# Patient Record
Sex: MALE | Race: WHITE | NOT HISPANIC OR LATINO | Employment: OTHER | URBAN - METROPOLITAN AREA
[De-identification: names, ages, dates, MRNs, and addresses within clinical notes are randomized per-mention and may not be internally consistent; named-entity substitution may affect disease eponyms.]

---

## 2017-02-06 ENCOUNTER — ALLSCRIPTS OFFICE VISIT (OUTPATIENT)
Dept: OTHER | Facility: OTHER | Age: 77
End: 2017-02-06

## 2017-04-10 ENCOUNTER — ALLSCRIPTS OFFICE VISIT (OUTPATIENT)
Dept: OTHER | Facility: OTHER | Age: 77
End: 2017-04-10

## 2017-06-19 ENCOUNTER — ALLSCRIPTS OFFICE VISIT (OUTPATIENT)
Dept: OTHER | Facility: OTHER | Age: 77
End: 2017-06-19

## 2017-06-28 ENCOUNTER — ALLSCRIPTS OFFICE VISIT (OUTPATIENT)
Dept: OTHER | Facility: OTHER | Age: 77
End: 2017-06-28

## 2017-09-01 ENCOUNTER — ALLSCRIPTS OFFICE VISIT (OUTPATIENT)
Dept: OTHER | Facility: OTHER | Age: 77
End: 2017-09-01

## 2017-11-13 ENCOUNTER — ALLSCRIPTS OFFICE VISIT (OUTPATIENT)
Dept: OTHER | Facility: OTHER | Age: 77
End: 2017-11-13

## 2017-11-14 NOTE — PROGRESS NOTES
Assessment  1  Atherosclerosis of arteries of extremities (440 20) (I70 209)   2  Callus (700) (L84)   3  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)   4  Diabetes mellitus with neuropathy (250 60,357 2) (E11 40)    Plan     · Follow-up visit in 9 weeks Evaluation and Treatment  Follow-up  Status: Hold For -Scheduling  Requested for: 17ESV7641   · Diabetes Foot Exam Performed; Status:Complete;   Done: 52LPU0600 01:44PM   · Inspect your feet daily ; Status:Complete;   Done: 95VBY3223 01:44PM   · It is important to take good care of your feet if you have diabetes ; Status:Complete;  Done: 50JRY0483 01:44PM    Discussion/Summary  The patient was counseled regarding instructions for management,-- prognosis,-- patient and family education  Patient is able to Self-Care  The treatment plan was reviewed with the patient/guardian  The patient/guardian understands and agrees with the treatment plan      Chief Complaint  Follow up      History of Present Illness  HPI: Patient had painful reaction to the last treatment  He presents for evaluation      Review of Systems   Constitutional: No fever or chills, feels well, no tiredness, no recent weight loss or weight gain  Eyes: No complaints of red eyes, no eyesight problems  ENT: no complaints of loss of hearing, no nosebleeds, no sore throat  Cardiovascular: No complaints of chest pain, no palpitations, no leg claudication or lower extremity edema  Respiratory: No complaints of shortness of breath, no wheezing, no cough  Gastrointestinal: No complaints of abdominal pain, no constipation, no nausea or vomiting, no diarrhea or bloody stools  Genitourinary: No complaints of dysuria or incontinence, no hesitancy, no nocturia  Musculoskeletal: limb pain, but-- as noted in HPI  Integumentary: No complaints of skin rash or lesion, no itching or dry skin, no skin wounds  Neurological: No complaints of headache, no confusion, no numbness or tingling, no dizziness  Psychiatric: No suicidal thoughts, no anxiety, no depression  Endocrine: No muscle weakness, no frequent urination, no excessive thirst, no feelings of weakness  Active Problems  1  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)   2  Arthritis (716 90) (M19 90)   3  Atherosclerosis of arteries of extremities (440 20) (I70 209)   4  Calcaneal spur (726 73) (M77 30)   5  Callus (700) (L84)   6  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)   7  Diabetes mellitus with neuropathy (250 60,357 2) (E11 40)   8  Diabetic neuropathy (250 60,357 2) (E11 40)   9  Hypercholesterolemia (272 0) (E78 00)   10  Hypertension (401 9) (I10)   11  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)   12  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)   13  Onychomycosis (110 1) (B35 1)   14  Orthopedic aftercare (V54 9) (Z47 89)   15  Pain in both feet (729 5) (M79 671,M79 672)   16  Pain in extremity (729 5) (M79 609)   17  Pes planus, congenital (754 61) (Q66 50)   18  Plantar 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 2)   19  Plantar wart (078 12) (B07 0)   20  Prostate cancer (185) (C61)   21  Sprain of right shoulder (840 9) (S43 401A)   22  Tinea pedis (110 4) (B35 3)    Past Medical History   · Orthopedic aftercare (V54 9) (Z47 89)    Surgical History   · History of Gastric Surgery   · History of Hernia Repair   · History of Previous Stent Placement Total Number Performed ___    The surgical history was reviewed and updated today  Family History  Family History    · Family history of Arthritis (V17 7)   · Family history of Cancer    The family history was reviewed and updated today  Social History     · Beer Consumption (___ Airpersons Per Day)   · Daily Coffee Consumption (1  Cups/Day)   · Former smoker (Q55 88) (R14 072)  The social history was reviewed and updated today  Current Meds   1  Aspirin 81 MG Oral Tablet Chewable; Therapy: (Ella Tolentino) to Recorded   2   Atenolol 100 MG Oral Tablet; Therapy: (Sarah Hall) to Recorded   3  Clotrimazole-Betamethasone 1-0 05 % External Cream; APPLY SPARINGLY TO THE AFFECTED AREA(S) TWICE DAILY; Therapy: 50AEP5173 to (Evaluate:96Dlo8617)  Requested for: 25QUC5841; Last Rx:16Jln3444 Ordered   4  Clotrimazole-Betamethasone 1-0 05 % External Cream; APPLY SPARINGLY TO THE AFFECTED AREA(S) TWICE DAILY; Therapy: 77UXH3102 to (Evaluate:06Jun2017)  Requested for: 00GVR6884; Last Rx:85Nin0834 Ordered   5  Jublia 10 % External Solution; apply to nails once daily; Therapy: 22RUU9400 to (Evaluate:12Oct2015)  Requested for: 73Oxl4083; Last Rx:28Ugs8204 Ordered   6  Lipitor 40 MG Oral Tablet; Therapy: (Sarah Hall) to Recorded   7  MetFORMIN HCl - 500 MG Oral Tablet; Therapy: (Alice Jules) to Recorded   8  Multi-Vitamin TABS; Therapy: (Sarah Hall) to Recorded   9  Omega-3 Fish Oil CAPS; Therapy: (Sarah Hall) to Recorded   10  Omeprazole 20 MG Oral Capsule Delayed Release; Therapy: (Sarah Hall) to Recorded   11  Sulindac 150 MG Oral Tablet; one tab po bid pc; Therapy: 80CFN1977 to (Evaluate:05Ufs8040)  Requested for: 39VXD9865; Last  Rx:00Qly7855 Ordered   12  Terbinafine HCl - 250 MG Oral Tablet; one tab po qod; Therapy: 67UNB8678 to (Evaluate:97Qxd0433)  Requested for: 33XMP6154; Last  Rx:25Frb5992 Ordered   13  Travatan Z 0 004 % Ophthalmic Solution; Therapy: (Sarah Hall) to Recorded   14  Xalatan 0 005 % Ophthalmic Solution; Therapy: (Recorded:27Xmb8772) to Recorded    Allergies  1  No Known Drug Allergies    Vitals   Recorded: 86MBU5842 01:32PM   Heart Rate 65   Respiration 17   Systolic 785   Diastolic 65   Height 5 ft 11 in   Weight 245 lb    BMI Calculated 34 17   BSA Calculated 2 3       Physical Exam  Left Foot: Appearance: Normal except as noted: excessive pronation-- and-- pes planus  Right Foot: Appearance: Normal except as noted: excessive pronation-- and-- pes planus     Left Ankle: ROM: limited ROM in all planes   Right Ankle: ROM: limited ROM in all planes   Neurological Exam: performed  Light touch was intact bilaterally  Vibratory sensation was intact bilaterally  Response to monofilament test was intact bilaterally  Deep tendon reflexes: patellar reflex present bilaterally-- and-- achilles reflex present bilaterally  Vascular Exam: performed Dorsalis pedis pulses were One/4 bilaterally  Posterior tibial pulses were One/4 bilaterally  Elevation Pallor: present bilaterally  Capillary refill time was greater than 3 seconds bilaterally-- and-- Q  9 findings bilateral  Negative digital hair noted  Positive abnormal cooling noted  Toenails: All of the toenails were elongated,-- hypertrophied,-- discolored,-- ingrown,-- shown to have subungual debris,-- tender-- and-- Severely mycotic with onychauxis  Socks and shoes removed, Right Foot Findings: swollen, erythematous and dry  The sensory exam showed diminished vibratory sensation at the level of the toes  Diminished tactile sensation with monofilament testing throughout the right foot  Socks and shoes removed, Left Foot Findings: swollen, erythematous and dry  The sensory exam showed diminished vibratory sensation at the level of the toes  Diminished tactile sensation with monofilament testing throughout the left foot  Capillary refills findings on the right were delayed in the toes  Pulses:  1+ in the posterior tibialis on the right  1+ in the dorsalis pedis on the right  Capillary refills findings on the left were delayed in the toes  Pulses:  1+ in the posterior tibialis on the left  1+ in the dorsalis pedis on the left  Assign Risk Category: 2: Loss of protective sensation with or without weakness, deformity, callus, pre-ulcer, or history of ulceration  High risk  Hyperkeratosis: present on both first toes,-- present on both fifth sub metatarsals-- and-- [place] tinea pedis, bilateral, is noted  Shoe Gear Evaluation: performed ()  Right Foot: width: d-- and-- length: 11  Left Foot: width: d-- and-- length: 11  Recommendation(s): athletic shoes-- and-- SAS style  Procedure  Diabetic foot exam performed  Patient educated on care of the diabetic foot  Mycotic nails debrided  Plantar lesions debrided  Procedures performed without pain or complication   Patient will wear diabetic shoes daily      Signatures   Electronically signed by : Paul Barber DPM; Nov 13 2017  1:45PM EST                       (Author)

## 2018-01-12 VITALS
HEIGHT: 71 IN | HEART RATE: 76 BPM | RESPIRATION RATE: 17 BRPM | DIASTOLIC BLOOD PRESSURE: 68 MMHG | SYSTOLIC BLOOD PRESSURE: 130 MMHG | WEIGHT: 245 LBS | BODY MASS INDEX: 34.3 KG/M2

## 2018-01-13 VITALS
HEIGHT: 71 IN | SYSTOLIC BLOOD PRESSURE: 136 MMHG | HEART RATE: 65 BPM | DIASTOLIC BLOOD PRESSURE: 65 MMHG | WEIGHT: 245 LBS | BODY MASS INDEX: 34.3 KG/M2 | RESPIRATION RATE: 17 BRPM

## 2018-01-13 VITALS
RESPIRATION RATE: 16 BRPM | DIASTOLIC BLOOD PRESSURE: 80 MMHG | BODY MASS INDEX: 34.3 KG/M2 | SYSTOLIC BLOOD PRESSURE: 135 MMHG | WEIGHT: 245 LBS | HEART RATE: 65 BPM | HEIGHT: 71 IN

## 2018-01-14 VITALS
RESPIRATION RATE: 16 BRPM | HEART RATE: 72 BPM | BODY MASS INDEX: 34.3 KG/M2 | SYSTOLIC BLOOD PRESSURE: 131 MMHG | DIASTOLIC BLOOD PRESSURE: 85 MMHG | HEIGHT: 71 IN | WEIGHT: 245 LBS

## 2018-01-15 VITALS
RESPIRATION RATE: 16 BRPM | HEIGHT: 71 IN | DIASTOLIC BLOOD PRESSURE: 82 MMHG | BODY MASS INDEX: 34.3 KG/M2 | SYSTOLIC BLOOD PRESSURE: 134 MMHG | HEART RATE: 74 BPM | WEIGHT: 245 LBS

## 2018-01-15 VITALS
HEART RATE: 59 BPM | BODY MASS INDEX: 34.3 KG/M2 | DIASTOLIC BLOOD PRESSURE: 87 MMHG | WEIGHT: 245 LBS | HEIGHT: 71 IN | SYSTOLIC BLOOD PRESSURE: 125 MMHG | RESPIRATION RATE: 16 BRPM

## 2018-02-15 ENCOUNTER — OFFICE VISIT (OUTPATIENT)
Dept: PODIATRY | Facility: CLINIC | Age: 78
End: 2018-02-15
Payer: COMMERCIAL

## 2018-02-15 VITALS
SYSTOLIC BLOOD PRESSURE: 153 MMHG | WEIGHT: 245 LBS | DIASTOLIC BLOOD PRESSURE: 93 MMHG | HEIGHT: 71 IN | BODY MASS INDEX: 34.3 KG/M2

## 2018-02-15 DIAGNOSIS — I70.209 PERIPHERAL ARTERIOSCLEROSIS (HCC): ICD-10-CM

## 2018-02-15 DIAGNOSIS — E11.42 DIABETIC POLYNEUROPATHY ASSOCIATED WITH TYPE 2 DIABETES MELLITUS (HCC): ICD-10-CM

## 2018-02-15 DIAGNOSIS — M79.671 PAIN IN BOTH FEET: ICD-10-CM

## 2018-02-15 DIAGNOSIS — B35.1 ONYCHOMYCOSIS: ICD-10-CM

## 2018-02-15 DIAGNOSIS — L84 CORNS: ICD-10-CM

## 2018-02-15 DIAGNOSIS — B35.3 TINEA PEDIS OF BOTH FEET: Primary | ICD-10-CM

## 2018-02-15 DIAGNOSIS — M79.672 PAIN IN BOTH FEET: ICD-10-CM

## 2018-02-15 PROCEDURE — 11056 PARNG/CUTG B9 HYPRKR LES 2-4: CPT | Performed by: PODIATRIST

## 2018-02-15 PROCEDURE — 99212 OFFICE O/P EST SF 10 MIN: CPT | Performed by: PODIATRIST

## 2018-02-15 RX ORDER — ATENOLOL 100 MG/1
100 TABLET ORAL DAILY
COMMUNITY

## 2018-02-15 RX ORDER — ASPIRIN 81 MG/1
81 TABLET, CHEWABLE ORAL DAILY
COMMUNITY
End: 2021-09-30 | Stop reason: HOSPADM

## 2018-02-15 RX ORDER — ATORVASTATIN CALCIUM 40 MG/1
40 TABLET, FILM COATED ORAL EVERY EVENING
COMMUNITY
Start: 2017-12-18

## 2018-02-15 RX ORDER — LATANOPROST 50 UG/ML
1 SOLUTION/ DROPS OPHTHALMIC
COMMUNITY

## 2018-02-15 RX ORDER — CLOTRIMAZOLE AND BETAMETHASONE DIPROPIONATE 10; .64 MG/G; MG/G
CREAM TOPICAL
COMMUNITY
Start: 2017-11-15 | End: 2018-02-15 | Stop reason: SDUPTHER

## 2018-02-15 RX ORDER — CHLORAL HYDRATE 500 MG
CAPSULE ORAL
COMMUNITY
End: 2021-11-22

## 2018-02-15 RX ORDER — EMPAGLIFLOZIN 25 MG/1
25 TABLET, FILM COATED ORAL
COMMUNITY
Start: 2017-12-28

## 2018-02-15 RX ORDER — ATORVASTATIN CALCIUM 40 MG/1
TABLET, FILM COATED ORAL
COMMUNITY
End: 2020-04-30 | Stop reason: SDUPTHER

## 2018-02-15 RX ORDER — TRAVOPROST OPHTHALMIC SOLUTION 0.04 MG/ML
SOLUTION OPHTHALMIC
COMMUNITY
End: 2021-10-05 | Stop reason: ALTCHOICE

## 2018-02-15 RX ORDER — CLOTRIMAZOLE AND BETAMETHASONE DIPROPIONATE 10; .64 MG/G; MG/G
CREAM TOPICAL 2 TIMES DAILY
Qty: 30 G | Refills: 2 | Status: SHIPPED | OUTPATIENT
Start: 2018-02-15 | End: 2018-07-05 | Stop reason: SDUPTHER

## 2018-02-15 RX ORDER — OMEPRAZOLE 20 MG/1
20 CAPSULE, DELAYED RELEASE ORAL DAILY
COMMUNITY

## 2018-02-15 NOTE — PROGRESS NOTES
Assessment/Plan:  Patient has pain in his feet and toes with ambulation  He has mycosis of nail and skin  He has plantar lesions  Plan  Diabetic foot exam performed  Mycotic nails debrided  Refill of cream ordered  No problem-specific Assessment & Plan notes found for this encounter  There are no diagnoses linked to this encounter  Subjective:      Patient ID: Alice Pang is a 78 y o  male  Patient is diabetic  He has pain in his feet and toes with ambulation  Review of Systems   Constitutional: No fever or chills, feels well, no tiredness, no recent weight loss or weight gain  Eyes: No complaints of red eyes, no eyesight problems  ENT: no complaints of loss of hearing, no nosebleeds, no sore throat  Cardiovascular: No complaints of chest pain, no palpitations, no leg claudication or lower extremity edema  Respiratory: No complaints of shortness of breath, no wheezing, no cough  Gastrointestinal: No complaints of abdominal pain, no constipation, no nausea or vomiting, no diarrhea or bloody stools  Genitourinary: No complaints of dysuria or incontinence, no hesitancy, no nocturia  Musculoskeletal: limb pain, but-- as noted in HPI  Integumentary: No complaints of skin rash or lesion, no itching or dry skin, no skin wounds  Neurological: No complaints of headache, no confusion, no numbness or tingling, no dizziness  Psychiatric: No suicidal thoughts, no anxiety, no depression  Endocrine: No muscle weakness, no frequent urination, no excessive thirst, no feelings of weakness  Active Problems  1  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)   2  Arthritis (716 90) (M19 90)   3  Atherosclerosis of arteries of extremities (440 20) (I70 209)   4  Calcaneal spur (726 73) (M77 30)   5  Callus (700) (L84)   6  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)   7  Diabetes mellitus with neuropathy (250 60,357 2) (E11 40)   8  Diabetic neuropathy (250 60,357  2) (E11 40)   9  Hypercholesterolemia (272 0) (E78 00)   10  Hypertension (401 9) (I10)   11  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)   12  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)   13  Onychomycosis (110 1) (B35 1)   14  Orthopedic aftercare (V54 9) (Z47 89)   15  Pain in both feet (729 5) (M79 671,M79 672)   16  Pain in extremity (729 5) (M79 609)   17  Pes planus, congenital (754 61) (Q66 50)   18  Plantar 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 2)   19  Plantar wart (078 12) (B07 0)   20  Prostate cancer (185) (C61)   21  Sprain of right shoulder (840 9) (S43 401A)   22  Tinea pedis (110 4) (B35 3)     Past Medical History   · Orthopedic aftercare (V54 9) (Z47 89)     Surgical History   · History of Gastric Surgery   · History of Hernia Repair   · History of Previous Stent Placement Total Number Performed ___     The surgical history was reviewed and updated today  Family History  Family History    · Family history of Arthritis (V17 7)   · Family history of Cancer     The family history was reviewed and updated today  Social History      · Beer Consumption (___ Bottles Per Day)   · Daily Coffee Consumption (1  Cups/Day)   · Former smoker (G44 43) (K51 015)  The social history was reviewed and updated today  Current Meds   1  Aspirin 81 MG Oral Tablet Chewable; Therapy: (Anna Saupe) to Recorded   2  Atenolol 100 MG Oral Tablet; Therapy: (Coleman Saupe) to Recorded   3  Clotrimazole-Betamethasone 1-0 05 % External Cream; APPLY SPARINGLY TO THE AFFECTED AREA(S) TWICE DAILY; Therapy: 57MKM6223 to (Evaluate:08Wyt9288)  Requested for: 65LVX9840; Last Rx:54Ymb7069 Ordered   4  Clotrimazole-Betamethasone 1-0 05 % External Cream; APPLY SPARINGLY TO THE AFFECTED AREA(S) TWICE DAILY; Therapy: 65DHG4023 to (Evaluate:06Jun2017)  Requested for: 13DWQ9877; Last Rx:78Ofv2571 Ordered   5  Jublia 10 % External Solution; apply to nails once daily;  Therapy: 46WKP7476 to (Evaluate:12Oct2015)  Requested for: 67Ibf1813; Last Rx:46Pnl0684 Ordered   6  Lipitor 40 MG Oral Tablet; Therapy: (Jb Simmering) to Recorded   7  MetFORMIN HCl - 500 MG Oral Tablet; Therapy: (Clarence Sorto) to Recorded   8  Multi-Vitamin TABS; Therapy: (Ruven Simmering) to Recorded   9  Omega-3 Fish Oil CAPS; Therapy: (Jbven Simmering) to Recorded   10  Omeprazole 20 MG Oral Capsule Delayed Release; Therapy: (Jb Simmering) to Recorded   11  Sulindac 150 MG Oral Tablet; one tab po bid pc; Therapy: 99MCC3144 to (Evaluate:97Ksf4850)  Requested for: 26ICD2660; Last  Rx:74Mra6541 Ordered   12  Terbinafine HCl - 250 MG Oral Tablet; one tab po qod; Therapy: 76XLZ6744 to (Evaluate:91Dyj8118)  Requested for: 73FLA6419; Last  Rx:00Mky4530 Ordered   13  Travatan Z 0 004 % Ophthalmic Solution; Therapy: (Jb Simmering) to Recorded   14  Xalatan 0 005 % Ophthalmic Solution; Therapy: (Recorded:34Dat0342) to Recorded     Allergies  1  No Known Drug Allergies     Vitals        Physical Exam  Left Foot: Appearance: Normal except as noted: excessive pronation-- and-- pes planus  Right Foot: Appearance: Normal except as noted: excessive pronation-- and-- pes planus  Left Ankle: ROM: limited ROM in all planes   Right Ankle: ROM: limited ROM in all planes   Neurological Exam: performed  Light touch was intact bilaterally  Vibratory sensation was intact bilaterally  Response to monofilament test was intact bilaterally  Deep tendon reflexes: patellar reflex present bilaterally-- and-- achilles reflex present bilaterally  Vascular Exam: performed Dorsalis pedis pulses were One/4 bilaterally  Posterior tibial pulses were One/4 bilaterally  Elevation Pallor: present bilaterally  Capillary refill time was greater than 3 seconds bilaterally-- and-- Q  9 findings bilateral  Negative digital hair noted  Positive abnormal cooling noted  Toenails:  All of the toenails were elongated,-- hypertrophied,-- discolored,-- ingrown,-- shown to have subungual debris,-- tender-- and-- Severely mycotic with onychauxis  Socks and shoes removed, Right Foot Findings: swollen, erythematous and dry  The sensory exam showed diminished vibratory sensation at the level of the toes  Diminished tactile sensation with monofilament testing throughout the right foot  Socks and shoes removed, Left Foot Findings: swollen, erythematous and dry  The sensory exam showed diminished vibratory sensation at the level of the toes  Diminished tactile sensation with monofilament testing throughout the left foot  Capillary refills findings on the right were delayed in the toes  Pulses:  1+ in the posterior tibialis on the right  1+ in the dorsalis pedis on the right  Capillary refills findings on the left were delayed in the toes  Pulses:  1+ in the posterior tibialis on the left  1+ in the dorsalis pedis on the left  Assign Risk Category: 2: Loss of protective sensation with or without weakness, deformity, callus, pre-ulcer, or history of ulceration  High risk  Hyperkeratosis: present on both first toes,-- present on both fifth sub metatarsals-- and-- [place] tinea pedis, bilateral, is noted  Shoe Gear Evaluation: performed ()  Right Foot: width: d-- and-- length: 11  Left Foot: width: d-- and-- length: 11  Recommendation(s): athletic shoes  The following portions of the patient's history were reviewed and updated as appropriate: allergies, current medications, past family history, past medical history, past social history, past surgical history and problem list     Review of Systems      Objective:      Foot Exam    Right Foot/Ankle     Inspection and Palpation  Skin Exam: callus and dry skin;     Neurovascular  Dorsalis pedis: 1+  Posterior tibial: 1+      Left Foot/Ankle      Inspection and Palpation  Skin Exam: callus and dry skin;     Neurovascular  Dorsalis pedis: 1+  Posterior tibial: 1+        Physical Exam Cardiovascular: Pulses are weak pulses  Pulses:       Dorsalis pedis pulses are 1+ on the right side, and 1+ on the left side  Posterior tibial pulses are 1+ on the right side, and 1+ on the left side  Feet:   Right Foot:   Skin Integrity: Positive for callus and dry skin  Left Foot:   Skin Integrity: Positive for callus and dry skin  Patient's shoes and socks removed  Right Foot/Ankle   Right Foot Inspection  Skin Exam: dry skin, callus and callus                          Toe Exam: swelling  Sensory   Vibration: diminished  Proprioception: diminished   Monofilament testing: diminished  Vascular  Capillary refills: elevated  The right DP pulse is 1+  The right PT pulse is 1+  Left Foot/Ankle  Left Foot Inspection  Skin Exam: dry skin and callus                         Toe Exam: swelling                   Sensory   Vibration: diminished  Proprioception: diminished  Monofilament: diminished  Vascular  Capillary refills: elevated  The left DP pulse is 1+  The left PT pulse is 1+  Assign Risk Category:  Deformity present;  Loss of protective sensation; Weak pulses       Risk: 2

## 2018-04-26 ENCOUNTER — OFFICE VISIT (OUTPATIENT)
Dept: PODIATRY | Facility: CLINIC | Age: 78
End: 2018-04-26
Payer: COMMERCIAL

## 2018-04-26 VITALS
HEIGHT: 71 IN | DIASTOLIC BLOOD PRESSURE: 104 MMHG | BODY MASS INDEX: 34.3 KG/M2 | SYSTOLIC BLOOD PRESSURE: 152 MMHG | WEIGHT: 245 LBS

## 2018-04-26 DIAGNOSIS — M79.671 PAIN IN BOTH FEET: ICD-10-CM

## 2018-04-26 DIAGNOSIS — M79.672 PAIN IN BOTH FEET: ICD-10-CM

## 2018-04-26 DIAGNOSIS — I70.209 PERIPHERAL ARTERIOSCLEROSIS (HCC): Primary | ICD-10-CM

## 2018-04-26 DIAGNOSIS — L84 CORNS: ICD-10-CM

## 2018-04-26 DIAGNOSIS — E11.42 DIABETIC POLYNEUROPATHY ASSOCIATED WITH TYPE 2 DIABETES MELLITUS (HCC): ICD-10-CM

## 2018-04-26 PROCEDURE — 11056 PARNG/CUTG B9 HYPRKR LES 2-4: CPT | Performed by: PODIATRIST

## 2018-04-26 NOTE — PROGRESS NOTES
Procedures   Foot Exam     Assessment/Plan:  Patient has pain in his feet and toes with ambulation  He has mycosis of nail and skin  He has plantar lesions      Plan  Diabetic foot exam performed  Mycotic nails debrided  Refill of cream ordered      No problem-specific Assessment & Plan notes found for this encounter          There are no diagnoses linked to this encounter        Subjective:       Patient ID: Kitty Batista is a 78 y o  male      Patient is diabetic  He has pain in his feet and toes with ambulation         Review of Systems   Constitutional: No fever or chills, feels well, no tiredness, no recent weight loss or weight gain   Eyes: No complaints of red eyes, no eyesight problems    ENT: no complaints of loss of hearing, no nosebleeds, no sore throat   Cardiovascular: No complaints of chest pain, no palpitations, no leg claudication or lower extremity edema   Respiratory: No complaints of shortness of breath, no wheezing, no cough   Gastrointestinal: No complaints of abdominal pain, no constipation, no nausea or vomiting, no diarrhea or bloody stools   Genitourinary: No complaints of dysuria or incontinence, no hesitancy, no nocturia   Musculoskeletal: limb pain, but-- as noted in HPI   Integumentary: No complaints of skin rash or lesion, no itching or dry skin, no skin wounds   Neurological: No complaints of headache, no confusion, no numbness or tingling, no dizziness   Psychiatric: No suicidal thoughts, no anxiety, no depression   Endocrine: No muscle weakness, no frequent urination, no excessive thirst, no feelings of weakness       Active Problems  1  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)   2  Arthritis (716 90) (M19 90)   3  Atherosclerosis of arteries of extremities (440 20) (I70 209)   4  Calcaneal spur (726 73) (M77 30)   5  Callus (700) (L84)   6  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)   7  Diabetes mellitus with neuropathy (250 60,357 2) (E11 40)   8  Diabetic neuropathy (250 60,357 2) (E11 40)   9  Hypercholesterolemia (272 0) (E78 00)   10  Hypertension (401 9) (I10)   11  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)   12  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)   13  Onychomycosis (110 1) (B35 1)   14  Orthopedic aftercare (V54 9) (Z47 89)   15  Pain in both feet (729 5) (M79 671,M79 672)   16  Pain in extremity (729 5) (M79 609)   17  Pes planus, congenital (754 61) (Q66 50)   18  Plantar 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 2)   19  Plantar wart (078 12) (B07 0)   20  Prostate cancer (185) (C61)   21  Sprain of right shoulder (840 9) (S43 401A)   22  Tinea pedis (110 4) (B35 3)     Past Medical History   · Orthopedic aftercare (V54 9) (Z47 89)     Surgical History   · History of Gastric Surgery   · History of Hernia Repair   · History of Previous Stent Placement Total Number Performed ___     The surgical history was reviewed and updated today        Family History  Family History    · Family history of Arthritis (V17 7)   · Family history of Cancer     The family history was reviewed and updated today        Social History      · Beer Consumption (___ Bottles Per Day)   · Daily Coffee Consumption (1  Cups/Day)   · Former smoker (F89 13) (Z87 891)  The social history was reviewed and updated today       Current Meds   1  Aspirin 81 MG Oral Tablet Chewable; Therapy: (Charlies Resides) to Recorded   2  Atenolol 100 MG Oral Tablet; Therapy: (Charlies Resides) to Recorded   3  Clotrimazole-Betamethasone 1-0 05 % External Cream; APPLY SPARINGLY TO THE AFFECTED AREA(S) TWICE DAILY; Therapy: 89AQE4322 to (Evaluate:33Hds0566)  Requested for: 22FKE5765; Last Rx:26May2016 Ordered   4  Clotrimazole-Betamethasone 1-0 05 % External Cream; APPLY SPARINGLY TO THE AFFECTED AREA(S) TWICE DAILY;  Therapy: 99Avk3994 to (Evaluate:06Jun2017)  Requested for: 48VVU1701; Last Rx:32Ujv3464 Ordered   5  Jublia 10 % External Solution; apply to nails once daily; Therapy: 73RBC1223 to (Evaluate:12Oct2015)  Requested for: 42Ymf6905; Last Rx:73Tbo1659 Ordered   6  Lipitor 40 MG Oral Tablet; Therapy: (Dorise Bees) to Recorded   7  MetFORMIN HCl - 500 MG Oral Tablet; Therapy: (Recorded:70Vai1563) to Recorded   8  Multi-Vitamin TABS; Therapy: (Dorise Bees) to Recorded   9  Omega-3 Fish Oil CAPS; Therapy: (Dorise Bees) to Recorded   10  Omeprazole 20 MG Oral Capsule Delayed Release;  Therapy: (Dorise Bees) to Recorded   11  Sulindac 150 MG Oral Tablet; one tab po bid pc; Mohit Mast: 48GUU9809 to (Evaluate:19May2015)  Requested for: 78WOL7942; Last  Rx:02Kqd1754 Ordered   12  Terbinafine HCl - 250 MG Oral Tablet; one tab po qod; Mohit Balbuenaer: 60RLA0598 to (Evaluate:95Acq2898)  Requested for: 24OBZ4909; Last  Rx:43Axc0818 Ordered   13  Travatan Z 0 004 % Ophthalmic Solution;  Therapy: (Recorded:25Jun2012) to Recorded   14  Xalatan 0 005 % Ophthalmic Solution;  Therapy: (Recorded:65Adx8353) to Recorded     Allergies  1  No Known Drug Allergies     Vitals        Physical Exam  Left Foot: Appearance: Normal except as noted: excessive pronation-- and-- pes planus  Right Foot: Appearance: Normal except as noted: excessive pronation-- and-- pes planus  Left Ankle: ROM: limited ROM in all planes   Right Ankle: ROM: limited ROM in all planes   Neurological Exam: performed  Light touch was intact bilaterally  Vibratory sensation was intact bilaterally  Response to monofilament test was intact bilaterally  Deep tendon reflexes: patellar reflex present bilaterally-- and-- achilles reflex present bilaterally  Vascular Exam: performed Dorsalis pedis pulses were One/4 bilaterally  Posterior tibial pulses were One/4 bilaterally  Elevation Pallor: present bilaterally  Capillary refill time was greater than 3 seconds bilaterally-- and-- Q  9 findings bilateral  Negative digital hair noted  Positive abnormal cooling noted  Toenails:  All of the toenails were elongated,-- hypertrophied,-- discolored,-- ingrown,-- shown to have subungual debris,-- tender-- and-- Severely mycotic with onychauxis     Socks and shoes removed, Right Foot Findings: swollen, erythematous and dry   The sensory exam showed diminished vibratory sensation at the level of the toes  Diminished tactile sensation with monofilament testing throughout the right foot   Socks and shoes removed, Left Foot Findings: swollen, erythematous and dry   The sensory exam showed diminished vibratory sensation at the level of the toes  Diminished tactile sensation with monofilament testing throughout the left foot  Capillary refills findings on the right were delayed in the toes   Pulses:  1+ in the posterior tibialis on the right  1+ in the dorsalis pedis on the right   Capillary refills findings on the left were delayed in the toes   Pulses:  1+ in the posterior tibialis on the left  1+ in the dorsalis pedis on the left   Assign Risk Category: 2: Loss of protective sensation with or without weakness, deformity, callus, pre-ulcer, or history of ulceration  High risk  Hyperkeratosis: present on both first toes,-- present on both fifth sub metatarsals-- and-- Ramer tinea pedis, bilateral, is noted  Shoe Gear Evaluation: performed ()  Right Foot: width: d-- and-- length: 11   Left Foot: width: d-- and-- length: 11  Recommendation(s): athletic shoes  The following portions of the patient's history were reviewed and updated as appropriate: allergies, current medications, past family history, past medical history, past social history, past surgical history and problem list      Review of Systems       Objective:      Foot Exam     Right Foot/Ankle      Inspection and Palpation  Skin Exam: callus and dry skin;      Neurovascular  Dorsalis pedis: 1+  Posterior tibial: 1+        Left Foot/Ankle       Inspection and Palpation  Skin Exam: callus and dry skin;      Neurovascular  Dorsalis pedis: 1+  Posterior tibial: 1+        Physical Exam   Cardiovascular: Pulses are weak pulses  Pulses:       Dorsalis pedis pulses are 1+ on the right side, and 1+ on the left side  Posterior tibial pulses are 1+ on the right side, and 1+ on the left side  Feet:   Right Foot:   Skin Integrity: Positive for callus and dry skin  Left Foot:   Skin Integrity: Positive for callus and dry skin  Patient's shoes and socks removed  Right Foot/Ankle   Right Foot Inspection  Skin Exam: dry skin, callus and callus                           Toe Exam: swelling  Sensory   Vibration: diminished  Proprioception: diminished   Monofilament testing: diminished  Vascular  Capillary refills: elevated  The right DP pulse is 1+  The right PT pulse is 1+       Left Foot/Ankle  Left Foot Inspection  Skin Exam: dry skin and callus                          Toe Exam: swelling                   Sensory   Vibration: diminished  Proprioception: diminished  Monofilament: diminished  Vascular  Capillary refills: elevated  The left DP pulse is 1+  The left PT pulse is 1+  Assign Risk Category:  Deformity present;  Loss of protective sensation; Weak pulses       Risk: 2

## 2018-07-05 ENCOUNTER — OFFICE VISIT (OUTPATIENT)
Dept: PODIATRY | Facility: CLINIC | Age: 78
End: 2018-07-05
Payer: COMMERCIAL

## 2018-07-05 VITALS
DIASTOLIC BLOOD PRESSURE: 77 MMHG | RESPIRATION RATE: 17 BRPM | HEIGHT: 71 IN | HEART RATE: 65 BPM | WEIGHT: 245 LBS | BODY MASS INDEX: 34.3 KG/M2 | SYSTOLIC BLOOD PRESSURE: 115 MMHG

## 2018-07-05 DIAGNOSIS — I70.209 PERIPHERAL ARTERIOSCLEROSIS (HCC): ICD-10-CM

## 2018-07-05 DIAGNOSIS — M79.672 PAIN IN BOTH FEET: ICD-10-CM

## 2018-07-05 DIAGNOSIS — E11.42 DIABETIC POLYNEUROPATHY ASSOCIATED WITH TYPE 2 DIABETES MELLITUS (HCC): ICD-10-CM

## 2018-07-05 DIAGNOSIS — M79.671 PAIN IN BOTH FEET: ICD-10-CM

## 2018-07-05 DIAGNOSIS — B35.1 ONYCHOMYCOSIS: ICD-10-CM

## 2018-07-05 DIAGNOSIS — L84 CORNS: ICD-10-CM

## 2018-07-05 DIAGNOSIS — B35.3 TINEA PEDIS OF BOTH FEET: Primary | ICD-10-CM

## 2018-07-05 PROCEDURE — 99211 OFF/OP EST MAY X REQ PHY/QHP: CPT | Performed by: PODIATRIST

## 2018-07-05 PROCEDURE — 11056 PARNG/CUTG B9 HYPRKR LES 2-4: CPT | Performed by: PODIATRIST

## 2018-07-05 RX ORDER — CLOTRIMAZOLE AND BETAMETHASONE DIPROPIONATE 10; .64 MG/G; MG/G
CREAM TOPICAL 2 TIMES DAILY
Qty: 30 G | Refills: 0 | Status: SHIPPED | OUTPATIENT
Start: 2018-07-05 | End: 2018-08-02

## 2018-07-05 NOTE — PROGRESS NOTES
Procedures   Foot Exam       Assessment/Plan:  Patient has pain in his feet and toes with ambulation   He has mycosis of nail and skin  He has plantar lesions      Plan   Diabetic foot exam performed   Mycotic nails debrided   Refill of cream ordered      No problem-specific Assessment & Plan notes found for this encounter          There are no diagnoses linked to this encounter        Subjective:       Patient ID: Spenser Clayton is a 78 y  o  male      Patient is diabetic  Aga Yipt has pain in his feet and toes with ambulation         Review of Systems   Constitutional: No fever or chills, feels well, no tiredness, no recent weight loss or weight gain   Eyes: No complaints of red eyes, no eyesight problems    ENT: no complaints of loss of hearing, no nosebleeds, no sore throat   Cardiovascular: No complaints of chest pain, no palpitations, no leg claudication or lower extremity edema   Respiratory: No complaints of shortness of breath, no wheezing, no cough   Gastrointestinal: No complaints of abdominal pain, no constipation, no nausea or vomiting, no diarrhea or bloody stools   Genitourinary: No complaints of dysuria or incontinence, no hesitancy, no nocturia   Musculoskeletal: limb pain, but-- as noted in HPI   Integumentary: No complaints of skin rash or lesion, no itching or dry skin, no skin wounds   Neurological: No complaints of headache, no confusion, no numbness or tingling, no dizziness   Psychiatric: No suicidal thoughts, no anxiety, no depression   Endocrine: No muscle weakness, no frequent urination, no excessive thirst, no feelings of weakness       Active Problems  1  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)   2  Arthritis (716 90) (M19 90)   3  Atherosclerosis of arteries of extremities (440 20) (I70 209)   4  Calcaneal spur (726 73) (M77 30)   5  Callus (700) (L84)   6  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)   7  Diabetes mellitus with neuropathy (250 60,357 2) (E11 40)   8  Diabetic neuropathy (250 60,357 2) (E11 40)   9  Hypercholesterolemia (272 0) (E78 00)   10  Hypertension (401 9) (I10)   11  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)   12  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)   13  Onychomycosis (110 1) (B35 1)   14  Orthopedic aftercare (V54 9) (Z47 89)   15  Pain in both feet (729 5) (M79 671,M79 672)   16  Pain in extremity (729 5) (M79 609)   17  Pes planus, congenital (754 61) (Q66 50)   18  Plantar 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 2)   19  Plantar wart (078 12) (B07 0)   20  Prostate cancer (185) (C61)   21  Sprain of right shoulder (840 9) (S43 401A)   22  Tinea pedis (110 4) (B35 3)     Past Medical History   · Orthopedic aftercare (V54 9) (Z47 89)     Surgical History   · History of Gastric Surgery   · History of Hernia Repair   · History of Previous Stent Placement Total Number Performed ___     The surgical history was reviewed and updated today        Family History  Family History    · Family history of Arthritis (V17 7)   · Family history of Cancer     The family history was reviewed and updated today        Social History      · Beer Consumption (___ Bottles Per Day)   · Daily Coffee Consumption (1  Cups/Day)   · Former smoker (O83 29) (Z87 891)  The social history was reviewed and updated today       Current Meds   1  Aspirin 81 MG Oral Tablet Chewable; Therapy: (Thierno ) to Recorded   2  Atenolol 100 MG Oral Tablet; Therapy: (Thierno ) to Recorded   3  Clotrimazole-Betamethasone 1-0 05 % External Cream; APPLY SPARINGLY TO THE AFFECTED AREA(S) TWICE DAILY; Therapy: 96LDH7589 to (Evaluate:91Axk4703)  Requested for: 67HON6654; Last Rx:26May2016 Ordered   4  Clotrimazole-Betamethasone 1-0 05 % External Cream; APPLY SPARINGLY TO THE AFFECTED AREA(S) TWICE DAILY;  Therapy: 33Roz5971 to (Evaluate:06Jun2017)  Requested for: 08CQJ9682; Last Rx:34Mfv2189 Ordered   5  Jublia 10 % External Solution; apply to nails once daily; Therapy: 64RFC6543 to (Evaluate:12Oct2015)  Requested for: 18Mdw7267; Last Rx:89Wbh6671 Ordered   6  Lipitor 40 MG Oral Tablet; Therapy: (Kendall ) to Recorded   7  MetFORMIN HCl - 500 MG Oral Tablet; Therapy: (Recorded:91Fxl5310) to Recorded   8  Multi-Vitamin TABS; Therapy: (Kendall ) to Recorded   9  Omega-3 Fish Oil CAPS; Therapy: (Kendall ) to Recorded   10  Omeprazole 20 MG Oral Capsule Delayed Release;  Therapy: (Kendall ) to Recorded   11  Sulindac 150 MG Oral Tablet; one tab po bid pc; Zhane Sames: 70ANN7318 to (Evaluate:36Bzi8371)  Requested for: 93EPJ1744; Last  Rx:91Vlx2835 Ordered   12  Terbinafine HCl - 250 MG Oral Tablet; one tab po qod; Zhane Sames: 15FAM8703 to (Evaluate:33Bvf5458)  Requested for: 87SVX9252; Last  Rx:26Gvo0297 Ordered   13  Travatan Z 0 004 % Ophthalmic Solution;  Therapy: (Recorded:25Jun2012) to Recorded   14  Xalatan 0 005 % Ophthalmic Solution;  Therapy: (Recorded:06Ujr4356) to Recorded     Allergies  1  No Known Drug Allergies     Vitals        Physical Exam  Left Foot: Appearance: Normal except as noted: excessive pronation-- and-- pes planus  Right Foot: Appearance: Normal except as noted: excessive pronation-- and-- pes planus  Left Ankle: ROM: limited ROM in all planes   Right Ankle: ROM: limited ROM in all planes   Neurological Exam: performed  Light touch was intact bilaterally  Vibratory sensation was intact bilaterally  Response to monofilament test was intact bilaterally  Deep tendon reflexes: patellar reflex present bilaterally-- and-- achilles reflex present bilaterally  Vascular Exam: performed Dorsalis pedis pulses were One/4 bilaterally  Posterior tibial pulses were One/4 bilaterally  Elevation Pallor: present bilaterally  Capillary refill time was greater than 3 seconds bilaterally-- and-- Q  9 findings bilateral  Negative digital hair noted  Positive abnormal cooling noted  Toenails:  All of the toenails were elongated,-- hypertrophied,-- discolored,-- ingrown,-- shown to have subungual debris,-- tender-- and-- Severely mycotic with onychauxis     Socks and shoes removed, Right Foot Findings: swollen, erythematous and dry   The sensory exam showed diminished vibratory sensation at the level of the toes  Diminished tactile sensation with monofilament testing throughout the right foot   Socks and shoes removed, Left Foot Findings: swollen, erythematous and dry   The sensory exam showed diminished vibratory sensation at the level of the toes  Diminished tactile sensation with monofilament testing throughout the left foot  Capillary refills findings on the right were delayed in the toes   Pulses:  1+ in the posterior tibialis on the right  1+ in the dorsalis pedis on the right   Capillary refills findings on the left were delayed in the toes   Pulses:  1+ in the posterior tibialis on the left  1+ in the dorsalis pedis on the left   Assign Risk Category: 2: Loss of protective sensation with or without weakness, deformity, callus, pre-ulcer, or history of ulceration  High risk  Hyperkeratosis: present on both first toes,-- present on both fifth sub metatarsals-- and-- [place] tinea pedis, bilateral, is noted  Shoe Gear Evaluation: performed ()  Right Foot: width: d-- and-- length: 11   Left Foot: width: d-- and-- length: 11  Recommendation(s): athletic shoes  The following portions of the patient's history were reviewed and updated as appropriate: allergies, current medications, past family history, past medical history, past social history, past surgical history and problem list      Review of Systems       Objective:      Foot Exam     Right Foot/Ankle      Inspection and Palpation  Skin Exam: callus and dry skin;      Neurovascular  Dorsalis pedis: 1+  Posterior tibial: 1+        Left Foot/Ankle       Inspection and Palpation  Skin Exam: callus and dry skin;      Neurovascular  Dorsalis pedis: 1+  Posterior tibial: 1+        Physical Exam   Cardiovascular: Pulses are weak pulses  Pulses:       Dorsalis pedis pulses are 1+ on the right side, and 1+ on the left side         Posterior tibial pulses are 1+ on the right side, and 1+ on the left side  Feet:   Right Foot:   Skin Integrity: Positive for callus and dry skin  Left Foot:   Skin Integrity: Positive for callus and dry skin  Patient's shoes and socks removed  Right Foot/Ankle   Right Foot Inspection  Skin Exam: dry skin, callus and callus               Toe Exam: swelling  Sensory   Vibration: diminished  Proprioception: diminished   Monofilament testing: diminished  Vascular  Capillary refills: elevated  The right DP pulse is 1+  The right PT pulse is 1+       Left Foot/Ankle  Left Foot Inspection  Skin Exam: dry skin and callus              Toe Exam: swelling                   Sensory   Vibration: diminished  Proprioception: diminished  Monofilament: diminished  Vascular  Capillary refills: elevated  The left DP pulse is 1+  The left PT pulse is 1+  Assign Risk Category:  Deformity present;  Loss of protective sensation; Weak pulses       Risk: 2

## 2018-08-24 ENCOUNTER — HOSPITAL ENCOUNTER (OUTPATIENT)
Dept: RADIOLOGY | Facility: HOSPITAL | Age: 78
Discharge: HOME | End: 2018-08-24
Attending: INTERNAL MEDICINE
Payer: COMMERCIAL

## 2018-08-24 ENCOUNTER — HOSPITAL ENCOUNTER (OUTPATIENT)
Dept: RADIOLOGY | Facility: HOSPITAL | Age: 78
Discharge: HOME | End: 2018-08-24

## 2018-08-24 ENCOUNTER — TRANSCRIBE ORDERS (OUTPATIENT)
Dept: ADMINISTRATIVE | Facility: HOSPITAL | Age: 78
End: 2018-08-24

## 2018-08-24 DIAGNOSIS — M25.512 ACUTE PAIN OF LEFT SHOULDER: ICD-10-CM

## 2018-08-24 DIAGNOSIS — M25.512 ACUTE PAIN OF LEFT SHOULDER: Primary | ICD-10-CM

## 2018-08-24 PROCEDURE — 73030 X-RAY EXAM OF SHOULDER: CPT

## 2018-09-10 ENCOUNTER — HOSPITAL ENCOUNTER (OUTPATIENT)
Dept: RADIOLOGY | Facility: HOSPITAL | Age: 78
Discharge: HOME | End: 2018-09-10
Attending: INTERNAL MEDICINE
Payer: COMMERCIAL

## 2018-09-10 ENCOUNTER — TRANSCRIBE ORDERS (OUTPATIENT)
Dept: ADMINISTRATIVE | Facility: HOSPITAL | Age: 78
End: 2018-09-10

## 2018-09-10 DIAGNOSIS — R52 PAIN: Primary | ICD-10-CM

## 2018-09-10 DIAGNOSIS — R60.9 SWELLING: ICD-10-CM

## 2018-09-10 DIAGNOSIS — R52 PAIN: ICD-10-CM

## 2018-09-10 PROCEDURE — 72050 X-RAY EXAM NECK SPINE 4/5VWS: CPT

## 2018-09-18 ENCOUNTER — OFFICE VISIT (OUTPATIENT)
Dept: PODIATRY | Facility: CLINIC | Age: 78
End: 2018-09-18
Payer: COMMERCIAL

## 2018-09-18 VITALS
HEIGHT: 71 IN | BODY MASS INDEX: 34.3 KG/M2 | RESPIRATION RATE: 17 BRPM | SYSTOLIC BLOOD PRESSURE: 139 MMHG | WEIGHT: 245 LBS | HEART RATE: 62 BPM | DIASTOLIC BLOOD PRESSURE: 82 MMHG

## 2018-09-18 DIAGNOSIS — I70.209 PERIPHERAL ARTERIOSCLEROSIS (HCC): Primary | ICD-10-CM

## 2018-09-18 DIAGNOSIS — M79.672 PAIN IN BOTH FEET: ICD-10-CM

## 2018-09-18 DIAGNOSIS — L84 CORNS: ICD-10-CM

## 2018-09-18 DIAGNOSIS — E11.42 DIABETIC POLYNEUROPATHY ASSOCIATED WITH TYPE 2 DIABETES MELLITUS (HCC): ICD-10-CM

## 2018-09-18 DIAGNOSIS — M79.671 PAIN IN BOTH FEET: ICD-10-CM

## 2018-09-18 PROCEDURE — 11056 PARNG/CUTG B9 HYPRKR LES 2-4: CPT | Performed by: PODIATRIST

## 2018-09-18 NOTE — PROGRESS NOTES
Procedures   Foot Exam          Assessment/Plan:  Patient has pain in his feet and toes with ambulation   He has mycosis of nail and skin  He has plantar lesions      Plan   Diabetic foot exam performed   Mycotic nails debrided   Refill of cream ordered      No problem-specific Assessment & Plan notes found for this encounter          There are no diagnoses linked to this encounter        Subjective:       Patient ID: Spenser Clayton is a 78 y  o  male      Patient is diabetic  Arvis Yola has pain in his feet and toes with ambulation         Review of Systems   Constitutional: No fever or chills, feels well, no tiredness, no recent weight loss or weight gain   Eyes: No complaints of red eyes, no eyesight problems   ENT: no complaints of loss of hearing, no nosebleeds, no sore throat   Cardiovascular: No complaints of chest pain, no palpitations, no leg claudication or lower extremity edema   Respiratory: No complaints of shortness of breath, no wheezing, no cough   Gastrointestinal: No complaints of abdominal pain, no constipation, no nausea or vomiting, no diarrhea or bloody stools   Genitourinary: No complaints of dysuria or incontinence, no hesitancy, no nocturia   Musculoskeletal: limb pain, but-- as noted in HPI   Integumentary: No complaints of skin rash or lesion, no itching or dry skin, no skin wounds   Neurological: No complaints of headache, no confusion, no numbness or tingling, no dizziness   Psychiatric: No suicidal thoughts, no anxiety, no depression   Endocrine: No muscle weakness, no frequent urination, no excessive thirst, no feelings of weakness       Active Problems  1  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)   2  Arthritis (716 90) (M19 90)   3  Atherosclerosis of arteries of extremities (440 20) (I70 209)   4  Calcaneal spur (726 73) (M77 30)   5  Callus (700) (L84)   6  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)   7  Diabetes mellitus with neuropathy (250 60,357  2) (E11 40)   8  Diabetic neuropathy (250 60,357 2) (E11 40)   9  Hypercholesterolemia (272 0) (E78 00)   10  Hypertension (401 9) (I10)   11  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)   12  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)   13  Onychomycosis (110 1) (B35 1)   14  Orthopedic aftercare (V54 9) (Z47 89)   15  Pain in both feet (729 5) (M79 671,M79 672)   16  Pain in extremity (729 5) (M79 609)   17  Pes planus, congenital (754 61) (Q66 50)   18  Plantar 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 2)   19  Plantar wart (078 12) (B07 0)   20  Prostate cancer (185) (C61)   21  Sprain of right shoulder (840 9) (S43 401A)   22  Tinea pedis (110 4) (B35 3)     Past Medical History   · Orthopedic aftercare (V54 9) (Z47 89)     Surgical History   · History of Gastric Surgery   · History of Hernia Repair   · History of Previous Stent Placement Total Number Performed ___     The surgical history was reviewed and updated today        Family History  Family History    · Family history of Arthritis (V17 7)   · Family history of Cancer     The family history was reviewed and updated today        Social History      · Beer Consumption (___ Bottles Per Day)   · Daily Coffee Consumption (1  Cups/Day)   · Former smoker (A35 04) (Z87 891)  The social history was reviewed and updated today       Current Meds   1  Aspirin 81 MG Oral Tablet Chewable; Therapy: (Eugenio Fairview) to Recorded   2  Atenolol 100 MG Oral Tablet; Therapy: (Eugenio Fairview) to Recorded   3  Clotrimazole-Betamethasone 1-0 05 % External Cream; APPLY SPARINGLY TO THE AFFECTED AREA(S) TWICE DAILY; Therapy: 80WBF7886 to (Evaluate:72Ker0198)  Requested for: 07ISW0566; Last Rx:[id number] Ordered   4  Clotrimazole-Betamethasone 1-0 05 % External Cream; APPLY SPARINGLY TO THE AFFECTED AREA(S) TWICE DAILY;  Therapy: 45Eqb3554 to (Evaluate:01Rrc3751)  Requested for: 37GFF7589; Last Rx:[id number] Ordered   5  Jublia 10 % External Solution; apply to nails once daily; Therapy: 02PMD0087 to (Evaluate:12Oct2015)  Requested for: 37Unv2017; Last Rx:68Eet2368 Ordered   6  Lipitor 40 MG Oral Tablet; Therapy: (Sarah Hews) to Recorded   7  MetFORMIN HCl - 500 MG Oral Tablet; Therapy: (Recorded:57Omc0921) to Recorded   8  Multi-Vitamin TABS; Therapy: (Sarah Moodyws) to Recorded   9  Omega-3 Fish Oil CAPS; Therapy: (Sarah Moodyws) to Recorded   10  Omeprazole 20 MG Oral Capsule Delayed Release;  Therapy: (Sarah Hews) to Recorded   11  Sulindac 150 MG Oral Tablet; one tab po bid pc; Bobby Tiwari: 92QRG2664 to (Evaluate:19May2015)  Requested for: 74IEV0662; Last  Rx:12May2015 Ordered   12  Terbinafine HCl - 250 MG Oral Tablet; one tab po qod; Bobby Tiwari: 39DIW8643 to (Evaluate:68Lrv3113)  Requested for: 37NUM1450; Last  Rx:[id number] Ordered   13  Travatan Z 0 004 % Ophthalmic Solution;  Therapy: (Recorded:25Jun2012) to Recorded   14  Xalatan 0 005 % Ophthalmic Solution;  Therapy: (Recorded:20Sep2013) to Recorded     Allergies  1  No Known Drug Allergies     Vitals        Physical Exam  Left Foot: Appearance: Normal except as noted: excessive pronation-- and-- pes planus  Right Foot: Appearance: Normal except as noted: excessive pronation-- and-- pes planus  Left Ankle: ROM: limited ROM in all planes   Right Ankle: ROM: limited ROM in all planes   Neurological Exam: performed  Light touch was intact bilaterally  Vibratory sensation was intact bilaterally  Response to monofilament test was intact bilaterally  Deep tendon reflexes: patellar reflex present bilaterally-- and-- achilles reflex present bilaterally  Vascular Exam: performed Dorsalis pedis pulses were One/4 bilaterally  Posterior tibial pulses were One/4 bilaterally  Elevation Pallor: present bilaterally  Capillary refill time was greater than 3 seconds bilaterally-- and-- Q  9 findings bilateral  Negative digital hair noted  Positive abnormal cooling noted  Toenails:  All of the toenails were elongated,-- hypertrophied,-- discolored,-- ingrown,-- shown to have subungual debris,-- tender-- and-- Severely mycotic with onychauxis     Socks and shoes removed, Right Foot Findings: swollen, erythematous and dry   The sensory exam showed diminished vibratory sensation at the level of the toes  Diminished tactile sensation with monofilament testing throughout the right foot   Socks and shoes removed, Left Foot Findings: swollen, erythematous and dry   The sensory exam showed diminished vibratory sensation at the level of the toes  Diminished tactile sensation with monofilament testing throughout the left foot  Capillary refills findings on the right were delayed in the toes   Pulses:  1+ in the posterior tibialis on the right  1+ in the dorsalis pedis on the right   Capillary refills findings on the left were delayed in the toes   Pulses:  1+ in the posterior tibialis on the left  1+ in the dorsalis pedis on the left   Assign Risk Category: 2: Loss of protective sensation with or without weakness, deformity, callus, pre-ulcer, or history of ulceration  High risk  Hyperkeratosis: present on both first toes,-- present on both fifth sub metatarsals-- and-- [place] tinea pedis, bilateral, is noted  Shoe Gear Evaluation: performed ()  Right Foot: width: d-- and-- length: 11   Left Foot: width: d-- and-- length: 11  Recommendation(s): athletic shoes  The following portions of the patient's history were reviewed and updated as appropriate: allergies, current medications, past family history, past medical history, past social history, past surgical history and problem list      Review of Systems       Objective:      Foot Exam     Right Foot/Ankle      Inspection and Palpation  Skin Exam: callus and dry skin;      Neurovascular  Dorsalis pedis: 1+  Posterior tibial: 1+        Left Foot/Ankle       Inspection and Palpation  Skin Exam: callus and dry skin;      Neurovascular  Dorsalis pedis: 1+  Posterior tibial: 1+        Physical Exam   Cardiovascular: Pulses are weak pulses  Pulses:       Dorsalis pedis pulses are 1+ on the right side, and 1+ on the left side         Posterior tibial pulses are 1+ on the right side, and 1+ on the left side  Feet:   Right Foot:   Skin Integrity: Positive for callus and dry skin  Left Foot:   Skin Integrity: Positive for callus and dry skin  Patient's shoes and socks removed  Right Foot/Ankle   Right Foot Inspection  Skin Exam: dry skin, callus and callus               Toe Exam: swelling  Sensory   Vibration: diminished  Proprioception: diminished   Monofilament testing: diminished  Vascular  Capillary refills: elevated  The right DP pulse is 1+  The right PT pulse is 1+       Left Foot/Ankle  Left Foot Inspection  Skin Exam: dry skin and callus              Toe Exam: swelling                   Sensory   Vibration: diminished  Proprioception: diminished  Monofilament: diminished  Vascular  Capillary refills: elevated  The left DP pulse is 1+  The left PT pulse is 1+  Assign Risk Category:  Deformity present;  Loss of protective sensation; Weak pulses       Risk: 2

## 2018-11-27 ENCOUNTER — OFFICE VISIT (OUTPATIENT)
Dept: PODIATRY | Facility: CLINIC | Age: 78
End: 2018-11-27
Payer: COMMERCIAL

## 2018-11-27 VITALS
HEIGHT: 71 IN | WEIGHT: 245 LBS | HEART RATE: 78 BPM | RESPIRATION RATE: 17 BRPM | SYSTOLIC BLOOD PRESSURE: 130 MMHG | DIASTOLIC BLOOD PRESSURE: 88 MMHG | BODY MASS INDEX: 34.3 KG/M2

## 2018-11-27 DIAGNOSIS — B35.1 ONYCHOMYCOSIS: ICD-10-CM

## 2018-11-27 DIAGNOSIS — B35.3 TINEA PEDIS OF BOTH FEET: ICD-10-CM

## 2018-11-27 DIAGNOSIS — M79.671 PAIN IN BOTH FEET: Primary | ICD-10-CM

## 2018-11-27 DIAGNOSIS — I70.209 PERIPHERAL ARTERIOSCLEROSIS (HCC): ICD-10-CM

## 2018-11-27 DIAGNOSIS — L84 CORNS: ICD-10-CM

## 2018-11-27 DIAGNOSIS — E11.42 DIABETIC POLYNEUROPATHY ASSOCIATED WITH TYPE 2 DIABETES MELLITUS (HCC): ICD-10-CM

## 2018-11-27 DIAGNOSIS — M79.672 PAIN IN BOTH FEET: Primary | ICD-10-CM

## 2018-11-27 PROCEDURE — 99212 OFFICE O/P EST SF 10 MIN: CPT | Performed by: PODIATRIST

## 2018-11-27 PROCEDURE — 11056 PARNG/CUTG B9 HYPRKR LES 2-4: CPT | Performed by: PODIATRIST

## 2018-11-27 NOTE — PROGRESS NOTES
Procedures   Foot Exam       Assessment/Plan:  Patient has pain in his feet and toes with ambulation   He has mycosis of nail and skin  He has plantar lesions      Plan   Diabetic foot exam performed   Mycotic nails debrided  cream ordered  Calluses debrided      No problem-specific Assessment & Plan notes found for this encounter          There are no diagnoses linked to this encounter        Subjective:  Patient has pain in his feet with ambulation  No history of trauma      Patient ID: Spenser Clayton is a 78 y  o  male      Patient is diabetic  Laverna Huge has pain in his feet and toes with ambulation         Review of Systems   Constitutional: No fever or chills, feels well, no tiredness, no recent weight loss or weight gain   Eyes: No complaints of red eyes, no eyesight problems    ENT: no complaints of loss of hearing, no nosebleeds, no sore throat   Cardiovascular: No complaints of chest pain, no palpitations, no leg claudication or lower extremity edema   Respiratory: No complaints of shortness of breath, no wheezing, no cough   Gastrointestinal: No complaints of abdominal pain, no constipation, no nausea or vomiting, no diarrhea or bloody stools   Genitourinary: No complaints of dysuria or incontinence, no hesitancy, no nocturia   Musculoskeletal: limb pain, but-- as noted in HPI   Integumentary: No complaints of skin rash or lesion, no itching or dry skin, no skin wounds   Neurological: No complaints of headache, no confusion, no numbness or tingling, no dizziness   Psychiatric: No suicidal thoughts, no anxiety, no depression   Endocrine: No muscle weakness, no frequent urination, no excessive thirst, no feelings of weakness       Active Problems  1  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)   2  Arthritis (716 90) (M19 90)   3  Atherosclerosis of arteries of extremities (440 20) (I70 209)   4  Calcaneal spur (726 73) (M77 30)   5  Callus (700) (L84)   6  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)   7  Diabetes mellitus with neuropathy (250 60,357 2) (E11 40)   8  Diabetic neuropathy (250 60,357 2) (E11 40)   9  Hypercholesterolemia (272 0) (E78 00)   10  Hypertension (401 9) (I10)   11  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)   12  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)   13  Onychomycosis (110 1) (B35 1)   14  Orthopedic aftercare (V54 9) (Z47 89)   15  Pain in both feet (729 5) (M79 671,M79 672)   16  Pain in extremity (729 5) (M79 609)   17  Pes planus, congenital (754 61) (Q66 50)   18  Plantar 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 2)   19  Plantar wart (078 12) (B07 0)   20  Prostate cancer (185) (C61)   21  Sprain of right shoulder (840 9) (S43 401A)   22  Tinea pedis (110 4) (B35 3)     Past Medical History   · Orthopedic aftercare (V54 9) (Z47 89)     Surgical History   · History of Gastric Surgery   · History of Hernia Repair   · History of Previous Stent Placement Total Number Performed ___     The surgical history was reviewed and updated today        Family History  Family History    · Family history of Arthritis (V17 7)   · Family history of Cancer     The family history was reviewed and updated today        Social History      · Beer Consumption (___ Bottles Per Day)   · Daily Coffee Consumption (1  Cups/Day)   · Former smoker (F98 46) (Z87 891)  The social history was reviewed and updated today       Current Meds   1  Aspirin 81 MG Oral Tablet Chewable; Therapy: (Selestine Alpers) to Recorded   2  Atenolol 100 MG Oral Tablet; Therapy: (Selestine Alpers) to Recorded   3  Clotrimazole-Betamethasone 1-0 05 % External Cream; APPLY SPARINGLY TO THE AFFECTED AREA(S) TWICE DAILY; Therapy: 48PML1454 to (Evaluate:89Dxo7212)  Requested for: 29WLC9433; Last Rx:26May2016 Ordered   4  Clotrimazole-Betamethasone 1-0 05 % External Cream; APPLY SPARINGLY TO THE AFFECTED AREA(S) TWICE DAILY;  Therapy: 48YFT3330 to (Evaluate:06Jun2017)  Requested for: 22FRB5741; Last Rx:28Pmu9785 Ordered   5  Jublia 10 % External Solution; apply to nails once daily; Therapy: 67HNW6696 to (Evaluate:12Oct2015)  Requested for: 44Ofy0256; Last Rx:74Few0399 Ordered   6  Lipitor 40 MG Oral Tablet; Therapy: (Remedios Chilhowie) to Recorded   7  MetFORMIN HCl - 500 MG Oral Tablet; Therapy: (Recorded:20Sep2013) to Recorded   8  Multi-Vitamin TABS; Therapy: (Remedios [place]) to Recorded   9  Omega-3 Fish Oil CAPS; Therapy: (Remedios Ch[place]) to Recorded   10  Omeprazole 20 MG Oral Capsule Delayed Release;  Therapy: (Remedios Chilhowie) to Recorded   11  Sulindac 150 MG Oral Tablet; one tab po bid pc; Eliza Liana: 05RJR6328 to (Evaluate:19May2015)  Requested for: 14CUU5026; Last  Rx:36Prd3018 Ordered   12  Terbinafine HCl - 250 MG Oral Tablet; one tab po qod; [place] Liana: 25EAL2988 to (Evaluate:17Jsa0098)  Requested for: 13SUQ7574; Last  Rx:42Ckc8393 Ordered   13  Travatan Z 0 004 % Ophthalmic Solution;  Therapy: (Recorded:25Jun2012) to Recorded   14  Xalatan 0 005 % Ophthalmic Solution;  Therapy: (Recorded:20Sep2013) to Recorded     Allergies  1  No Known Drug Allergies     Vitals        Physical Exam  Left Foot: Appearance: Normal except as noted: excessive pronation-- and-- pes planus  Right Foot: Appearance: Normal except as noted: excessive pronation-- and-- pes planus  Left Ankle: ROM: limited ROM in all planes   Right Ankle: ROM: limited ROM in all planes   Neurological Exam: performed  Light touch was intact bilaterally  Vibratory sensation was intact bilaterally  Response to monofilament test was intact bilaterally  Deep tendon reflexes: patellar reflex present bilaterally-- and-- achilles reflex present bilaterally  Vascular Exam: performed Dorsalis pedis pulses were One/4 bilaterally  Posterior tibial pulses were One/4 bilaterally  Elevation Pallor: present bilaterally  Capillary refill time was greater than 3 seconds bilaterally-- and-- Q  9 findings bilateral  Negative digital hair noted  Positive abnormal cooling noted  Toenails: All of the toenails were elongated,-- hypertrophied,-- discolored,-- ingrown,-- shown to have subungual debris,-- tender-- and-- Severely mycotic with onychauxis     Socks and shoes removed, Right Foot Findings: swollen, erythematous and dry   The sensory exam showed diminished vibratory sensation at the level of the toes  Diminished tactile sensation with monofilament testing throughout the right foot   Socks and shoes removed, Left Foot Findings: swollen, erythematous and dry   The sensory exam showed diminished vibratory sensation at the level of the toes  Diminished tactile sensation with monofilament testing throughout the left foot  Capillary refills findings on the right were delayed in the toes   Pulses:  1+ in the posterior tibialis on the right  1+ in the dorsalis pedis on the right   Capillary refills findings on the left were delayed in the toes   Pulses:  1+ in the posterior tibialis on the left  1+ in the dorsalis pedis on the left   Assign Risk Category: 2: Loss of protective sensation with or without weakness, deformity, callus, pre-ulcer, or history of ulceration  High risk  Hyperkeratosis: present on both first toes,-- present on both fifth sub metatarsals-- and-- [place] tinea pedis, bilateral, is noted  Shoe Gear Evaluation: performed ()  Right Foot: width: d-- and-- length: 11   Left Foot: width: d-- and-- length: 11  Recommendation(s): athletic shoes  The following portions of the patient's history were reviewed and updated as appropriate: allergies, current medications, past family history, past medical history, past social history, past surgical history and problem list      Review of Systems       Objective:      Foot Exam     Right Foot/Ankle      Inspection and Palpation  Skin Exam: callus and dry skin;      Neurovascular  Dorsalis pedis: 1+  Posterior tibial: 1+        Left Foot/Ankle       Inspection and Palpation  Skin Exam: callus and dry skin;      Neurovascular  Dorsalis pedis: 1+  Posterior tibial: 1+        Physical Exam   Cardiovascular: Pulses are weak pulses  Pulses:       Dorsalis pedis pulses are 1+ on the right side, and 1+ on the left side         Posterior tibial pulses are 1+ on the right side, and 1+ on the left side  Feet:   Right Foot:   Skin Integrity: Positive for callus and dry skin  Left Foot:   Skin Integrity: Positive for callus and dry skin  Patient's shoes and socks removed  Right Foot/Ankle   Right Foot Inspection  Skin Exam: dry skin, callus and callus               Toe Exam: swelling  Sensory   Vibration: diminished  Proprioception: diminished   Monofilament testing: diminished  Vascular  Capillary refills: elevated  The right DP pulse is 1+  The right PT pulse is 1+       Left Foot/Ankle  Left Foot Inspection  Skin Exam: dry skin and callus              Toe Exam: swelling                   Sensory   Vibration: diminished  Proprioception: diminished  Monofilament: diminished  Vascular  Capillary refills: elevated  The left DP pulse is 1+  The left PT pulse is 1+  Assign Risk Category:  Deformity present;  Loss of protective sensation; Weak pulses       Risk: 2

## 2019-02-21 ENCOUNTER — OFFICE VISIT (OUTPATIENT)
Dept: PODIATRY | Facility: CLINIC | Age: 79
End: 2019-02-21
Payer: COMMERCIAL

## 2019-02-21 VITALS
DIASTOLIC BLOOD PRESSURE: 82 MMHG | BODY MASS INDEX: 34.3 KG/M2 | WEIGHT: 245 LBS | HEIGHT: 71 IN | HEART RATE: 78 BPM | RESPIRATION RATE: 17 BRPM | SYSTOLIC BLOOD PRESSURE: 135 MMHG

## 2019-02-21 DIAGNOSIS — M79.671 PAIN IN BOTH FEET: ICD-10-CM

## 2019-02-21 DIAGNOSIS — B35.1 ONYCHOMYCOSIS: ICD-10-CM

## 2019-02-21 DIAGNOSIS — I70.209 PERIPHERAL ARTERIOSCLEROSIS (HCC): ICD-10-CM

## 2019-02-21 DIAGNOSIS — E11.42 DIABETIC POLYNEUROPATHY ASSOCIATED WITH TYPE 2 DIABETES MELLITUS (HCC): Primary | ICD-10-CM

## 2019-02-21 DIAGNOSIS — L84 CORNS: ICD-10-CM

## 2019-02-21 DIAGNOSIS — M79.672 PAIN IN BOTH FEET: ICD-10-CM

## 2019-02-21 DIAGNOSIS — B35.3 TINEA PEDIS OF BOTH FEET: ICD-10-CM

## 2019-02-21 PROCEDURE — 99212 OFFICE O/P EST SF 10 MIN: CPT | Performed by: PODIATRIST

## 2019-02-21 PROCEDURE — 11056 PARNG/CUTG B9 HYPRKR LES 2-4: CPT | Performed by: PODIATRIST

## 2019-02-21 RX ORDER — CLOTRIMAZOLE AND BETAMETHASONE DIPROPIONATE 10; .64 MG/G; MG/G
CREAM TOPICAL 2 TIMES DAILY
Qty: 30 G | Refills: 2 | Status: SHIPPED | OUTPATIENT
Start: 2019-02-21 | End: 2019-07-11 | Stop reason: SDUPTHER

## 2019-02-21 NOTE — PROGRESS NOTES
Procedures   Foot Exam    Right Foot/Ankle     Inspection and Palpation  Skin Exam: callus and dry skin; Left Foot/Ankle      Inspection and Palpation  Skin Exam: callus and dry skin;                   Assessment/Plan:  Patient has pain in his feet and toes with ambulation   He has mycosis of nail and skin  He has plantar lesions      Plan   Diabetic foot exam performed   Mycotic nails debrided  cream ordered  Calluses debrided      No problem-specific Assessment & Plan notes found for this encounter          There are no diagnoses linked to this encounter        Subjective:  Patient has pain in his feet with ambulation  No history of trauma      Patient ID: Spenser Clayton is a 78 y  o  male      Patient is diabetic  Jimmie Durand has pain in his feet and toes with ambulation         Review of Systems   Constitutional: No fever or chills, feels well, no tiredness, no recent weight loss or weight gain   Eyes: No complaints of red eyes, no eyesight problems    ENT: no complaints of loss of hearing, no nosebleeds, no sore throat   Cardiovascular: No complaints of chest pain, no palpitations, no leg claudication or lower extremity edema   Respiratory: No complaints of shortness of breath, no wheezing, no cough   Gastrointestinal: No complaints of abdominal pain, no constipation, no nausea or vomiting, no diarrhea or bloody stools   Genitourinary: No complaints of dysuria or incontinence, no hesitancy, no nocturia   Musculoskeletal: limb pain, but-- as noted in HPI   Integumentary: No complaints of skin rash or lesion, no itching or dry skin, no skin wounds   Neurological: No complaints of headache, no confusion, no numbness or tingling, no dizziness   Psychiatric: No suicidal thoughts, no anxiety, no depression   Endocrine: No muscle weakness, no frequent urination, no excessive thirst, no feelings of weakness       Active Problems  1  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) (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 756)   2  Arthritis (309 03) (M19 90)   3  Atherosclerosis of arteries of extremities (440 20) (I70 209)   4  Calcaneal spur (726 73) (M77 30)   5  Callus (700) (L84)   6  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)   7  Diabetes mellitus with neuropathy (250 60,357 2) (E11 40)   8  Diabetic neuropathy (250 60,357 2) (E11 40)   9  Hypercholesterolemia (272 0) (E78 00)   10  Hypertension (401 9) (I10)   11  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)   12  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)   13  Onychomycosis (110 1) (B35 1)   14  Orthopedic aftercare (V54 9) (Z47 89)   15  Pain in both feet (729 5) (M79 671,M79 672)   16  Pain in extremity (729 5) (M79 609)   17  Pes planus, congenital (754 61) (Q66 50)   18  Plantar 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 2)   19  Plantar wart (078 12) (B07 0)   20  Prostate cancer (185) (C61)   21  Sprain of right shoulder (840 9) (S43 401A)   22  Tinea pedis (110 4) (B35 3)     Past Medical History   · Orthopedic aftercare (V54 9) (Z47 89)     Surgical History   · History of Gastric Surgery   · History of Hernia Repair   · History of Previous Stent Placement Total Number Performed ___     The surgical history was reviewed and updated today        Family History  Family History    · Family history of Arthritis (V17 7)   · Family history of Cancer     The family history was reviewed and updated today        Social History      · Beer Consumption (___ Bottles Per Day)   · Daily Coffee Consumption (1  Cups/Day)   · Former smoker (F60 82) (Z87 891)  The social history was reviewed and updated today       Current Meds   1  Aspirin 81 MG Oral Tablet Chewable; Therapy: (Justin Zepeda) to Recorded   2  Atenolol 100 MG Oral Tablet; Therapy: (Justin Zepeda) to Recorded   3  Clotrimazole-Betamethasone 1-0 05 % External Cream; APPLY SPARINGLY TO THE AFFECTED AREA(S) TWICE DAILY;  Therapy: 21RGX2080 to (Evaluate:24Gxj1976)  Requested for: 20AKS9313; Last Rx:[id number] Ordered   4  Clotrimazole-Betamethasone 1-0 05 % External Cream; APPLY SPARINGLY TO THE AFFECTED AREA(S) TWICE DAILY; Therapy: 24Xxp7426 to (Evaluate:06Jun2017)  Requested for: 72RBK9439; Last Rx:[id number] Ordered   5  Jublia 10 % External Solution; apply to nails once daily; Therapy: 09ZYP2042 to (Evaluate:12Oct2015)  Requested for: 12Xzw4464; Last Rx:95Qtp2010 Ordered   6  Lipitor 40 MG Oral Tablet; Therapy: (Ida Stanley) to Recorded   7  MetFORMIN HCl - 500 MG Oral Tablet; Therapy: (Recorded:20Sep2013) to Recorded   8  Multi-Vitamin TABS; Therapy: ([facility]) to Recorded   9  Omega-3 Fish Oil CAPS; Therapy: ([facility]) to Recorded   10  Omeprazole 20 MG Oral Capsule Delayed Release;  Therapy: ([facility]) to Recorded   11  Sulindac 150 MG Oral Tablet; one tab po bid pc; Lexie Gitelman: 39KCA3408 to (Evaluate:19May2015)  Requested for: 95PZS1738; Last  Rx:12May2015 Ordered   12  Terbinafine HCl - 250 MG Oral Tablet; one tab po qod; Lexie Gitelman: 80IVT3296 to (Evaluate:74Xcn8532)  Requested for: 72AOR1175; Last  Rx:37Kyw0222 Ordered   13  Travatan Z 0 004 % Ophthalmic Solution;  Therapy: (Recorded:25Jun2012) to Recorded   14  Xalatan 0 005 % Ophthalmic Solution;  Therapy: (Recorded:17Efl6464) to Recorded     Allergies  1  No Known Drug Allergies     Vitals        Physical Exam  Left Foot: Appearance: Normal except as noted: excessive pronation-- and-- pes planus  Right Foot: Appearance: Normal except as noted: excessive pronation-- and-- pes planus  Left Ankle: ROM: limited ROM in all planes   Right Ankle: ROM: limited ROM in all planes   Neurological Exam: performed  Light touch was intact bilaterally  Vibratory sensation was intact bilaterally  Response to monofilament test was intact bilaterally  Deep tendon reflexes: patellar reflex present bilaterally-- and-- achilles reflex present bilaterally  Vascular Exam: performed Dorsalis pedis pulses were One/4 bilaterally   Posterior tibial pulses were One/4 bilaterally  Elevation Pallor: present bilaterally  Capillary refill time was greater than 3 seconds bilaterally-- and-- Q  9 findings bilateral  Negative digital hair noted  Positive abnormal cooling noted  Toenails: All of the toenails were elongated,-- hypertrophied,-- discolored,-- ingrown,-- shown to have subungual debris,-- tender-- and-- Severely mycotic with onychauxis     Socks and shoes removed, Right Foot Findings: swollen, erythematous and dry   The sensory exam showed diminished vibratory sensation at the level of the toes  Diminished tactile sensation with monofilament testing throughout the right foot   Socks and shoes removed, Left Foot Findings: swollen, erythematous and dry   The sensory exam showed diminished vibratory sensation at the level of the toes  Diminished tactile sensation with monofilament testing throughout the left foot  Capillary refills findings on the right were delayed in the toes   Pulses:  1+ in the posterior tibialis on the right  1+ in the dorsalis pedis on the right   Capillary refills findings on the left were delayed in the toes   Pulses:  1+ in the posterior tibialis on the left  1+ in the dorsalis pedis on the left   Assign Risk Category: 2: Loss of protective sensation with or without weakness, deformity, callus, pre-ulcer, or history of ulceration  High risk  Hyperkeratosis: present on both first toes,-- present on both fifth sub metatarsals-- and-- [place] tinea pedis, bilateral, is noted  Shoe Gear Evaluation: performed ()  Right Foot: width: d-- and-- length: 11  Left Foot: width: d-- and-- length: 11  Recommendation(s): athletic shoes    Patient's shoes and socks removed  Right Foot/Ankle   Right Foot Inspection  Skin Exam: dry skin, callus and callus                          Toe Exam: swelling  Sensory   Vibration: diminished  Proprioception: diminished     Vascular  Capillary refills: elevated      Left Foot/Ankle  Left Foot Inspection  Skin Exam: dry skin and callus                         Toe Exam: swelling and erythema                   Sensory   Vibration: diminished  Proprioception: diminished    Vascular  Capillary refills: elevated    Assign Risk Category:  Deformity present;  Loss of protective sensation; Weak pulses       Risk: 2

## 2019-05-02 ENCOUNTER — OFFICE VISIT (OUTPATIENT)
Dept: PODIATRY | Facility: CLINIC | Age: 79
End: 2019-05-02
Payer: COMMERCIAL

## 2019-05-02 VITALS
SYSTOLIC BLOOD PRESSURE: 134 MMHG | BODY MASS INDEX: 34.3 KG/M2 | HEIGHT: 71 IN | DIASTOLIC BLOOD PRESSURE: 65 MMHG | HEART RATE: 77 BPM | RESPIRATION RATE: 17 BRPM | WEIGHT: 245 LBS

## 2019-05-02 DIAGNOSIS — M79.672 PAIN IN BOTH FEET: ICD-10-CM

## 2019-05-02 DIAGNOSIS — I70.209 PERIPHERAL ARTERIOSCLEROSIS (HCC): ICD-10-CM

## 2019-05-02 DIAGNOSIS — L84 CORNS: ICD-10-CM

## 2019-05-02 DIAGNOSIS — E11.42 DIABETIC POLYNEUROPATHY ASSOCIATED WITH TYPE 2 DIABETES MELLITUS (HCC): Primary | ICD-10-CM

## 2019-05-02 DIAGNOSIS — M79.671 PAIN IN BOTH FEET: ICD-10-CM

## 2019-05-02 PROCEDURE — 11056 PARNG/CUTG B9 HYPRKR LES 2-4: CPT | Performed by: PODIATRIST

## 2019-06-04 DIAGNOSIS — L84 CORNS: ICD-10-CM

## 2019-06-04 DIAGNOSIS — M79.672 PAIN IN BOTH FEET: ICD-10-CM

## 2019-06-04 DIAGNOSIS — M79.671 PAIN IN BOTH FEET: ICD-10-CM

## 2019-07-11 ENCOUNTER — OFFICE VISIT (OUTPATIENT)
Dept: PODIATRY | Facility: CLINIC | Age: 79
End: 2019-07-11
Payer: COMMERCIAL

## 2019-07-11 VITALS
BODY MASS INDEX: 34.3 KG/M2 | RESPIRATION RATE: 17 BRPM | HEIGHT: 71 IN | SYSTOLIC BLOOD PRESSURE: 147 MMHG | WEIGHT: 245 LBS | DIASTOLIC BLOOD PRESSURE: 80 MMHG

## 2019-07-11 DIAGNOSIS — E11.42 DIABETIC POLYNEUROPATHY ASSOCIATED WITH TYPE 2 DIABETES MELLITUS (HCC): Primary | ICD-10-CM

## 2019-07-11 DIAGNOSIS — M79.672 PAIN IN BOTH FEET: ICD-10-CM

## 2019-07-11 DIAGNOSIS — B35.3 TINEA PEDIS OF BOTH FEET: ICD-10-CM

## 2019-07-11 DIAGNOSIS — L84 CORNS: ICD-10-CM

## 2019-07-11 DIAGNOSIS — M79.671 PAIN IN BOTH FEET: ICD-10-CM

## 2019-07-11 DIAGNOSIS — I70.209 PERIPHERAL ARTERIOSCLEROSIS (HCC): ICD-10-CM

## 2019-07-11 DIAGNOSIS — B35.1 ONYCHOMYCOSIS: ICD-10-CM

## 2019-07-11 PROCEDURE — 11056 PARNG/CUTG B9 HYPRKR LES 2-4: CPT | Performed by: PODIATRIST

## 2019-07-11 PROCEDURE — 99212 OFFICE O/P EST SF 10 MIN: CPT | Performed by: PODIATRIST

## 2019-07-11 RX ORDER — CLOTRIMAZOLE AND BETAMETHASONE DIPROPIONATE 10; .64 MG/G; MG/G
CREAM TOPICAL 2 TIMES DAILY
Qty: 30 G | Refills: 2 | Status: SHIPPED | OUTPATIENT
Start: 2019-07-11 | End: 2019-10-11 | Stop reason: SDUPTHER

## 2019-07-11 NOTE — PROGRESS NOTES
Assessment/Plan:  Patient has pain in his feet and toes with ambulation   He has mycosis of nail and skin  He has plantar pre ulcerative skin lesions      Plan   Diabetic foot exam performed   Mycotic nails debrided   cream ordered   Calluses debrided      No problem-specific Assessment & Plan notes found for this encounter          There are no diagnoses linked to this encounter        Subjective:  Patient has pain in his feet with ambulation   No history of trauma      Patient ID: Spenser Clayton is a 70 y  o  male      Patient is diabetic  [facility] has pain in his feet and toes with ambulation         Review of Systems   Constitutional: No fever or chills, feels well, no tiredness, no recent weight loss or weight gain   Eyes: No complaints of red eyes, no eyesight problems    ENT: no complaints of loss of hearing, no nosebleeds, no sore throat   Cardiovascular: No complaints of chest pain, no palpitations, no leg claudication or lower extremity edema   Respiratory: No complaints of shortness of breath, no wheezing, no cough   Gastrointestinal: No complaints of abdominal pain, no constipation, no nausea or vomiting, no diarrhea or bloody stools   Genitourinary: No complaints of dysuria or incontinence, no hesitancy, no nocturia   Musculoskeletal: limb pain, but-- as noted in HPI   Integumentary: No complaints of skin rash or lesion, no itching or dry skin, no skin wounds   Neurological: No complaints of headache, no confusion, no numbness or tingling, no dizziness   Psychiatric: No suicidal thoughts, no anxiety, no depression   Endocrine: No muscle weakness, no frequent urination, no excessive thirst, no feelings of weakness       Active Problems  1  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)   2  Arthritis (716 90) (M19 90)   3  Atherosclerosis of arteries of extremities (440 20) (I70 209)   4  Calcaneal spur (726 73) (M77 30)   5  Callus (700) (L84)   6  Congenital pes planus of right foot (504 61) (Q66 51)   7  Diabetes mellitus with neuropathy (250 60,357 2) (E11 40)   8  Diabetic neuropathy (250 60,357 2) (E11 40)   9  Hypercholesterolemia (272 0) (E78 00)   10  Hypertension (401 9) (I10)   11  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)   12  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)   13  Onychomycosis (110 1) (B35 1)   14  Orthopedic aftercare (V54 9) (Z47 89)   15  Pain in both feet (729 5) (M79 671,M79 672)   16  Pain in extremity (729 5) (M79 609)   17  Pes planus, congenital (754 61) (Q66 50)   18  Plantar 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 2)   19  Plantar wart (078 12) (B07 0)   20  Prostate cancer (185) (C61)   21  Sprain of right shoulder (840 9) (S43 401A)   22  Tinea pedis (110 4) (B35 3)     Past Medical History   · Orthopedic aftercare (V54 9) (Z47 89)     Surgical History   · History of Gastric Surgery   · History of Hernia Repair   · History of Previous Stent Placement Total Number Performed ___     The surgical history was reviewed and updated today        Family History  Family History    · Family history of Arthritis (V17 7)   · Family history of Cancer     The family history was reviewed and updated today        Social History      · Beer Consumption (___ Bottles Per Day)   · Daily Coffee Consumption (1  Cups/Day)   · Former smoker (O84 25) (Z87 891)  The social history was reviewed and updated today  Allergies  1  No Known Drug Allergies      Physical Exam  Left Foot: Appearance: Normal except as noted: excessive pronation-- and-- pes planus  Right Foot: Appearance: Normal except as noted: excessive pronation-- and-- pes planus  Left Ankle: ROM: limited ROM in all planes   Right Ankle: ROM: limited ROM in all planes   Neurological Exam: performed  Light touch was intact bilaterally  Vibratory sensation was intact bilaterally  Response to monofilament test was intact bilaterally   Deep tendon reflexes: patellar reflex present bilaterally-- and-- achilles reflex present bilaterally  Vascular Exam: performed Dorsalis pedis pulses were One/4 bilaterally  Posterior tibial pulses were One/4 bilaterally  Elevation Pallor: present bilaterally  Capillary refill time was greater than 3 seconds bilaterally-- and-- Q  9 findings bilateral  Negative digital hair noted  Positive abnormal cooling noted  Toenails: All of the toenails were elongated,-- hypertrophied,-- discolored,-- ingrown,-- shown to have subungual debris,-- tender-- and-- Severely mycotic with onychauxis     Socks and shoes removed, Right Foot Findings: swollen, erythematous and dry   The sensory exam showed diminished vibratory sensation at the level of the toes  Diminished tactile sensation with monofilament testing throughout the right foot   Socks and shoes removed, Left Foot Findings: swollen, erythematous and dry   The sensory exam showed diminished vibratory sensation at the level of the toes  Diminished tactile sensation with monofilament testing throughout the left foot  Capillary refills findings on the right were delayed in the toes   Pulses:  1+ in the posterior tibialis on the right  1+ in the dorsalis pedis on the right   Capillary refills findings on the left were delayed in the toes   Pulses:  1+ in the posterior tibialis on the left  1+ in the dorsalis pedis on the left   Assign Risk Category: 2: Loss of protective sensation with or without weakness, deformity, callus, pre-ulcer, or history of ulceration  High risk  Hyperkeratosis: present on both first toes,-- present on both fifth sub metatarsals-- and-- [place] tinea pedis, bilateral, is noted  Shoe Gear Evaluation: performed ()  Right Foot: width: d-- and-- length: 11  Left Foot: width: d-- and-- length: 11  Recommendation(s): athletic shoes     Patient's shoes and socks removed  Right Foot/Ankle   Right Foot Inspection  Skin Exam: dry skin, callus and callus               Toe Exam: swelling  Sensory   Vibration: diminished  Proprioception: diminished      Vascular  Capillary refills: elevated        Left Foot/Ankle  Left Foot Inspection  Skin Exam: dry skin and callus              Toe Exam: swelling and erythema                   Sensory   Vibration: diminished  Proprioception: diminished     Vascular  Capillary refills: elevated     Assign Risk Category:  Deformity present; Loss of protective sensation; Weak pulses       Risk: 2

## 2019-10-11 DIAGNOSIS — B35.3 TINEA PEDIS OF BOTH FEET: ICD-10-CM

## 2019-10-11 DIAGNOSIS — B35.1 ONYCHOMYCOSIS: ICD-10-CM

## 2019-10-11 RX ORDER — CLOTRIMAZOLE AND BETAMETHASONE DIPROPIONATE 10; .64 MG/G; MG/G
CREAM TOPICAL 2 TIMES DAILY
Qty: 30 G | Refills: 2 | Status: SHIPPED | OUTPATIENT
Start: 2019-10-11 | End: 2019-10-24 | Stop reason: SDUPTHER

## 2019-10-24 ENCOUNTER — OFFICE VISIT (OUTPATIENT)
Dept: PODIATRY | Facility: CLINIC | Age: 79
End: 2019-10-24
Payer: COMMERCIAL

## 2019-10-24 VITALS
SYSTOLIC BLOOD PRESSURE: 132 MMHG | HEART RATE: 78 BPM | HEIGHT: 71 IN | DIASTOLIC BLOOD PRESSURE: 76 MMHG | BODY MASS INDEX: 34.3 KG/M2 | WEIGHT: 245 LBS | RESPIRATION RATE: 16 BRPM

## 2019-10-24 DIAGNOSIS — L84 CORNS: ICD-10-CM

## 2019-10-24 DIAGNOSIS — E11.42 DIABETIC POLYNEUROPATHY ASSOCIATED WITH TYPE 2 DIABETES MELLITUS (HCC): Primary | ICD-10-CM

## 2019-10-24 DIAGNOSIS — B35.3 TINEA PEDIS OF BOTH FEET: ICD-10-CM

## 2019-10-24 DIAGNOSIS — M79.672 PAIN IN BOTH FEET: ICD-10-CM

## 2019-10-24 DIAGNOSIS — B35.1 ONYCHOMYCOSIS: ICD-10-CM

## 2019-10-24 DIAGNOSIS — M79.671 PAIN IN BOTH FEET: ICD-10-CM

## 2019-10-24 DIAGNOSIS — I70.209 PERIPHERAL ARTERIOSCLEROSIS (HCC): ICD-10-CM

## 2019-10-24 PROCEDURE — 99212 OFFICE O/P EST SF 10 MIN: CPT | Performed by: PODIATRIST

## 2019-10-24 PROCEDURE — 11056 PARNG/CUTG B9 HYPRKR LES 2-4: CPT | Performed by: PODIATRIST

## 2019-10-24 RX ORDER — CLOTRIMAZOLE AND BETAMETHASONE DIPROPIONATE 10; .64 MG/G; MG/G
CREAM TOPICAL 2 TIMES DAILY
Qty: 30 G | Refills: 2 | Status: SHIPPED | OUTPATIENT
Start: 2019-10-24 | End: 2020-01-02 | Stop reason: SDUPTHER

## 2019-10-24 NOTE — PROGRESS NOTES
Assessment/Plan:  Patient has pain in his feet and toes with ambulation   He has mycosis of nail and skin  He has plantar pre ulcerative skin lesions      Plan   Diabetic foot exam performed   Mycotic nails debrided   cream ordered   Calluses debrided      No problem-specific Assessment & Plan notes found for this encounter          There are no diagnoses linked to this encounter        Subjective:  Patient has pain in his feet with ambulation   No history of trauma      Patient ID: Spenser Clayton is a 79 y  o  male      Patient is diabetic  [facility] has pain in his feet and toes with ambulation         Review of Systems   Constitutional: No fever or chills, feels well, no tiredness, no recent weight loss or weight gain   Eyes: No complaints of red eyes, no eyesight problems    ENT: no complaints of loss of hearing, no nosebleeds, no sore throat   Cardiovascular: No complaints of chest pain, no palpitations, no leg claudication or lower extremity edema   Respiratory: No complaints of shortness of breath, no wheezing, no cough   Gastrointestinal: No complaints of abdominal pain, no constipation, no nausea or vomiting, no diarrhea or bloody stools   Genitourinary: No complaints of dysuria or incontinence, no hesitancy, no nocturia   Musculoskeletal: limb pain, but-- as noted in HPI   Integumentary: No complaints of skin rash or lesion, no itching or dry skin, no skin wounds   Neurological: No complaints of headache, no confusion, no numbness or tingling, no dizziness   Psychiatric: No suicidal thoughts, no anxiety, no depression   Endocrine: No muscle weakness, no frequent urination, no excessive thirst, no feelings of weakness       Active Problems  1  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)   2  Arthritis (716 90) (M19 90)   3  Atherosclerosis of arteries of extremities (440 20) (I70 209)   4  Calcaneal spur (726 73) (M77 30)   5  Callus (700) (L84)   6  Congenital pes planus of right foot (394 61) (Q66 51)   7  Diabetes mellitus with neuropathy (250 60,357 2) (E11 40)   8  Diabetic neuropathy (250 60,357 2) (E11 40)   9  Hypercholesterolemia (272 0) (E78 00)   10  Hypertension (401 9) (I10)   11  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)   12  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)   13  Onychomycosis (110 1) (B35 1)   14  Orthopedic aftercare (V54 9) (Z47 89)   15  Pain in both feet (729 5) (M79 671,M79 672)   16  Pain in extremity (729 5) (M79 609)   17  Pes planus, congenital (754 61) (Q66 50)   18  Plantar 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 2)   19  Plantar wart (078 12) (B07 0)   20  Prostate cancer (185) (C61)   21  Sprain of right shoulder (840 9) (S43 401A)   22  Tinea pedis (110 4) (B35 3)     Past Medical History   · Orthopedic aftercare (V54 9) (Z47 89)     Surgical History   · History of Gastric Surgery   · History of Hernia Repair   · History of Previous Stent Placement Total Number Performed ___     The surgical history was reviewed and updated today        Family History  Family History    · Family history of Arthritis (V17 7)   · Family history of Cancer     The family history was reviewed and updated today        Social History      · Beer Consumption (___ Bottles Per Day)   · Daily Coffee Consumption (1  Cups/Day)   · Former smoker (Q41 22) (Z87 891)  The social history was reviewed and updated today  Allergies  1  No Known Drug Allergies      Physical Exam  Left Foot: Appearance: Normal except as noted: excessive pronation-- and-- pes planus  Right Foot: Appearance: Normal except as noted: excessive pronation-- and-- pes planus  Left Ankle: ROM: limited ROM in all planes   Right Ankle: ROM: limited ROM in all planes   Neurological Exam: performed  Light touch was intact bilaterally  Vibratory sensation was intact bilaterally  Response to monofilament test was intact bilaterally   Deep tendon reflexes: patellar reflex present bilaterally-- and-- achilles reflex present bilaterally  Vascular Exam: performed Dorsalis pedis pulses were One/4 bilaterally  Posterior tibial pulses were One/4 bilaterally  Elevation Pallor: present bilaterally  Capillary refill time was greater than 3 seconds bilaterally-- and-- Q  9 findings bilateral  Negative digital hair noted  Positive abnormal cooling noted  Toenails: All of the toenails were elongated,-- hypertrophied,-- discolored,-- ingrown,-- shown to have subungual debris,-- tender-- and-- Severely mycotic with onychauxis     Socks and shoes removed, Right Foot Findings: swollen, erythematous and dry   The sensory exam showed diminished vibratory sensation at the level of the toes  Diminished tactile sensation with monofilament testing throughout the right foot   Socks and shoes removed, Left Foot Findings: swollen, erythematous and dry   The sensory exam showed diminished vibratory sensation at the level of the toes  Diminished tactile sensation with monofilament testing throughout the left foot  Capillary refills findings on the right were delayed in the toes   Pulses:  1+ in the posterior tibialis on the right  1+ in the dorsalis pedis on the right   Capillary refills findings on the left were delayed in the toes   Pulses:  1+ in the posterior tibialis on the left  1+ in the dorsalis pedis on the left   Assign Risk Category: 2: Loss of protective sensation with or without weakness, deformity, callus, pre-ulcer, or history of ulceration  High risk  Hyperkeratosis: present on both first toes,-- present on both fifth sub metatarsals-- and-- [place] tinea pedis, bilateral, is noted  Shoe Gear Evaluation: performed ()  Right Foot: width: d-- and-- length: 11  Left Foot: width: d-- and-- length: 11  Recommendation(s): athletic shoes     Patient's shoes and socks removed  Right Foot/Ankle   Right Foot Inspection  Skin Exam: dry skin, callus and callus               Toe Exam: swelling  Sensory   Vibration: diminished  Proprioception: diminished      Vascular  Capillary refills: elevated        Left Foot/Ankle  Left Foot Inspection  Skin Exam: dry skin and callus              Toe Exam: swelling and erythema                   Sensory   Vibration: diminished  Proprioception: diminished     Vascular  Capillary refills: elevated     Assign Risk Category:  Deformity present; Loss of protective sensation; Weak pulses       Risk: 2

## 2019-11-04 ENCOUNTER — HOSPITAL ENCOUNTER (EMERGENCY)
Facility: HOSPITAL | Age: 79
Discharge: HOME | End: 2019-11-04
Attending: EMERGENCY MEDICINE
Payer: COMMERCIAL

## 2019-11-04 ENCOUNTER — TELEPHONE (OUTPATIENT)
Dept: PHYSICAL THERAPY | Facility: OTHER | Age: 79
End: 2019-11-04

## 2019-11-04 ENCOUNTER — APPOINTMENT (EMERGENCY)
Dept: RADIOLOGY | Facility: HOSPITAL | Age: 79
End: 2019-11-04
Attending: EMERGENCY MEDICINE
Payer: COMMERCIAL

## 2019-11-04 VITALS
OXYGEN SATURATION: 95 % | HEIGHT: 70 IN | WEIGHT: 225 LBS | BODY MASS INDEX: 32.21 KG/M2 | TEMPERATURE: 97.7 F | SYSTOLIC BLOOD PRESSURE: 125 MMHG | DIASTOLIC BLOOD PRESSURE: 67 MMHG | HEART RATE: 50 BPM | RESPIRATION RATE: 16 BRPM

## 2019-11-04 DIAGNOSIS — M54.9 MUSCULOSKELETAL BACK PAIN: Primary | ICD-10-CM

## 2019-11-04 LAB
BACTERIA UR QL AUTO: ABNORMAL /HPF
BILIRUB UR QL STRIP: NEGATIVE
CLARITY UR: CLEAR
COLOR UR: ABNORMAL
GLUCOSE UR STRIP-MCNC: ABNORMAL MG/DL
HGB UR QL STRIP.AUTO: NEGATIVE
KETONES UR STRIP-MCNC: NEGATIVE MG/DL
LEUKOCYTE ESTERASE UR QL STRIP: ABNORMAL
NITRITE UR QL STRIP: NEGATIVE
NON-SQ EPI CELLS URNS QL MICRO: ABNORMAL /HPF
PH UR STRIP.AUTO: 6 [PH]
PROT UR STRIP-MCNC: NEGATIVE MG/DL
RBC #/AREA URNS AUTO: ABNORMAL /HPF
SP GR UR STRIP.AUTO: <=1.005 (ref 1–1.03)
UROBILINOGEN UR QL STRIP.AUTO: 0.2 E.U./DL
WBC #/AREA URNS AUTO: ABNORMAL /HPF

## 2019-11-04 PROCEDURE — 72100 X-RAY EXAM L-S SPINE 2/3 VWS: CPT

## 2019-11-04 PROCEDURE — 99284 EMERGENCY DEPT VISIT MOD MDM: CPT

## 2019-11-04 PROCEDURE — 81001 URINALYSIS AUTO W/SCOPE: CPT | Performed by: EMERGENCY MEDICINE

## 2019-11-04 PROCEDURE — 96372 THER/PROPH/DIAG INJ SC/IM: CPT

## 2019-11-04 RX ORDER — HYDROCODONE BITARTRATE AND ACETAMINOPHEN 5; 325 MG/1; MG/1
1 TABLET ORAL EVERY 6 HOURS PRN
Qty: 10 TABLET | Refills: 0 | Status: SHIPPED | OUTPATIENT
Start: 2019-11-04 | End: 2019-11-14

## 2019-11-04 RX ORDER — MORPHINE SULFATE 4 MG/ML
4 INJECTION, SOLUTION INTRAMUSCULAR; INTRAVENOUS ONCE
Status: COMPLETED | OUTPATIENT
Start: 2019-11-04 | End: 2019-11-04

## 2019-11-04 RX ADMIN — MORPHINE SULFATE 4 MG: 4 INJECTION INTRAVENOUS at 00:47

## 2019-11-04 NOTE — ED NOTES
Pt reports feeling sleepy sts pain decreased to 2/10 at rest and down to 6/10 with movement, JAKYS MD made aware        Shmuel Rao RN  11/04/19 8708

## 2019-11-04 NOTE — ED PROVIDER NOTES
History  Chief Complaint   Patient presents with    Back Pain     pt reports progressivly worsening b/l lower back pain  pt reports began after lifting full garbage can into truck  pt continued to be active  pain worse with inital movements and getting up from seated position  occassional nausea from pain no paresthesia no fever chills no urinary complaints     Pt in ER with c/o 2wks of worsening right low back pain  Symptoms began after he picked up a heavy trash can while gardening, and has worsened  Pt has been taking Advil without relief, he last took a dose tonight  He denies radiation of pain into abdomen or leg  He denies fevers/chills      History provided by:  Patient   used: No    Back Pain   Quality:  Aching  Radiates to:  Does not radiate  Pain severity:  Moderate  Onset quality:  Gradual  Timing:  Constant  Progression:  Worsening  Chronicity:  Recurrent  Context: lifting heavy objects, recent injury and twisting    Relieved by:  Nothing  Worsened by:  Twisting and bending  Ineffective treatments:  Ibuprofen  Associated symptoms: no abdominal pain, no abdominal swelling, no bladder incontinence, no bowel incontinence, no chest pain, no dysuria, no fever, no leg pain, no numbness, no paresthesias, no pelvic pain, no perianal numbness, no tingling and no weakness    Risk factors: no steroid use        Prior to Admission Medications   Prescriptions Last Dose Informant Patient Reported? Taking?    JARDIANCE 25 MG TABS 11/3/2019 at Unknown time Self Yes Yes   Sig: Take 25 mg by mouth daily    Multiple Vitamin (MULTI-VITAMIN DAILY PO) 11/3/2019 at Unknown time  Yes Yes   Sig: Take by mouth   Omega-3 Fatty Acids (OMEGA-3 FISH OIL) 1000 MG CAPS 11/3/2019 at Unknown time  Yes Yes   Sig: Take by mouth   aspirin 81 mg chewable tablet 11/3/2019 at Unknown time Self Yes Yes   Sig: Chew 81 mg daily    atenolol (TENORMIN) 100 mg tablet 11/3/2019 at Unknown time Self Yes Yes   Sig: Take 100 mg by mouth daily    atorvastatin (LIPITOR) 40 mg tablet 11/3/2019 at Unknown time Self Yes Yes   Sig: Take 40 mg by mouth daily    atorvastatin (LIPITOR) 40 mg tablet   Yes No   Sig: Take by mouth   clotrimazole-betamethasone (LOTRISONE) 1-0 05 % cream   No No   Sig: Apply topically 2 (two) times a day   fluocinonide (LIDEX) 0 05 % cream   No No   Sig: APPLY TOPICALLY 2 (TWO) TIMES A DAY FOR 30 DAYS   latanoprost (XALATAN) 0 005 % ophthalmic solution 11/3/2019 at Unknown time Self Yes Yes   Sig: Administer 1 drop to both eyes daily    metFORMIN (GLUCOPHAGE) 500 mg tablet 11/3/2019 at Unknown time Self Yes Yes   Sig: Take 500 mg by mouth daily with breakfast    omeprazole (PriLOSEC) 20 mg delayed release capsule 11/3/2019 at Unknown time  Yes Yes   Sig: Take by mouth   travoprost (TRAVATAN Z) 0 004 % ophthalmic solution Not Taking at Unknown time  Yes No   Sig: Apply to eye      Facility-Administered Medications: None       Past Medical History:   Diagnosis Date    Bleeding ulcer     Cardiac disease     cardiac stent x1    Diabetes mellitus ([facility] [address] )     boarderline diabetic     Hyperlipidemia     Hypertension        Past Surgical History:   Procedure Laterality Date    ABDOMINAL SURGERY      bleeding ulcer, cyst removed from abd    CORONARY ANGIOPLASTY WITH STENT PLACEMENT      x1     HERNIA REPAIR         History reviewed  No pertinent family history  I have reviewed and agree with the history as documented  Social History     Tobacco Use    Smoking status: Former Smoker     Types: Cigarettes     Last attempt to quit:      Years since quittin 8    Smokeless tobacco: Never Used   Substance Use Topics    Alcohol use: Yes     Alcohol/week: 3 0 - 5 0 standard drinks     Types: 3 - 5 Cans of beer per week    Drug use: Never        Review of Systems   Constitutional: Negative for chills and fever  Respiratory: Negative for cough, shortness of breath and wheezing      Cardiovascular: Negative for chest pain and palpitations  Gastrointestinal: Negative for abdominal pain, bowel incontinence, constipation, diarrhea, nausea and vomiting  Genitourinary: Negative for bladder incontinence, dysuria, flank pain, hematuria, pelvic pain and urgency  Musculoskeletal: Positive for back pain  Negative for joint swelling, neck pain and neck stiffness  Skin: Negative for color change and rash  Neurological: Negative for tingling, weakness, numbness and paresthesias  All other systems reviewed and are negative  Physical Exam  Physical Exam   Constitutional: He is oriented to person, place, and time  He appears well-developed and well-nourished  HENT:   Head: Normocephalic and atraumatic  Eyes: Pupils are equal, round, and reactive to light  EOM are normal    Cardiovascular: Normal rate, regular rhythm and normal heart sounds  Pulmonary/Chest: Effort normal and breath sounds normal    Abdominal: Soft  Bowel sounds are normal  He exhibits no distension and no mass  There is no tenderness  There is no rigidity, no rebound, no guarding and no CVA tenderness  Musculoskeletal:        Lumbar back: He exhibits normal range of motion, no tenderness, no bony tenderness, no swelling and no spasm  Back:    Neurological: He is alert and oriented to person, place, and time  Skin: Skin is warm and dry  Psychiatric: He has a normal mood and affect  His behavior is normal  Judgment and thought content normal    Nursing note and vitals reviewed        Vital Signs  ED Triage Vitals [11/04/19 0005]   Temperature Pulse Respirations Blood Pressure SpO2   97 7 °F (36 5 °C) (!) 51 18 (!) 204/90 95 %      Temp Source Heart Rate Source Patient Position - Orthostatic VS BP Location FiO2 (%)   Tympanic Monitor Sitting Right arm --      Pain Score       3           Vitals:    11/04/19 0005 11/04/19 0009 11/04/19 0050 11/04/19 0128   BP: (!) 204/90 170/78 (!) 174/85 125/67   Pulse: (!) 51  59 (!) 50   Patient Position - Orthostatic VS: Sitting  Sitting Sitting         Visual Acuity      ED Medications  Medications   morphine (PF) 4 mg/mL injection 4 mg (4 mg Intramuscular Given 11/4/19 0047)       Diagnostic Studies  Results Reviewed     Procedure Component Value Units Date/Time    Urine Microscopic [[id number]]  (Abnormal) Collected:  11/04/19 0135    Lab Status:  Final result Specimen:  Urine, Clean Catch Updated:  11/04/19 0149     RBC, UA None Seen /hpf      WBC, UA 0-1 /hpf      Epithelial Cells None Seen /hpf      Bacteria, UA None Seen /hpf     UA w Reflex to Microscopic [[id number]]  (Abnormal) Collected:  11/04/19 0135    Lab Status:  Final result Specimen:  Urine, Clean Catch Updated:  11/04/19 0141     Color, UA Light Yellow     Clarity, UA Clear     Specific Gravity, UA <=1 005     pH, UA 6 0     Leukocytes, UA Elevated glucose may cause decreased leukocyte values  See urine microscopic for [facility] result/     Nitrite, UA Negative     Protein, UA Negative mg/dl      Glucose, UA >=1000 (1%) mg/dl      Ketones, UA Negative mg/dl      Urobilinogen, UA 0 2 E U /dl      Bilirubin, UA Negative     Blood, UA Negative                 XR spine lumbar 2 or 3 views injury   ED Interpretation by Ky Upton DO (11/04 0158)   DJD  No fx      Final Result by Shashank Abad MD (11/04 8820)      Degenerative changes  Muscle spasm  Workstation performed: [id number]                    Procedures  Procedures       ED Course                               MDM  Number of Diagnoses or Management Options  Musculoskeletal back pain:   Diagnosis management comments: Xray reviewed - DJD, no acute fx  Pt with significant improvement with IM morphine  Will d/c to home with a script for vicodin and outpt f/u with PCP and spine center  Pt agrees with plan          Amount and/or Complexity of Data Reviewed  Clinical lab tests: ordered and reviewed  Tests in the radiology section of CPT®: ordered and reviewed        Disposition  Final diagnoses:   Musculoskeletal back pain     Time reflects when diagnosis was documented in both MDM as applicable and the Disposition within this note     Time User Action Codes Description Comment    11/4/2019  1:53 AM Dawson Barnard Add [M54 9] Musculoskeletal back pain       ED Disposition     ED Disposition Condition Date/Time Comment    Discharge Stable Mon Nov 4, 2019  1:53 AM Felipe Yuo discharge to home/self care  Follow-up Information     Follow up With Specialties Details Why Contact Info Additional Information    Chantal Restrepo MD Internal Medicine Schedule an appointment as soon as possible for a visit in 1 day for follow up [address]   [address] [address]  [phone]       [address] [place] [address]  Pain Medicine Schedule an appointment as soon as possible for a visit in 1 day for follow up Kar [address] [place] [address] [address] , [address] [place] [address] [place], 80 [facility]          Discharge Medication List as of 11/4/2019  1:59 AM      START taking these medications    Details   HYDROcodone-acetaminophen (NORCO) 5-325 mg per tablet Take 1 tablet by mouth every 6 (six) hours as needed for pain for up to 10 daysMax Daily Amount: 4 tablets, Starting Mon 11/4/2019, Until Thu 11/14/2019, Normal         CONTINUE these medications which have NOT CHANGED    Details   aspirin 81 mg chewable tablet Chew 81 mg daily , Historical Med      atenolol (TENORMIN) 100 mg tablet Take 100 mg by mouth daily , Historical Med      !! atorvastatin (LIPITOR) 40 mg tablet Take 40 mg by mouth daily , Starting Mon 12/18/2017, Historical Med      JARDIANCE 25 MG TABS Take 25 mg by mouth daily , Starting Thu 12/28/2017, Historical Med      latanoprost (XALATAN) 0 005 % ophthalmic solution Administer 1 drop to both eyes daily , Historical Med      metFORMIN (GLUCOPHAGE) 500 mg tablet Take 500 mg by mouth daily with breakfast , Starting Sat 12/23/2017, Historical Med      Multiple Vitamin (MULTI-VITAMIN DAILY PO) Take by mouth, Historical Med      Omega-3 Fatty Acids (OMEGA-3 FISH OIL) 1000 MG CAPS Take by mouth, Historical Med      omeprazole (PriLOSEC) 20 mg delayed release capsule Take by mouth, Historical Med      !! atorvastatin (LIPITOR) 40 mg tablet Take by mouth, Historical Med      clotrimazole-betamethasone (LOTRISONE) 1-0 05 % cream Apply topically 2 (two) times a day, Starting Thu 10/24/2019, Normal      fluocinonide (LIDEX) 0 05 % cream APPLY TOPICALLY 2 (TWO) TIMES A DAY FOR 30 DAYS, Starting Tue 6/4/2019, Until Thu 7/4/2019, Normal      travoprost (TRAVATAN Z) 0 004 % ophthalmic solution Apply to eye, Historical Med       !! - Potential duplicate medications found  Please discuss with provider              ED Provider  Electronically Signed by           Laney Kaufman DO  11/04/19 7156

## 2019-11-04 NOTE — ED NOTES
Pt was able to get OOB without assist with mild pain to provide urine sample at bedside  "that didn't hurt as much as before"      Pearl Duque, MAUREEN  11/04/19 8519

## 2019-11-04 NOTE — ED NOTES
Pt cleared for discharge   Spouse had left ed to obtain jacket and shoes, will discharge pt when spouse returns      Harleen Hong RN  11/04/19 1943

## 2019-11-04 NOTE — TELEPHONE ENCOUNTER
Message left for Pt to call us back  Our ph # and hours of business provided  Waiting for return call from Pt  Message was left with Pt's wife  Stated that Pt was still sleeping      Referral closed

## 2020-01-02 ENCOUNTER — OFFICE VISIT (OUTPATIENT)
Dept: PODIATRY | Facility: CLINIC | Age: 80
End: 2020-01-02
Payer: COMMERCIAL

## 2020-01-02 VITALS
DIASTOLIC BLOOD PRESSURE: 85 MMHG | WEIGHT: 225 LBS | HEIGHT: 70 IN | SYSTOLIC BLOOD PRESSURE: 131 MMHG | RESPIRATION RATE: 17 BRPM | BODY MASS INDEX: 32.21 KG/M2 | HEART RATE: 78 BPM

## 2020-01-02 DIAGNOSIS — B35.1 ONYCHOMYCOSIS: ICD-10-CM

## 2020-01-02 DIAGNOSIS — B35.3 TINEA PEDIS OF BOTH FEET: ICD-10-CM

## 2020-01-02 DIAGNOSIS — M79.671 PAIN IN BOTH FEET: ICD-10-CM

## 2020-01-02 DIAGNOSIS — M79.672 PAIN IN BOTH FEET: ICD-10-CM

## 2020-01-02 DIAGNOSIS — L84 CORNS: ICD-10-CM

## 2020-01-02 DIAGNOSIS — E11.42 DIABETIC POLYNEUROPATHY ASSOCIATED WITH TYPE 2 DIABETES MELLITUS (HCC): Primary | ICD-10-CM

## 2020-01-02 DIAGNOSIS — I70.209 PERIPHERAL ARTERIOSCLEROSIS (HCC): ICD-10-CM

## 2020-01-02 PROCEDURE — 11056 PARNG/CUTG B9 HYPRKR LES 2-4: CPT | Performed by: PODIATRIST

## 2020-01-02 PROCEDURE — 99212 OFFICE O/P EST SF 10 MIN: CPT | Performed by: PODIATRIST

## 2020-01-02 RX ORDER — CLOTRIMAZOLE AND BETAMETHASONE DIPROPIONATE 10; .64 MG/G; MG/G
CREAM TOPICAL 2 TIMES DAILY
Qty: 30 G | Refills: 2 | Status: SHIPPED | OUTPATIENT
Start: 2020-01-02 | End: 2020-03-12 | Stop reason: SDUPTHER

## 2020-01-02 NOTE — PROGRESS NOTES
Assessment/Plan:  Patient has pain in his feet and toes with ambulation   He has mycosis of nail and skin  He has plantar pre ulcerative skin lesions      Plan   Diabetic foot exam performed   Mycotic nails debrided   cream ordered   Calluses debrided      No problem-specific Assessment & Plan notes found for this encounter          There are no diagnoses linked to this encounter        Subjective:  Patient has pain in his feet with ambulation   No history of trauma      Patient ID: Spenser Clayton is a 79 y  o  male      Patient is diabetic  Greg Mercedes has pain in his feet and toes with ambulation         Review of Systems   Constitutional: No fever or chills, feels well, no tiredness, no recent weight loss or weight gain   Eyes: No complaints of red eyes, no eyesight problems    ENT: no complaints of loss of hearing, no nosebleeds, no sore throat   Cardiovascular: No complaints of chest pain, no palpitations, no leg claudication or lower extremity edema   Respiratory: No complaints of shortness of breath, no wheezing, no cough   Gastrointestinal: No complaints of abdominal pain, no constipation, no nausea or vomiting, no diarrhea or bloody stools   Genitourinary: No complaints of dysuria or incontinence, no hesitancy, no nocturia   Musculoskeletal: limb pain, but-- as noted in HPI   Integumentary: No complaints of skin rash or lesion, no itching or dry skin, no skin wounds   Neurological: No complaints of headache, no confusion, no numbness or tingling, no dizziness   Psychiatric: No suicidal thoughts, no anxiety, no depression   Endocrine: No muscle weakness, no frequent urination, no excessive thirst, no feelings of weakness       Active Problems  1  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)   2  Arthritis (716 90) (M19 90)   3  Atherosclerosis of arteries of extremities (440 20) (I70 209)   4  Calcaneal spur (726 73) (M77 30)   5  Callus (700) (L84)   6  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)   7  Diabetes mellitus with neuropathy (250 60,357 2) (E11 40)   8  Diabetic neuropathy (250 60,357 2) (E11 40)   9  Hypercholesterolemia (272 0) (E78 00)   10  Hypertension (401 9) (I10)   11  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)   12  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)   13  Onychomycosis (110 1) (B35 1)   14  Orthopedic aftercare (V54 9) (Z47 89)   15  Pain in both feet (729 5) (M79 671,M79 672)   16  Pain in extremity (729 5) (M79 609)   17  Pes planus, congenital (754 61) (Q66 50)   18  Plantar 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 2)   19  Plantar wart (078 12) (B07 0)   20  Prostate cancer (185) (C61)   21  Sprain of right shoulder (840 9) (S43 401A)   22  Tinea pedis (110 4) (B35 3)     Past Medical History   · Orthopedic aftercare (V54 9) (Z47 89)     Surgical History   · History of Gastric Surgery   · History of Hernia Repair   · History of Previous Stent Placement Total Number Performed ___     The surgical history was reviewed and updated today        Family History  Family History    · Family history of Arthritis (V17 7)   · Family history of Cancer     The family history was reviewed and updated today        Social History      · Beer Consumption (___ Bottles Per Day)   · Daily Coffee Consumption (1  Cups/Day)   · Former smoker (W23 74) (Z87 891)  The social history was reviewed and updated today  Allergies  1  No Known Drug Allergies      Physical Exam  Left Foot: Appearance: Normal except as noted: excessive pronation-- and-- pes planus  Right Foot: Appearance: Normal except as noted: excessive pronation-- and-- pes planus  Left Ankle: ROM: limited ROM in all planes   Right Ankle: ROM: limited ROM in all planes   Neurological Exam: performed  Light touch was intact bilaterally  Vibratory sensation was intact bilaterally  Response to monofilament test was intact bilaterally   Deep tendon reflexes: patellar reflex present bilaterally-- and-- achilles reflex present bilaterally  Vascular Exam: performed Dorsalis pedis pulses were One/4 bilaterally  Posterior tibial pulses were One/4 bilaterally  Elevation Pallor: present bilaterally  Capillary refill time was greater than 3 seconds bilaterally-- and-- Q  9 findings bilateral  Negative digital hair noted  Positive abnormal cooling noted  Toenails: All of the toenails were elongated,-- hypertrophied,-- discolored,-- ingrown,-- shown to have subungual debris,-- tender-- and-- Severely mycotic with onychauxis     Socks and shoes removed, Right Foot Findings: swollen, erythematous and dry   The sensory exam showed diminished vibratory sensation at the level of the toes  Diminished tactile sensation with monofilament testing throughout the right foot   Socks and shoes removed, Left Foot Findings: swollen, erythematous and dry   The sensory exam showed diminished vibratory sensation at the level of the toes  Diminished tactile sensation with monofilament testing throughout the left foot  Capillary refills findings on the right were delayed in the toes   Pulses:  1+ in the posterior tibialis on the right  1+ in the dorsalis pedis on the right   Capillary refills findings on the left were delayed in the toes   Pulses:  1+ in the posterior tibialis on the left  1+ in the dorsalis pedis on the left   Assign Risk Category: 2: Loss of protective sensation with or without weakness, deformity, callus, pre-ulcer, or history of ulceration  High risk  Hyperkeratosis: present on both first toes,-- present on both fifth sub metatarsals-- and-- [place] tinea pedis, bilateral, is noted  Shoe Gear Evaluation: performed ()  Right Foot: width: d-- and-- length: 11  Left Foot: width: d-- and-- length: 11  Recommendation(s): athletic shoes     Patient's shoes and socks removed  Right Foot/Ankle   Right Foot Inspection  Skin Exam: dry skin, callus and callus               Toe Exam: swelling  Sensory   Vibration: diminished  Proprioception: diminished      Vascular  Capillary refills: elevated        Left Foot/Ankle  Left Foot Inspection  Skin Exam: dry skin and callus              Toe Exam: swelling and erythema                   Sensory   Vibration: diminished  Proprioception: diminished     Vascular  Capillary refills: elevated    Patient's shoes and socks removed  Assign Risk Category:  Deformity present;  Loss of protective sensation; Weak pulses       Risk: 2

## 2020-01-20 ENCOUNTER — TRANSCRIBE ORDERS (OUTPATIENT)
Dept: ADMINISTRATIVE | Facility: HOSPITAL | Age: 80
End: 2020-01-20

## 2020-01-20 DIAGNOSIS — N50.811 TESTICULAR PAIN, RIGHT: Primary | ICD-10-CM

## 2020-01-21 ENCOUNTER — HOSPITAL ENCOUNTER (OUTPATIENT)
Dept: RADIOLOGY | Facility: HOSPITAL | Age: 80
Discharge: HOME | End: 2020-01-21
Attending: SPECIALIST
Payer: COMMERCIAL

## 2020-01-21 DIAGNOSIS — N50.811 TESTICULAR PAIN, RIGHT: ICD-10-CM

## 2020-01-21 PROCEDURE — 76870 US EXAM SCROTUM: CPT

## 2020-03-12 ENCOUNTER — OFFICE VISIT (OUTPATIENT)
Dept: PODIATRY | Facility: CLINIC | Age: 80
End: 2020-03-12
Payer: COMMERCIAL

## 2020-03-12 VITALS
WEIGHT: 225 LBS | BODY MASS INDEX: 32.21 KG/M2 | HEIGHT: 70 IN | RESPIRATION RATE: 17 BRPM | DIASTOLIC BLOOD PRESSURE: 87 MMHG | HEART RATE: 78 BPM | SYSTOLIC BLOOD PRESSURE: 132 MMHG

## 2020-03-12 DIAGNOSIS — B35.1 ONYCHOMYCOSIS: ICD-10-CM

## 2020-03-12 DIAGNOSIS — I70.209 PERIPHERAL ARTERIOSCLEROSIS (HCC): ICD-10-CM

## 2020-03-12 DIAGNOSIS — M79.672 PAIN IN BOTH FEET: ICD-10-CM

## 2020-03-12 DIAGNOSIS — M79.671 PAIN IN BOTH FEET: ICD-10-CM

## 2020-03-12 DIAGNOSIS — B35.3 TINEA PEDIS OF BOTH FEET: ICD-10-CM

## 2020-03-12 DIAGNOSIS — L84 CORNS: ICD-10-CM

## 2020-03-12 DIAGNOSIS — E11.42 DIABETIC POLYNEUROPATHY ASSOCIATED WITH TYPE 2 DIABETES MELLITUS (HCC): Primary | ICD-10-CM

## 2020-03-12 PROCEDURE — 99212 OFFICE O/P EST SF 10 MIN: CPT | Performed by: PODIATRIST

## 2020-03-12 PROCEDURE — 11056 PARNG/CUTG B9 HYPRKR LES 2-4: CPT | Performed by: PODIATRIST

## 2020-03-12 RX ORDER — CLOTRIMAZOLE AND BETAMETHASONE DIPROPIONATE 10; .64 MG/G; MG/G
CREAM TOPICAL 2 TIMES DAILY
Qty: 30 G | Refills: 2 | Status: SHIPPED | OUTPATIENT
Start: 2020-03-12 | End: 2020-05-21 | Stop reason: SDUPTHER

## 2020-03-12 NOTE — PROGRESS NOTES
Assessment/Plan:  Patient has pain in his feet and toes with ambulation   He has mycosis of nail and skin  He has plantar pre ulcerative skin lesions      Plan   Diabetic foot exam performed   Mycotic nails debrided   cream ordered   Calluses debrided           Subjective:  Patient has pain in his feet with ambulation   No history of trauma      Patient ID: Spenser Clayton is a 79 y  o  male      Patient is diabetic  Malia Butt has pain in his feet and toes with ambulation         Review of Systems   Constitutional: No fever or chills, feels well, no tiredness, no recent weight loss or weight gain   Eyes: No complaints of red eyes, no eyesight problems   ENT: no complaints of loss of hearing, no nosebleeds, no sore throat   Cardiovascular: No complaints of chest pain, no palpitations, no leg claudication or lower extremity edema   Respiratory: No complaints of shortness of breath, no wheezing, no cough   Gastrointestinal: No complaints of abdominal pain, no constipation, no nausea or vomiting, no diarrhea or bloody stools   Genitourinary: No complaints of dysuria or incontinence, no hesitancy, no nocturia   Musculoskeletal: limb pain, but-- as noted in HPI   Integumentary: No complaints of skin rash or lesion, no itching or dry skin, no skin wounds   Neurological: No complaints of headache, no confusion, no numbness or tingling, no dizziness   Psychiatric: No suicidal thoughts, no anxiety, no depression   Endocrine: No muscle weakness, no frequent urination, no excessive thirst, no feelings of weakness       Active Problems  1  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)   2  Arthritis (716 90) (M19 90)   3  Atherosclerosis of arteries of extremities (440 20) (I70 209)   4  Calcaneal spur (726 73) (M77 30)   5  Callus (700) (L84)   6  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)   7  Diabetes mellitus with neuropathy (250 60,357 2) (E11 40)   8  Diabetic neuropathy (250 60,357  2) (E11 40)   9  Hypercholesterolemia (272 0) (E78 00)   10  Hypertension (401 9) (I10)   11  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)   12  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)   13  Onychomycosis (110 1) (B35 1)   14  Orthopedic aftercare (V54 9) (Z47 89)   15  Pain in both feet (729 5) (M79 671,M79 672)   16  Pain in extremity (729 5) (M79 609)   17  Pes planus, congenital (754 61) (Q66 50)   18  Plantar 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 2)   19  Plantar wart (078 12) (B07 0)   20  Prostate cancer (185) (C61)   21  Sprain of right shoulder (840 9) (S43 401A)   22  Tinea pedis (110 4) (B35 3)     Past Medical History   · Orthopedic aftercare (V54 9) (Z47 89)     Surgical History   · History of Gastric Surgery   · History of Hernia Repair   · History of Previous Stent Placement Total Number Performed ___     The surgical history was reviewed and updated today        Family History  Family History    · Family history of Arthritis (V17 7)   · Family history of Cancer     The family history was reviewed and updated today        Social History      · Beer Consumption (___ Bottles Per Day)   · Daily Coffee Consumption (1  Cups/Day)   · Former smoker (J24 65) (Z87 891)  The social history was reviewed and updated today  Allergies  1  No Known Drug Allergies      Physical Exam  Left Foot: Appearance: Normal except as noted: excessive pronation-- and-- pes planus  Right Foot: Appearance: Normal except as noted: excessive pronation-- and-- pes planus  Left Ankle: ROM: limited ROM in all planes   Right Ankle: ROM: limited ROM in all planes   Neurological Exam: performed  Light touch was intact bilaterally  Vibratory sensation was intact bilaterally  Response to monofilament test was intact bilaterally  Deep tendon reflexes: patellar reflex present bilaterally-- and-- achilles reflex present bilaterally  Vascular Exam: performed Dorsalis pedis pulses were One/4 bilaterally   Posterior tibial pulses were One/4 bilaterally  Elevation Pallor: present bilaterally  Capillary refill time was greater than 3 seconds bilaterally-- and-- Q  9 findings bilateral  Negative digital hair noted  Positive abnormal cooling noted  Toenails: All of the toenails were elongated,-- hypertrophied,-- discolored,-- ingrown,-- shown to have subungual debris,-- tender-- and-- Severely mycotic with onychauxis     Socks and shoes removed, Right Foot Findings: swollen, erythematous and dry   The sensory exam showed diminished vibratory sensation at the level of the toes  Diminished tactile sensation with monofilament testing throughout the right foot   Socks and shoes removed, Left Foot Findings: swollen, erythematous and dry   The sensory exam showed diminished vibratory sensation at the level of the toes  Diminished tactile sensation with monofilament testing throughout the left foot  Capillary refills findings on the right were delayed in the toes   Pulses:  1+ in the posterior tibialis on the right  1+ in the dorsalis pedis on the right   Capillary refills findings on the left were delayed in the toes   Pulses:  1+ in the posterior tibialis on the left  1+ in the dorsalis pedis on the left   Assign Risk Category: 2: Loss of protective sensation with or without weakness, deformity, callus, pre-ulcer, or history of ulceration  High risk  Hyperkeratosis: present on both first toes,-- present on both fifth sub metatarsals-- and-- [place] tinea pedis, bilateral, is noted  Shoe Gear Evaluation: performed ()  Right Foot: width: d-- and-- length: 11  Left Foot: width: d-- and-- length: 11  Recommendation(s): athletic shoes     Patient's shoes and socks removed  Right Foot/Ankle   Right Foot Inspection  Skin Exam: dry skin, callus and callus               Toe Exam: swelling  Sensory   Vibration: diminished  Proprioception: diminished      Vascular  Capillary refills: elevated        Left Foot/Ankle  Left Foot Inspection  Skin Exam: dry skin and callus              Toe Exam: swelling and erythema                   Sensory   Vibration: diminished  Proprioception: diminished     Vascular  Capillary refills: elevated     Patient's shoes and socks removed  Assign Risk Category:  Deformity present;  Loss of protective sensation; Weak pulses       Risk: 2

## 2020-04-30 ENCOUNTER — OFFICE VISIT (OUTPATIENT)
Dept: PODIATRY | Facility: CLINIC | Age: 80
End: 2020-04-30
Payer: COMMERCIAL

## 2020-04-30 VITALS — SYSTOLIC BLOOD PRESSURE: 130 MMHG | RESPIRATION RATE: 16 BRPM | HEART RATE: 76 BPM | DIASTOLIC BLOOD PRESSURE: 84 MMHG

## 2020-04-30 DIAGNOSIS — M79.671 PAIN IN BOTH FEET: ICD-10-CM

## 2020-04-30 DIAGNOSIS — B35.3 TINEA PEDIS OF BOTH FEET: ICD-10-CM

## 2020-04-30 DIAGNOSIS — M77.42 METATARSALGIA OF BOTH FEET: ICD-10-CM

## 2020-04-30 DIAGNOSIS — B35.1 ONYCHOMYCOSIS: ICD-10-CM

## 2020-04-30 DIAGNOSIS — L84 CORNS: ICD-10-CM

## 2020-04-30 DIAGNOSIS — L85.3 XEROSIS CUTIS: Primary | ICD-10-CM

## 2020-04-30 DIAGNOSIS — M77.41 METATARSALGIA OF BOTH FEET: ICD-10-CM

## 2020-04-30 DIAGNOSIS — E11.42 DIABETIC POLYNEUROPATHY ASSOCIATED WITH TYPE 2 DIABETES MELLITUS (HCC): ICD-10-CM

## 2020-04-30 DIAGNOSIS — I70.209 PERIPHERAL ARTERIOSCLEROSIS (HCC): ICD-10-CM

## 2020-04-30 DIAGNOSIS — M79.672 PAIN IN BOTH FEET: ICD-10-CM

## 2020-04-30 PROCEDURE — 99212 OFFICE O/P EST SF 10 MIN: CPT | Performed by: PODIATRIST

## 2020-04-30 RX ORDER — EMPAGLIFLOZIN 25 MG/1
TABLET, FILM COATED ORAL
COMMUNITY
Start: 2020-02-14 | End: 2021-01-07

## 2020-04-30 RX ORDER — TRAVOPROST 0.004 %
DROPS OPHTHALMIC (EYE)
COMMUNITY
Start: 2020-02-14 | End: 2021-10-05 | Stop reason: ALTCHOICE

## 2020-04-30 RX ORDER — AMMONIUM LACTATE 12 G/100G
CREAM TOPICAL AS NEEDED
Qty: 385 G | Refills: 0 | Status: SHIPPED | OUTPATIENT
Start: 2020-04-30

## 2020-05-21 ENCOUNTER — OFFICE VISIT (OUTPATIENT)
Dept: PODIATRY | Facility: CLINIC | Age: 80
End: 2020-05-21
Payer: COMMERCIAL

## 2020-05-21 VITALS
DIASTOLIC BLOOD PRESSURE: 84 MMHG | HEART RATE: 76 BPM | RESPIRATION RATE: 16 BRPM | HEIGHT: 70 IN | SYSTOLIC BLOOD PRESSURE: 130 MMHG | BODY MASS INDEX: 32.21 KG/M2 | WEIGHT: 225 LBS

## 2020-05-21 DIAGNOSIS — L85.3 XEROSIS CUTIS: ICD-10-CM

## 2020-05-21 DIAGNOSIS — M79.671 PAIN IN BOTH FEET: ICD-10-CM

## 2020-05-21 DIAGNOSIS — M79.672 PAIN IN BOTH FEET: ICD-10-CM

## 2020-05-21 DIAGNOSIS — I70.209 PERIPHERAL ARTERIOSCLEROSIS (HCC): ICD-10-CM

## 2020-05-21 DIAGNOSIS — B35.1 ONYCHOMYCOSIS: ICD-10-CM

## 2020-05-21 DIAGNOSIS — L84 CORNS: ICD-10-CM

## 2020-05-21 DIAGNOSIS — E11.42 DIABETIC POLYNEUROPATHY ASSOCIATED WITH TYPE 2 DIABETES MELLITUS (HCC): Primary | ICD-10-CM

## 2020-05-21 DIAGNOSIS — B35.3 TINEA PEDIS OF BOTH FEET: ICD-10-CM

## 2020-05-21 PROCEDURE — 11056 PARNG/CUTG B9 HYPRKR LES 2-4: CPT | Performed by: PODIATRIST

## 2020-05-21 PROCEDURE — 99212 OFFICE O/P EST SF 10 MIN: CPT | Performed by: PODIATRIST

## 2020-05-21 RX ORDER — CLOTRIMAZOLE AND BETAMETHASONE DIPROPIONATE 10; .64 MG/G; MG/G
CREAM TOPICAL 2 TIMES DAILY
Qty: 30 G | Refills: 2 | Status: SHIPPED | OUTPATIENT
Start: 2020-05-21 | End: 2020-07-23 | Stop reason: SDUPTHER

## 2020-07-23 ENCOUNTER — OFFICE VISIT (OUTPATIENT)
Dept: PODIATRY | Facility: CLINIC | Age: 80
End: 2020-07-23
Payer: COMMERCIAL

## 2020-07-23 VITALS
BODY MASS INDEX: 32.21 KG/M2 | WEIGHT: 225 LBS | HEIGHT: 70 IN | HEART RATE: 76 BPM | RESPIRATION RATE: 16 BRPM | DIASTOLIC BLOOD PRESSURE: 84 MMHG | SYSTOLIC BLOOD PRESSURE: 130 MMHG

## 2020-07-23 DIAGNOSIS — M79.671 PAIN IN BOTH FEET: ICD-10-CM

## 2020-07-23 DIAGNOSIS — L84 CORNS: ICD-10-CM

## 2020-07-23 DIAGNOSIS — I70.209 PERIPHERAL ARTERIOSCLEROSIS (HCC): ICD-10-CM

## 2020-07-23 DIAGNOSIS — M79.672 PAIN IN BOTH FEET: ICD-10-CM

## 2020-07-23 DIAGNOSIS — E11.42 DIABETIC POLYNEUROPATHY ASSOCIATED WITH TYPE 2 DIABETES MELLITUS (HCC): Primary | ICD-10-CM

## 2020-07-23 DIAGNOSIS — B35.1 ONYCHOMYCOSIS: ICD-10-CM

## 2020-07-23 DIAGNOSIS — B35.3 TINEA PEDIS OF BOTH FEET: ICD-10-CM

## 2020-07-23 PROCEDURE — 99212 OFFICE O/P EST SF 10 MIN: CPT | Performed by: PODIATRIST

## 2020-07-23 PROCEDURE — 11056 PARNG/CUTG B9 HYPRKR LES 2-4: CPT | Performed by: PODIATRIST

## 2020-07-23 RX ORDER — CLOTRIMAZOLE AND BETAMETHASONE DIPROPIONATE 10; .64 MG/G; MG/G
CREAM TOPICAL 2 TIMES DAILY
Qty: 30 G | Refills: 2 | Status: SHIPPED | OUTPATIENT
Start: 2020-07-23 | End: 2021-02-11 | Stop reason: SDUPTHER

## 2020-07-23 NOTE — PROGRESS NOTES
Assessment/Plan:  Patient has pain in his feet and toes with ambulation   He has mycosis of nail and skin  He has plantar pre ulcerative skin lesions      Plan   Diabetic foot exam performed   Mycotic nails debrided   cream ordered   Calluses debrided            Subjective:  Patient has pain in his feet with ambulation   No history of trauma      Patient ID: Spenser Clayton is a 80 y  o  male      Patient is diabetic  Carrol Ramos has pain in his feet and toes with ambulation         Review of Systems   Constitutional: No fever or chills, feels well, no tiredness, no recent weight loss or weight gain   Eyes: No complaints of red eyes, no eyesight problems   ENT: no complaints of loss of hearing, no nosebleeds, no sore throat   Cardiovascular: No complaints of chest pain, no palpitations, no leg claudication or lower extremity edema   Respiratory: No complaints of shortness of breath, no wheezing, no cough   Gastrointestinal: No complaints of abdominal pain, no constipation, no nausea or vomiting, no diarrhea or bloody stools   Genitourinary: No complaints of dysuria or incontinence, no hesitancy, no nocturia   Musculoskeletal: limb pain, but-- as noted in HPI   Integumentary: No complaints of skin rash or lesion, no itching or dry skin, no skin wounds   Neurological: No complaints of headache, no confusion, no numbness or tingling, no dizziness   Psychiatric: No suicidal thoughts, no anxiety, no depression   Endocrine: No muscle weakness, no frequent urination, no excessive thirst, no feelings of weakness       Active Problems  1  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)   2  Arthritis (716 90) (M19 90)   3  Atherosclerosis of arteries of extremities (440 20) (I70 209)   4  Calcaneal spur (726 73) (M77 30)   5  Callus (700) (L84)   6  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)   7  Diabetes mellitus with neuropathy (250 60,357 2) (E11 40)   8  Diabetic neuropathy (250 60,357  2) (E11 40)   9  Hypercholesterolemia (272 0) (E78 00)   10  Hypertension (401 9) (I10)   11  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)   12  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)   13  Onychomycosis (110 1) (B35 1)   14  Orthopedic aftercare (V54 9) (Z47 89)   15  Pain in both feet (729 5) (M79 671,M79 672)   16  Pain in extremity (729 5) (M79 609)   17  Pes planus, congenital (754 61) (Q66 50)   18  Plantar 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 2)   19  Plantar wart (078 12) (B07 0)   20  Prostate cancer (185) (C61)   21  Sprain of right shoulder (840 9) (S43 401A)   22  Tinea pedis (110 4) (B35 3)     Past Medical History   · Orthopedic aftercare (V54 9) (Z47 89)     Surgical History   · History of Gastric Surgery   · History of Hernia Repair   · History of Previous Stent Placement Total Number Performed ___     The surgical history was reviewed and updated today        Family History  Family History    · Family history of Arthritis (V17 7)   · Family history of Cancer     The family history was reviewed and updated today        Social History      · Beer Consumption (___ Bottles Per Day)   · Daily Coffee Consumption (1  Cups/Day)   · Former smoker (B98 86) (Z87 891)  The social history was reviewed and updated today  Allergies  1  No Known Drug Allergies      Physical Exam  Left Foot: Appearance: Normal except as noted: excessive pronation-- and-- pes planus  Right Foot: Appearance: Normal except as noted: excessive pronation-- and-- pes planus  Left Ankle: ROM: limited ROM in all planes   Right Ankle: ROM: limited ROM in all planes   Neurological Exam: performed  Light touch was intact bilaterally  Vibratory sensation was intact bilaterally  Response to monofilament test was intact bilaterally  Deep tendon reflexes: patellar reflex present bilaterally-- and-- achilles reflex present bilaterally  Vascular Exam: performed Dorsalis pedis pulses were One/4 bilaterally   Posterior tibial pulses were One/4 bilaterally  Elevation Pallor: present bilaterally  Capillary refill time was greater than 3 seconds bilaterally-- and-- Q  9 findings bilateral  Negative digital hair noted  Positive abnormal cooling noted  Toenails: All of the toenails were elongated,-- hypertrophied,-- discolored,-- ingrown,-- shown to have subungual debris,-- tender-- and-- Severely mycotic with onychauxis     Socks and shoes removed, Right Foot Findings: swollen, erythematous and dry   The sensory exam showed diminished vibratory sensation at the level of the toes  Diminished tactile sensation with monofilament testing throughout the right foot   Socks and shoes removed, Left Foot Findings: swollen, erythematous and dry   The sensory exam showed diminished vibratory sensation at the level of the toes  Diminished tactile sensation with monofilament testing throughout the left foot  Capillary refills findings on the right were delayed in the toes   Pulses:  1+ in the posterior tibialis on the right  1+ in the dorsalis pedis on the right   Capillary refills findings on the left were delayed in the toes   Pulses:  1+ in the posterior tibialis on the left  1+ in the dorsalis pedis on the left   Assign Risk Category: 2: Loss of protective sensation with or without weakness, deformity, callus, pre-ulcer, or history of ulceration  High risk  Hyperkeratosis: present on both first toes,-- present on both fifth sub metatarsals-- and-- [place] tinea pedis, bilateral, is noted  Shoe Gear Evaluation: performed ()  Right Foot: width: d-- and-- length: 11  Left Foot: width: d-- and-- length: 11  Recommendation(s): athletic shoes     Patient's shoes and socks removed  Right Foot/Ankle   Right Foot Inspection  Skin Exam: dry skin, callus and callus               Toe Exam: swelling  Sensory   Vibration: diminished  Proprioception: diminished      Vascular  Capillary refills: elevated        Left Foot/Ankle  Left Foot Inspection  Skin Exam: dry skin and callus              Toe Exam: swelling and erythema                   Sensory   Vibration: diminished  Proprioception: diminished     Vascular  Capillary refills: elevated     Patient's shoes and socks removed  Assign Risk Category:  Deformity present; Loss of protective sensation; Weak pulses       Risk: 2

## 2020-09-22 DIAGNOSIS — B35.3 TINEA PEDIS OF BOTH FEET: Primary | ICD-10-CM

## 2020-09-22 RX ORDER — CLOTRIMAZOLE AND BETAMETHASONE DIPROPIONATE 10; .5 MG/ML; MG/ML
LOTION TOPICAL
Qty: 30 ML | Refills: 2 | Status: SHIPPED | OUTPATIENT
Start: 2020-09-22 | End: 2021-02-11 | Stop reason: ALTCHOICE

## 2020-09-24 ENCOUNTER — OFFICE VISIT (OUTPATIENT)
Dept: PODIATRY | Facility: CLINIC | Age: 80
End: 2020-09-24
Payer: COMMERCIAL

## 2020-09-24 VITALS
HEIGHT: 65 IN | SYSTOLIC BLOOD PRESSURE: 130 MMHG | HEART RATE: 76 BPM | BODY MASS INDEX: 37.49 KG/M2 | DIASTOLIC BLOOD PRESSURE: 84 MMHG | WEIGHT: 225 LBS | RESPIRATION RATE: 16 BRPM

## 2020-09-24 DIAGNOSIS — E11.42 DIABETIC POLYNEUROPATHY ASSOCIATED WITH TYPE 2 DIABETES MELLITUS (HCC): Primary | ICD-10-CM

## 2020-09-24 DIAGNOSIS — I70.209 PERIPHERAL ARTERIOSCLEROSIS (HCC): ICD-10-CM

## 2020-09-24 DIAGNOSIS — B35.3 TINEA PEDIS OF BOTH FEET: ICD-10-CM

## 2020-09-24 DIAGNOSIS — M79.671 PAIN IN BOTH FEET: ICD-10-CM

## 2020-09-24 DIAGNOSIS — M79.672 PAIN IN BOTH FEET: ICD-10-CM

## 2020-09-24 DIAGNOSIS — L84 CORNS: ICD-10-CM

## 2020-09-24 DIAGNOSIS — B35.1 ONYCHOMYCOSIS: ICD-10-CM

## 2020-09-24 PROCEDURE — 99212 OFFICE O/P EST SF 10 MIN: CPT | Performed by: PODIATRIST

## 2020-09-24 PROCEDURE — 11056 PARNG/CUTG B9 HYPRKR LES 2-4: CPT | Performed by: PODIATRIST

## 2020-09-24 NOTE — PROGRESS NOTES
Assessment/Plan:  Patient has pain in his feet and toes with ambulation   He has mycosis of nail and skin  He has plantar pre ulcerative skin lesions      Plan   Diabetic foot exam performed   Mycotic nails debrided   cream ordered   Patient will use daily, he will apply cream to feet b i d  For 4 weeks   Calluses debrided            Subjective:  Patient has pain in his feet with ambulation   No history of trauma      Patient ID: Spenser Clayton is [id number]  o  male      Patient is diabetic  [facility] has pain in his feet and toes with ambulation  Has pain from calluses  He has ingrown toenails and dry scaly skin  He is requesting refill of topical antifungal         Review of Systems   Constitutional: No fever or chills, feels well, no tiredness, no recent weight loss or weight gain   Eyes: No complaints of red eyes, no eyesight problems    ENT: no complaints of loss of hearing, no nosebleeds, no sore throat   Cardiovascular: No complaints of chest pain, no palpitations, no leg claudication or lower extremity edema   Respiratory: No complaints of shortness of breath, no wheezing, no cough   Gastrointestinal: No complaints of abdominal pain, no constipation, no nausea or vomiting, no diarrhea or bloody stools   Genitourinary: No complaints of dysuria or incontinence, no hesitancy, no nocturia   Musculoskeletal: limb pain, but-- as noted in HPI   Integumentary: No complaints of skin rash or lesion, no itching or dry skin, no skin wounds   Neurological: No complaints of headache, no confusion, no numbness or tingling, no dizziness   Psychiatric: No suicidal thoughts, no anxiety, no depression   Endocrine: No muscle weakness, no frequent urination, no excessive thirst, no feelings of weakness       Active Problems  1  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)   2  Arthritis (716 90) (M19 90)   3  Atherosclerosis of arteries of extremities (440 20) (I70 209)   4  Calcaneal spur (726 73) (M77 30)   5  Callus (700) (L84)   6  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)   7  Diabetes mellitus with neuropathy (250 60,357 2) (E11 40)   8  Diabetic neuropathy (250 60,357 2) (E11 40)   9  Hypercholesterolemia (272 0) (E78 00)   10  Hypertension (401 9) (I10)   11  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)   12  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)   13  Onychomycosis (110 1) (B35 1)   14  Orthopedic aftercare (V54 9) (Z47 89)   15  Pain in both feet (729 5) (M79 671,M79 672)   16  Pain in extremity (729 5) (M79 609)   17  Pes planus, congenital (754 61) (Q66 50)   18  Plantar 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 2)   19  Plantar wart (078 12) (B07 0)   20  Prostate cancer (185) (C61)   21  Sprain of right shoulder (840 9) (S43 401A)   22  Tinea pedis (110 4) (B35 3)     Past Medical History   · Orthopedic aftercare (V54 9) (Z47 89)     Surgical History   · History of Gastric Surgery   · History of Hernia Repair   · History of Previous Stent Placement Total Number Performed ___     The surgical history was reviewed and updated today        Family History  Family History    · Family history of Arthritis (V17 7)   · Family history of Cancer     The family history was reviewed and updated today        Social History      · Beer Consumption (___ Bottles Per Day)   · Daily Coffee Consumption (1  Cups/Day)   · Former smoker (T56 76) (Z87 891)  The social history was reviewed and updated today  Allergies  1  No Known Drug Allergies      Physical Exam  Left Foot: Appearance: Normal except as noted: excessive pronation-- and-- pes planus  Right Foot: Appearance: Normal except as noted: excessive pronation-- and-- pes planus  Left Ankle: ROM: limited ROM in all planes   Right Ankle: ROM: limited ROM in all planes   Neurological Exam: performed  Light touch was intact bilaterally  Vibratory sensation was intact bilaterally  Response to monofilament test was intact bilaterally   Deep tendon reflexes: patellar reflex present bilaterally-- and-- achilles reflex present bilaterally  Vascular Exam: performed Dorsalis pedis pulses were One/4 bilaterally  Posterior tibial pulses were One/4 bilaterally  Elevation Pallor: present bilaterally  Capillary refill time was greater than 3 seconds bilaterally-- and-- Q  9 findings bilateral  Negative digital hair noted  Positive abnormal cooling noted  Toenails: All of the toenails were elongated,-- hypertrophied,-- discolored,-- ingrown,-- shown to have subungual debris,-- tender-- and-- Severely mycotic with onychauxis     Socks and shoes removed, Right Foot Findings: swollen, erythematous and dry   The sensory exam showed diminished vibratory sensation at the level of the toes  Diminished tactile sensation with monofilament testing throughout the right foot   Socks and shoes removed, Left Foot Findings: swollen, erythematous and dry   The sensory exam showed diminished vibratory sensation at the level of the toes  Diminished tactile sensation with monofilament testing throughout the left foot  Capillary refills findings on the right were delayed in the toes   Pulses:  1+ in the posterior tibialis on the right  1+ in the dorsalis pedis on the right   Capillary refills findings on the left were delayed in the toes   Pulses:  1+ in the posterior tibialis on the left  1+ in the dorsalis pedis on the left   Assign Risk Category: 2: Loss of protective sensation with or without weakness, deformity, callus, pre-ulcer, or history of ulceration  High risk  Hyperkeratosis: present on both first toes,-- present on both fifth sub metatarsals-- and-- [place] tinea pedis, bilateral, is noted  Shoe Gear Evaluation: performed ()  Right Foot: width: d-- and-- length: 11  Left Foot: width: d-- and-- length: 11  Recommendation(s): athletic shoes     Patient's shoes and socks removed  Right Foot/Ankle   Right Foot Inspection  Skin Exam: dry skin, callus and callus               Toe Exam: swelling  Sensory   Vibration: diminished  Proprioception: diminished      Vascular  Capillary refills: elevated        Left Foot/Ankle  Left Foot Inspection  Skin Exam: dry skin and callus              Toe Exam: swelling and erythema                   Sensory   Vibration: diminished  Proprioception: diminished     Vascular  Capillary refills: elevated     Patient's shoes and socks removed  Assign Risk Category:  Deformity present; Loss of protective sensation; Weak pulses       Risk: 2

## 2020-12-03 ENCOUNTER — OFFICE VISIT (OUTPATIENT)
Dept: PODIATRY | Facility: CLINIC | Age: 80
End: 2020-12-03
Payer: COMMERCIAL

## 2020-12-03 VITALS
WEIGHT: 225 LBS | HEIGHT: 65 IN | DIASTOLIC BLOOD PRESSURE: 88 MMHG | RESPIRATION RATE: 16 BRPM | BODY MASS INDEX: 37.49 KG/M2 | SYSTOLIC BLOOD PRESSURE: 136 MMHG

## 2020-12-03 DIAGNOSIS — I70.209 PERIPHERAL ARTERIOSCLEROSIS (HCC): ICD-10-CM

## 2020-12-03 DIAGNOSIS — M79.671 PAIN IN BOTH FEET: ICD-10-CM

## 2020-12-03 DIAGNOSIS — M79.672 PAIN IN BOTH FEET: ICD-10-CM

## 2020-12-03 DIAGNOSIS — L84 CORNS: ICD-10-CM

## 2020-12-03 DIAGNOSIS — B35.1 ONYCHOMYCOSIS: ICD-10-CM

## 2020-12-03 DIAGNOSIS — E11.42 DIABETIC POLYNEUROPATHY ASSOCIATED WITH TYPE 2 DIABETES MELLITUS (HCC): Primary | ICD-10-CM

## 2020-12-03 DIAGNOSIS — B35.3 TINEA PEDIS OF BOTH FEET: ICD-10-CM

## 2020-12-03 PROCEDURE — 99213 OFFICE O/P EST LOW 20 MIN: CPT | Performed by: PODIATRIST

## 2021-01-07 ENCOUNTER — APPOINTMENT (EMERGENCY)
Dept: RADIOLOGY | Facility: HOSPITAL | Age: 81
End: 2021-01-07
Payer: COMMERCIAL

## 2021-01-07 ENCOUNTER — HOSPITAL ENCOUNTER (EMERGENCY)
Facility: HOSPITAL | Age: 81
Discharge: HOME | End: 2021-01-07
Attending: EMERGENCY MEDICINE
Payer: COMMERCIAL

## 2021-01-07 VITALS
OXYGEN SATURATION: 95 % | RESPIRATION RATE: 20 BRPM | DIASTOLIC BLOOD PRESSURE: 67 MMHG | SYSTOLIC BLOOD PRESSURE: 131 MMHG | TEMPERATURE: 96.3 F | HEART RATE: 52 BPM | WEIGHT: 225 LBS | BODY MASS INDEX: 37.44 KG/M2

## 2021-01-07 DIAGNOSIS — S01.01XA SCALP LACERATION, INITIAL ENCOUNTER: ICD-10-CM
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
## 2021-01-07 DIAGNOSIS — S09.90XA MINOR HEAD INJURY WITHOUT LOSS OF CONSCIOUSNESS, INITIAL ENCOUNTER: ICD-10-CM

## 2021-01-07 PROCEDURE — 90471 IMMUNIZATION ADMIN: CPT

## 2021-01-07 PROCEDURE — 99284 EMERGENCY DEPT VISIT MOD MDM: CPT | Performed by: EMERGENCY MEDICINE

## 2021-01-07 PROCEDURE — 72125 CT NECK SPINE W/O DYE: CPT

## 2021-01-07 PROCEDURE — 70450 CT HEAD/BRAIN W/O DYE: CPT

## 2021-01-07 PROCEDURE — 90715 TDAP VACCINE 7 YRS/> IM: CPT | Performed by: EMERGENCY MEDICINE

## 2021-01-07 PROCEDURE — 12004 RPR S/N/AX/GEN/TRK7.6-12.5CM: CPT | Performed by: EMERGENCY MEDICINE

## 2021-01-07 PROCEDURE — G1004 CDSM NDSC: HCPCS

## 2021-01-07 PROCEDURE — 99283 EMERGENCY DEPT VISIT LOW MDM: CPT

## 2021-01-07 RX ORDER — LIDOCAINE HYDROCHLORIDE AND EPINEPHRINE 10; 10 MG/ML; UG/ML
10 INJECTION, SOLUTION INFILTRATION; PERINEURAL ONCE
Status: COMPLETED | OUTPATIENT
Start: 2021-01-07 | End: 2021-01-07

## 2021-01-07 RX ORDER — GINSENG 100 MG
1 CAPSULE ORAL ONCE
Status: COMPLETED | OUTPATIENT
Start: 2021-01-07 | End: 2021-01-07

## 2021-01-07 RX ORDER — ACETAMINOPHEN 325 MG/1
975 TABLET ORAL ONCE
Status: COMPLETED | OUTPATIENT
Start: 2021-01-07 | End: 2021-01-07

## 2021-01-07 RX ADMIN — LIDOCAINE HYDROCHLORIDE AND EPINEPHRINE 10 ML: 10; 10 INJECTION, SOLUTION INFILTRATION; PERINEURAL at 17:01

## 2021-01-07 RX ADMIN — TETANUS TOXOID, REDUCED DIPHTHERIA TOXOID AND ACELLULAR PERTUSSIS VACCINE, ADSORBED 0.5 ML: 5; 2.5; 8; 8; 2.5 SUSPENSION INTRAMUSCULAR at 15:43

## 2021-01-07 RX ADMIN — BACITRACIN 2 SMALL APPLICATION: 500 OINTMENT TOPICAL at 17:01

## 2021-01-07 RX ADMIN — ACETAMINOPHEN 975 MG: 325 TABLET, FILM COATED ORAL at 15:42

## 2021-01-07 NOTE — ED PROVIDER NOTES
History  Chief Complaint   Patient presents with    Fall     Fall with LOC and daily aspirin  Patient reports he [place], rolled down a hill and hit his head off of a tree  2 in U shaped Laceration noted to R back of patients head, bleeding controlled  Patient is alert and oriented  Patient is a 68year old M on daily 81mg aspirin who presents after a trip and fall while working at the [employer] resulting in falling down a hill and striking a tree with his head  Denies LOC  Complains of pain to the right back of the scalp where he has a large laceration and hematoma  Pain has been constant, throbbing, moderate in intensity, non-radiating  Denies any visual changes, neck pain or stiffness, back pain, numbness, tingling, weakness, loss of bowel or bladder control  Unknown tetanus status  Prior to Admission Medications   Prescriptions Last Dose Informant Patient Reported? Taking?    JARDIANCE 25 MG TABS 1/7/2021 at Unknown time Self Yes Yes   Sig: Take 25 mg by mouth 2 (two) times a day    Multiple Vitamin (MULTI-VITAMIN DAILY PO) 1/7/2021 at Unknown time  Yes Yes   Sig: Take by mouth   Omega-3 Fatty Acids (OMEGA-3 FISH OIL) 1000 MG CAPS 1/7/2021 at Unknown time  Yes Yes   Sig: Take by mouth   TRAVATAN Z 0 004 % ophthalmic solution 1/7/2021 at Unknown time  Yes Yes   ammonium lactate (LAC-HYDRIN) 12 % cream   No No   Sig: Apply topically as needed for dry skin   aspirin 81 mg chewable tablet 1/7/2021 at Unknown time Self Yes Yes   Sig: Chew 81 mg daily    atenolol (TENORMIN) 100 mg tablet 1/7/2021 at Unknown time Self Yes Yes   Sig: Take 100 mg by mouth daily    atorvastatin (LIPITOR) 40 mg tablet 1/6/2021 at Unknown time Self Yes Yes   Sig: Take 40 mg by mouth daily    clotrimazole-betamethasone (LOTRISONE) 1-0 05 % cream   No No   Sig: Apply topically 2 (two) times a day   clotrimazole-betamethasone (LOTRISONE) 1-0 05 % lotion   No No   Sig: APPLY TO THE AFFECTED AREA(S) OF THE TRUNK TWICE A DAY    fluocinonide (LIDEX) 0 05 % cream   No No   Sig: APPLY TOPICALLY 2 (TWO) TIMES A DAY FOR 30 DAYS   latanoprost (XALATAN) 0 005 % ophthalmic solution 2021 at Unknown time Self Yes Yes   Sig: Administer 1 drop to both eyes daily    metFORMIN (GLUCOPHAGE) 500 mg tablet 2021 at Unknown time Self Yes Yes   Sig: Take 500 mg by mouth daily with breakfast    omeprazole (PriLOSEC) 20 mg delayed release capsule 2021 at Unknown time  Yes Yes   Sig: Take by mouth   travoprost (TRAVATAN Z) 0 004 % ophthalmic solution 2021 at Unknown time  Yes Yes   Sig: Apply to eye      Facility-Administered Medications: None       Past Medical History:   Diagnosis Date    Bleeding ulcer     Cardiac disease     cardiac stent x1    [facility] mellitus ([facility] [address] )     boarderline diabetic     Hyperlipidemia     Hypertension        Past Surgical History:   Procedure Laterality Date    ABDOMINAL SURGERY      bleeding ulcer, cyst removed from abd    CORONARY ANGIOPLASTY WITH STENT PLACEMENT      x1     HERNIA REPAIR         History reviewed  No pertinent family history  I have reviewed and agree with the history as documented  E-Cigarette/Vaping    E-Cigarette Use Never User      E-Cigarette/Vaping Substances     Social History     Tobacco Use    Smoking status: Former Smoker     Types: Cigarettes     Quit date:      Years since quittin 0    Smokeless tobacco: Never Used   Substance Use Topics    Alcohol use: Yes     Alcohol/week: 3 0 - 5 0 standard drinks     Types: 3 - 5 Cans of beer per week     Frequency: 4 or more times a week     Comment: glass of wine or a beer a day    Drug use: Never       Review of Systems   Constitutional: Negative for chills and fever  HENT: Negative for congestion and rhinorrhea  Eyes: Negative for photophobia and visual disturbance  Respiratory: Negative for cough and shortness of breath  Cardiovascular: Negative for chest pain and palpitations     Gastrointestinal: Negative for abdominal pain, blood in stool, constipation, diarrhea, nausea and vomiting  Genitourinary: Negative for dysuria and hematuria  Musculoskeletal: Negative for back pain, gait problem, neck pain and neck stiffness  Skin: Positive for wound  Negative for pallor and rash  Neurological: Negative for dizziness, syncope, speech difficulty, weakness, light-headedness, numbness and headaches  Psychiatric/Behavioral: Negative for confusion  Physical Exam  Physical Exam  Vitals signs and nursing note reviewed  Constitutional:       General: He is not in acute distress  Appearance: Normal appearance  He is well-developed  He is obese  He is not ill-appearing, toxic-appearing or diaphoretic  HENT:      Head: Normocephalic  Laceration present  No raccoon eyes, Ocampo's sign, abrasion, contusion, right periorbital erythema or left periorbital erythema  Jaw: No trismus or tenderness  Right Ear: External ear normal  No hemotympanum  Left Ear: External ear normal  No hemotympanum  Nose: Nose normal       Right Nostril: No epistaxis or septal hematoma  Left Nostril: No epistaxis or septal hematoma  Mouth/Throat:      Lips: Pink  Mouth: Mucous membranes are moist       Pharynx: Oropharynx is clear  Eyes:      Extraocular Movements: Extraocular movements intact  Conjunctiva/sclera: Conjunctivae normal       Pupils: Pupils are equal, round, and reactive to light  Comments: Peripheral visual fields intact   No illicitable nystagmus    Neck:      Musculoskeletal: Full passive range of motion without pain, normal range of motion and neck supple  Normal range of motion  No edema, erythema, neck rigidity, crepitus, injury, pain with movement, torticollis, spinous process tenderness or muscular tenderness  Cardiovascular:      Rate and Rhythm: Normal rate and regular rhythm  Heart sounds: Normal heart sounds  No murmur  No gallop      Pulmonary: Effort: Pulmonary effort is normal  No respiratory distress  Breath sounds: Normal breath sounds  No stridor  No wheezing, rhonchi or rales  Chest:      Chest wall: No tenderness  Abdominal:      General: Bowel sounds are normal  There is no distension  Palpations: Abdomen is soft  Tenderness: There is no abdominal tenderness  There is no right CVA tenderness, left CVA tenderness, guarding or rebound  Genitourinary:     Comments: No perianal hematoma   Musculoskeletal: Normal range of motion  General: No tenderness  Comments: No midline c-t-l-spine tenderness, step offs, deformities   Pelvis stable   Full AROM of bilateral shoulders, elbow, wrists, fingers, hips, knees, ankles, toes  No snuffbox tenderness bilaterally   Dime sized abrasion to the right distal forearm  Quarter size abrasion to the right dorsal hand   Skin:     General: Skin is warm and dry  Findings: Bruising present  No erythema  Neurological:      General: No focal deficit present  Mental Status: He is alert and oriented to person, place, and time  Mental status is at baseline  GCS: GCS eye subscore is 4  GCS verbal subscore is 5  GCS motor subscore is 6  Cranial Nerves: No cranial nerve deficit  Sensory: No sensory deficit  Motor: No weakness        Coordination: Coordination normal  Finger-Nose-Finger Test normal       Gait: Gait normal       Deep Tendon Reflexes: Reflexes normal    Psychiatric:         Mood and Affect: Mood normal          Behavior: Behavior normal          Vital Signs  ED Triage Vitals   Temperature Pulse Respirations Blood Pressure SpO2   01/07/21 1441 01/07/21 1441 01/07/21 1441 01/07/21 1441 01/07/21 1441   (!) 96 3 °F (35 7 °C) (!) 49 20 132/58 96 %      Temp Source Heart Rate Source Patient Position - Orthostatic VS BP Location FiO2 (%)   01/07/21 1441 01/07/21 1441 01/07/21 1441 01/07/21 1441 --   Tympanic Monitor Lying Right arm       Pain Score 01/07/21 1542       5           Vitals:    01/07/21 1441 01/07/21 1530   BP: 132/58 131/67   Pulse: (!) 49 (!) 52   Patient Position - Orthostatic VS: Lying          Visual Acuity      ED Medications  Medications   tetanus-diphtheria-acellular pertussis (BOOSTRIX) IM injection 0 5 mL (0 5 mL Intramuscular Given 1/7/21 1543)   acetaminophen (TYLENOL) tablet 975 mg (975 mg Oral Given 1/7/21 1542)   lidocaine-epinephrine (XYLOCAINE/EPINEPHRINE) 1 %-1:100,000 injection 10 mL (10 mL Infiltration Given 1/7/21 1701)   bacitracin topical ointment 1 small application (2 small application Topical Given 1/7/21 1701)       Diagnostic Studies  Results Reviewed     None                 CT head without contrast   Final Result by Adiel Cantu MD (01/07 7724)      No acute intracranial abnormality  Workstation performed: [id number]         CT spine cervical without contrast   Final Result by Adiel Cantu MD (01/07 6836)      No cervical spine fracture or traumatic malalignment  Workstation performed: [id number]                    Procedures  Procedures         ED Course                                           MDM  Number of Diagnoses or Management Options  Fall from standing, initial encounter:   Minor head injury without loss of consciousness, initial encounter:   Scalp laceration, initial encounter:   Diagnosis management comments: Assessment and Plan:   68year old M on daily aspirin who is presenting with a mechanical fall with head strike without LOC and resulting in scalp laceration  CTH and CT c-spine to rule out acute intracranial hemorrhage/ cervical spine fracture  Update tetanus  Staple scalp laceration         Disposition  Final diagnoses:   Fall from standing, initial encounter   Scalp laceration, initial encounter   Minor head injury without loss of consciousness, initial encounter     Time reflects when diagnosis was documented in both MDM as applicable and the Disposition within this note     Time User Action Codes Description Comment    1/7/2021  4:25 PM Rea Carrillo Add [Q14  XXXA] Fall from standing, initial encounter     1/7/2021  4:25 PM Rea Carrillo Add [S01 01XA] Scalp laceration, initial encounter     1/7/2021  4:38 PM Rea Carrillo Add [S09 90XA] Minor head injury without loss of consciousness, initial encounter       ED Disposition     ED Disposition Condition Date/Time Comment    Discharge Stable u Jan 7, 2021  4:25 PM Herman Grande discharge to home/self care  Follow-up Information     Follow up With Specialties Details Why Contact Info Additional Information    Deepa Dill MD Internal Medicine Schedule an appointment as soon as possible for a visit in 3 days For wound re-check [address] [address]  [facility] 76 Emergency Department Emergency Medicine Go in 1 week For suture removal [address] [facility] ED, [place], [place], [address]          Discharge Medication List as of 1/7/2021  5:18 PM      CONTINUE these medications which have NOT CHANGED    Details   aspirin 81 mg chewable tablet Chew 81 mg daily , Historical Med      atenolol (TENORMIN) 100 mg tablet Take 100 mg by mouth daily , Historical Med      atorvastatin (LIPITOR) 40 mg tablet Take 40 mg by mouth daily , Starting Mon 12/18/2017, Historical Med      JARDIANCE 25 MG TABS Take 25 mg by mouth 2 (two) times a day , Starting Thu 12/28/2017, Historical Med      latanoprost (XALATAN) 0 005 % ophthalmic solution Administer 1 drop to both eyes daily , Historical Med      metFORMIN (GLUCOPHAGE) 500 mg tablet Take 500 mg by mouth daily with breakfast , Starting Sat 12/23/2017, Historical Med      Multiple Vitamin (MULTI-VITAMIN DAILY PO) Take by mouth, Historical Med      Omega-3 Fatty Acids (OMEGA-3 FISH OIL) 1000 MG CAPS Take by mouth, Historical Med omeprazole (PriLOSEC) 20 mg delayed release capsule Take by mouth, Historical Med      !! TRAVATAN Z 0 004 % ophthalmic solution Starting Fri 2/14/2020, Historical Med      !! travoprost (TRAVATAN Z) 0 004 % ophthalmic solution Apply to eye, Historical Med      ammonium lactate (LAC-HYDRIN) 12 % cream Apply topically as needed for dry skin, Starting Thu 4/30/2020, Normal      clotrimazole-betamethasone (LOTRISONE) 1-0 05 % cream Apply topically 2 (two) times a day, Starting Thu 7/23/2020, Normal      clotrimazole-betamethasone (LOTRISONE) 1-0 05 % lotion APPLY TO THE AFFECTED AREA(S) OF THE TRUNK TWICE A DAY , Normal      fluocinonide (LIDEX) 0 05 % cream APPLY TOPICALLY 2 (TWO) TIMES A DAY FOR 30 DAYS, Starting Tue 6/4/2019, Until Thu 7/4/2019, Normal       !! - Potential duplicate medications found  Please discuss with provider  No discharge procedures on file      PDMP Review     None          ED Provider  Electronically Signed by           Raul Ojeda DO  01/07/21 2018

## 2021-01-07 NOTE — ED PROCEDURE NOTE
Procedure  Laceration repair    Date/Time: 1/7/2021 5:21 PM  Performed by: Jennifer Garcia PA-C  Authorized by: Jennifer Garcia PA-C   Consent: Verbal consent obtained  Risks and benefits: risks, benefits and alternatives were discussed  Consent given by: patient  Patient understanding: patient states understanding of the procedure being performed  Patient consent: the patient's understanding of the procedure matches consent given  Procedure consent: procedure consent matches procedure scheduled  Relevant documents: relevant documents present and verified  Test results: test results available and properly labeled  Site marked: the operative site was marked  Radiology Images displayed and confirmed  If images not available, report reviewed: imaging studies available  Required items: required blood products, implants, devices, and special equipment available  Body area: head/neck  Location details: scalp  Laceration length: 8 cm  Foreign bodies: no foreign bodies  Tendon involvement: none  Nerve involvement: none    Anesthesia:  Local Anesthetic: lidocaine 1% with epinephrine    Wound Dehiscence:  Superficial Wound Dehiscence: simple closure      Procedure Details:  Preparation: Patient was prepped and draped in the usual sterile fashion    Irrigation solution: saline  Irrigation method: syringe  Amount of cleaning: standard  Debridement: none  Degree of undermining: none  Skin closure: staples  Number of sutures: 7  Technique: simple  Approximation: close  Approximation difficulty: simple  Dressing: antibiotic ointment  Patient tolerance: patient tolerated the procedure well with no immediate complications                       Jennifer Garcia PA-C  01/07/21 8814

## 2021-01-07 NOTE — DISCHARGE INSTRUCTIONS
Please follow up with your primary care doctor for wound recheck and get the staples removed in 7 days either by your primary care doctor or come back to the emergency department  Please return to the emergency department for the following, but not limited to pus coming from the wound site, surrounding redness, warmth, fever, headache, changes in vision, change in mental status, persistent vomiting

## 2021-02-11 ENCOUNTER — OFFICE VISIT (OUTPATIENT)
Dept: PODIATRY | Facility: CLINIC | Age: 81
End: 2021-02-11
Payer: COMMERCIAL

## 2021-02-11 VITALS — WEIGHT: 225 LBS | BODY MASS INDEX: 31.5 KG/M2 | RESPIRATION RATE: 17 BRPM | HEIGHT: 71 IN

## 2021-02-11 DIAGNOSIS — I70.209 PERIPHERAL ARTERIOSCLEROSIS (HCC): ICD-10-CM

## 2021-02-11 DIAGNOSIS — B35.3 TINEA PEDIS OF BOTH FEET: ICD-10-CM

## 2021-02-11 DIAGNOSIS — E11.42 DIABETIC POLYNEUROPATHY ASSOCIATED WITH TYPE 2 DIABETES MELLITUS (HCC): Primary | ICD-10-CM

## 2021-02-11 DIAGNOSIS — B35.1 ONYCHOMYCOSIS: ICD-10-CM

## 2021-02-11 DIAGNOSIS — L84 CORNS: ICD-10-CM

## 2021-02-11 DIAGNOSIS — M79.672 PAIN IN BOTH FEET: ICD-10-CM

## 2021-02-11 DIAGNOSIS — M79.671 PAIN IN BOTH FEET: ICD-10-CM

## 2021-02-11 PROCEDURE — 99212 OFFICE O/P EST SF 10 MIN: CPT | Performed by: PODIATRIST

## 2021-02-11 PROCEDURE — 11056 PARNG/CUTG B9 HYPRKR LES 2-4: CPT | Performed by: PODIATRIST

## 2021-02-11 RX ORDER — CLOTRIMAZOLE AND BETAMETHASONE DIPROPIONATE 10; .64 MG/G; MG/G
CREAM TOPICAL 2 TIMES DAILY
Qty: 30 G | Refills: 2 | Status: SHIPPED | OUTPATIENT
Start: 2021-02-11 | End: 2021-09-02 | Stop reason: SDUPTHER

## 2021-02-11 NOTE — PROGRESS NOTES
Assessment/Plan:  Patient has pain in his feet and toes with ambulation   He has mycosis of nail and skin  He has plantar pre ulcerative skin lesions      Plan   Diabetic foot exam performed   Mycotic nails debrided     Calluses debrided  Procedures performed without pain or complication  Patient will use topical antifungal as directed           Subjective:  Patient has pain in his feet with ambulation   No history of trauma      Patient ID: Spenser Clayton is [id number]  o  male      Patient is diabetic  [facility] has pain in his feet and toes with ambulation   Has pain from calluses   He has ingrown toenails and dry scaly skin   He is requesting refill of topical antifungal         Review of Systems   Constitutional: No fever or chills, feels well, no tiredness, no recent weight loss or weight gain   Eyes: No complaints of red eyes, no eyesight problems    ENT: no complaints of loss of hearing, no nosebleeds, no sore throat   Cardiovascular: No complaints of chest pain, no palpitations, no leg claudication or lower extremity edema   Respiratory: No complaints of shortness of breath, no wheezing, no cough   Gastrointestinal: No complaints of abdominal pain, no constipation, no nausea or vomiting, no diarrhea or bloody stools   Genitourinary: No complaints of dysuria or incontinence, no hesitancy, no nocturia   Musculoskeletal: limb pain, but-- as noted in HPI   Integumentary: No complaints of skin rash or lesion, no itching or dry skin, no skin wounds   Neurological: No complaints of headache, no confusion, no numbness or tingling, no dizziness   Psychiatric: No suicidal thoughts, no anxiety, no depression   Endocrine: No muscle weakness, no frequent urination, no excessive thirst, no feelings of weakness       Active Problems  1  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)   2  Arthritis (716 90) (M19 90)   3  Atherosclerosis of arteries of extremities (440 20) (I70 209)   4  Calcaneal spur (726 73) (M77 30)   5  Callus (700) (L84)   6  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)   7  Diabetes mellitus with neuropathy (250 60,357 2) (E11 40)   8  Diabetic neuropathy (250 60,357 2) (E11 40)   9  Hypercholesterolemia (272 0) (E78 00)   10  Hypertension (401 9) (I10)   11  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)   12  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)   13  Onychomycosis (110 1) (B35 1)   14  Orthopedic aftercare (V54 9) (Z47 89)   15  Pain in both feet (729 5) (M79 671,M79 672)   16  Pain in extremity (729 5) (M79 609)   17  Pes planus, congenital (754 61) (Q66 50)   18  Plantar 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 2)   19  Plantar wart (078 12) (B07 0)   20  Prostate cancer (185) (C61)   21  Sprain of right shoulder (840 9) (S43 401A)   22  Tinea pedis (110 4) (B35 3)     Past Medical History   · Orthopedic aftercare (V54 9) (Z47 89)     Surgical History   · History of Gastric Surgery   · History of Hernia Repair   · History of Previous Stent Placement Total Number Performed ___     The surgical history was reviewed and updated today        Family History  Family History    · Family history of Arthritis (V17 7)   · Family history of Cancer     The family history was reviewed and updated today        Social History      · Beer Consumption (___ Bottles Per Day)   · Daily Coffee Consumption (1  Cups/Day)   · Former smoker (E56 21) (Z87 891)  The social history was reviewed and updated today  Allergies  1  No Known Drug Allergies      Physical Exam  Left Foot: Appearance: Normal except as noted: excessive pronation-- and-- pes planus  Right Foot: Appearance: Normal except as noted: excessive pronation-- and-- pes planus  Left Ankle: ROM: limited ROM in all planes   Right Ankle: ROM: limited ROM in all planes   Neurological Exam: performed  Light touch was intact bilaterally  Vibratory sensation was intact bilaterally  Response to monofilament test was intact bilaterally   Deep tendon reflexes: patellar reflex present bilaterally-- and-- achilles reflex present bilaterally  Vascular Exam: performed Dorsalis pedis pulses were One/4 bilaterally  Posterior tibial pulses were One/4 bilaterally  Elevation Pallor: present bilaterally  Capillary refill time was greater than 3 seconds bilaterally-- and-- Q  9 findings bilateral  Negative digital hair noted  Positive abnormal cooling noted  Toenails: All of the toenails were elongated,-- hypertrophied,-- discolored,-- ingrown,-- shown to have subungual debris,-- tender-- and-- Severely mycotic with onychauxis     Socks and shoes removed, Right Foot Findings: swollen, erythematous and dry   The sensory exam showed diminished vibratory sensation at the level of the toes  Diminished tactile sensation with monofilament testing throughout the right foot   Socks and shoes removed, Left Foot Findings: swollen, erythematous and dry   The sensory exam showed diminished vibratory sensation at the level of the toes  Diminished tactile sensation with monofilament testing throughout the left foot  Capillary refills findings on the right were delayed in the toes   Pulses:  1+ in the posterior tibialis on the right  1+ in the dorsalis pedis on the right   Capillary refills findings on the left were delayed in the toes   Pulses:  1+ in the posterior tibialis on the left  1+ in the dorsalis pedis on the left   Assign Risk Category: 2: Loss of protective sensation with or without weakness, deformity, callus, pre-ulcer, or history of ulceration  High risk  Hyperkeratosis: present on both first toes,-- present on both fifth sub metatarsals-- and-- [place] tinea pedis, bilateral, is noted  Shoe Gear Evaluation: performed ()  Right Foot: width: d-- and-- length: 11  Left Foot: width: d-- and-- length: 11  Recommendation(s): athletic shoes     Patient's shoes and socks removed  Right Foot/Ankle   Right Foot Inspection  Skin Exam: dry skin, callus and callus               Toe Exam: swelling  Sensory   Vibration: diminished  Proprioception: diminished      Vascular  Capillary refills: elevated        Left Foot/Ankle  Left Foot Inspection  Skin Exam: dry skin and callus              Toe Exam: swelling and erythema                   Sensory   Vibration: diminished  Proprioception: diminished     Vascular  Capillary refills: elevated       Patient's shoes and socks removed  Assign Risk Category:  Deformity present;  Loss of protective sensation; Weak pulses       Risk: 2

## 2021-02-26 ENCOUNTER — IMMUNIZATIONS (OUTPATIENT)
Dept: FAMILY MEDICINE CLINIC | Facility: HOSPITAL | Age: 81
End: 2021-02-26

## 2021-02-26 DIAGNOSIS — Z23 ENCOUNTER FOR IMMUNIZATION: Primary | ICD-10-CM

## 2021-02-26 PROCEDURE — 91301 SARS-COV-2 / COVID-19 MRNA VACCINE (MODERNA) 100 MCG: CPT

## 2021-02-26 PROCEDURE — 0011A SARS-COV-2 / COVID-19 MRNA VACCINE (MODERNA) 100 MCG: CPT

## 2021-03-26 ENCOUNTER — IMMUNIZATIONS (OUTPATIENT)
Dept: FAMILY MEDICINE CLINIC | Facility: HOSPITAL | Age: 81
End: 2021-03-26

## 2021-03-26 DIAGNOSIS — Z23 ENCOUNTER FOR IMMUNIZATION: Primary | ICD-10-CM

## 2021-03-26 PROCEDURE — 91301 SARS-COV-2 / COVID-19 MRNA VACCINE (MODERNA) 100 MCG: CPT

## 2021-03-26 PROCEDURE — 0012A SARS-COV-2 / COVID-19 MRNA VACCINE (MODERNA) 100 MCG: CPT

## 2021-04-22 ENCOUNTER — OFFICE VISIT (OUTPATIENT)
Dept: PODIATRY | Facility: CLINIC | Age: 81
End: 2021-04-22
Payer: COMMERCIAL

## 2021-04-22 VITALS — WEIGHT: 225 LBS | RESPIRATION RATE: 17 BRPM | HEIGHT: 71 IN | BODY MASS INDEX: 31.5 KG/M2

## 2021-04-22 DIAGNOSIS — B07.0 PLANTAR WARTS: ICD-10-CM

## 2021-04-22 DIAGNOSIS — E11.42 DIABETIC POLYNEUROPATHY ASSOCIATED WITH TYPE 2 DIABETES MELLITUS (HCC): ICD-10-CM

## 2021-04-22 DIAGNOSIS — M79.672 PAIN IN BOTH FEET: ICD-10-CM

## 2021-04-22 DIAGNOSIS — L84 CORNS: ICD-10-CM

## 2021-04-22 DIAGNOSIS — M79.671 PAIN IN BOTH FEET: ICD-10-CM

## 2021-04-22 DIAGNOSIS — I70.209 PERIPHERAL ARTERIOSCLEROSIS (HCC): Primary | ICD-10-CM

## 2021-04-22 PROCEDURE — 17110 DESTRUCTION B9 LES UP TO 14: CPT | Performed by: PODIATRIST

## 2021-04-22 PROCEDURE — 11056 PARNG/CUTG B9 HYPRKR LES 2-4: CPT | Performed by: PODIATRIST

## 2021-04-22 NOTE — PROGRESS NOTES
Assessment/Plan:  Patient has pain in his feet and toes with ambulation   He has mycosis of nail and skin  He has plantar pre ulcerative skin lesions      Plan   Diabetic foot exam performed   Mycotic nails debrided     Calluses debrided   Procedures performed without pain or complication   Patient will use topical antifungal as directed  Patient educated on care of verruca  Today after debridement, Nicole applied  Patient return 2 weeks           Subjective:  Patient has pain in his feet with ambulation   No history of trauma      Patient ID: Spenser Clayton is a 81  y o  male      Patient is diabetic  Maxine Deshpande has pain in his feet and toes with ambulation   Has pain from calluses   He has ingrown toenails and dry scaly skin   He is requesting refill of topical antifungal         Review of Systems   Constitutional: No fever or chills, feels well, no tiredness, no recent weight loss or weight gain   Eyes: No complaints of red eyes, no eyesight problems    ENT: no complaints of loss of hearing, no nosebleeds, no sore throat   Cardiovascular: No complaints of chest pain, no palpitations, no leg claudication or lower extremity edema   Respiratory: No complaints of shortness of breath, no wheezing, no cough   Gastrointestinal: No complaints of abdominal pain, no constipation, no nausea or vomiting, no diarrhea or bloody stools   Genitourinary: No complaints of dysuria or incontinence, no hesitancy, no nocturia   Musculoskeletal: limb pain, but-- as noted in HPI   Integumentary: No complaints of skin rash or lesion, no itching or dry skin, no skin wounds   Neurological: No complaints of headache, no confusion, no numbness or tingling, no dizziness   Psychiatric: No suicidal thoughts, no anxiety, no depression   Endocrine: No muscle weakness, no frequent urination, no excessive thirst, no feelings of weakness       Active Problems  1  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 54) (W05 217)   2  Arthritis (543 40) (M19 90)   3  Atherosclerosis of arteries of extremities (440 20) (I70 209)   4  Calcaneal spur (726 73) (M77 30)   5  Callus (700) (L84)   6  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)   7  Diabetes mellitus with neuropathy (250 60,357 2) (E11 40)   8  Diabetic neuropathy (250 60,357 2) (E11 40)   9  Hypercholesterolemia (272 0) (E78 00)   10  Hypertension (401 9) (I10)   11  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)   12  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)   13  Onychomycosis (110 1) (B35 1)   14  Orthopedic aftercare (V54 9) (Z47 89)   15  Pain in both feet (729 5) (M79 671,M79 672)   16  Pain in extremity (729 5) (M79 609)   17  Pes planus, congenital (754 61) (Q66 50)   18  Plantar 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 2)   19  Plantar wart (078 12) (B07 0)   20  Prostate cancer (185) (C61)   21  Sprain of right shoulder (840 9) (S43 401A)   22  Tinea pedis (110 4) (B35 3)     Past Medical History   · Orthopedic aftercare (V54 9) (Z47 89)     Surgical History   · History of Gastric Surgery   · History of Hernia Repair   · History of Previous Stent Placement Total Number Performed ___     The surgical history was reviewed and updated today        Family History  Family History    · Family history of Arthritis (V17 7)   · Family history of Cancer     The family history was reviewed and updated today        Social History      · Beer Consumption (___ Bottles Per Day)   · Daily Coffee Consumption (1  Cups/Day)   · Former smoker (E89 46) (Z87 891)  The social history was reviewed and updated today  Allergies  1  No Known Drug Allergies      Physical Exam  Left Foot: Appearance: Normal except as noted: excessive pronation-- and-- pes planus  Right Foot: Appearance: Normal except as noted: excessive pronation-- and-- pes planus  Left Ankle: ROM: limited ROM in all planes   Right Ankle: ROM: limited ROM in all planes   Neurological Exam: performed   Light touch was intact bilaterally  Vibratory sensation was intact bilaterally  Response to monofilament test was intact bilaterally  Deep tendon reflexes: patellar reflex present bilaterally-- and-- achilles reflex present bilaterally  Vascular Exam: performed Dorsalis pedis pulses were One/4 bilaterally  Posterior tibial pulses were One/4 bilaterally  Elevation Pallor: present bilaterally  Capillary refill time was greater than 3 seconds bilaterally-- and-- Q  9 findings bilateral  Negative digital hair noted  Positive abnormal cooling noted  Toenails: All of the toenails were elongated,-- hypertrophied,-- discolored,-- ingrown,-- shown to have subungual debris,-- tender-- and-- Severely mycotic with onychauxis     Socks and shoes removed, Right Foot Findings: swollen, erythematous and dry   The sensory exam showed diminished vibratory sensation at the level of the toes  Diminished tactile sensation with monofilament testing throughout the right foot   Socks and shoes removed, Left Foot Findings: swollen, erythematous and dry   The sensory exam showed diminished vibratory sensation at the level of the toes  Diminished tactile sensation with monofilament testing throughout the left foot  Capillary refills findings on the right were delayed in the toes   Pulses:  1+ in the posterior tibialis on the right  1+ in the dorsalis pedis on the right   Capillary refills findings on the left were delayed in the toes   Pulses:  1+ in the posterior tibialis on the left  1+ in the dorsalis pedis on the left   Assign Risk Category: 2: Loss of protective sensation with or without weakness, deformity, callus, pre-ulcer, or history of ulceration  High risk  Hyperkeratosis: present on both first toes,-- present on both fifth sub metatarsals-- and-- [place] tinea pedis, bilateral, is noted  Shoe Gear Evaluation: performed ()  Right Foot: width: d-- and-- length: 11   Left Foot: width: d-- and-- length: 11  Recommendation(s): athletic shoes     Patient's shoes and socks removed  Right Foot/Ankle   Right Foot Inspection  Skin Exam: dry skin, callus and callus               Toe Exam: swelling  Sensory   Vibration: diminished  Proprioception: diminished      Vascular  Capillary refills: elevated        Left Foot/Ankle  Left Foot Inspection  Skin Exam: dry skin and callus              Toe Exam: swelling and erythema                   Sensory   Vibration: diminished  Proprioception: diminished     Vascular  Capillary refills: elevated        Patient's shoes and socks removed  Assign Risk Category:  Deformity present;  Loss of protective sensation; Weak pulses       Risk: 2       submetatarsal 5 of the left foot demonstrates 2 small verruca

## 2021-05-06 ENCOUNTER — OFFICE VISIT (OUTPATIENT)
Dept: PODIATRY | Facility: CLINIC | Age: 81
End: 2021-05-06
Payer: COMMERCIAL

## 2021-05-06 VITALS — RESPIRATION RATE: 17 BRPM | BODY MASS INDEX: 31.5 KG/M2 | WEIGHT: 225 LBS | HEIGHT: 71 IN

## 2021-05-06 DIAGNOSIS — S91.302A OPEN WOUND OF FOOT EXCEPT TOES WITH COMPLICATION, LEFT, INITIAL ENCOUNTER: Primary | ICD-10-CM

## 2021-05-06 DIAGNOSIS — I70.209 PERIPHERAL ARTERIOSCLEROSIS (HCC): ICD-10-CM

## 2021-05-06 DIAGNOSIS — M79.672 LEFT FOOT PAIN: ICD-10-CM

## 2021-05-06 PROCEDURE — 99212 OFFICE O/P EST SF 10 MIN: CPT | Performed by: PODIATRIST

## 2021-05-06 NOTE — PROGRESS NOTES
Assessment/Plan: open wound of foot, left  Left foot pain  Peripheral artery disease  Diabetic  Resolved verruca  Plan  Foot exam performed  Lesion debrided  Gentian violet and silver nitrate applied  Patient will bandage daily for 3 days  Diagnoses and all orders for this visit:    Open wound of foot except toes with complication, left, initial encounter    Left foot pain    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[address] )          Subjective:   Patient had pain in his left foot  He had reaction to treatment  He presents for evaluation      No Known Allergies      Current Outpatient Medications:     ammonium lactate (LAC-HYDRIN) 12 % cream, Apply topically as needed for dry skin, Disp: 385 g, Rfl: 0    aspirin 81 mg chewable tablet, Chew 81 mg daily , Disp: , Rfl:     atenolol (TENORMIN) 100 mg tablet, Take 100 mg by mouth daily , Disp: , Rfl:     atorvastatin (LIPITOR) 40 mg tablet, Take 40 mg by mouth daily , Disp: , Rfl:     clotrimazole-betamethasone (LOTRISONE) 1-0 05 % cream, Apply topically 2 (two) times a day, Disp: 30 g, Rfl: 2    fluocinonide (LIDEX) 0 05 % cream, APPLY TOPICALLY 2 (TWO) TIMES A DAY FOR 30 DAYS, Disp: 30 g, Rfl: 2    JARDIANCE 25 MG TABS, Take 25 mg by mouth 2 (two) times a day , Disp: , Rfl:     latanoprost (XALATAN) 0 005 % ophthalmic solution, Administer 1 drop to both eyes daily , Disp: , Rfl:     metFORMIN (GLUCOPHAGE) 500 mg tablet, Take 500 mg by mouth daily with breakfast , Disp: , Rfl:     Multiple Vitamin (MULTI-VITAMIN DAILY PO), Take by mouth, Disp: , Rfl:     Omega-3 Fatty Acids (OMEGA-3 FISH OIL) 1000 MG CAPS, Take by mouth, Disp: , Rfl:     omeprazole (PriLOSEC) 20 mg delayed release capsule, Take by mouth, Disp: , Rfl:     TRAVATAN Z 0 004 % ophthalmic solution, , Disp: , Rfl:     travoprost (TRAVATAN Z) 0 004 % ophthalmic solution, Apply to eye, Disp: , Rfl:     Patient Active Problem List   Diagnosis    Corns    Pain in both feet    Diabetic polyneuropathy associated with type 2 diabetes mellitus ([address] )    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HCC)    Onychomycosis    Tinea pedis of both feet          Patient ID: Alejandro Carlisle is a 80 y o  male  HPI    The following portions of the patient's history were reviewed and updated as appropriate:     family history is not on file  reports that he quit smoking about 22 years ago  His smoking use included cigarettes  He has never used smokeless tobacco  He reports current alcohol use of about 3 0 - 5 0 standard drinks of alcohol per week  He reports that he does not use drugs  Vitals:    05/06/21 1419   Resp: 17       Review of Systems      Objective:  Patient's shoes and socks removed  Foot ExamPhysical Exam  Vitals signs and nursing note reviewed  Constitutional:       Appearance: Normal appearance  Cardiovascular:      Rate and Rhythm: Normal rate and regular rhythm  Skin:     Comments:   Submetatarsal 5 of the left foot demonstrates dried bullae  Lesion debrided  Superficial wound remains  No evidence of verruca  No cellulitis noted   Neurological:      Mental Status: He is alert  Psychiatric:         Mood and Affect: Mood normal          Behavior: Behavior normal          Thought Content:  Thought content normal          Judgment: Judgment normal

## 2021-07-01 ENCOUNTER — OFFICE VISIT (OUTPATIENT)
Dept: PODIATRY | Facility: CLINIC | Age: 81
End: 2021-07-01
Payer: COMMERCIAL

## 2021-07-01 VITALS — WEIGHT: 225 LBS | HEIGHT: 71 IN | RESPIRATION RATE: 17 BRPM | BODY MASS INDEX: 31.5 KG/M2

## 2021-07-01 DIAGNOSIS — I70.209 PERIPHERAL ARTERIOSCLEROSIS (HCC): ICD-10-CM

## 2021-07-01 DIAGNOSIS — M79.672 PAIN IN BOTH FEET: ICD-10-CM

## 2021-07-01 DIAGNOSIS — L84 CORNS: ICD-10-CM

## 2021-07-01 DIAGNOSIS — M79.671 PAIN IN BOTH FEET: ICD-10-CM

## 2021-07-01 DIAGNOSIS — E11.42 DIABETIC POLYNEUROPATHY ASSOCIATED WITH TYPE 2 DIABETES MELLITUS (HCC): Primary | ICD-10-CM

## 2021-07-01 PROCEDURE — 11056 PARNG/CUTG B9 HYPRKR LES 2-4: CPT | Performed by: PODIATRIST

## 2021-07-01 NOTE — PROGRESS NOTES
Assessment/Plan: open wound of foot, left  Left foot pain  Peripheral artery disease  Diabetic  Resolved verruca        Plan  Foot exam performed  Lesion debrided  Gentian violet and silver nitrate applied  Patient will bandage daily for 3 days             Diagnoses and all orders for this visit:     Open wound of foot except toes with complication, left, initial encounter     Left foot pain     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[address] )            Subjective:   Patient had pain in his left foot  He had reaction to treatment    He presents for evaluation      No Known Allergies        Current Outpatient Medications:     ammonium lactate (LAC-HYDRIN) 12 % cream, Apply topically as needed for dry skin, Disp: 385 g, Rfl: 0    aspirin 81 mg chewable tablet, Chew 81 mg daily , Disp: , Rfl:     atenolol (TENORMIN) 100 mg tablet, Take 100 mg by mouth daily , Disp: , Rfl:     atorvastatin (LIPITOR) 40 mg tablet, Take 40 mg by mouth daily , Disp: , Rfl:     clotrimazole-betamethasone (LOTRISONE) 1-0 05 % cream, Apply topically 2 (two) times a day, Disp: 30 g, Rfl: 2    fluocinonide (LIDEX) 0 05 % cream, APPLY TOPICALLY 2 (TWO) TIMES A DAY FOR 30 DAYS, Disp: 30 g, Rfl: 2    JARDIANCE 25 MG TABS, Take 25 mg by mouth 2 (two) times a day , Disp: , Rfl:     latanoprost (XALATAN) 0 005 % ophthalmic solution, Administer 1 drop to both eyes daily , Disp: , Rfl:     metFORMIN (GLUCOPHAGE) 500 mg tablet, Take 500 mg by mouth daily with breakfast , Disp: , Rfl:     Multiple Vitamin (MULTI-VITAMIN DAILY PO), Take by mouth, Disp: , Rfl:     Omega-3 Fatty Acids (OMEGA-3 FISH OIL) 1000 MG CAPS, Take by mouth, Disp: , Rfl:     omeprazole (PriLOSEC) 20 mg delayed release capsule, Take by mouth, Disp: , Rfl:     TRAVATAN Z 0 004 % ophthalmic solution, , Disp: , Rfl:     travoprost (TRAVATAN Z) 0 004 % ophthalmic solution, Apply to eye, Disp: , Rfl:      Patient Active Problem List   Diagnosis    Corns    Pain in both feet    Diabetic polyneuropathy associated with type 2 diabetes mellitus ([address] )    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HCC)    Onychomycosis    Tinea pedis of both feet             Patient ID: Cooper Goncalves is a 80 y o  male      HPI     The following portions of the patient's history were reviewed and updated as appropriate:      family history is not on file        reports that he quit smoking about 22 years ago  His smoking use included cigarettes  He has never used smokeless tobacco  He reports current alcohol use of about 3 0 - 5 0 standard drinks of alcohol per week  He reports that he does not use drugs         Objective:  Patient's shoes and socks removed  Foot ExamPhysical Exam  Vitals signs and nursing note reviewed  Constitutional:       Appearance: Normal appearance  Cardiovascular:      Rate and Rhythm: Normal rate and regular rhythm  Skin:     Comments:   Submetatarsal 5 of the left foot demonstrates dried bullae  Lesion debrided  Superficial wound remains  No evidence of verruca  No cellulitis noted   Neurological:      Mental Status: He is alert  Psychiatric:         Mood and Affect: Mood normal          Behavior: Behavior normal          Thought Content: Thought content normal          Judgment: Judgment normal      Patient's shoes and socks removed  Assign Risk Category:  Deformity present;  Loss of protective sensation; Weak pulses       Risk: 2

## 2021-09-02 ENCOUNTER — OFFICE VISIT (OUTPATIENT)
Dept: PODIATRY | Facility: CLINIC | Age: 81
End: 2021-09-02
Payer: COMMERCIAL

## 2021-09-02 VITALS
SYSTOLIC BLOOD PRESSURE: 132 MMHG | BODY MASS INDEX: 31.5 KG/M2 | DIASTOLIC BLOOD PRESSURE: 89 MMHG | HEART RATE: 93 BPM | WEIGHT: 225 LBS | RESPIRATION RATE: 17 BRPM | HEIGHT: 71 IN

## 2021-09-02 DIAGNOSIS — L84 CORNS: ICD-10-CM

## 2021-09-02 DIAGNOSIS — E11.42 DIABETIC POLYNEUROPATHY ASSOCIATED WITH TYPE 2 DIABETES MELLITUS (HCC): Primary | ICD-10-CM

## 2021-09-02 DIAGNOSIS — I70.209 PERIPHERAL ARTERIOSCLEROSIS (HCC): ICD-10-CM

## 2021-09-02 DIAGNOSIS — M79.671 PAIN IN BOTH FEET: ICD-10-CM

## 2021-09-02 DIAGNOSIS — B35.1 ONYCHOMYCOSIS: ICD-10-CM

## 2021-09-02 DIAGNOSIS — B35.3 TINEA PEDIS OF BOTH FEET: ICD-10-CM

## 2021-09-02 DIAGNOSIS — M79.672 PAIN IN BOTH FEET: ICD-10-CM

## 2021-09-02 PROCEDURE — 11056 PARNG/CUTG B9 HYPRKR LES 2-4: CPT | Performed by: PODIATRIST

## 2021-09-02 PROCEDURE — 99212 OFFICE O/P EST SF 10 MIN: CPT | Performed by: PODIATRIST

## 2021-09-02 RX ORDER — CLOTRIMAZOLE AND BETAMETHASONE DIPROPIONATE 10; .64 MG/G; MG/G
CREAM TOPICAL 2 TIMES DAILY
Qty: 30 G | Refills: 2 | Status: SHIPPED | OUTPATIENT
Start: 2021-09-02 | End: 2022-01-13 | Stop reason: SDUPTHER

## 2021-09-02 NOTE — PROGRESS NOTES
Assessment/Plan:  Patient has pain in his feet and toes with ambulation   He has mycosis of nail and skin  He has plantar pre ulcerative skin lesions      Plan   Diabetic foot exam performed   Mycotic nails debrided   cream ordered   Patient will use daily, he will apply cream to feet b i d  For 4 weeks   Calluses debrided  Procedures performed without pain or complication            Subjective:  Patient has pain in his feet with ambulation   No history of trauma   He has pain when he wears shoes  He has pain in his toes       Patient ID: Spenser Clayton is a 81 y o  male      Patient is diabetic  Maia Dewey has pain in his feet and toes with ambulation  Has pain from calluses  He has ingrown toenails and dry scaly skin  He is requesting refill of topical antifungal         Review of Systems   Constitutional: No fever or chills, feels well, no tiredness, no recent weight loss or weight gain   Eyes: No complaints of red eyes, no eyesight problems    ENT: no complaints of loss of hearing, no nosebleeds, no sore throat   Cardiovascular: No complaints of chest pain, no palpitations, no leg claudication or lower extremity edema   Respiratory: No complaints of shortness of breath, no wheezing, no cough   Gastrointestinal: No complaints of abdominal pain, no constipation, no nausea or vomiting, no diarrhea or bloody stools   Genitourinary: No complaints of dysuria or incontinence, no hesitancy, no nocturia   Musculoskeletal: limb pain, but-- as noted in HPI   Integumentary: No complaints of skin rash or lesion, no itching or dry skin, no skin wounds   Neurological: No complaints of headache, no confusion, no numbness or tingling, no dizziness   Psychiatric: No suicidal thoughts, no anxiety, no depression   Endocrine: No muscle weakness, no frequent urination, no excessive thirst, no feelings of weakness       Active Problems  1  Acquired ankle/foot deformity (945 63) (O30 189)   2  Arthritis (641 55) (M19 90)   3  Atherosclerosis of arteries of extremities (440 20) (I70 209)   4  Calcaneal spur (726 73) (M77 30)   5  Callus (700) (L84)   6  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)   7  Diabetes mellitus with neuropathy (250 60,357 2) (E11 40)   8  Diabetic neuropathy (250 60,357 2) (E11 40)   9  Hypercholesterolemia (272 0) (E78 00)   10  Hypertension (401 9) (I10)   11  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)   12  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)   13  Onychomycosis (110 1) (B35 1)   14  Orthopedic aftercare (V54 9) (Z47 89)   15  Pain in both feet (729 5) (M79 671,M79 672)   16  Pain in extremity (729 5) (M79 609)   17  Pes planus, congenital (754 61) (Q66 50)   18  Plantar 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 2)   19  Plantar wart (078 12) (B07 0)   20  Prostate cancer (185) (C61)   21  Sprain of right shoulder (840 9) (S43 401A)   22  Tinea pedis (110 4) (B35 3)     Past Medical History   · Orthopedic aftercare (V54 9) (Z47 89)     Surgical History   · History of Gastric Surgery   · History of Hernia Repair   · History of Previous Stent Placement Total Number Performed ___     The surgical history was reviewed and updated today        Family History  Family History    · Family history of Arthritis (V17 7)   · Family history of Cancer     The family history was reviewed and updated today        Social History      · Beer Consumption (___ Bottles Per Day)   · Daily Coffee Consumption (1  Cups/Day)   · Former smoker (Z45 67) (Z87 891)  The social history was reviewed and updated today  Allergies  1  No Known Drug Allergies      Physical Exam  Left Foot: Appearance: Normal except as noted: excessive pronation-- and-- pes planus  Right Foot: Appearance: Normal except as noted: excessive pronation-- and-- pes planus  Left Ankle: ROM: limited ROM in all planes   Right Ankle: ROM: limited ROM in all planes   Neurological Exam: performed   Light touch was intact bilaterally  Vibratory sensation was intact bilaterally  Response to monofilament test was intact bilaterally  Deep tendon reflexes: patellar reflex present bilaterally-- and-- achilles reflex present bilaterally  Vascular Exam: performed Dorsalis pedis pulses were One/4 bilaterally  Posterior tibial pulses were One/4 bilaterally  Elevation Pallor: present bilaterally  Capillary refill time was greater than 3 seconds bilaterally-- and-- Q  9 findings bilateral  Negative digital hair noted  Positive abnormal cooling noted  Toenails: All of the toenails were elongated,-- hypertrophied,-- discolored,-- ingrown,-- shown to have subungual debris,-- tender-- and-- Severely mycotic with onychauxis     Socks and shoes removed, Right Foot Findings: swollen, erythematous and dry   The sensory exam showed diminished vibratory sensation at the level of the toes  Diminished tactile sensation with monofilament testing throughout the right foot   Socks and shoes removed, Left Foot Findings: swollen, erythematous and dry   The sensory exam showed diminished vibratory sensation at the level of the toes  Diminished tactile sensation with monofilament testing throughout the left foot  Capillary refills findings on the right were delayed in the toes   Pulses:  1+ in the posterior tibialis on the right  1+ in the dorsalis pedis on the right   Capillary refills findings on the left were delayed in the toes   Pulses:  1+ in the posterior tibialis on the left  1+ in the dorsalis pedis on the left   Assign Risk Category: 2: Loss of protective sensation with or without weakness, deformity, callus, pre-ulcer, or history of ulceration  High risk  Hyperkeratosis: present on both first toes,-- present on both fifth sub metatarsals-- and-- [place] tinea pedis, bilateral, is noted  Shoe Gear Evaluation: performed ()  Right Foot: width: d-- and-- length: 11   Left Foot: width: d-- and-- length: 11  Recommendation(s): athletic shoes     Patient's shoes and socks removed  Right Foot/Ankle   Right Foot Inspection  Skin Exam: dry skin, callus and callus               Toe Exam: swelling  Sensory   Vibration: diminished  Proprioception: diminished      Vascular  Capillary refills: elevated        Left Foot/Ankle  Left Foot Inspection  Skin Exam: dry skin and callus              Toe Exam: swelling and erythema                   Sensory   Vibration: diminished  Proprioception: diminished     Vascular  Capillary refills: elevated     Patient's shoes and socks removed  Assign Risk Category:  Deformity present; Loss of protective sensation; Weak pulses       Risk: 2

## 2021-09-09 LAB
LEFT EYE DIABETIC RETINOPATHY: NORMAL
RIGHT EYE DIABETIC RETINOPATHY: NORMAL

## 2021-09-28 ENCOUNTER — APPOINTMENT (EMERGENCY)
Dept: RADIOLOGY | Facility: HOSPITAL | Age: 81
DRG: 203 | End: 2021-09-28
Payer: COMMERCIAL

## 2021-09-28 ENCOUNTER — HOSPITAL ENCOUNTER (INPATIENT)
Facility: HOSPITAL | Age: 81
LOS: 2 days | Discharge: HOME | DRG: 203 | End: 2021-09-30
Attending: EMERGENCY MEDICINE | Admitting: INTERNAL MEDICINE
Payer: COMMERCIAL

## 2021-09-28 DIAGNOSIS — I48.91 NEW ONSET A-FIB (HCC): ICD-10-CM

## 2021-09-28 DIAGNOSIS — I10 HYPERTENSION: ICD-10-CM

## 2021-09-28 DIAGNOSIS — B33.8 RSV (RESPIRATORY SYNCYTIAL VIRUS INFECTION): Primary | ICD-10-CM

## 2021-09-28 DIAGNOSIS — R91.8 LUNG MASS: ICD-10-CM

## 2021-09-28 DIAGNOSIS — L84 CORNS: ICD-10-CM

## 2021-09-28 DIAGNOSIS — I48.91 NEW ONSET ATRIAL FIBRILLATION (HCC): ICD-10-CM

## 2021-09-28 PROBLEM — J20.5 RSV BRONCHITIS: Status: ACTIVE | Noted: 2021-09-28

## 2021-09-28 LAB
ANION GAP SERPL CALCULATED.3IONS-SCNC: 9 MMOL/L (ref 4–13)
APTT PPP: 30 SECONDS (ref 23–37)
BASOPHILS # BLD AUTO: 0.03 THOUSANDS/ΜL (ref 0–0.1)
BASOPHILS NFR BLD AUTO: 0 % (ref 0–1)
BUN SERPL-MCNC: 16 MG/DL (ref 5–25)
CALCIUM SERPL-MCNC: 8.4 MG/DL (ref 8.3–10.1)
CHLORIDE SERPL-SCNC: 101 MMOL/L (ref 100–108)
CO2 SERPL-SCNC: 28 MMOL/L (ref 21–32)
CREAT SERPL-MCNC: 0.93 MG/DL (ref 0.6–1.3)
EOSINOPHIL # BLD AUTO: 0.16 THOUSAND/ΜL (ref 0–0.61)
EOSINOPHIL NFR BLD AUTO: 2 % (ref 0–6)
ERYTHROCYTE [DISTWIDTH] IN BLOOD BY AUTOMATED COUNT: 12.7 % (ref 11.6–15.1)
FLUAV RNA RESP QL NAA+PROBE: NEGATIVE
FLUBV RNA RESP QL NAA+PROBE: NEGATIVE
GFR SERPL CREATININE-BSD FRML MDRD: 78 ML/MIN/1.73SQ M
GLUCOSE SERPL-MCNC: 120 MG/DL (ref 65–140)
GLUCOSE SERPL-MCNC: 130 MG/DL (ref 65–140)
HCT VFR BLD AUTO: 48.6 % (ref 36.5–49.3)
HGB BLD-MCNC: 15.8 G/DL (ref 12–17)
IMM GRANULOCYTES # BLD AUTO: 0.02 THOUSAND/UL (ref 0–0.2)
IMM GRANULOCYTES NFR BLD AUTO: 0 % (ref 0–2)
INR PPP: 1.03 (ref 0.84–1.19)
LYMPHOCYTES # BLD AUTO: 1.15 THOUSANDS/ΜL (ref 0.6–4.47)
LYMPHOCYTES NFR BLD AUTO: 16 % (ref 14–44)
MCH RBC QN AUTO: 34 PG (ref 26.8–34.3)
MCHC RBC AUTO-ENTMCNC: 32.5 G/DL (ref 31.4–37.4)
MCV RBC AUTO: 105 FL (ref 82–98)
MONOCYTES # BLD AUTO: 0.64 THOUSAND/ΜL (ref 0.17–1.22)
MONOCYTES NFR BLD AUTO: 9 % (ref 4–12)
NEUTROPHILS # BLD AUTO: 5.08 THOUSANDS/ΜL (ref 1.85–7.62)
NEUTS SEG NFR BLD AUTO: 73 % (ref 43–75)
NRBC BLD AUTO-RTO: 0 /100 WBCS
PLATELET # BLD AUTO: 182 THOUSANDS/UL (ref 149–390)
PMV BLD AUTO: 9.8 FL (ref 8.9–12.7)
POTASSIUM SERPL-SCNC: 4.2 MMOL/L (ref 3.5–5.3)
PROTHROMBIN TIME: 13.3 SECONDS (ref 11.6–14.5)
RBC # BLD AUTO: 4.65 MILLION/UL (ref 3.88–5.62)
RSV RNA RESP QL NAA+PROBE: POSITIVE
SARS-COV-2 RNA RESP QL NAA+PROBE: NEGATIVE
SODIUM SERPL-SCNC: 138 MMOL/L (ref 136–145)
TROPONIN I SERPL-MCNC: <0.02 NG/ML
TROPONIN I SERPL-MCNC: <0.02 NG/ML
WBC # BLD AUTO: 7.08 THOUSAND/UL (ref 4.31–10.16)

## 2021-09-28 PROCEDURE — G1004 CDSM NDSC: HCPCS

## 2021-09-28 PROCEDURE — 36415 COLL VENOUS BLD VENIPUNCTURE: CPT | Performed by: PHYSICIAN ASSISTANT

## 2021-09-28 PROCEDURE — 80048 BASIC METABOLIC PNL TOTAL CA: CPT | Performed by: PHYSICIAN ASSISTANT

## 2021-09-28 PROCEDURE — 84484 ASSAY OF TROPONIN QUANT: CPT | Performed by: PHYSICIAN ASSISTANT

## 2021-09-28 PROCEDURE — 96374 THER/PROPH/DIAG INJ IV PUSH: CPT

## 2021-09-28 PROCEDURE — 99285 EMERGENCY DEPT VISIT HI MDM: CPT | Performed by: PHYSICIAN ASSISTANT

## 2021-09-28 PROCEDURE — 85610 PROTHROMBIN TIME: CPT | Performed by: PHYSICIAN ASSISTANT

## 2021-09-28 PROCEDURE — 82948 REAGENT STRIP/BLOOD GLUCOSE: CPT

## 2021-09-28 PROCEDURE — 96361 HYDRATE IV INFUSION ADD-ON: CPT

## 2021-09-28 PROCEDURE — 87449 NOS EACH ORGANISM AG IA: CPT | Performed by: INTERNAL MEDICINE

## 2021-09-28 PROCEDURE — 85025 COMPLETE CBC W/AUTO DIFF WBC: CPT | Performed by: PHYSICIAN ASSISTANT

## 2021-09-28 PROCEDURE — 71250 CT THORAX DX C-: CPT

## 2021-09-28 PROCEDURE — 85730 THROMBOPLASTIN TIME PARTIAL: CPT | Performed by: PHYSICIAN ASSISTANT

## 2021-09-28 PROCEDURE — 99285 EMERGENCY DEPT VISIT HI MDM: CPT

## 2021-09-28 PROCEDURE — 71045 X-RAY EXAM CHEST 1 VIEW: CPT

## 2021-09-28 PROCEDURE — 0241U HB NFCT DS VIR RESP RNA 4 TRGT: CPT | Performed by: PHYSICIAN ASSISTANT

## 2021-09-28 PROCEDURE — 99223 1ST HOSP IP/OBS HIGH 75: CPT | Performed by: INTERNAL MEDICINE

## 2021-09-28 PROCEDURE — 84484 ASSAY OF TROPONIN QUANT: CPT | Performed by: INTERNAL MEDICINE

## 2021-09-28 RX ORDER — PANTOPRAZOLE SODIUM 20 MG/1
20 TABLET, DELAYED RELEASE ORAL
Status: DISCONTINUED | OUTPATIENT
Start: 2021-09-29 | End: 2021-09-30 | Stop reason: HOSPADM

## 2021-09-28 RX ORDER — ATENOLOL 50 MG/1
100 TABLET ORAL DAILY
Status: DISCONTINUED | OUTPATIENT
Start: 2021-09-29 | End: 2021-09-30 | Stop reason: HOSPADM

## 2021-09-28 RX ORDER — ATORVASTATIN CALCIUM 40 MG/1
40 TABLET, FILM COATED ORAL
Status: DISCONTINUED | OUTPATIENT
Start: 2021-09-29 | End: 2021-09-30 | Stop reason: HOSPADM

## 2021-09-28 RX ORDER — CEFTRIAXONE 1 G/50ML
1000 INJECTION, SOLUTION INTRAVENOUS EVERY 24 HOURS
Status: DISCONTINUED | OUTPATIENT
Start: 2021-09-28 | End: 2021-09-29

## 2021-09-28 RX ORDER — METOPROLOL TARTRATE 5 MG/5ML
5 INJECTION INTRAVENOUS ONCE
Status: COMPLETED | OUTPATIENT
Start: 2021-09-28 | End: 2021-09-28

## 2021-09-28 RX ORDER — LATANOPROST 50 UG/ML
1 SOLUTION/ DROPS OPHTHALMIC DAILY
Status: DISCONTINUED | OUTPATIENT
Start: 2021-09-29 | End: 2021-09-30 | Stop reason: HOSPADM

## 2021-09-28 RX ORDER — AZITHROMYCIN 250 MG/1
500 TABLET, FILM COATED ORAL EVERY 24 HOURS
Status: DISCONTINUED | OUTPATIENT
Start: 2021-09-28 | End: 2021-09-29

## 2021-09-28 RX ORDER — TRAVOPROST OPHTHALMIC SOLUTION 0.04 MG/ML
1 SOLUTION OPHTHALMIC
Status: DISCONTINUED | OUTPATIENT
Start: 2021-09-28 | End: 2021-09-30 | Stop reason: HOSPADM

## 2021-09-28 RX ORDER — ASPIRIN 81 MG/1
81 TABLET, CHEWABLE ORAL DAILY
Status: DISCONTINUED | OUTPATIENT
Start: 2021-09-29 | End: 2021-09-30

## 2021-09-28 RX ORDER — CHLORAL HYDRATE 500 MG
1000 CAPSULE ORAL DAILY
Status: DISCONTINUED | OUTPATIENT
Start: 2021-09-29 | End: 2021-09-30 | Stop reason: HOSPADM

## 2021-09-28 RX ADMIN — AZITHROMYCIN MONOHYDRATE 500 MG: 250 TABLET ORAL at 21:52

## 2021-09-28 RX ADMIN — CEFEPIME HYDROCHLORIDE 2000 MG: 2 INJECTION, SOLUTION INTRAVENOUS at 13:49

## 2021-09-28 RX ADMIN — ENOXAPARIN SODIUM 100 MG: 100 INJECTION SUBCUTANEOUS at 21:52

## 2021-09-28 RX ADMIN — TRAVOPROST 1 DROP: 0.04 SOLUTION/ DROPS OPHTHALMIC at 23:03

## 2021-09-28 RX ADMIN — SODIUM CHLORIDE 500 ML: 0.9 INJECTION, SOLUTION INTRAVENOUS at 12:52

## 2021-09-28 RX ADMIN — CEFTRIAXONE 1000 MG: 1 INJECTION, SOLUTION INTRAVENOUS at 22:59

## 2021-09-28 RX ADMIN — METOPROLOL TARTRATE 5 MG: 5 INJECTION INTRAVENOUS at 12:59

## 2021-09-29 ENCOUNTER — APPOINTMENT (INPATIENT)
Dept: NON INVASIVE DIAGNOSTICS | Facility: HOSPITAL | Age: 81
DRG: 203 | End: 2021-09-29
Payer: COMMERCIAL

## 2021-09-29 LAB
ANION GAP SERPL CALCULATED.3IONS-SCNC: 9 MMOL/L (ref 4–13)
BUN SERPL-MCNC: 17 MG/DL (ref 5–25)
CALCIUM SERPL-MCNC: 8.6 MG/DL (ref 8.3–10.1)
CHLORIDE SERPL-SCNC: 104 MMOL/L (ref 100–108)
CO2 SERPL-SCNC: 28 MMOL/L (ref 21–32)
CREAT SERPL-MCNC: 0.95 MG/DL (ref 0.6–1.3)
ERYTHROCYTE [DISTWIDTH] IN BLOOD BY AUTOMATED COUNT: 12.7 % (ref 11.6–15.1)
GFR SERPL CREATININE-BSD FRML MDRD: 76 ML/MIN/1.73SQ M
GLUCOSE SERPL-MCNC: 106 MG/DL (ref 65–140)
GLUCOSE SERPL-MCNC: 108 MG/DL (ref 65–140)
GLUCOSE SERPL-MCNC: 153 MG/DL (ref 65–140)
GLUCOSE SERPL-MCNC: 166 MG/DL (ref 65–140)
HCT VFR BLD AUTO: 48.6 % (ref 36.5–49.3)
HGB BLD-MCNC: 15.6 G/DL (ref 12–17)
L PNEUMO1 AG UR QL IA.RAPID: NEGATIVE
MCH RBC QN AUTO: 33.3 PG (ref 26.8–34.3)
MCHC RBC AUTO-ENTMCNC: 32.1 G/DL (ref 31.4–37.4)
MCV RBC AUTO: 104 FL (ref 82–98)
PLATELET # BLD AUTO: 183 THOUSANDS/UL (ref 149–390)
PMV BLD AUTO: 10 FL (ref 8.9–12.7)
POTASSIUM SERPL-SCNC: 3.9 MMOL/L (ref 3.5–5.3)
PROCALCITONIN SERPL-MCNC: <0.05 NG/ML
RBC # BLD AUTO: 4.69 MILLION/UL (ref 3.88–5.62)
S PNEUM AG UR QL: NEGATIVE
SODIUM SERPL-SCNC: 141 MMOL/L (ref 136–145)
TROPONIN I SERPL-MCNC: <0.02 NG/ML
WBC # BLD AUTO: 6.12 THOUSAND/UL (ref 4.31–10.16)

## 2021-09-29 PROCEDURE — 85027 COMPLETE CBC AUTOMATED: CPT | Performed by: INTERNAL MEDICINE

## 2021-09-29 PROCEDURE — 99222 1ST HOSP IP/OBS MODERATE 55: CPT | Performed by: INTERNAL MEDICINE

## 2021-09-29 PROCEDURE — 99223 1ST HOSP IP/OBS HIGH 75: CPT | Performed by: INTERNAL MEDICINE

## 2021-09-29 PROCEDURE — 84145 PROCALCITONIN (PCT): CPT | Performed by: INTERNAL MEDICINE

## 2021-09-29 PROCEDURE — 93306 TTE W/DOPPLER COMPLETE: CPT | Performed by: INTERNAL MEDICINE

## 2021-09-29 PROCEDURE — 82948 REAGENT STRIP/BLOOD GLUCOSE: CPT

## 2021-09-29 PROCEDURE — 80048 BASIC METABOLIC PNL TOTAL CA: CPT | Performed by: INTERNAL MEDICINE

## 2021-09-29 PROCEDURE — 94640 AIRWAY INHALATION TREATMENT: CPT

## 2021-09-29 PROCEDURE — 87205 SMEAR GRAM STAIN: CPT | Performed by: INTERNAL MEDICINE

## 2021-09-29 PROCEDURE — 94760 N-INVAS EAR/PLS OXIMETRY 1: CPT

## 2021-09-29 PROCEDURE — NC001 PR NO CHARGE: Performed by: RADIOLOGY

## 2021-09-29 PROCEDURE — 99232 SBSQ HOSP IP/OBS MODERATE 35: CPT | Performed by: INTERNAL MEDICINE

## 2021-09-29 PROCEDURE — 87070 CULTURE OTHR SPECIMN AEROBIC: CPT | Performed by: INTERNAL MEDICINE

## 2021-09-29 PROCEDURE — 93306 TTE W/DOPPLER COMPLETE: CPT

## 2021-09-29 PROCEDURE — 84484 ASSAY OF TROPONIN QUANT: CPT | Performed by: INTERNAL MEDICINE

## 2021-09-29 RX ORDER — LOSARTAN POTASSIUM 25 MG/1
25 TABLET ORAL DAILY
Status: DISCONTINUED | OUTPATIENT
Start: 2021-09-29 | End: 2021-09-30

## 2021-09-29 RX ORDER — IPRATROPIUM BROMIDE AND ALBUTEROL SULFATE 2.5; .5 MG/3ML; MG/3ML
3 SOLUTION RESPIRATORY (INHALATION)
Status: DISCONTINUED | OUTPATIENT
Start: 2021-09-29 | End: 2021-09-30 | Stop reason: HOSPADM

## 2021-09-29 RX ORDER — BUDESONIDE 0.5 MG/2ML
0.5 INHALANT ORAL
Status: DISCONTINUED | OUTPATIENT
Start: 2021-09-29 | End: 2021-09-30 | Stop reason: HOSPADM

## 2021-09-29 RX ADMIN — OMEGA-3 FATTY ACIDS CAP 1000 MG 1000 MG: 1000 CAP at 08:35

## 2021-09-29 RX ADMIN — ENOXAPARIN SODIUM 100 MG: 100 INJECTION SUBCUTANEOUS at 21:38

## 2021-09-29 RX ADMIN — LOSARTAN POTASSIUM 25 MG: 25 TABLET, FILM COATED ORAL at 12:25

## 2021-09-29 RX ADMIN — ENOXAPARIN SODIUM 100 MG: 100 INJECTION SUBCUTANEOUS at 08:35

## 2021-09-29 RX ADMIN — IPRATROPIUM BROMIDE AND ALBUTEROL SULFATE 3 ML: 2.5; .5 SOLUTION RESPIRATORY (INHALATION) at 19:43

## 2021-09-29 RX ADMIN — ASPIRIN 81 MG CHEWABLE TABLET 81 MG: 81 TABLET CHEWABLE at 08:35

## 2021-09-29 RX ADMIN — ATENOLOL 100 MG: 50 TABLET ORAL at 08:35

## 2021-09-29 RX ADMIN — INSULIN LISPRO 1 UNITS: 100 INJECTION, SOLUTION INTRAVENOUS; SUBCUTANEOUS at 22:09

## 2021-09-29 RX ADMIN — IPRATROPIUM BROMIDE AND ALBUTEROL SULFATE 3 ML: 2.5; .5 SOLUTION RESPIRATORY (INHALATION) at 16:01

## 2021-09-29 RX ADMIN — TRAVOPROST 1 DROP: 0.04 SOLUTION/ DROPS OPHTHALMIC at 22:10

## 2021-09-29 RX ADMIN — PANTOPRAZOLE SODIUM 20 MG: 20 TABLET, DELAYED RELEASE ORAL at 05:00

## 2021-09-29 RX ADMIN — INSULIN LISPRO 1 UNITS: 100 INJECTION, SOLUTION INTRAVENOUS; SUBCUTANEOUS at 12:25

## 2021-09-29 RX ADMIN — ATORVASTATIN CALCIUM 40 MG: 40 TABLET, FILM COATED ORAL at 16:47

## 2021-09-29 RX ADMIN — BUDESONIDE 0.5 MG: 0.5 INHALANT ORAL at 19:43

## 2021-09-30 VITALS
OXYGEN SATURATION: 96 % | HEIGHT: 71 IN | SYSTOLIC BLOOD PRESSURE: 148 MMHG | HEART RATE: 60 BPM | TEMPERATURE: 97.7 F | RESPIRATION RATE: 18 BRPM | BODY MASS INDEX: 32.47 KG/M2 | DIASTOLIC BLOOD PRESSURE: 95 MMHG | WEIGHT: 231.92 LBS

## 2021-09-30 LAB
ANION GAP SERPL CALCULATED.3IONS-SCNC: 9 MMOL/L (ref 4–13)
BUN SERPL-MCNC: 18 MG/DL (ref 5–25)
CALCIUM SERPL-MCNC: 8.5 MG/DL (ref 8.3–10.1)
CHLORIDE SERPL-SCNC: 106 MMOL/L (ref 100–108)
CO2 SERPL-SCNC: 28 MMOL/L (ref 21–32)
CREAT SERPL-MCNC: 0.92 MG/DL (ref 0.6–1.3)
ERYTHROCYTE [DISTWIDTH] IN BLOOD BY AUTOMATED COUNT: 12.6 % (ref 11.6–15.1)
GFR SERPL CREATININE-BSD FRML MDRD: 79 ML/MIN/1.73SQ M
GLUCOSE SERPL-MCNC: 126 MG/DL (ref 65–140)
GLUCOSE SERPL-MCNC: 137 MG/DL (ref 65–140)
GLUCOSE SERPL-MCNC: 156 MG/DL (ref 65–140)
HCT VFR BLD AUTO: 47.7 % (ref 36.5–49.3)
HGB BLD-MCNC: 15.2 G/DL (ref 12–17)
MCH RBC QN AUTO: 33.2 PG (ref 26.8–34.3)
MCHC RBC AUTO-ENTMCNC: 31.9 G/DL (ref 31.4–37.4)
MCV RBC AUTO: 104 FL (ref 82–98)
PLATELET # BLD AUTO: 196 THOUSANDS/UL (ref 149–390)
PMV BLD AUTO: 10.2 FL (ref 8.9–12.7)
POTASSIUM SERPL-SCNC: 3.9 MMOL/L (ref 3.5–5.3)
PROCALCITONIN SERPL-MCNC: 0.06 NG/ML
RBC # BLD AUTO: 4.58 MILLION/UL (ref 3.88–5.62)
SODIUM SERPL-SCNC: 143 MMOL/L (ref 136–145)
WBC # BLD AUTO: 6.02 THOUSAND/UL (ref 4.31–10.16)

## 2021-09-30 PROCEDURE — 99232 SBSQ HOSP IP/OBS MODERATE 35: CPT | Performed by: INTERNAL MEDICINE

## 2021-09-30 PROCEDURE — 85027 COMPLETE CBC AUTOMATED: CPT | Performed by: INTERNAL MEDICINE

## 2021-09-30 PROCEDURE — 99232 SBSQ HOSP IP/OBS MODERATE 35: CPT | Performed by: NURSE PRACTITIONER

## 2021-09-30 PROCEDURE — 80048 BASIC METABOLIC PNL TOTAL CA: CPT | Performed by: INTERNAL MEDICINE

## 2021-09-30 PROCEDURE — 99239 HOSP IP/OBS DSCHRG MGMT >30: CPT | Performed by: INTERNAL MEDICINE

## 2021-09-30 PROCEDURE — 94640 AIRWAY INHALATION TREATMENT: CPT

## 2021-09-30 PROCEDURE — 82948 REAGENT STRIP/BLOOD GLUCOSE: CPT

## 2021-09-30 PROCEDURE — 84145 PROCALCITONIN (PCT): CPT | Performed by: INTERNAL MEDICINE

## 2021-09-30 PROCEDURE — 94760 N-INVAS EAR/PLS OXIMETRY 1: CPT

## 2021-09-30 RX ORDER — BUDESONIDE 0.5 MG/2ML
0.5 INHALANT ORAL
Qty: 120 ML | Refills: 0 | Status: SHIPPED | OUTPATIENT
Start: 2021-09-30 | End: 2021-10-07

## 2021-09-30 RX ORDER — LOSARTAN POTASSIUM 50 MG/1
50 TABLET ORAL DAILY
Status: DISCONTINUED | OUTPATIENT
Start: 2021-10-01 | End: 2021-09-30 | Stop reason: HOSPADM

## 2021-09-30 RX ORDER — LOSARTAN POTASSIUM 50 MG/1
50 TABLET ORAL DAILY
Qty: 30 TABLET | Refills: 0 | Status: SHIPPED | OUTPATIENT
Start: 2021-10-01 | End: 2021-11-03 | Stop reason: SDUPTHER

## 2021-09-30 RX ORDER — ALBUTEROL SULFATE 90 UG/1
2 AEROSOL, METERED RESPIRATORY (INHALATION) 4 TIMES DAILY
Qty: 18 G | Refills: 0 | Status: SHIPPED | OUTPATIENT
Start: 2021-09-30 | End: 2021-10-30

## 2021-09-30 RX ORDER — ALBUTEROL SULFATE 2.5 MG/3ML
2.5 SOLUTION RESPIRATORY (INHALATION) EVERY 6 HOURS PRN
Qty: 120 ML | Refills: 0 | Status: SHIPPED | OUTPATIENT
Start: 2021-09-30 | End: 2021-10-30

## 2021-09-30 RX ADMIN — OMEGA-3 FATTY ACIDS CAP 1000 MG 1000 MG: 1000 CAP at 08:48

## 2021-09-30 RX ADMIN — ATENOLOL 100 MG: 50 TABLET ORAL at 08:47

## 2021-09-30 RX ADMIN — IPRATROPIUM BROMIDE AND ALBUTEROL SULFATE 3 ML: 2.5; .5 SOLUTION RESPIRATORY (INHALATION) at 07:32

## 2021-09-30 RX ADMIN — BUDESONIDE 0.5 MG: 0.5 INHALANT ORAL at 07:31

## 2021-09-30 RX ADMIN — IPRATROPIUM BROMIDE AND ALBUTEROL SULFATE 3 ML: 2.5; .5 SOLUTION RESPIRATORY (INHALATION) at 02:53

## 2021-09-30 RX ADMIN — ENOXAPARIN SODIUM 100 MG: 100 INJECTION SUBCUTANEOUS at 08:48

## 2021-09-30 RX ADMIN — LOSARTAN POTASSIUM 25 MG: 25 TABLET, FILM COATED ORAL at 08:48

## 2021-09-30 RX ADMIN — PANTOPRAZOLE SODIUM 20 MG: 20 TABLET, DELAYED RELEASE ORAL at 05:18

## 2021-10-01 LAB
BACTERIA SPT RESP CULT: NORMAL
GRAM STN SPEC: NORMAL
GRAM STN SPEC: NORMAL

## 2021-10-04 ENCOUNTER — TELEPHONE (OUTPATIENT)
Dept: RADIOLOGY | Facility: HOSPITAL | Age: 81
End: 2021-10-04

## 2021-10-05 ENCOUNTER — HOSPITAL ENCOUNTER (OUTPATIENT)
Dept: RADIOLOGY | Facility: HOSPITAL | Age: 81
Discharge: HOME | End: 2021-10-05
Attending: RADIOLOGY
Payer: COMMERCIAL

## 2021-10-05 ENCOUNTER — TELEPHONE (OUTPATIENT)
Dept: PULMONOLOGY | Facility: MEDICAL CENTER | Age: 81
End: 2021-10-05

## 2021-10-05 VITALS
DIASTOLIC BLOOD PRESSURE: 85 MMHG | SYSTOLIC BLOOD PRESSURE: 125 MMHG | WEIGHT: 230 LBS | RESPIRATION RATE: 16 BRPM | TEMPERATURE: 97.4 F | OXYGEN SATURATION: 94 % | BODY MASS INDEX: 32.08 KG/M2 | HEART RATE: 70 BPM

## 2021-10-05 DIAGNOSIS — J20.5 RSV BRONCHITIS: Primary | ICD-10-CM

## 2021-10-05 PROCEDURE — 32408 CORE NDL BX LNG/MED PERQ: CPT

## 2021-10-05 PROCEDURE — 88342 IMHCHEM/IMCYTCHM 1ST ANTB: CPT | Performed by: PATHOLOGY

## 2021-10-05 PROCEDURE — 88305 TISSUE EXAM BY PATHOLOGIST: CPT | Performed by: PATHOLOGY

## 2021-10-05 PROCEDURE — 99152 MOD SED SAME PHYS/QHP 5/>YRS: CPT

## 2021-10-05 PROCEDURE — 88333 PATH CONSLTJ SURG CYTO XM 1: CPT | Performed by: PATHOLOGY

## 2021-10-05 PROCEDURE — 32408 CORE NDL BX LNG/MED PERQ: CPT | Performed by: RADIOLOGY

## 2021-10-05 PROCEDURE — 99152 MOD SED SAME PHYS/QHP 5/>YRS: CPT | Performed by: RADIOLOGY

## 2021-10-05 RX ORDER — OXYCODONE HYDROCHLORIDE 10 MG/1
10 TABLET ORAL EVERY 4 HOURS PRN
Status: DISCONTINUED | OUTPATIENT
Start: 2021-10-05 | End: 2021-10-06 | Stop reason: HOSPADM

## 2021-10-05 RX ORDER — SODIUM CHLORIDE 9 MG/ML
75 INJECTION, SOLUTION INTRAVENOUS CONTINUOUS
Status: DISCONTINUED | OUTPATIENT
Start: 2021-10-05 | End: 2021-10-06 | Stop reason: HOSPADM

## 2021-10-05 RX ORDER — MIDAZOLAM HYDROCHLORIDE 2 MG/2ML
INJECTION, SOLUTION INTRAMUSCULAR; INTRAVENOUS CODE/TRAUMA/SEDATION MEDICATION
Status: COMPLETED | OUTPATIENT
Start: 2021-10-05 | End: 2021-10-05

## 2021-10-05 RX ORDER — ACETAMINOPHEN 325 MG/1
650 TABLET ORAL EVERY 4 HOURS PRN
Status: DISCONTINUED | OUTPATIENT
Start: 2021-10-05 | End: 2021-10-06 | Stop reason: HOSPADM

## 2021-10-05 RX ORDER — OXYCODONE HYDROCHLORIDE 5 MG/1
5 TABLET ORAL EVERY 4 HOURS PRN
Status: DISCONTINUED | OUTPATIENT
Start: 2021-10-05 | End: 2021-10-06 | Stop reason: HOSPADM

## 2021-10-05 RX ORDER — HYDROMORPHONE HCL/PF 1 MG/ML
0.5 SYRINGE (ML) INJECTION EVERY 2 HOUR PRN
Status: DISCONTINUED | OUTPATIENT
Start: 2021-10-05 | End: 2021-10-06 | Stop reason: HOSPADM

## 2021-10-05 RX ORDER — FENTANYL CITRATE 50 UG/ML
INJECTION, SOLUTION INTRAMUSCULAR; INTRAVENOUS CODE/TRAUMA/SEDATION MEDICATION
Status: COMPLETED | OUTPATIENT
Start: 2021-10-05 | End: 2021-10-05

## 2021-10-05 RX ADMIN — MIDAZOLAM 1 MG: 1 INJECTION INTRAMUSCULAR; INTRAVENOUS at 10:47

## 2021-10-05 RX ADMIN — FENTANYL CITRATE 50 MCG: 50 INJECTION, SOLUTION INTRAMUSCULAR; INTRAVENOUS at 10:39

## 2021-10-05 RX ADMIN — MIDAZOLAM 1 MG: 1 INJECTION INTRAMUSCULAR; INTRAVENOUS at 10:39

## 2021-10-05 RX ADMIN — FENTANYL CITRATE 50 MCG: 50 INJECTION, SOLUTION INTRAMUSCULAR; INTRAVENOUS at 10:48

## 2021-10-07 DIAGNOSIS — B33.8 RSV (RESPIRATORY SYNCYTIAL VIRUS INFECTION): ICD-10-CM

## 2021-10-07 RX ORDER — BUDESONIDE 0.5 MG/2ML
0.5 INHALANT ORAL
Qty: 120 ML | Refills: 11 | Status: SHIPPED | OUTPATIENT
Start: 2021-10-07 | End: 2021-11-22

## 2021-10-09 RX ORDER — BUDESONIDE 0.5 MG/2ML
0.5 INHALANT ORAL 2 TIMES DAILY
Qty: 120 ML | Refills: 1 | Status: SHIPPED | OUTPATIENT
Start: 2021-10-09 | End: 2021-11-22

## 2021-10-13 ENCOUNTER — TELEPHONE (OUTPATIENT)
Dept: HEMATOLOGY ONCOLOGY | Facility: CLINIC | Age: 81
End: 2021-10-13

## 2021-10-13 ENCOUNTER — PATIENT OUTREACH (OUTPATIENT)
Dept: HEMATOLOGY ONCOLOGY | Facility: CLINIC | Age: 81
End: 2021-10-13

## 2021-10-13 ENCOUNTER — OFFICE VISIT (OUTPATIENT)
Dept: PULMONOLOGY | Facility: MEDICAL CENTER | Age: 81
End: 2021-10-13
Payer: COMMERCIAL

## 2021-10-13 VITALS
SYSTOLIC BLOOD PRESSURE: 126 MMHG | HEART RATE: 87 BPM | TEMPERATURE: 98.7 F | OXYGEN SATURATION: 94 % | DIASTOLIC BLOOD PRESSURE: 64 MMHG

## 2021-10-13 DIAGNOSIS — G47.19 DAYTIME HYPERSOMNOLENCE: Chronic | ICD-10-CM

## 2021-10-13 DIAGNOSIS — J43.2 CENTRILOBULAR EMPHYSEMA (HCC): ICD-10-CM

## 2021-10-13 DIAGNOSIS — R06.02 SHORTNESS OF BREATH: ICD-10-CM

## 2021-10-13 DIAGNOSIS — C34.92 SQUAMOUS CELL CARCINOMA OF LEFT LUNG (HCC): Primary | ICD-10-CM

## 2021-10-13 PROBLEM — C34.90 SQUAMOUS CELL CARCINOMA OF LUNG (HCC): Chronic | Status: ACTIVE | Noted: 2021-10-13

## 2021-10-13 PROBLEM — C34.90 SQUAMOUS CELL CARCINOMA OF LUNG (HCC): Status: ACTIVE | Noted: 2021-10-13

## 2021-10-13 PROCEDURE — 99214 OFFICE O/P EST MOD 30 MIN: CPT | Performed by: NURSE PRACTITIONER

## 2021-10-13 RX ORDER — ESCITALOPRAM OXALATE 10 MG/1
TABLET ORAL
COMMUNITY
Start: 2021-07-20 | End: 2021-11-22

## 2021-10-13 RX ORDER — FAMOTIDINE 40 MG/1
TABLET, FILM COATED ORAL DAILY
COMMUNITY
End: 2021-11-22

## 2021-10-13 RX ORDER — LISINOPRIL 10 MG/1
TABLET ORAL
COMMUNITY
End: 2021-11-03

## 2021-10-13 RX ORDER — PRIMIDONE 50 MG/1
50 TABLET ORAL
COMMUNITY
Start: 2021-09-23 | End: 2022-01-13 | Stop reason: SDUPTHER

## 2021-10-25 ENCOUNTER — HOSPITAL ENCOUNTER (OUTPATIENT)
Dept: PULMONOLOGY | Facility: HOSPITAL | Age: 81
Discharge: HOME | End: 2021-10-25
Payer: COMMERCIAL

## 2021-10-25 DIAGNOSIS — R06.02 SHORTNESS OF BREATH: ICD-10-CM

## 2021-10-25 PROCEDURE — 94729 DIFFUSING CAPACITY: CPT | Performed by: INTERNAL MEDICINE

## 2021-10-25 PROCEDURE — 94060 EVALUATION OF WHEEZING: CPT | Performed by: INTERNAL MEDICINE

## 2021-10-25 PROCEDURE — 94726 PLETHYSMOGRAPHY LUNG VOLUMES: CPT | Performed by: INTERNAL MEDICINE

## 2021-10-25 PROCEDURE — 94060 EVALUATION OF WHEEZING: CPT

## 2021-10-25 PROCEDURE — 94729 DIFFUSING CAPACITY: CPT

## 2021-10-25 PROCEDURE — 94760 N-INVAS EAR/PLS OXIMETRY 1: CPT

## 2021-10-25 PROCEDURE — 94726 PLETHYSMOGRAPHY LUNG VOLUMES: CPT

## 2021-10-25 RX ORDER — ALBUTEROL SULFATE 2.5 MG/3ML
2.5 SOLUTION RESPIRATORY (INHALATION) ONCE
Status: COMPLETED | OUTPATIENT
Start: 2021-10-25 | End: 2021-10-25

## 2021-10-25 RX ADMIN — ALBUTEROL SULFATE 2.5 MG: 2.5 SOLUTION RESPIRATORY (INHALATION) at 10:27

## 2021-10-27 ENCOUNTER — PATIENT OUTREACH (OUTPATIENT)
Dept: HEMATOLOGY ONCOLOGY | Facility: CLINIC | Age: 81
End: 2021-10-27

## 2021-10-29 ENCOUNTER — HOSPITAL ENCOUNTER (OUTPATIENT)
Dept: NUCLEAR MEDICINE | Facility: HOSPITAL | Age: 81
Discharge: HOME | End: 2021-10-29
Payer: COMMERCIAL

## 2021-10-29 DIAGNOSIS — C34.92 SQUAMOUS CELL CARCINOMA OF LEFT LUNG (HCC): ICD-10-CM

## 2021-10-29 LAB — GLUCOSE SERPL-MCNC: 116 MG/DL (ref 65–140)

## 2021-10-29 PROCEDURE — A9552 F18 FDG: HCPCS

## 2021-10-29 PROCEDURE — G1004 CDSM NDSC: HCPCS

## 2021-10-29 PROCEDURE — 78815 PET IMAGE W/CT SKULL-THIGH: CPT

## 2021-10-29 PROCEDURE — 82948 REAGENT STRIP/BLOOD GLUCOSE: CPT

## 2021-11-01 ENCOUNTER — CONSULT (OUTPATIENT)
Dept: HEMATOLOGY ONCOLOGY | Facility: MEDICAL CENTER | Age: 81
End: 2021-11-01
Payer: COMMERCIAL

## 2021-11-01 ENCOUNTER — TELEPHONE (OUTPATIENT)
Dept: HEMATOLOGY ONCOLOGY | Facility: MEDICAL CENTER | Age: 81
End: 2021-11-01

## 2021-11-01 ENCOUNTER — DOCUMENTATION (OUTPATIENT)
Dept: HEMATOLOGY ONCOLOGY | Facility: CLINIC | Age: 81
End: 2021-11-01

## 2021-11-01 VITALS
HEART RATE: 90 BPM | HEIGHT: 71 IN | TEMPERATURE: 97.5 F | DIASTOLIC BLOOD PRESSURE: 90 MMHG | SYSTOLIC BLOOD PRESSURE: 172 MMHG | OXYGEN SATURATION: 97 % | WEIGHT: 234 LBS | BODY MASS INDEX: 32.76 KG/M2 | RESPIRATION RATE: 16 BRPM

## 2021-11-01 DIAGNOSIS — C34.92 SQUAMOUS CELL CARCINOMA OF LEFT LUNG (HCC): ICD-10-CM

## 2021-11-01 PROCEDURE — 99204 OFFICE O/P NEW MOD 45 MIN: CPT | Performed by: INTERNAL MEDICINE

## 2021-11-02 ENCOUNTER — PATIENT OUTREACH (OUTPATIENT)
Dept: HEMATOLOGY ONCOLOGY | Facility: CLINIC | Age: 81
End: 2021-11-02

## 2021-11-03 ENCOUNTER — OFFICE VISIT (OUTPATIENT)
Dept: CARDIOLOGY CLINIC | Facility: CLINIC | Age: 81
End: 2021-11-03
Payer: COMMERCIAL

## 2021-11-03 ENCOUNTER — TELEPHONE (OUTPATIENT)
Dept: HEMATOLOGY ONCOLOGY | Facility: MEDICAL CENTER | Age: 81
End: 2021-11-03

## 2021-11-03 ENCOUNTER — HOSPITAL ENCOUNTER (OUTPATIENT)
Dept: NON INVASIVE DIAGNOSTICS | Facility: HOSPITAL | Age: 81
Discharge: HOME | End: 2021-11-03
Attending: INTERNAL MEDICINE
Payer: COMMERCIAL

## 2021-11-03 VITALS
BODY MASS INDEX: 32.49 KG/M2 | TEMPERATURE: 98.5 F | HEIGHT: 71 IN | OXYGEN SATURATION: 94 % | SYSTOLIC BLOOD PRESSURE: 120 MMHG | WEIGHT: 232.1 LBS | DIASTOLIC BLOOD PRESSURE: 80 MMHG | HEART RATE: 89 BPM

## 2021-11-03 DIAGNOSIS — I70.209 PERIPHERAL ARTERIOSCLEROSIS (HCC): ICD-10-CM

## 2021-11-03 DIAGNOSIS — C34.92 SQUAMOUS CELL CARCINOMA OF LEFT LUNG (HCC): ICD-10-CM

## 2021-11-03 DIAGNOSIS — C34.92 SQUAMOUS CELL CARCINOMA OF LEFT LUNG (HCC): Primary | ICD-10-CM

## 2021-11-03 DIAGNOSIS — I25.10 ATHEROSCLEROSIS OF NATIVE CORONARY ARTERY OF NATIVE HEART WITHOUT ANGINA PECTORIS: ICD-10-CM

## 2021-11-03 DIAGNOSIS — I10 HYPERTENSION: ICD-10-CM

## 2021-11-03 DIAGNOSIS — R06.02 SHORTNESS OF BREATH: ICD-10-CM

## 2021-11-03 DIAGNOSIS — I48.19 PERSISTENT ATRIAL FIBRILLATION (HCC): Primary | ICD-10-CM

## 2021-11-03 DIAGNOSIS — I50.32 CHRONIC DIASTOLIC HEART FAILURE (HCC): ICD-10-CM

## 2021-11-03 PROCEDURE — 76604 US EXAM CHEST: CPT | Performed by: RADIOLOGY

## 2021-11-03 PROCEDURE — 93000 ELECTROCARDIOGRAM COMPLETE: CPT | Performed by: INTERNAL MEDICINE

## 2021-11-03 PROCEDURE — 99214 OFFICE O/P EST MOD 30 MIN: CPT | Performed by: INTERNAL MEDICINE

## 2021-11-03 RX ORDER — LOSARTAN POTASSIUM 50 MG/1
50 TABLET ORAL DAILY
Qty: 90 TABLET | Refills: 3 | Status: SHIPPED | OUTPATIENT
Start: 2021-11-03 | End: 2022-08-03

## 2021-11-04 ENCOUNTER — OFFICE VISIT (OUTPATIENT)
Dept: PODIATRY | Facility: CLINIC | Age: 81
End: 2021-11-04
Payer: COMMERCIAL

## 2021-11-04 VITALS
SYSTOLIC BLOOD PRESSURE: 120 MMHG | HEIGHT: 71 IN | RESPIRATION RATE: 17 BRPM | WEIGHT: 232 LBS | DIASTOLIC BLOOD PRESSURE: 80 MMHG | BODY MASS INDEX: 32.48 KG/M2

## 2021-11-04 DIAGNOSIS — I70.209 PERIPHERAL ARTERIOSCLEROSIS (HCC): ICD-10-CM

## 2021-11-04 DIAGNOSIS — M79.671 PAIN IN BOTH FEET: ICD-10-CM

## 2021-11-04 DIAGNOSIS — E11.42 DIABETIC POLYNEUROPATHY ASSOCIATED WITH TYPE 2 DIABETES MELLITUS (HCC): Primary | ICD-10-CM

## 2021-11-04 DIAGNOSIS — M79.672 PAIN IN BOTH FEET: ICD-10-CM

## 2021-11-04 DIAGNOSIS — L84 CORNS: ICD-10-CM

## 2021-11-04 PROCEDURE — 11056 PARNG/CUTG B9 HYPRKR LES 2-4: CPT | Performed by: PODIATRIST

## 2021-11-05 ENCOUNTER — IMMUNIZATIONS (OUTPATIENT)
Dept: FAMILY MEDICINE CLINIC | Facility: HOSPITAL | Age: 81
End: 2021-11-05

## 2021-11-05 DIAGNOSIS — Z23 ENCOUNTER FOR IMMUNIZATION: Primary | ICD-10-CM

## 2021-11-05 PROCEDURE — 0013A COVID-19 MODERNA VACC 0.25 ML BOOSTER: CPT

## 2021-11-05 PROCEDURE — 91306 COVID-19 MODERNA VACC 0.25 ML BOOSTER: CPT

## 2021-11-08 ENCOUNTER — HOSPITAL ENCOUNTER (OUTPATIENT)
Dept: NON INVASIVE DIAGNOSTICS | Facility: HOSPITAL | Age: 81
Discharge: HOME | End: 2021-11-08
Attending: INTERNAL MEDICINE
Payer: COMMERCIAL

## 2021-11-08 ENCOUNTER — TELEMEDICINE (OUTPATIENT)
Dept: INTERVENTIONAL RADIOLOGY/VASCULAR | Facility: CLINIC | Age: 81
End: 2021-11-08
Payer: COMMERCIAL

## 2021-11-08 VITALS
DIASTOLIC BLOOD PRESSURE: 76 MMHG | OXYGEN SATURATION: 96 % | RESPIRATION RATE: 18 BRPM | SYSTOLIC BLOOD PRESSURE: 140 MMHG | HEART RATE: 68 BPM

## 2021-11-08 DIAGNOSIS — C34.92 SQUAMOUS CELL CARCINOMA OF LEFT LUNG (HCC): Primary | ICD-10-CM

## 2021-11-08 DIAGNOSIS — C34.92 SQUAMOUS CELL CARCINOMA OF LEFT LUNG (HCC): ICD-10-CM

## 2021-11-08 PROCEDURE — 32555 ASPIRATE PLEURA W/ IMAGING: CPT | Performed by: RADIOLOGY

## 2021-11-08 PROCEDURE — 99449 NTRPROF PH1/NTRNET/EHR 31/>: CPT | Performed by: RADIOLOGY

## 2021-11-08 PROCEDURE — 32555 ASPIRATE PLEURA W/ IMAGING: CPT

## 2021-11-08 PROCEDURE — 88112 CYTOPATH CELL ENHANCE TECH: CPT | Performed by: PATHOLOGY

## 2021-11-08 RX ORDER — SODIUM CHLORIDE 9 MG/ML
75 INJECTION, SOLUTION INTRAVENOUS CONTINUOUS
Status: CANCELLED | OUTPATIENT
Start: 2021-11-08

## 2021-11-08 RX ORDER — LIDOCAINE WITH 8.4% SOD BICARB 0.9%(10ML)
SYRINGE (ML) INJECTION CODE/TRAUMA/SEDATION MEDICATION
Status: COMPLETED | OUTPATIENT
Start: 2021-11-08 | End: 2021-11-08

## 2021-11-08 RX ADMIN — Medication 15 ML: at 13:23

## 2021-11-09 ENCOUNTER — HOSPITAL ENCOUNTER (OUTPATIENT)
Dept: RADIOLOGY | Facility: HOSPITAL | Age: 81
Discharge: HOME | End: 2021-11-09
Attending: INTERNAL MEDICINE
Payer: COMMERCIAL

## 2021-11-09 ENCOUNTER — HOSPITAL ENCOUNTER (OUTPATIENT)
Dept: NON INVASIVE DIAGNOSTICS | Facility: HOSPITAL | Age: 81
Discharge: HOME | End: 2021-11-09
Attending: INTERNAL MEDICINE
Payer: COMMERCIAL

## 2021-11-09 DIAGNOSIS — R06.02 SHORTNESS OF BREATH: ICD-10-CM

## 2021-11-09 DIAGNOSIS — I25.10 ATHEROSCLEROSIS OF NATIVE CORONARY ARTERY OF NATIVE HEART WITHOUT ANGINA PECTORIS: ICD-10-CM

## 2021-11-09 LAB
BASELINE ST DEPRESSION: 0 MM
CHEST PAIN STATEMENT: NORMAL
MAX DIASTOLIC BP: 86 MMHG
MAX HEART RATE: 136 BPM
MAX HR PERCENT: 96 %
MAX HR: 141 BPM
MAX PREDICTED HEART RATE: 141 BPM
MAX. SYSTOLIC BP: 168 MMHG
NUC STRESS EJECTION FRACTION: 55 %
PROTOCOL NAME: NORMAL
RATE PRESSURE PRODUCT: NORMAL
REASON FOR TERMINATION: NORMAL
SL CV REST NUCLEAR ISOTOPE DOSE: 11 MCI
SL CV STRESS NUCLEAR ISOTOPE DOSE: 33 MCI
SL CV STRESS RECOVERY BP: NORMAL MMHG
SL CV STRESS RECOVERY HR: 114 BPM
SL CV STRESS RECOVERY O2 SAT: 100 %
SL CV STRESS STAGE REACHED: 2
STRESS ANGINA INDEX: 0
STRESS BASELINE BP: NORMAL MMHG
STRESS BASELINE HR: 100 BPM
STRESS DUKE TREADMILL SCORE: 5
STRESS O2 SAT REST: 96 %
STRESS PEAK HR: 136 BPM
STRESS PERCENT HR: 96 %
STRESS POST ESTIMATED WORKLOAD: 7 METS
STRESS POST EXERCISE DUR MIN: 4 MIN
STRESS POST EXERCISE DUR SEC: 39 SEC
STRESS POST O2 SAT PEAK: 97 %
STRESS POST PEAK BP: 168 MMHG
STRESS ST DEPRESSION: 0 MM
STRESS TARGET HR: 136 BPM
TARGET HR FORMULA: NORMAL
TEST INDICATION: NORMAL
TIME IN EXERCISE PHASE: NORMAL

## 2021-11-09 PROCEDURE — 93017 CV STRESS TEST TRACING ONLY: CPT

## 2021-11-09 PROCEDURE — A9502 TC99M TETROFOSMIN: HCPCS

## 2021-11-09 PROCEDURE — 93016 CV STRESS TEST SUPVJ ONLY: CPT | Performed by: INTERNAL MEDICINE

## 2021-11-09 PROCEDURE — 78452 HT MUSCLE IMAGE SPECT MULT: CPT

## 2021-11-09 PROCEDURE — 93018 CV STRESS TEST I&R ONLY: CPT | Performed by: INTERNAL MEDICINE

## 2021-11-09 PROCEDURE — 78452 HT MUSCLE IMAGE SPECT MULT: CPT | Performed by: INTERNAL MEDICINE

## 2021-11-10 ENCOUNTER — TELEPHONE (OUTPATIENT)
Dept: CARDIOLOGY CLINIC | Facility: CLINIC | Age: 81
End: 2021-11-10

## 2021-11-11 LAB
DME PARACHUTE DELIVERY DATE ACTUAL: NORMAL
DME PARACHUTE DELIVERY DATE REQUESTED: NORMAL
DME PARACHUTE ITEM DESCRIPTION: NORMAL
DME PARACHUTE ORDER STATUS: NORMAL
DME PARACHUTE SUPPLIER NAME: NORMAL
DME PARACHUTE SUPPLIER PHONE: NORMAL

## 2021-11-15 ENCOUNTER — PATIENT OUTREACH (OUTPATIENT)
Dept: HEMATOLOGY ONCOLOGY | Facility: CLINIC | Age: 81
End: 2021-11-15

## 2021-11-16 ENCOUNTER — OFFICE VISIT (OUTPATIENT)
Dept: CARDIAC SURGERY | Facility: CLINIC | Age: 81
End: 2021-11-16
Payer: COMMERCIAL

## 2021-11-16 VITALS
TEMPERATURE: 97.5 F | SYSTOLIC BLOOD PRESSURE: 137 MMHG | HEIGHT: 71 IN | OXYGEN SATURATION: 95 % | WEIGHT: 235.23 LBS | RESPIRATION RATE: 16 BRPM | BODY MASS INDEX: 32.93 KG/M2 | DIASTOLIC BLOOD PRESSURE: 87 MMHG | HEART RATE: 59 BPM

## 2021-11-16 DIAGNOSIS — C34.92 SQUAMOUS CELL CARCINOMA OF LEFT LUNG (HCC): Primary | ICD-10-CM

## 2021-11-16 PROCEDURE — 99205 OFFICE O/P NEW HI 60 MIN: CPT | Performed by: PHYSICIAN ASSISTANT

## 2021-11-16 RX ORDER — CEFAZOLIN SODIUM 2 G/50ML
2000 SOLUTION INTRAVENOUS ONCE
Status: CANCELLED | OUTPATIENT
Start: 2021-11-16 | End: 2021-11-16

## 2021-11-17 DIAGNOSIS — R91.8 LUNG MASS: Primary | ICD-10-CM

## 2021-11-17 DIAGNOSIS — Z78.9 NEED FOR FOLLOW-UP BY SOCIAL WORKER: Primary | ICD-10-CM

## 2021-11-19 ENCOUNTER — ANESTHESIA EVENT (OUTPATIENT)
Dept: PERIOP | Facility: HOSPITAL | Age: 81
End: 2021-11-19
Payer: COMMERCIAL

## 2021-11-19 ENCOUNTER — HOSPITAL ENCOUNTER (OUTPATIENT)
Dept: MRI IMAGING | Facility: HOSPITAL | Age: 81
Discharge: HOME | End: 2021-11-19
Attending: INTERNAL MEDICINE
Payer: COMMERCIAL

## 2021-11-19 DIAGNOSIS — C34.92 SQUAMOUS CELL CARCINOMA OF LEFT LUNG (HCC): ICD-10-CM

## 2021-11-19 PROCEDURE — G1004 CDSM NDSC: HCPCS

## 2021-11-19 PROCEDURE — 70553 MRI BRAIN STEM W/O & W/DYE: CPT

## 2021-11-19 PROCEDURE — A9585 GADOBUTROL INJECTION: HCPCS | Performed by: INTERNAL MEDICINE

## 2021-11-19 RX ADMIN — GADOBUTROL 10 ML: 604.72 INJECTION INTRAVENOUS at 06:50

## 2021-11-22 ENCOUNTER — PATIENT OUTREACH (OUTPATIENT)
Dept: CASE MANAGEMENT | Facility: HOSPITAL | Age: 81
End: 2021-11-22

## 2021-11-22 ENCOUNTER — LAB REQUISITION (OUTPATIENT)
Dept: LAB | Facility: HOSPITAL | Age: 81
End: 2021-11-22
Payer: COMMERCIAL

## 2021-11-22 ENCOUNTER — APPOINTMENT (OUTPATIENT)
Dept: LAB | Facility: HOSPITAL | Age: 81
End: 2021-11-22
Payer: COMMERCIAL

## 2021-11-22 ENCOUNTER — HOSPITAL ENCOUNTER (OUTPATIENT)
Dept: SLEEP CENTER | Facility: CLINIC | Age: 81
Discharge: HOME | End: 2021-11-22
Payer: COMMERCIAL

## 2021-11-22 DIAGNOSIS — C34.92 MALIGNANT NEOPLASM OF UNSPECIFIED PART OF LEFT BRONCHUS OR LUNG (HCC): ICD-10-CM

## 2021-11-22 DIAGNOSIS — C34.92 SQUAMOUS CELL CARCINOMA OF LEFT LUNG (HCC): ICD-10-CM

## 2021-11-22 DIAGNOSIS — G47.19 DAYTIME HYPERSOMNOLENCE: Chronic | ICD-10-CM

## 2021-11-22 DIAGNOSIS — E11.9 TYPE 2 DIABETES MELLITUS WITHOUT COMPLICATION, UNSPECIFIED WHETHER LONG TERM INSULIN USE (HCC): ICD-10-CM

## 2021-11-22 DIAGNOSIS — C34.92 SQUAMOUS CARCINOMA OF LUNG, LEFT (HCC): ICD-10-CM

## 2021-11-22 LAB
ABO GROUP BLD: NORMAL
ANION GAP SERPL CALCULATED.3IONS-SCNC: 7 MMOL/L (ref 4–13)
APTT PPP: 33 SECONDS (ref 23–37)
BLD GP AB SCN SERPL QL: NEGATIVE
BUN SERPL-MCNC: 19 MG/DL (ref 5–25)
CALCIUM SERPL-MCNC: 9 MG/DL (ref 8.3–10.1)
CHLORIDE SERPL-SCNC: 104 MMOL/L (ref 100–108)
CO2 SERPL-SCNC: 28 MMOL/L (ref 21–32)
CREAT SERPL-MCNC: 1.05 MG/DL (ref 0.6–1.3)
ERYTHROCYTE [DISTWIDTH] IN BLOOD BY AUTOMATED COUNT: 12.9 % (ref 11.6–15.1)
EST. AVERAGE GLUCOSE BLD GHB EST-MCNC: 143 MG/DL
GFR SERPL CREATININE-BSD FRML MDRD: 67 ML/MIN/1.73SQ M
GLUCOSE P FAST SERPL-MCNC: 165 MG/DL (ref 65–99)
HBA1C MFR BLD: 6.6 %
HCT VFR BLD AUTO: 50.7 % (ref 36.5–49.3)
HGB BLD-MCNC: 16 G/DL (ref 12–17)
INR PPP: 1.14 (ref 0.84–1.19)
MCH RBC QN AUTO: 33.1 PG (ref 26.8–34.3)
MCHC RBC AUTO-ENTMCNC: 31.6 G/DL (ref 31.4–37.4)
MCV RBC AUTO: 105 FL (ref 82–98)
PLATELET # BLD AUTO: 200 THOUSANDS/UL (ref 149–390)
PMV BLD AUTO: 10.2 FL (ref 8.9–12.7)
POTASSIUM SERPL-SCNC: 4.8 MMOL/L (ref 3.5–5.3)
PROTHROMBIN TIME: 14.4 SECONDS (ref 11.6–14.5)
RBC # BLD AUTO: 4.83 MILLION/UL (ref 3.88–5.62)
RH BLD: POSITIVE
SODIUM SERPL-SCNC: 139 MMOL/L (ref 136–145)
SPECIMEN EXPIRATION DATE: NORMAL
WBC # BLD AUTO: 7.72 THOUSAND/UL (ref 4.31–10.16)

## 2021-11-22 PROCEDURE — 85027 COMPLETE CBC AUTOMATED: CPT

## 2021-11-22 PROCEDURE — 83036 HEMOGLOBIN GLYCOSYLATED A1C: CPT

## 2021-11-22 PROCEDURE — 80048 BASIC METABOLIC PNL TOTAL CA: CPT

## 2021-11-22 PROCEDURE — 86900 BLOOD TYPING SEROLOGIC ABO: CPT | Performed by: PHYSICIAN ASSISTANT

## 2021-11-22 PROCEDURE — 85730 THROMBOPLASTIN TIME PARTIAL: CPT

## 2021-11-22 PROCEDURE — 86901 BLOOD TYPING SEROLOGIC RH(D): CPT | Performed by: PHYSICIAN ASSISTANT

## 2021-11-22 PROCEDURE — 36415 COLL VENOUS BLD VENIPUNCTURE: CPT

## 2021-11-22 PROCEDURE — 95810 POLYSOM 6/> YRS 4/> PARAM: CPT

## 2021-11-22 PROCEDURE — 85610 PROTHROMBIN TIME: CPT

## 2021-11-22 PROCEDURE — 86850 RBC ANTIBODY SCREEN: CPT | Performed by: PHYSICIAN ASSISTANT

## 2021-11-22 PROCEDURE — 95810 POLYSOM 6/> YRS 4/> PARAM: CPT | Performed by: INTERNAL MEDICINE

## 2021-11-22 RX ORDER — ALBUTEROL SULFATE 2.5 MG/3ML
2.5 SOLUTION RESPIRATORY (INHALATION)
COMMUNITY

## 2021-11-22 RX ORDER — FLUTICASONE FUROATE, UMECLIDINIUM BROMIDE AND VILANTEROL TRIFENATATE 100; 62.5; 25 UG/1; UG/1; UG/1
1 POWDER RESPIRATORY (INHALATION) DAILY
Status: ON HOLD | COMMUNITY
End: 2021-11-29

## 2021-11-23 ENCOUNTER — OFFICE VISIT (OUTPATIENT)
Dept: HEMATOLOGY ONCOLOGY | Facility: MEDICAL CENTER | Age: 81
End: 2021-11-23
Payer: COMMERCIAL

## 2021-11-23 VITALS
RESPIRATION RATE: 18 BRPM | DIASTOLIC BLOOD PRESSURE: 82 MMHG | BODY MASS INDEX: 32.76 KG/M2 | WEIGHT: 234 LBS | SYSTOLIC BLOOD PRESSURE: 138 MMHG | HEIGHT: 71 IN | HEART RATE: 91 BPM | TEMPERATURE: 97.6 F | OXYGEN SATURATION: 98 %

## 2021-11-23 DIAGNOSIS — G47.33 OSA (OBSTRUCTIVE SLEEP APNEA): Primary | ICD-10-CM

## 2021-11-23 DIAGNOSIS — C34.92 SQUAMOUS CELL CARCINOMA OF LEFT LUNG (HCC): Primary | Chronic | ICD-10-CM

## 2021-11-23 DIAGNOSIS — J44.9 CHRONIC OBSTRUCTIVE PULMONARY DISEASE, UNSPECIFIED COPD TYPE (HCC): ICD-10-CM

## 2021-11-23 PROCEDURE — 99215 OFFICE O/P EST HI 40 MIN: CPT | Performed by: INTERNAL MEDICINE

## 2021-11-24 ENCOUNTER — TELEPHONE (OUTPATIENT)
Dept: SLEEP CENTER | Facility: CLINIC | Age: 81
End: 2021-11-24

## 2021-11-26 DIAGNOSIS — J43.2 CENTRILOBULAR EMPHYSEMA (HCC): Primary | ICD-10-CM

## 2021-11-29 ENCOUNTER — ANESTHESIA (OUTPATIENT)
Dept: PERIOP | Facility: HOSPITAL | Age: 81
End: 2021-11-29
Payer: COMMERCIAL

## 2021-11-29 ENCOUNTER — HOSPITAL ENCOUNTER (OUTPATIENT)
Facility: HOSPITAL | Age: 81
Setting detail: OUTPATIENT SURGERY
Discharge: HOME | End: 2021-11-29
Attending: THORACIC SURGERY (CARDIOTHORACIC VASCULAR SURGERY) | Admitting: THORACIC SURGERY (CARDIOTHORACIC VASCULAR SURGERY)
Payer: COMMERCIAL

## 2021-11-29 ENCOUNTER — TELEPHONE (OUTPATIENT)
Dept: CARDIAC SURGERY | Facility: CLINIC | Age: 81
End: 2021-11-29

## 2021-11-29 VITALS
DIASTOLIC BLOOD PRESSURE: 74 MMHG | OXYGEN SATURATION: 94 % | RESPIRATION RATE: 20 BRPM | TEMPERATURE: 96 F | BODY MASS INDEX: 32.9 KG/M2 | SYSTOLIC BLOOD PRESSURE: 143 MMHG | WEIGHT: 235 LBS | HEART RATE: 74 BPM | HEIGHT: 71 IN

## 2021-11-29 DIAGNOSIS — C34.92 SQUAMOUS CELL CARCINOMA OF LEFT LUNG (HCC): ICD-10-CM

## 2021-11-29 DIAGNOSIS — E11.9 TYPE 2 DIABETES MELLITUS WITHOUT COMPLICATION, UNSPECIFIED WHETHER LONG TERM INSULIN USE (HCC): Primary | ICD-10-CM

## 2021-11-29 LAB
GLUCOSE SERPL-MCNC: 146 MG/DL (ref 65–140)
GLUCOSE SERPL-MCNC: 159 MG/DL (ref 65–140)

## 2021-11-29 PROCEDURE — 88305 TISSUE EXAM BY PATHOLOGIST: CPT | Performed by: PATHOLOGY

## 2021-11-29 PROCEDURE — 82948 REAGENT STRIP/BLOOD GLUCOSE: CPT

## 2021-11-29 PROCEDURE — 31622 DX BRONCHOSCOPE/WASH: CPT | Performed by: THORACIC SURGERY (CARDIOTHORACIC VASCULAR SURGERY)

## 2021-11-29 PROCEDURE — 39402 MEDIASTINOSCPY W/LMPH NOD BX: CPT | Performed by: THORACIC SURGERY (CARDIOTHORACIC VASCULAR SURGERY)

## 2021-11-29 RX ORDER — ONDANSETRON 2 MG/ML
4 INJECTION INTRAMUSCULAR; INTRAVENOUS ONCE AS NEEDED
Status: DISCONTINUED | OUTPATIENT
Start: 2021-11-29 | End: 2021-11-29 | Stop reason: HOSPADM

## 2021-11-29 RX ORDER — ROCURONIUM BROMIDE 10 MG/ML
INJECTION, SOLUTION INTRAVENOUS AS NEEDED
Status: DISCONTINUED | OUTPATIENT
Start: 2021-11-29 | End: 2021-11-29

## 2021-11-29 RX ORDER — FENTANYL CITRATE 50 UG/ML
INJECTION, SOLUTION INTRAMUSCULAR; INTRAVENOUS AS NEEDED
Status: DISCONTINUED | OUTPATIENT
Start: 2021-11-29 | End: 2021-11-29

## 2021-11-29 RX ORDER — SODIUM CHLORIDE, SODIUM LACTATE, POTASSIUM CHLORIDE, CALCIUM CHLORIDE 600; 310; 30; 20 MG/100ML; MG/100ML; MG/100ML; MG/100ML
125 INJECTION, SOLUTION INTRAVENOUS CONTINUOUS
Status: DISCONTINUED | OUTPATIENT
Start: 2021-11-29 | End: 2021-11-29 | Stop reason: HOSPADM

## 2021-11-29 RX ORDER — LIDOCAINE HYDROCHLORIDE 10 MG/ML
0.5 INJECTION, SOLUTION EPIDURAL; INFILTRATION; INTRACAUDAL; PERINEURAL ONCE AS NEEDED
Status: DISCONTINUED | OUTPATIENT
Start: 2021-11-29 | End: 2021-11-29 | Stop reason: HOSPADM

## 2021-11-29 RX ORDER — PROPOFOL 10 MG/ML
INJECTION, EMULSION INTRAVENOUS AS NEEDED
Status: DISCONTINUED | OUTPATIENT
Start: 2021-11-29 | End: 2021-11-29

## 2021-11-29 RX ORDER — SODIUM CHLORIDE, SODIUM LACTATE, POTASSIUM CHLORIDE, CALCIUM CHLORIDE 600; 310; 30; 20 MG/100ML; MG/100ML; MG/100ML; MG/100ML
125 INJECTION, SOLUTION INTRAVENOUS CONTINUOUS
Status: DISCONTINUED | OUTPATIENT
Start: 2021-11-29 | End: 2021-11-29

## 2021-11-29 RX ORDER — ONDANSETRON 2 MG/ML
INJECTION INTRAMUSCULAR; INTRAVENOUS AS NEEDED
Status: DISCONTINUED | OUTPATIENT
Start: 2021-11-29 | End: 2021-11-29

## 2021-11-29 RX ORDER — CEFAZOLIN SODIUM 2 G/50ML
2000 SOLUTION INTRAVENOUS ONCE
Status: COMPLETED | OUTPATIENT
Start: 2021-11-29 | End: 2021-11-29

## 2021-11-29 RX ORDER — FENTANYL CITRATE/PF 50 MCG/ML
25 SYRINGE (ML) INJECTION
Status: COMPLETED | OUTPATIENT
Start: 2021-11-29 | End: 2021-11-29

## 2021-11-29 RX ORDER — LIDOCAINE HYDROCHLORIDE 10 MG/ML
INJECTION, SOLUTION EPIDURAL; INFILTRATION; INTRACAUDAL; PERINEURAL AS NEEDED
Status: DISCONTINUED | OUTPATIENT
Start: 2021-11-29 | End: 2021-11-29

## 2021-11-29 RX ORDER — FLUTICASONE FUROATE, UMECLIDINIUM BROMIDE AND VILANTEROL TRIFENATATE 100; 62.5; 25 UG/1; UG/1; UG/1
POWDER RESPIRATORY (INHALATION)
Qty: 60 EACH | Refills: 5 | Status: SHIPPED | OUTPATIENT
Start: 2021-11-29 | End: 2022-06-21

## 2021-11-29 RX ORDER — HYDROMORPHONE HCL IN WATER/PF 6 MG/30 ML
0.2 PATIENT CONTROLLED ANALGESIA SYRINGE INTRAVENOUS
Status: DISCONTINUED | OUTPATIENT
Start: 2021-11-29 | End: 2021-11-29 | Stop reason: HOSPADM

## 2021-11-29 RX ADMIN — FENTANYL CITRATE 50 MCG: 50 INJECTION INTRAMUSCULAR; INTRAVENOUS at 09:34

## 2021-11-29 RX ADMIN — LIDOCAINE HYDROCHLORIDE 50 MG: 10 INJECTION, SOLUTION EPIDURAL; INFILTRATION; INTRACAUDAL; PERINEURAL at 09:23

## 2021-11-29 RX ADMIN — FENTANYL CITRATE 25 MCG: 50 INJECTION INTRAMUSCULAR; INTRAVENOUS at 09:23

## 2021-11-29 RX ADMIN — Medication 25 MCG: at 11:15

## 2021-11-29 RX ADMIN — SUGAMMADEX 200 MG: 100 INJECTION, SOLUTION INTRAVENOUS at 10:24

## 2021-11-29 RX ADMIN — ONDANSETRON 4 MG: 2 INJECTION INTRAMUSCULAR; INTRAVENOUS at 09:55

## 2021-11-29 RX ADMIN — Medication 25 MCG: at 11:21

## 2021-11-29 RX ADMIN — FENTANYL CITRATE 25 MCG: 50 INJECTION INTRAMUSCULAR; INTRAVENOUS at 09:51

## 2021-11-29 RX ADMIN — PROPOFOL 150 MG: 10 INJECTION, EMULSION INTRAVENOUS at 09:23

## 2021-11-29 RX ADMIN — ROCURONIUM BROMIDE 40 MG: 50 INJECTION, SOLUTION INTRAVENOUS at 09:24

## 2021-11-29 RX ADMIN — CEFAZOLIN SODIUM 2000 MG: 2 SOLUTION INTRAVENOUS at 09:13

## 2021-11-29 RX ADMIN — Medication 25 MCG: at 11:04

## 2021-11-29 RX ADMIN — SODIUM CHLORIDE, SODIUM LACTATE, POTASSIUM CHLORIDE, AND CALCIUM CHLORIDE 125 ML/HR: .6; .31; .03; .02 INJECTION, SOLUTION INTRAVENOUS at 07:52

## 2021-11-29 RX ADMIN — Medication 25 MCG: at 11:26

## 2021-12-14 ENCOUNTER — DOCUMENTATION (OUTPATIENT)
Dept: CARDIAC SURGERY | Facility: CLINIC | Age: 81
End: 2021-12-14

## 2021-12-14 ENCOUNTER — OFFICE VISIT (OUTPATIENT)
Dept: CARDIAC SURGERY | Facility: CLINIC | Age: 81
End: 2021-12-14
Payer: COMMERCIAL

## 2021-12-14 VITALS
TEMPERATURE: 97.2 F | HEART RATE: 73 BPM | SYSTOLIC BLOOD PRESSURE: 140 MMHG | HEIGHT: 71 IN | DIASTOLIC BLOOD PRESSURE: 84 MMHG | BODY MASS INDEX: 33.33 KG/M2 | WEIGHT: 238.1 LBS | RESPIRATION RATE: 16 BRPM | OXYGEN SATURATION: 95 %

## 2021-12-14 DIAGNOSIS — R91.8 LUNG MASS: ICD-10-CM

## 2021-12-14 DIAGNOSIS — C34.92 SQUAMOUS CELL CARCINOMA OF LEFT LUNG (HCC): Primary | Chronic | ICD-10-CM

## 2021-12-14 PROCEDURE — 99213 OFFICE O/P EST LOW 20 MIN: CPT | Performed by: THORACIC SURGERY (CARDIOTHORACIC VASCULAR SURGERY)

## 2021-12-15 ENCOUNTER — TELEPHONE (OUTPATIENT)
Dept: SURGICAL ONCOLOGY | Facility: CLINIC | Age: 81
End: 2021-12-15

## 2021-12-16 ENCOUNTER — PATIENT OUTREACH (OUTPATIENT)
Dept: HEMATOLOGY ONCOLOGY | Facility: CLINIC | Age: 81
End: 2021-12-16

## 2022-01-03 ENCOUNTER — OFFICE VISIT (OUTPATIENT)
Dept: HEMATOLOGY ONCOLOGY | Facility: MEDICAL CENTER | Age: 82
End: 2022-01-03
Payer: COMMERCIAL

## 2022-01-03 VITALS
DIASTOLIC BLOOD PRESSURE: 82 MMHG | OXYGEN SATURATION: 98 % | HEART RATE: 72 BPM | SYSTOLIC BLOOD PRESSURE: 136 MMHG | TEMPERATURE: 97.2 F | RESPIRATION RATE: 18 BRPM

## 2022-01-03 DIAGNOSIS — C34.92 SQUAMOUS CELL CARCINOMA OF LEFT LUNG (HCC): Primary | Chronic | ICD-10-CM

## 2022-01-03 DIAGNOSIS — R06.02 SHORTNESS OF BREATH: Chronic | ICD-10-CM

## 2022-01-03 PROCEDURE — 99214 OFFICE O/P EST MOD 30 MIN: CPT | Performed by: INTERNAL MEDICINE

## 2022-01-03 NOTE — PROGRESS NOTES
Cristina Butler  1940  [facility] [place] [facility] [address] [id number]    DISCUSSION/SUMMARY:    62-year-old male with a number of medical and cardiac problems recently found to have a left lower lobe mass  Biopsy demonstrated squamous cell carcinoma  IR perform thoracentesis, minimal amount of fluid was removed but there was no evidence of metastatic disease; cytology was negative  Patient has been seen by thoracic surgery and underwent mediastinoscopy  There was no evidence of metastatic disease in the sampled lymph nodes (2R, 4R, 4L, 7)  Patient has a pending appointment for cardiopulmonary exercise testing later on this week  It needs to be determined whether or not Mr Daryn Ochoa is a surgical candidate (versus other; SBRT etc)  Medical oncology will defer to thoracic surgery for now  Patient is to return in 8 weeks but this may change depending upon the results  Patient denies any pain control issues at this time  Carefully review your medication list and verify that the list is accurate and up-to-date  Please call the hematology/oncology office if there are medications missing from the list, medications on the list that you are not currently taking or if there is a dosage or instruction that is different from how you're taking that medication  Patient goals and areas of care: Follow-up with thoracic surgery  Barriers to care: none  Patient is able to self-care   ______________________________________________________________________________________    Chief Complaint   Patient presents with    Follow-up     Non-small cell lung carcinoma     History of Present Illness:  62-year-old male with multiple medical and cardiac issue recently seen in the emergency room because of a cough  Workup demonstrated a left lower lobe mass  Biopsy demonstrated squamous cell carcinoma    Workup to date has not demonstrated evidence of metastatic disease  Patient returns for follow-up  Mr Bowen Urbano states feeling physically okay, about the same as before  Chronic cough is the same as before, no sputum or hemoptysis  No shortness of breath at rest, mild dyspnea on exertion, same as before  No pain control issues  No GI or  issues  Patient states that he is having trouble sleeping at night - thinking about the diagnosis and treatment options    Patient was diagnosed with prostate cancer (approximately 10 years ago) and underwent seeds and external beam radiation  No evidence of recurrence since that time  Patient has received his COVID vaccine  Review of Systems   Constitutional: Positive for fatigue  HENT: Negative  Eyes: Negative  Respiratory: Negative  Cardiovascular: Negative  Gastrointestinal: Negative  Endocrine: Negative  Genitourinary: Negative  Musculoskeletal: Negative  Skin: Negative  Allergic/Immunologic: Negative  Neurological: Negative  Hematological: Negative  Easy bruising   Psychiatric/Behavioral: The patient is nervous/anxious  All other systems reviewed and are negative      Patient Active Problem List   Diagnosis    Corns    Pain in both feet    Diabetic polyneuropathy associated with type 2 diabetes mellitus ([address] )    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    Onychomycosis    Tinea pedis of both feet    RSV bronchitis    Lung mass    Hypertension    Hyperlipidemia    Diabetes mellitus ([address] )    New onset atrial fibrillation (HCC)    Squamous cell carcinoma of lung (HCC)    Shortness of breath    Daytime hypersomnolence    Chronic diastolic heart failure (HCC)    SYLVESTER (obstructive sleep apnea)     Past Medical History:   Diagnosis Date    Anxiety     Arthritis     Atrial fibrillation (HCC)     Bleeding ulcer     Cancer [facility])     prostate 2011- radiation    Cardiac disease     cardiac stent x1    Cataract     starting    Chronic diastolic (congestive) heart failure ([facility]     Diabetes mellitus ([facility] )     boarderline diabetic     GERD (gastroesophageal reflux disease)     Hyperlipidemia     Hypertension     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, bilateral     Low back pain     Lung mass     Myocardial infarction ([facility] )     mild     Sleep apnea     sleep study     Wears dentures     full set    Wears glasses      Past Surgical History:   Procedure Laterality Date    ABDOMINAL HERNIA REPAIR      ABDOMINAL SURGERY      bleeding ulcer, cyst [facility]   [facility]    x[facility]     IR BIOPSY LUNG  10/5/2021    IR THORACENTESIS  2021    NM BRONCHOSCOPY,DIAGNOSTIC N/A 2021    Procedure: Ember Rizvi;  Surgeon: Glori Fothergill, MD;  [facility]: [facility];  Service: Thoracic    NM MEDIASTINOSCOPY WITH LYMPH NODE BIOPSY/IES N/A 2021    Procedure: MEDIASTINOSCOPY;  Surgeon: Glori Fothergill, MD;  [facility];  Service: Thoracic     Family history: 3 sons in good general health, no known familial or genetic diseases    Social History     Socioeconomic History    Marital status: /Civil Union     Spouse name: Not on file    Number of children: Not on file    Years of education: Not on file    Highest education level: Not on file   Occupational History    Not on file   Tobacco Use    Smoking status: Former Smoker     Packs/day: 1 00     Years: 30 00     Pack years: 30 00     Types: Cigarettes     Quit date:      Years since quittin 0    Smokeless tobacco: Never Used   Vaping Use    Vaping Use: Never used   Substance and Sexual Activity    Alcohol use:  Yes     Alcohol/week: 3 0 - 5 0 standard drinks     Types: 3 - 5 Cans of beer per week     Comment: few beers day    Drug use: Never    Sexual activity: Not on file   Other Topics Concern    Not on file   Social History Narrative    Not on file     Social Determinants of Health     Financial Resource Strain: Not on file   Food Insecurity: Not on file   Transportation Needs: Not on file   Physical Activity: Not on file   Stress: Not on file   Social Connections: Not on file   Intimate Partner Violence: Not on file   Housing Stability: Not on file   Social history:  40 pack-year history discontinued 20 years ago, patient drinks 2-4 beers a day off and on, no drug abuse, 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)    Current Outpatient Medications:     albuterol (2 5 mg/3 mL) 0 083 % nebulizer solution, Take 2 5 mg by nebulization Daily evening  and prn, Disp: , Rfl:     ammonium lactate (LAC-HYDRIN) 12 % cream, Apply topically as needed for dry skin, Disp: 385 g, Rfl: 0    apixaban (Eliquis) 5 mg, Take 1 tablet (5 mg total) by mouth 2 (two) times a day (Patient taking differently: Take 5 mg by mouth 2 (two) times a day To stop 11/26 ), Disp: 60 tablet, Rfl: 5    atenolol (TENORMIN) 100 mg tablet, Take 100 mg by mouth daily , Disp: , Rfl:     atorvastatin (LIPITOR) 40 mg tablet, Take 40 mg by mouth every evening  , Disp: , Rfl:     clotrimazole-betamethasone (LOTRISONE) 1-0 05 % cream, Apply topically 2 (two) times a day, Disp: 30 g, Rfl: 2    JARDIANCE 25 MG TABS, Take 25 mg by mouth daily in the early morning , Disp: , Rfl:     latanoprost (XALATAN) 0 005 % ophthalmic solution, Administer 1 drop to both eyes daily at bedtime  , Disp: , Rfl:     losartan (COZAAR) 50 mg tablet, Take 1 tablet (50 mg total) by mouth daily, Disp: 90 tablet, Rfl: 3    metFORMIN (GLUCOPHAGE) 500 mg tablet, Take 500 mg by mouth 2 (two) times a day , Disp: , Rfl:     Multiple Vitamin (MULTI-VITAMIN DAILY PO), Take by mouth daily  , Disp: , Rfl:     omeprazole (PriLOSEC) 20 mg delayed release capsule, Take 20 mg by mouth daily  , Disp: , Rfl:     primidone (MYSOLINE) 50 mg tablet, Take 50 mg by mouth daily at bedtime  , Disp: , Rfl:     Trelegy Ellipta 100-62 5-25 MCG/INH inhaler, INHALE 1 PUFF DAILY RINSE MOUTH AFTER USE , Disp: 60 each, Rfl: 5    No Known Allergies    Vitals:    01/03/22 1044   BP: 136/82   Pulse: 72   Resp: 18   Temp: (!) 97 2 °F (36 2 °C)   SpO2: 98%     Physical Exam  Constitutional:       Appearance: He is well-developed  Comments: Obese male, no respiratory distress, no signs of pain   HENT:      Head: Normocephalic and atraumatic  Right Ear: External ear normal       Left Ear: External ear normal    Eyes:      Conjunctiva/sclera: Conjunctivae normal       Pupils: Pupils are equal, round, and reactive to light  Cardiovascular:      Rate and Rhythm: Normal rate and regular rhythm  Heart sounds: Normal heart sounds  Pulmonary:      Effort: Pulmonary effort is normal       Breath sounds: Normal breath sounds  Comments: Distant breath sounds bilaterally, clear  Abdominal:      General: Bowel sounds are normal       Palpations: Abdomen is soft  Comments: Soft, nontender, obese, cannot palpate liver or spleen, +bowel sounds, no guarding   Musculoskeletal:         General: Normal range of motion  Cervical back: Normal range of motion and neck supple  Skin:     General: Skin is warm  Comments: Relatively good color, warm, moist, scattered upper extremity purpura in various stages of resolution, few scabs   Neurological:      Mental Status: He is alert and oriented to person, place, and time  Deep Tendon Reflexes: Reflexes are normal and symmetric  Psychiatric:         Behavior: Behavior normal          Thought Content: Thought content normal          Judgment: Judgment normal      Extremities:  0-1 +bilateral lower extremity edema, no cords, pulses are 1+   Lymphatics:  No adenopathy in the neck, supraclavicular region, axilla bilateral    Labs    09/30/2021 WBC = 6 02 hemoglobin = 15 2 hematocrit = 47 7 MCV = 104 platelet = 621  22/02/3709 BUN = 16 creatinine = 0 93 calcium = 8 4    Imaging    11/19/2021 MRI brain    Impression stated white matter changes suggestive of chronic microangiopathy  No acute intracranial pathology  10/29/2021 PET-CT    1  Hypermetabolic necrotic 4 5 x 4 cm left lower lung mass compatible with known malignancy  2   2 mildly hypermetabolic right paratracheal mediastinal nodes for which early metastases are not excluded  3   Small mildly hypermetabolic left pleural effusion for which malignant effusion is not excluded  4   Additional scattered tiny nodular lung densities may be reassessed on follow-up CT  5   Probable reactive subcentimeter right cervical node may be reassessed on follow-up PET/CT  6   No hypermetabolic soft tissue metastases in the abdomen, pelvis  7   Minimal inhomogeneous FDG activity in the sacrum and left posterior iliac, though without dominant focal lesion  This may be reassessed on follow-up exam     Pathology    Case Report   Surgical Pathology Report                         Case: [id number]                                    Authorizing Provider: Keerthi Causey MD       Collected:           11/29/2021 1005               Ordering Location:     [address]      Received:            11/29/2021 [address] [place] [address] Operating Room                                                       Pathologist:           Jeanna Bui MD                                                         Specimens:   A) - Lymph Node, level 7                                                                             B) - Lymph Node, level 4R                                                                            C) - Lymph Node, level 2R                                                                            D) - Lymph Node, level 4L                                                                  Final Diagnosis   A   Lymph node, level 7, excision:  -  Portions of benign lymph node with reactive change and anthracosis, negative for granulomas, lymphoma, or metastatic carcinoma  B  Lymph node, level 4R, excision:  -  Portions of benign lymph node with reactive change and anthracosis, negative for granulomas, lymphoma, or metastatic carcinoma  C  Lymph node, level 2R, excision:  -  Portions of benign lymph node with reactive change and anthracosis, negative for granulomas, lymphoma, or metastatic carcinoma  D  Lymph node, level 4L, excision:  -  Benign lymph node with reactive change and anthracosis, negative for granulomas, lymphoma, or metastatic carcinoma       Electronically signed by Ariana Augustine MD on 12/2/2021 at 11:29 AM       Case Report   Surgical Pathology Report                         Case: [id number]                                    Authorizing Provider: Ivette Blackmon MD      Collected:           10/05/2021 1058               Ordering Location:     [address] [place] [address] Received:            10/05/2021 1120                                      CAT Scan                                                                      Pathologist:           Leah Talbot MD                                                         Specimen:    Lung, Left Lower Lobe                                                                      Final Diagnosis   A  Lung, Left Lower Lobe, :  - Invasive squamous carcinoma, moderately differentiated, keratinizing type  - Tumor is highlighted with P 40 immunoperoxidase stain   - Prominent tumor necrosis is noted       Best representative block with tumor A1  This case was reviewed at the intradepartmental  conference     RADHA Hein, Pulmonology, is notified of the diagnosis in [facility] via [address] on 10/06/2021  at 2 40 pm    Electronically signed by [id number], MD on 10/6/2021 at  2:44 PM

## 2022-01-07 ENCOUNTER — HOSPITAL ENCOUNTER (OUTPATIENT)
Dept: PULMONOLOGY | Facility: HOSPITAL | Age: 82
Discharge: HOME | End: 2022-01-07
Payer: COMMERCIAL

## 2022-01-07 DIAGNOSIS — C34.92 SQUAMOUS CELL CARCINOMA OF LEFT LUNG (HCC): ICD-10-CM

## 2022-01-07 PROCEDURE — 94618 PULMONARY STRESS TESTING: CPT

## 2022-01-07 PROCEDURE — 94621 CARDIOPULM EXERCISE TESTING: CPT | Performed by: INTERNAL MEDICINE

## 2022-01-11 ENCOUNTER — OFFICE VISIT (OUTPATIENT)
Dept: CARDIAC SURGERY | Facility: CLINIC | Age: 82
End: 2022-01-11
Payer: COMMERCIAL

## 2022-01-11 ENCOUNTER — TELEPHONE (OUTPATIENT)
Dept: NEUROLOGY | Facility: CLINIC | Age: 82
End: 2022-01-11

## 2022-01-11 VITALS
WEIGHT: 232.81 LBS | OXYGEN SATURATION: 99 % | DIASTOLIC BLOOD PRESSURE: 70 MMHG | RESPIRATION RATE: 18 BRPM | HEIGHT: 71 IN | TEMPERATURE: 97 F | SYSTOLIC BLOOD PRESSURE: 130 MMHG | BODY MASS INDEX: 32.59 KG/M2 | HEART RATE: 101 BPM

## 2022-01-11 DIAGNOSIS — C34.92 SQUAMOUS CELL CARCINOMA OF LEFT LUNG (HCC): Primary | ICD-10-CM

## 2022-01-11 PROCEDURE — 99213 OFFICE O/P EST LOW 20 MIN: CPT | Performed by: PHYSICIAN ASSISTANT

## 2022-01-11 NOTE — PROGRESS NOTES
Thoracic Follow-Up  Assessment/Plan:    Squamous cell carcinoma of lung [facility])  We discussed Mr  Yelena Rosenthal CPET results with him, which was on the borderline of tolerating a lobectomy, since it was 12  3  since his lower lobes contribute more function than other lobes, surgical resection would significantly decrease his lung function and put him at a higher risk for surgery  The location of this nodule precludes it from a wedge resection  Therefore, Dr Corby Griffith is recommending SBRT for definitive treatment  We will refer him to radiation oncology  He is in agreement with the plan  We will not schedule an appointment in our office at this time  Diagnoses and all orders for this visit:    Squamous cell carcinoma of left lung [facility])  -     Ambulatory Referral to Radiation Oncology; Future             Thoracic History       Diagnosis: left lower lobe squamous cell carcinoma     Procedure: cervical mediastinoscopy 11/29/21  Pathology:A  Lymph node, level 7, excision:  -  Portions of benign lymph node with reactive change and anthracosis, negative for granulomas, lymphoma, or metastatic carcinoma  B  Lymph node, level 4R, excision:  -  Portions of benign lymph node with reactive change and anthracosis, negative for granulomas, lymphoma, or metastatic carcinoma  C  Lymph node, level 2R, excision:  -  Portions of benign lymph node with reactive change and anthracosis, negative for granulomas, lymphoma, or metastatic carcinoma  D  Lymph node, level 4L, excision:  -  Benign lymph node with reactive change and anthracosis, negative for granulomas, lymphoma, or metastatic carcinoma     Patient ID: Stone Galvan is a 80 y o  male  ECOG 2    HPI    Mr Won Galarza is a 79 yo gentleman with a history of HLD, HTN, Type 2 DM, CAD s/p stent 20 yrs on Eliquis, CHF, prostate ca s/p radiation and previous 30 ppy smoker who was seen in the office on 12/14/21 for a 4 5 x 3 7 cm left lower lobe lung mass seen on a CT scan from 9/28/21  IR biopsy was consistent with squamous cell carcinoma and PET from 10/29/21 revealed an associated SUV of 19 7  there were some mildly hypermetabolic paratracheal nodes and a small left effusion with a SUV of 2 8  IR thoracentesis from 11/8/21 was negative for carcinoma  PFT's demonstrated FEV1 of 58% and DLCO of 54% predicted  EF was 55% from a stress test on 11/9/21  Mediastinoscopy from 11/29/21 was negative for carcinoma  He returns today with CPET results from 1/7/21, which demonstrated a VO2 max of 12 3 mL/kg/min, which is 70% predicted  On discussion, he has fever, chills, cough, hemoptysis, shortness of breath at rest  He does have some dyspnea with extreme exertion  He is able to walk on flat ground for some time  He has lost 6 lbs since December, since decreasing his beer drinking beer  The following portions of the patient's history were reviewed and updated as appropriate: allergies, current medications, past family history, past medical history, past social history, past surgical history and problem list     Review of Systems      Objective:   Physical Exam  Vitals reviewed  Constitutional:       General: He is not in acute distress  Appearance: Normal appearance  He is well-developed  He is not diaphoretic  HENT:      Head: Normocephalic and atraumatic  Mouth/Throat:      Comments: Masked   Eyes:      General: No scleral icterus  Extraocular Movements: Extraocular movements intact  Comments: + glasses   Neck:      Trachea: No tracheal deviation  Cardiovascular:      Rate and Rhythm: Normal rate and regular rhythm  Pulses: Normal pulses  Heart sounds: Normal heart sounds  No murmur heard  Pulmonary:      Effort: Pulmonary effort is normal  No respiratory distress  Breath sounds: No wheezing  Comments: Breath sounds distant bilaterally  Abdominal:      General: Bowel sounds are normal  There is distension  Palpations: Abdomen is soft  Musculoskeletal:         General: Normal range of motion  Cervical back: Normal range of motion and neck supple  Right lower leg: No edema  Left lower leg: No edema  Lymphadenopathy:      Cervical: No cervical adenopathy  Skin:     General: Skin is warm and dry  Findings: No erythema  Neurological:      Mental Status: He is alert and oriented to person, place, and time  Cranial Nerves: No cranial nerve deficit  Psychiatric:         Mood and Affect: Mood normal          Behavior: Behavior normal          Thought Content: Thought content normal      /70 (BP Location: Left arm, Patient Position: Sitting, Cuff Size: Standard)   Pulse 101   Temp (!) 97 °F (36 1 °C) (Temporal)   Resp 18   Ht 5' 11" (1 803 m)   Wt 106 kg (232 lb 12 9 oz)   SpO2 99%   BMI 32 47 kg/m²        CT chest without contrast    Result Date: 9/28/2021  Narrative CT CHEST WITHOUT IV CONTRAST INDICATION:   cough, questionable left sided lobe consolidation  COMPARISON:  Chest radiograph from 9/20/2021 and 4/19/2013  TECHNIQUE: Chest CT without intravenous contrast   Axial, sagittal, coronal 2D reformats and coronal MIPS from source data  Radiation dose length product (DLP):  553 mGy-cm   Radiation dose exposure minimized using iterative reconstruction and automated exposure control  FINDINGS: LUNGS:  4 5 x 3 7 cm cavitary mass in the left lower lobe  Mild alveolar nodularity in the superior segment left lower lobe  Mild lower lobe juxtapleural reticulation  Mild emphysema  AIRWAYS: No significant filling defects  PLEURA:  Trace effusions  HEART/GREAT VESSELS:  Moderate cardiomegaly  MEDIASTINUM AND ANDRE:  Unremarkable  CHEST WALL AND LOWER NECK: Unremarkable  UPPER ABDOMEN:  Cholelithiasis  Gastric surgery  OSSEOUS STRUCTURES: Mild degenerative disease in the spine  Impression 4 5 x 3 7 cm cavitary left lower lobe mass    Given alveolar nodules in the left lower lobe due to mild bronchopneumonia, this could be a lung abscess, but lung cancer must be excluded  In the appropriate clinical setting, cavitary lesions can represent TB, although they are more often in the upper lobes and superior segment of the lower lobes  Mild lower lobe reticulation due to early pulmonary fibrosis  The study was marked in [facility] for immediate notification  Workstation performed: [id number]       NM PET CT skull base to mid thigh    Result Date: 11/1/2021  Narrative PET/CT SCAN INDICATION:  C34 92: Malignant neoplasm of unspecified part of left bronchus or lung MODIFIER: PI COMPARISON: CT chest 9/28/2021 CELL TYPE:  Squamous cell carcinoma, left lung biopsy 10/5/2021 TECHNIQUE:   10 5 mCi F-18-FDG administered IV  Multiplanar attenuation corrected and non attenuation corrected PET images were acquired 60 minutes post injection  Contiguous, low dose, axial CT sections were obtained from the skull base through the femurs   Intravenous contrast material was not utilized  This examination, like all CT scans performed in the [facility], was performed utilizing techniques to minimize radiation dose exposure, including the use of iterative reconstruction and automated exposure control  Fasting serum glucose: 116 mg/dl FINDINGS: VISUALIZED BRAIN:   No acute abnormalities are seen  HEAD/NECK:   Mildly hypermetabolic right cervical node at the level of the hyoid image 4/34 measuring 6 mm, SUV 2 5, is nonspecific but is likely just reactive  No FDG avid left cervical adenopathy is seen  CT images: Unremarkable  CHEST:   4 5 x 4 cm left lower lung nodule demonstrates intense FDG activity, SUV 19 7, compatible with known malignancy  There is central photopenia compatible with necrosis  There are a few additional scattered tiny nodular lung densities bilaterally which are too small for accurate PET evaluation  There are mildly hypermetabolic paratracheal nodes for which early metastases are not excluded   Example right paratracheal node image 4/59 measures 1 6 x 1 5 cm, SUV 3 5  Additional right paratracheal node more superiorly image 4/53 measures 1 2 x 0 7 cm, SUV 2 9  No hypermetabolic hilar adenopathy  There is a small left pleural effusion with mild FDG activity, SUV 2 8  A malignant effusion is not excluded  Mildly hypermetabolic soft tissue density deep to the bilateral scapula, SUV measuring 3 2 on the right and 3 3 on the left, are most compatible with benign elastofibroma dorsi  CT images: Coronary atherosclerosis  ABDOMEN: Bowel activity appears physiologic  No FDG avid soft tissue lesions are seen  CT images: Colon diverticulosis  Anterior abdominal wall mesh  Status post gastric bypass  Cholelithiasis  Small right renal hypodensity favoring cyst   Duodenal diverticulum  PELVIS: No FDG avid soft tissue lesions are seen  CT images: Right hydrocele  Prostate seeds  OSSEOUS STRUCTURES: Minimal inhomogeneous FDG activity in the sacrum and left posterior iliac, though without dominant focal lesion  This may be reassessed on follow-up exam  Otherwise no FDG avid lesions are seen  CT images: No significant findings  Impression 1  Hypermetabolic necrotic 4 5 x 4 cm left lower lung mass compatible with known malignancy  2   2 mildly hypermetabolic right paratracheal mediastinal nodes for which early metastases are not excluded  3   Small mildly hypermetabolic left pleural effusion for which malignant effusion is not excluded  4   Additional scattered tiny nodular lung densities may be reassessed on follow-up CT  5   Probable reactive subcentimeter right cervical node may be reassessed on follow-up PET/CT  6   No hypermetabolic soft tissue metastases in the abdomen, pelvis  7   Minimal inhomogeneous FDG activity in the sacrum and left posterior iliac, though without dominant focal lesion    This may be reassessed on follow-up exam  Workstation performed: [id number]

## 2022-01-11 NOTE — ASSESSMENT & PLAN NOTE
We discussed Mr Jaylon Rhodes CPET results with him, which was on the borderline of tolerating a lobectomy, since it was 15  3  since his lower lobes contribute more function than other lobes, surgical resection would significantly decrease his lung function and put him at a higher risk for surgery  The location of this nodule precludes it from a wedge resection  Therefore, Dr Luis Holland is recommending SBRT for definitive treatment  We will refer him to radiation oncology  He is in agreement with the plan  We will not schedule an appointment in our office at this time

## 2022-01-11 NOTE — TELEPHONE ENCOUNTER
[facility] [phone] confirming the patient's upcoming appointment on 01/13/11 at 11:00am

## 2022-01-12 ENCOUNTER — CLINICAL SUPPORT (OUTPATIENT)
Dept: RADIATION ONCOLOGY | Facility: HOSPITAL | Age: 82
End: 2022-01-12
Attending: RADIOLOGY
Payer: COMMERCIAL

## 2022-01-12 VITALS
OXYGEN SATURATION: 94 % | HEIGHT: 71 IN | SYSTOLIC BLOOD PRESSURE: 130 MMHG | RESPIRATION RATE: 18 BRPM | HEART RATE: 94 BPM | TEMPERATURE: 98.7 F | WEIGHT: 230.6 LBS | BODY MASS INDEX: 32.28 KG/M2 | DIASTOLIC BLOOD PRESSURE: 74 MMHG

## 2022-01-12 DIAGNOSIS — C34.90 MALIGNANT NEOPLASM OF BRONCHUS AND LUNG (HCC): ICD-10-CM

## 2022-01-12 DIAGNOSIS — C34.92 SQUAMOUS CELL CARCINOMA OF LEFT LUNG (HCC): Primary | ICD-10-CM

## 2022-01-12 DIAGNOSIS — C34.92 SQUAMOUS CELL CARCINOMA OF LEFT LUNG (HCC): Primary | Chronic | ICD-10-CM

## 2022-01-12 PROCEDURE — 77263 THER RADIOLOGY TX PLNG CPLX: CPT | Performed by: RADIOLOGY

## 2022-01-12 PROCEDURE — 99204 OFFICE O/P NEW MOD 45 MIN: CPT | Performed by: RADIOLOGY

## 2022-01-12 PROCEDURE — 99211 OFF/OP EST MAY X REQ PHY/QHP: CPT | Performed by: RADIOLOGY

## 2022-01-12 NOTE — LETTER
2022     Anabelle Drain, [address]  [place] [address] [place] [id number]    Patient: Woody Masters   YOB: 1940   Date of Visit: 2022       Dear Dr Sarah Salinas: Thank you for referring Zoraida Santos to me for evaluation  Below are my notes for this consultation  If you have questions, please do not hesitate to call me  I look forward to following your patient along with you  Sincerely,        Kelvin Charles MD        CC: No Recipients  Kelvin Charles MD  2022 10:33 AM  Incomplete  Consultation - Radiation Oncology      TQF:[id number] : 1940  Encounter: [id number]  Patient Information: [address]  Chief Complaint   Patient presents with    Consult     Cancer Staging  No matching staging information was found for the patient  History of Present Illness   Patient is a 80 y o male with 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  He presents today for radiation oncology consult to discuss SBRT  He is referred by Dr Sarah Salinas      He has a h/o HLD, HTN, Type 2 DM, CAD s/p stent, prostate ca s/p radiation (brachytherapy and EBRT), and previous smoking history      He presented to the hospital 21 with c/o cough and nasal congestion x 1 week  CT chest showed a 4 5 x 3 7 cm LLL cavitary mass  He was found to be in new onset a-fib and was admitted and started on Lovenox  He was given antibiotics  He was discharged 21 with plan for outpatient lung biopsy  He had a left lower lobe IR biopsy done 10/5/21, which revealed invasive squamous cell carcinoma  He was then seen by Dr Emery Rothman and by pulmonary who ordered PET scan and PFTs        PET scan showed FDG uptake in the left lower lobe cancer SUV 19 7  There were a few additional tiny nodules in the lung without uptake  There was a small left effusion with SUV 2 8  He was presented at tumor board 21 and plan was to refer to IR for thoracentesis   This was done 11/8/21 and was negative for malignancy  He was seen by Dr Kailash Da Silva who recommended bronchoscopy with cervical mediastinoscopy  This was done 11/29/21 and pathology was negative       9/28/21 Chest xray  Mass in the left base   See subsequent chest CT      9/28/21 CT chest-  4 5 x 3 7 cm cavitary left lower lobe mass   Given alveolar nodules in the left lower lobe due to mild bronchopneumonia, this could be a lung abscess, but lung cancer must be excluded   In the appropriate clinical setting, cavitary lesions can represent   TB, although they are more often in the upper lobes and superior segment of the lower lobes  Mild lower lobe reticulation due to early pulmonary fibrosis      10/5/21 IR biopsy  Final Diagnosis   A  Lung, Left Lower Lobe, :  - Invasive squamous carcinoma, moderately differentiated, keratinizing type  - Tumor is highlighted with P 40 immunoperoxidase stain   - Prominent tumor necrosis is noted        10/25/21 PFTs  Results:  FEV1/FVC Ratio: 81 %  FEV1: 1 75 L     58 % predicted  Forced Vital Capacity: 2 15 L    52 % predicted  After administration of bronchodilator: Brandan Fontan is improvement in both FEV1 and FVC   10/29/21 PET scan  1   Hypermetabolic necrotic 4 5 x 4 cm left lower lung mass compatible with known malignancy  2   2 mildly hypermetabolic right paratracheal mediastinal nodes for which early metastases are not excluded  3   Small mildly hypermetabolic left pleural effusion for which malignant effusion is not excluded  4   Additional scattered tiny nodular lung densities may be reassessed on follow-up CT  5   Probable reactive subcentimeter right cervical node may be reassessed on follow-up PET/CT  6   No hypermetabolic soft tissue metastases in the abdomen, pelvis  7   Minimal inhomogeneous FDG activity in the sacrum and left posterior iliac, though without dominant focal lesion   This may be reassessed on follow-up exam      11/8/21 Thoracentesis  Final Diagnosis   A  Pleural Fluid, Left:  Negative for malignancy  Benign mesothelial cells and histiocytes  Scattered lymphocytes and neutrophils  Proteinaceous material       11/19/21 MRI brain-  White matter changes suggestive of chronic microangiopathy   No acute intracranial pathology      11/29/21 Bronchoscopy and mediastinoscopy  Final Diagnosis   A  Lymph node, level 7, excision:  -  Portions of benign lymph node with reactive change and anthracosis, negative for granulomas, lymphoma, or metastatic carcinoma  B  Lymph node, level 4R, excision:  -  Portions of benign lymph node with reactive change and anthracosis, negative for granulomas, lymphoma, or metastatic carcinoma  C  Lymph node, level 2R, excision:  -  Portions of benign lymph node with reactive change and anthracosis, negative for granulomas, lymphoma, or metastatic carcinoma  D  Lymph node, level 4L, excision:  -  Benign lymph node with reactive change and anthracosis, negative for granulomas, lymphoma, or metastatic carcinoma       12/14/21 Dr Prachi Ordoñez- Clinical stage I left lower lobe squamous cell cancer but has marginal PFTs and limited endurance  Plan for cardiopulmonary exercise test to assess candidacy for lobectomy     1/3/21 Dr Gin Alas- Pending appt for cardiopulmonary exercise testing later this week  Defer to thoracic surgery for now  F/u in 8 weeks     1/11/22 Juan C GARCIA- "We discussed Mr Lisa Esquivel CPET results with him, which was on the borderline of tolerating a lobectomy, since it was 15  3  since his lower lobes contribute more function than other lobes, surgical resection would significantly decrease his lung function and put him at a higher risk for surgery  The location of this nodule precludes it from a wedge resection   Therefore, Dr Prachi Ordoñez is recommending SBRT for definitive treatment "        1/17/22 Dr Latrell Norwood  3/22/21 Dr Mikayla Aguillon   Oncology History   Squamous cell carcinoma of lung ([facility] [address] ) 10/5/2021 Initial Diagnosis    [facility]     10/5/2021 Biopsy    Final Diagnosis   A  Lung, Left Lower Lobe, :  - Invasive squamous carcinoma, moderately differentiated, keratinizing type  - Tumor is highlighted with P 40 immunoperoxidase stain   - Prominent tumor necrosis is noted                 Past Medical History:   Diagnosis Date    Anxiety     Arthritis     Atrial fibrillation ([facility] [address] )     Bleeding ulcer     Cancer [facility])     prostate 2011- radiation    Cardiac disease     cardiac stent x1    Cataract     starting    Chronic diastolic (congestive) heart failure (HCC)     Diabetes mellitus ([facility] )     boarderline diabetic     GERD (gastroesophageal reflux disease)     History of radiation therapy 2010    Prostate seeds (brachytherapy) and EBRT    Hyperlipidemia     Hypertension     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, bilateral     Low back pain     Lung cancer [facility]     Lung mass     Myocardial infarction ([facility]  )     mild 1999    Prostate cancer ([facility]  )     Sleep apnea     sleep study 11/22    Wears dentures     full set    Wears glasses      Past Surgical History:   Procedure Laterality Date    ABDOMINAL HERNIA REPAIR      ABDOMINAL SURGERY      bleeding ulcer, cyst removed from abd    COLONOSCOPY      CORONARY ANGIOPLASTY WITH STENT PLACEMENT  1999    x1     IR BIOPSY LUNG  10/5/2021    IR THORACENTESIS  11/8/2021    [place] BRONCHOSCOPY,DIAGNOSTIC N/A 11/29/2021    Procedure: BRONCHOSCOPY FLEXIBLE;  Surgeon: Maxine Stokes MD;  [facility];  Service: Thoracic    [place] MEDIASTINOSCOPY WITH LYMPH NODE BIOPSY/IES N/A 11/29/2021    Procedure: MEDIASTINOSCOPY;  Surgeon: Maxine Stokes MD;  [facility];  Service: Thoracic       Family History   Problem Relation Age of Onset    Prostate cancer Brother     Cancer Maternal Uncle         colo rectal cancer    Cancer Paternal Aunt        Social History   Social History     Substance and Sexual Activity Alcohol Use Yes    Alcohol/week: 10 0 - 12 0 standard drinks    Types: 3 - 5 Cans of beer, 7 Glasses of wine per week    Comment: few beers day; glass of red wine at dinner     Social History     Substance and Sexual Activity   Drug Use Never     Social History     Tobacco Use   Smoking Status Former Smoker    Packs/day:  00    Years: 30 00    Pack years: 30 00    Types: Cigarettes    Quit date:     Years since quittin 0   Smokeless Tobacco Never Used         Meds/Allergies     Current Outpatient Medications:     ammonium lactate (LAC-HYDRIN) 12 % cream, Apply topically as needed for dry skin, Disp: 385 g, Rfl: 0    apixaban (Eliquis) 5 mg, Take 1 tablet (5 mg total) by mouth 2 (two) times a day (Patient taking differently: Take 5 mg by mouth 2 (two) times a day To stop  ), Disp: 60 tablet, Rfl: 5    atenolol (TENORMIN) 100 mg tablet, Take 100 mg by mouth daily , Disp: , Rfl:     atorvastatin (LIPITOR) 40 mg tablet, Take 40 mg by mouth every evening  , Disp: , Rfl:     clotrimazole-betamethasone (LOTRISONE) 1-0 05 % cream, Apply topically 2 (two) times a day, Disp: 30 g, Rfl: 2    JARDIANCE 25 MG TABS, Take 25 mg by mouth daily in the early morning , Disp: , Rfl:     latanoprost (XALATAN) 0 005 % ophthalmic solution, Administer 1 drop to both eyes daily at bedtime  , Disp: , Rfl:     losartan (COZAAR) 50 mg tablet, Take 1 tablet (50 mg total) by mouth daily, Disp: 90 tablet, Rfl: 3    metFORMIN (GLUCOPHAGE) 500 mg tablet, Take 500 mg by mouth 2 (two) times a day , Disp: , Rfl:     Multiple Vitamin (MULTI-VITAMIN DAILY PO), Take by mouth daily  , Disp: , Rfl:     omeprazole (PriLOSEC) 20 mg delayed release capsule, Take 20 mg by mouth daily  , Disp: , Rfl:     primidone (MYSOLINE) 50 mg tablet, Take 50 mg by mouth daily at bedtime  , Disp: , Rfl:     Trelegy Ellipta 100-62 5-25 MCG/INH inhaler, INHALE 1 PUFF DAILY RINSE MOUTH AFTER USE , Disp: 60 each, Rfl: 5    albuterol (2 5 mg/3 mL) 0 083 % nebulizer solution, Take 2 5 mg by nebulization Daily evening  and prn (Patient not taking: Reported on 1/12/2022 ), Disp: , Rfl:   No Known Allergies      Review of Systems   Constitutional: Positive for activity change (less active since winter), fatigue and unexpected weight change (lost 6 pounds in the last ~month)  HENT: Positive for nosebleeds (occasional left nares bleeding, on Eliquis)  Eyes: Negative  Wears glasses   Respiratory: Positive for cough (with yellow mucous production) and wheezing (daily)  Negative for shortness of breath  Cardiovascular: Negative  Negative for palpitations  Gastrointestinal: Negative  Endocrine: Negative  Genitourinary: Negative  Negative for difficulty urinating, dysuria and hematuria  Musculoskeletal: Positive for arthralgias  Negative for gait problem  Skin: Negative  Allergic/Immunologic: Negative  Neurological: Negative  Negative for dizziness, weakness, light-headedness, numbness and headaches  Hematological: Bruises/bleeds easily (on Eliquis)  Psychiatric/Behavioral: Positive for dysphoric mood and sleep disturbance (lays awake thinking)   The patient is nervous/anxious            OBJECTIVE:   /74 (BP Location: Left arm, Patient Position: Sitting)   Pulse 94   Temp 98 7 °F (37 1 °C) (Temporal)   Resp 18   Ht 5' 11" (1 803 m)   Wt 105 kg (230 lb 9 6 oz)   SpO2 94%   BMI 32 16 kg/m²   Pain Assessment:  {Pain Score 0-10, 0 default:05529}  Performance Status: {MDA  Performance Status Choice:29966}    Physical Exam ***      RESULTS  Lab Results    Chemistry        Component Value Date/Time    K 4 8 11/22/2021 0902     11/22/2021 0902    CO2 28 11/22/2021 0902    BUN 19 11/22/2021 0902    CREATININE 1 05 11/22/2021 0902        Component Value Date/Time    CALCIUM 9 0 11/22/2021 0902            Lab Results   Component Value Date    WBC 7 72 11/22/2021    HGB 16 0 11/22/2021    HCT 50 7 (H) 11/22/2021  (H) 11/22/2021     11/22/2021         Imaging Studies  Pulmonary exercise stress test (Cardiopulmonary exercise stress test)    Result Date: 1/10/2022  Narrative: Leonel Hess 80 y o  male MRN: [id number] Data summary: The patient exercised via cycle ergometry in 30 w increments to exhaustion at 1:36 a m  w, which is 123% predicted  The maximal oxygen consumption (VO2 max) was 12 3 mL/kilogram per minute (1 3 liter/minute), which is 70% predicted  The respiratory exchange ratio increased appropriately with exercise  The heart rate increased from 79 beats per minute to 104 beats per minute, which is 73% of the predicted maximal heart rate  The oxygen pulse augmented appropriately with exercise  The systolic blood pressure decreased from 156 mm Hg to 147 mm Hg and diastolic blood pressure from 98 mm Hg to 83 mm Hg  The electrocardiogram showed atrial fibrillation with occasional PVCs during exercise and during recovery  No acute ischemic changes  The minute ventilation increased from 10 liters/minute to 66 3 liter/minute, compared to an MVV of 102 liter/minute, yielding a breathing reserve of 35 7 L  the respiratory rate increased from 17 breaths per minute to 42 breaths per minute and tidal volume increased from 0 58 L to 1 6 L  The estimated dead space to tidal volume ratio decreased from 0 33-0 28  O2 saturation dropped from 94% to 93%  The anaerobic threshold was estimated at 59% of the maximal oxygen consumption  Test ended by the patient due to leg fatigue and shortness of breath 6 to 7/10 on the Sunny scale  Study Interpretation: The patient has normal exercise tolerance as evident by achieving 123% predicted of the workload  He has reduced maximal oxygen consumption  There is no pulmonary limitations to exercise  However the blood pressure mildly decreased at peak exercise which may indicate underlying cardiac disease   This test was done to determine VO2 max for purposes of stridor find risk for lung resection  Results showed that the VO2 max was 12 3 mL/kilogram per minute, which is 70% predicted  The patient may still tolerate surgery up to the extent of resection  To determine this I recommend he have a quantitative perfusion scan  If the postoperative predicted VO2 max remains more than 35% predicted and more than 10 mL/kilogram per minute then he may tolerate surgery  Clinical correlation is recommended  Pathology:***        ASSESSMENT  1  Squamous cell carcinoma of left lung [facility])  Ambulatory Referral to Radiation Oncology    Ambulatory Referral to Oncology Social Worker   2  Malignant neoplasm of bronchus and lung [facility])  Ambulatory Referral to Radiation Oncology     Cancer Staging  No matching staging information was found for the patient  PLAN/DISCUSSION  Orders Placed This Encounter   Procedures    Ambulatory Referral to Oncology Social Worker          Woody Masters is a 80y o  year old male with ***          Kelvin Charles MD  1/12/2022,10:32 AM      Portions of the record may have been created with voice recognition software  Occasional wrong word or "sound a like" substitutions may have occurred due to the inherent limitations of voice recognition software  Read the chart carefully and recognize, using context, where substitutions have occurred

## 2022-01-12 NOTE — PROGRESS NOTES
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  He presents today for radiation oncology consult to discuss SBRT  He is referred by Dr Ranjan Luke  He has a h/o HLD, HTN, Type 2 DM, CAD s/p stent, prostate ca s/p radiation (brachytherapy and EBRT), and previous smoking history  He presented to the hospital 9/28/21 with c/o cough and nasal congestion x 1 week  CT chest showed a 4 5 x 3 7 cm LLL cavitary mass  He was found to be in new onset a-fib and was admitted and started on Lovenox  He was given antibiotics  He was discharged 9/30/21 with plan for outpatient lung biopsy  He had a left lower lobe IR biopsy done 10/5/21, which revealed invasive squamous cell carcinoma  He was then seen by Dr Héctor Montilla and by pulmonary who ordered PET scan and PFTs  PET scan showed FDG uptake in the left lower lobe cancer SUV 19 7  There were a few additional tiny nodules in the lung without uptake  There was a small left effusion with SUV 2 8  He was presented at tumor board 11/1/21 and plan was to refer to IR for thoracentesis  This was done 11/8/21 and was negative for malignancy  He was seen by Dr Ranjan Luke who recommended bronchoscopy with cervical mediastinoscopy  This was done 11/29/21 and pathology was negative  9/28/21 Chest xray  Mass in the left base  See subsequent chest CT     9/28/21 CT chest-  4 5 x 3 7 cm cavitary left lower lobe mass  Given alveolar nodules in the left lower lobe due to mild bronchopneumonia, this could be a lung abscess, but lung cancer must be excluded  In the appropriate clinical setting, cavitary lesions can represent   TB, although they are more often in the upper lobes and superior segment of the lower lobes  Mild lower lobe reticulation due to early pulmonary fibrosis  10/5/21 IR biopsy  Final Diagnosis   A   Lung, Left Lower Lobe, :  - Invasive squamous carcinoma, moderately differentiated, keratinizing type  - Tumor is highlighted with P 40 immunoperoxidase stain   - Prominent tumor necrosis is noted  10/25/21 PFTs  Results:  FEV1/FVC Ratio: 81 %  FEV1: 1 75 L     58 % predicted  Forced Vital Capacity: 2 15 L    52 % predicted  After administration of bronchodilator: There is improvement in both FEV1 and FVC    10/29/21 PET scan  1  Hypermetabolic necrotic 4 5 x 4 cm left lower lung mass compatible with known malignancy  2   2 mildly hypermetabolic right paratracheal mediastinal nodes for which early metastases are not excluded  3   Small mildly hypermetabolic left pleural effusion for which malignant effusion is not excluded  4   Additional scattered tiny nodular lung densities may be reassessed on follow-up CT  5   Probable reactive subcentimeter right cervical node may be reassessed on follow-up PET/CT  6   No hypermetabolic soft tissue metastases in the abdomen, pelvis  7   Minimal inhomogeneous FDG activity in the sacrum and left posterior iliac, though without dominant focal lesion  This may be reassessed on follow-up exam     11/8/21 Thoracentesis  Final Diagnosis   A  Pleural Fluid, Left:  Negative for malignancy  Benign mesothelial cells and histiocytes  Scattered lymphocytes and neutrophils  Proteinaceous material      11/19/21 MRI brain-  White matter changes suggestive of chronic microangiopathy  No acute intracranial pathology  11/29/21 Bronchoscopy and mediastinoscopy  Final Diagnosis   A  Lymph node, level 7, excision:  -  Portions of benign lymph node with reactive change and anthracosis, negative for granulomas, lymphoma, or metastatic carcinoma  B  Lymph node, level 4R, excision:  -  Portions of benign lymph node with reactive change and anthracosis, negative for granulomas, lymphoma, or metastatic carcinoma      C  Lymph node, level 2R, excision:  -  Portions of benign lymph node with reactive change and anthracosis, negative for granulomas, lymphoma, or metastatic carcinoma  D  Lymph node, level 4L, excision:  -  Benign lymph node with reactive change and anthracosis, negative for granulomas, lymphoma, or metastatic carcinoma  12/14/21 Dr Fausto Dobbs- Clinical stage I left lower lobe squamous cell cancer but has marginal PFTs and limited endurance  Plan for cardiopulmonary exercise test to assess candidacy for lobectomy    1/3/21 Dr Keturah Brittle- Pending appt for cardiopulmonary exercise testing later this week  Defer to thoracic surgery for now  F/u in 8 weeks    1/11/22 Pau GARCIA- "We discussed Mr Blaze Mathiasd CPET results with him, which was on the borderline of tolerating a lobectomy, since it was 15  3  since his lower lobes contribute more function than other lobes, surgical resection would significantly decrease his lung function and put him at a higher risk for surgery  The location of this nodule precludes it from a wedge resection  Therefore, Dr Fausto Dobbs is recommending SBRT for definitive treatment "        1/17/22 Dr Beulah Foss  3/22/21 Dr Keturah Brittle      Oncology History   Squamous cell carcinoma of lung ([facility] [address] )   10/5/2021 Initial Diagnosis    Squamous cell carcinoma of lung ([facility] [address] )     10/5/2021 Biopsy    Final Diagnosis   A  Lung, Left Lower Lobe, :  - Invasive squamous carcinoma, moderately differentiated, keratinizing type  - Tumor is highlighted with P 40 immunoperoxidase stain   - Prominent tumor necrosis is noted  Review of Systems:  Review of Systems   Constitutional: Positive for activity change (less active since winter), fatigue and unexpected weight change (lost 6 pounds in the last ~month)  HENT: Positive for nosebleeds (occasional left nares bleeding, on Eliquis)  Eyes: Negative  Wears glasses   Respiratory: Positive for cough (with yellow mucous production) and wheezing (daily)  Negative for shortness of breath  Cardiovascular: Negative  Negative for palpitations  Gastrointestinal: Negative  Endocrine: Negative  Genitourinary: Negative  Negative for difficulty urinating, dysuria and hematuria  Musculoskeletal: Positive for arthralgias  Negative for gait problem  Skin: Negative  Allergic/Immunologic: Negative  Neurological: Negative  Negative for dizziness, weakness, light-headedness, numbness and headaches  Hematological: Bruises/bleeds easily (on Eliquis)  Psychiatric/Behavioral: Positive for dysphoric mood and sleep disturbance (lays awake thinking)  The patient is nervous/anxious  Clinical Trial: no     Pain assessment: 0    PFT: 10/25/21    Imaging:No images are attached to the encounter       Prior Radiation: RT to prostate     Teaching: NCI RT packet given, RT to lung reviewed reviewed with patient and his wife, Jaime Shell    MST: completed, patient declined    Implantable Devices (Port, pacemaker, pain stimulator): heart stent    Hip Replacement: no    Covid Vaccine Status: up to date    Health Maintenance   Topic Date Due    Hepatitis C Screening  Never done   [facility] Annual Wellness Visit (AWV)  Never done    Pneumococcal Vaccine: 65+ Years (1 of 2 - PPSV23) Never done    DM Eye Exam  Never done    Depression Screening  Never done    BMI: Followup Plan  Never done    Fall Risk  Never done    COVID-19 Vaccine (3 - Booster for Alfie Allison series) 05/05/2022    HEMOGLOBIN A1C  05/22/2022    Diabetic Foot Exam  07/01/2022    BMI: Adult  01/11/2023    DTaP,Tdap,and Td Vaccines (2 - Td or Tdap) 01/07/2031    Influenza Vaccine  Completed    HIB Vaccine  Aged Out    Hepatitis B Vaccine  Aged Out    IPV Vaccine  Aged Out    Hepatitis A Vaccine  Aged Out    Meningococcal ACWY Vaccine  Aged Out    HPV Vaccine  Aged Out       Past Medical History:   Diagnosis Date    Anxiety     Arthritis     Atrial fibrillation ([address] )     Bleeding ulcer     Cancer ([facility] [address] )     prostate 2011- radiation    Cardiac disease     cardiac stent x1    Cataract     starting    Chronic diastolic (congestive) heart failure (HCC)     Diabetes mellitus ([facility] [address] )     boarderline diabetic     GERD (gastroesophageal reflux disease)     Hyperlipidemia     Hypertension     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, bilateral     Low back pain     Lung mass     Myocardial infarction ([facility] [address] )     mild     Sleep apnea     sleep study     Wears dentures     full set    Wears glasses        Past Surgical History:   Procedure Laterality Date    ABDOMINAL HERNIA REPAIR      ABDOMINAL SURGERY      bleeding ulcer, [facility]    [facility]     IR BIOPSY LUNG  10/5/2021    IR THORACENTESIS  2021    CO BRONCHOSCOPY,DIAGNOSTIC N/A 2021    Procedure: Cleve Vargas;  Surgeon: Maxine Stokes MD;  [facility]: [facility];  Service: Thoracic    CO MEDIASTINOSCOPY WITH LYMPH NODE BIOPSY/IES N/A 2021    Procedure: MEDIASTINOSCOPY;  Surgeon: Maxine Stokes MD;  Location: [facility];  Service: Thoracic       Family History   Problem Relation Age of Onset    Prostate cancer Brother        Social History     Tobacco Use    Smoking status: Former Smoker     Packs/day: 1 00     Years: 30 00     Pack years: 30 00     Types: Cigarettes     Quit date:      Years since quittin 0    Smokeless tobacco: Never Used   Vaping Use    Vaping Use: Never used   Substance Use Topics    Alcohol use:  Yes     Alcohol/week: 3 0 - 5 0 standard drinks     Types: 3 - 5 Cans of beer per week     Comment: few beers day    Drug use: Never          Current Outpatient Medications:     albuterol (2 5 mg/3 mL) 0 083 % nebulizer solution, Take 2 5 mg by nebulization Daily evening  and prn, Disp: , Rfl:     ammonium lactate (LAC-HYDRIN) 12 % cream, Apply topically as needed for dry skin, Disp: 385 g, Rfl: 0    apixaban (Eliquis) 5 mg, Take 1 tablet (5 mg total) by mouth 2 (two) times a day (Patient taking differently: Take 5 mg by mouth 2 (two) times a day To stop 11/26 ), Disp: 60 tablet, Rfl: 5    atenolol (TENORMIN) 100 mg tablet, Take 100 mg by mouth daily , Disp: , Rfl:     atorvastatin (LIPITOR) 40 mg tablet, Take 40 mg by mouth every evening  , Disp: , Rfl:     clotrimazole-betamethasone (LOTRISONE) 1-0 05 % cream, Apply topically 2 (two) times a day, Disp: 30 g, Rfl: 2    JARDIANCE 25 MG TABS, Take 25 mg by mouth daily in the early morning , Disp: , Rfl:     latanoprost (XALATAN) 0 005 % ophthalmic solution, Administer 1 drop to both eyes daily at bedtime  , Disp: , Rfl:     losartan (COZAAR) 50 mg tablet, Take 1 tablet (50 mg total) by mouth daily, Disp: 90 tablet, Rfl: 3    metFORMIN (GLUCOPHAGE) 500 mg tablet, Take 500 mg by mouth 2 (two) times a day , Disp: , Rfl:     Multiple Vitamin (MULTI-VITAMIN DAILY PO), Take by mouth daily  , Disp: , Rfl:     omeprazole (PriLOSEC) 20 mg delayed release capsule, Take 20 mg by mouth daily  , Disp: , Rfl:     primidone (MYSOLINE) 50 mg tablet, Take 50 mg by mouth daily at bedtime  , Disp: , Rfl:     Trelegy Ellipta 100-62 5-25 MCG/INH inhaler, INHALE 1 PUFF DAILY RINSE MOUTH AFTER USE , Disp: 60 each, Rfl: 5    No Known Allergies     Vitals:    01/12/22 0947   Height: 5' 11" (1 803 m)

## 2022-01-12 NOTE — PROGRESS NOTES
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  He presents today for radiation oncology consult to discuss SBRT  He is referred by Dr Adry Ivy      He has a h/o HLD, HTN, Type 2 DM, CAD s/p stent, prostate ca s/p radiation (brachytherapy and EBRT), and previous smoking history      He presented to the hospital 21 with c/o cough and nasal congestion x 1 week  CT chest showed a 4 5 x 3 7 cm LLL cavitary mass  He was found to be in new onset a-fib and was admitted and started on Lovenox  He was given antibiotics  He was discharged 21 with plan for outpatient lung biopsy  He had a left lower lobe IR biopsy done 10/5/21, which revealed invasive squamous cell carcinoma  He was then seen by Dr Vivian Kamara and by pulmonary who ordered PET scan and PFTs        PET scan showed FDG uptake in the left lower lobe cancer SUV 19 7  There were a few additional tiny nodules in the lung without uptake  There was a small left effusion with SUV 2 8  He was presented at tumor board 21 and plan was to refer to IR for thoracentesis  This was done 21 and was negative for malignancy  He was seen by Dr Adry Ivy who recommended bronchoscopy with cervical mediastinoscopy   This was done 21 and pathology was negative       21 Chest xray  Mass in the left base   See subsequent chest CT      21 CT chest-  4 5 x 3 7 cm cavitary left lower lobe mass   Given alveolar nodules in the left lower lobe due to mild bronchopneumonia, this could be a lung abscess, but lung cancer must be excluded   In the appropriate clinical setting, cavitary lesions can represent TB, although they are more often in the upper lobes and superior segment of the lower lobes  Mild lower lobe reticulation due to early pulmonary fibrosis      10/5/21 IR biopsy  Final Diagnosis   A  Lung, Left Lower Lobe, :  - Invasive squamous carcinoma, moderately differentiated, keratinizing type  - Tumor is highlighted with P 40 immunoperoxidase stain   - Prominent tumor necrosis is noted        10/25/21 PFTs  Results:  FEV1/FVC Ratio: 81 %  FEV1: 1 75 L     58 % predicted  Forced Vital Capacity: 2 15 L    52 % predicted  After administration of bronchodilator: Mahamed Alamo is improvement in both FEV1 and FVC   10/29/21 PET scan  1   Hypermetabolic necrotic 4 5 x 4 cm left lower lung mass compatible with known malignancy  2   2 mildly hypermetabolic right paratracheal mediastinal nodes for which early metastases are not excluded  3   Small mildly hypermetabolic left pleural effusion for which malignant effusion is not excluded  4   Additional scattered tiny nodular lung densities may be reassessed on follow-up CT  5   Probable reactive subcentimeter right cervical node may be reassessed on follow-up PET/CT  6   No hypermetabolic soft tissue metastases in the abdomen, pelvis  7   Minimal inhomogeneous FDG activity in the sacrum and left posterior iliac, though without dominant focal lesion   This may be reassessed on follow-up exam      11/8/21 Thoracentesis  Final Diagnosis   A  Pleural Fluid, Left:  Negative for malignancy  Benign mesothelial cells and histiocytes  Scattered lymphocytes and neutrophils  Proteinaceous material       11/19/21 MRI brain-  White matter changes suggestive of chronic microangiopathy   No acute intracranial pathology      11/29/21 Bronchoscopy and mediastinoscopy  Final Diagnosis   A  Lymph node, level 7, excision:  -  Portions of benign lymph node with reactive change and anthracosis, negative for granulomas, lymphoma, or metastatic carcinoma      B  Lymph node, level 4R, excision:  -  Portions of benign lymph node with reactive change and anthracosis, negative for granulomas, lymphoma, or metastatic carcinoma  C  Lymph node, level 2R, excision:  -  Portions of benign lymph node with reactive change and anthracosis, negative for granulomas, lymphoma, or metastatic carcinoma  D  Lymph node, level 4L, excision:  -  Benign lymph node with reactive change and anthracosis, negative for granulomas, lymphoma, or metastatic carcinoma       12/14/21 Dr Franklin Holt- Clinical stage I left lower lobe squamous cell cancer but has marginal PFTs and limited endurance  Plan for cardiopulmonary exercise test to assess candidacy for lobectomy     1/3/21 Dr Yeimi Sanchez- Pending appt for cardiopulmonary exercise testing later this week  Defer to thoracic surgery for now  F/u in 8 weeks     1/11/22 Isaias GARCIA- "We discussed Mr Tyrel Bernstein CPET results with him, which was on the borderline of tolerating a lobectomy, since it was 15  3  since his lower lobes contribute more function than other lobes, surgical resection would significantly decrease his lung function and put him at a higher risk for surgery  The location of this nodule precludes it from a wedge resection  Therefore, Dr Franklin Holt is recommending SBRT for definitive treatment "        1/17/22 Dr Johanny Veliz  3/22/21 Dr Yeimi Sanchez       He denies any significant change in his breathing  Historical Information   Oncology History   Squamous cell carcinoma of lung ([facility] [address] [address] )   10/5/2021 Initial Diagnosis    Squamous cell carcinoma of lung ([facility] [address] )     10/5/2021 Biopsy    Final Diagnosis   A  Lung, Left Lower Lobe, :  - Invasive squamous carcinoma, moderately differentiated, keratinizing type  - Tumor is highlighted with P 40 immunoperoxidase stain   - Prominent tumor necrosis is noted                 Past Medical History:   Diagnosis Date    Anxiety     Arthritis     Atrial fibrillation ([facility] [address] )     Bleeding ulcer     Cancer [facility])     prostate 2011- radiation    Cardiac disease     cardiac stent x1    Cataract     starting    Chronic diastolic (congestive) heart failure (HCC)     Diabetes mellitus ([facility] )     boarderline diabetic     GERD (gastroesophageal reflux disease)     History of radiation therapy     Prostate seeds (brachytherapy) and EBRT    Hyperlipidemia     Hypertension     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, bilateral     Low back pain     Lung cancer ([facility] )     Lung mass     Myocardial infarction ([facility] )     mild     Prostate cancer [facility]     Sleep apnea     sleep study     Wears dentures     full set    Wears glasses      Past Surgical History:   Procedure Laterality Date    ABDOMINAL HERNIA REPAIR      ABDOMINAL SURGERY      bleeding ulcer, cyst removed from abd    COLONOSCOPY     [facility]    x1     IR BIOPSY LUNG  10/5/2021    IR THORACENTESIS  2021    HI BRONCHOSCOPY,DIAGNOSTIC N/A 2021    Procedure: Ramón Allen;  Surgeon: Miriam Le MD;  [facility];  Service: Thoracic    HI MEDIASTINOSCOPY WITH LYMPH NODE BIOPSY/IES N/A 2021    Procedure: MEDIASTINOSCOPY;  Surgeon: Miriam Le MD;  [facility];  Service: Thoracic       Family History   Problem Relation Age of Onset    Prostate cancer Brother     Cancer Maternal Uncle         colo rectal cancer    Cancer Paternal Aunt        Social History   Social History     Substance and Sexual Activity   Alcohol Use Yes    Alcohol/week: 10 0 - 12 0 standard drinks    Types: 3 - 5 Cans of beer, 7 Glasses of wine per week    Comment: few beers day; glass of red wine at dinner     Social History     Substance and Sexual Activity   Drug Use Never     Social History     Tobacco Use   Smoking Status Former Smoker    Packs/day: 1 00    Years: 30 00    Pack years: 30 00    Types: Cigarettes    Quit date: [address]    Years since quittin 0   Smokeless Tobacco Never Used Meds/Allergies     Current Outpatient Medications:     ammonium lactate (LAC-HYDRIN) 12 % cream, Apply topically as needed for dry skin, Disp: 385 g, Rfl: 0    apixaban (Eliquis) 5 mg, Take 1 tablet (5 mg total) by mouth 2 (two) times a day (Patient taking differently: Take 5 mg by mouth 2 (two) times a day To stop 11/26 ), Disp: 60 tablet, Rfl: 5    atenolol (TENORMIN) 100 mg tablet, Take 100 mg by mouth daily , Disp: , Rfl:     atorvastatin (LIPITOR) 40 mg tablet, Take 40 mg by mouth every evening  , Disp: , Rfl:     clotrimazole-betamethasone (LOTRISONE) 1-0 05 % cream, Apply topically 2 (two) times a day, Disp: 30 g, Rfl: 2    JARDIANCE 25 MG TABS, Take 25 mg by mouth daily in the early morning , Disp: , Rfl:     latanoprost (XALATAN) 0 005 % ophthalmic solution, Administer 1 drop to both eyes daily at bedtime  , Disp: , Rfl:     losartan (COZAAR) 50 mg tablet, Take 1 tablet (50 mg total) by mouth daily, Disp: 90 tablet, Rfl: 3    metFORMIN (GLUCOPHAGE) 500 mg tablet, Take 500 mg by mouth 2 (two) times a day , Disp: , Rfl:     Multiple Vitamin (MULTI-VITAMIN DAILY PO), Take by mouth daily  , Disp: , Rfl:     omeprazole (PriLOSEC) 20 mg delayed release capsule, Take 20 mg by mouth daily  , Disp: , Rfl:     primidone (MYSOLINE) 50 mg tablet, Take 50 mg by mouth daily at bedtime  , Disp: , Rfl:     Trelegy Ellipta 100-62 5-25 MCG/INH inhaler, INHALE 1 PUFF DAILY RINSE MOUTH AFTER USE , Disp: 60 each, Rfl: 5    albuterol (2 5 mg/3 mL) 0 083 % nebulizer solution, Take 2 5 mg by nebulization Daily evening  and prn (Patient not taking: Reported on 1/12/2022 ), Disp: , Rfl:   No Known Allergies      Review of Systems   Constitutional: Positive for activity change (less active since winter), fatigue and unexpected weight change (lost 6 pounds in the last ~month)  HENT: Positive for nosebleeds (occasional left nares bleeding, on Eliquis)  Eyes: Negative           Wears glasses   Respiratory: Positive for cough (with yellow mucous production) and wheezing (daily)  Negative for shortness of breath  Cardiovascular: Negative  Negative for palpitations  Gastrointestinal: Negative  Endocrine: Negative  Genitourinary: Negative  Negative for difficulty urinating, dysuria and hematuria  Musculoskeletal: Positive for arthralgias  Negative for gait problem  Skin: Negative  Allergic/Immunologic: Negative  Neurological: Negative  Negative for dizziness, weakness, light-headedness, numbness and headaches  Hematological: Bruises/bleeds easily (on Eliquis)  Psychiatric/Behavioral: Positive for dysphoric mood and sleep disturbance (lays awake thinking)  The patient is nervous/anxious            OBJECTIVE:   /74 (BP Location: Left arm, Patient Position: Sitting)   Pulse 94   Temp 98 7 °F (37 1 °C) (Temporal)   Resp 18   Ht 5' 11" (1 803 m)   Wt 105 kg (230 lb 9 6 oz)   SpO2 94%   BMI 32 16 kg/m²   Pain Assessment:  0  Performance Status: ECOG/Zubrod/WHO: 1 - Symptomatic but completely ambulatory    Physical Exam  Vitals reviewed  Constitutional:       Appearance: He is well-developed  HENT:      Head: Normocephalic  Eyes:      Pupils: Pupils are equal, round, and reactive to light  Cardiovascular:      Rate and Rhythm: Normal rate and regular rhythm  Heart sounds: Normal heart sounds  Pulmonary:      Effort: Pulmonary effort is normal       Breath sounds: Normal breath sounds  No wheezing  Chest:      Chest wall: No tenderness  Abdominal:      General: Bowel sounds are normal  There is no distension  Palpations: Abdomen is soft  There is no mass  Tenderness: There is no abdominal tenderness  Comments: Obese   Genitourinary:     Prostate: Normal       Rectum: Normal    Musculoskeletal:         General: Normal range of motion  Cervical back: Normal range of motion and neck supple  Lymphadenopathy:      Cervical: No cervical adenopathy  Skin:     General: Skin is warm  Findings: No erythema or rash  Neurological:      Mental Status: He is alert  Cranial Nerves: No cranial nerve deficit  Coordination: Coordination normal    Psychiatric:         Behavior: Behavior normal            RESULTS  Lab Results    Chemistry        Component Value Date/Time    K 4 8 11/22/2021 0902     11/22/2021 0902    CO2 28 11/22/2021 0902    BUN 19 11/22/2021 0902    CREATININE 1 05 11/22/2021 0902        Component Value Date/Time    CALCIUM 9 0 11/22/2021 0902            Lab Results   Component Value Date    WBC 7 72 11/22/2021    HGB 16 0 11/22/2021    HCT 50 7 (H) 11/22/2021     (H) 11/22/2021     11/22/2021         Imaging Studies  Pulmonary exercise stress test (Cardiopulmonary exercise stress test)    Result Date: 1/10/2022  Narrative: Rainey Brunner 80 y o  male MRN: [id number] Data summary: The patient exercised via cycle ergometry in 30 w increments to exhaustion at 1:36 a m  w, which is 123% predicted  The maximal oxygen consumption (VO2 max) was 12 3 mL/kilogram per minute (1 3 liter/minute), which is 70% predicted  The respiratory exchange ratio increased appropriately with exercise  The heart rate increased from 79 beats per minute to 104 beats per minute, which is 73% of the predicted maximal heart rate  The oxygen pulse augmented appropriately with exercise  The systolic blood pressure decreased from 156 mm Hg to 147 mm Hg and diastolic blood pressure from 98 mm Hg to 83 mm Hg  The electrocardiogram showed atrial fibrillation with occasional PVCs during exercise and during recovery  No acute ischemic changes  The minute ventilation increased from 10 liters/minute to 66 3 liter/minute, compared to an MVV of 102 liter/minute, yielding a breathing reserve of 35 7 L  the respiratory rate increased from 17 breaths per minute to 42 breaths per minute and tidal volume increased from 0 58 L to 1 6 L    The estimated dead space to tidal volume ratio decreased from 0 33-0 28  O2 saturation dropped from 94% to 93%  The anaerobic threshold was estimated at 59% of the maximal oxygen consumption  Test ended by the patient due to leg fatigue and shortness of breath 6 to 7/10 on the Sunny scale  Study Interpretation: The patient has normal exercise tolerance as evident by achieving 123% predicted of the workload  He has reduced maximal oxygen consumption  There is no pulmonary limitations to exercise  However the blood pressure mildly decreased at peak exercise which may indicate underlying cardiac disease  This test was done to determine VO2 max for purposes of stridor find risk for lung resection  Results showed that the VO2 max was 12 3 mL/kilogram per minute, which is 70% predicted  The patient may still tolerate surgery up to the extent of resection  To determine this I recommend he have a quantitative perfusion scan  If the postoperative predicted VO2 max remains more than 35% predicted and more than 10 mL/kilogram per minute then he may tolerate surgery  Clinical correlation is recommended  Pathology:    Final Diagnosis   A  Lymph node, level 7, excision:  -  Portions of benign lymph node with reactive change and anthracosis, negative for granulomas, lymphoma, or metastatic carcinoma  B  Lymph node, level 4R, excision:  -  Portions of benign lymph node with reactive change and anthracosis, negative for granulomas, lymphoma, or metastatic carcinoma  C  Lymph node, level 2R, excision:  -  Portions of benign lymph node with reactive change and anthracosis, negative for granulomas, lymphoma, or metastatic carcinoma  D  Lymph node, level 4L, excision:  -  Benign lymph node with reactive change and anthracosis, negative for granulomas, lymphoma, or metastatic carcinoma  Final Diagnosis   A  Lung, Left Lower Lobe, :  - Invasive squamous carcinoma, moderately differentiated, keratinizing type    - Tumor is highlighted with P 40 immunoperoxidase stain   - Prominent tumor necrosis is noted              ASSESSMENT  1  Squamous cell carcinoma of left lung [facility])  Ambulatory Referral to Radiation Oncology    Ambulatory Referral to Oncology Social Worker   2  Malignant neoplasm of bronchus and lung [facility])  Ambulatory Referral to Radiation Oncology     Cancer Staging  No matching staging information was found for the patient  PLAN/DISCUSSION  Orders Placed This Encounter   Procedures    Ambulatory Referral to Oncology Social Worker          Marti Marroquin is a 80y o  year old male with clinical T2b N0 (stage IIA) of the left lower lobe  He has undergone PET-CT scan in October revealing a 4 5 cm lesion in the left lower lobe and questionable mediastinal nodes  Mediastinoscopy reveals no evidence of malignancy at multiple levels  He was evaluated by thoracic surgery and based on his pulmonary function test was not felt to be a good surgical candidate  I believe he is a candidate for SBRT as long as his lesion is not significantly changed in size from his PET-CT scan completed nearly 3 months ago  I reviewed the procedure involved with 4 D CT scan at which time customized immobilization will be constructed  We discussed breath hold scan as well as his lesion is close to the diaphragm to minimize any gastric dose  We discussed the goal of treatment  Possible side effects were reviewed with him and his wife  This can include but not limited to fatigue, pneumonitis, pulmonary scarring at treated site, low dose to surrounding structures including his heart and stomach, chest wall discomfort, rib fracture  He is agreeable to proceeding with the above outlined plan  Tom Fuentes MD  1/12/2022,10:32 AM      Portions of the record may have been created with voice recognition software    Occasional wrong word or "sound a like" substitutions may have occurred due to the inherent limitations of voice recognition software  Read the chart carefully and recognize, using context, where substitutions have occurred

## 2022-01-12 NOTE — LETTER
2022     S[place] Brar, [address] [address]    Patient: Alona Oreilly   YOB: 1940   Date of Visit: 2022       Dear Dr Cruzito Azul: Thank you for referring Celina Matthews to me for evaluation  Below are my notes for this consultation  If you have questions, please do not hesitate to call me  I look forward to following your patient along with you  Sincerely,        Altaf Weiss MD        CC: No Recipients  Altaf Weiss MD  2022 11:04 AM  Sign when Signing Visit  Consultation - Radiation Oncology      ITZEL:[id number] : 1940  Encounter: [id number]  Patient Information: [address]  Chief Complaint   Patient presents with    Consult     Cancer Staging  No matching staging information was found for the patient  History of Present Illness   Patient is a 80 y o male with 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  He presents today for radiation oncology consult to discuss SBRT  He is referred by Dr Cruzito Azul      He has a h/o HLD, HTN, Type 2 DM, CAD s/p stent, prostate ca s/p radiation (brachytherapy and EBRT), and previous smoking history      He presented to the hospital 21 with c/o cough and nasal congestion x 1 week  CT chest showed a 4 5 x 3 7 cm LLL cavitary mass  He was found to be in new onset a-fib and was admitted and started on Lovenox  He was given antibiotics  He was discharged 21 with plan for outpatient lung biopsy  He had a left lower lobe IR biopsy done 10/5/21, which revealed invasive squamous cell carcinoma  He was then seen by Dr Melburn Canavan and by pulmonary who ordered PET scan and PFTs        PET scan showed FDG uptake in the left lower lobe cancer SUV 19 7  There were a few additional tiny nodules in the lung without uptake  There was a small left effusion with SUV 2 8  He was presented at tumor board 21 and plan was to refer to IR for thoracentesis  This was done 11/8/21 and was negative for malignancy  He was seen by Dr Jack Rush who recommended bronchoscopy with cervical mediastinoscopy  This was done 11/29/21 and pathology was negative       9/28/21 Chest xray  Mass in the left base   See subsequent chest CT      9/28/21 CT chest-  4 5 x 3 7 cm cavitary left lower lobe mass   Given alveolar nodules in the left lower lobe due to mild bronchopneumonia, this could be a lung abscess, but lung cancer must be excluded   In the appropriate clinical setting, cavitary lesions can represent   TB, although they are more often in the upper lobes and superior segment of the lower lobes  Mild lower lobe reticulation due to early pulmonary fibrosis      10/5/21 IR biopsy  Final Diagnosis   A  Lung, Left Lower Lobe, :  - Invasive squamous carcinoma, moderately differentiated, keratinizing type  - Tumor is highlighted with P 40 immunoperoxidase stain   - Prominent tumor necrosis is noted        10/25/21 PFTs  Results:  FEV1/FVC Ratio: 81 %  FEV1: 1 75 L     58 % predicted  Forced Vital Capacity: 2 15 L    52 % predicted  After administration of bronchodilator: Sujit Cheek is improvement in both FEV1 and FVC   10/29/21 PET scan  1   Hypermetabolic necrotic 4 5 x 4 cm left lower lung mass compatible with known malignancy  2   2 mildly hypermetabolic right paratracheal mediastinal nodes for which early metastases are not excluded  3   Small mildly hypermetabolic left pleural effusion for which malignant effusion is not excluded  4   Additional scattered tiny nodular lung densities may be reassessed on follow-up CT  5   Probable reactive subcentimeter right cervical node may be reassessed on follow-up PET/CT  6   No hypermetabolic soft tissue metastases in the abdomen, pelvis    7   Minimal inhomogeneous FDG activity in the sacrum and left posterior iliac, though without dominant focal lesion   This may be reassessed on follow-up exam      11/8/21 Thoracentesis  Final Diagnosis A  Pleural Fluid, Left:  Negative for malignancy  Benign mesothelial cells and histiocytes  Scattered lymphocytes and neutrophils  Proteinaceous material       11/19/21 MRI brain-  White matter changes suggestive of chronic microangiopathy   No acute intracranial pathology      11/29/21 Bronchoscopy and mediastinoscopy  Final Diagnosis   A  Lymph node, level 7, excision:  -  Portions of benign lymph node with reactive change and anthracosis, negative for granulomas, lymphoma, or metastatic carcinoma  B  Lymph node, level 4R, excision:  -  Portions of benign lymph node with reactive change and anthracosis, negative for granulomas, lymphoma, or metastatic carcinoma  C  Lymph node, level 2R, excision:  -  Portions of benign lymph node with reactive change and anthracosis, negative for granulomas, lymphoma, or metastatic carcinoma  D  Lymph node, level 4L, excision:  -  Benign lymph node with reactive change and anthracosis, negative for granulomas, lymphoma, or metastatic carcinoma       12/14/21 Dr Prachi Ordoñez- Clinical stage I left lower lobe squamous cell cancer but has marginal PFTs and limited endurance  Plan for cardiopulmonary exercise test to assess candidacy for lobectomy     1/3/21 Dr Gin Alas- Pending appt for cardiopulmonary exercise testing later this week  Defer to thoracic surgery for now  F/u in 8 weeks     1/11/22 Juan C GARCIA- "We discussed Mr Lisa Esquivel CPET results with him, which was on the borderline of tolerating a lobectomy, since it was 15  3  since his lower lobes contribute more function than other lobes, surgical resection would significantly decrease his lung function and put him at a higher risk for surgery  The location of this nodule precludes it from a wedge resection  Therefore, Dr Prachi Ordoñez is recommending SBRT for definitive treatment "        1/17/22 Dr Latrell Norwood  3/22/21 Dr Gin Alas       He denies any significant change in his breathing      Historical Information Oncology History   Squamous [facility] carcinoma [facility] lung ([facility] )   10/5/2021 Initial Diagnosis    Squamous cell carcinoma of lung ([place] [address] )     10/5/2021 Biopsy    Final Diagnosis   A  Lung, Left Lower Lobe, :  - Invasive squamous carcinoma, moderately differentiated, keratinizing type  - Tumor is highlighted with P 40 immunoperoxidase stain   - Prominent tumor necrosis is noted                 Past Medical History:   Diagnosis Date    Anxiety     Arthritis     Atrial fibrillation ([place] [address] )     Bleeding ulcer     Cancer [facility])     prostate 2011- radiation    Cardiac disease     cardiac stent x1    Cataract     starting    Chronic diastolic (congestive) heart failure (HCC)     Diabetes mellitus ([place] [address] )     boarderline diabetic     GERD (gastroesophageal reflux disease)     History of radiation therapy 2010    Prostate seeds (brachytherapy) and EBRT    Hyperlipidemia     Hypertension     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, bilateral     Low back pain     Lung cancer ([place] [address] )     Lung mass     Myocardial infarction ([place] [address] )     mild 1999    Prostate cancer ([place] [address] )     Sleep apnea     sleep study 11/22    Wears dentures     full set    Wears glasses      Past Surgical History:   Procedure Laterality Date    ABDOMINAL HERNIA REPAIR      ABDOMINAL SURGERY      bleeding ulcer, cyst removed from abd    COLONOSCOPY     [facility]    x1     IR BIOPSY LUNG  10/5/2021    IR THORACENTESIS  11/8/2021    OK BRONCHOSCOPY,DIAGNOSTIC N/A 11/29/2021    Procedure: BRONCHOSCOPY FLEXIBLE;  Surgeon: Pooja Arellano MD;  [facility];  Service: Thoracic    OK MEDIASTINOSCOPY WITH LYMPH NODE BIOPSY/IES N/A 11/29/2021    Procedure: MEDIASTINOSCOPY;  Surgeon: Pooja Arellano MD;  [facility];  Service: Thoracic       Family History   Problem Relation Age of Onset    Prostate cancer Brother     Cancer Maternal Uncle         colo rectal cancer    Cancer Paternal Aunt        Social History   Social History     Substance and Sexual Activity   Alcohol Use Yes    Alcohol/week: 10 0 - 12 0 standard drinks    Types: 3 - 5 Cans of beer, 7 Glasses of wine per week    Comment: few beers day; glass of red wine at dinner     Social History     Substance and Sexual Activity   Drug Use Never     Social History     Tobacco Use   Smoking Status Former Smoker    Packs/day:     Years: 30     Pack years: 30     Types: Cigarettes    Quit date:     Years since quittin 0   Smokeless Tobacco Never Used         Meds/Allergies     Current Outpatient Medications:     ammonium lactate (LAC-HYDRIN) 12 % cream, Apply topically as needed for dry skin, Disp: 385 g, Rfl: 0    apixaban (Eliquis) 5 mg, Take 1 tablet (5 mg total) by mouth 2 (two) times a day (Patient taking differently: Take 5 mg by mouth 2 (two) times a day To stop  ), Disp: 60 tablet, Rfl: 5    atenolol (TENORMIN) 100 mg tablet, Take 100 mg by mouth daily , Disp: , Rfl:     atorvastatin (LIPITOR) 40 mg tablet, Take 40 mg by mouth every evening  , Disp: , Rfl:     clotrimazole-betamethasone (LOTRISONE) 1-0 05 % cream, Apply topically 2 (two) times a day, Disp: 30 g, Rfl: 2    JARDIANCE 25 MG TABS, Take 25 mg by mouth daily in the early morning , Disp: , Rfl:     latanoprost (XALATAN) 0 005 % ophthalmic solution, Administer 1 drop to both eyes daily at bedtime  , Disp: , Rfl:     losartan (COZAAR) 50 mg tablet, Take 1 tablet (50 mg total) by mouth daily, Disp: 90 tablet, Rfl: 3    metFORMIN (GLUCOPHAGE) 500 mg tablet, Take 500 mg by mouth 2 (two) times a day , Disp: , Rfl:     Multiple Vitamin (MULTI-VITAMIN DAILY PO), Take by mouth daily  , Disp: , Rfl:     omeprazole (PriLOSEC) 20 mg delayed release capsule, Take 20 mg by mouth daily  , Disp: , Rfl:     primidone (MYSOLINE) 50 mg tablet, Take 50 mg by mouth daily at bedtime  , Disp: , Rfl:     Trelegy Ellipta 100-62 5-25 MCG/INH inhaler, INHALE 1 PUFF DAILY RINSE MOUTH AFTER USE , Disp: 60 each, Rfl: 5    albuterol (2 5 mg/3 mL) 0 083 % nebulizer solution, Take 2 5 mg by nebulization Daily evening  and prn (Patient not taking: Reported on 1/12/2022 ), Disp: , Rfl:   No Known Allergies      Review of Systems   Constitutional: Positive for activity change (less active since winter), fatigue and unexpected weight change (lost 6 pounds in the last ~month)  HENT: Positive for nosebleeds (occasional left nares bleeding, on Eliquis)  Eyes: Negative  Wears glasses   Respiratory: Positive for cough (with yellow mucous production) and wheezing (daily)  Negative for shortness of breath  Cardiovascular: Negative  Negative for palpitations  Gastrointestinal: Negative  Endocrine: Negative  Genitourinary: Negative  Negative for difficulty urinating, dysuria and hematuria  Musculoskeletal: Positive for arthralgias  Negative for gait problem  Skin: Negative  Allergic/Immunologic: Negative  Neurological: Negative  Negative for dizziness, weakness, light-headedness, numbness and headaches  Hematological: Bruises/bleeds easily (on Eliquis)  Psychiatric/Behavioral: Positive for dysphoric mood and sleep disturbance (lays awake thinking)  The patient is nervous/anxious            OBJECTIVE:   /74 (BP Location: Left arm, Patient Position: Sitting)   Pulse 94   Temp 98 7 °F (37 1 °C) (Temporal)   Resp 18   Ht 5' 11" (1 803 m)   Wt 105 kg (230 lb 9 6 oz)   SpO2 94%   BMI 32 16 kg/m²   Pain Assessment:  0  Performance Status: ECOG/Zubrod/WHO: 1 - Symptomatic but completely ambulatory    Physical Exam  Vitals reviewed  Constitutional:       Appearance: He is well-developed  HENT:      Head: Normocephalic  Eyes:      Pupils: Pupils are equal, round, and reactive to light  Cardiovascular:      Rate and Rhythm: Normal rate and regular rhythm  Heart sounds: Normal heart sounds     Pulmonary:      Effort: Pulmonary effort is normal  Breath sounds: Normal breath sounds  No wheezing  Chest:      Chest wall: No tenderness  Abdominal:      General: Bowel sounds are normal  There is no distension  Palpations: Abdomen is soft  There is no mass  Tenderness: There is no abdominal tenderness  Comments: Obese   Genitourinary:     Prostate: Normal       Rectum: Normal    Musculoskeletal:         General: Normal range of motion  Cervical back: Normal range of motion and neck supple  Lymphadenopathy:      Cervical: No cervical adenopathy  Skin:     General: Skin is warm  Findings: No erythema or rash  Neurological:      Mental Status: He is alert  Cranial Nerves: No cranial nerve deficit  Coordination: Coordination normal    Psychiatric:         Behavior: Behavior normal            RESULTS  Lab Results    Chemistry        Component Value Date/Time    K 4 8 11/22/2021 0902     11/22/2021 0902    CO2 28 11/22/2021 0902    BUN 19 11/22/2021 0902    CREATININE 1 05 11/22/2021 0902        Component Value Date/Time    CALCIUM 9 0 11/22/2021 0902            Lab Results   Component Value Date    WBC 7 72 11/22/2021    HGB 16 0 11/22/2021    HCT 50 7 (H) 11/22/2021     (H) 11/22/2021     11/22/2021         Imaging Studies  Pulmonary exercise stress test (Cardiopulmonary exercise stress test)    Result Date: 1/10/2022  Narrative: Teressa Mcdonald 80 y o  male MRN: [id number] Data summary: The patient exercised via cycle ergometry in 30 w increments to exhaustion at 1:36 a m  w, which is 123% predicted  The maximal oxygen consumption (VO2 max) was 12 3 mL/kilogram per minute (1 3 liter/minute), which is 70% predicted  The respiratory exchange ratio increased appropriately with exercise  The heart rate increased from 79 beats per minute to 104 beats per minute, which is 73% of the predicted maximal heart rate  The oxygen pulse augmented appropriately with exercise    The systolic blood pressure decreased from 156 mm Hg to 147 mm Hg and diastolic blood pressure from 98 mm Hg to 83 mm Hg  The electrocardiogram showed atrial fibrillation with occasional PVCs during exercise and during recovery  No acute ischemic changes  The minute ventilation increased from 10 liters/minute to 66 3 liter/minute, compared to an MVV of 102 liter/minute, yielding a breathing reserve of 35 7 L  the respiratory rate increased from 17 breaths per minute to 42 breaths per minute and tidal volume increased from 0 58 L to 1 6 L  The estimated dead space to tidal volume ratio decreased from 0 33-0 28  O2 saturation dropped from 94% to 93%  The anaerobic threshold was estimated at 59% of the maximal oxygen consumption  Test ended by the patient due to leg fatigue and shortness of breath 6 to 7/10 on the Sunny scale  Study Interpretation: The patient has normal exercise tolerance as evident by achieving 123% predicted of the workload  He has reduced maximal oxygen consumption  There is no pulmonary limitations to exercise  However the blood pressure mildly decreased at peak exercise which may indicate underlying cardiac disease  This test was done to determine VO2 max for purposes of stridor find risk for lung resection  Results showed that the VO2 max was 12 3 mL/kilogram per minute, which is 70% predicted  The patient may still tolerate surgery up to the extent of resection  To determine this I recommend he have a quantitative perfusion scan  If the postoperative predicted VO2 max remains more than 35% predicted and more than 10 mL/kilogram per minute then he may tolerate surgery  Clinical correlation is recommended  Pathology:    Final Diagnosis   A  Lymph node, level 7, excision:  -  Portions of benign lymph node with reactive change and anthracosis, negative for granulomas, lymphoma, or metastatic carcinoma      B  Lymph node, level 4R, excision:  -  Portions of benign lymph node with reactive change and anthracosis, negative for granulomas, lymphoma, or metastatic carcinoma  C  Lymph node, level 2R, excision:  -  Portions of benign lymph node with reactive change and anthracosis, negative for granulomas, lymphoma, or metastatic carcinoma  D  Lymph node, level 4L, excision:  -  Benign lymph node with reactive change and anthracosis, negative for granulomas, lymphoma, or metastatic carcinoma  Final Diagnosis   A  Lung, Left Lower Lobe, :  - Invasive squamous carcinoma, moderately differentiated, keratinizing type  - Tumor is highlighted with P 40 immunoperoxidase stain   - Prominent tumor necrosis is noted              ASSESSMENT  1  Squamous cell carcinoma of left lung [facility])  Ambulatory Referral to Radiation Oncology    Ambulatory Referral to Oncology Social Worker   2  Malignant neoplasm of bronchus and lung [facility])  Ambulatory Referral to Radiation Oncology     Cancer Staging  No matching staging information was found for the patient  PLAN/DISCUSSION  Orders Placed This Encounter   Procedures    Ambulatory Referral to Oncology Social Worker          Alona Oreilly is a 80y o  year old male with clinical T2b N0 (stage IIA) of the left lower lobe  He has undergone PET-CT scan in October revealing a 4 5 cm lesion in the left lower lobe and questionable mediastinal nodes  Mediastinoscopy reveals no evidence of malignancy at multiple levels  He was evaluated by thoracic surgery and based on his pulmonary function test was not felt to be a good surgical candidate  I believe he is a candidate for SBRT as long as his lesion is not significantly changed in size from his PET-CT scan completed nearly 3 months ago  I reviewed the procedure involved with 4 D CT scan at which time customized immobilization will be constructed  We discussed breath hold scan as well as his lesion is close to the diaphragm to minimize any gastric dose  We discussed the goal of treatment    Possible side effects were reviewed with him and his wife  This can include but not limited to fatigue, pneumonitis, pulmonary scarring at treated site, low dose to surrounding structures including his heart and stomach, chest wall discomfort, rib fracture  He is agreeable to proceeding with the above outlined plan  Juan C Jeffries MD  1/12/2022,10:32 AM      Portions of the record may have been created with voice recognition software  Occasional wrong word or "sound a like" substitutions may have occurred due to the inherent limitations of voice recognition software  Read the chart carefully and recognize, using context, where substitutions have occurred

## 2022-01-13 ENCOUNTER — OFFICE VISIT (OUTPATIENT)
Dept: PODIATRY | Facility: CLINIC | Age: 82
End: 2022-01-13
Payer: COMMERCIAL

## 2022-01-13 ENCOUNTER — CONSULT (OUTPATIENT)
Dept: NEUROLOGY | Facility: CLINIC | Age: 82
End: 2022-01-13
Payer: COMMERCIAL

## 2022-01-13 ENCOUNTER — PATIENT OUTREACH (OUTPATIENT)
Dept: CASE MANAGEMENT | Facility: HOSPITAL | Age: 82
End: 2022-01-13

## 2022-01-13 VITALS
HEIGHT: 71 IN | SYSTOLIC BLOOD PRESSURE: 114 MMHG | DIASTOLIC BLOOD PRESSURE: 72 MMHG | WEIGHT: 245 LBS | BODY MASS INDEX: 34.3 KG/M2 | TEMPERATURE: 97.2 F | HEART RATE: 82 BPM

## 2022-01-13 VITALS — BODY MASS INDEX: 32.48 KG/M2 | HEIGHT: 71 IN | RESPIRATION RATE: 17 BRPM | WEIGHT: 232 LBS

## 2022-01-13 DIAGNOSIS — M79.672 PAIN IN BOTH FEET: ICD-10-CM

## 2022-01-13 DIAGNOSIS — I70.209 PERIPHERAL ARTERIOSCLEROSIS (HCC): ICD-10-CM

## 2022-01-13 DIAGNOSIS — M79.671 PAIN IN BOTH FEET: ICD-10-CM

## 2022-01-13 DIAGNOSIS — E11.42 DIABETIC POLYNEUROPATHY ASSOCIATED WITH TYPE 2 DIABETES MELLITUS (HCC): Primary | ICD-10-CM

## 2022-01-13 DIAGNOSIS — B35.3 TINEA PEDIS OF BOTH FEET: ICD-10-CM

## 2022-01-13 DIAGNOSIS — L84 CORNS: ICD-10-CM

## 2022-01-13 DIAGNOSIS — G25.0 TREMOR, ESSENTIAL: Primary | ICD-10-CM

## 2022-01-13 DIAGNOSIS — B35.1 ONYCHOMYCOSIS: ICD-10-CM

## 2022-01-13 PROCEDURE — 99204 OFFICE O/P NEW MOD 45 MIN: CPT | Performed by: PSYCHIATRY & NEUROLOGY

## 2022-01-13 PROCEDURE — 11056 PARNG/CUTG B9 HYPRKR LES 2-4: CPT | Performed by: PODIATRIST

## 2022-01-13 RX ORDER — CLOTRIMAZOLE AND BETAMETHASONE DIPROPIONATE 10; .64 MG/G; MG/G
CREAM TOPICAL 2 TIMES DAILY
Qty: 30 G | Refills: 2 | Status: SHIPPED | OUTPATIENT
Start: 2022-01-13 | End: 2022-05-31 | Stop reason: SDUPTHER

## 2022-01-13 RX ORDER — PRIMIDONE 50 MG/1
TABLET ORAL
Qty: 90 TABLET | Refills: 4 | Status: SHIPPED | OUTPATIENT
Start: 2022-01-13 | End: 2022-05-23 | Stop reason: SDUPTHER

## 2022-01-13 NOTE — PROGRESS NOTES
Assessment/Plan:  Patient has pain in his feet and toes with ambulation   He has mycosis of nail and skin  He has plantar pre ulcerative skin lesions      Plan   Diabetic foot exam performed   Mycotic nails debrided   cream ordered   Patient will use daily, he will apply cream to feet b i d  For 4 weeks   Calluses debrided   Procedures performed without pain or complication            Subjective:  Patient has pain in his feet with ambulation   No history of trauma    He has pain when he wears shoes   He has pain in his toes       Patient ID: Spenser Clayton is a 81 y o  male      Patient is diabetic  Paz Rowe has pain in his feet and toes with ambulation   Has pain from calluses   He has ingrown toenails and dry scaly skin   He is requesting refill of topical antifungal         Review of Systems   Constitutional: No fever or chills, feels well, no tiredness, no recent weight loss or weight gain   Eyes: No complaints of red eyes, no eyesight problems    ENT: no complaints of loss of hearing, no nosebleeds, no sore throat   Cardiovascular: No complaints of chest pain, no palpitations, no leg claudication or lower extremity edema   Respiratory: No complaints of shortness of breath, no wheezing, no cough   Gastrointestinal: No complaints of abdominal pain, no constipation, no nausea or vomiting, no diarrhea or bloody stools   Genitourinary: No complaints of dysuria or incontinence, no hesitancy, no nocturia   Musculoskeletal: limb pain, but-- as noted in HPI   Integumentary: No complaints of skin rash or lesion, no itching or dry skin, no skin wounds   Neurological: No complaints of headache, no confusion, no numbness or tingling, no dizziness   Psychiatric: No suicidal thoughts, no anxiety, no depression   Endocrine: No muscle weakness, no frequent urination, no excessive thirst, no feelings of weakness       Active Problems  1  Acquired ankle/foot deformity (207 26) (E77 606)   2  Arthritis (117 79) (M19 90)   3  Atherosclerosis of arteries of extremities (440 20) (I70 209)   4  Calcaneal spur (726 73) (M77 30)   5  Callus (700) (L84)   6  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)   7  Diabetes mellitus with neuropathy (250 60,357 2) (E11 40)   8  Diabetic neuropathy (250 60,357 2) (E11 40)   9  Hypercholesterolemia (272 0) (E78 00)   10  Hypertension (401 9) (I10)   11  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)   12  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)   13  Onychomycosis (110 1) (B35 1)   14  Orthopedic aftercare (V54 9) (Z47 89)   15  Pain in both feet (729 5) (M79 671,M79 672)   16  Pain in extremity (729 5) (M79 609)   17  Pes planus, congenital (754 61) (Q66 50)   18  Plantar 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 2)   19  Plantar wart (078 12) (B07 0)   20  Prostate cancer (185) (C61)   21  Sprain of right shoulder (840 9) (S43 401A)   22  Tinea pedis (110 4) (B35 3)     Past Medical History   · Orthopedic aftercare (V54 9) (Z47 89)     Surgical History   · History of Gastric Surgery   · History of Hernia Repair   · History of Previous Stent Placement Total Number Performed ___     The surgical history was reviewed and updated today        Family History  Family History    · Family history of Arthritis (V17 7)   · Family history of Cancer     The family history was reviewed and updated today        Social History      · Beer Consumption (___ Bottles Per Day)   · Daily Coffee Consumption (1  Cups/Day)   · Former smoker (B63 28) (Z87 891)  The social history was reviewed and updated today  Allergies  1  No Known Drug Allergies      Physical Exam  Left Foot: Appearance: Normal except as noted: excessive pronation-- and-- pes planus  Right Foot: Appearance: Normal except as noted: excessive pronation-- and-- pes planus  Left Ankle: ROM: limited ROM in all planes   Right Ankle: ROM: limited ROM in all planes   Neurological Exam: performed   Light touch was intact bilaterally  Vibratory sensation was intact bilaterally  Response to monofilament test was intact bilaterally  Deep tendon reflexes: patellar reflex present bilaterally-- and-- achilles reflex present bilaterally  Vascular Exam: performed Dorsalis pedis pulses were One/4 bilaterally  Posterior tibial pulses were One/4 bilaterally  Elevation Pallor: present bilaterally  Capillary refill time was greater than 3 seconds bilaterally-- and-- Q  9 findings bilateral  Negative digital hair noted  Positive abnormal cooling noted  Toenails: All of the toenails were elongated,-- hypertrophied,-- discolored,-- ingrown,-- shown to have subungual debris,-- tender-- and-- Severely mycotic with onychauxis     Socks and shoes removed, Right Foot Findings: swollen, erythematous and dry   The sensory exam showed diminished vibratory sensation at the level of the toes  Diminished tactile sensation with monofilament testing throughout the right foot   Socks and shoes removed, Left Foot Findings: swollen, erythematous and dry   The sensory exam showed diminished vibratory sensation at the level of the toes  Diminished tactile sensation with monofilament testing throughout the left foot  Capillary refills findings on the right were delayed in the toes   Pulses:  1+ in the posterior tibialis on the right  1+ in the dorsalis pedis on the right   Capillary refills findings on the left were delayed in the toes   Pulses:  1+ in the posterior tibialis on the left  1+ in the dorsalis pedis on the left   Assign Risk Category: 2: Loss of protective sensation with or without weakness, deformity, callus, pre-ulcer, or history of ulceration  High risk  Hyperkeratosis: present on both first toes,-- present on both fifth sub metatarsals-- and-- [place] tinea pedis, bilateral, is noted  Shoe Gear Evaluation: performed ()  Right Foot: width: d-- and-- length: 11   Left Foot: width: d-- and-- length: 11  Recommendation(s): athletic shoes     Patient's shoes and socks removed  Right Foot/Ankle   Right Foot Inspection  Skin Exam: dry skin, callus and callus               Toe Exam: swelling  Sensory   Vibration: diminished  Proprioception: diminished      Vascular  Capillary refills: elevated        Left Foot/Ankle  Left Foot Inspection  Skin Exam: dry skin and callus              Toe Exam: swelling and erythema                   Sensory   Vibration: diminished  Proprioception: diminished     Vascular  Capillary refills: elevated     Patient's shoes and socks removed  Assign Risk Category:  Deformity present; Loss of protective sensation; Weak pulses       Risk: 2

## 2022-01-13 NOTE — PROGRESS NOTES
LSW received referral, no explanation as to need, from Rad onc  LSW contacted pt via telephone, wife answered and stated pt was not available at this time  LSW advised wife LSW would call at a later time

## 2022-01-13 NOTE — PROGRESS NOTES
Outpatient Neurology History and Physical  Mirlande Hernandez  [id number]  81 y o   1940          Consult: Yes    Dean Acharya MD      CC: tremors         History Obtained from: patient and wife     HPI:     Mirlande Hernandez is a 81 yo right handed M that presents to discuss his tremors  About a year ago, he noticed tremor in his right hand and slightly in left hand  He denies resting tremor  It typically comes on when he's trying to do something  If he's holding cup, would need other hand to steady it  His hand writing is messy  Denies any change with his gait, chewing, swallowing  Patient was placed on primidone by his PCP for a year  He has tendency to wheeze  He takes inhaler prn       Past Medical History:   Diagnosis Date    Anxiety     Arthritis     Atrial fibrillation ([facility] )     Bleeding ulcer     Cancer [facility])     prostate 2011- radiation    Cardiac disease     cardiac stent x1    Cataract     starting    Chronic diastolic (congestive) heart failure (HCC)     Diabetes mellitus ([facility] )     boarderline diabetic     GERD (gastroesophageal reflux disease)     History of radiation therapy 2010    Prostate seeds (brachytherapy) and EBRT    Hyperlipidemia     Hypertension     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, bilateral     Low back pain     Lung cancer ([facility] )     Lung mass     Myocardial infarction ([facility] )     mild 1999    Prostate cancer ([facility] )     Sleep apnea     sleep study 11/22    Wears dentures     full set    Wears glasses                Current Outpatient Medications on File Prior to Visit   Medication Sig Dispense Refill    albuterol (2 5 mg/3 mL) 0 083 % nebulizer solution Take 2 5 mg by nebulization Daily evening  and prn        ammonium lactate (LAC-HYDRIN) 12 % cream Apply topically as needed for dry skin 385 g 0    apixaban (Eliquis) 5 mg Take 5 mg by mouth 2 (two) times a day      atenolol (TENORMIN) 100 mg tablet Take 100 mg by mouth daily       atorvastatin (LIPITOR) 40 mg tablet Take 40 mg by mouth every evening        JARDIANCE 25 MG TABS Take 25 mg by mouth daily in the early morning       latanoprost (XALATAN) 0 005 % ophthalmic solution Administer 1 drop to both eyes daily at bedtime        losartan (COZAAR) 50 mg tablet Take 1 tablet (50 mg total) by mouth daily 90 tablet 3    metFORMIN (GLUCOPHAGE) 500 mg tablet Take 500 mg by mouth 2 (two) times a day       Multiple Vitamin (MULTI-VITAMIN DAILY PO) Take by mouth daily        omeprazole (PriLOSEC) 20 mg delayed release capsule Take 20 mg by mouth daily        Trelegy Ellipta 100-62 5-25 MCG/INH inhaler INHALE 1 PUFF DAILY RINSE MOUTH AFTER USE  60 each 5    [DISCONTINUED] primidone (MYSOLINE) 50 mg tablet Take 50 mg by mouth daily at bedtime        apixaban (Eliquis) 5 mg Take 1 tablet (5 mg total) by mouth 2 (two) times a day (Patient taking differently: Take 5 mg by mouth 2 (two) times a day To stop 11/26 ) 60 tablet 5    [DISCONTINUED] clotrimazole-betamethasone (LOTRISONE) 1-0 05 % cream Apply topically 2 (two) times a day 30 g 2     No current facility-administered medications on file prior to visit         No Known Allergies      Family History   Problem Relation Age of Onset    Prostate cancer Brother     Cancer Maternal Uncle         colo rectal cancer    Cancer Paternal Aunt                 Past Surgical History:   Procedure Laterality Date    ABDOMINAL HERNIA REPAIR      ABDOMINAL SURGERY      bleeding ulcer, cyst removed from abd    COLONOSCOPY      CORONARY ANGIOPLASTY WITH STENT PLACEMENT  1999    x1     IR BIOPSY LUNG  10/5/2021    IR THORACENTESIS  11/8/2021    [place] BRONCHOSCOPY,DIAGNOSTIC N/A 11/29/2021    Procedure: BRONCHOSCOPY FLEXIBLE;  Surgeon: Dianne Manzano MD;  [facility]: [facility];  Service: Thoracic    [place] MEDIASTINOSCOPY WITH LYMPH NODE BIOPSY/IES N/A 11/29/2021    Procedure: Candi Reef;  Surgeon: Dianne Manzano MD;  Location: [facility];  Service: Thoracic           Social History     Socioeconomic History    Marital status: /Civil Union     Spouse name: Not on file    Number of children: Not on file    Years of education: Not on file    Highest education level: Not on file   Occupational History    Not on file   Tobacco Use    Smoking status: Former Smoker     Packs/day:      Years: 30      Pack years: 30      Types: Cigarettes     Quit date:      Years since quittin 0    Smokeless tobacco: Never Used   Vaping Use    Vaping Use: Never used   Substance and Sexual Activity    Alcohol use:  Yes     Alcohol/week: 10 0 - 12 0 standard drinks     Types: 7 Glasses of wine, 3 - 5 Cans of beer per week     Comment: few beers day; glass of red wine at dinner    Drug use: Never    Sexual activity: Not on file   Other Topics Concern    Not on file   Social History Narrative    Not on file     Social Determinants of Health     Financial Resource Strain: Not on file   Food Insecurity: Not on file   Transportation Needs: Not on file   Physical Activity: Not on file   Stress: Not on file   Social Connections: Not on file   Intimate Partner Violence: Not on file   Housing Stability: Not on file       Review of Systems  Refer to positive review of systems in HPI  Constitutional- No fever  Eyes- No visual change  ENT- Hearing normal  CV- No chest pain  Resp- No Shortness of breath  GI- No diarrhea  - Bladder normal  MS- No Arthritis   Skin- No rash  Psych- No depression  Endo- No DM  Heme- No nodes    PHYSICAL EXAM:    Vitals:    22 1102   BP: 114/72   BP Location: Left arm   Patient Position: Sitting   Cuff Size: Standard   Pulse: 82   Temp: (!) 97 2 °F (36 2 °C)   TempSrc: Tympanic   Weight: 111 kg (245 lb)   Height: 5' 11" (1 803 m)         Appearance: No Acute Distress  Ophthalmoscopic: Disc Flat, Normal fundus  Carotid/Heart/Peripheral Vascular: No Bruits, RRR  Orientation: Awake, Alert, and Oriented x 3  Mental status:  Memory: Registation 3/3 Recall 3/3  Attention: Normal  Knowledge: Appropriate  Language: No aphasia  Speech: No dysarthria  Cranial Nerves:  2 No Visual Defect on Confrontation; Pupils round, equal, reactive to light  3,4,6 Extraocular Movements Intact; no nystagmus  5 Facial Sensation Intact  7 No facial asymmetry  8 Intact hearing  9,10 Palate symmetric, normal gag  11 Good shoulder shrug  12 Tongue Midline  Gait: Stable, No ataxia, can perform tandem walking  Coordination: ET RUE> LUE  No ataxia with finger to nose testing and heel to shin testing  Sensory: Intact, Symmetric to Pinprick, Light Touch, Vibration, and Joint Position  Muscle Tone: Normal  Muscle exam  Arm Right Left Leg Right Left   Deltoid 5/5 5/5 Iliopsoas 5/5 5/5   Biceps 5/5 5/5 Quads 5/5 5/5   Triceps 5/5 5/5 Hamstrings 5/5 5/5   Wrist Extension 5/5 5/5 Ankle Dorsi Flexion 5/5 5/5   Wrist Flexion 5/5 5/5 Ankle Plantar Flexion 5/5 5/5   Interossei 5/5 5/5 Ankle Eversion 5/5 5/5   APB 5/5 5/5 Ankle Inversion 5/5 5/5       Reflexes   RJ BJ TJ KJ AJ Plantars Link's   Right 2+ 2+ 2+ 2+ 2+ Downgoing Not present   Left 2+ 2+ 2+ 2+ 2+ Downgoing Not present       Personal review of         None relevant     Assessment/Plan:     1  Tremor, essential  primidone (MYSOLINE) 50 mg tablet       Patient's exam doesn't support PD at this time  He has essential tremors  Will optimize primidone to 50mg bid for 2 weeks and then 50mg am and 100mg pm                  Counseling Documentation:  The patient and/or patient's family were  counseled regarding diagnostic results  Instructions for management,risk factor reductions,prognosis of disease were discussed  Patient and family were educated regarding impressions,risks and benefits of treatment options,importance of compliance with treatment  Total time of encounter: 45 min  More than 50% of time was spent in counseling and coordination of care of patient  [facility]  [address] [place] [address] Cameron  ReyesPrema

## 2022-01-18 ENCOUNTER — PATIENT OUTREACH (OUTPATIENT)
Dept: CASE MANAGEMENT | Facility: HOSPITAL | Age: 82
End: 2022-01-18

## 2022-01-18 NOTE — PROGRESS NOTES
Biopsychosocial and Barriers Assessment    Cancer Diagnosis:  Squamous cell carcinoma of left lung [facility])   Home/Cell Phone: [phone]  Emergency Contact: Sweta Clayton/ spouse  [phone]  Marital Status:   Interpretation concerns, speaks another language, preferred language: no concerns/ english  Cultural concerns: none  Ability to read or write: no issues    Caregiver/Support: wife, sons  Children: 3 adult sons  Child/Elder care: n/a    Housing: own  Home Setup: 2 floors  Lives With: wife  Daily Living Activities: no issues  Durable Medical Equipment: none at this time  Ambulation: fully ambulatory    Preferred Pharmacy: [facility]  High co-pays with insurance: no concerns at this time  High co-pays with medication coverage: no concerns at this time  No medication coverage: has coverage    Primary Care Provider: Radhika Soto MD  Hx of [address]: no hx  Hx of Short term rehab: no hx  Mental Health Hx: no hx  Substance Abuse Hx: no hx  Employment: retired  [place]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  Ability to pay bills: no concerns  POA/LW/AD: yes in place  Transportation Plan/Concerns: no concerns      What do you know about your Cancer Diagnosis    What has your doctor told you about your cancer diagnosis: Pt stated he is attending physician appointments to gather more information on his diagnosis  What has your doctor told you about your cancer treatment: Pt stated he has appointments to determine his treatment plan  What specific concerns do you have about your diagnosis and treatment: some concerns  "try not to let it get me down"    Have you been made aware of any hair loss associated with treatment: n/a    Additional Comments: Pt stated he was getting ready to go to his doctors appointment  Pt reported he is somewhat nervous but is dealing with the anxiety with the assistance of his wife and sons  LSW provided contact information and encouraged pt to reach out

## 2022-01-19 ENCOUNTER — RADIATION THERAPY TREATMENT (OUTPATIENT)
Dept: RADIATION ONCOLOGY | Facility: HOSPITAL | Age: 82
End: 2022-01-19
Attending: RADIOLOGY
Payer: COMMERCIAL

## 2022-01-19 PROCEDURE — 77399 UNLISTED PX MED RADJ PHYSICS: CPT | Performed by: RADIOLOGY

## 2022-01-19 PROCEDURE — 77334 RADIATION TREATMENT AID(S): CPT | Performed by: RADIOLOGY

## 2022-01-31 PROCEDURE — 77301 RADIOTHERAPY DOSE PLAN IMRT: CPT | Performed by: RADIOLOGY

## 2022-01-31 PROCEDURE — 77300 RADIATION THERAPY DOSE PLAN: CPT | Performed by: RADIOLOGY

## 2022-01-31 PROCEDURE — 77338 DESIGN MLC DEVICE FOR IMRT: CPT | Performed by: RADIOLOGY

## 2022-02-01 ENCOUNTER — APPOINTMENT (OUTPATIENT)
Dept: RADIATION ONCOLOGY | Facility: HOSPITAL | Age: 82
End: 2022-02-01
Attending: RADIOLOGY
Payer: COMMERCIAL

## 2022-02-02 ENCOUNTER — APPOINTMENT (OUTPATIENT)
Dept: RADIATION ONCOLOGY | Facility: HOSPITAL | Age: 82
End: 2022-02-02
Attending: RADIOLOGY
Payer: COMMERCIAL

## 2022-02-02 PROCEDURE — 77435 SBRT MANAGEMENT: CPT | Performed by: RADIOLOGY

## 2022-02-02 PROCEDURE — 77373 STRTCTC BDY RAD THER TX DLVR: CPT | Performed by: RADIOLOGY

## 2022-02-03 ENCOUNTER — APPOINTMENT (OUTPATIENT)
Dept: RADIATION ONCOLOGY | Facility: HOSPITAL | Age: 82
End: 2022-02-03
Attending: RADIOLOGY
Payer: COMMERCIAL

## 2022-02-04 ENCOUNTER — APPOINTMENT (OUTPATIENT)
Dept: RADIATION ONCOLOGY | Facility: HOSPITAL | Age: 82
End: 2022-02-04
Attending: RADIOLOGY
Payer: COMMERCIAL

## 2022-02-04 PROCEDURE — 77373 STRTCTC BDY RAD THER TX DLVR: CPT | Performed by: RADIOLOGY

## 2022-02-07 ENCOUNTER — APPOINTMENT (OUTPATIENT)
Dept: RADIATION ONCOLOGY | Facility: HOSPITAL | Age: 82
End: 2022-02-07
Attending: RADIOLOGY
Payer: COMMERCIAL

## 2022-02-07 PROCEDURE — 77373 STRTCTC BDY RAD THER TX DLVR: CPT | Performed by: STUDENT IN AN ORGANIZED HEALTH CARE EDUCATION/TRAINING PROGRAM

## 2022-02-09 ENCOUNTER — APPOINTMENT (OUTPATIENT)
Dept: RADIATION ONCOLOGY | Facility: HOSPITAL | Age: 82
End: 2022-02-09
Attending: RADIOLOGY
Payer: COMMERCIAL

## 2022-02-09 ENCOUNTER — APPOINTMENT (OUTPATIENT)
Dept: RADIATION ONCOLOGY | Facility: HOSPITAL | Age: 82
End: 2022-02-09
Payer: COMMERCIAL

## 2022-02-09 PROCEDURE — 77373 STRTCTC BDY RAD THER TX DLVR: CPT | Performed by: RADIOLOGY

## 2022-02-11 ENCOUNTER — APPOINTMENT (OUTPATIENT)
Dept: RADIATION ONCOLOGY | Facility: HOSPITAL | Age: 82
End: 2022-02-11
Attending: RADIOLOGY
Payer: COMMERCIAL

## 2022-02-11 PROCEDURE — 77373 STRTCTC BDY RAD THER TX DLVR: CPT | Performed by: STUDENT IN AN ORGANIZED HEALTH CARE EDUCATION/TRAINING PROGRAM

## 2022-02-11 PROCEDURE — 77336 RADIATION PHYSICS CONSULT: CPT | Performed by: RADIOLOGY

## 2022-02-21 ENCOUNTER — APPOINTMENT (OUTPATIENT)
Dept: LAB | Facility: HOSPITAL | Age: 82
End: 2022-02-21
Attending: INTERNAL MEDICINE
Payer: COMMERCIAL

## 2022-02-21 DIAGNOSIS — I51.9 MYXEDEMA HEART DISEASE: ICD-10-CM

## 2022-02-21 DIAGNOSIS — I50.9 CONGESTIVE HEART FAILURE, UNSPECIFIED HF CHRONICITY, UNSPECIFIED HEART FAILURE TYPE (HCC): ICD-10-CM

## 2022-02-21 DIAGNOSIS — Z09 FOLLOW-UP EXAMINATION, FOLLOWING OTHER SURGERY: ICD-10-CM

## 2022-02-21 DIAGNOSIS — E13.69 OTHER SPECIFIED DIABETES MELLITUS WITH OTHER SPECIFIED COMPLICATION, UNSPECIFIED WHETHER LONG TERM INSULIN USE (HCC): ICD-10-CM

## 2022-02-21 DIAGNOSIS — R94.5 NONSPECIFIC ABNORMAL RESULTS OF LIVER FUNCTION STUDY: ICD-10-CM

## 2022-02-21 DIAGNOSIS — R51.9 NONINTRACTABLE HEADACHE, UNSPECIFIED CHRONICITY PATTERN, UNSPECIFIED HEADACHE TYPE: ICD-10-CM

## 2022-02-21 DIAGNOSIS — N18.9 CHRONIC KIDNEY DISEASE, UNSPECIFIED CKD STAGE: ICD-10-CM

## 2022-02-21 DIAGNOSIS — E03.9 MYXEDEMA HEART DISEASE: ICD-10-CM

## 2022-02-21 DIAGNOSIS — E55.9 AVITAMINOSIS D: ICD-10-CM

## 2022-02-21 DIAGNOSIS — I10 ESSENTIAL HYPERTENSION, MALIGNANT: ICD-10-CM

## 2022-02-21 DIAGNOSIS — R10.9 STOMACH ACHE: ICD-10-CM

## 2022-02-21 DIAGNOSIS — M79.10 MYALGIA: ICD-10-CM

## 2022-02-21 DIAGNOSIS — E78.2 MIXED HYPERLIPIDEMIA: ICD-10-CM

## 2022-02-21 DIAGNOSIS — I25.10 ATHEROSCLEROSIS OF NATIVE CORONARY ARTERY WITHOUT ANGINA PECTORIS, UNSPECIFIED WHETHER NATIVE OR TRANSPLANTED HEART: ICD-10-CM

## 2022-02-21 LAB
25(OH)D3 SERPL-MCNC: 33.6 NG/ML (ref 30–100)
ALBUMIN SERPL BCP-MCNC: 3.4 G/DL (ref 3.5–5)
ALP SERPL-CCNC: 119 U/L (ref 46–116)
ALT SERPL W P-5'-P-CCNC: 40 U/L (ref 12–78)
ANION GAP SERPL CALCULATED.3IONS-SCNC: 8 MMOL/L (ref 4–13)
AST SERPL W P-5'-P-CCNC: 23 U/L (ref 5–45)
BASOPHILS # BLD AUTO: 0.05 THOUSANDS/ΜL (ref 0–0.1)
BASOPHILS NFR BLD AUTO: 1 % (ref 0–1)
BILIRUB SERPL-MCNC: 0.78 MG/DL (ref 0.2–1)
BUN SERPL-MCNC: 16 MG/DL (ref 5–25)
CALCIUM ALBUM COR SERPL-MCNC: 9.5 MG/DL (ref 8.3–10.1)
CALCIUM SERPL-MCNC: 9 MG/DL (ref 8.3–10.1)
CHLORIDE SERPL-SCNC: 100 MMOL/L (ref 100–108)
CHOLEST SERPL-MCNC: 137 MG/DL
CO2 SERPL-SCNC: 31 MMOL/L (ref 21–32)
CREAT SERPL-MCNC: 0.99 MG/DL (ref 0.6–1.3)
EOSINOPHIL # BLD AUTO: 0.15 THOUSAND/ΜL (ref 0–0.61)
EOSINOPHIL NFR BLD AUTO: 2 % (ref 0–6)
ERYTHROCYTE [DISTWIDTH] IN BLOOD BY AUTOMATED COUNT: 12.4 % (ref 11.6–15.1)
EST. AVERAGE GLUCOSE BLD GHB EST-MCNC: 157 MG/DL
GFR SERPL CREATININE-BSD FRML MDRD: 72 ML/MIN/1.73SQ M
GLUCOSE P FAST SERPL-MCNC: 156 MG/DL (ref 65–99)
HBA1C MFR BLD: 7.1 %
HCT VFR BLD AUTO: 52.5 % (ref 36.5–49.3)
HCV AB SER QL: NORMAL
HDLC SERPL-MCNC: 40 MG/DL
HGB BLD-MCNC: 17 G/DL (ref 12–17)
IMM GRANULOCYTES # BLD AUTO: 0.02 THOUSAND/UL (ref 0–0.2)
IMM GRANULOCYTES NFR BLD AUTO: 0 % (ref 0–2)
LDLC SERPL CALC-MCNC: 74 MG/DL (ref 0–100)
LYMPHOCYTES # BLD AUTO: 0.78 THOUSANDS/ΜL (ref 0.6–4.47)
LYMPHOCYTES NFR BLD AUTO: 12 % (ref 14–44)
MCH RBC QN AUTO: 33 PG (ref 26.8–34.3)
MCHC RBC AUTO-ENTMCNC: 32.4 G/DL (ref 31.4–37.4)
MCV RBC AUTO: 102 FL (ref 82–98)
MONOCYTES # BLD AUTO: 0.55 THOUSAND/ΜL (ref 0.17–1.22)
MONOCYTES NFR BLD AUTO: 8 % (ref 4–12)
NEUTROPHILS # BLD AUTO: 5.12 THOUSANDS/ΜL (ref 1.85–7.62)
NEUTS SEG NFR BLD AUTO: 77 % (ref 43–75)
NONHDLC SERPL-MCNC: 97 MG/DL
NRBC BLD AUTO-RTO: 0 /100 WBCS
PLATELET # BLD AUTO: 238 THOUSANDS/UL (ref 149–390)
PMV BLD AUTO: 9.9 FL (ref 8.9–12.7)
POTASSIUM SERPL-SCNC: 4.5 MMOL/L (ref 3.5–5.3)
PROT SERPL-MCNC: 7.5 G/DL (ref 6.4–8.2)
RBC # BLD AUTO: 5.15 MILLION/UL (ref 3.88–5.62)
SODIUM SERPL-SCNC: 139 MMOL/L (ref 136–145)
TRIGL SERPL-MCNC: 114 MG/DL
TSH SERPL DL<=0.05 MIU/L-ACNC: 2.74 UIU/ML (ref 0.36–3.74)
WBC # BLD AUTO: 6.67 THOUSAND/UL (ref 4.31–10.16)

## 2022-02-21 PROCEDURE — 84443 ASSAY THYROID STIM HORMONE: CPT

## 2022-02-21 PROCEDURE — 86803 HEPATITIS C AB TEST: CPT

## 2022-02-21 PROCEDURE — 36415 COLL VENOUS BLD VENIPUNCTURE: CPT

## 2022-02-21 PROCEDURE — 80053 COMPREHEN METABOLIC PANEL: CPT

## 2022-02-21 PROCEDURE — 82306 VITAMIN D 25 HYDROXY: CPT

## 2022-02-21 PROCEDURE — 85025 COMPLETE CBC W/AUTO DIFF WBC: CPT

## 2022-02-21 PROCEDURE — 80061 LIPID PANEL: CPT

## 2022-02-21 PROCEDURE — 83036 HEMOGLOBIN GLYCOSYLATED A1C: CPT

## 2022-03-02 ENCOUNTER — OFFICE VISIT (OUTPATIENT)
Dept: HEMATOLOGY ONCOLOGY | Facility: MEDICAL CENTER | Age: 82
End: 2022-03-02
Payer: COMMERCIAL

## 2022-03-02 ENCOUNTER — OFFICE VISIT (OUTPATIENT)
Dept: CARDIOLOGY CLINIC | Facility: CLINIC | Age: 82
End: 2022-03-02
Payer: COMMERCIAL

## 2022-03-02 VITALS
OXYGEN SATURATION: 95 % | SYSTOLIC BLOOD PRESSURE: 122 MMHG | BODY MASS INDEX: 31.64 KG/M2 | WEIGHT: 226 LBS | TEMPERATURE: 97.9 F | HEIGHT: 71 IN | HEART RATE: 93 BPM | DIASTOLIC BLOOD PRESSURE: 72 MMHG

## 2022-03-02 VITALS
DIASTOLIC BLOOD PRESSURE: 72 MMHG | RESPIRATION RATE: 16 BRPM | BODY MASS INDEX: 31.92 KG/M2 | OXYGEN SATURATION: 95 % | SYSTOLIC BLOOD PRESSURE: 122 MMHG | HEIGHT: 71 IN | HEART RATE: 93 BPM | WEIGHT: 228 LBS | TEMPERATURE: 97.9 F

## 2022-03-02 DIAGNOSIS — I50.32 CHRONIC DIASTOLIC HEART FAILURE (HCC): ICD-10-CM

## 2022-03-02 DIAGNOSIS — I10 HYPERTENSION, UNSPECIFIED TYPE: Primary | ICD-10-CM

## 2022-03-02 DIAGNOSIS — I48.91 NEW ONSET ATRIAL FIBRILLATION (HCC): ICD-10-CM

## 2022-03-02 DIAGNOSIS — C34.92 SQUAMOUS CELL CARCINOMA OF LEFT LUNG (HCC): Primary | Chronic | ICD-10-CM

## 2022-03-02 PROCEDURE — 93000 ELECTROCARDIOGRAM COMPLETE: CPT | Performed by: INTERNAL MEDICINE

## 2022-03-02 PROCEDURE — 99214 OFFICE O/P EST MOD 30 MIN: CPT | Performed by: INTERNAL MEDICINE

## 2022-03-02 PROCEDURE — 99215 OFFICE O/P EST HI 40 MIN: CPT | Performed by: INTERNAL MEDICINE

## 2022-03-02 NOTE — PROGRESS NOTES
Chris Yudi  1940  [facility] [place] [facility]  [facility] [facility] [address]    DISCUSSION/SUMMARY:    82-year-old male with a number of medical and cardiac problems recently found to have a left lower lobe mass  Biopsy demonstrated squamous cell carcinoma  IR perform thoracentesis, minimal amount of fluid was removed but there was no evidence of metastatic disease; cytology was negative  Patient has been seen by thoracic surgery and underwent mediastinoscopy  There was no evidence of metastatic disease in the sampled lymph nodes (2R, 4R, 4L, 7)  Tumor was 4 5 cm  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  Patient is status post SPRT  Mr Bowen Urbano is doing well from a pulmonary standpoint  Patient has a follow-up CT scan scheduled for April 2022; will also follow up with Dr Donovan Blair, radiation oncology  NCCN guidelines 1 2022 state that for patients with T2b N0 lesions, negative mediastinal lymph nodes, medically inoperable, initial treatment options include definitive RT  Guidelines also state that patients can be considered for adjuvant chemotherapy for high risk disease  High risk is defined as poorly differentiated tumors, pulmonary neuroendocrine tumors, vascular invasion, wedge resection, tumors> 4 cm (this patient) visceral pleural involvement and unknown lymph node status  Mr Bowen Urbano has only 1 known risk factor  Additionally patient has many other medical, cardiac and pulmonary issues; performance status is +/-so I do not believe this patient would be a good candidate for adjuvant systemic treatments  We discussed this at length  Patient understands that he needs to be monitored and that there is always the possibility the cancer may return  At that time, we would discuss systemic treatment options  Patient denies any pain control issues at this time    Routine health maintenance and medical care is up-to-date  Patient will follow up with Neurology as directed  Mr Won Galarza is to return here in 3 months  Carefully review your medication list and verify that the list is accurate and up-to-date  Please call the hematology/oncology office if there are medications missing from the list, medications on the list that you are not currently taking or if there is a dosage or instruction that is different from how you're taking that medication  Patient goals and areas of care:  Lung cancer surveillance  Barriers to care: none  Patient is able to self-care   ______________________________________________________________________________________    Chief Complaint   Patient presents with    Follow-up     Lung cancer     History of Present Illness:  24-year-old male with multiple medical and cardiac issue recently seen in the emergency room because of a cough  Workup demonstrated a left lower lobe mass  Biopsy demonstrated squamous cell carcinoma  Workup did not demonstrated evidence of metastatic disease; mediastinal ostomy was negative  Patient was seen by thoracic surgery but was not felt to be a surgical candidate  Patient was subsequently seen by radiation oncology and underwent SPRT  Patient returns for follow-up  Mr Won Galarza states feeling okay, about the same as before  Fatigue is the same as before  Cough is less/better than before, no sputum or hemoptysis  No shortness of breath at rest, mild dyspnea on exertion is the same as before  No pain control issues  Patient has sleep apnea - sleep apnea study is pending  Patient has a history of essential tremors, recently seen by Neurology and now on primidone  Numbers are less/better than before  Patient was diagnosed with prostate cancer (approximately 10 years ago) and underwent seeds and external beam radiation  No evidence of recurrence since that time  Patient has received his COVID vaccine      Review of Systems Constitutional: Positive for fatigue  HENT: Negative  Eyes: Negative  Respiratory: Negative  Cardiovascular: Negative  Gastrointestinal: Negative  Endocrine: Negative  Genitourinary: Negative  Musculoskeletal: Negative  Skin: Negative  Allergic/Immunologic: Negative  Neurological: Negative  Hematological: Negative  Easy bruising   Psychiatric/Behavioral: The patient is nervous/anxious  All other systems reviewed and are negative      Patient Active Problem List   Diagnosis    Corns    Pain in both feet    Diabetic polyneuropathy associated with type 2 diabetes mellitus ([facility]    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[facility]    Onychomycosis    Tinea pedis of both feet    RSV bronchitis    Lung mass    Hypertension    Hyperlipidemia    Diabetes mellitus ([facility]    New onset atrial fibrillation (HCC)    Squamous cell carcinoma of lung (HCC)    Shortness of breath    Daytime hypersomnolence    Chronic diastolic heart failure (HCC)    SYLVESTER (obstructive sleep apnea)     Past Medical History:   Diagnosis Date    Anxiety     Arthritis     Atrial fibrillation (HCC)     Bleeding ulcer     Cancer [facility])     prostate 2011- radiation    Cardiac disease     cardiac stent x1    Cataract     starting    Chronic diastolic (congestive) heart failure ([facility]     Diabetes mellitus ([facility]     boarderline diabetic     GERD (gastroesophageal reflux disease)     History of radiation therapy 2010    Prostate seeds (brachytherapy) and EBRT    Hyperlipidemia     Hypertension     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, bilateral     Low back pain     Lung cancer ([facility]     Lung mass     Myocardial infarction ([facility] )     mild 1999    Prostate cancer ([facility]     Sleep apnea     sleep study 11/22    Wears dentures     full set    Wears glasses      Past Surgical History:   Procedure Laterality Date    ABDOMINAL HERNIA REPAIR      ABDOMINAL SURGERY      bleeding ulcer, cyst removed from abd  COLONOSCOPY      CORONARY ANGIOPLASTY WITH STENT PLACEMENT  1999    x1     IR BIOPSY LUNG  10/5/2021    IR THORACENTESIS  2021    NC BRONCHOSCOPY,DIAGNOSTIC N/A 2021    Procedure: Fredy Nicholson;  Surgeon: Reuben Sharma MD;  [facility];  Service: Thoracic    NC MEDIASTINOSCOPY WITH LYMPH NODE BIOPSY/IES N/A 2021    Procedure: Lizzy Up;  Surgeon: Reuben Sharma MD;  [facility];  Service: Thoracic     Family history: 3 sons in good general health, no known familial or genetic diseases    Social History     Socioeconomic History    Marital status: /Civil Union     Spouse name: Not on file    Number of children: Not on file    Years of education: Not on file    Highest education level: Not on file   Occupational History    Not on file   Tobacco Use    Smoking status: Former Smoker     Packs/day:  00     Years: 30      Pack years:      Types: Cigarettes     Quit date:      Years since quittin 1    Smokeless tobacco: Never Used   Vaping Use    Vaping Use: Never used   Substance and Sexual Activity    Alcohol use:  Yes     Alcohol/week: 10 0 - 12 0 standard drinks     Types: 7 Glasses of wine, 3 - 5 Cans of beer per week     Comment: few beers day; glass of red wine at dinner    Drug use: Never    Sexual activity: Not on file   Other Topics Concern    Not on file   Social History Narrative    Not on file     Social Determinants of Health     Financial Resource Strain: Not on file   Food Insecurity: Not on file   Transportation Needs: Not on file   Physical Activity: Not on file   Stress: Not on file   Social Connections: Not on file   Intimate Partner Violence: Not on file   Housing Stability: Not on file   Social history:  40 pack-year history discontinued 20 years ago, patient drinks 2-4 beers a day off and on, no drug abuse, 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)    Current Outpatient Medications:     albuterol (2 5 mg/3 mL) 0 083 % nebulizer solution, Take 2 5 mg by nebulization Daily evening  and prn  , Disp: , Rfl:     ammonium lactate (LAC-HYDRIN) 12 % cream, Apply topically as needed for dry skin, Disp: 385 g, Rfl: 0    apixaban (Eliquis) 5 mg, Take 1 tablet (5 mg total) by mouth 2 (two) times a day (Patient taking differently: Take 5 mg by mouth 2 (two) times a day To stop 11/26 ), Disp: 60 tablet, Rfl: 5    apixaban (Eliquis) 5 mg, Take 5 mg by mouth in the morning  , Disp: , Rfl:     atenolol (TENORMIN) 100 mg tablet, Take 100 mg by mouth daily , Disp: , Rfl:     atorvastatin (LIPITOR) 40 mg tablet, Take 40 mg by mouth every evening  , Disp: , Rfl:     clotrimazole-betamethasone (LOTRISONE) 1-0 05 % cream, Apply topically 2 (two) times a day, Disp: 30 g, Rfl: 2    JARDIANCE 25 MG TABS, Take 25 mg by mouth daily in the early morning , Disp: , Rfl:     latanoprost (XALATAN) 0 005 % ophthalmic solution, Administer 1 drop to both eyes daily at bedtime  , Disp: , Rfl:     losartan (COZAAR) 50 mg tablet, Take 1 tablet (50 mg total) by mouth daily, Disp: 90 tablet, Rfl: 3    metFORMIN (GLUCOPHAGE) 500 mg tablet, Take 500 mg by mouth 2 (two) times a day , Disp: , Rfl:     Multiple Vitamin (MULTI-VITAMIN DAILY PO), Take by mouth daily  , Disp: , Rfl:     omeprazole (PriLOSEC) 20 mg delayed release capsule, Take 20 mg by mouth daily  , Disp: , Rfl:     primidone (MYSOLINE) 50 mg tablet, Take 1 tab in morning and 1 tab at bedtime for 2 weeks and then take 1 tab in morning and 2 tabs at bedtime  , Disp: 90 tablet, Rfl: 4    Trelegy Ellipta 100-62 5-25 MCG/INH inhaler, INHALE 1 PUFF DAILY RINSE MOUTH AFTER USE , Disp: 60 each, Rfl: 5    No Known Allergies    Vitals:    03/02/22 1113   BP: 122/72   Pulse: 93   Resp: 16   Temp: 97 9 °F (36 6 °C)   SpO2: 95%     Physical Exam  Constitutional:       Appearance: He is well-developed        Comments: Obese male, no respiratory distress, no signs of pain   HENT:      Head: Normocephalic and atraumatic  Right Ear: External ear normal       Left Ear: External ear normal    Eyes:      Conjunctiva/sclera: Conjunctivae normal       Pupils: Pupils are equal, round, and reactive to light  Cardiovascular:      Rate and Rhythm: Normal rate and regular rhythm  Heart sounds: Normal heart sounds  Pulmonary:      Effort: Pulmonary effort is normal       Breath sounds: Normal breath sounds  Comments: Distant breath sounds bilaterally, clear  Abdominal:      General: Bowel sounds are normal       Palpations: Abdomen is soft  Comments: Soft, nontender, obese, cannot palpate liver or spleen, +bowel sounds, no guarding   Musculoskeletal:         General: Normal range of motion  Cervical back: Normal range of motion and neck supple  Skin:     General: Skin is warm  Comments: Relatively good color, warm, moist, scattered upper extremity purpura in various stages of resolution, few scabs   Neurological:      Mental Status: He is alert and oriented to person, place, and time  Deep Tendon Reflexes: Reflexes are normal and symmetric  Psychiatric:         Behavior: Behavior normal          Thought Content: Thought content normal          Judgment: Judgment normal      Extremities:  0-1 +bilateral lower extremity edema, no cords, pulses are 1+   Lymphatics:  No adenopathy in the neck, supraclavicular region, axilla bilateral    Labs    02/21/2022 WBC = 6 6 hemoglobin = 17 hematocrit = 53 MCV = 102 platelet = 846 neutrophil = 77% BUN = 16 creatinine = 0 99 LFTs WNL    09/30/2021 WBC = 6 02 hemoglobin = 15 2 hematocrit = 47 7 MCV = 104 platelet = 600  12/30/5350 BUN = 16 creatinine = 0 93 calcium = 8 4    Imaging    11/19/2021 MRI brain  Impression stated white matter changes suggestive of chronic microangiopathy  No acute intracranial pathology  10/29/2021 PET-CT    1    Hypermetabolic necrotic 4 5 x 4 cm left lower lung mass compatible with known malignancy  2   2 mildly hypermetabolic right paratracheal mediastinal nodes for which early metastases are not excluded  3   Small mildly hypermetabolic left pleural effusion for which malignant effusion is not excluded  4   Additional scattered tiny nodular lung densities may be reassessed on follow-up CT  5   Probable reactive subcentimeter right cervical node may be reassessed on follow-up PET/CT  6   No hypermetabolic soft tissue metastases in the abdomen, pelvis  7   Minimal inhomogeneous FDG activity in the sacrum and left posterior iliac, though without dominant focal lesion  This may be reassessed on follow-up exam     Pathology    Case Report   Surgical Pathology Report                         Case: [id number]                                    Authorizing Provider: Ramiro Curtis MD       Collected:           11/29/2021 1005               Ordering Location:     [address]      Received:            11/29/2021 [address] [place] [address] Operating Room                                                       Pathologist:           Breanne Estrada MD                                                         Specimens:   A) - Lymph Node, level 7                                                                             B) - Lymph Node, level 4R                                                                            C) - Lymph Node, level 2R                                                                            D) - Lymph Node, level 4L                                                                  Final Diagnosis   A  Lymph node, level 7, excision:  -  Portions of benign lymph node with reactive change and anthracosis, negative for granulomas, lymphoma, or metastatic carcinoma      B  Lymph node, level 4R, excision:  -  Portions of benign lymph node with reactive change and anthracosis, negative for granulomas, lymphoma, or metastatic carcinoma  C  Lymph node, level 2R, excision:  -  Portions of benign lymph node with reactive change and anthracosis, negative for granulomas, lymphoma, or metastatic carcinoma  D  Lymph node, level 4L, excision:  -  Benign lymph node with reactive change and anthracosis, negative for granulomas, lymphoma, or metastatic carcinoma       Electronically signed by Mukesh Johnston MD on 12/2/2021 at 11:29 AM       Case Report   Surgical Pathology Report                         Case: [id number]                                    Authorizing Provider: Ollie Stanley MD      Collected:           10/05/2021 1058               Ordering Location:     [address] [place] [address] Received:            10/05/2021 1120                                      CAT Scan                                                                      Pathologist:           Leah Talbot MD                                                         Specimen:    Lung, Left Lower Lobe                                                                      Final Diagnosis   A  Lung, Left Lower Lobe, :  - Invasive squamous carcinoma, moderately differentiated, keratinizing type  - Tumor is highlighted with P 40 immunoperoxidase stain   - Prominent tumor necrosis is noted       Best representative block with tumor A1  This case was reviewed at the intradepartmental  conference     RADHA Kellogg, Pulmonology, is notified of the diagnosis in [facility] via [address] on 10/06/2021  at 2 40 pm    Electronically signed by [id number], MD on 10/6/2021 at  2:44 PM

## 2022-03-02 NOTE — PROGRESS NOTES
Cardiology   Hunter Goff DO, Jacqueline Li MD, Roger Bronson MD, Stella Carlson MD, [facility] [place]  -------------------------------------------------------------------  Critical access hospital and Vascular Center  [address], [address] [facility], [address] [place] [address], [facility] [facility] [address]  [phone]    Cardiology Follow Up  Shabbir Erazo  1940  [id number]          Assessment/Plan:    1  Persistent atrial fibrillation ([address] )  -   Patient remains rate controlled with atenolol  He is asymptomatic without any palpitations  -   Tolerating Eliquis but he developed a nose bleed  Should hold Eliquis until nose bleeds resolve then restart at 5 mg bid  2  Chronic diastolic heart failure (HCC)  -   Echocardiogram showed ejection fraction of 50-55%  3  Atherosclerosis of native coronary artery of native heart without angina pectoris  -   Patient has a history of coronary disease with prior PCI over 20 years ago  -   Stress test did not show any ischemia or infarction  4   Hypertension  -   Blood pressure controlled with losartan 50 mg daily  6  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[address] )  -  Continue atorvastatin  Interval History:     Shabbir Erazo is 80 y o  male here for followup of atrial fibirllation  The patient was admitted to [facility] on September 29, 2021 with shortness of breath  Workup in the emergency room revealed RSV and a left lower lobe lung mass  He was also found to be in atrial fibrillation  He was started on atenolol and Eliquis  Echocardiogram showed ejection fraction of 50-55% and mild valve disease  Patient had a PET scan done on October 29th which showed a 4 5 x 4 cm left lower lobe lung nodule with paratracheal nodes mildly hypermetabolic and a small mildly hypermetabolic left pleural effusion  He was evaluated by CT surgery who did not believe patient was candidate for resection    He underwent radiation therapy and will have repeat CT scan done next month  He is seeing Dr Isabel Romo later today  Since his last visit, he has not had any chest pain or shortness of breath  Cough has significantly improved  He denies any palpitations, syncope or near syncope  The following portions of the patient's history were reviewed and updated as appropriate: allergies, current medications, past family history, past medical history, past social history, past surgical history, and problem list        Current Outpatient Medications:     albuterol (2 5 mg/3 mL) 0 083 % nebulizer solution, Take 2 5 mg by nebulization Daily evening  and prn  , Disp: , Rfl:     ammonium lactate (LAC-HYDRIN) 12 % cream, Apply topically as needed for dry skin, Disp: 385 g, Rfl: 0    apixaban (Eliquis) 5 mg, Take 5 mg by mouth in the morning  , Disp: , Rfl:     atenolol (TENORMIN) 100 mg tablet, Take 100 mg by mouth daily , Disp: , Rfl:     atorvastatin (LIPITOR) 40 mg tablet, Take 40 mg by mouth every evening  , Disp: , Rfl:     clotrimazole-betamethasone (LOTRISONE) 1-0 05 % cream, Apply topically 2 (two) times a day, Disp: 30 g, Rfl: 2    JARDIANCE 25 MG TABS, Take 25 mg by mouth daily in the early morning , Disp: , Rfl:     latanoprost (XALATAN) 0 005 % ophthalmic solution, Administer 1 drop to both eyes daily at bedtime  , Disp: , Rfl:     losartan (COZAAR) 50 mg tablet, Take 1 tablet (50 mg total) by mouth daily, Disp: 90 tablet, Rfl: 3    metFORMIN (GLUCOPHAGE) 500 mg tablet, Take 500 mg by mouth 2 (two) times a day , Disp: , Rfl:     Multiple Vitamin (MULTI-VITAMIN DAILY PO), Take by mouth daily  , Disp: , Rfl:     omeprazole (PriLOSEC) 20 mg delayed release capsule, Take 20 mg by mouth daily  , Disp: , Rfl:     primidone (MYSOLINE) 50 mg tablet, Take 1 tab in morning and 1 tab at bedtime for 2 weeks and then take 1 tab in morning and 2 tabs at bedtime  , Disp: 90 tablet, Rfl: 4    Trelegy Ellipta 100-62 5-25 MCG/INH inhaler, INHALE 1 PUFF DAILY RINSE MOUTH AFTER USE , Disp: 60 each, Rfl: 5    apixaban (Eliquis) 5 mg, Take 1 tablet (5 mg total) by mouth 2 (two) times a day (Patient taking differently: Take 5 mg by mouth 2 (two) times a day To stop 11/26 ), Disp: 60 tablet, Rfl: 5        Review of Systems:  Review of Systems   Respiratory: Negative for shortness of breath  Cardiovascular: Negative for chest pain, palpitations and leg swelling  Musculoskeletal: Positive for arthralgias  All other systems reviewed and are negative  Physical Exam:  Vitals:  Vitals:    03/02/22 0823   BP: 122/72   BP Location: Left arm   Patient Position: Sitting   Cuff Size: Large   Pulse: 93   Temp: 97 9 °F (36 6 °C)   TempSrc: Temporal   SpO2: 95%   Weight: 103 kg (226 lb)   Height: 5' 11" (1 803 m)     Physical Exam   Constitutional: He appears healthy  No distress  Eyes: Pupils are equal, round, and reactive to light  Conjunctivae are normal    Neck: No JVD present  Cardiovascular: Normal rate, regular rhythm and normal heart sounds  Exam reveals no gallop and no friction rub  No murmur heard  Pulmonary/Chest: Effort normal and breath sounds normal  He has no wheezes  He has no rales  Musculoskeletal:         General: No tenderness, deformity or edema  Cervical back: Normal range of motion and neck supple  Neurological: He is alert and oriented to person, place, and time  Skin: Skin is warm and dry  Cardiographics:  EKG: Personally reviewed   Atrial fibrillation with a rate 90 beats per minute  Last known EF: 50-55%    This note was completed in part utilizing [employer] Fluency Direct Software  Grammatical errors, random word insertions, spelling mistakes, and incomplete sentences can be an occasional consequence of this system secondary to software limitations, ambient noise, and hardware issues    If you have any questions or concerns about the content, text, or information contained within the body of this dictation, please contact the provider for clarification

## 2022-03-11 ENCOUNTER — DOCUMENTATION (OUTPATIENT)
Dept: HEMATOLOGY ONCOLOGY | Facility: CLINIC | Age: 82
End: 2022-03-11

## 2022-03-11 NOTE — PROGRESS NOTES
Per notes on acct pt to have radiation consult in April & a f/u w/med onc in June  Financial counseling outreach not needed at this time

## 2022-03-22 ENCOUNTER — OFFICE VISIT (OUTPATIENT)
Dept: PODIATRY | Facility: CLINIC | Age: 82
End: 2022-03-22
Payer: COMMERCIAL

## 2022-03-22 VITALS
BODY MASS INDEX: 31.92 KG/M2 | DIASTOLIC BLOOD PRESSURE: 72 MMHG | SYSTOLIC BLOOD PRESSURE: 122 MMHG | HEIGHT: 71 IN | WEIGHT: 228 LBS | RESPIRATION RATE: 16 BRPM

## 2022-03-22 DIAGNOSIS — M79.672 PAIN IN BOTH FEET: ICD-10-CM

## 2022-03-22 DIAGNOSIS — L84 CORNS: ICD-10-CM

## 2022-03-22 DIAGNOSIS — E11.42 DIABETIC POLYNEUROPATHY ASSOCIATED WITH TYPE 2 DIABETES MELLITUS (HCC): Primary | ICD-10-CM

## 2022-03-22 DIAGNOSIS — M79.671 PAIN IN BOTH FEET: ICD-10-CM

## 2022-03-22 DIAGNOSIS — I70.209 PERIPHERAL ARTERIOSCLEROSIS (HCC): ICD-10-CM

## 2022-03-22 PROCEDURE — 11056 PARNG/CUTG B9 HYPRKR LES 2-4: CPT | Performed by: PODIATRIST

## 2022-03-22 NOTE — PROGRESS NOTES
Assessment/Plan:  Patient has pain in his feet and toes with ambulation   He has mycosis of nail and skin  He has plantar pre ulcerative skin lesions      Plan   Diabetic foot exam performed   Mycotic nails debrided   cream ordered   Patient will use daily, he will apply cream to feet b i d  For 4 weeks   Calluses debrided   Procedures performed without pain or complication            Subjective:  Patient has pain in his feet with ambulation   No history of trauma    He has pain when he wears shoes   He has pain in his toes       Patient ID: Spenser Clayton is a 81 y o  male      Patient is diabetic  Diogenes Narayan has pain in his feet and toes with ambulation   Has pain from calluses   He has ingrown toenails and dry scaly skin   He is requesting refill of topical antifungal         Review of Systems   Constitutional: No fever or chills, feels well, no tiredness, no recent weight loss or weight gain   Eyes: No complaints of red eyes, no eyesight problems    ENT: no complaints of loss of hearing, no nosebleeds, no sore throat   Cardiovascular: No complaints of chest pain, no palpitations, no leg claudication or lower extremity edema   Respiratory: No complaints of shortness of breath, no wheezing, no cough   Gastrointestinal: No complaints of abdominal pain, no constipation, no nausea or vomiting, no diarrhea or bloody stools   Genitourinary: No complaints of dysuria or incontinence, no hesitancy, no nocturia   Musculoskeletal: limb pain, but-- as noted in HPI   Integumentary: No complaints of skin rash or lesion, no itching or dry skin, no skin wounds   Neurological: No complaints of headache, no confusion, no numbness or tingling, no dizziness   Psychiatric: No suicidal thoughts, no anxiety, no depression   Endocrine: No muscle weakness, no frequent urination, no excessive thirst, no feelings of weakness       Active Problems  1  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 83) (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)   2  Arthritis (101 72) (M19 90)   3  Atherosclerosis of arteries of extremities (440 20) (I70 209)   4  Calcaneal spur (726 73) (M77 30)   5  Callus (700) (L84)   6  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)   7  Diabetes mellitus with neuropathy (250 60,357 2) (E11 40)   8  Diabetic neuropathy (250 60,357 2) (E11 40)   9  Hypercholesterolemia (272 0) (E78 00)   10  Hypertension (401 9) (I10)   11  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)   12  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)   13  Onychomycosis (110 1) (B35 1)   14  Orthopedic aftercare (V54 9) (Z47 89)   15  Pain in both feet (729 5) (M79 671,M79 672)   16  Pain in extremity (729 5) (M79 609)   17  Pes planus, congenital (754 61) (Q66 50)   18  Plantar 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 2)   19  Plantar wart (078 12) (B07 0)   20  Prostate cancer (185) (C61)   21  Sprain of right shoulder (840 9) (S43 401A)   22  Tinea pedis (110 4) (B35 3)     Past Medical History   · Orthopedic aftercare (V54 9) (Z47 89)     Surgical History   · History of Gastric Surgery   · History of Hernia Repair   · History of Previous Stent Placement Total Number Performed ___     The surgical history was reviewed and updated today        Family History  Family History    · Family history of Arthritis (V17 7)   · Family history of Cancer     The family history was reviewed and updated today        Social History      · Beer Consumption (___ Bottles Per Day)   · Daily Coffee Consumption (1  Cups/Day)   · Former smoker (J79 17) (Z11 781)  The social history was reviewed and updated today  Allergies  1  No Known Drug Allergies      Physical Exam  Left Foot: Appearance: Normal except as noted: excessive pronation-- and-- pes planus  Right Foot: Appearance: Normal except as noted: excessive pronation-- and-- pes planus  Left Ankle: ROM: limited ROM in all planes   Right Ankle: ROM: limited ROM in all planes   Neurological Exam: performed   Light touch was intact bilaterally  Vibratory sensation was intact bilaterally  Response to monofilament test was intact bilaterally  Deep tendon reflexes: patellar reflex present bilaterally-- and-- achilles reflex present bilaterally  Vascular Exam: performed Dorsalis pedis pulses were One/4 bilaterally  Posterior tibial pulses were One/4 bilaterally  Elevation Pallor: present bilaterally  Capillary refill time was greater than 3 seconds bilaterally-- and-- Q  9 findings bilateral  Negative digital hair noted  Positive abnormal cooling noted  Toenails: All of the toenails were elongated,-- hypertrophied,-- discolored,-- ingrown,-- shown to have subungual debris,-- tender-- and-- Severely mycotic with onychauxis     Socks and shoes removed, Right Foot Findings: swollen, erythematous and dry   The sensory exam showed diminished vibratory sensation at the level of the toes  Diminished tactile sensation with monofilament testing throughout the right foot   Socks and shoes removed, Left Foot Findings: swollen, erythematous and dry   The sensory exam showed diminished vibratory sensation at the level of the toes  Diminished tactile sensation with monofilament testing throughout the left foot  Capillary refills findings on the right were delayed in the toes   Pulses:  1+ in the posterior tibialis on the right  1+ in the dorsalis pedis on the right   Capillary refills findings on the left were delayed in the toes   Pulses:  1+ in the posterior tibialis on the left  1+ in the dorsalis pedis on the left   Assign Risk Category: 2: Loss of protective sensation with or without weakness, deformity, callus, pre-ulcer, or history of ulceration  High risk  Hyperkeratosis: present on both first toes,-- present on both fifth sub metatarsals-- and-- [place] tinea pedis, bilateral, is noted  Shoe Gear Evaluation: performed ()  Right Foot: width: d-- and-- length: 11   Left Foot: width: d-- and-- length: 11  Recommendation(s): athletic shoes     Patient's shoes and socks removed  Right Foot/Ankle   Right Foot Inspection  Skin Exam: dry skin, callus and callus               Toe Exam: swelling  Sensory   Vibration: diminished  Proprioception: diminished      Vascular  Capillary refills: elevated        Left Foot/Ankle  Left Foot Inspection  Skin Exam: dry skin and callus              Toe Exam: swelling and erythema                   Sensory   Vibration: diminished  Proprioception: diminished     Vascular  Capillary refills: elevated     Patient's shoes and socks removed  Assign Risk Category:  Deformity present; Loss of protective sensation; Weak pulses       Risk: 2

## 2022-04-09 ENCOUNTER — HOSPITAL ENCOUNTER (OUTPATIENT)
Dept: RADIOLOGY | Facility: HOSPITAL | Age: 82
Discharge: HOME | End: 2022-04-09
Payer: COMMERCIAL

## 2022-04-09 DIAGNOSIS — C34.92 SQUAMOUS CELL CARCINOMA OF LEFT LUNG (HCC): ICD-10-CM

## 2022-04-09 PROCEDURE — 71250 CT THORAX DX C-: CPT

## 2022-04-09 PROCEDURE — G1004 CDSM NDSC: HCPCS

## 2022-04-19 ENCOUNTER — TELEMEDICINE (OUTPATIENT)
Dept: RADIATION ONCOLOGY | Facility: CLINIC | Age: 82
End: 2022-04-19
Attending: RADIOLOGY

## 2022-04-19 DIAGNOSIS — C34.92 SQUAMOUS CELL CARCINOMA OF LEFT LUNG (HCC): Primary | Chronic | ICD-10-CM

## 2022-04-19 PROCEDURE — 99024 POSTOP FOLLOW-UP VISIT: CPT | Performed by: RADIOLOGY

## 2022-04-19 NOTE — PROGRESS NOTES
Virtual Brief Visit    Patient is located in the following state in which I hold an active license [place]      Assessment/Plan:  The patient is 2 months status post SBRT for left lung carcinoma  Overall he feels stable  In particular he does not feel is breathing is change from pre RT level  No significant cough  No fever  I reviewed his CT scan which reveals decreasing size of his tumor mass  There is evidence of surrounding inflammatory changes which was felt to likely be treatment related  We discussed follow-up CT scan in 6 months however should he have increased symptoms this would be moved up sooner  He does not have any clinical signs of pneumonitis  Follow-up in 6 months after repeat CT of the chest     Problem List Items Addressed This Visit        Respiratory    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

## 2022-05-01 ENCOUNTER — APPOINTMENT (EMERGENCY)
Dept: RADIOLOGY | Facility: HOSPITAL | Age: 82
DRG: 871 | End: 2022-05-01
Payer: COMMERCIAL

## 2022-05-01 ENCOUNTER — HOSPITAL ENCOUNTER (INPATIENT)
Facility: HOSPITAL | Age: 82
LOS: 3 days | Discharge: HOME | DRG: 871 | End: 2022-05-04
Attending: EMERGENCY MEDICINE | Admitting: INTERNAL MEDICINE
Payer: COMMERCIAL

## 2022-05-01 DIAGNOSIS — F10.20 ALCOHOL DEPENDENCE (HCC): ICD-10-CM

## 2022-05-01 DIAGNOSIS — A41.9 SEPSIS (HCC): Primary | ICD-10-CM

## 2022-05-01 PROBLEM — I48.19 PERSISTENT ATRIAL FIBRILLATION (HCC): Status: ACTIVE | Noted: 2022-05-01

## 2022-05-01 PROBLEM — C61 PROSTATE CANCER (HCC): Status: ACTIVE | Noted: 2022-05-01

## 2022-05-01 PROBLEM — I25.10 CAD (CORONARY ARTERY DISEASE): Status: ACTIVE | Noted: 2022-05-01

## 2022-05-01 PROBLEM — R65.20 SEVERE SEPSIS (HCC): Status: ACTIVE | Noted: 2022-05-01

## 2022-05-01 PROBLEM — K21.9 GERD (GASTROESOPHAGEAL REFLUX DISEASE): Status: ACTIVE | Noted: 2022-05-01

## 2022-05-01 LAB
2HR DELTA HS TROPONIN: 1 NG/L
ALBUMIN SERPL BCP-MCNC: 3.6 G/DL (ref 3.5–5)
ALP SERPL-CCNC: 99 U/L (ref 46–116)
ALT SERPL W P-5'-P-CCNC: 34 U/L (ref 12–78)
ANION GAP SERPL CALCULATED.3IONS-SCNC: 12 MMOL/L (ref 4–13)
APTT PPP: 34 SECONDS (ref 23–37)
AST SERPL W P-5'-P-CCNC: 26 U/L (ref 5–45)
BACTERIA UR QL AUTO: NORMAL /HPF
BASOPHILS # BLD AUTO: 0.04 THOUSANDS/ΜL (ref 0–0.1)
BASOPHILS NFR BLD AUTO: 0 % (ref 0–1)
BILIRUB SERPL-MCNC: 0.76 MG/DL (ref 0.2–1)
BILIRUB UR QL STRIP: NEGATIVE
BUN SERPL-MCNC: 16 MG/DL (ref 5–25)
CALCIUM SERPL-MCNC: 8.9 MG/DL (ref 8.3–10.1)
CARDIAC TROPONIN I PNL SERPL HS: 10 NG/L
CARDIAC TROPONIN I PNL SERPL HS: 11 NG/L
CHLORIDE SERPL-SCNC: 102 MMOL/L (ref 100–108)
CLARITY UR: CLEAR
CO2 SERPL-SCNC: 25 MMOL/L (ref 21–32)
COLOR UR: YELLOW
CREAT SERPL-MCNC: 1.1 MG/DL (ref 0.6–1.3)
EOSINOPHIL # BLD AUTO: 0.07 THOUSAND/ΜL (ref 0–0.61)
EOSINOPHIL NFR BLD AUTO: 1 % (ref 0–6)
ERYTHROCYTE [DISTWIDTH] IN BLOOD BY AUTOMATED COUNT: 14.3 % (ref 11.6–15.1)
FLUAV RNA RESP QL NAA+PROBE: NEGATIVE
FLUBV RNA RESP QL NAA+PROBE: NEGATIVE
GFR SERPL CREATININE-BSD FRML MDRD: 63 ML/MIN/1.73SQ M
GLUCOSE SERPL-MCNC: 140 MG/DL (ref 65–140)
GLUCOSE SERPL-MCNC: 173 MG/DL (ref 65–140)
GLUCOSE UR STRIP-MCNC: ABNORMAL MG/DL
HCT VFR BLD AUTO: 49.7 % (ref 36.5–49.3)
HGB BLD-MCNC: 16.3 G/DL (ref 12–17)
HGB UR QL STRIP.AUTO: ABNORMAL
IMM GRANULOCYTES # BLD AUTO: 0.06 THOUSAND/UL (ref 0–0.2)
IMM GRANULOCYTES NFR BLD AUTO: 1 % (ref 0–2)
INR PPP: 1.16 (ref 0.84–1.19)
KETONES UR STRIP-MCNC: NEGATIVE MG/DL
LACTATE SERPL-SCNC: 3.7 MMOL/L (ref 0.5–2)
LEUKOCYTE ESTERASE UR QL STRIP: ABNORMAL
LYMPHOCYTES # BLD AUTO: 1.05 THOUSANDS/ΜL (ref 0.6–4.47)
LYMPHOCYTES NFR BLD AUTO: 9 % (ref 14–44)
MCH RBC QN AUTO: 33.3 PG (ref 26.8–34.3)
MCHC RBC AUTO-ENTMCNC: 32.8 G/DL (ref 31.4–37.4)
MCV RBC AUTO: 102 FL (ref 82–98)
MONOCYTES # BLD AUTO: 1.03 THOUSAND/ΜL (ref 0.17–1.22)
MONOCYTES NFR BLD AUTO: 9 % (ref 4–12)
NEUTROPHILS # BLD AUTO: 9.37 THOUSANDS/ΜL (ref 1.85–7.62)
NEUTS SEG NFR BLD AUTO: 80 % (ref 43–75)
NITRITE UR QL STRIP: NEGATIVE
NON-SQ EPI CELLS URNS QL MICRO: NORMAL /HPF
NRBC BLD AUTO-RTO: 0 /100 WBCS
NT-PROBNP SERPL-MCNC: 1262 PG/ML
PH UR STRIP.AUTO: 6 [PH]
PLATELET # BLD AUTO: 223 THOUSANDS/UL (ref 149–390)
PMV BLD AUTO: 10.1 FL (ref 8.9–12.7)
POTASSIUM SERPL-SCNC: 4.8 MMOL/L (ref 3.5–5.3)
PROCALCITONIN SERPL-MCNC: 0.12 NG/ML
PROT SERPL-MCNC: 7.5 G/DL (ref 6.4–8.2)
PROT UR STRIP-MCNC: ABNORMAL MG/DL
PROTHROMBIN TIME: 14.6 SECONDS (ref 11.6–14.5)
RBC # BLD AUTO: 4.89 MILLION/UL (ref 3.88–5.62)
RBC #/AREA URNS AUTO: NORMAL /HPF
RSV RNA RESP QL NAA+PROBE: NEGATIVE
SARS-COV-2 RNA RESP QL NAA+PROBE: NEGATIVE
SODIUM SERPL-SCNC: 139 MMOL/L (ref 136–145)
SP GR UR STRIP.AUTO: 1.01 (ref 1–1.03)
UROBILINOGEN UR QL STRIP.AUTO: 0.2 E.U./DL
WBC # BLD AUTO: 11.62 THOUSAND/UL (ref 4.31–10.16)
WBC #/AREA URNS AUTO: NORMAL /HPF

## 2022-05-01 PROCEDURE — 71045 X-RAY EXAM CHEST 1 VIEW: CPT

## 2022-05-01 PROCEDURE — 84484 ASSAY OF TROPONIN QUANT: CPT | Performed by: EMERGENCY MEDICINE

## 2022-05-01 PROCEDURE — 99222 1ST HOSP IP/OBS MODERATE 55: CPT | Performed by: NURSE PRACTITIONER

## 2022-05-01 PROCEDURE — 82948 REAGENT STRIP/BLOOD GLUCOSE: CPT

## 2022-05-01 PROCEDURE — 83880 ASSAY OF NATRIURETIC PEPTIDE: CPT | Performed by: NURSE PRACTITIONER

## 2022-05-01 PROCEDURE — 81001 URINALYSIS AUTO W/SCOPE: CPT | Performed by: EMERGENCY MEDICINE

## 2022-05-01 PROCEDURE — 85730 THROMBOPLASTIN TIME PARTIAL: CPT | Performed by: EMERGENCY MEDICINE

## 2022-05-01 PROCEDURE — 99285 EMERGENCY DEPT VISIT HI MDM: CPT

## 2022-05-01 PROCEDURE — 85610 PROTHROMBIN TIME: CPT | Performed by: EMERGENCY MEDICINE

## 2022-05-01 PROCEDURE — 0241U HB NFCT DS VIR RESP RNA 4 TRGT: CPT | Performed by: EMERGENCY MEDICINE

## 2022-05-01 PROCEDURE — 93005 ELECTROCARDIOGRAM TRACING: CPT

## 2022-05-01 PROCEDURE — 96365 THER/PROPH/DIAG IV INF INIT: CPT

## 2022-05-01 PROCEDURE — 87040 BLOOD CULTURE FOR BACTERIA: CPT | Performed by: EMERGENCY MEDICINE

## 2022-05-01 PROCEDURE — 83605 ASSAY OF LACTIC ACID: CPT | Performed by: EMERGENCY MEDICINE

## 2022-05-01 PROCEDURE — 84145 PROCALCITONIN (PCT): CPT | Performed by: EMERGENCY MEDICINE

## 2022-05-01 PROCEDURE — 85025 COMPLETE CBC W/AUTO DIFF WBC: CPT | Performed by: EMERGENCY MEDICINE

## 2022-05-01 PROCEDURE — 36415 COLL VENOUS BLD VENIPUNCTURE: CPT | Performed by: EMERGENCY MEDICINE

## 2022-05-01 PROCEDURE — 99285 EMERGENCY DEPT VISIT HI MDM: CPT | Performed by: EMERGENCY MEDICINE

## 2022-05-01 PROCEDURE — 87449 NOS EACH ORGANISM AG IA: CPT | Performed by: NURSE PRACTITIONER

## 2022-05-01 PROCEDURE — 80053 COMPREHEN METABOLIC PANEL: CPT | Performed by: EMERGENCY MEDICINE

## 2022-05-01 RX ORDER — PRIMIDONE 50 MG/1
50 TABLET ORAL DAILY
Status: DISCONTINUED | OUTPATIENT
Start: 2022-05-02 | End: 2022-05-04 | Stop reason: HOSPADM

## 2022-05-01 RX ORDER — CEFTRIAXONE 2 G/50ML
2000 INJECTION, SOLUTION INTRAVENOUS EVERY 24 HOURS
Status: DISCONTINUED | OUTPATIENT
Start: 2022-05-02 | End: 2022-05-04 | Stop reason: HOSPADM

## 2022-05-01 RX ORDER — PRIMIDONE 50 MG/1
100 TABLET ORAL
Status: DISCONTINUED | OUTPATIENT
Start: 2022-05-01 | End: 2022-05-04 | Stop reason: HOSPADM

## 2022-05-01 RX ORDER — CEFEPIME HYDROCHLORIDE 2 G/50ML
2000 INJECTION, SOLUTION INTRAVENOUS EVERY 12 HOURS
Status: CANCELLED | OUTPATIENT
Start: 2022-05-02

## 2022-05-01 RX ORDER — GUAIFENESIN 600 MG
600 TABLET, EXTENDED RELEASE 12 HR ORAL 2 TIMES DAILY
Status: DISCONTINUED | OUTPATIENT
Start: 2022-05-01 | End: 2022-05-04 | Stop reason: HOSPADM

## 2022-05-01 RX ORDER — FOLIC ACID 1 MG/1
1 TABLET ORAL DAILY
Status: DISCONTINUED | OUTPATIENT
Start: 2022-05-02 | End: 2022-05-04 | Stop reason: HOSPADM

## 2022-05-01 RX ORDER — ACETAMINOPHEN 325 MG/1
650 TABLET ORAL ONCE
Status: COMPLETED | OUTPATIENT
Start: 2022-05-01 | End: 2022-05-01

## 2022-05-01 RX ORDER — FLUTICASONE FUROATE AND VILANTEROL 100; 25 UG/1; UG/1
1 POWDER RESPIRATORY (INHALATION) DAILY
Status: DISCONTINUED | OUTPATIENT
Start: 2022-05-02 | End: 2022-05-04 | Stop reason: HOSPADM

## 2022-05-01 RX ORDER — ONDANSETRON 2 MG/ML
4 INJECTION INTRAMUSCULAR; INTRAVENOUS EVERY 6 HOURS PRN
Status: DISCONTINUED | OUTPATIENT
Start: 2022-05-01 | End: 2022-05-04 | Stop reason: HOSPADM

## 2022-05-01 RX ORDER — INSULIN GLARGINE 100 [IU]/ML
8 INJECTION, SOLUTION SUBCUTANEOUS EVERY MORNING
Status: DISCONTINUED | OUTPATIENT
Start: 2022-05-02 | End: 2022-05-04 | Stop reason: HOSPADM

## 2022-05-01 RX ORDER — LOSARTAN POTASSIUM 50 MG/1
50 TABLET ORAL DAILY
Status: DISCONTINUED | OUTPATIENT
Start: 2022-05-02 | End: 2022-05-04 | Stop reason: HOSPADM

## 2022-05-01 RX ORDER — LEVALBUTEROL INHALATION SOLUTION 0.63 MG/3ML
0.63 SOLUTION RESPIRATORY (INHALATION) EVERY 4 HOURS PRN
Status: DISCONTINUED | OUTPATIENT
Start: 2022-05-01 | End: 2022-05-04 | Stop reason: HOSPADM

## 2022-05-01 RX ORDER — INSULIN LISPRO 100 [IU]/ML
1-6 INJECTION, SOLUTION INTRAVENOUS; SUBCUTANEOUS
Status: DISCONTINUED | OUTPATIENT
Start: 2022-05-02 | End: 2022-05-04 | Stop reason: HOSPADM

## 2022-05-01 RX ORDER — LATANOPROST 50 UG/ML
1 SOLUTION/ DROPS OPHTHALMIC
Status: DISCONTINUED | OUTPATIENT
Start: 2022-05-01 | End: 2022-05-04 | Stop reason: HOSPADM

## 2022-05-01 RX ORDER — SACCHAROMYCES BOULARDII 250 MG
250 CAPSULE ORAL 2 TIMES DAILY
Status: DISCONTINUED | OUTPATIENT
Start: 2022-05-01 | End: 2022-05-04 | Stop reason: HOSPADM

## 2022-05-01 RX ORDER — PANTOPRAZOLE SODIUM 40 MG/1
40 TABLET, DELAYED RELEASE ORAL
Status: DISCONTINUED | OUTPATIENT
Start: 2022-05-02 | End: 2022-05-04 | Stop reason: HOSPADM

## 2022-05-01 RX ORDER — ATENOLOL 50 MG/1
100 TABLET ORAL DAILY
Status: DISCONTINUED | OUTPATIENT
Start: 2022-05-02 | End: 2022-05-04 | Stop reason: HOSPADM

## 2022-05-01 RX ORDER — CLOTRIMAZOLE AND BETAMETHASONE DIPROPIONATE 10; .64 MG/G; MG/G
CREAM TOPICAL 2 TIMES DAILY
Status: DISCONTINUED | OUTPATIENT
Start: 2022-05-01 | End: 2022-05-04 | Stop reason: HOSPADM

## 2022-05-01 RX ORDER — ASCORBIC ACID 500 MG
1000 TABLET ORAL DAILY
Status: DISCONTINUED | OUTPATIENT
Start: 2022-05-01 | End: 2022-05-04 | Stop reason: HOSPADM

## 2022-05-01 RX ORDER — ACETAMINOPHEN 325 MG/1
650 TABLET ORAL EVERY 6 HOURS PRN
Status: DISCONTINUED | OUTPATIENT
Start: 2022-05-01 | End: 2022-05-04 | Stop reason: HOSPADM

## 2022-05-01 RX ORDER — LANOLIN ALCOHOL/MO/W.PET/CERES
100 CREAM (GRAM) TOPICAL DAILY
Status: DISCONTINUED | OUTPATIENT
Start: 2022-05-02 | End: 2022-05-04 | Stop reason: HOSPADM

## 2022-05-01 RX ORDER — INSULIN LISPRO 100 [IU]/ML
1-5 INJECTION, SOLUTION INTRAVENOUS; SUBCUTANEOUS
Status: DISCONTINUED | OUTPATIENT
Start: 2022-05-01 | End: 2022-05-04 | Stop reason: HOSPADM

## 2022-05-01 RX ORDER — ATORVASTATIN CALCIUM 40 MG/1
40 TABLET, FILM COATED ORAL
Status: DISCONTINUED | OUTPATIENT
Start: 2022-05-01 | End: 2022-05-04 | Stop reason: HOSPADM

## 2022-05-01 RX ORDER — CEFEPIME HYDROCHLORIDE 2 G/50ML
2000 INJECTION, SOLUTION INTRAVENOUS ONCE
Status: COMPLETED | OUTPATIENT
Start: 2022-05-01 | End: 2022-05-01

## 2022-05-01 RX ADMIN — APIXABAN 5 MG: 5 TABLET, FILM COATED ORAL at 23:04

## 2022-05-01 RX ADMIN — CEFEPIME HYDROCHLORIDE 2000 MG: 2 INJECTION, SOLUTION INTRAVENOUS at 20:38

## 2022-05-01 RX ADMIN — SODIUM CHLORIDE 1000 ML: 0.9 INJECTION, SOLUTION INTRAVENOUS at 21:23

## 2022-05-01 RX ADMIN — GUAIFENESIN 600 MG: 600 TABLET, EXTENDED RELEASE ORAL at 23:04

## 2022-05-01 RX ADMIN — Medication 250 MG: at 23:04

## 2022-05-01 RX ADMIN — PRIMIDONE 100 MG: 50 TABLET ORAL at 23:04

## 2022-05-01 RX ADMIN — ATORVASTATIN CALCIUM 40 MG: 40 TABLET, FILM COATED ORAL at 23:04

## 2022-05-01 RX ADMIN — ACETAMINOPHEN 650 MG: 325 TABLET, FILM COATED ORAL at 20:38

## 2022-05-01 RX ADMIN — OXYCODONE HYDROCHLORIDE AND ACETAMINOPHEN 1000 MG: 500 TABLET ORAL at 23:03

## 2022-05-02 ENCOUNTER — APPOINTMENT (INPATIENT)
Dept: RADIOLOGY | Facility: HOSPITAL | Age: 82
DRG: 871 | End: 2022-05-02
Payer: COMMERCIAL

## 2022-05-02 LAB
4HR DELTA HS TROPONIN: 0 NG/L
ANION GAP SERPL CALCULATED.3IONS-SCNC: 10 MMOL/L (ref 4–13)
ATRIAL RATE: 396 BPM
BACTERIA SPT RESP CULT: ABNORMAL
BUN SERPL-MCNC: 16 MG/DL (ref 5–25)
CALCIUM SERPL-MCNC: 8.3 MG/DL (ref 8.3–10.1)
CARDIAC TROPONIN I PNL SERPL HS: 10 NG/L
CHLORIDE SERPL-SCNC: 103 MMOL/L (ref 100–108)
CO2 SERPL-SCNC: 26 MMOL/L (ref 21–32)
CREAT SERPL-MCNC: 0.75 MG/DL (ref 0.6–1.3)
ERYTHROCYTE [DISTWIDTH] IN BLOOD BY AUTOMATED COUNT: 14.3 % (ref 11.6–15.1)
GFR SERPL CREATININE-BSD FRML MDRD: 86 ML/MIN/1.73SQ M
GLUCOSE SERPL-MCNC: 125 MG/DL (ref 65–140)
GLUCOSE SERPL-MCNC: 128 MG/DL (ref 65–140)
GLUCOSE SERPL-MCNC: 132 MG/DL (ref 65–140)
GLUCOSE SERPL-MCNC: 133 MG/DL (ref 65–140)
GLUCOSE SERPL-MCNC: 147 MG/DL (ref 65–140)
GRAM STN SPEC: ABNORMAL
HCT VFR BLD AUTO: 45.9 % (ref 36.5–49.3)
HGB BLD-MCNC: 15 G/DL (ref 12–17)
L PNEUMO1 AG UR QL IA.RAPID: NEGATIVE
LACTATE SERPL-SCNC: 1.3 MMOL/L (ref 0.5–2)
MAGNESIUM SERPL-MCNC: 2 MG/DL (ref 1.6–2.6)
MCH RBC QN AUTO: 33.1 PG (ref 26.8–34.3)
MCHC RBC AUTO-ENTMCNC: 32.7 G/DL (ref 31.4–37.4)
MCV RBC AUTO: 101 FL (ref 82–98)
PLATELET # BLD AUTO: 170 THOUSANDS/UL (ref 149–390)
PMV BLD AUTO: 10 FL (ref 8.9–12.7)
POTASSIUM SERPL-SCNC: 3.8 MMOL/L (ref 3.5–5.3)
PROCALCITONIN SERPL-MCNC: 0.12 NG/ML
QRS AXIS: -50 DEGREES
QRSD INTERVAL: 94 MS
QT INTERVAL: 362 MS
QTC INTERVAL: 373 MS
RBC # BLD AUTO: 4.53 MILLION/UL (ref 3.88–5.62)
S PNEUM AG UR QL: NEGATIVE
SODIUM SERPL-SCNC: 139 MMOL/L (ref 136–145)
T WAVE AXIS: 77 DEGREES
VENTRICULAR RATE: 64 BPM
WBC # BLD AUTO: 8.7 THOUSAND/UL (ref 4.31–10.16)

## 2022-05-02 PROCEDURE — 83735 ASSAY OF MAGNESIUM: CPT | Performed by: NURSE PRACTITIONER

## 2022-05-02 PROCEDURE — 84145 PROCALCITONIN (PCT): CPT | Performed by: NURSE PRACTITIONER

## 2022-05-02 PROCEDURE — 80048 BASIC METABOLIC PNL TOTAL CA: CPT | Performed by: NURSE PRACTITIONER

## 2022-05-02 PROCEDURE — 87205 SMEAR GRAM STAIN: CPT | Performed by: NURSE PRACTITIONER

## 2022-05-02 PROCEDURE — 71250 CT THORAX DX C-: CPT

## 2022-05-02 PROCEDURE — 87070 CULTURE OTHR SPECIMN AEROBIC: CPT | Performed by: INTERNAL MEDICINE

## 2022-05-02 PROCEDURE — 97116 GAIT TRAINING THERAPY: CPT

## 2022-05-02 PROCEDURE — 83605 ASSAY OF LACTIC ACID: CPT | Performed by: NURSE PRACTITIONER

## 2022-05-02 PROCEDURE — 85027 COMPLETE CBC AUTOMATED: CPT | Performed by: NURSE PRACTITIONER

## 2022-05-02 PROCEDURE — 99232 SBSQ HOSP IP/OBS MODERATE 35: CPT | Performed by: INTERNAL MEDICINE

## 2022-05-02 PROCEDURE — 97163 PT EVAL HIGH COMPLEX 45 MIN: CPT

## 2022-05-02 PROCEDURE — 82948 REAGENT STRIP/BLOOD GLUCOSE: CPT

## 2022-05-02 PROCEDURE — 87205 SMEAR GRAM STAIN: CPT | Performed by: INTERNAL MEDICINE

## 2022-05-02 PROCEDURE — G1004 CDSM NDSC: HCPCS

## 2022-05-02 PROCEDURE — 87070 CULTURE OTHR SPECIMN AEROBIC: CPT | Performed by: NURSE PRACTITIONER

## 2022-05-02 PROCEDURE — 84484 ASSAY OF TROPONIN QUANT: CPT | Performed by: NURSE PRACTITIONER

## 2022-05-02 PROCEDURE — 93010 ELECTROCARDIOGRAM REPORT: CPT | Performed by: INTERNAL MEDICINE

## 2022-05-02 RX ADMIN — UMECLIDINIUM 1 PUFF: 62.5 AEROSOL, POWDER ORAL at 08:40

## 2022-05-02 RX ADMIN — ATENOLOL 100 MG: 50 TABLET ORAL at 08:35

## 2022-05-02 RX ADMIN — LATANOPROST 1 DROP: 50 SOLUTION OPHTHALMIC at 21:22

## 2022-05-02 RX ADMIN — CLOTRIMAZOLE AND BETAMETHASONE DIPROPIONATE: 10; .64 CREAM TOPICAL at 17:17

## 2022-05-02 RX ADMIN — APIXABAN 5 MG: 5 TABLET, FILM COATED ORAL at 08:35

## 2022-05-02 RX ADMIN — PRIMIDONE 100 MG: 50 TABLET ORAL at 21:26

## 2022-05-02 RX ADMIN — APIXABAN 5 MG: 5 TABLET, FILM COATED ORAL at 17:17

## 2022-05-02 RX ADMIN — LOSARTAN POTASSIUM 50 MG: 50 TABLET, FILM COATED ORAL at 08:34

## 2022-05-02 RX ADMIN — CLOTRIMAZOLE AND BETAMETHASONE DIPROPIONATE: 10; .64 CREAM TOPICAL at 08:39

## 2022-05-02 RX ADMIN — ACETAMINOPHEN 650 MG: 325 TABLET, FILM COATED ORAL at 07:19

## 2022-05-02 RX ADMIN — PRIMIDONE 50 MG: 50 TABLET ORAL at 08:35

## 2022-05-02 RX ADMIN — PANTOPRAZOLE SODIUM 40 MG: 40 TABLET, DELAYED RELEASE ORAL at 06:02

## 2022-05-02 RX ADMIN — THIAMINE HCL TAB 100 MG 100 MG: 100 TAB at 08:35

## 2022-05-02 RX ADMIN — GUAIFENESIN 600 MG: 600 TABLET, EXTENDED RELEASE ORAL at 08:34

## 2022-05-02 RX ADMIN — INSULIN GLARGINE 8 UNITS: 100 INJECTION, SOLUTION SUBCUTANEOUS at 08:38

## 2022-05-02 RX ADMIN — FOLIC ACID 1 MG: 1 TABLET ORAL at 08:35

## 2022-05-02 RX ADMIN — ATORVASTATIN CALCIUM 40 MG: 40 TABLET, FILM COATED ORAL at 16:40

## 2022-05-02 RX ADMIN — Medication 250 MG: at 08:35

## 2022-05-02 RX ADMIN — Medication 250 MG: at 17:17

## 2022-05-02 RX ADMIN — AZITHROMYCIN MONOHYDRATE 500 MG: 500 INJECTION, POWDER, LYOPHILIZED, FOR SOLUTION INTRAVENOUS at 09:23

## 2022-05-02 RX ADMIN — CEFTRIAXONE 2000 MG: 2 INJECTION, SOLUTION INTRAVENOUS at 07:56

## 2022-05-02 RX ADMIN — GUAIFENESIN 600 MG: 600 TABLET, EXTENDED RELEASE ORAL at 17:17

## 2022-05-02 RX ADMIN — OXYCODONE HYDROCHLORIDE AND ACETAMINOPHEN 1000 MG: 500 TABLET ORAL at 08:36

## 2022-05-02 RX ADMIN — FLUTICASONE FUROATE AND VILANTEROL TRIFENATATE 1 PUFF: 100; 25 POWDER RESPIRATORY (INHALATION) at 08:40

## 2022-05-02 RX ADMIN — B-COMPLEX W/ C & FOLIC ACID TAB 1 TABLET: TAB at 08:35

## 2022-05-02 NOTE — ED PROVIDER NOTES
History  Chief Complaint   Patient presents with    Shortness of Breath     patient c/o sob worsening today, recently finished treatment for lung ca, denies cp,  has congested cough     Patient presents for evaluation of shortness of breath with generalized weakness starting today  Patient denies any chest pain  States symptoms are worse with exertion  Patient was unaware they had a fever  Patient recently completed a course of radiation treatment for lung cancer  Patient is not on chemotherapy  Patient reports congestion and cough as well  History provided by:  Patient   used: No    Shortness of Breath  Associated symptoms: cough and fever    Associated symptoms: no abdominal pain, no chest pain, no ear pain, no rash, no sore throat and no vomiting        Prior to Admission Medications   Prescriptions Last Dose Informant Patient Reported? Taking?    JARDIANCE 25 MG TABS 5/1/2022 at Unknown time Self Yes Yes   Sig: Take 25 mg by mouth daily in the early morning    Multiple Vitamin (MULTI-VITAMIN DAILY PO) 5/1/2022 at Unknown time Self Yes Yes   Sig: Take by mouth daily     Trelegy Ellipta 100-62 5-25 MCG/INH inhaler 5/1/2022 at Unknown time Self No Yes   Sig: INHALE 1 PUFF DAILY RINSE MOUTH AFTER USE    albuterol (2 5 mg/3 mL) 0 083 % nebulizer solution 5/1/2022 at Unknown time Self Yes Yes   Sig: Take 2 5 mg by nebulization As needed per patient's wife    ammonium lactate (LAC-HYDRIN) 12 % cream 5/1/2022 at Unknown time Self No Yes   Sig: Apply topically as needed for dry skin   apixaban (Eliquis) 5 mg  Self No No   Sig: Take 1 tablet (5 mg total) by mouth 2 (two) times a day   Patient taking differently: Take 5 mg by mouth 2 (two) times a day To stop 11/26    apixaban (Eliquis) 5 mg 5/1/2022 at Unknown time Self Yes Yes   Sig: Take 5 mg by mouth 2 (two) times a day     atenolol (TENORMIN) 100 mg tablet 5/1/2022 at Unknown time Self Yes Yes   Sig: Take 100 mg by mouth daily atorvastatin (LIPITOR) 40 mg tablet 4/30/2022 at Unknown time Self Yes Yes   Sig: Take 40 mg by mouth every evening     clotrimazole-betamethasone (LOTRISONE) 1-0 05 % cream 5/1/2022 at Unknown time Self No Yes   Sig: Apply topically 2 (two) times a day   latanoprost (XALATAN) 0 005 % ophthalmic solution 4/30/2022 at Unknown time Self Yes Yes   Sig: Administer 1 drop to both eyes daily at bedtime     losartan (COZAAR) 50 mg tablet 5/1/2022 at Unknown time Self No Yes   Sig: Take 1 tablet (50 mg total) by mouth daily   metFORMIN (GLUCOPHAGE) 500 mg tablet 5/1/2022 at Unknown time Self Yes Yes   Sig: Take 500 mg by mouth 2 (two) times a day    omeprazole (PriLOSEC) 20 mg delayed release capsule 5/1/2022 at Unknown time Self Yes Yes   Sig: Take 20 mg by mouth daily     primidone (MYSOLINE) 50 mg tablet 5/1/2022 at Unknown time Self No Yes   Sig: Take 1 tab in morning and 1 tab at bedtime for 2 weeks and then take 1 tab in morning and 2 tabs at bedtime  Patient taking differently: Take 50 mg by mouth take 1 tab in morning and 2 tabs at bedtime         Facility-Administered Medications: None       Past Medical History:   Diagnosis Date    Anxiety     Arthritis     Atrial fibrillation ([facility] [address] )     Bleeding ulcer     Cancer [facility])     prostate 2011- radiation    Cardiac disease     cardiac stent x1    Cataract     starting    Chronic diastolic (congestive) heart failure (HCC)     Diabetes mellitus ([address] )     boarderline diabetic     GERD (gastroesophageal reflux disease)     History of radiation therapy 2010    Prostate seeds (brachytherapy) and EBRT    Hyperlipidemia     Hypertension     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, bilateral     Low back pain     Lung cancer ([address] )     Lung mass     Myocardial infarction ([address] )     mild 1999    Prostate cancer ([address] )     Sleep apnea     sleep study 11/22    Wears dentures     full set    Wears glasses        Past Surgical History:   Procedure Laterality Date    ABDOMINAL HERNIA REPAIR      ABDOMINAL SURGERY      bleeding ulcer, cyst removed from abd    COLONOSCOPY     [facility]    x1     IR BIOPSY LUNG  10/5/2021    IR THORACENTESIS  2021    [place] BRONCHOSCOPY,DIAGNOSTIC N/A 2021    Procedure: Mala Hamilton;  Surgeon: Deborah Rivera MD;  [facility];  Service: Thoracic    [place] MEDIASTINOSCOPY WITH LYMPH NODE BIOPSY/IES N/A 2021    Procedure: Diann Floresster;  Surgeon: Deborah Rivera MD;  [facility];  Service: Thoracic       Family History   Problem Relation Age of Onset    Prostate cancer Brother     Cancer Maternal Uncle         colo rectal cancer    Cancer Paternal Aunt      I have reviewed and agree with the history as documented  E-Cigarette/Vaping    E-Cigarette Use Never User      E-Cigarette/Vaping Substances    Nicotine No     THC No     CBD No     Flavoring No     Other No     Unknown No      Social History     Tobacco Use    Smoking status: Former Smoker     Packs/day: 1 00     Years: 30 00     Pack years: 30 00     Types: Cigarettes     Quit date:      Years since quittin 3    Smokeless tobacco: Never Used   Vaping Use    Vaping Use: Never used   Substance Use Topics    Alcohol use: Yes     Alcohol/week: 10 0 - 12 0 standard drinks     Types: 7 Glasses of wine, 3 - 5 Cans of beer per week     Comment: few beers day; glass of red wine at dinner    Drug use: Never       Review of Systems   Constitutional: Positive for fatigue and fever  Negative for chills  HENT: Positive for congestion  Negative for ear pain and sore throat  Eyes: Negative for pain and visual disturbance  Respiratory: Positive for cough and shortness of breath  Cardiovascular: Negative for chest pain and palpitations  Gastrointestinal: Negative for abdominal pain and vomiting  Genitourinary: Negative for dysuria and hematuria     Musculoskeletal: Negative for arthralgias and back pain  Skin: Negative for color change and rash  Neurological: Negative for seizures and syncope  All other systems reviewed and are negative  Physical Exam  Physical Exam  Vitals and nursing note reviewed  Constitutional:       General: He is not in acute distress  Appearance: He is ill-appearing  HENT:      Head: Atraumatic  Right Ear: External ear normal       Left Ear: External ear normal       Nose: Congestion present  Mouth/Throat:      Mouth: Mucous membranes are moist       Pharynx: Oropharynx is clear  Eyes:      General: No scleral icterus  Conjunctiva/sclera: Conjunctivae normal    Cardiovascular:      Rate and Rhythm: Normal rate and regular rhythm  Pulses: Normal pulses  Pulmonary:      Effort: Pulmonary effort is normal  No respiratory distress  Breath sounds: Normal breath sounds  Abdominal:      General: Abdomen is flat  Bowel sounds are normal  There is no distension  Palpations: Abdomen is soft  Tenderness: There is no abdominal tenderness  There is no guarding or rebound  Musculoskeletal:         General: No deformity  Normal range of motion  Skin:     Capillary Refill: Capillary refill takes less than 2 seconds  Findings: No rash  Neurological:      General: No focal deficit present  Mental Status: He is alert and oriented to person, place, and time           Vital Signs  ED Triage Vitals   Temperature Pulse Respirations Blood Pressure SpO2   05/01/22 2000 05/01/22 2000 05/01/22 2000 05/01/22 2000 05/01/22 2000   (!) 102 3 °F (39 1 °C) 69 22 168/77 96 %      Temp Source Heart Rate Source Patient Position - Orthostatic VS BP Location FiO2 (%)   05/01/22 2000 05/01/22 2000 05/01/22 2000 05/01/22 2000 --   Oral Monitor Sitting Left arm       Pain Score       05/01/22 2038       No Pain           Vitals:    05/01/22 2218 05/01/22 2238 05/02/22 0334 05/02/22 0718   BP: 138/87 134/94 136/88 142/96   Pulse:  69 (!) 106 95   Patient Position - Orthostatic VS:  Sitting Lying Lying         Visual Acuity      ED Medications  Medications   apixaban (ELIQUIS) tablet 5 mg (5 mg Oral Given 5/1/22 2304)   atenolol (TENORMIN) tablet 100 mg (has no administration in time range)   atorvastatin (LIPITOR) tablet 40 mg (40 mg Oral Given 5/1/22 2304)   clotrimazole-betamethasone (LOTRISONE) 1-0 05 % cream ( Topical Not Given 5/1/22 2330)   latanoprost (XALATAN) 0 005 % ophthalmic solution 1 drop (1 drop Both Eyes Not Given 5/1/22 2327)   losartan (COZAAR) tablet 50 mg (has no administration in time range)   multivitamin stress formula tablet 1 tablet (has no administration in time range)   pantoprazole (PROTONIX) EC tablet 40 mg (40 mg Oral Given 5/2/22 0602)   primidone (MYSOLINE) tablet 50 mg (has no administration in time range)   primidone (MYSOLINE) tablet 100 mg (100 mg Oral Given 5/1/22 2304)   fluticasone-vilanterol (BREO ELLIPTA) 100-25 mcg/inh inhaler 1 puff (has no administration in time range)   umeclidinium bromide (INCRUSE ELLIPTA) 62 5 mcg/inh inhaler AEPB 1 puff (has no administration in time range)   acetaminophen (TYLENOL) tablet 650 mg (650 mg Oral Given 5/2/22 0719)   ondansetron (ZOFRAN) injection 4 mg (has no administration in time range)   guaiFENesin (MUCINEX) 12 hr tablet 600 mg (600 mg Oral Given 5/1/22 2304)   levalbuterol (XOPENEX) inhalation solution 0 63 mg (has no administration in time range)   cefTRIAXone (ROCEPHIN) IVPB (premix in dextrose) 2,000 mg 50 mL (2,000 mg Intravenous New Bag 5/2/22 0756)   saccharomyces boulardii (FLORASTOR) capsule 250 mg (250 mg Oral Given 5/1/22 2304)   ascorbic acid (VITAMIN C) tablet 1,000 mg (1,000 mg Oral Given 5/1/22 2303)   insulin lispro (HumaLOG) 100 units/mL subcutaneous injection 1-6 Units (1 Units Subcutaneous Not Given 5/2/22 9758)   insulin lispro (HumaLOG) 100 units/mL subcutaneous injection 1-5 Units (1 Units Subcutaneous Not Given 5/1/22 5887)   insulin glargine (LANTUS) subcutaneous injection 8 Units 0 08 mL (has no administration in time range)   thiamine tablet 100 mg (has no administration in time range)   folic acid (FOLVITE) tablet 1 mg (has no administration in time range)   pneumococcal 13-valent conjugate vaccine (PREVNAR-13) IM injection 0 5 mL (has no administration in time range)   acetaminophen (TYLENOL) tablet 650 mg (650 mg Oral Given 5/1/22 2038)   cefepime (MAXIPIME) IVPB (premix in dextrose) 2,000 mg 50 mL (0 mg Intravenous Stopped 5/1/22 2058)   sodium chloride 0 9 % bolus 1,000 mL (0 mL Intravenous Stopped 5/1/22 2346)       Diagnostic Studies  Results Reviewed     Procedure Component Value Units Date/Time    HS Troponin I 4hr [[id number]]  (Normal) Collected: 05/02/22 0002    Lab Status: Final result Specimen: Blood from Arm, Left Updated: 05/02/22 0037     hs TnI 4hr 10 ng/L      Delta 4hr hsTnI 0 ng/L     Lactic acid 2 Hours [[id number]]  (Normal) Collected: 05/02/22 0002    Lab Status: Final result Specimen: Blood from Arm, Left Updated: 05/02/22 0033     LACTIC ACID 1 3 mmol/L     Narrative:      Result may be elevated if tourniquet was used during collection  Blood culture #1 [[id number]] Collected: 05/01/22 2037    Lab Status: Preliminary result Specimen: Blood from Hand, Left Updated: 05/02/22 0001     Blood Culture Received in Microbiology Lab  Culture in Progress  Blood culture #2 [[id number]] Collected: 05/01/22 2030    Lab Status: Preliminary result Specimen: Blood from Line, Venous Updated: 05/02/22 0001     Blood Culture Received in Microbiology Lab  Culture in Progress  NT-BNP PRO [[id number]]  (Abnormal) Collected: 05/01/22 2030    Lab Status: Final result Specimen: Blood from Line, Venous Updated: 05/01/22 2338     NT-proBNP 1,262 pg/mL     Legionella antigen, Urine [[id number]] Collected: 05/01/22 2119    Lab Status:  In process Specimen: Urine, Clean Catch Updated: 05/01/22 2333    Strep Pneumoniae, Urine [[id number]] Collected: 05/01/22 2119    Lab Status: In process Specimen: Urine, Clean Catch Updated: 05/01/22 2332    HS Troponin I 2hr [[id number]]  (Normal) Collected: 05/01/22 2213    Lab Status: Final result Specimen: Blood from Line, Venous Updated: 05/01/22 2246     hs TnI 2hr 11 ng/L      Delta 2hr hsTnI 1 ng/L     Urine Microscopic [[id number]]  (Normal) Collected: 05/01/22 2119    Lab Status: Final result Specimen: Urine, Other Updated: 05/01/22 2138     RBC, UA 0-1 /hpf      WBC, UA 1-2 /hpf      Epithelial Cells Occasional /hpf      Bacteria, UA Occasional /hpf     UA w Reflex to Microscopic w Reflex to Culture [[id number]]  (Abnormal) Collected: 05/01/22 2119    Lab Status: Final result Specimen: Urine, Other Updated: 05/01/22 2129     Color, UA Yellow     Clarity, UA Clear     Specific Gravity, UA 1 015     pH, UA 6 0     Leukocytes, UA Elevated glucose may cause decreased leukocyte values  See urine microscopic for [facility] result/     Nitrite, UA Negative     Protein, UA Trace mg/dl      Glucose, UA >=1000 (1%) mg/dl      Ketones, UA Negative mg/dl      Urobilinogen, UA 0 2 E U /dl      Bilirubin, UA Negative     Blood, UA Trace-lysed    COVID/FLU/RSV - 2 hour TAT [[id number]]  (Normal) Collected: 05/01/22 2030    Lab Status: Final result Specimen: Nares from Nose Updated: 05/01/22 2128     SARS-CoV-2 Negative     INFLUENZA A PCR Negative     INFLUENZA B PCR Negative     RSV PCR Negative    Narrative:      FOR PEDIATRIC PATIENTS - copy/paste COVID Guidelines URL to browser: [email or web address]    SARS-CoV-2 assay is a Nucleic Acid Amplification assay intended for the  qualitative detection of nucleic acid from SARS-CoV-2 in nasopharyngeal  swabs  Results are for the presumptive identification of SARS-CoV-2 RNA      Positive results are indicative of infection with SARS-CoV-2, the virus  causing COVID-19, but do not rule out bacterial infection or co-infection  with other viruses  Laboratories within the [place] and its  territories are required to report all positive results to the appropriate  public health authorities  Negative results do not preclude SARS-CoV-2  infection and should not be used as the sole basis for treatment or other  patient management decisions  Negative results must be combined with  clinical observations, patient history, and epidemiological information  This test has not been FDA cleared or approved  This test has been authorized by FDA under an Emergency Use Authorization  (EUA)  This test is only authorized for the duration of time the  declaration that circumstances exist justifying the authorization of the  emergency use of an in vitro diagnostic tests for detection of SARS-CoV-2  virus and/or diagnosis of COVID-19 infection under section 564(b)(1) of  the Act, 21 U  S C  034OBY-6(X)(2), unless the authorization is terminated  or revoked sooner  The test has been validated but independent review by FDA  and CLIA is pending  Test performed using [employer] Gene[employer]: This RT-PCR assay targets N2,  a region unique to SARS-CoV-2  A conserved region in the E-gene was chosen  for pan-Sarbecovirus detection which includes SARS-CoV-2  Lactic acid [[id number]]  (Abnormal) Collected: 05/01/22 2030    Lab Status: Final result Specimen: Blood from Line, Venous Updated: 05/01/22 2118     LACTIC ACID 3 7 mmol/L     Narrative:      Result may be elevated if tourniquet was used during collection      Procalcitonin [[id number]]  (Normal) Collected: 05/01/22 2030    Lab Status: Final result Specimen: Blood from Line, Venous Updated: 05/01/22 2114     Procalcitonin 0 12 ng/ml     HS Troponin 0hr (reflex protocol) [[id number]]  (Normal) Collected: 05/01/22 2030    Lab Status: Final result Specimen: Blood from Line, Venous Updated: 05/01/22 2114     hs TnI 0hr 10 ng/L     Comprehensive metabolic panel [[id number]]  (Abnormal) Collected: 05/01/22 2030    Lab Status: Final result Specimen: Blood from Line, Venous Updated: 05/01/22 2105     Sodium 139 mmol/L      Potassium 4 8 mmol/L      Chloride 102 mmol/L      CO2 25 mmol/L      ANION GAP 12 mmol/L      BUN 16 mg/dL      Creatinine 1 10 mg/dL      Glucose 173 mg/dL      Calcium 8 9 mg/dL      AST 26 U/L      ALT 34 U/L      Alkaline Phosphatase 99 U/L      Total Protein 7 5 g/dL      Albumin 3 6 g/dL      Total Bilirubin 0 76 mg/dL      eGFR 63 ml/min/1 73sq m     Narrative:      National Kidney Disease Foundation guidelines for Chronic Kidney Disease (CKD):     Stage 1 with normal or high GFR (GFR > 90 mL/min/1 73 square meters)    Stage 2 Mild CKD (GFR = 60-89 mL/min/1 73 square meters)    Stage 3A Moderate CKD (GFR = 45-59 mL/min/1 73 square meters)    Stage 3B Moderate CKD (GFR = 30-44 mL/min/1 73 square meters)    Stage 4 Severe CKD (GFR = 15-29 mL/min/1 73 square meters)    Stage 5 End Stage CKD (GFR <15 mL/min/1 73 square meters)  Note: GFR calculation is accurate only with a steady state creatinine    APTT [[id number]]  (Normal) Collected: 05/01/22 2030    Lab Status: Final result Specimen: Blood from Line, Venous Updated: 05/01/22 2100     PTT 34 seconds     Protime-INR [[id number]]  (Abnormal) Collected: 05/01/22 2030    Lab Status: Final result Specimen: Blood from Line, Venous Updated: 05/01/22 2100     Protime 14 6 seconds      INR 1 16    CBC and differential [[id number]]  (Abnormal) Collected: 05/01/22 2030    Lab Status: Final result Specimen: Blood from Line, Venous Updated: 05/01/22 2048     WBC 11 62 Thousand/uL      RBC 4 89 Million/uL      Hemoglobin 16 3 g/dL      Hematocrit 49 7 %       fL      MCH 33 3 pg      MCHC 32 8 g/dL      RDW 14 3 %      MPV 10 1 fL      Platelets 755 Thousands/uL      nRBC 0 /100 WBCs      Neutrophils Relative 80 %      Immat GRANS % 1 %      Lymphocytes Relative 9 %      Monocytes Relative 9 %      Eosinophils Relative 1 %      Basophils Relative 0 % Neutrophils Absolute 9 37 Thousands/µL      Immature Grans Absolute 0 06 Thousand/uL      Lymphocytes Absolute 1 05 Thousands/µL      Monocytes Absolute 1 03 Thousand/µL      Eosinophils Absolute 0 07 Thousand/µL      Basophils Absolute 0 04 Thousands/µL                  XR chest 1 view portable    (Results Pending)   CT chest without contrast    (Results Pending)              Procedures  Procedures         ED Course                            Initial Sepsis Screening     Row Name 05/01/22 6834                Is the patient's history suggestive of a new or worsening infection? Yes (Proceed)  -JS        Suspected source of infection suspect infection, source unknown  -JS        Are two or more of the following signs & symptoms of infection both present and new to the patient? Yes (Proceed)  -JS        Indicate SIRS criteria Leukocytosis (WBC > 13705 IJL); Hyperthemia > 38 3C (100 9F)  -JS        If the answer is yes to both questions, suspicion of sepsis is present --        If severe sepsis is present AND tissue hypoperfusion perists in the hour after fluid resuscitation or lactate > 4, the patient meets criteria for SEPTIC SHOCK --        Are any of the following organ dysfunction criteria present within 6 hours of suspected infection and SIRS criteria that are NOT considered to be chronic conditions? Yes  -JS        Organ dysfunction Lactate > 2 0 mmol/L  -JS        Date of presentation of severe sepsis --        Time of presentation of severe sepsis --        Tissue hypoperfusion persists in the hour after crystalloid fluid administration, evidenced, by either: --        Was hypotension present within one hour of the conclusion of crystalloid fluid administration?  No  -JS        Date of presentation of septic shock --        Time of presentation of septic shock --              User Key  (r) = Recorded By, (t) = Taken By, (c) = Cosigned By    [address] Name Provider Type    Anna Ansari, DO Physician MDM  Number of Diagnoses or Management Options  Sepsis ([facility] )  Diagnosis management comments: Pulse ox 93% onNC indicating adequate oxygenation  CXR: NAD as read by me       Amount and/or Complexity of Data Reviewed  Clinical lab tests: ordered and reviewed  Tests in the radiology section of CPT®: ordered and reviewed  Decide to obtain previous medical records or to obtain history from someone other than the patient: yes  Review and summarize past medical records: yes  Independent visualization of images, tracings, or specimens: yes    Patient Progress  Patient progress: stable      Disposition  Final diagnoses:   Sepsis ([facility] )     Time reflects when diagnosis was documented in both MDM as applicable and the Disposition within this note     Time User Action Codes Description Comment    5/1/2022  9:36 PM Lakeland Mash [A41 [id number]] Sepsis [facility])       ED Disposition     ED Disposition Condition Date/Time Comment    Admit Stable Sun May 1, 2022  9:36 PM Case was discussed with Dr Ayan Denny and the patient's admission status was agreed to be Admission Status: inpatient status to the service of Dr Ayan Denny   Follow-up Information    None         Current Discharge Medication List      CONTINUE these medications which have NOT CHANGED    Details   albuterol (2 5 mg/3 mL) 0 083 % nebulizer solution Take 2 5 mg by nebulization As needed per patient's wife       ammonium lactate (LAC-HYDRIN) 12 % cream Apply topically as needed for dry skin  Qty: 385 g, Refills: 0    Associated Diagnoses: Xerosis cutis; Corns      apixaban (Eliquis) 5 mg Take 5 mg by mouth 2 (two) times a day        atenolol (TENORMIN) 100 mg tablet Take 100 mg by mouth daily       atorvastatin (LIPITOR) 40 mg tablet Take 40 mg by mouth every evening        clotrimazole-betamethasone (LOTRISONE) 1-0 05 % cream Apply topically 2 (two) times a day  Qty: 30 g, Refills: 2    Associated Diagnoses: Onychomycosis;  Tinea pedis of both feet JARDIANCE 25 MG TABS Take 25 mg by mouth daily in the early morning       latanoprost (XALATAN) 0 005 % ophthalmic solution Administer 1 drop to both eyes daily at bedtime        losartan (COZAAR) 50 mg tablet Take 1 tablet (50 mg total) by mouth daily  Qty: 90 tablet, Refills: 3    Associated Diagnoses: Hypertension      metFORMIN (GLUCOPHAGE) 500 mg tablet Take 500 mg by mouth 2 (two) times a day       Multiple Vitamin (MULTI-VITAMIN DAILY PO) Take by mouth daily        omeprazole (PriLOSEC) 20 mg delayed release capsule Take 20 mg by mouth daily        primidone (MYSOLINE) 50 mg tablet Take 1 tab in morning and 1 tab at bedtime for 2 weeks and then take 1 tab in morning and 2 tabs at bedtime  Qty: 90 tablet, Refills: 4    Associated Diagnoses: Tremor, essential      Trelegy Ellipta 100-62 5-25 MCG/INH inhaler INHALE 1 PUFF DAILY RINSE MOUTH AFTER USE  Qty: 60 each, Refills: 5    Associated Diagnoses: Centrilobular emphysema ([address] )             No discharge procedures on file      PDMP Review     None          ED Provider  Electronically Signed by           Nadeem Cannon DO  05/02/22 8395

## 2022-05-02 NOTE — PLAN OF CARE
Problem: RESPIRATORY - ADULT  Goal: Achieves optimal ventilation and oxygenation  Description: INTERVENTIONS:  - Assess for changes in respiratory status  - Assess for changes in mentation and behavior  - Position to facilitate oxygenation and minimize respiratory effort  - Oxygen administered by appropriate delivery if ordered  - Encourage broncho-pulmonary hygiene including cough, deep breathe, Incentive Spirometry  - Assess and instruct to report SOB or any respiratory difficulty  - Respiratory Therapy support as indicated  Outcome: Progressing  Note: Patient appears stable   O2 by NC at 2 lpm

## 2022-05-02 NOTE — ASSESSMENT & PLAN NOTE
Remote PCI with 1 stent  Nuclear stress test November 2021 was normal  EKG in ED showed AFib  Normal ST T-wave  Denies chest pain    Troponin 10, repeat pending  Continue aspirin atenolol Lipitor

## 2022-05-02 NOTE — ASSESSMENT & PLAN NOTE
Getting fit testing on Wednesday this week  May have to postpone  Currently on oxygen 4L,titrate to keep sats > 90%

## 2022-05-02 NOTE — PROGRESS NOTES
[address]  Progress Note - Kitty Mtz 1940, [de-identified] y o  male MRN: [id number]  Unit/Bed#: 2 [address] Encounter: [id number]  Primary Care Provider: Jillian Bach MD   Date and time admitted to hospital: 5/1/2022  7:51 PM    * Severe sepsis [facility])  Assessment & Plan  POA, as evidenced by fever 102 3, tachypneic, with suspected source of infection possible pneumonia  Lactic acid 3 7 which normalized with IV hydration  WBC 11 61, without any bandemia upon admission  White count has normalized  Procalcitonin x2 negative  Patient presents with chills, generalized weakness, runny nose, cough after coming home from restaurant today  Was in restaurant for about 40 minutes,did not wear mask  Patient vaccinated and boosted against COVID   History of lung cancer, just finished radiation therapy end of February  Does not use oxygen at home  COVID-19/flu/RSV negative  Chest x-ray in ED showed atelectasis, possible small left pleural effusion to me, pending final read  Patient received IV cefepime 2 g ED, normal saline 1 L in ED  Will deescalate to Rocephin  CT Scan of the chest showed increase in the size of left lower lobe opacity with new right basilar patchy densities concerning for pulmonary infiltrates with additional patchy and reticulonodular changes in the lingular segment of the left upper lobe and the right middle lobe likely related to infectious process  Pending sputum cultures, urine for Legionella pneumococcal antigens and blood cultures  Continue IV Rocephin  Patient added on IV azithromycin 500 milligram daily pending urine antigens  Results from last 7 days   Lab Units 05/01/22 2030   LACTIC ACID mmol/L 3 7*   PROCALCITONIN ng/ml 0 12     Results from last 7 days   Lab Units 05/01/22 2030   WBC Thousand/uL 11 62*               Alcohol dependence ([facility] [address] )  Assessment & Plan  Reports drinking 6 cans of beer daily, denies drinking today    Denies history of alcohol withdrawal   Continue CIWA protocol  Continue multivitamin thiamine and folic acid  No evidence of withdrawal     Persistent atrial fibrillation ([address] )  Assessment & Plan  EKG controlled AFib  Continue Eliquis, atenolol  Optimize electrolytes    CAD (coronary artery disease)  Assessment & Plan  Remote PCI with 1 stent  Nuclear stress test November 2021 was normal  EKG in ED showed AFib without any ST-T changes  Denies chest pain  Troponin x3 were negative  Continue aspirin atenolol Lipitor      GERD (gastroesophageal reflux disease)  Assessment & Plan  History of bleeding ulcer  Continue PPI    Prostate cancer [facility])  Assessment & Plan  Noted history  Status post radiation and prostate seeds    SYLVESTER (obstructive sleep apnea)  Assessment & Plan  Getting fit testing on Wednesday this week  May have to postpone  Currently on oxygen 4L,titrate to keep sats > 90%  Chronic diastolic heart failure (HCC)  Assessment & Plan  Wt Readings from Last 3 Encounters:   05/01/22 103 kg (228 lb)   03/22/22 103 kg (228 lb)   03/02/22 103 kg (228 lb)     EF 55% from stress test 11/2021  Trace pedal edema on exam   ProBNP was 1200  Continue atenolol  Daily weight and for I&Os  No clinical evidence of fluid overload  CT scan of the chest showing pneumonia        Squamous cell carcinoma of lung (HCC)  Assessment & Plan  Left lower lobe lung cancer  Diagnosed 9/2021  Completed radiation therapy end of February  No chemotherapy  Follows Dr Chevy Galloway and Dr Anguiano(radiation oncology)  CT chest on 4/9/2022 showed - Slight decrease in the left lower lobe treated tumor  New masslike consolidation in the inferior lingula and left lower lobe with crowding of the bronchovascular bundles suggestive of radiation pneumonitis/early fibrosis  Repeat CT chest ordered as above      Type 2 diabetes mellitus, without long-term current use of insulin [facility])  Assessment & Plan  Lab Results   Component Value Date    HGBA1C 7 1 (H) 02/21/2022       No results for input(s): POCGLU in the last 72 hours  Blood Sugar Average: Last 72 hrs:   on metformin, Jardiance at home  Will hold  Low dose Lantus  SSI  Diabetic diet    Hyperlipidemia  Assessment & Plan  Continue Lipitor    Hypertension  Assessment & Plan  Continue losartan and atenolol  BP stable    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HCC)  Assessment & Plan  Continue aspirin, Lipitor          VTE Pharmacologic Prophylaxis: VTE Score: 6 High Risk (Score >/= 5) - Pharmacological DVT Prophylaxis Ordered: apixaban (Eliquis)  Sequential Compression Devices Ordered  Patient Centered Rounds: I performed bedside rounds with nursing staff today  Discussions with Specialists or Other Care Team Provider: No    Education and Discussions with Family / Patient: Attempted to update  (wife) via phone  Unable to contact  Time Spent for Care: 45 minutes  More than 50% of total time spent on counseling and coordination of care as described above  Current Length of Stay: 1 day(s)  Current Patient Status: Inpatient   Certification Statement: The patient will continue to require additional inpatient hospital stay due to Severe sepsis secondary to pneumonia  Discharge Plan: Anticipate discharge in 48-72 hrs to home  Code Status: Level 1 - Full Code    Subjective:   Patient is feeling slightly better  Patient does complain of cough productive of mucoid phlegm  Denies any chest pain, abdominal pain  Objective:     Vitals:   Temp (24hrs), Av 5 °F (37 5 °C), Min:97 6 °F (36 4 °C), Max:102 3 °F (39 1 °C)    Temp:  [97 6 °F (36 4 °C)-102 3 °F (39 1 °C)] 99 6 °F (37 6 °C)  HR:  [] 95  Resp:  [17-30] 18  BP: (134-168)/(75-96) 142/96  SpO2:  [91 %-96 %] 93 %  Body mass index is 33 02 kg/m²  Input and Output Summary (last 24 hours):      Intake/Output Summary (Last 24 hours) at 2022 1036  Last data filed at 2022 0355  Gross per 24 hour   Intake 33 33 ml   Output 400 ml   Net -366 67 ml       Physical Exam:   Physical Exam  Constitutional:       Appearance: Normal appearance  HENT:      Head: Normocephalic and atraumatic  Eyes:      Extraocular Movements: Extraocular movements intact  Pupils: Pupils are equal, round, and reactive to light  Cardiovascular:      Rate and Rhythm: Normal rate  Rhythm irregular  Heart sounds: No murmur heard  No gallop  Pulmonary:      Effort: Pulmonary effort is normal       Breath sounds: Normal breath sounds  Abdominal:      General: Bowel sounds are normal       Palpations: Abdomen is soft  Tenderness: There is no abdominal tenderness  Musculoskeletal:         General: No swelling or deformity  Normal range of motion  Cervical back: Normal range of motion and neck supple  Right lower leg: Edema present  Left lower leg: Edema present  Skin:     General: Skin is warm and dry  Neurological:      General: No focal deficit present  Mental Status: He is alert  Additional Data:     Labs:  Results from last 7 days   Lab Units 05/02/22 0558 05/01/22 2030 05/01/22 2030   WBC Thousand/uL 8 70   < > 11 62*   HEMOGLOBIN g/dL 15 0   < > 16 3   HEMATOCRIT % 45 9   < > 49 7*   PLATELETS Thousands/uL 170   < > 223   NEUTROS PCT %  --   --  80*   LYMPHS PCT %  --   --  9*   MONOS PCT %  --   --  9   EOS PCT %  --   --  1    < > = values in this interval not displayed  Results from last 7 days   Lab Units 05/02/22 0558 05/01/22 2030 05/01/22 2030   SODIUM mmol/L 139   < > 139   POTASSIUM mmol/L 3 8   < > 4 8   CHLORIDE mmol/L 103   < > 102   CO2 mmol/L 26   < > 25   BUN mg/dL 16   < > 16   CREATININE mg/dL 0 75   < > 1 10   ANION GAP mmol/L 10   < > 12   CALCIUM mg/dL 8 3   < > 8 9   ALBUMIN g/dL  --   --  3 6   TOTAL BILIRUBIN mg/dL  --   --  0 76   ALK PHOS U/L  --   --  99   ALT U/L  --   --  34   AST U/L  --   --  26   GLUCOSE RANDOM mg/dL 125   < > 173*    < > = values in this interval not displayed       Results from last 7 days   Lab Units 05/01/22 2030   INR  1 16     Results from last 7 days   Lab Units 05/02/22  0710 05/01/22  2315   POC GLUCOSE mg/dl 128 140         Results from last 7 days   Lab Units 05/02/22  0558 05/02/22  0002 05/01/22 2030   LACTIC ACID mmol/L  --  1 3 3 7*   PROCALCITONIN ng/ml 0 12  --  0 12       Lines/Drains:  Invasive Devices  Report    Peripheral Intravenous Line            Peripheral IV 05/01/22 Right Arm <1 day                      Imaging: Reviewed radiology reports from this admission including: chest xray and chest CT scan    Recent Cultures (last 7 days):   Results from last 7 days   Lab Units 05/01/22 2037 05/01/22 2030   BLOOD CULTURE  Received in Microbiology Lab  Culture in Progress  Received in Microbiology Lab  Culture in Progress         Last 24 Hours Medication List:   Current Facility-Administered Medications   Medication Dose Route Frequency Provider Last Rate    acetaminophen  650 mg Oral Q6H PRN SIMI Gonzales      apixaban  5 mg Oral BID SIMI Gonzales      ascorbic acid  1,000 mg Oral Daily SIMI Gonzales      atenolol  100 mg Oral Daily SIMI Gonzales      atorvastatin  40 mg Oral Daily With SIMI Esteves      azithromycin  500 mg Intravenous Q24H Domenic Calderon  mg (05/02/22 0923)    cefTRIAXone  2,000 mg Intravenous Q24H SIMI Gonzales 2,000 mg (05/02/22 0756)    clotrimazole-betamethasone   Topical BID SIMI Gonzales      fluticasone-vilanterol  1 puff Inhalation Daily SIMI Gonzales      folic acid  1 mg Oral Daily SIMI Gonzales      guaiFENesin  600 mg Oral BID SIMI Gonzales      insulin glargine  8 Units Subcutaneous QAM SIMI Gonzales      insulin lispro  1-5 Units Subcutaneous HS SIMI Gonzales      insulin lispro  1-6 Units Subcutaneous TID AC SIMI Gonzales      latanoprost  1 drop Both Eyes HS SIMI Gonzales      levalbuterol  0 63 mg Nebulization Q4H PRN SIMI Daniels  losartan  50 mg Oral Daily SIMI Gonzales      multivitamin stress formula  1 tablet Oral Daily SIMI Gonzales      ondansetron  4 mg Intravenous Q6H PRN SIMI Gonzales      pantoprazole  40 mg Oral Early Morning SIMI Gonzales      pneumococcal 13-valent conjugate vaccine  0 5 mL Intramuscular Prior to discharge Danielle Bloom MD      primidone  100 mg Oral HS SIMI Gonzales      primidone  50 mg Oral Daily SIMI Gonzales      saccharomyces boulardii  250 mg Oral BID SIMI Gonzales      thiamine  100 mg Oral Daily SIMI Gonzales      umeclidinium bromide  1 puff Inhalation Daily SIMI Gonzales          Today, Patient Was Seen By: Danielle Bloom MD    **Please Note: This note may have been constructed using a voice recognition system  **

## 2022-05-02 NOTE — H&P
[address]  H&P- Lisa Blanton 1940, [de-identified] y o  male MRN: [id number]  Unit/Bed#: [address] [facility] Encounter: [id number]  Primary Care Provider: Araceli Dubois MD   Date and time admitted to hospital: 5/1/2022  7:51 PM    * Severe sepsis [facility])  Assessment & Plan  POA, as evidenced by fever 102 3, tachypneic, with suspected source of infection possible pneumonia  Lactic acid 3 7  WBC 11 61, procalcitonin negative, no bands  Patient presents with chills, generalized weakness, runny nose, cough after coming home from restaurant today  Was in restaurant for about 40 minutes,did not wear mask  Patient vaccinated and boosted against COVID   History of lung cancer, just finished radiation therapy end of February  Does not use oxygen at home  COVID-19/flu/RSV negative  Chest x-ray in ED showed atelectasis, possible small left pleural effusion to me, pending final read  Patient received IV cefepime 2 g ED, normal saline 1 L in ED  Will deescalate to Rocephin  Check CT chest to better delineate  Check urine antigens, sputum Gram stain and culture  Follow-up blood cultures  Repeat CBC with diff, procalcitonin in a m  Results from last 7 days   Lab Units 05/01/22  2030   LACTIC ACID mmol/L 3 7*   PROCALCITONIN ng/ml 0 12     Results from last 7 days   Lab Units 05/01/22  2030   WBC Thousand/uL 11 62*               Chronic diastolic heart failure (HCC)  Assessment & Plan  Wt Readings from Last 3 Encounters:   05/01/22 103 kg (228 lb)   03/22/22 103 kg (228 lb)   03/02/22 103 kg (228 lb)     EF 55% from stress test 11/2021  Trace pedal edema on exam   Check proBNP  Continue atenolol  Daily weight and I&Os        Alcohol dependence (HCC)  Assessment & Plan  Reports drinking 6 cans of beer daily, denies drinking today  Denies history of alcohol withdrawal   CIWA protocol  Start MVI, folic acid, thiamine  Persistent atrial fibrillation (HCC)  Assessment & Plan  EKG controlled AFib    Continue Eliquis, atenolol  Optimize electrolytes    CAD (coronary artery disease)  Assessment & Plan  Remote PCI with 1 stent  Nuclear stress test November 2021 was normal  EKG in ED showed AFib  Normal ST T-wave  Denies chest pain  Troponin 10, repeat pending  Continue aspirin atenolol Lipitor      GERD (gastroesophageal reflux disease)  Assessment & Plan  History of bleeding ulcer  Continue PPI    Prostate cancer [facility])  Assessment & Plan  Noted history  Status post radiation and prostate seeds    SYLVESTER (obstructive sleep apnea)  Assessment & Plan  Getting fit testing on Wednesday this week  May have to postpone  Currently on oxygen 4L,titrate to keep sats > 90%  Squamous cell carcinoma of lung (HCC)  Assessment & Plan  Left lower lobe lung cancer  Completed radiation therapy end of February  Follows Dr Harsh Jenkins and Dr Anguiano(radiation oncology)  CT chest on 4/9/2022 showed - Slight decrease in the left lower lobe treated tumor  New masslike consolidation in the inferior lingula and left lower lobe with crowding of the bronchovascular bundles suggestive of radiation pneumonitis/early fibrosis  Repeat CT chest ordered as above  Type 2 diabetes mellitus, without long-term current use of insulin (HCC)  Assessment & Plan  Lab Results   Component Value Date    HGBA1C 7 1 (H) 02/21/2022       No results for input(s): POCGLU in the last 72 hours  Blood Sugar Average: Last 72 hrs:   on metformin, Jardiance at home  Will hold  Low dose Lantus  SSI  Diabetic diet    Hyperlipidemia  Assessment & Plan  Continue Lipitor    Hypertension  Assessment & Plan  On losartan, atenolol at home  Will continue  BP stable    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HCC)  Assessment & Plan  Continue aspirin, Lipitor        VTE Prophylaxis: Apixaban (Eliquis)  / reason for no mechanical VTE prophylaxis On Eliquis   Code Status:  Full code  POLST: POLST form is not discussed and not completed at this time      Anticipated Length of Stay:  Patient will be admitted on an Inpatient basis with an anticipated length of stay of  > 2 midnights  Justification for Hospital Stay:  Severe sepsis    Total Time for Visit, including Counseling / Coordination of Care: 1 hour  Greater than 50% of this total time spent on direct patient counseling and coordination of care  Chief Complaint:   Chills, generalized weakness, runny nose, cough, SOB after eating out in restaurants today  History of Present Illness:    Marva Judd is a [de-identified] y o  male with PMH of CAD, CHF, AFib, type 2 diabetes, lung cancer, hypertension, hyperlipidemia, sleep apnea, PAD, GERD who presents with chills, generalized weakness, runny nose, cough, SOB after eating out in restaurants today  Patient reports he was doing fine before when out  Did not wear mask in the restaurant  Reports very weak at home and barely could walk  Had sever shaking at home and felt cold  Reports received COVID vaccine and booster  Patient reports he completed radiation therapy for lung cancer end of February  Patient denies SOB currently with oxygen on  Denies runny nose currently  Reports cough with thin yellow phlegm at home  Denies chest pain, headache, dizziness, nausea vomiting at home  Normally ambulates independently  Review of Systems:    Review of Systems   Constitutional: Positive for activity change and chills  HENT: Positive for rhinorrhea  Respiratory: Positive for cough and shortness of breath  All other systems reviewed and are negative        Past Medical and Surgical History:     Past Medical History:   Diagnosis Date    Anxiety     Arthritis     Atrial fibrillation ([address] )     Bleeding ulcer     Cancer [facility])     prostate 2011- radiation    Cardiac disease     cardiac stent x1    Cataract     starting    Chronic diastolic (congestive) heart failure (HCC)     Diabetes mellitus (HCC)     boarderline diabetic     GERD (gastroesophageal reflux disease)     History of radiation therapy 2010    Prostate seeds (brachytherapy) and EBRT    Hyperlipidemia     Hypertension     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, bilateral     Low back pain     [facility] [address]     Lung mass     Myocardial infarction ([facility] [address] )     mild 1999    Prostate cancer ([facility] [address] )     Sleep apnea     sleep study 11/22    Wears dentures     full set    Wears glasses        Past Surgical History:   Procedure Laterality Date    ABDOMINAL HERNIA REPAIR      ABDOMINAL SURGERY      bleeding ulcer, cyst removed from abd    COLONOSCOPY     [facility] [place]    x1     IR BIOPSY LUNG  10/5/2021    IR THORACENTESIS  11/8/2021    KS BRONCHOSCOPY,DIAGNOSTIC N/A 11/29/2021    Procedure: BRONCHOSCOPY FLEXIBLE;  Surgeon: Jose Maria Villeda MD;  [facility];  Service: Thoracic    KS MEDIASTINOSCOPY WITH LYMPH NODE BIOPSY/IES N/A 11/29/2021    Procedure: MEDIASTINOSCOPY;  Surgeon: Jose Maria Villeda MD;  [facility];  Service: Thoracic       Meds/Allergies:    Prior to Admission medications    Medication Sig Start Date End Date Taking?  Authorizing Provider   apixaban (Eliquis) 5 mg Take 5 mg by mouth 2 (two) times a day     Yes Historical Provider, MD   atenolol (TENORMIN) 100 mg tablet Take 100 mg by mouth daily    Yes Historical Provider, MD   atorvastatin (LIPITOR) 40 mg tablet Take 40 mg by mouth every evening   12/18/17  Yes Historical Provider, MD   clotrimazole-betamethasone (LOTRISONE) 1-0 05 % cream Apply topically 2 (two) times a day 1/13/22  Yes Karen Trivedi DPM   JARDIANCE 25 MG TABS Take 25 mg by mouth daily in the early morning  12/28/17  Yes Historical Provider, MD   latanoprost (XALATAN) 0 005 % ophthalmic solution Administer 1 drop to both eyes daily at bedtime     Yes Historical Provider, MD   losartan (COZAAR) 50 mg tablet Take 1 tablet (50 mg total) by mouth daily 11/3/21  Yes Renard Horton DO   metFORMIN (GLUCOPHAGE) 500 mg tablet Take 500 mg by mouth 2 (two) times a day  17  Yes Historical Provider, MD   omeprazole (PriLOSEC) 20 mg delayed release capsule Take 20 mg by mouth daily     Yes Historical Provider, MD   primidone (MYSOLINE) 50 mg tablet Take 1 tab in morning and 1 tab at bedtime for 2 weeks and then take 1 tab in morning and 2 tabs at bedtime  Patient taking differently: Take 50 mg by mouth take 1 tab in morning and 2 tabs at bedtime  22  Yes MD Darryl Castro Cast [id number] 5-48 MCG/INH inhaler INHALE 1 PUFF DAILY RINSE MOUTH AFTER USE  21  Yes SIMI Tsai   albuterol (2 5 mg/3 mL) 0 083 % nebulizer solution Take 2 5 mg by nebulization Daily evening  and prn      Historical Provider, MD   ammonium lactate (LAC-HYDRIN) 12 % cream Apply topically as needed for dry skin 20   Lanesonny Phelan, DPM   apixaban (Eliquis) 5 mg Take 1 tablet (5 mg total) by mouth 2 (two) times a day  Patient taking differently: Take 5 mg by mouth 2 (two) times a day To stop 11/26  11/3/21 1/12/22  Renard Horton,    Multiple Vitamin (MULTI-VITAMIN DAILY PO) Take by mouth daily      Historical Provider, MD     I have reviewed home medications with patient personally      Allergies: No Known Allergies    Social History:     Marital Status: /Civil Union   Occupation:  Retired  Patient Pre-hospital Living Situation:  Lives with wife  Patient Pre-hospital Level of Mobility:  Independent  Patient Pre-hospital Diet Restrictions:  Cardiac diabetic  Substance Use History:   Social History     Substance and Sexual Activity   Alcohol Use Yes    Alcohol/week: 10 0 - 12 0 standard drinks    Types: 7 Glasses of wine, 3 - 5 Cans of beer per week    Comment: few beers day; glass of red wine at dinner     Social History     Tobacco Use   Smoking Status Former Smoker    Packs/day:     Years:     Pack years:     Types: Cigarettes    Quit date: 38    Years since quittin 3   Smokeless Tobacco Never Used     Social History     Substance and Sexual Activity   Drug Use Never       Family History:    non-contributory    Physical Exam:     Vitals:   Blood Pressure: 134/94 (05/01/22 2238)  Pulse: 69 (05/01/22 2238)  Temperature: 99 5 °F (37 5 °C) (05/01/22 2238)  Temp Source: Oral (05/01/22 2114)  Respirations: 17 (05/01/22 2238)  Weight - Scale: 103 kg (228 lb) (05/01/22 2000)  SpO2: 94 % (05/01/22 2238)    Physical Exam  Vitals and nursing note reviewed  Constitutional:       Appearance: He is well-developed  He is obese  HENT:      Head: Normocephalic and atraumatic  Neck:      Thyroid: No thyromegaly  Vascular: No JVD  Trachea: No tracheal deviation  Cardiovascular:      Rate and Rhythm: Normal rate  Rhythm irregular  Heart sounds: Normal heart sounds  Pulmonary:      Effort: Pulmonary effort is normal  No respiratory distress  Breath sounds: Rales present  No wheezing  Comments: Rales in lower lobes, on oxygen 4 L, satting low 90s  Abdominal:      General: Bowel sounds are normal  There is no distension  Palpations: Abdomen is soft  Tenderness: There is no abdominal tenderness  There is no guarding  Comments: Abdomen is obese   Musculoskeletal:         General: Swelling present  Cervical back: Neck supple  Comments: Trace pedal edema   Skin:     General: Skin is warm and dry  Neurological:      General: No focal deficit present  Mental Status: He is alert and oriented to person, place, and time  Psychiatric:         Mood and Affect: Mood normal          Judgment: Judgment normal          Additional Data:     Lab Results: I have personally reviewed pertinent reports        Results from last 7 days   Lab Units 05/01/22 2030   WBC Thousand/uL 11 62*   HEMOGLOBIN g/dL 16 3   HEMATOCRIT % 49 7*   PLATELETS Thousands/uL 223   NEUTROS PCT % 80*   LYMPHS PCT % 9*   MONOS PCT % 9   EOS PCT % 1     Results from last 7 days   Lab Units 05/01/22 2030   POTASSIUM mmol/L 4 8   CHLORIDE mmol/L 102 CO2 mmol/L 25   BUN mg/dL 16   CREATININE mg/dL 1 10   CALCIUM mg/dL 8 9   ALK PHOS U/L 99   ALT U/L 34   AST U/L 26     Results from last 7 days   Lab Units 05/01/22  2030   INR  1 16       Imaging: I have personally reviewed pertinent reports  CT chest wo contrast    Result Date: 4/15/2022  Narrative: CT CHEST WITHOUT IV CONTRAST INDICATION:   C34 92: Malignant neoplasm of unspecified part of left bronchus or lung  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 lobe,  status post SBRT, date of completion not evident  COMPARISON:  PET CT from 10/29/2021 and chest CT from 9/28/2021  TECHNIQUE: Chest CT without intravenous contrast   Axial, sagittal, coronal 2D reformats and coronal MIPS from source data  Radiation dose length product (DLP):  803 mGy-cm   Radiation dose exposure minimized using iterative reconstruction and automated exposure control  FINDINGS: LUNGS:  Left lower lobe cavitary mass decreased from 4 5 x 3 7 cm to 4 1 x 2 2 cm (3/81)  New 4 7 x 3 6 cm masslike consolidation in the lingula and new 5 2 x 3 1 cm masslike consolidation in the paraspinal left lower lobe, both with crowding of the bronchovascular bundles  Redemonstration of mild bilateral mid and lower lung predominant juxtapleural reticulation  Mild paraseptal emphysema  AIRWAYS: No significant filling defects  PLEURA:  Trace loculated left pleural effusion  HEART/GREAT VESSELS:  Mild cardiomegaly  Mild coronary artery calcification indicating atherosclerotic heart disease  MEDIASTINUM AND ANDRE:  Unremarkable  CHEST WALL AND LOWER NECK: Unremarkable  UPPER ABDOMEN:  Cholelithiasis  Right renal cyst   Gastric surgery  OSSEOUS STRUCTURES: Mild degenerative disease in the spine  Impression: Slight decrease in the left lower lobe treated tumor  New masslike consolidation in the inferior lingula and left lower lobe with crowding of the bronchovascular bundles suggestive of radiation pneumonitis/early fibrosis   Redemonstration of mild juxtapleural pulmonary fibrosis  Workstation performed: [id number]       EKG, Pathology, and Other Studies Reviewed on Admission:   · EKG:  Controlled AFib    Allscripts Records Reviewed: Yes     ** Please Note: Dragon 360 Dictation voice to text software may have been used in the creation of this document   **

## 2022-05-02 NOTE — ASSESSMENT & PLAN NOTE
POA, as evidenced by fever 102 3, tachypneic, with suspected source of infection possible pneumonia  Lactic acid 3 7  WBC 11 61, procalcitonin negative, no bands  Patient presents with chills, generalized weakness, runny nose, cough after coming home from restaurant today  Was in restaurant for about 40 minutes,did not wear mask  Patient vaccinated and boosted against COVID   History of lung cancer, just finished radiation therapy end of March  Does not use oxygen at home  COVID-19/flu/RSV negative  Chest x-ray in ED showed atelectasis, possible small left pleural effusion to me, pending final read  Patient received IV cefepime 2 g ED, normal saline 1 L in ED  Will deescalate to Rocephin  Check CT chest to better delineate  Check urine antigens, sputum Gram stain and culture  Follow-up blood cultures  Repeat CBC with diff, procalcitonin in a m      Results from last 7 days   Lab Units 05/01/22  2030   LACTIC ACID mmol/L 3 7*   PROCALCITONIN ng/ml 0 12     Results from last 7 days   Lab Units 05/01/22  2030   WBC Thousand/uL 11 62*

## 2022-05-02 NOTE — ED PROCEDURE NOTE
PROCEDURE  ECG 12 Lead Documentation Only    Date/Time: 5/1/2022 8:13 PM  Performed by: Verena Steen DO  Authorized by: Verena Steen DO     ECG reviewed by me, the ED Provider: yes    Patient location:  ED  Interpretation:     Interpretation: abnormal    Rate:     ECG rate:  64    ECG rate assessment: normal    Rhythm:     Rhythm: atrial fibrillation    Ectopy:     Ectopy: none    QRS:     QRS axis:  Left  ST segments:     ST segments:  Normal  T waves:     T waves: normal           Verena Steen DO  05/01/22 2013

## 2022-05-02 NOTE — PHYSICAL THERAPY NOTE
PHYSICAL THERAPY EVALUATION/TREATMENT     05/02/22 1025   Note Type   Note type Evaluation   Pain Assessment   Pain Assessment Tool 0-10   Pain Score No Pain   Restrictions/Precautions   Other Precautions O2   Home Living   Type of Home House  (bed upstairs;full bath downstairs;1/2 bath upstairs)   Home Layout Two level  (5 LACI with rail)   Home Equipment Cane  (possible RW)   Prior Function   Level of [place] Independent with ADLs and functional mobility   Lives With Spouse   Receives Help From Family   ADL Assistance Independent   IADLs Independent   General   Additional Pertinent History Pt is adm with chills, weakness, cough, SOB   Cognition   Overall Cognitive Status WFL   Arousal/Participation Cooperative   Orientation Level Oriented X4   Following Commands Follows all commands and directions without difficulty   Subjective   Subjective "I feel better than I did yesterday"   RLE Assessment   RLE Assessment WFL  (3+ to 4-/5)   LLE Assessment   LLE Assessment WFL  (3+ to 4-/5)   Bed Mobility   Supine to Sit 7  Independent   Sit to Supine 7  Independent   Transfers   Sit to Stand 5  Supervision   Stand to Sit 5  Supervision   Ambulation/Elevation   Gait pattern   (slow, mildly unsteady)   Gait Assistance   (min A/close S)   Additional items Assist x 1;Verbal cues; Tactile cues   Assistive Device None   Distance 100 feet with change in direction;pt minimally SOB at end of amb;SAO2 96% at rest and 91% at end of ambulation   Balance   Static Sitting Good   Static Standing Fair +   Dynamic Standing Fair   Ambulatory   (F-/F)   Activity Tolerance   Activity Tolerance Patient limited by fatigue;Treatment limited secondary to medical complications (Comment)  (SOB)   Assessment   Problem List Decreased strength;Decreased range of motion;Decreased endurance; Impaired balance;Decreased mobility; Decreased coordination;Decreased safety awareness   Assessment Patient seen for Physical Therapy evaluation   Patient admitted with Severe sepsis ([facility] [address] )  Comorbidities affecting patient's physical performance include: HTN, HLD, DM2, lung CA, SYLVESTER, prostate CA, CAD, A-Fib, alcohol dependence, DM 2, RSV, SOB  Personal factors affecting patient at time of initial evaluation include: lives in two story house, stairs to enter home, inability to navigate community distances, inability to navigate level surfaces without external assistance and inability to perform dynamic tasks in community  Prior to admission, patient was independent with functional mobility without assistive device, independent with ADLS, independent with IADLS, living with spouse in a two level home with 5 steps to enter, ambulating household distance and ambulating community distances  Please find objective findings from Physical Therapy assessment regarding body systems outlined above with impairments and limitations including weakness, decreased ROM, impaired balance, decreased endurance, impaired coordination, gait deviations, decreased activity tolerance, decreased functional mobility tolerance, decreased safety awareness, fall risk and SOB upon exertion  The Barthel Index was used as a functional outcome tool presenting with a score of Barthel Index Score: 65 today indicating moderate limitations of functional mobility and ADLS  Patient's clinical presentation is currently unstable/unpredictable as seen in patient's presentation of vital sign response, increased fall risk, new onset of impairment of functional mobility, decreased endurance and new onset of weakness  Pt would benefit from continued Physical Therapy treatment to address deficits as defined above and maximize level of functional mobility  As demonstrated by objective findings, the assigned level of complexity for this evaluation is high  The patient's AM-PAC Basic Mobility Inpatient Short Form Raw Score is 20   A Raw score of greater than 16 suggests the patient may benefit from discharge to home  Please also refer to the recommendation of the Physical Therapist for safe discharge planning  Goals   Patient Goals go home   STG Expiration Date 05/09/22   Short Term Goal #1 trans - I; balance with the RW - F/F+ for safe gait and mobility   Short Term Goal #2 pt will amb with a RW functional household distances; up/down steps 5 steps with a rail with S so pt can enter/exit his home   LTG Expiration Date 05/16/22   Long Term Goal #1 pt will return to independent gait and mobility w/out an AD, levels and stairs, balance - Good   Long Term Goal #2 strength LEs - 4- to 4/5   Plan   Treatment/Interventions ADL retraining;Functional transfer training;LE strengthening/ROM; Elevations; Therapeutic exercise; Endurance training;Patient/family training;Equipment eval/education; Bed mobility;Gait training; Compensatory technique education   PT Frequency Other (Comment)  (5x/wk)   Recommendation   PT Discharge Recommendation Home with home health rehabilitation   AM-PAC Basic Mobility Inpatient   Turning in Bed Without Bedrails 4   Lying on Back to Sitting on Edge of Flat Bed 4   Moving Bed to Chair 3   Standing Up From Chair 3   Walk in Room 3   Climb 3-5 Stairs 3   Basic Mobility Inpatient Raw Score 20   Basic Mobility Standardized Score 43 99   Highest Level Of Mobility   JH-HLM Goal 6: Walk 10 steps or more   JH-HLM Highest Level of Mobility 7: Walk 25 feet or more   JH-HLM Goal Achieved Yes   Barthel Index   Feeding 10   Bathing 5   Grooming Score 5   Dressing Score 5   Bladder Score 10   Bowels Score 10   Toilet Use Score 5   Transfers (Bed/Chair) Score 10   Mobility (Level Surface) Score 0   Stairs Score 5   Barthel Index Score 65   Additional Treatment Session   Start Time 1025   End Time 1035   Treatment Assessment S: "I think I would feel better using a walker" O: Pt trans sit to stand with S/I and amb with a  feet with change in direction  Pt trans stand to sit - S  Pt minimally SOB at end of ambulation  A: Pt with good tolerance to bed mob, trans, and ambulation  Pt will benefit from cont amb with a RW at this time with progression to no AD as appropriate  Pt will also benefit from home PT when medically stable for dc  End of Consult   Patient Position at End of Consult Seated edge of bed; All needs within reach   [facility] Insurance Number  [place], [place] [id number]

## 2022-05-02 NOTE — ASSESSMENT & PLAN NOTE
Reports drinking 6 cans of beer daily, denies drinking today    Denies history of alcohol withdrawal   Continue CIWA protocol  Continue multivitamin thiamine and folic acid  No evidence of withdrawal

## 2022-05-02 NOTE — ASSESSMENT & PLAN NOTE
Left lower lobe lung cancer  Diagnosed 9/2021  Completed radiation therapy end of February  No chemotherapy  Follows Dr Sanford Bourne and Dr Anguiano(radiation oncology)  CT chest on 4/9/2022 showed - Slight decrease in the left lower lobe treated tumor  New masslike consolidation in the inferior lingula and left lower lobe with crowding of the bronchovascular bundles suggestive of radiation pneumonitis/early fibrosis  Repeat CT chest ordered as above

## 2022-05-02 NOTE — CASE MANAGEMENT
Case Management Assessment & Discharge Planning Note    Patient name Patsy Phillip  Location [facility] 204[address] [place] MRN [id number]  : 1940 Date 2022       Current Admission Date: 2022  Current Admission Diagnosis:Severe sepsis [facility])   Patient Active Problem List    Diagnosis Date Noted    Severe sepsis ([facility] ) 2022    Prostate cancer ([facility] ) 2022    GERD (gastroesophageal reflux disease) 2022    CAD (coronary artery disease) 2022    Persistent atrial fibrillation ([facility] ) 2022    Alcohol dependence ([place] [address] ) 2022    SYLVESTER (obstructive sleep apnea)     Chronic diastolic heart failure ([facility] ) 2021    Squamous cell carcinoma of lung ([facility] ) 10/13/2021    Shortness of breath 10/13/2021    Daytime hypersomnolence 10/13/2021    RSV bronchitis 2021    Lung mass 2021    Hypertension     Hyperlipidemia     Type 2 diabetes mellitus, without long-term current use of insulin ([facility] )     New onset atrial fibrillation ([facility] )     Corns 02/15/2018    Pain in both feet 02/15/2018    Diabetic polyneuropathy associated with type 2 diabetes mellitus ([facility] ) 02/15/2018    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[facility] ) 02/15/2018    Onychomycosis 02/15/2018    Tinea pedis of both feet 02/15/2018      LOS (days): 1  Geometric Mean LOS (GMLOS) (days):   Days to GMLOS:     OBJECTIVE:    Risk of Unplanned Readmission Score: 15     Current admission status: Inpatient    Preferred Pharmacy:   [address] [facility] [address]  [address] [place] [address]  Phone: [phone] Fax: [phone] [address], [facility] 100  [address] [facility] [address] [facility] [address]  Phone: [phone] Fax: [phone]    Primary Care Provider: Anna Bales MD    Primary Insurance: Tea Aly [facility] REP  Secondary Insurance:     ASSESSMENT:  [address], [address] Care Agent - Spouse   Primary Phone: [phone] (Home)               Advance Directives  Does patient have a [address] [facility] [address]?: Yes  Does patient have Advance Directives?: Yes (requested copy)  Advance Directives: Living will  Primary Contact: Rogerio Lennon    Readmission Root Cause  30 Day Readmission: No    Patient Information  Admitted from[de-identified] Home  Mental Status: Alert  During Assessment patient was accompanied by: Spouse  Assessment information provided by[de-identified] Patient,Spouse  Primary Caregiver: Spouse  Caregiver's Name[de-identified] Dmitriy Copping Relationship to Patient[de-identified] Family Member (wife)  [facility] Telephone Number[de-identified] [phone]  Support Systems: [address] of Residence: [address] [place] [address] do you live in?: [place]  Type of Current Residence: [address] home  Upon entering residence, is there a bedroom on the main floor (no further steps)?: Yes  Upon entering residence, is there a bathroom on the main floor (no further steps)?: Yes  In the last 12 months, was there a time when you were not able to pay the mortgage or rent on time?: No  In the last 12 months, how many places have you lived?: 1  In the last 12 months, was there a time when you did not have a steady place to sleep or slept in a shelter (including now)?: No  Homeless/housing insecurity resource given?: N/A  Living Arrangements: Lives w/ Spouse/significant other    Activities of Daily Living Prior to Admission  Functional Status: Independent  Completes ADLs independently?: Yes  Ambulates independently?: Yes  Does patient use assisted devices?: Yes  Assisted Devices (DME) used: Nebulizer  Does patient currently own DME?: Yes  What DME does the patient currently own?: Straight Cane,Nebulizer  Does patient have a history of Outpatient Therapy (PT/OT)?: Yes  Does the patient have a history of Short-Term Rehab?: No  Does patient have a history of HHC?: No  Does patient currently have Monae [address]?: No    Patient Information Continued  Income Source: Pension/long term  Does patient have prescription coverage?: Yes ([address])  Within the past 12 months, you worried that your food would run out before you got the money to buy more : Never true  Within the past 12 months, the food you bought just didnt last and you didnt have money to get more : Never true  Food insecurity resource given?: N/A  Does patient receive dialysis treatments?: No  Does patient have a history of substance abuse?: No  Does patient have a history of Mental Health Diagnosis?: No    Means of Transportation  Means of Transport to Appts[de-identified] Drives Self  In the past 12 months, has lack of transportation kept you from medical appointments or from getting medications?: No  In the past 12 months, has lack of transportation kept you from meetings, work, or from getting things needed for daily living?: No  Was application for public transport provided?: N/A        DISCHARGE DETAILS:    Discharge planning discussed with[de-identified] Patient and wife, Cheyanne Weeks of Choice: Yes  Comments - Freedom of Choice: SW met with pt and wife to assess needs and discuss plans  Home care/therapy is being recommended for pt  Pt and wife are open to home care services  Reviewed agency options  They chose to have referral made to [facility]  CM contacted family/caregiver?: Yes  Were Treatment Team discharge recommendations reviewed with patient/caregiver?: Yes  Did patient/caregiver verbalize understanding of patient care needs?: Yes  Were patient/caregiver advised of the risks associated with not following Treatment Team discharge recommendations?: Yes    Contacts  Patient Contacts: Kathleen Chain  Relationship to Patient[de-identified] Family (wife)  Contact Method:  In Person  Reason/Outcome: Discharge [address]         Is the patient interested in [facility] at discharge?: Yes  [address] requested[de-identified] Chetna Cook [address] Agency Name[de-identified] [address] Follow-Up Provider[de-identified] PCP (Dr Jillian Bach MD)  Home Health Services Needed[de-identified] Evaluate Functional Status and Safety,Gait/ADL Training,Heart Failure Management,Strengthening/Theraputic Exercises to Improve Function  Homebound Criteria Met[de-identified] Requires the Assistance of Another Person for Safe Ambulation or to Leave the Home,Uses an Assist Device (i e  cane, walker, etc)  Supporting Clincal Findings[de-identified] Limited Endurance,Fatigues Easliy in Short Distances,Dyspnea with Exertion    DME Referral Provided  Referral made for DME?: No    Other Referral/Resources/Interventions Provided:  Interventions: [facility]  Referral Comments: Referral made to [facility] AND [facility]  Programs[de-identified]  (CPR2)    Treatment Team Recommendation: Home with [facility]  Discharge Destination Plan[de-identified] Home with Tom at Discharge : [facility]

## 2022-05-02 NOTE — ASSESSMENT & PLAN NOTE
Wt Readings from Last 3 Encounters:   05/01/22 103 kg (228 lb)   03/22/22 103 kg (228 lb)   03/02/22 103 kg (228 lb)     EF 55% from stress test 11/2021  Trace pedal edema on exam   ProBNP was 1200  Continue atenolol  Daily weight and for I&Os  No clinical evidence of fluid overload    CT scan of the chest showing pneumonia

## 2022-05-02 NOTE — ASSESSMENT & PLAN NOTE
Lab Results   Component Value Date    HGBA1C 7 1 (H) 02/21/2022       No results for input(s): POCGLU in the last 72 hours  Blood Sugar Average: Last 72 hrs:   on metformin, Jardiance at home  Will hold    Low dose Lantus  SSI  Diabetic diet

## 2022-05-02 NOTE — ASSESSMENT & PLAN NOTE
Reports drinking 6 cans of beer daily, denies drinking today  Denies history of alcohol withdrawal   CIWA protocol  Start MVI, folic acid, thiamine

## 2022-05-02 NOTE — ASSESSMENT & PLAN NOTE
Left lower lobe lung cancer  Status post radiation therapy  Scheduled to have follow-up CT chest in April 2022  Was not done  Ordered to r/o pneumonia  Follows Dr Lida Adler and Dr Anguiano(radiation oncology)

## 2022-05-02 NOTE — ASSESSMENT & PLAN NOTE
Remote PCI with 1 stent  Nuclear stress test November 2021 was normal  EKG in ED showed AFib without any ST-T changes  Denies chest pain    Troponin x3 were negative  Continue aspirin atenolol Lipitor

## 2022-05-02 NOTE — ASSESSMENT & PLAN NOTE
POA, as evidenced by fever 102 3, tachypneic, with suspected source of infection possible pneumonia  Lactic acid 3 7 which normalized with IV hydration  WBC 11 61, without any bandemia upon admission  White count has normalized  Procalcitonin x2 negative  Patient presents with chills, generalized weakness, runny nose, cough after coming home from restaurant today  Was in restaurant for about 40 minutes,did not wear mask  Patient vaccinated and boosted against COVID   History of lung cancer, just finished radiation therapy end of February  Does not use oxygen at home  COVID-19/flu/RSV negative  Chest x-ray in ED showed atelectasis, possible small left pleural effusion to me, pending final read  Patient received IV cefepime 2 g ED, normal saline 1 L in ED  Will deescalate to Rocephin  CT Scan of the chest showed increase in the size of left lower lobe opacity with new right basilar patchy densities concerning for pulmonary infiltrates with additional patchy and reticulonodular changes in the lingular segment of the left upper lobe and the right middle lobe likely related to infectious process  Pending sputum cultures, urine for Legionella pneumococcal antigens and blood cultures  Continue IV Rocephin  Patient added on IV azithromycin 500 milligram daily pending urine antigens      Results from last 7 days   Lab Units 05/01/22 2030   LACTIC ACID mmol/L 3 7*   PROCALCITONIN ng/ml 0 12     Results from last 7 days   Lab Units 05/01/22 2030   WBC Thousand/uL 11 62*

## 2022-05-02 NOTE — ASSESSMENT & PLAN NOTE
Wt Readings from Last 3 Encounters:   05/01/22 103 kg (228 lb)   03/22/22 103 kg (228 lb)   03/02/22 103 kg (228 lb)     EF 55% from stress test 11/2021  Trace pedal edema on exam   Check proBNP  Continue atenolol  Daily weight and I&Os

## 2022-05-03 PROBLEM — J96.01 ACUTE RESPIRATORY FAILURE WITH HYPOXEMIA (HCC): Status: ACTIVE | Noted: 2022-05-03

## 2022-05-03 LAB
BASOPHILS # BLD AUTO: 0.03 THOUSANDS/ΜL (ref 0–0.1)
BASOPHILS NFR BLD AUTO: 1 % (ref 0–1)
EOSINOPHIL # BLD AUTO: 0.21 THOUSAND/ΜL (ref 0–0.61)
EOSINOPHIL NFR BLD AUTO: 3 % (ref 0–6)
ERYTHROCYTE [DISTWIDTH] IN BLOOD BY AUTOMATED COUNT: 14 % (ref 11.6–15.1)
GLUCOSE SERPL-MCNC: 131 MG/DL (ref 65–140)
GLUCOSE SERPL-MCNC: 152 MG/DL (ref 65–140)
GLUCOSE SERPL-MCNC: 161 MG/DL (ref 65–140)
GLUCOSE SERPL-MCNC: 174 MG/DL (ref 65–140)
HCT VFR BLD AUTO: 45.4 % (ref 36.5–49.3)
HGB BLD-MCNC: 15.2 G/DL (ref 12–17)
IMM GRANULOCYTES # BLD AUTO: 0.03 THOUSAND/UL (ref 0–0.2)
IMM GRANULOCYTES NFR BLD AUTO: 1 % (ref 0–2)
LYMPHOCYTES # BLD AUTO: 0.87 THOUSANDS/ΜL (ref 0.6–4.47)
LYMPHOCYTES NFR BLD AUTO: 13 % (ref 14–44)
MCH RBC QN AUTO: 32.7 PG (ref 26.8–34.3)
MCHC RBC AUTO-ENTMCNC: 33.5 G/DL (ref 31.4–37.4)
MCV RBC AUTO: 98 FL (ref 82–98)
MONOCYTES # BLD AUTO: 0.62 THOUSAND/ΜL (ref 0.17–1.22)
MONOCYTES NFR BLD AUTO: 9 % (ref 4–12)
NEUTROPHILS # BLD AUTO: 4.89 THOUSANDS/ΜL (ref 1.85–7.62)
NEUTS SEG NFR BLD AUTO: 73 % (ref 43–75)
NRBC BLD AUTO-RTO: 0 /100 WBCS
PLATELET # BLD AUTO: 197 THOUSANDS/UL (ref 149–390)
PMV BLD AUTO: 9.9 FL (ref 8.9–12.7)
PROCALCITONIN SERPL-MCNC: 0.16 NG/ML
RBC # BLD AUTO: 4.65 MILLION/UL (ref 3.88–5.62)
WBC # BLD AUTO: 6.65 THOUSAND/UL (ref 4.31–10.16)

## 2022-05-03 PROCEDURE — 85025 COMPLETE CBC W/AUTO DIFF WBC: CPT | Performed by: INTERNAL MEDICINE

## 2022-05-03 PROCEDURE — 99233 SBSQ HOSP IP/OBS HIGH 50: CPT | Performed by: INTERNAL MEDICINE

## 2022-05-03 PROCEDURE — 82948 REAGENT STRIP/BLOOD GLUCOSE: CPT

## 2022-05-03 PROCEDURE — 84145 PROCALCITONIN (PCT): CPT | Performed by: INTERNAL MEDICINE

## 2022-05-03 RX ADMIN — INSULIN GLARGINE 8 UNITS: 100 INJECTION, SOLUTION SUBCUTANEOUS at 08:01

## 2022-05-03 RX ADMIN — OXYCODONE HYDROCHLORIDE AND ACETAMINOPHEN 1000 MG: 500 TABLET ORAL at 08:03

## 2022-05-03 RX ADMIN — APIXABAN 5 MG: 5 TABLET, FILM COATED ORAL at 08:03

## 2022-05-03 RX ADMIN — INSULIN LISPRO 1 UNITS: 100 INJECTION, SOLUTION INTRAVENOUS; SUBCUTANEOUS at 11:23

## 2022-05-03 RX ADMIN — LATANOPROST 1 DROP: 50 SOLUTION OPHTHALMIC at 22:07

## 2022-05-03 RX ADMIN — INSULIN LISPRO 1 UNITS: 100 INJECTION, SOLUTION INTRAVENOUS; SUBCUTANEOUS at 08:01

## 2022-05-03 RX ADMIN — PANTOPRAZOLE SODIUM 40 MG: 40 TABLET, DELAYED RELEASE ORAL at 05:22

## 2022-05-03 RX ADMIN — APIXABAN 5 MG: 5 TABLET, FILM COATED ORAL at 17:50

## 2022-05-03 RX ADMIN — LOSARTAN POTASSIUM 50 MG: 50 TABLET, FILM COATED ORAL at 08:04

## 2022-05-03 RX ADMIN — GUAIFENESIN 600 MG: 600 TABLET, EXTENDED RELEASE ORAL at 17:50

## 2022-05-03 RX ADMIN — THIAMINE HCL TAB 100 MG 100 MG: 100 TAB at 08:04

## 2022-05-03 RX ADMIN — UMECLIDINIUM 1 PUFF: 62.5 AEROSOL, POWDER ORAL at 08:05

## 2022-05-03 RX ADMIN — B-COMPLEX W/ C & FOLIC ACID TAB 1 TABLET: TAB at 08:04

## 2022-05-03 RX ADMIN — PRIMIDONE 100 MG: 50 TABLET ORAL at 22:07

## 2022-05-03 RX ADMIN — GUAIFENESIN 600 MG: 600 TABLET, EXTENDED RELEASE ORAL at 08:03

## 2022-05-03 RX ADMIN — PRIMIDONE 50 MG: 50 TABLET ORAL at 08:04

## 2022-05-03 RX ADMIN — ATORVASTATIN CALCIUM 40 MG: 40 TABLET, FILM COATED ORAL at 15:50

## 2022-05-03 RX ADMIN — AZITHROMYCIN MONOHYDRATE 500 MG: 500 INJECTION, POWDER, LYOPHILIZED, FOR SOLUTION INTRAVENOUS at 08:03

## 2022-05-03 RX ADMIN — INSULIN LISPRO 1 UNITS: 100 INJECTION, SOLUTION INTRAVENOUS; SUBCUTANEOUS at 22:06

## 2022-05-03 RX ADMIN — Medication 250 MG: at 08:04

## 2022-05-03 RX ADMIN — CLOTRIMAZOLE AND BETAMETHASONE DIPROPIONATE: 10; .64 CREAM TOPICAL at 17:51

## 2022-05-03 RX ADMIN — ATENOLOL 100 MG: 50 TABLET ORAL at 08:03

## 2022-05-03 RX ADMIN — CEFTRIAXONE 2000 MG: 2 INJECTION, SOLUTION INTRAVENOUS at 07:59

## 2022-05-03 RX ADMIN — FLUTICASONE FUROATE AND VILANTEROL TRIFENATATE 1 PUFF: 100; 25 POWDER RESPIRATORY (INHALATION) at 08:05

## 2022-05-03 RX ADMIN — FOLIC ACID 1 MG: 1 TABLET ORAL at 08:03

## 2022-05-03 RX ADMIN — CLOTRIMAZOLE AND BETAMETHASONE DIPROPIONATE: 10; .64 CREAM TOPICAL at 08:10

## 2022-05-03 RX ADMIN — Medication 250 MG: at 17:51

## 2022-05-03 NOTE — ASSESSMENT & PLAN NOTE
· POA, as evidenced by fever 102 3, tachypneic, with suspected source of infection possible pneumonia  · Lactic acid 3 7 which normalized with IV hydration  · WBC 11 61, without any bandemia upon admission  White count has normalized  · Procalcitonin x2 negative  · History of lung cancer, just finished radiation therapy end of February  Does not use oxygen at home  · COVID-19/flu/RSV negative  · Chest x-ray in ED showed atelectasis, possible small left pleural effusion to me, pending final read  · CT Scan of the chest showed increase in the size of left lower lobe opacity with new right basilar patchy densities concerning for pulmonary infiltrates with additional patchy and reticulonodular changes in the lingular segment of the left upper lobe and the right middle lobe likely related to infectious process  · Pending sputum cultures, urine for Legionella pneumococcal antigens negative and blood cultures NTD  · Continue IV Rocephin  Pete rodriguez      Results from last 7 days   Lab Units 05/02/22 0558 05/02/22  0002 05/01/22 2030   LACTIC ACID mmol/L  --  1 3 3 7*   PROCALCITONIN ng/ml 0 12  --  0 12     Results from last 7 days   Lab Units 05/02/22  0558 05/01/22 2030   WBC Thousand/uL 8 70 11 62*

## 2022-05-03 NOTE — ASSESSMENT & PLAN NOTE
· Left lower lobe lung cancer  Diagnosed 9/2021  · Completed radiation therapy end of February 2022  No chemotherapy  · Follows Dr Esther Gandhi and Dr Rashad Perera (radiation oncology)  · CT chest on 4/9/2022 showed - Slight decrease in the left lower lobe treated tumor  New masslike consolidation in the inferior lingula and left lower lobe with crowding of the bronchovascular bundles suggestive of radiation pneumonitis/early fibrosis  · Repeat CT chest 5/2 - Increase in size of a left lower lobe patchy opacity with new right basilar patchy densities concerning for pulmonary infiltrates with additional patchy and reticulonodular changes lingular segment left upper lobe and right middle lobe also likely related to infectious process

## 2022-05-03 NOTE — ASSESSMENT & PLAN NOTE
Wt Readings from Last 3 Encounters:   05/02/22 104 kg (230 lb 2 6 oz)   03/22/22 103 kg (228 lb)   03/02/22 103 kg (228 lb)     EF 55% from stress test 11/2021  Trace pedal edema on exam   ProBNP was 1200  Continue atenolol  Daily weight and for I&Os  No clinical evidence of fluid overload    CT scan of the chest showing pneumonia

## 2022-05-03 NOTE — PROGRESS NOTES
[address]  Progress Note - [place] 1940, [de-identified] y o  male MRN: [id number]  Unit/Bed#: 2 [address] Encounter: [id number]  Primary Care Provider: Mic Willingham MD   Date and time admitted to hospital: 5/1/2022  7:51 PM    * Severe sepsis [facility])  Assessment & Plan  · POA, as evidenced by fever 102 3, tachypneic, with suspected source of infection possible pneumonia  · Lactic acid 3 7 which normalized with IV hydration  · WBC 11 61, without any bandemia upon admission  White count has normalized  · Procalcitonin x2 negative  · History of lung cancer, just finished radiation therapy end of February  Does not use oxygen at home  · COVID-19/flu/RSV negative  · Chest x-ray in ED showed atelectasis, possible small left pleural effusion to me, pending final read  · CT Scan of the chest showed increase in the size of left lower lobe opacity with new right basilar patchy densities concerning for pulmonary infiltrates with additional patchy and reticulonodular changes in the lingular segment of the left upper lobe and the right middle lobe likely related to infectious process  · Pending sputum cultures, urine for Legionella pneumococcal antigens negative and blood cultures NTD  · Continue IV Rocephin  Pete rodriguez  Results from last 7 days   Lab Units 05/02/22  0558 05/02/22  0002 05/01/22 2030   LACTIC ACID mmol/L  --  1 3 3 7*   PROCALCITONIN ng/ml 0 12  --  0 12     Results from last 7 days   Lab Units 05/02/22  0558 05/01/22 2030   WBC Thousand/uL 8 70 11 62*               Acute respiratory failure with hypoxemia (HCC)  Assessment & Plan  · Requiring O2 supplementation on admission to maintain acceptable SpO2 greater than 90%  · He does not typically wear oxygen at home  · At this time weaned to room air, respiratory protocol    Incentive spirometer  · Treatment for CAP as above    Alcohol dependence ([facility] [address] )  Assessment & Plan  Reports drinking 6 cans of beer daily, denies drinking today   Denies history of alcohol withdrawal   Continue CIWA protocol  Continue multivitamin thiamine and folic acid  No evidence of withdrawal     Persistent atrial fibrillation ([address] )  Assessment & Plan  EKG controlled AFib  Continue Eliquis, atenolol  Optimize electrolytes    CAD (coronary artery disease)  Assessment & Plan  Remote PCI with 1 stent  Nuclear stress test November 2021 was normal  EKG in ED showed AFib without any ST-T changes  Denies chest pain  Troponin x3 were negative  Continue aspirin atenolol Lipitor      GERD (gastroesophageal reflux disease)  Assessment & Plan  History of bleeding ulcer  Continue PPI    Prostate cancer [facility])  Assessment & Plan  Noted history  Status post radiation and prostate seeds    SYLVESTER (obstructive sleep apnea)  Assessment & Plan  Getting fit testing on Wednesday this week  May have to postpone  Currently on oxygen 4L,titrate to keep sats > 90%  Chronic diastolic heart failure (HCC)  Assessment & Plan  Wt Readings from Last 3 Encounters:   05/02/22 104 kg (230 lb 2 6 oz)   03/22/22 103 kg (228 lb)   03/02/22 103 kg (228 lb)     EF 55% from stress test 11/2021  Trace pedal edema on exam   ProBNP was 1200  Continue atenolol  Daily weight and for I&Os  No clinical evidence of fluid overload  CT scan of the chest showing pneumonia        Squamous cell carcinoma of lung (HCC)  Assessment & Plan  · Left lower lobe lung cancer  Diagnosed 9/2021  · Completed radiation therapy end of February 2022  No chemotherapy  · Follows Dr Reginald Linton and Dr Padmini Jones (radiation oncology)  · CT chest on 4/9/2022 showed - Slight decrease in the left lower lobe treated tumor  New masslike consolidation in the inferior lingula and left lower lobe with crowding of the bronchovascular bundles suggestive of radiation pneumonitis/early fibrosis    · Repeat CT chest 5/2 - Increase in size of a left lower lobe patchy opacity with new right basilar patchy densities concerning for pulmonary infiltrates with additional patchy and reticulonodular changes lingular segment left upper lobe and right middle lobe also likely related to infectious process  Type 2 diabetes mellitus, without long-term current use of insulin [facility])  Assessment & Plan  Lab Results   Component Value Date    HGBA1C 7 1 (H) 02/21/2022       Recent Labs     05/02/22  0710 05/02/22  1050 05/02/22  1613 05/02/22 2058   POCGLU 128 147* 132 133       Blood Sugar Average: Last 72 hrs:   on metformin, Jardiance at home  Will hold  Low dose Lantus  SSI  Diabetic diet    Hyperlipidemia  Assessment & Plan  Continue Lipitor    Hypertension  Assessment & Plan  Continue losartan and atenolol  BP stable    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HCC)  Assessment & Plan  Continue aspirin, Lipitor        VTE Pharmacologic Prophylaxis: VTE Score: 6 High Risk (Score >/= 5) - Pharmacological DVT Prophylaxis Ordered: apixaban (Eliquis)  Sequential Compression Devices Ordered  Patient Centered Rounds: I performed bedside rounds with nursing staff today  Discussions with Specialists or Other Care Team Provider: cm    Education and Discussions with Family / Patient: Updated  (wife) via phone  Time Spent for Care: 30 minutes  More than 50% of total time spent on counseling and coordination of care as described above  Current Length of Stay: 2 day(s)  Current Patient Status: Inpatient   Certification Statement: The patient will continue to require additional inpatient hospital stay due to iv abx , monitoring symptoms, await sputum cx results,  Discharge Plan: Anticipate discharge in 48 hrs to home  Code Status: Level 1 - Full Code    Subjective:   Continued coughing fits with productive sputum, no chest pain, heaviness or pleurisy  He is not short of breath  Patient tells me that he is able to breathe deeply without going into a coughing fit as much  He has been using his incentive spirometer      Objective:     Vitals:   Temp (24hrs), Av 6 °F (36 4 °C), Min:96 9 °F (36 1 °C), Max:98 3 °F (36 8 °C)    Temp:  [96 9 °F (36 1 °C)-98 3 °F (36 8 °C)] 97 5 °F (36 4 °C)  HR:  [75-79] 75  Resp:  [16-18] 16  BP: (129-156)/(90-96) 156/93  SpO2:  [92 %-94 %] 94 %  Body mass index is 32 99 kg/m²  Input and Output Summary (last 24 hours):   No intake or output data in the 24 hours ending 22 0810    Physical Exam:   Physical Exam  Vitals and nursing note reviewed  Constitutional:       General: He is not in acute distress  Appearance: He is not ill-appearing, toxic-appearing or diaphoretic  Comments: Chronically ill-appearing, he is awake alert, able to move around in bed during my physical exam without issue  HENT:      Head: Normocephalic and atraumatic  Nose: Nose normal       Mouth/Throat:      Mouth: Mucous membranes are moist    Eyes:      Extraocular Movements: Extraocular movements intact  Conjunctiva/sclera: Conjunctivae normal    Cardiovascular:      Rate and Rhythm: Normal rate  Rhythm irregular  Pulses: Normal pulses  Heart sounds: Normal heart sounds  Pulmonary:      Effort: Pulmonary effort is normal       Comments: Inspiratory wheeze bilateral lung fields, mild bi basilar crackles  He is not short of breath with rest or conversation or when moving in bed  He does have a wet sounding cough which I appreciated  Abdominal:      General: Bowel sounds are normal       Palpations: Abdomen is soft  Comments: Central obesity   Musculoskeletal:         General: No swelling or tenderness  Cervical back: Neck supple  Skin:     General: Skin is warm and dry  Comments: Actinic keratosis bilateral arms with ecchymosis   Neurological:      General: No focal deficit present  Mental Status: He is alert  Psychiatric:         Mood and Affect: Mood normal       Comments: He is tearful when he talks about how his lung cancer has limited his hobbies and activities that he used to enjoy  Additional Data:     Labs:  Results from last 7 days   Lab Units 05/03/22  0519   WBC Thousand/uL 6 65   HEMOGLOBIN g/dL 15 2   HEMATOCRIT % 45 4   PLATELETS Thousands/uL 197   NEUTROS PCT % 73   LYMPHS PCT % 13*   MONOS PCT % 9   EOS PCT % 3     Results from last 7 days   Lab Units 05/02/22  0558 05/01/22 2030 05/01/22 2030   SODIUM mmol/L 139   < > 139   POTASSIUM mmol/L 3 8   < > 4 8   CHLORIDE mmol/L 103   < > 102   CO2 mmol/L 26   < > 25   BUN mg/dL 16   < > 16   CREATININE mg/dL 0 75   < > 1 10   ANION GAP mmol/L 10   < > 12   CALCIUM mg/dL 8 3   < > 8 9   ALBUMIN g/dL  --   --  3 6   TOTAL BILIRUBIN mg/dL  --   --  0 76   ALK PHOS U/L  --   --  99   ALT U/L  --   --  34   AST U/L  --   --  26   GLUCOSE RANDOM mg/dL 125   < > 173*    < > = values in this interval not displayed  Results from last 7 days   Lab Units 05/01/22 2030   INR  1 16     Results from last 7 days   Lab Units 05/03/22  0721 05/02/22 2058 05/02/22  1613 05/02/22  1050 05/02/22  0710 05/01/22  2315   POC GLUCOSE mg/dl 161* 133 132 147* 128 140         Results from last 7 days   Lab Units 05/03/22  0519 05/02/22  0558 05/02/22 0002 05/01/22 2030   LACTIC ACID mmol/L  --   --  1 3 3 7*   PROCALCITONIN ng/ml 0 16 0 12  --  0 12       Lines/Drains:  Invasive Devices  Report    Peripheral Intravenous Line            Peripheral IV 05/01/22 Right Arm 1 day                      Imaging: Reviewed radiology reports from this admission including: CT chest x 2    Recent Cultures (last 7 days):   Results from last 7 days   Lab Units 05/02/22  0608 05/01/22 2119 05/01/22 2037 05/01/22 2030   BLOOD CULTURE   --   --  No Growth at 24 hrs  No Growth at 24 hrs  SPUTUM CULTURE  Test not performed  Suggest repeat specimen  --   --   --    GRAM STAIN RESULT  >10 squamous epithelial cells/lpf, indicating orophayngeal contamination  *  2+ Polys*  3+ Gram positive cocci in clusters*  2+ Gram positive rods*  2+ Gram negative rods*  1+ Gram negative cocci*  --   --   --    LEGIONELLA URINARY ANTIGEN   --  Negative  --   --        Last 24 Hours Medication List:   Current Facility-Administered Medications   Medication Dose Route Frequency Provider Last Rate    acetaminophen  650 mg Oral Q6H PRN Cuiyin Yurik, CRNP      apixaban  5 mg Oral BID Cuiyin Yurik, CRNP      ascorbic acid  1,000 mg Oral Daily Cuiyin Yurik, CRNP      atenolol  100 mg Oral Daily Cuiyin Yurik, CRNP      atorvastatin  40 mg Oral Daily With Mervin Chatman, SIMI      cefTRIAXone  2,000 mg Intravenous Q24H Cuiyin Yurik, CRNP 2,000 mg (05/03/22 7300)    clotrimazole-betamethasone   Topical BID Cuiyiesme Yurik, CRNP      fluticasone-vilanterol  1 puff Inhalation Daily Cuiyiesme Yurik, CRNP      folic acid  1 mg Oral Daily Cuiyin Yurik, CRNP      guaiFENesin  600 mg Oral BID Cuiyiesme Yurik, CRNP      insulin glargine  8 Units Subcutaneous QAM Cuiyin Yurik, CRNP      insulin lispro  1-5 Units Subcutaneous HS Cuiyin Yurik, CRNP      insulin lispro  1-6 Units Subcutaneous TID AC Cuiyin Yurik, CRNP      latanoprost  1 drop Both Eyes HS Cuiyin Yurik, CRNP      levalbuterol  0 63 mg Nebulization Q4H PRN Cuiyin Yurik, CRNP      losartan  50 mg Oral Daily Cuiyiesme Yurik, CRNP      multivitamin stress formula  1 tablet Oral Daily Cuiyin Yurik, CRNP      ondansetron  4 mg Intravenous Q6H PRN Cuiyin Yurik, CRNP      pantoprazole  40 mg Oral Early Morning Cuiyin Yurik, CRNP      pneumococcal 13-valent conjugate vaccine  0 5 mL Intramuscular Prior to discharge Yang Rainey MD      primidone  100 mg Oral HS Cuiyin Yurik, CRNP      primidone  50 mg Oral Daily Cuiyin Yurik, CRNP      saccharomyces boulardii  250 mg Oral BID Cuiyin Yurik, CRNP      thiamine  100 mg Oral Daily Cuiyin Yurik, CRNP      umeclidinium bromide  1 puff Inhalation Daily Cuiyin Yurik, CRNP          Today, Patient Was Seen By: Zenon Corley PA-C    **Please Note: This note may have been constructed using a voice recognition system  **

## 2022-05-03 NOTE — ASSESSMENT & PLAN NOTE
· Requiring O2 supplementation on admission to maintain acceptable SpO2 greater than 90%  · He does not typically wear oxygen at home  · At this time weaned to room air, respiratory protocol    Incentive spirometer  · Treatment for CAP as above

## 2022-05-03 NOTE — ASSESSMENT & PLAN NOTE
Lab Results   Component Value Date    HGBA1C 7 1 (H) 02/21/2022       Recent Labs     05/02/22  0710 05/02/22  1050 05/02/22  1613 05/02/22 2058   POCGLU 128 147* 132 133       Blood Sugar Average: Last 72 hrs:   on metformin, Jardiance at home  Will hold    Low dose Lantus  SSI  Diabetic diet

## 2022-05-04 ENCOUNTER — APPOINTMENT (INPATIENT)
Dept: RADIOLOGY | Facility: HOSPITAL | Age: 82
DRG: 871 | End: 2022-05-04
Payer: COMMERCIAL

## 2022-05-04 VITALS
DIASTOLIC BLOOD PRESSURE: 78 MMHG | OXYGEN SATURATION: 92 % | SYSTOLIC BLOOD PRESSURE: 131 MMHG | WEIGHT: 229.94 LBS | HEIGHT: 70 IN | HEART RATE: 71 BPM | BODY MASS INDEX: 32.92 KG/M2 | RESPIRATION RATE: 16 BRPM | TEMPERATURE: 98.6 F

## 2022-05-04 LAB
BACTERIA SPT RESP CULT: ABNORMAL
GLUCOSE SERPL-MCNC: 136 MG/DL (ref 65–140)
GLUCOSE SERPL-MCNC: 156 MG/DL (ref 65–140)
GRAM STN SPEC: ABNORMAL

## 2022-05-04 PROCEDURE — 94761 N-INVAS EAR/PLS OXIMETRY MLT: CPT

## 2022-05-04 PROCEDURE — 71045 X-RAY EXAM CHEST 1 VIEW: CPT

## 2022-05-04 PROCEDURE — 99239 HOSP IP/OBS DSCHRG MGMT >30: CPT | Performed by: PHYSICIAN ASSISTANT

## 2022-05-04 PROCEDURE — 82948 REAGENT STRIP/BLOOD GLUCOSE: CPT

## 2022-05-04 RX ORDER — ACETAMINOPHEN 325 MG/1
650 TABLET ORAL EVERY 6 HOURS PRN
Qty: 30 TABLET | Refills: 0 | Status: SHIPPED | OUTPATIENT
Start: 2022-05-04

## 2022-05-04 RX ORDER — LANOLIN ALCOHOL/MO/W.PET/CERES
100 CREAM (GRAM) TOPICAL DAILY
Qty: 30 TABLET | Refills: 0 | Status: SHIPPED | OUTPATIENT
Start: 2022-05-05

## 2022-05-04 RX ORDER — GUAIFENESIN 600 MG
600 TABLET, EXTENDED RELEASE 12 HR ORAL 2 TIMES DAILY
Qty: 20 TABLET | Refills: 0 | Status: SHIPPED | OUTPATIENT
Start: 2022-05-04

## 2022-05-04 RX ORDER — SACCHAROMYCES BOULARDII 250 MG
250 CAPSULE ORAL 2 TIMES DAILY
Qty: 20 CAPSULE | Refills: 0 | Status: SHIPPED | OUTPATIENT
Start: 2022-05-04 | End: 2022-05-14

## 2022-05-04 RX ORDER — FOLIC ACID 1 MG/1
1 TABLET ORAL DAILY
Qty: 30 TABLET | Refills: 0 | Status: SHIPPED | OUTPATIENT
Start: 2022-05-05

## 2022-05-04 RX ORDER — CEFUROXIME AXETIL 500 MG/1
500 TABLET ORAL EVERY 12 HOURS SCHEDULED
Qty: 6 TABLET | Refills: 0 | Status: SHIPPED | OUTPATIENT
Start: 2022-05-05 | End: 2022-05-08

## 2022-05-04 RX ADMIN — PANTOPRAZOLE SODIUM 40 MG: 40 TABLET, DELAYED RELEASE ORAL at 05:29

## 2022-05-04 RX ADMIN — THIAMINE HCL TAB 100 MG 100 MG: 100 TAB at 09:10

## 2022-05-04 RX ADMIN — LOSARTAN POTASSIUM 50 MG: 50 TABLET, FILM COATED ORAL at 09:10

## 2022-05-04 RX ADMIN — FOLIC ACID 1 MG: 1 TABLET ORAL at 09:10

## 2022-05-04 RX ADMIN — Medication 250 MG: at 09:10

## 2022-05-04 RX ADMIN — APIXABAN 5 MG: 5 TABLET, FILM COATED ORAL at 09:10

## 2022-05-04 RX ADMIN — UMECLIDINIUM 1 PUFF: 62.5 AEROSOL, POWDER ORAL at 09:11

## 2022-05-04 RX ADMIN — OXYCODONE HYDROCHLORIDE AND ACETAMINOPHEN 1000 MG: 500 TABLET ORAL at 09:10

## 2022-05-04 RX ADMIN — ATENOLOL 100 MG: 50 TABLET ORAL at 09:10

## 2022-05-04 RX ADMIN — FLUTICASONE FUROATE AND VILANTEROL TRIFENATATE 1 PUFF: 100; 25 POWDER RESPIRATORY (INHALATION) at 09:11

## 2022-05-04 RX ADMIN — INSULIN GLARGINE 8 UNITS: 100 INJECTION, SOLUTION SUBCUTANEOUS at 09:10

## 2022-05-04 RX ADMIN — GUAIFENESIN 600 MG: 600 TABLET, EXTENDED RELEASE ORAL at 09:10

## 2022-05-04 RX ADMIN — INSULIN LISPRO 1 UNITS: 100 INJECTION, SOLUTION INTRAVENOUS; SUBCUTANEOUS at 11:36

## 2022-05-04 RX ADMIN — CEFTRIAXONE 2000 MG: 2 INJECTION, SOLUTION INTRAVENOUS at 08:05

## 2022-05-04 RX ADMIN — PRIMIDONE 50 MG: 50 TABLET ORAL at 09:10

## 2022-05-04 RX ADMIN — B-COMPLEX W/ C & FOLIC ACID TAB 1 TABLET: TAB at 09:10

## 2022-05-04 NOTE — NURSING NOTE
Patient discharged home with supportive spouse  Discharge instruction provided to patient and spouse  Written instruction provided  IV removed per hospital protocol  Occlusive dressing applied  Patient left unit ambulatory with all personal belongings accompanied by spouse and RN

## 2022-05-04 NOTE — ASSESSMENT & PLAN NOTE
· Reports drinking 6 cans of beer daily, denies history of alcohol withdrawal   · Continue CIWA protocol  · Continue multivitamin thiamine and folic acid  · No evidence of withdrawal

## 2022-05-04 NOTE — ASSESSMENT & PLAN NOTE
Wt Readings from Last 3 Encounters:   05/04/22 104 kg (229 lb 15 oz)   03/22/22 103 kg (228 lb)   03/02/22 103 kg (228 lb)     · EF 55% from stress test 11/2021  · Trace pedal edema on exam, appears to be at baseline  · ProBNP was 1200  · Continue atenolol  · Daily weight and for I&Os  · No clinical evidence of fluid overload    · CT scan of the chest showing pneumonia

## 2022-05-04 NOTE — CASE MANAGEMENT
Case Management Discharge Planning Note    Patient name Lisa Blanton  Location [address] [facility] MRN [id number]  : 1940 Date 2022       Current Admission Date: 2022  Current Admission Diagnosis:Severe sepsis [facility])   Patient Active Problem List    Diagnosis Date Noted    [facility] hypoxemia [facility] ) 2022    Severe sepsis ([facility] ) 2022    Prostate cancer ([facility] ) 2022    GERD (gastroesophageal reflux disease) 2022    CAD (coronary artery disease) 2022    Persistent atrial fibrillation ([facility] ) 2022    Alcohol dependence ([facility] ) 2022    SYLVESTER (obstructive sleep apnea)     Chronic diastolic heart failure ([facility] ) 2021    Squamous cell carcinoma of lung ([facility] ) 10/13/2021    Shortness of breath 10/13/2021    Daytime hypersomnolence 10/13/2021    RSV bronchitis 2021    Lung mass 2021    Hypertension     Hyperlipidemia     Type 2 diabetes mellitus, without long-term current use of insulin ([facility] )     New onset atrial fibrillation ([facility] )     Corns 02/15/2018    Pain in both feet 02/15/2018    Diabetic polyneuropathy associated with type 2 diabetes mellitus ([facility] ) 02/15/2018    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[facility] ) 02/15/2018    Onychomycosis 02/15/2018    Tinea pedis of both feet 02/15/2018      LOS (days): 3  Geometric Mean LOS (GMLOS) (days):   Days to GMLOS:     OBJECTIVE:  Risk of Unplanned Readmission Score: 15     Current admission status: Inpatient   Preferred Pharmacy:   [address] [facility] [address]  [address] [place] [address]  Phone: [phone] Fax: [phone] [address]  [address], [address]  [address]  [facility] [address]  Phone: [phone] Fax: [phone]    Primary Care Provider: Araceli Dubois MD    Primary Insurance: [facility]P  Secondary Insurance:     DISCHARGE DETAILS:    Discharge planning discussed with[de-identified] Patient  Freedom of Choice: Yes  Comments - Freedom of Choice: SW following to monitor needs and assist with DCP  Met with pt to review plans  Pt ambulating independently in room and hallway  Discussed initial plan for home care services and pt's positive progress in hospital   Discussed criteria for home care including needing to be homebound  Pt stated the way he feels now he will not be homebound  After discussion pt chose to have home care referral cancelled  CM contacted family/caregiver?: Yes  Were Treatment Team discharge recommendations reviewed with patient/caregiver?: Yes  Did patient/caregiver verbalize understanding of patient care needs?: Yes  Were patient/caregiver advised of the risks associated with not following Treatment Team discharge recommendations?: Yes    Contacts  Patient Contacts: Mark Garo  Relationship to Patient[de-identified] Family  Contact Method: Phone  Phone Number: [phone]  Reason/Outcome: Discharge [address]         Is the patient interested in [address] at discharge?: No    DME Referral Provided  Referral made for DME?: No    Other Referral/Resources/Interventions Provided:  Interventions: None Indicated    Treatment Team Recommendation: Home  Discharge Destination Plan[de-identified] Home  Transport at Discharge : Family,Automobile    IMM Given (Date):: 05/04/22  IMM Given to[de-identified] Patient (IMM reviewed with patient  Pt verbalized understanding and agrees with discharge determination  Pt signed IMM and copy given    Copy also placed in scan bin for chart )

## 2022-05-04 NOTE — ASSESSMENT & PLAN NOTE
· Getting fit testing on Wednesday this week, may have to postpone  · Currently on oxygen 2L O2, titrate to keep sats > 90%

## 2022-05-04 NOTE — DISCHARGE INSTR - AVS FIRST PAGE
You were treated for pneumonia with IV antibiotics, you will need to continue taking oral antibiotics for 3 more days to complete total of 7 day antibiotic treatment     Recommend you follow-up with your oncologist for continued monitoring/management of your lung cancer  Follow-up with your primary care provider to continue monitoring chronic conditions

## 2022-05-04 NOTE — ASSESSMENT & PLAN NOTE
· Remote PCI with 1 stent  · Nuclear stress test November 2021 was normal  · EKG in ED showed AFib without any ST-T changes  · Denies chest pain    · Troponin x3 were negative  · Continue aspirin atenolol Lipitor

## 2022-05-04 NOTE — ASSESSMENT & PLAN NOTE
· POA, as evidenced by fever 102 3, tachypneic, with suspected source of infection possible pneumonia  Resolved  · History of lung cancer, just finished radiation therapy end of February  Does not use oxygen at home  · Chest x-ray in ED showed atelectasis, possible small left pleural effusion to me, pending final read  · CT Scan of the chest showed increase in the size of left lower lobe opacity with new right basilar patchy densities concerning for pulmonary infiltrates with additional patchy and reticulonodular changes in the lingular segment of the left upper lobe and the right middle lobe likely related to infectious process  · COVID-19/flu/RSV negative  · Lactic acid 3 7 which normalized with IV hydration  · WBC 11 61, without any bandemia upon admission  Leukocytosis resolved    · Procalcitonin x2 negative  · Urine for Legionella pneumococcal antigens negative and blood cultures NTD  · Repeat CXR today unchanged  · Status post 4 days of IV antibiotics total (initially with cefepime, then ceftriaxone and 2 days azithromycin)  · Sputum culture revealed mixed respiratory sushil growth, no significant growth of Staph aureus or Pseudomonas  · Patient clinically improved, will discharge with 3 days oral Ceftin to complete total of 7 day antibiotic course    Results from last 7 days   Lab Units 05/03/22 0519 05/02/22 0558 05/02/22  0002 05/01/22 2030   LACTIC ACID mmol/L  --   --  1 3 3 7*   PROCALCITONIN ng/ml 0 16 0 12  --  0 12     Results from last 7 days   Lab Units 05/03/22 0519 05/02/22 0558 05/01/22 2030   WBC Thousand/uL 6 65 8 70 11 62*

## 2022-05-04 NOTE — ASSESSMENT & PLAN NOTE
Lab Results   Component Value Date    HGBA1C 7 1 (H) 02/21/2022       Recent Labs     05/03/22  1048 05/03/22  1615 05/03/22 2051 05/04/22  0721   POCGLU 152* 131 174* 136       Blood Sugar Average: Last 72 hrs:  · On metformin, Jardiance at home   Can resume on discharge  · Low dose Lantus, SSI coverage while inpatient  · Diabetic diet

## 2022-05-04 NOTE — ASSESSMENT & PLAN NOTE
· Requiring O2 supplementation on admission, used up to 4L O2 intially  · He does not typically wear oxygen at home    · Currently on 2L O2 - room air, titrate to maintain SpO2 greater than 90%  · RT with desaturation screening, no home O2 requirements  · Treatment for CAP as above

## 2022-05-04 NOTE — RESPIRATORY THERAPY NOTE
Home Oxygen Qualifying Test     Patient name: Teressa Clinton        : 1940   Date of Test:  May 4, 2022  Diagnosis:    Home Oxygen Test:    Medicare Guidelines require item(s) 1-5 on all ambulatory patients or 1 and 2 on non-ambulatory patients  1  Baseline SPO2 on Room Air at rest 94 %   a  If <= 88% on Room Air add O2 via NC to obtain SpO2 >=88%  If LPM needed, document LPM NA needed to reach =>88%    2  SPO2 during exertion on Room Air 95  %  a  During exertion monitor SPO2  If SPO2 increases >=89%, do not add supplemental oxygen    3  SPO2 on Oxygen at Rest NA % at NA LPM    4  SPO2 during exertion on Oxygen na % at  Na LPM    5  Test performed during exertion activity  []  Supplemental Home Oxygen is indicated  [x]  Client does not qualify for home oxygen      Respiratory Additional Notes-     Pablo Ovalle, RT

## 2022-05-04 NOTE — DISCHARGE SUMMARY
Nikki 47  Discharge- Rui Reyes 1940, [de-identified] y o  male MRN: [id number]  Unit/Bed#: 2 [address] Encounter: [id number]  Primary Care Provider: Yakov Rawls MD   Date and time admitted to hospital: 5/1/2022  7:51 PM    * Severe sepsis [facility])  Assessment & Plan  · POA, as evidenced by fever 102 3, tachypneic, with suspected source of infection possible pneumonia  Resolved  · History of lung cancer, just finished radiation therapy end of February  Does not use oxygen at home  · Chest x-ray in ED showed atelectasis, possible small left pleural effusion to me, pending final read  · CT Scan of the chest showed increase in the size of left lower lobe opacity with new right basilar patchy densities concerning for pulmonary infiltrates with additional patchy and reticulonodular changes in the lingular segment of the left upper lobe and the right middle lobe likely related to infectious process  · COVID-19/flu/RSV negative  · Lactic acid 3 7 which normalized with IV hydration  · WBC 11 61, without any bandemia upon admission  Leukocytosis resolved    · Procalcitonin x2 negative  · Urine for Legionella pneumococcal antigens negative and blood cultures NTD  · Repeat CXR today unchanged  · Status post 4 days of IV antibiotics total (initially with cefepime, then ceftriaxone and 2 days azithromycin)  · Sputum culture revealed mixed respiratory sushil growth, no significant growth of Staph aureus or Pseudomonas  · Patient clinically improved, will discharge with 3 days oral Ceftin to complete total of 7 day antibiotic course    Results from last 7 days   Lab Units 05/03/22 0519 05/02/22 0558 05/02/22  0002 05/01/22 2030   LACTIC ACID mmol/L  --   --  1 3 3 7*   PROCALCITONIN ng/ml 0 16 0 12  --  0 12     Results from last 7 days   Lab Units 05/03/22  0519 05/02/22  0558 05/01/22  2030   WBC Thousand/uL 6 65 8 70 11 62*         Acute respiratory failure with hypoxemia (HCC)  Assessment & Plan  · Requiring O2 supplementation on admission, used up to 4L O2 intially  · He does not typically wear oxygen at home  · Currently on 2L O2 - room air, titrate to maintain SpO2 greater than 90%  · RT with desaturation screening, no home O2 requirements  · Treatment for CAP as above    Alcohol dependence (HCC)  Assessment & Plan  · Reports drinking 6 cans of beer daily, denies history of alcohol withdrawal   · Continue CIWA protocol  · Continue multivitamin thiamine and folic acid  · No evidence of withdrawal     Persistent atrial fibrillation ([address] )  Assessment & Plan  · EKG showed controlled AFib  · Continue Eliquis, atenolol  · Optimize electrolytes    CAD (coronary artery disease)  Assessment & Plan  · Remote PCI with 1 stent  · Nuclear stress test November 2021 was normal  · EKG in ED showed AFib without any ST-T changes  · Denies chest pain  · Troponin x3 were negative  · Continue aspirin atenolol Lipitor    GERD (gastroesophageal reflux disease)  Assessment & Plan  · History of bleeding ulcer  · Continue PPI    Prostate cancer [facility])  Assessment & Plan  · Noted history  · Status post radiation and prostate seeds    SYLVESTER (obstructive sleep apnea)  Assessment & Plan  · Getting fit testing on Wednesday this week, may have to postpone  · Currently on oxygen 2L O2, titrate to keep sats > 90%  Chronic diastolic heart failure (HCC)  Assessment & Plan  Wt Readings from Last 3 Encounters:   05/04/22 104 kg (229 lb 15 oz)   03/22/22 103 kg (228 lb)   03/02/22 103 kg (228 lb)     · EF 55% from stress test 11/2021  · Trace pedal edema on exam, appears to be at baseline  · ProBNP was 1200  · Continue atenolol  · Daily weight and for I&Os  · No clinical evidence of fluid overload  · CT scan of the chest showing pneumonia    Squamous cell carcinoma of lung (HCC)  Assessment & Plan  · Left lower lobe lung cancer  Diagnosed 9/2021  · Completed radiation therapy end of February 2022  No chemotherapy    · Follows   Gio Alvarado and Dr Jasmin Leavitt (radiation oncology)  · CT chest on 4/9/2022 showed - Slight decrease in the left lower lobe treated tumor  New masslike consolidation in the inferior lingula and left lower lobe with crowding of the bronchovascular bundles suggestive of radiation pneumonitis/early fibrosis  · Repeat CT chest 5/2 - Increase in size of a left lower lobe patchy opacity with new right basilar patchy densities concerning for pulmonary infiltrates with additional patchy and reticulonodular changes lingular segment left upper lobe and right middle lobe also likely related to infectious process  Type 2 diabetes mellitus, without long-term current use of insulin [facility])  Assessment & Plan  Lab Results   Component Value Date    HGBA1C 7 1 (H) 02/21/2022       Recent Labs     05/03/22  1048 05/03/22  1615 05/03/22 2051 05/04/22  0721   POCGLU 152* 131 174* 136       Blood Sugar Average: Last 72 hrs:  · On metformin, Jardiance at home  Can resume on discharge  · Low dose Lantus, SSI coverage while inpatient  · Diabetic diet    Hyperlipidemia  Assessment & Plan  · Continue Lipitor    Hypertension  Assessment & Plan  · Continue losartan and atenolol  · BP stable    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HCC)  Assessment & Plan  · Continue aspirin, Lipitor    Medical Problems             Resolved Problems  Date Reviewed: 5/4/2022    None              Discharging Physician / Practitioner: Genia Rod PA-C  PCP: Mary Kay Sanders MD  Admission Date:   Admission Orders (From admission, onward)     Ordered        05/01/22 2144  INPATIENT ADMISSION  Once                      Discharge Date: 05/04/22    Consultations During Hospital Stay:  · PT/OT, respiratory therapy    Procedures Performed:   · None    Significant Findings / Test Results:   XR chest portable   Final Result by Mariel Sawyer MD (05/04 1006)   Left small effusion and parenchymal process appears unchanged              Workstation performed: [id number]         CT chest without contrast   Final Result by Sara Bean MD (05/02 3259)   1  Increase in size of a left lower lobe patchy opacity with new right basilar patchy densities concerning for pulmonary infiltrates with additional patchy and reticulonodular changes lingular segment left upper lobe and right middle lobe also likely    related to infectious process  2   Left lower lobe cavitary mass has decreased in size now measuring 3 3 x 1 9 cm, previously 4 1 x 2 2 cm  3   Stable COPD and pulmonary fibrosis with the latter likely related to radiation change  Workstation performed: [id number]         XR chest 1 view portable   Final Result by Ganesh Ramey MD (05/02 8421)   Persistent left basal opacity consistent with mass  Small left pleural effusion      CT correlation may be considered for further investigation                  Workstation performed: [id number]             Incidental Findings:   · None     Test Results Pending at Discharge (will require follow up): · Final blood culture results     Outpatient Tests Requested:  · Follow-up with PCP for management of chronic conditions  · Follow-up with known oncologist for management of lung cancer    Complications:  None    Reason for Admission:  Severe sepsis 2/2 pneumonia    Hospital Course:   Latosha Roldan is a [de-identified] y o  male patient, [facility] lung cancer recently finished radiation therapy in February, CHF, alcohol dependence, AFib, CAD status post PCI and stent, prostate cancer, SYLVESTER, who originally presented to the hospital on 5/1/2022 due to severe sepsis likely secondary to pneumonia  Patient presented with generalized weakness, chills, cough, SOB  On arrival to ED, patient met severe sepsis criteria, chest x-ray showed atelectasis and possible left pleural effusion, COVID/influenza/RSV negative  CT chest did show increase in left lower lobe opacity, concerning for pneumonia   Patient started on IV cefepime, received IV fluids, continued on IV ceftriaxone  Throughout course of admission, patient clinically improved, fevers, lactic acidosis, tachypnea and leukocytosis resolved  Legionella and strep pneumonia antigens negative, sputum culture revealed mixed respiratory sushil  Will discharge to complete total 7 day antibiotic course with oral Ceftin, recommend close outpatient follow-up with PCP to monitor clinical course, and follow-up with known oncologist given lung cancer history  Respiratory therapy evaluated patient, stable on room air without home oxygen requirements  PT/OT evaluated patient, stable for home with [facility], patient declined [facility]  Please see above list of diagnoses and related plan for additional information  Condition at Discharge: stable    Discharge Day Visit / Exam:   Subjective:  Patient is seen at bedside this a m , reports SOB and coughing much improved, sputum production has decreased and phlegm/sputum has loosened up, denies chest pain, palpitations, fever, chills, sweats  Patient states he feels ready for discharge home  Vitals: Blood Pressure: 131/78 (05/04/22 0809)  Pulse: 71 (05/04/22 0809)  Temperature: 98 6 °F (37 °C) (05/04/22 0809)  Temp Source: Oral (05/04/22 0809)  Respirations: 16 (05/04/22 0809)  Height: 5' 10" (177 8 cm) (05/02/22 0719)  Weight - Scale: 104 kg (229 lb 15 oz) (05/04/22 0600)  SpO2: 92 % (05/04/22 0809)  Exam:   Physical Exam  Constitutional:       General: He is not in acute distress  Appearance: Normal appearance  He is not ill-appearing, toxic-appearing or diaphoretic  Cardiovascular:      Rate and Rhythm: Normal rate and regular rhythm  Pulses: Normal pulses  Heart sounds: Normal heart sounds  Pulmonary:      Effort: Pulmonary effort is normal  No respiratory distress  Breath sounds: Rhonchi present  Comments: Mild bibasilar rhonchi  Abdominal:      General: Bowel sounds are normal  There is no distension  Palpations: Abdomen is soft  Tenderness: There is no abdominal tenderness  Musculoskeletal:         General: Swelling present  No tenderness  Comments: Mild 1+ bilateral lower extremity pitting edema, appears to be baseline   Skin:     General: Skin is warm  Neurological:      General: No focal deficit present  Mental Status: He is alert and oriented to person, place, and time  Psychiatric:         Mood and Affect: Mood normal          Behavior: Behavior normal           Discussion with Family: Updated  (wife) via phone  Discharge instructions/Information to patient and family:   See after visit summary for information provided to patient and family  Provisions for Follow-Up Care:  See after visit summary for information related to follow-up care and any pertinent home health orders  Disposition:   Home    Planned Readmission:  None     Discharge Statement:  I spent 45 minutes discharging the patient  This time was spent on the day of discharge  I had direct contact with the patient on the day of discharge  Greater than 50% of the total time was spent examining patient, answering all patient questions, arranging and discussing plan of care with patient as well as directly providing post-discharge instructions  Additional time then spent on discharge activities  Discharge Medications:  See after visit summary for reconciled discharge medications provided to patient and/or family        **Please Note: This note may have been constructed using a voice recognition system**

## 2022-05-04 NOTE — ASSESSMENT & PLAN NOTE
· Left lower lobe lung cancer  Diagnosed 9/2021  · Completed radiation therapy end of February 2022  No chemotherapy  · Follows Dr Jony Bucio and Dr Zhang Lassiter (radiation oncology)  · CT chest on 4/9/2022 showed - Slight decrease in the left lower lobe treated tumor  New masslike consolidation in the inferior lingula and left lower lobe with crowding of the bronchovascular bundles suggestive of radiation pneumonitis/early fibrosis  · Repeat CT chest 5/2 - Increase in size of a left lower lobe patchy opacity with new right basilar patchy densities concerning for pulmonary infiltrates with additional patchy and reticulonodular changes lingular segment left upper lobe and right middle lobe also likely related to infectious process

## 2022-05-05 ENCOUNTER — TELEPHONE (OUTPATIENT)
Dept: NEUROLOGY | Facility: CLINIC | Age: 82
End: 2022-05-05

## 2022-05-05 NOTE — UTILIZATION REVIEW
Notification of Discharge   This is a Notification of Discharge from our facility [address]  Please be advised that this patient has been discharge from our facility  Below you will find the admission and discharge date and time including the patients disposition  UTILIZATION REVIEW CONTACT:  Diana Bailey  Utilization   Network Utilization Review Department  Phone: [phone] x carefully listen to the prompts  All voicemails are confidential   Email: [email or web address]     PHYSICIAN ADVISORY SERVICES:  FOR RTUL-AR-NECN REVIEW - MEDICAL NECESSITY DENIAL  Phone: [phone]  Fax: [phone]  Email: [email or web address]     PRESENTATION DATE: 5/1/2022  7:51 PM  OBERVATION ADMISSION DATE:   INPATIENT ADMISSION DATE: 5/1/22  9:44 PM   DISCHARGE DATE: 5/4/2022  2:16 PM  DISPOSITION: Home/Self Care Home/Self Care      IMPORTANT INFORMATION:  Send all requests for admission clinical reviews, approved or denied determinations and any other requests to dedicated fax number below belonging to the campus where the patient is receiving treatment   List of dedicated fax numbers:  [address] DENIALS (Administrative/Medical Necessity) [phone]   [address] (Maternity/NICU/Pediatrics) [phone]   [place] [phone]   [address] [facility] [phone]   [address] [place] [address] [phone]   [address] [place] [address],[address] [phone]   [facility]  [phone]   [address] [facility] [address]  [address] [facility] [address] [facility] [phone]

## 2022-05-06 LAB
BACTERIA BLD CULT: NORMAL
BACTERIA BLD CULT: NORMAL

## 2022-05-17 ENCOUNTER — OFFICE VISIT (OUTPATIENT)
Dept: NEUROLOGY | Facility: CLINIC | Age: 82
End: 2022-05-17
Payer: COMMERCIAL

## 2022-05-17 VITALS
TEMPERATURE: 96.7 F | HEIGHT: 70 IN | HEART RATE: 72 BPM | DIASTOLIC BLOOD PRESSURE: 69 MMHG | BODY MASS INDEX: 32.78 KG/M2 | SYSTOLIC BLOOD PRESSURE: 112 MMHG | WEIGHT: 229 LBS

## 2022-05-17 DIAGNOSIS — G25.0 TREMOR, ESSENTIAL: Primary | ICD-10-CM

## 2022-05-17 PROCEDURE — 99214 OFFICE O/P EST MOD 30 MIN: CPT | Performed by: PSYCHIATRY & NEUROLOGY

## 2022-05-17 RX ORDER — ATENOLOL 50 MG/1
TABLET ORAL
COMMUNITY
Start: 2022-03-23

## 2022-05-23 DIAGNOSIS — G25.0 TREMOR, ESSENTIAL: ICD-10-CM

## 2022-05-23 RX ORDER — PRIMIDONE 50 MG/1
TABLET ORAL
Qty: 270 TABLET | Refills: 3 | Status: SHIPPED | OUTPATIENT
Start: 2022-05-23

## 2022-05-23 NOTE — TELEPHONE ENCOUNTER
Wife called in asking for script of primidone to be updated  patietn taking 20 mg tabs, 1 in the morning and 2 at bedtime  States pharmacy is refusing to fill since they have a script that says Take 1 tab in morning and 1 tab at bedtime for 2 weeks and then take 1 tab in morning and 2 tabs at bedtime  [facility]    Updated script pended for review and signature

## 2022-05-31 ENCOUNTER — OFFICE VISIT (OUTPATIENT)
Dept: PODIATRY | Facility: CLINIC | Age: 82
End: 2022-05-31
Payer: COMMERCIAL

## 2022-05-31 VITALS
RESPIRATION RATE: 16 BRPM | HEART RATE: 86 BPM | BODY MASS INDEX: 32.78 KG/M2 | HEIGHT: 70 IN | DIASTOLIC BLOOD PRESSURE: 80 MMHG | SYSTOLIC BLOOD PRESSURE: 139 MMHG | WEIGHT: 229 LBS

## 2022-05-31 DIAGNOSIS — M79.672 PAIN IN BOTH FEET: ICD-10-CM

## 2022-05-31 DIAGNOSIS — B35.1 ONYCHOMYCOSIS: ICD-10-CM

## 2022-05-31 DIAGNOSIS — I70.209 PERIPHERAL ARTERIOSCLEROSIS (HCC): Primary | ICD-10-CM

## 2022-05-31 DIAGNOSIS — L84 CALLUS: ICD-10-CM

## 2022-05-31 DIAGNOSIS — I10 HYPERTENSION, UNSPECIFIED TYPE: Primary | ICD-10-CM

## 2022-05-31 DIAGNOSIS — B35.3 TINEA PEDIS OF BOTH FEET: ICD-10-CM

## 2022-05-31 DIAGNOSIS — M79.671 PAIN IN BOTH FEET: ICD-10-CM

## 2022-05-31 DIAGNOSIS — I48.91 NEW ONSET ATRIAL FIBRILLATION (HCC): ICD-10-CM

## 2022-05-31 DIAGNOSIS — E11.42 DIABETIC POLYNEUROPATHY ASSOCIATED WITH TYPE 2 DIABETES MELLITUS (HCC): ICD-10-CM

## 2022-05-31 PROCEDURE — 11056 PARNG/CUTG B9 HYPRKR LES 2-4: CPT | Performed by: PODIATRIST

## 2022-05-31 PROCEDURE — 99212 OFFICE O/P EST SF 10 MIN: CPT | Performed by: PODIATRIST

## 2022-05-31 RX ORDER — CLOTRIMAZOLE AND BETAMETHASONE DIPROPIONATE 10; .64 MG/G; MG/G
CREAM TOPICAL 2 TIMES DAILY
Qty: 30 G | Refills: 2 | Status: SHIPPED | OUTPATIENT
Start: 2022-05-31

## 2022-05-31 RX ORDER — APIXABAN 5 MG/1
TABLET, FILM COATED ORAL
Qty: 60 TABLET | Refills: 5 | Status: SHIPPED | OUTPATIENT
Start: 2022-05-31

## 2022-05-31 NOTE — PROGRESS NOTES
Assessment/Plan:  Patient has pain in his feet and toes with ambulation   He has mycosis of nail and skin  He has plantar pre ulcerative skin lesions      Plan   Diabetic foot exam performed   Mycotic nails debrided   cream ordered   Patient will use daily, he will apply cream to feet b i d  For 4 weeks   Calluses debrided   Procedures performed without pain or complication            Subjective:  Patient has pain in his feet with ambulation   No history of trauma    He has pain when he wears shoes   He has pain in his toes       Patient ID: Spenser Clayton is a 82 y o  male      Patient is diabetic  Bernie Alvarez has pain in his feet and toes with ambulation   Has pain from calluses   He has ingrown toenails and dry scaly skin   He is requesting refill of topical antifungal         Review of Systems   Constitutional: No fever or chills, feels well, no tiredness, no recent weight loss or weight gain   Eyes: No complaints of red eyes, no eyesight problems    ENT: no complaints of loss of hearing, no nosebleeds, no sore throat   Cardiovascular: No complaints of chest pain, no palpitations, no leg claudication or lower extremity edema   Respiratory: No complaints of shortness of breath, no wheezing, no cough   Gastrointestinal: No complaints of abdominal pain, no constipation, no nausea or vomiting, no diarrhea or bloody stools   Genitourinary: No complaints of dysuria or incontinence, no hesitancy, no nocturia   Musculoskeletal: limb pain, but-- as noted in HPI   Integumentary: No complaints of skin rash or lesion, no itching or dry skin, no skin wounds   Neurological: No complaints of headache, no confusion, no numbness or tingling, no dizziness   Psychiatric: No suicidal thoughts, no anxiety, no depression   Endocrine: No muscle weakness, no frequent urination, no excessive thirst, no feelings of weakness       Active Problems  1  Acquired ankle/foot deformity (014 48) (F55 625)   2  Arthritis (188 99) (M19 90)   3  Atherosclerosis of arteries of extremities (440 20) (I70 209)   4  Calcaneal spur (726 73) (M77 30)   5  Callus (700) (L84)   6  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)   7  Diabetes mellitus with neuropathy (250 60,357 2) (E11 40)   8  Diabetic neuropathy (250 60,357 2) (E11 40)   9  Hypercholesterolemia (272 0) (E78 00)   10  Hypertension (401 9) (I10)   11  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)   12  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)   13  Onychomycosis (110 1) (B35 1)   14  Orthopedic aftercare (V54 9) (Z47 89)   15  Pain in both feet (729 5) (M79 671,M79 672)   16  Pain in extremity (729 5) (M79 609)   17  Pes planus, congenital (754 61) (Q66 50)   18  Plantar 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 2)   19  Plantar wart (078 12) (B07 0)   20  Prostate cancer (185) (C61)   21  Sprain of right shoulder (840 9) (S43 401A)   22  Tinea pedis (110 4) (B35 3)     Past Medical History   · Orthopedic aftercare (V54 9) (Z47 89)     Surgical History   · History of Gastric Surgery   · History of Hernia Repair   · History of Previous Stent Placement Total Number Performed ___     The surgical history was reviewed and updated today        Family History  Family History    · Family history of Arthritis (V17 7)   · Family history of Cancer     The family history was reviewed and updated today        Social History      · Beer Consumption (___ Bottles Per Day)   · Daily Coffee Consumption (1  Cups/Day)   · Former smoker (B42 38) (Z87 891)  The social history was reviewed and updated today  Allergies  1  No Known Drug Allergies      Physical Exam  Left Foot: Appearance: Normal except as noted: excessive pronation-- and-- pes planus  Right Foot: Appearance: Normal except as noted: excessive pronation-- and-- pes planus  Left Ankle: ROM: limited ROM in all planes   Right Ankle: ROM: limited ROM in all planes   Neurological Exam: performed   Light touch was intact bilaterally  Vibratory sensation was intact bilaterally  Response to monofilament test was intact bilaterally  Deep tendon reflexes: patellar reflex present bilaterally-- and-- achilles reflex present bilaterally  Vascular Exam: performed Dorsalis pedis pulses were One/4 bilaterally  Posterior tibial pulses were One/4 bilaterally  Elevation Pallor: present bilaterally  Capillary refill time was greater than 3 seconds bilaterally-- and-- Q  9 findings bilateral  Negative digital hair noted  Positive abnormal cooling noted  Toenails: All of the toenails were elongated,-- hypertrophied,-- discolored,-- ingrown,-- shown to have subungual debris,-- tender-- and-- Severely mycotic with onychauxis     Socks and shoes removed, Right Foot Findings: swollen, erythematous and dry   The sensory exam showed diminished vibratory sensation at the level of the toes  Diminished tactile sensation with monofilament testing throughout the right foot   Socks and shoes removed, Left Foot Findings: swollen, erythematous and dry   The sensory exam showed diminished vibratory sensation at the level of the toes  Diminished tactile sensation with monofilament testing throughout the left foot  Capillary refills findings on the right were delayed in the toes   Pulses:  1+ in the posterior tibialis on the right  1+ in the dorsalis pedis on the right   Capillary refills findings on the left were delayed in the toes   Pulses:  1+ in the posterior tibialis on the left  1+ in the dorsalis pedis on the left   Assign Risk Category: 2: Loss of protective sensation with or without weakness, deformity, callus, pre-ulcer, or history of ulceration  High risk  Hyperkeratosis: present on both first toes,-- present on both fifth sub metatarsals-- and-- Iron tinea pedis, bilateral, is noted  Shoe Gear Evaluation: performed ()  Right Foot: width: d-- and-- length: 11   Left Foot: width: d-- and-- length: 11  Recommendation(s): athletic shoes     Patient's shoes and socks removed  Right Foot/Ankle   Right Foot Inspection  Skin Exam: dry skin, callus and callus               Toe Exam: swelling  Sensory   Vibration: diminished  Proprioception: diminished      Vascular  Capillary refills: elevated        Left Foot/Ankle  Left Foot Inspection  Skin Exam: dry skin and callus              Toe Exam: swelling and erythema                   Sensory   Vibration: diminished  Proprioception: diminished     Vascular  Capillary refills: elevated     Patient's shoes and socks removed  Assign Risk Category:  Deformity present; Loss of protective sensation; Weak pulses       Risk: 2

## 2022-06-06 ENCOUNTER — OFFICE VISIT (OUTPATIENT)
Dept: HEMATOLOGY ONCOLOGY | Facility: MEDICAL CENTER | Age: 82
End: 2022-06-06
Payer: COMMERCIAL

## 2022-06-06 VITALS
HEIGHT: 70 IN | BODY MASS INDEX: 33.21 KG/M2 | HEART RATE: 76 BPM | DIASTOLIC BLOOD PRESSURE: 70 MMHG | WEIGHT: 232 LBS | RESPIRATION RATE: 16 BRPM | TEMPERATURE: 97.8 F | SYSTOLIC BLOOD PRESSURE: 118 MMHG | OXYGEN SATURATION: 92 %

## 2022-06-06 DIAGNOSIS — C34.92 SQUAMOUS CELL CARCINOMA OF LEFT LUNG (HCC): Primary | Chronic | ICD-10-CM

## 2022-06-06 PROCEDURE — 99214 OFFICE O/P EST MOD 30 MIN: CPT | Performed by: INTERNAL MEDICINE

## 2022-06-06 NOTE — PROGRESS NOTES
Rusty Trujillo  1940  [facility] [address] [id number]    DISCUSSION/SUMMARY:    42-year-old male with a number of medical and cardiac problems previously found to have a left lower lobe mass  Biopsy demonstrated squamous cell carcinoma  IR perform thoracentesis, minimal amount of fluid was removed but there was no evidence of metastatic disease; cytology was negative  Patient was seen by thoracic surgery and underwent mediastinoscopy  There was no evidence of metastatic disease in the sampled lymph nodes (2R, 4R, 4L, 7)  Tumor was 4 5 cm  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  Patient is status post SPRT  Mr Magno Dominguez seems to be doing fairly well from a lung cancer standpoint  Patient will follow-up with Radiation Oncology as directed  Repeat CT scan ordered for October 2022  Patient will complete out the oral antibiotic regimen and follow up with his PCP regarding the prior pneumonia  NCCN guidelines 1 2022 state that for patients with T2b N0 lesions, negative mediastinal lymph nodes, medically inoperable, initial treatment options include definitive RT  Guidelines also state that patients can be considered for adjuvant chemotherapy for high risk disease  High risk is defined as poorly differentiated tumors, pulmonary neuroendocrine tumors, vascular invasion, wedge resection, tumors> 4 cm (this patient) visceral pleural involvement and unknown lymph node status  Mr Magno Dominguez has only 1 known risk factor  As discussed previously, patient has multiple comorbidities and a +/-performance status  Adjuvant treatments were not given  Patient will follow up with Cardiology, Neurology and his PCP  Mr Deepak Boland was given a 7 month office visit follow-up - trying to stagger Medical Oncology and Radiation Oncology    Patient knows to call if he has any other oncology questions or concerns  Carefully review your medication list and verify that the list is accurate and up-to-date  Please call the hematology/oncology office if there are medications missing from the list, medications on the list that you are not currently taking or if there is a dosage or instruction that is different from how you're taking that medication  Patient goals and areas of care:  Lung cancer surveillance  Barriers to care: none  Patient is able to self-care   ______________________________________________________________________________________    Chief Complaint   Patient presents with    Lung Cancer    Follow-up    Non-small cell lung carcinoma     History of Present Illness:  51-year-old male with multiple medical and cardiac issue recently seen in the emergency room because of a cough  Workup demonstrated a left lower lobe mass  Biopsy demonstrated squamous cell carcinoma  Workup did not demonstrated evidence of metastatic disease; mediastinal ostomy was negative  Patient was seen by thoracic surgery but was not felt to be a surgical candidate  Patient was subsequently seen by radiation oncology and underwent SPRT  Patient returns for follow-up  Mr Tabitha Saleem was recently admitted to the hospital and treated for pneumonia  Patient is just completing his oral antibiotic regimen now  No shortness of breath, dyspnea on exertion is the same as before  Cough is less/better, no sputum  No fevers, chills or sweats  Appetite is good, weight is stable  Activities are limited but about the same as before  No pain control issues  No chest pain or pressure  Patient has a history of essential tremors being followed by Neurology (primidone)  Patient was diagnosed with prostate cancer (approximately 10 + years ago) and underwent seeds and external beam radiation  No evidence of recurrence since that time  Patient has received his COVID vaccine      Review of Systems   Constitutional: Positive for fatigue  HENT: Negative  Eyes: Negative  Respiratory: Negative  Cardiovascular: Negative  Gastrointestinal: Negative  Endocrine: Negative  Genitourinary: Negative  Musculoskeletal: Negative  Skin: Negative  Allergic/Immunologic: Negative  Neurological: Negative  Hematological: Negative  Easy bruising   Psychiatric/Behavioral: The patient is nervous/anxious  All other systems reviewed and are negative      Patient Active Problem List   Diagnosis    Corns    Pain in both feet    Diabetic polyneuropathy associated with type 2 diabetes mellitus ([facility]    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[facility]    Onychomycosis    Tinea pedis of both feet    RSV bronchitis    Lung mass    Hypertension    Hyperlipidemia    Type 2 diabetes mellitus, without long-term current use of insulin ([facility]    New onset atrial fibrillation (HCC)    Squamous cell carcinoma of lung (HCC)    Shortness of breath    Daytime hypersomnolence    Chronic diastolic heart failure (HCC)    SYLVESTER (obstructive sleep apnea)    Severe sepsis (HCC)    Prostate cancer (HCC)    GERD (gastroesophageal reflux disease)    CAD (coronary artery disease)    Persistent atrial fibrillation (HCC)    Alcohol dependence ([facility]    Acute respiratory failure with hypoxemia (HCC)     Past Medical History:   Diagnosis Date    Anxiety     Arthritis     Atrial fibrillation (HCC)     Bleeding ulcer     Cancer ([facility]     prostate 2011- radiation    Cardiac disease     cardiac stent x1    Cataract     starting    Chronic diastolic (congestive) heart failure (HCC)     Diabetes mellitus (HCC)     boarderline diabetic     GERD (gastroesophageal reflux disease)     History of radiation therapy 2010    Prostate seeds (brachytherapy) and EBRT    Hyperlipidemia     Hypertension     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, bilateral     Low back pain     Lung cancer ([facility]     Lung mass     Myocardial infarction ([facility] )     mild     Prostate cancer ([facility]     Sleep apnea     sleep study     Wears dentures     full set    Wears glasses      Past Surgical History:   Procedure Laterality Date    ABDOMINAL HERNIA REPAIR      ABDOMINAL SURGERY      bleeding ulcer, cyst removed from abd    COLONOSCOPY     [place]    x1     IR BIOPSY LUNG  10/5/2021    IR THORACENTESIS  2021    OK BRONCHOSCOPY,DIAGNOSTIC N/A 2021    Procedure: Louekaterina Shade;  Surgeon: Basia Campos MD;  [facility];  Service: Thoracic    OK MEDIASTINOSCOPY WITH LYMPH NODE BIOPSY/IES N/A 2021    Procedure: MEDIASTINOSCOPY;  Surgeon: Basia Campos MD;  [facility];  Service: Thoracic     Family history: 3 sons in good general health, no known familial or genetic diseases    Social History     Socioeconomic History    Marital status: /Civil Union     Spouse name: Not on file    Number of children: Not on file    Years of education: Not on file    Highest education level: Not on file   Occupational History    Not on file   Tobacco Use    Smoking status: Former Smoker     Packs/day: 1 00     Years: 30 00     Pack years: 30      Types: Cigarettes     Quit date:      Years since quittin 4    Smokeless tobacco: Never Used   Vaping Use    Vaping Use: Never used   Substance and Sexual Activity    Alcohol use:  Yes     Alcohol/week: 10 0 - 12 0 standard drinks     Types: 7 Glasses of wine, 3 - 5 Cans of beer per week     Comment: few beers day; glass of red wine at dinner    Drug use: Never    Sexual activity: Not on file   Other Topics Concern    Not on file   Social History Narrative    Not on file     Social Determinants of Health     Financial Resource Strain: Not on file   Food Insecurity: No Food Insecurity    Worried About Running Out of Food in the Last Year: Never true    Juliann of Food in the Last Year: Never true   Transportation Needs: No Transportation Needs    Lack of Transportation (Medical): No    Lack of Transportation (Non-Medical):  No   Physical Activity: Not on file   Stress: Not on file   Social Connections: Not on file   Intimate Partner Violence: Not on file   Housing Stability: Low Risk     Unable to Pay for Housing in the Last Year: No    Number of Places Lived in the Last Year: 1    Unstable Housing in the Last Year: No   Social history:  40 pack-year history discontinued 20 years ago, patient drinks 2-4 beers a day off and on, no drug abuse, 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)    Current Outpatient Medications:     acetaminophen (TYLENOL) 325 mg tablet, Take 2 tablets (650 mg total) by mouth every 6 (six) hours as needed for mild pain, headaches or fever, Disp: 30 tablet, Rfl: 0    albuterol (2 5 mg/3 mL) 0 083 % nebulizer solution, Take 2 5 mg by nebulization As needed per patient's wife , Disp: , Rfl:     ammonium lactate (LAC-HYDRIN) 12 % cream, Apply topically as needed for dry skin, Disp: 385 g, Rfl: 0    ascorbic acid (VITAMIN C) 1000 MG tablet, Take 1 tablet (1,000 mg total) by mouth daily, Disp: 30 tablet, Rfl: 0    atenolol (TENORMIN) 100 mg tablet, Take 100 mg by mouth daily , Disp: , Rfl:     atorvastatin (LIPITOR) 40 mg tablet, Take 40 mg by mouth every evening  , Disp: , Rfl:     clotrimazole-betamethasone (LOTRISONE) 1-0 05 % cream, Apply topically 2 (two) times a day, Disp: 30 g, Rfl: 2    Eliquis 5 MG, TAKE 1 TABLET TWO TIMES A DAY, Disp: 60 tablet, Rfl: 5    folic acid (FOLVITE) 1 mg tablet, Take 1 tablet (1 mg total) by mouth daily, Disp: 30 tablet, Rfl: 0    JARDIANCE 25 MG TABS, Take 25 mg by mouth daily in the early morning , Disp: , Rfl:     latanoprost (XALATAN) 0 005 % ophthalmic solution, Administer 1 drop to both eyes daily at bedtime  , Disp: , Rfl:     losartan (COZAAR) 50 mg tablet, Take 1 tablet (50 mg total) by mouth daily, Disp: 90 tablet, Rfl: 3   metFORMIN (GLUCOPHAGE) 500 mg tablet, Take 500 mg by mouth 2 (two) times a day , Disp: , Rfl:     Multiple Vitamin (MULTI-VITAMIN DAILY PO), Take by mouth daily  , Disp: , Rfl:     omeprazole (PriLOSEC) 20 mg delayed release capsule, Take 20 mg by mouth daily  , Disp: , Rfl:     primidone (MYSOLINE) 50 mg tablet, take 1 tab in morning and 2 tabs at bedtime  , Disp: 270 tablet, Rfl: 3    thiamine 100 MG tablet, Take 1 tablet (100 mg total) by mouth daily, Disp: 30 tablet, Rfl: 0    Trelegy Ellipta 100-62 5-25 MCG/INH inhaler, INHALE 1 PUFF DAILY RINSE MOUTH AFTER USE , Disp: 60 each, Rfl: 5    atenolol (TENORMIN) 50 mg tablet, , Disp: , Rfl:     guaiFENesin (MUCINEX) 600 mg 12 hr tablet, Take 1 tablet (600 mg total) by mouth 2 (two) times a day (Patient not taking: No sig reported), Disp: 20 tablet, Rfl: 0    No Known Allergies    Vitals:    06/06/22 1420   Resp: 16   Temp: 97 8 °F (36 6 °C)     Physical Exam  Constitutional:       Appearance: He is well-developed  Comments: Obese male, no respiratory distress, no signs of pain   HENT:      Head: Normocephalic and atraumatic  Right Ear: External ear normal       Left Ear: External ear normal    Eyes:      Conjunctiva/sclera: Conjunctivae normal       Pupils: Pupils are equal, round, and reactive to light  Cardiovascular:      Rate and Rhythm: Normal rate and regular rhythm  Heart sounds: Normal heart sounds  Pulmonary:      Effort: Pulmonary effort is normal       Breath sounds: Normal breath sounds  Comments: Distant breath sounds bilaterally, clear  Abdominal:      General: Bowel sounds are normal       Palpations: Abdomen is soft  Comments: Soft, nontender, obese, cannot palpate liver or spleen, +bowel sounds, no guarding   Musculoskeletal:         General: Normal range of motion  Cervical back: Normal range of motion and neck supple  Skin:     General: Skin is warm        Comments: Relatively good color, warm, moist, scattered upper extremity purpura in various stages of resolution, few scabs   Neurological:      Mental Status: He is alert and oriented to person, place, and time  Deep Tendon Reflexes: Reflexes are normal and symmetric  Psychiatric:         Behavior: Behavior normal          Thought Content: Thought content normal          Judgment: Judgment normal      Extremities:  No lower extremity edema bilaterally, no cords, pulses are 1+   Lymphatics:  No adenopathy in the neck, supraclavicular region, axilla bilateral    Labs    05/03/2022 WBC = 6 6 hemoglobin = 15 2 hematocrit = 45 MCV = 98 platelet = 839 neutrophil = 73%    05/02/2022 BUN = 16 creatinine = 0 75    02/21/2022 WBC = 6 6 hemoglobin = 17 hematocrit = 53 MCV = 102 platelet = 615 neutrophil = 77% BUN = 16 creatinine = 0 99 LFTs WNL    Imaging    05/04/2022 chest x-ray portable  Impression stated left small effusion and parenchymal process appears unchanged  05/02/2022 CT scan the chest without contrast    IMPRESSION:  1  Increase in size of a left lower lobe patchy opacity with new right basilar patchy densities concerning for pulmonary infiltrates with additional patchy and reticulonodular changes lingular segment left upper lobe and right middle lobe also likely related to infectious process  2   Left lower lobe cavitary mass has decreased in size now measuring 3 3 x 1 9 cm, previously 4 1 x 2 2 cm  3   Stable COPD and pulmonary fibrosis with the latter likely related to radiation change      11/19/2021 MRI brain  Impression stated white matter changes suggestive of chronic microangiopathy  No acute intracranial pathology  10/29/2021 PET-CT    1  Hypermetabolic necrotic 4 5 x 4 cm left lower lung mass compatible with known malignancy  2   2 mildly hypermetabolic right paratracheal mediastinal nodes for which early metastases are not excluded    3   Small mildly hypermetabolic left pleural effusion for which malignant effusion is not excluded  4   Additional scattered tiny nodular lung densities may be reassessed on follow-up CT  5   Probable reactive subcentimeter right cervical node may be reassessed on follow-up PET/CT  6   No hypermetabolic soft tissue metastases in the abdomen, pelvis  7   Minimal inhomogeneous FDG activity in the sacrum and left posterior iliac, though without dominant focal lesion  This may be reassessed on follow-up exam     Pathology    Case Report   Surgical Pathology Report                         Case: [id number]                                    Authorizing Provider: Yesi Diallo MD       Collected:           11/29/2021 [facility]               Ordering Location:     [address]      Received:            11/29/2021 [address] [place] [address] Operating Room                                                       Pathologist:           Edith Monzon MD                                                         Specimens:   A) - Lymph Node, level 7                                                                             B) - Lymph Node, level 4R                                                                            C) - Lymph Node, level 2R                                                                            D) - Lymph Node, level 4L                                                                  Final Diagnosis   A  Lymph node, level 7, excision:  -  Portions of benign lymph node with reactive change and anthracosis, negative for granulomas, lymphoma, or metastatic carcinoma  B  Lymph node, level 4R, excision:  -  Portions of benign lymph node with reactive change and anthracosis, negative for granulomas, lymphoma, or metastatic carcinoma  C  Lymph node, level 2R, excision:  -  Portions of benign lymph node with reactive change and anthracosis, negative for granulomas, lymphoma, or metastatic carcinoma      D  Lymph node, level 4L, excision:  -  Benign lymph node with reactive change and anthracosis, negative for granulomas, lymphoma, or metastatic carcinoma       Electronically signed by Karina Del Rio MD on 12/2/2021 at 11:29 AM       Case Report   Surgical Pathology Report                         Case: [id number]                                    Authorizing Provider: Leatha Singh MD      Collected:           10/05/2021 1058               Ordering Location:     [address] [place] [address] Received:            10/05/2021 1120                                      CAT Scan                                                                      Pathologist:           Leah Talbot MD                                                         Specimen:    Lung, Left Lower Lobe                                                                      Final Diagnosis   A  Lung, Left Lower Lobe, :  - Invasive squamous carcinoma, moderately differentiated, keratinizing type  - Tumor is highlighted with P 40 immunoperoxidase stain   - Prominent tumor necrosis is noted       Best representative block with tumor A1  This case was reviewed at the intradepartmental  conference     RADHA Calderón, Pulmonology, is notified of the diagnosis in [facility] via [address] on 10/06/2021  at 2 40 pm    Electronically signed by [id number], MD on 10/6/2021 at  2:44 PM

## 2022-06-20 DIAGNOSIS — J43.2 CENTRILOBULAR EMPHYSEMA (HCC): ICD-10-CM

## 2022-06-21 RX ORDER — FLUTICASONE FUROATE, UMECLIDINIUM BROMIDE AND VILANTEROL TRIFENATATE 100; 62.5; 25 UG/1; UG/1; UG/1
POWDER RESPIRATORY (INHALATION)
Qty: 60 EACH | Refills: 5 | Status: SHIPPED | OUTPATIENT
Start: 2022-06-21

## 2022-08-02 ENCOUNTER — OFFICE VISIT (OUTPATIENT)
Dept: PODIATRY | Facility: CLINIC | Age: 82
End: 2022-08-02
Payer: COMMERCIAL

## 2022-08-02 VITALS
WEIGHT: 232 LBS | HEIGHT: 70 IN | DIASTOLIC BLOOD PRESSURE: 84 MMHG | SYSTOLIC BLOOD PRESSURE: 138 MMHG | RESPIRATION RATE: 17 BRPM | BODY MASS INDEX: 33.21 KG/M2

## 2022-08-02 DIAGNOSIS — E11.42 DIABETIC POLYNEUROPATHY ASSOCIATED WITH TYPE 2 DIABETES MELLITUS (HCC): ICD-10-CM

## 2022-08-02 DIAGNOSIS — L84 CALLUS: ICD-10-CM

## 2022-08-02 DIAGNOSIS — I70.209 PERIPHERAL ARTERIOSCLEROSIS (HCC): ICD-10-CM

## 2022-08-02 DIAGNOSIS — M79.671 PAIN IN BOTH FEET: ICD-10-CM

## 2022-08-02 DIAGNOSIS — M79.672 PAIN IN BOTH FEET: ICD-10-CM

## 2022-08-02 DIAGNOSIS — B35.1 ONYCHOMYCOSIS: ICD-10-CM

## 2022-08-02 DIAGNOSIS — B35.3 TINEA PEDIS OF BOTH FEET: Primary | ICD-10-CM

## 2022-08-02 PROCEDURE — 99212 OFFICE O/P EST SF 10 MIN: CPT | Performed by: PODIATRIST

## 2022-08-02 PROCEDURE — 11056 PARNG/CUTG B9 HYPRKR LES 2-4: CPT | Performed by: PODIATRIST

## 2022-08-02 NOTE — PROGRESS NOTES
Assessment/Plan:  Patient has pain in his feet and toes with ambulation   He has mycosis of nail and skin  He has plantar pre ulcerative skin lesions      Plan   Diabetic foot exam performed   Mycotic nails debrided   cream ordered   Patient will use daily, he will apply cream to feet b i d  For 4 weeks   Calluses debrided   Procedures performed without pain or complication            Subjective:  Patient has pain in his feet with ambulation   No history of trauma    He has pain when he wears shoes   He has pain in his toes       Patient ID: Spenser Clayton is a 82 y o  male      Patient is diabetic  Inna Leiva has pain in his feet and toes with ambulation   Has pain from calluses   He has ingrown toenails and dry scaly skin   He is requesting refill of topical antifungal         Review of Systems   Constitutional: No fever or chills, feels well, no tiredness, no recent weight loss or weight gain   Eyes: No complaints of red eyes, no eyesight problems    ENT: no complaints of loss of hearing, no nosebleeds, no sore throat   Cardiovascular: No complaints of chest pain, no palpitations, no leg claudication or lower extremity edema   Respiratory: No complaints of shortness of breath, no wheezing, no cough   Gastrointestinal: No complaints of abdominal pain, no constipation, no nausea or vomiting, no diarrhea or bloody stools   Genitourinary: No complaints of dysuria or incontinence, no hesitancy, no nocturia   Musculoskeletal: limb pain, but-- as noted in HPI   Integumentary: No complaints of skin rash or lesion, no itching or dry skin, no skin wounds   Neurological: No complaints of headache, no confusion, no numbness or tingling, no dizziness   Psychiatric: No suicidal thoughts, no anxiety, no depression   Endocrine: No muscle weakness, no frequent urination, no excessive thirst, no feelings of weakness       Active Problems  1  Acquired ankle/foot deformity (424 43) (M48 055)   2  Arthritis (211 75) (M19 90)   3  Atherosclerosis of arteries of extremities (440 20) (I70 209)   4  Calcaneal spur (726 73) (M77 30)   5  Callus (700) (L84)   6  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)   7  Diabetes mellitus with neuropathy (250 60,357 2) (E11 40)   8  Diabetic neuropathy (250 60,357 2) (E11 40)   9  Hypercholesterolemia (272 0) (E78 00)   10  Hypertension (401 9) (I10)   11  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)   12  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)   13  Onychomycosis (110 1) (B35 1)   14  Orthopedic aftercare (V54 9) (Z47 89)   15  Pain in both feet (729 5) (M79 671,M79 672)   16  Pain in extremity (729 5) (M79 609)   17  Pes planus, congenital (754 61) (Q66 50)   18  Plantar 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 2)   19  Plantar wart (078 12) (B07 0)   20  Prostate cancer (185) (C61)   21  Sprain of right shoulder (840 9) (S43 401A)   22  Tinea pedis (110 4) (B35 3)     Past Medical History   · Orthopedic aftercare (V54 9) (Z47 89)     Surgical History   · History of Gastric Surgery   · History of Hernia Repair   · History of Previous Stent Placement Total Number Performed ___     The surgical history was reviewed and updated today        Family History  Family History    · Family history of Arthritis (V17 7)   · Family history of Cancer     The family history was reviewed and updated today        Social History      · Beer Consumption (___ Bottles Per Day)   · Daily Coffee Consumption (1  Cups/Day)   · Former smoker (J00 08) (Z87 891)  The social history was reviewed and updated today  Allergies  1  No Known Drug Allergies      Physical Exam  Left Foot: Appearance: Normal except as noted: excessive pronation-- and-- pes planus  Right Foot: Appearance: Normal except as noted: excessive pronation-- and-- pes planus  Left Ankle: ROM: limited ROM in all planes   Right Ankle: ROM: limited ROM in all planes   Neurological Exam: performed   Light touch was intact bilaterally  Vibratory sensation was intact bilaterally  Response to monofilament test was intact bilaterally  Deep tendon reflexes: patellar reflex present bilaterally-- and-- achilles reflex present bilaterally  Vascular Exam: performed Dorsalis pedis pulses were One/4 bilaterally  Posterior tibial pulses were One/4 bilaterally  Elevation Pallor: present bilaterally  Capillary refill time was greater than 3 seconds bilaterally-- and-- Q  9 findings bilateral  Negative digital hair noted  Positive abnormal cooling noted  Toenails: All of the toenails were elongated,-- hypertrophied,-- discolored,-- ingrown,-- shown to have subungual debris,-- tender-- and-- Severely mycotic with onychauxis     Socks and shoes removed, Right Foot Findings: swollen, erythematous and dry   The sensory exam showed diminished vibratory sensation at the level of the toes  Diminished tactile sensation with monofilament testing throughout the right foot   Socks and shoes removed, Left Foot Findings: swollen, erythematous and dry   The sensory exam showed diminished vibratory sensation at the level of the toes  Diminished tactile sensation with monofilament testing throughout the left foot  Capillary refills findings on the right were delayed in the toes   Pulses:  1+ in the posterior tibialis on the right  1+ in the dorsalis pedis on the right   Capillary refills findings on the left were delayed in the toes   Pulses:  1+ in the posterior tibialis on the left  1+ in the dorsalis pedis on the left   Assign Risk Category: 2: Loss of protective sensation with or without weakness, deformity, callus, pre-ulcer, or history of ulceration  High risk  Hyperkeratosis: present on both first toes,-- present on both fifth sub metatarsals-- and-- [place] tinea pedis, bilateral, is noted  Shoe Gear Evaluation: performed ()  Right Foot: width: d-- and-- length: 11   Left Foot: width: d-- and-- length: 11  Recommendation(s): athletic shoes     Patient's shoes and socks removed  Right Foot/Ankle   Right Foot Inspection  Skin Exam: dry skin, callus and callus               Toe Exam: swelling  Sensory   Vibration: diminished  Proprioception: diminished      Vascular  Capillary refills: elevated        Left Foot/Ankle  Left Foot Inspection  Skin Exam: dry skin and callus              Toe Exam: swelling and erythema                   Sensory   Vibration: diminished  Proprioception: diminished     Vascular  Capillary refills: elevated    Right Foot/Ankle   Right Foot Inspection      Sensory   Vibration: diminished  Proprioception: diminished  Monofilament testing: diminished    Vascular  Capillary refills: < 3 seconds          Left Foot/Ankle  Left Foot Inspection      Sensory   Vibration: diminished  Proprioception: diminished  Monofilament testing: diminished    Vascular  Capillary refills: < 3 seconds        Assign Risk Category  Deformity present  Loss of protective sensation  Weak pulses  Risk: 2

## 2022-08-03 DIAGNOSIS — I10 HYPERTENSION: ICD-10-CM

## 2022-08-03 RX ORDER — LOSARTAN POTASSIUM 50 MG/1
TABLET ORAL
Qty: 90 TABLET | Refills: 3 | Status: SHIPPED | OUTPATIENT
Start: 2022-08-03

## 2022-08-17 ENCOUNTER — HOSPITAL ENCOUNTER (OUTPATIENT)
Dept: SLEEP CENTER | Facility: CLINIC | Age: 82
Discharge: HOME | End: 2022-08-17

## 2022-08-17 DIAGNOSIS — G47.33 OSA (OBSTRUCTIVE SLEEP APNEA): ICD-10-CM

## 2022-08-17 DIAGNOSIS — J44.9 CHRONIC OBSTRUCTIVE PULMONARY DISEASE, UNSPECIFIED COPD TYPE (HCC): ICD-10-CM

## 2022-08-23 ENCOUNTER — RA CDI HCC (OUTPATIENT)
Dept: OTHER | Facility: HOSPITAL | Age: 82
End: 2022-08-23

## 2022-08-23 NOTE — PROGRESS NOTES
[facility]  coding opportunities     I11 0   Chart Reviewed number of suggestions sent to Provider: 1     Patients Insurance     Medicare Insurance: [address] [place] [address]

## 2022-08-24 DIAGNOSIS — B35.3 TINEA PEDIS OF BOTH FEET: ICD-10-CM

## 2022-08-24 DIAGNOSIS — B35.1 ONYCHOMYCOSIS: ICD-10-CM

## 2022-08-25 ENCOUNTER — OFFICE VISIT (OUTPATIENT)
Dept: INTERNAL MEDICINE CLINIC | Facility: CLINIC | Age: 82
End: 2022-08-25
Payer: COMMERCIAL

## 2022-08-25 VITALS
BODY MASS INDEX: 30.38 KG/M2 | RESPIRATION RATE: 16 BRPM | SYSTOLIC BLOOD PRESSURE: 126 MMHG | DIASTOLIC BLOOD PRESSURE: 80 MMHG | OXYGEN SATURATION: 96 % | HEIGHT: 71 IN | WEIGHT: 217 LBS | TEMPERATURE: 97.8 F | HEART RATE: 68 BPM

## 2022-08-25 DIAGNOSIS — I50.32 CHRONIC DIASTOLIC HEART FAILURE (HCC): Primary | ICD-10-CM

## 2022-08-25 DIAGNOSIS — G47.33 OSA (OBSTRUCTIVE SLEEP APNEA): ICD-10-CM

## 2022-08-25 DIAGNOSIS — E78.2 MIXED HYPERLIPIDEMIA: ICD-10-CM

## 2022-08-25 DIAGNOSIS — E11.42 TYPE 2 DIABETES MELLITUS WITH DIABETIC POLYNEUROPATHY, WITHOUT LONG-TERM CURRENT USE OF INSULIN (HCC): ICD-10-CM

## 2022-08-25 DIAGNOSIS — E78.00 HYPERCHOLESTEREMIA: ICD-10-CM

## 2022-08-25 DIAGNOSIS — I48.19 PERSISTENT ATRIAL FIBRILLATION (HCC): ICD-10-CM

## 2022-08-25 DIAGNOSIS — F33.9 DEPRESSION, RECURRENT (HCC): ICD-10-CM

## 2022-08-25 DIAGNOSIS — E11.42 DIABETIC POLYNEUROPATHY ASSOCIATED WITH TYPE 2 DIABETES MELLITUS (HCC): ICD-10-CM

## 2022-08-25 DIAGNOSIS — I25.10 CORONARY ARTERY DISEASE INVOLVING NATIVE CORONARY ARTERY OF NATIVE HEART WITHOUT ANGINA PECTORIS: ICD-10-CM

## 2022-08-25 DIAGNOSIS — C61 PROSTATE CANCER (HCC): ICD-10-CM

## 2022-08-25 DIAGNOSIS — I10 PRIMARY HYPERTENSION: ICD-10-CM

## 2022-08-25 DIAGNOSIS — C34.92 SQUAMOUS CELL CARCINOMA OF LEFT LUNG (HCC): Chronic | ICD-10-CM

## 2022-08-25 DIAGNOSIS — E55.9 VITAMIN D DEFICIENCY: ICD-10-CM

## 2022-08-25 DIAGNOSIS — R79.89 ABNORMAL LFTS: ICD-10-CM

## 2022-08-25 DIAGNOSIS — D64.9 ANEMIA, UNSPECIFIED TYPE: ICD-10-CM

## 2022-08-25 PROBLEM — I70.209 PERIPHERAL ARTERIOSCLEROSIS (HCC): Status: RESOLVED | Noted: 2018-02-15 | Resolved: 2022-08-25

## 2022-08-25 PROBLEM — J20.5 RSV BRONCHITIS: Status: RESOLVED | Noted: 2021-09-28 | Resolved: 2022-08-25

## 2022-08-25 PROCEDURE — 3288F FALL RISK ASSESSMENT DOCD: CPT | Performed by: INTERNAL MEDICINE

## 2022-08-25 PROCEDURE — 1101F PT FALLS ASSESS-DOCD LE1/YR: CPT | Performed by: INTERNAL MEDICINE

## 2022-08-25 PROCEDURE — 1160F RVW MEDS BY RX/DR IN RCRD: CPT | Performed by: INTERNAL MEDICINE

## 2022-08-25 PROCEDURE — 3074F SYST BP LT 130 MM HG: CPT | Performed by: INTERNAL MEDICINE

## 2022-08-25 PROCEDURE — 99214 OFFICE O/P EST MOD 30 MIN: CPT | Performed by: INTERNAL MEDICINE

## 2022-08-25 PROCEDURE — 3079F DIAST BP 80-89 MM HG: CPT | Performed by: INTERNAL MEDICINE

## 2022-08-25 NOTE — ASSESSMENT & PLAN NOTE
Lab Results   Component Value Date    HGBA1C 7 1 (H) 02/21/2022   Patient is a diabetes failure under control on metformin Jardiance diet patient in noncompliant intermittently with diet will repeat A1c lipid profile before the next visit recommended to be seen by ophthalmologist overall improving

## 2022-08-25 NOTE — ASSESSMENT & PLAN NOTE
Wt Readings from Last 3 Encounters:   08/25/22 98 4 kg (217 lb)   08/02/22 105 kg (232 lb)   06/06/22 105 kg (232 lb)     Ejection fraction 55% the minimal edema some difficulty breathing possibly due to COPD lung cancer continue losartan fluid restriction if needed Lasix 20 mg patient is off Lasix for now and daily weight monitoring  CHF is compensated  Continue Current Medication Combination for CHF as included in Complete List of Medicine  Recommend to weigh daily   If weigh gain of more than 3 lbs, Pt to call for instruction or evaluation  Recommend to avoid excessive salt intake  Pt is advised to continue to follow with cardiologist   Matt Greer to get periodic blood test to monitor side effect of medication as advised by us or cardiologist

## 2022-08-25 NOTE — PATIENT INSTRUCTIONS
Hypertension( High Blood Pressure ):    Continue Home BP check daily and bring log, if you are not checking consider checking daily    Take your Blood Pressure medicine as advised    Do not take your Blood pressure medicine if systolic Blood Pressure (top number) is less than 100 or heart rate less than 60  Notify you physician  Diabetes    Check your fingerstick Accu-Chek once a day  Please check your fingerstick Accu-Chek different time of the day either at 7:00 a m  or 11:00 a m  for for p m  4 9:00 p m  Emma Mc Follow Diabetic 1800 calorie diet for diabetes as advised  If you would sugar less than 80 or more than 300 to please call us on next visit is day  If the sugar is less than 80 follow-up hypoglycemia instructions as advised  Take your diabetic medicine as advised  Cholesterol    Eat low cholesterol diet    Continue taking your cholesterol medicine as advised    Call if any side effects    Lipid Profile and LFT prior to next visit or as advised  ( You should Get periodically to monitor liver side effects)    KNOW YOUR DIABETIC GOAL( HBA1C AND SUGAR), BLOOD PRESSURE TARGET NUMBER AND CHOLESTEROL( LDL, HDL AND TRIGLYCERIDE)   CHF is compensated  Continue Current Medication Combination for CHF as included in Complete List of Medicine  Recommend to weigh daily   If weigh gain of more than 3 lbs, Pt to call for instruction or evaluation  Recommend to avoid excessive salt intake  Pt is advised to continue to follow with cardiologist   Amara Ferrara to get periodic blood test to monitor side effect of medication as advised by us or cardiologist

## 2022-08-25 NOTE — ASSESSMENT & PLAN NOTE
Patient has a controlled atrial fibrillation with controlled rate continue Eliquis check electrolytes before the next visit CHF is compensated and no palpitations no other associated symptoms

## 2022-08-25 NOTE — ASSESSMENT & PLAN NOTE
The following the results of the CT scan and PET scan reviewed patient followed by a Dr Verona Alfonso the oncologist close monitoring some difficulty breathing intermittent on exertion and coughing   IMPRESSION:  1   Increase in size of a left lower lobe patchy opacity with new right basilar patchy densities concerning for pulmonary infiltrates with additional patchy and reticulonodular changes lingular segment left upper lobe and right middle lobe also likely related to infectious process  2   Left lower lobe cavitary mass has decreased in size now measuring 3 3 x 1 9 cm, previously 4 1 x 2 2 cm  3   Stable COPD and pulmonary fibrosis with the latter likely related to radiation change      11/19/2021 MRI brain  Impression stated white matter changes suggestive of chronic microangiopathy  No acute intracranial pathology      10/29/2021 PET-CT     1   Hypermetabolic necrotic 4 5 x 4 cm left lower lung mass compatible with known malignancy  2   2 mildly hypermetabolic right paratracheal mediastinal nodes for which early metastases are not excluded  3   Small mildly hypermetabolic left pleural effusion for which malignant effusion is not excluded  4   Additional scattered tiny nodular lung densities may be reassessed on follow-up CT  5   Probable reactive subcentimeter right cervical node may be reassessed on follow-up PET/CT  6   No hypermetabolic soft tissue metastases in the abdomen, pelvis    7   Minimal inhomogeneous FDG activity in the sacrum and left posterior iliac, though without dominant focal lesion   This may be reassessed on follow-up exam

## 2022-08-25 NOTE — PROGRESS NOTES
Dr Ham Se Office Visit Note  22     Kaye Pineda [de-identified] y o  male MRN: [id number]  : 1940    Assessment:     1  Chronic diastolic heart failure (HCC)  Assessment & Plan:  Wt Readings from Last 3 Encounters:   22 98 4 kg (217 lb)   22 105 kg (232 lb)   22 105 kg (232 lb)     Ejection fraction 55% the minimal edema some difficulty breathing possibly due to COPD lung cancer continue losartan fluid restriction if needed Lasix 20 mg patient is off Lasix for now and daily weight monitoring  CHF is compensated  Continue Current Medication Combination for CHF as included in Complete List of Medicine  Recommend to weigh daily  If weigh gain of more than 3 lbs, Pt to call for instruction or evaluation  Recommend to avoid excessive salt intake  Pt is advised to continue to follow with cardiologist   Mike Mccain to get periodic blood test to monitor side effect of medication as advised by us or cardiologist         2  Diabetic polyneuropathy associated with type 2 diabetes mellitus ([address] )  Assessment & Plan:    Lab Results   Component Value Date    HGBA1C 7 1 (H) 2022   Patient is a diabetes failure under control on metformin Jardiance diet patient in noncompliant intermittently with diet will repeat A1c lipid profile before the next visit recommended to be seen by ophthalmologist overall improving    Orders:  -     Hemoglobin A1C; Future    3  Squamous cell carcinoma of left lung [facility])  Assessment & Plan: The following the results of the CT scan and PET scan reviewed patient followed by a Dr Kinsey Farfan the oncologist close monitoring some difficulty breathing intermittent on exertion and coughing   IMPRESSION:  1   Increase in size of a left lower lobe patchy opacity with new right basilar patchy densities concerning for pulmonary infiltrates with additional patchy and reticulonodular changes lingular segment left upper lobe and right middle lobe also likely related to infectious process    2   Left lower lobe cavitary mass has decreased in size now measuring 3 3 x 1 9 cm, previously 4 1 x 2 2 cm  3   Stable COPD and pulmonary fibrosis with the latter likely related to radiation change      11/19/2021 MRI brain  Impression stated white matter changes suggestive of chronic microangiopathy  No acute intracranial pathology      10/29/2021 PET-CT     1   Hypermetabolic necrotic 4 5 x 4 cm left lower lung mass compatible with known malignancy  2   2 mildly hypermetabolic right paratracheal mediastinal nodes for which early metastases are not excluded  3   Small mildly hypermetabolic left pleural effusion for which malignant effusion is not excluded  4   Additional scattered tiny nodular lung densities may be reassessed on follow-up CT  5   Probable reactive subcentimeter right cervical node may be reassessed on follow-up PET/CT  6   No hypermetabolic soft tissue metastases in the abdomen, pelvis  7   Minimal inhomogeneous FDG activity in the sacrum and left posterior iliac, though without dominant focal lesion   This may be reassessed on follow-up exam       4  SYLVESTER (obstructive sleep apnea)  Assessment & Plan:  Patient recommended to use CPAP was uncomfortable now 2 L nasal cannula recommended to be evaluated by Pulmonary      5  Coronary artery disease involving native coronary artery of native heart without angina pectoris  Assessment & Plan:  Nuclear stress test November 2021 normal denies any chest pain continue secondary prevention with diabetes control losartan Eliquis baby aspirin Lipitor no chest pain patient symptom control follow with Cardiology continue current treatment      6  Persistent atrial fibrillation ([address] )  Assessment & Plan:  Patient has a controlled atrial fibrillation with controlled rate continue Eliquis check electrolytes before the next visit CHF is compensated and no palpitations no other associated symptoms      7   Prostate cancer [facility])  Assessment & Plan:  Status post radiation and prostate seeds no symptoms of recurrence      8  Mixed hyperlipidemia    9  Abnormal LFTs  -     Comprehensive metabolic panel; Future    10  Primary hypertension  -     UA (URINE) with reflex to Scope    11  Anemia, unspecified type  -     CBC and differential; Future    12  Hypercholesteremia  -     Lipid panel; Future    13  Vitamin D deficiency  -     Vitamin D Panel; Future    14  Type 2 diabetes mellitus with diabetic polyneuropathy, without long-term current use of insulin (HCC)  -     Hemoglobin A1C; Future  -     Microalbumin / creatinine urine ratio  -     metFORMIN (GLUCOPHAGE) 500 mg tablet; Take 1 tablet (500 mg total) by mouth 2 (two) times a day    15  Depression, recurrent ([facility] )        Discussion Summary and Plan: Today's care plan and medications were reviewed with patient in detail and all their questions answered to their satisfaction  Chief Complaint   Patient presents with    Hypertension    Coronary Artery Disease    Diabetes    Atrial Fibrillation    Congestive Heart Failure    COPD    Follow-up    Hyperlipidemia      Subjective:  Patient came in for follow-up multiple medical condition including coronary artery disease chronic diastolic CHF hypertension lung cancers COPD sleep apnea atrial fibrillation patient intermittent difficulty breathing overall unchanged patient evaluated by oncologist and Pulmonary and also Cardiology all those the follow-up per notes reviewed all the workup x-rays reviewed no new symptoms    Diabetic Foot Exam    Patient's shoes and socks removed  Right Foot/Ankle   Right Foot Inspection  Skin Exam: skin normal and skin intact  No dry skin, no warmth, no callus, no erythema, no maceration, no abnormal color, no pre-ulcer, no ulcer and no callus  Toe Exam: ROM and strength within normal limits    no right toe deformity    Sensory   Proprioception: intact  Monofilament testing: diminished    Vascular  Capillary refills: < 3 seconds  The right DP pulse is 2+  The right PT pulse is 1+  Left Foot/Ankle  Left Foot Inspection  Skin Exam: skin normal and skin intact  No dry skin, no warmth, no erythema, no maceration, normal color, no pre-ulcer, no ulcer and no callus  Toe Exam: ROM and strength within normal limits  No left toe deformity  Sensory   Proprioception: intact  Monofilament testing: diminished    Vascular  Capillary refills: < 3 seconds  The left DP pulse is 2+  The left PT pulse is 1+       Assign Risk Category  No deformity present  Loss of protective sensation  No weak pulses  Risk: 1      The following portions of the patient's history were reviewed and updated as appropriate: allergies, current medications, past family history, past medical history, past social history, past surgical history and problem list     Review of Systems      Historical Information   Patient Active Problem List   Diagnosis    Corns    Pain in both feet    Diabetic polyneuropathy associated with type 2 diabetes mellitus [facility]    Onychomycosis    Tinea pedis of both feet    Lung mass    Hyperlipidemia    Squamous cell carcinoma of lung (HCC)    Shortness of breath    Daytime hypersomnolence    Chronic diastolic heart failure (HCC)    SYLVESTER (obstructive sleep apnea)    Severe sepsis (HCC)    Prostate cancer (HCC)    GERD (gastroesophageal reflux disease)    CAD (coronary artery disease)    Persistent atrial fibrillation (HCC)    Alcohol dependence ([facility] )    Acute respiratory failure with hypoxemia (HCC)    Depression, recurrent ([facility] )     Past Medical History:   Diagnosis Date    Anxiety     Arthritis     Atrial fibrillation (HCC)     Bleeding ulcer     Cancer [facility]     prostate 2011- radiation    Cardiac disease     cardiac stent x1    Cataract     starting    Chronic diastolic (congestive) heart failure (HCC)     Diabetes mellitus ([facility] )     boarderline diabetic     GERD (gastroesophageal reflux disease)     History of radiation therapy     Prostate seeds (brachytherapy) and EBRT    Hyperlipidemia     Hypertension     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, bilateral     Low back pain     Lung cancer ([address] )     Lung mass     Myocardial infarction ([facility] )     mild     Prostate cancer ([facility] )     Sleep apnea     sleep study     Wears dentures     full set    Wears glasses      Past Surgical History:   Procedure Laterality Date    ABDOMINAL HERNIA REPAIR      ABDOMINAL SURGERY      bleeding ulcer, cyst removed from abd    [facility]     IR BIOPSY LUNG  10/5/2021    IR THORACENTESIS  2021    AL BRONCHOSCOPY,DIAGNOSTIC N/A 2021    Procedure: BRONCHOSCOPY FLEXIBLE;  Surgeon: Cele Bettencourt MD;  [facility]: [facility];  Service: Thoracic    AL MEDIASTINOSCOPY WITH LYMPH NODE BIOPSY/IES N/A 2021    Procedure: MEDIASTINOSCOPY;  Surgeon: Cele Bettencourt MD;  [facility];  Service: Thoracic     Social History     Substance and Sexual Activity   Alcohol Use Yes    Alcohol/week: 10 0 - 12 0 standard drinks    Types: 7 Glasses of wine, 3 - 5 Cans of beer per week    Comment: few beers day; glass of red wine at dinner     Social History     Substance and Sexual Activity   Drug Use Never     Social History     Tobacco Use   Smoking Status Former Smoker    Packs/day: 1 00    Years: 30 00    Pack years: 30 00    Types: Cigarettes    Quit date: 38    Years since quittin 6   Smokeless Tobacco Never Used     Family History   Problem Relation Age of Onset    Prostate cancer Brother     Cancer Maternal Uncle         colo rectal cancer    Cancer Paternal Aunt      Health Maintenance Due   Topic    Medicare Annual Wellness Visit (AWV)     Pneumococcal Vaccine: 65+ Years (1 - PCV)    DM Eye Exam     BMI: Followup Plan     COVID-19 Vaccine (3 - Moderna risk series)    Diabetic Foot Exam     HEMOGLOBIN A1C     Influenza Vaccine (1) Meds/Allergies       Current Outpatient Medications:     acetaminophen (TYLENOL) 325 mg tablet, Take 2 tablets (650 mg total) by mouth every 6 (six) hours as needed for mild pain, headaches or fever, Disp: 30 tablet, Rfl: 0    albuterol (2 5 mg/3 mL) 0 083 % nebulizer solution, Take 2 5 mg by nebulization As needed per patient's wife , Disp: , Rfl:     ammonium lactate (LAC-HYDRIN) 12 % cream, Apply topically as needed for dry skin, Disp: 385 g, Rfl: 0    atenolol (TENORMIN) 50 mg tablet, , Disp: , Rfl:     atorvastatin (LIPITOR) 40 mg tablet, Take 40 mg by mouth every evening  , Disp: , Rfl:     clotrimazole-betamethasone (LOTRISONE) 1-0 05 % cream, Apply topically 2 (two) times a day, Disp: 30 g, Rfl: 2    Eliquis 5 MG, TAKE 1 TABLET TWO TIMES A DAY, Disp: 60 tablet, Rfl: 5    folic acid (FOLVITE) 1 mg tablet, Take 1 tablet (1 mg total) by mouth daily, Disp: 30 tablet, Rfl: 0    JARDIANCE 25 MG TABS, Take 25 mg by mouth daily in the early morning , Disp: , Rfl:     latanoprost (XALATAN) 0 005 % ophthalmic solution, Administer 1 drop to both eyes daily at bedtime  , Disp: , Rfl:     losartan (COZAAR) 50 mg tablet, TAKE 1 TABLET DAILY, Disp: 90 tablet, Rfl: 3    metFORMIN (GLUCOPHAGE) 500 mg tablet, Take 1 tablet (500 mg total) by mouth 2 (two) times a day, Disp: 180 tablet, Rfl: 1    Multiple Vitamin (MULTI-VITAMIN DAILY PO), Take by mouth daily  , Disp: , Rfl:     omeprazole (PriLOSEC) 20 mg delayed release capsule, Take 20 mg by mouth daily  , Disp: , Rfl:     primidone (MYSOLINE) 50 mg tablet, take 1 tab in morning and 2 tabs at bedtime  , Disp: 270 tablet, Rfl: 3    thiamine 100 MG tablet, Take 1 tablet (100 mg total) by mouth daily, Disp: 30 tablet, Rfl: 0    Trelegy Ellipta 100-62 5-25 MCG/INH inhaler, INHALE ONE PUFF DAILY; RINSE MOUTH AFTER USE, Disp: 60 each, Rfl: 5    ciclopirox (LOPROX) 0 77 % cream, Apply topically 2 (two) times a day for 20 days, Disp: 45 g, Rfl: 1      Objective:    Vitals:   /80 (BP Location: Right arm, Patient Position: Sitting, Cuff Size: Standard)   Pulse 68   Temp 97 8 °F (36 6 °C) (Temporal)   Resp 16   Ht 5' 11" (1 803 m)   Wt 98 4 kg (217 lb)   SpO2 96%   BMI 30 27 kg/m²   Body mass index is 30 27 kg/m²  Vitals:    08/25/22 0903   Weight: 98 4 kg (217 lb)       Physical Exam  Cardiovascular:      Pulses: no weak pulses          Dorsalis pedis pulses are 2+ on the right side and 2+ on the left side  Posterior tibial pulses are 1+ on the right side and 1+ on the left side  Feet:      Right foot:      Skin integrity: No ulcer, skin breakdown, erythema, warmth, callus or dry skin  Left foot:      Skin integrity: No ulcer, skin breakdown, erythema, warmth, callus or dry skin  Lab Review   No visits with results within 2 Month(s) from this visit  Latest known visit with results is:   Admission on 05/01/2022, Discharged on 05/04/2022   Component Date Value Ref Range Status    PTT 05/01/2022 34  23 - 37 seconds Final    Therapeutic Heparin Range =  60-90 seconds    Protime 05/01/2022 14 6 (A) 11 6 - 14 5 seconds Final    INR 05/01/2022 1 16  0 84 - 1 19 Final    Blood Culture 05/01/2022 No Growth After 5 Days  Final    Blood Culture 05/01/2022 No Growth After 5 Days     Final    WBC 05/01/2022 11 62 (A) 4 31 - 10 16 Thousand/uL Final    RBC 05/01/2022 4 89  3 88 - 5 62 Million/uL Final    Hemoglobin 05/01/2022 16 3  12 0 - 17 0 g/dL Final    Hematocrit 05/01/2022 49 7 (A) 36 5 - 49 3 % Final    MCV 05/01/2022 102 (A) 82 - 98 fL Final    MCH 05/01/2022 33 3  26 8 - 34 3 pg Final    MCHC 05/01/2022 32 8  31 4 - 37 4 g/dL Final    RDW 05/01/2022 14 3  11 6 - 15 1 % Final    MPV 05/01/2022 10 1  8 9 - 12 7 fL Final    Platelets 80/85/5276 223  149 - 390 Thousands/uL Final    nRBC 05/01/2022 0  /100 WBCs Final    Neutrophils Relative 05/01/2022 80 (A) 43 - 75 % Final    Immat GRANS % 05/01/2022 1  0 - 2 % Final    Lymphocytes Relative 05/01/2022 9 (A) 14 - 44 % Final    Monocytes Relative 05/01/2022 9  4 - 12 % Final    Eosinophils Relative 05/01/2022 1  0 - 6 % Final    Basophils Relative 05/01/2022 0  0 - 1 % Final    Neutrophils Absolute 05/01/2022 9 37 (A) 1 85 - 7 62 Thousands/µL Final    Immature Grans Absolute 05/01/2022 0 06  0 00 - 0 20 Thousand/uL Final    Lymphocytes Absolute 05/01/2022 1 05  0 60 - 4 47 Thousands/µL Final    Monocytes Absolute 05/01/2022 1 03  0 17 - 1 22 Thousand/µL Final    Eosinophils Absolute 05/01/2022 0 07  0 00 - 0 61 Thousand/µL Final    Basophils Absolute 05/01/2022 0 04  0 00 - 0 10 Thousands/µL Final    Sodium 05/01/2022 139  136 - 145 mmol/L Final    Potassium 05/01/2022 4 8  3 5 - 5 3 mmol/L Final    Chloride 05/01/2022 102  100 - 108 mmol/L Final    CO2 05/01/2022 25  21 - 32 mmol/L Final    ANION GAP 05/01/2022 12  4 - 13 mmol/L Final    BUN 05/01/2022 16  5 - 25 mg/dL Final    Creatinine 05/01/2022 1 10  0 60 - 1 30 mg/dL Final    Standardized to IDMS reference method    Glucose 05/01/2022 173 (A) 65 - 140 mg/dL Final    If the patient is fasting, the ADA then defines impaired fasting glucose as > 100 mg/dL and diabetes as > or equal to 123 mg/dL  Specimen collection should occur prior to Sulfasalazine administration due to the potential for falsely depressed results  Specimen collection should occur prior to Sulfapyridine administration due to the potential for falsely elevated results   Calcium 05/01/2022 8 9  8 3 - 10 1 mg/dL Final    AST 05/01/2022 26  5 - 45 U/L Final    Specimen collection should occur prior to Sulfasalazine administration due to the potential for falsely depressed results   ALT 05/01/2022 34  12 - 78 U/L Final    Specimen collection should occur prior to Sulfasalazine administration due to the potential for falsely depressed results       Alkaline Phosphatase 05/01/2022 99  46 - 116 U/L Final    Total Protein 05/01/2022 7 5  6 4 - 8 2 g/dL Final    Albumin 05/01/2022 3 6  3 5 - 5 0 g/dL Final    Total Bilirubin 05/01/2022 0 76  0 20 - 1 00 mg/dL Final    Use of this assay is not recommended for patients undergoing treatment with eltrombopag due to the potential for falsely elevated results   eGFR 05/01/2022 63  ml/min/1 73sq m Final    LACTIC ACID 05/01/2022 3 7 (A) 0 5 - 2 0 mmol/L Final    Procalcitonin 05/01/2022 0 12  <=0 25 ng/ml Final    Comment: Suspected Lower Respiratory Tract Infection (LRTI):  - LESS than or EQUAL to 0 25 ng/mL:   low likelihood for bacterial LRTI; antibiotics DISCOURAGED   - GREATER than 0 25 ng/mL:   increased likelihood for bacterial LRTI; antibiotics ENCOURAGED  Suspected Sepsis:  - Strongly consider initiating antibiotics in ALL UNSTABLE patients  - LESS than or EQUAL to 0 5 ng/mL:   low likelihood for bacterial sepsis; antibiotics DISCOURAGED   - GREATER than 0 5 ng/mL:   increased likelihood for bacterial sepsis; antibiotics ENCOURAGED   - GREATER than 2 ng/mL:   high risk for severe sepsis / septic shock; antibiotics strongly ENCOURAGED  Decisions on antibiotic use should not be based solely on Procalcitonin (PCT) levels  If PCT is low but uncertainty exists with stopping antibiotics, repeat PCT in 6-24 hours to confirm the low level  If antibiotics are administered (regardless if initial PCT was high or low), repeat PCT every 1-2 days to consider early antibiotic cessation (when GREATER                            than 80% decrease from the peak OR when PCT drops below designated cutoffs, whichever comes first), so long as the infection is NOT one that typically requires prolonged treatment durations (e g , bone/joint infections, endocarditis, Staph  aureus bacteremia)      Situations of FALSE-POSITIVE Procalcitonin values:  1) Newborns < 67 hours old  2) Massive stress from severe trauma / burns, major surgery, acute pancreatitis, cardiogenic / hemorrhagic shock, sickle cell crisis, or other organ perfusion abnormalities  3) Malaria and some Candidal infections  4) Treatment with agents that stimulate cytokines (e g , OKT3, anti-lymphocyte globulins, alemtuzumab, IL-2, granulocyte transfusion [NOT GCSFs])  5) Chronic renal disease causes elevated baseline levels (consider GREATER than 0 75 ng/mL as an abnormal cut-off); initiating HD/CRRT may cause transient decreases  6) Paraneoplastic syndromes from medullary thyroid or SCLC, some forms of vasculitis, and acute [id number]                            disease    Situations of FALSE-NEGATIVE Procalcitonin values:  1) Too early in clinical course for PCT to have reached its peak (may repeat in 6-24 hours to confirm low level)  2) Localized infection WITHOUT systemic (SIRS / sepsis) response (e g , an abscess, osteomyelitis, cystitis)  3) Mycobacteria (e g , Tuberculosis, MAC)  4) Cystic fibrosis exacerbations      Color, UA 05/01/2022 Yellow   Final    Clarity, UA 05/01/2022 Clear   Final    Specific Gravity, UA 05/01/2022 1 015  1 000 - 1 030 Final    pH, UA 05/01/2022 6 0  5 0, 5 5, 6 0, 6 5, 7 0, 7 5, 8 0, 8 5, 9 0 Final    Leukocytes, UA 05/01/2022 Elevated glucose may cause decreased leukocyte values   See urine microscopic for [facility] result/ (A) Negative Final    Nitrite, UA 05/01/2022 Negative  Negative Final    Protein, UA 05/01/2022 Trace (A) Negative mg/dl Final    Glucose, UA 05/01/2022 >=1000 (1%) (A) Negative mg/dl Final    Ketones, UA 05/01/2022 Negative  Negative mg/dl Final    Urobilinogen, UA 05/01/2022 0 2  0 2, 1 0 E U /dl E U /dl Final    Bilirubin, UA 05/01/2022 Negative  Negative Final    Occult Blood, UA 05/01/2022 Trace-lysed (A) Negative Final    hs TnI 0hr 05/01/2022 10  "Refer to ACS Flowchart"- see link ng/L Final    Comment:                                              Initial (time 0) result  If >=50 ng/L, Myocardial injury suggested ;  Type of myocardial injury and treatment strategy  to be determined  If 5-49 ng/L, a delta result at 2 hours and or 4 hours will be needed to further evaluate  If <4 ng/L, and chest pain has been >3 hours since onset, patient may qualify for discharge based on the HEART score in the ED  If <5 ng/L and <3hours since onset of chest pain, a delta result at 2 hours will be needed to further evaluate  HS Troponin 99th Percentile URL of a Health Population=12 ng/L with a 95% Confidence Interval of 8-18 ng/L  Second Troponin (time 2 hours)  If calculated delta >= 20 ng/L,  Myocardial injury suggested ; Type of myocardial injury and treatment strategy to be determined  If 5-49 ng/L and the calculated delta is 5-19 ng/L, consult medical service for evaluation  Continue evaluation for ischemia on ecg and other possible etiology and repeat hs troponin at 4 hours  If delta                            is <5 ng/L at 2 hours, consider discharge based on risk stratification via the HEART score (if in ED), or BETTY risk score in IP/Observation  HS Troponin 99th Percentile URL of a Health Population=12 ng/L with a 95% Confidence Interval of 8-18 ng/L   SARS-CoV-2 05/01/2022 Negative  Negative Final    INFLUENZA A PCR 05/01/2022 Negative  Negative Final    INFLUENZA B PCR 05/01/2022 Negative  Negative Final    RSV PCR 05/01/2022 Negative  Negative Final    hs TnI 2hr 05/01/2022 11  "Refer to ACS Flowchart"- see link ng/L Final    Comment:                                              Initial (time 0) result  If >=50 ng/L, Myocardial injury suggested ;  Type of myocardial injury and treatment strategy  to be determined  If 5-49 ng/L, a delta result at 2 hours and or 4 hours will be needed to further evaluate  If <4 ng/L, and chest pain has been >3 hours since onset, patient may qualify for discharge based on the HEART score in the ED  If <5 ng/L and <3hours since onset of chest pain, a delta result at 2 hours will be needed to further evaluate      HS Troponin 99th Percentile URL of a Health Population=12 ng/L with a 95% Confidence Interval of 8-18 ng/L  Second Troponin (time 2 hours)  If calculated delta >= 20 ng/L,  Myocardial injury suggested ; Type of myocardial injury and treatment strategy to be determined  If 5-49 ng/L and the calculated delta is 5-19 ng/L, consult medical service for evaluation  Continue evaluation for ischemia on ecg and other possible etiology and repeat hs troponin at 4 hours  If delta                            is <5 ng/L at 2 hours, consider discharge based on risk stratification via the HEART score (if in ED), or BETTY risk score in IP/Observation  HS Troponin 99th Percentile URL of a Health Population=12 ng/L with a 95% Confidence Interval of 8-18 ng/L   Delta 2hr hsTnI 05/01/2022 1  <20 ng/L Final    LACTIC ACID 05/02/2022 1 3  0 5 - 2 0 mmol/L Final    RBC, UA 05/01/2022 0-1  None Seen, 0-1, 1-2, 2-4, 0-5 /hpf Final    WBC, UA 05/01/2022 1-2  None Seen, 0-1, 1-2, 0-5, 2-4 /hpf Final    Epithelial Cells 05/01/2022 Occasional  None Seen, Occasional /hpf Final    Bacteria, UA 05/01/2022 Occasional  None Seen, Occasional /hpf Final    Sputum Culture 05/02/2022 Test not performed  Suggest repeat specimen  Final    Gram Stain Result 05/02/2022 >10 squamous epithelial cells/lpf, indicating orophayngeal contamination   (A)  Final    Gram Stain Result 05/02/2022 2+ Polys (A)  Final    Gram Stain Result 05/02/2022 3+ Gram positive cocci in clusters (A)  Final    Gram Stain Result 05/02/2022 2+ Gram positive rods (A)  Final    Gram Stain Result 05/02/2022 2+ Gram negative rods (A)  Final    Gram Stain Result 05/02/2022 1+ Gram negative cocci (A)  Final    Strep pneumoniae antigen, urine 05/01/2022 Negative  Negative Final    Legionella Urinary Antigen 05/01/2022 Negative  Negative Final    NT-proBNP 05/01/2022 1,262 (A) <450 pg/mL Final    hs TnI 4hr 05/02/2022 10  "Refer to ACS Flowchart"- see link ng/L Final    Comment: Initial (time 0) result  If >=50 ng/L, Myocardial injury suggested ;  Type of myocardial injury and treatment strategy  to be determined  If 5-49 ng/L, a delta result at 2 hours and or 4 hours will be needed to further evaluate  If <4 ng/L, and chest pain has been >3 hours since onset, patient may qualify for discharge based on the HEART score in the ED  If <5 ng/L and <3hours since onset of chest pain, a delta result at 2 hours will be needed to further evaluate  HS Troponin 99th Percentile URL of a Health Population=12 ng/L with a 95% Confidence Interval of 8-18 ng/L  Second Troponin (time 2 hours)  If calculated delta >= 20 ng/L,  Myocardial injury suggested ; Type of myocardial injury and treatment strategy to be determined  If 5-49 ng/L and the calculated delta is 5-19 ng/L, consult medical service for evaluation  Continue evaluation for ischemia on ecg and other possible etiology and repeat hs troponin at 4 hours  If delta                            is <5 ng/L at 2 hours, consider discharge based on risk stratification via the HEART score (if in ED), or BETTY risk score in IP/Observation  HS Troponin 99th Percentile URL of a Health Population=12 ng/L with a 95% Confidence Interval of 8-18 ng/L   Delta 4hr hsTnI 05/02/2022 0  <20 ng/L Final    POC Glucose 05/01/2022 140  65 - 140 mg/dl Final    Procalcitonin 05/02/2022 0 12  <=0 25 ng/ml Final    Comment: Suspected Lower Respiratory Tract Infection (LRTI):  - LESS than or EQUAL to 0 25 ng/mL:   low likelihood for bacterial LRTI; antibiotics DISCOURAGED   - GREATER than 0 25 ng/mL:   increased likelihood for bacterial LRTI; antibiotics ENCOURAGED  Suspected Sepsis:  - Strongly consider initiating antibiotics in ALL UNSTABLE patients    - LESS than or EQUAL to 0 5 ng/mL:   low likelihood for bacterial sepsis; antibiotics DISCOURAGED   - GREATER than 0 5 ng/mL:   increased likelihood for bacterial sepsis; antibiotics ENCOURAGED   - GREATER than 2 ng/mL:   high risk for severe sepsis / septic shock; antibiotics strongly ENCOURAGED  Decisions on antibiotic use should not be based solely on Procalcitonin (PCT) levels  If PCT is low but uncertainty exists with stopping antibiotics, repeat PCT in 6-24 hours to confirm the low level  If antibiotics are administered (regardless if initial PCT was high or low), repeat PCT every 1-2 days to consider early antibiotic cessation (when GREATER                            than 80% decrease from the peak OR when PCT drops below designated cutoffs, whichever comes first), so long as the infection is NOT one that typically requires prolonged treatment durations (e g , bone/joint infections, endocarditis, Staph  aureus bacteremia)      Situations of FALSE-POSITIVE Procalcitonin values:  1) Newborns < 67 hours old  2) Massive stress from severe trauma / burns, major surgery, acute pancreatitis, cardiogenic / hemorrhagic shock, sickle cell crisis, or other organ perfusion abnormalities  3) Malaria and some Candidal infections  4) Treatment with agents that stimulate cytokines (e g , OKT3, anti-lymphocyte globulins, alemtuzumab, IL-2, granulocyte transfusion [NOT GCSFs])  5) Chronic renal disease causes elevated baseline levels (consider GREATER than 0 75 ng/mL as an abnormal cut-off); initiating HD/CRRT may cause transient decreases  6) Paraneoplastic syndromes from medullary thyroid or SCLC, some forms of vasculitis, and acute [id number]                            disease    Situations of FALSE-NEGATIVE Procalcitonin values:  1) Too early in clinical course for PCT to have reached its peak (may repeat in 6-24 hours to confirm low level)  2) Localized infection WITHOUT systemic (SIRS / sepsis) response (e g , an abscess, osteomyelitis, cystitis)  3) Mycobacteria (e g , Tuberculosis, MAC)  4) Cystic fibrosis exacerbations      Sodium 05/02/2022 139  136 - 145 mmol/L Final  Potassium 05/02/2022 3 8  3 5 - 5 3 mmol/L Final    Chloride 05/02/2022 103  100 - 108 mmol/L Final    CO2 05/02/2022 26  21 - 32 mmol/L Final    ANION GAP 05/02/2022 10  4 - 13 mmol/L Final    BUN 05/02/2022 16  5 - 25 mg/dL Final    Creatinine 05/02/2022 0 75  0 60 - 1 30 mg/dL Final    Standardized to IDMS reference method    Glucose 05/02/2022 125  65 - 140 mg/dL Final    If the patient is fasting, the ADA then defines impaired fasting glucose as > 100 mg/dL and diabetes as > or equal to 123 mg/dL  Specimen collection should occur prior to Sulfasalazine administration due to the potential for falsely depressed results  Specimen collection should occur prior to Sulfapyridine administration due to the potential for falsely elevated results   Calcium 05/02/2022 8 3  8 3 - 10 1 mg/dL Final    eGFR 05/02/2022 86  ml/min/1 73sq m Final    Magnesium 05/02/2022 2 0  1 6 - 2 6 mg/dL Final    WBC 05/02/2022 8 70  4 31 - 10 16 Thousand/uL Final    RBC 05/02/2022 4 53  3 88 - 5 62 Million/uL Final    Hemoglobin 05/02/2022 15 0  12 0 - 17 0 g/dL Final    Hematocrit 05/02/2022 45 9  36 5 - 49 3 % Final    MCV 05/02/2022 101 (A) 82 - 98 fL Final    MCH 05/02/2022 33 1  26 8 - 34 3 pg Final    MCHC 05/02/2022 32 7  31 4 - 37 4 g/dL Final    RDW 05/02/2022 14 3  11 6 - 15 1 % Final    Platelets 90/90/3232 170  149 - 390 Thousands/uL Final    MPV 05/02/2022 10 0  8 9 - 12 7 fL Final    POC Glucose 05/02/2022 128  65 - 140 mg/dl Final    Ventricular Rate 05/01/2022 64  BPM Final    Atrial Rate 05/01/2022 396  BPM Final    QRSD Interval 05/01/2022 94  ms Final    QT Interval 05/01/2022 362  ms Final    QTC Interval 05/01/2022 373  ms Final    QRS Axis 05/01/2022 -50  degrees Final    T Wave [place] 05/01/2022 77  degrees Final    POC Glucose 05/02/2022 147 (A) 65 - 140 mg/dl Final    Sputum Culture 05/02/2022 2+ Growth of    Final    Mixed Respiratory sushil  Commensal respiratory sushil only;  No significant growth of Staph aureus/MRSA or Pseudomonas aeruginosa      Gram Stain Result 05/02/2022 Rare Epithelial cells per low power field (A)  Final    Gram Stain Result 05/02/2022 Rare Polys (A)  Final    Gram Stain Result 05/02/2022 Rare Gram positive rods (A)  Final    Gram Stain Result 05/02/2022 Rare Gram positive cocci in pairs (A)  Final    POC Glucose 05/02/2022 132  65 - 140 mg/dl Final    POC Glucose 05/02/2022 133  65 - 140 mg/dl Final    WBC 05/03/2022 6 65  4 31 - 10 16 Thousand/uL Final    RBC 05/03/2022 4 65  3 88 - 5 62 Million/uL Final    Hemoglobin 05/03/2022 15 2  12 0 - 17 0 g/dL Final    Hematocrit 05/03/2022 45 4  36 5 - 49 3 % Final    MCV 05/03/2022 98  82 - 98 fL Final    MCH 05/03/2022 32 7  26 8 - 34 3 pg Final    MCHC 05/03/2022 33 5  31 4 - 37 4 g/dL Final    RDW 05/03/2022 14 0  11 6 - 15 1 % Final    MPV 05/03/2022 9 9  8 9 - 12 7 fL Final    Platelets 25/41/0022 197  149 - 390 Thousands/uL Final    nRBC 05/03/2022 0  /100 WBCs Final    Neutrophils Relative 05/03/2022 73  43 - 75 % Final    Immat GRANS % 05/03/2022 1  0 - 2 % Final    Lymphocytes Relative 05/03/2022 13 (A) 14 - 44 % Final    Monocytes Relative 05/03/2022 9  4 - 12 % Final    Eosinophils Relative 05/03/2022 3  0 - 6 % Final    Basophils Relative 05/03/2022 1  0 - 1 % Final    Neutrophils Absolute 05/03/2022 4 89  1 85 - 7 62 Thousands/µL Final    Immature Grans Absolute 05/03/2022 0 03  0 00 - 0 20 Thousand/uL Final    Lymphocytes Absolute 05/03/2022 0 87  0 60 - 4 47 Thousands/µL Final    Monocytes Absolute 05/03/2022 0 62  0 17 - 1 22 Thousand/µL Final    Eosinophils Absolute 05/03/2022 0 21  0 00 - 0 61 Thousand/µL Final    Basophils Absolute 05/03/2022 0 03  0 00 - 0 10 Thousands/µL Final    Procalcitonin 05/03/2022 0 16  <=0 25 ng/ml Final    Comment: Suspected Lower Respiratory Tract Infection (LRTI):  - LESS than or EQUAL to 0 25 ng/mL:   low likelihood for bacterial LRTI; antibiotics DISCOURAGED   - GREATER than 0 25 ng/mL:   increased likelihood for bacterial LRTI; antibiotics ENCOURAGED  Suspected Sepsis:  - Strongly consider initiating antibiotics in ALL UNSTABLE patients  - LESS than or EQUAL to 0 5 ng/mL:   low likelihood for bacterial sepsis; antibiotics DISCOURAGED   - GREATER than 0 5 ng/mL:   increased likelihood for bacterial sepsis; antibiotics ENCOURAGED   - GREATER than 2 ng/mL:   high risk for severe sepsis / septic shock; antibiotics strongly ENCOURAGED  Decisions on antibiotic use should not be based solely on Procalcitonin (PCT) levels  If PCT is low but uncertainty exists with stopping antibiotics, repeat PCT in 6-24 hours to confirm the low level  If antibiotics are administered (regardless if initial PCT was high or low), repeat PCT every 1-2 days to consider early antibiotic cessation (when GREATER                            than 80% decrease from the peak OR when PCT drops below designated cutoffs, whichever comes first), so long as the infection is NOT one that typically requires prolonged treatment durations (e g , bone/joint infections, endocarditis, Staph  aureus bacteremia)      Situations of FALSE-POSITIVE Procalcitonin values:  1) Newborns < 67 hours old  2) Massive stress from severe trauma / burns, major surgery, acute pancreatitis, cardiogenic / hemorrhagic shock, sickle cell crisis, or other organ perfusion abnormalities  3) Malaria and some Candidal infections  4) Treatment with agents that stimulate cytokines (e g , OKT3, anti-lymphocyte globulins, alemtuzumab, IL-2, granulocyte transfusion [NOT GCSFs])  5) Chronic renal disease causes elevated baseline levels (consider GREATER than 0 75 ng/mL as an abnormal cut-off); initiating HD/CRRT may cause transient decreases  6) Paraneoplastic syndromes from medullary thyroid or SCLC, some forms of vasculitis, and acute [id number]                            disease    Situations of FALSE-NEGATIVE Procalcitonin values:  1) Too early in clinical course for PCT to have reached its peak (may repeat in 6-24 hours to confirm low level)  2) Localized infection WITHOUT systemic (SIRS / sepsis) response (e g , an abscess, osteomyelitis, cystitis)  3) Mycobacteria (e g , Tuberculosis, MAC)  4) Cystic fibrosis exacerbations      POC Glucose 05/03/2022 161 (A) 65 - 140 mg/dl Final    POC Glucose 05/03/2022 152 (A) 65 - 140 mg/dl Final    POC Glucose 05/03/2022 131  65 - 140 mg/dl Final    POC Glucose 05/03/2022 174 (A) 65 - 140 mg/dl Final    POC Glucose 05/04/2022 136  65 - 140 mg/dl Final    POC Glucose 05/04/2022 156 (A) 65 - 140 mg/dl Final         Patient Instructions   Hypertension( High Blood Pressure ):    Continue Home BP check daily and bring log, if you are not checking consider checking daily    Take your Blood Pressure medicine as advised    Do not take your Blood pressure medicine if systolic Blood Pressure (top number) is less than 100 or heart rate less than 60  Notify you physician  Diabetes    Check your fingerstick Accu-Chek once a day  Please check your fingerstick Accu-Chek different time of the day either at 7:00 a m  or 11:00 a m  for for p m  4 9:00 p m  Gerhardt Sep Follow Diabetic 1800 calorie diet for diabetes as advised  If you would sugar less than 80 or more than 300 to please call us on next visit is day  If the sugar is less than 80 follow-up hypoglycemia instructions as advised  Take your diabetic medicine as advised  Cholesterol    Eat low cholesterol diet    Continue taking your cholesterol medicine as advised    Call if any side effects    Lipid Profile and LFT prior to next visit or as advised  ( You should Get periodically to monitor liver side effects)    KNOW YOUR DIABETIC GOAL( HBA1C AND SUGAR), BLOOD PRESSURE TARGET NUMBER AND CHOLESTEROL( LDL, HDL AND TRIGLYCERIDE)    CHF is compensated  Continue Current Medication Combination for CHF as included in Complete List of Medicine  Recommend to weigh daily  If weigh gain of more than 3 lbs, Pt to call for instruction or evaluation  Recommend to avoid excessive salt intake  Pt is advised to continue to follow with cardiologist   Ernestine Hartman to get periodic blood test to monitor side effect of medication as advised by us or cardiologist         Doris Figueroa MD        "This note has been constructed using a voice recognition system  Therefore there may be syntax, spelling, and/or grammatical errors   Please call if you have any questions  "

## 2022-08-25 NOTE — ASSESSMENT & PLAN NOTE
Nuclear stress test November 2021 normal denies any chest pain continue secondary prevention with diabetes control losartan Eliquis baby aspirin Lipitor no chest pain patient symptom control follow with Cardiology continue current treatment

## 2022-08-25 NOTE — ASSESSMENT & PLAN NOTE
Patient recommended to use CPAP was uncomfortable now 2 L nasal cannula recommended to be evaluated by Pulmonary

## 2022-08-26 RX ORDER — CLOTRIMAZOLE AND BETAMETHASONE DIPROPIONATE 10; .64 MG/G; MG/G
CREAM TOPICAL
Qty: 30 G | Refills: 2 | Status: SHIPPED | OUTPATIENT
Start: 2022-08-26 | End: 2022-09-23 | Stop reason: CLARIF

## 2022-09-05 DIAGNOSIS — E78.00 HYPERCHOLESTEREMIA: Primary | ICD-10-CM

## 2022-09-06 RX ORDER — ATORVASTATIN CALCIUM 40 MG/1
TABLET, FILM COATED ORAL
Qty: 90 TABLET | Refills: 1 | Status: SHIPPED | OUTPATIENT
Start: 2022-09-06

## 2022-09-07 ENCOUNTER — OFFICE VISIT (OUTPATIENT)
Dept: CARDIOLOGY CLINIC | Facility: CLINIC | Age: 82
End: 2022-09-07
Payer: COMMERCIAL

## 2022-09-07 VITALS
SYSTOLIC BLOOD PRESSURE: 130 MMHG | DIASTOLIC BLOOD PRESSURE: 70 MMHG | BODY MASS INDEX: 32.11 KG/M2 | HEART RATE: 94 BPM | WEIGHT: 230.2 LBS

## 2022-09-07 DIAGNOSIS — I50.32 CHRONIC DIASTOLIC HEART FAILURE (HCC): ICD-10-CM

## 2022-09-07 DIAGNOSIS — I10 PRIMARY HYPERTENSION: ICD-10-CM

## 2022-09-07 DIAGNOSIS — I48.19 PERSISTENT ATRIAL FIBRILLATION (HCC): Primary | ICD-10-CM

## 2022-09-07 DIAGNOSIS — E78.2 MIXED HYPERLIPIDEMIA: ICD-10-CM

## 2022-09-07 DIAGNOSIS — I25.10 CORONARY ARTERY DISEASE INVOLVING NATIVE CORONARY ARTERY OF NATIVE HEART WITHOUT ANGINA PECTORIS: ICD-10-CM

## 2022-09-07 PROCEDURE — 93000 ELECTROCARDIOGRAM COMPLETE: CPT | Performed by: INTERNAL MEDICINE

## 2022-09-07 PROCEDURE — 99214 OFFICE O/P EST MOD 30 MIN: CPT | Performed by: INTERNAL MEDICINE

## 2022-09-07 PROCEDURE — 3075F SYST BP GE 130 - 139MM HG: CPT | Performed by: INTERNAL MEDICINE

## 2022-09-07 PROCEDURE — 3078F DIAST BP <80 MM HG: CPT | Performed by: INTERNAL MEDICINE

## 2022-09-07 NOTE — PROGRESS NOTES
Cardiology   Valeriy Gandhi DO, Tiffanie Bee MD, Roselyn Menard MD, Parviz Ashford MD, [facility] [place]  -------------------------------------------------------------------  [facility]  [address], [address] [facility],[address] [place] [address], [address] [place] [address] Avenue  [phone]    Cardiology Follow Up  Jakub Terrazas  1940  [id number]          Assessment/Plan:    1  Persistent atrial fibrillation ([address] )  -   Patient remains rate controlled with atenolol  He is asymptomatic without any palpitations  -   Tolerating Eliquis  No further nose bleeds  2  Chronic diastolic heart failure (HCC)  -   Echocardiogram showed ejection fraction of 50-55%  3  Atherosclerosis of native coronary artery of native heart without angina pectoris  -   Patient has a history of coronary disease with prior PCI over 20 years ago  -   Stress test did not show any ischemia or infarction  4   Hypertension  -   Blood pressure controlled with losartan 50 mg daily  6  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[address] )  -  Continue atorvastatin  Interval History:     Jakub Terrazas is [de-identified] y o  male here for followup of atrial fibirllation  Since his last visit, he has completed radiation therapy for lung mass  He did not require chemotherapy  Has repeat CT scan scheduled for next month  He had sleep study done which was abnormal   Went for CPAP fitting but could not tolerate CPAP machine  He denies any chest pain, shortness of breath, lower extremity edema, orthopnea or paroxysmal nocturnal dyspnea  No further nosebleeds  Previously, the patient was admitted to [facility] on September 29, 2021 with shortness of breath  Workup in the emergency room revealed RSV and a left lower lobe lung mass  He was also found to be in atrial fibrillation  He was started on atenolol and Eliquis  Echocardiogram showed ejection fraction of 50-55% and mild valve disease     Patient had a PET scan done on October 29th which showed a 4 5 x 4 cm left lower lobe lung nodule with paratracheal nodes mildly hypermetabolic and a small mildly hypermetabolic left pleural effusion  He was evaluated by CT surgery who did not believe patient was candidate for resection  He underwent radiation therapy  The following portions of the patient's history were reviewed and updated as appropriate: allergies, current medications, past family history, past medical history, past social history, past surgical history, and problem list        Current Outpatient Medications:     albuterol (2 5 mg/3 mL) 0 083 % nebulizer solution, Take 2 5 mg by nebulization As needed per patient's wife , Disp: , Rfl:     ammonium lactate (LAC-HYDRIN) 12 % cream, Apply topically as needed for dry skin, Disp: 385 g, Rfl: 0    atenolol (TENORMIN) 50 mg tablet, , Disp: , Rfl:     atorvastatin (LIPITOR) 40 mg tablet, TAKE ONE TABLET DAILY, Disp: 90 tablet, Rfl: 1    clotrimazole-betamethasone (LOTRISONE) 1-0 05 % cream, APPLY TOPICALLY TWO TIMES A DAY, Disp: 30 g, Rfl: 2    Eliquis 5 MG, TAKE 1 TABLET TWO TIMES A DAY, Disp: 60 tablet, Rfl: 5    folic acid (FOLVITE) 1 mg tablet, Take 1 tablet (1 mg total) by mouth daily, Disp: 30 tablet, Rfl: 0    JARDIANCE 25 MG TABS, Take 25 mg by mouth daily in the early morning , Disp: , Rfl:     latanoprost (XALATAN) 0 005 % ophthalmic solution, Administer 1 drop to both eyes daily at bedtime  , Disp: , Rfl:     losartan (COZAAR) 50 mg tablet, TAKE 1 TABLET DAILY, Disp: 90 tablet, Rfl: 3    metFORMIN (GLUCOPHAGE) 500 mg tablet, Take 1 tablet (500 mg total) by mouth 2 (two) times a day, Disp: 180 tablet, Rfl: 1    Multiple Vitamin (MULTI-VITAMIN DAILY PO), Take by mouth daily  , Disp: , Rfl:     omeprazole (PriLOSEC) 20 mg delayed release capsule, Take 20 mg by mouth daily  , Disp: , Rfl:     primidone (MYSOLINE) 50 mg tablet, take 1 tab in morning and 2 tabs at bedtime  , Disp: 270 tablet, Rfl: 3    thiamine 100 MG tablet, Take 1 tablet (100 mg total) by mouth daily, Disp: 30 tablet, Rfl: 0    Trelegy Ellipta 100-62 5-25 MCG/INH inhaler, INHALE ONE PUFF DAILY; RINSE MOUTH AFTER USE, Disp: 60 each, Rfl: 5    acetaminophen (TYLENOL) 325 mg tablet, Take 2 tablets (650 mg total) by mouth every 6 (six) hours as needed for mild pain, headaches or fever (Patient not taking: Reported on 9/7/2022), Disp: 30 tablet, Rfl: 0    ciclopirox (LOPROX) 0 77 % cream, Apply topically 2 (two) times a day for 20 days, Disp: 45 g, Rfl: 1        Review of Systems:  Review of Systems   Respiratory: Negative for shortness of breath  Cardiovascular: Negative for chest pain, palpitations and leg swelling  Musculoskeletal: Positive for arthralgias  All other systems reviewed and are negative  Physical Exam:  Vitals:  Vitals:    09/07/22 1029   BP: 130/70   BP Location: Right arm   Patient Position: Sitting   Cuff Size: Standard   Pulse: 94   Weight: 104 kg (230 lb 3 2 oz)     Physical Exam   Constitutional: He appears healthy  No distress  Eyes: Pupils are equal, round, and reactive to light  Conjunctivae are normal    Neck: No JVD present  Cardiovascular: Normal rate and normal heart sounds  An irregularly irregular rhythm present  Exam reveals no gallop and no friction rub  No murmur heard  Pulmonary/Chest: Effort normal and breath sounds normal  He has no wheezes  He has no rales  Musculoskeletal:         General: No tenderness, deformity or edema  Cervical back: Normal range of motion and neck supple  Neurological: He is alert and oriented to person, place, and time  Skin: Skin is warm and dry  Cardiographics:  EKG: Personally reviewed   Atrial fibrillation with a rate 95 beats per minute  Last known EF: 50-55%    This note was completed in part utilizing [employer] Direct Software    Grammatical errors, random word insertions, spelling mistakes, and incomplete sentences can be an occasional consequence of this system secondary to software limitations, ambient noise, and hardware issues  If you have any questions or concerns about the content, text, or information contained within the body of this dictation, please contact the provider for clarification

## 2022-09-22 DIAGNOSIS — B35.3 TINEA PEDIS OF BOTH FEET: ICD-10-CM

## 2022-09-23 ENCOUNTER — APPOINTMENT (OUTPATIENT)
Dept: RADIOLOGY | Facility: CLINIC | Age: 82
End: 2022-09-23
Payer: COMMERCIAL

## 2022-09-23 ENCOUNTER — OFFICE VISIT (OUTPATIENT)
Dept: URGENT CARE | Facility: CLINIC | Age: 82
End: 2022-09-23
Payer: COMMERCIAL

## 2022-09-23 VITALS — OXYGEN SATURATION: 96 % | RESPIRATION RATE: 14 BRPM | TEMPERATURE: 96.9 F | HEART RATE: 92 BPM

## 2022-09-23 DIAGNOSIS — M25.512 CHRONIC LEFT SHOULDER PAIN: ICD-10-CM

## 2022-09-23 DIAGNOSIS — M25.512 ACUTE PAIN OF LEFT SHOULDER: Primary | ICD-10-CM

## 2022-09-23 DIAGNOSIS — G89.29 CHRONIC LEFT SHOULDER PAIN: ICD-10-CM

## 2022-09-23 PROCEDURE — 1160F RVW MEDS BY RX/DR IN RCRD: CPT | Performed by: PHYSICIAN ASSISTANT

## 2022-09-23 PROCEDURE — 73030 X-RAY EXAM OF SHOULDER: CPT

## 2022-09-23 PROCEDURE — 99213 OFFICE O/P EST LOW 20 MIN: CPT | Performed by: PHYSICIAN ASSISTANT

## 2022-09-23 NOTE — PATIENT INSTRUCTIONS
Shoulder Bursitis   WHAT YOU NEED TO KNOW:   Shoulder bursitis is inflammation of the bursa in your shoulder  The bursa is a fluid-filled sac that acts as a cushion between a bone and a tendon  A tendon is a cord of strong tissue that connects muscles to bones  DISCHARGE INSTRUCTIONS:   Call your doctor if:   You have increased redness, pain, and swelling  Your symptoms do not improve with treatment  You have a fever  You have questions or concerns about your condition or care  Medicines: You may need any of the following:  NSAIDs , such as ibuprofen, help decrease swelling, pain, and fever  This medicine is available with or without a doctor's order  NSAIDs can cause stomach bleeding or kidney problems in certain people  If you take blood thinner medicine, always ask if NSAIDs are safe for you  Always read the medicine label and follow directions  Do not give these medicines to children under 10months of age without direction from your child's healthcare provider  Aspirin  helps relieve pain and swelling  Take aspirin exactly as directed by your healthcare provider  Antibiotics  help treat or prevent a bacterial infection  Steroids  help relieve pain and swelling  Steroids may be given for a short time for acute pain  Do not give aspirin to children under 25years of age  Your child could develop Reye syndrome if he takes aspirin  Reye syndrome can cause life-threatening brain and liver damage  Check your child's medicine labels for aspirin, salicylates, or oil of wintergreen  Take your medicine as directed  Contact your healthcare provider if you think your medicine is not helping or if you have side effects  Tell him of her if you are allergic to any medicine  Keep a list of the medicines, vitamins, and herbs you take  Include the amounts, and when and why you take them  Bring the list or the pill bottles to follow-up visits   Carry your medicine list with you in case of an emergency  Manage shoulder bursitis:   Rest your shoulder as much as possible to decrease pain and swelling  Slowly start to do more each day  Return to your daily activities as directed  Apply ice to help decrease swelling and pain  Use an ice pack, or put crushed ice in a plastic bag  Cover the bag with a towel before you place it on your shoulder  Apply ice for 15 to 20 minutes, 3 to 4 times each day, as directed  Find a comfortable sleep position  Sleep on the side that is not injured  You may be more comfortable if you sleep on your stomach or back  Go to physical therapy, if directed  A physical therapist teaches you exercises to help improve movement and strength, and to decrease pain  Prevent shoulder bursitis:   Do not overuse your shoulders  Shorten the time you spend swimming, playing tennis, or doing other overhead arm movements  Take breaks as you do these activities  Try not to do the same activities each day  For example, swim every other day or every 3 days instead of daily  Always warm up and stretch before you exercise  This will help loosen your muscles and decrease stress on your shoulder  Cool down after you exercise  Prevent injury to your shoulders  Wear shoulder pads or protectors when you play sports  Try to keep pressure off your shoulders  If you need to sleep on your side, do not lie on same side each night  Manage health conditions that can lead to shoulder bursitis  Your healthcare provider may recommend you to a specialist, such as an arthritis specialist     Follow up with your doctor as directed:  Write down your questions so you remember to ask them during your visits  © Copyright [employer] 2022 Information is for End User's use only and may not be sold, redistributed or otherwise used for commercial purposes   All illustrations and images included in CareNotes® are the copyrighted property of [employer]  or [employer]  The above information is an  only  It is not intended as medical advice for individual conditions or treatments  Talk to your doctor, nurse or pharmacist before following any medical regimen to see if it is safe and effective for you  Left shoulder pain:   -There is no obvious sign of dislocation or fracture on X-ray  Awaiting official read  Arthritic changes noted     -Heating pad or warm compress   -Elevate the area and rest   -Tylenol for the pain sparingly   -Avoid strenuous activity/sports or gym until your symptoms improve  -Physical therapy referral   -Follow up with Dr Jayce Hennessy For further evaluation and management

## 2022-09-23 NOTE — PROGRESS NOTES
[address]        NAME: David Ott is a [de-identified] y o  male  : 1940    MRN: [id number]  DATE: 2022  TIME: 1:31 PM    Assessment and Plan   Acute pain of left shoulder [M25 512]  1  Acute pain of left shoulder  Ambulatory Referral to Orthopedic Surgery    Ambulatory Referral to [facility]   2  Chronic left shoulder pain  XR shoulder 2+ vw left         Patient Instructions   Left shoulder pain: ddx left shoulder bursitis   -There is no obvious sign of dislocation or fracture on X-ray  Awaiting official read  Arthritic changes noted  -Heating pad or warm compress   -Elevate the area and rest   -Tylenol for the pain sparingly   -Avoid strenuous activity/sports or gym until your symptoms improve  -Physical therapy referral   -Follow up with Dr Jovon Benjamin For further evaluation and management       Follow up with PCP in 3-5 days  Proceed to  ER if symptoms worsen  Chief Complaint     Chief Complaint   Patient presents with    Pain     Pt presents with left shoulder pain ongoing for 3 weeks of unknown etiology         History of Present Illness       The patient is an 63-year-old male with an extensive PMH who states that he was lifting cases of water 3 weeks ago when he began to experience L sided shoulder pain over the posterior aspect of the L shoulder that radiates to the L scapula  He states that he has pain while lying on that side in bed  He states that he has been placing a heating pad which relieves the pain  He has limited ROM in all planes due to the pain  The pain is a sharp/shooting pain with posterior movement of the arm  He has been taking Tylenol occasionally  No neck pain  No weakness, numbness or tingling  No chest pain or palpitations  No redness, swelling, bruising  No hx of shoulder surgery or previous injury  Review of Systems   Review of Systems   Constitutional: Negative for activity change, appetite change, chills, fatigue and fever     Respiratory: Negative for chest tightness, shortness of breath and wheezing  Cardiovascular: Negative for chest pain, palpitations and leg swelling  Musculoskeletal: Positive for arthralgias  Negative for back pain, gait problem, joint swelling, myalgias, neck pain and neck stiffness  Skin: Negative for color change, pallor, rash and wound  Allergic/Immunologic: Negative for immunocompromised state  Neurological: Negative for dizziness, weakness, light-headedness and numbness  Hematological: Does not bruise/bleed easily           Current Medications       Current Outpatient Medications:     acetaminophen (TYLENOL) 325 mg tablet, Take 2 tablets (650 mg total) by mouth every 6 (six) hours as needed for mild pain, headaches or fever, Disp: 30 tablet, Rfl: 0    albuterol (2 5 mg/3 mL) 0 083 % nebulizer solution, Take 2 5 mg by nebulization As needed per patient's wife , Disp: , Rfl:     atenolol (TENORMIN) 50 mg tablet, , Disp: , Rfl:     atorvastatin (LIPITOR) 40 mg tablet, TAKE ONE TABLET DAILY, Disp: 90 tablet, Rfl: 1    ciclopirox (LOPROX) 0 77 % cream, APPLY TOPICALLY 2 (TWO) TIMES A DAY FOR 20 DAYS, Disp: 45 g, Rfl: 1    Eliquis 5 MG, TAKE 1 TABLET TWO TIMES A DAY, Disp: 60 tablet, Rfl: 5    folic acid (FOLVITE) 1 mg tablet, Take 1 tablet (1 mg total) by mouth daily, Disp: 30 tablet, Rfl: 0    JARDIANCE 25 MG TABS, Take 25 mg by mouth daily in the early morning , Disp: , Rfl:     latanoprost (XALATAN) 0 005 % ophthalmic solution, Administer 1 drop to both eyes daily at bedtime  , Disp: , Rfl:     losartan (COZAAR) 50 mg tablet, TAKE 1 TABLET DAILY, Disp: 90 tablet, Rfl: 3    metFORMIN (GLUCOPHAGE) 500 mg tablet, Take 1 tablet (500 mg total) by mouth 2 (two) times a day, Disp: 180 tablet, Rfl: 1    Multiple Vitamin (MULTI-VITAMIN DAILY PO), Take by mouth daily  , Disp: , Rfl:     omeprazole (PriLOSEC) 20 mg delayed release capsule, Take 20 mg by mouth daily  , Disp: , Rfl:     primidone (MYSOLINE) 50 mg tablet, take 1 tab in morning and 2 tabs at bedtime  , Disp: 270 tablet, Rfl: 3    thiamine 100 MG tablet, Take 1 tablet (100 mg total) by mouth daily, Disp: 30 tablet, Rfl: 0    Trelegy Ellipta 100-62 5-25 MCG/INH inhaler, INHALE ONE PUFF DAILY; RINSE MOUTH AFTER USE, Disp: 60 each, Rfl: 5    Current Allergies     Allergies as of 09/23/2022    (No Known Allergies)            The following portions of the patient's history were reviewed and updated as appropriate: allergies, current medications, past family history, past medical history, past social history, past surgical history and problem list      Past Medical History:   Diagnosis Date    Anxiety     Arthritis     Atrial fibrillation ([facility] )     Bleeding ulcer     [facility])     prostate 2011- radiation    Cardiac disease     cardiac stent x1    Cataract     starting    Chronic diastolic (congestive) heart failure ([facility] [address] )     Diabetes mellitus ([facility] [address] )     boarderline diabetic     GERD (gastroesophageal reflux disease)     History of radiation therapy 2010    Prostate seeds (brachytherapy) and EBRT    Hyperlipidemia     Hypertension     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, bilateral     Low back pain     Lung cancer ([address] )     Lung mass     Myocardial infarction ([address] )     mild 1999    Prostate cancer ([address] )     Sleep apnea     sleep study 11/22    Wears dentures     full set    Wears glasses        Past Surgical History:   Procedure Laterality Date    ABDOMINAL HERNIA REPAIR      ABDOMINAL SURGERY      bleeding ulcer, cyst removed from abd    COLONOSCOPY      CORONARY ANGIOPLASTY WITH STENT PLACEMENT  1999    x1     IR BIOPSY LUNG  10/5/2021    IR THORACENTESIS  11/8/2021    MD BRONCHOSCOPY,DIAGNOSTIC N/A 11/29/2021    Procedure: BRONCHOSCOPY FLEXIBLE;  Surgeon: Raymond Talley MD;  [facility];  Service: Thoracic    MD MEDIASTINOSCOPY WITH LYMPH NODE BIOPSY/IES N/A 11/29/2021    Procedure: MEDIASTINOSCOPY;  Surgeon: Flora Huber Keri Sherwood MD;  [facility];  Service: Thoracic       Family History   Problem Relation Age of Onset    Prostate cancer Brother     Cancer Maternal Uncle         colo rectal cancer    Cancer Paternal Aunt          Medications have been verified  Objective   Pulse 92   Temp (!) 96 9 °F (36 1 °C)   Resp 14   SpO2 96%   No LMP for male patient  Physical Exam     Physical Exam  Vitals and nursing note reviewed  Constitutional:       General: He is not in acute distress  Appearance: He is well-developed  He is not diaphoretic  Cardiovascular:      Rate and Rhythm: Normal rate and regular rhythm  Heart sounds: Normal heart sounds  Pulmonary:      Effort: Pulmonary effort is normal       Breath sounds: Normal breath sounds  Musculoskeletal:      Right shoulder: Normal       Left shoulder: No swelling, deformity, effusion, tenderness, bony tenderness or crepitus  Decreased range of motion  Normal strength  Comments: L shoulder: There is no tenderness to palpation  No erythema, edema or ecchymosis  He has limited ROM with external and internal rotation of the shoulder  Strength and sensation is intact  Skin:     General: Skin is warm and dry  Capillary Refill: Capillary refill takes less than 2 seconds  Findings: No bruising, ecchymosis or erythema  Neurological:      Sensory: Sensation is intact  No sensory deficit  Motor: Motor function is intact  No weakness or abnormal muscle tone        Gait: Gait normal    Psychiatric:         Behavior: Behavior normal

## 2022-10-04 ENCOUNTER — OFFICE VISIT (OUTPATIENT)
Dept: PODIATRY | Facility: CLINIC | Age: 82
End: 2022-10-04
Payer: COMMERCIAL

## 2022-10-04 VITALS
SYSTOLIC BLOOD PRESSURE: 131 MMHG | DIASTOLIC BLOOD PRESSURE: 80 MMHG | RESPIRATION RATE: 17 BRPM | HEIGHT: 71 IN | HEART RATE: 86 BPM | BODY MASS INDEX: 32.2 KG/M2 | WEIGHT: 230 LBS

## 2022-10-04 DIAGNOSIS — E11.51 DIABETES MELLITUS TYPE 2 WITH PERIPHERAL ARTERY DISEASE (HCC): ICD-10-CM

## 2022-10-04 DIAGNOSIS — M79.672 PAIN IN BOTH FEET: ICD-10-CM

## 2022-10-04 DIAGNOSIS — M79.671 PAIN IN BOTH FEET: ICD-10-CM

## 2022-10-04 DIAGNOSIS — B35.3 TINEA PEDIS OF BOTH FEET: ICD-10-CM

## 2022-10-04 DIAGNOSIS — B35.1 ONYCHOMYCOSIS: ICD-10-CM

## 2022-10-04 DIAGNOSIS — L84 CORNS: ICD-10-CM

## 2022-10-04 DIAGNOSIS — E11.42 DIABETIC POLYNEUROPATHY ASSOCIATED WITH TYPE 2 DIABETES MELLITUS (HCC): Primary | ICD-10-CM

## 2022-10-04 PROCEDURE — 99212 OFFICE O/P EST SF 10 MIN: CPT | Performed by: PODIATRIST

## 2022-10-04 PROCEDURE — 11056 PARNG/CUTG B9 HYPRKR LES 2-4: CPT | Performed by: PODIATRIST

## 2022-10-04 NOTE — PROGRESS NOTES
Assessment/Plan:  Patient has pain in his feet and toes with ambulation   He has mycosis of nail and skin  He has plantar pre ulcerative skin lesions      Plan   Diabetic foot exam performed   Mycotic nails debrided   cream ordered   Patient will use daily, he will apply cream to feet b i d  For 4 weeks   Calluses debrided   Procedures performed without pain or complication            Subjective:  Patient has pain in his feet with ambulation   No history of trauma    He has pain when he wears shoes   He has pain in his toes       Patient ID: Spenser Clayton is a 82 y o  male      Patient is diabetic  [facility] has pain in his feet and toes with ambulation   Has pain from calluses   He has ingrown toenails and dry scaly skin   He is requesting refill of topical antifungal         Review of Systems   Constitutional: No fever or chills, feels well, no tiredness, no recent weight loss or weight gain   Eyes: No complaints of red eyes, no eyesight problems    ENT: no complaints of loss of hearing, no nosebleeds, no sore throat   Cardiovascular: No complaints of chest pain, no palpitations, no leg claudication or lower extremity edema   Respiratory: No complaints of shortness of breath, no wheezing, no cough   Gastrointestinal: No complaints of abdominal pain, no constipation, no nausea or vomiting, no diarrhea or bloody stools   Genitourinary: No complaints of dysuria or incontinence, no hesitancy, no nocturia   Musculoskeletal: limb pain, but-- as noted in HPI   Integumentary: No complaints of skin rash or lesion, no itching or dry skin, no skin wounds   Neurological: No complaints of headache, no confusion, no numbness or tingling, no dizziness   Psychiatric: No suicidal thoughts, no anxiety, no depression   Endocrine: No muscle weakness, no frequent urination, no excessive thirst, no feelings of weakness       Active Problems  1  Acquired ankle/foot deformity (747 74) (Q15 768)   2  Arthritis (382 36) (M19 90)   3  Atherosclerosis of arteries of extremities (440 20) (I70 209)   4  Calcaneal spur (726 73) (M77 30)   5  Callus (700) (L84)   6  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)   7  Diabetes mellitus with neuropathy (250 60,357 2) (E11 40)   8  Diabetic neuropathy (250 60,357 2) (E11 40)   9  Hypercholesterolemia (272 0) (E78 00)   10  Hypertension (401 9) (I10)   11  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)   12  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)   13  Onychomycosis (110 1) (B35 1)   14  Orthopedic aftercare (V54 9) (Z47 89)   15  Pain in both feet (729 5) (M79 671,M79 672)   16  Pain in extremity (729 5) (M79 609)   17  Pes planus, congenital (754 61) (Q66 50)   18  Plantar 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 2)   19  Plantar wart (078 12) (B07 0)   20  Prostate cancer (185) (C61)   21  Sprain of right shoulder (840 9) (S43 401A)   22  Tinea pedis (110 4) (B35 3)     Past Medical History   · Orthopedic aftercare (V54 9) (Z47 89)     Surgical History   · History of Gastric Surgery   · History of Hernia Repair   · History of Previous Stent Placement Total Number Performed ___     The surgical history was reviewed and updated today        Family History  Family History    · Family history of Arthritis (V17 7)   · Family history of Cancer     The family history was reviewed and updated today        Social History      · Beer Consumption (___ Bottles Per Day)   · Daily Coffee Consumption (1  Cups/Day)   · Former smoker (S82 50) (Z87 891)  The social history was reviewed and updated today  Allergies  1  No Known Drug Allergies      Physical Exam  Left Foot: Appearance: Normal except as noted: excessive pronation-- and-- pes planus  Right Foot: Appearance: Normal except as noted: excessive pronation-- and-- pes planus  Left Ankle: ROM: limited ROM in all planes   Right Ankle: ROM: limited ROM in all planes   Neurological Exam: performed   Light touch was intact bilaterally  Vibratory sensation was intact bilaterally  Response to monofilament test was intact bilaterally  Deep tendon reflexes: patellar reflex present bilaterally-- and-- achilles reflex present bilaterally  Vascular Exam: performed Dorsalis pedis pulses were One/4 bilaterally  Posterior tibial pulses were One/4 bilaterally  Elevation Pallor: present bilaterally  Capillary refill time was greater than 3 seconds bilaterally-- and-- Q  9 findings bilateral  Negative digital hair noted  Positive abnormal cooling noted  Toenails: All of the toenails were elongated,-- hypertrophied,-- discolored,-- ingrown,-- shown to have subungual debris,-- tender-- and-- Severely mycotic with onychauxis     Socks and shoes removed, Right Foot Findings: swollen, erythematous and dry   The sensory exam showed diminished vibratory sensation at the level of the toes  Diminished tactile sensation with monofilament testing throughout the right foot   Socks and shoes removed, Left Foot Findings: swollen, erythematous and dry   The sensory exam showed diminished vibratory sensation at the level of the toes  Diminished tactile sensation with monofilament testing throughout the left foot  Capillary refills findings on the right were delayed in the toes   Pulses:  1+ in the posterior tibialis on the right  1+ in the dorsalis pedis on the right   Capillary refills findings on the left were delayed in the toes   Pulses:  1+ in the posterior tibialis on the left  1+ in the dorsalis pedis on the left   Assign Risk Category: 2: Loss of protective sensation with or without weakness, deformity, callus, pre-ulcer, or history of ulceration  High risk  Hyperkeratosis: present on both first toes,-- present on both fifth sub metatarsals-- and-- Moca tinea pedis, bilateral, is noted  Shoe Gear Evaluation: performed ()  Right Foot: width: d-- and-- length: 11   Left Foot: width: d-- and-- length: 11  Recommendation(s): athletic shoes     Patient's shoes and socks removed  Right Foot/Ankle   Right Foot Inspection  Skin Exam: dry skin, callus and callus               Toe Exam: swelling  Sensory   Vibration: diminished  Proprioception: diminished      Vascular  Capillary refills: elevated        Left Foot/Ankle  Left Foot Inspection  Skin Exam: dry skin and callus              Toe Exam: swelling and erythema                   Sensory   Vibration: diminished  Proprioception: diminished     Vascular  Capillary refills: elevated     Right Foot/Ankle   Right Foot Inspection        Sensory   Vibration: diminished  Proprioception: diminished  Monofilament testing: diminished     Vascular  Capillary refills: < 3 seconds              Left Foot/Ankle  Left Foot Inspection        Sensory   Vibration: diminished  Proprioception: diminished  Monofilament testing: diminished     Vascular  Capillary refills: < 3 seconds           Assign Risk Category  Deformity present  Loss of protective sensation  Weak pulses  Risk: 2

## 2022-10-10 ENCOUNTER — TELEMEDICINE (OUTPATIENT)
Dept: INTERNAL MEDICINE CLINIC | Facility: CLINIC | Age: 82
End: 2022-10-10
Payer: COMMERCIAL

## 2022-10-10 DIAGNOSIS — I50.32 CHRONIC DIASTOLIC HEART FAILURE (HCC): ICD-10-CM

## 2022-10-10 DIAGNOSIS — E11.42 DIABETIC POLYNEUROPATHY ASSOCIATED WITH TYPE 2 DIABETES MELLITUS (HCC): ICD-10-CM

## 2022-10-10 DIAGNOSIS — C34.92 SQUAMOUS CELL CARCINOMA OF LEFT LUNG (HCC): Chronic | ICD-10-CM

## 2022-10-10 DIAGNOSIS — U07.1 COVID-19: Primary | ICD-10-CM

## 2022-10-10 PROCEDURE — 99442 PR PHYS/QHP TELEPHONE EVALUATION 11-20 MIN: CPT | Performed by: INTERNAL MEDICINE

## 2022-10-10 RX ORDER — GUAIFENESIN AND CODEINE PHOSPHATE 100; 10 MG/5ML; MG/5ML
10 SOLUTION ORAL 3 TIMES DAILY PRN
Qty: 180 ML | Refills: 0 | Status: SHIPPED | OUTPATIENT
Start: 2022-10-10

## 2022-10-10 RX ORDER — NIRMATRELVIR AND RITONAVIR 150-100 MG
2 KIT ORAL 2 TIMES DAILY
Qty: 20 TABLET | Refills: 0 | Status: SHIPPED | OUTPATIENT
Start: 2022-10-10 | End: 2022-10-15

## 2022-10-10 NOTE — ASSESSMENT & PLAN NOTE
The following the results of the CT scan and PET scan reviewed patient followed by a Dr Chon Donald the oncologist close monitoring some difficulty breathing intermittent on exertion and coughing   IMPRESSION:  1   Increase in size of a left lower lobe patchy opacity with new right basilar patchy densities concerning for pulmonary infiltrates with additional patchy and reticulonodular changes lingular segment left upper lobe and right middle lobe also likely related to infectious process  2   Left lower lobe cavitary mass has decreased in size now measuring 3 3 x 1 9 cm, previously 4 1 x 2 2 cm  3   Stable COPD and pulmonary fibrosis with the latter likely related to radiation change      11/19/2021 MRI brain  Impression stated white matter changes suggestive of chronic microangiopathy  No acute intracranial pathology      10/29/2021 PET-CT     1   Hypermetabolic necrotic 4 5 x 4 cm left lower lung mass compatible with known malignancy  2   2 mildly hypermetabolic right paratracheal mediastinal nodes for which early metastases are not excluded  3   Small mildly hypermetabolic left pleural effusion for which malignant effusion is not excluded  4   Additional scattered tiny nodular lung densities may be reassessed on follow-up CT  5   Probable reactive subcentimeter right cervical node may be reassessed on follow-up PET/CT  6   No hypermetabolic soft tissue metastases in the abdomen, pelvis    7   Minimal inhomogeneous FDG activity in the sacrum and left posterior iliac, though without dominant focal lesion   This may be reassessed on follow-up exam   Awaiting CT of the chest after 1 week if COVID-19 negative

## 2022-10-10 NOTE — ASSESSMENT & PLAN NOTE
Wt Readings from Last 3 Encounters:   10/04/22 104 kg (230 lb)   09/07/22 104 kg (230 lb 3 2 oz)   08/25/22 98 4 kg (217 lb)   No leg edema intractable coughing but no difficulty breathing CHF seems to be controlled continue monitoring and continue taking Lasix

## 2022-10-10 NOTE — PROGRESS NOTES
COVID-19 Outpatient Progress Note    Assessment/Plan:    Problem List Items Addressed This Visit        Endocrine    Diabetic polyneuropathy associated with type 2 diabetes mellitus ([facility] [address] )       Lab Results   Component Value Date    HGBA1C 7 1 (H) 02/21/2022   Patient is a diabetes failure under control on metformin Jardiance diet patient in noncompliant intermittently with diet will repeat A1c lipid profile before the next visit recommended to be seen by ophthalmologist overall improving            Respiratory    Squamous cell carcinoma of lung (HCC) (Chronic)     The following the results of the CT scan and PET scan reviewed patient followed by a Dr Racheal Montero the oncologist close monitoring some difficulty breathing intermittent on exertion and coughing   IMPRESSION:  1   Increase in size of a left lower lobe patchy opacity with new right basilar patchy densities concerning for pulmonary infiltrates with additional patchy and reticulonodular changes lingular segment left upper lobe and right middle lobe also likely related to infectious process  2   Left lower lobe cavitary mass has decreased in size now measuring 3 3 x 1 9 cm, previously 4 1 x 2 2 cm  3   Stable COPD and pulmonary fibrosis with the latter likely related to radiation change      11/19/2021 MRI brain  Impression stated white matter changes suggestive of chronic microangiopathy  No acute intracranial pathology      10/29/2021 PET-CT     1   Hypermetabolic necrotic 4 5 x 4 cm left lower lung mass compatible with known malignancy  2   2 mildly hypermetabolic right paratracheal mediastinal nodes for which early metastases are not excluded  3   Small mildly hypermetabolic left pleural effusion for which malignant effusion is not excluded  4   Additional scattered tiny nodular lung densities may be reassessed on follow-up CT  5   Probable reactive subcentimeter right cervical node may be reassessed on follow-up PET/CT    6   No hypermetabolic soft tissue metastases in the abdomen, pelvis  7   Minimal inhomogeneous FDG activity in the sacrum and left posterior iliac, though without dominant focal lesion   This may be reassessed on follow-up exam   Awaiting CT of the chest after 1 week if COVID-19 negative         Relevant Medications    guaifenesin-codeine (GUAIFENESIN AC) 100-10 MG/5ML liquid       Cardiovascular and Mediastinum    Chronic diastolic heart failure (HCC)     Wt Readings from Last 3 Encounters:   10/04/22 104 kg (230 lb)   09/07/22 104 kg (230 lb 3 2 oz)   08/25/22 98 4 kg (217 lb)   No leg edema intractable coughing but no difficulty breathing CHF seems to be controlled continue monitoring and continue taking Lasix                  Other    COVID-19 - Primary     Symptoms started about 2 days ago with coughing low-grade fever chills congestion sore throat at present time coughing congestion no difficulty breathing patient checked the pulse ox 96% on room air has all the other chronic comorbid condition reviewed prescribed Paxlovid discussed side effects  COVID-19 Home Care Guidelines    Your healthcare provider and/or public health staff have evaluated you and have determined that you do not need to remain in the hospital at this time  At this time you can be isolated at home where you will be monitored by staff from your local or state health department  You should carefully follow the prevention and isolation steps below until a healthcare provider or local or state health department says that you can return to your normal activities  Stay home except to get medical care    People who are mildly ill with COVID-19 are able to isolate at home during their illness  You should restrict activities outside your home, except for getting medical care  Do not go to work, school, or public areas  Avoid using public transportation, ride-sharing, or taxis      Separate yourself from other people and animals in your home    People: As much as possible, you should stay in a specific room and away from other people in your home  Also, you should use a separate bathroom, if available  Animals: You should restrict contact with pets and other animals while you are sick with COVID-19, just like you would around other people  Although there have not been reports of pets or other animals becoming sick with COVID-19, it is still recommended that people sick with COVID-19 limit contact with animals until more information is known about the virus  When possible, have another member of your household care for your animals while you are sick  If you are sick with COVID-19, avoid contact with your pet, including petting, snuggling, being kissed or licked, and sharing food  If you must care for your pet or be around animals while you are sick, wash your hands before and after you interact with pets and wear a facemask  See COVID-19 and Animals for more information  Call ahead before visiting your doctor    If you have a medical appointment, call the healthcare provider and tell them that you have or may have COVID-19  This will help the healthcare provider’s office take steps to keep other people from getting infected or exposed  Wear a facemask    You should wear a facemask when you are around other people (e g , sharing a room or vehicle) or pets and before you enter a healthcare provider’s office  If you are not able to wear a facemask (for example, because it causes trouble breathing), then people who live with you should not stay in the same room with you, or they should wear a facemask if they enter your room  Cover your coughs and sneezes    Cover your mouth and nose with a tissue when you cough or sneeze  Throw used tissues in a lined trash can   Immediately wash your hands with soap and water for at least 20 seconds or, if soap and water are not available, clean your hands with an alcohol-based hand  that contains at least 60% alcohol  Clean your hands often    Wash your hands often with soap and water for at least 20 seconds, especially after blowing your nose, coughing, or sneezing; going to the bathroom; and before eating or preparing food  If soap and water are not readily available, use an alcohol-based hand  with at least 60% alcohol, covering all surfaces of your hands and rubbing them together until they feel dry  Soap and water are the best option if hands are visibly dirty  Avoid touching your eyes, nose, and mouth with unwashed hands  Avoid sharing personal household items    You should not share dishes, drinking glasses, cups, eating utensils, towels, or bedding with other people or pets in your home  After using these items, they should be washed thoroughly with soap and water  Clean all “high-touch” surfaces everyday    High touch surfaces include counters, tabletops, doorknobs, bathroom fixtures, toilets, phones, keyboards, tablets, and bedside tables  Also, clean any surfaces that may have blood, stool, or body fluids on them  Use a household cleaning spray or wipe, according to the label instructions  Labels contain instructions for safe and effective use of the cleaning product including precautions you should take when applying the product, such as wearing gloves and making sure you have good ventilation during use of the product  Monitor your symptoms    Seek prompt medical attention if your illness is worsening (e g , difficulty breathing)  Before seeking care, call your healthcare provider and tell them that you have, or are being evaluated for, COVID-19  Put on a facemask before you enter the facility  These steps will help the healthcare provider’s office to keep other people in the office or waiting room from getting infected or exposed  Ask your healthcare provider to call the local or state health department   Persons who are placed under active monitoring or facilitated self-monitoring should follow instructions provided by their local health department or occupational health professionals, as appropriate  If you have a medical emergency and need to call 911, notify the dispatch personnel that you have, or are being evaluated for COVID-19  If possible, put on a facemask before emergency medical services arrive  Discontinuing home isolation    Patients with confirmed COVID-19 should remain under home isolation precautions until the following conditions are met:   - They have had no fever for at least 24 hours (that is one full day of no fever without the use medicine that reduces fevers)  AND  - other symptoms have improved (for example, when their cough or shortness of breath have improved)  AND  - If had mild or moderate illness, at least 10 days have passed since their symptoms first appeared or if severe illness (needed oxygen) or immunosuppressed, at least 20 days have passed since symptoms first appeared  Patients with confirmed COVID-19 should also notify close contacts (including their workplace) and ask that they self-quarantine  Currently, close contact is defined as being within 6 feet for 15 minutes or more from the period 24 hours starting 48 hours before symptom onset to the time at which the patient went into isolation  Close contacts of patients diagnosed with COVID-19 should be instructed by the patient to self-quarantine for 14 days from the last time of their last contact with the patient  Source: [employer]         Relevant Medications    guaifenesin-codeine (GUAIFENESIN AC) 100-10 MG/5ML liquid    nirmatrelvir & ritonavir (Paxlovid, 150/100,) tablet therapy pack         Disposition:     Discussed symptom directed medication options with patient  Discussed vitamin D, vitamin C, and/or zinc supplementation with patient       Patient meets criteria for PAXLOVID and they have been counseled appropriately according to EUA documentation released by the FDA  After discussion, patient agrees to treatment  Anam Carrillo is an investigational medicine used to treat mild-to-moderate COVID-19 in adults and children (15years of age and older weighing at least 80 pounds (40 kg)) with positive results of direct SARS-CoV-2 viral testing, and who are at high risk for progression to severe COVID-19, including hospitalization or death  PAXLOVID is investigational because it is still being studied  There is limited information about the safety and effectiveness of using PAXLOVID to treat people with mild-to-moderate COVID-19  The FDA has authorized the emergency use of PAXLOVID for the treatment of mild-tomoderate COVID-19 in adults and children (15years of age and older weighing at least 80 pounds (40 kg)) with a positive test for the virus that causes COVID-19, and who are at high risk for progression to severe COVID-19, including hospitalization or death, under an EUA  What should I tell my healthcare provider before I take PAXLOVID? Tell your healthcare provider if you:  - Have any allergies  - Have liver or kidney disease  - Are pregnant or plan to become pregnant  - Are breastfeeding a child  - Have any serious illnesses    Tell your healthcare provider about all the medicines you take, including prescription and over-the-counter medicines, vitamins, and herbal supplements  Some medicines may interact with PAXLOVID and may cause serious side effects  Keep a list of your medicines to show your healthcare provider and pharmacist when you get a new medicine  You can ask your healthcare provider or pharmacist for a list of medicines that interact with PAXLOVID  Do not start taking a new medicine without telling your healthcare provider  Your healthcare provider can tell you if it is safe to take PAXLOVID with other medicines  Tell your healthcare provider if you are taking combined hormonal contraceptive   Anam Carrillo may affect how your birth control pills work  Females who are able to become pregnant should use another effective alternative form of contraception or an additional barrier method of contraception  Talk to your healthcare provider if you have any questions about contraceptive methods that might be right for you  How do I take PAXLOVID? PAXLOVID consists of 2 medicines: nirmatrelvir and ritonavir  - Take 2 pink tablets of nirmatrelvir with 1 white tablet of ritonavir by mouth 2 times each day (in the morning and in the evening) for 5 days  For each dose, take all 3 tablets at the same time  - If you have kidney disease, talk to your healthcare provider  You may need a different dose  - Swallow the tablets whole  Do not chew, break, or crush the tablets  - Take PAXLOVID with or without food  - Do not stop taking PAXLOVID without talking to your healthcare provider, even if you feel better  - If you miss a dose of PAXLOVID within 8 hours of the time it is usually taken, take it as soon as you remember  If you miss a dose by more than 8 hours, skip the missed dose and take the next dose at your regular time  Do not take 2 doses of PAXLOVID at the same time  - If you take too much PAXLOVID, call your healthcare provider or go to the nearest hospital emergency room right away  - If you are taking a ritonavir- or cobicistat-containing medicine to treat hepatitis C or Human Immunodeficiency Virus (HIV), you should continue to take your medicine as prescribed by your healthcare provider   - Talk to your healthcare provider if you do not feel better or if you feel worse after 5 days  Who should generally not take PAXLOVID? Do not take PAXLOVID if:  You are allergic to nirmatrelvir, ritonavir, or any of the ingredients in PAXLOVID      You are taking any of the following medicines:  - Alfuzosin  - Pethidine, piroxicam, propoxyphene  - Ranolazine  - Amiodarone, dronedarone, flecainide, propafenone, quinidine  - Colchicine  - Lurasidone, pimozide, clozapine  - Dihydroergotamine, ergotamine, methylergonovine  - Lovastatin, simvastatin  - Sildenafil (Revatio®) for pulmonary arterial hypertension (PAH)  - Triazolam, oral midazolam  - Apalutamide  - Carbamazepine, phenobarbital, phenytoin  - Rifampin  - St  Bridger’s Wort (hypericum perforatum)    What are the important possible side effects of PAXLOVID? Possible side effects of PAXLOVID are:  - Liver Problems  Tell your healthcare provider right away if you have any of these signs and symptoms of liver problems: loss of appetite, yellowing of your skin and the whites of eyes (jaundice), dark-colored urine, pale colored stools and itchy skin, stomach area (abdominal) pain  - Resistance to HIV Medicines  If you have untreated HIV infection, PAXLOVID may lead to some HIV medicines not working as well in the future  - Other possible side effects include: altered sense of taste, diarrhea, high blood pressure, or muscle aches    These are not all the possible side effects of PAXLOVID  Not many people have taken PAXLOVID  Serious and unexpected side effects may happen  [address] is still being studied, so it is possible that all of the risks are not known at this time  What other treatment choices are there? Like Leesa Many may allow for the emergency use of other medicines to treat people with COVID-19  Go to https://[address] for information on the emergency use of other medicines that are authorized by FDA to treat people with COVID-19  Your healthcare provider may talk with you about clinical trials for which you may be eligible  It is your choice to be treated or not to be treated with PAXLOVID  Should you decide not to receive it or for your child not to receive it, it will not change your standard medical care      What if I am pregnant or breastfeeding? There is no experience treating pregnant women or breastfeeding mothers with PAXLOVID  For a mother and unborn baby, the benefit of taking PAXLOVID may be greater than the risk from the treatment  If you are pregnant, discuss your options and specific situation with your healthcare provider  It is recommended that you use effective barrier contraception or do not have sexual activity while taking PAXLOVID  If you are breastfeeding, discuss your options and specific situation with your healthcare provider  How do I report side effects with PAXLOVID? Contact your healthcare provider if you have any side effects that bother you or do not go away  Report side effects to FDA MedWatch at www [employer] gov/medwatch or call 9-401-NVU1380 or you can report side effects to [employer]  at the contact information provided below  Website Fax number Telephone number   [employer] [phone]     How should I store Inna Flick? Store PAXLOVID tablets at room temperature between 68°F to 77°F (20°C to 25°C)  Full fact sheet for patients, parents, and caregivers can be found at: [employer]    I have spent 15 minutes directly with the patient  Greater than 50% of this time was spent in counseling/coordination of care regarding: diagnostic results, prognosis, risks and benefits of treatment options, instructions for management and patient and family education  Encounter provider: Sam Yost MD     Provider located at: [facility]  [address] [place] [address]     Recent Visits  No visits were found meeting these conditions    Showing recent visits within past 7 days and meeting all other requirements  Today's Visits  Date Type Provider Dept   10/10/22 Marcos Smith MD [facility]   Showing today's visits and meeting all other requirements  Future Appointments  No visits were found meeting these conditions  Showing future appointments within next 150 days and meeting all other requirements     This virtual check-in was done via telephone and he agrees to proceed  Patient agrees to participate in a virtual check in via telephone or video visit instead of presenting to the office to address urgent/immediate medical needs  Patient is aware this is a billable service  He acknowledged consent and understanding of privacy and security of the video platform  The patient has agreed to participate and understands they can discontinue the visit at any time  After connecting through Telephone, the patient was identified by name and date of birth  Tr Romano was informed that this was a telemedicine visit and that the exam was being conducted confidentially over secure lines  My office door was closed  No one else was in the room  Tr Romano acknowledged consent and understanding of privacy and security of the telemedicine visit  I informed the patient that I have reviewed his record in Epic and presented the opportunity for him to ask any questions regarding the visit today  The patient agreed to participate  It was my intent to perform this visit via video technology but the patient was not able to do a video connection so the visit was completed via audio telephone only  Verification of patient location:  Patient is located in the following state in which I hold an active license: [place]    Subjective:   Tr Romano is a [de-identified] y o  male who is concerned about COVID-19  Patient's symptoms include fever, chills, nasal congestion, rhinorrhea, sore throat, cough and chest tightness           COVID-19 vaccination status: Fully vaccinated with booster    Exposure:   Contact with a person who is under investigation (PUI) for or who is positive for COVID-19 within the last 14 days?: Yes    Hospitalized recently for fever and/or lower respiratory symptoms?: No      Currently a healthcare worker that is involved in direct patient care?: No      Works in a special setting where the risk of COVID-19 transmission may be high? (this may include long-term care, correctional and nursing home facilities; homeless shelters; assisted-living facilities and group homes ): No      Resident in a special setting where the risk of COVID-19 transmission may be high? (this may include long-term care, correctional and nursing home facilities; homeless shelters; assisted-living facilities and group homes ): No      Lab Results   Component Value Date    SARSCOV2 Negative 05/01/2022       Review of Systems   Constitutional: Positive for chills and fever  HENT: Positive for congestion, rhinorrhea and sore throat  Respiratory: Positive for cough and chest tightness        Current Outpatient Medications on File Prior to Visit   Medication Sig   • acetaminophen (TYLENOL) 325 mg tablet Take 2 tablets (650 mg total) by mouth every 6 (six) hours as needed for mild pain, headaches or fever   • albuterol (2 5 mg/3 mL) 0 083 % nebulizer solution Take 2 5 mg by nebulization As needed per patient's wife    • atenolol (TENORMIN) 50 mg tablet    • atorvastatin (LIPITOR) 40 mg tablet TAKE ONE TABLET DAILY   • ciclopirox (LOPROX) 0 77 % cream Apply topically 2 (two) times a day for 20 days   • Eliquis 5 MG TAKE 1 TABLET TWO TIMES A DAY   • folic acid (FOLVITE) 1 mg tablet Take 1 tablet (1 mg total) by mouth daily   • JARDIANCE 25 MG TABS Take 25 mg by mouth daily in the early morning    • latanoprost (XALATAN) 0 005 % ophthalmic solution Administer 1 drop to both eyes daily at bedtime     • losartan (COZAAR) 50 mg tablet TAKE 1 TABLET DAILY   • metFORMIN (GLUCOPHAGE) 500 mg tablet Take 1 tablet (500 mg total) by mouth 2 (two) times a day   • Multiple Vitamin (MULTI-VITAMIN DAILY PO) Take by mouth daily     • omeprazole (PriLOSEC) 20 mg delayed release capsule Take 20 mg by mouth daily     • primidone (MYSOLINE) 50 mg tablet take 1 tab in morning and 2 tabs at bedtime  • thiamine 100 MG tablet Take 1 tablet (100 mg total) by mouth daily   • Trelegy Ellipta 100-62 5-25 MCG/INH inhaler INHALE ONE PUFF DAILY; RINSE MOUTH AFTER USE       Objective: There were no vitals taken for this visit  Physical Exam  Pete Leger MD  Virtual Brief Visit    Patient is located in the following state in which I hold an active license [place]      Assessment/Plan:    Problem List Items Addressed This Visit        Endocrine    Diabetic polyneuropathy associated with type 2 diabetes mellitus ([facility] [address] )       Lab Results   Component Value Date    HGBA1C 7 1 (H) 02/21/2022   Patient is a diabetes failure under control on metformin Jardiance diet patient in noncompliant intermittently with diet will repeat A1c lipid profile before the next visit recommended to be seen by ophthalmologist overall improving            Respiratory    Squamous cell carcinoma of lung (HCC) (Chronic)     The following the results of the CT scan and PET scan reviewed patient followed by a Dr Vivian Kamara the oncologist close monitoring some difficulty breathing intermittent on exertion and coughing   IMPRESSION:  1   Increase in size of a left lower lobe patchy opacity with new right basilar patchy densities concerning for pulmonary infiltrates with additional patchy and reticulonodular changes lingular segment left upper lobe and right middle lobe also likely related to infectious process  2   Left lower lobe cavitary mass has decreased in size now measuring 3 3 x 1 9 cm, previously 4 1 x 2 2 cm  3   Stable COPD and pulmonary fibrosis with the latter likely related to radiation change      11/19/2021 MRI brain  Impression stated white matter changes suggestive of chronic microangiopathy    No acute intracranial pathology      10/29/2021 PET-CT     1   Hypermetabolic necrotic 4 5 x 4 cm left lower lung mass compatible with known malignancy  2   2 mildly hypermetabolic right paratracheal mediastinal nodes for which early metastases are not excluded  3   Small mildly hypermetabolic left pleural effusion for which malignant effusion is not excluded  4   Additional scattered tiny nodular lung densities may be reassessed on follow-up CT  5   Probable reactive subcentimeter right cervical node may be reassessed on follow-up PET/CT  6   No hypermetabolic soft tissue metastases in the abdomen, pelvis  7   Minimal inhomogeneous FDG activity in the sacrum and left posterior iliac, though without dominant focal lesion   This may be reassessed on follow-up exam   Awaiting CT of the chest after 1 week if COVID-19 negative         Relevant Medications    guaifenesin-codeine (GUAIFENESIN AC) 100-10 MG/5ML liquid       Cardiovascular and Mediastinum    Chronic diastolic heart failure (HCC)     Wt Readings from Last 3 Encounters:   10/04/22 104 kg (230 lb)   09/07/22 104 kg (230 lb 3 2 oz)   08/25/22 98 4 kg (217 lb)   No leg edema intractable coughing but no difficulty breathing CHF seems to be controlled continue monitoring and continue taking Lasix                  Other    COVID-19 - Primary     Symptoms started about 2 days ago with coughing low-grade fever chills congestion sore throat at present time coughing congestion no difficulty breathing patient checked the pulse ox 96% on room air has all the other chronic comorbid condition reviewed prescribed Paxlovid discussed side effects  COVID-19 Home Care Guidelines    Your healthcare provider and/or public health staff have evaluated you and have determined that you do not need to remain in the hospital at this time  At this time you can be isolated at home where you will be monitored by staff from your local or state health department   You should carefully follow the prevention and isolation steps below until a healthcare provider or local or state health department says that you can return to your normal activities  Stay home except to get medical care    People who are mildly ill with COVID-19 are able to isolate at home during their illness  You should restrict activities outside your home, except for getting medical care  Do not go to work, school, or public areas  Avoid using public transportation, ride-sharing, or taxis  Separate yourself from other people and animals in your home    People: As much as possible, you should stay in a specific room and away from other people in your home  Also, you should use a separate bathroom, if available  Animals: You should restrict contact with pets and other animals while you are sick with COVID-19, just like you would around other people  Although there have not been reports of pets or other animals becoming sick with COVID-19, it is still recommended that people sick with COVID-19 limit contact with animals until more information is known about the virus  When possible, have another member of your household care for your animals while you are sick  If you are sick with COVID-19, avoid contact with your pet, including petting, snuggling, being kissed or licked, and sharing food  If you must care for your pet or be around animals while you are sick, wash your hands before and after you interact with pets and wear a facemask  See COVID-19 and Animals for more information  Call ahead before visiting your doctor    If you have a medical appointment, call the healthcare provider and tell them that you have or may have COVID-19  This will help the healthcare provider’s office take steps to keep other people from getting infected or exposed  Wear a facemask    You should wear a facemask when you are around other people (e g , sharing a room or vehicle) or pets and before you enter a healthcare provider’s office   If you are not able to wear a facemask (for example, because it causes trouble breathing), then people who live with you should not stay in the same room with you, or they should wear a facemask if they enter your room  Cover your coughs and sneezes    Cover your mouth and nose with a tissue when you cough or sneeze  Throw used tissues in a lined trash can  Immediately wash your hands with soap and water for at least 20 seconds or, if soap and water are not available, clean your hands with an alcohol-based hand  that contains at least 60% alcohol  Clean your hands often    Wash your hands often with soap and water for at least 20 seconds, especially after blowing your nose, coughing, or sneezing; going to the bathroom; and before eating or preparing food  If soap and water are not readily available, use an alcohol-based hand  with at least 60% alcohol, covering all surfaces of your hands and rubbing them together until they feel dry  Soap and water are the best option if hands are visibly dirty  Avoid touching your eyes, nose, and mouth with unwashed hands  Avoid sharing personal household items    You should not share dishes, drinking glasses, cups, eating utensils, towels, or bedding with other people or pets in your home  After using these items, they should be washed thoroughly with soap and water  Clean all “high-touch” surfaces everyday    High touch surfaces include counters, tabletops, doorknobs, bathroom fixtures, toilets, phones, keyboards, tablets, and bedside tables  Also, clean any surfaces that may have blood, stool, or body fluids on them  Use a household cleaning spray or wipe, according to the label instructions  Labels contain instructions for safe and effective use of the cleaning product including precautions you should take when applying the product, such as wearing gloves and making sure you have good ventilation during use of the product  Monitor your symptoms    Seek prompt medical attention if your illness is worsening (e g , difficulty breathing)   Before seeking care, call your healthcare provider and tell them that you have, or are being evaluated for, COVID-19  Put on a facemask before you enter the facility  These steps will help the healthcare provider’s office to keep other people in the office or waiting room from getting infected or exposed  Ask your healthcare provider to call the local or [facility] health department  Persons who are placed under active monitoring or facilitated self-monitoring should follow instructions provided by their local health department or occupational health professionals, as appropriate  If you have a medical emergency and need to call 911, notify the dispatch personnel that you have, or are being evaluated for COVID-19  If possible, put on a facemask before emergency medical services arrive  Discontinuing home isolation    Patients with confirmed COVID-19 should remain under home isolation precautions until the following conditions are met:   - They have had no fever for at least 24 hours (that is one full day of no fever without the use medicine that reduces fevers)  AND  - other symptoms have improved (for example, when their cough or shortness of breath have improved)  AND  - If had mild or moderate illness, at least 10 days have passed since their symptoms first appeared or if severe illness (needed oxygen) or immunosuppressed, at least 20 days have passed since symptoms first appeared  Patients with confirmed COVID-19 should also notify close contacts (including their workplace) and ask that they self-quarantine  Currently, close contact is defined as being within 6 feet for 15 minutes or more from the period 24 hours starting 48 hours before symptom onset to the time at which the patient went into isolation  Close contacts of patients diagnosed with COVID-19 should be instructed by the patient to self-quarantine for 14 days from the last time of their last contact with the patient       Source: [employer]         Relevant Medications    guaifenesin-codeine (GUAIFENESIN AC) 100-10 MG/5ML liquid    nirmatrelvir & ritonavir (Paxlovid, 150/100,) tablet therapy pack          Recent Visits  No visits were found meeting these conditions  Showing recent visits within past 7 days and meeting all other requirements  Today's Visits  Date Type Provider Dept   10/10/22 Telemedicine Ney Briones MD [facility] Internal Med   Showing today's visits and meeting all other requirements  Future Appointments  No visits were found meeting these conditions    Showing future appointments within next 150 days and meeting all other requirements         I spent 15 minutes directly with the patient during this visit

## 2022-10-10 NOTE — ASSESSMENT & PLAN NOTE
Symptoms started about 2 days ago with coughing low-grade fever chills congestion sore throat at present time coughing congestion no difficulty breathing patient checked the pulse ox 96% on room air has all the other chronic comorbid condition reviewed prescribed Paxlovid discussed side effects  COVID-19 Home Care Guidelines    Your healthcare provider and/or public health staff have evaluated you and have determined that you do not need to remain in the hospital at this time  At this time you can be isolated at home where you will be monitored by staff from your local or state health department  You should carefully follow the prevention and isolation steps below until a healthcare provider or local or state health department says that you can return to your normal activities  Stay home except to get medical care    People who are mildly ill with COVID-19 are able to isolate at home during their illness  You should restrict activities outside your home, except for getting medical care  Do not go to work, school, or public areas  Avoid using public transportation, ride-sharing, or taxis  Separate yourself from other people and animals in your home    People: As much as possible, you should stay in a specific room and away from other people in your home  Also, you should use a separate bathroom, if available  Animals: You should restrict contact with pets and other animals while you are sick with COVID-19, just like you would around other people  Although there have not been reports of pets or other animals becoming sick with COVID-19, it is still recommended that people sick with COVID-19 limit contact with animals until more information is known about the virus  When possible, have another member of your household care for your animals while you are sick  If you are sick with COVID-19, avoid contact with your pet, including petting, snuggling, being kissed or licked, and sharing food   If you must care for your pet or be around animals while you are sick, wash your hands before and after you interact with pets and wear a facemask  See COVID-19 and Animals for more information  Call ahead before visiting your doctor    If you have a medical appointment, call the healthcare provider and tell them that you have or may have COVID-19  This will help the healthcare provider’s office take steps to keep other people from getting infected or exposed  Wear a facemask    You should wear a facemask when you are around other people (e g , sharing a room or vehicle) or pets and before you enter a healthcare provider’s office  If you are not able to wear a facemask (for example, because it causes trouble breathing), then people who live with you should not stay in the same room with you, or they should wear a facemask if they enter your room  Cover your coughs and sneezes    Cover your mouth and nose with a tissue when you cough or sneeze  Throw used tissues in a lined trash can  Immediately wash your hands with soap and water for at least 20 seconds or, if soap and water are not available, clean your hands with an alcohol-based hand  that contains at least 60% alcohol  Clean your hands often    Wash your hands often with soap and water for at least 20 seconds, especially after blowing your nose, coughing, or sneezing; going to the bathroom; and before eating or preparing food  If soap and water are not readily available, use an alcohol-based hand  with at least 60% alcohol, covering all surfaces of your hands and rubbing them together until they feel dry  Soap and water are the best option if hands are visibly dirty  Avoid touching your eyes, nose, and mouth with unwashed hands  Avoid sharing personal household items    You should not share dishes, drinking glasses, cups, eating utensils, towels, or bedding with other people or pets in your home   After using these items, they should be washed thoroughly with soap and water  Clean all “high-touch” surfaces everyday    High touch surfaces include counters, tabletops, doorknobs, bathroom fixtures, toilets, phones, keyboards, tablets, and bedside tables  Also, clean any surfaces that may have blood, stool, or body fluids on them  Use a household cleaning spray or wipe, according to the label instructions  Labels contain instructions for safe and effective use of the cleaning product including precautions you should take when applying the product, such as wearing gloves and making sure you have good ventilation during use of the product  Monitor your symptoms    Seek prompt medical attention if your illness is worsening (e g , difficulty breathing)  Before seeking care, call your healthcare provider and tell them that you have, or are being evaluated for, COVID-19  Put on a facemask before you enter the facility  These steps will help the healthcare provider’s office to keep other people in the office or waiting room from getting infected or exposed  Ask your healthcare provider to call the local or [facility] health department  Persons who are placed under active monitoring or facilitated self-monitoring should follow instructions provided by their local health department or occupational health professionals, as appropriate  If you have a medical emergency and need to call 911, notify the dispatch personnel that you have, or are being evaluated for COVID-19  If possible, put on a facemask before emergency medical services arrive      Discontinuing home isolation    Patients with confirmed COVID-19 should remain under home isolation precautions until the following conditions are met:   - They have had no fever for at least 24 hours (that is one full day of no fever without the use medicine that reduces fevers)  AND  - other symptoms have improved (for example, when their cough or shortness of breath have improved)  AND  - If had mild or moderate illness, at least 10 days have passed since their symptoms first appeared or if severe illness (needed oxygen) or immunosuppressed, at least 20 days have passed since symptoms first appeared  Patients with confirmed COVID-19 should also notify close contacts (including their workplace) and ask that they self-quarantine  Currently, close contact is defined as being within 6 feet for 15 minutes or more from the period 24 hours starting 48 hours before symptom onset to the time at which the patient went into isolation  Close contacts of patients diagnosed with COVID-19 should be instructed by the patient to self-quarantine for 14 days from the last time of their last contact with the patient       Source: [employer]

## 2022-10-11 ENCOUNTER — VBI (OUTPATIENT)
Dept: ADMINISTRATIVE | Facility: OTHER | Age: 82
End: 2022-10-11

## 2022-10-11 PROBLEM — R65.20 SEVERE SEPSIS (HCC): Status: RESOLVED | Noted: 2022-05-01 | Resolved: 2022-10-11

## 2022-10-11 PROBLEM — A41.9 SEVERE SEPSIS (HCC): Status: RESOLVED | Noted: 2022-05-01 | Resolved: 2022-10-11

## 2022-10-11 NOTE — TELEPHONE ENCOUNTER
Upon review of the In Basket request we were able to locate, review, and update the patient chart as requested for Medicare AW  Any additional questions or concerns should be emailed to the Practice Liaisons via the appropriate education email address, please do not reply via In Basket      Thank you  Colin Braga

## 2022-10-17 ENCOUNTER — EVALUATION (OUTPATIENT)
Dept: PHYSICAL THERAPY | Facility: CLINIC | Age: 82
End: 2022-10-17
Payer: COMMERCIAL

## 2022-10-17 DIAGNOSIS — G89.29 CHRONIC LEFT SHOULDER PAIN: Primary | ICD-10-CM

## 2022-10-17 DIAGNOSIS — M25.512 CHRONIC LEFT SHOULDER PAIN: Primary | ICD-10-CM

## 2022-10-17 PROCEDURE — 97161 PT EVAL LOW COMPLEX 20 MIN: CPT

## 2022-10-17 NOTE — PROGRESS NOTES
PT Evaluation     Today's date: 10/17/2022  Patient name: Maria L Oviedo  : 1940  MRN: [id number]  Referring provider: Alan Calero PA-C  Dx:   Encounter Diagnosis     ICD-10-CM    1  Acute pain of left shoulder  M25 [id number] Ambulatory Referral to [facility]         Assessment  Assessment details: Patient is a [de-identified] y o  Male who presents to skilled outpatient PT with referring diagnosis of chronic left shoulder pain  Patient presents to clinic with pain in shoulder extension and internal rotation (5/10) and left shoulder strength deficits  The aforementioned impairments have limited the patient's ability to don/doff jackets and sleep on left side   Patient demonstrated a decrease in pain sxs (3/10) after cervical retraction and extensions  Patient education performed during today's session included: HEP as noted on flow sheet, nature of shoulder injury, and modalities to help ease pain levels  Patient is predominantly right handed  Patient will benefit from skilled to address strength deficits and pain sxs  Patient verbalized understanding of POC      Impairments: Impaired physical strength, Lacks appropriate HEP, Poor posture and Pain with function  Understanding of Dx/Px/POC: Excellent  Prognosis: Good      Plan  Patient would benefit from: Skilled PT  Planned modality interventions: Biofeedback, Cryotherapy, TENS and Thermotherapy: Hydrocollator Packs  Planned therapy interventions: Abdominal trunk stabilization, Breathing training, Coordination, HEP, Joint mobilization, Manual therapy, Edwards taping, Motor coordination training, Neuromuscular re-education, Patient education, Postural training, Strengthening, Stretching, Therapeutic activities and Therapeutic exercises  Frequency: 2x/wk  Duration in weeks: 8  Plan of Care beginning date: 10/17/22  Plan of Care expiration date: 8 weeks - 2022  Treatment plan discussed with: Patient       Goals  Short Term Goals (4 weeks):    - Patient will be independent in basic HEP 2-3 weeks  - Patient will have 0/10 pain at rest  - Patient will demonstrate >1/3 improvement in MMT grade as applicable  - Patient will be able to don jacket with no more than 2/10 pain    Long Term Goals (8 weeks):  - Patient will be independent in a comprehensive home exercise program  - Patient FOTO score will improve by 10 points  - Patient will self-report >75% improvement in function  - Patient will be able to le      Subjective    History of Present Illness  - Mechanism of injury: Patient reports being unable to bring his arm back and can't sleep on arm  Pain came on insidiously and he went to Dr Albert In for xrays which showed osteorthritis  Feels pain in the back of shoulder  Happened a couple months ago      - Functional limitations: sleeping on left side, putting a jacket on,    - Patient goals: to get better, no pain    Pain  - Current pain ratin-6/10  - At best pain ratin-6/10  - At worst pain ratin-6/10  - Location: left shoulder  - Alleviating factors: heat, recliner    Social Support  - Steps to enter house: 5  - Stairs in house: 0  - Bedroom/bathroom set up: 1 st floor  - Lives in: house  - Lives with: wife      Objective   UE MMT  - L Shoulder Flexion: 5/5   R Shoulder Flexion: 5/5  - L Shoulder Extension: 5/5   R Shoulder Extension: 5/5  - L Shoulder Abduction: 5/5   R Shoulder Abduction: 5/5  - L Shoulder  Adduction: 5/5   R Shoulder  Adduction: 5/5  - L Shoulder  IR: 5/5    R Shoulder  IR: 5/5  - L Shoulder  ER: 5/5    R Shoulder  ER: 5/5  - L Elbow Extension: 5/5   R Elbow Extension: 5/5   - L Elbow Flexion: 5/5    R Elbow Flexion: 5/5  - L Wrist Flexion: 5/5    R Wrist Flexion: 5/5  - L Wrist Extension: 5/5   R Wrist Extension: 5/5  -L mid trap: 4/5    R mid trap: 4+/5  -L upper trap:4/5    R upper trap: 4+/5  Sensation  - Light touch: intact    Palpation   -unremarkable    Joint mobility assessment L R   SC     AC     GH     Thoracic spine      1st rib Shoulder AROM L R   Flexion [facility]   [facility]   ER [facility] WFL   IR WFL WFL   Abduction WFL WFL   Functional IR BTB T1 T1   Functional ER BTH Inf shoulder blade Inferior shoulder blade     Shoulder PROM L R   Flexion     Extension     ER     IR     Abduction       Cervical AROM    Flexion 100%   Extension 75%   Side gliding R NP%   Side gliding L NNP%   Side bending R 75%   Side bending L  90%   Rotation R 90%   Rotation L 90%     Repeated motion assessment    Repeated retraction 10x decrease SXS   Repeated protraction    Repeated retraction extension  10x decrease sxs   Repeated extension     Repeated L lateral flexion     Repeated R lateral flexion     Sustained protraction      Deep neck flexor endurance: NP     strength    L: 26   R: 26    Special Testing L R   Compression negative negative   Distraction negative negative   Cervical rotation lateral flexion     C1 fracture      Alar ligament     Vertebral artery (VBI)      Sharp Luis Daniel      Neurodynamic assessment          Special Testing L R   Hawkin's Ren  negative or positive: positive negative or positive: negative   Speed's  negative or positive: positive negative or positive: negative   Drop arm test negative or positive: negative negative or positive: negative   Painful arc  negative or positive: negative negative or positive: negative   Belly press test negative or positive: negative negative or positive: negative   Cross body test negative or positive: negative negative or positive: negative                 Precautions: hx Afib, hx CA, chronic HF  Past Medical History:   Diagnosis Date   • Abdominal pain    • Anxiety    • Arthritis    • Asthma    • Atrial fibrillation (HCC)    • Back pain    • Bleeding ulcer    • Bronchitis    • Cancer [facility])     prostate 2011- radiation   • Cardiac disease     cardiac stent x1   • Cataract     starting   • Chronic diastolic (congestive) heart failure ([facility] )    • Diabetes mellitus ([facility] ) boarderline diabetic    • GERD (gastroesophageal reflux disease)    • History of radiation therapy 2010    Prostate seeds (brachytherapy) and EBRT   • Hyperlipidemia    • Hypertension    • 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, bilateral    • Low back pain    • Lung cancer ([facility] [address] )    • Lung mass    • Myocardial infarction (HCC)     mild 1999   • Obesity    • Prostate cancer ([facility] [address] )    • Shortness of breath    • Sleep apnea     sleep study 11/22   • Wears dentures     full set   • Wears glasses          Date 10/17/2022        Visit Number IE                 Manual         GH mobs          AC mobs          Scap mobs          TPR                  Neuro Re-ed         Shoulder isos         Scap retract          Serratus push up         Cervical retraction+ extension HEP                          Thera Ex         Pulleys          Shoulder PROM         TB shoulder ext         TB row          TB IR/ER          Wall slides          Prone I, Y, T         SL ER                  Thera Activity         Patient education         Posture education                                                      Modalities         Ice           Insurance:  AMA/CMS Eval/ Re-eval POC expires Kunal Balderas #/ Referral # Total   Visits  Start date  Expiration date Extension  Visit limitation? PT only or  PT+OT?  [facility]  CMS 10/17/22                                                                           AUTH #:  Date               Visits  Authed:  Used                Remaining

## 2022-10-19 ENCOUNTER — OFFICE VISIT (OUTPATIENT)
Dept: PHYSICAL THERAPY | Facility: CLINIC | Age: 82
End: 2022-10-19
Payer: COMMERCIAL

## 2022-10-19 DIAGNOSIS — G89.29 CHRONIC LEFT SHOULDER PAIN: Primary | ICD-10-CM

## 2022-10-19 DIAGNOSIS — M25.512 CHRONIC LEFT SHOULDER PAIN: Primary | ICD-10-CM

## 2022-10-19 PROCEDURE — 97110 THERAPEUTIC EXERCISES: CPT

## 2022-10-19 NOTE — PROGRESS NOTES
Daily Note     Today's date: 10/19/2022  Patient name: Parish Jimenez  : 1940  MRN: [id number]  Referring provider: Venu Reagan PA-C  Dx:   Encounter Diagnosis     ICD-10-CM    1  Chronic left shoulder pain  M25 512     G89 29        Start Time: 1100  Stop Time: 1130  Total time in clinic (min): 30 minutes    Subjective: Patient reports difficulties with donning his coat with his L UE due to lack of mobility  Objective: See treatment diary below      Assessment: Tolerated treatment well  Patient demonstrates improved L shoulder IR behind his back as a result of repeated cervical retraction extension  Patient able to don coat independently at the end of the session with no increased pain  Continue to progress as able  Patient would benefit from continued PT      Plan: Continue per plan of care        Precautions: hx Afib, hx CA, chronic HF  Past Medical History:   Diagnosis Date   • Abdominal pain    • Anxiety    • Arthritis    • Asthma    • Atrial fibrillation (HCC)    • Back pain    • Bleeding ulcer    • Bronchitis    • Cancer 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)     mild    • Obesity    • Prostate cancer ([facility] )    • Shortness of breath    • Sleep apnea     sleep study    • Wears dentures     full set   • Wears glasses          Date 10/17/2022 10/19/22       Visit Number IE 2                Manual         GH mobs          AC mobs          Scap mobs          TPR                  Neuro Re-ed         Shoulder isos         Scap retract          Serratus push up         Cervical retraction+ extension HEP 2x10        Cervical retraction/extension with thoracic extension over chair  2x10                 Thera Ex         Cane ext unilateral  2x10                                           Thera Activity         Patient education         Posture education           Evaluation of cervical spine and L shoulder ROM x5'                                            Modalities         Ice           Insurance:  AMA/CMS Eval/ Re-eval POC expires Kieran Kuo #/ Referral # Total   Visits  Start date  Expiration date Extension  Visit limitation? PT only or  PT+OT?  Co-Insurance   [employer] [facility]  CMS 10/17/22    33 10/17 4/15/23                                                                     AUTH #:  Date 10/19               Used 1              Visits  Authed: 33 Remaining  28

## 2022-10-21 ENCOUNTER — OFFICE VISIT (OUTPATIENT)
Dept: OBGYN CLINIC | Facility: CLINIC | Age: 82
End: 2022-10-21
Payer: COMMERCIAL

## 2022-10-21 ENCOUNTER — HOSPITAL ENCOUNTER (OUTPATIENT)
Dept: RADIOLOGY | Facility: HOSPITAL | Age: 82
Discharge: HOME | End: 2022-10-21
Payer: COMMERCIAL

## 2022-10-21 VITALS
SYSTOLIC BLOOD PRESSURE: 112 MMHG | HEART RATE: 79 BPM | DIASTOLIC BLOOD PRESSURE: 72 MMHG | HEIGHT: 71 IN | BODY MASS INDEX: 31.36 KG/M2 | WEIGHT: 224 LBS

## 2022-10-21 DIAGNOSIS — M19.012 OSTEOARTHRITIS OF LEFT ACROMIOCLAVICULAR JOINT: Primary | ICD-10-CM

## 2022-10-21 DIAGNOSIS — C34.92 SQUAMOUS CELL CARCINOMA OF LEFT LUNG (HCC): Chronic | ICD-10-CM

## 2022-10-21 DIAGNOSIS — M19.012 OSTEOARTHRITIS OF GLENOHUMERAL JOINT, LEFT: ICD-10-CM

## 2022-10-21 PROCEDURE — 71250 CT THORAX DX C-: CPT

## 2022-10-21 PROCEDURE — 99203 OFFICE O/P NEW LOW 30 MIN: CPT | Performed by: ORTHOPAEDIC SURGERY

## 2022-10-21 PROCEDURE — G1004 CDSM NDSC: HCPCS

## 2022-10-21 NOTE — PROGRESS NOTES
Assessment/Plan:  1  Osteoarthritis of left acromioclavicular joint  Ambulatory Referral to Orthopedic Surgery   2  Osteoarthritis of glenohumeral joint, left       Scribe Attestation    I,:  Allyson Acosta am acting as a scribe while in the presence of the attending physician :       I,:  Liang Perkins MD personally performed the services described in this documentation    as scribed in my presence :         Coleen Cecy upon examination and review of the x-rays of the left shoulder does demonstrate moderate to severe acromioclavicular joint osteoarthritis  I do believe his symptoms are associated with the arthritis as he does demonstrate point tenderness at the acromioclavicular joint today  Overall, his range of motion and strength are within functional limits  I do not believe a steroid injection to the acromioclavicular joint is indicated given his low level of symptoms today as well as the history of the radiation treatment and upcoming follow-up for his lung cancer  I believe non operative care in the form of continuation of physical therapy is appropriate for him he may continue to do so over the next 4-6 weeks  If he feels that he is to continue physical therapy he may simply contact our office via phone and request a new physical therapy prescription  If he feels that he is not improving after 4-6 weeks of physical therapy he may follow up with me  Otherwise, he may follow up with me on an as-needed basis  Subjective:   Wm Spears is a [de-identified] y o  male who presents for evaluation of his left shoulder  He is referred to me today by Vika Butler PA-C  He had acute onset of shoulder pain approximately 2 months ago  He does not recall a specific injury  However, notes that he was moving cases of water that did exacerbate his pain  Initially his pain was at the posterior aspect of her shoulder  However, now is localized on the superior aspect  And laterally    He describes it as moderate aching and sometimes sharp pain  He states that putting on a jacket will exacerbate his pain  He states that his painful symptoms have improved since the initial onset  He has been participating in physical therapy more recently and notes that this does seem to have provide her with some symptomatic relief  He does have a history of diabetes that is well controlled with the use of metformin  He also notes a history of lung cancer and is undergoing radiation treatment  He does have a follow-up x-ray of the lungs next week  Today he denies any distal paresthesias  Review of Systems   Constitutional: Negative for chills, fever and unexpected weight change  HENT: Negative for hearing loss, nosebleeds and sore throat  Eyes: Negative for pain, redness and visual disturbance  Respiratory: Negative for cough, shortness of breath and wheezing  Cardiovascular: Negative for chest pain, palpitations and leg swelling  Gastrointestinal: Negative for abdominal pain, nausea and vomiting  Endocrine: Negative for polyphagia and polyuria  Genitourinary: Negative for dysuria and hematuria  Musculoskeletal: Positive for arthralgias and myalgias  See HPI   Skin: Negative for rash and wound  Neurological: Negative for dizziness, numbness and headaches  Psychiatric/Behavioral: Negative for decreased concentration and suicidal ideas  The patient is not nervous/anxious            Past Medical History:   Diagnosis Date   • Abdominal pain    • Anxiety    • Arthritis    • Asthma    • Atrial fibrillation [facility])    • Back pain    • Bleeding ulcer    • Bronchitis    • Cancer [facility])     prostate 2011- radiation   • Cardiac disease     cardiac stent x1   • Cataract     starting   • Chronic diastolic (congestive) heart failure (HCC)    • Diabetes mellitus ([facility] [address] )     boarderline diabetic    • GERD (gastroesophageal reflux disease)    • History of radiation therapy 2010    Prostate seeds (brachytherapy) and EBRT   • Hyperlipidemia    • Hypertension    • 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, bilateral    • Low back pain    • Lung cancer (HCC)    • Lung mass    • Myocardial infarction [facility])     mild    • Obesity    • Prostate cancer ([facility] [address] )    • Shortness of breath    • Sleep apnea     sleep study    • Wears dentures     full set   • Wears glasses        Past Surgical History:   Procedure Laterality Date   • ABDOMINAL HERNIA REPAIR     • ABDOMINAL SURGERY      bleeding ulcer, cyst removed from abd   • COLONOSCOPY     • CORONARY ANGIOPLASTY WITH [address]    x1    • ESOPHAGOGASTRODUODENOSCOPY     • IR BIOPSY LUNG  10/05/2021   • IR THORACENTESIS  2021   • KNEE SURGERY Left    • DC BRONCHOSCOPY,DIAGNOSTIC N/A 2021    Procedure: BRONCHOSCOPY FLEXIBLE;  Surgeon: Berenice Carrillo MD;  Location: [facility];  Service: Thoracic   • DC MEDIASTINOSCOPY WITH LYMPH NODE BIOPSY/IES N/A 2021    Procedure: MEDIASTINOSCOPY;  Surgeon: Berenice Carrillo MD;  Location: [facility];  Service: Thoracic   • PROSTATE SURGERY         Family History   Problem Relation Age of Onset   • Prostate cancer Brother    • Cancer Maternal Uncle         colo rectal cancer   • Cancer Paternal Aunt        Social History     Occupational History   • Not on file   Tobacco Use   • Smoking status: Former Smoker     Packs/day: 1 00     Years: 30 00     Pack years: 30 00     Types: Cigarettes     Quit date:      Years since quittin 8   • Smokeless tobacco: Never Used   Vaping Use   • Vaping Use: Never used   Substance and Sexual Activity   • Alcohol use:  Yes     Alcohol/week: 10 0 - 12 0 standard drinks     Types: 7 Glasses of wine, 3 - 5 Cans of beer per week     Comment: few beers day; glass of red wine at dinner   • Drug use: Never   • Sexual activity: Not on file         Current Outpatient Medications:   •  acetaminophen (TYLENOL) 325 mg tablet, Take 2 tablets (650 mg total) by mouth every 6 (six) hours as needed for mild pain, headaches or fever, Disp: 30 tablet, Rfl: 0  •  albuterol (2 5 mg/3 mL) 0 083 % nebulizer solution, Take 2 5 mg by nebulization As needed per patient's wife , Disp: , Rfl:   •  atenolol (TENORMIN) 50 mg tablet, , Disp: , Rfl:   •  atorvastatin (LIPITOR) 40 mg tablet, TAKE ONE TABLET DAILY, Disp: 90 tablet, Rfl: 1  •  ciclopirox (LOPROX) 0 77 % cream, Apply topically 2 (two) times a day for 20 days, Disp: 45 g, Rfl: 1  •  Eliquis 5 MG, TAKE 1 TABLET TWO TIMES A DAY, Disp: 60 tablet, Rfl: 5  •  folic acid (FOLVITE) 1 mg tablet, Take 1 tablet (1 mg total) by mouth daily, Disp: 30 tablet, Rfl: 0  •  guaifenesin-codeine (GUAIFENESIN AC) 100-10 MG/5ML liquid, Take 10 mL by mouth 3 (three) times a day as needed for cough, Disp: 180 mL, Rfl: 0  •  JARDIANCE 25 MG TABS, Take 25 mg by mouth daily in the early morning , Disp: , Rfl:   •  latanoprost (XALATAN) 0 005 % ophthalmic solution, Administer 1 drop to both eyes daily at bedtime  , Disp: , Rfl:   •  losartan (COZAAR) 50 mg tablet, TAKE 1 TABLET DAILY, Disp: 90 tablet, Rfl: 3  •  metFORMIN (GLUCOPHAGE) 500 mg tablet, Take 1 tablet (500 mg total) by mouth 2 (two) times a day, Disp: 180 tablet, Rfl: 1  •  Multiple Vitamin (MULTI-VITAMIN DAILY PO), Take by mouth daily  , Disp: , Rfl:   •  omeprazole (PriLOSEC) 20 mg delayed release capsule, Take 20 mg by mouth daily  , Disp: , Rfl:   •  primidone (MYSOLINE) 50 mg tablet, take 1 tab in morning and 2 tabs at bedtime  , Disp: 270 tablet, Rfl: 3  •  thiamine 100 MG tablet, Take 1 tablet (100 mg total) by mouth daily, Disp: 30 tablet, Rfl: 0  •  Trelegy Ellipta 100-62 5-25 MCG/INH inhaler, INHALE ONE PUFF DAILY; RINSE MOUTH AFTER USE, Disp: 60 each, Rfl: 5    No Known Allergies    Objective:  Vitals:    10/21/22 0927   BP: 112/72   Pulse: 79       Left Shoulder Exam     Tenderness   The patient is experiencing tenderness in the acromioclavicular joint      Range of Motion   Active abduction: 150   External rotation: 70 Internal rotation 0 degrees: L3     Muscle Strength   Abduction: 5/5   Internal rotation: 5/5   External rotation: 5/5   Biceps: 5/5     Tests   Sawyer test: negative  Impingement: negative  Drop arm: negative    Other   Erythema: absent  Scars: absent  Sensation: normal  Pulse: present     Comments:  Empty can sign: Negative            Physical Exam  Vitals reviewed  Constitutional:       Appearance: He is well-developed  HENT:      Head: Normocephalic and atraumatic  Eyes:      General:         Right eye: No discharge  Left eye: No discharge  Conjunctiva/sclera: Conjunctivae normal    Cardiovascular:      Rate and Rhythm: Normal rate  Pulmonary:      Effort: Pulmonary effort is normal  No respiratory distress  Musculoskeletal:      Cervical back: Normal range of motion and neck supple  Comments: As noted in HPI   Skin:     General: Skin is warm and dry  Neurological:      Mental Status: He is alert and oriented to person, place, and time  Psychiatric:         Behavior: Behavior normal          Thought Content: Thought content normal          Judgment: Judgment normal          I have personally reviewed pertinent films in PACS and my interpretation is as follows:      X-rays of the left shoulder obtained on 9/23/2022 demonstrates moderate acromioclavicular joint osteoarthritis with mild-to-moderate glenohumeral joint osteoarthritis  No obvious lytic or blastic lesions demonstrated  This document was created using speech voice recognition software  Grammatical errors, random word insertions, pronoun errors, and incomplete sentences are an occasional consequence of this system due to software limitations, ambient noise, and hardware issues  Any formal questions or concerns about content, text, or information contained within the body of this dictation should be directly addressed to the provider for clarification

## 2022-10-24 ENCOUNTER — OFFICE VISIT (OUTPATIENT)
Dept: PHYSICAL THERAPY | Facility: CLINIC | Age: 82
End: 2022-10-24
Payer: COMMERCIAL

## 2022-10-24 DIAGNOSIS — G89.29 CHRONIC LEFT SHOULDER PAIN: Primary | ICD-10-CM

## 2022-10-24 DIAGNOSIS — K21.9 GASTROESOPHAGEAL REFLUX DISEASE WITHOUT ESOPHAGITIS: Primary | ICD-10-CM

## 2022-10-24 DIAGNOSIS — M25.512 CHRONIC LEFT SHOULDER PAIN: Primary | ICD-10-CM

## 2022-10-24 PROCEDURE — 97110 THERAPEUTIC EXERCISES: CPT | Performed by: PHYSICAL THERAPIST

## 2022-10-24 RX ORDER — OMEPRAZOLE 20 MG/1
20 CAPSULE, DELAYED RELEASE ORAL DAILY
Qty: 90 CAPSULE | Refills: 0 | Status: SHIPPED | OUTPATIENT
Start: 2022-10-24

## 2022-10-24 NOTE — PROGRESS NOTES
Daily Note/Discharge summary     Today's date: 10/24/2022  Patient name: Elaine Lea  : 1940  MRN: [id number]  Referring provider: Layla Celaya PA-C  Dx:   Encounter Diagnosis     ICD-10-CM    1  Chronic left shoulder pain  M25 512     G89 29                   Subjective: Patient reports no limitations at this time  He reports being able to stuart/doff coat and sleep without pain or disturbances  Goals  Short Term Goals (4 weeks): MET  - Patient will be independent in basic HEP 2-3 weeks  - Patient will have 0/10 pain at rest  - Patient will demonstrate >1/3 improvement in MMT grade as applicable  - Patient will be able to don jacket with no more than 2/10 pain     Long Term Goals (8 weeks): MET   - Patient will be independent in a comprehensive home exercise program  - Patient FOTO score will improve by 10 points  - Patient will self-report >75% improvement in function    Objective: See treatment diary below    Assessment: Tolerated treatment well  Patient demonstrates full pain-free AROM and full strength at this time  All goals have been met and patient is appropriate for discharge from therapy  Plan: Discharge to HEP  Patient to contact clinic via phone PRN       Precautions: hx Afib, hx CA, chronic HF  Past Medical History:   Diagnosis Date   • Abdominal pain    • Anxiety    • Arthritis    • Asthma    • Atrial fibrillation (HCC)    • Back pain    • Bleeding ulcer    • Bronchitis    • Cancer [facility])     prostate - radiation   • Cardiac disease     cardiac stent x1   • Cataract     starting   • Chronic diastolic (congestive) heart failure (HCC)    • Diabetes mellitus ([facility] [address] )     boarderline diabetic    • GERD (gastroesophageal reflux disease)    • History of radiation therapy 2010    Prostate seeds (brachytherapy) and EBRT   • Hyperlipidemia    • Hypertension    • 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, bilateral    • Low back pain    • Lung cancer ([facility] [address] )    • Lung mass    • Myocardial infarction [facility])     mild 1999   • Obesity    • Prostate cancer [facility])    • Shortness of breath    • Sleep apnea     sleep study 11/22   • Wears dentures     full set   • Wears glasses        Date 10/17/2022 10/19/22 10/24/22      Visit Number IE 2 3               Manual         GH mobs                   Neuro Re-ed         Shoulder isos         Scap retract          Serratus push up         Cervical retraction+ extension HEP 2x10        Cervical retraction/extension with thoracic extension over chair  2x10                 Thera Ex         Cane ext unilateral  2x10       Pt edu   10'               Thera Activity         Posture education           Evaluation of cervical spine and L shoulder ROM x5'                            Insurance:  AMA/CMS Eval/ Re-eval POC expires Amanda Elliott #/ Referral # Total   Visits  Start date  Expiration date Extension  Visit limitation? PT only or  PT+OT?  Co-Insurance   [employer]  CMS 10/17/22    33 10/17 4/15/23                       AUTH #:  Date 10/19 10/19 10/24             Used 1 2 3            Visits  Authed: 69 [id number]  [phone]

## 2022-10-26 ENCOUNTER — APPOINTMENT (OUTPATIENT)
Dept: PHYSICAL THERAPY | Facility: CLINIC | Age: 82
End: 2022-10-26

## 2022-10-27 ENCOUNTER — RADIATION ONCOLOGY FOLLOW-UP (OUTPATIENT)
Dept: RADIATION ONCOLOGY | Facility: HOSPITAL | Age: 82
End: 2022-10-27
Attending: RADIOLOGY
Payer: COMMERCIAL

## 2022-10-27 VITALS
WEIGHT: 222.6 LBS | SYSTOLIC BLOOD PRESSURE: 153 MMHG | TEMPERATURE: 97.4 F | HEIGHT: 71 IN | OXYGEN SATURATION: 97 % | HEART RATE: 86 BPM | BODY MASS INDEX: 31.16 KG/M2 | RESPIRATION RATE: 20 BRPM | DIASTOLIC BLOOD PRESSURE: 74 MMHG

## 2022-10-27 DIAGNOSIS — C34.92 SQUAMOUS CELL CARCINOMA OF LEFT LUNG (HCC): Primary | ICD-10-CM

## 2022-10-27 DIAGNOSIS — C34.92 SQUAMOUS CELL CARCINOMA OF LEFT LUNG (HCC): Primary | Chronic | ICD-10-CM

## 2022-10-27 PROCEDURE — 99214 OFFICE O/P EST MOD 30 MIN: CPT | Performed by: RADIOLOGY

## 2022-10-27 PROCEDURE — 99211 OFF/OP EST MAY X REQ PHY/QHP: CPT | Performed by: RADIOLOGY

## 2022-10-27 NOTE — PROGRESS NOTES
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  The pt completed a course of SBRT to the LLL on 2022  The pt was last seen in radiation on 22  He returns today for his follow up      22 - 22 - Admitted to hospital   Severe sepsis - likely secondary to pneumonia  Pt discharged with 7 day antibiotic course of Ceftin     22 - CT chest wo contrast  IMPRESSION:  1   Increase in size of a left lower lobe patchy opacity with new right basilar patchy densities concerning for pulmonary infiltrates with additional patchy and reticulonodular changes lingular segment left upper lobe and right middle lobe also likely   related to infectious process  2   Left lower lobe cavitary mass has decreased in size now measuring 3 3 x 1 9 cm, previously 4 1 x 2 2 cm  3   Stable COPD and pulmonary fibrosis with the latter likely related to radiation change      22 - Hem Jacob Reece  Pt to have repeat CT in October  No evidence of disease      Patient reports testing positive for COVID at the beginning of the month     10/21/22 - CT chest  IMPRESSION:  Increase in left lower lobe mass since May 2022 from 3 3 x 1 9 cm to 3 4 x 2 9 cm   Follow-up needed to exclude recurrent tumor  Mild radiation fibrosis in the left lower lobe and inferior lingula with resolution of right lower lobe consolidation  Persistent small left pleural effusion  Mild juxtapleural fibrosis        Upcomin23 - Hem Artelia Race           Historical Information   Oncology History   Squamous cell carcinoma of lung ([facility] [address] )   10/5/2021 Initial Diagnosis    Squamous cell carcinoma of lung ([facility] [address] )     10/5/2021 Biopsy    Final Diagnosis   A   Lung, Left Lower Lobe, :  - Invasive squamous carcinoma, moderately differentiated, keratinizing type  - Tumor is highlighted with P 40 immunoperoxidase stain   - Prominent tumor necrosis is noted  2/2/2022 - 2/11/2022 Radiation    BH SBRT LLL 6X 5 / 5 1,000 0 5,000 9      Treatment Dates:  2/2/2022 - 2/11/2022            Past Medical History:   Diagnosis Date   • Abdominal pain    • Anxiety    • Arthritis    • Asthma    • Atrial fibrillation [facility])    • Back pain    • Bleeding ulcer    • Bronchitis    • Cancer [facility])     prostate 2011- radiation   • Cardiac disease     cardiac stent x1   • Cataract     starting   • Chronic diastolic (congestive) heart failure (HCC)    • Diabetes mellitus ([facility] [address] )     boarderline diabetic    • GERD (gastroesophageal reflux disease)    • History of radiation therapy 2010    Prostate seeds (brachytherapy) and EBRT   • Hyperlipidemia    • Hypertension    • 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, bilateral    • Low back pain    • Lung cancer ([facility] [address] )    • Lung mass    • Myocardial infarction ([facility] [address] )     mild 1999   • Obesity    • Prostate cancer ([facility] [address] )    • Shortness of breath    • Sleep apnea     sleep study 11/22   • Wears dentures     full set   • Wears glasses      Past Surgical History:   Procedure Laterality Date   • ABDOMINAL HERNIA REPAIR     • ABDOMINAL SURGERY      bleeding ulcer, cyst removed from abd   • COLONOSCOPY     • CORONARY ANGIOPLASTY WITH STENT PLACEMENT  1999    x1    • ESOPHAGOGASTRODUODENOSCOPY     • IR BIOPSY LUNG  10/05/2021   • IR THORACENTESIS  11/08/2021   • KNEE SURGERY Left    • OR BRONCHOSCOPY,DIAGNOSTIC N/A 11/29/2021    Procedure: BRONCHOSCOPY FLEXIBLE;  Surgeon: Maninder Cano MD;  [facility];  Service: Thoracic   • OR MEDIASTINOSCOPY WITH LYMPH NODE BIOPSY/IES N/A 11/29/2021    Procedure: MEDIASTINOSCOPY;  Surgeon: Maninder Cano MD;  Location[facility];  Service: Thoracic   • PROSTATE SURGERY         Social History   Social History     Substance and Sexual Activity   Alcohol Use Yes   • Alcohol/week: 10 0 - 12 0 standard drinks   • Types: 7 Glasses of wine, 3 - 5 Cans of beer per week    Comment: few beers day; glass of red wine at dinner     Social History     Substance and Sexual Activity   Drug Use Never     Social History     Tobacco Use   Smoking Status Former Smoker   • Packs/day: 1 00   • Years:    • Pack years:    • Types: Cigarettes   • Quit date:    • Years since quittin 8   Smokeless Tobacco Never Used         Meds/Allergies     Current Outpatient Medications:   •  acetaminophen (TYLENOL) 325 mg tablet, Take 2 tablets (650 mg total) by mouth every 6 (six) hours as needed for mild pain, headaches or fever, Disp: 30 tablet, Rfl: 0  •  albuterol (2 5 mg/3 mL) 0 083 % nebulizer solution, Take 2 5 mg by nebulization As needed per patient's wife , Disp: , Rfl:   •  atenolol (TENORMIN) 50 mg tablet, Take 50 mg by mouth daily, Disp: , Rfl:   •  atorvastatin (LIPITOR) 40 mg tablet, TAKE ONE TABLET DAILY, Disp: 90 tablet, Rfl: 1  •  Eliquis 5 MG, TAKE 1 TABLET TWO TIMES A DAY, Disp: 60 tablet, Rfl: 5  •  folic acid (FOLVITE) 1 mg tablet, Take 1 tablet (1 mg total) by mouth daily, Disp: 30 tablet, Rfl: 0  •  guaifenesin-codeine (GUAIFENESIN AC) 100-10 MG/5ML liquid, Take 10 mL by mouth 3 (three) times a day as needed for cough, Disp: 180 mL, Rfl: 0  •  JARDIANCE 25 MG TABS, Take 25 mg by mouth daily in the early morning , Disp: , Rfl:   •  latanoprost (XALATAN) 0 005 % ophthalmic solution, Administer 1 drop to both eyes daily at bedtime  , Disp: , Rfl:   •  losartan (COZAAR) 50 mg tablet, TAKE 1 TABLET DAILY, Disp: 90 tablet, Rfl: 3  •  metFORMIN (GLUCOPHAGE) 500 mg tablet, Take 1 tablet (500 mg total) by mouth 2 (two) times a day, Disp: 180 tablet, Rfl: 1  •  Multiple Vitamin (MULTI-VITAMIN DAILY PO), Take by mouth daily  , Disp: , Rfl:   •  omeprazole (PriLOSEC) 20 mg delayed release capsule, Take 1 capsule (20 mg total) by mouth daily, Disp: 90 capsule, Rfl: 0  • primidone (MYSOLINE) 50 mg tablet, take 1 tab in morning and 2 tabs at bedtime  , Disp: 270 tablet, Rfl: 3  •  thiamine 100 MG tablet, Take 1 tablet (100 mg total) by mouth daily, Disp: 30 tablet, Rfl: 0  •  Trelegy Ellipta 100-62 5-25 MCG/INH inhaler, INHALE ONE PUFF DAILY; RINSE MOUTH AFTER USE, Disp: 60 each, Rfl: 5  •  ciclopirox (LOPROX) 0 77 % cream, Apply topically 2 (two) times a day for 20 days, Disp: 45 g, Rfl: 1  No Known Allergies      Review of Systems   Constitutional: Positive for fatigue  HENT: Negative  Eyes: Negative  Respiratory: Positive for cough (productive for yellow phelgm)  Cardiovascular: Negative  Gastrointestinal: Negative  Endocrine: Negative  Genitourinary: Negative  Musculoskeletal: Positive for back pain  Skin: Negative  Allergic/Immunologic: Negative  Neurological: Negative  Hematological: Bruises/bleeds easily  Psychiatric/Behavioral: Negative  OBJECTIVE:   /74   Pulse 86   Temp (!) 97 4 °F (36 3 °C)   Resp 20   Ht 5' 11" (1 803 m)   Wt 101 kg (222 lb 9 6 oz)   SpO2 97%   BMI 31 05 kg/m²   Pain Assessment:  0  ECOG/Zubrod/WHO: 1 - Symptomatic but completely ambulatory    Physical Exam   He is conversing appropriately  His breathing is unlabored  Lungs clear bilaterally  Abdomen obese soft nontender  He is ambulating independently        RESULTS    Lab Results: No results found for this or any previous visit (from the past 672 hour(s))  Imaging Studies:CT chest wo contrast    Result Date: 10/25/2022  Narrative: CT CHEST WITHOUT IV CONTRAST INDICATION:   C34 92: Malignant neoplasm of unspecified part of left bronchus or lung  Per my review of the medical record, the patient  was diagnosed with 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 on 10/5/2021  Completed SBRT in February 2022  COMPARISON:  CXR 5/4/2022, chest CT 5/2/2022, 4/9/2022, and 9/20/2021   TECHNIQUE: Chest CT without intravenous contrast   Axial, sagittal, coronal 2D reformats and coronal MIPS from source data  Radiation dose length product (DLP):  576 01 mGy-cm   Radiation dose exposure minimized using iterative reconstruction and automated exposure control  FINDINGS: LUNGS:  Left lower lobe mass increased from 3 3 x 1 9 cm to 3 4 x 2 9 cm (3/78)  Adjacent radiation fibrosis in the left lower lobe and inferior lingula  Improved bilateral lower lobe consolidation with mild right lower lobe reticulation and traction bronchiolectasis  Stable 3 mm nodules since September 2021, circled on series 3  Benign calcified granulomas  AIRWAYS: No significant filling defects  PLEURA:  Persistent small left pleural effusion  HEART/GREAT VESSELS:  Moderate cardiomegaly Moderate coronary artery calcification indicating atherosclerotic heart disease  Pulmonary artery enlargement  MEDIASTINUM AND ANDRE:  Unremarkable  CHEST WALL AND LOWER NECK: Unremarkable  UPPER ABDOMEN:  Cholelithiasis  Gastric surgery  Partially imaged right renal cyst  OSSEOUS STRUCTURES: Mild degenerative disease in the spine  Impression: Increase in left lower lobe mass since May 2022 from 3 3 x 1 9 cm to 3 4 x 2 9 cm  Follow-up needed to exclude recurrent tumor  Mild radiation fibrosis in the left lower lobe and inferior lingula with resolution of right lower lobe consolidation  Persistent small left pleural effusion  Mild juxtapleural fibrosis  Workstation performed: [id number]           Assessment/Plan:  No orders of the defined types were placed in this encounter  Usha Ly is a [de-identified]y o  year old male who is 8 months status post SBRT to the left lower lobe  He had COVID several weeks ago  He continues to have some lingering symptoms however not pulmonary related  His CT scan reveals slight increased size of his left lower lobe lesion with surrounding post treatment changes  We discussed that this could be evolving treatment related changes or tumor recurrence    Therefore will order shorter follow-up CT scan  He is agreeable  He will return for follow-up in 3 months with repeat CT  Sofía Molina  10/27/2022,10:02 AM    Portions of the record may have been created with voice recognition software   Occasional wrong word or "sound a like" substitutions may have occurred due to the inherent limitations of voice recognition software   Read the chart carefully and recognize, using context, where substitutions have occurred

## 2022-10-27 NOTE — PROGRESS NOTES
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  The pt completed a course of SBRT to the LLL on 2022  The pt was last seen in radiation on 22  He returns today for his follow up     22 - 22 - Admitted to hospital   Severe sepsis - likely secondary to pneumonia  Pt discharged with 7 day antibiotic course of Ceftin    22 - CT chest wo contrast  IMPRESSION:  1  Increase in size of a left lower lobe patchy opacity with new right basilar patchy densities concerning for pulmonary infiltrates with additional patchy and reticulonodular changes lingular segment left upper lobe and right middle lobe also likely   related to infectious process  2   Left lower lobe cavitary mass has decreased in size now measuring 3 3 x 1 9 cm, previously 4 1 x 2 2 cm  3   Stable COPD and pulmonary fibrosis with the latter likely related to radiation change  22 - Jacob Storm  Pt to have repeat CT in October  No evidence of disease     Patient reports testing positive for COVID at the beginning of the month    10/21/22 - CT chest  IMPRESSION:  Increase in left lower lobe mass since May 2022 from 3 3 x 1 9 cm to 3 4 x 2 9 cm  Follow-up needed to exclude recurrent tumor  Mild radiation fibrosis in the left lower lobe and inferior lingula with resolution of right lower lobe consolidation  Persistent small left pleural effusion  Mild juxtapleural fibrosis  Upcomin23 - Jacob Storm      Follow up visit     Oncology History   Squamous cell carcinoma of lung ([facility] [address] )   10/5/2021 Initial Diagnosis    Squamous cell carcinoma of lung ([address] )     10/5/2021 Biopsy    Final Diagnosis   A  Lung, Left Lower Lobe, :  - Invasive squamous carcinoma, moderately differentiated, keratinizing type  - Tumor is highlighted with P 40 immunoperoxidase stain   - Prominent tumor necrosis is noted           2022 - 2022 Radiation    BH SBRT LLL 6X 5 / 5 1,000 0 5,000 9      Treatment Dates:  2/2/2022 - 2/11/2022  Review of Systems:  Review of Systems   Constitutional: Positive for fatigue  HENT: Negative  Eyes: Negative  Respiratory: Positive for cough (productive for yellow phelgm)  Cardiovascular: Negative  Gastrointestinal: Negative  Endocrine: Negative  Genitourinary: Negative  Musculoskeletal: Positive for back pain  Skin: Negative  Allergic/Immunologic: Negative  Neurological: Negative  Hematological: Bruises/bleeds easily  Psychiatric/Behavioral: Negative          Clinical Trial: no    Covid Vaccine Status up to date    Health Maintenance   Topic Date Due   • Pneumococcal Vaccine: 65+ Years (1 - PCV) Never done   • DM Eye Exam  Never done   • BMI: Followup Plan  Never done   • COVID-19 Vaccine (3 - Moderna risk series) 12/03/2021   • HEMOGLOBIN A1C  08/21/2022   • Influenza Vaccine (1) 09/01/2022   • Depression Remission PHQ  01/12/2023   • Medicare Annual Wellness Visit (AWV)  06/29/2023   • Diabetic Foot Exam  08/25/2023   • Fall Risk  10/17/2023   • BMI: Adult  10/21/2023   • Hepatitis C Screening  Completed   • HIB Vaccine  Aged Out   • Hepatitis B Vaccine  Aged Out   • IPV Vaccine  Aged Out   • Hepatitis A Vaccine  Aged Out   • Meningococcal ACWY Vaccine  Aged Out   • HPV Vaccine  Aged Out     Patient Active Problem List   Diagnosis   • Corns   • Pain in both feet   • Diabetic polyneuropathy associated with type 2 diabetes mellitus ([address] )   • Onychomycosis   • Tinea pedis of both feet   • Lung mass   • Hyperlipidemia   • Squamous cell carcinoma of lung (HCC)   • Shortness of breath   • Daytime hypersomnolence   • Chronic diastolic heart failure (HCC)   • SYLVESTER (obstructive sleep apnea)   • Prostate cancer (HCC)   • GERD (gastroesophageal reflux disease)   • CAD (coronary artery disease)   • Persistent atrial fibrillation (HCC)   • Alcohol dependence ([address] )   • Acute respiratory failure with hypoxemia (HCC)   • Depression, recurrent ([place] [id number] )   • COVID-19     Past Medical History:   Diagnosis Date   • Abdominal pain    • Anxiety    • Arthritis    • Asthma    • Atrial fibrillation [facility])    • Back pain    • Bleeding ulcer    • Bronchitis    • Cancer [facility])     prostate 2011- radiation   • Cardiac disease     cardiac stent x1   • Cataract     starting   • Chronic diastolic (congestive) heart failure (HCC)    • Diabetes mellitus ([place] [address] )     boarderline diabetic    • GERD (gastroesophageal reflux disease)    • History of radiation therapy 2010    Prostate seeds (brachytherapy) and EBRT   • Hyperlipidemia    • Hypertension    • 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, bilateral    • Low back pain    • Lung cancer ([place] [address] )    • Lung mass    • Myocardial infarction ([place] [address] )     mild 1999   • Obesity    • Prostate cancer ([place] [address] )    • Shortness of breath    • Sleep apnea     sleep study 11/22   • Wears dentures     full set   • Wears glasses      Past Surgical History:   Procedure Laterality Date   • ABDOMINAL HERNIA REPAIR     • ABDOMINAL SURGERY      bleeding ulcer, cyst removed from abd   • COLONOSCOPY     • CORONARY ANGIOPLASTY WITH STENT PLACEMENT  1999    x1    • ESOPHAGOGASTRODUODENOSCOPY     • IR BIOPSY LUNG  10/05/2021   • IR THORACENTESIS  11/08/2021   • KNEE SURGERY Left    • DE BRONCHOSCOPY,DIAGNOSTIC N/A 11/29/2021    Procedure: BRONCHOSCOPY FLEXIBLE;  Surgeon: Bernardo Berry MD;  [facility];  Service: Thoracic   • DE MEDIASTINOSCOPY WITH LYMPH NODE BIOPSY/IES N/A 11/29/2021    Procedure: MEDIASTINOSCOPY;  Surgeon: Bernardo Berry MD;  [facility];  Service: Thoracic   • PROSTATE SURGERY       Family History   Problem Relation Age of Onset   • Prostate cancer Brother    • Cancer Maternal Uncle         colo rectal cancer   • Cancer Paternal Aunt      Social History     Socioeconomic History   • Marital status: /Civil Union     Spouse name: Not on file   • Number of children: Not on file   • Years of education: Not on file   • Highest education level: Not on file   Occupational History   • Not on file   Tobacco Use   • Smoking status: Former Smoker     Packs/day: 1 00     Years: 30 00     Pack years: 30 00     Types: Cigarettes     Quit date:      Years since quittin 8   • Smokeless tobacco: Never Used   Vaping Use   • Vaping Use: Never used   Substance and Sexual Activity   • Alcohol use: Yes     Alcohol/week: 10 0 - 12 0 standard drinks     Types: 7 Glasses of wine, 3 - 5 Cans of beer per week     Comment: few beers day; glass of red wine at dinner   • Drug use: Never   • Sexual activity: Not on file   Other Topics Concern   • Not on file   Social History Narrative   • Not on file     Social Determinants of Health     Financial Resource Strain: Not on file   Food Insecurity: No Food Insecurity   • Worried About Running Out of Food in the Last Year: Never true   • Ran Out of Food in the Last Year: Never true   Transportation Needs: No Transportation Needs   • Lack of Transportation (Medical): No   • Lack of Transportation (Non-Medical):  No   Physical Activity: Not on file   Stress: Not on file   Social Connections: Not on file   Intimate Partner Violence: Not on file   Housing Stability: Low Risk    • Unable to Pay for Housing in the Last Year: No   • Number of Places Lived in the Last Year: 1   • Unstable Housing in the Last Year: No       Current Outpatient Medications:   •  acetaminophen (TYLENOL) 325 mg tablet, Take 2 tablets (650 mg total) by mouth every 6 (six) hours as needed for mild pain, headaches or fever, Disp: 30 tablet, Rfl: 0  •  albuterol (2 5 mg/3 mL) 0 083 % nebulizer solution, Take 2 5 mg by nebulization As needed per patient's wife , Disp: , Rfl:   •  atenolol (TENORMIN) 50 mg tablet, Take 50 mg by mouth daily, Disp: , Rfl:   •  atorvastatin (LIPITOR) 40 mg tablet, TAKE ONE TABLET DAILY, Disp: 90 tablet, Rfl: 1  •  Eliquis 5 MG, TAKE 1 TABLET TWO TIMES A DAY, Disp: 60 tablet, Rfl: 5  •  folic acid (FOLVITE) 1 mg tablet, Take 1 tablet (1 mg total) by mouth daily, Disp: 30 tablet, Rfl: 0  •  guaifenesin-codeine (GUAIFENESIN AC) 100-10 MG/5ML liquid, Take 10 mL by mouth 3 (three) times a day as needed for cough, Disp: 180 mL, Rfl: 0  •  JARDIANCE 25 MG TABS, Take 25 mg by mouth daily in the early morning , Disp: , Rfl:   •  latanoprost (XALATAN) 0 005 % ophthalmic solution, Administer 1 drop to both eyes daily at bedtime  , Disp: , Rfl:   •  losartan (COZAAR) 50 mg tablet, TAKE 1 TABLET DAILY, Disp: 90 tablet, Rfl: 3  •  metFORMIN (GLUCOPHAGE) 500 mg tablet, Take 1 tablet (500 mg total) by mouth 2 (two) times a day, Disp: 180 tablet, Rfl: 1  •  Multiple Vitamin (MULTI-VITAMIN DAILY PO), Take by mouth daily  , Disp: , Rfl:   •  omeprazole (PriLOSEC) 20 mg delayed release capsule, Take 1 capsule (20 mg total) by mouth daily, Disp: 90 capsule, Rfl: 0  •  primidone (MYSOLINE) 50 mg tablet, take 1 tab in morning and 2 tabs at bedtime  , Disp: 270 tablet, Rfl: 3  •  thiamine 100 MG tablet, Take 1 tablet (100 mg total) by mouth daily, Disp: 30 tablet, Rfl: 0  •  Trelegy Ellipta 100-62 5-25 MCG/INH inhaler, INHALE ONE PUFF DAILY; RINSE MOUTH AFTER USE, Disp: 60 each, Rfl: 5  •  ciclopirox (LOPROX) 0 77 % cream, Apply topically 2 (two) times a day for 20 days, Disp: 45 g, Rfl: 1  No Known Allergies  Vitals:    10/27/22 0847   BP: 153/74   Pulse: 86   Resp: 20   Temp: (!) 97 4 °F (36 3 °C)   SpO2: 97%   Weight: 101 kg (222 lb 9 6 oz)   Height: 5' 11" (1 803 m)      Pain Score: 0-No pain

## 2022-10-30 DIAGNOSIS — I10 HYPERTENSION, UNSPECIFIED TYPE: ICD-10-CM

## 2022-10-30 DIAGNOSIS — I48.91 NEW ONSET ATRIAL FIBRILLATION (HCC): ICD-10-CM

## 2022-10-31 ENCOUNTER — APPOINTMENT (OUTPATIENT)
Dept: PHYSICAL THERAPY | Facility: CLINIC | Age: 82
End: 2022-10-31

## 2022-10-31 RX ORDER — APIXABAN 5 MG/1
TABLET, FILM COATED ORAL
Qty: 60 TABLET | Refills: 5 | Status: SHIPPED | OUTPATIENT
Start: 2022-10-31

## 2022-11-15 ENCOUNTER — TELEPHONE (OUTPATIENT)
Dept: NEUROLOGY | Facility: CLINIC | Age: 82
End: 2022-11-15

## 2022-11-16 ENCOUNTER — OFFICE VISIT (OUTPATIENT)
Dept: NEUROLOGY | Facility: CLINIC | Age: 82
End: 2022-11-16

## 2022-11-16 VITALS
SYSTOLIC BLOOD PRESSURE: 105 MMHG | OXYGEN SATURATION: 100 % | WEIGHT: 225 LBS | DIASTOLIC BLOOD PRESSURE: 69 MMHG | TEMPERATURE: 96.4 F | HEART RATE: 78 BPM | HEIGHT: 71 IN | BODY MASS INDEX: 31.5 KG/M2

## 2022-11-16 DIAGNOSIS — G89.29 CHRONIC BILATERAL LOW BACK PAIN WITHOUT SCIATICA: ICD-10-CM

## 2022-11-16 DIAGNOSIS — M54.50 CHRONIC BILATERAL LOW BACK PAIN WITHOUT SCIATICA: ICD-10-CM

## 2022-11-16 DIAGNOSIS — G25.0 TREMOR, ESSENTIAL: Primary | ICD-10-CM

## 2022-11-16 RX ORDER — PRIMIDONE 50 MG/1
TABLET ORAL
Qty: 360 TABLET | Refills: 1 | Status: SHIPPED | OUTPATIENT
Start: 2022-11-16

## 2022-11-16 NOTE — PROGRESS NOTES
Return NeuroOutpatient Note        Jaimie Kelley  [id number]  [de-identified] y o   1940       Essential tremor        History obtained from:  Patient and wife     HPI/Subjective:    Jaimie Kelley is an [de-identified] yo M with PMH of ET presents as f/u  Per my previous history, patient has had tremors in both hands for past 2 years  It's mostly when he's doing something such as using utensils, writing, holding cup, fixing something  He can't shoot pool anymore  He denies resting tremor  He's currently on primidone 150mg daily       He recently was lifting a lot of heavy containers and has had lower back pain since  Even though it's chronic problem, it's worse at present  He has tendency to wheeze  He takes inhaler prn so we have not used propranolol         Past Medical History:   Diagnosis Date   • Abdominal pain    • Anxiety    • Arthritis    • Asthma    • Atrial fibrillation [facility])    • Back pain    • Bleeding ulcer    • Bronchitis    • Cancer [facility])     prostate 2011- radiation   • Cardiac disease     cardiac stent x1   • Cataract     starting   • Chronic diastolic (congestive) heart failure (HCC)    • Diabetes mellitus (HCC)     boarderline diabetic    • GERD (gastroesophageal reflux disease)    • History of radiation therapy 2010    Prostate seeds (brachytherapy) and EBRT   • Hyperlipidemia    • Hypertension    • 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, bilateral    • Low back pain    • Lung cancer ([address] )    • Lung mass    • Myocardial infarction ([address] )     mild 1999   • Obesity    • Prostate cancer ([facility] [address] )    • Shortness of breath    • Sleep apnea     sleep study 11/22   • Wears dentures     full set   • Wears glasses      Social History     Socioeconomic History   • Marital status: /Civil Union     Spouse name: Not on file   • Number of children: Not on file   • Years of education: Not on file   • Highest education level: Not on file   Occupational History   • Not on file   Tobacco Use   • Smoking status: Former     Packs/day: 1 00     Years: 30 00     Pack years: 30 00     Types: Cigarettes     Quit date: 38     Years since quittin 8   • Smokeless tobacco: Never   Vaping Use   • Vaping Use: Never used   Substance and Sexual Activity   • Alcohol use: Yes     Alcohol/week: 10 0 - 12 0 standard drinks     Types: 7 Glasses of wine, 3 - 5 Cans of beer per week     Comment: few beers day; glass of red wine at dinner   • Drug use: Never   • Sexual activity: Not on file   Other Topics Concern   • Not on file   Social History Narrative   • Not on file     Social Determinants of Health     Financial Resource Strain: Not on file   Food Insecurity: No Food Insecurity   • Worried About Running Out of Food in the Last Year: Never true   • Ran Out of Food in the Last Year: Never true   Transportation Needs: No Transportation Needs   • Lack of Transportation (Medical): No   • Lack of Transportation (Non-Medical):  No   Physical Activity: Not on file   Stress: Not on file   Social Connections: Not on file   Intimate Partner Violence: Not on file   Housing Stability: Low Risk    • Unable to Pay for Housing in the Last Year: No   • Number of Places Lived in the Last Year: 1   • Unstable Housing in the Last Year: No     Family History   Problem Relation Age of Onset   • Prostate cancer Brother    • Cancer Maternal Uncle         colo rectal cancer   • Cancer Paternal Aunt      No Known Allergies  Current Outpatient Medications on File Prior to Visit   Medication Sig Dispense Refill   • acetaminophen (TYLENOL) 325 mg tablet Take 2 tablets (650 mg total) by mouth every 6 (six) hours as needed for mild pain, headaches or fever 30 tablet 0   • albuterol (2 5 mg/3 mL) 0 083 % nebulizer solution Take 2 5 mg by nebulization As needed per patient's wife      • atenolol (TENORMIN) 50 mg tablet Take 50 mg by mouth daily     • atorvastatin (LIPITOR) 40 mg tablet TAKE ONE TABLET DAILY 90 tablet 1   • ciclopirox (LOPROX) 0 77 % cream Apply topically 2 (two) times a day for 20 days 45 g 1   • Eliquis 5 MG TAKE 1 TABLET TWO TIMES A DAY 60 tablet 5   • folic acid (FOLVITE) 1 mg tablet Take 1 tablet (1 mg total) by mouth daily 30 tablet 0   • guaifenesin-codeine (GUAIFENESIN AC) 100-10 MG/5ML liquid Take 10 mL by mouth 3 (three) times a day as needed for cough 180 mL 0   • JARDIANCE 25 MG TABS Take 25 mg by mouth daily in the early morning      • latanoprost (XALATAN) 0 005 % ophthalmic solution Administer 1 drop to both eyes daily at bedtime       • losartan (COZAAR) 50 mg tablet TAKE 1 TABLET DAILY 90 tablet 3   • metFORMIN (GLUCOPHAGE) 500 mg tablet Take 1 tablet (500 mg total) by mouth 2 (two) times a day 180 tablet 1   • Multiple Vitamin (MULTI-VITAMIN DAILY PO) Take by mouth daily       • omeprazole (PriLOSEC) 20 mg delayed release capsule Take 1 capsule (20 mg total) by mouth daily 90 capsule 0   • thiamine 100 MG tablet Take 1 tablet (100 mg total) by mouth daily 30 tablet 0   • Trelegy Ellipta 100-62 5-25 MCG/INH inhaler INHALE ONE PUFF DAILY; RINSE MOUTH AFTER USE 60 each 5   • [DISCONTINUED] primidone (MYSOLINE) 50 mg tablet take 1 tab in morning and 2 tabs at bedtime  270 tablet 3     No current facility-administered medications on file prior to visit  Review of Systems   Refer to positive review of systems in HPI     Review of Systems    Constitutional- No fever  Eyes- No visual change  ENT- Hearing normal  CV- No chest pain  Resp- No Shortness of breath  GI- No diarrhea  - Bladder normal  MS- No Arthritis   Skin- No rash  Psych- No depression  Endo- No DM  Heme- No nodes    Vitals:    11/16/22 1136   BP: 105/69   BP Location: Left arm   Patient Position: Sitting   Cuff Size: Standard   Pulse: 78   Temp: (!) 96 4 °F (35 8 °C)   TempSrc: Tympanic   SpO2: 100%   Weight: 102 kg (225 lb)   Height: 5' 11" (1 803 m)       PHYSICAL EXAM:  Appearance: No Acute Distress, +obese   Ophthalmoscopic: Disc Flat, Normal fundus  Mental status:  Orientation: Awake, Alert, and Orientedx3  Memory: Registation 3/3 Recall 3/3  Attention: normal  Knowledge: good  Language: No aphasia  Speech: No dysarthria  Cranial Nerves:  2 No Visual Defect on Confrontation, Pupils round, equal, reactive to light  3,4,6 Extraocular Movements Intact, no nystagmus  5 Facial Sensation Intact  7 No facial asymmetry  8 Intact hearing  9,10 Palate symmetric, normal gag  11 Good shoulder shrug  12 Tongue Midline  Gait: Stable  Coordination: moderate ET b/l  No ataxia with finger to nose testing, and heel to shin  Sensory: Intact, Symmetric to pinprick, light touch, vibration, and joint position  Muscle Tone: Normal              Muscle exam:  Arm Right Left Leg Right Left   Deltoid 5/5 5/5 Iliopsoas 5/5 5/5   Biceps 5/5 5/5 Quads 5/5 5/5   Triceps 5/5 5/5 Hamstrings 5/5 5/5   Wrist Extension 5/5 5/5 Ankle Dorsi Flexion 5/5 5/5   Wrist Flexion 5/5 5/5 Ankle Plantar Flexion 5/5 5/5   Interossei 5/5 5/5 Ankle Eversion 5/5 5/5   APB 5/5 5/5 Ankle Inversion 5/5 5/5     Straight leg raising positive b/l  Reflexes   RJ BJ TJ KJ AJ Plantars Link's   Right 2+ 2+ 2+ 2+  Downgoing Not present   Left 2+ 2+ 2+ 2+  Downgoing Not present     Personal review of  Labs:                  Diagnoses and all orders for this visit:      1  Tremor, essential  primidone (MYSOLINE) 50 mg tablet      2  Chronic bilateral low back pain without sciatica  MRI lumbar spine without contrast          Patient's ET is not as well controlled  Will optimize primidone to 100mg bid  He doesn't have PD signs at this time  His father did used to have ET but was not diagnosed  For his lower back, will get MRI and depending on results, will refer him to pain management  Encouraged stretching exercises at home                 Total time of encounter:  40 min  More than 50% of the time was used in counseling and/or coordination of care  Extent of counseling and/or coordination of care        Nam Haynes MD  [facility]  [phone] [facility]  [address]  [phone]

## 2022-11-21 ENCOUNTER — RA CDI HCC (OUTPATIENT)
Dept: OTHER | Facility: HOSPITAL | Age: 82
End: 2022-11-21

## 2022-11-21 NOTE — PROGRESS NOTES
[facility]  coding opportunities          Chart Reviewed number of suggestions sent to Provider: 1   I11 0    Patients Insurance     Medicare Insurance: [employer]

## 2022-11-28 ENCOUNTER — OFFICE VISIT (OUTPATIENT)
Dept: INTERNAL MEDICINE CLINIC | Facility: CLINIC | Age: 82
End: 2022-11-28

## 2022-11-28 VITALS
RESPIRATION RATE: 16 BRPM | HEIGHT: 71 IN | BODY MASS INDEX: 31.36 KG/M2 | DIASTOLIC BLOOD PRESSURE: 80 MMHG | TEMPERATURE: 98 F | OXYGEN SATURATION: 97 % | SYSTOLIC BLOOD PRESSURE: 140 MMHG | WEIGHT: 224 LBS | HEART RATE: 97 BPM

## 2022-11-28 DIAGNOSIS — I50.32 CHRONIC DIASTOLIC HEART FAILURE (HCC): ICD-10-CM

## 2022-11-28 DIAGNOSIS — C34.92 SQUAMOUS CELL CARCINOMA OF LEFT LUNG (HCC): Chronic | ICD-10-CM

## 2022-11-28 DIAGNOSIS — E78.2 MIXED HYPERLIPIDEMIA: ICD-10-CM

## 2022-11-28 DIAGNOSIS — E11.42 DIABETIC POLYNEUROPATHY ASSOCIATED WITH TYPE 2 DIABETES MELLITUS (HCC): Primary | ICD-10-CM

## 2022-11-28 DIAGNOSIS — E55.9 VITAMIN D DEFICIENCY: ICD-10-CM

## 2022-11-28 DIAGNOSIS — G47.33 OSA (OBSTRUCTIVE SLEEP APNEA): ICD-10-CM

## 2022-11-28 DIAGNOSIS — Z13.6 SCREENING FOR CARDIOVASCULAR CONDITION: ICD-10-CM

## 2022-11-28 DIAGNOSIS — Z23 ENCOUNTER FOR IMMUNIZATION: ICD-10-CM

## 2022-11-28 DIAGNOSIS — I48.19 PERSISTENT ATRIAL FIBRILLATION (HCC): ICD-10-CM

## 2022-11-28 DIAGNOSIS — F33.9 DEPRESSION, RECURRENT (HCC): ICD-10-CM

## 2022-11-28 DIAGNOSIS — L40.9 PSORIASIS: ICD-10-CM

## 2022-11-28 NOTE — ASSESSMENT & PLAN NOTE
Does not use CPAP using oxygen by nasal cannula at nighttime hours advised to be evaluated by Pulmonary

## 2022-11-28 NOTE — ASSESSMENT & PLAN NOTE
Wt Readings from Last 3 Encounters:   11/28/22 102 kg (224 lb)   11/16/22 102 kg (225 lb)   10/27/22 101 kg (222 lb 9 6 oz)     Patient's CHF seems to be controlled continue current treatment fluid restriction daily weight low-salt diet on Jardiance 25 mg daily

## 2022-11-28 NOTE — ASSESSMENT & PLAN NOTE
The CT scan PET scan reviewed from the last visit with me patient feels better no difficulty breathing awaiting to be evaluated by oncologist upper the repeat CT scan

## 2022-11-28 NOTE — PATIENT INSTRUCTIONS
Diabetes    Check your fingerstick Accu-Chek once a day  Please check your fingerstick Accu-Chek different time of the day either at 7:00 a m  or 11:00 a m  for for p m  4 9:00 p m  Agustin Winter Follow Diabetic diet for diabetes as advised  If you would sugar less than 80 or more than 300 to please call us on next visit is day  If the sugar is less than 80 follow-up hypoglycemia instructions as advised  Take your diabetic medicine as advised

## 2022-11-28 NOTE — PROGRESS NOTES
Dr Rocky Rea Office Visit Note  22     Froilan Myrick [de-identified] y o  male MRN: [id number]  : 1940    Assessment:     1  Diabetic polyneuropathy associated with type 2 diabetes mellitus ([facility] [address] )  Assessment & Plan:    Lab Results   Component Value Date    HGBA1C 7 1 (H) 2022   Patient's diabetes control for by her endocrinologist symptoms are controlled with metformin and Jardiance minimal RT numbness lower extremity counseling done for the diet yearly dilated eye exam foot exam normal hypoglycemia protocol in place diet to diabetic diet and increase activity    Orders:  -     Hemoglobin A1C; Future  -     Microalbumin / creatinine urine ratio; Future  -     Urinalysis with microscopic; Future    2  Squamous cell carcinoma of left lung [facility])  Assessment & Plan:  The CT scan PET scan reviewed from the last visit with me patient feels better no difficulty breathing awaiting to be evaluated by oncologist upper the repeat CT scan    Orders:  -     CBC and differential; Future  -     Comprehensive metabolic panel; Future    3  Chronic diastolic heart failure (HCC)  Assessment & Plan:  Wt Readings from Last 3 Encounters:   22 102 kg (224 lb)   22 102 kg (225 lb)   10/27/22 101 kg (222 lb 9 6 oz)     Patient's CHF seems to be controlled continue current treatment fluid restriction daily weight low-salt diet on Jardiance 25 mg daily          4  Persistent atrial fibrillation [facility])  Assessment & Plan:  Patient is rate controlled on Eliquis continue atenolol follow-up with cardiology      5  Mixed hyperlipidemia  -     Lipid Panel with Direct LDL reflex; Future    6  SYLVESTER (obstructive sleep apnea)  Assessment & Plan:  Does not use CPAP using oxygen by nasal cannula at nighttime hours advised to be evaluated by Pulmonary      7  Vitamin D deficiency  -     Vitamin D 25 hydroxy; Future; Expected date: 2022    8   Psoriasis  -     hydrocortisone 2 5 % cream; Apply topically 2 (two) times a day Apply twice a day affected area the trunk    9  Screening for cardiovascular condition  -     Comprehensive metabolic panel; Future    10  Depression, recurrent ([facility] [address] )  Assessment & Plan:  In remission on no treatment will monitor closely      11  Encounter for immunization  -     influenza vaccine, high-dose, PF 0 5 mL        Discussion Summary and Plan: Today's care plan and medications were reviewed with patient in detail and all their questions answered to their satisfaction  No chief complaint on file  Subjective:  Patient came in for follow-up chronic medical condition and the rash and the back patient has similar rash about a year and half ago treated by Dr Rowan Maya questionable psoriasis no other new symptoms the patient's breathing is better lost 2 lb claims patient has no appetite awaiting CT of the chest for follow-up for ca lung    BMI Counseling: Body mass index is 31 24 kg/m²  The BMI is above normal  Nutrition recommendations include decreasing portion sizes, encouraging healthy choices of fruits and vegetables, decreasing fast food intake, consuming healthier snacks, limiting drinks that contain sugar, moderation in carbohydrate intake, increasing intake of lean protein, reducing intake of saturated and trans fat and reducing intake of cholesterol  Exercise recommendations include exercising 3-5 times per week  No pharmacotherapy was ordered  Patient referred to PCP  Rationale for BMI follow-up plan is due to patient being overweight or obese  The following portions of the patient's history were reviewed and updated as appropriate: allergies, current medications, past family history, past medical history, past social history, past surgical history and problem list     Review of Systems   Constitutional: Positive for fatigue  Negative for activity change, appetite change, chills, diaphoresis, fever and unexpected weight change     HENT: Negative for congestion, dental problem, drooling, ear discharge, ear pain, facial swelling, hearing loss, mouth sores, nosebleeds, postnasal drip, rhinorrhea, sinus pressure, sneezing, sore throat, tinnitus, trouble swallowing and voice change  Eyes: Negative for photophobia, pain, discharge, redness, itching and visual disturbance  Respiratory: Positive for shortness of breath  Negative for apnea, cough, choking, chest tightness, wheezing and stridor  Cardiovascular: Positive for palpitations  Negative for chest pain and leg swelling  Gastrointestinal: Negative for abdominal distention, abdominal pain, anal bleeding, blood in stool, constipation, diarrhea, nausea, rectal pain and vomiting  Endocrine: Negative for cold intolerance, heat intolerance, polydipsia, polyphagia and polyuria  Genitourinary: Negative for decreased urine volume, difficulty urinating, dysuria, enuresis, flank pain, frequency, genital sores, hematuria and urgency  Musculoskeletal: Positive for arthralgias, back pain, joint swelling and myalgias  Negative for gait problem, neck pain and neck stiffness  Skin: Negative for color change, pallor, rash and wound  Allergic/Immunologic: Negative  Negative for environmental allergies, food allergies and immunocompromised state  Neurological: Positive for dizziness and weakness  Negative for tremors, seizures, syncope, facial asymmetry, speech difficulty, light-headedness, numbness and headaches  Psychiatric/Behavioral: Negative for agitation, behavioral problems, confusion, decreased concentration, dysphoric mood, hallucinations, self-injury, sleep disturbance and suicidal ideas  The patient is not nervous/anxious and is not hyperactive            Historical Information   Patient Active Problem List   Diagnosis   • Corns   • Pain in both feet   • Diabetic polyneuropathy associated with type 2 diabetes mellitus (HCC)   • Onychomycosis   • Tinea pedis of both feet   • Lung mass   • Hyperlipidemia   • Squamous cell carcinoma of lung (HCC)   • Shortness of breath   • Daytime hypersomnolence   • Chronic diastolic heart failure (HCC)   • SYLVESTER (obstructive sleep apnea)   • Prostate cancer (HCC)   • GERD (gastroesophageal reflux disease)   • CAD (coronary artery disease)   • Persistent atrial fibrillation (HCC)   • Alcohol dependence ([place] [address] )   • Acute respiratory failure with hypoxemia (HCC)   • Depression, recurrent ([place] [address] )   • COVID-19     Past Medical History:   Diagnosis Date   • Abdominal pain    • Anxiety    • Arthritis    • Asthma    • Atrial fibrillation ([place] [address] )    • Back pain    • Bleeding ulcer    • Bronchitis    • Cancer [facility])     prostate 2011- radiation   • Cardiac disease     cardiac stent x1   • Cataract     starting   • Chronic diastolic (congestive) heart failure (HCC)    • Diabetes mellitus ([place] [address] )     boarderline diabetic    • GERD (gastroesophageal reflux disease)    • History of radiation therapy 2010    Prostate seeds (brachytherapy) and EBRT   • Hyperlipidemia    • Hypertension    • 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, bilateral    • Low back pain    • Lung cancer ([place] [address] )    • Lung mass    • Myocardial infarction ([place] [address] )     mild 1999   • Obesity    • Prostate cancer ([place] [address] )    • Shortness of breath    • Sleep apnea     sleep study 11/22   • Wears dentures     full set   • Wears glasses      Past Surgical History:   Procedure Laterality Date   • ABDOMINAL HERNIA REPAIR     • ABDOMINAL SURGERY      bleeding ulcer, cyst removed from abd   • COLONOSCOPY     • CORONARY ANGIOPLASTY WITH STENT PLACEMENT  1999    x1    • ESOPHAGOGASTRODUODENOSCOPY     • IR BIOPSY LUNG  10/05/2021   • IR THORACENTESIS  11/08/2021   • KNEE SURGERY Left    • NM BRONCHOSCOPY,DIAGNOSTIC N/A 11/29/2021    Procedure: BRONCHOSCOPY FLEXIBLE;  Surgeon: Lucrecia Kerr MD;  Location: [facility];  Service: Thoracic   • NM MEDIASTINOSCOPY WITH LYMPH NODE BIOPSY/IES N/A 11/29/2021    Procedure: MEDIASTINOSCOPY;  Surgeon: Lucrecia Kerr MD;  Location: [facility];  Service: Thoracic   • PROSTATE SURGERY       Social History     Substance and Sexual Activity   Alcohol Use Yes   • Alcohol/week: 10 0 - 12 0 standard drinks   • Types: 7 Glasses of wine, 3 - 5 Cans of beer per week    Comment: few beers day; glass of red wine at dinner     Social History     Substance and Sexual Activity   Drug Use Never     Social History     Tobacco Use   Smoking Status Former   • Packs/day:    • Years: 30    • Pack years:    • Types: Cigarettes   • Quit date:    • Years since quittin 9   Smokeless Tobacco Never     Family History   Problem Relation Age of Onset   • Prostate cancer Brother    • Cancer Maternal Uncle         colo rectal cancer   • Cancer Paternal Aunt      Health Maintenance Due   Topic   • Hepatitis B Vaccine (1 of 3 - 3-dose series)   • Pneumococcal Vaccine: 65+ Years (1 - PCV)   • DM Eye Exam    • BMI: Followup Plan    • COVID-19 Vaccine (3 - Moderna risk series)   • HEMOGLOBIN A1C    • Depression Remission PHQ       Meds/Allergies       Current Outpatient Medications:   •  acetaminophen (TYLENOL) 325 mg tablet, Take 2 tablets (650 mg total) by mouth every 6 (six) hours as needed for mild pain, headaches or fever, Disp: 30 tablet, Rfl: 0  •  albuterol (2 5 mg/3 mL) 0 083 % nebulizer solution, Take 2 5 mg by nebulization As needed per patient's wife , Disp: , Rfl:   •  atenolol (TENORMIN) 50 mg tablet, Take 50 mg by mouth daily, Disp: , Rfl:   •  atorvastatin (LIPITOR) 40 mg tablet, TAKE ONE TABLET DAILY, Disp: 90 tablet, Rfl: 1  •  Eliquis 5 MG, TAKE 1 TABLET TWO TIMES A DAY, Disp: 60 tablet, Rfl: 5  •  folic acid (FOLVITE) 1 mg tablet, Take 1 tablet (1 mg total) by mouth daily, Disp: 30 tablet, Rfl: 0  •  hydrocortisone 2 5 % cream, Apply topically 2 (two) times a day Apply twice a day affected area the trunk, Disp: 20 g, Rfl: 2  •  JARDIANCE 25 MG TABS, Take 25 mg by mouth daily in the early morning , Disp: , Rfl:   •  latanoprost (XALATAN) 0 005 % ophthalmic solution, Administer 1 drop to both eyes daily at bedtime  , Disp: , Rfl:   •  losartan (COZAAR) 50 mg tablet, TAKE 1 TABLET DAILY, Disp: 90 tablet, Rfl: 3  •  metFORMIN (GLUCOPHAGE) 500 mg tablet, Take 1 tablet (500 mg total) by mouth 2 (two) times a day, Disp: 180 tablet, Rfl: 1  •  Multiple Vitamin (MULTI-VITAMIN DAILY PO), Take by mouth daily  , Disp: , Rfl:   •  omeprazole (PriLOSEC) 20 mg delayed release capsule, Take 1 capsule (20 mg total) by mouth daily, Disp: 90 capsule, Rfl: 0  •  primidone (MYSOLINE) 50 mg tablet, Take 2 tabs twice daily  , Disp: 360 tablet, Rfl: 1  •  thiamine 100 MG tablet, Take 1 tablet (100 mg total) by mouth daily, Disp: 30 tablet, Rfl: 0  •  Trelegy Ellipta 100-62 5-25 MCG/INH inhaler, INHALE ONE PUFF DAILY; RINSE MOUTH AFTER USE, Disp: 60 each, Rfl: 5  •  ciclopirox (LOPROX) 0 77 % cream, Apply topically 2 (two) times a day for 20 days, Disp: 45 g, Rfl: 1      Objective:    Vitals:   /80   Pulse 97   Temp 98 °F (36 7 °C)   Resp 16   Ht 5' 11" (1 803 m)   Wt 102 kg (224 lb)   SpO2 97%   BMI 31 24 kg/m²   Body mass index is 31 24 kg/m²  Vitals:    11/28/22 0943   Weight: 102 kg (224 lb)       Physical Exam  Constitutional:       General: He is not in acute distress  Appearance: He is well-developed and well-nourished  He is obese  He is not ill-appearing, toxic-appearing, sickly-appearing or diaphoretic  HENT:      Head: Normocephalic and atraumatic  Right Ear: External ear normal       Left Ear: External ear normal       Nose: Nose normal       Mouth/Throat:      Mouth: Oropharynx is clear and moist       Pharynx: No oropharyngeal exudate  Eyes:      General: Lids are normal  Lids are everted, no foreign bodies appreciated  No scleral icterus  Right eye: No discharge  Left eye: No discharge  Extraocular Movements: EOM normal       Conjunctiva/sclera: Conjunctivae normal       Pupils: Pupils are equal, round, and reactive to light     Neck: Thyroid: No thyromegaly  Vascular: Normal carotid pulses  No carotid bruit, hepatojugular reflux or JVD  Trachea: No tracheal tenderness or tracheal deviation  Cardiovascular:      Rate and Rhythm: Normal rate and regular rhythm  Pulses: Normal pulses and intact distal pulses  Heart sounds: Normal heart sounds  No murmur heard  No friction rub  No gallop  Pulmonary:      Effort: Pulmonary effort is normal  No respiratory distress  Breath sounds: No stridor  Rhonchi present  No wheezing or rales  Chest:      Chest wall: No tenderness  Abdominal:      General: Bowel sounds are normal  There is no distension or ascites  Palpations: Abdomen is soft  There is no mass  Tenderness: There is no abdominal tenderness  There is no guarding or rebound  Musculoskeletal:         General: Swelling and deformity present  No tenderness  Normal range of motion  Cervical back: Normal range of motion and neck supple  No edema, erythema or rigidity  No spinous process tenderness or muscular tenderness  Normal range of motion  Right lower leg: Edema present  Left lower leg: Edema present  Lymphadenopathy:      Head:      Right side of head: No submental, submandibular, tonsillar, preauricular or posterior auricular adenopathy  Left side of head: No submental, submandibular, tonsillar, preauricular, posterior auricular or occipital adenopathy  Cervical: No cervical adenopathy  Right cervical: No superficial, deep or posterior cervical adenopathy  Left cervical: No superficial, deep or posterior cervical adenopathy  Upper Body:      Right upper body: No pectoral or lateral adenopathy  Left upper body: No pectoral or lateral adenopathy  Skin:     General: Skin is warm and dry  Coloration: Skin is not pale  Findings: No erythema or rash  Neurological:      Mental Status: He is alert and oriented to person, place, and time        Cranial Nerves: No cranial nerve deficit  Sensory: No sensory deficit  Motor: Motor strength is normal  Weakness present  No tremor, abnormal muscle tone or seizure activity  Coordination: He displays a negative Romberg sign  Coordination normal       Gait: Gait abnormal       Deep Tendon Reflexes: Reflexes are normal and symmetric  Reflexes normal    Psychiatric:         Mood and Affect: Mood and affect normal          Behavior: Behavior normal          Thought Content: Thought content normal          Judgment: Judgment normal          Lab Review   No visits with results within 2 Month(s) from this visit  Latest known visit with results is:   Admission on 05/01/2022, Discharged on 05/04/2022   Component Date Value Ref Range Status   • PTT 05/01/2022 34  23 - 37 seconds Final    Therapeutic Heparin Range =  60-90 seconds   • Protime 05/01/2022 14 6 (H)  11 6 - 14 5 seconds Final   • INR 05/01/2022 1 16  0 84 - 1 19 Final   • Blood Culture 05/01/2022 No Growth After 5 Days  Final   • Blood Culture 05/01/2022 No Growth After 5 Days     Final   • WBC 05/01/2022 11 62 (H)  4 31 - 10 16 Thousand/uL Final   • RBC 05/01/2022 4 89  3 88 - 5 62 Million/uL Final   • Hemoglobin 05/01/2022 16 3  12 0 - 17 0 g/dL Final   • Hematocrit 05/01/2022 49 7 (H)  36 5 - 49 3 % Final   • MCV 05/01/2022 102 (H)  82 - 98 fL Final   • MCH 05/01/2022 33 3  26 8 - 34 3 pg Final   • MCHC 05/01/2022 32 8  31 4 - 37 4 g/dL Final   • RDW 05/01/2022 14 3  11 6 - 15 1 % Final   • MPV 05/01/2022 10 1  8 9 - 12 7 fL Final   • Platelets 13/17/7935 223  149 - 390 Thousands/uL Final   • nRBC 05/01/2022 0  /100 WBCs Final   • Neutrophils Relative 05/01/2022 80 (H)  43 - 75 % Final   • Immat GRANS % 05/01/2022 1  0 - 2 % Final   • Lymphocytes Relative 05/01/2022 9 (L)  14 - 44 % Final   • Monocytes Relative 05/01/2022 9  4 - 12 % Final   • Eosinophils Relative 05/01/2022 1  0 - 6 % Final   • Basophils Relative 05/01/2022 0  0 - 1 % Final   • Neutrophils Absolute 05/01/2022 9 37 (H)  1 85 - 7 62 Thousands/µL Final   • Immature Grans Absolute 05/01/2022 0 06  0 00 - 0 20 Thousand/uL Final   • Lymphocytes Absolute 05/01/2022 1 05  0 60 - 4 47 Thousands/µL Final   • Monocytes Absolute 05/01/2022 1 03  0 17 - 1 22 Thousand/µL Final   • Eosinophils Absolute 05/01/2022 0 07  0 00 - 0 61 Thousand/µL Final   • Basophils Absolute 05/01/2022 0 04  0 00 - 0 10 Thousands/µL Final   • Sodium 05/01/2022 139  136 - 145 mmol/L Final   • Potassium 05/01/2022 4 8  3 5 - 5 3 mmol/L Final   • Chloride 05/01/2022 102  100 - 108 mmol/L Final   • CO2 05/01/2022 25  21 - 32 mmol/L Final   • ANION GAP 05/01/2022 12  4 - 13 mmol/L Final   • BUN 05/01/2022 16  5 - 25 mg/dL Final   • Creatinine 05/01/2022 1 10  0 60 - 1 30 mg/dL Final    Standardized to IDMS reference method   • Glucose 05/01/2022 173 (H)  65 - 140 mg/dL Final    If the patient is fasting, the ADA then defines impaired fasting glucose as > 100 mg/dL and diabetes as > or equal to 123 mg/dL  Specimen collection should occur prior to Sulfasalazine administration due to the potential for falsely depressed results  Specimen collection should occur prior to Sulfapyridine administration due to the potential for falsely elevated results  • Calcium 05/01/2022 8 9  8 3 - 10 1 mg/dL Final   • AST 05/01/2022 26  5 - 45 U/L Final    Specimen collection should occur prior to Sulfasalazine administration due to the potential for falsely depressed results  • ALT 05/01/2022 34  12 - 78 U/L Final    Specimen collection should occur prior to Sulfasalazine administration due to the potential for falsely depressed results      • Alkaline Phosphatase 05/01/2022 99  46 - 116 U/L Final   • Total Protein 05/01/2022 7 5  6 4 - 8 2 g/dL Final   • Albumin 05/01/2022 3 6  3 5 - 5 0 g/dL Final   • Total Bilirubin 05/01/2022 0 76  0 20 - 1 00 mg/dL Final    Use of this assay is not recommended for patients undergoing treatment with eltrombopag due to the potential for falsely elevated results  • eGFR 05/01/2022 63  ml/min/1 73sq m Final   • LACTIC ACID 05/01/2022 3 7 (HH)  0 5 - 2 0 mmol/L Final   • Procalcitonin 05/01/2022 0 12  <=0 25 ng/ml Final    Comment: Suspected Lower Respiratory Tract Infection (LRTI):  - LESS than or EQUAL to 0 25 ng/mL:   low likelihood for bacterial LRTI; antibiotics DISCOURAGED   - GREATER than 0 25 ng/mL:   increased likelihood for bacterial LRTI; antibiotics ENCOURAGED  Suspected Sepsis:  - Strongly consider initiating antibiotics in ALL UNSTABLE patients  - LESS than or EQUAL to 0 5 ng/mL:   low likelihood for bacterial sepsis; antibiotics DISCOURAGED   - GREATER than 0 5 ng/mL:   increased likelihood for bacterial sepsis; antibiotics ENCOURAGED   - GREATER than 2 ng/mL:   high risk for severe sepsis / septic shock; antibiotics strongly ENCOURAGED  Decisions on antibiotic use should not be based solely on Procalcitonin (PCT) levels  If PCT is low but uncertainty exists with stopping antibiotics, repeat PCT in 6-24 hours to confirm the low level  If antibiotics are administered (regardless if initial PCT was high or low), repeat PCT every 1-2 days to consider early antibiotic cessation (when GREATER                            than 80% decrease from the peak OR when PCT drops below designated cutoffs, whichever comes first), so long as the infection is NOT one that typically requires prolonged treatment durations (e g , bone/joint infections, endocarditis, Staph  aureus bacteremia)      Situations of FALSE-POSITIVE Procalcitonin values:  1) Newborns < 67 hours old  2) Massive stress from severe trauma / burns, major surgery, acute pancreatitis, cardiogenic / hemorrhagic shock, sickle cell crisis, or other organ perfusion abnormalities  3) Malaria and some Candidal infections  4) Treatment with agents that stimulate cytokines (e g , OKT3, anti-lymphocyte globulins, alemtuzumab, IL-2, granulocyte transfusion [NOT GCSFs])  5) Chronic renal disease causes elevated baseline levels (consider GREATER than 0 75 ng/mL as an abnormal cut-off); initiating HD/CRRT may cause transient decreases  6) Paraneoplastic syndromes from medullary thyroid or SCLC, some forms of vasculitis, and acute [id number]                            disease    Situations of FALSE-NEGATIVE Procalcitonin values:  1) Too early in clinical course for PCT to have reached its peak (may repeat in 6-24 hours to confirm low level)  2) Localized infection WITHOUT systemic (SIRS / sepsis) response (e g , an abscess, osteomyelitis, cystitis)  3) Mycobacteria (e g , Tuberculosis, MAC)  4) Cystic fibrosis exacerbations     • Color, UA 05/01/2022 Yellow   Final   • Clarity, UA 05/01/2022 Clear   Final   • Specific Gravity, UA 05/01/2022 1 015  1 000 - 1 030 Final   • pH, UA 05/01/2022 6 0  5 0, 5 5, 6 0, 6 5, 7 0, 7 5, 8 0, 8 5, 9 0 Final   • Leukocytes, UA 05/01/2022 Elevated glucose may cause decreased leukocyte values  See urine microscopic for [facility] result/ (A)  Negative Final   • Nitrite, UA 05/01/2022 Negative  Negative Final   • Protein, UA 05/01/2022 Trace (A)  Negative mg/dl Final   • Glucose, UA 05/01/2022 >=1000 (1%) (A)  Negative mg/dl Final   • Ketones, UA 05/01/2022 Negative  Negative mg/dl Final   • Urobilinogen, UA 05/01/2022 0 2  0 2, 1 0 E U /dl E U /dl Final   • Bilirubin, UA 05/01/2022 Negative  Negative Final   • Occult Blood, UA 05/01/2022 Trace-lysed (A)  Negative Final   • hs TnI 0hr 05/01/2022 10  "Refer to ACS Flowchart"- see link ng/L Final    Comment:                                              Initial (time 0) result  If >=50 ng/L, Myocardial injury suggested ;  Type of myocardial injury and treatment strategy  to be determined  If 5-49 ng/L, a delta result at 2 hours and or 4 hours will be needed to further evaluate    If <4 ng/L, and chest pain has been >3 hours since onset, patient may qualify for discharge based on the HEART score in the ED  If <5 ng/L and <3hours since onset of chest pain, a delta result at 2 hours will be needed to further evaluate  HS Troponin 99th Percentile URL of a Health Population=12 ng/L with a 95% Confidence Interval of 8-18 ng/L  Second Troponin (time 2 hours)  If calculated delta >= 20 ng/L,  Myocardial injury suggested ; Type of myocardial injury and treatment strategy to be determined  If 5-49 ng/L and the calculated delta is 5-19 ng/L, consult medical service for evaluation  Continue evaluation for ischemia on ecg and other possible etiology and repeat hs troponin at 4 hours  If delta                            is <5 ng/L at 2 hours, consider discharge based on risk stratification via the HEART score (if in ED), or BETTY risk score in IP/Observation  HS Troponin 99th Percentile URL of a Health Population=12 ng/L with a 95% Confidence Interval of 8-18 ng/L  • SARS-CoV-2 05/01/2022 Negative  Negative Final   • INFLUENZA A PCR 05/01/2022 Negative  Negative Final   • INFLUENZA B PCR 05/01/2022 Negative  Negative Final   • RSV PCR 05/01/2022 Negative  Negative Final   • hs TnI 2hr 05/01/2022 11  "Refer to ACS Flowchart"- see link ng/L Final    Comment:                                              Initial (time 0) result  If >=50 ng/L, Myocardial injury suggested ;  Type of myocardial injury and treatment strategy  to be determined  If 5-49 ng/L, a delta result at 2 hours and or 4 hours will be needed to further evaluate  If <4 ng/L, and chest pain has been >3 hours since onset, patient may qualify for discharge based on the HEART score in the ED  If <5 ng/L and <3hours since onset of chest pain, a delta result at 2 hours will be needed to further evaluate  HS Troponin 99th Percentile URL of a Health Population=12 ng/L with a 95% Confidence Interval of 8-18 ng/L      Second Troponin (time 2 hours)  If calculated delta >= 20 ng/L,  Myocardial injury suggested ; Type of myocardial injury and treatment strategy to be determined  If 5-49 ng/L and the calculated delta is 5-19 ng/L, consult medical service for evaluation  Continue evaluation for ischemia on ecg and other possible etiology and repeat hs troponin at 4 hours  If delta                            is <5 ng/L at 2 hours, consider discharge based on risk stratification via the HEART score (if in ED), or BETTY risk score in IP/Observation  HS Troponin 99th Percentile URL of a Health Population=12 ng/L with a 95% Confidence Interval of 8-18 ng/L  • Delta 2hr hsTnI 05/01/2022 1  <20 ng/L Final   • LACTIC ACID 05/02/2022 1 3  0 5 - 2 0 mmol/L Final   • RBC, UA 05/01/2022 0-1  None Seen, 0-1, 1-2, 2-4, 0-5 /hpf Final   • WBC, UA 05/01/2022 1-2  None Seen, 0-1, 1-2, 0-5, 2-4 /hpf Final   • Epithelial Cells 05/01/2022 Occasional  None Seen, Occasional /hpf Final   • Bacteria, UA 05/01/2022 Occasional  None Seen, Occasional /hpf Final   • Sputum Culture 05/02/2022 Test not performed  Suggest repeat specimen  Final   • Gram Stain Result 05/02/2022 >10 squamous epithelial cells/lpf, indicating orophayngeal contamination  (A)   Final   • Gram Stain Result 05/02/2022 2+ Polys (A)   Final   • Gram Stain Result 05/02/2022 3+ Gram positive cocci in clusters (A)   Final   • Gram Stain Result 05/02/2022 2+ Gram positive rods (A)   Final   • Gram Stain Result 05/02/2022 2+ Gram negative rods (A)   Final   • Gram Stain Result 05/02/2022 1+ Gram negative cocci (A)   Final   • Strep pneumoniae antigen, urine 05/01/2022 Negative  Negative Final   • Legionella Urinary Antigen 05/01/2022 Negative  Negative Final   • NT-proBNP 05/01/2022 1,262 (H)  <450 pg/mL Final   • hs TnI 4hr 05/02/2022 10  "Refer to ACS Flowchart"- see link ng/L Final    Comment:                                              Initial (time 0) result  If >=50 ng/L, Myocardial injury suggested ;  Type of myocardial injury and treatment strategy  to be determined    If 5-49 ng/L, a delta result at 2 hours and or 4 hours will be needed to further evaluate  If <4 ng/L, and chest pain has been >3 hours since onset, patient may qualify for discharge based on the HEART score in the ED  If <5 ng/L and <3hours since onset of chest pain, a delta result at 2 hours will be needed to further evaluate  HS Troponin 99th Percentile URL of a Health Population=12 ng/L with a 95% Confidence Interval of 8-18 ng/L  Second Troponin (time 2 hours)  If calculated delta >= 20 ng/L,  Myocardial injury suggested ; Type of myocardial injury and treatment strategy to be determined  If 5-49 ng/L and the calculated delta is 5-19 ng/L, consult medical service for evaluation  Continue evaluation for ischemia on ecg and other possible etiology and repeat hs troponin at 4 hours  If delta                            is <5 ng/L at 2 hours, consider discharge based on risk stratification via the HEART score (if in ED), or BETTY risk score in IP/Observation  HS Troponin 99th Percentile URL of a Health Population=12 ng/L with a 95% Confidence Interval of 8-18 ng/L  • Delta 4hr hsTnI 05/02/2022 0  <20 ng/L Final   • POC Glucose 05/01/2022 140  65 - 140 mg/dl Final   • Procalcitonin 05/02/2022 0 12  <=0 25 ng/ml Final    Comment: Suspected Lower Respiratory Tract Infection (LRTI):  - LESS than or EQUAL to 0 25 ng/mL:   low likelihood for bacterial LRTI; antibiotics DISCOURAGED   - GREATER than 0 25 ng/mL:   increased likelihood for bacterial LRTI; antibiotics ENCOURAGED  Suspected Sepsis:  - Strongly consider initiating antibiotics in ALL UNSTABLE patients  - LESS than or EQUAL to 0 5 ng/mL:   low likelihood for bacterial sepsis; antibiotics DISCOURAGED   - GREATER than 0 5 ng/mL:   increased likelihood for bacterial sepsis; antibiotics ENCOURAGED   - GREATER than 2 ng/mL:   high risk for severe sepsis / septic shock; antibiotics strongly ENCOURAGED      Decisions on antibiotic use should not be based solely on Procalcitonin (PCT) levels  If PCT is low but uncertainty exists with stopping antibiotics, repeat PCT in 6-24 hours to confirm the low level  If antibiotics are administered (regardless if initial PCT was high or low), repeat PCT every 1-2 days to consider early antibiotic cessation (when GREATER                            than 80% decrease from the peak OR when PCT drops below designated cutoffs, whichever comes first), so long as the infection is NOT one that typically requires prolonged treatment durations (e g , bone/joint infections, endocarditis, Staph  aureus bacteremia)      Situations of FALSE-POSITIVE Procalcitonin values:  1) Newborns < 67 hours old  2) Massive stress from severe trauma / burns, major surgery, acute pancreatitis, cardiogenic / hemorrhagic shock, sickle cell crisis, or other organ perfusion abnormalities  3) Malaria and some Candidal infections  4) Treatment with agents that stimulate cytokines (e g , OKT3, anti-lymphocyte globulins, alemtuzumab, IL-2, granulocyte transfusion [NOT GCSFs])  5) Chronic renal disease causes elevated baseline levels (consider GREATER than 0 75 ng/mL as an abnormal cut-off); initiating HD/CRRT may cause transient decreases  6) Paraneoplastic syndromes from medullary thyroid or SCLC, some forms of vasculitis, and acute [id number]-cp-innw                            disease    Situations of FALSE-NEGATIVE Procalcitonin values:  1) Too early in clinical course for PCT to have reached its peak (may repeat in 6-24 hours to confirm low level)  2) Localized infection WITHOUT systemic (SIRS / sepsis) response (e g , an abscess, osteomyelitis, cystitis)  3) Mycobacteria (e g , Tuberculosis, MAC)  4) Cystic fibrosis exacerbations     • Sodium 05/02/2022 139  136 - 145 mmol/L Final   • Potassium 05/02/2022 3 8  3 5 - 5 3 mmol/L Final   • Chloride 05/02/2022 103  100 - 108 mmol/L Final   • CO2 05/02/2022 26  21 - 32 mmol/L Final   • ANION GAP 05/02/2022 10  4 - 13 mmol/L Final   • BUN 05/02/2022 16  5 - 25 mg/dL Final   • Creatinine 05/02/2022 0 75  0 60 - 1 30 mg/dL Final    Standardized to IDMS reference method   • Glucose 05/02/2022 125  65 - 140 mg/dL Final    If the patient is fasting, the ADA then defines impaired fasting glucose as > 100 mg/dL and diabetes as > or equal to 123 mg/dL  Specimen collection should occur prior to Sulfasalazine administration due to the potential for falsely depressed results  Specimen collection should occur prior to Sulfapyridine administration due to the potential for falsely elevated results  • Calcium 05/02/2022 8 3  8 3 - 10 1 mg/dL Final   • eGFR 05/02/2022 86  ml/min/1 73sq m Final   • Magnesium 05/02/2022 2 0  1 6 - 2 6 mg/dL Final   • WBC 05/02/2022 8 70  4 31 - 10 16 Thousand/uL Final   • RBC 05/02/2022 4 53  3 88 - 5 62 Million/uL Final   • Hemoglobin 05/02/2022 15 0  12 0 - 17 0 g/dL Final   • Hematocrit 05/02/2022 45 9  36 5 - 49 3 % Final   • MCV 05/02/2022 101 (H)  82 - 98 fL Final   • MCH 05/02/2022 33 1  26 8 - 34 3 pg Final   • MCHC 05/02/2022 32 7  31 4 - 37 4 g/dL Final   • RDW 05/02/2022 14 3  11 6 - 15 1 % Final   • Platelets 02/00/0158 170  149 - 390 Thousands/uL Final   • MPV 05/02/2022 10 0  8 9 - 12 7 fL Final   • POC Glucose 05/02/2022 128  65 - 140 mg/dl Final   • Ventricular Rate 05/01/2022 64  BPM Final   • Atrial Rate 05/01/2022 396  BPM Final   • QRSD Interval 05/01/2022 94  ms Final   • QT Interval 05/01/2022 362  ms Final   • QTC Interval 05/01/2022 373  ms Final   • QRS Maynard 05/01/2022 -50  degrees Final   • T Wave Axis 05/01/2022 77  degrees Final   • POC Glucose 05/02/2022 147 (H)  65 - 140 mg/dl Final   • Sputum Culture 05/02/2022 2+ Growth of   Final    Mixed Respiratory sushil  Commensal respiratory sushil only; No significant growth of Staph aureus/MRSA or Pseudomonas aeruginosa     • Gram Stain Result 05/02/2022 Rare Epithelial cells per low power field (A)   Final   • Gram Stain Result 05/02/2022 Rare Polys (A)   Final • Gram Stain Result 05/02/2022 Rare Gram positive rods (A)   Final   • Gram Stain Result 05/02/2022 Rare Gram positive cocci in pairs (A)   Final   • POC Glucose 05/02/2022 132  65 - 140 mg/dl Final   • POC Glucose 05/02/2022 133  65 - 140 mg/dl Final   • WBC 05/03/2022 6 65  4 31 - 10 16 Thousand/uL Final   • RBC 05/03/2022 4 65  3 88 - 5 62 Million/uL Final   • Hemoglobin 05/03/2022 15 2  12 0 - 17 0 g/dL Final   • Hematocrit 05/03/2022 45 4  36 5 - 49 3 % Final   • MCV 05/03/2022 98  82 - 98 fL Final   • MCH 05/03/2022 32 7  26 8 - 34 3 pg Final   • MCHC 05/03/2022 33 5  31 4 - 37 4 g/dL Final   • RDW 05/03/2022 14 0  11 6 - 15 1 % Final   • MPV 05/03/2022 9 9  8 9 - 12 7 fL Final   • Platelets 75/91/0893 197  149 - 390 Thousands/uL Final   • nRBC 05/03/2022 0  /100 WBCs Final   • Neutrophils Relative 05/03/2022 73  43 - 75 % Final   • Immat GRANS % 05/03/2022 1  0 - 2 % Final   • Lymphocytes Relative 05/03/2022 13 (L)  14 - 44 % Final   • Monocytes Relative 05/03/2022 9  4 - 12 % Final   • Eosinophils Relative 05/03/2022 3  0 - 6 % Final   • Basophils Relative 05/03/2022 1  0 - 1 % Final   • Neutrophils Absolute 05/03/2022 4 89  1 85 - 7 62 Thousands/µL Final   • Immature Grans Absolute 05/03/2022 0 03  0 00 - 0 20 Thousand/uL Final   • Lymphocytes Absolute 05/03/2022 0 87  0 60 - 4 47 Thousands/µL Final   • Monocytes Absolute 05/03/2022 0 62  0 17 - 1 22 Thousand/µL Final   • Eosinophils Absolute 05/03/2022 0 21  0 00 - 0 61 Thousand/µL Final   • Basophils Absolute 05/03/2022 0 03  0 00 - 0 10 Thousands/µL Final   • Procalcitonin 05/03/2022 0 16  <=0 25 ng/ml Final    Comment: Suspected Lower Respiratory Tract Infection (LRTI):  - LESS than or EQUAL to 0 25 ng/mL:   low likelihood for bacterial LRTI; antibiotics DISCOURAGED   - GREATER than 0 25 ng/mL:   increased likelihood for bacterial LRTI; antibiotics ENCOURAGED      Suspected Sepsis:  - Strongly consider initiating antibiotics in ALL UNSTABLE patients  - LESS than or EQUAL to 0 5 ng/mL:   low likelihood for bacterial sepsis; antibiotics DISCOURAGED   - GREATER than 0 5 ng/mL:   increased likelihood for bacterial sepsis; antibiotics ENCOURAGED   - GREATER than 2 ng/mL:   high risk for severe sepsis / septic shock; antibiotics strongly ENCOURAGED  Decisions on antibiotic use should not be based solely on Procalcitonin (PCT) levels  If PCT is low but uncertainty exists with stopping antibiotics, repeat PCT in 6-24 hours to confirm the low level  If antibiotics are administered (regardless if initial PCT was high or low), repeat PCT every 1-2 days to consider early antibiotic cessation (when GREATER                            than 80% decrease from the peak OR when PCT drops below designated cutoffs, whichever comes first), so long as the infection is NOT one that typically requires prolonged treatment durations (e g , bone/joint infections, endocarditis, Staph  aureus bacteremia)      Situations of FALSE-POSITIVE Procalcitonin values:  1) Newborns < 67 hours old  2) Massive stress from severe trauma / burns, major surgery, acute pancreatitis, cardiogenic / hemorrhagic shock, sickle cell crisis, or other organ perfusion abnormalities  3) Malaria and some Candidal infections  4) Treatment with agents that stimulate cytokines (e g , OKT3, anti-lymphocyte globulins, alemtuzumab, IL-2, granulocyte transfusion [NOT GCSFs])  5) Chronic renal disease causes elevated baseline levels (consider GREATER than 0 75 ng/mL as an abnormal cut-off); initiating HD/CRRT may cause transient decreases  6) Paraneoplastic syndromes from medullary thyroid or SCLC, some forms of vasculitis, and acute zlbst-jm-dshy                            disease    Situations of FALSE-NEGATIVE Procalcitonin values:  1) Too early in clinical course for PCT to have reached its peak (may repeat in 6-24 hours to confirm low level)  2) Localized infection WITHOUT systemic (SIRS / sepsis) response (e g , an abscess, osteomyelitis, cystitis)  3) Mycobacteria (e g , Tuberculosis, MAC)  4) Cystic fibrosis exacerbations     • POC Glucose 05/03/2022 161 (H)  65 - 140 mg/dl Final   • POC Glucose 05/03/2022 152 (H)  65 - 140 mg/dl Final   • POC Glucose 05/03/2022 131  65 - 140 mg/dl Final   • POC Glucose 05/03/2022 174 (H)  65 - 140 mg/dl Final   • POC Glucose 05/04/2022 136  65 - 140 mg/dl Final   • POC Glucose 05/04/2022 156 (H)  65 - 140 mg/dl Final         Patient Instructions   Diabetes    Check your fingerstick Accu-Chek once a day  Please check your fingerstick Accu-Chek different time of the day either at 7:00 a m  or 11:00 a m  for for p m  4 9:00 p m  Jayla Perdomo Follow Diabetic diet for diabetes as advised  If you would sugar less than 80 or more than 300 to please call us on next visit is day  If the sugar is less than 80 follow-up hypoglycemia instructions as advised  Take your diabetic medicine as advised  Grant Messer MD        "This note has been constructed using a voice recognition system  Therefore there may be syntax, spelling, and/or grammatical errors   Please call if you have any questions  "

## 2022-11-28 NOTE — ASSESSMENT & PLAN NOTE
Lab Results   Component Value Date    HGBA1C 7 1 (H) 02/21/2022   Patient's diabetes control for by her endocrinologist symptoms are controlled with metformin and Jardiance minimal RT numbness lower extremity counseling done for the diet yearly dilated eye exam foot exam normal hypoglycemia protocol in place diet to diabetic diet and increase activity

## 2022-11-29 ENCOUNTER — TELEPHONE (OUTPATIENT)
Dept: ADMINISTRATIVE | Facility: OTHER | Age: 82
End: 2022-11-29

## 2022-11-29 NOTE — TELEPHONE ENCOUNTER
Upon review of the In Basket request and the patient's chart, initial outreach has been made via fax to facility  Please see Contacts section for details       Thank you  Raeann Sanders

## 2022-11-29 NOTE — LETTER
Diabetic Eye Exam Form(We have no copy of this report! Please resend tk u!)    Date Requested: 22  Patient: Kit Ennis  Patient : 1940   Referring Provider: Ernestine Morel MD      DIABETIC Eye Exam Date _1-8-77______________________      Type of Exam MUST be documented for Diabetic Eye Exams  Please CHECK ONE  Retinal Exam       Dilated Retinal Exam       OCT       Optomap-Iris Exam      Fundus Photography       Left Eye - Please check Retinopathy or No Retinopathy        Exam did show retinopathy    Exam did not show retinopathy       Right Eye - Please check Retinopathy or No Retinopathy       Exam did show retinopathy    Exam did not show retinopathy       Comments __________________________________________________________    Practice Providing Exam ______________________________________________    Exam Performed By (print name) _______________________________________      Provider Signature ___________________________________________________      These reports are needed for  compliance  Please fax this completed form and a copy of the Diabetic Eye Exam report to our office located at [place] [address] as soon as possible via [phone] [facility]: Phone [phone]  We thank you for your assistance in treating our mutual patient

## 2022-11-29 NOTE — TELEPHONE ENCOUNTER
----- Message from Marissa Reis MD sent at 11/28/2022  2:11 PM EST -----  Regarding: dm eye  11/28/22 2:12 PM    Hello, our patient Maria Del Carmen Willoughby has had Diabetic Eye Exampleted/performed   Please assist in updating the patient chart by making an External outreach to  dr Edie Schmidt  facility located in  [address] [place] [address] The date of service is about 6 months ago    Thank you,  Marissa Reis MD  [facility] INTERNAL MED

## 2022-11-29 NOTE — LETTER
Diabetic Eye Exam Form    Date Requested: 22  Patient: Grabiel Rodriguez  Patient : 1940   Referring Provider: Rafael Nieves MD      DIABETIC Eye Exam Date _______________________________      Type of Exam MUST be documented for Diabetic Eye Exams  Please CHECK ONE  Retinal Exam       Dilated Retinal Exam       OCT       Optomap-Iris Exam      Fundus Photography       Left Eye - Please check Retinopathy or No Retinopathy        Exam did show retinopathy    Exam did not show retinopathy       Right Eye - Please check Retinopathy or No Retinopathy       Exam did show retinopathy    Exam did not show retinopathy       Comments __________________________________________________________    Practice Providing Exam ______________________________________________    Exam Performed By (print name) _______________________________________      Provider Signature ___________________________________________________      These reports are needed for  compliance  Please fax this completed form and a copy of the Diabetic Eye Exam report to our office located at [place] [address] as soon as possible via [phone] [facility] [employer]: Phone [phone]  We thank you for your assistance in treating our mutual patient

## 2022-12-01 DIAGNOSIS — I48.91 NEW ONSET ATRIAL FIBRILLATION (HCC): ICD-10-CM

## 2022-12-01 DIAGNOSIS — I10 HYPERTENSION, UNSPECIFIED TYPE: ICD-10-CM

## 2022-12-05 DIAGNOSIS — E78.00 HYPERCHOLESTEREMIA: ICD-10-CM

## 2022-12-05 RX ORDER — ATORVASTATIN CALCIUM 40 MG/1
40 TABLET, FILM COATED ORAL DAILY
Qty: 90 TABLET | Refills: 1 | Status: SHIPPED | OUTPATIENT
Start: 2022-12-05

## 2022-12-06 ENCOUNTER — OFFICE VISIT (OUTPATIENT)
Dept: PODIATRY | Facility: CLINIC | Age: 82
End: 2022-12-06

## 2022-12-06 VITALS
RESPIRATION RATE: 17 BRPM | BODY MASS INDEX: 31.36 KG/M2 | DIASTOLIC BLOOD PRESSURE: 80 MMHG | SYSTOLIC BLOOD PRESSURE: 140 MMHG | WEIGHT: 224 LBS | HEIGHT: 71 IN

## 2022-12-06 DIAGNOSIS — I70.209 PERIPHERAL ARTERIOSCLEROSIS (HCC): ICD-10-CM

## 2022-12-06 DIAGNOSIS — M79.672 PAIN IN BOTH FEET: ICD-10-CM

## 2022-12-06 DIAGNOSIS — M79.671 PAIN IN BOTH FEET: ICD-10-CM

## 2022-12-06 DIAGNOSIS — E11.42 DIABETIC POLYNEUROPATHY ASSOCIATED WITH TYPE 2 DIABETES MELLITUS (HCC): Primary | ICD-10-CM

## 2022-12-06 DIAGNOSIS — L84 CORNS: ICD-10-CM

## 2022-12-06 NOTE — TELEPHONE ENCOUNTER
As a follow-up, a second attempt has been made for outreach via telephone call to facility  Please see Contacts section for details      Thank you  Geovany Jaime

## 2022-12-06 NOTE — PROGRESS NOTES
Assessment/Plan:  Patient has pain in his feet and toes with ambulation   He has mycosis of nail and skin  He has plantar pre ulcerative skin lesions      Plan   Diabetic foot exam performed   Mycotic nails debrided   cream ordered   Patient will use daily, he will apply cream to feet b i d  For 4 weeks   Calluses debrided   Procedures performed without pain or complication            Subjective:  Patient has pain in his feet with ambulation   No history of trauma    He has pain when he wears shoes   He has pain in his toes       Patient ID: Spenser Clayton is a 82 y o  male      Patient is diabetic  Obinna Henriquez has pain in his feet and toes with ambulation   Has pain from calluses   He has ingrown toenails and dry scaly skin   He is requesting refill of topical antifungal         Review of Systems   Constitutional: No fever or chills, feels well, no tiredness, no recent weight loss or weight gain   Eyes: No complaints of red eyes, no eyesight problems    ENT: no complaints of loss of hearing, no nosebleeds, no sore throat   Cardiovascular: No complaints of chest pain, no palpitations, no leg claudication or lower extremity edema   Respiratory: No complaints of shortness of breath, no wheezing, no cough   Gastrointestinal: No complaints of abdominal pain, no constipation, no nausea or vomiting, no diarrhea or bloody stools   Genitourinary: No complaints of dysuria or incontinence, no hesitancy, no nocturia   Musculoskeletal: limb pain, but-- as noted in HPI   Integumentary: No complaints of skin rash or lesion, no itching or dry skin, no skin wounds   Neurological: No complaints of headache, no confusion, no numbness or tingling, no dizziness   Psychiatric: No suicidal thoughts, no anxiety, no depression   Endocrine: No muscle weakness, no frequent urination, no excessive thirst, no feelings of weakness       Active Problems  1  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)   2  Arthritis (306 53) (M19 90)   3  Atherosclerosis of arteries of extremities (440 20) (I70 209)   4  Calcaneal spur (726 73) (M77 30)   5  Callus (700) (L84)   6  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)   7  Diabetes mellitus with neuropathy (250 60,357 2) (E11 40)   8  Diabetic neuropathy (250 60,357 2) (E11 40)   9  Hypercholesterolemia (272 0) (E78 00)   10  Hypertension (401 9) (I10)   11  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)   12  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)   13  Onychomycosis (110 1) (B35 1)   14  Orthopedic aftercare (V54 9) (Z47 89)   15  Pain in both feet (729 5) (M79 671,M79 672)   16  Pain in extremity (729 5) (M79 609)   17  Pes planus, congenital (754 61) (Q66 50)   18  Plantar 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 2)   19  Plantar wart (078 12) (B07 0)   20  Prostate cancer (185) (C61)   21  Sprain of right shoulder (840 9) (S43 401A)   22  Tinea pedis (110 4) (B35 3)     Past Medical History   · Orthopedic aftercare (V54 9) (Z47 89)     Surgical History   · History of Gastric Surgery   · History of Hernia Repair   · History of Previous Stent Placement Total Number Performed ___     The surgical history was reviewed and updated today        Family History  Family History    · Family history of Arthritis (V17 7)   · Family history of Cancer     The family history was reviewed and updated today        Social History      · Beer Consumption (___ Bottles Per Day)   · Daily Coffee Consumption (1  Cups/Day)   · Former smoker (L34 22) (Z87 891)  The social history was reviewed and updated today  Allergies  1  No Known Drug Allergies      Physical Exam  Left Foot: Appearance: Normal except as noted: excessive pronation-- and-- pes planus  Right Foot: Appearance: Normal except as noted: excessive pronation-- and-- pes planus  Left Ankle: ROM: limited ROM in all planes   Right Ankle: ROM: limited ROM in all planes   Neurological Exam: performed   Light touch was intact bilaterally  Vibratory sensation was intact bilaterally  Response to monofilament test was intact bilaterally  Deep tendon reflexes: patellar reflex present bilaterally-- and-- achilles reflex present bilaterally  Vascular Exam: performed Dorsalis pedis pulses were One/4 bilaterally  Posterior tibial pulses were One/4 bilaterally  Elevation Pallor: present bilaterally  Capillary refill time was greater than 3 seconds bilaterally-- and-- Q  9 findings bilateral  Negative digital hair noted  Positive abnormal cooling noted  Toenails: All of the toenails were elongated,-- hypertrophied,-- discolored,-- ingrown,-- shown to have subungual debris,-- tender-- and-- Severely mycotic with onychauxis     Socks and shoes removed, Right Foot Findings: swollen, erythematous and dry   The sensory exam showed diminished vibratory sensation at the level of the toes  Diminished tactile sensation with monofilament testing throughout the right foot   Socks and shoes removed, Left Foot Findings: swollen, erythematous and dry   The sensory exam showed diminished vibratory sensation at the level of the toes  Diminished tactile sensation with monofilament testing throughout the left foot  Capillary refills findings on the right were delayed in the toes   Pulses:  1+ in the posterior tibialis on the right  1+ in the dorsalis pedis on the right   Capillary refills findings on the left were delayed in the toes   Pulses:  1+ in the posterior tibialis on the left  1+ in the dorsalis pedis on the left   Assign Risk Category: 2: Loss of protective sensation with or without weakness, deformity, callus, pre-ulcer, or history of ulceration  High risk  Hyperkeratosis: present on both first toes,-- present on both fifth sub metatarsals-- and-- Coldiron tinea pedis, bilateral, is noted  Shoe Gear Evaluation: performed ()  Right Foot: width: d-- and-- length: 11   Left Foot: width: d-- and-- length: 11  Recommendation(s): athletic shoes     Patient's shoes and socks removed  Right Foot/Ankle   Right Foot Inspection  Skin Exam: dry skin, callus and callus               Toe Exam: swelling  Sensory   Vibration: diminished  Proprioception: diminished      Vascular  Capillary refills: elevated        Left Foot/Ankle  Left Foot Inspection  Skin Exam: dry skin and callus              Toe Exam: swelling and erythema                   Sensory   Vibration: diminished  Proprioception: diminished     Vascular  Capillary refills: elevated     Right Foot/Ankle   Right Foot Inspection        Sensory   Vibration: diminished  Proprioception: diminished  Monofilament testing: diminished     Vascular  Capillary refills: < 3 seconds              Left Foot/Ankle  Left Foot Inspection        Sensory   Vibration: diminished  Proprioception: diminished  Monofilament testing: diminished     Vascular  Capillary refills: < 3 seconds           Assign Risk Category  Deformity present  Loss of protective sensation  Weak pulses  Risk: 2

## 2022-12-06 NOTE — TELEPHONE ENCOUNTER
Upon review of the In Basket request we were able to locate, review, and update the patient chart as requested for Diabetic Eye Exam     Any additional questions or concerns should be emailed to the Practice Liaisons via the appropriate education email address, please do not reply via In Basket      Thank you  Nancy Turpin

## 2022-12-19 ENCOUNTER — HOSPITAL ENCOUNTER (INPATIENT)
Facility: HOSPITAL | Age: 82
LOS: 11 days | Discharge: HOME | End: 2022-12-31
Attending: EMERGENCY MEDICINE | Admitting: STUDENT IN AN ORGANIZED HEALTH CARE EDUCATION/TRAINING PROGRAM

## 2022-12-19 DIAGNOSIS — L03.90 CELLULITIS: ICD-10-CM

## 2022-12-19 DIAGNOSIS — M79.89 RIGHT AXILLARY SWELLING: ICD-10-CM

## 2022-12-19 DIAGNOSIS — I48.91 NEW ONSET ATRIAL FIBRILLATION (HCC): ICD-10-CM

## 2022-12-19 DIAGNOSIS — G93.40 ENCEPHALOPATHY: ICD-10-CM

## 2022-12-19 DIAGNOSIS — I48.91 ATRIAL FIBRILLATION WITH RVR (HCC): ICD-10-CM

## 2022-12-19 DIAGNOSIS — L50.9 DIFFUSE URTICARIA: ICD-10-CM

## 2022-12-19 DIAGNOSIS — J96.01 ACUTE RESPIRATORY FAILURE WITH HYPOXEMIA (HCC): ICD-10-CM

## 2022-12-19 DIAGNOSIS — E78.5 HYPERLIPIDEMIA: ICD-10-CM

## 2022-12-19 DIAGNOSIS — T78.3XXA ANGIOEDEMA, INITIAL ENCOUNTER: Primary | ICD-10-CM

## 2022-12-19 DIAGNOSIS — T78.3XXS ANGIOEDEMA, SEQUELA: ICD-10-CM

## 2022-12-19 DIAGNOSIS — N17.9 ACUTE KIDNEY INJURY (HCC): ICD-10-CM

## 2022-12-19 DIAGNOSIS — I10 HYPERTENSION, UNSPECIFIED TYPE: ICD-10-CM

## 2022-12-19 DIAGNOSIS — I48.19 PERSISTENT ATRIAL FIBRILLATION (HCC): ICD-10-CM

## 2022-12-19 LAB
ALBUMIN SERPL BCP-MCNC: 3.1 G/DL (ref 3.5–5)
ALP SERPL-CCNC: 91 U/L (ref 46–116)
ALT SERPL W P-5'-P-CCNC: 17 U/L (ref 12–78)
ANION GAP SERPL CALCULATED.3IONS-SCNC: 10 MMOL/L (ref 4–13)
AST SERPL W P-5'-P-CCNC: 20 U/L (ref 5–45)
BASOPHILS # BLD AUTO: 0.02 THOUSANDS/ÂΜL (ref 0–0.1)
BASOPHILS # BLD MANUAL: 0 THOUSAND/UL (ref 0–0.1)
BASOPHILS NFR BLD AUTO: 0 % (ref 0–1)
BASOPHILS NFR MAR MANUAL: 0 % (ref 0–1)
BILIRUB SERPL-MCNC: 0.4 MG/DL (ref 0.2–1)
BUN SERPL-MCNC: 15 MG/DL (ref 5–25)
CALCIUM ALBUM COR SERPL-MCNC: 9.6 MG/DL (ref 8.3–10.1)
CALCIUM SERPL-MCNC: 8.9 MG/DL (ref 8.3–10.1)
CHLORIDE SERPL-SCNC: 101 MMOL/L (ref 96–108)
CO2 SERPL-SCNC: 27 MMOL/L (ref 21–32)
CREAT SERPL-MCNC: 0.91 MG/DL (ref 0.6–1.3)
EOSINOPHIL # BLD AUTO: 0.07 THOUSAND/ÂΜL (ref 0–0.61)
EOSINOPHIL # BLD MANUAL: 0 THOUSAND/UL (ref 0–0.4)
EOSINOPHIL NFR BLD AUTO: 1 % (ref 0–6)
EOSINOPHIL NFR BLD MANUAL: 0 % (ref 0–6)
ERYTHROCYTE [DISTWIDTH] IN BLOOD BY AUTOMATED COUNT: 13.1 % (ref 11.6–15.1)
ERYTHROCYTE [DISTWIDTH] IN BLOOD BY AUTOMATED COUNT: 13.2 % (ref 11.6–15.1)
FLUAV RNA RESP QL NAA+PROBE: NEGATIVE
FLUBV RNA RESP QL NAA+PROBE: NEGATIVE
GFR SERPL CREATININE-BSD FRML MDRD: 79 ML/MIN/1.73SQ M
GLUCOSE SERPL-MCNC: 176 MG/DL (ref 65–140)
GLUCOSE SERPL-MCNC: 178 MG/DL (ref 65–140)
GLUCOSE SERPL-MCNC: 222 MG/DL (ref 65–140)
HCT VFR BLD AUTO: 51.7 % (ref 36.5–49.3)
HCT VFR BLD AUTO: 52.9 % (ref 36.5–49.3)
HGB BLD-MCNC: 16.5 G/DL (ref 12–17)
HGB BLD-MCNC: 17.2 G/DL (ref 12–17)
IMM GRANULOCYTES # BLD AUTO: 0.03 THOUSAND/UL (ref 0–0.2)
IMM GRANULOCYTES NFR BLD AUTO: 0 % (ref 0–2)
LYMPHOCYTES # BLD AUTO: 0.61 THOUSAND/UL (ref 0.6–4.47)
LYMPHOCYTES # BLD AUTO: 1.11 THOUSANDS/ÂΜL (ref 0.6–4.47)
LYMPHOCYTES # BLD AUTO: 8 % (ref 14–44)
LYMPHOCYTES NFR BLD AUTO: 11 % (ref 14–44)
MAGNESIUM SERPL-MCNC: 1.6 MG/DL (ref 1.6–2.6)
MCH RBC QN AUTO: 32.8 PG (ref 26.8–34.3)
MCH RBC QN AUTO: 32.8 PG (ref 26.8–34.3)
MCHC RBC AUTO-ENTMCNC: 31.9 G/DL (ref 31.4–37.4)
MCHC RBC AUTO-ENTMCNC: 32.5 G/DL (ref 31.4–37.4)
MCV RBC AUTO: 101 FL (ref 82–98)
MCV RBC AUTO: 103 FL (ref 82–98)
METAMYELOCYTES NFR BLD MANUAL: 1 % (ref 0–1)
MONOCYTES # BLD AUTO: 0.08 THOUSAND/UL (ref 0–1.22)
MONOCYTES # BLD AUTO: 0.45 THOUSAND/ÂΜL (ref 0.17–1.22)
MONOCYTES NFR BLD AUTO: 5 % (ref 4–12)
MONOCYTES NFR BLD: 1 % (ref 4–12)
NEUTROPHILS # BLD AUTO: 8.04 THOUSANDS/ÂΜL (ref 1.85–7.62)
NEUTROPHILS # BLD MANUAL: 6.9 THOUSAND/UL (ref 1.85–7.62)
NEUTS BAND NFR BLD MANUAL: 25 % (ref 0–8)
NEUTS SEG NFR BLD AUTO: 65 % (ref 43–75)
NEUTS SEG NFR BLD AUTO: 83 % (ref 43–75)
NRBC BLD AUTO-RTO: 0 /100 WBCS
PLATELET # BLD AUTO: 255 THOUSANDS/UL (ref 149–390)
PLATELET # BLD AUTO: 273 THOUSANDS/UL (ref 149–390)
PLATELET BLD QL SMEAR: ADEQUATE
PLATELET CLUMP BLD QL SMEAR: PRESENT
PMV BLD AUTO: 9.6 FL (ref 8.9–12.7)
PMV BLD AUTO: 9.9 FL (ref 8.9–12.7)
POLYCHROMASIA BLD QL SMEAR: PRESENT
POTASSIUM SERPL-SCNC: 4.4 MMOL/L (ref 3.5–5.3)
PROT SERPL-MCNC: 7.1 G/DL (ref 6.4–8.4)
RBC # BLD AUTO: 5.03 MILLION/UL (ref 3.88–5.62)
RBC # BLD AUTO: 5.25 MILLION/UL (ref 3.88–5.62)
RBC MORPH BLD: PRESENT
RSV RNA RESP QL NAA+PROBE: NEGATIVE
SARS-COV-2 RNA RESP QL NAA+PROBE: NEGATIVE
SODIUM SERPL-SCNC: 138 MMOL/L (ref 135–147)
WBC # BLD AUTO: 7.67 THOUSAND/UL (ref 4.31–10.16)
WBC # BLD AUTO: 9.72 THOUSAND/UL (ref 4.31–10.16)

## 2022-12-19 RX ORDER — DIPHENHYDRAMINE HYDROCHLORIDE 50 MG/ML
25 INJECTION INTRAMUSCULAR; INTRAVENOUS EVERY 6 HOURS
Status: DISCONTINUED | OUTPATIENT
Start: 2022-12-20 | End: 2022-12-22

## 2022-12-19 RX ORDER — DIPHENHYDRAMINE HYDROCHLORIDE 50 MG/ML
50 INJECTION INTRAMUSCULAR; INTRAVENOUS 2 TIMES DAILY PRN
Status: DISCONTINUED | OUTPATIENT
Start: 2022-12-19 | End: 2022-12-19

## 2022-12-19 RX ORDER — METHYLPREDNISOLONE SODIUM SUCCINATE 125 MG/2ML
125 INJECTION, POWDER, LYOPHILIZED, FOR SOLUTION INTRAMUSCULAR; INTRAVENOUS ONCE
Status: COMPLETED | OUTPATIENT
Start: 2022-12-19 | End: 2022-12-19

## 2022-12-19 RX ORDER — HALOPERIDOL 1 MG/1
2 TABLET ORAL ONCE
Status: COMPLETED | OUTPATIENT
Start: 2022-12-19 | End: 2022-12-19

## 2022-12-19 RX ORDER — INSULIN LISPRO 100 [IU]/ML
1-6 INJECTION, SOLUTION INTRAVENOUS; SUBCUTANEOUS
Status: DISCONTINUED | OUTPATIENT
Start: 2022-12-19 | End: 2023-01-01 | Stop reason: HOSPADM

## 2022-12-19 RX ORDER — MONTELUKAST SODIUM 5 MG/1
5 TABLET, CHEWABLE ORAL
Status: DISCONTINUED | OUTPATIENT
Start: 2022-12-19 | End: 2022-12-19 | Stop reason: CLARIF

## 2022-12-19 RX ORDER — MONTELUKAST SODIUM 10 MG/1
5 TABLET ORAL
Status: DISCONTINUED | OUTPATIENT
Start: 2022-12-19 | End: 2023-01-01 | Stop reason: HOSPADM

## 2022-12-19 RX ORDER — ATENOLOL 50 MG/1
50 TABLET ORAL DAILY
Status: DISCONTINUED | OUTPATIENT
Start: 2022-12-19 | End: 2022-12-23

## 2022-12-19 RX ORDER — ACETAMINOPHEN 325 MG/1
650 TABLET ORAL EVERY 6 HOURS PRN
Status: DISCONTINUED | OUTPATIENT
Start: 2022-12-19 | End: 2023-01-01 | Stop reason: HOSPADM

## 2022-12-19 RX ORDER — PRIMIDONE 50 MG/1
50 TABLET ORAL EVERY 12 HOURS SCHEDULED
Status: DISCONTINUED | OUTPATIENT
Start: 2022-12-19 | End: 2022-12-19

## 2022-12-19 RX ORDER — DIPHENHYDRAMINE HYDROCHLORIDE 50 MG/ML
50 INJECTION INTRAMUSCULAR; INTRAVENOUS EVERY 6 HOURS
Status: DISCONTINUED | OUTPATIENT
Start: 2022-12-19 | End: 2022-12-19

## 2022-12-19 RX ORDER — HEPARIN SODIUM 5000 [USP'U]/ML
5000 INJECTION, SOLUTION INTRAVENOUS; SUBCUTANEOUS EVERY 8 HOURS SCHEDULED
Status: DISCONTINUED | OUTPATIENT
Start: 2022-12-19 | End: 2022-12-19

## 2022-12-19 RX ORDER — ALBUTEROL SULFATE 2.5 MG/3ML
2.5 SOLUTION RESPIRATORY (INHALATION) EVERY 4 HOURS PRN
Status: DISCONTINUED | OUTPATIENT
Start: 2022-12-19 | End: 2022-12-20

## 2022-12-19 RX ORDER — ATORVASTATIN CALCIUM 40 MG/1
40 TABLET, FILM COATED ORAL DAILY
Status: DISCONTINUED | OUTPATIENT
Start: 2022-12-19 | End: 2022-12-19

## 2022-12-19 RX ORDER — BISACODYL 10 MG
10 SUPPOSITORY, RECTAL RECTAL DAILY PRN
Status: DISCONTINUED | OUTPATIENT
Start: 2022-12-19 | End: 2023-01-01 | Stop reason: HOSPADM

## 2022-12-19 RX ORDER — SIMETHICONE 80 MG
80 TABLET,CHEWABLE ORAL 4 TIMES DAILY PRN
Status: DISCONTINUED | OUTPATIENT
Start: 2022-12-19 | End: 2023-01-01 | Stop reason: HOSPADM

## 2022-12-19 RX ORDER — FAMOTIDINE 10 MG/ML
20 INJECTION, SOLUTION INTRAVENOUS ONCE
Status: COMPLETED | OUTPATIENT
Start: 2022-12-19 | End: 2022-12-19

## 2022-12-19 RX ORDER — IODINE/SODIUM IODIDE 2 %
TINCTURE TOPICAL 4 TIMES DAILY
Status: DISCONTINUED | OUTPATIENT
Start: 2022-12-19 | End: 2023-01-01 | Stop reason: HOSPADM

## 2022-12-19 RX ORDER — METHYLPREDNISOLONE SODIUM SUCCINATE 40 MG/ML
40 INJECTION, POWDER, LYOPHILIZED, FOR SOLUTION INTRAMUSCULAR; INTRAVENOUS ONCE
Status: COMPLETED | OUTPATIENT
Start: 2022-12-19 | End: 2022-12-19

## 2022-12-19 RX ORDER — INSULIN LISPRO 100 [IU]/ML
1-6 INJECTION, SOLUTION INTRAVENOUS; SUBCUTANEOUS
Status: DISCONTINUED | OUTPATIENT
Start: 2022-12-20 | End: 2022-12-22

## 2022-12-19 RX ORDER — FOLIC ACID 1 MG/1
1 TABLET ORAL DAILY
Status: DISCONTINUED | OUTPATIENT
Start: 2022-12-19 | End: 2023-01-01 | Stop reason: HOSPADM

## 2022-12-19 RX ORDER — FAMOTIDINE 10 MG/ML
20 INJECTION, SOLUTION INTRAVENOUS EVERY 12 HOURS SCHEDULED
Status: DISCONTINUED | OUTPATIENT
Start: 2022-12-19 | End: 2023-01-01 | Stop reason: HOSPADM

## 2022-12-19 RX ORDER — LATANOPROST 50 UG/ML
1 SOLUTION/ DROPS OPHTHALMIC
Status: DISCONTINUED | OUTPATIENT
Start: 2022-12-19 | End: 2023-01-01 | Stop reason: HOSPADM

## 2022-12-19 RX ORDER — METHYLPREDNISOLONE SODIUM SUCCINATE 40 MG/ML
20 INJECTION, POWDER, LYOPHILIZED, FOR SOLUTION INTRAMUSCULAR; INTRAVENOUS EVERY 8 HOURS SCHEDULED
Status: DISCONTINUED | OUTPATIENT
Start: 2022-12-20 | End: 2022-12-20

## 2022-12-19 RX ORDER — ONDANSETRON 2 MG/ML
4 INJECTION INTRAMUSCULAR; INTRAVENOUS EVERY 6 HOURS PRN
Status: DISCONTINUED | OUTPATIENT
Start: 2022-12-19 | End: 2023-01-01 | Stop reason: HOSPADM

## 2022-12-19 RX ORDER — DIPHENHYDRAMINE HYDROCHLORIDE 50 MG/ML
50 INJECTION INTRAMUSCULAR; INTRAVENOUS ONCE
Status: DISCONTINUED | OUTPATIENT
Start: 2022-12-19 | End: 2022-12-19

## 2022-12-19 RX ORDER — HYDROXYZINE HYDROCHLORIDE 25 MG/1
25 TABLET, FILM COATED ORAL EVERY 6 HOURS PRN
Status: DISCONTINUED | OUTPATIENT
Start: 2022-12-19 | End: 2023-01-01 | Stop reason: HOSPADM

## 2022-12-19 RX ORDER — METHYLPREDNISOLONE SODIUM SUCCINATE 40 MG/ML
40 INJECTION, POWDER, LYOPHILIZED, FOR SOLUTION INTRAMUSCULAR; INTRAVENOUS EVERY 8 HOURS SCHEDULED
Status: DISCONTINUED | OUTPATIENT
Start: 2022-12-19 | End: 2022-12-19

## 2022-12-19 RX ORDER — DIPHENHYDRAMINE HYDROCHLORIDE 50 MG/ML
25 INJECTION INTRAMUSCULAR; INTRAVENOUS ONCE
Status: COMPLETED | OUTPATIENT
Start: 2022-12-19 | End: 2022-12-19

## 2022-12-19 RX ORDER — MAGNESIUM HYDROXIDE/ALUMINUM HYDROXICE/SIMETHICONE 120; 1200; 1200 MG/30ML; MG/30ML; MG/30ML
30 SUSPENSION ORAL EVERY 6 HOURS PRN
Status: DISCONTINUED | OUTPATIENT
Start: 2022-12-19 | End: 2022-12-28

## 2022-12-19 RX ORDER — FLUTICASONE FUROATE AND VILANTEROL 100; 25 UG/1; UG/1
1 POWDER RESPIRATORY (INHALATION) DAILY
Status: DISCONTINUED | OUTPATIENT
Start: 2022-12-19 | End: 2023-01-01 | Stop reason: HOSPADM

## 2022-12-19 RX ADMIN — INSULIN LISPRO 1 UNITS: 100 INJECTION, SOLUTION INTRAVENOUS; SUBCUTANEOUS at 21:13

## 2022-12-19 RX ADMIN — METHYLPREDNISOLONE SODIUM SUCCINATE 40 MG: 40 INJECTION, POWDER, FOR SOLUTION INTRAMUSCULAR; INTRAVENOUS at 09:38

## 2022-12-19 RX ADMIN — HYDROCORTISONE: 25 CREAM TOPICAL at 08:13

## 2022-12-19 RX ADMIN — DIPHENHYDRAMINE HYDROCHLORIDE 50 MG: 50 INJECTION, SOLUTION INTRAMUSCULAR; INTRAVENOUS at 05:53

## 2022-12-19 RX ADMIN — UMECLIDINIUM 1 PUFF: 62.5 AEROSOL, POWDER ORAL at 08:09

## 2022-12-19 RX ADMIN — FERRIC OXIDE RED 1 APPLICATION: 8; 8 LOTION TOPICAL at 21:13

## 2022-12-19 RX ADMIN — ONDANSETRON 4 MG: 2 INJECTION INTRAMUSCULAR; INTRAVENOUS at 21:19

## 2022-12-19 RX ADMIN — APIXABAN 5 MG: 5 TABLET, FILM COATED ORAL at 17:36

## 2022-12-19 RX ADMIN — FAMOTIDINE 20 MG: 10 INJECTION, SOLUTION INTRAVENOUS at 07:52

## 2022-12-19 RX ADMIN — DIPHENHYDRAMINE HYDROCHLORIDE 50 MG: 50 INJECTION, SOLUTION INTRAMUSCULAR; INTRAVENOUS at 15:58

## 2022-12-19 RX ADMIN — DIPHENHYDRAMINE HYDROCHLORIDE 50 MG: 50 INJECTION, SOLUTION INTRAMUSCULAR; INTRAVENOUS at 21:13

## 2022-12-19 RX ADMIN — FLUTICASONE FUROATE AND VILANTEROL TRIFENATATE 1 PUFF: 100; 25 POWDER RESPIRATORY (INHALATION) at 08:09

## 2022-12-19 RX ADMIN — METHYLPREDNISOLONE SODIUM SUCCINATE 40 MG: 40 INJECTION, POWDER, FOR SOLUTION INTRAMUSCULAR; INTRAVENOUS at 21:13

## 2022-12-19 RX ADMIN — METHYLPREDNISOLONE SODIUM SUCCINATE 40 MG: 40 INJECTION, POWDER, FOR SOLUTION INTRAMUSCULAR; INTRAVENOUS at 11:30

## 2022-12-19 RX ADMIN — FERRIC OXIDE RED: 8; 8 LOTION TOPICAL at 08:08

## 2022-12-19 RX ADMIN — FERRIC OXIDE RED: 8; 8 LOTION TOPICAL at 17:41

## 2022-12-19 RX ADMIN — ACETAMINOPHEN 650 MG: 325 TABLET, FILM COATED ORAL at 05:53

## 2022-12-19 RX ADMIN — FAMOTIDINE 20 MG: 10 INJECTION, SOLUTION INTRAVENOUS at 01:30

## 2022-12-19 RX ADMIN — ACETAMINOPHEN 650 MG: 325 TABLET, FILM COATED ORAL at 17:43

## 2022-12-19 RX ADMIN — INSULIN LISPRO 2 UNITS: 100 INJECTION, SOLUTION INTRAVENOUS; SUBCUTANEOUS at 16:06

## 2022-12-19 RX ADMIN — FAMOTIDINE 20 MG: 10 INJECTION, SOLUTION INTRAVENOUS at 21:12

## 2022-12-19 RX ADMIN — LATANOPROST 1 DROP: 50 SOLUTION OPHTHALMIC at 21:13

## 2022-12-19 RX ADMIN — ACETAMINOPHEN 650 MG: 325 TABLET, FILM COATED ORAL at 23:21

## 2022-12-19 RX ADMIN — DIPHENHYDRAMINE HYDROCHLORIDE 50 MG: 50 INJECTION, SOLUTION INTRAMUSCULAR; INTRAVENOUS at 09:39

## 2022-12-19 RX ADMIN — APIXABAN 5 MG: 5 TABLET, FILM COATED ORAL at 08:07

## 2022-12-19 RX ADMIN — FOLIC ACID 1 MG: 1 TABLET ORAL at 08:07

## 2022-12-19 RX ADMIN — METHYLPREDNISOLONE SODIUM SUCCINATE 125 MG: 125 INJECTION, POWDER, FOR SOLUTION INTRAMUSCULAR; INTRAVENOUS at 01:30

## 2022-12-19 RX ADMIN — ATENOLOL 50 MG: 50 TABLET ORAL at 08:07

## 2022-12-19 RX ADMIN — HYDROXYZINE HYDROCHLORIDE 25 MG: 25 TABLET ORAL at 21:12

## 2022-12-19 RX ADMIN — HALOPERIDOL 2 MG: 1 TABLET ORAL at 23:21

## 2022-12-19 RX ADMIN — DIPHENHYDRAMINE HYDROCHLORIDE 25 MG: 50 INJECTION, SOLUTION INTRAMUSCULAR; INTRAVENOUS at 01:29

## 2022-12-19 RX ADMIN — HYDROCORTISONE: 25 CREAM TOPICAL at 17:41

## 2022-12-19 RX ADMIN — FERRIC OXIDE RED: 8; 8 LOTION TOPICAL at 12:35

## 2022-12-19 RX ADMIN — MONTELUKAST 5 MG: 10 TABLET, FILM COATED ORAL at 21:12

## 2022-12-19 RX ADMIN — METHYLPREDNISOLONE SODIUM SUCCINATE 40 MG: 40 INJECTION, POWDER, FOR SOLUTION INTRAMUSCULAR; INTRAVENOUS at 15:15

## 2022-12-19 NOTE — ASSESSMENT & PLAN NOTE
Wt Readings from Last 3 Encounters:   12/19/22 104 kg (228 lb 6 3 oz)   12/06/22 102 kg (224 lb)   11/28/22 102 kg (224 lb)         No clinical s/s of acute exacerbation

## 2022-12-19 NOTE — ASSESSMENT & PLAN NOTE
- patient on mysoline, atorvastatin and losartan which could cause angioedema/ urticaria- both held   - rash is mostly on trunk and upper thigh and is very pruritic   - first incidence   - also has swelling lips but no swelling of tongue  Denies chest pain or dizziness or sob or palpitations or findings to suggest bronchospasm   - clinically and hemodynamically stable   - given a dose of 125 mng solumedrol, benadryl 25 mg IV and pepcid 20 mg IV   - patient 's pruritis returned once benadryl worse off     PLAN:   Reordered benadryl, Pepcid, solumedrol ordered for later today   calamine and topical hydrocortisone lotion   Montelukast started

## 2022-12-19 NOTE — ASSESSMENT & PLAN NOTE
Lab Results   Component Value Date    HGBA1C 7 1 (H) 02/21/2022       No results for input(s): POCGLU in the last 72 hours      Blood Sugar Average: Last 72 hrs:     - hold metformin   - continue with other DM medications   - insulin sliding scale with acuchecks every 6 hrs as patient on steroids now

## 2022-12-19 NOTE — ASSESSMENT & PLAN NOTE
Heart rate is not well controlled  · Telemetry and placed   · Started on Cardizem drip and also received IV digoxin x1 overnight  · continue eliquis    Increased dose of Cardizem  · Cardio input appreciated

## 2022-12-19 NOTE — ASSESSMENT & PLAN NOTE
Wt Readings from Last 3 Encounters:   12/19/22 104 kg (228 lb 6 3 oz)   12/06/22 102 kg (224 lb)   11/28/22 102 kg (224 lb)     Daily weights  Intake and output  Continue atenolol

## 2022-12-19 NOTE — CASE MANAGEMENT
Case Management Assessment & Discharge Planning Note    Patient name Tr Romano  Location [facility] [address] CUELLO MRN [id number]  : 1940 Date 2022       Current Admission Date: 2022  Current Admission Diagnosis:Angioedema   Patient Active Problem List    Diagnosis Date Noted   • Angioedema 2022   • COVID-19 10/10/2022   • Depression, recurrent ([facility] ) 2022   • Acute respiratory failure with hypoxemia ([facility] ) 2022   • Prostate cancer ([place] [address] ) 2022   • GERD (gastroesophageal reflux disease) 2022   • CAD (coronary artery disease) 2022   • Persistent atrial fibrillation ([place] [address] ) 2022   • Alcohol dependence ([place] [address] ) 2022   • SYLVESTER (obstructive sleep apnea)    • Chronic diastolic heart failure ([place] [address] ) 2021   • Squamous cell carcinoma of lung ([place] [address] ) 10/13/2021   • Shortness of breath 10/13/2021   • Daytime hypersomnolence 10/13/2021   • Lung mass 2021   • Hyperlipidemia    • Corns 02/15/2018   • Pain in both feet 02/15/2018   • Diabetic polyneuropathy associated with type 2 diabetes mellitus ([facility] ) 02/15/2018   • Onychomycosis 02/15/2018   • Tinea pedis of both feet 02/15/2018      LOS (days): 0  Geometric Mean LOS (GMLOS) (days):   Days to GMLOS:     OBJECTIVE:      Current admission status: Observation     Preferred Pharmacy:   [address] [facility] [address]  [address] [facility] [address]  Phone: [phone] Fax: [phone]    [facility] ([address] [place] [address],   [address]  [address] [place] [address]  Phone: [phone] Fax: [phone]    Primary Care Provider: Mauricio Ceja MD    Primary Insurance: Cecilia Love [facility] REP  Secondary Insurance:     ASSESSMENT:  [address] [address] [facility] - Spouse   Primary Phone: [phone] (Home)            Obs Notice Signed:  (MOON reviewed with patient   Patient gave verbal understanding, signed, and was providing original  Copy placed in scan bin )    Readmission Root Cause  30 Day Readmission: No    Patient Information  Admitted from[de-identified] Home  Mental Status: Alert  During Assessment patient was accompanied by: Not accompanied during assessment  Assessment information provided by[de-identified] Patient  Primary Caregiver: Self  Support Systems: Spouse/significant other, Self  [place] of Residence: [address] [place] [address] do you live in?: Nikki Reyes 45 entry access options  Select all that apply : Stairs  Number of steps to enter home : 4  Type of Current Residence: 2 story home  Upon entering residence, is there a bedroom on the main floor (no further steps)?: Yes  Upon entering residence, is there a bathroom on the main floor (no further steps)?: Yes  In the last 12 months, was there a time when you were not able to pay the mortgage or rent on time?: No  In the last 12 months, how many places have you lived?: 1  In the last 12 months, was there a time when you did not have a steady place to sleep or slept in a shelter (including now)?: No  Homeless/housing insecurity resource given?: N/A  Living Arrangements: Lives w/ Spouse/significant other    Activities of Daily Living Prior to Admission  Functional Status: Independent  Completes ADLs independently?: Yes  Ambulates independently?: Yes  Does patient use assisted devices?: Yes  Assisted Devices (DME) used: Nebulizer  Does patient currently own DME?: Yes  What DME does the patient currently own?: Nebulizer, Straight Cane, Walker, Bedside Commode  Does patient have a history of Outpatient Therapy (PT/OT)?: Yes  Does the patient have a history of Short-Term Rehab?: Yes (Hx with [facility])  Does patient have a history of HHC?: No  Does patient currently have [address]?: No     Patient Information Continued  Income Source: Pension/nursing home  Does patient have prescription coverage?: Yes (Pt uses [employer];  Denies issues with OOP costs/coverage)  Within the past 12 months, you worried that your food would run out before you got the money to buy more : Never true  Within the past 12 months, the food you bought just didn't last and you didn't have money to get more : Never true  Food insecurity resource given?: N/A  Does patient receive dialysis treatments?: No  Does patient have a history of substance abuse?: Yes (per chart)  Historical substance use preference: Alcohol/ETOH  Does patient have a history of Mental Health Diagnosis?: Yes (Depression per chart)     Means of Transportation  Means of Transport to Appts[de-identified] Drives Self  In the past 12 months, has lack of transportation kept you from medical appointments or from getting medications?: No  In the past 12 months, has lack of transportation kept you from meetings, work, or from getting things needed for daily living?: No  Was application for public transport provided?: N/A    DISCHARGE DETAILS:    Discharge planning discussed with[de-identified] Patient  Freedom of Choice: Yes  Comments - Freedom of Choice: VON met bedside with patient to introduce role, complete assessment, and discuss discharge plans  Patient states he is independent with mobility and ADLs including driving prior to admission  He anticipates to return home w/ spouse on discharge and has no current questions, concerns, or needs  CM contacted family/caregiver?: Yes (SW contacted spouse to introduce role and review anticipated discharge plan  Spouse confirmed she has no questions or concerns at this time   Spouse provided with VON's direct contact info if needed )  Were Treatment Team discharge recommendations reviewed with patient/caregiver?: Yes  Did patient/caregiver verbalize understanding of patient care needs?: Yes  Were patient/caregiver advised of the risks associated with not following Treatment Team discharge recommendations?: Yes    Contacts  Patient Contacts: Goldie Evangelista  Relationship to Patient[de-identified] Family  Contact Method: Phone  Phone Number: [phone]  Reason/Outcome: Emergency [address]         Is the patient interested in [place] [address] at discharge?: No    DME Referral Provided  Referral made for DME?: No     Would you like to participate in our [facility] service program?  : No - Declined    Treatment Team Recommendation: Home  Discharge Destination Plan[de-identified] Home  Transport at Discharge : Family

## 2022-12-19 NOTE — H&P
Krystle U  68   H&P- Jaimie Kelley 1940, [de-identified] y o  male MRN: [id number]  Unit/Bed#: 2 [place] [address] A Encounter: [id number]  Primary Care Provider: Rishi De MD   Date and time admitted to hospital: 12/19/2022  1:01 AM    * Angioedema  Assessment & Plan  - patient on mysoline, atorvastatin and losartan which could cause angioedema/ urticaria- both held   - rash is mostly on trunk and upper thigh and is very pruritic   - first incidence   - also has swelling lips but no swelling of tongue  Denies chest pain or dizziness or sob or palpitations or findings to suggest bronchospasm   - clinically and hemodynamically stable   - given a dose of 125 mng solumedrol, benadryl 25 mg IV and pepcid 20 mg IV   - patient 's pruritis returned once benadryl worse off  PLAN:   Reordered benadryl, Pepcid, solumedrol ordered for later today   calamine and topical hydrocortisone lotion   Montelukast started     Persistent atrial fibrillation (HCC)  Assessment & Plan  - on eliquis     GERD (gastroesophageal reflux disease)  Assessment & Plan  On ppi    Chronic diastolic heart failure (HCC)  Assessment & Plan  Wt Readings from Last 3 Encounters:   12/19/22 104 kg (228 lb 6 3 oz)   12/06/22 102 kg (224 lb)   11/28/22 102 kg (224 lb)         No clinical s/s of acute exacerbation     Diabetic polyneuropathy associated with type 2 diabetes mellitus ([facility] )  Assessment & Plan  Lab Results   Component Value Date    HGBA1C 7 1 (H) 02/21/2022       No results for input(s): POCGLU in the last 72 hours  Blood Sugar Average: Last 72 hrs:     - hold metformin   - continue with other DM medications   - insulin sliding scale with acuchecks every 6 hrs as patient on steroids now     VTE Pharmacologic Prophylaxis: VTE Score: 4 Moderate Risk (Score 3-4) - Pharmacological DVT Prophylaxis Ordered: apixaban (Eliquis)  Code Status: Level 1 - Full Code    Discussion with family: Patient declined call to   Anticipated Length of Stay: Patient will be admitted on an observation basis with an anticipated length of stay of less than 2 midnights secondary to angioedema  Total Time for Visit, including Counseling / Coordination of Care: 45 minutes Greater than 50% of this total time spent on direct patient counseling and coordination of care  Chief Complaint:  Rash     History of Present Illness:  Dejah Barth is a [de-identified] y o  male with a PMH of  Atrial fibrillation DM hypertension on losartan dyslipidemia tremors and chronic diastolic CHF  who presents with pruritic rash in truncal area - mostly in anterior abdominal wall and upper thigh for almost 1 month  He was self treating with OTC benadryl and topical steroids without much relief  He has been evaluated by his primary care physician and etiology remains unknown  Night prior to coming to ER yesterday he developed swelling of his lips  He also felt soreness of throat but no overt sob  This brought him to ER for evaluation  He recvd IV solumedrol 125 mg  pepcid 20 mg iV and benadryl 25 mg IV one time that helped with symptoms  When I saw patient he denied any soreness of throat or any other cx of concern  His main cx is itching in the area of rash which is mostly located in truncal area and upper thigh  He denies new foods, creams, soaps  Patient is tolerating oral secretions at time of my initial evaluation  Her is tachycardic but not hypotensive likely from underlying a fib and anxiety from pruritis  Patient is being admitted for urticaria, angioedema to be monitored for 24-48 hours  Review of Systems:  Review of Systems   Skin: Positive for rash          Pruritis on trunk and up(er thigh area where rash is which is maculopapular non tender   Lips are swollen           Past Medical and Surgical History:   Past Medical History:   Diagnosis Date   • Abdominal pain    • Anxiety    • Arthritis    • Asthma    • Atrial fibrillation (HCC)    • Back pain • Bleeding ulcer    • Bronchitis    • Cancer [facility])     prostate 2011- radiation   • Cardiac disease     cardiac stent x1   • Cataract     starting   • Chronic diastolic (congestive) heart failure (HCC)    • Diabetes mellitus (HCC)     boarderline diabetic    • GERD (gastroesophageal reflux disease)    • History of radiation therapy 2010    Prostate seeds (brachytherapy) and EBRT   • Hyperlipidemia    • Hypertension    • 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, bilateral    • Low back pain    • Lung cancer ([facility] )    • Lung mass    • Myocardial infarction ([facility] )     mild 1999   • Obesity    • Prostate cancer ([facility] )    • Shortness of breath    • Sleep apnea     sleep study 11/22   • Wears dentures     full set   • Wears glasses        Past Surgical History:   Procedure Laterality Date   • ABDOMINAL HERNIA REPAIR     • ABDOMINAL SURGERY      bleeding ulcer, cyst removed from abd   • COLONOSCOPY     • CORONARY ANGIOPLASTY WITH STENT PLACEMENT  1999    x1    • ESOPHAGOGASTRODUODENOSCOPY     • IR BIOPSY LUNG  10/05/2021   • IR THORACENTESIS  11/08/2021   • KNEE SURGERY Left    • AR BRONCHOSCOPY,DIAGNOSTIC N/A 11/29/2021    Procedure: BRONCHOSCOPY FLEXIBLE;  Surgeon: Reuben Sharma MD;  [facility];  Service: Thoracic   • AR MEDIASTINOSCOPY WITH LYMPH NODE BIOPSY/IES N/A 11/29/2021    Procedure: MEDIASTINOSCOPY;  Surgeon: Reuben Sharma MD;  [facility];  Service: Thoracic   • PROSTATE SURGERY         Meds/Allergies:  Prior to Admission medications    Medication Sig Start Date End Date Taking?  Authorizing Provider   acetaminophen (TYLENOL) 325 mg tablet Take 2 tablets (650 mg total) by mouth every 6 (six) hours as needed for mild pain, headaches or fever 5/4/22   Huma Lozada PA-C   albuterol (2 5 mg/3 mL) 0 083 % nebulizer solution Take 2 5 mg by nebulization As needed per patient's wife     Historical Provider, MD   apixaban (Eliquis) 5 mg Take 1 tablet (5 mg total) by mouth 2 (two) times a day 12/1/22 Rosetta Benjamin MD   atenolol (TENORMIN) 50 mg tablet Take 50 mg by mouth daily 3/23/22   Historical Provider, MD   atorvastatin (LIPITOR) 40 mg tablet Take 1 tablet (40 mg total) by mouth daily 12/5/22   Rosetta Benjamin MD   ciclopirox (LOPROX) 0 77 % cream Apply topically 2 (two) times a day for 20 days 10/4/22 11/16/22  Marta Mcdaniel DPM   folic acid (FOLVITE) 1 mg tablet Take 1 tablet (1 mg total) by mouth daily 5/5/22   Aldair Jordan PA-C   hydrocortisone 2 5 % cream Apply topically 2 (two) times a day Apply twice a day affected area the trunk 11/28/22   Media Carson, MD   JARDIANCE 25 MG TABS Take 25 mg by mouth daily in the early morning  12/28/17   Historical Provider, MD   latanoprost (XALATAN) 0 005 % ophthalmic solution Administer 1 drop to both eyes daily at bedtime      Historical Provider, MD   losartan (COZAAR) 50 mg tablet TAKE 1 TABLET DAILY 8/3/22   Renard Horton DO   metFORMIN (GLUCOPHAGE) 500 mg tablet Take 1 tablet (500 mg total) by mouth 2 (two) times a day 8/25/22   Rosetta Benjamin MD   Multiple Vitamin (MULTI-VITAMIN DAILY PO) Take by mouth daily      Historical Provider, MD   omeprazole (PriLOSEC) 20 mg delayed release capsule Take 1 capsule (20 mg total) by mouth daily 10/24/22   Rosetta Benjamin MD   primidone (MYSOLINE) 50 mg tablet Take 2 tabs twice daily  11/16/22   Nolan Lewis, MD   thiamine 100 MG tablet Take 1 tablet (100 mg total) by mouth daily 5/5/22   Aldair Jordan PA-C   Trelegy Ellipta 073-23 9-01 MCG/INH inhaler INHALE ONE PUFF DAILY; RINSE MOUTH AFTER USE 6/21/22   SIMI Baldwin     I have reviewed home medications using recent Epic encounter      Allergies: No Known Allergies    Social History:  Marital Status: /Civil Union      Patient Pre-hospital Living Situation: Home  Patient Pre-hospital Level of Mobility: walks     Substance Use History:   Social History     Substance and Sexual Activity   Alcohol Use Yes   • Alcohol/week: 10 0 - 12 0 standard drinks   • Types: 7 Glasses of wine, 3 - 5 Cans of beer per week    Comment: few beers day; glass of red wine at dinner     Social History     Tobacco Use   Smoking Status Former   • Packs/day:    • Years:    • Pack years:    • Types: Cigarettes   • Quit date:    • Years since quittin 9   Smokeless Tobacco Never     Social History     Substance and Sexual Activity   Drug Use Never       Family History:  Family History   Problem Relation Age of Onset   • Prostate cancer Brother    • Cancer Maternal Uncle         colo rectal cancer   • Cancer Paternal Aunt        Physical Exam:     Vitals:   Blood Pressure: 96/78 (22 0508)  Pulse: (!) 113 (22 0508)  Temperature: 97 9 °F (36 6 °C) (22 0508)  Temp Source: Tympanic (22 010)  Respirations: 18 (22 0508)  Height: 5' 11" (180 3 cm) (22 0502)  Weight - Scale: 104 kg (228 lb 6 3 oz) (22 0107)  SpO2: 95 % (22 0508)    Physical Exam  Constitutional:       Appearance: Normal appearance  HENT:      Head: Normocephalic and atraumatic  Nose: Nose normal       Mouth/Throat:      Mouth: Mucous membranes are moist    Eyes:      Extraocular Movements: Extraocular movements intact  Pupils: Pupils are equal, round, and reactive to light  Cardiovascular:      Rate and Rhythm: Normal rate  Pulmonary:      Effort: Pulmonary effort is normal       Breath sounds: Normal breath sounds  Abdominal:      General: Abdomen is flat  Bowel sounds are normal       Palpations: Abdomen is soft  Musculoskeletal:         General: Normal range of motion  Cervical back: Normal range of motion  Skin:     General: Skin is warm  Comments: truncal pruritic rah erythematous non tender not warm to touch   Neurological:      General: No focal deficit present  Mental Status: He is alert  Mental status is at baseline     Psychiatric:         Mood and Affect: Mood normal          Additional Data: Lab Results:  Results from last 7 days   Lab Units 12/19/22  0129   WBC Thousand/uL 9 72   HEMOGLOBIN g/dL 17 2*   HEMATOCRIT % 52 9*   PLATELETS Thousands/uL 255   NEUTROS PCT % 83*   LYMPHS PCT % 11*   MONOS PCT % 5   EOS PCT % 1     Results from last 7 days   Lab Units 12/19/22  0129   SODIUM mmol/L 138   POTASSIUM mmol/L 4 4   CHLORIDE mmol/L 101   CO2 mmol/L 27   BUN mg/dL 15   CREATININE mg/dL 0 91   ANION GAP mmol/L 10   CALCIUM mg/dL 8 9   ALBUMIN g/dL 3 1*   TOTAL BILIRUBIN mg/dL 0 40   ALK PHOS U/L 91   ALT U/L 17   AST U/L 20   GLUCOSE RANDOM mg/dL 176*                       Lines/Drains:  Invasive Devices     Peripheral Intravenous Line  Duration           Peripheral IV 12/19/22 Dorsal (posterior); Left Hand <1 day                    Imaging: Reviewed radiology reports from this admission including: none   No orders to display       EKG and Other Studies Reviewed on Admission:   · EKG: Sinus Tachycardia    ** Please Note: This note has been constructed using a voice recognition system   **

## 2022-12-19 NOTE — PROGRESS NOTES
Nikki 47  Progress Note - Bart Alejandro 1940, [de-identified] y o  male MRN: [id number]  Unit/Bed#: 2 [place] [address] A Encounter: [id number]  Primary Care Provider: Pam Burnham MD   Date and time admitted to hospital: 12/19/2022  1:01 AM    * Angioedema  Assessment & Plan  Patient on mysoline, atorvastatin and losartan which could cause angioedema/ urticaria- both held   · Rash is mostly on trunk and upper thigh and is very pruritic; has lip swelling, no tongue  · Given dose of 125 mg solumedrol, benadryl 25 mg IV and pepcid 20 mg IV in ER but symptoms returned   · Continue solumedrol, benadryl, pepcid   · Continue calamine, hydrocortisone   · Continue Singulair      Persistent atrial fibrillation (HCC)  Assessment & Plan  Continue eliquis     GERD (gastroesophageal reflux disease)  Assessment & Plan  Continue PPI    Chronic diastolic heart failure ([facility] [address] )  Assessment & Plan  Wt Readings from Last 3 Encounters:   12/19/22 104 kg (228 lb 6 3 oz)   12/06/22 102 kg (224 lb)   11/28/22 102 kg (224 lb)     Daily weights  Intake and output  Continue atenolol     Diabetic polyneuropathy associated with type 2 diabetes mellitus ([facility] [address] )  Assessment & Plan  Lab Results   Component Value Date    HGBA1C 7 1 (H) 02/21/2022       No results for input(s): POCGLU in the last 72 hours  Blood Sugar Average: Last 72 hrs:     - hold metformin   - continue with other DM medications   - Accuchecks AC/HS with insulin sliding scale       VTE Pharmacologic Prophylaxis:   Pharmacologic: Apixaban (Eliquis)  Mechanical VTE Prophylaxis in Place: Yes    Patient Centered Rounds: I have performed bedside rounds with nursing staff today  Discussions with Specialists or Other Care Team Provider: Yes  Education and Discussions with Family / Patient:Yes  Time Spent for Care: 15 minutes  More than 50% of total time spent on counseling and coordination of care as described above      Current Length of Stay: 0 day(s)  Current Patient Status: Observation     Discharge Plan: pending     Code Status: Level 1 - Full Code      Subjective:   Patient seen multiple times this morning - continues to have pruritic rash  He has no difficulty managing his secretions, no involvement of the throat or oropharynx  Objective:     Vitals:   Temp (24hrs), Av 9 °F (36 6 °C), Min:97 6 °F (36 4 °C), Max:98 °F (36 7 °C)    Temp:  [97 6 °F (36 4 °C)-98 °F (36 7 °C)] 98 °F (36 7 °C)  HR:  [] 92  Resp:  [18-20] 18  BP: ()/(59-84) 140/78  SpO2:  [92 %-97 %] 96 %  Body mass index is 31 85 kg/m²  Input and Output Summary (last 24 hours):   No intake or output data in the 24 hours ending 22 1449     Physical Exam:     Physical Exam  Vitals and nursing note reviewed  Constitutional:       Appearance: Normal appearance  HENT:      Head: Normocephalic  Nose: Nose normal       Mouth/Throat:      Mouth: Mucous membranes are moist    Eyes:      Extraocular Movements: Extraocular movements intact  Cardiovascular:      Rate and Rhythm: Normal rate and regular rhythm  Heart sounds: No murmur heard  Pulmonary:      Effort: Pulmonary effort is normal  No respiratory distress  Breath sounds: Normal breath sounds  No wheezing  Abdominal:      General: Abdomen is flat  Bowel sounds are normal  There is no distension  Palpations: Abdomen is soft  Musculoskeletal:         General: No swelling  Normal range of motion  Skin:     General: Skin is warm and dry  Comments: Urticaria noted to trunk, legs  Mild edema noted to lips  No edema to face/neck   Neurological:      General: No focal deficit present  Mental Status: He is alert and oriented to person, place, and time           Additional Data:     Labs:    Results from last 7 days   Lab Units 22  0129   WBC Thousand/uL 9 72   HEMOGLOBIN g/dL 17 2*   HEMATOCRIT % 52 9*   PLATELETS Thousands/uL 255   NEUTROS PCT % 83*     Results from last 7 days   Lab Units 12/19/22  0129   SODIUM mmol/L 138   POTASSIUM mmol/L 4 4   CHLORIDE mmol/L 101   CO2 mmol/L 27   BUN mg/dL 15   CREATININE mg/dL 0 91   CALCIUM mg/dL 8 9   TOTAL BILIRUBIN mg/dL 0 40   ALK PHOS U/L 91   ALT U/L 17   AST U/L 20             Lab Results   Component Value Date/Time    HGBA1C 7 1 (H) 02/21/2022 12:12 PM               * I Have Reviewed All Lab Data Listed Above  * Additional Pertinent Lab Tests Reviewed: [facility] [place] Reviewed    Imaging:     No orders to display     Imaging Reports Reviewed by myself    Cultures:   Blood Culture:   Lab Results   Component Value Date    BLOODCX No Growth After 5 Days  05/01/2022    BLOODCX No Growth After 5 Days   05/01/2022     Urine Culture: No results found for: URINECX  Sputum Culture: No components found for: SPUTUMCX  Wound Culture: No results found for: WOUNDCULT    Last 24 Hours Medication List:   Current Facility-Administered Medications   Medication Dose Route Frequency Provider Last Rate   • acetaminophen  650 mg Oral Q6H PRN Karen Lawton MD     • albuterol  2 5 mg Nebulization Q4H PRN Karen Lawton MD     • aluminum-magnesium hydroxide-simethicone  30 mL Oral Q6H PRN Karen Lawton MD     • apixaban  5 mg Oral BID Karen Lawton MD     • atenolol  50 mg Oral Daily Karen Lawton MD     • bisacodyl  10 mg Rectal Daily PRN Karen Lawton MD     • calamine-zinc oxide   Topical 4x Daily Karen Lawton MD     • diphenhydrAMINE  50 mg Intravenous Q6H SIMI Brink     • Empagliflozin  25 mg Oral Daily Karen Lawton MD     • famotidine  20 mg Intravenous Q12H [facility] SIMI Brink     • Fluticasone Furoate-Vilanterol  1 puff Inhalation Daily Karen Lawton MD      And   • umeclidinium  1 puff Inhalation Daily Karen Lawton MD     • folic acid  1 mg Oral Daily Karen Lawton MD     • hydrocortisone   Topical BID Karen Lawton MD     • insulin lispro  1-6 Units Subcutaneous TID  SIMI Brink     • insulin lispro  1-6 Units Subcutaneous HS SIMI Thomas     • [START ON 12/20/2022] insulin lispro  1-6 Units Subcutaneous 0200 SIMI Thomas     • latanoprost  1 drop Both Eyes HS Davi Dorman MD     • methylPREDNISolone sodium succinate  40 mg Intravenous Q8H [address] SIMI Thomas     • montelukast  5 mg Oral HS Davi Dorman MD     • ondansetron  4 mg Intravenous Q6H PRN Davi Dorman MD     • simethicone  80 mg Oral 4x Daily PRN Davi Dorman MD          Today, Patient Was Seen By: SIMI Thomas    ** Please Note: Dragon 360 Dictation voice to text software may have been used in the creation of this document   **

## 2022-12-19 NOTE — ASSESSMENT & PLAN NOTE
Patient on mysoline, atorvastatin and losartan which could cause angioedema/ urticaria- both held   · Rash is mostly on trunk and upper thigh and is very pruritic; has lip swelling, no tongue  · Given dose of 125 mg solumedrol, benadryl 25 mg IV and pepcid 20 mg IV in ER but symptoms returned   · Continue solumedrol, benadryl, pepcid   · Continue calamine, hydrocortisone   · Continue Singulair

## 2022-12-19 NOTE — PLAN OF CARE
Problem: SAFETY ADULT  Goal: Patient will remain free of falls  Description: INTERVENTIONS:  - Educate patient/family on patient safety including physical limitations  - Instruct patient to call for assistance with activity   - Consult OT/PT to assist with strengthening/mobility   - Keep Call bell within reach  - Keep bed low and locked with side rails adjusted as appropriate  - Keep care items and personal belongings within reach  - Initiate and maintain comfort rounds  - Make Fall Risk Sign visible to staff  - Offer Toileting every 2 Hours, in advance of need  - Initiate/Maintain bed alarm  - Obtain necessary fall risk management equipment: walker  - Apply yellow socks and bracelet for high fall risk patients  - Consider moving patient to room near nurses station  Outcome: Progressing     Problem: Knowledge Deficit  Goal: Patient/family/caregiver demonstrates understanding of disease process, treatment plan, medications, and discharge instructions  Description: Complete learning assessment and assess knowledge base    Interventions:  - Provide teaching at level of understanding  - Provide teaching via preferred learning methods  Outcome: Progressing

## 2022-12-19 NOTE — ED PROVIDER NOTES
History  Chief Complaint   Patient presents with   • Urticaria     Pt c/o itching, hives and swelling of lips and face  Denies SOB but does feel "tight" with a "lump" in his throat     Patient is an 79-year-old male with complaint of pruritic rash to his back, axilla, buttocks for approximately 1 month  Patient has intermittently been treating with Benadryl without resolution  He has been evaluated by his primary care physician and etiology remains unknown  Patient with complaint of swelling to his lips that began last night, associated with soreness in his throat  He denies new foods, creams, soaps  Patient is tolerating oral secretions at time of my initial evaluation  He took 25 mg of Benadryl prior to coming to the ED  He has a history of A  fib on Eliquis, hyperlipidemia, CHF, diabetes, hypertension  History provided by:  Patient and relative  History limited by:  Acuity of condition   used: No        Prior to Admission Medications   Prescriptions Last Dose Informant Patient Reported? Taking?    JARDIANCE 25 MG TABS   Yes No   Sig: Take 25 mg by mouth daily in the early morning    Multiple Vitamin (MULTI-VITAMIN DAILY PO)   Yes No   Sig: Take by mouth daily     Trelegy Ellipta 100-62 5-25 MCG/INH inhaler   No No   Sig: INHALE ONE PUFF DAILY; RINSE MOUTH AFTER USE   acetaminophen (TYLENOL) 325 mg tablet   No No   Sig: Take 2 tablets (650 mg total) by mouth every 6 (six) hours as needed for mild pain, headaches or fever   albuterol (2 5 mg/3 mL) 0 083 % nebulizer solution   Yes No   Sig: Take 2 5 mg by nebulization As needed per patient's wife    apixaban (Eliquis) 5 mg   No No   Sig: Take 1 tablet (5 mg total) by mouth 2 (two) times a day   atenolol (TENORMIN) 50 mg tablet   Yes No   Sig: Take 50 mg by mouth daily   atorvastatin (LIPITOR) 40 mg tablet   No No   Sig: Take 1 tablet (40 mg total) by mouth daily   ciclopirox (LOPROX) 0 77 % cream   No No   Sig: Apply topically 2 (two) times a day for 20 days   folic acid (FOLVITE) 1 mg tablet   No No   Sig: Take 1 tablet (1 mg total) by mouth daily   hydrocortisone 2 5 % cream   No No   Sig: Apply topically 2 (two) times a day Apply twice a day affected area the trunk   latanoprost (XALATAN) 0 005 % ophthalmic solution   Yes No   Sig: Administer 1 drop to both eyes daily at bedtime     losartan (COZAAR) 50 mg tablet   No No   Sig: TAKE 1 TABLET DAILY   metFORMIN (GLUCOPHAGE) 500 mg tablet   No No   Sig: Take 1 tablet (500 mg total) by mouth 2 (two) times a day   omeprazole (PriLOSEC) 20 mg delayed release capsule   No No   Sig: Take 1 capsule (20 mg total) by mouth daily   primidone (MYSOLINE) 50 mg tablet   No No   Sig: Take 2 tabs twice daily     thiamine 100 MG tablet   No No   Sig: Take 1 tablet (100 mg total) by mouth daily      Facility-Administered Medications: None       Past Medical History:   Diagnosis Date   • Abdominal pain    • Anxiety    • Arthritis    • Asthma    • Atrial fibrillation (HCC)    • Back pain    • Bleeding ulcer    • Bronchitis    • Cancer [facility])     prostate 2011- radiation   • Cardiac disease     cardiac stent x1   • Cataract     starting   • Chronic diastolic (congestive) heart failure (HCC)    • Diabetes mellitus ([facility] [address] )     boarderline diabetic    • GERD (gastroesophageal reflux disease)    • History of radiation therapy 2010    Prostate seeds (brachytherapy) and EBRT   • Hyperlipidemia    • Hypertension    • 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, bilateral    • Low back pain    • Lung cancer ([facility] [address] )    • Lung mass    • Myocardial infarction ([facility] [address] )     mild 1999   • Obesity    • Prostate cancer ([facility] [address] )    • Shortness of breath    • Sleep apnea     sleep study 11/22   • Wears dentures     full set   • Wears glasses        Past Surgical History:   Procedure Laterality Date   • ABDOMINAL HERNIA REPAIR     • ABDOMINAL SURGERY      bleeding ulcer, cyst removed from abd   • COLONOSCOPY     • CORONARY ANGIOPLASTY WITH STENT PLACEMENT 1999    x1    • ESOPHAGOGASTRODUODENOSCOPY     • IR BIOPSY LUNG  10/05/2021   • IR THORACENTESIS  2021   • KNEE SURGERY Left    • ME BRONCHOSCOPY,DIAGNOSTIC N/A 2021    Procedure: Ellen Zarate;  Surgeon: Andres Marshall MD;  Location: [facility];  Service: Thoracic   • ME MEDIASTINOSCOPY WITH LYMPH NODE BIOPSY/IES N/A 2021    Procedure: MEDIASTINOSCOPY;  Surgeon: Andres Marshall MD;  Location: [facility];  Service: Thoracic   • PROSTATE SURGERY         Family History   Problem Relation Age of Onset   • Prostate cancer Brother    • Cancer Maternal Uncle         colo rectal cancer   • Cancer Paternal Aunt      I have reviewed and agree with the history as documented  E-Cigarette/Vaping   • E-Cigarette Use Never User      E-Cigarette/Vaping Substances   • Nicotine No    • THC No    • CBD No    • Flavoring No    • Other No    • Unknown No      Social History     Tobacco Use   • Smoking status: Former     Packs/day: 1 00     Years: 30 00     Pack years: 30 00     Types: Cigarettes     Quit date:      Years since quittin 9   • Smokeless tobacco: Never   Vaping Use   • Vaping Use: Never used   Substance Use Topics   • Alcohol use: Yes     Alcohol/week: 10 0 - 12 0 standard drinks     Types: 7 Glasses of wine, 3 - 5 Cans of beer per week     Comment: few beers day; glass of red wine at dinner   • Drug use: Never       Review of Systems   Constitutional: Negative for chills and fever  Respiratory: Negative for cough, shortness of breath and wheezing  Cardiovascular: Negative for chest pain and palpitations  Gastrointestinal: Negative for abdominal pain, constipation, diarrhea, nausea and vomiting  Genitourinary: Negative for dysuria, flank pain, hematuria and urgency  Musculoskeletal: Negative for back pain  Skin: Positive for rash  Negative for color change  All other systems reviewed and are negative        Physical Exam  Physical Exam  Vitals and nursing note reviewed  Constitutional:       Appearance: He is well-developed  HENT:      Head: Normocephalic and atraumatic  Mouth/Throat:      Mouth: Angioedema present  Eyes:      Pupils: Pupils are equal, round, and reactive to light  Cardiovascular:      Rate and Rhythm: Normal rate and regular rhythm  Heart sounds: Normal heart sounds  Pulmonary:      Effort: Pulmonary effort is normal       Breath sounds: Normal breath sounds  Abdominal:      General: Bowel sounds are normal  There is no distension  Palpations: Abdomen is soft  There is no mass  Tenderness: There is no abdominal tenderness  There is no guarding or rebound  Musculoskeletal:      Cervical back: Normal range of motion and neck supple  Skin:     General: Skin is warm and dry  Findings: Rash present  Rash is urticarial       Comments: Scattered urticarial rash noted to bilateral axilla, buttocks and back  Neurological:      Mental Status: He is alert and oriented to person, place, and time  Psychiatric:         Behavior: Behavior normal          Thought Content:  Thought content normal          Judgment: Judgment normal          Vital Signs  ED Triage Vitals [12/19/22 0107]   Temperature Pulse Respirations Blood Pressure SpO2   98 °F (36 7 °C) 83 20 147/59 97 %      Temp Source Heart Rate Source Patient Position - Orthostatic VS BP Location FiO2 (%)   Tympanic Monitor Sitting Right arm --      Pain Score       No Pain           Vitals:    12/19/22 0107   BP: 147/59   Pulse: 83   Patient Position - Orthostatic VS: Sitting         Visual Acuity      ED Medications  Medications   methylPREDNISolone sodium succinate (Solu-MEDROL) injection 125 mg (125 mg Intravenous Given 12/19/22 0130)   Famotidine (PF) (PEPCID) injection 20 mg (20 mg Intravenous Given 12/19/22 0130)   diphenhydrAMINE (BENADRYL) injection 25 mg (25 mg Intravenous Given 12/19/22 0129)       Diagnostic Studies  Results Reviewed     Procedure Component Value Units Date/Time    Comprehensive metabolic panel [[id number]]  (Abnormal) Collected: 12/19/22 0129    Lab Status: Final result Specimen: Blood from Arm, Left Updated: 12/19/22 0159     Sodium 138 mmol/L      Potassium 4 4 mmol/L      Chloride 101 mmol/L      CO2 27 mmol/L      ANION GAP 10 mmol/L      BUN 15 mg/dL      Creatinine 0 91 mg/dL      Glucose 176 mg/dL      Calcium 8 9 mg/dL      Corrected Calcium 9 6 mg/dL      AST 20 U/L      ALT 17 U/L      Alkaline Phosphatase 91 U/L      Total Protein 7 1 g/dL      Albumin 3 1 g/dL      Total Bilirubin 0 40 mg/dL      eGFR 79 ml/min/1 73sq m     Narrative:      [place] guidelines for Chronic Kidney Disease (CKD):   •  Stage 1 with normal or high GFR (GFR > 90 mL/min/1 73 square meters)  •  Stage 2 Mild CKD (GFR = 60-89 mL/min/1 73 square meters)  •  Stage 3A Moderate CKD (GFR = 45-59 mL/min/1 73 square meters)  •  Stage 3B Moderate CKD (GFR = 30-44 mL/min/1 73 square meters)  •  Stage 4 Severe CKD (GFR = 15-29 mL/min/1 73 square meters)  •  Stage 5 End Stage CKD (GFR <15 mL/min/1 73 square meters)  Note: GFR calculation is accurate only with a steady state creatinine    CBC and differential [[id number]]  (Abnormal) Collected: 12/19/22 0129    Lab Status: Final result Specimen: Blood from Arm, Left Updated: 12/19/22 0143     WBC 9 72 Thousand/uL      RBC 5 25 Million/uL      Hemoglobin 17 2 g/dL      Hematocrit 52 9 %       fL      MCH 32 8 pg      MCHC 32 5 g/dL      RDW 13 2 %      MPV 9 6 fL      Platelets 163 Thousands/uL      nRBC 0 /100 WBCs      Neutrophils Relative 83 %      Immat GRANS % 0 %      Lymphocytes Relative 11 %      Monocytes Relative 5 %      Eosinophils Relative 1 %      Basophils Relative 0 %      Neutrophils Absolute 8 04 Thousands/µL      Immature Grans Absolute 0 03 Thousand/uL      Lymphocytes Absolute 1 11 Thousands/µL      Monocytes Absolute 0 45 Thousand/µL      Eosinophils Absolute 0 07 Thousand/µL Basophils Absolute 0 02 Thousands/µL                  No orders to display              Procedures  Procedures         ED Course                                             MDM  Number of Diagnoses or Management Options  Angioedema, initial encounter: new and requires workup  Diffuse urticaria: new and requires workup     Amount and/or Complexity of Data Reviewed  Clinical lab tests: ordered and reviewed    Risk of Complications, Morbidity, and/or Mortality  Presenting problems: high  Diagnostic procedures: high  Management options: high    Patient Progress  Patient progress: stable      Disposition  Final diagnoses:   Angioedema, initial encounter   Diffuse urticaria     Time reflects when diagnosis was documented in both MDM as applicable and the Disposition within this note     Time User Action Codes Description Comment    12/19/2022  2:51 AM Imani Tran Add [T78  3XXA] Angioedema, initial encounter     12/19/2022  2:51 AM Imani HEAD Add [L50 9] Diffuse urticaria       ED Disposition     ED Disposition   Admit    Condition   Stable    Date/Time   Mon Dec 19, 2022  2:51 AM    Comment   Case was discussed with Dr Chelsea Frazier and the patient's admission status was agreed to be Admission Status: observation status to the service of Dr Chelsea Frazier   Follow-up Information    None         Patient's Medications   Discharge Prescriptions    No medications on file       No discharge procedures on file      PDMP Review     None          ED Provider  Electronically Signed by           Claude Daring, DO  12/20/22 2504

## 2022-12-19 NOTE — PLAN OF CARE
Problem: PAIN - ADULT  Goal: Verbalizes/displays adequate comfort level or baseline comfort level  Description: Interventions:  - Encourage patient to monitor pain and request assistance  - Assess pain using appropriate pain scale  - Administer analgesics based on type and severity of pain and evaluate response  - Implement non-pharmacological measures as appropriate and evaluate response  - Consider cultural and social influences on pain and pain management  - Notify physician/advanced practitioner if interventions unsuccessful or patient reports new pain  Outcome: Progressing     Problem: INFECTION - ADULT  Goal: Absence or prevention of progression during hospitalization  Description: INTERVENTIONS:  - Assess and monitor for signs and symptoms of infection  - Monitor lab/diagnostic results  - Monitor all insertion sites, i e  indwelling lines, tubes, and drains  - Monitor endotracheal if appropriate and nasal secretions for changes in amount and color  - Benson appropriate cooling/warming therapies per order  - Administer medications as ordered  - Instruct and encourage patient and family to use good hand hygiene technique  - Identify and instruct in appropriate isolation precautions for identified infection/condition  Outcome: Progressing  Goal: Absence of fever/infection during neutropenic period  Description: INTERVENTIONS:  - Monitor WBC    Outcome: Progressing     Problem: SAFETY ADULT  Goal: Patient will remain free of falls  Description: INTERVENTIONS:  - Educate patient/family on patient safety including physical limitations  - Instruct patient to call for assistance with activity   - Consult OT/PT to assist with strengthening/mobility   - Keep Call bell within reach  - Keep bed low and locked with side rails adjusted as appropriate  - Keep care items and personal belongings within reach  - Initiate and maintain comfort rounds  - Make Fall Risk Sign visible to staff  - Apply yellow socks and bracelet for high fall risk patients  - Consider moving patient to room near nurses station  Outcome: Progressing  Goal: Maintain or return to baseline ADL function  Description: INTERVENTIONS:  -  Assess patient's ability to carry out ADLs; assess patient's baseline for ADL function and identify physical deficits which impact ability to perform ADLs (bathing, care of mouth/teeth, toileting, grooming, dressing, etc )  - Assess/evaluate cause of self-care deficits   - Assess range of motion  - Assess patient's mobility; develop plan if impaired  - Assess patient's need for assistive devices and provide as appropriate  - Encourage maximum independence but intervene and supervise when necessary  - Involve family in performance of ADLs  - Assess for home care needs following discharge   - Consider OT consult to assist with ADL evaluation and planning for discharge  - Provide patient education as appropriate  Outcome: Progressing  Goal: Maintains/Returns to pre admission functional level  Description: INTERVENTIONS:  - Perform BMAT or MOVE assessment daily    - Set and communicate daily mobility goal to care team and patient/family/caregiver     - Collaborate with rehabilitation services on mobility goals if consulted  - Out of bed for toileting  - Record patient progress and toleration of activity level   Outcome: Progressing     Problem: DISCHARGE PLANNING  Goal: Discharge to home or other facility with appropriate resources  Description: INTERVENTIONS:  - Identify barriers to discharge w/patient and caregiver  - Arrange for needed discharge resources and transportation as appropriate  - Identify discharge learning needs (meds, wound care, etc )  - Arrange for interpretive services to assist at discharge as needed  - Refer to Case Management Department for coordinating discharge planning if the patient needs post-hospital services based on physician/advanced practitioner order or complex needs related to functional status, cognitive ability, or social support system  Outcome: Progressing     Problem: Knowledge Deficit  Goal: Patient/family/caregiver demonstrates understanding of disease process, treatment plan, medications, and discharge instructions  Description: Complete learning assessment and assess knowledge base    Interventions:  - Provide teaching at level of understanding  - Provide teaching via preferred learning methods  Outcome: Progressing     Problem: Potential for Falls  Goal: Patient will remain free of falls  Description: INTERVENTIONS:  - Educate patient/family on patient safety including physical limitations  - Instruct patient to call for assistance with activity   - Consult OT/PT to assist with strengthening/mobility   - Keep Call bell within reach  - Keep bed low and locked with side rails adjusted as appropriate  - Keep care items and personal belongings within reach  - Initiate and maintain comfort rounds  - Make Fall Risk Sign visible to staff  - Apply yellow socks and bracelet for high fall risk patients  - Consider moving patient to room near nurses station  Outcome: Progressing     Problem: RESPIRATORY - ADULT  Goal: Achieves optimal ventilation and oxygenation  Description: INTERVENTIONS:  - Assess for changes in respiratory status  - Assess for changes in mentation and behavior  - Position to facilitate oxygenation and minimize respiratory effort  - Oxygen administered by appropriate delivery if ordered  - Initiate smoking cessation education as indicated  - Encourage broncho-pulmonary hygiene including cough, deep breathe, Incentive Spirometry  - Assess the need for suctioning and aspirate as needed  - Assess and instruct to report SOB or any respiratory difficulty  - Respiratory Therapy support as indicated  Outcome: Progressing

## 2022-12-19 NOTE — ASSESSMENT & PLAN NOTE
Lab Results   Component Value Date    HGBA1C 6 7 (H) 12/20/2022       Recent Labs     12/23/22  2103 12/24/22  0706 12/24/22  1109 12/24/22  1547   POCGLU 148* 137 172* 163*     -Continue to hold metformin and Jardiance (potential contributor to Derm reaction)  - Accuchecks AC/HS with insulin sliding scale

## 2022-12-19 NOTE — ASSESSMENT & PLAN NOTE
Wt Readings from Last 3 Encounters:   12/19/22 104 kg (228 lb 6 3 oz)   12/06/22 102 kg (224 lb)   11/28/22 102 kg (224 lb)     Appears somewhat volume overloaded today    20 mg of IV Lasix x1  · Daily weights  · Intake and output  · Continue atenolol

## 2022-12-20 ENCOUNTER — APPOINTMENT (OUTPATIENT)
Dept: RADIOLOGY | Facility: HOSPITAL | Age: 82
End: 2022-12-20

## 2022-12-20 ENCOUNTER — TELEPHONE (OUTPATIENT)
Dept: GASTROENTEROLOGY | Facility: CLINIC | Age: 82
End: 2022-12-20

## 2022-12-20 PROBLEM — N17.9 ACUTE KIDNEY INJURY (HCC): Status: ACTIVE | Noted: 2022-12-20

## 2022-12-20 PROBLEM — R79.89 HIGH SERUM LACTIC ACID: Status: ACTIVE | Noted: 2022-12-20

## 2022-12-20 PROBLEM — R65.21 SEPTIC SHOCK (HCC): Status: ACTIVE | Noted: 2022-12-20

## 2022-12-20 PROBLEM — A41.9 SEPTIC SHOCK (HCC): Status: ACTIVE | Noted: 2022-12-20

## 2022-12-20 PROBLEM — G93.40 ENCEPHALOPATHY: Status: ACTIVE | Noted: 2022-12-20

## 2022-12-20 LAB
ALBUMIN SERPL BCP-MCNC: 3.2 G/DL (ref 3.5–5)
ALP SERPL-CCNC: 76 U/L (ref 46–116)
ALT SERPL W P-5'-P-CCNC: 17 U/L (ref 12–78)
ANION GAP SERPL CALCULATED.3IONS-SCNC: 13 MMOL/L (ref 4–13)
ANION GAP SERPL CALCULATED.3IONS-SCNC: 21 MMOL/L (ref 4–13)
ARTERIAL PATENCY WRIST A: YES
AST SERPL W P-5'-P-CCNC: 29 U/L (ref 5–45)
BASE EXCESS BLDA CALC-SCNC: -6.2 MMOL/L
BILIRUB SERPL-MCNC: 0.94 MG/DL (ref 0.2–1)
BODY TEMPERATURE: 98.1 DEGREES FEHRENHEIT
BUN SERPL-MCNC: 33 MG/DL (ref 5–25)
BUN SERPL-MCNC: 33 MG/DL (ref 5–25)
CALCIUM ALBUM COR SERPL-MCNC: 9.6 MG/DL (ref 8.3–10.1)
CALCIUM SERPL-MCNC: 8 MG/DL (ref 8.3–10.1)
CALCIUM SERPL-MCNC: 9 MG/DL (ref 8.3–10.1)
CHLORIDE SERPL-SCNC: 100 MMOL/L (ref 96–108)
CHLORIDE SERPL-SCNC: 101 MMOL/L (ref 96–108)
CO2 SERPL-SCNC: 17 MMOL/L (ref 21–32)
CO2 SERPL-SCNC: 24 MMOL/L (ref 21–32)
CREAT SERPL-MCNC: 1.9 MG/DL (ref 0.6–1.3)
CREAT SERPL-MCNC: 1.95 MG/DL (ref 0.6–1.3)
ERYTHROCYTE [DISTWIDTH] IN BLOOD BY AUTOMATED COUNT: 13.2 % (ref 11.6–15.1)
EST. AVERAGE GLUCOSE BLD GHB EST-MCNC: 146 MG/DL
GFR SERPL CREATININE-BSD FRML MDRD: 31 ML/MIN/1.73SQ M
GFR SERPL CREATININE-BSD FRML MDRD: 32 ML/MIN/1.73SQ M
GLUCOSE P FAST SERPL-MCNC: 149 MG/DL (ref 65–99)
GLUCOSE SERPL-MCNC: 129 MG/DL (ref 65–140)
GLUCOSE SERPL-MCNC: 131 MG/DL (ref 65–140)
GLUCOSE SERPL-MCNC: 137 MG/DL (ref 65–140)
GLUCOSE SERPL-MCNC: 146 MG/DL (ref 65–140)
GLUCOSE SERPL-MCNC: 149 MG/DL (ref 65–140)
GLUCOSE SERPL-MCNC: 151 MG/DL (ref 65–140)
GLUCOSE SERPL-MCNC: 163 MG/DL (ref 65–140)
HBA1C MFR BLD: 6.7 %
HCO3 BLDA-SCNC: 18.3 MMOL/L (ref 22–28)
HCT VFR BLD AUTO: 52.6 % (ref 36.5–49.3)
HGB BLD-MCNC: 17.1 G/DL (ref 12–17)
LACTATE SERPL-SCNC: 2.7 MMOL/L (ref 0.5–2)
LACTATE SERPL-SCNC: 3.8 MMOL/L (ref 0.5–2)
LACTATE SERPL-SCNC: 4.5 MMOL/L (ref 0.5–2)
LACTATE SERPL-SCNC: 5 MMOL/L (ref 0.5–2)
MCH RBC QN AUTO: 33.1 PG (ref 26.8–34.3)
MCHC RBC AUTO-ENTMCNC: 32.5 G/DL (ref 31.4–37.4)
MCV RBC AUTO: 102 FL (ref 82–98)
NASAL CANNULA: 2
O2 CT BLDA-SCNC: 21.7 ML/DL (ref 16–23)
OXYHGB MFR BLDA: 93.1 % (ref 94–97)
PCO2 BLDA: 33.9 MM HG (ref 36–44)
PCO2 TEMP ADJ BLDA: 33.5 MM HG (ref 36–44)
PH BLD: 7.35 [PH] (ref 7.35–7.45)
PH BLDA: 7.35 [PH] (ref 7.35–7.45)
PLATELET # BLD AUTO: 252 THOUSANDS/UL (ref 149–390)
PMV BLD AUTO: 10 FL (ref 8.9–12.7)
PO2 BLD: 75.3 MM HG (ref 75–129)
PO2 BLDA: 76.8 MM HG (ref 75–129)
POTASSIUM SERPL-SCNC: 4.9 MMOL/L (ref 3.5–5.3)
POTASSIUM SERPL-SCNC: 5.6 MMOL/L (ref 3.5–5.3)
PROCALCITONIN SERPL-MCNC: 0.35 NG/ML
PROCALCITONIN SERPL-MCNC: 4.55 NG/ML
PROT SERPL-MCNC: 6.7 G/DL (ref 6.4–8.4)
RBC # BLD AUTO: 5.17 MILLION/UL (ref 3.88–5.62)
SODIUM SERPL-SCNC: 138 MMOL/L (ref 135–147)
SODIUM SERPL-SCNC: 138 MMOL/L (ref 135–147)
SPECIMEN SOURCE: ABNORMAL
VANCOMYCIN SERPL-MCNC: 13.3 UG/ML (ref 10–20)
WBC # BLD AUTO: 6.34 THOUSAND/UL (ref 4.31–10.16)

## 2022-12-20 RX ORDER — SODIUM CHLORIDE 9 MG/ML
125 INJECTION, SOLUTION INTRAVENOUS CONTINUOUS
Status: DISCONTINUED | OUTPATIENT
Start: 2022-12-20 | End: 2022-12-21

## 2022-12-20 RX ORDER — LEVALBUTEROL INHALATION SOLUTION 0.63 MG/3ML
0.63 SOLUTION RESPIRATORY (INHALATION) EVERY 4 HOURS PRN
Status: DISCONTINUED | OUTPATIENT
Start: 2022-12-20 | End: 2023-01-01 | Stop reason: HOSPADM

## 2022-12-20 RX ORDER — SODIUM CHLORIDE, SODIUM GLUCONATE, SODIUM ACETATE, POTASSIUM CHLORIDE, MAGNESIUM CHLORIDE, SODIUM PHOSPHATE, DIBASIC, AND POTASSIUM PHOSPHATE .53; .5; .37; .037; .03; .012; .00082 G/100ML; G/100ML; G/100ML; G/100ML; G/100ML; G/100ML; G/100ML
75 INJECTION, SOLUTION INTRAVENOUS CONTINUOUS
Status: DISCONTINUED | OUTPATIENT
Start: 2022-12-20 | End: 2022-12-20

## 2022-12-20 RX ORDER — METOPROLOL TARTRATE 5 MG/5ML
2.5 INJECTION INTRAVENOUS EVERY 6 HOURS PRN
Status: DISCONTINUED | OUTPATIENT
Start: 2022-12-20 | End: 2022-12-28

## 2022-12-20 RX ORDER — SODIUM POLYSTYRENE SULFONATE 4.1 MEQ/G
15 POWDER, FOR SUSPENSION ORAL; RECTAL ONCE
Status: COMPLETED | OUTPATIENT
Start: 2022-12-20 | End: 2022-12-20

## 2022-12-20 RX ORDER — SODIUM CHLORIDE, SODIUM GLUCONATE, SODIUM ACETATE, POTASSIUM CHLORIDE, MAGNESIUM CHLORIDE, SODIUM PHOSPHATE, DIBASIC, AND POTASSIUM PHOSPHATE .53; .5; .37; .037; .03; .012; .00082 G/100ML; G/100ML; G/100ML; G/100ML; G/100ML; G/100ML; G/100ML
1000 INJECTION, SOLUTION INTRAVENOUS ONCE
Status: COMPLETED | OUTPATIENT
Start: 2022-12-20 | End: 2022-12-20

## 2022-12-20 RX ORDER — ALBUMIN, HUMAN INJ 5% 5 %
25 SOLUTION INTRAVENOUS ONCE
Status: COMPLETED | OUTPATIENT
Start: 2022-12-20 | End: 2022-12-20

## 2022-12-20 RX ORDER — LIDOCAINE HYDROCHLORIDE 10 MG/ML
10 INJECTION, SOLUTION EPIDURAL; INFILTRATION; INTRACAUDAL; PERINEURAL ONCE
Status: COMPLETED | OUTPATIENT
Start: 2022-12-20 | End: 2022-12-20

## 2022-12-20 RX ORDER — MAGNESIUM SULFATE 1 G/100ML
1 INJECTION INTRAVENOUS ONCE
Status: COMPLETED | OUTPATIENT
Start: 2022-12-20 | End: 2022-12-20

## 2022-12-20 RX ORDER — SACCHAROMYCES BOULARDII 250 MG
250 CAPSULE ORAL 2 TIMES DAILY
Status: DISCONTINUED | OUTPATIENT
Start: 2022-12-20 | End: 2023-01-01 | Stop reason: HOSPADM

## 2022-12-20 RX ORDER — CEFTRIAXONE 2 G/50ML
2000 INJECTION, SOLUTION INTRAVENOUS EVERY 24 HOURS
Status: DISCONTINUED | OUTPATIENT
Start: 2022-12-20 | End: 2022-12-20

## 2022-12-20 RX ORDER — METHYLPREDNISOLONE SODIUM SUCCINATE 40 MG/ML
20 INJECTION, POWDER, LYOPHILIZED, FOR SOLUTION INTRAMUSCULAR; INTRAVENOUS EVERY 12 HOURS SCHEDULED
Status: DISCONTINUED | OUTPATIENT
Start: 2022-12-20 | End: 2022-12-22

## 2022-12-20 RX ORDER — SODIUM CHLORIDE, SODIUM GLUCONATE, SODIUM ACETATE, POTASSIUM CHLORIDE, MAGNESIUM CHLORIDE, SODIUM PHOSPHATE, DIBASIC, AND POTASSIUM PHOSPHATE .53; .5; .37; .037; .03; .012; .00082 G/100ML; G/100ML; G/100ML; G/100ML; G/100ML; G/100ML; G/100ML
250 INJECTION, SOLUTION INTRAVENOUS ONCE
Status: COMPLETED | OUTPATIENT
Start: 2022-12-20 | End: 2022-12-20

## 2022-12-20 RX ORDER — SODIUM CHLORIDE, SODIUM GLUCONATE, SODIUM ACETATE, POTASSIUM CHLORIDE, MAGNESIUM CHLORIDE, SODIUM PHOSPHATE, DIBASIC, AND POTASSIUM PHOSPHATE .53; .5; .37; .037; .03; .012; .00082 G/100ML; G/100ML; G/100ML; G/100ML; G/100ML; G/100ML; G/100ML
1000 INJECTION, SOLUTION INTRAVENOUS ONCE
Status: DISCONTINUED | OUTPATIENT
Start: 2022-12-20 | End: 2022-12-20

## 2022-12-20 RX ADMIN — FAMOTIDINE 20 MG: 10 INJECTION, SOLUTION INTRAVENOUS at 10:16

## 2022-12-20 RX ADMIN — ALBUMIN (HUMAN) 25 G: 12.5 INJECTION, SOLUTION INTRAVENOUS at 23:47

## 2022-12-20 RX ADMIN — INSULIN LISPRO 1 UNITS: 100 INJECTION, SOLUTION INTRAVENOUS; SUBCUTANEOUS at 16:55

## 2022-12-20 RX ADMIN — INSULIN LISPRO 1 UNITS: 100 INJECTION, SOLUTION INTRAVENOUS; SUBCUTANEOUS at 07:41

## 2022-12-20 RX ADMIN — SODIUM POLYSTYRENE SULFONATE 15 G: 1 POWDER ORAL; RECTAL at 04:32

## 2022-12-20 RX ADMIN — CEFTRIAXONE 2000 MG: 2 INJECTION, SOLUTION INTRAVENOUS at 00:27

## 2022-12-20 RX ADMIN — VANCOMYCIN HYDROCHLORIDE 1250 MG: 10 INJECTION, POWDER, LYOPHILIZED, FOR SOLUTION INTRAVENOUS at 19:04

## 2022-12-20 RX ADMIN — SODIUM CHLORIDE 125 ML/HR: 0.9 INJECTION, SOLUTION INTRAVENOUS at 23:10

## 2022-12-20 RX ADMIN — SODIUM CHLORIDE, SODIUM GLUCONATE, SODIUM ACETATE, POTASSIUM CHLORIDE, MAGNESIUM CHLORIDE, SODIUM PHOSPHATE, DIBASIC, AND POTASSIUM PHOSPHATE 1000 ML: .53; .5; .37; .037; .03; .012; .00082 INJECTION, SOLUTION INTRAVENOUS at 01:49

## 2022-12-20 RX ADMIN — VANCOMYCIN HYDROCHLORIDE 2000 MG: 1 INJECTION, POWDER, LYOPHILIZED, FOR SOLUTION INTRAVENOUS at 03:07

## 2022-12-20 RX ADMIN — APIXABAN 5 MG: 5 TABLET, FILM COATED ORAL at 17:36

## 2022-12-20 RX ADMIN — LATANOPROST 1 DROP: 50 SOLUTION OPHTHALMIC at 21:49

## 2022-12-20 RX ADMIN — FAMOTIDINE 20 MG: 10 INJECTION, SOLUTION INTRAVENOUS at 21:48

## 2022-12-20 RX ADMIN — LIDOCAINE HYDROCHLORIDE 10 ML: 10 INJECTION, SOLUTION EPIDURAL; INFILTRATION; INTRACAUDAL; PERINEURAL at 15:17

## 2022-12-20 RX ADMIN — FERRIC OXIDE RED: 8; 8 LOTION TOPICAL at 21:49

## 2022-12-20 RX ADMIN — FERRIC OXIDE RED: 8; 8 LOTION TOPICAL at 17:36

## 2022-12-20 RX ADMIN — METHYLPREDNISOLONE SODIUM SUCCINATE 20 MG: 40 INJECTION, POWDER, FOR SOLUTION INTRAMUSCULAR; INTRAVENOUS at 05:19

## 2022-12-20 RX ADMIN — METHYLPREDNISOLONE SODIUM SUCCINATE 20 MG: 40 INJECTION, POWDER, FOR SOLUTION INTRAMUSCULAR; INTRAVENOUS at 21:46

## 2022-12-20 RX ADMIN — DIPHENHYDRAMINE HYDROCHLORIDE 25 MG: 50 INJECTION, SOLUTION INTRAMUSCULAR; INTRAVENOUS at 17:35

## 2022-12-20 RX ADMIN — SODIUM CHLORIDE, SODIUM GLUCONATE, SODIUM ACETATE, POTASSIUM CHLORIDE, MAGNESIUM CHLORIDE, SODIUM PHOSPHATE, DIBASIC, AND POTASSIUM PHOSPHATE 250 ML: .53; .5; .37; .037; .03; .012; .00082 INJECTION, SOLUTION INTRAVENOUS at 02:54

## 2022-12-20 RX ADMIN — SODIUM CHLORIDE 125 ML/HR: 0.9 INJECTION, SOLUTION INTRAVENOUS at 05:19

## 2022-12-20 RX ADMIN — SODIUM CHLORIDE, SODIUM GLUCONATE, SODIUM ACETATE, POTASSIUM CHLORIDE, MAGNESIUM CHLORIDE, SODIUM PHOSPHATE, DIBASIC, AND POTASSIUM PHOSPHATE 1000 ML: .53; .5; .37; .037; .03; .012; .00082 INJECTION, SOLUTION INTRAVENOUS at 00:25

## 2022-12-20 RX ADMIN — SODIUM CHLORIDE 500 ML: 0.9 INJECTION, SOLUTION INTRAVENOUS at 07:40

## 2022-12-20 RX ADMIN — HYDROCORTISONE: 25 CREAM TOPICAL at 17:36

## 2022-12-20 RX ADMIN — SODIUM CHLORIDE 500 ML: 0.9 INJECTION, SOLUTION INTRAVENOUS at 04:32

## 2022-12-20 RX ADMIN — Medication 250 MG: at 17:36

## 2022-12-20 RX ADMIN — DIPHENHYDRAMINE HYDROCHLORIDE 25 MG: 50 INJECTION, SOLUTION INTRAMUSCULAR; INTRAVENOUS at 05:19

## 2022-12-20 RX ADMIN — DIPHENHYDRAMINE HYDROCHLORIDE 25 MG: 50 INJECTION, SOLUTION INTRAMUSCULAR; INTRAVENOUS at 23:10

## 2022-12-20 RX ADMIN — MAGNESIUM SULFATE HEPTAHYDRATE 1 G: 1 INJECTION, SOLUTION INTRAVENOUS at 01:11

## 2022-12-20 RX ADMIN — MONTELUKAST 5 MG: 10 TABLET, FILM COATED ORAL at 21:48

## 2022-12-20 NOTE — ASSESSMENT & PLAN NOTE
Likely due to right lateral chest wall cellulitis, tachycardia- possibly secondary to underlying A  fib  · BCx-negative so far    Wound culture staph  · disContinued Rocephin, Vanco completed, currently on IV Ancef  · ID following, recs appreciated  · Imaging negative for abscess

## 2022-12-20 NOTE — PROGRESS NOTES
[facility]  Progress Note - Alice Tommy 1940, [de-identified] y o  male MRN: [id number]  Unit/Bed#: [address] [place] [address] A Encounter: [id number]  Primary Care Provider: Cory Becerra MD   Date and time admitted to hospital: 12/19/2022  1:01 AM    * Angioedema  Assessment & Plan  Improving, poor mentation, airway maintained    Patient on mysoline, atorvastatin and losartan/Jardiance which could cause angioedema/ urticaria- both held   · Rash is mostly on trunk and upper thigh and is very pruritic; has lip swelling, no tongue  · Given dose of 125 mg solumedrol, benadryl 25 mg IV bid, and pepcid 20 mg IV in ER but symptoms returned   · Continue solumedrol, benadryl, pepcid   · Continue calamine, hydrocortisone   · Continue Singulair      High serum lactic acid  Assessment & Plan  Worsening,    · Possibly d/t sepsis vs OP medications  · Continue to trend and IVF  · ABG-pending    Encephalopathy  Assessment & Plan  Possibly multifactorial, Sepsis vs Haldo vs lactic acidosis, hx of SYLVESTER    · ABG-pending  · Neuro checks, maintain airway  · S/p haldo administration overnight-hold all neurotoxics  · CCU consulted overnight    Acute kidney injury [facility])  Assessment & Plan  Slowly improving, likely due to septic shock      · Cr 1 9    · Continue IVF  · Avoid nephrotoxic's    Septic shock (HCC)  Assessment & Plan  resolved, Hemodynamic stable, still in sepsis    · Likely due to right lateral chest wall cellulitis with possible abscess  · Contiune Rocephin, Vanco completed  · IVF    Persistent atrial fibrillation (HCC)  Assessment & Plan  Continue eliquis as tolerated,    GERD (gastroesophageal reflux disease)  Assessment & Plan  Continue PPI famotidine 20 mg    Severe sepsis (HCC)  Assessment & Plan  Unresolved, Hemodynamic stable, still in sepsis    · Likely due to right lateral chest wall cellulitis with possible abscess  · BCx-pending,   · [employer] Plazaep[employer]olvia albertso completed, continue Solumedrol bid  · IVF  · Surgery recs u/s-pending, potential I&D    Chronic diastolic heart failure (HCC)  Assessment & Plan  Wt Readings from Last 3 Encounters:   22 104 kg (228 lb 6 3 oz)   22 102 kg (224 lb)   22 102 kg (224 lb)     Daily weights  Intake and output  Continue atenolol     Diabetic polyneuropathy associated with type 2 diabetes mellitus ([facility] [address] )  Assessment & Plan  Lab Results   Component Value Date    HGBA1C 7 1 (H) 2022       No results for input(s): POCGLU in the last 72 hours  Blood Sugar Average: Last 72 hrs:     - hold metformin and Jardiance (potential contributor to Derm reaction)  - Accuchecks AC/HS with insulin sliding scale       VTE Pharmacologic Prophylaxis:   Pharmacologic: Apixaban (Eliquis)  Mechanical VTE Prophylaxis in Place: Yes    Patient Centered Rounds: I have performed bedside rounds with nursing staff today  Discussions with Specialists or Other Care Team Provider: Yes  Education and Discussions with Family / Patient:No, Left VM for spouse  Time Spent for Care: 45 minutes  More than 50% of total time spent on counseling and coordination of care as described above  Current Length of Stay: 0 day(s)  Current Patient Status: Inpatient     Discharge Plan: pending     Code Status: Level 1 - Full Code      Subjective:   Patient seen and examined at bedside, pt not arousable, airway maintained, nursing reported pt receiving haldo overnight and was back at baseline mentation per s/o report  Pt mentation limited subjective, objective and PE  Objective:     Vitals:   Temp (24hrs), Av 4 °F (38 °C), Min:98 °F (36 7 °C), Max:103 °F (39 4 °C)    Temp:  [98 °F (36 7 °C)-103 °F (39 4 °C)] 98 1 °F (36 7 °C)  HR:  [] 78  Resp:  [18-28] 24  BP: (101-163)/() 130/89  SpO2:  [88 %-96 %] 94 %  Body mass index is 31 85 kg/m²  Input and Output Summary (last 24 hours):      Intake/Output Summary (Last 24 hours) at 2022 1240  Last data filed at 2022 6864  Gross per 24 hour   Intake --   Output 100 ml   Net -100 ml        Physical Exam:     Physical Exam  Vitals reviewed  Constitutional:       General: He is in acute distress  Appearance: He is obese  He is ill-appearing  HENT:      Head: Atraumatic  Nose: No congestion  Mouth/Throat:      Mouth: Mucous membranes are moist       Tongue: No lesions  Tongue does not deviate from midline  Comments: Unable to visualize pharynx due to poor mentation  Eyes:      General: No scleral icterus  Pupils: Pupils are equal, round, and reactive to light  Cardiovascular:      Heart sounds: No murmur heard  Pulmonary:      Effort: No respiratory distress  Breath sounds: Wheezing (Bilaterally) present  No rhonchi or rales  Abdominal:      Palpations: Abdomen is soft  Tenderness: There is no abdominal tenderness  There is no guarding or rebound  Musculoskeletal:         General: No swelling or deformity  Normal range of motion  Cervical back: No tenderness  Right lower leg: No edema  Left lower leg: No edema  Feet:      Right foot:      Skin integrity: No callus  Skin:     General: Skin is warm  Capillary Refill: Capillary refill takes less than 2 seconds  Findings: Ecchymosis (Diffusely) present  No lesion or rash  Neurological:      Mental Status: He is unresponsive        Coordination: Coordination normal       Comments: Unable to assess due to patient mentation         Additional Data:     Labs:    Results from last 7 days   Lab Units 12/20/22  0610 12/19/22  2327 12/19/22  0129   WBC Thousand/uL 6 34 7 67 9 72   HEMOGLOBIN g/dL 17 1* 16 5 17 2*   HEMATOCRIT % 52 6* 51 7* 52 9*   PLATELETS Thousands/uL 252 273 255   NEUTROS PCT %  --   --  83*     Results from last 7 days   Lab Units 12/20/22 0610 12/19/22 2327 12/19/22  0129   SODIUM mmol/L 138 138 138   POTASSIUM mmol/L 4 9 5 6* 4 4   CHLORIDE mmol/L 101 100 101   CO2 mmol/L 24 17* 27   BUN mg/dL 33* 33* 15   CREATININE mg/dL 1 90* 1 95* 0 91   CALCIUM mg/dL 8 0* 9 0 8 9   TOTAL BILIRUBIN mg/dL  --  0 94 0 40   ALK PHOS U/L  --  76 91   ALT U/L  --  17 17   AST U/L  --  29 20             Lab Results   Component Value Date/Time    HGBA1C 6 7 (H) 12/20/2022 06:10 AM     Results from last 7 days   Lab Units 12/20/22  1120 12/20/22  0735 12/20/22  0149 12/19/22  2048 12/19/22  1559   POC GLUCOSE mg/dl 137 151* 131 178* 222*     Results from last 7 days   Lab Units 12/20/22  0913 12/20/22  0610 12/20/22  0302 12/19/22  2327   LACTIC ACID mmol/L 4 5* 3 8* 2 7* 5 0*   PROCALCITONIN ng/ml  --  4 55*  --  0 35*       * I Have Reviewed All Lab Data Listed Above  * Additional Pertinent Lab Tests Reviewed: [facility] Admission Reviewed    Imaging:     US axilla   Final Result by Mason Coleman MD (12/20 1224)      No discrete fluid collection is identified in the right axilla  Workstation performed: [id number]         XR chest portable   Final Result by Radha Mejia MD (03/41 0338)      Increasing pleuroparenchymal opacity at the left base consistent with enlargement of the known mass and probably associated effusion/atelectasis  Workstation performed: [id number]           Imaging Reports Reviewed by myself    Cultures:   Blood Culture:   Lab Results   Component Value Date    BLOODCX Received in Microbiology Lab  Culture in Progress  12/19/2022    BLOODCX Received in Microbiology Lab  Culture in Progress  12/19/2022    BLOODCX No Growth After 5 Days  05/01/2022    BLOODCX No Growth After 5 Days   05/01/2022     Urine Culture: No results found for: URINECX  Sputum Culture: No components found for: SPUTUMCX  Wound Culture: No results found for: WOUNDCULT    Last 24 Hours Medication List:   Current Facility-Administered Medications   Medication Dose Route Frequency Provider Last Rate   • acetaminophen  650 mg Oral Q6H PRN Godwin Garduno MD     • aluminum-magnesium hydroxide-simethicone  30 mL Oral Q6H PRN Meryle Laws, MD     • apixaban  5 mg Oral BID Meryle Laws, MD     • atenolol  50 mg Oral Daily Meryle Laws, MD     • bisacodyl  10 mg Rectal Daily PRN Meryle Laws, MD     • calamine-zinc oxide   Topical 4x Daily Meryle Laws, MD     • cefTRIAXone  2,000 mg Intravenous Q24H SIMI Gonzales 2,000 mg (12/20/22 0027)   • diphenhydrAMINE  25 mg Intravenous Q6H SIMI Gonzales     • famotidine  20 mg Intravenous Q12H [address] SIMI Bustillo     • Fluticasone Furoate-Vilanterol  1 puff Inhalation Daily Meryle Laws, MD      And   • umeclidinium  1 puff Inhalation Daily Meryle Laws, MD     • folic acid  1 mg Oral Daily Meryle Laws, MD     • hydrocortisone   Topical BID Meryle Laws, MD     • hydrOXYzine HCL  25 mg Oral Q6H PRN SIMI Gonzales     • insulin lispro  1-6 Units Subcutaneous TID AC SIMI Bustillo     • insulin lispro  1-6 Units Subcutaneous HS SIMI Bustillo     • insulin lispro  1-6 Units Subcutaneous 0200 SIMI Bustillo     • latanoprost  1 drop Both Eyes HS Meryle Laws, MD     • levalbuterol  0 63 mg Nebulization Q4H PRN SIMI Gonzales     • lidocaine (PF)  10 mL Infiltration Once Carey BEREKET Vu     • methylPREDNISolone sodium succinate  20 mg Intravenous Q12H Merrianne Dakin, MD     • montelukast  5 mg Oral HS Meryle Laws, MD     • ondansetron  4 mg Intravenous Q6H PRN Meryle Laws, MD     • saccharomyces boulardii  250 mg Oral BID SIMI Gonzales     • simethicone  80 mg Oral 4x Daily PRN Meryle Laws, MD     • sodium chloride  125 mL/hr Intravenous Continuous SIMI Gonzales 125 mL/hr (12/20/22 0519)   • vancomycin  15 mg/kg (Adjusted) Intravenous Daily PRN SIMI Liao          Today, Patient Was Seen By: Alisha Mack MD    ** Please Note: Dragon 360 Dictation voice to text software may have been used in the creation of this document   **

## 2022-12-20 NOTE — QUICK NOTE
Notified by [facility] that the patient had an increase in lactic acid  Resulted 5 0 on admission then downtrended to 2 7  Currently this am, the lactic is 3 8 with suspected source cellulitis and arm pit abscess per primary team       Met SIRS criteria on admission with fever, tachycardia and tachypnea  Current /89  Temp 98 1  RR 24  WBC 6 34  Patient meets severe sepsis diagnosis due to lactic greater than 2 but less than 4  Currently no critical care needs at this time

## 2022-12-20 NOTE — CONSULTS
Consultation - General Surgery   Marti Marroquin [de-identified] y o  male MRN: [id number]  Unit/Bed#: [address] [place] [address] A Encounter: [id number]    Assessment/Plan     Assessment:  1) axillary cellulitis -patient uncertain of when right axillary swelling started to occur, patient does have some degree of confusion, right axilla exhibits significant erythema and tenderness to palpation, no palpable abscess on physical exam observed, patient does have historical treatment for lung cancer and could be immunocompromised, patient does have elevated lactic acid and temperature of 103 in the last 24 hours, no leukocytosis  2) sepsis -lactic acid persistently elevated, increased from 2 7-3 8, there is no leukocytosis but patient did have tachycardia, tachypnea, T-max of 103 in the last 24 hours, source may be right axilla based on presentation and palpable tenderness although fluctuance cannot be directly palpated on exam due to tenderness and adipose tissue, blood cultures pending, patient could have elevated lactic acid due to BUDDY but that would not explain the temperature of 103,  3) BUDDY -elevated creatinine at 1 9, EGFR 32, elevated BUN at 33, does not appear to be per baseline from initial labs in May 2022    Plan:  1- 3)   -Right axilla ultrasound ordered  -Possible diagnostic needle aspiration versus incision and drainage at bedside  -Based on comorbid risk factors would rather do bedside with local anesthetic and then take to the OR  -Repeat labs with CBC, BMP, mag, Phos  - IV abx per SLIM   - trend lactic acid   - will call and discuss with family given patients confusion    History of Present Illness   HPI:  Marti Marroquin is a [de-identified] y o  male with a past medical history pertinent for lung cancer, prostate cancer, CHF, diabetes mellitus type 2, A  fib, self-reported treatment for lung cancer recently presenting to the acute care surgery team secondary to right axillary swelling and tenderness    Patient is somewhat confused and not necessarily answering inappropriately but does not seem to be well later oriented to his surroundings  Patient does not know the year  Patient also does not know his right from his left  Patient earlier this morning did not know that he was in the hospital but does seem to understand that he is in a medical care facility now  Patient does report that he had a lung cancer treatment recently but we are not sure if this is accurate on the timeline  Patient when asked about why he is here cannot necessarily answer it comprehensively  Patient denies any abdominal pain  Patient denies any issues with pain in his right axilla  Physical exam is not consistent with reports of locking pain in the right axilla  Patient denies any shortness of breath or chest pain or palpitations  Patient denies any fevers or chills currently  Patient does not report that he has any sort of tongue swelling or difficulty breathing but then when asked about tongue and lip swelling does report that he feels like his tongue is larger than normal   Patient's primary concern when visiting is that he is so itchy on his back and on his chest   Patient reports that over the course of the last day and a half he has had these lesions pop up on his skin  Patient cannot correlate any other information to the skin lesions other than the fact that he did have a recent lung cancer treatment  Inpatient consult to Acute Care Surgery  Consult performed by: Pablo Ovalle PA-C  Consult ordered by: SIMI Baxter          Review of Systems   Constitutional: Negative for chills and fever  HENT: Negative for congestion and rhinorrhea  Respiratory: Negative for cough, chest tightness, shortness of breath and wheezing  Cardiovascular: Negative for chest pain and palpitations  Gastrointestinal: Negative for abdominal distention and abdominal pain  Genitourinary: Negative for dysuria and flank pain     Musculoskeletal: Negative for arthralgias and gait problem  Skin: Positive for color change and rash  Negative for wound  Neurological: Positive for tremors and weakness  Negative for syncope  Psychiatric/Behavioral: Positive for confusion  Negative for agitation         Historical Information   Past Medical History:   Diagnosis Date   • Abdominal pain    • Anxiety    • Arthritis    • Asthma    • Atrial fibrillation (HCC)    • Back pain    • Bleeding ulcer    • Bronchitis    • Cancer [facility])     prostate 2011- radiation   • Cardiac disease     cardiac stent x1   • Cataract     starting   • Chronic diastolic (congestive) heart failure (HCC)    • Diabetes mellitus ([facility] [address] )     boarderline diabetic    • GERD (gastroesophageal reflux disease)    • History of radiation therapy 2010    Prostate seeds (brachytherapy) and EBRT   • Hyperlipidemia    • Hypertension    • 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, bilateral    • Low back pain    • Lung cancer ([facility] [address] )    • Lung mass    • Myocardial infarction ([facility] [address] )     mild 1999   • Obesity    • Prostate cancer ([facility] [address] )    • Shortness of breath    • Sleep apnea     sleep study 11/22   • Wears dentures     full set   • Wears glasses      Past Surgical History:   Procedure Laterality Date   • ABDOMINAL HERNIA REPAIR     • ABDOMINAL SURGERY      bleeding ulcer, cyst removed from abd   • COLONOSCOPY     • CORONARY ANGIOPLASTY WITH STENT PLACEMENT  1999    x1    • ESOPHAGOGASTRODUODENOSCOPY     • IR BIOPSY LUNG  10/05/2021   • IR THORACENTESIS  11/08/2021   • KNEE SURGERY Left    • [place] BRONCHOSCOPY,DIAGNOSTIC N/A 11/29/2021    Procedure: BRONCHOSCOPY FLEXIBLE;  Surgeon: Kayla Cole MD;  Location: [facility];  Service: Thoracic   • [place] MEDIASTINOSCOPY WITH LYMPH NODE BIOPSY/IES N/A 11/29/2021    Procedure: MEDIASTINOSCOPY;  Surgeon: Kayla Cole MD;  Location: [facility];  Service: Thoracic   • PROSTATE SURGERY       Social History   Social History     Substance and Sexual Activity   Alcohol Use Yes   • Alcohol/week: 10 0 - 12 0 standard drinks   • Types: 7 Glasses of wine, 3 - 5 Cans of beer per week    Comment: few beers day; glass of red wine at dinner     Social History     Substance and Sexual Activity   Drug Use Never     E-Cigarette/Vaping   • E-Cigarette Use Never User      E-Cigarette/Vaping Substances   • Nicotine No    • THC No    • CBD No    • Flavoring No    • Other No    • Unknown No      Social History     Tobacco Use   Smoking Status Former   • Packs/day: 1 00   • Years: 30    • Pack years: 30    • Types: Cigarettes   • Quit date:    • Years since quittin 9   Smokeless Tobacco Never     Family History: non-contributory    Meds/Allergies   all current active meds have been reviewed and current meds:   Current Facility-Administered Medications   Medication Dose Route Frequency   • acetaminophen (TYLENOL) tablet 650 mg  650 mg Oral Q6H PRN   • aluminum-magnesium hydroxide-simethicone (MYLANTA) oral suspension 30 mL  30 mL Oral Q6H PRN   • apixaban (ELIQUIS) tablet 5 mg  5 mg Oral BID   • atenolol (TENORMIN) tablet 50 mg  50 mg Oral Daily   • bisacodyl (DULCOLAX) rectal suppository 10 mg  10 mg Rectal Daily PRN   • calamine-zinc oxide lotion   Topical 4x Daily   • cefTRIAXone (ROCEPHIN) IVPB (premix in dextrose) 2,000 mg 50 mL  2,000 mg Intravenous Q24H   • diphenhydrAMINE (BENADRYL) injection 25 mg  25 mg Intravenous Q6H   • Famotidine (PF) (PEPCID) injection 20 mg  20 mg Intravenous Q12H VALARIE   • Fluticasone Furoate-Vilanterol 100-25 mcg/actuation 1 puff  1 puff Inhalation Daily    And   • umeclidinium 62 5 mcg/actuation inhaler AEPB 1 puff  1 puff Inhalation Daily   • folic acid (FOLVITE) tablet 1 mg  1 mg Oral Daily   • hydrocortisone 2 5 % cream   Topical BID   • hydrOXYzine HCL (ATARAX) tablet 25 mg  25 mg Oral Q6H PRN   • insulin lispro (HumaLOG) 100 units/mL subcutaneous injection 1-6 Units  1-6 Units Subcutaneous TID AC   • insulin lispro (HumaLOG) 100 units/mL subcutaneous injection 1-6 Units  1-6 Units Subcutaneous HS   • insulin lispro (HumaLOG) 100 units/mL subcutaneous injection 1-6 Units  1-6 Units Subcutaneous 0200   • latanoprost (XALATAN) 0 005 % ophthalmic solution 1 drop  1 drop Both Eyes HS   • levalbuterol (XOPENEX) inhalation solution 0 63 mg  0 63 mg Nebulization Q4H PRN   • methylPREDNISolone sodium succinate (Solu-MEDROL) injection 20 mg  20 mg Intravenous Q8H [address]   • montelukast (SINGULAIR) tablet 5 mg  5 mg Oral HS   • ondansetron (ZOFRAN) injection 4 mg  4 mg Intravenous Q6H PRN   • saccharomyces boulardii (FLORASTOR) capsule 250 mg  250 mg Oral BID   • simethicone (MYLICON) chewable tablet 80 mg  80 mg Oral 4x Daily PRN   • sodium chloride 0 9 % infusion  125 mL/hr Intravenous Continuous   • vancomycin (VANCOCIN) 1,250 mg in sodium chloride 0 9 % 250 mL IVPB  15 mg/kg (Adjusted) Intravenous Daily PRN     No Known Allergies    Objective   First Vitals:   Blood Pressure: 147/59 (12/19/22 0107)  Pulse: 83 (12/19/22 0107)  Temperature: 98 °F (36 7 °C) (12/19/22 0107)  Temp Source: Tympanic (12/19/22 0107)  Respirations: 20 (12/19/22 0107)  Height: 5' 11" (180 3 cm) (12/19/22 0502)  Weight - Scale: 104 kg (228 lb 6 3 oz) (12/19/22 0107)  SpO2: 97 % (12/19/22 0107)    Current Vitals:   Blood Pressure: 130/89 (12/20/22 0612)  Pulse: 78 (12/20/22 0612)  Temperature: 98 1 °F (36 7 °C) (12/20/22 0612)  Temp Source: Oral (12/20/22 0218)  Respirations: (!) 24 (12/20/22 0612)  Height: 5' 11" (180 3 cm) (12/19/22 0502)  Weight - Scale: 104 kg (228 lb 6 3 oz) (12/19/22 0107)  SpO2: 94 % (12/20/22 0612)      Intake/Output Summary (Last 24 hours) at 12/20/2022 0941  Last data filed at 12/20/2022 0452  Gross per 24 hour   Intake --   Output 100 ml   Net -100 ml       Invasive Devices     Peripheral Intravenous Line  Duration           Peripheral IV 12/20/22 Distal;Right;Ventral (anterior) Forearm <1 day    Peripheral IV 12/20/22 Proximal;Right;Ventral (anterior) Forearm <1 day                Physical Exam  Constitutional:       General: He is awake  Appearance: He is overweight  He is ill-appearing  He is not toxic-appearing or diaphoretic  Interventions: He is not intubated  HENT:      Head: Normocephalic and atraumatic  Right Ear: Hearing and external ear normal       Left Ear: Hearing and external ear normal       Nose: Nose normal    Cardiovascular:      Rate and Rhythm: Normal rate and regular rhythm  Heart sounds: Normal heart sounds, S1 normal and S2 normal  Heart sounds not distant  No murmur heard  Pulmonary:      Effort: Pulmonary effort is normal  No tachypnea, bradypnea, accessory muscle usage, prolonged expiration, respiratory distress or retractions  He is not intubated  Breath sounds: No stridor, decreased air movement or transmitted upper airway sounds  Wheezing present  No decreased breath sounds, rhonchi or rales  Chest:      Chest wall: Swelling, tenderness and edema present  No crepitus  Abdominal:      General: Abdomen is protuberant  Bowel sounds are normal       Palpations: Abdomen is soft  Tenderness: There is no abdominal tenderness  There is no guarding  Skin:     Findings: Erythema, rash and wound present  Rash is macular and urticarial           Neurological:      Mental Status: He is alert  He is disoriented and confused  Motor: Tremor present  Psychiatric:         Behavior: Behavior is cooperative  Lab Results:   I have personally reviewed pertinent lab results    , CBC:   Lab Results   Component Value Date    WBC 6 34 12/20/2022    HGB 17 1 (H) 12/20/2022    HCT 52 6 (H) 12/20/2022     (H) 12/20/2022     12/20/2022    MCH 33 1 12/20/2022    MCHC 32 5 12/20/2022    RDW 13 2 12/20/2022    MPV 10 0 12/20/2022   , CMP:   Lab Results   Component Value Date    SODIUM 138 12/20/2022    K 4 9 12/20/2022     12/20/2022    CO2 24 12/20/2022    BUN 33 (H) 12/20/2022    CREATININE 1 90 (H) 12/20/2022    CALCIUM 8 0 (L) 12/20/2022    AST 29 12/19/2022    ALT 17 12/19/2022    ALKPHOS 76 12/19/2022    EGFR 32 12/20/2022     Imaging: I have personally reviewed pertinent reports  EKG, Pathology, and Other Studies: I have personally reviewed pertinent reports  Counseling / Coordination of Care  Total floor / unit time spent today 45 minutes  Greater than 50% of total time was spent with the patient and / or family counseling and / or coordination of care  A description of the counseling / coordination of care: Developing plan, reviewing attending, coordinating care, physical exam, history taking, reviewing labs, reviewing imaging

## 2022-12-20 NOTE — PROGRESS NOTES
[address]  Progress Note - Shabbir Erazo 1940, [de-identified] y o  male MRN: [id number]  Unit/Bed#: [address] [place] [address] Encounter: [id number]  Primary Care Provider: Maksim Gray MD   Date and time admitted to hospital: 12/19/2022  1:01 AM    * Angioedema-resolved as of 12/21/2022  Assessment & Plan  Improving, poor mentation, airway maintained    Patient on mysoline, atorvastatin and losartan/Jardiance which could cause angioedema/ urticaria- both held   · Rash is mostly on trunk and upper thigh and is very pruritic; has lip swelling, no tongue  · Given dose of 125 mg solumedrol, benadryl 25 mg IV bid, and pepcid 20 mg IV in ER but symptoms returned   · Continue solumedrol, benadryl, pepcid   · Continue calamine, hydrocortisone   · Continue Singulair      Acute kidney injury [facility])  Assessment & Plan  Slowly improving, likely due to septic shock      · Cr 1 9  > 1 79  · Continue IVF  · Avoid nephrotoxic's    Septic shock (HCC)  Assessment & Plan  resolved, Hemodynamic stable, still in sepsis    · Likely due to right lateral chest wall cellulitis with possible abscess  · Contiune Rocephin, Vanco completed  · IVF    Persistent atrial fibrillation (HCC)  Assessment & Plan  Continue eliquis as tolerated,    GERD (gastroesophageal reflux disease)  Assessment & Plan  Continue PPI famotidine 20 mg    Severe sepsis (HCC)  Assessment & Plan  Unresolved, Hemodynamic stable, still in sepsis    · Likely due to right lateral chest wall cellulitis with possible abscess  · BCx-pending,   · Contiune Rocephin, Vanco completed, continue Solumedrol bid  · IVF  · Surgery recs u/s-pending, potential I&D    Chronic diastolic heart failure (HCC)  Assessment & Plan  Wt Readings from Last 3 Encounters:   12/19/22 104 kg (228 lb 6 3 oz)   12/06/22 102 kg (224 lb)   11/28/22 102 kg (224 lb)     Daily weights  Intake and output  Continue atenolol     Diabetic polyneuropathy associated with type 2 diabetes mellitus [facility])  Assessment & Plan  Lab Results   Component Value Date    HGBA1C 7 1 (H) 2022       No results for input(s): POCGLU in the last 72 hours  Blood Sugar Average: Last 72 hrs:     - hold metformin and Jardiance (potential contributor to Derm reaction)  - Accuchecks AC/HS with insulin sliding scale     High serum lactic acid-resolved as of 2022  Assessment & Plan  Worsening,    · Possibly d/t sepsis vs OP medications  · Continue to trend and IVF  · ABG-pending    Encephalopathy-resolved as of 2022  Assessment & Plan  Possibly multifactorial, Sepsis vs Haldo vs lactic acidosis, hx of SYLVESTER    · ABG-unremarkable  · Neuro checks, maintain airway  · S/p haldo administration overnight-hold all neurotoxics  · CCU consulted overnight      VTE Pharmacologic Prophylaxis:   Pharmacologic: Apixaban (Eliquis)  Mechanical VTE Prophylaxis in Place: Yes    Patient Centered Rounds: I have performed bedside rounds with nursing staff today  Discussions with Specialists or Other Care Team Provider: Yes  Education and Discussions with Family / Patient:Yes  Time Spent for Care: 15 minutes  More than 50% of total time spent on counseling and coordination of care as described above  Current Length of Stay: 1 day(s)  Current Patient Status: Inpatient     Discharge Plan: pending     Code Status: Level 1 - Full Code      Subjective:   Patient seen and examined at bedside, patient was AOx3, reported doing a lot better, no chest pain no shortness of breath, no palpitations, no swelling, no itching, no pain  Nursing reported patient coughed up some bloody purulent sputum and had no other episodes since  Patient informed about outpatient follow-up with dermatology      Objective:     Vitals:   Temp (24hrs), Av 9 °F (36 6 °C), Min:97 4 °F (36 3 °C), Max:98 7 °F (37 1 °C)    Temp:  [97 4 °F (36 3 °C)-98 7 °F (37 1 °C)] 97 5 °F (36 4 °C)  HR:  [] 111  Resp:  [18-26] 18  BP: (102-129)/(61-85) 128/83  SpO2:  [93 %-97 %] 94 %  Body mass index is 31 85 kg/m²  Input and Output Summary (last 24 hours): Intake/Output Summary (Last 24 hours) at 12/21/2022 1515  Last data filed at 12/21/2022 0734  Gross per 24 hour   Intake --   Output 650 ml   Net -650 ml          Physical Exam  Vitals reviewed  Constitutional:       General: He is not in acute distress  Appearance: Normal appearance  HENT:      Head: Atraumatic  Nose: No congestion  Eyes:      General: No scleral icterus  Pupils: Pupils are equal, round, and reactive to light  Cardiovascular:      Rate and Rhythm: Normal rate  Heart sounds: No murmur heard  Pulmonary:      Effort: No respiratory distress  Breath sounds: No wheezing, rhonchi or rales  Abdominal:      Palpations: Abdomen is soft  Tenderness: There is no abdominal tenderness  There is no guarding  Musculoskeletal:         General: No swelling or deformity  Normal range of motion  Cervical back: No tenderness  Right lower leg: No edema  Left lower leg: No edema  Feet:      Right foot:      Skin integrity: No callus  Skin:     General: Skin is warm  Capillary Refill: Capillary refill takes less than 2 seconds  Findings: Abrasion (forearms) and bruising present  No erythema, lesion, petechiae, rash or wound  Rash is not crusting, macular, nodular, purpuric or vesicular  Neurological:      Mental Status: He is oriented to person, place, and time  Cranial Nerves: No cranial nerve deficit  Coordination: Coordination normal    Psychiatric:         Mood and Affect: Mood normal          Thought Content:  Thought content normal          Additional Data:     Labs:    Results from last 7 days   Lab Units 12/21/22  0651 12/20/22  0610 12/19/22  2327 12/19/22  0129   WBC Thousand/uL 9 09 6 34 7 67 9 72   HEMOGLOBIN g/dL 12 8 17 1* 16 5 17 2*   HEMATOCRIT % 40 8 52 6* 51 7* 52 9*   PLATELETS Thousands/uL 154 252 273 255   NEUTROS PCT %  -- --   --  83*     Results from last 7 days   Lab Units 12/21/22  0651 12/20/22  0610 12/19/22  2327 12/19/22  0129   SODIUM mmol/L 139 138 138 138   POTASSIUM mmol/L 4 4 4 9 5 6* 4 4   CHLORIDE mmol/L 108 101 100 101   CO2 mmol/L 23 24 17* 27   BUN mg/dL 44* 33* 33* 15   CREATININE mg/dL 1 79* 1 90* 1 95* 0 91   CALCIUM mg/dL 6 9* 8 0* 9 0 8 9   TOTAL BILIRUBIN mg/dL 0 53  --  0 94 0 40   ALK PHOS U/L 40*  --  76 91   ALT U/L 19  --  17 17   AST U/L 23  --  29 20             Lab Results   Component Value Date/Time    HGBA1C 6 7 (H) 12/20/2022 06:10 AM     Results from last 7 days   Lab Units 12/21/22  1110 12/21/22  0720 12/21/22  0156 12/20/22  2122 12/20/22  1636 12/20/22  1120 12/20/22  0735 12/20/22  0149 12/19/22  2048 12/19/22  1559   POC GLUCOSE mg/dl 170* 123 137 129 163* 137 151* 131 178* 222*     Results from last 7 days   Lab Units 12/21/22  0651 12/20/22  0913 12/20/22  0610 12/20/22  0302 12/19/22  2327   LACTIC ACID mmol/L 1 9 4 5* 3 8* 2 7* 5 0*   PROCALCITONIN ng/ml 26 86*  --  4 55*  --  0 35*       * I Have Reviewed All Lab Data Listed Above  * Additional Pertinent Lab Tests Reviewed: [facility] Admission Reviewed    Imaging:     US axilla   Final Result by Pepe Saba MD (12/20 1224)      No discrete fluid collection is identified in the right axilla  Workstation performed: [id number]         XR chest portable   Final Result by Loida Santiago MD (14/15 0040)      Increasing pleuroparenchymal opacity at the left base consistent with enlargement of the known mass and probably associated effusion/atelectasis  Workstation performed: [id number]           Imaging Reports Reviewed by myself    Cultures:   Blood Culture:   Lab Results   Component Value Date    BLOODCX No Growth at 24 hrs  12/19/2022    BLOODCX No Growth at 24 hrs  12/19/2022    BLOODCX No Growth After 5 Days  05/01/2022    BLOODCX No Growth After 5 Days  05/01/2022     Urine Culture:  No results found for: URINECX  Sputum Culture: No components found for: SPUTUMCX  Wound Culture:   Lab Results   Component Value Date    WOUNDCULT 4+ Growth of Staphylococcus aureus (A) 12/20/2022       Last 24 Hours Medication List:   Current Facility-Administered Medications   Medication Dose Route Frequency Provider Last Rate   • acetaminophen  650 mg Oral Q6H PRN Clarissa Oakley MD     • aluminum-magnesium hydroxide-simethicone  30 mL Oral Q6H PRN Clarissa Oakley MD     • apixaban  5 mg Oral BID Clarissa Oakley MD     • atenolol  50 mg Oral Daily Clarissa Oakley MD     • bisacodyl  10 mg Rectal Daily PRN Clarissa Oakley MD     • calamine-zinc oxide   Topical 4x Daily Clarissa Oakley MD     • diphenhydrAMINE  25 mg Intravenous Q6H SIMI Gonzales     • famotidine  20 mg Intravenous Q12H [address] Rashard Cap, CRNP     • Fluticasone Furoate-Vilanterol  1 puff Inhalation Daily Clarissa Oakley MD      And   • umeclidinium  1 puff Inhalation Daily Clarissa Oakley MD     • folic acid  1 mg Oral Daily Clarissa Oakley MD     • hydrocortisone   Topical BID Clarissa Oakley MD     • hydrOXYzine HCL  25 mg Oral Q6H PRN SIMI Gonzales     • insulin lispro  1-6 Units Subcutaneous TID AC Rashard Cap, CRNP     • insulin lispro  1-6 Units Subcutaneous HS Rashard Cap, CRNP     • insulin lispro  1-6 Units Subcutaneous 0200 Rashard Cap, CRNP     • latanoprost  1 drop Both Eyes HS Clarissa Oakley MD     • levalbuterol  0 63 mg Nebulization Q4H PRN SIMI Gonzales     • methylPREDNISolone sodium succinate  20 mg Intravenous Q12H Ross Booth MD     • metoprolol  2 5 mg Intravenous Q6H PRN SIMI Gonzales     • montelukast  5 mg Oral HS Clarissa Oakley MD     • ondansetron  4 mg Intravenous Q6H PRN Clarissa Oakley MD     • saccharomyces boulardii  250 mg Oral BID SIMI Gonzales     • simethicone  80 mg Oral 4x Daily PRN Clarissa Oakley MD     • sodium chloride  125 mL/hr Intravenous Continuous SIMI Gonzales 125 mL/hr (12/21/22 6026)   • vancomycin  15 mg/kg (Adjusted) Intravenous Daily PRN SIMI Gonzales     • vancomycin  12 5 mg/kg Intravenous Once SIMI Trivedi          Today, Patient Was Seen By: Romeo Francis MD    ** Please Note: Dragon 360 Dictation voice to text software may have been used in the creation of this document   **

## 2022-12-20 NOTE — PROCEDURES
Puncture aspiration  Date/Time: 12/20/2022 3:44 PM  Performed by: Hari Amin PA-C  Authorized by: Hari Amin PA-C   Universal Protocol:  Consent: Verbal consent obtained  Risks and benefits: risks, benefits and alternatives were discussed  Consent given by: patient and spouse (Discussed at length with Patient and patient's spouse  Spouse verbalzies understanding and agrees with treatment plan )  Time out: Immediately prior to procedure a "time out" was called to verify the correct patient, procedure, equipment, support staff and site/side marked as required  Patient understanding: patient states understanding of the procedure being performed  Patient identity confirmed: verbally with patient and arm band      Patient location:  Bedside  Location:     Indications for incision and drainage: Induration of axilla  Pre-procedure details:     Skin preparation:  Betadine  Anesthesia (see MAR for exact dosages): Anesthesia method:  Local infiltration    Local anesthetic:  Lidocaine 1% WITH epi  Procedure details:     Aspiration type: puncture aspiration      Approach:  Puncture    Incision depth:  Subfascial    Drainage:  Bloody    Drainage amount:  Scant    Packing materials:  None  Post-procedure details:     Patient tolerance of procedure:   Tolerated well, no immediate complications

## 2022-12-20 NOTE — ASSESSMENT & PLAN NOTE
Possibly multifactorial, Sepsis vs Haldo vs lactic acidosis, hx of SYLVESTER    · ABG-unremarkable  · Neuro checks, maintain airway  · S/p haldo administration overnight-hold all neurotoxics  · CCU consulted overnight

## 2022-12-20 NOTE — QUICK NOTE
Progress Note - Triage Asssessment   Benny Moore [de-identified] y o  male MRN: [id number]    Time Called ( Time): 2351  Date Called: 12/20/22  Room#: 219A   Person requesting evaluation: Vickie Sandoval, clinical coordinator     Situation:    Per nursing, patient has had a decline in his mental status today and his SBPs have been in the [de-identified]  He was admitted last evening with a pruritic rash around the abdomen and upper thigh  Additionally he was found to have some swelling on his lips and a large cellulitic around under his right arm pit  On exam, the patient was found sleeping but woke up to his name being called  He was able to state he was at [facility] and that he came in for the "lump in his armpit"  He was confused about the year stating is was 2020  He stated that the "lump" has been there for about a month and he feels it has been getting worse since he now has difficulty moving his arm  He denies any bug bite or injury to the area that he knows of  Area is red and hot to the touch  It spans from his anterior nipple to his posterior back and he has difficulty moving his arm due to decreased range of motion  Concern from source control at this time  No other wounds or skin issues noted at this time other than ecchymosis which patient states is always there  Of note, patient did receive outpatient radiation with his last treatment on 4/19/2022  Recent Chest CT showed increase in left lower lobe mass since May 2022 and patient is to follow up with Dr James on 1/9/2023  His hemodynamics were WNL on my exam     Temp 99 5 HR 80 /62 O2 sat 93% on 2 L NC    Discussed case with ID and surgery  ID is going to continue vanco since patient has a history of MRSA infection years ago  Surgery is coming up to the bedside today to attempt a local aspirate/I&D  No critical care needs at this time  Please call if patient's condition worsens            Interventions:   continue with IV antibitoics as per ID and surgery to perform bedside I&D          Triage Assessment:     continue to keep on med surg floor at this time     Recommendations discussed with Dr Tegan Cota, ICU attending, Henri Askew [facility] from ID and Lindy Schwab [facility] from surgery

## 2022-12-20 NOTE — PLAN OF CARE
Problem: PAIN - ADULT  Goal: Verbalizes/displays adequate comfort level or baseline comfort level  Description: Interventions:  - Encourage patient to monitor pain and request assistance  - Assess pain using appropriate pain scale  - Administer analgesics based on type and severity of pain and evaluate response  - Implement non-pharmacological measures as appropriate and evaluate response  - Consider cultural and social influences on pain and pain management  - Notify physician/advanced practitioner if interventions unsuccessful or patient reports new pain  Outcome: Progressing     Problem: INFECTION - ADULT  Goal: Absence or prevention of progression during hospitalization  Description: INTERVENTIONS:  - Assess and monitor for signs and symptoms of infection  - Monitor lab/diagnostic results  - Monitor all insertion sites, i e  indwelling lines, tubes, and drains  - Monitor endotracheal if appropriate and nasal secretions for changes in amount and color  - [place] appropriate cooling/warming therapies per order  - Administer medications as ordered  - Instruct and encourage patient and family to use good hand hygiene technique  - Identify and instruct in appropriate isolation precautions for identified infection/condition  Outcome: Progressing  Goal: Absence of fever/infection during neutropenic period  Description: INTERVENTIONS:  - Monitor WBC    Outcome: Progressing     Problem: SAFETY ADULT  Goal: Patient will remain free of falls  Description: INTERVENTIONS:  - Educate patient/family on patient safety including physical limitations  - Instruct patient to call for assistance with activity   - Consult OT/PT to assist with strengthening/mobility   - Keep Call bell within reach  - Keep bed low and locked with side rails adjusted as appropriate  - Keep care items and personal belongings within reach  - Initiate and maintain comfort rounds  - Make Fall Risk Sign visible to staff  - Apply yellow socks and bracelet for high fall risk patients  - Consider moving patient to room near nurses station  Outcome: Progressing  Goal: Maintain or return to baseline ADL function  Description: INTERVENTIONS:  -  Assess patient's ability to carry out ADLs; assess patient's baseline for ADL function and identify physical deficits which impact ability to perform ADLs (bathing, care of mouth/teeth, toileting, grooming, dressing, etc )  - Assess/evaluate cause of self-care deficits   - Assess range of motion  - Assess patient's mobility; develop plan if impaired  - Assess patient's need for assistive devices and provide as appropriate  - Encourage maximum independence but intervene and supervise when necessary  - Involve family in performance of ADLs  - Assess for home care needs following discharge   - Consider OT consult to assist with ADL evaluation and planning for discharge  - Provide patient education as appropriate  Outcome: Progressing  Goal: Maintains/Returns to pre admission functional level  Description: INTERVENTIONS:  - Perform BMAT or MOVE assessment daily    - Set and communicate daily mobility goal to care team and patient/family/caregiver     - Collaborate with rehabilitation services on mobility goals if consulted  - Out of bed for toileting  - Record patient progress and toleration of activity level   Outcome: Progressing     Problem: DISCHARGE PLANNING  Goal: Discharge to home or other facility with appropriate resources  Description: INTERVENTIONS:  - Identify barriers to discharge w/patient and caregiver  - Arrange for needed discharge resources and transportation as appropriate  - Identify discharge learning needs (meds, wound care, etc )  - Arrange for interpretive services to assist at discharge as needed  - Refer to Case Management Department for coordinating discharge planning if the patient needs post-hospital services based on physician/advanced practitioner order or complex needs related to functional status, cognitive ability, or social support system  Outcome: Progressing     Problem: Knowledge Deficit  Goal: Patient/family/caregiver demonstrates understanding of disease process, treatment plan, medications, and discharge instructions  Description: Complete learning assessment and assess knowledge base    Interventions:  - Provide teaching at level of understanding  - Provide teaching via preferred learning methods  Outcome: Progressing     Problem: Potential for Falls  Goal: Patient will remain free of falls  Description: INTERVENTIONS:  - Educate patient/family on patient safety including physical limitations  - Instruct patient to call for assistance with activity   - Consult OT/PT to assist with strengthening/mobility   - Keep Call bell within reach  - Keep bed low and locked with side rails adjusted as appropriate  - Keep care items and personal belongings within reach  - Initiate and maintain comfort rounds  - Make Fall Risk Sign visible to staff  - Apply yellow socks and bracelet for high fall risk patients  - Consider moving patient to room near nurses station  Outcome: Progressing     Problem: RESPIRATORY - ADULT  Goal: Achieves optimal ventilation and oxygenation  Description: INTERVENTIONS:  - Assess for changes in respiratory status  - Assess for changes in mentation and behavior  - Position to facilitate oxygenation and minimize respiratory effort  - Oxygen administered by appropriate delivery if ordered  - Initiate smoking cessation education as indicated  - Encourage broncho-pulmonary hygiene including cough, deep breathe, Incentive Spirometry  - Assess the need for suctioning and aspirate as needed  - Assess and instruct to report SOB or any respiratory difficulty  - Respiratory Therapy support as indicated  Outcome: Progressing     Problem: SAFETY,RESTRAINT: NV/NON-SELF DESTRUCTIVE BEHAVIOR  Goal: Remains free of harm/injury (restraint for non violent/non self-detsructive behavior)  Description: INTERVENTIONS:  - Instruct patient/family regarding restraint use   - Assess and monitor physiologic and psychological status   - Provide interventions and comfort measures to meet assessed patient needs   - Identify and implement measures to help patient regain control  - Assess readiness for release of restraint   Outcome: Progressing  Goal: Returns to optimal restraint-free functioning  Description: INTERVENTIONS:  - Assess the patient's behavior and symptoms that indicate continued need for restraint  - Identify and implement measures to help patient regain control  - Assess readiness for release of restraint   Outcome: Progressing     Problem: Prexisting or High Potential for Compromised Skin Integrity  Goal: Skin integrity is maintained or improved  Description: INTERVENTIONS:  - Identify patients at risk for skin breakdown  - Assess and monitor skin integrity  - Assess and monitor nutrition and hydration status  - Monitor labs   - Assess for incontinence   - Turn and reposition patient  - Assist with mobility/ambulation  - Relieve pressure over bony prominences  - Avoid friction and shearing  - Provide appropriate hygiene as needed including keeping skin clean and dry  - Evaluate need for skin moisturizer/barrier cream  - Collaborate with interdisciplinary team   - Patient/family teaching  - Consider wound care consult   Outcome: Progressing     Problem: MOBILITY - ADULT  Goal: Maintain or return to baseline ADL function  Description: INTERVENTIONS:  -  Assess patient's ability to carry out ADLs; assess patient's baseline for ADL function and identify physical deficits which impact ability to perform ADLs (bathing, care of mouth/teeth, toileting, grooming, dressing, etc )  - Assess/evaluate cause of self-care deficits   - Assess range of motion  - Assess patient's mobility; develop plan if impaired  - Assess patient's need for assistive devices and provide as appropriate  - Encourage maximum independence but intervene and supervise when necessary  - Involve family in performance of ADLs  - Assess for home care needs following discharge   - Consider OT consult to assist with ADL evaluation and planning for discharge  - Provide patient education as appropriate  Outcome: Progressing  Goal: Maintains/Returns to pre admission functional level  Description: INTERVENTIONS:  - Perform BMAT or MOVE assessment daily    - Set and communicate daily mobility goal to care team and patient/family/caregiver     - Collaborate with rehabilitation services on mobility goals if consulted  - Out of bed for toileting  - Record patient progress and toleration of activity level   Outcome: Progressing

## 2022-12-20 NOTE — TELEPHONE ENCOUNTER
Recall ltr for colon due to hx of ta polyps and egd due to hx of Barretts with Dr Aleah Koenig due 1/13/23

## 2022-12-20 NOTE — UTILIZATION REVIEW
Initial Clinical Review    Observation 12/19/22 @ [phone] converted to inpatient admission 12/20/22 @ [id number] for continued care & tx for angioedema  Admission: Date/Time/Statement:   Admission Orders (From admission, onward)     Ordered        12/20/22 1208  Inpatient Admission  Once            12/19/22 0252  Place in Observation  Once                      Orders Placed This Encounter   Procedures   • Inpatient Admission     Standing Status:   Standing     Number of Occurrences:   1     Order Specific Question:   Level of Care     Answer:   Med Surg [16]     Order Specific Question:   Estimated length of stay     Answer:   More than 2 Midnights     Order Specific Question:   Certification     Answer:   I certify that inpatient services are medically necessary for this patient for a duration of greater than two midnights  See H&P and MD Progress Notes for additional information about the patient's course of treatment  ED Arrival Information     Expected   -    Arrival   12/19/2022 00:44    Acuity   Urgent            Means of arrival   Walk-In    Escorted by   Spouse    Service   Hospitalist    Admission type   Emergency            Arrival complaint   Swelling and hives           Chief Complaint   Patient presents with   • Urticaria     Pt c/o itching, hives and swelling of lips and face  Denies SOB but does feel "tight" with a "lump" in his throat     Initial Presentation:   [de-identified] yom [facility] ER from home c/o pruritic rash to his back, axilla, buttocks for approximately 1 month; swelling to his lips that began last night, associated with soreness in his throat  Patient has intermittently been treating with Benadryl without resolution  Hx A  fib on Eliquis, hyperlipidemia, CHF, diabetes, hypertension  Presents with angioedema, scattered urticarial rash noted to bilateral axilla, buttocks and back  placed in observation status  Temp spike after admission  Seen multiple times, continues to have pruritic rash    He has no difficulty managing his secretions, no involvement of the throat or oropharynx  Observation to IP admission 12/20/22:  Tmax 103  Angioedema, lip swelling, steroids & Benadryl continued ATC  Mentation improving, able to maintain airway  IVABT in progress for sepsis 2nd axillary cellulitis  Increased lactic acid early this morning  IVF bolus given, labs improving  Per surg: R axilla US ordered  Possible diagnostic needle aspiration versus incision and drainage at bedside  Continue IVABT  Puncture aspiration at bedside by surgery procedure note:  Patient location:  Bedside  Location:     Indications for incision and drainage: Induration of axilla  Pre-procedure details:     Skin preparation:  Betadine  Anesthesia (see MAR for exact dosages): Anesthesia method:  Local infiltration    Local anesthetic:  Lidocaine 1% WITH epi  Procedure details:     Aspiration type: puncture aspiration      Approach:  Puncture    Incision depth:  Subfascial    Drainage:  Bloody    Drainage amount:  Scant    Packing materials:  None  Post-procedure details:     Patient tolerance of procedure: Tolerated well, no immediate complications    Day 2- 87/41/00:  Tmax 102 5  Late last night into early this morning: Given Albumin 5% 25 gm x 1 due to low urine output  Sterling insertion due to acute urinary retention  Not voiding since 3PM,bladder scan 706 ml  IVABT continued per ID for axillary cellulitis, cultures pending  IV steroids & Benadryl continued for angioedemea, improving  Per surg:  S/p puncture aspiration  WBC count normal but bands of 42%  Fever and hypotension resolved  Lactic normalized overnight  No purulence noted on dressing  Dressing replaced at bedside  Currently on IV vancomycin   Follow up blood cultures    Exam:   Extremities: extremities are warm and well perfused, 2+ pitting edema in b/l lower extremities,  ecchymotic macular skin changes to distal b/l upper extremities   Skin: edema and blanchable erythema of periaxillary region with tenderness to palpation  Per pharmacy note RE: Vanco:  The patient is receiving vancomycin 1250 mg IV daily prn Trough </= 15 with the most recent vancomycin level being not at steady-state and sub-therapeutic based on a goal of trough < or = 15 ; therefore, patient will receive a one time dose of 1250 mg at 19:00 today dec   21st       ED Triage Vitals [12/19/22 0107]   Temperature Pulse Respirations Blood Pressure SpO2   98 °F (36 7 °C) 83 20 147/59 97 %      Temp Source Heart Rate Source Patient Position - Orthostatic VS BP Location FiO2 (%)   Tympanic Monitor Sitting Right arm --      Pain Score       No Pain          Wt Readings from Last 1 Encounters:   12/19/22 104 kg (228 lb 6 3 oz)     Additional Vital Signs:   12/20/22 06:12:41 98 1 °F (36 7 °C) 78 24 Abnormal  130/89 103 94 % -- -- -- --   12/20/22 06:11:51 -- 101 -- -- -- 92 % -- -- -- --   12/20/22 03:53:24 99 °F (37 2 °C) -- 20 -- -- -- -- -- -- --   12/20/22 03:52:44 99 °F (37 2 °C) 102 -- -- -- 92 % -- -- -- --   12/20/22 03:51:07 -- -- -- 103/65 78 93 % -- -- -- --   12/20/22 03:49:12 -- 107 Abnormal  -- 103/65 78 94 % -- -- -- --   12/20/22 03:11:55 -- 98 -- 103/64 77 95 % -- -- -- --   12/20/22 0300 -- 95 -- 103/65 -- 95 % -- -- -- --   12/20/22 02:18:10 99 °F (37 2 °C) 113 Abnormal  20 101/60 74 94 % -- -- -- --   12/20/22 01:16:50 99 4 °F (37 4 °C) 118 Abnormal  20 109/68 82 95 % 28 2 L/min Nasal cannula --   12/20/22 00:53:39 102 5 °F (39 2 °C) Abnormal  107 Abnormal  24 Abnormal  109/69 82 93 % -- -- -- --   12/20/22 00:52:05 -- 114 Abnormal  -- 109/69 82 92 % -- -- -- --   12/20/22 00:50:57 -- -- -- 109/69 82 -- -- -- -- --   12/20/22 00:17:03 102 1 °F (38 9 °C) Abnormal  106 Abnormal  28 Abnormal  105/66 79 91 % -- -- -- --   12/20/22 00:16:20 102 1 °F (38 9 °C) Abnormal  107 Abnormal  -- 105/66 79 91 % -- -- -- --   12/20/22 00:14:53 -- -- -- 105/66 79 -- -- -- -- --   12/19/22 2350 101 7 °F (38 7 °C) Abnormal  -- -- -- -- -- -- -- -- --   12/19/22 23:00:20 103 °F (39 4 °C) Abnormal  125 Abnormal  -- 135/116 Abnormal  122 89 % Abnormal  -- -- -- --   12/19/22 22:58:52 103 °F (39 4 °C) Abnormal  103 -- -- -- 90 % -- -- -- --   12/19/22 22:45:32 -- 93 22 163/83 110 95 % -- -- -- --   12/19/22 21:48:08 98 2 °F (36 8 °C) 80 20 140/61 87 88 % Abnormal  -- -- -- --     Pertinent Labs/Diagnostic Test Results:   US axilla   Final Result  (12/20 1224)      No discrete fluid collection is identified in the right axilla  XR chest portable   Final Result  (12/20 5270)      Increasing pleuroparenchymal opacity at the left base consistent with enlargement of the known mass and probably associated effusion/atelectasis          Results from last 7 days   Lab Units 12/19/22  0307   SARS-COV-2  Negative     Results from last 7 days   Lab Units 12/21/22  0651 12/20/22  0610 12/19/22 2327 12/19/22  0129   WBC Thousand/uL 9 09 6 34 7 67 9 72   HEMOGLOBIN g/dL 12 8 17 1* 16 5 17 2*   HEMATOCRIT % 40 8 52 6* 51 7* 52 9*   PLATELETS Thousands/uL 154 252 273 255   NEUTROS ABS Thousands/µL  --   --   --  8 04*   BANDS PCT % 42*  --  25*  --      Results from last 7 days   Lab Units 12/21/22  0651 12/20/22  0610 12/19/22 2327 12/19/22  0129   SODIUM mmol/L 139 138 138 138   POTASSIUM mmol/L 4 4 4 9 5 6* 4 4   CHLORIDE mmol/L 108 101 100 101   CO2 mmol/L 23 24 17* 27   ANION GAP mmol/L 8 13 21* 10   BUN mg/dL 44* 33* 33* 15   CREATININE mg/dL 1 79* 1 90* 1 95* 0 91   EGFR ml/min/1 73sq m 35 32 31 79   CALCIUM mg/dL 6 9* 8 0* 9 0 8 9   MAGNESIUM mg/dL 1 9  --  1 6  --      Results from last 7 days   Lab Units 12/21/22  0651 12/19/22  2327 12/19/22  0129   AST U/L 23 29 20   ALT U/L 19 17 17   ALK PHOS U/L 40* 76 91   TOTAL PROTEIN g/dL 5 2* 6 7 7 1   ALBUMIN g/dL 2 2* 3 2* 3 1*   TOTAL BILIRUBIN mg/dL 0 53 0 94 0 40     Results from last 7 days   Lab Units 12/21/22  0720 12/21/22  0156 12/20/22  2122 12/20/22  1636 12/20/22  1120 12/20/22  0735 12/20/22  0149 12/19/22  2048 12/19/22  1559   POC GLUCOSE mg/dl 123 137 129 163* 137 151* 131 178* 222*     Results from last 7 days   Lab Units 12/21/22  0651 12/20/22  0610 12/19/22  2327 12/19/22  0129   GLUCOSE RANDOM mg/dL 125 149* 146* 176*     Results from last 7 days   Lab Units 12/21/22  0651 12/20/22  0610 12/19/22  2327   PROCALCITONIN ng/ml 26 86* 4 55* 0 35*     Results from last 7 days   Lab Units 12/21/22  0651 12/20/22  0913 12/20/22  0610 12/20/22  0302 12/19/22  2327   LACTIC ACID mmol/L 1 9 4 5* 3 8* 2 7* 5 0*     Results from last 7 days   Lab Units 12/19/22  0307   INFLUENZA A PCR  Negative   INFLUENZA B PCR  Negative   RSV PCR  Negative      Ref Range & Units 12/21/22 0651 12/20/22 1734   Vancomycin Rm 10 0 - 20 0 ug/mL 14 2  13 3      ED Treatment:   Medication Administration from 12/19/2022 0043 to 12/19/2022 0453       Date/Time Order Dose Route Action     12/19/2022 0130 EST methylPREDNISolone sodium succinate (Solu-MEDROL) injection 125 mg 125 mg Intravenous Given     12/19/2022 0130 EST Famotidine (PF) (PEPCID) injection 20 mg 20 mg Intravenous Given     12/19/2022 0129 EST diphenhydrAMINE (BENADRYL) injection 25 mg 25 mg Intravenous Given        Past Medical History:   Diagnosis Date   • Abdominal pain    • Anxiety    • Arthritis    • Asthma    • Atrial fibrillation (HCC)    • Back pain    • Bleeding ulcer    • Bronchitis    • Cancer [facility])     prostate 2011- radiation   • Cardiac disease     cardiac stent x1   • Cataract     starting   • Chronic diastolic (congestive) heart failure (HCC)    • Diabetes mellitus ([facility] [address] )     boarderline diabetic    • GERD (gastroesophageal reflux disease)    • History of radiation therapy 2010    Prostate seeds (brachytherapy) and EBRT   • Hyperlipidemia    • Hypertension    • 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, bilateral    • Low back pain    • Lung cancer ([address] )    • Lung mass    • Myocardial infarction ([facility] [address] )     mild 1999   • Obesity    • Prostate cancer [facility])    • Shortness of breath    • Sleep apnea     sleep study 11/22   • Wears dentures     full set   • Wears glasses      Present on Admission:  • Diabetic polyneuropathy associated with type 2 diabetes mellitus ([address] )  • Chronic diastolic heart failure (HCC)  • GERD (gastroesophageal reflux disease)  • Angioedema  • Persistent atrial fibrillation (HCC)  • Severe sepsis (HCC)    Admitting Diagnosis: Urticaria [L50 9]  Angioedema, initial encounter [T78  3XXA]  Diffuse urticaria [L50 9]  Age/Sex: [de-identified] y o  male  Admission Orders:  Scd/foot pumps  accuchecks  Telemetry  Consult dermatology  Consult suregry  Consult ID  Non-violent restraints    Scheduled Medications:  albumin human (FLEXBUMIN) 5 % injection 25 g, 12/20 @ 2624  apixaban, 5 mg, Oral, BID  atenolol, 50 mg, Oral, Daily  calamine-zinc oxide, , Topical, 4x Daily  cefTRIAXone, 2,000 mg, Intravenous, Q24H>given then d/c'd 12/20  diphenhydrAMINE, 25 mg, Intravenous, Q6H  Empagliflozin, 25 mg, Oral, Daily>d/c'd  famotidine, 20 mg, Intravenous, Q12H VALARIE  Fluticasone Furoate-Vilanterol, 1 puff, Inhalation, Daily   And  umeclidinium, 1 puff, Inhalation, Daily  folic acid, 1 mg, Oral, Daily  hydrocortisone, , Topical, BID  insulin lispro, 1-6 Units, Subcutaneous, TID AC  insulin lispro, 1-6 Units, Subcutaneous, HS  insulin lispro, 1-6 Units, Subcutaneous, 0200  latanoprost, 1 drop, Both Eyes, HS  magnesium sulfate IVPB SOLN 1 g, Once , 12/20  methylPREDNISolone sodium succinate, 20 mg, Intravenous, Q8H VALARIE>q12h  montelukast, 5 mg, Oral, HS  saccharomyces boulardii, 250 mg, Oral, BID  sodium chloride, 500 mL, Intravenous, Once  vancomycin (VANCOCIN) 1,250 mg in sodium chloride 0 9 % 250 mL IVPB, Dose: 12 5 mg/kg, x 1 dose, 12/20 @ 1904  vancomycin (VANCOCIN) 2,000 mg in sodium chloride 0 9 % 500 mL IVPB,Dose: 2,000 mg, x 1 dose 12/20 @ 0307    Continuous IV Infusions:  sodium chloride, 125 mL/hr, Intravenous, Continuous    PRN Meds:  acetaminophen, 650 mg, Oral, Q6H PRN  aluminum-magnesium hydroxide-simethicone, 30 mL, Oral, Q6H PRN  bisacodyl, 10 mg, Rectal, Daily PRN  hydrOXYzine HCL, 25 mg, Oral, Q6H PRN  levalbuterol, 0 63 mg, Nebulization, Q4H PRN  ondansetron, 4 mg, Intravenous, Q6H PRN  simethicone, 80 mg, Oral, 4x Daily PRN  vancomycin, 15 mg/kg (Adjusted), Intravenous, Daily PRN    Network Utilization Review Department  ATTENTION: Please call with any questions or concerns to [phone] and carefully listen to the prompts so that you are directed to the right person  All voicemails are confidential   Dieudonne Simmons all requests for admission clinical reviews, approved or denied determinations and any other requests to dedicated fax number below belonging to the campus where the patient is receiving treatment   List of dedicated fax numbers for the Facilities:  [address] DENIALS (Administrative/Medical Necessity) [phone]   [address] (Maternity/NICU/Pediatrics) [phone]   1 Aretha Stone [phone]   [facility] 77 [phone]   [address] [place] [address] [address] [phone]   1559 [facility] Bruno [address]ou [facility] 134 815 [facility] [phone]

## 2022-12-20 NOTE — QUICK NOTE
Incision and drain    Date/Time: 12/20/2022 3:44 PM  Performed by: Saniya Zimmer PA-C  Authorized by: Saniya Zimmer PA-C   Universal Protocol:  Consent: Verbal consent obtained  Risks and benefits: risks, benefits and alternatives were discussed  Consent given by: patient and spouse (Discussed at length with Patient and patient's spouse  Spouse verbalzies understanding and agrees with treatment plan )  Time out: Immediately prior to procedure a "time out" was called to verify the correct patient, procedure, equipment, support staff and site/side marked as required  Patient understanding: patient states understanding of the procedure being performed  Patient identity confirmed: verbally with patient and arm band      Patient location:  Bedside  Location:     Indications for incision and drainage: Induration of axilla  Pre-procedure details:     Skin preparation:  Betadine  Anesthesia (see MAR for exact dosages): Anesthesia method:  Local infiltration    Local anesthetic:  Lidocaine 1% WITH epi  Procedure details:     Aspiration type: puncture aspiration      Approach:  Puncture    Incision depth:  Subfascial    Drainage:  Bloody    Drainage amount:  Scant    Packing materials:  None  Post-procedure details:     Patient tolerance of procedure:   Tolerated well, no immediate complications

## 2022-12-20 NOTE — QUICK NOTE
Nurse reported patient tachycardic, fever 103, new onset confusion  EKG showed a flutter, rate 90s  + cough per nursing  Patient seen at bedside, awake and oriented to self only, tachypneic, satting high 80s on room air,low 90s on oxygen 2 L  Lung sounds essentially clear  Heart irregular,in low 100s   1-2+ pedal edema on exam     Stat CBC with differential, CMP, mag, procalcitonin, lactic acid, blood cultures, chest x-ray ordered  Will start on IV Rocephin after blood culture obtained  Patient was given Haldol 2 mg p o  for acute confusion  Thought was due to high-dose IV Benadryl and Solu-Medrol  IV Benadryl and IV Solu-Medrol dose reduced  Patient was placed on wrist restraint  Update  Lactic acid came back 5 0  Procalcitonin 0 35  Patient ordered Isolyte 30 cc/kg using IBW due to obesity  Chest x-ray appears unremarkable to me  Nursing reported patient complaining of a "boil " under his right armpit and it is very tender  Patient was seen again at bedside  Under the right armpit, there is redness, induration, severe tenderness on exam   Concerning for underlying abscess  Added IV vancomycin  Will keep patient n p o  until evaluated by acute surgery in the morning  Patient was calm and cooperative on exam at this time, will wean off restraints  Consult ID, surgery, dermatology

## 2022-12-20 NOTE — PROGRESS NOTES
Grabiel Rodriguez is a [de-identified] y o  male who is currently ordered Vancomycin IV with management by the Pharmacy Consult service  Relevant clinical data and objective / subjective history reviewed  Vancomycin Assessment:  Indication and Goal AUC/Trough: Soft tissue (goal -600, trough >10), -600, trough >10  Clinical Status: worsening  Micro:     Renal Function:  SCr: 1 95 mg/dL  CrCl: 37 1 mL/min  Renal replacement: Not on dialysis  Days of Therapy: 1  Current Dose: vancomycin 1250mg q12h  Vancomycin Plan:  New Dosing: vancomycin 1250mg daily prn pulse dosing for random trough level </=15      Next Level: 12/20 1800  Renal Function Monitoring: Daily BMP and UOP  Pharmacy will continue to follow closely for s/sx of nephrotoxicity, infusion reactions and appropriateness of therapy  BMP and CBC will be ordered per protocol  We will continue to follow the patient’s culture results and clinical progress daily      Trinh Chambers, Pharmacist

## 2022-12-20 NOTE — SEPSIS NOTE
Sepsis Note   Carli Faye [de-identified] y o  male MRN: [id number]  Unit/Bed#: [address]  KHUSHBOO Encounter: [id number]       qSOFA     [address] [place] Name 12/20/22 02:18:10 12/20/22 01:16:50 12/20/22 00:53:39 12/20/22 00:52:05 12/20/22 00:50:57    Altered mental status GCS < 15 -- -- -- -- --    Respiratory Rate > / =22 0 0 1 -- --    Systolic BP < / =771 0 0 0 0 0    Q Sofa Score 0 0 1 1 1    Row Name 12/20/22 00:17:03 12/20/22 00:16:20 12/20/22 00:14:53 12/19/22 23:00:20 12/19/22 22:45:32    Altered mental status GCS < 15 -- -- -- -- --    Respiratory Rate > / =22 1 -- -- -- 1    Systolic BP < / =832 0 0 0 0 0    Q Sofa Score 1 1 1 1 1    Row Name 12/19/22 21:48:08 12/19/22 15:13:02 12/19/22 1238 12/19/22 07:23:06 12/19/22 05:08:14    Altered mental status GCS < 15 -- -- -- -- --    Respiratory Rate > / =22 0 0 0 0 0    Systolic BP < / =643 0 0 0 0 1    Q Sofa Score 0 0 0 0 1    Row Name 12/19/22 0245 12/19/22 0107             Altered mental status GCS < 15 -- --       Respiratory Rate > / =38 0 0       Systolic BP < / =048 0 0       Q Sofa Score 0 0                  Initial Sepsis Screening     Row Name 12/19/22 1152                Is the patient's history suggestive of a new or worsening infection? Yes (Proceed)  -CY        Suspected source of infection soft tissue  -CY        Are two or more of the following signs & symptoms of infection both present and new to the patient? Yes (Proceed)  -CY        Indicate SIRS criteria Hyperthemia > 38 3C (100 9F); Tachycardia > 90 bpm;Tachypnea > 20 resp per min;WBC > 10% bands  -CY        If the answer is yes to both questions, suspicion of sepsis is present --        If severe sepsis is present AND tissue hypoperfusion perists in the hour after fluid resuscitation or lactate > 4, the patient meets criteria for SEPTIC SHOCK --        Are any of the following organ dysfunction criteria present within 6 hours of suspected infection and SIRS criteria that are NOT considered to be chronic conditions? Yes  -CY        Organ dysfunction Lactate >/equal 4 0 mmol/L (MEETS CRITERIA FOR SEPTIC SHOCK)  -CY        Date of presentation of severe sepsis 12/19/22  -CY        Time of presentation of severe sepsis 2350  -CY        Tissue hypoperfusion persists in the hour after crystalloid fluid administration, evidenced, by either: --        Was hypotension present within one hour of the conclusion of crystalloid fluid administration?  No  -CY        Date of presentation of septic shock --        Time of presentation of septic shock --              User Key  (r) = Recorded By, (t) = Taken By, (c) = Cosigned By    [address] Name Provider Type    1000 [facility] Practitioner

## 2022-12-20 NOTE — ASSESSMENT & PLAN NOTE
Slowly improving, likely due to septic shock      · Cr 1 9  > 1 79 > 0 97  · Continue IVF  · Avoid nephrotoxic's

## 2022-12-20 NOTE — ASSESSMENT & PLAN NOTE
resolved, Hemodynamic stable, still in sepsis    · Likely due to right lateral chest wall cellulitis with possible abscess  · Contiune Rocephin, Vanco completed  · IVF

## 2022-12-20 NOTE — QUICK NOTE
Patient received IVF bolus total 2750 ml( 2250 + 500),followed by  ml/hr  LA 5 0->2 7->3 8  Will order  ml x 1,and request ICU team to evaluate patient

## 2022-12-21 PROBLEM — G93.40 ENCEPHALOPATHY: Status: RESOLVED | Noted: 2022-12-20 | Resolved: 2022-12-21

## 2022-12-21 PROBLEM — T78.3XXA ANGIOEDEMA: Status: RESOLVED | Noted: 2022-12-19 | Resolved: 2022-12-21

## 2022-12-21 PROBLEM — R79.89 HIGH SERUM LACTIC ACID: Status: RESOLVED | Noted: 2022-12-20 | Resolved: 2022-12-21

## 2022-12-21 LAB
ALBUMIN SERPL BCP-MCNC: 2.2 G/DL (ref 3.5–5)
ALP SERPL-CCNC: 40 U/L (ref 46–116)
ALT SERPL W P-5'-P-CCNC: 19 U/L (ref 12–78)
ANION GAP SERPL CALCULATED.3IONS-SCNC: 8 MMOL/L (ref 4–13)
AST SERPL W P-5'-P-CCNC: 23 U/L (ref 5–45)
ATRIAL RATE: 344 BPM
BASOPHILS # BLD MANUAL: 0 THOUSAND/UL (ref 0–0.1)
BASOPHILS NFR MAR MANUAL: 0 % (ref 0–1)
BILIRUB SERPL-MCNC: 0.53 MG/DL (ref 0.2–1)
BUN SERPL-MCNC: 44 MG/DL (ref 5–25)
CALCIUM ALBUM COR SERPL-MCNC: 8.3 MG/DL (ref 8.3–10.1)
CALCIUM SERPL-MCNC: 6.9 MG/DL (ref 8.3–10.1)
CHLORIDE SERPL-SCNC: 108 MMOL/L (ref 96–108)
CO2 SERPL-SCNC: 23 MMOL/L (ref 21–32)
CREAT SERPL-MCNC: 1.79 MG/DL (ref 0.6–1.3)
EOSINOPHIL # BLD MANUAL: 0 THOUSAND/UL (ref 0–0.4)
EOSINOPHIL NFR BLD MANUAL: 0 % (ref 0–6)
ERYTHROCYTE [DISTWIDTH] IN BLOOD BY AUTOMATED COUNT: 13.5 % (ref 11.6–15.1)
GFR SERPL CREATININE-BSD FRML MDRD: 35 ML/MIN/1.73SQ M
GLUCOSE SERPL-MCNC: 123 MG/DL (ref 65–140)
GLUCOSE SERPL-MCNC: 125 MG/DL (ref 65–140)
GLUCOSE SERPL-MCNC: 137 MG/DL (ref 65–140)
GLUCOSE SERPL-MCNC: 156 MG/DL (ref 65–140)
GLUCOSE SERPL-MCNC: 170 MG/DL (ref 65–140)
GLUCOSE SERPL-MCNC: 195 MG/DL (ref 65–140)
HCT VFR BLD AUTO: 40.8 % (ref 36.5–49.3)
HGB BLD-MCNC: 12.8 G/DL (ref 12–17)
LACTATE SERPL-SCNC: 1.9 MMOL/L (ref 0.5–2)
LYMPHOCYTES # BLD AUTO: 0.45 THOUSAND/UL (ref 0.6–4.47)
LYMPHOCYTES # BLD AUTO: 5 % (ref 14–44)
MACROCYTES BLD QL AUTO: PRESENT
MAGNESIUM SERPL-MCNC: 1.9 MG/DL (ref 1.6–2.6)
MCH RBC QN AUTO: 32.7 PG (ref 26.8–34.3)
MCHC RBC AUTO-ENTMCNC: 31.6 G/DL (ref 31.4–37.4)
MCV RBC AUTO: 104 FL (ref 82–98)
METAMYELOCYTES NFR BLD MANUAL: 12 % (ref 0–1)
MONOCYTES # BLD AUTO: 0.36 THOUSAND/UL (ref 0–1.22)
MONOCYTES NFR BLD: 4 % (ref 4–12)
MYELOCYTES NFR BLD MANUAL: 7 % (ref 0–1)
NEUTROPHILS # BLD MANUAL: 6.36 THOUSAND/UL (ref 1.85–7.62)
NEUTS BAND NFR BLD MANUAL: 42 % (ref 0–8)
NEUTS SEG NFR BLD AUTO: 28 % (ref 43–75)
NT-PROBNP SERPL-MCNC: 3479 PG/ML
PLATELET # BLD AUTO: 154 THOUSANDS/UL (ref 149–390)
PLATELET BLD QL SMEAR: ADEQUATE
PMV BLD AUTO: 10.2 FL (ref 8.9–12.7)
POLYCHROMASIA BLD QL SMEAR: PRESENT
POTASSIUM SERPL-SCNC: 4.4 MMOL/L (ref 3.5–5.3)
PROCALCITONIN SERPL-MCNC: 26.86 NG/ML
PROT SERPL-MCNC: 5.2 G/DL (ref 6.4–8.4)
QRS AXIS: -21 DEGREES
QRSD INTERVAL: 96 MS
QT INTERVAL: 338 MS
QTC INTERVAL: 420 MS
RBC # BLD AUTO: 3.94 MILLION/UL (ref 3.88–5.62)
RBC MORPH BLD: PRESENT
SODIUM SERPL-SCNC: 139 MMOL/L (ref 135–147)
T WAVE AXIS: 153 DEGREES
VANCOMYCIN SERPL-MCNC: 14.2 UG/ML (ref 10–20)
VARIANT LYMPHS # BLD AUTO: 2 %
VENTRICULAR RATE: 93 BPM
WBC # BLD AUTO: 9.09 THOUSAND/UL (ref 4.31–10.16)
WBC TOXIC VACUOLES BLD QL SMEAR: PRESENT

## 2022-12-21 RX ORDER — GUAIFENESIN 600 MG/1
600 TABLET, EXTENDED RELEASE ORAL EVERY 12 HOURS SCHEDULED
Status: DISCONTINUED | OUTPATIENT
Start: 2022-12-21 | End: 2023-01-01 | Stop reason: HOSPADM

## 2022-12-21 RX ADMIN — FAMOTIDINE 20 MG: 10 INJECTION, SOLUTION INTRAVENOUS at 09:28

## 2022-12-21 RX ADMIN — METOPROLOL TARTRATE 2.5 MG: 5 INJECTION INTRAVENOUS at 05:34

## 2022-12-21 RX ADMIN — SODIUM CHLORIDE 125 ML/HR: 0.9 INJECTION, SOLUTION INTRAVENOUS at 19:21

## 2022-12-21 RX ADMIN — DIPHENHYDRAMINE HYDROCHLORIDE 25 MG: 50 INJECTION, SOLUTION INTRAMUSCULAR; INTRAVENOUS at 05:29

## 2022-12-21 RX ADMIN — SODIUM CHLORIDE 125 ML/HR: 0.9 INJECTION, SOLUTION INTRAVENOUS at 05:29

## 2022-12-21 RX ADMIN — METHYLPREDNISOLONE SODIUM SUCCINATE 20 MG: 40 INJECTION, POWDER, FOR SOLUTION INTRAMUSCULAR; INTRAVENOUS at 21:42

## 2022-12-21 RX ADMIN — Medication 250 MG: at 09:28

## 2022-12-21 RX ADMIN — MONTELUKAST 5 MG: 10 TABLET, FILM COATED ORAL at 21:44

## 2022-12-21 RX ADMIN — FLUTICASONE FUROATE AND VILANTEROL TRIFENATATE 1 PUFF: 100; 25 POWDER RESPIRATORY (INHALATION) at 09:35

## 2022-12-21 RX ADMIN — APIXABAN 5 MG: 5 TABLET, FILM COATED ORAL at 17:44

## 2022-12-21 RX ADMIN — FOLIC ACID 1 MG: 1 TABLET ORAL at 09:28

## 2022-12-21 RX ADMIN — Medication 250 MG: at 17:44

## 2022-12-21 RX ADMIN — INSULIN LISPRO 2 UNITS: 100 INJECTION, SOLUTION INTRAVENOUS; SUBCUTANEOUS at 16:26

## 2022-12-21 RX ADMIN — ACETAMINOPHEN 650 MG: 325 TABLET, FILM COATED ORAL at 09:28

## 2022-12-21 RX ADMIN — METHYLPREDNISOLONE SODIUM SUCCINATE 20 MG: 40 INJECTION, POWDER, FOR SOLUTION INTRAMUSCULAR; INTRAVENOUS at 09:32

## 2022-12-21 RX ADMIN — LATANOPROST 1 DROP: 50 SOLUTION OPHTHALMIC at 21:43

## 2022-12-21 RX ADMIN — APIXABAN 5 MG: 5 TABLET, FILM COATED ORAL at 09:28

## 2022-12-21 RX ADMIN — ATENOLOL 50 MG: 50 TABLET ORAL at 09:32

## 2022-12-21 RX ADMIN — METOPROLOL TARTRATE 2.5 MG: 5 INJECTION INTRAVENOUS at 21:42

## 2022-12-21 RX ADMIN — HYDROCORTISONE 1 APPLICATION: 25 CREAM TOPICAL at 09:35

## 2022-12-21 RX ADMIN — FAMOTIDINE 20 MG: 10 INJECTION, SOLUTION INTRAVENOUS at 21:43

## 2022-12-21 RX ADMIN — GUAIFENESIN 600 MG: 600 TABLET, EXTENDED RELEASE ORAL at 21:43

## 2022-12-21 RX ADMIN — INSULIN LISPRO 1 UNITS: 100 INJECTION, SOLUTION INTRAVENOUS; SUBCUTANEOUS at 21:42

## 2022-12-21 RX ADMIN — VANCOMYCIN HYDROCHLORIDE 1250 MG: 10 INJECTION, POWDER, LYOPHILIZED, FOR SOLUTION INTRAVENOUS at 19:19

## 2022-12-21 RX ADMIN — DIPHENHYDRAMINE HYDROCHLORIDE 25 MG: 50 INJECTION, SOLUTION INTRAMUSCULAR; INTRAVENOUS at 11:43

## 2022-12-21 RX ADMIN — UMECLIDINIUM 1 PUFF: 62.5 AEROSOL, POWDER ORAL at 09:35

## 2022-12-21 RX ADMIN — INSULIN LISPRO 1 UNITS: 100 INJECTION, SOLUTION INTRAVENOUS; SUBCUTANEOUS at 11:43

## 2022-12-21 RX ADMIN — FERRIC OXIDE RED 1 APPLICATION: 8; 8 LOTION TOPICAL at 09:35

## 2022-12-21 NOTE — PROGRESS NOTES
Vancomycin Assessment    Mulugeta Malcolm is a [de-identified] y o  male who is currently receiving vancomycin vancomycin 1250mg q12h for Soft tissue (goal -600, trough >10)     Relevant clinical data and objective history reviewed:  Creatinine   Date Value Ref Range Status   12/20/2022 1 90 (H) 0 60 - 1 30 mg/dL Final     Comment:     Standardized to IDMS reference method   12/19/2022 1 95 (H) 0 60 - 1 30 mg/dL Final     Comment:     Verified by repeat analysis   12/19/2022 0 91 0 60 - 1 30 mg/dL Final     Comment:     Standardized to IDMS reference method     Vancomycin Rm   Date Value Ref Range Status   12/20/2022 13 3 10 0 - 20 0 ug/mL Final     /62   Pulse 70   Temp 98 7 °F (37 1 °C)   Resp 20   Ht 5' 11" (1 803 m)   Wt 104 kg (228 lb 6 3 oz)   SpO2 96%   BMI 31 85 kg/m²   I/O last 3 completed shifts:  In: -   Out: 200 [Urine:200]  Lab Results   Component Value Date/Time    BUN 33 (H) 12/20/2022 06:10 AM    WBC 6 34 12/20/2022 06:10 AM    HGB 17 1 (H) 12/20/2022 06:10 AM    HCT 52 6 (H) 12/20/2022 06:10 AM     (H) 12/20/2022 06:10 AM     12/20/2022 06:10 AM     Temp Readings from Last 3 Encounters:   12/20/22 98 7 °F (37 1 °C)   11/28/22 98 °F (36 7 °C)   11/16/22 (!) 96 4 °F (35 8 °C) (Tympanic)     Vancomycin Days of Therapy: 2    Assessment/Plan  The patient is currently on vancomycin utilizing pulse dosing  Baseline risks associated with therapy include: pre-existing renal impairment, concomitant nephrotoxic medications, and advanced age  The patient is receiving 1250mg IV daily prn if trough </= 15 with the most recent vancomycin level being at steady-state and therapeutic based on a goal of -600, trough >10 ; therefore, is clinically appropriate and dose will be continued   Pharmacy will continue to follow closely for s/sx of nephrotoxicity, infusion reactions, and appropriateness of therapy  BMP and CBC will be ordered per protocol    Plan for a random trough  at approximately 0600 on 12/21/22 (with morning draw)  Pharmacy will continue to follow the patient’s culture results and clinical progress daily      Ezio Andrews, Pharmacist

## 2022-12-21 NOTE — PLAN OF CARE
Problem: SAFETY ADULT  Goal: Patient will remain free of falls  Description: INTERVENTIONS:  - Educate patient/family on patient safety including physical limitations  - Instruct patient to call for assistance with activity   - Consult OT/PT to assist with strengthening/mobility   - Keep Call bell within reach  - Keep bed low and locked with side rails adjusted as appropriate  - Keep care items and personal belongings within reach  - Initiate and maintain comfort rounds  - Make Fall Risk Sign visible to staff  - Offer Toileting every 2 Hours, in advance of need  - Initiate/Maintain bed alarm  - Obtain necessary fall risk management equipment: walker  - Apply yellow socks and bracelet for high fall risk patients  - Consider moving patient to room near nurses station  Outcome: Progressing     Problem: RESPIRATORY - ADULT  Goal: Achieves optimal ventilation and oxygenation  Description: INTERVENTIONS:  - Assess for changes in respiratory status  - Assess for changes in mentation and behavior  - Position to facilitate oxygenation and minimize respiratory effort  - Oxygen administered by appropriate delivery if ordered  - Initiate smoking cessation education as indicated  - Encourage broncho-pulmonary hygiene including cough, deep breathe, Incentive Spirometry  - Assess the need for suctioning and aspirate as needed  - Assess and instruct to report SOB or any respiratory difficulty  - Respiratory Therapy support as indicated  Outcome: Progressing

## 2022-12-21 NOTE — PROGRESS NOTES
Progress Note - General Surgery   Maria Del Carmen Willoughby [de-identified] y o  male MRN: [id number]  Unit/Bed#: [address] [place] [address] Encounter: [id number]    Assessment:  [de-identified] y o  male with a past medical history pertinent for lung cancer, prostate cancer, CHF, diabetes mellitus type 2, A  Fib on eliquis, and reported radiation for lung cancer presenting to the acute care surgery team secondary to right axillary swelling and tenderness  CT scan and ultrasound were both negative for any fluid collection findings  He is now status post thorough diagonstic needle aspiration at bedside on 12/20 that did not yield any purulence  WBC count normal but bands of 42%  Fever and hypotension resolved  Lactic normalized overnight   Procal 4 5 > 26 8  Creatinine improving      Plan:  No purulence noted on dressing  Dressing replaced at bedside  Currently on IV vancomycin   Follow up blood cultures   Dermatology consult pending   No need for OR today, can have diabetic cardiac diet       Subjective/Objective     Subjective:  Patient still confused this morning but reports no complaints besides a headache  His right axilla is  but he is able to lift his right arm over his head     Objective:     Blood pressure 129/77, pulse (!) 109, temperature 97 8 °F (36 6 °C), temperature source Axillary, resp  rate 18, height 5' 11" (1 803 m), weight 104 kg (228 lb 6 3 oz), SpO2 94 %  ,Body mass index is 31 85 kg/m²  Intake/Output Summary (Last 24 hours) at 12/21/2022 0921  Last data filed at 12/21/2022 0734  Gross per 24 hour   Intake --   Output 750 ml   Net -750 ml       Invasive Devices     Peripheral Intravenous Line  Duration           Peripheral IV 12/20/22 Distal;Right;Ventral (anterior) Forearm 1 day    Peripheral IV 12/20/22 Distal;Dorsal (posterior); Left Forearm <1 day          Drain  Duration           Urethral Catheter 16 Fr  <1 day                Physical Exam: /77 (BP Location: Right arm)   Pulse (!) 109   Temp 97 8 °F (36 6 °C) (Axillary)   Resp 18   Ht 5' 11" (1 803 m)   Wt 104 kg (228 lb 6 3 oz)   SpO2 94%   BMI 31 85 kg/m²   General appearance: arousable, no acute distress  Head: Normocephalic, without obvious abnormality, atraumatic  Lungs: end expiratory wheezes in b/l bases  Heart: irregularly irregular rhythm  Extremities: extremities are warm and well perfused, 2+ pitting edema in b/l lower extremities,  ecchymotic macular skin changes to distal b/l upper extremities   Skin: edema and blanchable erythema of periaxillary region with tenderness to palpation    Lab, Imaging and other studies:  I have personally reviewed pertinent lab results    , CBC:   Lab Results   Component Value Date    WBC 9 09 12/21/2022    HGB 12 8 12/21/2022    HCT 40 8 12/21/2022     (H) 12/21/2022     12/21/2022    MCH 32 7 12/21/2022    MCHC 31 6 12/21/2022    RDW 13 5 12/21/2022    MPV 10 2 12/21/2022   , CMP:   Lab Results   Component Value Date    SODIUM 139 12/21/2022    K 4 4 12/21/2022     12/21/2022    CO2 23 12/21/2022    BUN 44 (H) 12/21/2022    CREATININE 1 79 (H) 12/21/2022    CALCIUM 6 9 (L) 12/21/2022    AST 23 12/21/2022    ALT 19 12/21/2022    ALKPHOS 40 (L) 12/21/2022    EGFR 35 12/21/2022       VTE Pharmacologic Prophylaxis:eliquis

## 2022-12-21 NOTE — QUICK NOTE
Given Albumin 5% 25 gm x 1 due to low urine output  Sterling insertion due to acute urinary retention  Not voiding since 3PM,bladder scan 706 ml

## 2022-12-21 NOTE — PROGRESS NOTES
Progress Note - Infectious Disease   Jaimie Kelley [de-identified] y o  male MRN: [id number]  Unit/Bed#: [address] [address] Encounter: [id number]      Impression/Plan:  1  SIRS vs Severe sepsis, evolving soon on presentation  With high fever spike, tachycardia, tachypnea, lactic acidosis and now hypotension responsive to IVFs  Blood cultures negative to date  Suspicious source #2  -continue Vancomycin IV by Vancomycin level  -Pharmacy on consult for Vancomycin trough dosing management    -follow-up cultures and adjust antibiotics as needed  -monitor temperature and hemodynamics  -serial exam  -recheck CBC and BMP in a m   -supportive care per primary care team  -additional interventions clinical course     2  Right axillary cellulitis, Staph aureus abscess/carbuncle  Axillary US with discrete collection  Bedside needle aspiration attempted with only scant blood obtained and sent for culture  Patient/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 and is unaware of known MRSA history  12/20/22 Aspirate cx growing 4+ Staph aureus  -antibiotics as above  -follow-up cultures and adjust antibiotics as needed  -monitor temperature and hemodynamics  -serial exam  -close surgical follow-up     3  BUDDY  Developing soon on admission 0 91 < 1 95 > 1 79  -renal dose adjust antibiotic as needed  -volume management per primary  -recheck BMP     4  Urticaria/Angioedema, POA and worsening x 1 month  Unclear source  Outpatient mysoline, atorvastatin, losartan, jardiance held  No known antibiotic allergies  -monitor symptoms  -symptomatic management per primary care team     5  Lung CA  S/p 3 High dose RT treatment January 2022   No chemo    -Has outpatient pulmonary follow up next month     Antibiotics:  Vancomycin D3     I have discussed the above management plan in detail with patient, RN, and the primary service    Subjective:  Patient has no fever, chills, sweats overnight; no nausea, vomiting, diarrhea; no cough, shortness of breath; decreased right axilla pain dependent on position  Appears to be tolerating vancomycin    Objective:  Vitals:  Temp:  [97 4 °F (36 3 °C)-98 7 °F (37 1 °C)] 97 5 °F (36 4 °C)  HR:  [] 111  Resp:  [18-26] 18  BP: (102-129)/(61-85) 128/83  SpO2:  [93 %-97 %] 94 %  Temp (24hrs), Av 9 °F (36 6 °C), Min:97 4 °F (36 3 °C), Max:98 7 °F (37 1 °C)  Current: Temperature: 97 5 °F (36 4 °C)    Physical Exam:   General Appearance:   44-year-old elderly male, debilitated, nontoxic appearance today, alert and oriented,, no acute distress  HEENT: Atraumatic normocephalic   Throat: Oropharynx moist  Poor dentition   Pulmonary:   Normal respiratory excursion without accessory muscle use   Cardiac:  RRR   Abdomen:   Soft, no focal tenderness, protuberant   Extremities: No edema   :  No Sterling, no SPT   Psychiatric: Awake, cooperative   Skin: No new rashes  IV site nontender  Pink swelling of right chest wall axillary area softer with tinged drainage on gauze and bedding       Labs, Imaging, & Other studies:   All pertinent labs and imaging studies were personally reviewed  Results from last 7 days   Lab Units 22  0651 22  0610 22  2327   WBC Thousand/uL 9 09 6 34 7 67   HEMOGLOBIN g/dL 12 8 17 1* 16 5   PLATELETS Thousands/uL 154 252 273     Results from last 7 days   Lab Units 22  0651 22  0610 22  2327 22  0129   SODIUM mmol/L 139 138 138 138   POTASSIUM mmol/L 4 4 4 9 5 6* 4 4   CHLORIDE mmol/L 108 101 100 101   CO2 mmol/L 23 24 17* 27   BUN mg/dL 44* 33* 33* 15   CREATININE mg/dL 1 79* 1 90* 1 95* 0 91   EGFR ml/min/1 73sq m 35 32 31 79   CALCIUM mg/dL 6 9* 8 0* 9 0 8 9   AST U/L 23  --  29 20   ALT U/L 19  --  17 17   ALK PHOS U/L 40*  --  76 91     Results from last 7 days   Lab Units 22  1539 22  2337 22  2328   BLOOD CULTURE   --  No Growth at 24 hrs  No Growth at 24 hrs     GRAM STAIN RESULT  1+ Polys*  2+ Gram positive cocci in pairs*  --   --    WOUND CULTURE  4+ Growth of Staphylococcus aureus*  --   --      Results from last 7 days   Lab Units 12/21/22  0651 12/20/22  0610 12/19/22  2327   PROCALCITONIN ng/ml 26 86* 4 55* 0 35*

## 2022-12-21 NOTE — PROGRESS NOTES
Vancomycin Assessment    Chris Bagley is a [de-identified] y o  male who is currently receiving vancomycin 1250 mg IV daily prn Trough </= 15 for Soft tissue (goal -600, trough >10)     Relevant clinical data and objective history reviewed:  Creatinine   Date Value Ref Range Status   12/21/2022 1 79 (H) 0 60 - 1 30 mg/dL Final     Comment:     Standardized to IDMS reference method   12/20/2022 1 90 (H) 0 60 - 1 30 mg/dL Final     Comment:     Standardized to IDMS reference method   12/19/2022 1 95 (H) 0 60 - 1 30 mg/dL Final     Comment:     Verified by repeat analysis     Vancomycin Rm   Date Value Ref Range Status   12/21/2022 14 2 10 0 - 20 0 ug/mL Final     /77 (BP Location: Right arm)   Pulse (!) 109   Temp 97 8 °F (36 6 °C) (Axillary)   Resp 18   Ht 5' 11" (1 803 m)   Wt 104 kg (228 lb 6 3 oz)   SpO2 94%   BMI 31 85 kg/m²   I/O last 3 completed shifts:  In: -   Out: 200 [Urine:200]  Lab Results   Component Value Date/Time    BUN 44 (H) 12/21/2022 06:51 AM    WBC 9 09 12/21/2022 06:51 AM    HGB 12 8 12/21/2022 06:51 AM    HCT 40 8 12/21/2022 06:51 AM     (H) 12/21/2022 06:51 AM     12/21/2022 06:51 AM     Temp Readings from Last 3 Encounters:   12/21/22 97 8 °F (36 6 °C) (Axillary)   11/28/22 98 °F (36 7 °C)   11/16/22 (!) 96 4 °F (35 8 °C) (Tympanic)     Vancomycin Days of Therapy: 2    Assessment/Plan  The patient is currently on vancomycin utilizing pulse dosing  Baseline risks associated with therapy include: pre-existing renal impairment, concomitant nephrotoxic medications, and advanced age  The patient is receiving vancomycin 1250 mg IV daily prn Trough </= 15 with the most recent vancomycin level being not at steady-state and sub-therapeutic based on a goal of trough < or = 15 ; therefore, patient will receive a one time dose of 1250 mg at 19:00 today dec   21st    Pharmacy will continue to follow closely for s/sx of nephrotoxicity, infusion reactions, and appropriateness of therapy  BMP and CBC will be ordered per protocol  Plan for trough as patient approaches steady state, prior to tomorrow's  dose at approximately 06:00 on 12/22/2022  Pharmacy will continue to follow the patient’s culture results and clinical progress daily      Rl Stanley, Pharmacist

## 2022-12-22 PROBLEM — R65.21 SEPTIC SHOCK (HCC): Status: RESOLVED | Noted: 2022-12-20 | Resolved: 2022-12-22

## 2022-12-22 PROBLEM — A41.9 SEVERE SEPSIS (HCC): Status: RESOLVED | Noted: 2022-05-01 | Resolved: 2022-12-22

## 2022-12-22 PROBLEM — A41.9 SEPTIC SHOCK (HCC): Status: RESOLVED | Noted: 2022-12-20 | Resolved: 2022-12-22

## 2022-12-22 PROBLEM — R65.20 SEVERE SEPSIS (HCC): Status: RESOLVED | Noted: 2022-05-01 | Resolved: 2022-12-22

## 2022-12-22 LAB
BACTERIA WND AEROBE CULT: ABNORMAL
GLUCOSE SERPL-MCNC: 135 MG/DL (ref 65–140)
GLUCOSE SERPL-MCNC: 150 MG/DL (ref 65–140)
GLUCOSE SERPL-MCNC: 176 MG/DL (ref 65–140)
GLUCOSE SERPL-MCNC: 206 MG/DL (ref 65–140)
GRAM STN SPEC: ABNORMAL
GRAM STN SPEC: ABNORMAL
VANCOMYCIN SERPL-MCNC: 16.2 UG/ML (ref 10–20)

## 2022-12-22 RX ORDER — CEFAZOLIN SODIUM 2 G/50ML
2000 SOLUTION INTRAVENOUS EVERY 8 HOURS
Status: CANCELLED | OUTPATIENT
Start: 2022-12-22

## 2022-12-22 RX ORDER — VANCOMYCIN HYDROCHLORIDE 1 G/200ML
10 INJECTION, SOLUTION INTRAVENOUS ONCE
Status: DISCONTINUED | OUTPATIENT
Start: 2022-12-22 | End: 2022-12-22

## 2022-12-22 RX ORDER — POLYETHYLENE GLYCOL 3350 17 G/17G
17 POWDER, FOR SOLUTION ORAL DAILY
Status: DISCONTINUED | OUTPATIENT
Start: 2022-12-22 | End: 2022-12-29

## 2022-12-22 RX ORDER — CEFAZOLIN SODIUM 2 G/50ML
2000 SOLUTION INTRAVENOUS EVERY 8 HOURS
Status: DISCONTINUED | OUTPATIENT
Start: 2022-12-22 | End: 2022-12-26

## 2022-12-22 RX ORDER — VANCOMYCIN HYDROCHLORIDE 1 G/200ML
10 INJECTION, SOLUTION INTRAVENOUS DAILY PRN
Status: DISCONTINUED | OUTPATIENT
Start: 2022-12-22 | End: 2022-12-23

## 2022-12-22 RX ORDER — SODIUM CHLORIDE 9 MG/ML
75 INJECTION, SOLUTION INTRAVENOUS CONTINUOUS
Status: DISCONTINUED | OUTPATIENT
Start: 2022-12-22 | End: 2022-12-24

## 2022-12-22 RX ADMIN — FLUTICASONE FUROATE AND VILANTEROL TRIFENATATE 1 PUFF: 100; 25 POWDER RESPIRATORY (INHALATION) at 08:48

## 2022-12-22 RX ADMIN — INSULIN LISPRO 2 UNITS: 100 INJECTION, SOLUTION INTRAVENOUS; SUBCUTANEOUS at 11:41

## 2022-12-22 RX ADMIN — CEFAZOLIN SODIUM 2000 MG: 2 SOLUTION INTRAVENOUS at 17:03

## 2022-12-22 RX ADMIN — POLYETHYLENE GLYCOL 3350 17 G: 17 POWDER, FOR SOLUTION ORAL at 11:50

## 2022-12-22 RX ADMIN — METOPROLOL TARTRATE 2.5 MG: 5 INJECTION INTRAVENOUS at 19:54

## 2022-12-22 RX ADMIN — UMECLIDINIUM 1 PUFF: 62.5 AEROSOL, POWDER ORAL at 08:49

## 2022-12-22 RX ADMIN — GUAIFENESIN 600 MG: 600 TABLET, EXTENDED RELEASE ORAL at 20:02

## 2022-12-22 RX ADMIN — SODIUM CHLORIDE 75 ML/HR: 0.9 INJECTION, SOLUTION INTRAVENOUS at 07:01

## 2022-12-22 RX ADMIN — FOLIC ACID 1 MG: 1 TABLET ORAL at 08:53

## 2022-12-22 RX ADMIN — Medication 250 MG: at 08:53

## 2022-12-22 RX ADMIN — DIPHENHYDRAMINE HYDROCHLORIDE 25 MG: 50 INJECTION, SOLUTION INTRAMUSCULAR; INTRAVENOUS at 05:23

## 2022-12-22 RX ADMIN — APIXABAN 5 MG: 5 TABLET, FILM COATED ORAL at 08:53

## 2022-12-22 RX ADMIN — LATANOPROST 1 DROP: 50 SOLUTION OPHTHALMIC at 21:32

## 2022-12-22 RX ADMIN — GUAIFENESIN 600 MG: 600 TABLET, EXTENDED RELEASE ORAL at 08:53

## 2022-12-22 RX ADMIN — ATENOLOL 50 MG: 50 TABLET ORAL at 08:53

## 2022-12-22 RX ADMIN — METHYLPREDNISOLONE SODIUM SUCCINATE 20 MG: 40 INJECTION, POWDER, FOR SOLUTION INTRAMUSCULAR; INTRAVENOUS at 08:48

## 2022-12-22 RX ADMIN — METOPROLOL TARTRATE 2.5 MG: 5 INJECTION INTRAVENOUS at 05:22

## 2022-12-22 RX ADMIN — SODIUM CHLORIDE 75 ML/HR: 0.9 INJECTION, SOLUTION INTRAVENOUS at 21:32

## 2022-12-22 RX ADMIN — INSULIN LISPRO 1 UNITS: 100 INJECTION, SOLUTION INTRAVENOUS; SUBCUTANEOUS at 17:02

## 2022-12-22 RX ADMIN — FAMOTIDINE 20 MG: 10 INJECTION, SOLUTION INTRAVENOUS at 20:02

## 2022-12-22 RX ADMIN — DIPHENHYDRAMINE HYDROCHLORIDE 25 MG: 50 INJECTION, SOLUTION INTRAMUSCULAR; INTRAVENOUS at 00:17

## 2022-12-22 RX ADMIN — APIXABAN 5 MG: 5 TABLET, FILM COATED ORAL at 17:02

## 2022-12-22 RX ADMIN — FAMOTIDINE 20 MG: 10 INJECTION, SOLUTION INTRAVENOUS at 08:48

## 2022-12-22 RX ADMIN — MONTELUKAST 5 MG: 10 TABLET, FILM COATED ORAL at 21:32

## 2022-12-22 RX ADMIN — INSULIN LISPRO 1 UNITS: 100 INJECTION, SOLUTION INTRAVENOUS; SUBCUTANEOUS at 21:32

## 2022-12-22 RX ADMIN — Medication 250 MG: at 17:02

## 2022-12-22 NOTE — PROGRESS NOTES
Progress Note - Infectious Disease   Jaimie Kelley [de-identified] y o  male MRN: [id number]  Unit/Bed#: [address] [address] Encounter: [id number]      Impression/Plan:  1  SIRS vs Severe sepsis, evolving soon on presentation  With high fever spike, tachycardia, tachypnea, lactic acidosis and now hypotension responsive to IVFs  Blood cultures negative to date  Suspicious source #2  -discontinue Vancomycin  -Switch to Cefazolin 2 g IV q 8 hours  -monitor temperature and hemodynamics  -serial exam  -recheck CBC and BMP in a m   -supportive care per primary care team  -additional interventions clinical course     2  Right axillary cellulitis, Staph aureus abscess/carbuncle  Axillary US with discrete collection  Bedside needle aspiration attempted with only scant blood obtained and sent for culture  Patient/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 and is unaware of known MRSA history  12/20/22 Aspirate cx growing 4+ MSSA  -Monitor on Cefazolin IV next 24 hours  -serial exam  -close surgical follow-up  -if continued clinical improvement, anticipate transitioning to PO Cephalexin 500 mg q 6 hours to complete total 2 week antibiotic course through 1/1/23     3  BUDDY  Developing soon on admission 0 91 < 1 95 > 1 79  Crcl 41%  -renal dose adjust antibiotic as needed  -volume management per primary  -recheck BMP in am     4  Urticaria/Angioedema, POA and worsening x 1 month  Unclear source  Outpatient mysoline, atorvastatin, losartan, jardiance held  No known antibiotic allergies  -monitor symptoms  -symptomatic management per primary care team     5  Lung CA  S/p 3 High dose RT treatment January 2022   No chemo    -Has outpatient pulmonary follow up next month     Antibiotics:  Cefazolin - abx D4     I have discussed the above management plan in detail with patient, RN, Prema Jensen, surgical PA, and the primary service    Subjective:  Patient has no fever, chills, sweats overnight; no nausea, vomiting, diarrhea; no cough, shortness of breath; decreased right axilla pain dependent on position  Appears to be tolerating vancomycin    Objective:  Vitals:  Temp:  [97 1 °F (36 2 °C)-97 7 °F (36 5 °C)] 97 6 °F (36 4 °C)  HR:  [] 105  Resp:  [16-18] 18  BP: (125-170)/() 154/99  SpO2:  [92 %-97 %] 93 %  Temp (24hrs), Av 5 °F (36 4 °C), Min:97 1 °F (36 2 °C), Max:97 7 °F (36 5 °C)  Current: Temperature: 97 6 °F (36 4 °C)    Physical Exam:   General Appearance:   27-year-old elderly male, debilitated, nontoxic appearance today, alert and oriented,, no acute distress  HEENT: Atraumatic normocephalic   Throat: Oropharynx moist  Poor dentition   Pulmonary:   Normal respiratory excursion without accessory muscle use   Cardiac:  RRR   Abdomen:   Soft, no focal tenderness, protuberant   Extremities: No edema   :  No Sterling, no SPT   Psychiatric: Awake, cooperative   Skin: No new rashes  IV site nontender  Erythema/swelling of right chest wall axillary area shailesh with some tenderness and induration most posterior edge  Labs, Imaging, & Other studies:   All pertinent labs and imaging studies were personally reviewed  Results from last 7 days   Lab Units 22  0651 22  0610 22  2327   WBC Thousand/uL 9 09 6 34 7 67   HEMOGLOBIN g/dL 12 8 17 1* 16 5   PLATELETS Thousands/uL 154 252 273     Results from last 7 days   Lab Units 22  0651 22  0610 22  2327 22  0129   SODIUM mmol/L 139 138 138 138   POTASSIUM mmol/L 4 4 4 9 5 6* 4 4   CHLORIDE mmol/L 108 101 100 101   CO2 mmol/L 23 24 17* 27   BUN mg/dL 44* 33* 33* 15   CREATININE mg/dL 1 79* 1 90* 1 95* 0 91   EGFR ml/min/1 73sq m 35 32 31 79   CALCIUM mg/dL 6 9* 8 0* 9 0 8 9   AST U/L 23  --  29 20   ALT U/L 19  --  17 17   ALK PHOS U/L 40*  --  76 91     Results from last 7 days   Lab Units 22  1539 22  2337 22  2328   BLOOD CULTURE   --  No Growth at 48 hrs  No Growth at 48 hrs     GRAM STAIN RESULT  1+ Polys*  2+ Gram positive cocci in pairs*  --   --    WOUND CULTURE  4+ Growth of Staphylococcus aureus*  --   --      Results from last 7 days   Lab Units 12/21/22  0651 12/20/22  0610 12/19/22  2327   PROCALCITONIN ng/ml 26 86* 4 55* 0 35*

## 2022-12-22 NOTE — PROGRESS NOTES
Progress Note - General Surgery   David Ott [de-identified] y o  male MRN: [id number]  Unit/Bed#: [address] [place] [address] Encounter: [id number]    Assessment:  [de-identified] y o  male with a past medical history pertinent for lung cancer, prostate cancer, CHF, diabetes mellitus type 2, A  Fib on eliquis, and reported radiation for lung cancer presenting to the acute care surgery team secondary to right axillary swelling and tenderness  CT scan and ultrasound were both negative for any fluid collection findings  He is now status post thorough diagonstic needle aspiration at bedside on 12/20 that did not yield any purulence  Erythema improving, still edematous, no fluctuance, no drainage able to be expressed  No labs this morning      Plan:  Erythema improving, still edematous, no fluctuance, no drainage able to be expressed  Dressing replaced at bedside  Currently on IV vancomycin   Follow up blood cultures   No need for OR  gen surg can follow peripherally      Subjective/Objective     Subjective:  Patient reports his tenderness of right axilla is improving  He reports dyspnea on exertion which is not normal for him at home    Objective:     Blood pressure (!) 162/103, pulse 101, temperature 97 6 °F (36 4 °C), resp  rate 18, height 5' 11" (1 803 m), weight 110 kg (243 lb 4 8 oz), SpO2 93 %  ,Body mass index is 33 93 kg/m²  Intake/Output Summary (Last 24 hours) at 12/22/2022 0936  Last data filed at 12/22/2022 0740  Gross per 24 hour   Intake 210 ml   Output 3025 ml   Net -2815 ml       Invasive Devices     Peripheral Intravenous Line  Duration           Peripheral IV 12/20/22 Distal;Dorsal (posterior); Left Forearm 1 day          Drain  Duration           Urethral Catheter 16 Fr  1 day                Physical Exam: BP (!) 162/103   Pulse 101   Temp 97 6 °F (36 4 °C)   Resp 18   Ht 5' 11" (1 803 m)   Wt 110 kg (243 lb 4 8 oz)   SpO2 93%   BMI 33 93 kg/m²   General appearance: awake, alert, no acute distress  Head: Normocephalic, without obvious abnormality, atraumatic  Heart: irregularly irregular rhythm  Extremities: extremities are warm and well perfused, 2+ pitting edema in b/l lower extremities,  ecchymotic macular skin changes to distal b/l upper extremities with skin tears  Skin: edema and blanchable erythema of periaxillary region with tenderness to palpation, improving compared to yesterday    Lab, Imaging and other studies:  I have personally reviewed pertinent lab results    , CBC:   No results found for: WBC, HGB, HCT, MCV, PLT, ADJUSTEDWBC, MCH, MCHC, RDW, MPV, NRBC, CMP:   No results found for: SODIUM, K, CL, CO2, ANIONGAP, BUN, CREATININE, GLUCOSE, CALCIUM, AST, ALT, ALKPHOS, PROT, BILITOT, EGFR    VTE Pharmacologic Prophylaxis:eliquis

## 2022-12-22 NOTE — PROGRESS NOTES
Danielle [address]  Progress Note - Martin Wallace 1940, [de-identified] y o  male MRN: [id number]  Unit/Bed#: [address] [address] Encounter: [id number]  Primary Care Provider: Mara Suarez MD   Date and time admitted to hospital: 12/19/2022  1:01 AM    * Angioedema-resolved as of 12/21/2022  Assessment & Plan  Improving, poor mentation, airway maintained    Patient on mysoline, atorvastatin and losartan/Jardiance which could cause angioedema/ urticaria- both held   · Rash is mostly on trunk and upper thigh and is very pruritic; has lip swelling, no tongue  · Given dose of 125 mg solumedrol, benadryl 25 mg IV bid, and pepcid 20 mg IV in ER but symptoms returned   · Continue solumedrol, benadryl, pepcid   · Continue calamine, hydrocortisone   · Continue Singulair      Severe sepsis (HCC)-resolved as of 12/22/2022  Assessment & Plan  Resolved    · Likely due to right lateral chest wall cellulitis with possible abscess  · BCx-pending,   · Contiune Rocephin, Vanco completed, continue Solumedrol bid transition to PO  · IVF  · Surgery recs Erythema improving, still edematous, no fluctuance, no drainage able to be expressed  Dressing replaced at bedside  Currently on IV vancomycin  Follow up blood cultures ,No need for OR  gen surg can follow peripherally    Acute kidney injury [facility])  Assessment & Plan  Slowly improving, likely due to septic shock      · Cr 1 9  > 1 79  · Continue IVF  · Avoid nephrotoxic's    Persistent atrial fibrillation (HCC)  Assessment & Plan  Continue eliquis as tolerated,    GERD (gastroesophageal reflux disease)  Assessment & Plan  Continue PPI famotidine 20 mg    Chronic diastolic heart failure (HCC)  Assessment & Plan  Wt Readings from Last 3 Encounters:   12/19/22 104 kg (228 lb 6 3 oz)   12/06/22 102 kg (224 lb)   11/28/22 102 kg (224 lb)     Daily weights  Intake and output  Continue atenolol     Diabetic polyneuropathy associated with type 2 diabetes mellitus ([facility] [address] )  Assessment & Plan  Lab Results   Component Value Date    HGBA1C 7 1 (H) 2022       No results for input(s): POCGLU in the last 72 hours  Blood Sugar Average: Last 72 hrs:     - hold metformin and Jardiance (potential contributor to Derm reaction)  - Accuchecks AC/HS with insulin sliding scale     High serum lactic acid-resolved as of 2022  Assessment & Plan  Worsening,    · Possibly d/t sepsis vs OP medications  · Continue to trend and IVF  · ABG-pending    Encephalopathy-resolved as of 2022  Assessment & Plan  Possibly multifactorial, Sepsis vs Haldo vs lactic acidosis, hx of SYLVESTER    · ABG-unremarkable  · Neuro checks, maintain airway  · S/p haldo administration overnight-hold all neurotoxics  · CCU consulted overnight    Septic shock (HCC)-resolved as of 2022  Assessment & Plan  resolved, Hemodynamic stable, still in sepsis    · Likely due to right lateral chest wall cellulitis with possible abscess  · Contiune Rocephin, Vanco completed  · IVF      VTE Pharmacologic Prophylaxis:   Pharmacologic: Apixaban (Eliquis)  Mechanical VTE Prophylaxis in Place: Yes    Patient Centered Rounds: I have performed bedside rounds with nursing staff today  Discussions with Specialists or Other Care Team Provider: Yes  Education and Discussions with Family / Patient:Yes  Time Spent for Care: 30 minutes  More than 50% of total time spent on counseling and coordination of care as described above  Current Length of Stay: 2 day(s)  Current Patient Status: Inpatient     Discharge Plan: pending     Code Status: Level 1 - Full Code      Subjective:   Patient seen and examined at bedside, patient was AOx3, patient reported no pain at abdomen, or at underarm site on right, no itching, no shortness of breath no bowel movement but positive for gas    Nursing reported patient was able to void after Sterling removal     Objective:     Vitals:   Temp (24hrs), Av 5 °F (36 4 °C), Min:97 1 °F (36 2 °C), Max:97 7 °F (36 5 °C)    Temp:  [97 1 °F (36 2 °C)-97 7 °F (36 5 °C)] 97 6 °F (36 4 °C)  HR:  [] 95  Resp:  [16-18] 18  BP: (125-170)/() 151/96  SpO2:  [92 %-97 %] 95 %  Body mass index is 33 93 kg/m²  Input and Output Summary (last 24 hours): Intake/Output Summary (Last 24 hours) at 12/22/2022 1752  Last data filed at 12/22/2022 1459  Gross per 24 hour   Intake 210 ml   Output 2425 ml   Net -2215 ml          Physical Exam  Vitals reviewed  Constitutional:       General: He is not in acute distress  Appearance: Normal appearance  He is obese  HENT:      Head: Atraumatic  Nose: No congestion  Eyes:      General: No scleral icterus  Pupils: Pupils are equal, round, and reactive to light  Cardiovascular:      Rate and Rhythm: Normal rate  Heart sounds: No murmur heard  Pulmonary:      Effort: No respiratory distress  Breath sounds: No wheezing, rhonchi or rales  Abdominal:      Palpations: Abdomen is soft  Tenderness: There is no abdominal tenderness  There is no guarding  Musculoskeletal:         General: No swelling or deformity  Normal range of motion  Cervical back: No tenderness  Right lower leg: No edema  Left lower leg: No edema  Feet:      Right foot:      Skin integrity: No callus  Skin:     General: Skin is warm  Capillary Refill: Capillary refill takes more than 3 seconds  Findings: Abrasion (Positive Nikolsky's), ecchymosis, erythema (Right axilla with bulla) and rash present  No laceration, lesion or petechiae  Rash is not vesicular  Neurological:      Mental Status: He is oriented to person, place, and time  Cranial Nerves: No cranial nerve deficit  Coordination: Coordination normal    Psychiatric:         Mood and Affect: Mood normal          Thought Content:  Thought content normal          Additional Data:     Labs:    Results from last 7 days   Lab Units 12/21/22  0651 12/20/22  0610 12/19/22  2327 12/19/22  0129   WBC Thousand/uL 9 09 6  34 7 67 9 72   HEMOGLOBIN g/dL 12 8 17 1* 16 5 17 2*   HEMATOCRIT % 40 8 52 6* 51 7* 52 9*   PLATELETS Thousands/uL 154 252 273 255   NEUTROS PCT %  --   --   --  83*     Results from last 7 days   Lab Units 12/21/22  0651 12/20/22  0610 12/19/22  2327 12/19/22  0129   SODIUM mmol/L 139 138 138 138   POTASSIUM mmol/L 4 4 4 9 5 6* 4 4   CHLORIDE mmol/L 108 101 100 101   CO2 mmol/L 23 24 17* 27   BUN mg/dL 44* 33* 33* 15   CREATININE mg/dL 1 79* 1 90* 1 95* 0 91   CALCIUM mg/dL 6 9* 8 0* 9 0 8 9   TOTAL BILIRUBIN mg/dL 0 53  --  0 94 0 40   ALK PHOS U/L 40*  --  76 91   ALT U/L 19  --  17 17   AST U/L 23  --  29 20             Lab Results   Component Value Date/Time    HGBA1C 6 7 (H) 12/20/2022 06:10 AM     Results from last 7 days   Lab Units 12/22/22  1609 12/22/22  1121 12/22/22  0658 12/21/22  2100 12/21/22  1558 12/21/22  1110 12/21/22  0720 12/21/22  0156 12/20/22  2122 12/20/22  1636 12/20/22  1120 12/20/22  0735   POC GLUCOSE mg/dl 176* 206* 135 156* 195* 170* 123 137 129 163* 137 151*     Results from last 7 days   Lab Units 12/21/22  0651 12/20/22  0913 12/20/22  0610 12/20/22  0302 12/19/22  2327   LACTIC ACID mmol/L 1 9 4 5* 3 8* 2 7* 5 0*   PROCALCITONIN ng/ml 26 86*  --  4 55*  --  0 35*       * I Have Reviewed All Lab Data Listed Above  * Additional Pertinent Lab Tests Reviewed: Kathryn 66 Admission Reviewed    Imaging:     US axilla   Final Result by Ronn Gonazlez MD (12/20 1224)      No discrete fluid collection is identified in the right axilla  Workstation performed: [id number]         XR chest portable   Final Result by Megan Koenig MD (47/57 5577)      Increasing pleuroparenchymal opacity at the left base consistent with enlargement of the known mass and probably associated effusion/atelectasis                    Workstation performed: [id number]           Imaging Reports Reviewed by myself    Cultures:   Blood Culture:   Lab Results   Component Value Date BLOODCX No Growth at 48 hrs  12/19/2022    BLOODCX No Growth at 48 hrs  12/19/2022    BLOODCX No Growth After 5 Days  05/01/2022    BLOODCX No Growth After 5 Days   05/01/2022     Urine Culture: No results found for: URINECX  Sputum Culture: No components found for: SPUTUMCX  Wound Culture:   Lab Results   Component Value Date    WOUNDCULT 4+ Growth of Staphylococcus aureus (A) 12/20/2022       Last 24 Hours Medication List:   Current Facility-Administered Medications   Medication Dose Route Frequency Provider Last Rate   • acetaminophen  650 mg Oral Q6H PRN Damien Ferreira MD     • aluminum-magnesium hydroxide-simethicone  30 mL Oral Q6H PRN Damien Ferreira MD     • apixaban  5 mg Oral BID Damien Ferreira MD     • atenolol  50 mg Oral Daily Damien Ferreira MD     • bisacodyl  10 mg Rectal Daily PRN Damien Ferreira MD     • calamine-zinc oxide   Topical 4x Daily Damien Ferreira MD     • cefazolin  2,000 mg Intravenous Q8H Epifanio Ruano MD 2,000 mg (12/22/22 1703)   • famotidine  20 mg Intravenous Q12H [facility] SIMI Cadena     • Fluticasone Furoate-Vilanterol  1 puff Inhalation Daily Damien Ferreira MD      And   • umeclidinium  1 puff Inhalation Daily Damien Ferreira MD     • folic acid  1 mg Oral Daily Damien Ferreira MD     • guaiFENesin  600 mg Oral Q12H Lazaro Torres MD     • hydrocortisone   Topical BID Damien Ferreira MD     • hydrOXYzine HCL  25 mg Oral Q6H PRN SIMI Gonzales     • insulin lispro  1-6 Units Subcutaneous TID AC SIMI Easley     • insulin lispro  1-6 Units Subcutaneous HS SIMI Easley     • latanoprost  1 drop Both Eyes HS Damien Ferreira MD     • levalbuterol  0 63 mg Nebulization Q4H PRN SIMI Gonzales     • metoprolol  2 5 mg Intravenous Q6H PRN SIMI Gonzales     • montelukast  5 mg Oral HS Damien Ferreira MD     • ondansetron  4 mg Intravenous Q6H PRN Damien Ferreira MD     • polyethylene glycol  17 g Oral Daily Tono To MD     • saccharomyces boulardii  250 mg Oral BID SIMI Gonzales     • simethicone  80 mg Oral 4x Daily PRN Nayeli Melgar MD     • sodium chloride  75 mL/hr Intravenous Continuous Rad Freire PA-C 75 mL/hr (12/22/22 0701)   • vancomycin  10 mg/kg (Adjusted) Intravenous Daily PRN SIMI Bhagat          Today, Patient Was Seen By: Liz Treviño MD    ** Please Note: Dragon 360 Dictation voice to text software may have been used in the creation of this document   **

## 2022-12-22 NOTE — PHYSICAL THERAPY NOTE
PHYSICAL THERAPY EVALUATION/TREATMENT     12/22/22 8555   Note Type   Note type Evaluation   Pain Assessment   Pain Assessment Tool 0-10   Pain Score No Pain   Restrictions/Precautions   Other Precautions Fall Risk   Home Living   Type of [address] One level;Stairs to enter with rails  (5 stairs to enter)   Home Equipment Cane   Additional Comments pateint not using an assistive device prior to admission   Prior Function   Level of Bee Independent with ADLs; Independent with IADLS   Lives With Spouse   Receives Help From Family   IADLs Independent with driving   Falls in the last 6 months 1 to 4   Comments fall with on icy yard   General   Additional Pertinent History chart reviewed, patient admitted with angioedema  now presents as generally weak and deconditioned but tolerating gait well with a roller walker and will need one for home   Family/Caregiver Present Yes   Cognition   Overall Cognitive Status WFL   Arousal/Participation Cooperative   Orientation Level Oriented X4   Following Commands Follows all commands and directions without difficulty   Subjective   Subjective no pain noted   RLE Assessment   RLE Assessment   (ROM WFL, strength 3+/4-)   LLE Assessment   LLE Assessment   (ROM WFL, strength 3+/4-)   Coordination   Movements are Fluid and Coordinated 0   Bed Mobility   Additional Comments patient sitting in bedside chair upon arrival by therapist   Transfers   Sit to Stand 4  Minimal assistance   Additional items Assist x 1   Stand to Sit 4  Minimal assistance   Additional items Assist x 1   Ambulation/Elevation   Gait Assistance 4  Minimal assist   Additional items Assist x 1;Verbal cues; Tactile cues   Assistive Device Rolling walker   Distance 20 feet with numerous changes in direction, with shortened stride length and narrow base of support   Balance   Static Sitting Fair +   Dynamic Sitting Fair   Static Standing [address] - Activity Tolerance   Activity Tolerance Patient tolerated treatment well;Patient limited by fatigue   Nurse Made Aware yes   Assessment   Prognosis Good   Problem List Decreased strength;Decreased range of motion;Decreased endurance; Impaired balance;Decreased mobility; Decreased coordination   Assessment Patient seen for Physical Therapy evaluation  Patient admitted with Angioedema  Comorbidities affecting patient's physical performance include: Atrial fibrillation DM hypertension on losartan dyslipidemia tremors and chronic diastolic CHF  who presents with pruritic rash in truncal area  Personal factors affecting patient at time of initial evaluation include: inability to ambulate household distances, inability to navigate community distances, inability to navigate level surfaces without external assistance, inability to perform dynamic tasks in community, limited home support, positive fall history, inability to perform physical activity, inability to perform ADLS and inability to perform IADLS   Prior to admission, patient was independent with functional mobility without assistive device, independent with ADLS, independent with IADLS, ambulating household distance, ambulating community distances and home with family assist   Please find objective findings from Physical Therapy assessment regarding body systems outlined above with impairments and limitations including weakness, decreased ROM, impaired balance, decreased endurance, impaired coordination, gait deviations, decreased activity tolerance, decreased functional mobility tolerance and fall risk  The Barthel Index was used as a functional outcome tool presenting with a score of Barthel Index Score: 55 today indicating marked limitations of functional mobility and ADLS    Patient's clinical presentation is currently unstable/unpredictable as seen in patient's presentation of vital sign response, changing level of pain, increased fall risk, new onset of impairment of functional mobility, decreased endurance and new onset of weakness  Pt would benefit from continued Physical Therapy treatment to address deficits as defined above and maximize level of functional mobility  As demonstrated by objective findings, the assigned level of complexity for this evaluation is high  The patient's AM-PAC Basic Mobility Inpatient Short Form Raw Score is 19  A Raw score of greater than 16 suggests the patient may benefit from discharge to home  Please also refer to the recommendation of the Physical Therapist for safe discharge planning  Goals   Patient Goals to get moving and go home   STG Expiration Date 12/29/22   Short Term Goal #1 transfers and gait with roller walker independently   Short Term Goal #2 gait endurance to functional household distances   LTG Expiration Date 01/05/23   Long Term Goal #1 transfers and gait independently with a cane   Long Term Goal #2 gait endurance to functional community distances, strength BLEs 4+/5   Plan   Treatment/Interventions ADL retraining;Functional transfer training;LE strengthening/ROM; Elevations; Therapeutic exercise; Endurance training;Patient/family training;Equipment eval/education; Bed mobility;Gait training; Compensatory technique education   PT Frequency Other (Comment)  (5/week)   Recommendation   PT Discharge Recommendation Home with home health rehabilitation   Equipment Recommended [facility] Recommended Wheeled walker   [address] Inpatient   Turning in Bed Without Bedrails 4   Lying on Back to Sitting on Edge of Flat Bed 4   Moving Bed to Chair 3   Standing Up From Chair 3   Walk in Room 3   Climb 3-5 Stairs 2   Basic Mobility Inpatient Raw Score 19   Basic Mobility Standardized Score 42 48   Highest Level Of Mobility   JH-HLM Goal 6: Walk 10 steps or more   JH-HLM Achieved 6: Walk 10 steps or more   Barthel Index   Feeding 10   Bathing 0   Grooming Score 0   Dressing Score 5   Bladder Score 10 Bowels Score 10   Toilet Use Score 5   Transfers (Bed/Chair) Score 10   Mobility (Level Surface) Score 0   Stairs Score 5   Barthel Index Score 55   Additional Treatment Session   Start Time 1310   End Time 1325   Treatment Assessment S: patient without pain O: BLE exercise completed as listed below, gait training with roller walker with supervision to min assist A: patient will benefit from continued PT with progression as tolerated   Exercises   Quad Sets Sitting;5 reps   Glute Sets Sitting;5 reps   Hip Flexion Sitting;10 reps   Hip Abduction Sitting;10 reps   Knee AROM Long Arc Quad Sitting;10 reps   Marching Standing;10 reps   Balance training  sidestepping and backward walking completed for balance and coordination   Licensure   [place] License Number  Del Colbert PT [id number]

## 2022-12-22 NOTE — PLAN OF CARE
Problem: INFECTION - ADULT  Goal: Absence of fever/infection during neutropenic period  Description: INTERVENTIONS:  - Monitor WBC    Outcome: Progressing     Problem: SAFETY ADULT  Goal: Maintain or return to baseline ADL function  Description: INTERVENTIONS:  -  Assess patient's ability to carry out ADLs; assess patient's baseline for ADL function and identify physical deficits which impact ability to perform ADLs (bathing, care of mouth/teeth, toileting, grooming, dressing, etc )  - Assess/evaluate cause of self-care deficits   - Assess range of motion  - Assess patient's mobility; develop plan if impaired  - Assess patient's need for assistive devices and provide as appropriate  - Encourage maximum independence but intervene and supervise when necessary  - Involve family in performance of ADLs  - Assess for home care needs following discharge   - Consider OT consult to assist with ADL evaluation and planning for discharge  - Provide patient education as appropriate  Outcome: Progressing     Problem: RESPIRATORY - ADULT  Goal: Achieves optimal ventilation and oxygenation  Description: INTERVENTIONS:  - Assess for changes in respiratory status  - Assess for changes in mentation and behavior  - Position to facilitate oxygenation and minimize respiratory effort  - Oxygen administered by appropriate delivery if ordered  - Initiate smoking cessation education as indicated  - Encourage broncho-pulmonary hygiene including cough, deep breathe, Incentive Spirometry  - Assess the need for suctioning and aspirate as needed  - Assess and instruct to report SOB or any respiratory difficulty  - Respiratory Therapy support as indicated  Outcome: Progressing     Problem: Prexisting or High Potential for Compromised Skin Integrity  Goal: Skin integrity is maintained or improved  Description: INTERVENTIONS:  - Identify patients at risk for skin breakdown  - Assess and monitor skin integrity  - Assess and monitor nutrition and hydration status  - Monitor labs   - Assess for incontinence   - Turn and reposition patient  - Assist with mobility/ambulation  - Relieve pressure over bony prominences  - Avoid friction and shearing  - Provide appropriate hygiene as needed including keeping skin clean and dry  - Evaluate need for skin moisturizer/barrier cream  - Collaborate with interdisciplinary team   - Patient/family teaching  - Consider wound care consult   Outcome: Progressing

## 2022-12-22 NOTE — CASE MANAGEMENT
Case Management Discharge Planning Note    Patient name Martin Wallace  Location [facility] [address] [address] MRN [id number]  : 1940 Date 2022       Current Admission Date: 2022  Current Admission Diagnosis:Diabetic polyneuropathy associated with type 2 diabetes mellitus [facility])   Patient Active Problem List    Diagnosis Date Noted   • Septic shock ([facility] [address] ) 2022   • Acute kidney injury ([facility] [address] ) 2022   • COVID-19 10/10/2022   • Depression, recurrent ([facility] [address] ) 2022   • Acute respiratory failure with hypoxemia ([facility] ) 2022   • Severe sepsis ([facility] [address] ) 2022   • Prostate cancer ([facility] [address] ) 2022   • GERD (gastroesophageal reflux disease) 2022   • CAD (coronary artery disease) 2022   • Persistent atrial fibrillation ([facility] [address] ) 2022   • Alcohol dependence ([facility] ) 2022   • SYLVESTER (obstructive sleep apnea)    • Chronic diastolic heart failure ([facility] [address] ) 2021   • Squamous cell carcinoma of lung ([facility] ) 10/13/2021   • Shortness of breath 10/13/2021   • Daytime hypersomnolence 10/13/2021   • Lung mass 2021   • Hyperlipidemia    • Corns 02/15/2018   • Pain in both feet 02/15/2018   • Diabetic polyneuropathy associated with type 2 diabetes mellitus ([facility] ) 02/15/2018   • Onychomycosis 02/15/2018   • Tinea pedis of both feet 02/15/2018      LOS (days): 2  Geometric Mean LOS (GMLOS) (days): 5 00  Days to GMLOS:2 8     OBJECTIVE:  Risk of Unplanned Readmission Score: 25 09     Current admission status: Inpatient   Preferred Pharmacy:   [facility] [address] [facility]  [address] [address]  Phone: [phone] Fax: [phone]    [facility] ([address] [place] [address], [facility] [place] [facility]  [address] [place] [id number]  Phone: [phone] Fax: [phone]    Primary Care Provider: Mara Suarez MD    Primary Insurance: [place] Rosa [facility] REP  Secondary Insurance:     DISCHARGE DETAILS:    Discharge planning discussed with[de-identified] Patient  Freedom of Choice: Yes  Comments - Freedom of Choice: SW following to monitor needs and assist with DCP  Home with home care services and rolling walker is being recommended by PT  SW met with pt to review recommendations  Patient is open to home care services  Reviewed plan to make blanket home care referrals to determine availability and pt was agreeable with referral process  Also reviewed DME company options  Pt requested rolling walker be ordered from [employer]  Requested [address]         Is the patient interested in [facility] at discharge?: Yes  [address] requested[de-identified] Occupational Therapy, Physical [address] Name[de-identified] Other  [address] [facility] Provider[de-identified] PCP  Home Health Services Needed[de-identified] Evaluate Functional Status and Safety, Gait/ADL Training, Strengthening/Theraputic Exercises to Improve Function  Homebound Criteria Met[de-identified] Requires the Assistance of Another Person for Safe Ambulation or to Leave the Home, Uses an Assist Device (i e  cane, walker, etc)  Supporting Clincal Findings[de-identified] Limited Endurance, Fatigues Easliy in [employer]    DME Referral Provided  Referral made for DME?: Yes  DME referral completed for the following items[de-identified] Charley Slade  DME Supplier Name[de-identified] [employer]    Other Referral/Resources/Interventions Provided:  Interventions: DME, HHC  Referral Comments: [place] home care referrals have been made  Rolling walker has been ordered from [employer]      Treatment Team Recommendation: Home with [address] [employer]  Discharge Destination Plan[de-identified] Home with Tom at Discharge : Family

## 2022-12-23 ENCOUNTER — APPOINTMENT (INPATIENT)
Dept: RADIOLOGY | Facility: HOSPITAL | Age: 82
End: 2022-12-23

## 2022-12-23 PROBLEM — N17.9 ACUTE KIDNEY INJURY (HCC): Status: RESOLVED | Noted: 2022-12-20 | Resolved: 2022-12-23

## 2022-12-23 PROBLEM — L03.90 CELLULITIS: Status: ACTIVE | Noted: 2022-12-23

## 2022-12-23 LAB
ALBUMIN SERPL BCP-MCNC: 2.5 G/DL (ref 3.5–5)
ALP SERPL-CCNC: 117 U/L (ref 46–116)
ALT SERPL W P-5'-P-CCNC: 70 U/L (ref 12–78)
ANION GAP SERPL CALCULATED.3IONS-SCNC: 9 MMOL/L (ref 4–13)
AST SERPL W P-5'-P-CCNC: 56 U/L (ref 5–45)
ATRIAL RATE: 141 BPM
BASOPHILS # BLD AUTO: 0.03 THOUSANDS/ÂΜL (ref 0–0.1)
BASOPHILS NFR BLD AUTO: 0 % (ref 0–1)
BILIRUB SERPL-MCNC: 0.48 MG/DL (ref 0.2–1)
BUN SERPL-MCNC: 31 MG/DL (ref 5–25)
CALCIUM ALBUM COR SERPL-MCNC: 9.8 MG/DL (ref 8.3–10.1)
CALCIUM SERPL-MCNC: 8.6 MG/DL (ref 8.3–10.1)
CHLORIDE SERPL-SCNC: 106 MMOL/L (ref 96–108)
CO2 SERPL-SCNC: 25 MMOL/L (ref 21–32)
CREAT SERPL-MCNC: 0.97 MG/DL (ref 0.6–1.3)
DME PARACHUTE DELIVERY DATE ACTUAL: NORMAL
DME PARACHUTE DELIVERY DATE REQUESTED: NORMAL
DME PARACHUTE ITEM DESCRIPTION: NORMAL
DME PARACHUTE ORDER STATUS: NORMAL
DME PARACHUTE SUPPLIER NAME: NORMAL
DME PARACHUTE SUPPLIER PHONE: NORMAL
EOSINOPHIL # BLD AUTO: 0.23 THOUSAND/ÂΜL (ref 0–0.61)
EOSINOPHIL NFR BLD AUTO: 2 % (ref 0–6)
ERYTHROCYTE [DISTWIDTH] IN BLOOD BY AUTOMATED COUNT: 13.6 % (ref 11.6–15.1)
GFR SERPL CREATININE-BSD FRML MDRD: 73 ML/MIN/1.73SQ M
GLUCOSE SERPL-MCNC: 124 MG/DL (ref 65–140)
GLUCOSE SERPL-MCNC: 130 MG/DL (ref 65–140)
GLUCOSE SERPL-MCNC: 132 MG/DL (ref 65–140)
GLUCOSE SERPL-MCNC: 148 MG/DL (ref 65–140)
GLUCOSE SERPL-MCNC: 199 MG/DL (ref 65–140)
HCT VFR BLD AUTO: 44.1 % (ref 36.5–49.3)
HGB BLD-MCNC: 14.2 G/DL (ref 12–17)
IMM GRANULOCYTES # BLD AUTO: 0.06 THOUSAND/UL (ref 0–0.2)
IMM GRANULOCYTES NFR BLD AUTO: 1 % (ref 0–2)
LYMPHOCYTES # BLD AUTO: 0.69 THOUSANDS/ÂΜL (ref 0.6–4.47)
LYMPHOCYTES NFR BLD AUTO: 6 % (ref 14–44)
MAGNESIUM SERPL-MCNC: 2.2 MG/DL (ref 1.6–2.6)
MCH RBC QN AUTO: 32.7 PG (ref 26.8–34.3)
MCHC RBC AUTO-ENTMCNC: 32.2 G/DL (ref 31.4–37.4)
MCV RBC AUTO: 102 FL (ref 82–98)
MONOCYTES # BLD AUTO: 0.32 THOUSAND/ÂΜL (ref 0.17–1.22)
MONOCYTES NFR BLD AUTO: 3 % (ref 4–12)
NEUTROPHILS # BLD AUTO: 9.76 THOUSANDS/ÂΜL (ref 1.85–7.62)
NEUTS SEG NFR BLD AUTO: 88 % (ref 43–75)
NRBC BLD AUTO-RTO: 1 /100 WBCS
PLATELET # BLD AUTO: 183 THOUSANDS/UL (ref 149–390)
PMV BLD AUTO: 10 FL (ref 8.9–12.7)
POTASSIUM SERPL-SCNC: 4.7 MMOL/L (ref 3.5–5.3)
PROT SERPL-MCNC: 6.6 G/DL (ref 6.4–8.4)
QRS AXIS: -6 DEGREES
QRSD INTERVAL: 106 MS
QT INTERVAL: 308 MS
QTC INTERVAL: 451 MS
RBC # BLD AUTO: 4.34 MILLION/UL (ref 3.88–5.62)
SODIUM SERPL-SCNC: 140 MMOL/L (ref 135–147)
T WAVE AXIS: 75 DEGREES
VANCOMYCIN SERPL-MCNC: 6.4 UG/ML (ref 10–20)
VENTRICULAR RATE: 129 BPM
WBC # BLD AUTO: 11.09 THOUSAND/UL (ref 4.31–10.16)

## 2022-12-23 RX ORDER — CARVEDILOL 12.5 MG/1
12.5 TABLET ORAL 2 TIMES DAILY WITH MEALS
Status: DISCONTINUED | OUTPATIENT
Start: 2022-12-23 | End: 2022-12-24

## 2022-12-23 RX ORDER — FUROSEMIDE 10 MG/ML
20 INJECTION INTRAMUSCULAR; INTRAVENOUS ONCE
Status: DISCONTINUED | OUTPATIENT
Start: 2022-12-23 | End: 2022-12-23

## 2022-12-23 RX ORDER — DILTIAZEM HYDROCHLORIDE 5 MG/ML
15 INJECTION INTRAVENOUS ONCE
Status: COMPLETED | OUTPATIENT
Start: 2022-12-23 | End: 2022-12-23

## 2022-12-23 RX ORDER — LOSARTAN POTASSIUM 50 MG/1
50 TABLET ORAL DAILY
Status: DISCONTINUED | OUTPATIENT
Start: 2022-12-23 | End: 2022-12-23

## 2022-12-23 RX ORDER — HYDRALAZINE HYDROCHLORIDE 20 MG/ML
5 INJECTION INTRAMUSCULAR; INTRAVENOUS EVERY 6 HOURS PRN
Status: DISCONTINUED | OUTPATIENT
Start: 2022-12-23 | End: 2022-12-28

## 2022-12-23 RX ORDER — METOPROLOL TARTRATE 5 MG/5ML
5 INJECTION INTRAVENOUS ONCE
Status: COMPLETED | OUTPATIENT
Start: 2022-12-23 | End: 2022-12-23

## 2022-12-23 RX ORDER — ALBUMIN (HUMAN) 12.5 G/50ML
25 SOLUTION INTRAVENOUS ONCE
Status: COMPLETED | OUTPATIENT
Start: 2022-12-23 | End: 2022-12-23

## 2022-12-23 RX ORDER — HYDRALAZINE HYDROCHLORIDE 25 MG/1
25 TABLET, FILM COATED ORAL EVERY 12 HOURS
Status: DISCONTINUED | OUTPATIENT
Start: 2022-12-23 | End: 2022-12-23

## 2022-12-23 RX ADMIN — METOPROLOL TARTRATE 5 MG: 5 INJECTION INTRAVENOUS at 06:09

## 2022-12-23 RX ADMIN — CARVEDILOL 12.5 MG: 12.5 TABLET, FILM COATED ORAL at 16:35

## 2022-12-23 RX ADMIN — ATENOLOL 50 MG: 50 TABLET ORAL at 08:10

## 2022-12-23 RX ADMIN — FERRIC OXIDE RED: 8; 8 LOTION TOPICAL at 22:31

## 2022-12-23 RX ADMIN — IOHEXOL 85 ML: 350 INJECTION, SOLUTION INTRAVENOUS at 15:05

## 2022-12-23 RX ADMIN — FLUTICASONE FUROATE AND VILANTEROL TRIFENATATE 1 PUFF: 100; 25 POWDER RESPIRATORY (INHALATION) at 08:10

## 2022-12-23 RX ADMIN — UMECLIDINIUM 1 PUFF: 62.5 AEROSOL, POWDER ORAL at 08:10

## 2022-12-23 RX ADMIN — CEFAZOLIN SODIUM 2000 MG: 2 SOLUTION INTRAVENOUS at 10:06

## 2022-12-23 RX ADMIN — CEFAZOLIN SODIUM 2000 MG: 2 SOLUTION INTRAVENOUS at 02:28

## 2022-12-23 RX ADMIN — APIXABAN 5 MG: 5 TABLET, FILM COATED ORAL at 17:46

## 2022-12-23 RX ADMIN — HYDROXYZINE HYDROCHLORIDE 25 MG: 25 TABLET ORAL at 23:11

## 2022-12-23 RX ADMIN — CEFAZOLIN SODIUM 2000 MG: 2 SOLUTION INTRAVENOUS at 17:46

## 2022-12-23 RX ADMIN — LATANOPROST 1 DROP: 50 SOLUTION OPHTHALMIC at 22:26

## 2022-12-23 RX ADMIN — DILTIAZEM HYDROCHLORIDE 8 MG/HR: 5 INJECTION, SOLUTION INTRAVENOUS at 20:21

## 2022-12-23 RX ADMIN — HYDROCORTISONE: 25 CREAM TOPICAL at 17:46

## 2022-12-23 RX ADMIN — POLYETHYLENE GLYCOL 3350 17 G: 17 POWDER, FOR SOLUTION ORAL at 08:11

## 2022-12-23 RX ADMIN — ALBUMIN (HUMAN) 25 G: 0.25 INJECTION, SOLUTION INTRAVENOUS at 15:22

## 2022-12-23 RX ADMIN — METOPROLOL TARTRATE 2.5 MG: 5 INJECTION INTRAVENOUS at 10:05

## 2022-12-23 RX ADMIN — METOPROLOL TARTRATE 5 MG: 5 INJECTION INTRAVENOUS at 07:23

## 2022-12-23 RX ADMIN — DILTIAZEM HYDROCHLORIDE 15 MG: 5 INJECTION, SOLUTION INTRAVENOUS at 19:31

## 2022-12-23 RX ADMIN — Medication 250 MG: at 17:46

## 2022-12-23 RX ADMIN — FOLIC ACID 1 MG: 1 TABLET ORAL at 08:10

## 2022-12-23 RX ADMIN — APIXABAN 5 MG: 5 TABLET, FILM COATED ORAL at 08:10

## 2022-12-23 RX ADMIN — ACETAMINOPHEN 650 MG: 325 TABLET, FILM COATED ORAL at 19:12

## 2022-12-23 RX ADMIN — HYDROCORTISONE: 25 CREAM TOPICAL at 08:10

## 2022-12-23 RX ADMIN — INSULIN LISPRO 2 UNITS: 100 INJECTION, SOLUTION INTRAVENOUS; SUBCUTANEOUS at 12:31

## 2022-12-23 RX ADMIN — Medication 250 MG: at 08:10

## 2022-12-23 RX ADMIN — GUAIFENESIN 600 MG: 600 TABLET, EXTENDED RELEASE ORAL at 08:10

## 2022-12-23 RX ADMIN — FAMOTIDINE 20 MG: 10 INJECTION, SOLUTION INTRAVENOUS at 08:10

## 2022-12-23 RX ADMIN — MONTELUKAST 5 MG: 10 TABLET, FILM COATED ORAL at 22:21

## 2022-12-23 RX ADMIN — METOPROLOL TARTRATE 2.5 MG: 5 INJECTION INTRAVENOUS at 02:28

## 2022-12-23 RX ADMIN — FAMOTIDINE 20 MG: 10 INJECTION, SOLUTION INTRAVENOUS at 22:21

## 2022-12-23 RX ADMIN — METOPROLOL TARTRATE 2.5 MG: 5 INJECTION INTRAVENOUS at 17:46

## 2022-12-23 RX ADMIN — GUAIFENESIN 600 MG: 600 TABLET, EXTENDED RELEASE ORAL at 22:21

## 2022-12-23 NOTE — PROGRESS NOTES
-- Patient:  -- MRN: [id number]  -- Aidin Request ID: [id number]  -- Level of care reserved: [address] [place] [address]  -- Partner Reserved: Amy Dao [phone]  -- Clinical needs requested:  -- Geography searched: [phone]  -- Start of Service:  -- Request sent: 4:09pm EST on 12/22/2022 by Rachel Baker  -- Partner reserved: 11:55am EST on 12/23/2022 by Rachel Baker  -- Choice list shared: 10:42am EST on 12/23/2022 by Rachel Baker

## 2022-12-23 NOTE — ASSESSMENT & PLAN NOTE
Followed by GEN surg  Bedside aspiration was done previously and today bedside I&D done  · Currently on IV cefazolin  · No leukocytosis  · Ridging negative for abscess

## 2022-12-23 NOTE — PROGRESS NOTES
Nikki 47  Progress Note - Grabiel Rodriguez 1940, [de-identified] y o  male MRN: [id number]  Unit/Bed#: [address] [place] [address] Encounter: [id number]  Primary Care Provider: Rafael Nieves MD   Date and time admitted to hospital: 12/19/2022  1:01 AM    Cellulitis  Assessment & Plan  Unresolved, clinically worsening    · Followed by GEN surge for potential I&D- no fluid drained, s/o  · ID following with recs of Vanco and cefazolin  · Leukocytes worsening   · Consider soft tissue imaging if unresponsive to antibiotics  · monitor for worsening infection    Persistent atrial fibrillation (HCC)  Assessment & Plan  RVR overnight,     · Telemetry and placed   · Metoprolol and hydralazine given overnight   · continue eliquis as tolerated,  · Cardio consulted    Severe sepsis ([facility] [address] )  Assessment & Plan  unResolved    · Likely due to right lateral chest wall cellulitis vs possible abscess, tachycardia- possibly secondary to underlying A  fib  · BCx-NGTD 72, wound culture staph  · Contiune Rocephin, Vanco completed, continue Solumedrol bid transition to PO  · IVF  · ID following recs appreciated  · Surgery recs Erythema improving, still edematous, no fluctuance, no drainage able to be expressed  Dressing replaced at bedside  Currently on IV vancomycin  Follow up blood cultures ,No need for OR   gen surg can follow peripherally    * Angioedema-resolved as of 12/21/2022  Assessment & Plan  Improving, poor mentation, airway maintained    Patient on mysoline, atorvastatin and losartan/Jardiance which could cause angioedema/ urticaria- both held   · Rash is mostly on trunk and upper thigh and is very pruritic; has lip swelling, no tongue  · Given dose of 125 mg solumedrol, benadryl 25 mg IV bid, and pepcid 20 mg IV in ER but symptoms returned   · Continue solumedrol, benadryl, pepcid   · Continue calamine, hydrocortisone   · Continue Singulair      GERD (gastroesophageal reflux disease)  Assessment & Plan  Continue PPI famotidine 20 mg    Chronic diastolic heart failure (HCC)  Assessment & Plan  Wt Readings from Last 3 Encounters:   12/19/22 104 kg (228 lb 6 3 oz)   12/06/22 102 kg (224 lb)   11/28/22 102 kg (224 lb)     Daily weights  Intake and output  Continue atenolol     Diabetic polyneuropathy associated with type 2 diabetes mellitus ([facility] [address] )  Assessment & Plan  Lab Results   Component Value Date    HGBA1C 7 1 (H) 02/21/2022       No results for input(s): POCGLU in the last 72 hours  Blood Sugar Average: Last 72 hrs:     - hold metformin and Jardiance (potential contributor to Derm reaction)  - Accuchecks AC/HS with insulin sliding scale     High serum lactic acid-resolved as of 12/21/2022  Assessment & Plan  Worsening,    · Possibly d/t sepsis vs OP medications  · Continue to trend and IVF  · ABG-pending    Encephalopathy-resolved as of 12/21/2022  Assessment & Plan  Possibly multifactorial, Sepsis vs Haldo vs lactic acidosis, hx of SYLVESTER    · ABG-unremarkable  · Neuro checks, maintain airway  · S/p haldo administration overnight-hold all neurotoxics  · CCU consulted overnight    Acute kidney injury (HCC)-resolved as of 12/23/2022  Assessment & Plan  Slowly improving, likely due to septic shock  · Cr 1 9  > 1 79 > 0 97  · Continue IVF  · Avoid nephrotoxic's    Septic shock (HCC)-resolved as of 12/22/2022  Assessment & Plan  resolved, Hemodynamic stable, still in sepsis    · Likely due to right lateral chest wall cellulitis with possible abscess  · Contiune Rocephin, Vanco completed  · IVF      VTE Pharmacologic Prophylaxis:   Pharmacologic: Apixaban (Eliquis)  Mechanical VTE Prophylaxis in Place: Yes    Patient Centered Rounds: I have performed bedside rounds with nursing staff today  Discussions with Specialists or Other Care Team Provider: Yes Cardio and ID  Education and Discussions with Family / Patient:Yes left voicemail for wife [place] Stamp  Time Spent for Care: 30 minutes    More than 50% of total time spent on counseling and coordination of care as described above  Current Length of Stay: 3 day(s)  Current Patient Status: Inpatient     Discharge Plan: pending     Code Status: Level 1 - Full Code      Subjective:   Patient seen and examined at bedside, nursing reported patient had multiple episodes of A  fib with RVR and episode of chest pain overnight was given rate control meds  Patient reported no chest pain, no shortness of breath, no palpitations upon evaluation  Patient noted to have worsening cellulitis and upon palpation was extremely tender and reported pain 7/10  Alli plan of care with patient regarding cardiology consult and expected plan to discharge  Objective:     Vitals:   Temp (24hrs), Av 1 °F (36 2 °C), Min:96 5 °F (35 8 °C), Max:97 6 °F (36 4 °C)    Temp:  [96 5 °F (35 8 °C)-97 6 °F (36 4 °C)] 96 8 °F (36 °C)  HR:  [] 142  Resp:  [17-18] 18  BP: (129-176)/() 159/108  SpO2:  [92 %-96 %] 94 %  Body mass index is 33 26 kg/m²  Input and Output Summary (last 24 hours): Intake/Output Summary (Last 24 hours) at 2022 1217  Last data filed at 2022 1155  Gross per 24 hour   Intake 1210 ml   Output 1350 ml   Net -140 ml          Physical Exam  Vitals reviewed  Constitutional:       General: He is not in acute distress  Appearance: Normal appearance  He is obese  HENT:      Head: Atraumatic  Nose: No congestion  Eyes:      General: No scleral icterus  Pupils: Pupils are equal, round, and reactive to light  Cardiovascular:      Rate and Rhythm: Normal rate  Heart sounds: No murmur heard  Pulmonary:      Effort: No respiratory distress  Breath sounds: No wheezing, rhonchi or rales  Abdominal:      Palpations: Abdomen is soft  Tenderness: There is no abdominal tenderness  There is no guarding  Musculoskeletal:         General: No swelling or deformity  Normal range of motion  Cervical back: No tenderness  Right lower leg: No edema  Left lower leg: No edema  Feet:      Right foot:      Skin integrity: No callus  Skin:     General: Skin is warm  Capillary Refill: Capillary refill takes less than 2 seconds  Findings: Abrasion (Forearms), bruising, erythema and rash present  No lesion  Rash is vesicular  Comments: Worsening erythema, and warmth in right axillary region, tender, nonfluctuant, positive Nikolsky,1- bulla   Neurological:      Mental Status: He is oriented to person, place, and time  Cranial Nerves: No cranial nerve deficit  Motor: No weakness  Coordination: Coordination normal    Psychiatric:         Mood and Affect: Mood normal          Thought Content: Thought content normal              Additional Data:     Labs:    Results from last 7 days   Lab Units 12/23/22  0550 12/21/22  0651 12/20/22  0610 12/19/22  2327 12/19/22  0129   WBC Thousand/uL 11 09* 9 09 6 34   < > 9 72   HEMOGLOBIN g/dL 14 2 12 8 17 1*   < > 17 2*   HEMATOCRIT % 44 1 40 8 52 6*   < > 52 9*   PLATELETS Thousands/uL 183 154 252   < > 255   NEUTROS PCT % 88*  --   --   --  83*    < > = values in this interval not displayed       Results from last 7 days   Lab Units 12/23/22  0550 12/21/22  0651 12/20/22  0610 12/19/22  2327   SODIUM mmol/L 140 139 138 138   POTASSIUM mmol/L 4 7 4 4 4 9 5 6*   CHLORIDE mmol/L 106 108 101 100   CO2 mmol/L 25 23 24 17*   BUN mg/dL 31* 44* 33* 33*   CREATININE mg/dL 0 97 1 79* 1 90* 1 95*   CALCIUM mg/dL 8 6 6 9* 8 0* 9 0   TOTAL BILIRUBIN mg/dL 0 48 0 53  --  0 94   ALK PHOS U/L 117* 40*  --  76   ALT U/L 70 19  --  17   AST U/L 56* 23  --  29             Lab Results   Component Value Date/Time    HGBA1C 6 7 (H) 12/20/2022 06:10 AM     Results from last 7 days   Lab Units 12/23/22  1106 12/23/22  0712 12/22/22  2052 12/22/22  1609 12/22/22  1121 12/22/22  0658 12/21/22  2100 12/21/22  1558 12/21/22  1110 12/21/22  0720 12/21/22  0156 12/20/22  2122   POC GLUCOSE mg/dl 199* 132 150* 176* 206* 135 156* 195* 170* 123 137 129     Results from last 7 days   Lab Units 12/21/22  0651 12/20/22  0913 12/20/22  0610 12/20/22  0302 12/19/22  2327   LACTIC ACID mmol/L 1 9 4 5* 3 8* 2 7* 5 0*   PROCALCITONIN ng/ml 26 86*  --  4 55*  --  0 35*       * I Have Reviewed All Lab Data Listed Above  * Additional Pertinent Lab Tests Reviewed: Kathryn 66 Admission Reviewed    Imaging:     US axilla   Final Result by Jamin Rahman MD (12/20 1224)      No discrete fluid collection is identified in the right axilla  Workstation performed: [id number]         XR chest portable   Final Result by Prashant Fierro MD (45/35 9000)      Increasing pleuroparenchymal opacity at the left base consistent with enlargement of the known mass and probably associated effusion/atelectasis  Workstation performed: [id number]           Imaging Reports Reviewed by myself    Cultures:   Blood Culture:   Lab Results   Component Value Date    BLOODCX No Growth at 72 hrs  12/19/2022    BLOODCX No Growth at 72 hrs  12/19/2022    BLOODCX No Growth After 5 Days  05/01/2022    BLOODCX No Growth After 5 Days   05/01/2022     Urine Culture: No results found for: URINECX  Sputum Culture: No components found for: SPUTUMCX  Wound Culture:   Lab Results   Component Value Date    WOUNDCULT 4+ Growth of Staphylococcus aureus (A) 12/20/2022       Last 24 Hours Medication List:   Current Facility-Administered Medications   Medication Dose Route Frequency Provider Last Rate   • acetaminophen  650 mg Oral Q6H PRN Chas Le MD     • aluminum-magnesium hydroxide-simethicone  30 mL Oral Q6H PRN Chas Le MD     • apixaban  5 mg Oral BID Chas Le MD     • atenolol  50 mg Oral Daily Chas Le MD     • bisacodyl  10 mg Rectal Daily PRN Chas Le MD     • calamine-zinc oxide   Topical 4x Daily Chas Le MD     • cefazolin  2,000 mg Intravenous Q8H Marysol Bush MD 2,000 mg (12/23/22 1006)   • famotidine  20 mg Intravenous Q12H [address] SIMI Hernandez     • Fluticasone Furoate-Vilanterol  1 puff Inhalation Daily Zoraida Matos MD      And   • umeclidinium  1 puff Inhalation Daily Zoraida Matos MD     • folic acid  1 mg Oral Daily Zoraida Matos MD     • guaiFENesin  600 mg Oral Q12H [address] Reba Maria MD     • hydrALAZINE  5 mg Intravenous Q6H PRN Edis Blood PA-C     • hydrocortisone   Topical BID Zoraida Matos MD     • hydrOXYzine HCL  25 mg Oral Q6H PRN SIMI Gonzales     • insulin lispro  1-6 Units Subcutaneous TID AC SIMI Hernandez     • insulin lispro  1-6 Units Subcutaneous HS SIMI Hernandez     • latanoprost  1 drop Both Eyes HS Zoraida Matos MD     • levalbuterol  0 63 mg Nebulization Q4H PRN SIMI Gonzales     • metoprolol  2 5 mg Intravenous Q6H PRN SIMI Gonzales     • montelukast  5 mg Oral HS Zoraida Matos MD     • ondansetron  4 mg Intravenous Q6H PRN Zoraida Matos MD     • polyethylene glycol  17 g Oral Daily Reba Maria MD     • saccharomyces boulardii  250 mg Oral BID SIMI Gonzales     • simethicone  80 mg Oral 4x Daily PRN Zoraida Matos MD     • sodium chloride  75 mL/hr Intravenous Continuous Edis Blood PA-C 75 mL/hr (12/22/22 2132)   • vancomycin  10 mg/kg (Adjusted) Intravenous Daily PRN SIMI Dee          Today, Patient Was Seen By: Reba Maria MD    ** Please Note: Dragon 360 Dictation voice to text software may have been used in the creation of this document   **

## 2022-12-23 NOTE — PLAN OF CARE
Problem: PHYSICAL THERAPY ADULT  Goal: Performs mobility at highest level of function for planned discharge setting  See evaluation for individualized goals  Description: Treatment/Interventions: ADL retraining, Functional transfer training, LE strengthening/ROM, Elevations, Therapeutic exercise, Endurance training, Patient/family training, Equipment eval/education, Bed mobility, Gait training, Compensatory technique education  Equipment Recommended: Elba Quintero       See flowsheet documentation for full assessment, interventions and recommendations  12/23/2022 0916 by Jelena Haro PT  Outcome: Progressing  Note: Prognosis: Good  Problem List: Decreased strength, Decreased range of motion, Decreased endurance, Impaired balance, Decreased mobility, Decreased coordination  Assessment: Patient agreeable to PT session this morning  Patient is noted with improved transfers, gait balance with a roller walker, gait endurance and overall functional mobility with a rolling walker  Patient will continue to benefit from skilled physical therapy services to return patient to his prior level of function  Patient normally ambulates with no assistive device or a cane at baseline, but will benefit from continued gait training with a roller walker at this time  PT Discharge Recommendation: Home with home health rehabilitation    See flowsheet documentation for full assessment  12/23/2022 0916 by Jelena Haro PT  Note: Prognosis: Good  Problem List: Decreased strength, Decreased range of motion, Decreased endurance, Impaired balance, Decreased mobility, Decreased coordination  Assessment: Patient agreeable to PT session this morning  Patient is noted with improved transfers, gait balance with a roller walker, gait endurance and overall functional mobility with a rolling walker  Patient will continue to benefit from skilled physical therapy services to return patient to his prior level of function  Patient normally ambulates with no assistive device or a cane at baseline, but will benefit from continued gait training with a roller walker at this time  PT Discharge Recommendation: Home with home health rehabilitation    See flowsheet documentation for full assessment

## 2022-12-23 NOTE — OCCUPATIONAL THERAPY NOTE
Occupational Therapy Evaluation       12/23/22 1501   Note Type   Note type Evaluation   Pain Assessment   Pain Assessment Tool 0-10   Pain Score No Pain   Restrictions/Precautions   Other Precautions Fall Risk   Home Living   Type of [place] to enter with rails;Multi-level;Performs ADLs on one level  (Bedroom and full bath 1st floor, does not need to go upstairs)   Bathroom Shower/Tub Walk-in shower   Bathroom Toilet Standard   Bathroom Equipment Grab bars in shower; Shower chair   Home Equipment Cane   Prior Function   Level of [place] Independent with ADLs; Independent with functional mobility; Independent with IADLS   Lives With Spouse; Son   Xavier Help From Family   IADLs Independent with driving;Family/Friend/Other provides meals   Falls in the last 6 months 1 to 4   Comments PTA patient was independent in ADLs and mobility with use of cane   ADL   Eating Assistance 7  Independent   Grooming Assistance 5  Supervision/Setup   UB Bathing Assistance 5  Supervision/Setup    S  Latisha  5  Supervision/Setup   Functional Assistance 4  Minimal Assistance   Bed Mobility   Additional Comments Patient received seated in recliner chair   Transfers   Sit to Stand 5  Supervision   Additional items Verbal cues   Stand to Sit 5  Supervision   Additional items Verbal cues   Functional Mobility   Functional Mobility 4  Minimal assistance  (1 step forward/backward)   Additional Comments Patient declined further ambulation due to fatigue and also due to being upset at just finding out he had to stay in hospital over [facility]; patient tearful throughout session but able to be redirected   Balance   Static Sitting Good   Dynamic Sitting Fair +   Static Standing Fair   Activity Tolerance   Activity Tolerance Patient limited by fatigue   RUE Assessment RUE Assessment WFL   LUE Assessment   LUE Assessment WFL   Cognition   Overall Cognitive Status WFL   Arousal/Participation Alert; Cooperative   Attention Attends with cues to redirect   Orientation Level Oriented to person;Oriented to place;Oriented to situation   Following Commands Follows multistep commands with increased time or repetition   Assessment   Limitation Decreased ADL status; Decreased UE strength;Decreased Safe judgement during ADL;Decreased endurance;Decreased self-care trans;Decreased high-level ADLs   Prognosis Good   Assessment Patient evaluated by Occupational Therapy  Patient admitted with Angioedema  The patients occupational profile, medical and therapy history includes a extensive additional review of physical, cognitive, or psychosocial history related to current functional performance  Comorbidities affecting functional mobility and ADLS include: HTN, HLD, DM, cardiac disease, GERD, arthritis, anxiety, Afib, CHF, MI, lung cancer, asthma, obesity  Prior to admission, patient was independent with functional mobility with cane, independent with ADLS and independent with IADLS  The evaluation identifies the following performance deficits: weakness, impaired balance, decreased endurance, decreased coordination, increased fall risk, new onset of impairment of functional mobility, decreased ADLS, decreased IADLS, decreased activity tolerance, decreased safety awareness, impaired judgement and decreased strength, that result in activity limitations and/or participation restrictions  This evaluation requires clinical decision making of high complexity, because the patient presents with comorbidites that affect occupational performance and required significant modification of tasks or assistance with consideration of multiple treatment options    The Barthel Index was used as a functional outcome tool presenting with a score of Barthel Index Score: 50, indicating marked limitations of functional mobility and ADLS  The patient's raw score on the -PAC Daily Activity inpatient short form is 20, standardized score is 42 03, greater than 39 4  Patients at this level are likely to benefit from DC to home  Please refer to the recommendation of the Occupational Therapist for safe DC planning  Patient will benefit from skilled Occupational Therapy services to address above deficits and facilitate a safe return to prior level of function  Goals   Patient Goals 'Get stronger and go home'   STG Time Frame   (1-7 days)   Short Term Goal  Goals established to promote Patient Goals: 'Get stronger and go home':  Eating: independent; Grooming: independent seated; Bathing: supervision; Lower Body Dressing: supervision; Toileting: supervision; Patient will increase ambulatory standard toilet transfer to supervision with rolling walker to increase performance and safety with ADLS and functional mobility; Patient will increase standing tolerance to 5 minutes during ADL task to decrease assistance level and decrease fall risk; Patient will increase bed mobility to supervision in preparation for ADLS and transfers;  Patient will increase functional mobility to and from bathroom with rolling walker with supervision to increase performance with ADLS and to use a toilet; Patient will tolerate 5 minutes of UE ROM/strengthening to increase general activity tolerance and performance in ADLS/IADLS; Patient will improve functional activity tolerance to 5 minutes of sustained functional tasks to increase participation in basic self-care and decrease assistance level;  Patient will be able to to verbalize understanding and perform energy conservation/proper body mechanics during ADLS and functional mobility at least 75% of the time with minimal cueing to decrease signs of fatigue and increase stamina to return to prior level of function; Patient will increase dynamic sitting balance to good to improve the ability to sit at edge of bed or on a chair for ADLS;  Patient will increase static/dynamic standing balance to fair+ to improve postural stability and decrease fall risk during standing ADLS and transfers  LTG Time Frame   (8-14 days)   Long Term Goal Grooming: independent standing at sink; Bathing: independent; Upper Body Dressing independent; Lower Body Dressing: independent; Toileting: independent; Patient will increase ambulatory standard toilet transfer to independent with rolling walker to increase performance and safety with ADLS and functional mobility; Patient will increase standing tolerance to 10 minutes during ADL task to decrease assistance level and decrease fall risk; Patient will increase bed mobility to independent in preparation for ADLS and transfers; Patient will increase functional mobility to and from bathroom with rolling walker independently to increase performance with ADLS and to use a toilet; Patient will tolerate 10 minutes of UE ROM/strengthening to increase general activity tolerance and performance in ADLS/IADLS; Patient will improve functional activity tolerance to 10 minutes of sustained functional tasks to increase participation in basic self-care and decrease assistance level;  Patient will be able to to verbalize understanding and perform energy conservation/proper body mechanics during ADLS and functional mobility at least 90% of the time with no cueing to decrease signs of fatigue and increase stamina to return to prior level of function; Patient will increase static/dynamic standing balance to good to improve postural stability and decrease fall risk during standing ADLS and transfers  Pt will score >/= 24/24 on AM-PAC Daily Activity Inpatient scale to promote safe independence with ADLs and functional mobility; Pt will score >/= 100/100 on Barthel Index in order to decrease caregiver assistance needed and increase ability to perform ADLs and functional mobility     Plan   Treatment Interventions ADL retraining;Functional transfer training;UE strengthening/ROM; Endurance training;Patient/family training;Equipment evaluation/education; Compensatory technique education;Continued evaluation; Energy conservation; Activityengagement   Goal Expiration Date 01/06/23   OT Frequency 3-5x/wk   Recommendation   OT Discharge Recommendation Home with home health rehabilitation   AM-PAC Daily Activity Inpatient   Lower Body Dressing 3   Bathing 3   Toileting 3   Upper Body Dressing 3   Grooming 4   Eating 4   Daily Activity Raw Score 20   Daily Activity Standardized Score (Calc for Raw Score >=11) 42 03   AM-PAC Applied Cognition Inpatient   Following a Speech/Presentation 4   Understanding Ordinary Conversation 4   Taking Medications 4   Remembering Where Things Are Placed or Put Away 4   Remembering List of 4-5 Errands 3   Taking Care of Complicated Tasks 3   Applied Cognition Raw Score 22   Applied Cognition Standardized Score 47 83   Barthel Index   Feeding 10   Bathing 0   Grooming Score 0   Dressing Score 5   Bladder Score 5   Bowels Score 10   Toilet Use Score 5   Transfers (Bed/Chair) Score 10   Mobility (Level Surface) Score 0   Stairs Score 5   Barthel Index Score 48   Licensure   [place] License Number  Yen Turcios OTR/CHRISTINA [id number]

## 2022-12-23 NOTE — PROGRESS NOTES
Progress Note - Infectious Disease   David Ott [de-identified] y o  male MRN: [id number]  Unit/Bed#: 2 [place] [address] Encounter: [id number]      Impression/Plan:  1  SIRS vs Severe sepsis, evolving soon on presentation  With high fever spike, tachycardia, tachypnea, lactic acidosis and now hypotension responsive to IVFs  Blood cultures negative to date  Suspicious source #2  -Continues Cefazolin 2 g IV q 8 hours  -monitor temperature and hemodynamics  -serial exam  -recheck CBC and BMP in a m   -supportive care per primary care team  -check CT chest with IV contrast  -additional interventions clinical course     2  Right axillary cellulitis, Staph aureus abscess/carbuncle  Axillary US without discrete collection  Bedside needle aspiration attempted with only scant blood obtained and sent for culture  12/20/22 Aspirate cx growing 4+ MSSA  Increased erythema, fluctuance today  -Continue Cefazolin IV  -serial exam  -close surgical follow-up  -check CT chest with IV contrast     3  BUDDY  Developing soon on admission 0 91 < 1 95 > 0 97  -renal dose adjust antibiotic as needed  -volume management per primary  -recheck BMP in am     4  Urticaria/Angioedema, POA and worsening x 1 month  Unclear source  Outpatient mysoline, atorvastatin, losartan, jardiance held  No known antibiotic allergies  -monitor symptoms  -symptomatic management per primary care team     5  Lung CA  S/p 3 High dose RT treatment January 2022  No chemo    -Has outpatient pulmonary follow up next month     Antibiotics:  Cefazolin - abx D5     I have discussed the above management plan in detail with patient, RN, Marlena Ogden, surgical PA, and the primary service  We will see patient again 12/26/22 if here  Please call ID on call physician in meantime if questions      Subjective:  Patient has no fever, chills, sweats overnight; no nausea, vomiting, diarrhea; no cough, shortness of breath; some right axilla pain and increasing posterior redness/swelling    Objective:  Vitals:  Temp:  [96 5 °F (35 8 °C)-97 6 °F (36 4 °C)] 96 8 °F (36 °C)  HR:  [] 142  Resp:  [17-18] 18  BP: (129-176)/() 159/108  SpO2:  [92 %-96 %] 94 %  Temp (24hrs), Av 1 °F (36 2 °C), Min:96 5 °F (35 8 °C), Max:97 6 °F (36 4 °C)  Current: Temperature: (!) 96 8 °F (36 °C)    Physical Exam:   General Appearance:   63-year-old elderly male, debilitated, alert and oriented, no acute distress  HEENT: Atraumatic normocephalic   Throat: Oropharynx moist  Poor dentition   Pulmonary:   Normal respiratory excursion without accessory muscle use   Cardiac:  RRR   Abdomen:   Soft, no focal tenderness, protuberant   Extremities: No edema   :  No Sterling, no SPT   Psychiatric: Awake, cooperative   Skin: No new rashes  IV site nontender   + Erythema/swelling of right chest wall axillary area with tenderness, fluctuance and induration most posterior edge  Labs, Imaging, & Other studies:   All pertinent labs and imaging studies were personally reviewed  Results from last 7 days   Lab Units 22  0550 22  0651 22  0610   WBC Thousand/uL 11 09* 9 09 6 34   HEMOGLOBIN g/dL 14 2 12 8 17 1*   PLATELETS Thousands/uL 183 154 252     Results from last 7 days   Lab Units 22  0550 22  0651 22  0610 22  2327   SODIUM mmol/L 140 139 138 138   POTASSIUM mmol/L 4 7 4 4 4 9 5 6*   CHLORIDE mmol/L 106 108 101 100   CO2 mmol/L 25 23 24 17*   BUN mg/dL 31* 44* 33* 33*   CREATININE mg/dL 0 97 1 79* 1 90* 1 95*   EGFR ml/min/1 73sq m 73 35 32 31   CALCIUM mg/dL 8 6 6 9* 8 0* 9 0   AST U/L 56* 23  --  29   ALT U/L 70 19  --  17   ALK PHOS U/L 117* 40*  --  76     Results from last 7 days   Lab Units 22  1539 22  2337 22  2328   BLOOD CULTURE   --  No Growth at 72 hrs  No Growth at 72 hrs     GRAM STAIN RESULT  1+ Polys*  2+ Gram positive cocci in pairs*  --   --    WOUND CULTURE  4+ Growth of Staphylococcus aureus*  --   --      Results from last 7 days   Lab Units 12/21/22  0651 12/20/22  0610 12/19/22  2327   PROCALCITONIN ng/ml 26 86* 4 55* 0 35*

## 2022-12-23 NOTE — PHYSICAL THERAPY NOTE
PT TREATMENT     12/23/22 0907   PT Last Visit   PT Visit Date 12/23/22   Note Type   Note Type Treatment   Pain Assessment   Pain Assessment Tool 0-10   Pain Score No Pain   Restrictions/Precautions   Other Precautions Fall Risk   General   Chart Reviewed Yes; patient ID by name and date of birth, confirmed by ID bracelet   Family/Caregiver Present No   Subjective   Subjective "I hope I go home today or tomorrow"   Bed Mobility   Additional Comments Patient received out of bed in recliner chair   Transfers   Sit to Stand   (Close supervision)   Additional items Assist x 1;Verbal cues; Increased time required   Stand to Sit   (Close supervision)   Additional items Assist x 1;Verbal cues; Increased time required  (Cues for safe hand placement)   Ambulation/Elevation   Gait pattern Decreased heel strike;Decreased foot clearance;Narrow RICH  (Decreased gait speed)   Gait Assistance   (Close supervision)   Additional items Assist x 1;Verbal cues; Tactile cues   Assistive Device Rolling walker   Distance 150 feet with change in direction; 125 feet with change in direction; rest between ambulation trials   Balance   Static Sitting Good   Static Standing Fair  (With roller walker)   Ambulatory   (Fair to occasional fair minus with roller walker)   Endurance Deficit   Endurance Deficit Yes   Endurance Deficit Description Limits gait and mobility   Activity Tolerance   Activity Tolerance Patient limited by fatigue;Patient tolerated treatment well   Assessment   Assessment Patient agreeable to PT session this morning  Patient is noted with improved transfers, gait balance with a roller walker, gait endurance and overall functional mobility with a rolling walker  Patient will continue to benefit from skilled physical therapy services to return patient to his prior level of function    Patient normally ambulates with no assistive device or a cane at baseline, but will benefit from continued gait training with a roller walker at this time  The patient's AM-PAC Basic Mobility Inpatient Short Form Raw Score is 19  A Raw score of greater than 16 suggests the patient may benefit from discharge to home  Please also refer to the recommendation of the Physical Therapist for safe discharge planning  Plan   Treatment/Interventions ADL retraining;Functional transfer training;LE strengthening/ROM; Elevations; Therapeutic exercise; Endurance training;Patient/family training;Equipment eval/education; Bed mobility;Gait training; Compensatory technique education   PT Frequency Other (Comment)  (5x/wk)   Recommendation   PT Discharge Recommendation Home with home health rehabilitation   Equipment Recommended [facility] Recommended Wheeled walker   Additional Comments PT assisted patient to don a gown for his upper body and another gown as a robe  PT also assisted pt to adjust his hospital bottoms and tying  During first amb trial, hospital PJ bottoms not fitting pt well  Once back to room, PT assisted pt to doff his PJ bottoms  AM-PAC Basic Mobility Inpatient   Turning in Bed Without Bedrails 4   Lying on Back to Sitting on Edge of Flat Bed 4   Moving Bed to Chair 3   Standing Up From Chair 3   Walk in Room 3   Climb 3-5 Stairs 2   Basic Mobility Inpatient Raw Score 19   Basic Mobility Standardized Score 42 48   Highest Level Of Mobility   JH-HLM Goal 6: Walk 10 steps or more   JH-HLM Achieved 7: Walk 25 feet or more   Education   Education Provided Mobility training;Assistive device   Patient Explanation/teachback used; Reinforcement needed   End of Consult   Patient Position at End of Consult Bed/Chair alarm activated; Bedside chair; All needs within reach   [facility] Insurance Number  [address] [place] [address] [id number]     Portions of the documentation may have been created using voice recognition software   Occasional wrong word or sound alike substitutions may have occurred due to the inherent limitation of the voice recognition software  Read the chart carefully and recognize, using context, where substitutions have occurred

## 2022-12-23 NOTE — CONSULTS
Consultation - Cardiology   Tr Romano [de-identified] y o  male MRN: [id number]  Unit/Bed#: 2 [place] [address] Encounter: [id number]    Assessment/Plan     Assessment:  1  Right axilla cellulitis/sepsis  2  Hypertension  3  Atrial fibrillation with rapid ventricular response  4  Diabetes  5  Chronic kidney disease  6  Chronic diastolic heart failure  7  Obesity  8  Left lower lobe lung CA status postradiation therapy      Plan:  Patient has been admitted to the hospitalist service  1  Blood pressures have been poorly controlled with the discontinuation of his losartan  Patient was on this medication for long period of time without reaction and now was noted to have MSSA cellulitis which is most likely the cause of his previous rash  Will restart patient's losartan at 50 mg once a day and monitor for any worsening in reaction  2   Discontinue Tenormin and change patient to Coreg 12 5 mg twice daily for better blood pressure management due to alpha blocking agent  3   Continue IV Ancef per infectious disease    4  Diabetic management per primary team    5  Continue Eliquis 5 mg twice daily    6  Patient with lower extremity edema, will give single dose of albumin 25% / 25 g x 1 to see if this will encourage fluid shift from extracellular to intravascular area  Hesitant to add diuretics due to recent acute kidney injury with the use of vancomycin and also his tachycardia  7   Further orders as patient condition and testing warrant        History of Present Illness   Physician Requesting Consult: Clare Patton MD  Reason for Consult / Principal Problem: Atrial fibrillation which is chronic with a rapid ventricular response      HPI: Tr Romano is a [de-identified]y o  year old male who was admitted on 12/19/2022, after he presented to the emergency room for evaluation of complaints of itching, hives, swelling of his lips and face and feeling as he could not swallow    He also noted, a pruritic rash on his back axilla and buttocks for approximately 1 month  Patient was admitted for urticaria and possible angioedema  On admission, his atorvastatin, Mysoline and losartan were discontinued  Patient now appears to have a severe cellulitis of his right axilla  He was seen by surgery and attempt for I&D was performed but did not receive any real drainage  Cultures were obtained which are growing MSSA  Infectious diseases following and patient is currently on IV Ancef  Also during admission patient had an acute kidney injury which did respond to IV fluids  Patient has history for chronic atrial fibrillation, dyslipidemia,left lower lobe iris cancer for which she recently completed radiation treatments, chronic diastolic heart failure, hypertension and diabetes  Since admission patient has been noted to have atrial fibrillation with rapid ventricular response  Patient is unaware of his rapid heart rate  He states he has been compliant with his Eliquis  Echocardiogram performed in September 2021 demonstrated an EF of 50 to 55% with left atrium to be moderately dilated  Inpatient consult to Cardiology  Consult performed by: SIMI James  Consult ordered by: Julian Flores MD          Review of Systems   Constitutional: Negative  Negative for activity change, fatigue and fever  HENT: Negative  Negative for congestion, facial swelling, rhinorrhea and sore throat  Eyes: Negative  Negative for photophobia and visual disturbance  Respiratory: Negative  Negative for chest tightness and shortness of breath  Cardiovascular: Positive for leg swelling  Gastrointestinal: Negative  Negative for abdominal distention, blood in stool, diarrhea, nausea and vomiting  Endocrine: Negative  Negative for polydipsia, polyphagia and polyuria  Genitourinary: Negative  Negative for difficulty urinating  Musculoskeletal: Negative  Neurological: Negative  Psychiatric/Behavioral: Negative          Historical Information   Past Medical History:   Diagnosis Date   • Abdominal pain    • Anxiety    • Arthritis    • Asthma    • Atrial fibrillation [facility])    • Back pain    • Bleeding ulcer    • Bronchitis    • Cancer [facility])     prostate 2011- radiation   • Cardiac disease     cardiac stent x1   • Cataract     starting   • Chronic diastolic (congestive) heart failure (HCC)    • Diabetes mellitus ([facility] )     boarderline diabetic    • GERD (gastroesophageal reflux disease)    • History of radiation therapy 2010    Prostate seeds (brachytherapy) and EBRT   • Hyperlipidemia    • Hypertension    • 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, bilateral    • Low back pain    • Lung cancer ([facility] [address] )    • Lung mass    • Myocardial infarction ([facility] )     mild 1999   • Obesity    • Prostate cancer ([facility] )    • Shortness of breath    • Sleep apnea     sleep study 11/22   • Wears dentures     full set   • Wears glasses      Past Surgical History:   Procedure Laterality Date   • ABDOMINAL HERNIA REPAIR     • ABDOMINAL SURGERY      bleeding ulcer, cyst removed from abd   • COLONOSCOPY     • CORONARY ANGIOPLASTY WITH STENT PLACEMENT  1999    x1    • ESOPHAGOGASTRODUODENOSCOPY     • IR BIOPSY LUNG  10/05/2021   • IR THORACENTESIS  11/08/2021   • KNEE SURGERY Left    • WI BRONCHOSCOPY,DIAGNOSTIC N/A 11/29/2021    Procedure: BRONCHOSCOPY FLEXIBLE;  Surgeon: Frankey Goldberg, MD;  Location: [facility];  Service: Thoracic   • WI MEDIASTINOSCOPY WITH LYMPH NODE BIOPSY/IES N/A 11/29/2021    Procedure: MEDIASTINOSCOPY;  Surgeon: Frankey Goldberg, MD;  Location: [facility];  Service: Thoracic   • PROSTATE SURGERY       Social History     Substance and Sexual Activity   Alcohol Use Yes   • Alcohol/week: 10 0 - 12 0 standard drinks   • Types: 7 Glasses of wine, 3 - 5 Cans of beer per week    Comment: few beers day; glass of red wine at dinner     Social History     Substance and Sexual Activity   Drug Use Never     E-Cigarette/Vaping   • E-Cigarette Use Never User E-Cigarette/Vaping Substances   • Nicotine No    • THC No    • CBD No    • Flavoring No    • Other No    • Unknown No      Social History     Tobacco Use   Smoking Status Former   • Packs/day: 1 00   • Years: 30 00   • Pack years: 30    • Types: Cigarettes   • Quit date:    • Years since quittin 9   Smokeless Tobacco Never     Family History:   Family History   Problem Relation Age of Onset   • Prostate cancer Brother    • Cancer Maternal Uncle         colo rectal cancer   • Cancer Paternal Aunt        Meds/Allergies   all current active meds have been reviewed, current meds:   Current Facility-Administered Medications   Medication Dose Route Frequency   • acetaminophen (TYLENOL) tablet 650 mg  650 mg Oral Q6H PRN   • albumin human (FLEXBUMIN) 25 % injection 25 g  25 g Intravenous Once   • aluminum-magnesium hydroxide-simethicone (MYLANTA) oral suspension 30 mL  30 mL Oral Q6H PRN   • apixaban (ELIQUIS) tablet 5 mg  5 mg Oral BID   • bisacodyl (DULCOLAX) rectal suppository 10 mg  10 mg Rectal Daily PRN   • calamine-zinc oxide lotion   Topical 4x Daily   • carvedilol (COREG) tablet 12 5 mg  12 5 mg Oral BID With Meals   • ceFAZolin (ANCEF) IVPB (premix in dextrose) 2,000 mg 50 mL  2,000 mg Intravenous Q8H   • Famotidine (PF) (PEPCID) injection 20 mg  20 mg Intravenous Q12H [facility] & long-term   • Fluticasone Furoate-Vilanterol 100-25 mcg/actuation 1 puff  1 puff Inhalation Daily    And   • umeclidinium 62 5 mcg/actuation inhaler AEPB 1 puff  1 puff Inhalation Daily   • folic acid (FOLVITE) tablet 1 mg  1 mg Oral Daily   • guaiFENesin (MUCINEX) 12 hr tablet 600 mg  600 mg Oral Q12H VALARIE   • hydrALAZINE (APRESOLINE) injection 5 mg  5 mg Intravenous Q6H PRN   • hydrALAZINE (APRESOLINE) tablet 25 mg  25 mg Oral Q12H   • hydrocortisone 2 5 % cream   Topical BID   • hydrOXYzine HCL (ATARAX) tablet 25 mg  25 mg Oral Q6H PRN   • insulin lispro (HumaLOG) 100 units/mL subcutaneous injection 1-6 Units  1-6 Units Subcutaneous TID AC   • insulin lispro (HumaLOG) 100 units/mL subcutaneous injection 1-6 Units  1-6 Units Subcutaneous HS   • latanoprost (XALATAN) 0 005 % ophthalmic solution 1 drop  1 drop Both Eyes HS   • levalbuterol (XOPENEX) inhalation solution 0 63 mg  0 63 mg Nebulization Q4H PRN   • metoprolol (LOPRESSOR) injection 2 5 mg  2 5 mg Intravenous Q6H PRN   • montelukast (SINGULAIR) tablet 5 mg  5 mg Oral HS   • ondansetron (ZOFRAN) injection 4 mg  4 mg Intravenous Q6H PRN   • polyethylene glycol (MIRALAX) packet 17 g  17 g Oral Daily   • saccharomyces boulardii (FLORASTOR) capsule 250 mg  250 mg Oral BID   • simethicone (MYLICON) chewable tablet 80 mg  80 mg Oral 4x Daily PRN   • sodium chloride 0 9 % infusion  75 mL/hr Intravenous Continuous    and PTA meds:   Prior to Admission Medications   Prescriptions Last Dose Informant Patient Reported? Taking?    JARDIANCE 25 MG TABS 12/18/2022  Yes Yes   Sig: Take 25 mg by mouth daily in the early morning    Multiple Vitamin (MULTI-VITAMIN DAILY PO) 12/18/2022  Yes Yes   Sig: Take by mouth daily     Trelegy Ellipta 100-62 5-25 MCG/INH inhaler 12/18/2022  No Yes   Sig: INHALE ONE PUFF DAILY; RINSE MOUTH AFTER USE   acetaminophen (TYLENOL) 325 mg tablet Past Week  No Yes   Sig: Take 2 tablets (650 mg total) by mouth every 6 (six) hours as needed for mild pain, headaches or fever   albuterol (2 5 mg/3 mL) 0 083 % nebulizer solution 12/18/2022  Yes Yes   Sig: Take 2 5 mg by nebulization As needed per patient's wife    apixaban (Eliquis) 5 mg 12/18/2022  No Yes   Sig: Take 1 tablet (5 mg total) by mouth 2 (two) times a day   atenolol (TENORMIN) 50 mg tablet 12/18/2022  Yes Yes   Sig: Take 50 mg by mouth daily   atorvastatin (LIPITOR) 40 mg tablet 12/18/2022  No Yes   Sig: Take 1 tablet (40 mg total) by mouth daily   ciclopirox (LOPROX) 0 77 % cream   No No   Sig: Apply topically 2 (two) times a day for 20 days   folic acid (FOLVITE) 1 mg tablet 12/18/2022  No Yes   Sig: Take 1 tablet (1 mg total) by mouth daily   hydrocortisone 2 5 % cream 12/18/2022  No Yes   Sig: Apply topically 2 (two) times a day Apply twice a day affected area the trunk   latanoprost (XALATAN) 0 005 % ophthalmic solution 12/18/2022  Yes Yes   Sig: Administer 1 drop to both eyes daily at bedtime     losartan (COZAAR) 50 mg tablet 12/18/2022  No Yes   Sig: TAKE 1 TABLET DAILY   metFORMIN (GLUCOPHAGE) 500 mg tablet 12/18/2022  No Yes   Sig: Take 1 tablet (500 mg total) by mouth 2 (two) times a day   omeprazole (PriLOSEC) 20 mg delayed release capsule 12/18/2022  No Yes   Sig: Take 1 capsule (20 mg total) by mouth daily   primidone (MYSOLINE) 50 mg tablet 12/18/2022  No Yes   Sig: Take 2 tabs twice daily  thiamine 100 MG tablet 12/18/2022  No Yes   Sig: Take 1 tablet (100 mg total) by mouth daily      Facility-Administered Medications: None     No Known Allergies    Objective   Vitals: Blood pressure (!) 159/108, pulse (!) 142, temperature (!) 96 8 °F (36 °C), temperature source Oral, resp  rate 18, height 5' 11" (1 803 m), weight 108 kg (238 lb 8 oz), SpO2 94 %  Orthostatic Blood Pressures    Flowsheet Row Most Recent Value   Blood Pressure 159/108 filed at 12/23/2022 0804   Patient Position - Orthostatic VS Lying filed at 12/22/2022 2300            Intake/Output Summary (Last 24 hours) at 12/23/2022 1302  Last data filed at 12/23/2022 1155  Gross per 24 hour   Intake 1210 ml   Output 1350 ml   Net -140 ml       Invasive Devices     Peripheral Intravenous Line  Duration           Peripheral IV 12/22/22 Dorsal (posterior); Right Forearm <1 day                Physical Exam  Vitals and nursing note reviewed  Constitutional:       General: He is not in acute distress  Appearance: Normal appearance  He is obese  HENT:      Right Ear: External ear normal       Left Ear: External ear normal       Nose: Nose normal    Eyes:      General: No scleral icterus  Right eye: No discharge  Left eye: No discharge  Cardiovascular:      Rate and Rhythm: Tachycardia present  Rhythm irregularly irregular  Pulses: Normal pulses  Heart sounds: Heart sounds are distant  Abdominal:      General: Bowel sounds are normal  There is no distension  Palpations: Abdomen is soft  Musculoskeletal:      Right lower le+ Pitting Edema present  Left lower le+ Pitting Edema present  Skin:     Capillary Refill: Capillary refill takes less than 2 seconds  Findings: Erythema present  Comments: Patient with large phlegmon right axillary area causing deformity with erythema and tenderness his skin (see picture in media)   Neurological:      General: No focal deficit present  Mental Status: He is alert and oriented to person, place, and time  Mental status is at baseline  Psychiatric:         Mood and Affect: Mood normal          Lab Results:   I have personally reviewed pertinent lab results  CBC with diff:   Results from last 7 days   Lab Units 22  0550   WBC Thousand/uL 11 09*   RBC Million/uL 4 34   HEMOGLOBIN g/dL 14 2   HEMATOCRIT % 44 1   MCV fL 102*   MCH pg 32 7   MCHC g/dL 32 2   RDW % 13 6   MPV fL 10 0   PLATELETS Thousands/uL 183     CMP:   Results from last 7 days   Lab Units 22  0550   SODIUM mmol/L 140   CHLORIDE mmol/L 106   CO2 mmol/L 25   BUN mg/dL 31*   CREATININE mg/dL 0 97   CALCIUM mg/dL 8 6   AST U/L 56*   ALT U/L 70   ALK PHOS U/L 117*   EGFR ml/min/1 73sq m 73     HS Troponin:   0   Lab Value Date/Time    HSTNI0 10 2022 2030    HSTNI2 11 2022 2213    HSTNI4 10 2022 0002     BNP:   Results from last 7 days   Lab Units 22  0550   POTASSIUM mmol/L 4 7   CHLORIDE mmol/L 106   CO2 mmol/L 25   BUN mg/dL 31*   CREATININE mg/dL 0 97   CALCIUM mg/dL 8 6   EGFR ml/min/1 73sq m 73     Magnesium:   Results from last 7 days   Lab Units 22  0651   MAGNESIUM mg/dL 1 9     Lipid Profile:     Imaging: I have personally reviewed pertinent reports  EKG: Atrial fibrillation with rapid ventricular response  VTE Prophylaxis: Sequential compression device (Venodyne) And Eliquis    Code Status: Level 1 - Full Code  Advance Directive and Living Will:      Power of :    POLST:      Ann  Cardiology

## 2022-12-23 NOTE — PLAN OF CARE
Problem: PAIN - ADULT  Goal: Verbalizes/displays adequate comfort level or baseline comfort level  Description: Interventions:  - Encourage patient to monitor pain and request assistance  - Assess pain using appropriate pain scale  - Administer analgesics based on type and severity of pain and evaluate response  - Implement non-pharmacological measures as appropriate and evaluate response  - Consider cultural and social influences on pain and pain management  - Notify physician/advanced practitioner if interventions unsuccessful or patient reports new pain  Outcome: Progressing     Problem: INFECTION - ADULT  Goal: Absence or prevention of progression during hospitalization  Description: INTERVENTIONS:  - Assess and monitor for signs and symptoms of infection  - Monitor lab/diagnostic results  - Monitor all insertion sites, i e  indwelling lines, tubes, and drains  - Monitor endotracheal if appropriate and nasal secretions for changes in amount and color  - [place] appropriate cooling/warming therapies per order  - Administer medications as ordered  - Instruct and encourage patient and family to use good hand hygiene technique  - Identify and instruct in appropriate isolation precautions for identified infection/condition  Outcome: Progressing  Goal: Absence of fever/infection during neutropenic period  Description: INTERVENTIONS:  - Monitor WBC    Outcome: Progressing     Problem: SAFETY ADULT  Goal: Patient will remain free of falls  Description: INTERVENTIONS:  - Educate patient/family on patient safety including physical limitations  - Instruct patient to call for assistance with activity   - Consult OT/PT to assist with strengthening/mobility   - Keep Call bell within reach  - Keep bed low and locked with side rails adjusted as appropriate  - Keep care items and personal belongings within reach  - Initiate and maintain comfort rounds  - Make Fall Risk Sign visible to staff  - Offer Toileting every 2 Hours, in advance of need  - Initiate/Maintain bed alarm  - Obtain necessary fall risk management equipment: bed alarm, yellow socks   - Apply yellow socks and bracelet for high fall risk patients  - Consider moving patient to room near nurses station  Outcome: Progressing  Goal: Maintain or return to baseline ADL function  Description: INTERVENTIONS:  -  Assess patient's ability to carry out ADLs; assess patient's baseline for ADL function and identify physical deficits which impact ability to perform ADLs (bathing, care of mouth/teeth, toileting, grooming, dressing, etc )  - Assess/evaluate cause of self-care deficits   - Assess range of motion  - Assess patient's mobility; develop plan if impaired  - Assess patient's need for assistive devices and provide as appropriate  - Encourage maximum independence but intervene and supervise when necessary  - Involve family in performance of ADLs  - Assess for home care needs following discharge   - Consider OT consult to assist with ADL evaluation and planning for discharge  - Provide patient education as appropriate  Outcome: Progressing  Goal: Maintains/Returns to pre admission functional level  Description: INTERVENTIONS:  - Perform BMAT or MOVE assessment daily    - Set and communicate daily mobility goal to care team and patient/family/caregiver  - Collaborate with rehabilitation services on mobility goals if consulted  - Perform Range of Motion 3 times a day  - Reposition patient every 2 hours    - Dangle patient 3 times a day  - Stand patient 3 times a day  - Ambulate patient 3 times a day  - Out of bed to chair 3 times a day   - Out of bed for meals 3 times a day  - Out of bed for toileting  - Record patient progress and toleration of activity level   Outcome: Progressing     Problem: DISCHARGE PLANNING  Goal: Discharge to home or other facility with appropriate resources  Description: INTERVENTIONS:  - Identify barriers to discharge w/patient and caregiver  - Arrange for needed discharge resources and transportation as appropriate  - Identify discharge learning needs (meds, wound care, etc )  - Arrange for interpretive services to assist at discharge as needed  - Refer to Case Management Department for coordinating discharge planning if the patient needs post-hospital services based on physician/advanced practitioner order or complex needs related to functional status, cognitive ability, or social support system  Outcome: Progressing     Problem: Knowledge Deficit  Goal: Patient/family/caregiver demonstrates understanding of disease process, treatment plan, medications, and discharge instructions  Description: Complete learning assessment and assess knowledge base    Interventions:  - Provide teaching at level of understanding  - Provide teaching via preferred learning methods  Outcome: Progressing     Problem: Potential for Falls  Goal: Patient will remain free of falls  Description: INTERVENTIONS:  - Educate patient/family on patient safety including physical limitations  - Instruct patient to call for assistance with activity   - Consult OT/PT to assist with strengthening/mobility   - Keep Call bell within reach  - Keep bed low and locked with side rails adjusted as appropriate  - Keep care items and personal belongings within reach  - Initiate and maintain comfort rounds  - Make Fall Risk Sign visible to staff  - Offer Toileting every 2 Hours, in advance of need  - Initiate/Maintain bed alarm  - Obtain necessary fall risk management equipment: bed alarm, yellow socks   - Apply yellow socks and bracelet for high fall risk patients  - Consider moving patient to room near nurses station  Outcome: Progressing     Problem: RESPIRATORY - ADULT  Goal: Achieves optimal ventilation and oxygenation  Description: INTERVENTIONS:  - Assess for changes in respiratory status  - Assess for changes in mentation and behavior  - Position to facilitate oxygenation and minimize respiratory effort  - Oxygen administered by appropriate delivery if ordered  - Initiate smoking cessation education as indicated  - Encourage broncho-pulmonary hygiene including cough, deep breathe, Incentive Spirometry  - Assess the need for suctioning and aspirate as needed  - Assess and instruct to report SOB or any respiratory difficulty  - Respiratory Therapy support as indicated  Outcome: Progressing     Problem: SAFETY,RESTRAINT: NV/NON-SELF DESTRUCTIVE BEHAVIOR  Goal: Remains free of harm/injury (restraint for non violent/non self-detsructive behavior)  Description: INTERVENTIONS:  - Instruct patient/family regarding restraint use   - Assess and monitor physiologic and psychological status   - Provide interventions and comfort measures to meet assessed patient needs   - Identify and implement measures to help patient regain control  - Assess readiness for release of restraint   Outcome: Progressing  Goal: Returns to optimal restraint-free functioning  Description: INTERVENTIONS:  - Assess the patient's behavior and symptoms that indicate continued need for restraint  - Identify and implement measures to help patient regain control  - Assess readiness for release of restraint   Outcome: Progressing     Problem: Prexisting or High Potential for Compromised Skin Integrity  Goal: Skin integrity is maintained or improved  Description: INTERVENTIONS:  - Identify patients at risk for skin breakdown  - Assess and monitor skin integrity  - Assess and monitor nutrition and hydration status  - Monitor labs   - Assess for incontinence   - Turn and reposition patient  - Assist with mobility/ambulation  - Relieve pressure over bony prominences  - Avoid friction and shearing  - Provide appropriate hygiene as needed including keeping skin clean and dry  - Evaluate need for skin moisturizer/barrier cream  - Collaborate with interdisciplinary team   - Patient/family teaching  - Consider wound care consult   Outcome: Progressing Problem: MOBILITY - ADULT  Goal: Maintain or return to baseline ADL function  Description: INTERVENTIONS:  -  Assess patient's ability to carry out ADLs; assess patient's baseline for ADL function and identify physical deficits which impact ability to perform ADLs (bathing, care of mouth/teeth, toileting, grooming, dressing, etc )  - Assess/evaluate cause of self-care deficits   - Assess range of motion  - Assess patient's mobility; develop plan if impaired  - Assess patient's need for assistive devices and provide as appropriate  - Encourage maximum independence but intervene and supervise when necessary  - Involve family in performance of ADLs  - Assess for home care needs following discharge   - Consider OT consult to assist with ADL evaluation and planning for discharge  - Provide patient education as appropriate  Outcome: Progressing  Goal: Maintains/Returns to pre admission functional level  Description: INTERVENTIONS:  - Perform BMAT or MOVE assessment daily    - Set and communicate daily mobility goal to care team and patient/family/caregiver  - Collaborate with rehabilitation services on mobility goals if consulted  - Perform Range of Motion 3 times a day  - Reposition patient every 2 hours  - Dangle patient 3 times a day  - Stand patient 3 times a day  - Ambulate patient 3 times a day  - Out of bed to chair 3 times a day   - Out of bed for meals 3 times a day  - Out of bed for toileting  - Record patient progress and toleration of activity level   Outcome: Progressing     Problem: Nutrition/Hydration-ADULT  Goal: Nutrient/Hydration intake appropriate for improving, restoring or maintaining nutritional needs  Description: Monitor and assess patient's nutrition/hydration status for malnutrition  Collaborate with interdisciplinary team and initiate plan and interventions as ordered  Monitor patient's weight and dietary intake as ordered or per policy   Utilize nutrition screening tool and intervene as necessary  Determine patient's food preferences and provide high-protein, high-caloric foods as appropriate       INTERVENTIONS:  - Monitor oral intake, urinary output, labs, and treatment plans  - Assess nutrition and hydration status and recommend course of action  - Evaluate amount of meals eaten  - Assist patient with eating if necessary   - Allow adequate time for meals  - Recommend/ encourage appropriate diets, oral nutritional supplements, and vitamin/mineral supplements  - Order, calculate, and assess calorie counts as needed  - Recommend, monitor, and adjust tube feedings and TPN/PPN based on assessed needs  - Assess need for intravenous fluids  - Provide specific nutrition/hydration education as appropriate  - Include patient/family/caregiver in decisions related to nutrition  Outcome: Progressing

## 2022-12-23 NOTE — QUICK NOTE
Significant thickening and erythema increasing in severity of the right axillary region  Infectious disease and medicine team and reach out to surgical team regarding this issue  Patient has had diagnostic needle aspiration without any success for finding any purulence in deeper soft tissue spaces  Based on the discussion it appears that patient's lactic acid has resolved and bandemia has decreased  Patient does have mild increase though in leukocytosis at approximately 11  Patient does report subjectively having increased tenderness  Patient's still having tachycardia as well as some hypertension  As a result patient was briefly seen and examined at bedside  Patient does still have significant swelling, edema, erythema without any obvious draining of purulence  Patient does have some open blisters on the posterior aspect of the swelling  Patient at this current moment through discussion was determined he would have a CT with IV contrast of chest wall on the right side to look for any deeper fluid collections that cannot be palpated on exam     Patient did have CT scan performed and completed results do not show any significant fluid collection consistent with abscess  Significant skin thickening and subcutaneous edematous changes consistent with cellulitis  This was also confirmed by ultrasound as well as diagnostic needle aspiration  Will have surgeon reevaluate tomorrow with physical exam to confirm suspicions as well as to see if there is any progression or development of abscess into tomorrow

## 2022-12-23 NOTE — CASE MANAGEMENT
Case Management Discharge Planning Note    Patient name Frida Stone  [facility] MRN [id number]  : 1940 Date 2022       Current Admission Date: 2022  Current Admission Diagnosis:Diabetic polyneuropathy associated with type 2 diabetes mellitus [facility])   Patient Active Problem List    Diagnosis Date Noted   • Cellulitis 2022   • COVID-19 10/10/2022   • Depression, recurrent ([facility] [address] ) 2022   • Acute [facility] failure with hypoxemia [facility] ) 2022   • Prostate cancer ([facility] ) 2022   • GERD (gastroesophageal reflux disease) 2022   • CAD (coronary artery disease) 2022   • Persistent atrial fibrillation ([facility] ) 2022   • Alcohol dependence ([facility] ) 2022   • SYLVESTER (obstructive sleep apnea)    • Chronic diastolic heart failure ([facility] ) 2021   • Squamous cell carcinoma of lung ([facility] ) 10/13/2021   • Shortness of breath 10/13/2021   • Daytime hypersomnolence 10/13/2021   • Lung mass 2021   • Hyperlipidemia    • Corns 02/15/2018   • Pain in both feet 02/15/2018   • Diabetic polyneuropathy associated with type 2 diabetes mellitus ([facility] [address] ) 02/15/2018   • Onychomycosis 02/15/2018   • Tinea pedis of both feet 02/15/2018      LOS (days): 3  Geometric Mean LOS (GMLOS) (days): 5 00  Days to GMLOS:2     OBJECTIVE:  Risk of Unplanned Readmission Score: 20 81     Current admission status: Inpatient   Preferred Pharmacy:   [address] [facility], [address] [facility]  [address] [address]  Phone: [phone] Fax: [phone]    [facility] ([address] [facility],   [address] [address]  Phone: [phone] Fax: [phone]    Primary Care Provider: Mahin Li MD    Primary Insurance: [facility]  Secondary Insurance:     DISCHARGE DETAILS:    Discharge planning discussed with[de-identified] Patient  Freedom of Choice: Yes  Comments - Freedom of Choice: SW following to assist with DCP  Current plan is for pt to return home with home care services upon discharge  SW provided pt with list of accepting home care agencies  Pt requested [facility] through [facility]  Agency reserved in [address] [place]  Rolling walker order approved by [employer] and delivered by [facility]  No other discharge needs expressed at this time  SW offered ongoing support as needed  Requested [address]         Is the patient interested in [facility] at discharge?: Yes  [address] requested[de-identified] Nursing, Occupational Therapy, Physical [address] Name[de-identified] [address] Provider[de-identified] PCP  Homebound Criteria Met[de-identified] Requires the Assistance of Another Person for Safe Ambulation or to Leave the Home, Uses an Assist Device (i e  cane, walker, etc)  Supporting Clincal Findings[de-identified] Limited Endurance, Fatigues Easliy in [employer]    DME Referral Provided  Referral made for DME?: Yes  DME referral completed for the following items[de-identified] Bertha Innocent  DME Supp[facility]    Other Referral/Resources/Interventions Provided:  Interventions: DME, HHC  Referral Comments: See above    Treatment Team Recommendation: Home with [address] [facility] [employer]  Discharge Destination Plan[de-identified] Home with [place] at Discharge : Family    IMM Given (Date):: 12/23/22  IMM Given to[de-identified] Patient (IMM reviewed with pt  Pt verbalized understanding  Pt signed IMM and copy given    Copy also placed in scan bin for chart )

## 2022-12-24 ENCOUNTER — APPOINTMENT (INPATIENT)
Dept: NON INVASIVE DIAGNOSTICS | Facility: HOSPITAL | Age: 82
End: 2022-12-24

## 2022-12-24 LAB
ANION GAP SERPL CALCULATED.3IONS-SCNC: 9 MMOL/L (ref 4–13)
AORTIC ROOT: 2.8 CM
APICAL FOUR CHAMBER EJECTION FRACTION: 59 %
ASCENDING AORTA: 3.2 CM
BUN SERPL-MCNC: 25 MG/DL (ref 5–25)
CALCIUM SERPL-MCNC: 8.5 MG/DL (ref 8.3–10.1)
CHLORIDE SERPL-SCNC: 107 MMOL/L (ref 96–108)
CO2 SERPL-SCNC: 24 MMOL/L (ref 21–32)
CREAT SERPL-MCNC: 0.85 MG/DL (ref 0.6–1.3)
ERYTHROCYTE [DISTWIDTH] IN BLOOD BY AUTOMATED COUNT: 13.5 % (ref 11.6–15.1)
FRACTIONAL SHORTENING: 39 % (ref 28–44)
GFR SERPL CREATININE-BSD FRML MDRD: 82 ML/MIN/1.73SQ M
GLUCOSE SERPL-MCNC: 137 MG/DL (ref 65–140)
GLUCOSE SERPL-MCNC: 142 MG/DL (ref 65–140)
GLUCOSE SERPL-MCNC: 158 MG/DL (ref 65–140)
GLUCOSE SERPL-MCNC: 163 MG/DL (ref 65–140)
GLUCOSE SERPL-MCNC: 172 MG/DL (ref 65–140)
HCT VFR BLD AUTO: 43.2 % (ref 36.5–49.3)
HGB BLD-MCNC: 14.1 G/DL (ref 12–17)
INTERVENTRICULAR SEPTUM IN DIASTOLE (PARASTERNAL SHORT AXIS VIEW): 1.1 CM
INTERVENTRICULAR SEPTUM: 1.1 CM (ref 0.6–1.1)
LAAS-AP2: 29.4 CM2
LAAS-AP4: 27.1 CM2
LEFT ATRIUM SIZE: 4.2 CM
LEFT INTERNAL DIMENSION IN SYSTOLE: 2.8 CM (ref 2.1–4)
LEFT VENTRICULAR INTERNAL DIMENSION IN DIASTOLE: 4.6 CM (ref 3.5–6)
LEFT VENTRICULAR POSTERIOR WALL IN END DIASTOLE: 1 CM
LEFT VENTRICULAR STROKE VOLUME: 65 ML
LVSV (TEICH): 65 ML
MAGNESIUM SERPL-MCNC: 1.8 MG/DL (ref 1.6–2.6)
MCH RBC QN AUTO: 32.8 PG (ref 26.8–34.3)
MCHC RBC AUTO-ENTMCNC: 32.6 G/DL (ref 31.4–37.4)
MCV RBC AUTO: 101 FL (ref 82–98)
MV E'TISSUE VEL-SEP: 6 CM/S
MV PEAK E VEL: 105 CM/S
NT-PROBNP SERPL-MCNC: 8163 PG/ML
PLATELET # BLD AUTO: 187 THOUSANDS/UL (ref 149–390)
PMV BLD AUTO: 10.1 FL (ref 8.9–12.7)
POTASSIUM SERPL-SCNC: 4.6 MMOL/L (ref 3.5–5.3)
RBC # BLD AUTO: 4.3 MILLION/UL (ref 3.88–5.62)
RIGHT ATRIUM AREA SYSTOLE A4C: 21.5 CM2
RIGHT VENTRICLE ID DIMENSION: 2.9 CM
SL CV LEFT ATRIUM LENGTH A2C: 7.1 CM
SL CV LV EF: 50
SL CV PED ECHO LEFT VENTRICLE DIASTOLIC VOLUME (MOD BIPLANE) 2D: 96 ML
SL CV PED ECHO LEFT VENTRICLE SYSTOLIC VOLUME (MOD BIPLANE) 2D: 30 ML
SODIUM SERPL-SCNC: 140 MMOL/L (ref 135–147)
TR MAX PG: 22 MMHG
TR PEAK VELOCITY: 2.4 M/S
TRICUSPID VALVE PEAK REGURGITATION VELOCITY: 2.37 M/S
WBC # BLD AUTO: 8.79 THOUSAND/UL (ref 4.31–10.16)

## 2022-12-24 PROCEDURE — 0J9D0ZZ DRAINAGE OF RIGHT UPPER ARM SUBCUTANEOUS TISSUE AND FASCIA, OPEN APPROACH: ICD-10-PCS | Performed by: STUDENT IN AN ORGANIZED HEALTH CARE EDUCATION/TRAINING PROGRAM

## 2022-12-24 RX ORDER — LIDOCAINE HYDROCHLORIDE 10 MG/ML
INJECTION, SOLUTION EPIDURAL; INFILTRATION; INTRACAUDAL; PERINEURAL
Status: DISPENSED
Start: 2022-12-24 | End: 2022-12-24

## 2022-12-24 RX ORDER — CARVEDILOL 25 MG/1
25 TABLET ORAL 2 TIMES DAILY WITH MEALS
Status: DISCONTINUED | OUTPATIENT
Start: 2022-12-24 | End: 2023-01-01 | Stop reason: HOSPADM

## 2022-12-24 RX ORDER — FUROSEMIDE 10 MG/ML
20 INJECTION INTRAMUSCULAR; INTRAVENOUS ONCE
Status: COMPLETED | OUTPATIENT
Start: 2022-12-24 | End: 2022-12-24

## 2022-12-24 RX ORDER — DIGOXIN 0.25 MG/ML
250 INJECTION INTRAMUSCULAR; INTRAVENOUS ONCE
Status: COMPLETED | OUTPATIENT
Start: 2022-12-24 | End: 2022-12-24

## 2022-12-24 RX ORDER — LANOLIN ALCOHOL/MO/W.PET/CERES
3 CREAM (GRAM) TOPICAL
Status: DISCONTINUED | OUTPATIENT
Start: 2022-12-24 | End: 2023-01-01 | Stop reason: HOSPADM

## 2022-12-24 RX ORDER — HYDRALAZINE HYDROCHLORIDE 25 MG/1
25 TABLET, FILM COATED ORAL EVERY 12 HOURS
Status: DISCONTINUED | OUTPATIENT
Start: 2022-12-24 | End: 2022-12-26

## 2022-12-24 RX ORDER — LIDOCAINE HYDROCHLORIDE 10 MG/ML
10 INJECTION, SOLUTION EPIDURAL; INFILTRATION; INTRACAUDAL; PERINEURAL ONCE
Status: COMPLETED | OUTPATIENT
Start: 2022-12-24 | End: 2022-12-24

## 2022-12-24 RX ADMIN — HYDRALAZINE HYDROCHLORIDE 25 MG: 25 TABLET ORAL at 21:12

## 2022-12-24 RX ADMIN — FOLIC ACID 1 MG: 1 TABLET ORAL at 10:22

## 2022-12-24 RX ADMIN — FAMOTIDINE 20 MG: 10 INJECTION, SOLUTION INTRAVENOUS at 10:28

## 2022-12-24 RX ADMIN — Medication 250 MG: at 18:24

## 2022-12-24 RX ADMIN — DIGOXIN 250 MCG: 0.25 INJECTION INTRAMUSCULAR; INTRAVENOUS at 05:26

## 2022-12-24 RX ADMIN — MONTELUKAST 5 MG: 10 TABLET, FILM COATED ORAL at 21:09

## 2022-12-24 RX ADMIN — INSULIN LISPRO 1 UNITS: 100 INJECTION, SOLUTION INTRAVENOUS; SUBCUTANEOUS at 11:19

## 2022-12-24 RX ADMIN — GUAIFENESIN 600 MG: 600 TABLET, EXTENDED RELEASE ORAL at 10:22

## 2022-12-24 RX ADMIN — INSULIN LISPRO 1 UNITS: 100 INJECTION, SOLUTION INTRAVENOUS; SUBCUTANEOUS at 21:20

## 2022-12-24 RX ADMIN — CARVEDILOL 25 MG: 25 TABLET, FILM COATED ORAL at 16:17

## 2022-12-24 RX ADMIN — GUAIFENESIN 600 MG: 600 TABLET, EXTENDED RELEASE ORAL at 21:12

## 2022-12-24 RX ADMIN — DILTIAZEM HYDROCHLORIDE 15 MG/HR: 5 INJECTION, SOLUTION INTRAVENOUS at 23:17

## 2022-12-24 RX ADMIN — UMECLIDINIUM 1 PUFF: 62.5 AEROSOL, POWDER ORAL at 10:24

## 2022-12-24 RX ADMIN — Medication 3 MG: at 21:12

## 2022-12-24 RX ADMIN — APIXABAN 5 MG: 5 TABLET, FILM COATED ORAL at 10:22

## 2022-12-24 RX ADMIN — HYDROCORTISONE: 25 CREAM TOPICAL at 10:24

## 2022-12-24 RX ADMIN — FLUTICASONE FUROATE AND VILANTEROL TRIFENATATE 1 PUFF: 100; 25 POWDER RESPIRATORY (INHALATION) at 10:24

## 2022-12-24 RX ADMIN — Medication 3 MG: at 01:24

## 2022-12-24 RX ADMIN — ACETAMINOPHEN 650 MG: 325 TABLET, FILM COATED ORAL at 04:42

## 2022-12-24 RX ADMIN — CEFAZOLIN SODIUM 2000 MG: 2 SOLUTION INTRAVENOUS at 18:24

## 2022-12-24 RX ADMIN — METOPROLOL TARTRATE 2.5 MG: 5 INJECTION INTRAVENOUS at 21:51

## 2022-12-24 RX ADMIN — FUROSEMIDE 20 MG: 10 INJECTION, SOLUTION INTRAMUSCULAR; INTRAVENOUS at 14:27

## 2022-12-24 RX ADMIN — FAMOTIDINE 20 MG: 10 INJECTION, SOLUTION INTRAVENOUS at 21:13

## 2022-12-24 RX ADMIN — FUROSEMIDE 20 MG: 10 INJECTION, SOLUTION INTRAMUSCULAR; INTRAVENOUS at 16:15

## 2022-12-24 RX ADMIN — CEFAZOLIN SODIUM 2000 MG: 2 SOLUTION INTRAVENOUS at 10:24

## 2022-12-24 RX ADMIN — LATANOPROST 1 DROP: 50 SOLUTION OPHTHALMIC at 21:13

## 2022-12-24 RX ADMIN — ACETAMINOPHEN 650 MG: 325 TABLET, FILM COATED ORAL at 11:16

## 2022-12-24 RX ADMIN — INSULIN LISPRO 1 UNITS: 100 INJECTION, SOLUTION INTRAVENOUS; SUBCUTANEOUS at 16:15

## 2022-12-24 RX ADMIN — Medication 250 MG: at 10:22

## 2022-12-24 RX ADMIN — APIXABAN 5 MG: 5 TABLET, FILM COATED ORAL at 18:24

## 2022-12-24 RX ADMIN — SODIUM CHLORIDE 75 ML/HR: 0.9 INJECTION, SOLUTION INTRAVENOUS at 02:30

## 2022-12-24 RX ADMIN — CARVEDILOL 12.5 MG: 12.5 TABLET, FILM COATED ORAL at 08:38

## 2022-12-24 RX ADMIN — DILTIAZEM HYDROCHLORIDE 15 MG/HR: 5 INJECTION, SOLUTION INTRAVENOUS at 04:42

## 2022-12-24 RX ADMIN — DIGOXIN 250 MCG: 0.25 INJECTION INTRAMUSCULAR; INTRAVENOUS at 23:11

## 2022-12-24 RX ADMIN — CEFAZOLIN SODIUM 2000 MG: 2 SOLUTION INTRAVENOUS at 01:27

## 2022-12-24 RX ADMIN — LIDOCAINE HYDROCHLORIDE 10 ML: 10 INJECTION, SOLUTION EPIDURAL; INFILTRATION; INTRACAUDAL; PERINEURAL at 10:33

## 2022-12-24 NOTE — PROGRESS NOTES
Progress Note - Cardiology   [facility]     Charlee Rodriguez [de-identified] y o  male MRN: [id number]  : 1940  Unit/Bed#: [address] [place] [address] Encounter: [id number]    Assessment and Plan:   1  Right axilla cellulitis/sepsis: Patient followed by infectious disease and surgery    -   Wound culture grew out staph aureus    -    Patient on Ancef 2 g every 8 hours IV    2  Hypertension: Patient's blood pressure elevated since admission, his losartan was discontinued on admission for concern for angioedema    -   We will increase his Coreg to 25 mg twice daily    -   Patient is currently on Cardizem drip    -   We will add hydralazine 25 mg every 12 hours    -   Continue to monitor    3  Chronic atrial fibrillation with rapid ventricular response: Rates poorly controlled since admission to the hospital question underlying sepsis as cause    -   Increase Coreg to 25 mg twice a day    -   Patient was started on IV Cardizem last evening and is currently at 15 mg/h    -   Patient was also given a single dose of digoxin 0 25 mg on the evening of 2022    -   Continue to monitor telemetry    4  Chronic diastolic heart failure: Patient is receiving high levels of fluids and appears mildly volume overloaded    -   We will give Lasix 20 mg IV x1 and monitor output    -   Other medication as above    -   ACE/ARB and now listed as allergies secondary to history of angioedema    -   Monitor I&O, daily weights and labs    5  Diabetes: Hemoglobin A1c 7 1 managed per primary team    6  Chronic kidney disease: Baseline creatinine appears to be less than 1    7  Obesity: BMI 33 8    8  Left lower lobe lung CA status postradiation therapy    Subjective / Objective:   Patient seen and examined  He is sitting out of bed in the chair  Does not offer any specific complaints    Underwent attempted ID from right axilla again today with the surgical team     Patient remains in atrial fibrillation, overnight rate elevated and Cardizem drip was initiated  He also received a single dose of digoxin 0 25 mg IV  Patient denies any complaints of palpitations, chest pain or shortness of breath  Vitals: Blood pressure 137/89, pulse (!) 120, temperature 98 °F (36 7 °C), resp  rate 20, height 5' 11" (1 803 m), weight 110 kg (242 lb 15 2 oz), SpO2 93 %  Vitals:    12/23/22 0544 12/24/22 0600   Weight: 108 kg (238 lb 8 oz) 110 kg (242 lb 15 2 oz)     Body mass index is 33 88 kg/m²  BP Readings from Last 3 Encounters:   12/24/22 137/89   12/06/22 140/80   11/28/22 140/80     Orthostatic Blood Pressures    Flowsheet Row Most Recent Value   Blood Pressure 137/89 filed at 12/24/2022 0640   Patient Position - Orthostatic VS Sitting filed at 12/24/2022 0640        I/O       12/22 0701  12/23 0700 12/23 0701  12/24 0700 12/24 0701  12/25 0700    P  O  250 240     I V  (mL/kg) 910 (8 4) 990 (9)     IV Piggyback 50      Total Intake(mL/kg) 1210 (11 2) 1230 (11 2)     Urine (mL/kg/hr) 1975 (0 8) 1340 (0 5)     Stool 0 0     Total Output 1975 1340     Net -765 -110            Unmeasured Urine Occurrence 2 x      Unmeasured Stool Occurrence 1 x 1 x         Invasive Devices     Peripheral Intravenous Line  Duration           Peripheral IV 12/22/22 Dorsal (posterior); Right Forearm 1 day    Peripheral IV 12/23/22 Left Antecubital <1 day                  Intake/Output Summary (Last 24 hours) at 12/24/2022 1115  Last data filed at 12/24/2022 0601  Gross per 24 hour   Intake 1230 ml   Output 1340 ml   Net -110 ml         Physical Exam:   Physical Exam  Vitals and nursing note reviewed  Constitutional:       Appearance: Normal appearance  He is obese  HENT:      Right Ear: External ear normal       Left Ear: External ear normal    Eyes:      General: No scleral icterus  Right eye: No discharge  Left eye: No discharge  Cardiovascular:      Rate and Rhythm: Tachycardia present  Rhythm irregularly irregular  Pulses: Normal pulses  Heart sounds: Murmur heard  Systolic murmur is present with a grade of 1/6  Pulmonary:      Effort: Pulmonary effort is normal  No accessory muscle usage or respiratory distress  Breath sounds: Examination of the right-lower field reveals decreased breath sounds and rales  Examination of the left-lower field reveals decreased breath sounds and rales  Decreased breath sounds and rales present  Comments: Coarse breath sounds  Abdominal:      General: Bowel sounds are normal  There is no distension  Palpations: Abdomen is soft  Musculoskeletal:      Right lower leg: Edema present  Left lower leg: Edema present  Skin:     General: Skin is warm and dry  Capillary Refill: Capillary refill takes less than 2 seconds  Neurological:      General: No focal deficit present  Mental Status: He is alert and oriented to person, place, and time  Mental status is at baseline  Psychiatric:         Mood and Affect: Mood normal          Behavior: Behavior is cooperative                   Medications/ Allergies:     Current Facility-Administered Medications   Medication Dose Route Frequency Provider Last Rate   • acetaminophen  650 mg Oral Q6H PRN Bakari Rushing MD     • aluminum-magnesium hydroxide-simethicone  30 mL Oral Q6H PRN Bakari Rushing MD     • apixaban  5 mg Oral BID Bakari Rushing MD     • bisacodyl  10 mg Rectal Daily PRN Bakari Rushing MD     • calamine-zinc oxide   Topical 4x Daily Bakari Rushing MD     • carvedilol  12 5 mg Oral BID With Meals SIMI Madden     • cefazolin  2,000 mg Intravenous Kelin Owens MD 2,000 mg (12/24/22 1024)   • diltiazem  15 mg/hr Intravenous Continuous SIMI Gonzales 15 mg/hr (12/24/22 0442)   • famotidine  20 mg Intravenous Q12H [address] Jessica SIMI Olivarez     • Fluticasone Furoate-Vilanterol  1 puff Inhalation Daily Bakari Rushing MD      And   • umeclidinium  1 puff Inhalation Daily Bakari Rushing MD     • folic acid  1 mg Oral Daily Bakari Rushing MD     • guaiFENesin  600 mg Oral Q12H [facility] Mary Renee MD     • hydrALAZINE  5 mg Intravenous Q6H PRN Freddie Ochoa PA-C     • hydrocortisone   Topical BID Gilford Dull, MD     • hydrOXYzine HCL  25 mg Oral Q6H PRN SIMI Gonzales     • insulin lispro  1-6 Units Subcutaneous TID AC SIMI Kirk     • insulin lispro  1-6 Units Subcutaneous HS SIMI Kirk     • latanoprost  1 drop Both Eyes HS Gilford Dull, MD     • levalbuterol  0 63 mg Nebulization Q4H PRN SIMI Gonzales     • melatonin  3 mg Oral HS SIMI Gonzales     • metoprolol  2 5 mg Intravenous Q6H PRN SIMI Gonzales     • montelukast  5 mg Oral HS Gilford Dull, MD     • ondansetron  4 mg Intravenous Q6H PRN Gilford Dull, MD     • polyethylene glycol  17 g Oral Daily Mary Renee MD     • saccharomyces boulardii  250 mg Oral BID SIMI Gonzales     • simethicone  80 mg Oral 4x Daily PRN Gilford Dull, MD       acetaminophen, 650 mg, Q6H PRN  aluminum-magnesium hydroxide-simethicone, 30 mL, Q6H PRN  bisacodyl, 10 mg, Daily PRN  hydrALAZINE, 5 mg, Q6H PRN  hydrOXYzine HCL, 25 mg, Q6H PRN  levalbuterol, 0 63 mg, Q4H PRN  metoprolol, 2 5 mg, Q6H PRN  ondansetron, 4 mg, Q6H PRN  simethicone, 80 mg, 4x Daily PRN      No Known Allergies    VTE Pharmacologic Prophylaxis:   Sequential compression device (Venodyne)     Labs:   CBC with diff:  Results from last 7 days   Lab Units 12/24/22  0619 12/23/22  0550 12/21/22  0651 12/20/22  0610 12/19/22  2327 12/19/22  0129   WBC Thousand/uL 8 79 11 09* 9 09 6 34 7 67 9 72   HEMOGLOBIN g/dL 14 1 14 2 12 8 17 1* 16 5 17 2*   HEMATOCRIT % 43 2 44 1 40 8 52 6* 51 7* 52 9*   MCV fL 101* 102* 104* 102* 103* 101*   PLATELETS Thousands/uL 187 183 154 252 273 255   MCH pg 32 8 32 7 32 7 33 1 32 8 32 8   MCHC g/dL 32 6 32 2 31 6 32 5 31 9 32 5   RDW % 13 5 13 6 13 5 13 2 13 1 13 2   MPV fL 10 1 10 0 10 2 10 0 9 9 9 6   NRBC AUTO /100 WBCs  --  1  --   --   --  0     CMP:  Results from last 7 days   Lab Units 12/24/22  0619 12/23/22  0550 12/21/22  0651 12/20/22  0610 12/19/22  2327 12/19/22  0129   SODIUM mmol/L 140 140 139 138 138 138   POTASSIUM mmol/L 4 6 4 7 4 4 4 9 5 6* 4 4   CHLORIDE mmol/L 107 106 108 101 100 101   CO2 mmol/L 24 25 23 24 17* 27   ANION GAP mmol/L 9 9 8 13 21* 10   BUN mg/dL 25 31* 44* 33* 33* 15   CREATININE mg/dL 0 85 0 97 1 79* 1 90* 1 95* 0 91   GLUCOSE FASTING mg/dL  --   --   --  149*  --   --    CALCIUM mg/dL 8 5 8 6 6 9* 8 0* 9 0 8 9   AST U/L  --  56* 23  --  29 20   ALT U/L  --  70 19  --  17 17   ALK PHOS U/L  --  117* 40*  --  76 91   TOTAL PROTEIN g/dL  --  6 6 5 2*  --  6 7 7 1   ALBUMIN g/dL  --  2 5* 2 2*  --  3 2* 3 1*   TOTAL BILIRUBIN mg/dL  --  0 48 0 53  --  0 94 0 40   EGFR ml/min/1 73sq m 82 73 35 32 31 79     Magnesium:  Results from last 7 days   Lab Units 12/24/22  0619 12/23/22  0550 12/21/22  0651 12/19/22  2327   MAGNESIUM mg/dL 1 8 2 2 1 9 1 6     Hgb A1c:  Results from last 7 days   Lab Units 12/20/22  0610   HEMOGLOBIN A1C % 6 7*     NT-proBNP:   Recent Labs     12/24/22 0619   NTBNP 8,163*        Imaging & Testing   I have personally reviewed pertinent reports  XR chest portable    Result Date: 12/20/2022  Narrative: CHEST INDICATION:   SOB,cough  Lung cancer  COMPARISON:  5/4/2022   10/21/2022, CT scan  EXAM PERFORMED/VIEWS:  XR CHEST PORTABLE  AP semierect FINDINGS: Heart shadow is obscured by adjacent opacity  Increasing opacity at the left base which, when correlated with the CT scan, represents enlargement of the known mass  Probable associated pleural effusion  Left upper lung and right lung remain clear  No pneumothorax apparent  No acute osseous abnormalities  Impression: Increasing pleuroparenchymal opacity at the left base consistent with enlargement of the known mass and probably associated effusion/atelectasis   Workstation performed: [id number]     CT chest w contrast    Result Date: 12/23/2022  Narrative: CT CHEST WITH IV CONTRAST INDICATION:   Soft tissue mass, chest, US/xray nondiagnostic Soft tissue rule out cellulitis, abscess, fasciitis of right chest wall and axilla  COMPARISON:  Ultrasound from earlier today of the right axilla and CT scan of the chest from 10/21/2022  TECHNIQUE: CT examination of the chest was performed  Axial, sagittal, and coronal 2D reformatted images were created from the source data and submitted for interpretation  Radiation dose length product (DLP) for this visit:  747 62 mGy-cm   This examination, like all CT scans performed in the [facility], was performed utilizing techniques to minimize radiation dose exposure, including the use of iterative  reconstruction and automated exposure control  IV Contrast:  85 mL of iohexol (OMNIPAQUE) FINDINGS: LUNGS: Stable background emphysema  Increasing left lower lobe consolidation due to increasing left pleural effusion  The previously noted rounded masslike area is more difficult to discern but a slightly hypodense area noted on image 2/42 measuring 3 6 cm is approximately the same as prior  Stable juxtapleural reticular fibrotic changes mostly in the right lung base nonspecific  PLEURA:  Increasing left pleural effusion  HEART/GREAT VESSELS: Heart is unremarkable for patient's age  No thoracic aortic aneurysm  MEDIASTINUM AND ANDRE:  Unremarkable  CHEST WALL AND LOWER NECK:  There is skin thickening and subcutaneous edematous changes in the right axillary region  No focal fluid collection or mass identified  VISUALIZED STRUCTURES IN THE UPPER ABDOMEN:  Stable gallstones without surrounding inflammation  Postsurgical changes of the stomach  OSSEOUS STRUCTURES:  No acute fracture or destructive osseous lesion  Impression: Skin thickening and subcutaneous edematous changes in the right axillary region  No focal fluid collection or mass identified  Findings most consistent with cellulitis  Stable background emphysema    Increasing left lower lobe consolidation due to increasing left pleural effusion  The previously noted rounded mass in the left lower lobe is more difficult to discern on today's exam due to the increased effusion and atelectasis, but a slightly hypodense area noted on image 2/42 measuring 3 6 cm is approximately the same as prior  Stable gallstones without surrounding inflammation  Workstation performed: [id number]     US axilla    Result Date: 12/20/2022  Narrative: AXILLA ULTRASOUND INDICATION:   observe for fluid collection required drainage  COMPARISON:  None TECHNIQUE:   Real-time ultrasound of the right axilla was performed with a linear transducer with both volumetric sweeps and still imaging techniques  FINDINGS:  No discrete fluid collection is identified in the right axilla  Mild skin thickening and soft tissue edema is noted  Impression: No discrete fluid collection is identified in the right axilla  Workstation performed: [id number]        EKG / Monitor: Personally reviewed  Atrial fibrillation        [address] [facility]  Cardiology      "This note was completed in part utilizing [employer] direct voice recognition software  Grammatical errors, random word insertion, spelling mistakes, and incomplete sentences may be an occasional consequence of the system secondary to software limitations, ambient noise and hardware issues  Please read the chart carefully and recognize, using context, where substitutions have occurred    If you have any questions or concerns about the context, text or information contained within the body of this dictation, please contact myself, the provider, for further clarification "

## 2022-12-24 NOTE — PROGRESS NOTES
Progress Note - General Surgery   Jaimie Kelley [de-identified] y o  male MRN: [id number]  Unit/Bed#: [address] [place] [address] Encounter: [id number]    Assessment:  1) right axillary swelling -patient was having some tachycardia suspected to be due to A  fib but otherwise is hemodynamically stable, she does not appear to be having a febrile status, leukocytosis has been resolved, lactic acid has cleared, with that being said erythema and swelling has increased on the right axilla, patient has significant tenderness overlying right axilla, physical exam has shown signs of worsening, CT scan with IV contrast of right chest wall does not show any significant collections amenable to drainage, ultrasound also was negative for any sort of abscess, diagnostic needle aspiration was negative for any sort of purulence/collections    Plan:  1)   -We will try again with a larger incision and drainage in order to assist or facilitate any sort of potential drainage  -Ordered 10 cc of 1% lidocaine  -Continue antibiotics per the medicine and infectious disease team  -Uncertain if we will have a clear surgical target but we will pack and wick the axilla in order to attempt to facilitate healing from infectious state    Subjective/Objective   Chief Complaint: There are the guys    Subjective: Patient was seen and examined at bedside  Patient denies any fevers or chills  Patient denies any acute events overnight  Both wife and patient are present at bedside  Both of them are somewhat concerned by the fact that his armpit continues to increase in swelling and pain as well as erythema  Both of them understand that the imaging has been negative and the diagnostic needle aspiration has been negative for any sort of findings of purulence or abscess cavity  However, exam continues to worsen  Patient's objective endpoints on labs have been improving    Patient and family member have been counseled on risks and benefits versus alternatives of incision and drainage  Patient and family desire an attempt at incision and drainage and exploration of axilla  Objective:     Blood pressure 137/89, pulse (!) 120, temperature 98 °F (36 7 °C), resp  rate 20, height 5' 11" (1 803 m), weight 110 kg (242 lb 15 2 oz), SpO2 93 %  ,Body mass index is 33 88 kg/m²  Intake/Output Summary (Last 24 hours) at 12/24/2022 0816  Last data filed at 12/24/2022 0601  Gross per 24 hour   Intake 1230 ml   Output 1340 ml   Net -110 ml       Invasive Devices     Peripheral Intravenous Line  Duration           Peripheral IV 12/22/22 Dorsal (posterior); Right Forearm 1 day    Peripheral IV 12/23/22 Left Antecubital <1 day                Physical Exam: /89 (BP Location: Right arm)   Pulse (!) 120   Temp 98 °F (36 7 °C)   Resp 20   Ht 5' 11" (1 803 m)   Wt 110 kg (242 lb 15 2 oz)   SpO2 93%   BMI 33 88 kg/m²   General appearance: alert and oriented, in no acute distress  Head: Normocephalic, without obvious abnormality, atraumatic  Chest wall: right sided chest wall tenderness  Extremities: Significant axillary tenderness with erythema and swelling and lateral chest wall swelling, no sensory or motor issues of the distal right upper extremity    Lab, Imaging and other studies:  I have personally reviewed pertinent lab results    , CBC:   Lab Results   Component Value Date    WBC 8 79 12/24/2022    HGB 14 1 12/24/2022    HCT 43 2 12/24/2022     (H) 12/24/2022     12/24/2022    MCH 32 8 12/24/2022    MCHC 32 6 12/24/2022    RDW 13 5 12/24/2022    MPV 10 1 12/24/2022   , CMP:   Lab Results   Component Value Date    SODIUM 140 12/24/2022    K 4 6 12/24/2022     12/24/2022    CO2 24 12/24/2022    BUN 25 12/24/2022    CREATININE 0 85 12/24/2022    CALCIUM 8 5 12/24/2022    EGFR 82 12/24/2022     VTE Pharmacologic Prophylaxis: Eliquis  VTE Mechanical Prophylaxis: sequential compression device

## 2022-12-24 NOTE — PLAN OF CARE
Problem: PAIN - ADULT  Goal: Verbalizes/displays adequate comfort level or baseline comfort level  Description: Interventions:  - Encourage patient to monitor pain and request assistance  - Assess pain using appropriate pain scale  - Administer analgesics based on type and severity of pain and evaluate response  - Implement non-pharmacological measures as appropriate and evaluate response  - Consider cultural and social influences on pain and pain management  - Notify physician/advanced practitioner if interventions unsuccessful or patient reports new pain  12/24/2022 0226 by Lucila Edmonds RN  Outcome: Progressing  12/24/2022 0225 by Lucila Edmonds RN  Outcome: Progressing     Problem: INFECTION - ADULT  Goal: Absence or prevention of progression during hospitalization  Description: INTERVENTIONS:  - Assess and monitor for signs and symptoms of infection  - Monitor lab/diagnostic results  - Monitor all insertion sites, i e  indwelling lines, tubes, and drains  - Monitor endotracheal if appropriate and nasal secretions for changes in amount and color  - [place] appropriate cooling/warming therapies per order  - Administer medications as ordered  - Instruct and encourage patient and family to use good hand hygiene technique  - Identify and instruct in appropriate isolation precautions for identified infection/condition  12/24/2022 0226 by Lucila Edmonds RN  Outcome: Progressing  12/24/2022 0225 by Lucila Edmonds RN  Outcome: Progressing     Problem: SAFETY ADULT  Goal: Patient will remain free of falls  Description: INTERVENTIONS:  - Educate patient/family on patient safety including physical limitations  - Instruct patient to call for assistance with activity   - Consult OT/PT to assist with strengthening/mobility   - Keep Call bell within reach  - Keep bed low and locked with side rails adjusted as appropriate  - Keep care items and personal belongings within reach  - Initiate and maintain comfort rounds  - Make Fall Risk Sign visible to staff  - Offer Toileting every 2 Hours, in advance of need  - Initiate/Maintain bed alarm  - Apply yellow socks and bracelet for high fall risk patients  - Consider moving patient to room near nurses station  12/24/2022 0226 by Avis Salinas RN  Outcome: Progressing  12/24/2022 0225 by Avis Salinas RN  Outcome: Progressing  Goal: Maintain or return to baseline ADL function  Description: INTERVENTIONS:  -  Assess patient's ability to carry out ADLs; assess patient's baseline for ADL function and identify physical deficits which impact ability to perform ADLs (bathing, care of mouth/teeth, toileting, grooming, dressing, etc )  - Assess/evaluate cause of self-care deficits   - Assess range of motion  - Assess patient's mobility; develop plan if impaired  - Assess patient's need for assistive devices and provide as appropriate  - Encourage maximum independence but intervene and supervise when necessary  - Involve family in performance of ADLs  - Assess for home care needs following discharge   - Consider OT consult to assist with ADL evaluation and planning for discharge  - Provide patient education as appropriate  12/24/2022 0226 by Avis Salinas RN  Outcome: Progressing  12/24/2022 0225 by Avis Salinas RN  Outcome: Progressing  Goal: Maintains/Returns to pre admission functional level  Description: INTERVENTIONS:  - Perform BMAT or MOVE assessment daily    - Set and communicate daily mobility goal to care team and patient/family/caregiver  - Collaborate with rehabilitation services on mobility goals if consulted  - Perform Range of Motion 2 times a day  - Reposition patient every 2 hours    - Dangle patient 2 times a day  - Stand patient 2 times a day  - Ambulate patient 2 times a day  - Out of bed to chair 2 times a day   - Out of bed for meals 2 times a day  - Out of bed for toileting  - Record patient progress and toleration of activity level   12/24/2022 0226 by Darvin Lord Morgan Gibbons RN  Outcome: Progressing  12/24/2022 0225 by Shaggy Blum RN  Outcome: Progressing     Problem: Knowledge Deficit  Goal: Patient/family/caregiver demonstrates understanding of disease process, treatment plan, medications, and discharge instructions  Description: Complete learning assessment and assess knowledge base    Interventions:  - Provide teaching at level of understanding  - Provide teaching via preferred learning methods  Outcome: Progressing     Problem: DISCHARGE PLANNING  Goal: Discharge to home or other facility with appropriate resources  Description: INTERVENTIONS:  - Identify barriers to discharge w/patient and caregiver  - Arrange for needed discharge resources and transportation as appropriate  - Identify discharge learning needs (meds, wound care, etc )  - Arrange for interpretive services to assist at discharge as needed  - Refer to Case Management Department for coordinating discharge planning if the patient needs post-hospital services based on physician/advanced practitioner order or complex needs related to functional status, cognitive ability, or social support system  Outcome: Progressing     Problem: Potential for Falls  Goal: Patient will remain free of falls  Description: INTERVENTIONS:  - Educate patient/family on patient safety including physical limitations  - Instruct patient to call for assistance with activity   - Consult OT/PT to assist with strengthening/mobility   - Keep Call bell within reach  - Keep bed low and locked with side rails adjusted as appropriate  - Keep care items and personal belongings within reach  - Initiate and maintain comfort rounds  - Make Fall Risk Sign visible to staff  - Offer Toileting every 2 Hours, in advance of need  - Initiate/Maintain bed alarm  - Apply yellow socks and bracelet for high fall risk patients  - Consider moving patient to room near nurses station  12/24/2022 0226 by Shaggy Blum RN  Outcome: Progressing  12/24/2022 0225 by Malick Canales RN  Outcome: Progressing

## 2022-12-24 NOTE — PROCEDURES
Incision and drain    Date/Time: 12/24/2022 11:12 AM  Performed by: Bernie Mchugh PA-C  Authorized by: Bernie Mchugh PA-C   Universal Protocol:  Consent: Verbal consent obtained  Written consent not obtained  Risks and benefits: risks, benefits and alternatives were discussed  Time out: Immediately prior to procedure a "time out" was called to verify the correct patient, procedure, equipment, support staff and site/side marked as required  Timeout called at: 12/24/2022 10:43 AM   Patient understanding: patient states understanding of the procedure being performed  Patient consent: the patient's understanding of the procedure matches consent given  Procedure consent: procedure consent matches procedure scheduled  Relevant documents: relevant documents present and verified  Test results: test results available and properly labeled  Site marked: the operative site was marked  Radiology Images displayed and confirmed  If images not available, report reviewed: imaging studies available  Required items: required blood products, implants, devices, and special equipment available  Patient identity confirmed: verbally with patient and arm band      Patient location:  Bedside  Location:     Type:  Cyst, hematoma and fluid collection    Location:  Upper extremity    Upper extremity location:  R shoulder  Pre-procedure details:     Skin preparation:  Betadine  Anesthesia (see MAR for exact dosages):      Anesthesia method:  Local infiltration    Local anesthetic:  Lidocaine 1% w/o epi  Procedure details:     Complexity:  Simple    Needle aspiration: no      Incision types:  Stab incision    Scalpel blade:  11    Approach:  Open    Incision depth:  Subcutaneous    Wound management:  Probed and deloculated and extensive cleaning    Drainage:  Bloody    Drainage amount:  Scant    Wound treatment:  Wound left open    Packing material: 1inch iodoform X2   Post-procedure details:     Patient tolerance of procedure: Tolerated well, no immediate complications  Comments:      I&D x2

## 2022-12-24 NOTE — PROGRESS NOTES
Nikki 47  Progress Note - Tr Romano 1940, [de-identified] y o  male MRN: [id number]  Unit/Bed#: [address] [place] [address] Encounter: [id number]  Primary Care Provider: Mauricio Ceja MD   Date and time admitted to hospital: 12/19/2022  1:01 AM    * Angioedema-resolved as of 12/21/2022  Assessment & Plan  Improved, airway maintained  Patient on mysoline, atorvastatin and losartan/Jardiance which could cause angioedema/ urticaria-on hold  · Rash was mostly on trunk and upper thigh and is very pruritic; has lip swelling, no tongue  · Given dose of 125 mg solumedrol, benadryl 25 mg IV bid, and pepcid 20 mg IV in ER but symptoms returned   · Received solumedrol, benadryl, pepcid   · Continue calamine, hydrocortisone   · Continue Singulair      Cellulitis  Assessment & Plan  Followed by GEN surg  Bedside aspiration was done previously and today bedside I&D done  · Currently on IV cefazolin  · No leukocytosis  · Ridging negative for abscess    Persistent atrial fibrillation ([address] )  Assessment & Plan  Heart rate is not well controlled  · Telemetry and placed   · Started on Cardizem drip and also received IV digoxin x1 overnight  · continue eliquis  Increased dose of Cardizem  · Cardio input appreciated    Severe sepsis [facility])  Assessment & Plan  Likely due to right lateral chest wall cellulitis, tachycardia- possibly secondary to underlying A  fib  · BCx-negative so far  Wound culture staph  · disContinued Rocephin, Vanco completed, currently on IV Ancef  · ID following, recs appreciated  · Imaging negative for abscess    GERD (gastroesophageal reflux disease)  Assessment & Plan  Continue famotidine 20 mg    Chronic diastolic heart failure (HCC)  Assessment & Plan  Wt Readings from Last 3 Encounters:   12/19/22 104 kg (228 lb 6 3 oz)   12/06/22 102 kg (224 lb)   11/28/22 102 kg (224 lb)     Appears somewhat volume overloaded today    20 mg of IV Lasix x1  · Daily weights  · Intake and output  · Continue atenolol Diabetic polyneuropathy associated with type 2 diabetes mellitus [facility])  Assessment & Plan  Lab Results   Component Value Date    HGBA1C 6 7 (H) 12/20/2022       Recent Labs     12/23/22  2103 12/24/22  0706 12/24/22  1109 12/24/22  1547   POCGLU 148* 137 172* 163*     -Continue to hold metformin and Jardiance (potential contributor to Derm reaction)  - Accuchecks AC/HS with insulin sliding scale     High serum lactic acid-resolved as of 12/21/2022  Assessment & Plan  Worsening,    · Possibly d/t sepsis vs OP medications  · Continue to trend and IVF  · ABG-pending    Encephalopathy-resolved as of 12/21/2022  Assessment & Plan  Possibly multifactorial, Sepsis vs Haldo vs lactic acidosis, hx of SYLVESTER    · ABG-unremarkable  · Neuro checks, maintain airway  · S/p haldo administration overnight-hold all neurotoxics  · CCU consulted overnight    Acute kidney injury (HCC)-resolved as of 12/23/2022  Assessment & Plan  Slowly improving, likely due to septic shock  · Cr 1 9  > 1 79 > 0 97  · Continue IVF  · Avoid nephrotoxic's    Septic shock (HCC)-resolved as of 12/22/2022  Assessment & Plan  resolved, Hemodynamic stable, still in sepsis    · Likely due to right lateral chest wall cellulitis with possible abscess  · Contiune Rocephin, Vanco completed  · IVF      VTE Pharmacologic Prophylaxis: VTE Score: 4 Moderate Risk (Score 3-4) - Pharmacological DVT Prophylaxis Ordered: apixaban (Eliquis)  Patient Centered Rounds: I performed bedside rounds with nursing staff today  Discussions with Specialists or Other Care Team Provider: yes - cardio    Education and Discussions with Family / Patient: Updated  (wife) via phone  Time Spent for Care: 35 min  More than 50% of total time spent on counseling and coordination of care as described above      Current Length of Stay: 4 day(s)  Current Patient Status: Inpatient   Certification Statement: The patient will continue to require additional inpatient hospital stay due to Sepsis due to cellulitis requiring IV antibiotics, A  fib with RVR requiring Cardizem drip  Discharge Plan: Anticipate discharge in 48-72 hrs to home  Code Status: Level 1 - Full Code    Subjective:     Patient reported pain along the left axillary area which has improved    Objective:     Vitals:   Temp (24hrs), Av 6 °F (36 4 °C), Min:97 °F (36 1 °C), Max:98 3 °F (36 8 °C)    Temp:  [97 °F (36 1 °C)-98 3 °F (36 8 °C)] 98 °F (36 7 °C)  HR:  [] 120  Resp:  [20] 20  BP: (126-157)/() 137/89  SpO2:  [91 %-100 %] 93 %  Body mass index is 33 88 kg/m²  Input and Output Summary (last 24 hours): Intake/Output Summary (Last 24 hours) at 2022 1114  Last data filed at 2022 0601  Gross per 24 hour   Intake 1230 ml   Output 1340 ml   Net -110 ml       Physical Exam:   Physical Exam  Vitals reviewed  Constitutional:       General: He is not in acute distress  Appearance: He is not toxic-appearing  HENT:      Head: Normocephalic and atraumatic  Eyes:      General:         Right eye: No discharge  Left eye: No discharge  Cardiovascular:      Rate and Rhythm: Tachycardia present  Rhythm irregular  Pulmonary:      Effort: Pulmonary effort is normal  No respiratory distress  Breath sounds: No wheezing or rales  Comments: Decreased breath sounds bilaterally  Abdominal:      General: Bowel sounds are normal  There is no distension  Palpations: Abdomen is soft  Tenderness: There is no abdominal tenderness  Musculoskeletal:      Right lower leg: Edema present  Left lower leg: Edema present  Skin:     Comments: Right chest wall with dressing in place which was not removed   Neurological:      Mental Status: He is alert            Additional Data:     Labs:  Results from last 7 days   Lab Units 22  0619 22  0550 22  0651   WBC Thousand/uL 8 79 11 09* 9 09   HEMOGLOBIN g/dL 14 1 14 2 12 8   HEMATOCRIT % 43 2 44 1 40 8 PLATELETS Thousands/uL 187 183 154   BANDS PCT %  --   --  42*   NEUTROS PCT %  --  88*  --    LYMPHS PCT %  --  6*  --    LYMPHO PCT %  --   --  5*   MONOS PCT %  --  3*  --    MONO PCT %  --   --  4   EOS PCT %  --  2 0     Results from last 7 days   Lab Units 12/24/22  0619 12/23/22  0550   SODIUM mmol/L 140 140   POTASSIUM mmol/L 4 6 4 7   CHLORIDE mmol/L 107 106   CO2 mmol/L 24 25   BUN mg/dL 25 31*   CREATININE mg/dL 0 85 0 97   ANION GAP mmol/L 9 9   CALCIUM mg/dL 8 5 8 6   ALBUMIN g/dL  --  2 5*   TOTAL BILIRUBIN mg/dL  --  0 48   ALK PHOS U/L  --  117*   ALT U/L  --  70   AST U/L  --  56*   GLUCOSE RANDOM mg/dL 142* 124         Results from last 7 days   Lab Units 12/24/22  1109 12/24/22  0706 12/23/22  2103 12/23/22  1552 12/23/22  1106 12/23/22  0712 12/22/22  2052 12/22/22  1609 12/22/22  1121 12/22/22  0658 12/21/22  2100 12/21/22  1558   POC GLUCOSE mg/dl 172* 137 148* 130 199* 132 150* 176* 206* 135 156* 195*     Results from last 7 days   Lab Units 12/20/22  0610   HEMOGLOBIN A1C % 6 7*     Results from last 7 days   Lab Units 12/21/22  0651 12/20/22  0913 12/20/22  0610 12/20/22  0302 12/19/22  2327   LACTIC ACID mmol/L 1 9 4 5* 3 8* 2 7* 5 0*   PROCALCITONIN ng/ml 26 86*  --  4 55*  --  0 35*       Lines/Drains:  Invasive Devices     Peripheral Intravenous Line  Duration           Peripheral IV 12/22/22 Dorsal (posterior); Right Forearm 1 day    Peripheral IV 12/23/22 Left Antecubital <1 day                  Telemetry:  Telemetry Orders (From admission, onward)             24 Hour Telemetry Monitoring  Continuous x 24 Hours (Telem)        References:    Telemetry Guidelines   Question:  Reason for 24 Hour Telemetry  Answer:  Drug Overdose known to cause arrythmia                 Telemetry Reviewed: A  fib with intermittent tachycardia  Indication for Continued Telemetry Use: Arrthymias requiring medical therapy             Imaging: Reviewed radiology reports from this admission including: chest CT scan    Recent Cultures (last 7 days):   Results from last 7 days   Lab Units 12/20/22  1539 12/19/22  2337 12/19/22  2328   BLOOD CULTURE   --  No Growth After 4 Days  No Growth After 4 Days     GRAM STAIN RESULT  1+ Polys*  2+ Gram positive cocci in pairs*  --   --    WOUND CULTURE  4+ Growth of Staphylococcus aureus*  --   --        Last 24 Hours Medication List:   Current Facility-Administered Medications   Medication Dose Route Frequency Provider Last Rate   • acetaminophen  650 mg Oral Q6H PRN Mónica Malone MD     • aluminum-magnesium hydroxide-simethicone  30 mL Oral Q6H PRN Mónica Malone MD     • apixaban  5 mg Oral BID Mónica Malone MD     • bisacodyl  10 mg Rectal Daily PRN Mónica Malone MD     • calamine-zinc oxide   Topical 4x Daily Mónica Malone MD     • carvedilol  12 5 mg Oral BID With Meals SIMI Granados     • cefazolin  2,000 mg Intravenous Q8H Sourav Martinez MD 2,000 mg (12/24/22 1024)   • diltiazem  15 mg/hr Intravenous Continuous SIMI Gonzales 15 mg/hr (12/24/22 0442)   • famotidine  20 mg Intravenous Q12H [facility] SIMI Contreras     • Fluticasone Furoate-Vilanterol  1 puff Inhalation Daily Mónica Malone MD      And   • umeclidinium  1 puff Inhalation Daily Mónica Malone MD     • folic acid  1 mg Oral Daily Mónica Malone MD     • guaiFENesin  600 mg Oral Q12H [facility] Haim Sen MD     • hydrALAZINE  5 mg Intravenous Q6H PRN Jael Sharma PA-C     • hydrocortisone   Topical BID Mónica Malone MD     • hydrOXYzine HCL  25 mg Oral Q6H PRN SIMI Gonzales     • insulin lispro  1-6 Units Subcutaneous TID AC SIMI Broussard     • insulin lispro  1-6 Units Subcutaneous HS SIMI Broussard     • latanoprost  1 drop Both Eyes HS Mónica Malone MD     • levalbuterol  0 63 mg Nebulization Q4H PRN SIMI Gonzales     • melatonin  3 mg Oral HS SIMI Gonzales     • metoprolol  2 5 mg Intravenous Q6H PRN SIMI Gonzales     • montelukast  5 mg Oral HS Mónica Malone MD     • ondansetron  4 mg Intravenous Q6H PRN Germain Fuentes MD     • polyethylene glycol  17 g Oral Daily Vick Bates MD     • saccharomyces boulardii  250 mg Oral BID SIMI Gonzales     • simethicone  80 mg Oral 4x Daily PRN Germain Fuentes MD          Today, Patient Was Seen By: Yoli Sanchez DO    **Please Note: This note may have been constructed using a voice recognition system  **

## 2022-12-24 NOTE — QUICK NOTE
Placed patient on Cardizem drip, initially 8 mg/hr, then increased to 15mg/hr,HR slightly improved  Given digoxin 250mcg IV push x1 as well  Heart rate ranging 80 - 120s currently per nursing  Nursing reports severe edema to extremities  Will order repeat proBNP  BMP pending  Patient  may benefit from diuretic

## 2022-12-24 NOTE — PLAN OF CARE
Problem: RESPIRATORY - ADULT  Goal: Achieves optimal ventilation and oxygenation  Description: INTERVENTIONS:  - Assess for changes in respiratory status  - Assess for changes in mentation and behavior  - Position to facilitate oxygenation and minimize respiratory effort  - Oxygen administered by appropriate delivery if ordered  - Initiate smoking cessation education as indicated  - Encourage broncho-pulmonary hygiene including cough, deep breathe, Incentive Spirometry  - Assess the need for suctioning and aspirate as needed  - Assess and instruct to report SOB or any respiratory difficulty  - Respiratory Therapy support as indicated  Outcome: Progressing     Problem: Prexisting or High Potential for Compromised Skin Integrity  Goal: Skin integrity is maintained or improved  Description: INTERVENTIONS:  - Identify patients at risk for skin breakdown  - Assess and monitor skin integrity  - Assess and monitor nutrition and hydration status  - Monitor labs   - Assess for incontinence   - Turn and reposition patient  - Assist with mobility/ambulation  - Relieve pressure over bony prominences  - Avoid friction and shearing  - Provide appropriate hygiene as needed including keeping skin clean and dry  - Evaluate need for skin moisturizer/barrier cream  - Collaborate with interdisciplinary team   - Patient/family teaching  - Consider wound care consult   Outcome: Progressing     Problem: MOBILITY - ADULT  Goal: Maintain or return to baseline ADL function  Description: INTERVENTIONS:  -  Assess patient's ability to carry out ADLs; assess patient's baseline for ADL function and identify physical deficits which impact ability to perform ADLs (bathing, care of mouth/teeth, toileting, grooming, dressing, etc )  - Assess/evaluate cause of self-care deficits   - Assess range of motion  - Assess patient's mobility; develop plan if impaired  - Assess patient's need for assistive devices and provide as appropriate  - Encourage maximum independence but intervene and supervise when necessary  - Involve family in performance of ADLs  - Assess for home care needs following discharge   - Consider OT consult to assist with ADL evaluation and planning for discharge  - Provide patient education as appropriate  Outcome: Progressing     Problem: INFECTION - ADULT  Goal: Absence or prevention of progression during hospitalization  Description: INTERVENTIONS:  - Assess and monitor for signs and symptoms of infection  - Monitor lab/diagnostic results  - Monitor all insertion sites, i e  indwelling lines, tubes, and drains  - Monitor endotracheal if appropriate and nasal secretions for changes in amount and color  - [place] appropriate cooling/warming therapies per order  - Administer medications as ordered  - Instruct and encourage patient and family to use good hand hygiene technique  - Identify and instruct in appropriate isolation precautions for identified infection/condition  Outcome: Progressing

## 2022-12-25 LAB
ANION GAP SERPL CALCULATED.3IONS-SCNC: 10 MMOL/L (ref 4–13)
ATRIAL RATE: 90 BPM
BACTERIA BLD CULT: NORMAL
BACTERIA BLD CULT: NORMAL
BUN SERPL-MCNC: 19 MG/DL (ref 5–25)
CALCIUM SERPL-MCNC: 8.3 MG/DL (ref 8.3–10.1)
CHLORIDE SERPL-SCNC: 103 MMOL/L (ref 96–108)
CO2 SERPL-SCNC: 25 MMOL/L (ref 21–32)
CREAT SERPL-MCNC: 0.73 MG/DL (ref 0.6–1.3)
GFR SERPL CREATININE-BSD FRML MDRD: 87 ML/MIN/1.73SQ M
GLUCOSE SERPL-MCNC: 146 MG/DL (ref 65–140)
GLUCOSE SERPL-MCNC: 147 MG/DL (ref 65–140)
GLUCOSE SERPL-MCNC: 150 MG/DL (ref 65–140)
GLUCOSE SERPL-MCNC: 208 MG/DL (ref 65–140)
GLUCOSE SERPL-MCNC: 259 MG/DL (ref 65–140)
POTASSIUM SERPL-SCNC: 3.7 MMOL/L (ref 3.5–5.3)
QRS AXIS: -7 DEGREES
QRSD INTERVAL: 102 MS
QT INTERVAL: 304 MS
QTC INTERVAL: 424 MS
SODIUM SERPL-SCNC: 138 MMOL/L (ref 135–147)
T WAVE AXIS: 55 DEGREES
VENTRICULAR RATE: 117 BPM

## 2022-12-25 RX ORDER — FUROSEMIDE 10 MG/ML
20 INJECTION INTRAMUSCULAR; INTRAVENOUS ONCE
Status: DISCONTINUED | OUTPATIENT
Start: 2022-12-25 | End: 2022-12-25

## 2022-12-25 RX ORDER — FUROSEMIDE 10 MG/ML
20 INJECTION INTRAMUSCULAR; INTRAVENOUS DAILY
Status: DISCONTINUED | OUTPATIENT
Start: 2022-12-25 | End: 2022-12-26

## 2022-12-25 RX ORDER — DIGOXIN 0.25 MG/ML
125 INJECTION INTRAMUSCULAR; INTRAVENOUS ONCE
Status: COMPLETED | OUTPATIENT
Start: 2022-12-25 | End: 2022-12-25

## 2022-12-25 RX ORDER — DIGOXIN 0.25 MG/ML
250 INJECTION INTRAMUSCULAR; INTRAVENOUS ONCE
Status: COMPLETED | OUTPATIENT
Start: 2022-12-25 | End: 2022-12-25

## 2022-12-25 RX ADMIN — APIXABAN 5 MG: 5 TABLET, FILM COATED ORAL at 08:03

## 2022-12-25 RX ADMIN — DILTIAZEM HYDROCHLORIDE 15 MG/HR: 5 INJECTION, SOLUTION INTRAVENOUS at 17:04

## 2022-12-25 RX ADMIN — CEFAZOLIN SODIUM 2000 MG: 2 SOLUTION INTRAVENOUS at 09:09

## 2022-12-25 RX ADMIN — Medication 3 MG: at 22:23

## 2022-12-25 RX ADMIN — CARVEDILOL 25 MG: 25 TABLET, FILM COATED ORAL at 17:04

## 2022-12-25 RX ADMIN — GUAIFENESIN 600 MG: 600 TABLET, EXTENDED RELEASE ORAL at 21:10

## 2022-12-25 RX ADMIN — HYDRALAZINE HYDROCHLORIDE 25 MG: 25 TABLET ORAL at 08:03

## 2022-12-25 RX ADMIN — FOLIC ACID 1 MG: 1 TABLET ORAL at 08:04

## 2022-12-25 RX ADMIN — INSULIN LISPRO 2 UNITS: 100 INJECTION, SOLUTION INTRAVENOUS; SUBCUTANEOUS at 21:12

## 2022-12-25 RX ADMIN — GUAIFENESIN 600 MG: 600 TABLET, EXTENDED RELEASE ORAL at 08:03

## 2022-12-25 RX ADMIN — FLUTICASONE FUROATE AND VILANTEROL TRIFENATATE 1 PUFF: 100; 25 POWDER RESPIRATORY (INHALATION) at 08:04

## 2022-12-25 RX ADMIN — APIXABAN 5 MG: 5 TABLET, FILM COATED ORAL at 17:04

## 2022-12-25 RX ADMIN — FUROSEMIDE 20 MG: 10 INJECTION, SOLUTION INTRAMUSCULAR; INTRAVENOUS at 08:08

## 2022-12-25 RX ADMIN — HYDRALAZINE HYDROCHLORIDE 25 MG: 25 TABLET ORAL at 21:11

## 2022-12-25 RX ADMIN — LATANOPROST 1 DROP: 50 SOLUTION OPHTHALMIC at 21:17

## 2022-12-25 RX ADMIN — FAMOTIDINE 20 MG: 10 INJECTION, SOLUTION INTRAVENOUS at 21:11

## 2022-12-25 RX ADMIN — MONTELUKAST 5 MG: 10 TABLET, FILM COATED ORAL at 21:10

## 2022-12-25 RX ADMIN — INSULIN LISPRO 3 UNITS: 100 INJECTION, SOLUTION INTRAVENOUS; SUBCUTANEOUS at 11:22

## 2022-12-25 RX ADMIN — CARVEDILOL 25 MG: 25 TABLET, FILM COATED ORAL at 07:58

## 2022-12-25 RX ADMIN — ACETAMINOPHEN 650 MG: 325 TABLET, FILM COATED ORAL at 21:10

## 2022-12-25 RX ADMIN — DIGOXIN 250 MCG: 0.25 INJECTION INTRAMUSCULAR; INTRAVENOUS at 03:21

## 2022-12-25 RX ADMIN — UMECLIDINIUM 1 PUFF: 62.5 AEROSOL, POWDER ORAL at 08:04

## 2022-12-25 RX ADMIN — Medication 250 MG: at 08:03

## 2022-12-25 RX ADMIN — DILTIAZEM HYDROCHLORIDE 15 MG/HR: 5 INJECTION, SOLUTION INTRAVENOUS at 07:57

## 2022-12-25 RX ADMIN — DIGOXIN 125 MCG: 0.25 INJECTION INTRAMUSCULAR; INTRAVENOUS at 07:58

## 2022-12-25 RX ADMIN — FAMOTIDINE 20 MG: 10 INJECTION, SOLUTION INTRAVENOUS at 08:03

## 2022-12-25 RX ADMIN — Medication 250 MG: at 17:04

## 2022-12-25 RX ADMIN — CEFAZOLIN SODIUM 2000 MG: 2 SOLUTION INTRAVENOUS at 02:49

## 2022-12-25 RX ADMIN — CEFAZOLIN SODIUM 2000 MG: 2 SOLUTION INTRAVENOUS at 17:04

## 2022-12-25 NOTE — ASSESSMENT & PLAN NOTE
Likely due to right lateral chest wall cellulitis, tachycardia- possibly secondary to underlying A  fib  · BCx-negative so far    Wound culture - staph  · Off Rocephin, Vanco  currently on IV Ancef  · ID following, recs appreciated  · Imaging negative for abscess

## 2022-12-25 NOTE — PROGRESS NOTES
Progress Note - Cardiology   [facility]     Tr Romano [de-identified] y o  male MRN: [id number]  : 1940  Unit/Bed#: [address] [place] [address] Encounter: [id number]    Assessment and Plan:   1   Right axilla cellulitis/sepsis: Patient followed by infectious disease and surgery    -   Wound culture grew out staph aureus    -   Patient on Ancef 2 g every 8 hours IV     2   Hypertension:  Blood pressure slowly improving with addition of hydralazine we will continue to monitor     -   Continue Coreg to 25 mg twice daily    -   Patient is currently on Cardizem drip (had been on Norvasc)    -   Continue hydralazine 25 mg twice daily    -   Continue to monitor     3   Chronic atrial fibrillation with rapid ventricular response: Rates poorly controlled since admission to the hospital question underlying sepsis as cause    -   Patient loaded with digoxin early a m   with digoxin 0 25 mg IV x2 with improvement in heart rate    -   We will give another dose of digoxin 0 125 mg x 1 this morning and continue to monitor telemetry    -   Continue Cardizem drip at 15 mg/h    -   Continue Eliquis 5 mg twice daily    -   Continue to monitor telemetry     4   Chronic diastolic heart failure: Patient is receiving high levels of fluids and appears volume overloaded    -   Patient received Lasix 20 mg IV x1 on 2022 with a very good response    -   We will start Lasix 20 mg IV daily with close monitoring of I&O and weights    -   Other medication as above    -   ACE/ARB and now listed as allergies secondary to history of angioedema    -   Monitor I&O, daily weights and labs     5   Diabetes: Hemoglobin A1c 7 1 managed per primary team     6   Chronic kidney disease: Baseline creatinine appears to be less than 1     7   Obesity: BMI 33 8     8   Left lower lobe lung CA status postradiation therapy    Subjective / Objective:   Seen and examined    Heart rate still uncontrolled despite being on maximal dose of Cardizem and Coreg  Patient was given digoxin 0 25 mg IV x2 last evening with slight improvement in heart rate  We will repeat digoxin 0 125 mg IV today and continue to monitor telemetry  Patient diuresed approximately 2 L after receiving Lasix yesterday  Still appears to be volume overloaded with +1 edema of lower extremities  Will continue IV Lasix 20 mg today  Vitals: Blood pressure 140/81, pulse 81, temperature 98 °F (36 7 °C), resp  rate 20, height 5' 11" (1 803 m), weight 109 kg (240 lb 8 oz), SpO2 92 %  Vitals:    12/24/22 1224 12/25/22 0643   Weight: 110 kg (242 lb 8 1 oz) 109 kg (240 lb 8 oz)     Body mass index is 33 54 kg/m²  BP Readings from Last 3 Encounters:   12/25/22 140/81   12/06/22 140/80   11/28/22 140/80     Orthostatic Blood Pressures    Flowsheet Row Most Recent Value   Blood Pressure 140/81 filed at 12/25/2022 0715   Patient Position - Orthostatic VS Lying filed at 12/25/2022 0318        I/O       12/23 0701  12/24 0700 12/24 0701  12/25 0700 12/25 0701  12/26 0700    P  O  240      I V  (mL/kg) 990 (9)      IV Piggyback  50     Total Intake(mL/kg) 1230 (11 2) 50 (0 5)     Urine (mL/kg/hr) 1340 (0 5) 2770 (1 1)     Stool 0      Total Output 1340 2770     Net -110 -2720            Unmeasured Stool Occurrence 1 x          Invasive Devices     Peripheral Intravenous Line  Duration           Peripheral IV 12/22/22 Dorsal (posterior); Right Forearm 2 days    Peripheral IV 12/23/22 Left Antecubital 1 day                  Intake/Output Summary (Last 24 hours) at 12/25/2022 0750  Last data filed at 12/25/2022 0249  Gross per 24 hour   Intake 50 ml   Output 2770 ml   Net -2720 ml         Physical Exam:   Physical Exam  Vitals and nursing note reviewed  Constitutional:       Appearance: Normal appearance  He is morbidly obese  HENT:      Right Ear: External ear normal       Left Ear: External ear normal    Eyes:      General: No scleral icterus  Right eye: No discharge           Left eye: No discharge  Cardiovascular:      Rate and Rhythm: Tachycardia present  Rhythm irregularly irregular  Pulses: Normal pulses  Pulmonary:      Effort: Pulmonary effort is normal  No accessory muscle usage or respiratory distress  Breath sounds: Examination of the right-lower field reveals decreased breath sounds and rales  Examination of the left-lower field reveals decreased breath sounds and rales  Decreased breath sounds and rales present  Abdominal:      General: Bowel sounds are normal       Palpations: Abdomen is soft  Musculoskeletal:      Right lower le+ Edema present  Left lower leg: Edema present  Skin:     General: Skin is warm and dry  Capillary Refill: Capillary refill takes less than 2 seconds  Neurological:      General: No focal deficit present  Mental Status: He is alert and oriented to person, place, and time  Mental status is at baseline  Psychiatric:         Mood and Affect: Mood normal          Behavior: Behavior is cooperative              Medications/ Allergies:     Current Facility-Administered Medications   Medication Dose Route Frequency Provider Last Rate   • acetaminophen  650 mg Oral Q6H PRN Ronnie Laurent MD     • aluminum-magnesium hydroxide-simethicone  30 mL Oral Q6H PRN Ronnie Laurent MD     • apixaban  5 mg Oral BID Ronnie Laurent MD     • bisacodyl  10 mg Rectal Daily PRN Ronnie Laurent MD     • calamine-zinc oxide   Topical 4x Daily Ronnie Laurent MD     • carvedilol  25 mg Oral BID With Meals SIMI Shen     • cefazolin  2,000 mg Intravenous Q8H Germaine Alcala MD 2,000 mg (22 0249)   • digoxin  125 mcg Intravenous Once SIMI Shen     • diltiazem  15 mg/hr Intravenous Continuous SIMI Gonzales 15 mg/hr (22 4837)   • famotidine  20 mg Intravenous Q12H [facility] SIMI Escobar     • Fluticasone Furoate-Vilanterol  1 puff Inhalation Daily Ronnie Laurent MD      And   • umeclidinium  1 puff Inhalation Daily Ronnie Laurent MD     • folic acid  1 mg Oral Daily Josefina Pelaez MD     • guaiFENesin  600 mg Oral Q12H [facility] Nai Martinez MD     • hydrALAZINE  5 mg Intravenous Q6H PRN Kassidy Ojeda PA-C     • hydrALAZINE  25 mg Oral Q12H SIMI Marrufo     • hydrocortisone   Topical BID Josefina Pelaez MD     • hydrOXYzine HCL  25 mg Oral Q6H PRN SIMI Gonzales     • insulin lispro  1-6 Units Subcutaneous TID AC SIMI Galindo     • insulin lispro  1-6 Units Subcutaneous HS SIMI Galindo     • latanoprost  1 drop Both Eyes HS Josefina Pelaez MD     • levalbuterol  0 63 mg Nebulization Q4H PRN SIMI Gonzales     • melatonin  3 mg Oral HS SIMI Gonzales     • metoprolol  2 5 mg Intravenous Q6H PRN SIMI Gonzales     • montelukast  5 mg Oral HS Josefina Pelaez MD     • ondansetron  4 mg Intravenous Q6H PRN Josefina Pelaez MD     • polyethylene glycol  17 g Oral Daily Nai Martinez MD     • saccharomyces boulardii  250 mg Oral BID SIMI Gonzales     • simethicone  80 mg Oral 4x Daily PRN Josefina Pelaez MD       acetaminophen, 650 mg, Q6H PRN  aluminum-magnesium hydroxide-simethicone, 30 mL, Q6H PRN  bisacodyl, 10 mg, Daily PRN  hydrALAZINE, 5 mg, Q6H PRN  hydrOXYzine HCL, 25 mg, Q6H PRN  levalbuterol, 0 63 mg, Q4H PRN  metoprolol, 2 5 mg, Q6H PRN  ondansetron, 4 mg, Q6H PRN  simethicone, 80 mg, 4x Daily PRN      No Known Allergies    VTE Pharmacologic Prophylaxis:   Sequential compression device (Venodyne)  and Eliquis    Labs:   CBC with diff:  Results from last 7 days   Lab Units 12/24/22  0619 12/23/22  0550 12/21/22  0651 12/20/22  0610 12/19/22  2327 12/19/22  0129   WBC Thousand/uL 8 79 11 09* 9 09 6 34 7 67 9 72   HEMOGLOBIN g/dL 14 1 14 2 12 8 17 1* 16 5 17 2*   HEMATOCRIT % 43 2 44 1 40 8 52 6* 51 7* 52 9*   MCV fL 101* 102* 104* 102* 103* 101*   PLATELETS Thousands/uL 187 183 154 252 273 255   MCH pg 32 8 32 7 32 7 33 1 32 8 32 8   MCHC g/dL 32 6 32 2 31 6 32 5 31 9 32 5   RDW % 13 5 13 6 13 5 13 2 13 1 13 2   MPV fL 10 1 10 0 10 2 10 0 9 9 9 6   NRBC AUTO /100 WBCs  --  1  --   --   --  0     CMP:  Results from last 7 days   Lab Units 12/25/22  0535 12/24/22  0619 12/23/22  0550 12/21/22  0651 12/20/22  0610 12/19/22  2327 12/19/22  0129   SODIUM mmol/L 138 140 140 139 138 138 138   POTASSIUM mmol/L 3 7 4 6 4 7 4 4 4 9 5 6* 4 4   CHLORIDE mmol/L 103 107 106 108 101 100 101   CO2 mmol/L 25 24 25 23 24 17* 27   ANION GAP mmol/L 10 9 9 8 13 21* 10   BUN mg/dL 19 25 31* 44* 33* 33* 15   CREATININE mg/dL 0 73 0 85 0 97 1 79* 1 90* 1 95* 0 91   GLUCOSE FASTING mg/dL  --   --   --   --  149*  --   --    CALCIUM mg/dL 8 3 8 5 8 6 6 9* 8 0* 9 0 8 9   AST U/L  --   --  56* 23  --  29 20   ALT U/L  --   --  70 19  --  17 17   ALK PHOS U/L  --   --  117* 40*  --  76 91   TOTAL PROTEIN g/dL  --   --  6 6 5 2*  --  6 7 7 1   ALBUMIN g/dL  --   --  2 5* 2 2*  --  3 2* 3 1*   TOTAL BILIRUBIN mg/dL  --   --  0 48 0 53  --  0 94 0 40   EGFR ml/min/1 73sq m 87 82 73 35 32 31 79     Magnesium:  Results from last 7 days   Lab Units 12/24/22  0619 12/23/22  0550 12/21/22  0651 12/19/22  2327   MAGNESIUM mg/dL 1 8 2 2 1 9 1 6     Hgb A1c:  Results from last 7 days   Lab Units 12/20/22  0610   HEMOGLOBIN A1C % 6 7*     NT-proBNP:   Recent Labs     12/24/22  0619   NTBNP 8,163*        Imaging & Testing   I have personally reviewed pertinent reports  XR chest portable    Result Date: 12/20/2022  Narrative: CHEST INDICATION:   SOB,cough  Lung cancer  COMPARISON:  5/4/2022   10/21/2022, CT scan  EXAM PERFORMED/VIEWS:  XR CHEST PORTABLE  AP semierect FINDINGS: Heart shadow is obscured by adjacent opacity  Increasing opacity at the left base which, when correlated with the CT scan, represents enlargement of the known mass  Probable associated pleural effusion  Left upper lung and right lung remain clear  No pneumothorax apparent  No acute osseous abnormalities       Impression: Increasing pleuroparenchymal opacity at the left base consistent with enlargement of the known mass and probably associated effusion/atelectasis  Workstation performed: [id number]     CT chest w contrast    Result Date: 12/23/2022  Narrative: CT CHEST WITH IV CONTRAST INDICATION:   Soft tissue mass, chest, US/xray nondiagnostic Soft tissue rule out cellulitis, abscess, fasciitis of right chest wall and axilla  COMPARISON:  Ultrasound from earlier today of the right axilla and CT scan of the chest from 10/21/2022  TECHNIQUE: CT examination of the chest was performed  Axial, sagittal, and coronal 2D reformatted images were created from the source data and submitted for interpretation  Radiation dose length product (DLP) for this visit:  747 62 mGy-cm   This examination, like all CT scans performed in the [facility], was performed utilizing techniques to minimize radiation dose exposure, including the use of iterative  reconstruction and automated exposure control  IV Contrast:  85 mL of iohexol (OMNIPAQUE) FINDINGS: LUNGS: Stable background emphysema  Increasing left lower lobe consolidation due to increasing left pleural effusion  The previously noted rounded masslike area is more difficult to discern but a slightly hypodense area noted on image 2/42 measuring 3 6 cm is approximately the same as prior  Stable juxtapleural reticular fibrotic changes mostly in the right lung base nonspecific  PLEURA:  Increasing left pleural effusion  HEART/GREAT VESSELS: Heart is unremarkable for patient's age  No thoracic aortic aneurysm  MEDIASTINUM AND ANDRE:  Unremarkable  CHEST WALL AND LOWER NECK:  There is skin thickening and subcutaneous edematous changes in the right axillary region  No focal fluid collection or mass identified  VISUALIZED STRUCTURES IN THE UPPER ABDOMEN:  Stable gallstones without surrounding inflammation  Postsurgical changes of the stomach  OSSEOUS STRUCTURES:  No acute fracture or destructive osseous lesion       Impression: Skin thickening and subcutaneous edematous changes in the right axillary region  No focal fluid collection or mass identified  Findings most consistent with cellulitis  Stable background emphysema  Increasing left lower lobe consolidation due to increasing left pleural effusion  The previously noted rounded mass in the left lower lobe is more difficult to discern on today's exam due to the increased effusion and atelectasis, but a slightly hypodense area noted on image 2/42 measuring 3 6 cm is approximately the same as prior  Stable gallstones without surrounding inflammation  Workstation performed: [id number]     Echo complete w/ contrast if indicated    Result Date: 12/24/2022  Narrative: •  Left Ventricle: Left ventricular cavity size is normal  Wall thickness is mildly increased  There is mild concentric hypertrophy  The left ventricular ejection fraction is 50% by visual estimation  Systolic function is low normal  Although no diagnostic regional wall motion abnormality was identified, this possibility cannot be completely excluded on the basis of this study  •  IVS: There is abnormal septal motion consistent with post-operative status  •  Right Ventricle: Wall thickness is mildly increased  •  Left Atrium: The atrium is moderately dilated  •  Right Atrium: The atrium is mildly dilated  •  Mitral Valve: There is mild annular calcification  There is mild regurgitation  •  Tricuspid Valve: There is mild regurgitation  Pulmonary artery pressure around 30 mmHg  •  Pericardium: There is a left pleural effusion  •  Hard to accurately assess due to atrial fibrillation EF but appears to be low normal on 50%  No other significant changes from old echo        US axilla    Result Date: 12/20/2022  Narrative: AXILLA ULTRASOUND INDICATION:   observe for fluid collection required drainage   COMPARISON:  None TECHNIQUE:   Real-time ultrasound of the right axilla was performed with a linear transducer with both volumetric sweeps and still imaging techniques  FINDINGS:  No discrete fluid collection is identified in the right axilla  Mild skin thickening and soft tissue edema is noted  Impression: No discrete fluid collection is identified in the right axilla  Workstation performed: [id number]        EKG / Monitor: Personally reviewed  Telemetry reviewed, patient atrial fibrillation with uncontrolled ventricular response  Was loaded with digoxin 0 25 mg x 2 last evening  Heart rate slowly improving  [facility]      "This note was completed in part utilizing [employer] direct voice recognition software  Grammatical errors, random word insertion, spelling mistakes, and incomplete sentences may be an occasional consequence of the system secondary to software limitations, ambient noise and hardware issues  Please read the chart carefully and recognize, using context, where substitutions have occurred    If you have any questions or concerns about the context, text or information contained within the body of this dictation, please contact myself, the provider, for further clarification "

## 2022-12-25 NOTE — PROGRESS NOTES
Krystle U  68   Progress Note - Shabbir Erazo 1940, [de-identified] y o  male MRN: [id number]  Unit/Bed#: [address] [place] [address] Encounter: [id number]  Primary Care Provider: Maksim Gray MD   Date and time admitted to hospital: 12/19/2022  1:01 AM    * Angioedema-resolved as of 12/21/2022  Assessment & Plan  Improved, airway maintained  Patient on mysoline, atorvastatin and losartan/Jardiance which could cause angioedema/ urticaria-on hold  · Rash was mostly on trunk and upper thigh and is very pruritic; has lip swelling, no tongue  · Given dose of 125 mg solumedrol, benadryl 25 mg IV bid, and pepcid 20 mg IV in ER but symptoms returned   · Received solumedrol, benadryl, pepcid   · Continue calamine, hydrocortisone   · Continue Singulair      Cellulitis  Assessment & Plan  Followed by GEN surg  Bedside aspiration was done previously and bedside I&D done on 12/24  · Continue IV cefazolin  · No leukocytosis  · Imaging negative for abscess    Persistent atrial fibrillation (HCC)  Assessment & Plan  Heart rate is not well controlled  · Telemetry placed   · Started on Cardizem drip and also receiving doses of IV digoxin  · continue eliquis  Increased dose of coreg  · Cardio input appreciated    Severe sepsis [facility])  Assessment & Plan  Likely due to right lateral chest wall cellulitis, tachycardia- possibly secondary to underlying A  fib  · BCx-negative so far  Wound culture - staph  · Off Rocephin, Vanco  currently on IV Ancef  · ID following, recs appreciated  · Imaging negative for abscess    GERD (gastroesophageal reflux disease)  Assessment & Plan  Continue famotidine 20 mg  Chronic diastolic heart failure [facility])  Assessment & Plan  Wt Readings from Last 3 Encounters:   12/25/22 109 kg (240 lb 8 oz)   12/06/22 102 kg (224 lb)   11/28/22 102 kg (224 lb)     Appears somewhat volume overloade    20 mg of IV Lasix x1 given on 12/24 and has been started on 20 mg of IV lasix daily  · Daily weights  · Intake and output  · Continue atenolol     Diabetic polyneuropathy associated with type 2 diabetes mellitus [facility])  Assessment & Plan  Lab Results   Component Value Date    HGBA1C 6 7 (H) 12/20/2022       Recent Labs     12/24/22  1547 12/24/22  2119 12/25/22  0709 12/25/22  1057   POCGLU 163* 158* 147* 259*     -Continue to hold metformin and Jardiance (potential contributor to Derm reaction)  - Accuchecks AC/HS with insulin sliding scale     High serum lactic acid-resolved as of 12/21/2022  Assessment & Plan  Worsening,    · Possibly d/t sepsis vs OP medications  · Continue to trend and IVF  · ABG-pending    Encephalopathy-resolved as of 12/21/2022  Assessment & Plan  Possibly multifactorial, Sepsis vs Haldo vs lactic acidosis, hx of SYLVESTER    · ABG-unremarkable  · Neuro checks, maintain airway  · S/p haldo administration overnight-hold all neurotoxics  · CCU consulted overnight    Acute kidney injury (HCC)-resolved as of 12/23/2022  Assessment & Plan  Slowly improving, likely due to septic shock  · Cr 1 9  > 1 79 > 0 97  · Continue IVF  · Avoid nephrotoxic's    Septic shock (HCC)-resolved as of 12/22/2022  Assessment & Plan  resolved, Hemodynamic stable, still in sepsis    · Likely due to right lateral chest wall cellulitis with possible abscess  · Contiune Rocephin, Vanco completed  · IVF      VTE Pharmacologic Prophylaxis: VTE Score: 4 Moderate Risk (Score 3-4) - Pharmacological DVT Prophylaxis Ordered: apixaban (Eliquis)  Patient Centered Rounds: I performed bedside rounds with nursing staff today  Discussions with Specialists or Other Care Team Provider: yes     Education and Discussions with Family / Patient: Updated  (wife) at bedside  Time Spent for Care: 35 min  More than 50% of total time spent on counseling and coordination of care as described above      Current Length of Stay: 5 day(s)  Current Patient Status: Inpatient   Certification Statement: The patient will continue to require additional inpatient hospital stay due to A  fib with RVR, right chest wall cellulitis  Discharge Plan: Anticipate discharge in 48-72 hrs to home  Code Status: Level 1 - Full Code    Subjective:     Patient denies any pain along the right axillary region  Hoping to be getting home soon    Objective:     Vitals:   Temp (24hrs), Av 6 °F (36 4 °C), Min:97 °F (36 1 °C), Max:98 °F (36 7 °C)    Temp:  [97 °F (36 1 °C)-98 °F (36 7 °C)] 98 °F (36 7 °C)  HR:  [] 81  Resp:  [20] 20  BP: (137-162)/(81-99) 140/81  SpO2:  [92 %-95 %] 92 %  Body mass index is 33 54 kg/m²  Input and Output Summary (last 24 hours): Intake/Output Summary (Last 24 hours) at 2022 0917  Last data filed at 2022 0249  Gross per 24 hour   Intake 50 ml   Output 2770 ml   Net -2720 ml       Physical Exam:   Physical Exam  Vitals reviewed  Constitutional:       General: He is not in acute distress  Appearance: He is not toxic-appearing  HENT:      Head: Normocephalic and atraumatic  Eyes:      General: No scleral icterus  Cardiovascular:      Rate and Rhythm: Tachycardia present  Rhythm irregular  Pulmonary:      Effort: Pulmonary effort is normal  No respiratory distress  Breath sounds: No wheezing or rales  Comments: Decreased breath sounds bilaterally  Abdominal:      General: Bowel sounds are normal  There is no distension  Palpations: Abdomen is soft  Tenderness: There is no abdominal tenderness  Musculoskeletal:      Right lower leg: Edema present  Left lower leg: Edema present  Skin:     Comments: Right chest wall/axillary region with dressing in place   Neurological:      Mental Status: He is alert           Additional Data:     Labs:  Results from last 7 days   Lab Units 22  0619 22  0550 22  0651   WBC Thousand/uL 8 79 11 09* 9 09   HEMOGLOBIN g/dL 14 1 14 2 12 8   HEMATOCRIT % 43 2 44 1 40 8   PLATELETS Thousands/uL 187 183 154   BANDS PCT %  --   --  42*   NEUTROS PCT %  --  88*  --    LYMPHS PCT %  --  6*  --    LYMPHO PCT %  --   --  5*   MONOS PCT %  --  3*  --    MONO PCT %  --   --  4   EOS PCT %  --  2 0     Results from last 7 days   Lab Units 12/25/22  0535 12/24/22  0619 12/23/22  0550   SODIUM mmol/L 138   < > 140   POTASSIUM mmol/L 3 7   < > 4 7   CHLORIDE mmol/L 103   < > 106   CO2 mmol/L 25   < > 25   BUN mg/dL 19   < > 31*   CREATININE mg/dL 0 73   < > 0 97   ANION GAP mmol/L 10   < > 9   CALCIUM mg/dL 8 3   < > 8 6   ALBUMIN g/dL  --   --  2 5*   TOTAL BILIRUBIN mg/dL  --   --  0 48   ALK PHOS U/L  --   --  117*   ALT U/L  --   --  70   AST U/L  --   --  56*   GLUCOSE RANDOM mg/dL 150*   < > 124    < > = values in this interval not displayed  Results from last 7 days   Lab Units 12/25/22  0709 12/24/22  2119 12/24/22  1547 12/24/22  1109 12/24/22  0706 12/23/22  2103 12/23/22  1552 12/23/22  1106 12/23/22  0712 12/22/22  2052 12/22/22  1609 12/22/22  1121   POC GLUCOSE mg/dl 147* 158* 163* 172* 137 148* 130 199* 132 150* 176* 206*     Results from last 7 days   Lab Units 12/20/22  0610   HEMOGLOBIN A1C % 6 7*     Results from last 7 days   Lab Units 12/21/22  0651 12/20/22  0913 12/20/22  0610 12/20/22  0302 12/19/22  2327   LACTIC ACID mmol/L 1 9 4 5* 3 8* 2 7* 5 0*   PROCALCITONIN ng/ml 26 86*  --  4 55*  --  0 35*       Lines/Drains:  Invasive Devices     Peripheral Intravenous Line  Duration           Peripheral IV 12/22/22 Dorsal (posterior); Right Forearm 2 days    Peripheral IV 12/23/22 Left Antecubital 1 day                  Telemetry:  Telemetry Orders (From admission, onward)             48 Hour Telemetry Monitoring  Continuous x 48 hours        References:    Telemetry Guidelines   Question:  Reason for 48 Hour Telemetry  Answer:  Arrhythmias Requiring Medical Therapy (eg  SVT, Vtach/fib, Bradycardia, Uncontrolled A-fib)                 Telemetry Reviewed: A  fib with intermittent tachycardia  Indication for Continued Telemetry Use: Arrthymias requiring medical therapy             Imaging: No pertinent imaging reviewed  Recent Cultures (last 7 days):   Results from last 7 days   Lab Units 12/20/22  1539 12/19/22  2337 12/19/22  2328   BLOOD CULTURE   --  No Growth After 4 Days  No Growth After 4 Days     GRAM STAIN RESULT  1+ Polys*  2+ Gram positive cocci in pairs*  --   --    WOUND CULTURE  4+ Growth of Staphylococcus aureus*  --   --        Last 24 Hours Medication List:   Current Facility-Administered Medications   Medication Dose Route Frequency Provider Last Rate   • acetaminophen  650 mg Oral Q6H PRN Saturnino Mosqueda MD     • aluminum-magnesium hydroxide-simethicone  30 mL Oral Q6H PRN Saturnino Mosqueda MD     • apixaban  5 mg Oral BID Saturnino Mosqueda MD     • bisacodyl  10 mg Rectal Daily PRN Saturnino Mosqueda MD     • calamine-zinc oxide   Topical 4x Daily Saturnino Mosqueda MD     • carvedilol  25 mg Oral BID With Meals SIMI Charles     • cefazolin  2,000 mg Intravenous Q8H Tia Montenegro MD 2,000 mg (12/25/22 0909)   • diltiazem  15 mg/hr Intravenous Continuous SIMI Gonzales 15 mg/hr (12/25/22 0757)   • famotidine  20 mg Intravenous Q12H [address] SIMI Owens     • Fluticasone Furoate-Vilanterol  1 puff Inhalation Daily Saturnino Mosqueda MD      And   • umeclidinium  1 puff Inhalation Daily Saturnino Mosqueda MD     • folic acid  1 mg Oral Daily Saturnino Mosqueda MD     • furosemide  20 mg Intravenous Daily SIMI Charles     • guaiFENesin  600 mg Oral Q12H Hernando Sweeney MD     • hydrALAZINE  5 mg Intravenous Q6H PRN Joe Gutierrez PA-C     • hydrALAZINE  25 mg Oral Q12H SIMI Charles     • hydrocortisone   Topical BID Saturnino Mosqueda MD     • hydrOXYzine HCL  25 mg Oral Q6H PRN SIMI Gonzales     • insulin lispro  1-6 Units Subcutaneous TID AC SIMI Keene     • insulin lispro  1-6 Units Subcutaneous HS SIMI Keene     • latanoprost  1 drop Both Eyes HS Saturnino Mosqueda MD     • levalbuterol  0 63 mg Nebulization Q4H PRN SIMI Gonzales     • melatonin  3 mg Oral HS SIMI Gonzales     • metoprolol  2 5 mg Intravenous Q6H PRN SIMI Gonzales     • montelukast  5 mg Oral HS Indiana Aguirre MD     • ondansetron  4 mg Intravenous Q6H PRN Indiana Aguirre MD     • polyethylene glycol  17 g Oral Daily Kanchan Kenney MD     • saccharomyces boulardii  250 mg Oral BID SIMI Gonzales     • simethicone  80 mg Oral 4x Daily PRN Indiana Aguirre MD          Today, Patient Was Seen By: Janeth Gonzalez DO    **Please Note: This note may have been constructed using a voice recognition system  **

## 2022-12-25 NOTE — ASSESSMENT & PLAN NOTE
Heart rate is not well controlled  · Telemetry placed   · Started on Cardizem drip and also receiving doses of IV digoxin  · continue eliquis    Increased dose of coreg  · Cardio input appreciated

## 2022-12-25 NOTE — ASSESSMENT & PLAN NOTE
Followed by GEN surg  Bedside aspiration was done previously and bedside I&D done on 12/24  · Continue IV cefazolin  · No leukocytosis  · Imaging negative for abscess

## 2022-12-25 NOTE — ASSESSMENT & PLAN NOTE
Wt Readings from Last 3 Encounters:   12/25/22 109 kg (240 lb 8 oz)   12/06/22 102 kg (224 lb)   11/28/22 102 kg (224 lb)     Appears somewhat volume overloade    20 mg of IV Lasix x1 given on 12/24 and has been started on 20 mg of IV lasix daily  · Daily weights  · Intake and output  · Continue atenolol

## 2022-12-25 NOTE — ASSESSMENT & PLAN NOTE
Lab Results   Component Value Date    HGBA1C 6 7 (H) 12/20/2022       Recent Labs     12/24/22  1547 12/24/22  2119 12/25/22  0709 12/25/22  1057   POCGLU 163* 158* 147* 259*     -Continue to hold metformin and Jardiance (potential contributor to Derm reaction)  - Accuchecks AC/HS with insulin sliding scale

## 2022-12-25 NOTE — ASSESSMENT & PLAN NOTE
Improved, airway maintained  Patient on mysoline, atorvastatin and losartan/Jardiance which could cause angioedema/ urticaria-on hold  · Rash was mostly on trunk and upper thigh and is very pruritic; has lip swelling, no tongue  · Given dose of 125 mg solumedrol, benadryl 25 mg IV bid, and pepcid 20 mg IV in ER but symptoms returned   · Received solumedrol, benadryl, pepcid   · Continue calamine, hydrocortisone   · Continue Singulair

## 2022-12-25 NOTE — PLAN OF CARE
Problem: PAIN - ADULT  Goal: Verbalizes/displays adequate comfort level or baseline comfort level  Description: Interventions:  - Encourage patient to monitor pain and request assistance  - Assess pain using appropriate pain scale  - Administer analgesics based on type and severity of pain and evaluate response  - Implement non-pharmacological measures as appropriate and evaluate response  - Consider cultural and social influences on pain and pain management  - Notify physician/advanced practitioner if interventions unsuccessful or patient reports new pain  Outcome: Progressing     Problem: INFECTION - ADULT  Goal: Absence or prevention of progression during hospitalization  Description: INTERVENTIONS:  - Assess and monitor for signs and symptoms of infection  - Monitor lab/diagnostic results  - Monitor all insertion sites, i e  indwelling lines, tubes, and drains  - Monitor endotracheal if appropriate and nasal secretions for changes in amount and color  - [place] appropriate cooling/warming therapies per order  - Administer medications as ordered  - Instruct and encourage patient and family to use good hand hygiene technique  - Identify and instruct in appropriate isolation precautions for identified infection/condition  Outcome: Progressing  Goal: Absence of fever/infection during neutropenic period  Description: INTERVENTIONS:  - Monitor WBC    Outcome: Progressing     Problem: SAFETY ADULT  Goal: Patient will remain free of falls  Description: INTERVENTIONS:  - Educate patient/family on patient safety including physical limitations  - Instruct patient to call for assistance with activity   - Consult OT/PT to assist with strengthening/mobility   - Keep Call bell within reach  - Keep bed low and locked with side rails adjusted as appropriate  - Keep care items and personal belongings within reach  - Initiate and maintain comfort rounds  - Make Fall Risk Sign visible to staff  - Offer Toileting every 2 Hours, in advance of need  - Initiate/Maintain bed alarm  - Apply yellow socks and bracelet for high fall risk patients  - Consider moving patient to room near nurses station  Outcome: Progressing  Goal: Maintain or return to baseline ADL function  Description: INTERVENTIONS:  -  Assess patient's ability to carry out ADLs; assess patient's baseline for ADL function and identify physical deficits which impact ability to perform ADLs (bathing, care of mouth/teeth, toileting, grooming, dressing, etc )  - Assess/evaluate cause of self-care deficits   - Assess range of motion  - Assess patient's mobility; develop plan if impaired  - Assess patient's need for assistive devices and provide as appropriate  - Encourage maximum independence but intervene and supervise when necessary  - Involve family in performance of ADLs  - Assess for home care needs following discharge   - Consider OT consult to assist with ADL evaluation and planning for discharge  - Provide patient education as appropriate  Outcome: Progressing  Goal: Maintains/Returns to pre admission functional level  Description: INTERVENTIONS:  - Perform BMAT or MOVE assessment daily    - Set and communicate daily mobility goal to care team and patient/family/caregiver  - Collaborate with rehabilitation services on mobility goals if consulted  - Perform Range of Motion 2 times a day  - Reposition patient every 2 hours    - Dangle patient 2 times a day  - Stand patient 2 times a day  - Ambulate patient 2 times a day  - Out of bed to chair 2 times a day   - Out of bed for meals 2 times a day  - Out of bed for toileting  - Record patient progress and toleration of activity level   Outcome: Progressing     Problem: DISCHARGE PLANNING  Goal: Discharge to home or other facility with appropriate resources  Description: INTERVENTIONS:  - Identify barriers to discharge w/patient and caregiver  - Arrange for needed discharge resources and transportation as appropriate  - Identify discharge learning needs (meds, wound care, etc )  - Arrange for interpretive services to assist at discharge as needed  - Refer to Case Management Department for coordinating discharge planning if the patient needs post-hospital services based on physician/advanced practitioner order or complex needs related to functional status, cognitive ability, or social support system  Outcome: Progressing     Problem: Knowledge Deficit  Goal: Patient/family/caregiver demonstrates understanding of disease process, treatment plan, medications, and discharge instructions  Description: Complete learning assessment and assess knowledge base    Interventions:  - Provide teaching at level of understanding  - Provide teaching via preferred learning methods  Outcome: Progressing     Problem: Potential for Falls  Goal: Patient will remain free of falls  Description: INTERVENTIONS:  - Educate patient/family on patient safety including physical limitations  - Instruct patient to call for assistance with activity   - Consult OT/PT to assist with strengthening/mobility   - Keep Call bell within reach  - Keep bed low and locked with side rails adjusted as appropriate  - Keep care items and personal belongings within reach  - Initiate and maintain comfort rounds  - Make Fall Risk Sign visible to staff  - Offer Toileting every 2 Hours, in advance of need  - Initiate/Maintain bed alarm  - Apply yellow socks and bracelet for high fall risk patients  - Consider moving patient to room near nurses station  Outcome: Progressing     Problem: RESPIRATORY - ADULT  Goal: Achieves optimal ventilation and oxygenation  Description: INTERVENTIONS:  - Assess for changes in respiratory status  - Assess for changes in mentation and behavior  - Position to facilitate oxygenation and minimize respiratory effort  - Oxygen administered by appropriate delivery if ordered  - Initiate smoking cessation education as indicated  - Encourage broncho-pulmonary hygiene including cough, deep breathe, Incentive Spirometry  - Assess the need for suctioning and aspirate as needed  - Assess and instruct to report SOB or any respiratory difficulty  - Respiratory Therapy support as indicated  Outcome: Progressing     Problem: Prexisting or High Potential for Compromised Skin Integrity  Goal: Skin integrity is maintained or improved  Description: INTERVENTIONS:  - Identify patients at risk for skin breakdown  - Assess and monitor skin integrity  - Assess and monitor nutrition and hydration status  - Monitor labs   - Assess for incontinence   - Turn and reposition patient  - Assist with mobility/ambulation  - Relieve pressure over bony prominences  - Avoid friction and shearing  - Provide appropriate hygiene as needed including keeping skin clean and dry  - Evaluate need for skin moisturizer/barrier cream  - Collaborate with interdisciplinary team   - Patient/family teaching  - Consider wound care consult   Outcome: Progressing     Problem: MOBILITY - ADULT  Goal: Maintain or return to baseline ADL function  Description: INTERVENTIONS:  -  Assess patient's ability to carry out ADLs; assess patient's baseline for ADL function and identify physical deficits which impact ability to perform ADLs (bathing, care of mouth/teeth, toileting, grooming, dressing, etc )  - Assess/evaluate cause of self-care deficits   - Assess range of motion  - Assess patient's mobility; develop plan if impaired  - Assess patient's need for assistive devices and provide as appropriate  - Encourage maximum independence but intervene and supervise when necessary  - Involve family in performance of ADLs  - Assess for home care needs following discharge   - Consider OT consult to assist with ADL evaluation and planning for discharge  - Provide patient education as appropriate  Outcome: Progressing  Goal: Maintains/Returns to pre admission functional level  Description: INTERVENTIONS:  - Perform BMAT or MOVE assessment daily    - Set and communicate daily mobility goal to care team and patient/family/caregiver  - Collaborate with rehabilitation services on mobility goals if consulted  - Perform Range of Motion 2 times a day  - Reposition patient every 2 hours  - Dangle patient 2 times a day  - Stand patient 2 times a day  - Ambulate patient 2 times a day  - Out of bed to chair 2 times a day   - Out of bed for meals 2 times a day  - Out of bed for toileting  - Record patient progress and toleration of activity level   Outcome: Progressing     Problem: Nutrition/Hydration-ADULT  Goal: Nutrient/Hydration intake appropriate for improving, restoring or maintaining nutritional needs  Description: Monitor and assess patient's nutrition/hydration status for malnutrition  Collaborate with interdisciplinary team and initiate plan and interventions as ordered  Monitor patient's weight and dietary intake as ordered or per policy  Utilize nutrition screening tool and intervene as necessary  Determine patient's food preferences and provide high-protein, high-caloric foods as appropriate       INTERVENTIONS:  - Monitor oral intake, urinary output, labs, and treatment plans  - Assess nutrition and hydration status and recommend course of action  - Evaluate amount of meals eaten  - Assist patient with eating if necessary   - Allow adequate time for meals  - Recommend/ encourage appropriate diets, oral nutritional supplements, and vitamin/mineral supplements  - Order, calculate, and assess calorie counts as needed  - Recommend, monitor, and adjust tube feedings and TPN/PPN based on assessed needs  - Assess need for intravenous fluids  - Provide specific nutrition/hydration education as appropriate  - Include patient/family/caregiver in decisions related to nutrition  Outcome: Progressing

## 2022-12-26 LAB
ANION GAP SERPL CALCULATED.3IONS-SCNC: 7 MMOL/L (ref 4–13)
BUN SERPL-MCNC: 15 MG/DL (ref 5–25)
CALCIUM SERPL-MCNC: 8.4 MG/DL (ref 8.3–10.1)
CHLORIDE SERPL-SCNC: 102 MMOL/L (ref 96–108)
CO2 SERPL-SCNC: 29 MMOL/L (ref 21–32)
CREAT SERPL-MCNC: 0.67 MG/DL (ref 0.6–1.3)
GFR SERPL CREATININE-BSD FRML MDRD: 90 ML/MIN/1.73SQ M
GLUCOSE SERPL-MCNC: 147 MG/DL (ref 65–140)
GLUCOSE SERPL-MCNC: 153 MG/DL (ref 65–140)
GLUCOSE SERPL-MCNC: 158 MG/DL (ref 65–140)
GLUCOSE SERPL-MCNC: 168 MG/DL (ref 65–140)
GLUCOSE SERPL-MCNC: 215 MG/DL (ref 65–140)
POTASSIUM SERPL-SCNC: 3.4 MMOL/L (ref 3.5–5.3)
SODIUM SERPL-SCNC: 138 MMOL/L (ref 135–147)

## 2022-12-26 RX ORDER — LOSARTAN POTASSIUM 25 MG/1
25 TABLET ORAL DAILY
Status: DISCONTINUED | OUTPATIENT
Start: 2022-12-27 | End: 2022-12-26

## 2022-12-26 RX ORDER — DIGOXIN 125 MCG
125 TABLET ORAL DAILY
Status: DISCONTINUED | OUTPATIENT
Start: 2022-12-26 | End: 2022-12-28

## 2022-12-26 RX ORDER — CEFAZOLIN SODIUM 2 G/50ML
2000 SOLUTION INTRAVENOUS EVERY 6 HOURS
Status: DISCONTINUED | OUTPATIENT
Start: 2022-12-26 | End: 2022-12-28

## 2022-12-26 RX ORDER — ATORVASTATIN CALCIUM 10 MG/1
10 TABLET, FILM COATED ORAL
Status: DISCONTINUED | OUTPATIENT
Start: 2022-12-26 | End: 2023-01-01 | Stop reason: HOSPADM

## 2022-12-26 RX ORDER — SPIRONOLACTONE 25 MG/1
25 TABLET ORAL DAILY
Status: DISCONTINUED | OUTPATIENT
Start: 2022-12-27 | End: 2023-01-01 | Stop reason: HOSPADM

## 2022-12-26 RX ORDER — POTASSIUM CHLORIDE 20 MEQ/1
40 TABLET, EXTENDED RELEASE ORAL ONCE
Status: COMPLETED | OUTPATIENT
Start: 2022-12-26 | End: 2022-12-26

## 2022-12-26 RX ORDER — HYDRALAZINE HYDROCHLORIDE 25 MG/1
25 TABLET, FILM COATED ORAL EVERY 8 HOURS SCHEDULED
Status: DISCONTINUED | OUTPATIENT
Start: 2022-12-26 | End: 2022-12-26

## 2022-12-26 RX ADMIN — FLUTICASONE FUROATE AND VILANTEROL TRIFENATATE 1 PUFF: 100; 25 POWDER RESPIRATORY (INHALATION) at 08:25

## 2022-12-26 RX ADMIN — INSULIN LISPRO 1 UNITS: 100 INJECTION, SOLUTION INTRAVENOUS; SUBCUTANEOUS at 16:20

## 2022-12-26 RX ADMIN — GUAIFENESIN 600 MG: 600 TABLET, EXTENDED RELEASE ORAL at 08:20

## 2022-12-26 RX ADMIN — CEFAZOLIN SODIUM 2000 MG: 2 SOLUTION INTRAVENOUS at 16:23

## 2022-12-26 RX ADMIN — DILTIAZEM HYDROCHLORIDE 90 MG: 60 TABLET, FILM COATED ORAL at 17:09

## 2022-12-26 RX ADMIN — DILTIAZEM HYDROCHLORIDE 15 MG/HR: 5 INJECTION, SOLUTION INTRAVENOUS at 01:21

## 2022-12-26 RX ADMIN — FERRIC OXIDE RED: 8; 8 LOTION TOPICAL at 17:11

## 2022-12-26 RX ADMIN — ATORVASTATIN CALCIUM 10 MG: 10 TABLET, FILM COATED ORAL at 16:22

## 2022-12-26 RX ADMIN — FAMOTIDINE 20 MG: 10 INJECTION, SOLUTION INTRAVENOUS at 08:23

## 2022-12-26 RX ADMIN — CARVEDILOL 25 MG: 25 TABLET, FILM COATED ORAL at 16:21

## 2022-12-26 RX ADMIN — CEFAZOLIN SODIUM 2000 MG: 2 SOLUTION INTRAVENOUS at 10:17

## 2022-12-26 RX ADMIN — APIXABAN 5 MG: 5 TABLET, FILM COATED ORAL at 08:21

## 2022-12-26 RX ADMIN — FERRIC OXIDE RED: 8; 8 LOTION TOPICAL at 11:23

## 2022-12-26 RX ADMIN — DIGOXIN 125 MCG: 125 TABLET ORAL at 08:22

## 2022-12-26 RX ADMIN — HYDROCORTISONE: 25 CREAM TOPICAL at 08:26

## 2022-12-26 RX ADMIN — INSULIN LISPRO 1 UNITS: 100 INJECTION, SOLUTION INTRAVENOUS; SUBCUTANEOUS at 08:24

## 2022-12-26 RX ADMIN — MONTELUKAST 5 MG: 10 TABLET, FILM COATED ORAL at 21:36

## 2022-12-26 RX ADMIN — Medication 250 MG: at 17:09

## 2022-12-26 RX ADMIN — FERRIC OXIDE RED: 8; 8 LOTION TOPICAL at 08:26

## 2022-12-26 RX ADMIN — FUROSEMIDE 20 MG: 10 INJECTION, SOLUTION INTRAMUSCULAR; INTRAVENOUS at 08:23

## 2022-12-26 RX ADMIN — INSULIN LISPRO 1 UNITS: 100 INJECTION, SOLUTION INTRAVENOUS; SUBCUTANEOUS at 21:34

## 2022-12-26 RX ADMIN — CARVEDILOL 25 MG: 25 TABLET, FILM COATED ORAL at 08:23

## 2022-12-26 RX ADMIN — DILTIAZEM HYDROCHLORIDE 90 MG: 60 TABLET, FILM COATED ORAL at 23:01

## 2022-12-26 RX ADMIN — Medication 250 MG: at 08:23

## 2022-12-26 RX ADMIN — HYDROCORTISONE: 25 CREAM TOPICAL at 17:10

## 2022-12-26 RX ADMIN — HYDRALAZINE HYDROCHLORIDE 25 MG: 25 TABLET ORAL at 08:22

## 2022-12-26 RX ADMIN — Medication 3 MG: at 23:01

## 2022-12-26 RX ADMIN — FAMOTIDINE 20 MG: 10 INJECTION, SOLUTION INTRAVENOUS at 21:35

## 2022-12-26 RX ADMIN — POLYETHYLENE GLYCOL 3350 17 G: 17 POWDER, FOR SOLUTION ORAL at 08:31

## 2022-12-26 RX ADMIN — INSULIN LISPRO 2 UNITS: 100 INJECTION, SOLUTION INTRAVENOUS; SUBCUTANEOUS at 11:23

## 2022-12-26 RX ADMIN — POTASSIUM CHLORIDE 40 MEQ: 1500 TABLET, EXTENDED RELEASE ORAL at 08:21

## 2022-12-26 RX ADMIN — FOLIC ACID 1 MG: 1 TABLET ORAL at 08:22

## 2022-12-26 RX ADMIN — GUAIFENESIN 600 MG: 600 TABLET, EXTENDED RELEASE ORAL at 21:35

## 2022-12-26 RX ADMIN — UMECLIDINIUM 1 PUFF: 62.5 AEROSOL, POWDER ORAL at 08:25

## 2022-12-26 RX ADMIN — LATANOPROST 1 DROP: 50 SOLUTION OPHTHALMIC at 21:35

## 2022-12-26 RX ADMIN — APIXABAN 5 MG: 5 TABLET, FILM COATED ORAL at 17:09

## 2022-12-26 RX ADMIN — DILTIAZEM HYDROCHLORIDE 90 MG: 60 TABLET, FILM COATED ORAL at 11:20

## 2022-12-26 RX ADMIN — CEFAZOLIN SODIUM 2000 MG: 2 SOLUTION INTRAVENOUS at 01:08

## 2022-12-26 RX ADMIN — CEFAZOLIN SODIUM 2000 MG: 2 SOLUTION INTRAVENOUS at 21:35

## 2022-12-26 NOTE — PROGRESS NOTES
Progress Note - Infectious Disease   Yasir Yevgeniy [de-identified] y o  male MRN: [id number]  Unit/Bed#: 2 [place] [address] Encounter: [id number]      Impression/Plan:    1   Severe sepsis, developing soon after admission  With fever, tachycardia, tachypnea, lactic acidosis and hypotension responsive to IVFs  Due to #2 below  blood cultures negative to date   -Continues Cefazolin 2 g IV q 8 hours  -monitor temperature and hemodynamics  -serial exam  -recheck CBC and BMP in a m   -supportive care per primary care team  -check CT chest with IV contrast  -additional interventions clinical course     2  Right axillary cellulitis  Axillary US without discrete collection  Bedside needle aspiration attempted with only scant blood obtained and sent for culture 12/20  Culture is growing MSSA  CT chest was performed 12/23 due to continued erythema and swelling of the axilla, this did not show any abscess  Patient is status post repeat I&D by surgery on 12/24  Repeat cultures were not sent  The area of erythema appears smaller but the patient still has significant erythema with induration and swelling  He is hemodynamically stable without fever or leukocytosis  Consider presence of myositis due to slow response to antibiotics    -Continue IV cefazolin, increase dose to every 6 hours given low response of infection and patient's elevated body weight  -Close surgical follow-up  -Monitor exam  -If exam fails to improve we will ask dermatology to assess and would consider skin biopsy  -If continued improvement, recommend a 14-day total course of antibiotics with switch to p o  antibiotics at discharge     3  BUDDY  Developing soon on admission  Renal function has since improved  -renal dose adjust antibiotic as needed  -volume management per primary     4  Urticaria/Angioedema, POA and worsening x 1 month  Unclear source   Outpatient mysoline, atorvastatin, losartan, jardiance held  No known antibiotic allergies  -monitor symptoms  -symptomatic management per primary care team     5  Lung cancer  S/p 3 High dose RT treatment 2022  No chemotherapy    -Outpatient oncology follow-up    Above management plan discussed with the patient at bedside and BIENVENIDO Attending  ID will follow  Antibiotics:  Cefazolin day 5  Antibiotics day 8    Subjective:  Patient continues to have erythema and tenderness in the right axilla  Otherwise he is feeling well  Denies fever, chills, shortness of breath, chest pain  Objective:  Vitals:  Temp:  [97 1 °F (36 2 °C)-98 1 °F (36 7 °C)] 97 5 °F (36 4 °C)  HR:  [] 94  Resp:  [18-20] 18  BP: (115-164)/(67-96) 133/85  SpO2:  [93 %-100 %] 95 %  Temp (24hrs), Av 6 °F (36 4 °C), Min:97 1 °F (36 2 °C), Max:98 1 °F (36 7 °C)  Current: Temperature: 97 5 °F (36 4 °C)    Physical Exam:   General Appearance:  Alert, interactive, nontoxic, no acute distress  Throat: Oropharynx moist without lesions  Lungs:   Clear to auscultation bilaterally; no wheezes, rhonchi or rales; respirations unlabored   Heart:  RRR; no murmur, rub or gallop   Abdomen:   Soft, non-tender, non-distended, positive bowel sounds  Extremities: No clubbing, cyanosis or edema   Skin:  Right axillary erythema with warmth present  2 areas are indurated       Labs:    All pertinent labs and imaging studies were personally reviewed  Results from last 7 days   Lab Units 22  0619 22  0550 22  0651   WBC Thousand/uL 8 79 11 09* 9 09   HEMOGLOBIN g/dL 14 1 14 2 12 8   PLATELETS Thousands/uL 187 183 154     Results from last 7 days   Lab Units 22  0556 22  0535 22  0619 22  0550 22  0651 22  0610 22  2327   SODIUM mmol/L 138 138 140 140 139   < > 138   POTASSIUM mmol/L 3 4* 3 7 4 6 4 7 4 4   < > 5 6*   CHLORIDE mmol/L 102 103 107 106 108   < > 100   CO2 mmol/L 29 25 24 25 23   < > 17*   BUN mg/dL 15 19 25 31* 44*   < > 33*   CREATININE mg/dL 0 67 0 73 0 85 0 97 1 79*   < > 1 95*   EGFR ml/min/1 73sq m 90 87 82 73 35   < > 31   CALCIUM mg/dL 8 4 8 3 8 5 8 6 6 9*   < > 9 0   AST U/L  --   --   --  56* 23  --  29   ALT U/L  --   --   --  70 19  --  17   ALK PHOS U/L  --   --   --  117* 40*  --  76    < > = values in this interval not displayed  Results from last 7 days   Lab Units 12/21/22  0651 12/20/22  0610 12/19/22  2327   PROCALCITONIN ng/ml 26 86* 4 55* 0 35*                   Micro:  Results from last 7 days   Lab Units 12/20/22  1539 12/19/22  2337 12/19/22  2328   BLOOD CULTURE   --  No Growth After 5 Days  No Growth After 5 Days     GRAM STAIN RESULT  1+ Polys*  2+ Gram positive cocci in pairs*  --   --    WOUND CULTURE  4+ Growth of Staphylococcus aureus*  --   --        Imaging:  Pertinent imaging in PACS personally reviewed    CT chest with contrast 12/23: Skin thickening and subcutaneous edematous changes in the right axillary region, findings most consistent with cellulitis

## 2022-12-26 NOTE — PLAN OF CARE
Problem: PAIN - ADULT  Goal: Verbalizes/displays adequate comfort level or baseline comfort level  Description: Interventions:  - Encourage patient to monitor pain and request assistance  - Assess pain using appropriate pain scale  - Administer analgesics based on type and severity of pain and evaluate response  - Implement non-pharmacological measures as appropriate and evaluate response  - Consider cultural and social influences on pain and pain management  - Notify physician/advanced practitioner if interventions unsuccessful or patient reports new pain  Outcome: Progressing     Problem: INFECTION - ADULT  Goal: Absence or prevention of progression during hospitalization  Description: INTERVENTIONS:  - Assess and monitor for signs and symptoms of infection  - Monitor lab/diagnostic results  - Monitor all insertion sites, i e  indwelling lines, tubes, and drains  - Monitor endotracheal if appropriate and nasal secretions for changes in amount and color  - [place] appropriate cooling/warming therapies per order  - Administer medications as ordered  - Instruct and encourage patient and family to use good hand hygiene technique  - Identify and instruct in appropriate isolation precautions for identified infection/condition  Outcome: Progressing  Goal: Absence of fever/infection during neutropenic period  Description: INTERVENTIONS:  - Monitor WBC    Outcome: Progressing     Problem: SAFETY ADULT  Goal: Patient will remain free of falls  Description: INTERVENTIONS:  - Educate patient/family on patient safety including physical limitations  - Instruct patient to call for assistance with activity   - Consult OT/PT to assist with strengthening/mobility   - Keep Call bell within reach  - Keep bed low and locked with side rails adjusted as appropriate  - Keep care items and personal belongings within reach  - Initiate and maintain comfort rounds  - Make Fall Risk Sign visible to staff  - Offer Toileting every 2 Hours, in advance of need  - Initiate/Maintain bed alarm  - Apply yellow socks and bracelet for high fall risk patients  - Consider moving patient to room near nurses station  Outcome: Progressing  Goal: Maintain or return to baseline ADL function  Description: INTERVENTIONS:  -  Assess patient's ability to carry out ADLs; assess patient's baseline for ADL function and identify physical deficits which impact ability to perform ADLs (bathing, care of mouth/teeth, toileting, grooming, dressing, etc )  - Assess/evaluate cause of self-care deficits   - Assess range of motion  - Assess patient's mobility; develop plan if impaired  - Assess patient's need for assistive devices and provide as appropriate  - Encourage maximum independence but intervene and supervise when necessary  - Involve family in performance of ADLs  - Assess for home care needs following discharge   - Consider OT consult to assist with ADL evaluation and planning for discharge  - Provide patient education as appropriate  Outcome: Progressing  Goal: Maintains/Returns to pre admission functional level  Description: INTERVENTIONS:  - Perform BMAT or MOVE assessment daily    - Set and communicate daily mobility goal to care team and patient/family/caregiver  - Collaborate with rehabilitation services on mobility goals if consulted  - Perform Range of Motion 2 times a day  - Reposition patient every 2 hours    - Dangle patient 2 times a day  - Stand patient 2 times a day  - Ambulate patient 2 times a day  - Out of bed to chair 2 times a day   - Out of bed for meals 2 times a day  - Out of bed for toileting  - Record patient progress and toleration of activity level   Outcome: Progressing     Problem: DISCHARGE PLANNING  Goal: Discharge to home or other facility with appropriate resources  Description: INTERVENTIONS:  - Identify barriers to discharge w/patient and caregiver  - Arrange for needed discharge resources and transportation as appropriate  - Identify discharge learning needs (meds, wound care, etc )  - Arrange for interpretive services to assist at discharge as needed  - Refer to Case Management Department for coordinating discharge planning if the patient needs post-hospital services based on physician/advanced practitioner order or complex needs related to functional status, cognitive ability, or social support system  Outcome: Progressing     Problem: Knowledge Deficit  Goal: Patient/family/caregiver demonstrates understanding of disease process, treatment plan, medications, and discharge instructions  Description: Complete learning assessment and assess knowledge base    Interventions:  - Provide teaching at level of understanding  - Provide teaching via preferred learning methods  Outcome: Progressing     Problem: Potential for Falls  Goal: Patient will remain free of falls  Description: INTERVENTIONS:  - Educate patient/family on patient safety including physical limitations  - Instruct patient to call for assistance with activity   - Consult OT/PT to assist with strengthening/mobility   - Keep Call bell within reach  - Keep bed low and locked with side rails adjusted as appropriate  - Keep care items and personal belongings within reach  - Initiate and maintain comfort rounds  - Make Fall Risk Sign visible to staff  - Offer Toileting every 2 Hours, in advance of need  - Initiate/Maintain bed alarm  - Apply yellow socks and bracelet for high fall risk patients  - Consider moving patient to room near nurses station  Outcome: Progressing     Problem: RESPIRATORY - ADULT  Goal: Achieves optimal ventilation and oxygenation  Description: INTERVENTIONS:  - Assess for changes in respiratory status  - Assess for changes in mentation and behavior  - Position to facilitate oxygenation and minimize respiratory effort  - Oxygen administered by appropriate delivery if ordered  - Initiate smoking cessation education as indicated  - Encourage broncho-pulmonary hygiene including cough, deep breathe, Incentive Spirometry  - Assess the need for suctioning and aspirate as needed  - Assess and instruct to report SOB or any respiratory difficulty  - Respiratory Therapy support as indicated  Outcome: Progressing     Problem: Prexisting or High Potential for Compromised Skin Integrity  Goal: Skin integrity is maintained or improved  Description: INTERVENTIONS:  - Identify patients at risk for skin breakdown  - Assess and monitor skin integrity  - Assess and monitor nutrition and hydration status  - Monitor labs   - Assess for incontinence   - Turn and reposition patient  - Assist with mobility/ambulation  - Relieve pressure over bony prominences  - Avoid friction and shearing  - Provide appropriate hygiene as needed including keeping skin clean and dry  - Evaluate need for skin moisturizer/barrier cream  - Collaborate with interdisciplinary team   - Patient/family teaching  - Consider wound care consult   Outcome: Progressing     Problem: MOBILITY - ADULT  Goal: Maintain or return to baseline ADL function  Description: INTERVENTIONS:  -  Assess patient's ability to carry out ADLs; assess patient's baseline for ADL function and identify physical deficits which impact ability to perform ADLs (bathing, care of mouth/teeth, toileting, grooming, dressing, etc )  - Assess/evaluate cause of self-care deficits   - Assess range of motion  - Assess patient's mobility; develop plan if impaired  - Assess patient's need for assistive devices and provide as appropriate  - Encourage maximum independence but intervene and supervise when necessary  - Involve family in performance of ADLs  - Assess for home care needs following discharge   - Consider OT consult to assist with ADL evaluation and planning for discharge  - Provide patient education as appropriate  Outcome: Progressing  Goal: Maintains/Returns to pre admission functional level  Description: INTERVENTIONS:  - Perform BMAT or MOVE assessment daily    - Set and communicate daily mobility goal to care team and patient/family/caregiver  - Collaborate with rehabilitation services on mobility goals if consulted  - Perform Range of Motion 2 times a day  - Reposition patient every 2 hours  - Dangle patient 2 times a day  - Stand patient 2 times a day  - Ambulate patient 2 times a day  - Out of bed to chair 2 times a day   - Out of bed for meals 2 times a day  - Out of bed for toileting  - Record patient progress and toleration of activity level   Outcome: Progressing     Problem: Nutrition/Hydration-ADULT  Goal: Nutrient/Hydration intake appropriate for improving, restoring or maintaining nutritional needs  Description: Monitor and assess patient's nutrition/hydration status for malnutrition  Collaborate with interdisciplinary team and initiate plan and interventions as ordered  Monitor patient's weight and dietary intake as ordered or per policy  Utilize nutrition screening tool and intervene as necessary  Determine patient's food preferences and provide high-protein, high-caloric foods as appropriate       INTERVENTIONS:  - Monitor oral intake, urinary output, labs, and treatment plans  - Assess nutrition and hydration status and recommend course of action  - Evaluate amount of meals eaten  - Assist patient with eating if necessary   - Allow adequate time for meals  - Recommend/ encourage appropriate diets, oral nutritional supplements, and vitamin/mineral supplements  - Order, calculate, and assess calorie counts as needed  - Recommend, monitor, and adjust tube feedings and TPN/PPN based on assessed needs  - Assess need for intravenous fluids  - Provide specific nutrition/hydration education as appropriate  - Include patient/family/caregiver in decisions related to nutrition  Outcome: Progressing

## 2022-12-26 NOTE — PROGRESS NOTES
Nikki 47  Progress Note - Dejah Barth 1940, [de-identified] y o  male MRN: [id number]  Unit/Bed#: [address] [place] [address] Encounter: [id number]  Primary Care Provider: Chris Stout MD   Date and time admitted to hospital: 12/19/2022  1:01 AM    Cellulitis  Assessment & Plan  Followed by GEN surg  Bedside aspiration was done previously and bedside I&D done on 12/24  · Continue IV cefazolin, increase to every 6 hours  · No leukocytosis  · Imaging negative for abscess    Persistent atrial fibrillation (HCC)  Assessment & Plan  Heart rate is not well controlled  · Telemetry monitoring on telemetry  · Cardizem drip discontinued and transition to oral Cardizem today  Also received doses of IV digoxin and started on p o  digoxin today  · continue eliquis  Continue Coreg  · Cardio input appreciated    Severe sepsis [facility])  Assessment & Plan  Likely due to right lateral chest wall cellulitis, tachycardia- possibly secondary to underlying A  fib  · BCx-negative so far  Wound culture - staph  · Off Rocephin, Vanco  currently on IV Ancef as noted above  · ID following, recs appreciated  · Imaging negative for abscess    GERD (gastroesophageal reflux disease)  Assessment & Plan  Continue Pepcid    Chronic diastolic heart failure (HCC)  Assessment & Plan  Wt Readings from Last 3 Encounters:   12/26/22 106 kg (234 lb 9 6 oz)   12/06/22 102 kg (224 lb)   11/28/22 102 kg (224 lb)     Patient with signs of volume overload    20 mg of IV Lasix x1 given on 12/24 and was started on 20 mg of IV lasix daily  IV Lasix discontinued and patient started on Aldactone  · Daily weights  · Intake and output  · Continue Coreg    Diabetic polyneuropathy associated with type 2 diabetes mellitus [facility])  Assessment & Plan  Lab Results   Component Value Date    HGBA1C 6 7 (H) 12/20/2022       Recent Labs     12/26/22  0756 12/26/22  1115 12/26/22  1610 12/26/22 2052   POCGLU 158* 215* 153* 168*     -Continue to hold metformin and Jardiance (potential contributor to Derm reaction)  -Continue Accuchecks AC/HS with insulin sliding scale     Encephalopathy-resolved as of 2022  Assessment & Plan  Possibly multifactorial, Sepsis vs Haldo vs lactic acidosis, hx of SYLVESTER    · ABG-unremarkable  · Neuro checks, maintain airway  · S/p haldo administration overnight-hold all neurotoxics  · CCU consulted overnight    Acute kidney injury (HCC)-resolved as of 2022  Assessment & Plan  Slowly improving, likely due to septic shock  · Cr 1 9  > 1 79 > 0 97  · Continue IVF  · Avoid nephrotoxic's    Septic shock (HCC)-resolved as of 2022  Assessment & Plan  resolved, Hemodynamic stable, still in sepsis    · Likely due to right lateral chest wall cellulitis with possible abscess  · Contiune Rocephin, Vanco completed  · IVF      VTE Pharmacologic Prophylaxis: VTE Score: 4 Moderate Risk (Score 3-4) - Pharmacological DVT Prophylaxis Ordered: apixaban (Eliquis)  Patient Centered Rounds: I performed bedside rounds with nursing staff today  Discussions with Specialists or Other Care Team Provider: yes - ID    Education and Discussions with Family / Patient: Updated  (wife) at bedside  Time Spent for Care: 40 min  More than 50% of total time spent on counseling and coordination of care as described above  Current Length of Stay: 6 day(s)  Current Patient Status: Inpatient   Certification Statement: The patient will continue to require additional inpatient hospital stay due to Cellulitis requiring IV antibiotics, A  fib  Discharge Plan: Anticipate discharge in 48-72 hrs to home  Code Status: Level 1 - Full Code    Subjective:     Patient hoping to be going home soon  Denies any right axillary/chest wall pain  States swelling in his legs is improving    Does have some swelling even at baseline    Objective:     Vitals:   Temp (24hrs), Av 5 °F (36 4 °C), Min:97 1 °F (36 2 °C), Max:98 1 °F (36 7 °C)    Temp:  [97 1 °F (36 2 °C)-98 1 °F (36 7 °C)] 97 1 °F (36 2 °C)  HR:  [] 105  Resp:  [18-20] 18  BP: (136-164)/(80-96) 164/88  SpO2:  [93 %-100 %] 93 %  Body mass index is 32 72 kg/m²  Input and Output Summary (last 24 hours): Intake/Output Summary (Last 24 hours) at 12/26/2022 0906  Last data filed at 12/26/2022 6260  Gross per 24 hour   Intake --   Output 800 ml   Net -800 ml       Physical Exam:   Physical Exam  Vitals reviewed  Constitutional:       General: He is not in acute distress  Appearance: He is not toxic-appearing  HENT:      Head: Normocephalic and atraumatic  Eyes:      General:         Right eye: No discharge  Left eye: No discharge  Cardiovascular:      Rate and Rhythm: Normal rate  Rhythm irregular  Pulmonary:      Effort: Pulmonary effort is normal  No respiratory distress  Breath sounds: No wheezing or rales  Comments: Decreased breath sounds bilaterally  Abdominal:      General: Bowel sounds are normal  There is no distension  Palpations: Abdomen is soft  Tenderness: There is no abdominal tenderness  Musculoskeletal:      Right lower leg: Edema present  Left lower leg: Edema present  Skin:     Comments: Right axillary area with erythema noted  Dressing in place   Neurological:      Mental Status: He is alert            Additional Data:     Labs:  Results from last 7 days   Lab Units 12/24/22  0619 12/23/22  0550 12/21/22  0651   WBC Thousand/uL 8 79 11 09* 9 09   HEMOGLOBIN g/dL 14 1 14 2 12 8   HEMATOCRIT % 43 2 44 1 40 8   PLATELETS Thousands/uL 187 183 154   BANDS PCT %  --   --  42*   NEUTROS PCT %  --  88*  --    LYMPHS PCT %  --  6*  --    LYMPHO PCT %  --   --  5*   MONOS PCT %  --  3*  --    MONO PCT %  --   --  4   EOS PCT %  --  2 0     Results from last 7 days   Lab Units 12/26/22  0556 12/24/22  0619 12/23/22  0550   SODIUM mmol/L 138   < > 140   POTASSIUM mmol/L 3 4*   < > 4 7   CHLORIDE mmol/L 102   < > 106   CO2 mmol/L 29   < > 25   BUN mg/dL 15   < > 31*   CREATININE mg/dL 0 67   < > 0 97   ANION GAP mmol/L 7   < > 9   CALCIUM mg/dL 8 4   < > 8 6   ALBUMIN g/dL  --   --  2 5*   TOTAL BILIRUBIN mg/dL  --   --  0 48   ALK PHOS U/L  --   --  117*   ALT U/L  --   --  70   AST U/L  --   --  56*   GLUCOSE RANDOM mg/dL 147*   < > 124    < > = values in this interval not displayed  Results from last 7 days   Lab Units 12/26/22  0756 12/25/22  2034 12/25/22  1618 12/25/22  1057 12/25/22  0709 12/24/22  2119 12/24/22  1547 12/24/22  1109 12/24/22  0706 12/23/22  2103 12/23/22  1552 12/23/22  1106   POC GLUCOSE mg/dl 158* 208* 146* 259* 147* 158* 163* 172* 137 148* 130 199*     Results from last 7 days   Lab Units 12/20/22  0610   HEMOGLOBIN A1C % 6 7*     Results from last 7 days   Lab Units 12/21/22  0651 12/20/22  0913 12/20/22  0610 12/20/22  0302 12/19/22  2327   LACTIC ACID mmol/L 1 9 4 5* 3 8* 2 7* 5 0*   PROCALCITONIN ng/ml 26 86*  --  4 55*  --  0 35*       Lines/Drains:  Invasive Devices     Peripheral Intravenous Line  Duration           Peripheral IV 12/22/22 Dorsal (posterior); Right Forearm 3 days    Peripheral IV 12/26/22 Dorsal (posterior); Left Hand <1 day                  Telemetry:  Telemetry Orders (From admission, onward)             48 Hour Telemetry Monitoring  Continuous x 48 hours        References:    Telemetry Guidelines   Question:  Reason for 48 Hour Telemetry  Answer:  Arrhythmias Requiring Medical Therapy (eg  SVT, Vtach/fib, Bradycardia, Uncontrolled A-fib)                 Telemetry Reviewed: Atrial fibrillation with intermittent tachycardia  Indication for Continued Telemetry Use: Arrthymias requiring medical therapy             Imaging: No pertinent imaging reviewed  Recent Cultures (last 7 days):   Results from last 7 days   Lab Units 12/20/22  1539 12/19/22  2337 12/19/22  2328   BLOOD CULTURE   --  No Growth After 5 Days  No Growth After 5 Days     GRAM STAIN RESULT  1+ Polys*  2+ Gram positive cocci in pairs* --   --    WOUND CULTURE  4+ Growth of Staphylococcus aureus*  --   --        Last 24 Hours Medication List:   Current Facility-Administered Medications   Medication Dose Route Frequency Provider Last Rate   • acetaminophen  650 mg Oral Q6H PRN Angel Sunshine MD     • aluminum-magnesium hydroxide-simethicone  30 mL Oral Q6H PRN Angel Sunshine MD     • apixaban  5 mg Oral BID Angel Sunshine MD     • bisacodyl  10 mg Rectal Daily PRN Angel Sunshine MD     • calamine-zinc oxide   Topical 4x Daily Angel Sunshine MD     • carvedilol  25 mg Oral BID With Meals SIMI James     • cefazolin  2,000 mg Intravenous Q8H Romaine Farmer MD 2,000 mg (12/26/22 0108)   • digoxin  125 mcg Oral Daily SIMI James     • diltiazem  15 mg/hr Intravenous Continuous SIMI Gonzales 15 mg/hr (12/26/22 0121)   • famotidine  20 mg Intravenous Q12H [address] SIMI Archibald     • Fluticasone Furoate-Vilanterol  1 puff Inhalation Daily Angel Sunshine MD      And   • umeclidinium  1 puff Inhalation Daily Angel Sunshine MD     • folic acid  1 mg Oral Daily Angel Sunshine MD     • furosemide  20 mg Intravenous Daily SIMI James     • guaiFENesin  600 mg Oral Q12H Sang Goldberg MD     • hydrALAZINE  5 mg Intravenous Q6H PRN Charity Amos PA-C     • hydrALAZINE  25 mg Oral Q12H SIMI James     • hydrocortisone   Topical BID Angel Sunshine MD     • hydrOXYzine HCL  25 mg Oral Q6H PRN SIMI Gonzales     • insulin lispro  1-6 Units Subcutaneous TID AC SIMI Archibald     • insulin lispro  1-6 Units Subcutaneous HS SIMI Archibald     • latanoprost  1 drop Both Eyes HS Angel Sunshine MD     • levalbuterol  0 63 mg Nebulization Q4H PRN SIMI Gonzales     • melatonin  3 mg Oral HS SIMI Gonzales     • metoprolol  2 5 mg Intravenous Q6H PRN SIMI Gonzales     • montelukast  5 mg Oral HS Angel Sunshine MD     • ondansetron  4 mg Intravenous Q6H PRN Angel Sunshine MD     • polyethylene glycol  17 g Oral Daily Kelsy Lau MD     • saccharomyces boulardii  250 mg Oral BID SIMI Gonzales     • simethicone  80 mg Oral 4x Daily PRN Heather Farias MD          Today, Patient Was Seen By: Michaela Layton DO    **Please Note: This note may have been constructed using a voice recognition system  **

## 2022-12-26 NOTE — PLAN OF CARE
Problem: PAIN - ADULT  Goal: Verbalizes/displays adequate comfort level or baseline comfort level  Description: Interventions:  - Encourage patient to monitor pain and request assistance  - Assess pain using appropriate pain scale  - Administer analgesics based on type and severity of pain and evaluate response  - Implement non-pharmacological measures as appropriate and evaluate response  - Consider cultural and social influences on pain and pain management  - Notify physician/advanced practitioner if interventions unsuccessful or patient reports new pain  Outcome: Progressing     Problem: INFECTION - ADULT  Goal: Absence or prevention of progression during hospitalization  Description: INTERVENTIONS:  - Assess and monitor for signs and symptoms of infection  - Monitor lab/diagnostic results  - Monitor all insertion sites, i e  indwelling lines, tubes, and drains  - Monitor endotracheal if appropriate and nasal secretions for changes in amount and color  - [place] appropriate cooling/warming therapies per order  - Administer medications as ordered  - Instruct and encourage patient and family to use good hand hygiene technique  - Identify and instruct in appropriate isolation precautions for identified infection/condition  Outcome: Progressing  Goal: Absence of fever/infection during neutropenic period  Description: INTERVENTIONS:  - Monitor WBC    Outcome: Progressing     Problem: RESPIRATORY - ADULT  Goal: Achieves optimal ventilation and oxygenation  Description: INTERVENTIONS:  - Assess for changes in respiratory status  - Assess for changes in mentation and behavior  - Position to facilitate oxygenation and minimize respiratory effort  - Oxygen administered by appropriate delivery if ordered  - Initiate smoking cessation education as indicated  - Encourage broncho-pulmonary hygiene including cough, deep breathe, Incentive Spirometry  - Assess the need for suctioning and aspirate as needed  - Assess and instruct to report SOB or any respiratory difficulty  - Respiratory Therapy support as indicated  Outcome: Progressing

## 2022-12-26 NOTE — PROGRESS NOTES
Progress Note - Cardiology   [facility]     Chris Bagley [de-identified] y o  male MRN: [id number]  : 1940  Unit/Bed#: [address] [place] [address] Encounter: [id number]    Assessment and Plan:   1   Right axilla cellulitis/sepsis: Patient followed by infectious disease and surgery    -   Wound culture grew out staph aureus    -   Patient on Ancef 2 g every 8 hours IV     2   Hypertension:  Blood pressure slowly improving with addition of hydralazine we will continue to monitor     -   Continue Coreg to 25 mg twice daily    -   Patient is currently on Cardizem drip (had been on Norvasc)    -   We will increase hydralazine to 25 mg 3 times daily    -   Continue to monitor     3   Chronic atrial fibrillation with rapid ventricular response: Rates poorly controlled since admission to the hospital question underlying sepsis as cause    -   Patient loaded with digoxin early a m   with digoxin 0 25 mg IV x2 with improvement in heart rate    -   Patient started on digoxin 0 125 mg daily on 2022    -   IV Cardizem converted to oral Cardizem 90 mg every 6 hours on 2022    -   Continue Eliquis 5 mg twice daily    -   Continue to monitor telemetry     4   Chronic diastolic heart failure: Patient is receiving high levels of fluids and appears volume overloaded    -   Appears patient's dry weight is around 230 pounds, today he is 234    -   Patient received Lasix 20 mg IV x1 on 2022 with a very good response    -   We will continue Lasix 20 mg IV x1 more day and reevaluate in the a m     -   Other medication as above    -   ACE/ARB and now listed as allergies secondary to history of angioedema    -   Monitor I&O, daily weights and labs     5   Diabetes: Hemoglobin A1c 7 1 managed per primary team     6   Chronic kidney disease: Baseline creatinine appears to be less than 1     7   Obesity: BMI 33 8     8   Left lower lobe lung CA status postradiation therapy    Subjective / Objective:   Patient seen and examined  He is anxious for discharge home and was happy to hear that his wound was slowly looking better  Blood pressures elevated this morning will increase hydralazine to 25 mg 3 times daily  Vitals: Blood pressure 164/88, pulse 105, temperature (!) 97 1 °F (36 2 °C), resp  rate 18, height 5' 11" (1 803 m), weight 106 kg (234 lb 9 6 oz), SpO2 93 %  Vitals:    12/25/22 0643 12/26/22 0557   Weight: 109 kg (240 lb 8 oz) 106 kg (234 lb 9 6 oz)     Body mass index is 32 72 kg/m²  BP Readings from Last 3 Encounters:   12/26/22 164/88   12/06/22 140/80   11/28/22 140/80     Orthostatic Blood Pressures    Flowsheet Row Most Recent Value   Blood Pressure 164/88 filed at 12/26/2022 0740   Patient Position - Orthostatic VS Sitting filed at 12/25/2022 1919        I/O       12/24 0701  12/25 0700 12/25 0701  12/26 0700 12/26 0701  12/27 0700    P  O        I V  (mL/kg)       IV Piggyback 50      Total Intake(mL/kg) 50 (0 5)      Urine (mL/kg/hr) 2770 (1 1) 800 (0 3)     Stool       Total Output 2770 800     Net -2720 -800                Invasive Devices     Peripheral Intravenous Line  Duration           Peripheral IV 12/22/22 Dorsal (posterior); Right Forearm 3 days    Peripheral IV 12/26/22 Dorsal (posterior); Left Hand <1 day                  Intake/Output Summary (Last 24 hours) at 12/26/2022 4250  Last data filed at 12/26/2022 4706  Gross per 24 hour   Intake --   Output 800 ml   Net -800 ml         Physical Exam:   Physical Exam  Vitals and nursing note reviewed  Constitutional:       General: He is not in acute distress  Appearance: Normal appearance  He is obese  HENT:      Right Ear: External ear normal       Left Ear: External ear normal    Eyes:      General: No scleral icterus  Right eye: No discharge  Left eye: No discharge  Cardiovascular:      Rate and Rhythm: Tachycardia present  Rhythm irregularly irregular  Pulses: Normal pulses     Pulmonary:      Effort: Pulmonary effort is normal  No accessory muscle usage or respiratory distress  Breath sounds: Examination of the right-lower field reveals decreased breath sounds  Examination of the left-lower field reveals decreased breath sounds  Decreased breath sounds present  Abdominal:      General: Bowel sounds are normal       Palpations: Abdomen is soft  Musculoskeletal:      Right lower leg: Edema present  Left lower leg: Edema present  Skin:     General: Skin is warm and dry  Capillary Refill: Capillary refill takes less than 2 seconds  Comments: Right axilla still with erythema posterior scapular area, less swelling noted anterior pectoral area       Neurological:      General: No focal deficit present  Mental Status: He is alert and oriented to person, place, and time  Mental status is at baseline     Psychiatric:         Mood and Affect: Mood normal             Medications/ Allergies:     Current Facility-Administered Medications   Medication Dose Route Frequency Provider Last Rate   • acetaminophen  650 mg Oral Q6H PRN Raghavendra Fairchild MD     • aluminum-magnesium hydroxide-simethicone  30 mL Oral Q6H PRN Raghavendra Fairchild MD     • apixaban  5 mg Oral BID Raghavendra Fairchild MD     • bisacodyl  10 mg Rectal Daily PRN Raghavendra Fairchild MD     • calamine-zinc oxide   Topical 4x Daily Raghavendra Fairchild MD     • carvedilol  25 mg Oral BID With Meals SIMI Wiley     • cefazolin  2,000 mg Intravenous Q8H Addy Murry MD 2,000 mg (12/26/22 0108)   • digoxin  125 mcg Oral Daily SIMI Wiley     • diltiazem  15 mg/hr Intravenous Continuous SIMI Gonzales 15 mg/hr (12/26/22 0121)   • famotidine  20 mg Intravenous Q12H [address] SIMI Hensley     • Fluticasone Furoate-Vilanterol  1 puff Inhalation Daily Raghavendra Fairchild MD      And   • umeclidinium  1 puff Inhalation Daily Raghavendra Fairchild MD     • folic acid  1 mg Oral Daily Raghavendra Fairchild MD     • furosemide  20 mg Intravenous Daily SIMI Wiley     • guaiFENesin  600 mg Oral Q12H Héctor Church MD     • hydrALAZINE  5 mg Intravenous Q6H PRN Celine Guajardo PA-C     • hydrALAZINE  25 mg Oral [facility] SIMI Baig     • hydrocortisone   Topical BID Karen Lawton MD     • hydrOXYzine HCL  25 mg Oral Q6H PRN SIMI Gonzales     • insulin lispro  1-6 Units Subcutaneous TID AC SIMI Brink     • insulin lispro  1-6 Units Subcutaneous HS SIMI Brink     • latanoprost  1 drop Both Eyes HS Karen Lawton MD     • levalbuterol  0 63 mg Nebulization Q4H PRN SIMI Gonzales     • melatonin  3 mg Oral HS SIMI Gonzales     • metoprolol  2 5 mg Intravenous Q6H PRN SIMI Gonzales     • montelukast  5 mg Oral HS Karen Lawton MD     • ondansetron  4 mg Intravenous Q6H PRN Karen Lawton MD     • polyethylene glycol  17 g Oral Daily Jewels Liang MD     • saccharomyces boulardii  250 mg Oral BID SIMI Gonzales     • simethicone  80 mg Oral 4x Daily PRN Karen Lawton MD       acetaminophen, 650 mg, Q6H PRN  aluminum-magnesium hydroxide-simethicone, 30 mL, Q6H PRN  bisacodyl, 10 mg, Daily PRN  hydrALAZINE, 5 mg, Q6H PRN  hydrOXYzine HCL, 25 mg, Q6H PRN  levalbuterol, 0 63 mg, Q4H PRN  metoprolol, 2 5 mg, Q6H PRN  ondansetron, 4 mg, Q6H PRN  simethicone, 80 mg, 4x Daily PRN      No Known Allergies    VTE Pharmacologic Prophylaxis:   Sequential compression device (Venodyne)     Labs:   CBC with diff:  Results from last 7 days   Lab Units 12/24/22  0619 12/23/22  0550 12/21/22  0651 12/20/22  0610 12/19/22  2327   WBC Thousand/uL 8 79 11 09* 9 09 6 34 7 67   HEMOGLOBIN g/dL 14 1 14 2 12 8 17 1* 16 5   HEMATOCRIT % 43 2 44 1 40 8 52 6* 51 7*   MCV fL 101* 102* 104* 102* 103*   PLATELETS Thousands/uL 187 183 154 252 273   MCH pg 32 8 32 7 32 7 33 1 32 8   MCHC g/dL 32 6 32 2 31 6 32 5 31 9   RDW % 13 5 13 6 13 5 13 2 13 1   MPV fL 10 1 10 0 10 2 10 0 9 9   NRBC AUTO /100 WBCs  --  1  --   --   --      CMP:  Results from last 7 days   Lab Units 12/26/22  0556 12/25/22  0535 12/24/22  0619 12/23/22  0550 12/21/22  0651 12/20/22  0610 12/19/22  2327   SODIUM mmol/L 138 138 140 140 139 138 138   POTASSIUM mmol/L 3 4* 3 7 4 6 4 7 4 4 4 9 5 6*   CHLORIDE mmol/L 102 103 107 106 108 101 100   CO2 mmol/L 29 25 24 25 23 24 17*   ANION GAP mmol/L 7 10 9 9 8 13 21*   BUN mg/dL 15 19 25 31* 44* 33* 33*   CREATININE mg/dL 0 67 0 73 0 85 0 97 1 79* 1 90* 1 95*   GLUCOSE FASTING mg/dL  --   --   --   --   --  149*  --    CALCIUM mg/dL 8 4 8 3 8 5 8 6 6 9* 8 0* 9 0   AST U/L  --   --   --  56* 23  --  29   ALT U/L  --   --   --  70 19  --  17   ALK PHOS U/L  --   --   --  117* 40*  --  76   TOTAL PROTEIN g/dL  --   --   --  6 6 5 2*  --  6 7   ALBUMIN g/dL  --   --   --  2 5* 2 2*  --  3 2*   TOTAL BILIRUBIN mg/dL  --   --   --  0 48 0 53  --  0 94   EGFR ml/min/1 73sq m 90 87 82 73 35 32 31     Magnesium:  Results from last 7 days   Lab Units 12/24/22  0619 12/23/22  0550 12/21/22  0651 12/19/22  2327   MAGNESIUM mg/dL 1 8 2 2 1 9 1 6     Hgb A1c:  Results from last 7 days   Lab Units 12/20/22  0610   HEMOGLOBIN A1C % 6 7*     NT-proBNP:   Recent Labs     12/24/22  0619   NTBNP 8,163*        Imaging & Testing   I have personally reviewed pertinent reports  XR chest portable    Result Date: 12/20/2022  Narrative: CHEST INDICATION:   SOB,cough  Lung cancer  COMPARISON:  5/4/2022   10/21/2022, CT scan  EXAM PERFORMED/VIEWS:  XR CHEST PORTABLE  AP semierect FINDINGS: Heart shadow is obscured by adjacent opacity  Increasing opacity at the left base which, when correlated with the CT scan, represents enlargement of the known mass  Probable associated pleural effusion  Left upper lung and right lung remain clear  No pneumothorax apparent  No acute osseous abnormalities  Impression: Increasing pleuroparenchymal opacity at the left base consistent with enlargement of the known mass and probably associated effusion/atelectasis   Workstation performed: [id number]     CT chest w contrast    Result Date: 12/23/2022  Narrative: CT CHEST WITH IV CONTRAST INDICATION:   Soft tissue mass, chest, US/xray nondiagnostic Soft tissue rule out cellulitis, abscess, fasciitis of right chest wall and axilla  COMPARISON:  Ultrasound from earlier today of the right axilla and CT scan of the chest from 10/21/2022  TECHNIQUE: CT examination of the chest was performed  Axial, sagittal, and coronal 2D reformatted images were created from the source data and submitted for interpretation  Radiation dose length product (DLP) for this visit:  747 62 mGy-cm   This examination, like all CT scans performed in the [facility], was performed utilizing techniques to minimize radiation dose exposure, including the use of iterative  reconstruction and automated exposure control  IV Contrast:  85 mL of iohexol (OMNIPAQUE) FINDINGS: LUNGS: Stable background emphysema  Increasing left lower lobe consolidation due to increasing left pleural effusion  The previously noted rounded masslike area is more difficult to discern but a slightly hypodense area noted on image 2/42 measuring 3 6 cm is approximately the same as prior  Stable juxtapleural reticular fibrotic changes mostly in the right lung base nonspecific  PLEURA:  Increasing left pleural effusion  HEART/GREAT VESSELS: Heart is unremarkable for patient's age  No thoracic aortic aneurysm  MEDIASTINUM AND ANDRE:  Unremarkable  CHEST WALL AND LOWER NECK:  There is skin thickening and subcutaneous edematous changes in the right axillary region  No focal fluid collection or mass identified  VISUALIZED STRUCTURES IN THE UPPER ABDOMEN:  Stable gallstones without surrounding inflammation  Postsurgical changes of the stomach  OSSEOUS STRUCTURES:  No acute fracture or destructive osseous lesion  Impression: Skin thickening and subcutaneous edematous changes in the right axillary region  No focal fluid collection or mass identified    Findings most consistent with cellulitis  Stable background emphysema  Increasing left lower lobe consolidation due to increasing left pleural effusion  The previously noted rounded mass in the left lower lobe is more difficult to discern on today's exam due to the increased effusion and atelectasis, but a slightly hypodense area noted on image 2/42 measuring 3 6 cm is approximately the same as prior  Stable gallstones without surrounding inflammation  Workstation performed: [id number]     Echo complete w/ contrast if indicated    Result Date: 12/24/2022  Narrative: •  Left Ventricle: Left ventricular cavity size is normal  Wall thickness is mildly increased  There is mild concentric hypertrophy  The left ventricular ejection fraction is 50% by visual estimation  Systolic function is low normal  Although no diagnostic regional wall motion abnormality was identified, this possibility cannot be completely excluded on the basis of this study  •  IVS: There is abnormal septal motion consistent with post-operative status  •  Right Ventricle: Wall thickness is mildly increased  •  Left Atrium: The atrium is moderately dilated  •  Right Atrium: The atrium is mildly dilated  •  Mitral Valve: There is mild annular calcification  There is mild regurgitation  •  Tricuspid Valve: There is mild regurgitation  Pulmonary artery pressure around 30 mmHg  •  Pericardium: There is a left pleural effusion  •  Hard to accurately assess due to atrial fibrillation EF but appears to be low normal on 50%  No other significant changes from old echo        US axilla    Result Date: 12/20/2022  Narrative: AXILLA ULTRASOUND INDICATION:   observe for fluid collection required drainage  COMPARISON:  None TECHNIQUE:   Real-time ultrasound of the right axilla was performed with a linear transducer with both volumetric sweeps and still imaging techniques  FINDINGS:  No discrete fluid collection is identified in the right axilla    Mild skin thickening and soft tissue edema is noted  Impression: No discrete fluid collection is identified in the right axilla  Workstation performed: [id number]        EKG / Monitor: Personally reviewed  Atrial fibrillation        [facility]  Cardiology      "This note was completed in part utilizing m-modal fluency direct voice recognition software  Grammatical errors, random word insertion, spelling mistakes, and incomplete sentences may be an occasional consequence of the system secondary to software limitations, ambient noise and hardware issues  Please read the chart carefully and recognize, using context, where substitutions have occurred    If you have any questions or concerns about the context, text or information contained within the body of this dictation, please contact myself, the provider, for further clarification "

## 2022-12-27 LAB
ANION GAP SERPL CALCULATED.3IONS-SCNC: 8 MMOL/L (ref 4–13)
BUN SERPL-MCNC: 16 MG/DL (ref 5–25)
CALCIUM SERPL-MCNC: 8.7 MG/DL (ref 8.3–10.1)
CHLORIDE SERPL-SCNC: 104 MMOL/L (ref 96–108)
CO2 SERPL-SCNC: 27 MMOL/L (ref 21–32)
CREAT SERPL-MCNC: 0.71 MG/DL (ref 0.6–1.3)
GFR SERPL CREATININE-BSD FRML MDRD: 88 ML/MIN/1.73SQ M
GLUCOSE SERPL-MCNC: 144 MG/DL (ref 65–140)
GLUCOSE SERPL-MCNC: 146 MG/DL (ref 65–140)
GLUCOSE SERPL-MCNC: 186 MG/DL (ref 65–140)
GLUCOSE SERPL-MCNC: 197 MG/DL (ref 65–140)
GLUCOSE SERPL-MCNC: 238 MG/DL (ref 65–140)
MAGNESIUM SERPL-MCNC: 1.7 MG/DL (ref 1.6–2.6)
POTASSIUM SERPL-SCNC: 4.3 MMOL/L (ref 3.5–5.3)
SODIUM SERPL-SCNC: 139 MMOL/L (ref 135–147)

## 2022-12-27 RX ORDER — DILTIAZEM HYDROCHLORIDE 120 MG/1
120 CAPSULE, EXTENDED RELEASE ORAL EVERY 12 HOURS SCHEDULED
Qty: 60 CAPSULE | Refills: 1 | Status: SHIPPED | OUTPATIENT
Start: 2022-12-27

## 2022-12-27 RX ORDER — DILTIAZEM HYDROCHLORIDE 60 MG/1
120 CAPSULE, EXTENDED RELEASE ORAL EVERY 12 HOURS SCHEDULED
Status: DISCONTINUED | OUTPATIENT
Start: 2022-12-27 | End: 2023-01-01 | Stop reason: HOSPADM

## 2022-12-27 RX ORDER — DILTIAZEM HYDROCHLORIDE 60 MG/1
120 CAPSULE, EXTENDED RELEASE ORAL EVERY 12 HOURS SCHEDULED
Status: DISCONTINUED | OUTPATIENT
Start: 2022-12-27 | End: 2022-12-27

## 2022-12-27 RX ORDER — DIGOXIN 125 MCG
125 TABLET ORAL DAILY
Qty: 30 TABLET | Refills: 1 | Status: SHIPPED | OUTPATIENT
Start: 2022-12-28 | End: 2022-12-30

## 2022-12-27 RX ORDER — DIPHENHYDRAMINE HYDROCHLORIDE 50 MG/ML
25 INJECTION INTRAMUSCULAR; INTRAVENOUS DAILY PRN
Status: DISCONTINUED | OUTPATIENT
Start: 2022-12-27 | End: 2023-01-01 | Stop reason: HOSPADM

## 2022-12-27 RX ORDER — CARVEDILOL 25 MG/1
25 TABLET ORAL 2 TIMES DAILY WITH MEALS
Qty: 60 TABLET | Refills: 1 | Status: SHIPPED | OUTPATIENT
Start: 2022-12-27

## 2022-12-27 RX ORDER — SPIRONOLACTONE 25 MG/1
25 TABLET ORAL DAILY
Qty: 30 TABLET | Refills: 1 | Status: SHIPPED | OUTPATIENT
Start: 2022-12-28

## 2022-12-27 RX ADMIN — DILTIAZEM HYDROCHLORIDE 90 MG: 60 TABLET, FILM COATED ORAL at 05:54

## 2022-12-27 RX ADMIN — DIPHENHYDRAMINE HYDROCHLORIDE 25 MG: 50 INJECTION, SOLUTION INTRAMUSCULAR; INTRAVENOUS at 02:24

## 2022-12-27 RX ADMIN — Medication 1500 MG: at 16:25

## 2022-12-27 RX ADMIN — APIXABAN 5 MG: 5 TABLET, FILM COATED ORAL at 08:29

## 2022-12-27 RX ADMIN — FAMOTIDINE 20 MG: 10 INJECTION, SOLUTION INTRAVENOUS at 21:12

## 2022-12-27 RX ADMIN — CEFAZOLIN SODIUM 2000 MG: 2 SOLUTION INTRAVENOUS at 12:17

## 2022-12-27 RX ADMIN — Medication 250 MG: at 08:29

## 2022-12-27 RX ADMIN — CEFAZOLIN SODIUM 2000 MG: 2 SOLUTION INTRAVENOUS at 04:30

## 2022-12-27 RX ADMIN — APIXABAN 5 MG: 5 TABLET, FILM COATED ORAL at 17:36

## 2022-12-27 RX ADMIN — GUAIFENESIN 600 MG: 600 TABLET, EXTENDED RELEASE ORAL at 21:12

## 2022-12-27 RX ADMIN — INSULIN LISPRO 3 UNITS: 100 INJECTION, SOLUTION INTRAVENOUS; SUBCUTANEOUS at 16:26

## 2022-12-27 RX ADMIN — INSULIN LISPRO 1 UNITS: 100 INJECTION, SOLUTION INTRAVENOUS; SUBCUTANEOUS at 21:13

## 2022-12-27 RX ADMIN — CEFAZOLIN SODIUM 2000 MG: 2 SOLUTION INTRAVENOUS at 15:35

## 2022-12-27 RX ADMIN — HYDRALAZINE HYDROCHLORIDE 5 MG: 20 INJECTION INTRAMUSCULAR; INTRAVENOUS at 23:40

## 2022-12-27 RX ADMIN — UMECLIDINIUM 1 PUFF: 62.5 AEROSOL, POWDER ORAL at 08:33

## 2022-12-27 RX ADMIN — FLUTICASONE FUROATE AND VILANTEROL TRIFENATATE 1 PUFF: 100; 25 POWDER RESPIRATORY (INHALATION) at 08:34

## 2022-12-27 RX ADMIN — DIGOXIN 125 MCG: 125 TABLET ORAL at 08:29

## 2022-12-27 RX ADMIN — SPIRONOLACTONE 25 MG: 25 TABLET ORAL at 08:29

## 2022-12-27 RX ADMIN — CEFAZOLIN SODIUM 2000 MG: 2 SOLUTION INTRAVENOUS at 21:52

## 2022-12-27 RX ADMIN — DILTIAZEM HYDROCHLORIDE 120 MG: 60 CAPSULE, EXTENDED RELEASE ORAL at 21:11

## 2022-12-27 RX ADMIN — FOLIC ACID 1 MG: 1 TABLET ORAL at 08:29

## 2022-12-27 RX ADMIN — ATORVASTATIN CALCIUM 10 MG: 10 TABLET, FILM COATED ORAL at 15:35

## 2022-12-27 RX ADMIN — MONTELUKAST 5 MG: 10 TABLET, FILM COATED ORAL at 21:12

## 2022-12-27 RX ADMIN — FAMOTIDINE 20 MG: 10 INJECTION, SOLUTION INTRAVENOUS at 08:37

## 2022-12-27 RX ADMIN — GUAIFENESIN 600 MG: 600 TABLET, EXTENDED RELEASE ORAL at 08:31

## 2022-12-27 RX ADMIN — HYDROXYZINE HYDROCHLORIDE 25 MG: 25 TABLET ORAL at 00:47

## 2022-12-27 RX ADMIN — CARVEDILOL 25 MG: 25 TABLET, FILM COATED ORAL at 08:28

## 2022-12-27 RX ADMIN — LATANOPROST 1 DROP: 50 SOLUTION OPHTHALMIC at 21:11

## 2022-12-27 RX ADMIN — CARVEDILOL 25 MG: 25 TABLET, FILM COATED ORAL at 15:36

## 2022-12-27 RX ADMIN — Medication 250 MG: at 17:36

## 2022-12-27 RX ADMIN — Medication 3 MG: at 21:12

## 2022-12-27 RX ADMIN — INSULIN LISPRO 2 UNITS: 100 INJECTION, SOLUTION INTRAVENOUS; SUBCUTANEOUS at 12:17

## 2022-12-27 NOTE — PROGRESS NOTES
Progress Note - Cardiology   [facility]     Alice Sanders [de-identified] y o  male MRN: [id number]  : 1940  Unit/Bed#: 2 [place] [address] Encounter: [id number]    Assessment and Plan:   1   Right axilla cellulitis/sepsis: Patient followed by infectious disease and surgery    -   Wound culture grew out staph aureus    -   Patient on Ancef 2 g every 6 hours IV     2   Hypertension:  Blood pressure slowly improving with addition of hydralazine we will continue to monitor     -   Continue Coreg to 25 mg twice daily    -   Patient is currently on Cardizem drip (had been on Norvasc)    -   Continue to monitor     3   Chronic atrial fibrillation with rapid ventricular response: Rates poorly controlled since admission to the hospital question underlying sepsis as cause    -   Patient loaded with digoxin early a m   with digoxin 0 25 mg IV x2 with improvement in heart rate    -   Patient started on digoxin 0 125 mg daily on 2022    -   Change short acting Cardizem to Cardizem  every 12 hours    -   Digoxin level in 1 week    -   Continue Eliquis 5 mg twice daily    -   Continue to monitor telemetry     4   Chronic diastolic heart failure: Patient is receiving high levels of fluids and appears volume overloaded    -   Appears patient's dry weight is around 230 pounds, today he is 234    -   Patient received Lasix 20 mg IV x2  with a very good response    -   Started on Aldactone 12 5 mg on 2022    -   Other medication as above    -   ACE/ARB and now listed as allergies secondary to history of angioedema    -   Monitor I&O, daily weights and labs     5   Diabetes: Hemoglobin A1c 7 1 managed per primary team     6   Chronic kidney disease: Baseline creatinine appears to be less than 1     7   Obesity: BMI 33 8     8   Left lower lobe lung CA status postradiation therapy    Subjective / Objective:   Patient seen and examined    States that he is feeling much better and is anxious for discharge home   Heart rates greatly improved with the addition of Cardizem and digoxin  We will continue monitoring as may be able to discontinue or decrease frequency of digoxin dosing  Vitals: Blood pressure 150/96, pulse 73, temperature 99 2 °F (37 3 °C), resp  rate 16, height 5' 11" (1 803 m), weight 108 kg (237 lb), SpO2 96 %  Vitals:    12/26/22 0557 12/27/22 0600   Weight: 106 kg (234 lb 9 6 oz) 108 kg (237 lb)     Body mass index is 33 05 kg/m²  BP Readings from Last 3 Encounters:   12/27/22 150/96   12/06/22 140/80   11/28/22 140/80     Orthostatic Blood Pressures    Flowsheet Row Most Recent Value   Blood Pressure 150/96 filed at 12/27/2022 0744   Patient Position - Orthostatic VS Sitting filed at 12/26/2022 1938        I/O       12/25 0701  12/26 0700 12/26 0701  12/27 0700 12/27 0701  12/28 0700    P  O   240     IV Piggyback       Total Intake(mL/kg)  240 (2 2)     Urine (mL/kg/hr) 800 (0 3) 800 (0 3)     Total Output 800 800     Net -800 -560                Invasive Devices     Peripheral Intravenous Line  Duration           Peripheral IV 12/26/22 Dorsal (posterior); Left Hand 1 day                  Intake/Output Summary (Last 24 hours) at 12/27/2022 1037  Last data filed at 12/26/2022 2110  Gross per 24 hour   Intake 240 ml   Output 800 ml   Net -560 ml         Physical Exam:   Physical Exam  Vitals and nursing note reviewed  Constitutional:       Appearance: Normal appearance  He is morbidly obese  HENT:      Right Ear: External ear normal       Left Ear: External ear normal       Nose: Nose normal    Eyes:      General: No scleral icterus  Right eye: No discharge  Left eye: No discharge  Cardiovascular:      Rate and Rhythm: Normal rate  Rhythm irregular  Pulses: Normal pulses  Heart sounds: Murmur heard  Pulmonary:      Effort: Pulmonary effort is normal  No accessory muscle usage or respiratory distress        Breath sounds: Examination of the right-lower field reveals decreased breath sounds  Examination of the left-lower field reveals decreased breath sounds  Decreased breath sounds present  Abdominal:      General: Bowel sounds are normal  There is no distension  Palpations: Abdomen is soft  There is no mass  Skin:     Capillary Refill: Capillary refill takes less than 2 seconds  Neurological:      General: No focal deficit present  Mental Status: He is alert and oriented to person, place, and time  Mental status is at baseline  Psychiatric:         Mood and Affect: Mood normal          Behavior: Behavior is cooperative              Medications/ Allergies:     Current Facility-Administered Medications   Medication Dose Route Frequency Provider Last Rate   • acetaminophen  650 mg Oral Q6H PRN Angel Sunshine MD     • aluminum-magnesium hydroxide-simethicone  30 mL Oral Q6H PRN Angel Sunshine MD     • apixaban  5 mg Oral BID Angel Sunshine MD     • atorvastatin  10 mg Oral Daily With Adele Blair MD     • bisacodyl  10 mg Rectal Daily PRN Angel Sunshine MD     • calamine-zinc oxide   Topical 4x Daily Angel Sunshine MD     • carvedilol  25 mg Oral BID With Meals SIMI James     • cefazolin  2,000 mg Intravenous Q6H Romaine Farmer MD 2,000 mg (12/27/22 0430)   • digoxin  125 mcg Oral Daily SIMI James     • diltiazem  120 mg Oral Q12H [address] SIMI James     • diphenhydrAMINE  25 mg Intravenous Daily PRN Charity Amos PA-C     • famotidine  20 mg Intravenous Q12H [address] SIMI Archibald     • Fluticasone Furoate-Vilanterol  1 puff Inhalation Daily Angel Sunshine MD      And   • umeclidinium  1 puff Inhalation Daily Angel Sunshine MD     • folic acid  1 mg Oral Daily Angel Sunshine MD     • guaiFENesin  600 mg Oral Q12H Sang Goldberg MD     • hydrALAZINE  5 mg Intravenous Q6H PRN Charity Amos PA-C     • hydrocortisone   Topical BID Angel Sunshine MD     • hydrOXYzine HCL  25 mg Oral Q6H PRN SIMI Gonzales     • insulin lispro  1-6 Units Subcutaneous TID AC SIMI Castillo     • insulin lispro  1-6 Units Subcutaneous HS SIMI Castillo     • latanoprost  1 drop Both Eyes HS Vielka Grayson MD     • levalbuterol  0 63 mg Nebulization Q4H PRN SIMI Gonzales     • melatonin  3 mg Oral HS SIMI Gonzales     • metoprolol  2 5 mg Intravenous Q6H PRN SIMI Gonzales     • montelukast  5 mg Oral HS Vielka Grayson MD     • ondansetron  4 mg Intravenous Q6H PRN Vielka Grayson MD     • polyethylene glycol  17 g Oral Daily Vanessa Garza MD     • saccharomyces boulardii  250 mg Oral BID SIMI Gonzales     • simethicone  80 mg Oral 4x Daily PRN Vielka Grayson MD     • spironolactone  25 mg Oral Daily Felix Bucio MD       acetaminophen, 650 mg, Q6H PRN  aluminum-magnesium hydroxide-simethicone, 30 mL, Q6H PRN  bisacodyl, 10 mg, Daily PRN  diphenhydrAMINE, 25 mg, Daily PRN  hydrALAZINE, 5 mg, Q6H PRN  hydrOXYzine HCL, 25 mg, Q6H PRN  levalbuterol, 0 63 mg, Q4H PRN  metoprolol, 2 5 mg, Q6H PRN  ondansetron, 4 mg, Q6H PRN  simethicone, 80 mg, 4x Daily PRN      No Known Allergies    VTE Pharmacologic Prophylaxis:   Sequential compression device (Venodyne)     Labs:   CBC with diff:  Results from last 7 days   Lab Units 12/24/22  0619 12/23/22  0550 12/21/22  0651   WBC Thousand/uL 8 79 11 09* 9 09   HEMOGLOBIN g/dL 14 1 14 2 12 8   HEMATOCRIT % 43 2 44 1 40 8   MCV fL 101* 102* 104*   PLATELETS Thousands/uL 187 183 154   MCH pg 32 8 32 7 32 7   MCHC g/dL 32 6 32 2 31 6   RDW % 13 5 13 6 13 5   MPV fL 10 1 10 0 10 2   NRBC AUTO /100 WBCs  --  1  --      CMP:  Results from last 7 days   Lab Units 12/27/22  0553 12/26/22  0556 12/25/22  0535 12/24/22  0619 12/23/22  0550 12/21/22  0651   SODIUM mmol/L 139 138 138 140 140 139   POTASSIUM mmol/L 4 3 3 4* 3 7 4 6 4 7 4 4   CHLORIDE mmol/L 104 102 103 107 106 108   CO2 mmol/L 27 29 25 24 25 23   ANION GAP mmol/L 8 7 10 9 9 8   BUN mg/dL 16 15 19 25 31* 44*   CREATININE mg/dL 0 71 0 67 0 73 0  85 0 97 1 79*   CALCIUM mg/dL 8 7 8 4 8 3 8 5 8 6 6 9*   AST U/L  --   --   --   --  56* 23   ALT U/L  --   --   --   --  70 19   ALK PHOS U/L  --   --   --   --  117* 40*   TOTAL PROTEIN g/dL  --   --   --   --  6 6 5 2*   ALBUMIN g/dL  --   --   --   --  2 5* 2 2*   TOTAL BILIRUBIN mg/dL  --   --   --   --  0 48 0 53   EGFR ml/min/1 73sq m 88 90 87 82 73 35     Magnesium:  Results from last 7 days   Lab Units 12/27/22  0553 12/24/22  0619 12/23/22  0550 12/21/22  0651   MAGNESIUM mg/dL 1 7 1 8 2 2 1 9       Imaging & Testing   I have personally reviewed pertinent reports  XR chest portable    Result Date: 12/20/2022  Narrative: CHEST INDICATION:   SOB,cough  Lung cancer  COMPARISON:  5/4/2022   10/21/2022, CT scan  EXAM PERFORMED/VIEWS:  XR CHEST PORTABLE  AP semierect FINDINGS: Heart shadow is obscured by adjacent opacity  Increasing opacity at the left base which, when correlated with the CT scan, represents enlargement of the known mass  Probable associated pleural effusion  Left upper lung and right lung remain clear  No pneumothorax apparent  No acute osseous abnormalities  Impression: Increasing pleuroparenchymal opacity at the left base consistent with enlargement of the known mass and probably associated effusion/atelectasis  Workstation performed: [id number]     CT chest w contrast    Result Date: 12/23/2022  Narrative: CT CHEST WITH IV CONTRAST INDICATION:   Soft tissue mass, chest, US/xray nondiagnostic Soft tissue rule out cellulitis, abscess, fasciitis of right chest wall and axilla  COMPARISON:  Ultrasound from earlier today of the right axilla and CT scan of the chest from 10/21/2022  TECHNIQUE: CT examination of the chest was performed  Axial, sagittal, and coronal 2D reformatted images were created from the source data and submitted for interpretation  Radiation dose length product (DLP) for this visit:  747 62 mGy-cm     This examination, like all CT scans performed in the [facility], was performed utilizing techniques to minimize radiation dose exposure, including the use of iterative  reconstruction and automated exposure control  IV Contrast:  85 mL of iohexol (OMNIPAQUE) FINDINGS: LUNGS: Stable background emphysema  Increasing left lower lobe consolidation due to increasing left pleural effusion  The previously noted rounded masslike area is more difficult to discern but a slightly hypodense area noted on image 2/42 measuring 3 6 cm is approximately the same as prior  Stable juxtapleural reticular fibrotic changes mostly in the right lung base nonspecific  PLEURA:  Increasing left pleural effusion  HEART/GREAT VESSELS: Heart is unremarkable for patient's age  No thoracic aortic aneurysm  MEDIASTINUM AND ANDRE:  Unremarkable  CHEST WALL AND LOWER NECK:  There is skin thickening and subcutaneous edematous changes in the right axillary region  No focal fluid collection or mass identified  VISUALIZED STRUCTURES IN THE UPPER ABDOMEN:  Stable gallstones without surrounding inflammation  Postsurgical changes of the stomach  OSSEOUS STRUCTURES:  No acute fracture or destructive osseous lesion  Impression: Skin thickening and subcutaneous edematous changes in the right axillary region  No focal fluid collection or mass identified  Findings most consistent with cellulitis  Stable background emphysema  Increasing left lower lobe consolidation due to increasing left pleural effusion  The previously noted rounded mass in the left lower lobe is more difficult to discern on today's exam due to the increased effusion and atelectasis, but a slightly hypodense area noted on image 2/42 measuring 3 6 cm is approximately the same as prior  Stable gallstones without surrounding inflammation   Workstation performed: [id number]     Echo complete w/ contrast if indicated    Result Date: 12/24/2022  Narrative: •  Left Ventricle: Left ventricular cavity size is normal  Wall thickness is mildly increased  There is mild concentric hypertrophy  The left ventricular ejection fraction is 50% by visual estimation  Systolic function is low normal  Although no diagnostic regional wall motion abnormality was identified, this possibility cannot be completely excluded on the basis of this study  •  IVS: There is abnormal septal motion consistent with post-operative status  •  Right Ventricle: Wall thickness is mildly increased  •  Left Atrium: The atrium is moderately dilated  •  Right Atrium: The atrium is mildly dilated  •  Mitral Valve: There is mild annular calcification  There is mild regurgitation  •  Tricuspid Valve: There is mild regurgitation  Pulmonary artery pressure around 30 mmHg  •  Pericardium: There is a left pleural effusion  •  Hard to accurately assess due to atrial fibrillation EF but appears to be low normal on 50%  No other significant changes from old echo        US axilla    Result Date: 12/20/2022  Narrative: AXILLA ULTRASOUND INDICATION:   observe for fluid collection required drainage  COMPARISON:  None TECHNIQUE:   Real-time ultrasound of the right axilla was performed with a linear transducer with both volumetric sweeps and still imaging techniques  FINDINGS:  No discrete fluid collection is identified in the right axilla  Mild skin thickening and soft tissue edema is noted  Impression: No discrete fluid collection is identified in the right axilla  Workstation performed: [id number]        EKG / Monitor: Personally reviewed  Atrial fibrillation with controlled ventricular response        [facility]  Cardiology      "This note was completed in part utilizing [employer] fluency direct voice recognition software  Grammatical errors, random word insertion, spelling mistakes, and incomplete sentences may be an occasional consequence of the system secondary to software limitations, ambient noise and hardware issues      Please read the chart carefully and recognize, using context, where substitutions have occurred    If you have any questions or concerns about the context, text or information contained within the body of this dictation, please contact myself, the provider, for further clarification "

## 2022-12-27 NOTE — ASSESSMENT & PLAN NOTE
Likely due to right lateral chest wall cellulitis, tachycardia- possibly secondary to underlying A  fib  · BCx-negative so far    Wound culture - staph  · Off Rocephin, Vanco  currently on IV Ancef as noted above  · ID following, recs appreciated  · Imaging negative for abscess

## 2022-12-27 NOTE — ASSESSMENT & PLAN NOTE
Wt Readings from Last 3 Encounters:   12/26/22 106 kg (234 lb 9 6 oz)   12/06/22 102 kg (224 lb)   11/28/22 102 kg (224 lb)     Patient with signs of volume overload    20 mg of IV Lasix x1 given on 12/24 and was started on 20 mg of IV lasix daily  IV Lasix discontinued and patient started on Aldactone  · Daily weights  · Intake and output  · Continue Coreg

## 2022-12-27 NOTE — ASSESSMENT & PLAN NOTE
Followed by GEN surg  Bedside aspiration was done previously and bedside I&D done on 12/24  · Continue IV cefazolin, increase to every 6 hours  · No leukocytosis  · Imaging negative for abscess

## 2022-12-27 NOTE — PLAN OF CARE
Problem: PAIN - ADULT  Goal: Verbalizes/displays adequate comfort level or baseline comfort level  Description: Interventions:  - Encourage patient to monitor pain and request assistance  - Assess pain using appropriate pain scale  - Administer analgesics based on type and severity of pain and evaluate response  - Implement non-pharmacological measures as appropriate and evaluate response  - Consider cultural and social influences on pain and pain management  - Notify physician/advanced practitioner if interventions unsuccessful or patient reports new pain  Outcome: Progressing     Problem: INFECTION - ADULT  Goal: Absence or prevention of progression during hospitalization  Description: INTERVENTIONS:  - Assess and monitor for signs and symptoms of infection  - Monitor lab/diagnostic results  - Monitor all insertion sites, i e  indwelling lines, tubes, and drains  - Monitor endotracheal if appropriate and nasal secretions for changes in amount and color  - [place] appropriate cooling/warming therapies per order  - Administer medications as ordered  - Instruct and encourage patient and family to use good hand hygiene technique  - Identify and instruct in appropriate isolation precautions for identified infection/condition  Outcome: Progressing     Problem: MOBILITY - ADULT  Goal: Maintain or return to baseline ADL function  Description: INTERVENTIONS:  -  Assess patient's ability to carry out ADLs; assess patient's baseline for ADL function and identify physical deficits which impact ability to perform ADLs (bathing, care of mouth/teeth, toileting, grooming, dressing, etc )  - Assess/evaluate cause of self-care deficits   - Assess range of motion  - Assess patient's mobility; develop plan if impaired  - Assess patient's need for assistive devices and provide as appropriate  - Encourage maximum independence but intervene and supervise when necessary  - Involve family in performance of ADLs  - Assess for home care needs following discharge   - Consider OT consult to assist with ADL evaluation and planning for discharge  - Provide patient education as appropriate  Outcome: Progressing

## 2022-12-27 NOTE — PROGRESS NOTES
David Ott is a [de-identified] y o  male who is currently ordered Vancomycin IV with management by the Pharmacy Consult service  Relevant clinical data and objective / subjective history reviewed  Vancomycin Assessment:  Indication and Goal AUC/Trough: Soft tissue (goal -600, trough >10), -600, trough >10  Clinical Status: New  Micro: Pending  Renal Function:  SCr: 0 71 mg/dL  CrCl: 102 5 mL/min  Renal replacement: Not on dialysis  Days of Therapy: 1  Current Dose: 1500 mg IV q 12 h  Vancomycin Plan: 1  Estimated AUC: 477 mcg*hr/mL  2  Estimated Trough:  14 2 mcg/mL  Next Level: 12/29/22 @ 0600  Renal Function Monitoring: Daily BMP and [place] will continue to follow closely for s/sx of nephrotoxicity, infusion reactions and appropriateness of therapy  BMP and CBC will be ordered per protocol  We will continue to follow the patient’s culture results and clinical progress daily      Dotty Guo, Pharmacist

## 2022-12-27 NOTE — ASSESSMENT & PLAN NOTE
Lab Results   Component Value Date    HGBA1C 6 7 (H) 12/20/2022       Recent Labs     12/26/22  0756 12/26/22  1115 12/26/22  1610 12/26/22 2052   POCGLU 158* 215* 153* 168*     -Continue to hold metformin and Jardiance (potential contributor to Derm reaction)  -Continue Accuchecks AC/HS with insulin sliding scale

## 2022-12-27 NOTE — ASSESSMENT & PLAN NOTE
Heart rate is not well controlled  · Telemetry monitoring on telemetry  · Cardizem drip discontinued and transition to oral Cardizem today  Also received doses of IV digoxin and started on p o  digoxin today  · continue eliquis    Continue Coreg  · Cardio input appreciated

## 2022-12-27 NOTE — UTILIZATION REVIEW
Continued Stay Review    Date: 12/25/22                          Current Patient Class: inpatient  Current Level of Care: Med surg/telemetry    HPI:82 y o  male initially admitted on 12/20/22     Assessment/Plan:   IVABT continued for R axilla cellulitis  Wound culture grew staph aureus  Lungs with decreased breath sounds, 2+ BLE edema noted  Per cardio: Chronic atrial fibrillation with rapid ventricular response: Rates poorly controlled since admission to the hospital question underlying sepsis as cause  Patient loaded with digoxin early a m  12/25 with digoxin 0 25 mg IV x2 with improvement in heart rate  Give another dose of digoxin 0 125 mg x 1 this morning and continue to monitor telemetry  Continue Cardizem drip at 15 mg/h & Eliquis 5 mg twice daily  Chronic diastolic heart failure: Patient is receiving high levels of fluids and appears volume overloaded  S/p IV Lasix 12/24 with good response, continue daily dosing       Vital Signs:   12/25/22 21:10:26 -- 57 -- 151/96 114 96 % -- -- --   12/25/22 19:19:21 97 5 °F (36 4 °C) 85 20 156/82 107 100 % None (Room air) -- Sitting   12/25/22 14:53:22 97 2 °F (36 2 °C) Abnormal  69 -- 141/80 100 96 % -- -- --   12/25/22 1100 -- -- -- -- -- -- None (Room air) -- --   12/25/22 07:15:41 98 °F (36 7 °C) 81 -- 140/81 101 92 % -- -- --   12/25/22 03:20:54 -- 124 Abnormal  -- 144/81 102 94 % -- -- --   12/25/22 0318 -- 136 Abnormal  -- -- -- -- -- -- Lying     Pertinent Labs/Diagnostic Results:   12/24 Echo=  •  Left Ventricle: Left ventricular cavity size is normal  Wall thickness is mildly increased  There is mild concentric hypertrophy  The left ventricular ejection fraction is 50% by visual estimation  Systolic function is low normal  Although no diagnostic regional wall motion abnormality was identified, this possibility cannot be completely excluded on the basis of this study  •  IVS: There is abnormal septal motion consistent with post-operative status    • Right Ventricle: Wall thickness is mildly increased  •  Left Atrium: The atrium is moderately dilated  •  Right Atrium: The atrium is mildly dilated  •  Mitral Valve: There is mild annular calcification  There is mild regurgitation  •  Tricuspid Valve: There is mild regurgitation  Pulmonary artery pressure around 30 mmHg  •  Pericardium: There is a left pleural effusion  •  Hard to accurately assess due to atrial fibrillation EF but appears to be low normal on 50%  No other significant changes from old echo  12/23 Ekg=  Atrial fibrillation with rapid ventricular response with premature ventricular or aberrantly conducted complexes  Low voltage QRS  Septal infarct (cited on or before 28-SEP-2021)    12/23 CT chest w contrast  Skin thickening and subcutaneous edematous changes in the right axillary region  No focal fluid collection or mass identified  Findings most consistent with cellulitis  Stable background emphysema  Increasing left lower lobe consolidation due to increasing left pleural effusion  The previously noted rounded mass in the left lower lobe is more difficult to discern on today's exam due to the increased effusion and atelectasis, but a slightly hypodense area noted on image 2/42 measuring 3 6 cm is approximately the same as prior  Stable gallstones without surrounding inflammation      Results from last 7 days   Lab Units 12/24/22  0619 12/23/22  0550 12/21/22  0651   WBC Thousand/uL 8 79 11 09* 9 09   HEMOGLOBIN g/dL 14 1 14 2 12 8   HEMATOCRIT % 43 2 44 1 40 8   PLATELETS Thousands/uL 187 183 154   NEUTROS ABS Thousands/µL  --  9 76*  --    BANDS PCT %  --   --  42*     Results from last 7 days   Lab Units 12/27/22  0553 12/26/22  0556 12/25/22  0535 12/24/22  0619 12/23/22  0550 12/21/22  0651   SODIUM mmol/L 139 138 138 140 140 139   POTASSIUM mmol/L 4 3 3 4* 3 7 4 6 4 7 4 4   CHLORIDE mmol/L 104 102 103 107 106 108   CO2 mmol/L 27 29 25 24 25 23   ANION GAP mmol/L 8 7 10 9 9 8   BUN mg/dL 16 15 19 25 31* 44*   CREATININE mg/dL 0 71 0 67 0 73 0 85 0 97 1 79*   EGFR ml/min/1 73sq m 88 90 87 82 73 35   CALCIUM mg/dL 8 7 8 4 8 3 8 5 8 6 6 9*   MAGNESIUM mg/dL 1 7  --   --  1 8 2 2 1 9     Results from last 7 days   Lab Units 12/23/22  0550 12/21/22  0651   AST U/L 56* 23   ALT U/L 70 19   ALK PHOS U/L 117* 40*   TOTAL PROTEIN g/dL 6 6 5 2*   ALBUMIN g/dL 2 5* 2 2*   TOTAL BILIRUBIN mg/dL 0 48 0 53     Results from last 7 days   Lab Units 12/27/22  1124 12/27/22  0710 12/26/22  2052 12/26/22  1610 12/26/22  1115 12/26/22  0756 12/25/22  2034 12/25/22  1618 12/25/22  1057 12/25/22  0709 12/24/22  2119 12/24/22  1547   POC GLUCOSE mg/dl 197* 146* 168* 153* 215* 158* 208* 146* 259* 147* 158* 163*     Results from last 7 days   Lab Units 12/27/22  0553 12/26/22  0556 12/25/22  0535 12/24/22  0619 12/23/22  0550 12/21/22  0651   GLUCOSE RANDOM mg/dL 144* 147* 150* 142* 124 125     Results from last 7 days   Lab Units 12/20/22  1307   PH ART  7 350   PCO2 ART mm Hg 33 9*   PO2 ART mm Hg 76 8   HCO3 ART mmol/L 18 3*   BASE EXC ART mmol/L -6 2   O2 CONTENT ART mL/dL 21 7   O2 HGB, ARTERIAL % 93 1*   ABG SOURCE  Brachial, Right     Results from last 7 days   Lab Units 12/21/22  0651   PROCALCITONIN ng/ml 26 86*     Results from last 7 days   Lab Units 12/21/22  0651   LACTIC ACID mmol/L 1 9     Results from last 7 days   Lab Units 12/24/22  0619   NT-PRO BNP pg/mL 8,163*     Results from last 7 days   Lab Units 12/20/22  1539   GRAM STAIN RESULT  1+ Polys*  2+ Gram positive cocci in pairs*   WOUND CULTURE  4+ Growth of Staphylococcus aureus*     Medications:   Scheduled Medications:  apixaban, 5 mg, Oral, BID  calamine-zinc oxide, , Topical, 4x Daily  carvedilol, 25 mg, Oral, BID With Meals  cefazolin, 2,000 mg, Intravenous, Q8H  digoxin (LANOXIN) injection 125 mcg, Once @ 0758  digoxin (LANOXIN) injection 250 mcg, Once @ 0321  famotidine, 20 mg, Intravenous, Q12H [address]  Fluticasone Furoate-Vilanterol, 1 puff, Inhalation, Daily   And  umeclidinium, 1 puff, Inhalation, Daily  folic acid, 1 mg, Oral, Daily  furosemide (LASIX) injection 20 mg, Daily  guaiFENesin, 600 mg, Oral, Q12H VALARIE  hydrALAZINE (APRESOLINE) tablet 25 mg, Q12H  hydrocortisone, , Topical, BID  insulin lispro, 1-6 Units, Subcutaneous, TID AC  insulin lispro, 1-6 Units, Subcutaneous, HS  latanoprost, 1 drop, Both Eyes, HS  melatonin, 3 mg, Oral, HS  montelukast, 5 mg, Oral, HS  polyethylene glycol, 17 g, Oral, Daily  saccharomyces boulardii, 250 mg, Oral, BID  spironolactone, 25 mg, Oral, Daily    Continuous IV Infusions:  diltiazem (CARDIZEM) 125 mg in sodium chloride 0 9 % 125 mL infusion  Rate: 15 mL/hr Dose: 15 mg/hr  Freq: Continuous Route: IV  Last Dose: Stopped (12/26/22 1006)  Start: 12/23/22 1930     PRN Meds:  acetaminophen, 650 mg, Oral, Q6H PRN  aluminum-magnesium hydroxide-simethicone, 30 mL, Oral, Q6H PRN  bisacodyl, 10 mg, Rectal, Daily PRN  diphenhydrAMINE, 25 mg, Intravenous, Daily PRN  hydrALAZINE, 5 mg, Intravenous, Q6H PRN  hydrOXYzine HCL, 25 mg, Oral, Q6H PRN  levalbuterol, 0 63 mg, Nebulization, Q4H PRN  metoprolol, 2 5 mg, Intravenous, Q6H PRN  ondansetron, 4 mg, Intravenous, Q6H PRN  simethicone, 80 mg, Oral, 4x Daily PRN    Discharge Plan: d    Network Utilization Review Department  ATTENTION: Please call with any questions or concerns to [phone] and carefully listen to the prompts so that you are directed to the right person  All voicemails are confidential   Zuñiga Selina all requests for admission clinical reviews, approved or denied determinations and any other requests to dedicated fax number below belonging to the campus where the patient is receiving treatment   List of dedicated fax numbers for the Facilities:  [address] [place] [address] DENIALS (Administrative/Medical Necessity) [phone] [address]  (Maternity/NICU/Pediatrics) [phone]   [address] [place] [phone]   [facility] 77 [phone]   [address] [place] [address] Courtney Neri 28 [address] [facility] 310 [facility] 134 815 [facility] [phone]

## 2022-12-27 NOTE — PROGRESS NOTES
Progress Note - General Surgery   Shabbir Erazo [de-identified] y o  male MRN: [id number]  Unit/Bed#: [address] [place] [address] Encounter: [id number]    Assessment:  1) right axillary swelling -slightly improved, no leukocytosis, resolved lactic acid, incision and drainage sites are clean and dry, patient still having significant erythema and swelling surrounding the axillary region particularly in the posterior and inferior aspects,     Plan:  1)   -Discussion and coordination with internal medicine infectious disease  -Patient education  -Wound dressing change, exchange of packing  -Punch biopsy x3 planned for erythematous regions  -Discussion and education to patient on risks, benefits, alternatives for punch biopsy  -Patient would like to pursue punch biopsies for diagnostic value  -1% lidocaine ordered 10 cc    Subjective/Objective   Chief Complaint: There is the farideh    Subjective: Patient was seen and examined at bedside  Patient denies any acute events overnight  Patient denies any recent changes  Patient still has erythema, tenderness, swelling in the right axilla  Patient is somewhat frustrated and feels like he just wants to go home and cannot understand what is going on  Patient does feel that there has been some slight improvement compared to when he was seen on the 24th  Patient did have some drainage from the incision and drainage sites but only sanguinous secretion no purulence  Objective:     Blood pressure 140/68, pulse 80, temperature 99 2 °F (37 3 °C), resp  rate 16, height 5' 11" (1 803 m), weight 108 kg (237 lb), SpO2 94 %  ,Body mass index is 33 05 kg/m²  Intake/Output Summary (Last 24 hours) at 12/27/2022 1634  Last data filed at 12/27/2022 1535  Gross per 24 hour   Intake 240 ml   Output 1350 ml   Net -1110 ml       Invasive Devices     Peripheral Intravenous Line  Duration           Peripheral IV 12/26/22 Dorsal (posterior); Left Hand 1 day                Physical Exam: /82   Pulse 100   Temp 97 9 °F (36 6 °C)   Resp 16   Ht 5' 11" (1 803 m)   Wt 105 kg (231 lb 1 6 oz)   SpO2 94%   BMI 32 23 kg/m²   General appearance: alert and oriented, in no acute distress  Head: Normocephalic, without obvious abnormality, atraumatic  Skin: Erythema, swelling, 2 incision and drainage sites that are healing appropriately with packing in place    Lab, Imaging and other studies:  I have personally reviewed pertinent lab results  , CBC:   Lab Results   Component Value Date    WBC 12 06 (H) 12/28/2022    HGB 15 7 12/28/2022    HCT 48 3 12/28/2022     (H) 12/28/2022     12/28/2022    MCH 32 7 12/28/2022    MCHC 32 5 12/28/2022    RDW 13 4 12/28/2022    MPV 9 3 12/28/2022    NRBC  12/28/2022      Comment: This is an appended report  These results have been appended to a previously preliminary verified report  This is a corrected result   Previous result was 0 /100 WBCs on 12/28/2022 at 0552 EST   , CMP:   Lab Results   Component Value Date    SODIUM 138 12/28/2022    K 3 8 12/28/2022     12/28/2022    CO2 28 12/28/2022    BUN 11 12/28/2022    CREATININE 0 82 12/28/2022    CALCIUM 8 9 12/28/2022    EGFR 83 12/28/2022     VTE Pharmacologic Prophylaxis: Eliquis  VTE Mechanical Prophylaxis: sequential compression device

## 2022-12-27 NOTE — PROGRESS NOTES
Progress Note - Infectious Disease   Chris Bagley [de-identified] y o  male MRN: [id number]  Unit/Bed#: [address] [place] [address] Encounter: [id number]      Impression/Plan:    1   Severe sepsis, developing soon after admission  With fever, tachycardia, tachypnea, lactic acidosis and hypotension responsive to IVFs  Due to #2 below  Blood cultures negative to date   -Antibiotics as below  -monitor temperature and hemodynamics  -serial exam  -recheck CBC and BMP in a m   -supportive care per primary care team  -additional interventions clinical course     2  Right axillary cellulitis  Axillary US without discrete collection  Bedside needle aspiration attempted with only scant blood obtained and sent for culture 12/20  Culture is growing MSSA  CT chest was performed 12/23 due to continued erythema and swelling of the axilla, this did not show any abscess  Patient is status post repeat I&D by surgery on 12/24  Repeat cultures were not sent  The area of erythema appears smaller but the patient still has significant erythema with induration and swelling  He is hemodynamically stable without fever or leukocytosis  Consider presence of myositis due to slow response to antibiotics  Also consider mixed infection with MRSA present given limited improvement thus far with cefazolin   -Continue high-dose IV cefazolin   -Will add on IV vancomycin for additional coverage of MRSA  -Recommend surgical reevaluation  -recommend ongoing wound care  -Recommend skin biopsy to evaluate for an underlying inflammatory or noninfectious condition  -Monitor exam     3  BUDDY  Developing soon on admission  Renal function has since improved  -renal dose adjust antibiotic as needed  -volume management per primary     4  Urticaria/Angioedema, POA and worsening x 1 month  Unclear source  Outpatient mysoline, atorvastatin, losartan, jardiance held  No known antibiotic allergies  -monitor symptoms  -symptomatic management per primary care team     5  Lung cancer   S/p 3 High dose RT treatment 2022  No chemotherapy    -Outpatient oncology follow-up    Above management plan discussed with the patient and family at bedside and [address] [place] [address] Attending  ID will follow  Antibiotics:  Cefazolin day 6  Antibiotics day 9    Subjective:  Patient continues to have significant erythema, tenderness, induration in the right axillary region  However the patient reports that he is overall feeling better  He denies fever or chills  He is overall frustrated about his prolonged hospitalization  Objective:  Vitals:  Temp:  [97 4 °F (36 3 °C)-99 2 °F (37 3 °C)] 99 2 °F (37 3 °C)  HR:  [] 80  Resp:  [16-18] 16  BP: (140-157)/(68-96) 140/68  SpO2:  [94 %-96 %] 94 %  Temp (24hrs), Av 4 °F (36 9 °C), Min:97 4 °F (36 3 °C), Max:99 2 °F (37 3 °C)  Current: Temperature: 99 2 °F (37 3 °C)    Physical Exam:   General Appearance:  Alert, interactive, nontoxic, no acute distress  Throat: Oropharynx moist without lesions  Lungs:   Clear to auscultation bilaterally; no wheezes, rhonchi or rales; respirations unlabored   Heart:  RRR; no murmur, rub or gallop   Abdomen:   Soft, non-tender, non-distended, positive bowel sounds  Extremities: No clubbing, cyanosis or edema   Skin:  Right axillary erythema with warmth present  Areas of induration and drainage at sites of packing       Labs:    All pertinent labs and imaging studies were personally reviewed  Results from last 7 days   Lab Units 22  0619 22  0550 22  0651   WBC Thousand/uL 8 79 11 09* 9 09   HEMOGLOBIN g/dL 14 1 14 2 12 8   PLATELETS Thousands/uL 187 183 154     Results from last 7 days   Lab Units 22  0553 22  0556 22  0535 22  0619 22  0550 22  0651   SODIUM mmol/L 139 138 138   < > 140 139   POTASSIUM mmol/L 4 3 3 4* 3 7   < > 4 7 4 4   CHLORIDE mmol/L 104 102 103   < > 106 108   CO2 mmol/L 27 29 25   < > 25 23   BUN mg/dL 16 15 19   < > 31* 44*   CREATININE mg/dL 0 71 0 67 0 73   < > 0 97 1 79*   EGFR ml/min/1 73sq m 88 90 87   < > 73 35   CALCIUM mg/dL 8 7 8 4 8 3   < > 8 6 6 9*   AST U/L  --   --   --   --  56* 23   ALT U/L  --   --   --   --  70 19   ALK PHOS U/L  --   --   --   --  117* 40*    < > = values in this interval not displayed       Results from last 7 days   Lab Units 12/21/22  0651   PROCALCITONIN ng/ml 26 86*       Imaging:  Pertinent imaging in PACS personally reviewed    CT chest with contrast 12/23: Skin thickening and subcutaneous edematous changes in the right axillary region, findings most consistent with cellulitis

## 2022-12-27 NOTE — PLAN OF CARE
Problem: INFECTION - ADULT  Goal: Absence or prevention of progression during hospitalization  Description: INTERVENTIONS:  - Assess and monitor for signs and symptoms of infection  - Monitor lab/diagnostic results  - Monitor all insertion sites, i e  indwelling lines, tubes, and drains  - Monitor endotracheal if appropriate and nasal secretions for changes in amount and color  - [place] appropriate cooling/warming therapies per order  - Administer medications as ordered  - Instruct and encourage patient and family to use good hand hygiene technique  - Identify and instruct in appropriate isolation precautions for identified infection/condition  Outcome: Progressing  Goal: Absence of fever/infection during neutropenic period  Description: INTERVENTIONS:  - Monitor WBC    Outcome: Progressing     Problem: RESPIRATORY - ADULT  Goal: Achieves optimal ventilation and oxygenation  Description: INTERVENTIONS:  - Assess for changes in respiratory status  - Assess for changes in mentation and behavior  - Position to facilitate oxygenation and minimize respiratory effort  - Oxygen administered by appropriate delivery if ordered  - Initiate smoking cessation education as indicated  - Encourage broncho-pulmonary hygiene including cough, deep breathe, Incentive Spirometry  - Assess the need for suctioning and aspirate as needed  - Assess and instruct to report SOB or any respiratory difficulty  - Respiratory Therapy support as indicated  Outcome: Progressing

## 2022-12-28 LAB
ANION GAP SERPL CALCULATED.3IONS-SCNC: 7 MMOL/L (ref 4–13)
BUN SERPL-MCNC: 11 MG/DL (ref 5–25)
CALCIUM SERPL-MCNC: 8.9 MG/DL (ref 8.3–10.1)
CHLORIDE SERPL-SCNC: 103 MMOL/L (ref 96–108)
CO2 SERPL-SCNC: 28 MMOL/L (ref 21–32)
CREAT SERPL-MCNC: 0.82 MG/DL (ref 0.6–1.3)
EOSINOPHIL # BLD AUTO: 0.12 THOUSAND/UL (ref 0–0.61)
EOSINOPHIL NFR BLD MANUAL: 1 % (ref 0–6)
ERYTHROCYTE [DISTWIDTH] IN BLOOD BY AUTOMATED COUNT: 13.4 % (ref 11.6–15.1)
GFR SERPL CREATININE-BSD FRML MDRD: 83 ML/MIN/1.73SQ M
GLUCOSE SERPL-MCNC: 130 MG/DL (ref 65–140)
GLUCOSE SERPL-MCNC: 150 MG/DL (ref 65–140)
GLUCOSE SERPL-MCNC: 161 MG/DL (ref 65–140)
GLUCOSE SERPL-MCNC: 212 MG/DL (ref 65–140)
GLUCOSE SERPL-MCNC: 229 MG/DL (ref 65–140)
HCT VFR BLD AUTO: 48.3 % (ref 36.5–49.3)
HGB BLD-MCNC: 15.7 G/DL (ref 12–17)
LYMPHOCYTES # BLD AUTO: 0.48 THOUSAND/UL (ref 0.6–4.47)
LYMPHOCYTES # BLD AUTO: 4 %
MCH RBC QN AUTO: 32.7 PG (ref 26.8–34.3)
MCHC RBC AUTO-ENTMCNC: 32.5 G/DL (ref 31.4–37.4)
MCV RBC AUTO: 101 FL (ref 82–98)
MONOCYTES # BLD AUTO: 0.36 THOUSAND/UL (ref 0–1.22)
MONOCYTES NFR BLD AUTO: 3 % (ref 4–12)
NEUTS BAND NFR BLD MANUAL: 9 % (ref 0–8)
NEUTS SEG # BLD: 10.85 THOUSAND/UL (ref 1.81–6.82)
NEUTS SEG NFR BLD AUTO: 81 %
PLATELET # BLD AUTO: 249 THOUSANDS/UL (ref 149–390)
PLATELET BLD QL SMEAR: ADEQUATE
PLATELET CLUMP BLD QL SMEAR: PRESENT
PMV BLD AUTO: 9.3 FL (ref 8.9–12.7)
POLYCHROMASIA BLD QL SMEAR: PRESENT
POTASSIUM SERPL-SCNC: 3.8 MMOL/L (ref 3.5–5.3)
RBC # BLD AUTO: 4.8 MILLION/UL (ref 3.88–5.62)
RBC MORPH BLD: PRESENT
SODIUM SERPL-SCNC: 138 MMOL/L (ref 135–147)
TOTAL CELLS COUNTED SPEC: 100
TOXIC GRANULES BLD QL SMEAR: PRESENT
VARIANT LYMPHS # BLD AUTO: 2 % (ref 0–0)
WBC # BLD AUTO: 12.06 THOUSAND/UL (ref 4.31–10.16)

## 2022-12-28 PROCEDURE — 0HBBXZX EXCISION OF RIGHT UPPER ARM SKIN, EXTERNAL APPROACH, DIAGNOSTIC: ICD-10-PCS | Performed by: STUDENT IN AN ORGANIZED HEALTH CARE EDUCATION/TRAINING PROGRAM

## 2022-12-28 RX ORDER — LIDOCAINE HYDROCHLORIDE 10 MG/ML
10 INJECTION, SOLUTION EPIDURAL; INFILTRATION; INTRACAUDAL; PERINEURAL ONCE
Status: COMPLETED | OUTPATIENT
Start: 2022-12-28 | End: 2022-12-28

## 2022-12-28 RX ORDER — HYDRALAZINE HYDROCHLORIDE 25 MG/1
25 TABLET, FILM COATED ORAL EVERY 12 HOURS
Status: DISCONTINUED | OUTPATIENT
Start: 2022-12-28 | End: 2023-01-01 | Stop reason: HOSPADM

## 2022-12-28 RX ORDER — DIGOXIN 125 MCG
125 TABLET ORAL EVERY OTHER DAY
Status: DISCONTINUED | OUTPATIENT
Start: 2022-12-28 | End: 2022-12-30

## 2022-12-28 RX ADMIN — SPIRONOLACTONE 25 MG: 25 TABLET ORAL at 09:57

## 2022-12-28 RX ADMIN — HYDRALAZINE HYDROCHLORIDE 25 MG: 25 TABLET ORAL at 20:55

## 2022-12-28 RX ADMIN — LATANOPROST 1 DROP: 50 SOLUTION OPHTHALMIC at 22:07

## 2022-12-28 RX ADMIN — ACETAMINOPHEN 650 MG: 325 TABLET, FILM COATED ORAL at 09:54

## 2022-12-28 RX ADMIN — DILTIAZEM HYDROCHLORIDE 120 MG: 60 CAPSULE, EXTENDED RELEASE ORAL at 10:01

## 2022-12-28 RX ADMIN — FLUTICASONE FUROATE AND VILANTEROL TRIFENATATE 1 PUFF: 100; 25 POWDER RESPIRATORY (INHALATION) at 10:04

## 2022-12-28 RX ADMIN — Medication 1500 MG: at 19:08

## 2022-12-28 RX ADMIN — CEFAZOLIN SODIUM 2000 MG: 2 SOLUTION INTRAVENOUS at 09:57

## 2022-12-28 RX ADMIN — CEFAZOLIN SODIUM 2000 MG: 2 SOLUTION INTRAVENOUS at 18:17

## 2022-12-28 RX ADMIN — LIDOCAINE HYDROCHLORIDE 10 ML: 10 INJECTION, SOLUTION EPIDURAL; INFILTRATION; INTRACAUDAL; PERINEURAL at 13:43

## 2022-12-28 RX ADMIN — UMECLIDINIUM 1 PUFF: 62.5 AEROSOL, POWDER ORAL at 10:04

## 2022-12-28 RX ADMIN — INSULIN LISPRO 2 UNITS: 100 INJECTION, SOLUTION INTRAVENOUS; SUBCUTANEOUS at 18:17

## 2022-12-28 RX ADMIN — FOLIC ACID 1 MG: 1 TABLET ORAL at 09:57

## 2022-12-28 RX ADMIN — Medication 1500 MG: at 03:00

## 2022-12-28 RX ADMIN — Medication 3 MG: at 22:07

## 2022-12-28 RX ADMIN — FAMOTIDINE 20 MG: 10 INJECTION, SOLUTION INTRAVENOUS at 20:55

## 2022-12-28 RX ADMIN — DILTIAZEM HYDROCHLORIDE 120 MG: 60 CAPSULE, EXTENDED RELEASE ORAL at 20:58

## 2022-12-28 RX ADMIN — DIPHENHYDRAMINE HYDROCHLORIDE 25 MG: 50 INJECTION, SOLUTION INTRAMUSCULAR; INTRAVENOUS at 03:00

## 2022-12-28 RX ADMIN — GUAIFENESIN 600 MG: 600 TABLET, EXTENDED RELEASE ORAL at 20:55

## 2022-12-28 RX ADMIN — Medication 250 MG: at 09:57

## 2022-12-28 RX ADMIN — ATORVASTATIN CALCIUM 10 MG: 10 TABLET, FILM COATED ORAL at 18:17

## 2022-12-28 RX ADMIN — FAMOTIDINE 20 MG: 10 INJECTION, SOLUTION INTRAVENOUS at 10:02

## 2022-12-28 RX ADMIN — CARVEDILOL 25 MG: 25 TABLET, FILM COATED ORAL at 10:02

## 2022-12-28 RX ADMIN — MONTELUKAST 5 MG: 10 TABLET, FILM COATED ORAL at 22:07

## 2022-12-28 RX ADMIN — INSULIN LISPRO 1 UNITS: 100 INJECTION, SOLUTION INTRAVENOUS; SUBCUTANEOUS at 22:08

## 2022-12-28 RX ADMIN — DIGOXIN 125 MCG: 125 TABLET ORAL at 09:58

## 2022-12-28 RX ADMIN — APIXABAN 5 MG: 5 TABLET, FILM COATED ORAL at 18:17

## 2022-12-28 RX ADMIN — APIXABAN 5 MG: 5 TABLET, FILM COATED ORAL at 09:57

## 2022-12-28 RX ADMIN — Medication 250 MG: at 18:17

## 2022-12-28 RX ADMIN — CEFAZOLIN SODIUM 2000 MG: 2 SOLUTION INTRAVENOUS at 05:00

## 2022-12-28 RX ADMIN — INSULIN LISPRO 2 UNITS: 100 INJECTION, SOLUTION INTRAVENOUS; SUBCUTANEOUS at 11:37

## 2022-12-28 RX ADMIN — GUAIFENESIN 600 MG: 600 TABLET, EXTENDED RELEASE ORAL at 09:57

## 2022-12-28 RX ADMIN — FERRIC OXIDE RED: 8; 8 LOTION TOPICAL at 22:08

## 2022-12-28 RX ADMIN — CARVEDILOL 25 MG: 25 TABLET, FILM COATED ORAL at 18:17

## 2022-12-28 NOTE — PROGRESS NOTES
Progress Note - Cardiology   [facility]     Areta Cogan [de-identified] y o  male MRN: [id number]  : 1940  Unit/Bed#: [address] [place] [address] Encounter: [id number]    Assessment and Plan:   1   Right axilla cellulitis/sepsis: Patient followed by infectious disease and surgery    -   Wound culture grew out staph aureus    -   Patient on Ancef 2 g every 6 hours IV    -   IV vancomycin every 12 hours added on 2022     2   Hypertension:  Blood pressure still not at goal     -   Continue Coreg to 25 mg twice daily    -   Continue Aldactone 25 mg daily    -   Continue Cardizem  mg every 12 hours    -   We added hydralazine 25 mg twice daily and continue to monitor     3   Chronic atrial fibrillation with rapid ventricular response: Rates poorly controlled since admission to the hospital question underlying sepsis as cause    -   Patient loaded with digoxin early a m   with digoxin 0 25 mg IV x2 with improvement in heart rate    -   Continue digoxin 0 125 mg every other day    -   Digoxin level for Friday, 2022    -   Continue Cardizem  mg every 12 hours    -   Continue Eliquis 5 mg twice daily     4   Chronic diastolic heart failure:  Appears patient's dry weight is around 230 pounds, today he is 231    -   Continue Aldactone 25 mg daily    -   Other medication as above    -   ACE/ARB and now listed as allergies secondary to history of angioedema    -   Monitor I&O, daily weights and labs     5   Diabetes: Hemoglobin A1c 7 1 managed per primary team     6   Chronic kidney disease: Baseline creatinine appears to be less than 1     7   Obesity: BMI 33 8     8   Left lower lobe lung CA status postradiation therapy    Patient appears to be stable from a cardiac standpoint  We will continue to follow in the periphery/as needed while patient is here in the hospital   Thank you    Subjective / Objective:   Patient seen and examined  Heart rates improved with new medication    Patient still on IV antibiotics for axilla abscess and is under the care of infectious disease  No cardiac complaints offered at this time    Vitals: Blood pressure 147/82, pulse 100, temperature 97 9 °F (36 6 °C), resp  rate 16, height 5' 11" (1 803 m), weight 105 kg (231 lb 1 6 oz), SpO2 94 %  Vitals:    12/27/22 0600 12/28/22 0540   Weight: 108 kg (237 lb) 105 kg (231 lb 1 6 oz)     Body mass index is 32 23 kg/m²  BP Readings from Last 3 Encounters:   12/28/22 147/82   12/06/22 140/80   11/28/22 140/80     Orthostatic Blood Pressures    Flowsheet Row Most Recent Value   Blood Pressure 147/82 filed at 12/28/2022 0740   Patient Position - Orthostatic VS Sitting filed at 12/27/2022 1947        I/O       12/26 0701  12/27 0700 12/27 0701  12/28 0700 12/28 0701  12/29 0700    P  O  240 250 300    Total Intake(mL/kg) 240 (2 2) 250 (2 4) 300 (2 9)    Urine (mL/kg/hr) 800 (0 3) 2150 (0 9)     Total Output 800 2150     Net -560 -1900 +300               Invasive Devices     Peripheral Intravenous Line  Duration           Peripheral IV 12/27/22 Distal;Dorsal (posterior); Right Forearm <1 day                  Intake/Output Summary (Last 24 hours) at 12/28/2022 1101  Last data filed at 12/28/2022 0954  Gross per 24 hour   Intake 550 ml   Output 2150 ml   Net -1600 ml         Physical Exam:   Physical Exam  Vitals and nursing note reviewed  Constitutional:       Appearance: Normal appearance  He is morbidly obese  HENT:      Right Ear: External ear normal       Left Ear: External ear normal    Eyes:      General: No scleral icterus  Right eye: No discharge  Left eye: No discharge  Cardiovascular:      Rate and Rhythm: Normal rate  Rhythm irregular  Pulses: Normal pulses  Heart sounds: Murmur heard  Pulmonary:      Effort: Pulmonary effort is normal  No accessory muscle usage or respiratory distress  Breath sounds: Examination of the right-lower field reveals decreased breath sounds   Examination of the left-lower field reveals decreased breath sounds  Decreased breath sounds present  Abdominal:      General: Bowel sounds are normal  There is no distension  Palpations: Abdomen is soft  Musculoskeletal:      Right lower leg: No edema  Left lower leg: No edema  Skin:     General: Skin is warm and dry  Capillary Refill: Capillary refill takes less than 2 seconds  Neurological:      General: No focal deficit present  Mental Status: He is alert  Mental status is at baseline  Psychiatric:         Mood and Affect: Mood normal          Behavior: Behavior is cooperative              Medications/ Allergies:     Current Facility-Administered Medications   Medication Dose Route Frequency Provider Last Rate   • acetaminophen  650 mg Oral Q6H PRN Davi Dorman MD     • apixaban  5 mg Oral BID Davi Dorman MD     • atorvastatin  10 mg Oral Daily With Rakan Vyas MD     • bisacodyl  10 mg Rectal Daily PRN Davi Dorman MD     • calamine-zinc oxide   Topical 4x Daily Davi Dorman MD     • carvedilol  25 mg Oral BID With Meals SIMI Vieyra     • cefazolin  2,000 mg Intravenous Q6H Gege Bhardwaj MD 2,000 mg (12/28/22 0957)   • digoxin  125 mcg Oral Every Other Day SIMI Vieyra     • diltiazem  120 mg Oral Q12H [address] SIMI Vieyra     • diphenhydrAMINE  25 mg Intravenous Daily PRN Brandi Figueroa PA-C     • famotidine  20 mg Intravenous Q12H [address] SIMI Thomas     • Fluticasone Furoate-Vilanterol  1 puff Inhalation Daily Davi Dorman MD      And   • umeclidinium  1 puff Inhalation Daily Davi Dorman MD     • folic acid  1 mg Oral Daily Davi Dorman MD     • guaiFENesin  600 mg Oral Q12H Agueda Pickens MD     • hydrALAZINE  25 mg Oral Q12H SIMI Vieyra     • hydrocortisone   Topical BID Davi Dorman MD     • hydrOXYzine HCL  25 mg Oral Q6H PRN SIMI Gonzales     • insulin lispro  1-6 Units Subcutaneous TID AC SIMI Thomas     • insulin lispro  1-6 Units Subcutaneous HS SIMI Archibald     • latanoprost  1 drop Both Eyes HS Angel Sunshine MD     • levalbuterol  0 63 mg Nebulization Q4H PRN SIMI Gonzales     • lidocaine (PF)  10 mL Infiltration Once Roberta Fast, PA-C     • melatonin  3 mg Oral HS SIMI Gonzales     • montelukast  5 mg Oral HS Angel Sunshine MD     • ondansetron  4 mg Intravenous Q6H PRN Angel Sunshine MD     • polyethylene glycol  17 g Oral Daily Julian Flores MD     • saccharomyces boulardii  250 mg Oral BID SIMI Gonzales     • simethicone  80 mg Oral 4x Daily PRN Angel Sunshine MD     • spironolactone  25 mg Oral Daily Maria D Jose MD     • vancomycin  15 mg/kg Intravenous Q12H Romaine Farmer MD       acetaminophen, 650 mg, Q6H PRN  bisacodyl, 10 mg, Daily PRN  diphenhydrAMINE, 25 mg, Daily PRN  hydrOXYzine HCL, 25 mg, Q6H PRN  levalbuterol, 0 63 mg, Q4H PRN  ondansetron, 4 mg, Q6H PRN  simethicone, 80 mg, 4x Daily PRN      No Known Allergies    VTE Pharmacologic Prophylaxis:   Sequential compression device (Venodyne)     Labs:   CBC with diff:  Results from last 7 days   Lab Units 12/28/22  0459 12/24/22  0619 12/23/22  0550   WBC Thousand/uL 12 06* 8 79 11 09*   HEMOGLOBIN g/dL 15 7 14 1 14 2   HEMATOCRIT % 48 3 43 2 44 1   MCV fL 101* 101* 102*   PLATELETS Thousands/uL 249 187 183   MCH pg 32 7 32 8 32 7   MCHC g/dL 32 5 32 6 32 2   RDW % 13 4 13 5 13 6   MPV fL 9 3 10 1 10 0   NRBC AUTO /100 WBCs  --   --  1     CMP:  Results from last 7 days   Lab Units 12/28/22  0459 12/27/22  0553 12/26/22  0556 12/25/22  0535 12/24/22  0619 12/23/22  0550   SODIUM mmol/L 138 139 138 138 140 140   POTASSIUM mmol/L 3 8 4 3 3 4* 3 7 4 6 4 7   CHLORIDE mmol/L 103 104 102 103 107 106   CO2 mmol/L 28 27 29 25 24 25   ANION GAP mmol/L 7 8 7 10 9 9   BUN mg/dL 11 16 15 19 25 31*   CREATININE mg/dL 0 82 0 71 0 67 0 73 0 85 0 97   CALCIUM mg/dL 8 9 8 7 8 4 8 3 8 5 8 6   AST U/L  --   --   --   --   --  56*   ALT U/L  -- --   --   --   --  70   ALK PHOS U/L  --   --   --   --   --  117*   TOTAL PROTEIN g/dL  --   --   --   --   --  6 6   ALBUMIN g/dL  --   --   --   --   --  2 5*   TOTAL BILIRUBIN mg/dL  --   --   --   --   --  0 48   EGFR ml/min/1 73sq m 83 88 90 87 82 73     Magnesium:  Results from last 7 days   Lab Units 12/27/22  0553 12/24/22  0619 12/23/22  0550   MAGNESIUM mg/dL 1 7 1 8 2 2       Imaging & Testing   I have personally reviewed pertinent reports  XR chest portable    Result Date: 12/20/2022  Narrative: CHEST INDICATION:   SOB,cough  Lung cancer  COMPARISON:  5/4/2022   10/21/2022, CT scan  EXAM PERFORMED/VIEWS:  XR CHEST PORTABLE  AP semierect FINDINGS: Heart shadow is obscured by adjacent opacity  Increasing opacity at the left base which, when correlated with the CT scan, represents enlargement of the known mass  Probable associated pleural effusion  Left upper lung and right lung remain clear  No pneumothorax apparent  No acute osseous abnormalities  Impression: Increasing pleuroparenchymal opacity at the left base consistent with enlargement of the known mass and probably associated effusion/atelectasis  Workstation performed: [id number]     CT chest w contrast    Result Date: 12/23/2022  Narrative: CT CHEST WITH IV CONTRAST INDICATION:   Soft tissue mass, chest, US/xray nondiagnostic Soft tissue rule out cellulitis, abscess, fasciitis of right chest wall and axilla  COMPARISON:  Ultrasound from earlier today of the right axilla and CT scan of the chest from 10/21/2022  TECHNIQUE: CT examination of the chest was performed  Axial, sagittal, and coronal 2D reformatted images were created from the source data and submitted for interpretation  Radiation dose length product (DLP) for this visit:  747 62 mGy-cm     This examination, like all CT scans performed in the [facility], was performed utilizing techniques to minimize radiation dose exposure, including the use of iterative reconstruction and automated exposure control  IV Contrast:  85 mL of iohexol (OMNIPAQUE) FINDINGS: LUNGS: Stable background emphysema  Increasing left lower lobe consolidation due to increasing left pleural effusion  The previously noted rounded masslike area is more difficult to discern but a slightly hypodense area noted on image 2/42 measuring 3 6 cm is approximately the same as prior  Stable juxtapleural reticular fibrotic changes mostly in the right lung base nonspecific  PLEURA:  Increasing left pleural effusion  HEART/GREAT VESSELS: Heart is unremarkable for patient's age  No thoracic aortic aneurysm  MEDIASTINUM AND ANDRE:  Unremarkable  CHEST WALL AND LOWER NECK:  There is skin thickening and subcutaneous edematous changes in the right axillary region  No focal fluid collection or mass identified  VISUALIZED STRUCTURES IN THE UPPER ABDOMEN:  Stable gallstones without surrounding inflammation  Postsurgical changes of the stomach  OSSEOUS STRUCTURES:  No acute fracture or destructive osseous lesion  Impression: Skin thickening and subcutaneous edematous changes in the right axillary region  No focal fluid collection or mass identified  Findings most consistent with cellulitis  Stable background emphysema  Increasing left lower lobe consolidation due to increasing left pleural effusion  The previously noted rounded mass in the left lower lobe is more difficult to discern on today's exam due to the increased effusion and atelectasis, but a slightly hypodense area noted on image 2/42 measuring 3 6 cm is approximately the same as prior  Stable gallstones without surrounding inflammation  Workstation performed: [id number]     Echo complete w/ contrast if indicated    Result Date: 12/24/2022  Narrative: •  Left Ventricle: Left ventricular cavity size is normal  Wall thickness is mildly increased  There is mild concentric hypertrophy  The left ventricular ejection fraction is 50% by visual estimation  Systolic function is low normal  Although no diagnostic regional wall motion abnormality was identified, this possibility cannot be completely excluded on the basis of this study  •  IVS: There is abnormal septal motion consistent with post-operative status  •  Right Ventricle: Wall thickness is mildly increased  •  Left Atrium: The atrium is moderately dilated  •  Right Atrium: The atrium is mildly dilated  •  Mitral Valve: There is mild annular calcification  There is mild regurgitation  •  Tricuspid Valve: There is mild regurgitation  Pulmonary artery pressure around 30 mmHg  •  Pericardium: There is a left pleural effusion  •  Hard to accurately assess due to atrial fibrillation EF but appears to be low normal on 50%  No other significant changes from old echo        US axilla    Result Date: 12/20/2022  Narrative: AXILLA ULTRASOUND INDICATION:   observe for fluid collection required drainage  COMPARISON:  None TECHNIQUE:   Real-time ultrasound of the right axilla was performed with a linear transducer with both volumetric sweeps and still imaging techniques  FINDINGS:  No discrete fluid collection is identified in the right axilla  Mild skin thickening and soft tissue edema is noted  Impression: No discrete fluid collection is identified in the right axilla  Workstation performed: [id number]          [facility]  Cardiology      "This note was completed in part utilizing wishkicker direct voice recognition software  Grammatical errors, random word insertion, spelling mistakes, and incomplete sentences may be an occasional consequence of the system secondary to software limitations, ambient noise and hardware issues  Please read the chart carefully and recognize, using context, where substitutions have occurred    If you have any questions or concerns about the context, text or information contained within the body of this dictation, please contact myself, the provider, for further clarification "

## 2022-12-28 NOTE — ASSESSMENT & PLAN NOTE
Blood sugar is 160, patient given clear liquid meal. Wt Readings from Last 3 Encounters:   12/27/22 108 kg (237 lb)   12/06/22 102 kg (224 lb)   11/28/22 102 kg (224 lb)     Patient with signs of volume overload    20 mg of IV Lasix x1 given on 12/24 and was started on 20 mg of IV lasix daily  IV Lasix has since been discontinued and patient started on Aldactone  · Daily weights  · Intake and output  · Continue Coreg

## 2022-12-28 NOTE — PLAN OF CARE
Problem: PAIN - ADULT  Goal: Verbalizes/displays adequate comfort level or baseline comfort level  Description: Interventions:  - Encourage patient to monitor pain and request assistance  - Assess pain using appropriate pain scale  - Administer analgesics based on type and severity of pain and evaluate response  - Implement non-pharmacological measures as appropriate and evaluate response  - Consider cultural and social influences on pain and pain management  - Notify physician/advanced practitioner if interventions unsuccessful or patient reports new pain  Outcome: Progressing     Problem: Potential for Falls  Goal: Patient will remain free of falls  Description: INTERVENTIONS:  - Educate patient/family on patient safety including physical limitations  - Instruct patient to call for assistance with activity   - Consult OT/PT to assist with strengthening/mobility   - Keep Call bell within reach  - Keep bed low and locked with side rails adjusted as appropriate  - Keep care items and personal belongings within reach  - Initiate and maintain comfort rounds  - Make Fall Risk Sign visible to staff  - Offer Toileting every 2 Hours, in advance of need  - Initiate/Maintain bed alarm  - Apply yellow socks and bracelet for high fall risk patients  - Consider moving patient to room near nurses station  Outcome: Progressing     Problem: RESPIRATORY - ADULT  Goal: Achieves optimal ventilation and oxygenation  Description: INTERVENTIONS:  - Assess for changes in respiratory status  - Assess for changes in mentation and behavior  - Position to facilitate oxygenation and minimize respiratory effort  - Oxygen administered by appropriate delivery if ordered  - Initiate smoking cessation education as indicated  - Encourage broncho-pulmonary hygiene including cough, deep breathe, Incentive Spirometry  - Assess the need for suctioning and aspirate as needed  - Assess and instruct to report SOB or any respiratory difficulty  - Respiratory Therapy support as indicated  Outcome: Progressing     Problem: Prexisting or High Potential for Compromised Skin Integrity  Goal: Skin integrity is maintained or improved  Description: INTERVENTIONS:  - Identify patients at risk for skin breakdown  - Assess and monitor skin integrity  - Assess and monitor nutrition and hydration status  - Monitor labs   - Assess for incontinence   - Turn and reposition patient  - Assist with mobility/ambulation  - Relieve pressure over bony prominences  - Avoid friction and shearing  - Provide appropriate hygiene as needed including keeping skin clean and dry  - Evaluate need for skin moisturizer/barrier cream  - Collaborate with interdisciplinary team   - Patient/family teaching  - Consider wound care consult   Outcome: Progressing

## 2022-12-28 NOTE — ASSESSMENT & PLAN NOTE
Heart rate not well controlled but is improving  · Continue telemetry monitoring  · Cardizem drip discontinued and transitioned to oral Cardizem  Also received doses of IV digoxin and currently on p o  digoxin  · Digoxin level in 1 week  · continue eliquis    Continue Coreg  · Cardio input appreciated

## 2022-12-28 NOTE — ASSESSMENT & PLAN NOTE
Likely due to right lateral chest wall cellulitis, tachycardia- possibly secondary to underlying A  fib  · BCx-negative so far  Wound culture - staph  · Off Rocephin   currently on IV Ancef & Vanco added as noted above  · ID following, recs appreciated  · Imaging negative for abscess

## 2022-12-28 NOTE — PROGRESS NOTES
Nikki 47  Progress Note - Parish Jimenez 1940, [de-identified] y o  male MRN: [id number]  Unit/Bed#: [address] [address] Encounter: [id number]  Primary Care Provider: Cyd Goodpasture, MD   Date and time admitted to hospital: 12/19/2022  1:01 AM    * Angioedema-resolved as of 12/21/2022  Assessment & Plan  Improved, airway maintained  Patient on mysoline, atorvastatin and losartan/Jardiance which could cause angioedema/ urticaria-on hold  · Rash was mostly on trunk and upper thigh and is very pruritic; has lip swelling, no tongue  · Given dose of 125 mg solumedrol, benadryl 25 mg IV bid, and pepcid 20 mg IV in ER but symptoms returned   · Received solumedrol, benadryl, pepcid   · Continue calamine, hydrocortisone   · Continue Singulair      Cellulitis  Assessment & Plan  Followed by GEN surg  Bedside aspiration was done previously and bedside I&D done on 12/24  · Discontinued IV cefazolin every 6 hours today  Vancomycin started 12/27  · Mild leukocytosis on lab work  · Imaging negative for abscess  · Continue dressing changes per surgery  status post skin biopsy by surgery today per ID recommendation  · Per ID will discuss with dermatology tomorrow and have them review pictures as no significant improvement despite being on antibiotics    Persistent atrial fibrillation (HCC)  Assessment & Plan  Heart rate not well controlled previously but has improved  · Continue telemetry monitoring  · Cardizem drip discontinued and transitioned to oral Cardizem  Also received doses of IV digoxin and currently on p o  digoxin  · Digoxin level in 1 week  · continue Coreg, eliquis  · Cardio input appreciated    Severe sepsis [facility])  Assessment & Plan  Likely due to right lateral chest wall cellulitis, tachycardia- possibly secondary to underlying A  fib  · BCx-negative so far  Wound culture - staph  · Off Rocephin  Discontinued IV Ancef    Currently on Vanco added as noted above  · ID following, recs appreciated  · Imaging negative for abscess    GERD (gastroesophageal reflux disease)  Assessment & Plan  Continue Pepcid    Chronic diastolic heart failure (HCC)  Assessment & Plan  Wt Readings from Last 3 Encounters:   12/28/22 105 kg (231 lb 1 6 oz)   12/06/22 102 kg (224 lb)   11/28/22 102 kg (224 lb)     Patient with signs of volume overload  20 mg of IV Lasix x1 given on 12/24 and was started on 20 mg of IV lasix daily  · IV Lasix has been discontinued and patient currently on Aldactone  · Daily weights  · Intake and output  · Continue Coreg    Diabetic polyneuropathy associated with type 2 diabetes mellitus [facility])  Assessment & Plan  Lab Results   Component Value Date    HGBA1C 6 7 (H) 12/20/2022       Recent Labs     12/27/22  2039 12/28/22  0711 12/28/22  1109 12/28/22  1659   POCGLU 186* 130 212* 229*     Holding metformin and Jardiance (potential contributor to Derm reaction)  -Continue Accuchecks AC/HS with insulin sliding scale     High serum lactic acid-resolved as of 12/21/2022  Assessment & Plan  Worsening,    · Possibly d/t sepsis vs OP medications  · Continue to trend and IVF  · ABG-pending    Encephalopathy-resolved as of 12/21/2022  Assessment & Plan  Possibly multifactorial, Sepsis vs Haldo vs lactic acidosis, hx of SYLVESTER    · ABG-unremarkable  · Neuro checks, maintain airway  · S/p haldo administration overnight-hold all neurotoxics  · CCU consulted overnight    Acute kidney injury (HCC)-resolved as of 12/23/2022  Assessment & Plan  Slowly improving, likely due to septic shock      · Cr 1 9  > 1 79 > 0 97  · Continue IVF  · Avoid nephrotoxic's    Septic shock (HCC)-resolved as of 12/22/2022  Assessment & Plan  resolved, Hemodynamic stable, still in sepsis    · Likely due to right lateral chest wall cellulitis with possible abscess  · Contiune Rocephin, Vanco completed  · IVF      VTE Pharmacologic Prophylaxis: VTE Score: 4 Moderate Risk (Score 3-4) - Pharmacological DVT Prophylaxis Ordered: apixaban (Eliquis)  Patient Centered Rounds: I performed bedside rounds with nursing staff today  Discussions with Specialists or Other Care Team Provider: yes - ID, surgery    Education and Discussions with Family / Patient: Updated  (wife) at bedside  Time Spent for Care: 45 minutes  More than 50% of total time spent on counseling and coordination of care as described above  Current Length of Stay: 8 day(s)  Current Patient Status: Inpatient   Certification Statement: The patient will continue to require additional inpatient hospital stay due to Right axillary cellulitis vs other process requiring IV antibiotics  Discharge Plan: Anticipate discharge in 48-72 hrs to home  Code Status: Level 1 - Full Code    Subjective:     Patient denies any shortness of breath  Leg swelling has improved  Hoping to be going home soon    Objective:     Vitals:   Temp (24hrs), Av 8 °F (36 6 °C), Min:97 3 °F (36 3 °C), Max:98 2 °F (36 8 °C)    Temp:  [97 3 °F (36 3 °C)-98 2 °F (36 8 °C)] 97 9 °F (36 6 °C)  HR:  [] 100  Resp:  [16] 16  BP: (140-173)/(68-99) 147/82  SpO2:  [90 %-96 %] 94 %  Body mass index is 32 23 kg/m²  Input and Output Summary (last 24 hours): Intake/Output Summary (Last 24 hours) at 2022 0905  Last data filed at 2022 0502  Gross per 24 hour   Intake 250 ml   Output 2150 ml   Net -1900 ml       Physical Exam:   Physical Exam  Vitals reviewed  Constitutional:       General: He is not in acute distress  Appearance: He is not toxic-appearing  HENT:      Head: Normocephalic and atraumatic  Eyes:      General:         Right eye: No discharge  Left eye: No discharge  Cardiovascular:      Rate and Rhythm: Normal rate  Rhythm irregular  Heart sounds: Murmur heard  Pulmonary:      Effort: Pulmonary effort is normal  No respiratory distress  Breath sounds: Normal breath sounds  No wheezing or rales     Abdominal:      General: Bowel sounds are normal  There is no distension  Palpations: Abdomen is soft  Tenderness: There is no abdominal tenderness  Skin:     Comments: Right axillary area with dressing in place   Neurological:      Mental Status: He is alert and oriented to person, place, and time  Additional Data:     Labs:  Results from last 7 days   Lab Units 12/28/22 0459 12/24/22 0619 12/23/22  0550   WBC Thousand/uL 12 06*   < > 11 09*   HEMOGLOBIN g/dL 15 7   < > 14 2   HEMATOCRIT % 48 3   < > 44 1   PLATELETS Thousands/uL 249   < > 183   BANDS PCT % 9*  --   --    NEUTROS PCT %  --   --  88*   LYMPHS PCT %  --   --  6*   LYMPHO PCT % 4  --   --    MONOS PCT %  --   --  3*   MONO PCT % 3*  --   --    EOS PCT % 1  --  2    < > = values in this interval not displayed  Results from last 7 days   Lab Units 12/28/22 0459 12/24/22  0619 12/23/22  0550   SODIUM mmol/L 138   < > 140   POTASSIUM mmol/L 3 8   < > 4 7   CHLORIDE mmol/L 103   < > 106   CO2 mmol/L 28   < > 25   BUN mg/dL 11   < > 31*   CREATININE mg/dL 0 82   < > 0 97   ANION GAP mmol/L 7   < > 9   CALCIUM mg/dL 8 9   < > 8 6   ALBUMIN g/dL  --   --  2 5*   TOTAL BILIRUBIN mg/dL  --   --  0 48   ALK PHOS U/L  --   --  117*   ALT U/L  --   --  70   AST U/L  --   --  56*   GLUCOSE RANDOM mg/dL 150*   < > 124    < > = values in this interval not displayed  Results from last 7 days   Lab Units 12/28/22  0711 12/27/22 2039 12/27/22  1611 12/27/22  1124 12/27/22  0710 12/26/22  2052 12/26/22  1610 12/26/22  1115 12/26/22  0756 12/25/22 2034 12/25/22  1618 12/25/22  1057   POC GLUCOSE mg/dl 130 186* 238* 197* 146* 168* 153* 215* 158* 208* 146* 259*               Lines/Drains:  Invasive Devices     Peripheral Intravenous Line  Duration           Peripheral IV 12/27/22 Distal;Dorsal (posterior); Right Forearm <1 day                  Telemetry:  Telemetry Orders (From admission, onward)             48 Hour Telemetry Monitoring  Continuous x 48 hours References:    Telemetry Guidelines   Question:  Reason for 48 Hour Telemetry  Answer:  Arrhythmias Requiring Medical Therapy (eg  SVT, Vtach/fib, Bradycardia, Uncontrolled A-fib)                 Telemetry Reviewed: afib  Indication for Continued Telemetry Use: Arrthymias requiring medical therapy             Imaging: Reviewed radiology reports from this admission including: procedure reports    Recent Cultures (last 7 days):         Last 24 Hours Medication List:   Current Facility-Administered Medications   Medication Dose Route Frequency Provider Last Rate   • acetaminophen  650 mg Oral Q6H PRN Rober Magana MD     • aluminum-magnesium hydroxide-simethicone  30 mL Oral Q6H PRN Rober Magana MD     • apixaban  5 mg Oral BID Rober Magana MD     • atorvastatin  10 mg Oral Daily With Destiny Bravo MD     • bisacodyl  10 mg Rectal Daily PRN Rober Magana MD     • calamine-zinc oxide   Topical 4x Daily Rober Magana MD     • carvedilol  25 mg Oral BID With Meals Sunday SIMI Ngo     • cefazolin  2,000 mg Intravenous Q6H Brittany Caraballo MD 2,000 mg (12/28/22 0500)   • digoxin  125 mcg Oral Every Other Day Sunday Huber CRNP     • diltiazem  120 mg Oral Q12H Dayriaz CRNP     • diphenhydrAMINE  25 mg Intravenous Daily PRN Meaghan Beckford PA-C     • famotidine  20 mg Intravenous Q12H [address] Carlisle Slnatalio, CRNP     • Fluticasone Furoate-Vilanterol  1 puff Inhalation Daily Rober Magana MD      And   • umeclidinium  1 puff Inhalation Daily Rober Magana MD     • folic acid  1 mg Oral Daily Rober Magana MD     • guaiFENesin  600 mg Oral Q12H Rebecca Oliver MD     • hydrALAZINE  5 mg Intravenous Q6H PRN Meaghan Beckford PA-C     • hydrocortisone   Topical BID Rober Magana MD     • hydrOXYzine HCL  25 mg Oral Q6H PRN SIMI Gonzales     • insulin lispro  1-6 Units Subcutaneous TID AC Carlisle Slim, CRNP     • insulin lispro  1-6 Units Subcutaneous HS Carlisle Slim, CRNP     • latanoprost 1 drop Both Eyes HS Zoraida Matos MD     • levalbuterol  0 63 mg Nebulization Q4H PRN SIMI Gonzales     • lidocaine (PF)  10 mL Infiltration Once Denisse Evangelista PA-C     • melatonin  3 mg Oral HS SIMI Gonzales     • metoprolol  2 5 mg Intravenous Q6H PRN SIMI Gonzales     • montelukast  5 mg Oral HS Zoraida Matos MD     • ondansetron  4 mg Intravenous Q6H PRN Zoraida Matos MD     • polyethylene glycol  17 g Oral Daily Reba Maria MD     • saccharomyces boulardii  250 mg Oral BID SIMI Gonzales     • simethicone  80 mg Oral 4x Daily PRN Zoraida Matos MD     • spironolactone  25 mg Oral Daily Terell Núñez MD     • vancomycin  15 mg/kg Intravenous Q12H Erum Rahman MD          Today, Patient Was Seen By: Hattie Bee DO    **Please Note: This note may have been constructed using a voice recognition system  **

## 2022-12-28 NOTE — PLAN OF CARE
Problem: INFECTION - ADULT  Goal: Absence or prevention of progression during hospitalization  Description: INTERVENTIONS:  - Assess and monitor for signs and symptoms of infection  - Monitor lab/diagnostic results  - Monitor all insertion sites, i e  indwelling lines, tubes, and drains  - Monitor endotracheal if appropriate and nasal secretions for changes in amount and color  - [place] appropriate cooling/warming therapies per order  - Administer medications as ordered  - Instruct and encourage patient and family to use good hand hygiene technique  - Identify and instruct in appropriate isolation precautions for identified infection/condition  Outcome: Progressing  Goal: Absence of fever/infection during neutropenic period  Description: INTERVENTIONS:  - Monitor WBC    Outcome: Progressing     Problem: RESPIRATORY - ADULT  Goal: Achieves optimal ventilation and oxygenation  Description: INTERVENTIONS:  - Assess for changes in respiratory status  - Assess for changes in mentation and behavior  - Position to facilitate oxygenation and minimize respiratory effort  - Oxygen administered by appropriate delivery if ordered  - Initiate smoking cessation education as indicated  - Encourage broncho-pulmonary hygiene including cough, deep breathe, Incentive Spirometry  - Assess the need for suctioning and aspirate as needed  - Assess and instruct to report SOB or any respiratory difficulty  - Respiratory Therapy support as indicated  Outcome: Progressing

## 2022-12-28 NOTE — ASSESSMENT & PLAN NOTE
Lab Results   Component Value Date    HGBA1C 6 7 (H) 12/20/2022       Recent Labs     12/26/22 2052 12/27/22  0710 12/27/22  1124 12/27/22  1611   POCGLU 168* 146* 197* 238*     Holding metformin and Jardiance (potential contributor to Derm reaction)  -Continue Accuchecks AC/HS with insulin sliding scale

## 2022-12-28 NOTE — PROCEDURES
Biopsy    Date/Time: 12/28/2022 1:41 PM  Performed by: Charlee Currie PA-C  Authorized by: Cheyanne Nunez PA-C   [place] Protocol:  Procedure performed by: Cheyanne Nunez)  Consent: Verbal consent obtained  Written consent not obtained  Risks and benefits: risks, benefits and alternatives were discussed  Consent given by: patient  Time out: Immediately prior to procedure a "time out" was called to verify the correct patient, procedure, equipment, support staff and site/side marked as required  Timeout called at: 12/28/2022 1:11 PM   Patient understanding: patient states understanding of the procedure being performed  Patient consent: the patient's understanding of the procedure matches consent given  Procedure consent: procedure consent matches procedure scheduled  Relevant documents: relevant documents present and verified  Test results: test results available and properly labeled  Site marked: the operative site was marked  Radiology Images displayed and confirmed  If images not available, report reviewed: imaging studies available  Required items: required blood products, implants, devices, and special equipment available  Patient identity confirmed: verbally with patient and arm band      Procedure Details - Lesion Biopsy:      Body area:  Trunk    Trunk location:  R axilla    Biopsy method: punch biopsy      Biopsy tissue type: skin and subcutaneous    Initial size (mm):  0    Final defect size (mm):  18    Malignancy: malignancy unknown       Punch biopsy X3 6mm biopsies

## 2022-12-28 NOTE — PLAN OF CARE
Problem: PHYSICAL THERAPY ADULT  Goal: Performs mobility at highest level of function for planned discharge setting  See evaluation for individualized goals  Description: Treatment/Interventions: ADL retraining, Functional transfer training, LE strengthening/ROM, Elevations, Therapeutic exercise, Endurance training, Patient/family training, Equipment eval/education, Bed mobility, Gait training, Compensatory technique education  Equipment Recommended: Clara Álvarez       See flowsheet documentation for full assessment, interventions and recommendations  Outcome: Progressing  Note: Prognosis: Good  Problem List: Decreased strength, Decreased range of motion, Decreased endurance, Impaired balance, Decreased mobility, Decreased coordination  Assessment: Patient agreeable to PT session this morning  Patient is making steady progress with bed mobility, transfers, ambulation with a roller walker, gait endurance, gait balance and overall functional mobility  Patient is progressing toward goals  Patient will continue to benefit from skilled physical therapy services to improve his strength, endurance and functional mobility  When medically stable for discharge, patient is safe for discharge home with continued ambulation with a roller walker and home PT  PT Discharge Recommendation: Home with home health rehabilitation    See flowsheet documentation for full assessment

## 2022-12-28 NOTE — ASSESSMENT & PLAN NOTE
Followed by GEN surg  Bedside aspiration was done previously and bedside I&D done on 12/24  · Continue IV cefazolin every 6 hours  Vancomycin added today  · No leukocytosis prior lab work  · Imaging negative for abscess  · Continue dressing changes per surgery    Plan for skin biopsy tomorrow surgery

## 2022-12-28 NOTE — PROGRESS NOTES
Nikki 47  Progress Note - Jakub Terrazas 1940, [de-identified] y o  male MRN: [id number]  Unit/Bed#: [address] [place] [address] Encounter: [id number]  Primary Care Provider: Shona Avila MD   Date and time admitted to hospital: 12/19/2022  1:01 AM    * Angioedema-resolved as of 12/21/2022  Assessment & Plan  Improved, airway maintained  Patient on mysoline, atorvastatin and losartan/Jardiance which could cause angioedema/ urticaria-on hold  · Rash was mostly on trunk and upper thigh and is very pruritic; has lip swelling, no tongue  · Given dose of 125 mg solumedrol, benadryl 25 mg IV bid, and pepcid 20 mg IV in ER but symptoms returned   · Received solumedrol, benadryl, pepcid   · Continue calamine, hydrocortisone   · Continue Singulair      Cellulitis  Assessment & Plan  Followed by GEN surg  Bedside aspiration was done previously and bedside I&D done on 12/24  · Continue IV cefazolin every 6 hours  Vancomycin added today  · No leukocytosis prior lab work  · Imaging negative for abscess  · Continue dressing changes per surgery  Plan for skin biopsy tomorrow surgery    Persistent atrial fibrillation [facility])  Assessment & Plan  Heart rate not well controlled but is improving  · Continue telemetry monitoring  · Cardizem drip discontinued and transitioned to oral Cardizem  Also received doses of IV digoxin and currently on p o  digoxin  · Digoxin level in 1 week  · continue eliquis  Continue Coreg  · Cardio input appreciated    Severe sepsis [facility])  Assessment & Plan  Likely due to right lateral chest wall cellulitis, tachycardia- possibly secondary to underlying A  fib  · BCx-negative so far  Wound culture - staph  · Off Rocephin   currently on IV Ancef & Vanco added as noted above  · ID following, recs appreciated  · Imaging negative for abscess    GERD (gastroesophageal reflux disease)  Assessment & Plan  Continue Pepcid    Chronic diastolic heart failure (HCC)  Assessment & Plan  Wt Readings from Last 3 Encounters:   12/27/22 108 kg (237 lb)   12/06/22 102 kg (224 lb)   11/28/22 102 kg (224 lb)     Patient with signs of volume overload  20 mg of IV Lasix x1 given on 12/24 and was started on 20 mg of IV lasix daily  IV Lasix has since been discontinued and patient started on Aldactone  · Daily weights  · Intake and output  · Continue Coreg    Diabetic polyneuropathy associated with type 2 diabetes mellitus [facility])  Assessment & Plan  Lab Results   Component Value Date    HGBA1C 6 7 (H) 12/20/2022       Recent Labs     12/26/22  2052 12/27/22  0710 12/27/22  1124 12/27/22  1611   POCGLU 168* 146* 197* 238*     Holding metformin and Jardiance (potential contributor to Derm reaction)  -Continue Accuchecks AC/HS with insulin sliding scale     High serum lactic acid-resolved as of 12/21/2022  Assessment & Plan  Worsening,    · Possibly d/t sepsis vs OP medications  · Continue to trend and IVF  · ABG-pending    Encephalopathy-resolved as of 12/21/2022  Assessment & Plan  Possibly multifactorial, Sepsis vs Haldo vs lactic acidosis, hx of SYLVESTER    · ABG-unremarkable  · Neuro checks, maintain airway  · S/p haldo administration overnight-hold all neurotoxics  · CCU consulted overnight    Acute kidney injury (HCC)-resolved as of 12/23/2022  Assessment & Plan  Slowly improving, likely due to septic shock  · Cr 1 9  > 1 79 > 0 97  · Continue IVF  · Avoid nephrotoxic's    Septic shock (HCC)-resolved as of 12/22/2022  Assessment & Plan  resolved, Hemodynamic stable, still in sepsis    · Likely due to right lateral chest wall cellulitis with possible abscess  · Contiune Rocephin Vanco completed  · IVF        VTE Pharmacologic Prophylaxis: VTE Score: 4 Moderate Risk (Score 3-4) - Pharmacological DVT Prophylaxis Ordered: apixaban (Eliquis)  Patient Centered Rounds: I performed bedside rounds with nursing staff today    Discussions with Specialists or Other Care Team Provider: yes - cardio, ID, surgery    Education and Discussions with Family / Patient: Updated  (daughter in law) at bedside  Time Spent for Care: 45 minutes  More than 50% of total time spent on counseling and coordination of care as described above  Current Length of Stay: 7 day(s)  Current Patient Status: Inpatient   Certification Statement: The patient will continue to require additional inpatient hospital stay due to Cellulitis requiring IV antibiotics and skin biopsy  Discharge Plan: Anticipate discharge in >72 hrs to home  Code Status: Level 1 - Full Code    Subjective:     Patient wants to know when he is going home  Denies any shortness of breath    Objective:     Vitals:   Temp (24hrs), Av 4 °F (36 9 °C), Min:97 4 °F (36 3 °C), Max:99 2 °F (37 3 °C)    Temp:  [97 4 °F (36 3 °C)-99 2 °F (37 3 °C)] 99 2 °F (37 3 °C)  HR:  [] 80  Resp:  [16-18] 16  BP: (140-157)/(68-96) 140/68  SpO2:  [94 %-96 %] 94 %  Body mass index is 33 05 kg/m²  Input and Output Summary (last 24 hours): Intake/Output Summary (Last 24 hours) at 2022 1926  Last data filed at 2022 1535  Gross per 24 hour   Intake 240 ml   Output 750 ml   Net -510 ml       Physical Exam:   Physical Exam  Vitals reviewed  Constitutional:       General: He is not in acute distress  Appearance: He is not ill-appearing  HENT:      Head: Normocephalic and atraumatic  Eyes:      General:         Right eye: No discharge  Left eye: No discharge  Cardiovascular:      Rate and Rhythm: Normal rate  Rhythm irregular  Heart sounds: Murmur heard  Pulmonary:      Effort: Pulmonary effort is normal  No respiratory distress  Breath sounds: No wheezing or rales  Comments: Decreased breath sounds bilaterally  Abdominal:      General: Bowel sounds are normal  There is no distension  Palpations: Abdomen is soft  Tenderness: There is no abdominal tenderness  Musculoskeletal:      Right lower leg: Edema present        Left lower leg: Edema present  Skin:     Comments: Erythema noted along the right axillary region  Dressing/packing in place   Neurological:      Mental Status: He is alert  Additional Data:     Labs:  Results from last 7 days   Lab Units 12/24/22  0619 12/23/22  0550 12/21/22  0651   WBC Thousand/uL 8 79 11 09* 9 09   HEMOGLOBIN g/dL 14 1 14 2 12 8   HEMATOCRIT % 43 2 44 1 40 8   PLATELETS Thousands/uL 187 183 154   BANDS PCT %  --   --  42*   NEUTROS PCT %  --  88*  --    LYMPHS PCT %  --  6*  --    LYMPHO PCT %  --   --  5*   MONOS PCT %  --  3*  --    MONO PCT %  --   --  4   EOS PCT %  --  2 0     Results from last 7 days   Lab Units 12/27/22  0553 12/24/22  0619 12/23/22  0550   SODIUM mmol/L 139   < > 140   POTASSIUM mmol/L 4 3   < > 4 7   CHLORIDE mmol/L 104   < > 106   CO2 mmol/L 27   < > 25   BUN mg/dL 16   < > 31*   CREATININE mg/dL 0 71   < > 0 97   ANION GAP mmol/L 8   < > 9   CALCIUM mg/dL 8 7   < > 8 6   ALBUMIN g/dL  --   --  2 5*   TOTAL BILIRUBIN mg/dL  --   --  0 48   ALK PHOS U/L  --   --  117*   ALT U/L  --   --  70   AST U/L  --   --  56*   GLUCOSE RANDOM mg/dL 144*   < > 124    < > = values in this interval not displayed  Results from last 7 days   Lab Units 12/27/22  1611 12/27/22  1124 12/27/22  0710 12/26/22  2052 12/26/22  1610 12/26/22  1115 12/26/22  0756 12/25/22  2034 12/25/22  1618 12/25/22  1057 12/25/22  0709 12/24/22  2119   POC GLUCOSE mg/dl 238* 197* 146* 168* 153* 215* 158* 208* 146* 259* 147* 158*         Results from last 7 days   Lab Units 12/21/22  0651   LACTIC ACID mmol/L 1 9   PROCALCITONIN ng/ml 26 86*       Lines/Drains:  Invasive Devices     Peripheral Intravenous Line  Duration           Peripheral IV 12/26/22 Dorsal (posterior); Left Hand 1 day                  Telemetry:  Telemetry Orders (From admission, onward)             48 Hour Telemetry Monitoring  Continuous x 48 hours        References:    Telemetry Guidelines   Question:  Reason for 48 Hour Telemetry  Answer: Arrhythmias Requiring Medical Therapy (eg  SVT, Vtach/fib, Bradycardia, Uncontrolled A-fib)                 Telemetry Reviewed: a-fib  Indication for Continued Telemetry Use: Arrthymias requiring medical therapy             Imaging: No pertinent imaging reviewed      Recent Cultures (last 7 days):         Last 24 Hours Medication List:   Current Facility-Administered Medications   Medication Dose Route Frequency Provider Last Rate   • acetaminophen  650 mg Oral Q6H PRN Heather Farias MD     • aluminum-magnesium hydroxide-simethicone  30 mL Oral Q6H PRN Heather Farias MD     • apixaban  5 mg Oral BID Heather Farias MD     • atorvastatin  10 mg Oral Daily With Trisha Mcwilliams MD     • bisacodyl  10 mg Rectal Daily PRN Heather Farias MD     • calamine-zinc oxide   Topical 4x Daily Heather Farias MD     • carvedilol  25 mg Oral BID With Meals SIMI Daniel     • cefazolin  2,000 mg Intravenous Q6H Bean Holloway MD 2,000 mg (12/27/22 1535)   • digoxin  125 mcg Oral Daily SIMI Daniel     • diltiazem  120 mg Oral Q12H [facility] SIMI Daniel     • diphenhydrAMINE  25 mg Intravenous Daily PRN Cathern Pap, PA-C     • famotidine  20 mg Intravenous Q12H [facility] SIMI Patino     • Fluticasone Furoate-Vilanterol  1 puff Inhalation Daily Heather Farias MD      And   • umeclidinium  1 puff Inhalation Daily Heather Farias MD     • folic acid  1 mg Oral Daily Heather Farias MD     • guaiFENesin  600 mg Oral Q12H Colt Lopez MD     • hydrALAZINE  5 mg Intravenous Q6H PRN Cathern Pap, PA-C     • hydrocortisone   Topical BID Heather Farias MD     • hydrOXYzine HCL  25 mg Oral Q6H PRN SIMI Gonzales     • insulin lispro  1-6 Units Subcutaneous TID AC SIMI Patino     • insulin lispro  1-6 Units Subcutaneous HS SIMI Patino     • latanoprost  1 drop Both Eyes HS Heather Farias MD     • levalbuterol  0 63 mg Nebulization Q4H PRN SIMI Gonzales     • melatonin  3 mg Oral HS Laurel SIMI Hayes     • metoprolol  2 5 mg Intravenous Q6H PRN SIMI Gonzales     • montelukast  5 mg Oral HS Bakari Rushing MD     • ondansetron  4 mg Intravenous Q6H PRN Bakari Rushing MD     • polyethylene glycol  17 g Oral Daily Romeo Francis MD     • saccharomyces boulardii  250 mg Oral BID SIMI Gonzales     • simethicone  80 mg Oral 4x Daily PRN Bakari Rushing MD     • spironolactone  25 mg Oral Daily Jermaine Edmondson MD     • vancomycin  15 mg/kg Intravenous Q12H Liat Perez MD          Today, Patient Was Seen By: Tad Jett DO    **Please Note: This note may have been constructed using a voice recognition system  **

## 2022-12-28 NOTE — PROGRESS NOTES
Progress Note - Infectious Disease   Walter Ortega [de-identified] y o  male MRN: [id number]  Unit/Bed#: [address] [address] Encounter: [id number]      Impression/Plan:    1   Severe sepsis, developing soon after admission  With fever, tachycardia, tachypnea, lactic acidosis and hypotension responsive to IVFs  Due to likely due to #2 below  Blood cultures negative to date   -Antibiotics as below  -monitor temperature and hemodynamics  -serial exam  -recheck CBC and BMP in a m   -supportive care per primary care team  -additional interventions clinical course     2  Right axillary cellulitis  Axillary US without discrete collection  Bedside needle aspiration attempted with only scant blood obtained and sent for culture 12/20  Culture is growing MSSA  CT chest was performed 12/23 due to continued erythema and swelling of the axilla, this did not show any abscess  Patient is status post repeat I&D by surgery on 12/24  Repeat cultures were not sent  The area of erythema appears smaller but the patient still has significant erythema with induration and swelling  He is hemodynamically stable without fever or leukocytosis  At this point the patient has received 10 days of appropriate antibiotics with minimal improvement, therefore would consider a noninfectious cause such as a atypical inflammatory process or malignancy  Also consider mixed infection with MRSA present given limited improvement thus far with cefazolin so patient transition to IV vancomycin 12/27  Punch biopsies performed today by surgery    -Stop IV cefazolin   -Continue IV vancomycin   -Follow-up pathology and culture from punch biopsy  -Recommend discussion with dermatology about possibility of atypical inflammatory or malignant process  -If no improvement after antibiotic switch within the next 48 hours, anticipate stopping IV vancomycin and monitor off antibiotics  -recommend ongoing wound care  -Monitor exam     3  BUDDY  Developing soon on admission    Renal function has since improved  -renal dose adjust antibiotic as needed  -volume management per primary     4  Urticaria/Angioedema, POA and worsening x 1 month  Unclear source  Outpatient mysoline, atorvastatin, losartan, jardiance held  No known antibiotic allergies  -monitor symptoms  -symptomatic management per primary care team     5  Lung cancer  S/p 3 High dose RT treatment 2022  No chemotherapy    -Outpatient oncology follow-up    Above management plan discussed with the patient and family at bedside and [address] [place] [address] Attending  ID will follow  Antibiotics:  Cefazolin day 7  Vancomycin day 2  Antibiotics day 10    Subjective:  Patient continues to have significant erythema, tenderness, induration in the right axillary region  Appears similar to yesterday  Status post punch biopsies performed today by surgery  The patient is afebrile  Objective:  Vitals:  Temp:  [97 3 °F (36 3 °C)-98 2 °F (36 8 °C)] 97 9 °F (36 6 °C)  HR:  [] 100  Resp:  [16] 16  BP: (147-173)/(81-99) 147/82  SpO2:  [90 %-96 %] 94 %  Temp (24hrs), Av 8 °F (36 6 °C), Min:97 3 °F (36 3 °C), Max:98 2 °F (36 8 °C)  Current: Temperature: 97 9 °F (36 6 °C)    Physical Exam:   General Appearance:  Alert, interactive, nontoxic, no acute distress  Throat: Oropharynx moist without lesions  Lungs:   Clear to auscultation bilaterally; no wheezes, rhonchi or rales; respirations unlabored   Heart:  RRR; no murmur, rub or gallop   Abdomen:   Soft, non-tender, non-distended, positive bowel sounds  Extremities: No clubbing, cyanosis or edema   Skin:  Right axillary erythema with warmth present  Areas of induration and drainage at sites of packing       Labs:    All pertinent labs and imaging studies were personally reviewed  Results from last 7 days   Lab Units 22  0459 22  0619 22  0550   WBC Thousand/uL 12 06* 8 79 11 09*   HEMOGLOBIN g/dL 15 7 14 1 14 2   PLATELETS Thousands/uL 249 187 183     Results from last 7 days Lab Units 12/28/22  0459 12/27/22  0553 12/26/22  0556 12/24/22  0619 12/23/22  0550   SODIUM mmol/L 138 139 138   < > 140   POTASSIUM mmol/L 3 8 4 3 3 4*   < > 4 7   CHLORIDE mmol/L 103 104 102   < > 106   CO2 mmol/L 28 27 29   < > 25   BUN mg/dL 11 16 15   < > 31*   CREATININE mg/dL 0 82 0 71 0 67   < > 0 97   EGFR ml/min/1 73sq m 83 88 90   < > 73   CALCIUM mg/dL 8 9 8 7 8 4   < > 8 6   AST U/L  --   --   --   --  56*   ALT U/L  --   --   --   --  70   ALK PHOS U/L  --   --   --   --  117*    < > = values in this interval not displayed             Imaging:  Pertinent imaging in PACS personally reviewed    CT chest with contrast 12/23: Skin thickening and subcutaneous edematous changes in the right axillary region, findings most consistent with cellulitis

## 2022-12-28 NOTE — PROGRESS NOTES
Sammy Resendiz is a [de-identified] y o  male who is currently ordered Vancomycin IV with management by the Pharmacy Consult service  Relevant clinical data and objective / subjective history reviewed  Vancomycin Assessment:  Indication and Goal AUC/Trough: Soft tissue (goal -600, trough >10), -600, trough >10  Clinical Status: New  Micro:   Pending  Renal Function:  SCr: 0 82 mg/dL  CrCl: 87 5 mL/min  Renal replacement: Not on dialysis  Days of Therapy: 2  Current Dose: 1500 mg IV q 12 h  Vancomycin Plan:  Next Level: 12/29/22 @ 0600  Renal Function Monitoring: Daily BMP and [place] will continue to follow closely for s/sx of nephrotoxicity, infusion reactions and appropriateness of therapy  BMP and CBC will be ordered per protocol  We will continue to follow the patient’s culture results and clinical progress daily      Diana Singer, Pharmacist

## 2022-12-28 NOTE — ASSESSMENT & PLAN NOTE
Possibly multifactorial, Sepsis vs Haldo vs lactic acidosis, hx of SYLVESTER    · ABG-unremarkable  · Neuro checks, maintain airway  · S/p haldo administration overnight-hold all neurotoxics  · CCU consulted overnight Endocrinology

## 2022-12-28 NOTE — PHYSICAL THERAPY NOTE
PT TREATMENT     12/28/22 0912   PT Last Visit   PT Visit Date 12/28/22   Note Type   Note Type Treatment   Pain Assessment   Pain Assessment Tool 0-10   Pain Score 7   Pain Location/Orientation Location: Head  ("Headache")   Hospital Pain Intervention(s) Repositioned; Ambulation/increased activity; Emotional support; Rest  (During ambulation, patient saw his nurse and requested Tylenol -nurse to bring patient Tylenol when done with physical therapy session)   Restrictions/Precautions   Other Precautions Fall Risk   General   Chart Reviewed Yes   Family/Caregiver Present No   Subjective   Subjective "I'd like to stop and see my wife"   Bed Mobility   Supine to Sit 4  Minimal assistance   Additional items Assist x 1;Verbal cues; Increased time required   Transfers   Sit to Stand 5  Supervision   Additional items Verbal cues; Increased time required   Stand to Sit 5  Supervision   Additional items Verbal cues; Increased time required   Ambulation/Elevation   Gait pattern Decreased foot clearance  (Decreased gait speed)   Gait Assistance 5  Supervision   Additional items Assist x 1;Verbal cues; Tactile cues   Assistive Device Rolling walker   Distance 150 feet with change in direction and toward end of ambulation patient stopped to visit with his wife who is also a patient and stood with a roller walker for 5 minutes while visiting with his wife  Rest   125 feet with change in direction  Balance   Static Sitting Good   Static Standing Fair +  (With roller walker)   Ambulatory Fair  (With roller walker)   Endurance Deficit   Endurance Deficit Yes   Endurance Deficit Description Limits gait and functional mobility   Activity Tolerance   Activity Tolerance Patient limited by fatigue;Treatment limited secondary to medical complications (Comment)  (Mild shortness of breath at end of ambulation; SaO2 on room air 94%)   Assessment   Assessment Patient agreeable to PT session this morning    Patient is making steady progress with bed mobility, transfers, ambulation with a roller walker, gait endurance, gait balance and overall functional mobility  Patient is progressing toward goals  Patient will continue to benefit from skilled physical therapy services to improve his strength, endurance and functional mobility  When medically stable for discharge, patient is safe for discharge home with continued ambulation with a roller walker and home PT  The patient's AM-PAC Basic Mobility Inpatient Short Form Raw Score is 18  A Raw score of greater than 16 suggests the patient may benefit from discharge to home  Please also refer to the recommendation of the Physical Therapist for safe discharge planning  Plan   Treatment/Interventions ADL retraining;Functional transfer training;LE strengthening/ROM; Elevations; Therapeutic exercise; Endurance training;Patient/family training;Equipment eval/education; Bed mobility;Gait training; Compensatory technique education   PT Frequency Other (Comment)  (5x/wk)   Recommendation   PT Discharge Recommendation Home with home health rehabilitation   Equipment Recommended   (Patient received a roller walker for discharge)   AM-PAC Basic Mobility Inpatient   Turning in Bed Without Bedrails 4   Lying on Back to Sitting on Edge of Flat Bed 3   Moving Bed to Chair 3   Standing Up From Chair 3   Walk in Room 3   Climb 3-5 Stairs 2   Basic Mobility Inpatient Raw Score 18   Basic Mobility Standardized Score 41 05   Highest Level Of Mobility   JH-HLM Goal 6: Walk 10 steps or more   JH-HLM Achieved 7: Walk 25 feet or more   Education   Education Provided Mobility training;Assistive device   Patient Demonstrates acceptance/verbal understanding;Explanation/teachback used   End of Consult   Patient Position at End of Consult Bed/Chair alarm activated; Bedside chair; All needs within reach   [facility] Insurance Number  [address] [place] [address] [id number]     Portions of the documentation may have been created using voice recognition software  Occasional wrong word or sound alike substitutions may have occurred due to the inherent limitation of the voice recognition software  Read the chart carefully and recognize, using context, where substitutions have occurred

## 2022-12-29 LAB
ANION GAP SERPL CALCULATED.3IONS-SCNC: 9 MMOL/L (ref 4–13)
APTT PPP: 32 SECONDS (ref 23–37)
BASOPHILS # BLD AUTO: 0.06 THOUSANDS/ÂΜL (ref 0–0.1)
BASOPHILS NFR BLD AUTO: 0 % (ref 0–1)
BUN SERPL-MCNC: 11 MG/DL (ref 5–25)
CALCIUM SERPL-MCNC: 8.6 MG/DL (ref 8.3–10.1)
CHLORIDE SERPL-SCNC: 105 MMOL/L (ref 96–108)
CO2 SERPL-SCNC: 30 MMOL/L (ref 21–32)
CREAT SERPL-MCNC: 0.74 MG/DL (ref 0.6–1.3)
DIGOXIN SERPL-MCNC: 0.7 NG/ML (ref 0.8–2)
EOSINOPHIL # BLD AUTO: 0.26 THOUSAND/ÂΜL (ref 0–0.61)
EOSINOPHIL NFR BLD AUTO: 2 % (ref 0–6)
ERYTHROCYTE [DISTWIDTH] IN BLOOD BY AUTOMATED COUNT: 13.4 % (ref 11.6–15.1)
ERYTHROCYTE [DISTWIDTH] IN BLOOD BY AUTOMATED COUNT: 13.5 % (ref 11.6–15.1)
GFR SERPL CREATININE-BSD FRML MDRD: 87 ML/MIN/1.73SQ M
GLUCOSE SERPL-MCNC: 124 MG/DL (ref 65–140)
GLUCOSE SERPL-MCNC: 147 MG/DL (ref 65–140)
GLUCOSE SERPL-MCNC: 151 MG/DL (ref 65–140)
GLUCOSE SERPL-MCNC: 154 MG/DL (ref 65–140)
GLUCOSE SERPL-MCNC: 218 MG/DL (ref 65–140)
HCT VFR BLD AUTO: 45.6 % (ref 36.5–49.3)
HCT VFR BLD AUTO: 46.6 % (ref 36.5–49.3)
HGB BLD-MCNC: 14.7 G/DL (ref 12–17)
HGB BLD-MCNC: 15 G/DL (ref 12–17)
IMM GRANULOCYTES # BLD AUTO: 0.14 THOUSAND/UL (ref 0–0.2)
IMM GRANULOCYTES NFR BLD AUTO: 1 % (ref 0–2)
INR PPP: 1.34 (ref 0.84–1.19)
LYMPHOCYTES # BLD AUTO: 0.98 THOUSANDS/ÂΜL (ref 0.6–4.47)
LYMPHOCYTES NFR BLD AUTO: 7 % (ref 14–44)
MCH RBC QN AUTO: 32.3 PG (ref 26.8–34.3)
MCH RBC QN AUTO: 32.8 PG (ref 26.8–34.3)
MCHC RBC AUTO-ENTMCNC: 32.2 G/DL (ref 31.4–37.4)
MCHC RBC AUTO-ENTMCNC: 32.2 G/DL (ref 31.4–37.4)
MCV RBC AUTO: 100 FL (ref 82–98)
MCV RBC AUTO: 102 FL (ref 82–98)
MONOCYTES # BLD AUTO: 0.64 THOUSAND/ÂΜL (ref 0.17–1.22)
MONOCYTES NFR BLD AUTO: 5 % (ref 4–12)
NEUTROPHILS # BLD AUTO: 11.61 THOUSANDS/ÂΜL (ref 1.85–7.62)
NEUTS SEG NFR BLD AUTO: 85 % (ref 43–75)
NRBC BLD AUTO-RTO: 0 /100 WBCS
PLATELET # BLD AUTO: 252 THOUSANDS/UL (ref 149–390)
PLATELET # BLD AUTO: 282 THOUSANDS/UL (ref 149–390)
PMV BLD AUTO: 9.6 FL (ref 8.9–12.7)
PMV BLD AUTO: 9.9 FL (ref 8.9–12.7)
POTASSIUM SERPL-SCNC: 3.7 MMOL/L (ref 3.5–5.3)
PROTHROMBIN TIME: 16.7 SECONDS (ref 11.6–14.5)
RBC # BLD AUTO: 4.55 MILLION/UL (ref 3.88–5.62)
RBC # BLD AUTO: 4.58 MILLION/UL (ref 3.88–5.62)
SODIUM SERPL-SCNC: 144 MMOL/L (ref 135–147)
VANCOMYCIN SERPL-MCNC: 15.4 UG/ML (ref 10–20)
VANCOMYCIN TROUGH SERPL-MCNC: 37.4 UG/ML (ref 10–20)
WBC # BLD AUTO: 12.4 THOUSAND/UL (ref 4.31–10.16)
WBC # BLD AUTO: 13.69 THOUSAND/UL (ref 4.31–10.16)

## 2022-12-29 RX ORDER — DOCUSATE SODIUM 100 MG/1
100 CAPSULE, LIQUID FILLED ORAL 2 TIMES DAILY
Status: DISCONTINUED | OUTPATIENT
Start: 2022-12-29 | End: 2023-01-01 | Stop reason: HOSPADM

## 2022-12-29 RX ORDER — POLYETHYLENE GLYCOL 3350 17 G/17G
17 POWDER, FOR SOLUTION ORAL 2 TIMES DAILY
Status: DISCONTINUED | OUTPATIENT
Start: 2022-12-29 | End: 2023-01-01 | Stop reason: HOSPADM

## 2022-12-29 RX ORDER — PRIMIDONE 50 MG/1
50 TABLET ORAL EVERY 12 HOURS SCHEDULED
Status: DISCONTINUED | OUTPATIENT
Start: 2022-12-29 | End: 2023-01-01 | Stop reason: HOSPADM

## 2022-12-29 RX ORDER — ATORVASTATIN CALCIUM 40 MG/1
40 TABLET, FILM COATED ORAL DAILY
Status: DISCONTINUED | OUTPATIENT
Start: 2022-12-30 | End: 2022-12-29

## 2022-12-29 RX ADMIN — POLYETHYLENE GLYCOL 3350 17 G: 17 POWDER, FOR SOLUTION ORAL at 08:30

## 2022-12-29 RX ADMIN — SPIRONOLACTONE 25 MG: 25 TABLET ORAL at 08:32

## 2022-12-29 RX ADMIN — FOLIC ACID 1 MG: 1 TABLET ORAL at 08:32

## 2022-12-29 RX ADMIN — ATORVASTATIN CALCIUM 10 MG: 10 TABLET, FILM COATED ORAL at 17:00

## 2022-12-29 RX ADMIN — INSULIN LISPRO 2 UNITS: 100 INJECTION, SOLUTION INTRAVENOUS; SUBCUTANEOUS at 11:50

## 2022-12-29 RX ADMIN — APIXABAN 5 MG: 5 TABLET, FILM COATED ORAL at 17:00

## 2022-12-29 RX ADMIN — APIXABAN 5 MG: 5 TABLET, FILM COATED ORAL at 08:32

## 2022-12-29 RX ADMIN — DIPHENHYDRAMINE HYDROCHLORIDE 25 MG: 50 INJECTION, SOLUTION INTRAMUSCULAR; INTRAVENOUS at 00:12

## 2022-12-29 RX ADMIN — HYDROXYZINE HYDROCHLORIDE 25 MG: 25 TABLET ORAL at 21:30

## 2022-12-29 RX ADMIN — HYDROCORTISONE: 25 CREAM TOPICAL at 08:33

## 2022-12-29 RX ADMIN — UMECLIDINIUM 1 PUFF: 62.5 AEROSOL, POWDER ORAL at 08:33

## 2022-12-29 RX ADMIN — FAMOTIDINE 20 MG: 10 INJECTION, SOLUTION INTRAVENOUS at 21:29

## 2022-12-29 RX ADMIN — CARVEDILOL 25 MG: 25 TABLET, FILM COATED ORAL at 08:31

## 2022-12-29 RX ADMIN — DILTIAZEM HYDROCHLORIDE 120 MG: 60 CAPSULE, EXTENDED RELEASE ORAL at 21:29

## 2022-12-29 RX ADMIN — CARVEDILOL 25 MG: 25 TABLET, FILM COATED ORAL at 17:00

## 2022-12-29 RX ADMIN — FAMOTIDINE 20 MG: 10 INJECTION, SOLUTION INTRAVENOUS at 08:32

## 2022-12-29 RX ADMIN — HYDRALAZINE HYDROCHLORIDE 25 MG: 25 TABLET ORAL at 08:31

## 2022-12-29 RX ADMIN — FLUTICASONE FUROATE AND VILANTEROL TRIFENATATE 1 PUFF: 100; 25 POWDER RESPIRATORY (INHALATION) at 08:33

## 2022-12-29 RX ADMIN — INSULIN LISPRO 1 UNITS: 100 INJECTION, SOLUTION INTRAVENOUS; SUBCUTANEOUS at 17:00

## 2022-12-29 RX ADMIN — Medication 1500 MG: at 04:36

## 2022-12-29 RX ADMIN — INSULIN LISPRO 1 UNITS: 100 INJECTION, SOLUTION INTRAVENOUS; SUBCUTANEOUS at 08:29

## 2022-12-29 RX ADMIN — GUAIFENESIN 600 MG: 600 TABLET, EXTENDED RELEASE ORAL at 21:30

## 2022-12-29 RX ADMIN — DILTIAZEM HYDROCHLORIDE 120 MG: 60 CAPSULE, EXTENDED RELEASE ORAL at 08:32

## 2022-12-29 RX ADMIN — LATANOPROST 1 DROP: 50 SOLUTION OPHTHALMIC at 21:29

## 2022-12-29 RX ADMIN — MONTELUKAST 5 MG: 10 TABLET, FILM COATED ORAL at 21:30

## 2022-12-29 RX ADMIN — HYDRALAZINE HYDROCHLORIDE 25 MG: 25 TABLET ORAL at 21:30

## 2022-12-29 RX ADMIN — Medication 1500 MG: at 17:00

## 2022-12-29 RX ADMIN — PRIMIDONE 50 MG: 50 TABLET ORAL at 21:31

## 2022-12-29 RX ADMIN — Medication 250 MG: at 17:00

## 2022-12-29 RX ADMIN — GUAIFENESIN 600 MG: 600 TABLET, EXTENDED RELEASE ORAL at 08:32

## 2022-12-29 RX ADMIN — Medication 250 MG: at 08:32

## 2022-12-29 RX ADMIN — Medication 3 MG: at 21:30

## 2022-12-29 NOTE — PROGRESS NOTES
Progress Note - Infectious Disease   Maria L Oviedo [de-identified] y o  male MRN: [id number]  Unit/Bed#: [address] [place] [address] Encounter: [id number]      Impression/Plan:    1   Severe sepsis, developing soon after admission  With fever, tachycardia, tachypnea, lactic acidosis and hypotension responsive to IVFs  Due to likely due to #2 below  Blood cultures negative to date   -Antibiotics as below  -monitor temperature and hemodynamics  -serial exam  -recheck CBC and BMP in a m   -supportive care per primary care team     2  Right axillary cellulitis  Axillary US without discrete collection  Bedside needle aspiration attempted with only scant blood obtained and sent for culture 12/20  Culture is growing MSSA  CT chest was performed 12/23 due to continued erythema and swelling of the axilla, this did not show any abscess  Patient is status post repeat I&D by surgery on 12/24  Repeat cultures were not sent  The patient continues to have significant erythema, swelling, induration, pain in the area  Is hemodynamically stable with mild leukocytosis but no fever  Patient has received extensive course of culture targeted IV antibiotics with minimal improvement, therefore would consider a noninfectious cause such as a atypical inflammatory process or malignancy  Also consider mixed infection with MRSA present given limited improvement thus far with cefazolin so patient transitioned to IV vancomycin 12/27  Punch biopsies performed 12/28 by surgery and pathology is in process  Dermatology evaluated images and are considering malignancy as well   -Continue IV vancomycin   -Follow-up pathology and culture from punch biopsy  -If there is limited improvement tomorrow on IV vancomycin recommend stopping antibiotics  -Will need outpatient follow-up with dermatology and surgery  -recommend ongoing wound care  -Monitor exam     3  BUDDY  Developing soon on admission    Renal function has since improved  -renal dose adjust antibiotic as needed  -volume management per primary     4  Urticaria/Angioedema, POA and worsening x 1 month  Unclear source  Outpatient mysoline, atorvastatin, losartan, jardiance held  No known antibiotic allergies  -monitor symptoms  -symptomatic management per primary care team     5  Lung cancer  S/p 3 High dose RT treatment 2022  No chemotherapy    -Outpatient oncology follow-up    Above management plan discussed with the patient and family at bedside and [address] [place] [address] Attending  ID will follow  Antibiotics:  Vancomycin day 3  Antibiotics day 11    Subjective: The patient is having pain of the right axilla but feels that some of this is from the punch biopsies  He denies fever, chills  He is anxious for discharge  Objective:  Vitals:  Temp:  [96 5 °F (35 8 °C)-98 2 °F (36 8 °C)] 96 5 °F (35 8 °C)  HR:  [56-80] 65  Resp:  [18] 18  BP: (105-138)/(60-85) 132/85  SpO2:  [92 %-96 %] 96 %  Temp (24hrs), Av 7 °F (36 5 °C), Min:96 5 °F (35 8 °C), Max:98 2 °F (36 8 °C)  Current: Temperature: (!) 96 5 °F (35 8 °C)    Physical Exam:   General Appearance:  Alert, interactive, nontoxic, no acute distress  Throat: Oropharynx moist without lesions  Lungs:   Clear to auscultation bilaterally; no wheezes, rhonchi or rales; respirations unlabored   Heart:  RRR; no murmur, rub or gallop   Abdomen:   Soft, non-tender, non-distended, positive bowel sounds  Extremities: No clubbing, cyanosis or edema   Skin:  Right axillary erythema with warmth present  Remaining areas of induration  Area is very tender  There is bleeding present on dressings from punch biopsy sites       Labs:    All pertinent labs and imaging studies were personally reviewed  Results from last 7 days   Lab Units 22  0520 22  0459 22  0619   WBC Thousand/uL 12 40* 12 06* 8 79   HEMOGLOBIN g/dL 14 7 15 7 14 1   PLATELETS Thousands/uL 252 249 187     Results from last 7 days   Lab Units 22  0520 22  0459 22  0553 12/24/22  0619 12/23/22  0550   SODIUM mmol/L 144 138 139   < > 140   POTASSIUM mmol/L 3 7 3 8 4 3   < > 4 7   CHLORIDE mmol/L 105 103 104   < > 106   CO2 mmol/L 30 28 27   < > 25   BUN mg/dL 11 11 16   < > 31*   CREATININE mg/dL 0 74 0 82 0 71   < > 0 97   EGFR ml/min/1 73sq m 87 83 88   < > 73   CALCIUM mg/dL 8 6 8 9 8 7   < > 8 6   AST U/L  --   --   --   --  56*   ALT U/L  --   --   --   --  70   ALK PHOS U/L  --   --   --   --  117*    < > = values in this interval not displayed             Imaging:  Pertinent imaging in PACS personally reviewed    CT chest with contrast 12/23: Skin thickening and subcutaneous edematous changes in the right axillary region, findings most consistent with cellulitis

## 2022-12-29 NOTE — OCCUPATIONAL THERAPY NOTE
OT TREATMENT       12/29/22 1315   Note Type   Note Type Treatment   Pain Assessment   Pain Assessment Tool 0-10   Pain Score No Pain   Restrictions/Precautions   Other Precautions Fall Risk   ADL   Where Assessed Standing at sink   Grooming Assistance 6  Modified Independent   Grooming Deficit Setup   UB Dressing Assistance 7  Independent   LB Dressing Assistance 7  Independent   Toileting Assistance  7  Independent   Functional Standing Tolerance   Time 7 minutes   Activity ADLS in bathroom   Transfers   Sit to Stand 7  Independent   Stand to Sit 7  Independent   Stand pivot 7  Independent   Toilet transfer 7  Independent   Additional items Standard toilet   Functional Mobility   Functional Mobility 7  Independent   Additional Comments in room with RW   Cognition   Overall Cognitive Status WFL   Activity Tolerance   Activity Tolerance Patient limited by fatigue   Assessment   Assessment Turning in Bed Without Bedrails 4  Pt demonstrated improved strength, balance and functional activity tolerance allowing for increased active participation and modified independence with ADLs and functional mobility with RW  Steady progress towards goals  The patient's raw score on the AM-PAC Daily Activity inpatient short form is 23, standardized score is 51 12, greater than 39 4  Patients at this level are likely to benefit from discharge to home  Please refer to the recommendation of the Occupational Therapist for safe discharge planning  Plan   Treatment Interventions ADL retraining;Functional transfer training;UE strengthening/ROM; Endurance training;Patient/family training;Equipment evaluation/education; Compensatory technique education;Continued evaluation; Energy conservation; Activityengagement   OT Frequency 3-5x/wk   Recommendation   OT Discharge Recommendation Home with home health rehabilitation   [facility] Daily Activity Inpatient   Lower Body Dressing 4   Bathing 3   Toileting 4   Upper Body Dressing 4   Grooming 4 Eating 4   Daily Activity Raw Score 23   Daily Activity Standardized Score (Calc for Raw Score >=11) 51 12   AM-PAC Applied Cognition Inpatient   Following a Speech/Presentation 4   Understanding Ordinary Conversation 4   Taking Medications 4   Remembering Where Things Are Placed or Put Away 4   Remembering List of 4-5 Errands 3   Taking Care of Complicated Tasks 4   Applied Cognition Raw Score 23   Applied Cognition Standardized Score 53 08   End of Consult   Education Provided Yes   Patient Position at End of Consult Bedside chair; All needs within reach   Nurse Communication Nurse aware of consult   Licensure   [place] License Number  Raven Shields [address] 89, OTR/L, [place] Lic# [id number]

## 2022-12-29 NOTE — ASSESSMENT & PLAN NOTE
Likely due to right lateral chest wall cellulitis, tachycardia- possibly secondary to underlying A  fib  · BCx-negative so far  Wound culture - staph  · Off Rocephin  Discontinued IV Ancef  Currently on Vanco added as noted above    · ID following, recs appreciated  · Imaging negative for abscess

## 2022-12-29 NOTE — ASSESSMENT & PLAN NOTE
Heart rate was not well controlled previously but has improved  · Cardizem drip was discontinued and transitioned to oral Cardizem    Also received doses of IV digoxin and currently on p o  digoxin  · Digoxin level in 1 week  · continue Eliquis, Coreg  · Cardio input appreciated

## 2022-12-29 NOTE — PLAN OF CARE
Problem: PAIN - ADULT  Goal: Verbalizes/displays adequate comfort level or baseline comfort level  Description: Interventions:  - Encourage patient to monitor pain and request assistance  - Assess pain using appropriate pain scale  - Administer analgesics based on type and severity of pain and evaluate response  - Implement non-pharmacological measures as appropriate and evaluate response  - Consider cultural and social influences on pain and pain management  - Notify physician/advanced practitioner if interventions unsuccessful or patient reports new pain  Outcome: Progressing     Problem: INFECTION - ADULT  Goal: Absence or prevention of progression during hospitalization  Description: INTERVENTIONS:  - Assess and monitor for signs and symptoms of infection  - Monitor lab/diagnostic results  - Monitor all insertion sites, i e  indwelling lines, tubes, and drains  - Monitor endotracheal if appropriate and nasal secretions for changes in amount and color  - [place] appropriate cooling/warming therapies per order  - Administer medications as ordered  - Instruct and encourage patient and family to use good hand hygiene technique  - Identify and instruct in appropriate isolation precautions for identified infection/condition  Outcome: Progressing  Goal: Absence of fever/infection during neutropenic period  Description: INTERVENTIONS:  - Monitor WBC    Outcome: Progressing     Problem: SAFETY ADULT  Goal: Patient will remain free of falls  Description: INTERVENTIONS:  - Educate patient/family on patient safety including physical limitations  - Instruct patient to call for assistance with activity   - Consult OT/PT to assist with strengthening/mobility   - Keep Call bell within reach  - Keep bed low and locked with side rails adjusted as appropriate  - Keep care items and personal belongings within reach  - Initiate and maintain comfort rounds  - Make Fall Risk Sign visible to staff  - Offer Toileting every 2 Hours, in advance of need  - Initiate/Maintain bed alarm  - Apply yellow socks and bracelet for high fall risk patients  - Consider moving patient to room near nurses station  Outcome: Progressing  Goal: Maintain or return to baseline ADL function  Description: INTERVENTIONS:  -  Assess patient's ability to carry out ADLs; assess patient's baseline for ADL function and identify physical deficits which impact ability to perform ADLs (bathing, care of mouth/teeth, toileting, grooming, dressing, etc )  - Assess/evaluate cause of self-care deficits   - Assess range of motion  - Assess patient's mobility; develop plan if impaired  - Assess patient's need for assistive devices and provide as appropriate  - Encourage maximum independence but intervene and supervise when necessary  - Involve family in performance of ADLs  - Assess for home care needs following discharge   - Consider OT consult to assist with ADL evaluation and planning for discharge  - Provide patient education as appropriate  Outcome: Progressing  Goal: Maintains/Returns to pre admission functional level  Description: INTERVENTIONS:  - Perform BMAT or MOVE assessment daily    - Set and communicate daily mobility goal to care team and patient/family/caregiver  - Collaborate with rehabilitation services on mobility goals if consulted  - Perform Range of Motion 2 times a day  - Reposition patient every 2 hours    - Dangle patient 2 times a day  - Stand patient 2 times a day  - Ambulate patient 2 times a day  - Out of bed to chair 2 times a day   - Out of bed for meals 2 times a day  - Out of bed for toileting  - Record patient progress and toleration of activity level   Outcome: Progressing     Problem: DISCHARGE PLANNING  Goal: Discharge to home or other facility with appropriate resources  Description: INTERVENTIONS:  - Identify barriers to discharge w/patient and caregiver  - Arrange for needed discharge resources and transportation as appropriate  - Identify discharge learning needs (meds, wound care, etc )  - Arrange for interpretive services to assist at discharge as needed  - Refer to Case Management Department for coordinating discharge planning if the patient needs post-hospital services based on physician/advanced practitioner order or complex needs related to functional status, cognitive ability, or social support system  Outcome: Progressing     Problem: Knowledge Deficit  Goal: Patient/family/caregiver demonstrates understanding of disease process, treatment plan, medications, and discharge instructions  Description: Complete learning assessment and assess knowledge base    Interventions:  - Provide teaching at level of understanding  - Provide teaching via preferred learning methods  Outcome: Progressing     Problem: Potential for Falls  Goal: Patient will remain free of falls  Description: INTERVENTIONS:  - Educate patient/family on patient safety including physical limitations  - Instruct patient to call for assistance with activity   - Consult OT/PT to assist with strengthening/mobility   - Keep Call bell within reach  - Keep bed low and locked with side rails adjusted as appropriate  - Keep care items and personal belongings within reach  - Initiate and maintain comfort rounds  - Make Fall Risk Sign visible to staff  - Offer Toileting every 2 Hours, in advance of need  - Initiate/Maintain bed alarm  - Apply yellow socks and bracelet for high fall risk patients  - Consider moving patient to room near nurses station  Outcome: Progressing     Problem: RESPIRATORY - ADULT  Goal: Achieves optimal ventilation and oxygenation  Description: INTERVENTIONS:  - Assess for changes in respiratory status  - Assess for changes in mentation and behavior  - Position to facilitate oxygenation and minimize respiratory effort  - Oxygen administered by appropriate delivery if ordered  - Initiate smoking cessation education as indicated  - Encourage broncho-pulmonary hygiene including cough, deep breathe, Incentive Spirometry  - Assess the need for suctioning and aspirate as needed  - Assess and instruct to report SOB or any respiratory difficulty  - Respiratory Therapy support as indicated  Outcome: Progressing     Problem: Prexisting or High Potential for Compromised Skin Integrity  Goal: Skin integrity is maintained or improved  Description: INTERVENTIONS:  - Identify patients at risk for skin breakdown  - Assess and monitor skin integrity  - Assess and monitor nutrition and hydration status  - Monitor labs   - Assess for incontinence   - Turn and reposition patient  - Assist with mobility/ambulation  - Relieve pressure over bony prominences  - Avoid friction and shearing  - Provide appropriate hygiene as needed including keeping skin clean and dry  - Evaluate need for skin moisturizer/barrier cream  - Collaborate with interdisciplinary team   - Patient/family teaching  - Consider wound care consult   Outcome: Progressing     Problem: MOBILITY - ADULT  Goal: Maintain or return to baseline ADL function  Description: INTERVENTIONS:  -  Assess patient's ability to carry out ADLs; assess patient's baseline for ADL function and identify physical deficits which impact ability to perform ADLs (bathing, care of mouth/teeth, toileting, grooming, dressing, etc )  - Assess/evaluate cause of self-care deficits   - Assess range of motion  - Assess patient's mobility; develop plan if impaired  - Assess patient's need for assistive devices and provide as appropriate  - Encourage maximum independence but intervene and supervise when necessary  - Involve family in performance of ADLs  - Assess for home care needs following discharge   - Consider OT consult to assist with ADL evaluation and planning for discharge  - Provide patient education as appropriate  Outcome: Progressing  Goal: Maintains/Returns to pre admission functional level  Description: INTERVENTIONS:  - Perform BMAT or MOVE assessment daily    - Set and communicate daily mobility goal to care team and patient/family/caregiver  - Collaborate with rehabilitation services on mobility goals if consulted  - Perform Range of Motion 2 times a day  - Reposition patient every 2 hours  - Dangle patient 2 times a day  - Stand patient 2 times a day  - Ambulate patient 2 times a day  - Out of bed to chair 2 times a day   - Out of bed for meals 2 times a day  - Out of bed for toileting  - Record patient progress and toleration of activity level   Outcome: Progressing     Problem: Nutrition/Hydration-ADULT  Goal: Nutrient/Hydration intake appropriate for improving, restoring or maintaining nutritional needs  Description: Monitor and assess patient's nutrition/hydration status for malnutrition  Collaborate with interdisciplinary team and initiate plan and interventions as ordered  Monitor patient's weight and dietary intake as ordered or per policy  Utilize nutrition screening tool and intervene as necessary  Determine patient's food preferences and provide high-protein, high-caloric foods as appropriate       INTERVENTIONS:  - Monitor oral intake, urinary output, labs, and treatment plans  - Assess nutrition and hydration status and recommend course of action  - Evaluate amount of meals eaten  - Assist patient with eating if necessary   - Allow adequate time for meals  - Recommend/ encourage appropriate diets, oral nutritional supplements, and vitamin/mineral supplements  - Order, calculate, and assess calorie counts as needed  - Recommend, monitor, and adjust tube feedings and TPN/PPN based on assessed needs  - Assess need for intravenous fluids  - Provide specific nutrition/hydration education as appropriate  - Include patient/family/caregiver in decisions related to nutrition  Outcome: Progressing

## 2022-12-29 NOTE — ASSESSMENT & PLAN NOTE
Followed by GEN surg  Bedside aspiration was done previously and bedside I&D done on 12/24  · Discontinued IV cefazolin every 6 hours on 12/28  Vancomycin started 12/27  · Mild leukocytosis on lab work  · Imaging negative for abscess  · Continue dressing changes per surgery  status post skin biopsy by surgery today per ID recommendation  · D/W dermatology on call who reviewed pictures and per dermatology could be malignancy related or Paget's disease, less likely Sweet syndrome    Dermatology recommended obtaining some tissue for tissue culture however per surgery too late to get culture from tissue

## 2022-12-29 NOTE — ASSESSMENT & PLAN NOTE
Wt Readings from Last 3 Encounters:   12/28/22 105 kg (231 lb 1 6 oz)   12/06/22 102 kg (224 lb)   11/28/22 102 kg (224 lb)     Patient with signs of volume overload    20 mg of IV Lasix x1 given on 12/24 and was started on 20 mg of IV lasix daily  · IV Lasix has been discontinued and patient currently on Aldactone  · Daily weights  · Intake and output  · Continue Coreg

## 2022-12-29 NOTE — PLAN OF CARE
Problem: OCCUPATIONAL THERAPY ADULT  Goal: Performs self-care activities at highest level of function for planned discharge setting  See evaluation for individualized goals  Description: Treatment Interventions: ADL retraining, Functional transfer training, UE strengthening/ROM, Endurance training, Patient/family training, Equipment evaluation/education, Compensatory technique education, Continued evaluation, Energy conservation, Activityengagement          See flowsheet documentation for full assessment, interventions and recommendations  Outcome: Progressing  Note: Limitation: Decreased ADL status, Decreased UE strength, Decreased Safe judgement during ADL, Decreased endurance, Decreased self-care trans, Decreased high-level ADLs  Prognosis: Good  Assessment: Turning in Bed Without Bedrails 4  Pt demonstrated improved strength, balance and functional activity tolerance allowing for increased active participation and modified independence with ADLs and functional mobility with RW  Steady progress towards goals  The patient's raw score on the AM-PAC Daily Activity inpatient short form is 23, standardized score is 51 12, greater than 39 4  Patients at this level are likely to benefit from discharge to home  Please refer to the recommendation of the Occupational Therapist for safe discharge planning       OT Discharge Recommendation: Home with home health rehabilitation

## 2022-12-29 NOTE — ASSESSMENT & PLAN NOTE
Heart rate not well controlled previously but has improved  · Continue telemetry monitoring  · Cardizem drip discontinued and transitioned to oral Cardizem  Also received doses of IV digoxin and currently on p o  digoxin  · Digoxin level in 1 week  · continue Coreg, eliquis    · Cardio input appreciated

## 2022-12-29 NOTE — QUICK NOTE
Dressing taken down, packing removed  Biopsy cavities flushed with sterile NS  One biopsy cavity still with mild sanguinous ooze, this was re-packed with 1inch iodoform  Covered with ABD/tape

## 2022-12-29 NOTE — ASSESSMENT & PLAN NOTE
Lab Results   Component Value Date    HGBA1C 6 7 (H) 12/20/2022       Recent Labs     12/28/22  2121 12/29/22  0712 12/29/22  1139 12/29/22  1559   POCGLU 161* 151* 218* 154*     Holding metformin and Jardiance (less like a contributor to Derm reaction)    -Continue Accuchecks AC/HS with insulin sliding scale

## 2022-12-29 NOTE — ASSESSMENT & PLAN NOTE
Followed by GEN surg  Bedside aspiration was done previously and bedside I&D done on 12/24  · Discontinued IV cefazolin every 6 hours today  Vancomycin started 12/27  · Mild leukocytosis on lab work  · Imaging negative for abscess  · Continue dressing changes per surgery   status post skin biopsy by surgery today per ID recommendation  · Per ID will discuss with dermatology tomorrow and have them review pictures as no significant improvement despite being on antibiotics

## 2022-12-29 NOTE — PHYSICAL THERAPY NOTE
PT TREATMENT     12/29/22 0938   Note Type   Note Type Treatment   Pain Assessment   Pain Assessment Tool 0-10   Pain Score No Pain   Restrictions/Precautions   Other Precautions Fall Risk   General   Chart Reviewed Yes   Cognition   Overall Cognitive Status WFL   Arousal/Participation Alert   Attention Within functional limits   Orientation Level Oriented X4   Subjective   Subjective "feeling OK"   Bed Mobility   Supine to Sit 7  Independent   Sit to Supine 7  Independent   Transfers   Sit to Stand 7  Independent   Stand to Sit 7  Independent   Stand pivot 7  Independent   Ambulation/Elevation   Gait pattern   (flexed posture, slow pauline)   Gait Assistance 5  Supervision   Assistive Device Rolling walker   Distance 2x150 feet with a walker, x 50 feet without a walker using rail in hallway   Balance   Static Sitting Good   Static Standing Fair +   Activity Tolerance   Activity Tolerance Patient tolerated treatment well   Assessment   Prognosis Good   Problem List Decreased strength;Decreased endurance;Decreased mobility   Assessment Pt demonstrates improving activity tolerance and functional mobility  Pt demonstrates a steady gait with a walker and is beginning to be able to ambulate short distances without a walker  Encouraged pt to ambulate ad michael in room and to walk in the hallway with his walker and nursing supervision  Plan to practice stair climbing next session  The patient's AM-PAC Basic Mobility Inpatient Short Form Raw Score is 23  A Raw score of greater than 16 suggests the patient may benefit from discharge to home  Plan   Treatment/Interventions Therapeutic exercise; Endurance training;Elevations;LE strengthening/ROM; Functional transfer training;Gait training;Equipment eval/education;Patient/family training   Progress Progressing toward goals   PT Frequency Other (Comment)  (5x/w)   Recommendation   PT Discharge Recommendation Home with home health rehabilitation   Equipment Recommended (pt received a rolling walker for home use)   AM-PAC Basic Mobility Inpatient   Turning in Bed Without Bedrails 4   Lying on Back to Sitting on Edge of Flat Bed 4   Moving Bed to Chair 4   Standing Up From Chair 4   Walk in Room 4   Climb 3-5 Stairs 3   Basic Mobility Inpatient Raw Score 23   Basic Mobility Standardized Score 50 88   Highest Level Of Mobility   JH-HLM Goal 7: Walk 25 feet or more   JH-HLM Achieved 7: Walk 25 feet or more   End of Consult   Patient Position at End of Consult All needs within reach; Supine   Licensure   [place] License Number  Afua Selina PT  [id number]

## 2022-12-29 NOTE — PLAN OF CARE
Problem: PAIN - ADULT  Goal: Verbalizes/displays adequate comfort level or baseline comfort level  Description: Interventions:  - Encourage patient to monitor pain and request assistance  - Assess pain using appropriate pain scale  - Administer analgesics based on type and severity of pain and evaluate response  - Implement non-pharmacological measures as appropriate and evaluate response  - Consider cultural and social influences on pain and pain management  - Notify physician/advanced practitioner if interventions unsuccessful or patient reports new pain  Outcome: Progressing     Problem: INFECTION - ADULT  Goal: Absence or prevention of progression during hospitalization  Description: INTERVENTIONS:  - Assess and monitor for signs and symptoms of infection  - Monitor lab/diagnostic results  - Monitor all insertion sites, i e  indwelling lines, tubes, and drains  - Monitor endotracheal if appropriate and nasal secretions for changes in amount and color  - [place] appropriate cooling/warming therapies per order  - Administer medications as ordered  - Instruct and encourage patient and family to use good hand hygiene technique  - Identify and instruct in appropriate isolation precautions for identified infection/condition  Outcome: Progressing  Goal: Absence of fever/infection during neutropenic period  Description: INTERVENTIONS:  - Monitor WBC    Outcome: Progressing     Problem: SAFETY ADULT  Goal: Patient will remain free of falls  Description: INTERVENTIONS:  - Educate patient/family on patient safety including physical limitations  - Instruct patient to call for assistance with activity   - Consult OT/PT to assist with strengthening/mobility   - Keep Call bell within reach  - Keep bed low and locked with side rails adjusted as appropriate  - Keep care items and personal belongings within reach  - Initiate and maintain comfort rounds  - Make Fall Risk Sign visible to staff  - Offer Toileting every 2 Hours, in advance of need  - Initiate/Maintain bed alarm  - Apply yellow socks and bracelet for high fall risk patients  - Consider moving patient to room near nurses station  Outcome: Progressing  Goal: Maintain or return to baseline ADL function  Description: INTERVENTIONS:  -  Assess patient's ability to carry out ADLs; assess patient's baseline for ADL function and identify physical deficits which impact ability to perform ADLs (bathing, care of mouth/teeth, toileting, grooming, dressing, etc )  - Assess/evaluate cause of self-care deficits   - Assess range of motion  - Assess patient's mobility; develop plan if impaired  - Assess patient's need for assistive devices and provide as appropriate  - Encourage maximum independence but intervene and supervise when necessary  - Involve family in performance of ADLs  - Assess for home care needs following discharge   - Consider OT consult to assist with ADL evaluation and planning for discharge  - Provide patient education as appropriate  Outcome: Progressing  Goal: Maintains/Returns to pre admission functional level  Description: INTERVENTIONS:  - Perform BMAT or MOVE assessment daily    - Set and communicate daily mobility goal to care team and patient/family/caregiver  - Collaborate with rehabilitation services on mobility goals if consulted  - Perform Range of Motion 2 times a day  - Reposition patient every 2 hours    - Dangle patient 2 times a day  - Stand patient 2 times a day  - Ambulate patient 2 times a day  - Out of bed to chair 2 times a day   - Out of bed for meals 2 times a day  - Out of bed for toileting  - Record patient progress and toleration of activity level   Outcome: Progressing     Problem: Potential for Falls  Goal: Patient will remain free of falls  Description: INTERVENTIONS:  - Educate patient/family on patient safety including physical limitations  - Instruct patient to call for assistance with activity   - Consult OT/PT to assist with strengthening/mobility   - Keep Call bell within reach  - Keep bed low and locked with side rails adjusted as appropriate  - Keep care items and personal belongings within reach  - Initiate and maintain comfort rounds  - Make Fall Risk Sign visible to staff  - Offer Toileting every 2 Hours, in advance of need  - Initiate/Maintain bed alarm  - Apply yellow socks and bracelet for high fall risk patients  - Consider moving patient to room near nurses station  Outcome: Progressing     Problem: MOBILITY - ADULT  Goal: Maintain or return to baseline ADL function  Description: INTERVENTIONS:  -  Assess patient's ability to carry out ADLs; assess patient's baseline for ADL function and identify physical deficits which impact ability to perform ADLs (bathing, care of mouth/teeth, toileting, grooming, dressing, etc )  - Assess/evaluate cause of self-care deficits   - Assess range of motion  - Assess patient's mobility; develop plan if impaired  - Assess patient's need for assistive devices and provide as appropriate  - Encourage maximum independence but intervene and supervise when necessary  - Involve family in performance of ADLs  - Assess for home care needs following discharge   - Consider OT consult to assist with ADL evaluation and planning for discharge  - Provide patient education as appropriate  Outcome: Progressing  Goal: Maintains/Returns to pre admission functional level  Description: INTERVENTIONS:  - Perform BMAT or MOVE assessment daily    - Set and communicate daily mobility goal to care team and patient/family/caregiver  - Collaborate with rehabilitation services on mobility goals if consulted  - Perform Range of Motion 2 times a day  - Reposition patient every 2 hours    - Dangle patient 2 times a day  - Stand patient 2 times a day  - Ambulate patient 2 times a day  - Out of bed to chair 2 times a day   - Out of bed for meals 2 times a day  - Out of bed for toileting  - Record patient progress and toleration of activity level   Outcome: Progressing     Problem: Nutrition/Hydration-ADULT  Goal: Nutrient/Hydration intake appropriate for improving, restoring or maintaining nutritional needs  Description: Monitor and assess patient's nutrition/hydration status for malnutrition  Collaborate with interdisciplinary team and initiate plan and interventions as ordered  Monitor patient's weight and dietary intake as ordered or per policy  Utilize nutrition screening tool and intervene as necessary  Determine patient's food preferences and provide high-protein, high-caloric foods as appropriate       INTERVENTIONS:  - Monitor oral intake, urinary output, labs, and treatment plans  - Assess nutrition and hydration status and recommend course of action  - Evaluate amount of meals eaten  - Assist patient with eating if necessary   - Allow adequate time for meals  - Recommend/ encourage appropriate diets, oral nutritional supplements, and vitamin/mineral supplements  - Order, calculate, and assess calorie counts as needed  - Recommend, monitor, and adjust tube feedings and TPN/PPN based on assessed needs  - Assess need for intravenous fluids  - Provide specific nutrition/hydration education as appropriate  - Include patient/family/caregiver in decisions related to nutrition  Outcome: Progressing

## 2022-12-29 NOTE — ASSESSMENT & PLAN NOTE
Improved, airway maintained  Patient on mysoline, atorvastatin and losartan/Jardiance which could cause angioedema/ urticaria-on hold  · Rash was mostly on trunk and upper thigh and is very pruritic; has lip swelling, no tongue  · Given dose of 125 mg solumedrol, benadryl 25 mg IV bid, and pepcid 20 mg IV in ER but symptoms returned   · Received solumedrol, benadryl, pepcid   · Continue calamine, hydrocortisone   · Continue Singulair    · Rechallenge with Mysoline today as less likely due to this and most likely due to losartan  Can rechallenge with Jardiance tomorrow    Pt And wife are aware that these meds are being restarted and that if she does develop an allergic reaction he is in a hospital and we can address it accordingly

## 2022-12-29 NOTE — PROGRESS NOTES
[address]  Progress Note - Jose E Muniz 1940, [de-identified] y o  male MRN: [id number]  Unit/Bed#: 2 [place] [address] Encounter: [id number]  Primary Care Provider: Rebecca Bell MD   Date and time admitted to hospital: 12/19/2022  1:01 AM    * Angioedema-resolved as of 12/21/2022  Assessment & Plan  Improved, airway maintained  Patient on mysoline, atorvastatin and losartan/Jardiance which could cause angioedema/ urticaria-on hold  · Rash was mostly on trunk and upper thigh and is very pruritic; has lip swelling, no tongue  · Given dose of 125 mg solumedrol, benadryl 25 mg IV bid, and pepcid 20 mg IV in ER but symptoms returned   · Received solumedrol, benadryl, pepcid   · Continue calamine, hydrocortisone   · Continue Singulair    · Rechallenge with Mysoline today as less likely due to this and most likely due to losartan  Can rechallenge with Jardiance tomorrow  Pt And wife are aware that these meds are being restarted and that if she does develop an allergic reaction he is in a hospital and we can address it accordingly    Cellulitis  Assessment & Plan  Followed by GEN surg  Bedside aspiration was done previously and bedside I&D done on 12/24  · Discontinued IV cefazolin every 6 hours on 12/28  Vancomycin started 12/27  · Mild leukocytosis on lab work  · Imaging negative for abscess  · Continue dressing changes per surgery  status post skin biopsy by surgery today per ID recommendation  · D/W dermatology on call who reviewed pictures and per dermatology could be malignancy related or Paget's disease, less likely Sweet syndrome  Dermatology recommended obtaining some tissue for tissue culture however per surgery too late to get culture from tissue     Persistent atrial fibrillation [facility])  Assessment & Plan  Heart rate was not well controlled previously but has improved  · Cardizem drip was discontinued and transitioned to oral Cardizem  Also received doses of IV digoxin and currently on p o  digoxin  · Digoxin level in 1 week  · continue Eliquis, Coreg  · Cardio input appreciated    Severe sepsis [facility])  Assessment & Plan  Likely due to right lateral chest wall cellulitis, tachycardia- possibly secondary to underlying A  fib  · BCx-negative so far  Wound culture - staph  · Off Rocephin  Discontinued IV Ancef  Currently on Vanco added as noted above  · ID following, recs appreciated  · Imaging negative for abscess    GERD (gastroesophageal reflux disease)  Assessment & Plan  Continue Pepcid    Chronic diastolic heart failure (HCC)  Assessment & Plan  Wt Readings from Last 3 Encounters:   12/29/22 104 kg (230 lb 6 1 oz)   12/06/22 102 kg (224 lb)   11/28/22 102 kg (224 lb)     Patient with signs of volume overload  20 mg of IV Lasix x1 given on 12/24 and was started on 20 mg of IV lasix daily  · IV Lasix has been discontinued and patient currently on Aldactone  · Continue Coreg  · Daily weights  · Intake and output    Diabetic polyneuropathy associated with type 2 diabetes mellitus [facility])  Assessment & Plan  Lab Results   Component Value Date    HGBA1C 6 7 (H) 12/20/2022       Recent Labs     12/28/22  2121 12/29/22  0712 12/29/22  1139 12/29/22  1559   POCGLU 161* 151* 218* 154*     Holding metformin and Jardiance (less like a contributor to Derm reaction)  -Continue Accuchecks AC/HS with insulin sliding scale     High serum lactic acid-resolved as of 12/21/2022  Assessment & Plan  Worsening,    · Possibly d/t sepsis vs OP medications  · Continue to trend and IVF  · ABG-pending    Encephalopathy-resolved as of 12/21/2022  Assessment & Plan  Possibly multifactorial, Sepsis vs Haldo vs lactic acidosis, hx of SYLVESTER    · ABG-unremarkable  · Neuro checks, maintain airway  · S/p haldo administration overnight-hold all neurotoxics  · CCU consulted overnight    Acute kidney injury (HCC)-resolved as of 12/23/2022  Assessment & Plan  Slowly improving, likely due to septic shock      · Cr 1 9  > 1 79 > 0 97  · Continue IVF  · Avoid nephrotoxic's    Septic shock (HCC)-resolved as of 2022  Assessment & Plan  resolved, Hemodynamic stable, still in sepsis    · Likely due to right lateral chest wall cellulitis with possible abscess  · Contiune Rocephin, Vanco completed  · IVF        VTE Pharmacologic Prophylaxis: VTE Score: 4 Moderate Risk (Score 3-4) - Pharmacological DVT Prophylaxis Ordered: apixaban (Eliquis)  Patient Centered Rounds: I performed bedside rounds with nursing staff today  Discussions with Specialists or Other Care Team Provider: yes - ID, derm, surgery    Education and Discussions with Family / Patient: Updated  (wife) via phone  Time Spent for Care: 45 minutes  More than 50% of total time spent on counseling and coordination of care as described above  Current Length of Stay: 9 day(s)  Current Patient Status: Inpatient   Certification Statement: The patient will continue to require additional inpatient hospital stay due to Right axillary cellulitis  Discharge Plan: Anticipate discharge in 48-72 hrs to home  Code Status: Level 1 - Full Code    Subjective:     Patient reports intermittent pain along the right axillary region    Objective:     Vitals:   Temp (24hrs), Av 7 °F (36 5 °C), Min:96 5 °F (35 8 °C), Max:98 2 °F (36 8 °C)    Temp:  [96 5 °F (35 8 °C)-98 2 °F (36 8 °C)] 96 5 °F (35 8 °C)  HR:  [56-80] 65  Resp:  [18] 18  BP: (105-138)/(60-85) 132/85  SpO2:  [92 %-96 %] 96 %  Body mass index is 32 13 kg/m²  Input and Output Summary (last 24 hours): Intake/Output Summary (Last 24 hours) at 2022 1841  Last data filed at 2022 1156  Gross per 24 hour   Intake --   Output 580 ml   Net -580 ml       Physical Exam:   Physical Exam  Vitals reviewed  Constitutional:       General: He is not in acute distress  Appearance: He is not toxic-appearing  HENT:      Head: Normocephalic and atraumatic     Eyes:      General: No scleral icterus  Cardiovascular:      Rate and Rhythm: Normal rate  Rhythm irregular  Heart sounds: Murmur heard  Pulmonary:      Effort: Pulmonary effort is normal  No respiratory distress  Breath sounds: Normal breath sounds  No wheezing or rales  Abdominal:      General: Bowel sounds are normal  There is no distension  Palpations: Abdomen is soft  Tenderness: There is no abdominal tenderness  Skin:     Comments: Right axillary region with dressing in place  Pictures on media reviewed   Neurological:      Mental Status: He is alert and oriented to person, place, and time  Additional Data:     Labs:  Results from last 7 days   Lab Units 12/29/22  0520 12/28/22  0459 12/24/22  0619 12/23/22  0550   WBC Thousand/uL 12 40* 12 06*   < > 11 09*   HEMOGLOBIN g/dL 14 7 15 7   < > 14 2   HEMATOCRIT % 45 6 48 3   < > 44 1   PLATELETS Thousands/uL 252 249   < > 183   BANDS PCT %  --  9*  --   --    NEUTROS PCT %  --   --   --  88*   LYMPHS PCT %  --   --   --  6*   LYMPHO PCT %  --  4  --   --    MONOS PCT %  --   --   --  3*   MONO PCT %  --  3*  --   --    EOS PCT %  --  1  --  2    < > = values in this interval not displayed  Results from last 7 days   Lab Units 12/29/22  0520 12/24/22  0619 12/23/22  0550   SODIUM mmol/L 144   < > 140   POTASSIUM mmol/L 3 7   < > 4 7   CHLORIDE mmol/L 105   < > 106   CO2 mmol/L 30   < > 25   BUN mg/dL 11   < > 31*   CREATININE mg/dL 0 74   < > 0 97   ANION GAP mmol/L 9   < > 9   CALCIUM mg/dL 8 6   < > 8 6   ALBUMIN g/dL  --   --  2 5*   TOTAL BILIRUBIN mg/dL  --   --  0 48   ALK PHOS U/L  --   --  117*   ALT U/L  --   --  70   AST U/L  --   --  56*   GLUCOSE RANDOM mg/dL 124   < > 124    < > = values in this interval not displayed           Results from last 7 days   Lab Units 12/29/22  1559 12/29/22  1139 12/29/22  1270 12/28/22  2121 12/28/22  1659 12/28/22  1109 12/28/22  0711 12/27/22 2039 12/27/22  1611 12/27/22  1124 12/27/22  0710 12/26/22 2052 POC GLUCOSE mg/dl 154* 218* 151* 161* 229* 212* 130 186* 238* 197* 146* 168*               Lines/Drains:  Invasive Devices     Peripheral Intravenous Line  Duration           Peripheral IV 12/27/22 Distal;Dorsal (posterior); Right Forearm 1 day                      Imaging: Reviewed radiology reports from this admission including: chest CT scan    Recent Cultures (last 7 days):   Results from last 7 days   Lab Units 12/28/22  1344   GRAM STAIN RESULT  1+ Polys  No bacteria seen       Last 24 Hours Medication List:   Current Facility-Administered Medications   Medication Dose Route Frequency Provider Last Rate   • acetaminophen  650 mg Oral Q6H PRN Arlene Truong MD     • apixaban  5 mg Oral BID Arlene Truong MD     • atorvastatin  10 mg Oral Daily With Mary Ann Truong MD     • bisacodyl  10 mg Rectal Daily PRN Arlene Truong MD     • calamine-zinc oxide   Topical 4x Daily Arlene Truong MD     • carvedilol  25 mg Oral BID With Meals SIMI Kaplan     • digoxin  125 mcg Oral Every Other Day SIMI Kaplan     • diltiazem  120 mg Oral Q12H SIMI Lang     • diphenhydrAMINE  25 mg Intravenous Daily PRN Duyen Dunn PA-C     • docusate sodium  100 mg Oral BID Rylie Fonseca DO     • famotidine  20 mg Intravenous Q12H [facility] SIMI Mancia     • Fluticasone Furoate-Vilanterol  1 puff Inhalation Daily Arlene Truong MD      And   • umeclidinium  1 puff Inhalation Daily Arlene Truong MD     • folic acid  1 mg Oral Daily Arlene Truong MD     • guaiFENesin  600 mg Oral Q12H Justin Renteria MD     • hydrALAZINE  25 mg Oral Q12H SIMI Kaplan     • hydrocortisone   Topical BID Arlene Truong MD     • hydrOXYzine HCL  25 mg Oral Q6H PRN SIMI Gonzales     • insulin lispro  1-6 Units Subcutaneous TID AC SIMI Mancia     • insulin lispro  1-6 Units Subcutaneous HS SIMI Mancia     • latanoprost  1 drop Both Eyes HS Arlene Truong MD     • levalbuterol  0 63 mg Nebulization Q4H PRN SIMI Gonzales     • melatonin  3 mg Oral HS SIMI Gonzales     • montelukast  5 mg Oral HS Bakari Rushing MD     • ondansetron  4 mg Intravenous Q6H PRN Bakari Rushing MD     • polyethylene glycol  17 g Oral BID Rylie Fonseca,      • primidone  50 mg Oral Q12H [address] Rylie Fonseca DO     • saccharomyces boulardii  250 mg Oral BID SIMI Gonzales     • simethicone  80 mg Oral 4x Daily PRN Bakari Rushing MD     • spironolactone  25 mg Oral Daily Jermaine Edmondson MD     • vancomycin  15 mg/kg Intravenous Q12H Liat Perez MD          Today, Patient Was Seen By: Tad Jett DO    **Please Note: This note may have been constructed using a voice recognition system  **

## 2022-12-29 NOTE — ASSESSMENT & PLAN NOTE
Wt Readings from Last 3 Encounters:   12/29/22 104 kg (230 lb 6 1 oz)   12/06/22 102 kg (224 lb)   11/28/22 102 kg (224 lb)     Patient with signs of volume overload    20 mg of IV Lasix x1 given on 12/24 and was started on 20 mg of IV lasix daily  · IV Lasix has been discontinued and patient currently on Aldactone  · Continue Coreg  · Daily weights  · Intake and output

## 2022-12-29 NOTE — ASSESSMENT & PLAN NOTE
Lab Results   Component Value Date    HGBA1C 6 7 (H) 12/20/2022       Recent Labs     12/27/22 2039 12/28/22  0711 12/28/22  1109 12/28/22  1659   POCGLU 186* 130 212* 229*     Holding metformin and Jardiance (potential contributor to Derm reaction)    -Continue Accuchecks AC/HS with insulin sliding scale

## 2022-12-30 PROBLEM — A41.9 SEVERE SEPSIS (HCC): Status: RESOLVED | Noted: 2022-05-01 | Resolved: 2022-12-30

## 2022-12-30 PROBLEM — R65.20 SEVERE SEPSIS (HCC): Status: RESOLVED | Noted: 2022-05-01 | Resolved: 2022-12-30

## 2022-12-30 LAB
BASOPHILS # BLD AUTO: 0.07 THOUSANDS/ÂΜL (ref 0–0.1)
BASOPHILS NFR BLD AUTO: 0 % (ref 0–1)
EOSINOPHIL # BLD AUTO: 0.24 THOUSAND/ÂΜL (ref 0–0.61)
EOSINOPHIL NFR BLD AUTO: 2 % (ref 0–6)
ERYTHROCYTE [DISTWIDTH] IN BLOOD BY AUTOMATED COUNT: 13.5 % (ref 11.6–15.1)
FIBRINOGEN PPP-MCNC: 566 MG/DL (ref 227–495)
GLUCOSE SERPL-MCNC: 148 MG/DL (ref 65–140)
GLUCOSE SERPL-MCNC: 163 MG/DL (ref 65–140)
GLUCOSE SERPL-MCNC: 167 MG/DL (ref 65–140)
GLUCOSE SERPL-MCNC: 168 MG/DL (ref 65–140)
HCT VFR BLD AUTO: 43.3 % (ref 36.5–49.3)
HGB BLD-MCNC: 14 G/DL (ref 12–17)
IMM GRANULOCYTES # BLD AUTO: 0.14 THOUSAND/UL (ref 0–0.2)
IMM GRANULOCYTES NFR BLD AUTO: 1 % (ref 0–2)
LYMPHOCYTES # BLD AUTO: 1.22 THOUSANDS/ÂΜL (ref 0.6–4.47)
LYMPHOCYTES NFR BLD AUTO: 8 % (ref 14–44)
MCH RBC QN AUTO: 32.8 PG (ref 26.8–34.3)
MCHC RBC AUTO-ENTMCNC: 32.3 G/DL (ref 31.4–37.4)
MCV RBC AUTO: 101 FL (ref 82–98)
MONOCYTES # BLD AUTO: 0.64 THOUSAND/ÂΜL (ref 0.17–1.22)
MONOCYTES NFR BLD AUTO: 4 % (ref 4–12)
NEUTROPHILS # BLD AUTO: 13.39 THOUSANDS/ÂΜL (ref 1.85–7.62)
NEUTS SEG NFR BLD AUTO: 85 % (ref 43–75)
NRBC BLD AUTO-RTO: 0 /100 WBCS
PLATELET # BLD AUTO: 291 THOUSANDS/UL (ref 149–390)
PMV BLD AUTO: 9.5 FL (ref 8.9–12.7)
RBC # BLD AUTO: 4.27 MILLION/UL (ref 3.88–5.62)
WBC # BLD AUTO: 15.7 THOUSAND/UL (ref 4.31–10.16)

## 2022-12-30 RX ORDER — LIDOCAINE HYDROCHLORIDE AND EPINEPHRINE BITARTRATE 20; .01 MG/ML; MG/ML
10 INJECTION, SOLUTION SUBCUTANEOUS ONCE
Status: COMPLETED | OUTPATIENT
Start: 2022-12-30 | End: 2022-12-30

## 2022-12-30 RX ORDER — LIDOCAINE HYDROCHLORIDE AND EPINEPHRINE 20; 5 MG/ML; UG/ML
10 INJECTION, SOLUTION EPIDURAL; INFILTRATION; INTRACAUDAL; PERINEURAL ONCE
Status: DISCONTINUED | OUTPATIENT
Start: 2022-12-30 | End: 2022-12-30 | Stop reason: CLARIF

## 2022-12-30 RX ORDER — VANCOMYCIN HYDROCHLORIDE 1 G/200ML
1000 INJECTION, SOLUTION INTRAVENOUS EVERY 12 HOURS
Status: DISCONTINUED | OUTPATIENT
Start: 2022-12-30 | End: 2023-01-01 | Stop reason: HOSPADM

## 2022-12-30 RX ORDER — HYDRALAZINE HYDROCHLORIDE 25 MG/1
25 TABLET, FILM COATED ORAL EVERY 12 HOURS
Qty: 60 TABLET | Refills: 1 | Status: SHIPPED | OUTPATIENT
Start: 2022-12-30

## 2022-12-30 RX ADMIN — EMPAGLIFLOZIN 20 MG: 10 TABLET, FILM COATED ORAL at 19:20

## 2022-12-30 RX ADMIN — Medication 250 MG: at 08:33

## 2022-12-30 RX ADMIN — DILTIAZEM HYDROCHLORIDE 120 MG: 60 CAPSULE, EXTENDED RELEASE ORAL at 21:19

## 2022-12-30 RX ADMIN — LIDOCAINE HYDROCHLORIDE AND EPINEPHRINE 10 ML: 20; 10 INJECTION, SOLUTION INFILTRATION; PERINEURAL at 10:01

## 2022-12-30 RX ADMIN — FAMOTIDINE 20 MG: 10 INJECTION, SOLUTION INTRAVENOUS at 21:19

## 2022-12-30 RX ADMIN — DOCUSATE SODIUM 100 MG: 100 CAPSULE, LIQUID FILLED ORAL at 17:01

## 2022-12-30 RX ADMIN — MONTELUKAST 5 MG: 10 TABLET, FILM COATED ORAL at 21:21

## 2022-12-30 RX ADMIN — Medication 3 MG: at 21:21

## 2022-12-30 RX ADMIN — PRIMIDONE 50 MG: 50 TABLET ORAL at 08:33

## 2022-12-30 RX ADMIN — ATORVASTATIN CALCIUM 10 MG: 10 TABLET, FILM COATED ORAL at 16:16

## 2022-12-30 RX ADMIN — HYDROXYZINE HYDROCHLORIDE 25 MG: 25 TABLET ORAL at 21:20

## 2022-12-30 RX ADMIN — DILTIAZEM HYDROCHLORIDE 120 MG: 60 CAPSULE, EXTENDED RELEASE ORAL at 08:37

## 2022-12-30 RX ADMIN — Medication 1500 MG: at 04:00

## 2022-12-30 RX ADMIN — HYDRALAZINE HYDROCHLORIDE 25 MG: 25 TABLET ORAL at 08:34

## 2022-12-30 RX ADMIN — FOLIC ACID 1 MG: 1 TABLET ORAL at 08:32

## 2022-12-30 RX ADMIN — INSULIN LISPRO 1 UNITS: 100 INJECTION, SOLUTION INTRAVENOUS; SUBCUTANEOUS at 16:19

## 2022-12-30 RX ADMIN — SPIRONOLACTONE 25 MG: 25 TABLET ORAL at 08:34

## 2022-12-30 RX ADMIN — PRIMIDONE 50 MG: 50 TABLET ORAL at 21:21

## 2022-12-30 RX ADMIN — GUAIFENESIN 600 MG: 600 TABLET, EXTENDED RELEASE ORAL at 21:19

## 2022-12-30 RX ADMIN — HYDRALAZINE HYDROCHLORIDE 25 MG: 25 TABLET ORAL at 21:20

## 2022-12-30 RX ADMIN — FAMOTIDINE 20 MG: 10 INJECTION, SOLUTION INTRAVENOUS at 08:33

## 2022-12-30 RX ADMIN — CARVEDILOL 25 MG: 25 TABLET, FILM COATED ORAL at 08:34

## 2022-12-30 RX ADMIN — FLUTICASONE FUROATE AND VILANTEROL TRIFENATATE 1 PUFF: 100; 25 POWDER RESPIRATORY (INHALATION) at 08:38

## 2022-12-30 RX ADMIN — GUAIFENESIN 600 MG: 600 TABLET, EXTENDED RELEASE ORAL at 08:33

## 2022-12-30 RX ADMIN — INSULIN LISPRO 1 UNITS: 100 INJECTION, SOLUTION INTRAVENOUS; SUBCUTANEOUS at 08:36

## 2022-12-30 RX ADMIN — UMECLIDINIUM 1 PUFF: 62.5 AEROSOL, POWDER ORAL at 08:38

## 2022-12-30 RX ADMIN — VANCOMYCIN HYDROCHLORIDE 1000 MG: 1 INJECTION, SOLUTION INTRAVENOUS at 16:18

## 2022-12-30 RX ADMIN — DIGOXIN 125 MCG: 125 TABLET ORAL at 08:34

## 2022-12-30 RX ADMIN — LATANOPROST 1 DROP: 50 SOLUTION OPHTHALMIC at 21:21

## 2022-12-30 RX ADMIN — INSULIN LISPRO 1 UNITS: 100 INJECTION, SOLUTION INTRAVENOUS; SUBCUTANEOUS at 21:20

## 2022-12-30 RX ADMIN — Medication 250 MG: at 17:01

## 2022-12-30 NOTE — PROGRESS NOTES
Nikki 47  Progress Note - Parish Jimenez 1940, [de-identified] y o  male MRN: [id number]  Unit/Bed#: [address] [place] [address] Encounter: [id number]  Primary Care Provider: Cyd Goodpasture, MD   Date and time admitted to hospital: 12/19/2022  1:01 AM    * Angioedema-resolved as of 12/21/2022  Assessment & Plan  Improved, airway maintained  Patient on mysoline, atorvastatin and losartan/Jardiance which could cause angioedema/ urticaria-on hold  · Rash was mostly on trunk and upper thigh and is very pruritic; has lip swelling, no tongue  · Given dose of 125 mg solumedrol, benadryl 25 mg IV bid, and pepcid 20 mg IV in ER but symptoms returned   · Received solumedrol, benadryl, pepcid   · Continue calamine, hydrocortisone   · Continue Singulair    · Rechallenge with Mysoline as less likely due to this and most likely due to losartan  Will rechallenge with Jardiance today  Pt And wife are aware that these meds are being restarted and that if she does develop an allergic reaction he is in a hospital and we can address it accordingly    Cellulitis  Assessment & Plan  Followed by GEN surg  Bedside aspiration was done previously and bedside I&D done on 12/24  · Discontinued IV cefazolin every 6 hours on 12/28  Vancomycin started 12/27  · Likely transition to Doxy tomorrow per ID  · WBC count trending up  · Imaging negative for abscess  · Continue dressing changes per surgery  status post skin biopsy by surgery per ID recommendation  · D/W dermatology on call who reviewed pictures and per dermatology could be malignancy related or Paget's disease, less likely Sweet syndrome    Dermatology recommended obtaining some tissue for tissue culture however per surgery too late to get culture from tissue   · Follow-up with surgery after discharge to discuss biopsy results and possible follow-up with dermatology aced on biopsy results    Persistent atrial fibrillation [facility])  Assessment & Plan  Heart rate was not well controlled previously but has improved  · Cardizem drip was discontinued and transitioned to oral Cardizem  Also received doses of IV digoxin previously  Digoxin p o  discontinued today  · continue Coreg  Eliquis on hold due to oozing from biopsy site overnight  Sutures placed by surgery and can restart Eliquis tomm if no further bleeding   · Cardio input appreciated    Severe sepsis (HCC)-resolved as of 12/30/2022  Assessment & Plan  Likely due to right lateral chest wall cellulitis, tachycardia- possibly secondary to underlying A  fib  · BCx-negative so far  Wound culture - staph  · Off Rocephin  Discontinued IV Ancef  Currently on Vanco added as noted above  · ID following, recs appreciated  · Imaging negative for abscess    GERD (gastroesophageal reflux disease)  Assessment & Plan  Continue Pepcid  Chronic diastolic heart failure (HCC)  Assessment & Plan  Wt Readings from Last 3 Encounters:   12/29/22 104 kg (230 lb 6 1 oz)   12/06/22 102 kg (224 lb)   11/28/22 102 kg (224 lb)     Patient with signs of volume overload  20 mg of IV Lasix x1 given on 12/24 and was started on 20 mg of IV lasix daily  · IV Lasix has been discontinued and patient currently on Aldactone  · Continue coreg  · Daily weights  · Intake and output    Diabetic polyneuropathy associated with type 2 diabetes mellitus [facility])  Assessment & Plan  Lab Results   Component Value Date    HGBA1C 6 7 (H) 12/20/2022       Recent Labs     12/29/22  2040 12/30/22  0726 12/30/22  1123 12/30/22  1544   POCGLU 147* 163* 148* 167*     Holding metformin   Hyacinth Cranker was held due to possibility of it causing angioedema   - Rechallenge with Jardiance today  -Continue Accuchecks AC/HS with insulin sliding scale     High serum lactic acid-resolved as of 12/21/2022  Assessment & Plan  Worsening,    · Possibly d/t sepsis vs OP medications  · Continue to trend and IVF      Encephalopathy-resolved as of 12/21/2022  Assessment & Plan  Possibly multifactorial, Sepsis vs Haldo vs lactic acidosis, hx of SYLVESTER    · ABG-unremarkable  · Neuro checks, maintain airway  · S/p haldo administration overnight-hold all neurotoxics  · CCU consulted overnight    Acute kidney injury (HCC)-resolved as of 2022  Assessment & Plan  Slowly improving, likely due to septic shock  · Cr 1 9  > 1 79 > 0 97  · Continue IVF  · Avoid nephrotoxic's    Septic shock (HCC)-resolved as of 2022  Assessment & Plan  resolved, Hemodynamic stable, still in sepsis    · Likely due to right lateral chest wall cellulitis with possible abscess  · Contiune Rocephin, Vanco completed  · IVF        VTE Pharmacologic Prophylaxis: VTE Score: 4 Moderate Risk (Score 3-4) - Pharmacological DVT Prophylaxis Contraindicated  Sequential Compression Devices Ordered  Patient Centered Rounds: I performed bedside rounds with nursing staff today  Discussions with Specialists or Other Care Team Provider: yes - ID    Education and Discussions with Family / Patient: Updated  (wife) via phone  Time Spent for Care: 45 minutes  More than 50% of total time spent on counseling and coordination of care as described above  Current Length of Stay: 10 day(s)  Current Patient Status: Inpatient   Certification Statement: The patient will continue to require additional inpatient hospital stay due to Right axillary area cellulitis requiring IV antibiotics  Discharge Plan: Anticipate discharge tomorrow to home  Code Status: Level 1 - Full Code    Subjective:     Patient with oozing from biopsy site overnight which has resolved today  Patient states he is looking forward to be able to go home soon    Objective:     Vitals:   Temp (24hrs), Av 9 °F (36 1 °C), Min:96 5 °F (35 8 °C), Max:97 4 °F (36 3 °C)    Temp:  [96 5 °F (35 8 °C)-97 4 °F (36 3 °C)] 96 5 °F (35 8 °C)  HR:  [65-80] 65  Resp:  [14-18] 16  BP: ()/(62-86) 99/62  SpO2:  [94 %-96 %] 95 %  Body mass index is 32 13 kg/m²       Input and Output Summary (last 24 hours): Intake/Output Summary (Last 24 hours) at 12/30/2022 1750  Last data filed at 12/30/2022 1701  Gross per 24 hour   Intake 815 ml   Output 1150 ml   Net -335 ml       Physical Exam:   Physical Exam  Vitals reviewed  Constitutional:       General: He is not in acute distress  Appearance: He is not toxic-appearing  HENT:      Head: Normocephalic and atraumatic  Eyes:      General:         Right eye: No discharge  Left eye: No discharge  Cardiovascular:      Rate and Rhythm: Normal rate  Rhythm irregular  Heart sounds: Murmur heard  Pulmonary:      Effort: Pulmonary effort is normal  No respiratory distress  Breath sounds: No wheezing or rales  Comments: Decreased breath sounds bilaterally  Abdominal:      General: Bowel sounds are normal  There is no distension  Palpations: Abdomen is soft  Tenderness: There is no abdominal tenderness  Musculoskeletal:      Comments: Improved bilateral lower extremity edema   Skin:     Comments: Right axillary region with dressing in place   Neurological:      Mental Status: He is alert and oriented to person, place, and time  Additional Data:     Labs:  Results from last 7 days   Lab Units 12/30/22  0524 12/29/22  0520 12/28/22  0459   WBC Thousand/uL 15 70*   < > 12 06*   HEMOGLOBIN g/dL 14 0   < > 15 7   HEMATOCRIT % 43 3   < > 48 3   PLATELETS Thousands/uL 291   < > 249   BANDS PCT %  --   --  9*   NEUTROS PCT % 85*   < >  --    LYMPHS PCT % 8*   < >  --    LYMPHO PCT %  --   --  4   MONOS PCT % 4   < >  --    MONO PCT %  --   --  3*   EOS PCT % 2   < > 1    < > = values in this interval not displayed       Results from last 7 days   Lab Units 12/29/22  0520   SODIUM mmol/L 144   POTASSIUM mmol/L 3 7   CHLORIDE mmol/L 105   CO2 mmol/L 30   BUN mg/dL 11   CREATININE mg/dL 0 74   ANION GAP mmol/L 9   CALCIUM mg/dL 8 6   GLUCOSE RANDOM mg/dL 124     Results from last 7 days   Lab Units 12/29/22  2158   INR 1 34*     Results from last 7 days   Lab Units 12/30/22  1544 12/30/22  1123 12/30/22  0726 12/29/22  2040 12/29/22  1559 12/29/22  1139 12/29/22  0712 12/28/22  2121 12/28/22  1659 12/28/22  1109 12/28/22  0711 12/27/22  2039   POC GLUCOSE mg/dl 167* 148* 163* 147* 154* 218* 151* 161* 229* 212* 130 186*               Lines/Drains:  Invasive Devices     Peripheral Intravenous Line  Duration           Peripheral IV 12/27/22 Distal;Dorsal (posterior); Right Forearm 2 days                      Imaging: No pertinent imaging reviewed      Recent Cultures (last 7 days):   Results from last 7 days   Lab Units 12/28/22  1344   GRAM STAIN RESULT  1+ Polys  No bacteria seen       Last 24 Hours Medication List:   Current Facility-Administered Medications   Medication Dose Route Frequency Provider Last Rate   • acetaminophen  650 mg Oral Q6H PRN Nik Goodman MD     • atorvastatin  10 mg Oral Daily With Sherryl Kocher, MD     • bisacodyl  10 mg Rectal Daily PRN Nik Goodman MD     • calamine-zinc oxide   Topical 4x Daily Nik Goodman MD     • carvedilol  25 mg Oral BID With Meals SIMI Lopez     • diltiazem  120 mg Oral Q12H [address] SIMI Lopez     • diphenhydrAMINE  25 mg Intravenous Daily PRN Duyen Dunn PA-C     • docusate sodium  100 mg Oral BID Rylie Fonseca DO     • Empagliflozin  20 mg Oral Daily Rylie Fonseca DO     • famotidine  20 mg Intravenous Q12H [address] SIMI Matos     • Fluticasone Furoate-Vilanterol  1 puff Inhalation Daily Nik Goodman MD      And   • umeclidinium  1 puff Inhalation Daily Nik Goodman MD     • folic acid  1 mg Oral Daily Nik Goodman MD     • guaiFENesin  600 mg Oral Q12H Rafi Schafer MD     • hydrALAZINE  25 mg Oral Q12H SIMI Lopez     • hydrocortisone   Topical BID Nik Goodman MD     • hydrOXYzine HCL  25 mg Oral Q6H PRN SIMI Gonzales     • insulin lispro  1-6 Units Subcutaneous TID AC SIMI Matos     • insulin lispro  1-6 Units Subcutaneous HS SIMI Hernandez     • latanoprost  1 drop Both Eyes HS Zoraida Matos MD     • levalbuterol  0 63 mg Nebulization Q4H PRN SIMI Gonzales     • melatonin  3 mg Oral HS SIMI Gonzales     • montelukast  5 mg Oral HS Zoraida Matos MD     • ondansetron  4 mg Intravenous Q6H PRN Zoraida Matos MD     • polyethylene glycol  17 g Oral BID Rylie Fonseac DO     • primidone  50 mg Oral Q12H [address] Rylie Fonseca DO     • saccharomyces boulardii  250 mg Oral BID SIMI Gonzales     • simethicone  80 mg Oral 4x Daily PRN Zoraida Matos MD     • spironolactone  25 mg Oral Daily Terell Núñez MD     • vancomycin  1,000 mg Intravenous Q12H Erum Rahman MD 1,000 mg (12/30/22 1618)        Today, Patient Was Seen By: Hattie Bee DO    **Please Note: This note may have been constructed using a voice recognition system  **

## 2022-12-30 NOTE — PLAN OF CARE
Problem: PAIN - ADULT  Goal: Verbalizes/displays adequate comfort level or baseline comfort level  Description: Interventions:  - Encourage patient to monitor pain and request assistance  - Assess pain using appropriate pain scale  - Administer analgesics based on type and severity of pain and evaluate response  - Implement non-pharmacological measures as appropriate and evaluate response  - Consider cultural and social influences on pain and pain management  - Notify physician/advanced practitioner if interventions unsuccessful or patient reports new pain  Outcome: Progressing     Problem: INFECTION - ADULT  Goal: Absence or prevention of progression during hospitalization  Description: INTERVENTIONS:  - Assess and monitor for signs and symptoms of infection  - Monitor lab/diagnostic results  - Monitor all insertion sites, i e  indwelling lines, tubes, and drains  - Monitor endotracheal if appropriate and nasal secretions for changes in amount and color  - Mason appropriate cooling/warming therapies per order  - Administer medications as ordered  - Instruct and encourage patient and family to use good hand hygiene technique  - Identify and instruct in appropriate isolation precautions for identified infection/condition  Outcome: Progressing  Goal: Absence of fever/infection during neutropenic period  Description: INTERVENTIONS:  - Monitor WBC    Outcome: Progressing     Problem: SAFETY ADULT  Goal: Patient will remain free of falls  Description: INTERVENTIONS:  - Educate patient/family on patient safety including physical limitations  - Instruct patient to call for assistance with activity   - Consult OT/PT to assist with strengthening/mobility   - Keep Call bell within reach  - Keep bed low and locked with side rails adjusted as appropriate  - Keep care items and personal belongings within reach  - Initiate and maintain comfort rounds  - Make Fall Risk Sign visible to staff  - Offer Toileting every 2 Hours, in advance of need  - Initiate/Maintain bed alarm  - Apply yellow socks and bracelet for high fall risk patients  - Consider moving patient to room near nurses station  Outcome: Progressing  Goal: Maintain or return to baseline ADL function  Description: INTERVENTIONS:  -  Assess patient's ability to carry out ADLs; assess patient's baseline for ADL function and identify physical deficits which impact ability to perform ADLs (bathing, care of mouth/teeth, toileting, grooming, dressing, etc )  - Assess/evaluate cause of self-care deficits   - Assess range of motion  - Assess patient's mobility; develop plan if impaired  - Assess patient's need for assistive devices and provide as appropriate  - Encourage maximum independence but intervene and supervise when necessary  - Involve family in performance of ADLs  - Assess for home care needs following discharge   - Consider OT consult to assist with ADL evaluation and planning for discharge  - Provide patient education as appropriate  Outcome: Progressing  Goal: Maintains/Returns to pre admission functional level  Description: INTERVENTIONS:  - Perform BMAT or MOVE assessment daily    - Set and communicate daily mobility goal to care team and patient/family/caregiver  - Collaborate with rehabilitation services on mobility goals if consulted  - Perform Range of Motion 2 times a day  - Reposition patient every 2 hours    - Dangle patient 2 times a day  - Stand patient 2 times a day  - Ambulate patient 2 times a day  - Out of bed to chair 2 times a day   - Out of bed for meals 2 times a day  - Out of bed for toileting  - Record patient progress and toleration of activity level   Outcome: Progressing     Problem: DISCHARGE PLANNING  Goal: Discharge to home or other facility with appropriate resources  Description: INTERVENTIONS:  - Identify barriers to discharge w/patient and caregiver  - Arrange for needed discharge resources and transportation as appropriate  - Identify discharge learning needs (meds, wound care, etc )  - Arrange for interpretive services to assist at discharge as needed  - Refer to Case Management Department for coordinating discharge planning if the patient needs post-hospital services based on physician/advanced practitioner order or complex needs related to functional status, cognitive ability, or social support system  Outcome: Progressing     Problem: Knowledge Deficit  Goal: Patient/family/caregiver demonstrates understanding of disease process, treatment plan, medications, and discharge instructions  Description: Complete learning assessment and assess knowledge base    Interventions:  - Provide teaching at level of understanding  - Provide teaching via preferred learning methods  Outcome: Progressing     Problem: Potential for Falls  Goal: Patient will remain free of falls  Description: INTERVENTIONS:  - Educate patient/family on patient safety including physical limitations  - Instruct patient to call for assistance with activity   - Consult OT/PT to assist with strengthening/mobility   - Keep Call bell within reach  - Keep bed low and locked with side rails adjusted as appropriate  - Keep care items and personal belongings within reach  - Initiate and maintain comfort rounds  - Make Fall Risk Sign visible to staff  - Offer Toileting every 2 Hours, in advance of need  - Initiate/Maintain bed alarm  - Apply yellow socks and bracelet for high fall risk patients  - Consider moving patient to room near nurses station  Outcome: Progressing     Problem: RESPIRATORY - ADULT  Goal: Achieves optimal ventilation and oxygenation  Description: INTERVENTIONS:  - Assess for changes in respiratory status  - Assess for changes in mentation and behavior  - Position to facilitate oxygenation and minimize respiratory effort  - Oxygen administered by appropriate delivery if ordered  - Initiate smoking cessation education as indicated  - Encourage broncho-pulmonary hygiene including cough, deep breathe, Incentive Spirometry  - Assess the need for suctioning and aspirate as needed  - Assess and instruct to report SOB or any respiratory difficulty  - Respiratory Therapy support as indicated  Outcome: Progressing     Problem: Prexisting or High Potential for Compromised Skin Integrity  Goal: Skin integrity is maintained or improved  Description: INTERVENTIONS:  - Identify patients at risk for skin breakdown  - Assess and monitor skin integrity  - Assess and monitor nutrition and hydration status  - Monitor labs   - Assess for incontinence   - Turn and reposition patient  - Assist with mobility/ambulation  - Relieve pressure over bony prominences  - Avoid friction and shearing  - Provide appropriate hygiene as needed including keeping skin clean and dry  - Evaluate need for skin moisturizer/barrier cream  - Collaborate with interdisciplinary team   - Patient/family teaching  - Consider wound care consult   Outcome: Progressing     Problem: MOBILITY - ADULT  Goal: Maintain or return to baseline ADL function  Description: INTERVENTIONS:  -  Assess patient's ability to carry out ADLs; assess patient's baseline for ADL function and identify physical deficits which impact ability to perform ADLs (bathing, care of mouth/teeth, toileting, grooming, dressing, etc )  - Assess/evaluate cause of self-care deficits   - Assess range of motion  - Assess patient's mobility; develop plan if impaired  - Assess patient's need for assistive devices and provide as appropriate  - Encourage maximum independence but intervene and supervise when necessary  - Involve family in performance of ADLs  - Assess for home care needs following discharge   - Consider OT consult to assist with ADL evaluation and planning for discharge  - Provide patient education as appropriate  Outcome: Progressing  Goal: Maintains/Returns to pre admission functional level  Description: INTERVENTIONS:  - Perform BMAT or MOVE assessment daily    - Set and communicate daily mobility goal to care team and patient/family/caregiver  - Collaborate with rehabilitation services on mobility goals if consulted  - Perform Range of Motion 2 times a day  - Reposition patient every 2 hours  - Dangle patient 2 times a day  - Stand patient 2 times a day  - Ambulate patient 2 times a day  - Out of bed to chair 2 times a day   - Out of bed for meals 2 times a day  - Out of bed for toileting  - Record patient progress and toleration of activity level   Outcome: Progressing     Problem: Nutrition/Hydration-ADULT  Goal: Nutrient/Hydration intake appropriate for improving, restoring or maintaining nutritional needs  Description: Monitor and assess patient's nutrition/hydration status for malnutrition  Collaborate with interdisciplinary team and initiate plan and interventions as ordered  Monitor patient's weight and dietary intake as ordered or per policy  Utilize nutrition screening tool and intervene as necessary  Determine patient's food preferences and provide high-protein, high-caloric foods as appropriate       INTERVENTIONS:  - Monitor oral intake, urinary output, labs, and treatment plans  - Assess nutrition and hydration status and recommend course of action  - Evaluate amount of meals eaten  - Assist patient with eating if necessary   - Allow adequate time for meals  - Recommend/ encourage appropriate diets, oral nutritional supplements, and vitamin/mineral supplements  - Order, calculate, and assess calorie counts as needed  - Recommend, monitor, and adjust tube feedings and TPN/PPN based on assessed needs  - Assess need for intravenous fluids  - Provide specific nutrition/hydration education as appropriate  - Include patient/family/caregiver in decisions related to nutrition  Outcome: Progressing

## 2022-12-30 NOTE — PHYSICAL THERAPY NOTE
12/30/22 1452   Note Type   Note type Cancelled Session   Cancel Reasons Refusal   Additional Comments Patient declined PT services this afternoon due to "tired" Will follow     Licensure   [place] License Number  [id number] [place] [address] [id number]

## 2022-12-30 NOTE — UTILIZATION REVIEW
Continued Stay Review    Date: 12/30/2022                          Current Patient Class: inpatient    Current Level of Care: med surg     HPI:82 y o  male initially admitted on 12/20/2022    Assessment/Plan: Attending Cont clinically & HD stable w oozing bloody fluid from BX site, repeat CBC/ fibrinogen pending; Eliquis was DC last night; no reversal of Eliquis w FEIBA as of now unless deemed necessary by Surgery & Cardiology due to risk vs benefit  Cardiology  On IV antibx per ID stable from cardiac standpoint; Wound culture grew out staph aureus  IV Ancef & IV Vanco added on 12/27/2022  ; resume Eliquis 24 HR after bleeding DC & site is stable   Cont antiHTN meds aldactone daily ACE/ ARB now listed as allergies due to HX of angioedema; cont I/O daily wt         Vital Signs:   Date/Time Temp Pulse Resp BP MAP (mmHg) SpO2 O2 Device Patient Position - Orthostatic VS   12/30/22 14:59:31 96 5 °F (35 8 °C) Abnormal  65 -- 99/62 74 95 % -- --   12/30/22 14:58:59 96 5 °F (35 8 °C) Abnormal  80 -- 99/62 74 96 % -- --   12/30/22 08:33:52 -- 72 -- 134/71 92 94 % -- --   12/30/22 07:26:09 97 4 °F (36 3 °C) Abnormal  78 16 110/86 -- 96 % None (Room air) Lying         Pertinent Labs/Diagnostic Results:       Results from last 7 days   Lab Units 12/30/22  0524 12/29/22 2158 12/29/22  0520 12/28/22 0459 12/24/22  0619   WBC Thousand/uL 15 70* 13 69* 12 40* 12 06* 8 79   HEMOGLOBIN g/dL 14 0 15 0 14 7 15 7 14 1   HEMATOCRIT % 43 3 46 6 45 6 48 3 43 2   PLATELETS Thousands/uL 291 282 252 249 187   NEUTROS ABS Thousands/µL 13 39* 11 61*  --   --   --    TOTAL NEUT ABS Thousand/uL  --   --   --  10 85*  --    BANDS PCT %  --   --   --  9*  --          Results from last 7 days   Lab Units 12/29/22  0520 12/28/22  0459 12/27/22  0553 12/26/22  0556 12/25/22  0535 12/24/22  0619   SODIUM mmol/L 144 138 139 138 138 140   POTASSIUM mmol/L 3 7 3 8 4 3 3 4* 3 7 4 6   CHLORIDE mmol/L 105 103 104 102 103 107   CO2 mmol/L 30 28 27 29 25 24 ANION GAP mmol/L 9 7 8 7 10 9   BUN mg/dL 11 11 16 15 19 25   CREATININE mg/dL 0 74 0 82 0 71 0 67 0 73 0 85   EGFR ml/min/1 73sq m 87 83 88 90 87 82   CALCIUM mg/dL 8 6 8 9 8 7 8 4 8 3 8 5   MAGNESIUM mg/dL  --   --  1 7  --   --  1 8         Results from last 7 days   Lab Units 12/30/22  1544 12/30/22  1123 12/30/22  0726 12/29/22  2040 12/29/22  1559 12/29/22  1139 12/29/22  0712 12/28/22  2121 12/28/22  1659 12/28/22  1109 12/28/22  0711 12/27/22  2039   POC GLUCOSE mg/dl 167* 148* 163* 147* 154* 218* 151* 161* 229* 212* 130 186*     Results from last 7 days   Lab Units 12/29/22  0520 12/28/22  0459 12/27/22  0553 12/26/22  0556 12/25/22  0535 12/24/22  0619   GLUCOSE RANDOM mg/dL 124 150* 144* 147* 150* 142*             No results found for: BETA-HYDROXYBUTYRATE                           Results from last 7 days   Lab Units 12/29/22  2158   PROTIME seconds 16 7*   INR  1 34*   PTT seconds 32                     Results from last 7 days   Lab Units 12/29/22  2158   DIGOXIN LVL ng/mL 0 7*     Results from last 7 days   Lab Units 12/24/22  0619   NT-PRO BNP pg/mL 8,163*               Results from last 7 days   Lab Units 12/28/22  1344   GRAM STAIN RESULT  1+ Polys  No bacteria seen     Results from last 7 days   Lab Units 12/28/22  0459   TOTAL COUNTED  100             Medications:   Scheduled Medications:  atorvastatin, 10 mg, Oral, Daily With Dinner  calamine-zinc oxide, , Topical, 4x Daily  carvedilol, 25 mg, Oral, BID With Meals  diltiazem, 120 mg, Oral, Q12H VALARIE  docusate sodium, 100 mg, Oral, BID  famotidine, 20 mg, Intravenous, Q12H VALARIE  Fluticasone Furoate-Vilanterol, 1 puff, Inhalation, Daily   And  umeclidinium, 1 puff, Inhalation, Daily  folic acid, 1 mg, Oral, Daily  guaiFENesin, 600 mg, Oral, Q12H VALARIE  hydrALAZINE, 25 mg, Oral, Q12H  hydrocortisone, , Topical, BID  insulin lispro, 1-6 Units, Subcutaneous, TID AC  insulin lispro, 1-6 Units, Subcutaneous, HS  latanoprost, 1 drop, Both Eyes, HS  melatonin, 3 mg, Oral, HS  montelukast, 5 mg, Oral, HS  polyethylene glycol, 17 g, Oral, BID  primidone, 50 mg, Oral, Q12H [address]  saccharomyces boulardii, 250 mg, Oral, BID  spironolactone, 25 mg, Oral, Daily  vancomycin, 1,000 mg, Intravenous, Q12H      Continuous IV Infusions:     PRN Meds:  acetaminophen, 650 mg, Oral, Q6H PRN  bisacodyl, 10 mg, Rectal, Daily PRN  diphenhydrAMINE, 25 mg, Intravenous, Daily PRN  hydrOXYzine HCL, 25 mg, Oral, Q6H PRN  levalbuterol, 0 63 mg, Nebulization, Q4H PRN  ondansetron, 4 mg, Intravenous, Q6H PRN  simethicone, 80 mg, Oral, 4x Daily PRN        Discharge Plan: TBD    Network Utilization Review Department  ATTENTION: Please call with any questions or concerns to [phone] and carefully listen to the prompts so that you are directed to the right person  All voicemails are confidential   [facility] all requests for admission clinical reviews, approved or denied determinations and any other requests to dedicated fax number below belonging to the campus where the patient is receiving treatment   List of dedicated fax numbers for the Facilities:  [address] DENIALS (Administrative/Medical Necessity) [phone]   [address] (Maternity/NICU/Pediatrics) [phone]   [address] [phone]   [place] [address] [phone]   [address] [place] [id number] [place] [address] [facility] [phone]   [address] [facility] 134 815 [facility] [phone]

## 2022-12-30 NOTE — OCCUPATIONAL THERAPY NOTE
Occupational Therapy Attempt Note       12/30/22 1611   Note Type   Note Type Cancelled Session   Cancel Reasons Refusal  (Attempted OT treatment; pt refused due to fatigue; will follow up as patient is agreeable)   Licensure   [place] License Number  Efrain Lewis OTR/CHRISTINA [id number]

## 2022-12-30 NOTE — PROGRESS NOTES
Progress Note - Infectious Disease   Rich Francois [de-identified] y o  male MRN: [id number]  Unit/Bed#: 2 [place] [address] Encounter: [id number]      Impression/Plan:    1   Severe sepsis, developing soon after admission  With fever, tachycardia, tachypnea, lactic acidosis and hypotension responsive to IVFs  Due to likely due to #2 below  Blood cultures negative to date   -Antibiotics as below  -monitor temperature and hemodynamics  -serial exam  -recheck CBC and BMP in a m   -supportive care per primary care team     2  Right axillary cellulitis  Axillary US without discrete collection  Bedside needle aspiration attempted with only scant blood obtained and sent for culture 12/20  Culture is growing MSSA  CT chest was performed 12/23 due to continued erythema and swelling of the axilla, this did not show any abscess  Patient is status post repeat I&D by surgery on 12/24  Repeat cultures were not sent  The patient continues to have significant erythema, swelling, induration, pain in the area  Is hemodynamically stable with mild leukocytosis but no fever  Patient has received extensive course of culture targeted IV antibiotics with minimal improvement, therefore would consider a noninfectious cause such as a atypical inflammatory process or malignancy  Also consider mixed infection with MRSA present given limited improvement thus far with cefazolin so patient transitioned to IV vancomycin 12/27  Punch biopsies performed 12/28 by surgery and pathology is in process  Culture shows no growth  Dermatology evaluated images and are considering malignancy as well  There has been some improvement in erythema with vancomycin   -Continue IV vancomycin for now   -Follow-up pathology and culture from punch biopsy  -If patient remains clinically stable, okay for discharge on p o  doxycycline to complete an additional 7 days of anti-MRSA therapy, through 1/2/2023    This will be 14 days of antibiotics total   This is a very prolonged course for skin and soft tissue infection so suspect any residual skin findings likely non infectious  -Will need outpatient follow-up with dermatology and surgery  -recommend ongoing wound care  -Monitor exam     3  BUDDY  Developing soon on admission  Renal function has since improved  -renal dose adjust antibiotic as needed  -volume management per primary     4  Urticaria/Angioedema, POA and worsening x 1 month  Unclear source  Outpatient mysoline, atorvastatin, losartan, jardiance held  No known antibiotic allergies  -monitor symptoms  -symptomatic management per primary care team     5  Lung cancer  S/p 3 High dose RT treatment 2022  No chemotherapy    -Outpatient oncology follow-up    Above management plan discussed with the patient and family at bedside and [address] [place] [address] Attending  ID will reevaluate on 2023 if he remains inpatient please call on-call provider over the weekend with questions  Antibiotics:  Vancomycin day 4  Antibiotics day 12    Subjective: The patient is feeling okay today  He was having bleeding from the biopsy sites but a stitch was placed by surgery and the site was rewrapped with pressure dressing  The patient is having somewhat less pain in the right axilla  He denies fever or chills  Objective:  Vitals:  Temp:  [96 5 °F (35 8 °C)-97 4 °F (36 3 °C)] 96 5 °F (35 8 °C)  HR:  [65-80] 65  Resp:  [14-18] 16  BP: ()/(62-86) 99/62  SpO2:  [94 %-96 %] 95 %  Temp (24hrs), Av 9 °F (36 1 °C), Min:96 5 °F (35 8 °C), Max:97 4 °F (36 3 °C)  Current: Temperature: (!) 96 5 °F (35 8 °C)    Physical Exam:   General Appearance:  Alert, interactive, nontoxic, no acute distress  Throat: Oropharynx moist without lesions  Lungs:   Clear to auscultation bilaterally; no wheezes, rhonchi or rales; respirations unlabored   Heart:  RRR; no murmur, rub or gallop   Abdomen:   Soft, non-tender, non-distended, positive bowel sounds       Extremities: No clubbing, cyanosis or edema   Skin:  Right axillary erythema with warmth present  Remaining areas of induration  Area is very tender  There is bleeding present on dressings from punch biopsy sites       Labs:    All pertinent labs and imaging studies were personally reviewed  Results from last 7 days   Lab Units 12/30/22  0524 12/29/22 2158 12/29/22  0520   WBC Thousand/uL 15 70* 13 69* 12 40*   HEMOGLOBIN g/dL 14 0 15 0 14 7   PLATELETS Thousands/uL 291 282 252     Results from last 7 days   Lab Units 12/29/22  0520 12/28/22  0459 12/27/22  0553   SODIUM mmol/L 144 138 139   POTASSIUM mmol/L 3 7 3 8 4 3   CHLORIDE mmol/L 105 103 104   CO2 mmol/L 30 28 27   BUN mg/dL 11 11 16   CREATININE mg/dL 0 74 0 82 0 71   EGFR ml/min/1 73sq m 87 83 88   CALCIUM mg/dL 8 6 8 9 8 7           Imaging:  Pertinent imaging in PACS personally reviewed    CT chest with contrast 12/23: Skin thickening and subcutaneous edematous changes in the right axillary region, findings most consistent with cellulitis

## 2022-12-30 NOTE — PROGRESS NOTES
Hgb 15 g/dl on repeat normal platelets and PTT / INR slightly high at 1 3   Patient clinically and hemodynamically stable but continues to have oozing bloody fluid from biopsy site  Dr Efe Hernández and Dr Everton Freire aware     Repeat stat CBC and fibrinogen levels pending  Eliquis was dcd last night   No reversal of eliquis with FEIBA as of now unless deemed necessary by surgery and cardiology due to risk vs benefit     Await surgery evaluation after seeing patients

## 2022-12-30 NOTE — PROGRESS NOTES
Progress Note - Cardiology   [facility]     Kit Ennis [de-identified] y o  male MRN: [id number]  : 1940  Unit/Bed#: [address] [place] [address] Encounter: [id number]    Assessment and Plan:   1   Right axilla cellulitis/sepsis: Patient followed by infectious disease and surgery    -   Wound culture grew out staph aureus    -   Patient on Ancef 2 g every 6 hours IV    -   IV vancomycin every 12 hours added on 2022     2   Hypertension:  Blood pressure improved    -   Continue Coreg to 25 mg twice daily    -   Continue Aldactone 25 mg daily    -   Continue Cardizem  mg every 12 hours    -   Continue hydralazine 25 mg every 12 hours     3   Chronic atrial fibrillation with rapid ventricular response: Heart rates well controlled    -   Digoxin level 0 7 on 2022, will discontinue digoxin at this time    -   Continue Cardizem  mg every 12 hours    -   Continue Coreg 25 mg twice daily    -   Eliquis held starting the evening of 2022 due to heavy bloody drainage from left axilla biopsy sites    -   Would resume 24 hours after bleeding has discontinued and site is stable      4   Chronic diastolic heart failure:  Appears patient's dry weight is around 230 pounds, today he is 231    -   Other medication as above    -   Continue Aldactone 25 mg daily    -   ACE/ARB and now listed as allergies secondary to history of angioedema    -   Monitor I&O, daily weights and labs     5   Diabetes: Hemoglobin A1c 7 1 managed per primary team     6   Chronic kidney disease: Baseline creatinine appears to be less than 1     7   Obesity: BMI 33 8     8   Left lower lobe lung CA status postradiation therapy     Patient appears to be stable from a cardiac standpoint  We will continue to follow in the periphery/as needed while patient is here in the hospital   Thank you    Subjective / Objective:   Patient seen and examined  No complaints offered at this time  Still on IV antibiotics per infectious disease  No cardiac complaints offered at this time  Vitals: Blood pressure 134/71, pulse 72, temperature (!) 97 4 °F (36 3 °C), temperature source Axillary, resp  rate 16, height 5' 11" (1 803 m), weight 104 kg (230 lb 6 1 oz), SpO2 94 %  Vitals:    12/28/22 0540 12/29/22 0600   Weight: 105 kg (231 lb 1 6 oz) 104 kg (230 lb 6 1 oz)     Body mass index is 32 13 kg/m²  BP Readings from Last 3 Encounters:   12/30/22 134/71   12/06/22 140/80   11/28/22 140/80     Orthostatic Blood Pressures    Flowsheet Row Most Recent Value   Blood Pressure 134/71 filed at 12/30/2022 2907   Patient Position - Orthostatic VS Lying filed at 12/30/2022 0726        I/O       12/28 0701  12/29 0700 12/29 0701  12/30 0700 12/30 0701  12/31 0700    P  O  300  280    I V  (mL/kg)   5 (0)    Total Intake(mL/kg) 300 (2 9)  285 (2 7)    Urine (mL/kg/hr) 400 (0 2) 1030 (0 4)     Total Output 400 1030     Net -100 -1030 +285           Unmeasured Urine Occurrence   1 x        Invasive Devices     Peripheral Intravenous Line  Duration           Peripheral IV 12/27/22 Distal;Dorsal (posterior); Right Forearm 2 days                  Intake/Output Summary (Last 24 hours) at 12/30/2022 1132  Last data filed at 12/30/2022 0834  Gross per 24 hour   Intake 285 ml   Output 650 ml   Net -365 ml         Physical Exam:   Physical Exam  Vitals and nursing note reviewed  Constitutional:       Appearance: Normal appearance  He is morbidly obese  HENT:      Right Ear: External ear normal       Left Ear: External ear normal       Nose: Nose normal    Eyes:      General: No scleral icterus  Right eye: No discharge  Left eye: No discharge  Cardiovascular:      Rate and Rhythm: Normal rate  Rhythm irregularly irregular  Pulses: Normal pulses  Heart sounds: Murmur heard  Systolic murmur is present with a grade of 2/6  Pulmonary:      Effort: Pulmonary effort is normal  No accessory muscle usage or respiratory distress        Breath sounds: Examination of the right-lower field reveals decreased breath sounds  Examination of the left-lower field reveals decreased breath sounds  Decreased breath sounds present  Abdominal:      General: Bowel sounds are normal  There is no distension  Palpations: Abdomen is soft  Musculoskeletal:      Right lower leg: Edema present  Left lower leg: Edema present  Comments: Trace   Skin:     General: Skin is warm and dry  Capillary Refill: Capillary refill takes less than 2 seconds  Neurological:      General: No focal deficit present  Mental Status: He is alert and oriented to person, place, and time  Mental status is at baseline  Psychiatric:         Mood and Affect: Mood normal          Behavior: Behavior is cooperative              Medications/ Allergies:     Current Facility-Administered Medications   Medication Dose Route Frequency Provider Last Rate   • acetaminophen  650 mg Oral Q6H PRN Josefina Pelaez MD     • atorvastatin  10 mg Oral Daily With Siomara Sargent MD     • bisacodyl  10 mg Rectal Daily PRN Josefina Pelaez MD     • calamine-zinc oxide   Topical 4x Daily Josefina Pelaez MD     • carvedilol  25 mg Oral BID With Meals SMII Marrufo     • diltiazem  120 mg Oral Q12H [facility] SIMI Marrufo     • diphenhydrAMINE  25 mg Intravenous Daily PRN Duyen Dunn PA-C     • docusate sodium  100 mg Oral BID Rylie Fonseca DO     • famotidine  20 mg Intravenous Q12H [facility] SIMI Galindo     • Fluticasone Furoate-Vilanterol  1 puff Inhalation Daily Josefina Pelaez MD      And   • umeclidinium  1 puff Inhalation Daily Josefina Pelaez MD     • folic acid  1 mg Oral Daily Josefina Pelaez MD     • guaiFENesin  600 mg Oral Q12H Reji Butler MD     • hydrALAZINE  25 mg Oral Q12H SIMI Marrufo     • hydrocortisone   Topical BID Josefina Pelaez MD     • hydrOXYzine HCL  25 mg Oral Q6H PRN SIMI Gonzales     • insulin lispro  1-6 Units Subcutaneous TID [address] [facility], SIMI     • insulin lispro  1-6 Units Subcutaneous HS SIMI Monroe     • latanoprost  1 drop Both Eyes HS Juanita Salinas MD     • levalbuterol  0 63 mg Nebulization Q4H PRN SIMI Gonzales     • melatonin  3 mg Oral HS SIMI Gonzales     • montelukast  5 mg Oral HS Juanita Salinas MD     • ondansetron  4 mg Intravenous Q6H PRN Juanita Salinas MD     • polyethylene glycol  17 g Oral BID Rylie Fonseca DO     • primidone  50 mg Oral Q12H [address] Rylie Fonseca, DO     • saccharomyces boulardii  250 mg Oral BID SIMI Gonzales     • simethicone  80 mg Oral 4x Daily PRN Juanita Salinas MD     • spironolactone  25 mg Oral Daily Kerri Taylor MD     • vancomycin  15 mg/kg Intravenous Q12H Pedro Mcdonald MD       acetaminophen, 650 mg, Q6H PRN  bisacodyl, 10 mg, Daily PRN  diphenhydrAMINE, 25 mg, Daily PRN  hydrOXYzine HCL, 25 mg, Q6H PRN  levalbuterol, 0 63 mg, Q4H PRN  ondansetron, 4 mg, Q6H PRN  simethicone, 80 mg, 4x Daily PRN      No Known Allergies    VTE Pharmacologic Prophylaxis:   Eliquis    Labs:   CBC with diff:  Results from last 7 days   Lab Units 12/30/22  0524 12/29/22  2158 12/29/22  0520 12/28/22  0459 12/24/22  0619   WBC Thousand/uL 15 70* 13 69* 12 40* 12 06* 8 79   HEMOGLOBIN g/dL 14 0 15 0 14 7 15 7 14 1   HEMATOCRIT % 43 3 46 6 45 6 48 3 43 2   MCV fL 101* 102* 100* 101* 101*   PLATELETS Thousands/uL 291 282 252 249 187   MCH pg 32 8 32 8 32 3 32 7 32 8   MCHC g/dL 32 3 32 2 32 2 32 5 32 6   RDW % 13 5 13 5 13 4 13 4 13 5   MPV fL 9 5 9 6 9 9 9 3 10 1   NRBC AUTO /100 WBCs 0 0  --   --   --      CMP:  Results from last 7 days   Lab Units 12/29/22  0520 12/28/22  0459 12/27/22  0553 12/26/22  0556 12/25/22  0535 12/24/22  0619   SODIUM mmol/L 144 138 139 138 138 140   POTASSIUM mmol/L 3 7 3 8 4 3 3 4* 3 7 4 6   CHLORIDE mmol/L 105 103 104 102 103 107   CO2 mmol/L 30 28 27 29 25 24   ANION GAP mmol/L 9 7 8 7 10 9   BUN mg/dL 11 11 16 15 19 25   CREATININE mg/dL 0 74 0 82 0 71 0 67 0 73 0 85 CALCIUM mg/dL 8 6 8 9 8 7 8 4 8 3 8 5   EGFR ml/min/1 73sq m 87 83 88 90 87 82     Magnesium:  Results from last 7 days   Lab Units 12/27/22  0553 12/24/22  0619   MAGNESIUM mg/dL 1 7 1 8     Coags:  Results from last 7 days   Lab Units 12/29/22  2158   PTT seconds 32   INR  1 34*       Imaging & Testing   I have personally reviewed pertinent reports  XR chest portable    Result Date: 12/20/2022  Narrative: CHEST INDICATION:   SOB,cough  Lung cancer  COMPARISON:  5/4/2022   10/21/2022, CT scan  EXAM PERFORMED/VIEWS:  XR CHEST PORTABLE  AP semierect FINDINGS: Heart shadow is obscured by adjacent opacity  Increasing opacity at the left base which, when correlated with the CT scan, represents enlargement of the known mass  Probable associated pleural effusion  Left upper lung and right lung remain clear  No pneumothorax apparent  No acute osseous abnormalities  Impression: Increasing pleuroparenchymal opacity at the left base consistent with enlargement of the known mass and probably associated effusion/atelectasis  Workstation performed: [id number]     CT chest w contrast    Result Date: 12/23/2022  Narrative: CT CHEST WITH IV CONTRAST INDICATION:   Soft tissue mass, chest, US/xray nondiagnostic Soft tissue rule out cellulitis, abscess, fasciitis of right chest wall and axilla  COMPARISON:  Ultrasound from earlier today of the right axilla and CT scan of the chest from 10/21/2022  TECHNIQUE: CT examination of the chest was performed  Axial, sagittal, and coronal 2D reformatted images were created from the source data and submitted for interpretation  Radiation dose length product (DLP) for this visit:  747 62 mGy-cm   This examination, like all CT scans performed in the [facility], was performed utilizing techniques to minimize radiation dose exposure, including the use of iterative  reconstruction and automated exposure control   IV Contrast:  85 mL of iohexol (OMNIPAQUE) FINDINGS: LUNGS: Stable background emphysema  Increasing left lower lobe consolidation due to increasing left pleural effusion  The previously noted rounded masslike area is more difficult to discern but a slightly hypodense area noted on image 2/42 measuring 3 6 cm is approximately the same as prior  Stable juxtapleural reticular fibrotic changes mostly in the right lung base nonspecific  PLEURA:  Increasing left pleural effusion  HEART/GREAT VESSELS: Heart is unremarkable for patient's age  No thoracic aortic aneurysm  MEDIASTINUM AND ANDRE:  Unremarkable  CHEST WALL AND LOWER NECK:  There is skin thickening and subcutaneous edematous changes in the right axillary region  No focal fluid collection or mass identified  VISUALIZED STRUCTURES IN THE UPPER ABDOMEN:  Stable gallstones without surrounding inflammation  Postsurgical changes of the stomach  OSSEOUS STRUCTURES:  No acute fracture or destructive osseous lesion  Impression: Skin thickening and subcutaneous edematous changes in the right axillary region  No focal fluid collection or mass identified  Findings most consistent with cellulitis  Stable background emphysema  Increasing left lower lobe consolidation due to increasing left pleural effusion  The previously noted rounded mass in the left lower lobe is more difficult to discern on today's exam due to the increased effusion and atelectasis, but a slightly hypodense area noted on image 2/42 measuring 3 6 cm is approximately the same as prior  Stable gallstones without surrounding inflammation  Workstation performed: [id number]     Echo complete w/ contrast if indicated    Result Date: 12/24/2022  Narrative: •  Left Ventricle: Left ventricular cavity size is normal  Wall thickness is mildly increased  There is mild concentric hypertrophy  The left ventricular ejection fraction is 50% by visual estimation   Systolic function is low normal  Although no diagnostic regional wall motion abnormality was identified, this possibility cannot be completely excluded on the basis of this study  •  IVS: There is abnormal septal motion consistent with post-operative status  •  Right Ventricle: Wall thickness is mildly increased  •  Left Atrium: The atrium is moderately dilated  •  Right Atrium: The atrium is mildly dilated  •  Mitral Valve: There is mild annular calcification  There is mild regurgitation  •  Tricuspid Valve: There is mild regurgitation  Pulmonary artery pressure around 30 mmHg  •  Pericardium: There is a left pleural effusion  •  Hard to accurately assess due to atrial fibrillation EF but appears to be low normal on 50%  No other significant changes from old echo        US axilla    Result Date: 12/20/2022  Narrative: AXILLA ULTRASOUND INDICATION:   observe for fluid collection required drainage  COMPARISON:  None TECHNIQUE:   Real-time ultrasound of the right axilla was performed with a linear transducer with both volumetric sweeps and still imaging techniques  FINDINGS:  No discrete fluid collection is identified in the right axilla  Mild skin thickening and soft tissue edema is noted  Impression: No discrete fluid collection is identified in the right axilla  Workstation performed: [id number]          [facility]  Cardiology      "This note was completed in part utilizing [employer] direct voice recognition software  Grammatical errors, random word insertion, spelling mistakes, and incomplete sentences may be an occasional consequence of the system secondary to software limitations, ambient noise and hardware issues  Please read the chart carefully and recognize, using context, where substitutions have occurred    If you have any questions or concerns about the context, text or information contained within the body of this dictation, please contact myself, the provider, for further clarification "

## 2022-12-30 NOTE — ASSESSMENT & PLAN NOTE
Followed by GEN surg  Bedside aspiration was done previously and bedside I&D done on 12/24  · Discontinued IV cefazolin every 6 hours on 12/28  Vancomycin started 12/27  · Likely transition to Doxy tomorrow per ID  · WBC count trending up  · Imaging negative for abscess  · Continue dressing changes per surgery  status post skin biopsy by surgery per ID recommendation  · D/W dermatology on call who reviewed pictures and per dermatology could be malignancy related or Paget's disease, less likely Sweet syndrome    Dermatology recommended obtaining some tissue for tissue culture however per surgery too late to get culture from tissue   · Follow-up with surgery after discharge to discuss biopsy results and possible follow-up with dermatology aced on biopsy results

## 2022-12-30 NOTE — PROGRESS NOTES
Patient on Eliquis  Site of biopsy is bleeding continuously  Patient is clinically ok with stable vitals  Spoke to alton Hooper dcd and stat labs cbc coags ordered  Cardiology included in group text

## 2022-12-30 NOTE — PROGRESS NOTES
Curtis Jordan is a [de-identified] y o  male who is currently ordered Vancomycin IV with management by the Pharmacy Consult service  Relevant clinical data and objective / subjective history reviewed  Vancomycin Assessment:  1  Indication and Goal AUC/Trough: Soft tissue (goal -600, trough >10), -600, trough >10  2  Clinical Status: stable  3  Micro:     4  Renal Function:  · SCr: 0 74 mg/dL  · CrCl: 96 6 mL/min  · Renal replacement: Not on dialysis  5  Days of Therapy: 4  6  Current Dose: 1500 mg IV q12h  Vancomycin Plan:  1  New Dosin mg IV q12h  2  Estimated AUC: 421 mcg*hr/mL  3  Estimated Trough: 10 6 mcg/mL  4  Next Level: 23 at 0600 with AM Labs  5  Renal Function Monitoring: Daily BMP and [place] will continue to follow closely for s/sx of nephrotoxicity, infusion reactions and appropriateness of therapy  BMP and CBC will be ordered per protocol  We will continue to follow the patient’s culture results and clinical progress daily      Violette Felder, Pharmacist

## 2022-12-30 NOTE — ASSESSMENT & PLAN NOTE
Lab Results   Component Value Date    HGBA1C 6 7 (H) 12/20/2022       Recent Labs     12/29/22  2040 12/30/22  0726 12/30/22  1123 12/30/22  1544   POCGLU 147* 163* 148* 167*     Holding metformin   Myron [place]or[place] was held due to possibility of it causing angioedema   - Rechallenge with Jardiance today  -Continue Accuchecks AC/HS with insulin sliding scale

## 2022-12-30 NOTE — ASSESSMENT & PLAN NOTE
Improved, airway maintained  Patient on mysoline, atorvastatin and losartan/Jardiance which could cause angioedema/ urticaria-on hold  · Rash was mostly on trunk and upper thigh and is very pruritic; has lip swelling, no tongue  · Given dose of 125 mg solumedrol, benadryl 25 mg IV bid, and pepcid 20 mg IV in ER but symptoms returned   · Received solumedrol, benadryl, pepcid   · Continue calamine, hydrocortisone   · Continue Singulair    · Rechallenge with Mysoline as less likely due to this and most likely due to losartan  Will rechallenge with Jardiance today    Pt And wife are aware that these meds are being restarted and that if she does develop an allergic reaction he is in a hospital and we can address it accordingly

## 2022-12-30 NOTE — ASSESSMENT & PLAN NOTE
Heart rate was not well controlled previously but has improved  · Cardizem drip was discontinued and transitioned to oral Cardizem  Also received doses of IV digoxin previously  Digoxin p o  discontinued today  · continue Coreg  Eliquis on hold due to oozing from biopsy site overnight    Sutures placed by surgery and can restart Eliquis tomm if no further bleeding   · Cardio input appreciated

## 2022-12-31 VITALS
HEART RATE: 49 BPM | BODY MASS INDEX: 32.44 KG/M2 | OXYGEN SATURATION: 97 % | SYSTOLIC BLOOD PRESSURE: 127 MMHG | HEIGHT: 71 IN | DIASTOLIC BLOOD PRESSURE: 61 MMHG | TEMPERATURE: 97.3 F | WEIGHT: 231.7 LBS | RESPIRATION RATE: 20 BRPM

## 2022-12-31 LAB
BACTERIA TISS AEROBE CULT: NO GROWTH
BASOPHILS # BLD AUTO: 0.06 THOUSANDS/ÂΜL (ref 0–0.1)
BASOPHILS NFR BLD AUTO: 1 % (ref 0–1)
EOSINOPHIL # BLD AUTO: 0.2 THOUSAND/ÂΜL (ref 0–0.61)
EOSINOPHIL NFR BLD AUTO: 2 % (ref 0–6)
ERYTHROCYTE [DISTWIDTH] IN BLOOD BY AUTOMATED COUNT: 13.3 % (ref 11.6–15.1)
GLUCOSE SERPL-MCNC: 125 MG/DL (ref 65–140)
GLUCOSE SERPL-MCNC: 153 MG/DL (ref 65–140)
GLUCOSE SERPL-MCNC: 196 MG/DL (ref 65–140)
GRAM STN SPEC: NORMAL
GRAM STN SPEC: NORMAL
HCT VFR BLD AUTO: 39.6 % (ref 36.5–49.3)
HGB BLD-MCNC: 12.7 G/DL (ref 12–17)
IMM GRANULOCYTES # BLD AUTO: 0.06 THOUSAND/UL (ref 0–0.2)
IMM GRANULOCYTES NFR BLD AUTO: 1 % (ref 0–2)
LYMPHOCYTES # BLD AUTO: 1.07 THOUSANDS/ÂΜL (ref 0.6–4.47)
LYMPHOCYTES NFR BLD AUTO: 10 % (ref 14–44)
MCH RBC QN AUTO: 32 PG (ref 26.8–34.3)
MCHC RBC AUTO-ENTMCNC: 32.1 G/DL (ref 31.4–37.4)
MCV RBC AUTO: 100 FL (ref 82–98)
MONOCYTES # BLD AUTO: 0.77 THOUSAND/ÂΜL (ref 0.17–1.22)
MONOCYTES NFR BLD AUTO: 7 % (ref 4–12)
NEUTROPHILS # BLD AUTO: 8.86 THOUSANDS/ÂΜL (ref 1.85–7.62)
NEUTS SEG NFR BLD AUTO: 79 % (ref 43–75)
NRBC BLD AUTO-RTO: 0 /100 WBCS
PLATELET # BLD AUTO: 256 THOUSANDS/UL (ref 149–390)
PMV BLD AUTO: 9.7 FL (ref 8.9–12.7)
RBC # BLD AUTO: 3.97 MILLION/UL (ref 3.88–5.62)
WBC # BLD AUTO: 11.02 THOUSAND/UL (ref 4.31–10.16)

## 2022-12-31 RX ORDER — ATORVASTATIN CALCIUM 10 MG/1
10 TABLET, FILM COATED ORAL
Qty: 30 TABLET | Refills: 1 | Status: SHIPPED | OUTPATIENT
Start: 2023-01-01

## 2022-12-31 RX ORDER — HYDRALAZINE HYDROCHLORIDE 25 MG/1
25 TABLET, FILM COATED ORAL EVERY 12 HOURS
Qty: 60 TABLET | Refills: 1 | Status: SHIPPED | OUTPATIENT
Start: 2022-12-31

## 2022-12-31 RX ORDER — DOXYCYCLINE HYCLATE 100 MG/1
100 CAPSULE ORAL EVERY 12 HOURS SCHEDULED
Qty: 6 CAPSULE | Refills: 0 | Status: SHIPPED | OUTPATIENT
Start: 2022-12-31 | End: 2023-01-03

## 2022-12-31 RX ORDER — HYDROXYZINE HYDROCHLORIDE 25 MG/1
25 TABLET, FILM COATED ORAL EVERY 6 HOURS PRN
Qty: 24 TABLET | Refills: 0 | Status: SHIPPED | OUTPATIENT
Start: 2022-12-31

## 2022-12-31 RX ORDER — ATORVASTATIN CALCIUM 10 MG/1
10 TABLET, FILM COATED ORAL DAILY
Qty: 30 TABLET | Refills: 1 | Status: SHIPPED | OUTPATIENT
Start: 2022-12-31

## 2022-12-31 RX ORDER — PRIMIDONE 50 MG/1
50 TABLET ORAL EVERY 12 HOURS SCHEDULED
Qty: 30 TABLET | Refills: 1 | Status: SHIPPED | OUTPATIENT
Start: 2022-12-31

## 2022-12-31 RX ORDER — IODINE/SODIUM IODIDE 2 %
TINCTURE TOPICAL 4 TIMES DAILY
Qty: 118 ML | Refills: 0 | Status: SHIPPED | OUTPATIENT
Start: 2022-12-31

## 2022-12-31 RX ADMIN — HYDRALAZINE HYDROCHLORIDE 25 MG: 25 TABLET ORAL at 08:30

## 2022-12-31 RX ADMIN — INSULIN LISPRO 2 UNITS: 100 INJECTION, SOLUTION INTRAVENOUS; SUBCUTANEOUS at 11:29

## 2022-12-31 RX ADMIN — Medication 250 MG: at 08:30

## 2022-12-31 RX ADMIN — PRIMIDONE 50 MG: 50 TABLET ORAL at 08:30

## 2022-12-31 RX ADMIN — FOLIC ACID 1 MG: 1 TABLET ORAL at 08:30

## 2022-12-31 RX ADMIN — UMECLIDINIUM 1 PUFF: 62.5 AEROSOL, POWDER ORAL at 08:31

## 2022-12-31 RX ADMIN — CARVEDILOL 25 MG: 25 TABLET, FILM COATED ORAL at 15:34

## 2022-12-31 RX ADMIN — EMPAGLIFLOZIN 20 MG: 10 TABLET, FILM COATED ORAL at 08:31

## 2022-12-31 RX ADMIN — HYDRALAZINE HYDROCHLORIDE 25 MG: 25 TABLET ORAL at 20:39

## 2022-12-31 RX ADMIN — DILTIAZEM HYDROCHLORIDE 120 MG: 60 CAPSULE, EXTENDED RELEASE ORAL at 08:31

## 2022-12-31 RX ADMIN — ATORVASTATIN CALCIUM 10 MG: 10 TABLET, FILM COATED ORAL at 15:34

## 2022-12-31 RX ADMIN — APIXABAN 5 MG: 5 TABLET, FILM COATED ORAL at 17:35

## 2022-12-31 RX ADMIN — SPIRONOLACTONE 25 MG: 25 TABLET ORAL at 08:30

## 2022-12-31 RX ADMIN — FAMOTIDINE 20 MG: 10 INJECTION, SOLUTION INTRAVENOUS at 08:30

## 2022-12-31 RX ADMIN — FLUTICASONE FUROATE AND VILANTEROL TRIFENATATE 1 PUFF: 100; 25 POWDER RESPIRATORY (INHALATION) at 08:32

## 2022-12-31 RX ADMIN — PRIMIDONE 50 MG: 50 TABLET ORAL at 20:39

## 2022-12-31 RX ADMIN — GUAIFENESIN 600 MG: 600 TABLET, EXTENDED RELEASE ORAL at 08:30

## 2022-12-31 RX ADMIN — VANCOMYCIN HYDROCHLORIDE 1000 MG: 1 INJECTION, SOLUTION INTRAVENOUS at 15:30

## 2022-12-31 RX ADMIN — DOCUSATE SODIUM 100 MG: 100 CAPSULE, LIQUID FILLED ORAL at 08:30

## 2022-12-31 RX ADMIN — CARVEDILOL 25 MG: 25 TABLET, FILM COATED ORAL at 08:30

## 2022-12-31 RX ADMIN — VANCOMYCIN HYDROCHLORIDE 1000 MG: 1 INJECTION, SOLUTION INTRAVENOUS at 04:46

## 2022-12-31 NOTE — ASSESSMENT & PLAN NOTE
Followed by GEN surg  Bedside aspiration was done previously and bedside I&D done on 12/24  · Discontinued IV cefazolin  Vancomycin started 12/27  · Likely transition to Doxy tomorrow per ID  · WBC count trending up  · Imaging negative for abscess  · Continue dressing changes per surgery  status post skin biopsy by surgery per ID recommendation  · D/W dermatology on call who reviewed pictures and per dermatology could be malignancy related or Paget's disease, less likely Sweet syndrome    Dermatology recommended obtaining some tissue for tissue culture however per surgery too late to get culture from tissue   · Follow-up with surgery after discharge to discuss biopsy results and possible follow-up with dermatology aced on biopsy results

## 2022-12-31 NOTE — QUICK NOTE
If bleeding biopsy site remains dry, can restart Eliquis on 1/1/23  Suture in right axilla will need removed sometime around 1/13/23     Patient's PCP can do this or general surgery office if need be

## 2022-12-31 NOTE — DISCHARGE SUMMARY
Danielle 89  Discharge- Chris Bagley 1940, [de-identified] y o  male MRN: [id number]  Unit/Bed#: [address] [place] [address] Encounter: [id number]  Primary Care Provider: Deidre Dash MD   Date and time admitted to hospital: 12/19/2022  1:01 AM    Cellulitis  Assessment & Plan  Followed by GEN surg  Bedside aspiration was done previously and bedside I&D done on 12/24  · Discontinued IV cefazolin  Vancomycin started 12/27  · Likely transition to Doxy tomorrow per ID  · WBC count trending up  · Imaging negative for abscess  · Continue dressing changes per surgery  status post skin biopsy by surgery per ID recommendation  · D/W dermatology on call who reviewed pictures and per dermatology could be malignancy related or Paget's disease, less likely Sweet syndrome  Dermatology recommended obtaining some tissue for tissue culture however per surgery too late to get culture from tissue   · Follow-up with surgery after discharge to discuss biopsy results and possible follow-up with dermatology aced on biopsy results    Persistent atrial fibrillation [facility])  Assessment & Plan  Controlled,     Heart rate was not well controlled previously but has improved  · Cardizem drip was discontinued and transitioned to oral Cardizem  Also received doses of IV digoxin previously  Digoxin p o  discontinued today  · continue Coreg  Eliquis on hold due to oozing from biopsy site overnight  Sutures placed by surgery and can restart Eliquis tomm if no further bleeding   · Cardio input Coreg and Cardizem on discharge    Severe sepsis (HCC)-resolved as of 12/30/2022  Assessment & Plan  Likely due to right lateral chest wall cellulitis, tachycardia- possibly secondary to underlying A  fib  · BCx-negative so far  Wound culture - staph  · Off Rocephin  Discontinued IV Ancef    Currently on Vanco added as noted above  · ID following, recs appreciated  · Imaging negative for abscess    GERD (gastroesophageal reflux disease)  Assessment & Plan  Continue Pepcid  Prostate cancer [facility])  Assessment & Plan  Historical, follow-up with specialists    Chronic diastolic heart failure [facility])  Assessment & Plan  Wt Readings from Last 3 Encounters:   12/31/22 105 kg (231 lb 11 3 oz)   12/06/22 102 kg (224 lb)   11/28/22 102 kg (224 lb)     Patient with signs of volume overload  20 mg of IV Lasix x1 given on 12/24 and was started on 20 mg of IV lasix daily  · IV Lasix has been discontinued and patient currently on Aldactone  · Continue coreg  · Daily weights  · Intake and output    Diabetic polyneuropathy associated with type 2 diabetes mellitus [facility])  Assessment & Plan  Lab Results   Component Value Date    HGBA1C 6 7 (H) 12/20/2022       Recent Labs     12/30/22  1123 12/30/22  1544 12/30/22  2116 12/31/22  0709   POCGLU 148* 167* 168* 125     Holding metformin  Skyla Keen was held due to possibility of it causing angioedema   - Rechallenge with Jardiance today  - Continue Accuchecks AC/HS with insulin sliding scale     High serum lactic acid-resolved as of 12/21/2022  Assessment & Plan  Worsening,    · Possibly d/t sepsis vs OP medications  · Continue to trend and IVF      Encephalopathy-resolved as of 12/21/2022  Assessment & Plan  Possibly multifactorial, Sepsis vs Haldo vs lactic acidosis, hx of SYLVESTER    · ABG-unremarkable  · Neuro checks, maintain airway  · S/p haldo administration overnight-hold all neurotoxics  · CCU consulted overnight    Acute kidney injury (HCC)-resolved as of 12/23/2022  Assessment & Plan  Slowly improving, likely due to septic shock      · Cr 1 9  > 1 79 > 0 97  · Continue IVF  · Avoid nephrotoxic's    Septic shock (HCC)-resolved as of 12/22/2022  Assessment & Plan  resolved, Hemodynamic stable, still in sepsis    · Likely due to right lateral chest wall cellulitis with possible abscess  · Contiune Rocephin, Vanco completed  · IVF      Medical Problems     Resolved Problems  Date Reviewed: 12/31/2022          Resolved    Severe sepsis ([facility] [address] ) 12/30/2022     Resolved by  Ryanne Perea DO    * (Principal) Angioedema 12/21/2022     Resolved by  Ryanne Perea DO    [facility] 12/22/2022     Resolved by  Sonu Duran MD    Acute kidney injury [facility]) 12/23/2022     Resolved by  Sonu Duran MD    Encephalopathy 12/21/2022     Resolved by  Sonu Duran MD    High serum lactic acid 12/21/2022     Resolved by  Sonu Duran MD        Discharging Physician / Practitioner: Sonu Duran MD  PCP: Yamileth Malik MD  Admission Date:   Admission Orders (From admission, onward)     Ordered        12/20/22 1208  Inpatient Admission  Once            12/19/22 0252  Place in Observation  Once                      Discharge Date: 12/31/22    Consultations During Hospital Stay:  · General surgery, dermatology, infectious disease, cardiology    Procedures Performed:   · Punch biopsy, I&D    Significant Findings / Test Results:   · N/A    Incidental Findings:   · no      Test Results Pending at Discharge (will require follow up):   · Punch biopsy     Outpatient Tests Requested:  · CBC, CMP, magnesium    Complications: N/A    Reason for Admission: Angioedema    Hospital Course:   Sammy Resendiz is a [de-identified] y o  male patient who originally presented to the hospital on 12/19/2022 due to angioedema and itchy rash  Patient angioedema resolved, stop any possible included diabetic medications Jardiance  Patient also had axillary cellulitis on the right side, status post I&D with no drainage and punch biopsy to same location  Patient was treated with several IV antibiotics and will be discharged with 3 more days of p o  antibiotics per infectious disease recommendations  Patient will also have 2 following up with dermatology regarding skin infection and angioedema and you Uticaria  Patient also has a history of A  fib and was seen by cardiology with changes to medication and will be discharged with Cardizem and Coreg and continue home Eliquis  All other chronic conditions managed and patient will have the follow-up with PCP, patient counseled to keep a diary to identify triggers for potential angioedema  Patient has a history of underlying malignancy and needs to follow-up with specialist   Patient will have to follow-up with dermatology/cardiology/PCP-remove her sutures  Please see above list of diagnoses and related plan for additional information  Condition at Discharge: stable    Discharge Day Visit / Exam:   Subjective: Patient seen and examined at bedside, denied any chest pain, shortness of breath or itchiness  Patient reported pain at right underarm site and the stitch in place  Patient reported wanting to go home and had no concerns at this time  Patient's wife informed of plan of care as shared decision making and unknown origin of angioedema; encouraged to monitor for changes  Vitals: Blood Pressure: 129/61 (12/31/22 1511)  Pulse: 87 (12/31/22 0707)  Temperature: 97 7 °F (36 5 °C) (12/31/22 1511)  Temp Source: Oral (12/31/22 0707)  Respirations: 18 (12/31/22 0707)  Height: 5' 11" (180 3 cm) (12/24/22 1224)  Weight - Scale: 105 kg (231 lb 11 3 oz) (12/31/22 0600)  SpO2: 94 % (12/31/22 0707)  Exam:   Physical Exam  Vitals and nursing note reviewed  Constitutional:       General: He is not in acute distress  Appearance: He is well-developed  He is obese  He is not ill-appearing  HENT:      Head: Normocephalic and atraumatic  Eyes:      Conjunctiva/sclera: Conjunctivae normal    Cardiovascular:      Rate and Rhythm: Normal rate and regular rhythm  Heart sounds: No murmur heard  Pulmonary:      Effort: Pulmonary effort is normal  No respiratory distress  Breath sounds: Normal breath sounds  No wheezing, rhonchi or rales  Abdominal:      Palpations: Abdomen is soft  Tenderness: There is no abdominal tenderness  There is no guarding or rebound        Comments: Obese, nontender   Musculoskeletal: General: No swelling  Cervical back: Neck supple  Right lower leg: No edema  Left lower leg: No edema  Skin:     General: Skin is warm and dry  Capillary Refill: Capillary refill takes less than 2 seconds  Comments: Right axillary erythema, chest wall circular bandage CDI, 1 cm incision (non-draining) w/ surrounding purulent/serous fluid  ,  Tenderness, 1 visible stitch posterior lateral right axilla    Neurological:      Mental Status: He is alert and oriented to person, place, and time  Psychiatric:         Mood and Affect: Mood normal          Behavior: Behavior normal          Thought Content: Thought content normal           Discussion with Family: Updated  (wife) via phone  Discharge instructions/Information to patient and family:   See after visit summary for information provided to patient and family  Provisions for Follow-Up Care:  See after visit summary for information related to follow-up care and any pertinent home health orders  Disposition:   Home    Planned Readmission: No     Discharge Statement:  I spent 45 minutes discharging the patient  This time was spent on the day of discharge  I had direct contact with the patient on the day of discharge  Greater than 50% of the total time was spent examining patient, answering all patient questions, arranging and discussing plan of care with patient as well as directly providing post-discharge instructions  Additional time then spent on discharge activities  Discharge Medications:  See after visit summary for reconciled discharge medications provided to patient and/or family        **Please Note: This note may have been constructed using a voice recognition system**

## 2022-12-31 NOTE — ASSESSMENT & PLAN NOTE
Wt Readings from Last 3 Encounters:   12/31/22 105 kg (231 lb 11 3 oz)   12/06/22 102 kg (224 lb)   11/28/22 102 kg (224 lb)     Patient with signs of volume overload  20 mg of IV Lasix x1 given on 12/24 and was started on 20 mg of IV lasix daily  · IV Lasix has been discontinued and patient currently on Aldactone    · Continue coreg  · Daily weights  · Intake and output

## 2022-12-31 NOTE — PLAN OF CARE
Problem: PAIN - ADULT  Goal: Verbalizes/displays adequate comfort level or baseline comfort level  Description: Interventions:  - Encourage patient to monitor pain and request assistance  - Assess pain using appropriate pain scale  - Administer analgesics based on type and severity of pain and evaluate response  - Implement non-pharmacological measures as appropriate and evaluate response  - Consider cultural and social influences on pain and pain management  - Notify physician/advanced practitioner if interventions unsuccessful or patient reports new pain  Outcome: Progressing     Problem: INFECTION - ADULT  Goal: Absence or prevention of progression during hospitalization  Description: INTERVENTIONS:  - Assess and monitor for signs and symptoms of infection  - Monitor lab/diagnostic results  - Monitor all insertion sites, i e  indwelling lines, tubes, and drains  - Monitor endotracheal if appropriate and nasal secretions for changes in amount and color  - [place] appropriate cooling/warming therapies per order  - Administer medications as ordered  - Instruct and encourage patient and family to use good hand hygiene technique  - Identify and instruct in appropriate isolation precautions for identified infection/condition  Outcome: Progressing  Goal: Absence of fever/infection during neutropenic period  Description: INTERVENTIONS:  - Monitor WBC    Outcome: Progressing     Problem: SAFETY ADULT  Goal: Patient will remain free of falls  Description: INTERVENTIONS:  - Educate patient/family on patient safety including physical limitations  - Instruct patient to call for assistance with activity   - Consult OT/PT to assist with strengthening/mobility   - Keep Call bell within reach  - Keep bed low and locked with side rails adjusted as appropriate  - Keep care items and personal belongings within reach  - Initiate and maintain comfort rounds  - Make Fall Risk Sign visible to staff  - Offer Toileting every 2 Hours, in advance of need  - Initiate/Maintain bed alarm  - Apply yellow socks and bracelet for high fall risk patients  - Consider moving patient to room near nurses station  Outcome: Progressing  Goal: Maintain or return to baseline ADL function  Description: INTERVENTIONS:  -  Assess patient's ability to carry out ADLs; assess patient's baseline for ADL function and identify physical deficits which impact ability to perform ADLs (bathing, care of mouth/teeth, toileting, grooming, dressing, etc )  - Assess/evaluate cause of self-care deficits   - Assess range of motion  - Assess patient's mobility; develop plan if impaired  - Assess patient's need for assistive devices and provide as appropriate  - Encourage maximum independence but intervene and supervise when necessary  - Involve family in performance of ADLs  - Assess for home care needs following discharge   - Consider OT consult to assist with ADL evaluation and planning for discharge  - Provide patient education as appropriate  Outcome: Progressing  Goal: Maintains/Returns to pre admission functional level  Description: INTERVENTIONS:  - Perform BMAT or MOVE assessment daily    - Set and communicate daily mobility goal to care team and patient/family/caregiver  - Collaborate with rehabilitation services on mobility goals if consulted  - Perform Range of Motion 2 times a day  - Reposition patient every 2 hours    - Dangle patient 2 times a day  - Stand patient 2 times a day  - Ambulate patient 2 times a day  - Out of bed to chair 2 times a day   - Out of bed for meals 2 times a day  - Out of bed for toileting  - Record patient progress and toleration of activity level   Outcome: Progressing     Problem: DISCHARGE PLANNING  Goal: Discharge to home or other facility with appropriate resources  Description: INTERVENTIONS:  - Identify barriers to discharge w/patient and caregiver  - Arrange for needed discharge resources and transportation as appropriate  - Identify discharge learning needs (meds, wound care, etc )  - Arrange for interpretive services to assist at discharge as needed  - Refer to Case Management Department for coordinating discharge planning if the patient needs post-hospital services based on physician/advanced practitioner order or complex needs related to functional status, cognitive ability, or social support system  Outcome: Progressing     Problem: Knowledge Deficit  Goal: Patient/family/caregiver demonstrates understanding of disease process, treatment plan, medications, and discharge instructions  Description: Complete learning assessment and assess knowledge base    Interventions:  - Provide teaching at level of understanding  - Provide teaching via preferred learning methods  Outcome: Progressing     Problem: Potential for Falls  Goal: Patient will remain free of falls  Description: INTERVENTIONS:  - Educate patient/family on patient safety including physical limitations  - Instruct patient to call for assistance with activity   - Consult OT/PT to assist with strengthening/mobility   - Keep Call bell within reach  - Keep bed low and locked with side rails adjusted as appropriate  - Keep care items and personal belongings within reach  - Initiate and maintain comfort rounds  - Make Fall Risk Sign visible to staff  - Offer Toileting every 2 Hours, in advance of need  - Initiate/Maintain bed alarm  - Apply yellow socks and bracelet for high fall risk patients  - Consider moving patient to room near nurses station  Outcome: Progressing     Problem: RESPIRATORY - ADULT  Goal: Achieves optimal ventilation and oxygenation  Description: INTERVENTIONS:  - Assess for changes in respiratory status  - Assess for changes in mentation and behavior  - Position to facilitate oxygenation and minimize respiratory effort  - Oxygen administered by appropriate delivery if ordered  - Initiate smoking cessation education as indicated  - Encourage broncho-pulmonary hygiene including cough, deep breathe, Incentive Spirometry  - Assess the need for suctioning and aspirate as needed  - Assess and instruct to report SOB or any respiratory difficulty  - Respiratory Therapy support as indicated  Outcome: Progressing     Problem: Prexisting or High Potential for Compromised Skin Integrity  Goal: Skin integrity is maintained or improved  Description: INTERVENTIONS:  - Identify patients at risk for skin breakdown  - Assess and monitor skin integrity  - Assess and monitor nutrition and hydration status  - Monitor labs   - Assess for incontinence   - Turn and reposition patient  - Assist with mobility/ambulation  - Relieve pressure over bony prominences  - Avoid friction and shearing  - Provide appropriate hygiene as needed including keeping skin clean and dry  - Evaluate need for skin moisturizer/barrier cream  - Collaborate with interdisciplinary team   - Patient/family teaching  - Consider wound care consult   Outcome: Progressing     Problem: MOBILITY - ADULT  Goal: Maintain or return to baseline ADL function  Description: INTERVENTIONS:  -  Assess patient's ability to carry out ADLs; assess patient's baseline for ADL function and identify physical deficits which impact ability to perform ADLs (bathing, care of mouth/teeth, toileting, grooming, dressing, etc )  - Assess/evaluate cause of self-care deficits   - Assess range of motion  - Assess patient's mobility; develop plan if impaired  - Assess patient's need for assistive devices and provide as appropriate  - Encourage maximum independence but intervene and supervise when necessary  - Involve family in performance of ADLs  - Assess for home care needs following discharge   - Consider OT consult to assist with ADL evaluation and planning for discharge  - Provide patient education as appropriate  Outcome: Progressing  Goal: Maintains/Returns to pre admission functional level  Description: INTERVENTIONS:  - Perform BMAT or MOVE assessment daily    - Set and communicate daily mobility goal to care team and patient/family/caregiver  - Collaborate with rehabilitation services on mobility goals if consulted  - Perform Range of Motion 2 times a day  - Reposition patient every 2 hours  - Dangle patient 2 times a day  - Stand patient 2 times a day  - Ambulate patient 2 times a day  - Out of bed to chair 2 times a day   - Out of bed for meals 2 times a day  - Out of bed for toileting  - Record patient progress and toleration of activity level   Outcome: Progressing     Problem: Nutrition/Hydration-ADULT  Goal: Nutrient/Hydration intake appropriate for improving, restoring or maintaining nutritional needs  Description: Monitor and assess patient's nutrition/hydration status for malnutrition  Collaborate with interdisciplinary team and initiate plan and interventions as ordered  Monitor patient's weight and dietary intake as ordered or per policy  Utilize nutrition screening tool and intervene as necessary  Determine patient's food preferences and provide high-protein, high-caloric foods as appropriate       INTERVENTIONS:  - Monitor oral intake, urinary output, labs, and treatment plans  - Assess nutrition and hydration status and recommend course of action  - Evaluate amount of meals eaten  - Assist patient with eating if necessary   - Allow adequate time for meals  - Recommend/ encourage appropriate diets, oral nutritional supplements, and vitamin/mineral supplements  - Order, calculate, and assess calorie counts as needed  - Recommend, monitor, and adjust tube feedings and TPN/PPN based on assessed needs  - Assess need for intravenous fluids  - Provide specific nutrition/hydration education as appropriate  - Include patient/family/caregiver in decisions related to nutrition  Outcome: Progressing

## 2022-12-31 NOTE — ASSESSMENT & PLAN NOTE
Lab Results   Component Value Date    HGBA1C 6 7 (H) 12/20/2022       Recent Labs     12/30/22  1123 12/30/22  1544 12/30/22  2116 12/31/22  0709   POCGLU 148* 167* 168* 125     Holding metformin   Howard Purdy was held due to possibility of it causing angioedema   - Rechallenge with Jardiance today  - Continue Accuchecks AC/HS with insulin sliding scale

## 2022-12-31 NOTE — ASSESSMENT & PLAN NOTE
Controlled,     Heart rate was not well controlled previously but has improved  · Cardizem drip was discontinued and transitioned to oral Cardizem  Also received doses of IV digoxin previously  Digoxin p o  discontinued today  · continue Coreg  Eliquis on hold due to oozing from biopsy site overnight    Sutures placed by surgery and can restart Eliquis tomm if no further bleeding   · Cardio input Coreg and Cardizem on discharge

## 2022-12-31 NOTE — PROGRESS NOTES
Yasir Vuong is a [de-identified] y o  male who is currently ordered Vancomycin IV with management by the Pharmacy Consult service  Relevant clinical data and objective / subjective history reviewed  Vancomycin Assessment:  Indication and Goal AUC/Trough: Soft tissue (goal -600, trough >10), -600, trough >10  Clinical Status: stable  Micro:   pending  Renal Function:  SCr: 0 74 mg/dL (recorded on 12/29/2022)  CrCl: 96 6 mL/min  Renal replacement: Not on dialysis  Days of Therapy: 5  Current Dose: 1000 mg IV q12h  Vancomycin Plan:  New Dosing: no change  Stable  Continue 1000mg IV q12h  Estimated AUC: 400-600 mcg*hr/mL  Estimated Trough: >10 mcg/mL  Next Level: 01/05/2023  Renal Function Monitoring: Daily BMP and UOP  Pharmacy will continue to follow closely for s/sx of nephrotoxicity, infusion reactions and appropriateness of therapy  BMP and CBC will be ordered per protocol  We will continue to follow the patient’s culture results and clinical progress daily      [address] , [address], Pharmacist

## 2023-01-01 NOTE — NURSING NOTE
IV removed and belongings sent with patient  Patient discharged home with family, accompanied by PCA with wheelchair

## 2023-01-03 ENCOUNTER — TRANSITIONAL CARE MANAGEMENT (OUTPATIENT)
Dept: INTERNAL MEDICINE CLINIC | Facility: CLINIC | Age: 83
End: 2023-01-03

## 2023-01-03 DIAGNOSIS — L03.90 CELLULITIS: ICD-10-CM

## 2023-01-03 DIAGNOSIS — U07.1 COVID-19: Primary | ICD-10-CM

## 2023-01-03 NOTE — UTILIZATION REVIEW
NOTIFICATION OF ADMISSION DISCHARGE   This is a Notification of Discharge from [address]  Please be advised that this patient has been discharge from our facility  Below you will find the admission and discharge date and time including the patient’s disposition  UTILIZATION REVIEW CONTACT:  Suhas Prather  Utilization   Network Utilization Review Department  Phone: [phone] x carefully listen to the prompts  All voicemails are confidential   Email: [email or web address]     ADMISSION INFORMATION  PRESENTATION DATE: 12/19/2022  1:01 AM  OBERVATION ADMISSION DATE:   INPATIENT ADMISSION DATE: 12/20/22 12:08 PM   DISCHARGE DATE: 12/31/2022  9:15 PM   DISPOSITION:Home/Self Care    IMPORTANT INFORMATION:  Send all requests for admission clinical reviews, approved or denied determinations and any other requests to dedicated fax number below belonging to the campus where the patient is receiving treatment   List of dedicated fax numbers:  [address] [place] [address] DENIALS (Administrative/Medical Necessity) [phone]   [address] (Maternity/NICU/Pediatrics) [phone]   [facility] [phone]   [facility] [phone]   [address] [phone]   [address] [facility] [phone]   90 [facility] [phone]   [address] [place], [place] [address] [phone]   [facility]  [phone]   [address] [facility] [phone]   [address] [facility] [address] [facility] [phone]

## 2023-01-04 ENCOUNTER — RA CDI HCC (OUTPATIENT)
Dept: OTHER | Facility: HOSPITAL | Age: 83
End: 2023-01-04

## 2023-01-04 LAB — RHODAMINE-AURAMINE STN SPEC: NORMAL

## 2023-01-04 NOTE — PROGRESS NOTES
[facility]  coding opportunities     I11 0     Chart Reviewed number of suggestions sent to Provider: 1     Patients Insurance     Medicare Insurance: [address] [place] [address]

## 2023-01-05 ENCOUNTER — CONSULT (OUTPATIENT)
Dept: DERMATOLOGY | Facility: CLINIC | Age: 83
End: 2023-01-05

## 2023-01-05 ENCOUNTER — OFFICE VISIT (OUTPATIENT)
Dept: INTERNAL MEDICINE CLINIC | Facility: CLINIC | Age: 83
End: 2023-01-05

## 2023-01-05 VITALS
HEIGHT: 71 IN | TEMPERATURE: 97.8 F | OXYGEN SATURATION: 96 % | WEIGHT: 220 LBS | SYSTOLIC BLOOD PRESSURE: 106 MMHG | BODY MASS INDEX: 30.8 KG/M2 | DIASTOLIC BLOOD PRESSURE: 56 MMHG | HEART RATE: 87 BPM | RESPIRATION RATE: 17 BRPM

## 2023-01-05 VITALS — TEMPERATURE: 98.7 F | BODY MASS INDEX: 30.94 KG/M2 | HEIGHT: 71 IN | WEIGHT: 221 LBS

## 2023-01-05 DIAGNOSIS — I25.10 CORONARY ARTERY DISEASE INVOLVING NATIVE CORONARY ARTERY OF NATIVE HEART WITHOUT ANGINA PECTORIS: ICD-10-CM

## 2023-01-05 DIAGNOSIS — T78.3XXD ANGIOEDEMA, SUBSEQUENT ENCOUNTER: ICD-10-CM

## 2023-01-05 DIAGNOSIS — I50.32 CHRONIC DIASTOLIC HEART FAILURE (HCC): ICD-10-CM

## 2023-01-05 DIAGNOSIS — E11.42 DIABETIC POLYNEUROPATHY ASSOCIATED WITH TYPE 2 DIABETES MELLITUS (HCC): ICD-10-CM

## 2023-01-05 DIAGNOSIS — F10.20 ALCOHOL DEPENDENCE, UNCOMPLICATED (HCC): ICD-10-CM

## 2023-01-05 DIAGNOSIS — L03.90 CELLULITIS: ICD-10-CM

## 2023-01-05 DIAGNOSIS — C34.90 MALIGNANT NEOPLASM OF UNSPECIFIED PART OF UNSPECIFIED BRONCHUS OR LUNG (HCC): ICD-10-CM

## 2023-01-05 DIAGNOSIS — J44.9 CHRONIC OBSTRUCTIVE PULMONARY DISEASE, UNSPECIFIED COPD TYPE (HCC): ICD-10-CM

## 2023-01-05 DIAGNOSIS — L03.313 CELLULITIS OF CHEST WALL: ICD-10-CM

## 2023-01-05 DIAGNOSIS — I48.19 OTHER PERSISTENT ATRIAL FIBRILLATION (HCC): ICD-10-CM

## 2023-01-05 DIAGNOSIS — E11.42 TYPE 2 DIABETES MELLITUS WITH DIABETIC POLYNEUROPATHY, WITHOUT LONG-TERM CURRENT USE OF INSULIN (HCC): ICD-10-CM

## 2023-01-05 DIAGNOSIS — I50.32 CHRONIC DIASTOLIC (CONGESTIVE) HEART FAILURE (HCC): ICD-10-CM

## 2023-01-05 DIAGNOSIS — A41.9 SEPTIC SHOCK (HCC): Primary | ICD-10-CM

## 2023-01-05 DIAGNOSIS — C34.92 SQUAMOUS CELL CARCINOMA OF LEFT LUNG (HCC): Chronic | ICD-10-CM

## 2023-01-05 DIAGNOSIS — R05.3 CHRONIC COUGH: ICD-10-CM

## 2023-01-05 DIAGNOSIS — R65.21 SEPTIC SHOCK (HCC): Primary | ICD-10-CM

## 2023-01-05 DIAGNOSIS — F33.9 DEPRESSION, RECURRENT (HCC): ICD-10-CM

## 2023-01-05 PROBLEM — N17.0 ACUTE KIDNEY INJURY (AKI) WITH ACUTE TUBULAR NECROSIS (ATN) (HCC): Status: ACTIVE | Noted: 2023-01-05

## 2023-01-05 RX ORDER — GUAIFENESIN AND CODEINE PHOSPHATE 100; 10 MG/5ML; MG/5ML
5 SOLUTION ORAL 3 TIMES DAILY PRN
Qty: 118 ML | Refills: 0 | Status: SHIPPED | OUTPATIENT
Start: 2023-01-05

## 2023-01-05 RX ORDER — DOXYCYCLINE 100 MG/1
100 TABLET ORAL 2 TIMES DAILY
Qty: 14 TABLET | Refills: 0 | Status: SHIPPED | OUTPATIENT
Start: 2023-01-05 | End: 2023-01-12

## 2023-01-05 NOTE — ASSESSMENT & PLAN NOTE
Slowly improving, likely due to septic shock      • Cr 1 9  > 1 79 > 0 97  • Continue IVF  • Avoid nephrotoxic's

## 2023-01-05 NOTE — ASSESSMENT & PLAN NOTE
Patient hospital record reviewed met criteria for septic shock lactic acid was normal on discharge discharge on doxycycline following with a course of treatment for the cellulitis discussed at length with the patient  Likely due to right lateral chest wall cellulitis, tachycardia- possibly secondary to underlying A  fib  • BCx-negative so far  Wound culture - staph  • Off Rocephin  Discontinued IV Ancef    Currently on Vanco added as noted above  • ID following, recs appreciated

## 2023-01-05 NOTE — PATIENT INSTRUCTIONS
Hypertension( High Blood Pressure ):    Continue Home BP check daily and bring log, if you are not checking consider checking daily    Take your Blood Pressure medicine as advised    Do not take your Blood pressure medicine if systolic Blood Pressure (top number) is less than 100 or heart rate less than 60  Notify you physician  Diabetes    Check your fingerstick Accu-Chek once a day  Please check your fingerstick Accu-Chek different time of the day either at 7:00 a m  or 11:00 a m  for for p m  4 9:00 p m  Tripp Yost Follow Diabetic 1800 calorie diet for diabetes as advised  If you would sugar less than 80 or more than 300 to please call us on next visit is day  If the sugar is less than 80 follow-up hypoglycemia instructions as advised  Take your diabetic medicine as advised  Cholesterol    Eat low cholesterol diet    Continue taking your cholesterol medicine as advised    Call if any side effects    Lipid Profile and LFT prior to next visit or as advised  ( You should Get periodically to monitor liver side effects)    KNOW YOUR DIABETIC GOAL( HBA1C AND SUGAR), BLOOD PRESSURE TARGET NUMBER AND CHOLESTEROL( LDL, HDL AND TRIGLYCERIDE)

## 2023-01-05 NOTE — PATIENT INSTRUCTIONS
CELLULITIS    Assessment and Plan:  Based on a thorough discussion of this condition and the management approach to it (including a comprehensive discussion of the known risks, side effects and potential benefits of treatment), the patient (family) agrees to implement the following specific plan:  Recommend warm compress to the right armpit  Doxycycline 100 mg 1 tablet by mouth 2 times daily for 7 days  Follow up next week at the [facility]  Cellulitis is a common and potentially serious infection caused by bacteria  The bacteria infect the deep layers of skin and tissue beneath the skin  The first sign of cellulitis is usually red and swollen skin  When you touch the infected area, it often feels warm and tender  This infection can show up anywhere on the skin  Adults often get it in a lower leg  In children, cellulitis tends to appear on the face or neck  Without treatment, the infection can spread quickly  The bacteria may travel to lymph nodes and into the bloodstream  This can lead to a blood infection or permanently damage lymph vessels, which are part of your immune system  Other complications can also develop  Cellulitis affects people of all ages and races  Some risk factors for cellulitis include:  Cracked skin in feet, eg due to athlete's foot, tinea pedis or cracked heels, allows bacteria to enter the body  Venous disease, eg gravitational eczema, leg ulceration, and/or lymphedema  Current or prior injury   Immunodeficiency and immunosuppressive medications  Diabetes, obesity, pregnancy, alcoholism  Kidney and liver disease    What causes cellulitis? The most common bacteria causing cellulitis are Streptococcus pyogenes (two thirds of cases) and Staphylococcus aureus (one third)   Rare causes of cellulitis include:   Pseudomonas aeruginosa, usually in a puncture wound of foot or hand  Haemophilus influenzae, in children with facial cellulitis  Anaerobes, Eikenella, Streptococcus viridans, due to human bite  Pasteurella multocida, due to cat or dog bite  Vibrio vulnificus, due to salt water exposure, eg coral injury  Aeromonas hydrophila from fresh or salt water exposure, eg following leech bites  Erysipelothrix (erysipeloid), in butchers     What are the symptoms of cellulitis? Cellulitis can affect any site, most often a limb    It is usually one-sided; two-sided disease is more often due to another condition such as venous stasis or contact dermatitis in the legs  It can occur by itself or complicate an underlying skin condition or wound  The first sign of the illness is often feeling unwell, with fever, chills and shakes (rigors)  This is due to bacteria in the blood stream (bacteremia)  Systemic symptoms are soon followed by development of a localized area of painful, red, swollen skin  Other signs include:    Dimpled skin (peau d'orange)  Warmth  Blistering  Erosions and ulceration  Abscess formation  Purpura: petechiae, ecchymoses, or hemorrhagic blisters  These may appear as purple spots under the skin    Cellulitis may be associated with lymphangiitis and lymphadenitis, which are due to bacteria within lymph vessels and local lymph glands  A red line tracks from the site of infection to nearby tender, swollen lymph glands  How do we diagnose cellulitis? Cellulitis can usually be diagnosed with a good history and skin exam  It is important to recall recent injuries, medical conditions, and all medications you take  Blood tests can show increased white blood cell counts and other inflammatory mediators  Imaging can be performed to check for heart and lung involvement  Your doctor may be able to take a wound culture to determine what bacteria are present  In severe cases, MRI can show whether or not the infection has progressed to necrotizing fasciitis, which is a surgical emergency that requires removal of rapidly spreading dead tissue       How do we treat cellulitis? Cellulitis is potentially serious and may require a multidisciplinary team  The patient should rest and elevate the affected limb  The edge of the involved area of swelling should be marked to monitor progression or regression of the infection  If there are no signs of systemic illness or extensive cellulitis, patients can be treated with oral antibiotics at home, for a minimum of 5-10 days  In some cases, antibiotics are continued until all signs of infection have cleared (redness, pain and swelling), sometimes for several months  Treatment should also include:  Analgesia to reduce pain eg NSAIDs, Tylenol  Adequate water/fluid intake  Management of co-existing skin conditions like venous dermatitis or tinea pedis    More severe cellulitis and systemic symptoms should be treated with fluids, intravenous antibiotics and oxygen  The choice of antibiotics depends on local protocols based on prevalent organisms and their resistance patterns and may be altered according to culture results  Penicillin-based antibiotics are often chosen (eg penicillin G or flucloxacillin)  Amoxicillin and clavulanic acid provide broad-spectrum cover if unusual bacteria are suspected  Cephalosporins are also commonly used (eg ceftriaxone, cefotaxime or cefazolin)  Clindamycin, sulfamethoxazole/trimethoprim, doxycycline and vancomycin are used in patients with penicillin or cephalosporin allergy, or where infection with methicillin-resistant Staphylococcus aureus is suspected    Broad-spectrum antibiotics may also include linezolid, ceftaroline, or daptomycin    Sometimes oral probenecid is added to maintain antibiotic levels in the blood  Treatment may be switched to oral antibiotics when fever has settled and cellulitis has improved  Multidisciplinary care    An internal medicine physician is consulted to assess and manage sepsis    The infectious diseases service can advise on microbiology and choice of antibiotic  A surgeon is called to drain an abscess, debride necrotic tissue, and relieve compression symptoms, eg compartment syndrome  An ophthalmologist should be involved in case of orbital cellulitis  A dermatologist may be called to confirm the diagnosis of cellulitis or suggest alternative diagnoses  Specialist nurses may advise on dressings and bandaging  What is the management of recurrent cellulitis? Patients with recurrent cellulitis should:  Avoid trauma, wear long sleeves and pants in high risk activities, eg gardening  Keep skin clean and well moisturized, with nails well-tended  Avoid having blood tests taken from the affected limb  Treat fungal infections of hands and feet early  Keep swollen limbs elevated during rest periods to aid lymphatic circulation  Those with chronic lymphedema may benefit from compression garments  Patients with 2 or more episodes of cellulitis may benefit from chronic suppressive antibiotic treatment with low-dose penicillin V or erythromycin, for one to two years  After successful treatment, the skin may flake or peel off as it heals  This can be itchy

## 2023-01-05 NOTE — ASSESSMENT & PLAN NOTE
The course of the disease symptoms and the treatment during the hospitalization reviewed as follows now everything has resolved and the suspected medication Mysoline Lipitor losartan has been discontinued new list medication reconciliation done  patient on mysoline, atorvastatin and losartan which could cause angioedema/ urticaria- both held   - rash is mostly on trunk and upper thigh and is very pruritic   - first incidence   - also has swelling lips but no swelling of tongue  Denies chest pain or dizziness or sob or palpitations or findings to suggest bronchospasm   - clinically and hemodynamically stable   - given a dose of 125 mng solumedrol, benadryl 25 mg IV and pepcid 20 mg IV   - patient 's pruritis returned once benadryl worse off     PLAN:   Reordered benadryl, Pepcid, solumedrol ordered for later today   calamine and topical hydrocortisone lotion   Montelukast started

## 2023-01-05 NOTE — ASSESSMENT & PLAN NOTE
Heart rate was not well controlled previously but has improved  • Cardizem drip was discontinued and transitioned to oral Cardizem  Also received doses of IV digoxin previously  Digoxin p o  discontinued today  • continue Coreg  Eliquis on hold due to oozing from biopsy site overnight    Sutures placed by surgery and can restart Eliquis tomm if no further bleeding   • Cardio input Coreg and Cardizem on discharge

## 2023-01-05 NOTE — PROGRESS NOTES
[facility]     Patient Name: Wm Spears  Encounter Date: 1/5/23    Have you been cared for by a [place] [address] Dermatologist in the last 3 years and, if so, which description applies to you? NO  I am considered a "new" patient and must complete all patient intake questions  I am MALE/not capable of bearing children  REVIEW OF SYSTEMS:  Have you recently had or currently have any of the following? · Recent fever or chills? No  · Any non-healing wound? No   PAST MEDICAL HISTORY:  Have you personally ever had or currently have any of the following? If "YES," then please provide more detail  · Skin cancer (such as Melanoma, Basal Cell Carcinoma, Squamous Cell Carcinoma? No  · Tuberculosis, HIV/AIDS, Hepatitis B or C: No  · Systemic Immunosuppression such as Diabetes, Biologic or Immunotherapy, Chemotherapy, Organ Transplantation, Bone Marrow Transplantation YES, diabetes  · Radiation Treatment YES, prostate and lung cancer   FAMILY HISTORY:  Any "first degree relatives" (parent, brother, sister, or child) with the following? • Skin Cancer, Pancreatic or Other Cancer? YES, brother prostate cancer   PATIENT EXPERIENCE:    • Do you want the Dermatologist to perform a COMPLETE skin exam today including a clinical examination under the "bra and underwear" areas? NO  • If necessary, do we have your permission to call and leave a detailed message on your Preferred Phone number that includes your specific medical information?   Yes      No Known Allergies   Current Outpatient Medications:   •  acetaminophen (TYLENOL) 325 mg tablet, Take 2 tablets (650 mg total) by mouth every 6 (six) hours as needed for mild pain, headaches or fever, Disp: 30 tablet, Rfl: 0  •  albuterol (2 5 mg/3 mL) 0 083 % nebulizer solution, Take 2 5 mg by nebulization As needed per patient's wife , Disp: , Rfl:   •  apixaban (Eliquis) 5 mg, Take 1 tablet (5 mg total) by mouth 2 (two) times a day, Disp: 60 tablet, Rfl: 5  •  atorvastatin (LIPITOR) 10 mg tablet, Take 1 tablet (10 mg total) by mouth daily with dinner Do not start before January 1, 2023 , Disp: 30 tablet, Rfl: 1  •  atorvastatin (LIPITOR) 10 mg tablet, Take 1 tablet (10 mg total) by mouth daily, Disp: 30 tablet, Rfl: 1  •  calamine-zinc oxide 8-8 %, Apply topically 4 (four) times a day, Disp: 118 mL, Rfl: 0  •  carvedilol (COREG) 25 mg tablet, Take 1 tablet (25 mg total) by mouth 2 (two) times a day with meals, Disp: 60 tablet, Rfl: 1  •  diltiazem (CARDIZEM SR) 120 mg 12 hr capsule, Take 1 capsule (120 mg total) by mouth every 12 (twelve) hours, Disp: 60 capsule, Rfl: 1  •  folic acid (FOLVITE) 1 mg tablet, Take 1 tablet (1 mg total) by mouth daily, Disp: 30 tablet, Rfl: 0  •  guaifenesin-codeine (GUAIFENESIN AC) 100-10 MG/5ML liquid, Take 5 mL by mouth 3 (three) times a day as needed for cough, Disp: 118 mL, Rfl: 0  •  hydrALAZINE (APRESOLINE) 25 mg tablet, Take 1 tablet (25 mg total) by mouth every 12 (twelve) hours, Disp: 60 tablet, Rfl: 1  •  hydrALAZINE (APRESOLINE) 25 mg tablet, Take 1 tablet (25 mg total) by mouth every 12 (twelve) hours, Disp: 60 tablet, Rfl: 1  •  hydrocortisone 2 5 % cream, Apply topically 2 (two) times a day Apply twice a day affected area the trunk, Disp: 20 g, Rfl: 2  •  hydrOXYzine HCL (ATARAX) 25 mg tablet, Take 1 tablet (25 mg total) by mouth every 6 (six) hours as needed for itching, allergies or anxiety, Disp: 24 tablet, Rfl: 0  •  hydrOXYzine HCL (ATARAX) 25 mg tablet, Take 1 tablet (25 mg total) by mouth every 6 (six) hours as needed for itching, allergies or anxiety, Disp: 24 tablet, Rfl: 0  •  metFORMIN (GLUCOPHAGE) 500 mg tablet, Take 1 tablet (500 mg total) by mouth 2 (two) times a day, Disp: 180 tablet, Rfl: 1  •  Multiple Vitamin (MULTI-VITAMIN DAILY PO), Take by mouth daily  , Disp: , Rfl:   •  omeprazole (PriLOSEC) 20 mg delayed release capsule, Take 1 capsule (20 mg total) by mouth daily, Disp: 90 capsule, Rfl: 0  • primidone (MYSOLINE) 50 mg tablet, Take 1 tablet (50 mg total) by mouth every 12 (twelve) hours, Disp: 30 tablet, Rfl: 1  •  spironolactone (ALDACTONE) 25 mg tablet, Take 1 tablet (25 mg total) by mouth daily Do not start before December 28, 2022 , Disp: 30 tablet, Rfl: 1  •  thiamine 100 MG tablet, Take 1 tablet (100 mg total) by mouth daily, Disp: 30 tablet, Rfl: 0  •  Trelegy Ellipta 100-62 5-25 MCG/INH inhaler, INHALE ONE PUFF DAILY; RINSE MOUTH AFTER USE, Disp: 60 each, Rfl: 5          • Whom besides the patient is providing clinical information about today's encounter?   o NO ADDITIONAL HISTORIAN (patient alone provided history)  o Patient here for cellulitis follow up patient was seen in the ED 12/19/22  Biopsy taken  Administered IV Cefazolin  Given Vancomycin  And Doxycyline 100 mg x 2 for 3 days  Patient noted improvements  Some consistent symptoms under the right arm noted by patient  Physical Exam and Assessment/Plan by Diagnosis:    CELLULITIS    Physical Exam:  • Anatomic Location Affected:  Right Axilla   • Morphological Description:  10 cm inflamed ill define plaques with serosanguineous drainage  • Pertinent Positives:  • Pertinent Negatives: Additional History of Present Condition:  Patient here for cellulitis follow up patient was seen in the ED 12/19/22  Biopsy taken  Administered IV Cefazolin  Given Vancomycin  And Doxycyline 100 mg x 2 for 3 days  Patient noted improvements  Some consistent symptoms under the right arm noted by patient  Assessment and Plan:  Based on a thorough discussion of this condition and the management approach to it (including a comprehensive discussion of the known risks, side effects and potential benefits of treatment), the patient (family) agrees to implement the following specific plan:  • Recommend warm compress to the right armpit  • Doxycycline 100 mg 1 tablet by mouth 2 times daily for 7 days    • Follow up next week at the [facility] clinic  Cellulitis is a common and potentially serious infection caused by bacteria  The bacteria infect the deep layers of skin and tissue beneath the skin  The first sign of cellulitis is usually red and swollen skin  When you touch the infected area, it often feels warm and tender  This infection can show up anywhere on the skin  Adults often get it in a lower leg  In children, cellulitis tends to appear on the face or neck  Without treatment, the infection can spread quickly  The bacteria may travel to lymph nodes and into the bloodstream  This can lead to a blood infection or permanently damage lymph vessels, which are part of your immune system  Other complications can also develop  Cellulitis affects people of all ages and races  Some risk factors for cellulitis include:  • Cracked skin in feet, eg due to athlete's foot, tinea pedis or cracked heels, allows bacteria to enter the body  • Venous disease, eg gravitational eczema, leg ulceration, and/or lymphedema  • Current or prior injury   • Immunodeficiency and immunosuppressive medications  • Diabetes, obesity, pregnancy, alcoholism  • Kidney and liver disease    What causes cellulitis? The most common bacteria causing cellulitis are Streptococcus pyogenes (two thirds of cases) and Staphylococcus aureus (one third)  Rare causes of cellulitis include:   • Pseudomonas aeruginosa, usually in a puncture wound of foot or hand  • Haemophilus influenzae, in children with facial cellulitis  • Anaerobes, Eikenella, Streptococcus viridans, due to human bite  • Pasteurella multocida, due to cat or dog bite  • Vibrio vulnificus, due to salt water exposure, eg coral injury  • Aeromonas hydrophila from fresh or salt water exposure, eg following leech bites  • Erysipelothrix (erysipeloid), in butchers     What are the symptoms of cellulitis?     Cellulitis can affect any site, most often a limb    • It is usually one-sided; two-sided disease is more often due to another condition such as venous stasis or contact dermatitis in the legs  • It can occur by itself or complicate an underlying skin condition or wound  The first sign of the illness is often feeling unwell, with fever, chills and shakes (rigors)  This is due to bacteria in the blood stream (bacteremia)  Systemic symptoms are soon followed by development of a localized area of painful, red, swollen skin  Other signs include:    • Dimpled skin (peau d'orange)  • Warmth  • Blistering  • Erosions and ulceration  • Abscess formation  • Purpura: petechiae, ecchymoses, or hemorrhagic blisters  These may appear as purple spots under the skin    Cellulitis may be associated with lymphangiitis and lymphadenitis, which are due to bacteria within lymph vessels and local lymph glands  A red line tracks from the site of infection to nearby tender, swollen lymph glands  How do we diagnose cellulitis? Cellulitis can usually be diagnosed with a good history and skin exam  It is important to recall recent injuries, medical conditions, and all medications you take  Blood tests can show increased white blood cell counts and other inflammatory mediators  Imaging can be performed to check for heart and lung involvement  Your doctor may be able to take a wound culture to determine what bacteria are present  In severe cases, MRI can show whether or not the infection has progressed to necrotizing fasciitis, which is a surgical emergency that requires removal of rapidly spreading dead tissue  How do we treat cellulitis? Cellulitis is potentially serious and may require a multidisciplinary team  The patient should rest and elevate the affected limb  The edge of the involved area of swelling should be marked to monitor progression or regression of the infection  If there are no signs of systemic illness or extensive cellulitis, patients can be treated with oral antibiotics at home, for a minimum of 5-10 days   In some cases, antibiotics are continued until all signs of infection have cleared (redness, pain and swelling), sometimes for several months  Treatment should also include:  • Analgesia to reduce pain eg NSAIDs, Tylenol  • Adequate water/fluid intake  • Management of co-existing skin conditions like venous dermatitis or tinea pedis    More severe cellulitis and systemic symptoms should be treated with fluids, intravenous antibiotics and oxygen  The choice of antibiotics depends on local protocols based on prevalent organisms and their resistance patterns and may be altered according to culture results  • Penicillin-based antibiotics are often chosen (eg penicillin G or flucloxacillin)  • Amoxicillin and clavulanic acid provide broad-spectrum cover if unusual bacteria are suspected  • Cephalosporins are also commonly used (eg ceftriaxone, cefotaxime or cefazolin)  • Clindamycin, sulfamethoxazole/trimethoprim, doxycycline and vancomycin are used in patients with penicillin or cephalosporin allergy, or where infection with methicillin-resistant Staphylococcus aureus is suspected    • Broad-spectrum antibiotics may also include linezolid, ceftaroline, or daptomycin    Sometimes oral probenecid is added to maintain antibiotic levels in the blood  Treatment may be switched to oral antibiotics when fever has settled and cellulitis has improved  Multidisciplinary care    • An internal medicine physician is consulted to assess and manage sepsis  • The infectious diseases service can advise on microbiology and choice of antibiotic  • A surgeon is called to drain an abscess, debride necrotic tissue, and relieve compression symptoms, eg compartment syndrome  • An ophthalmologist should be involved in case of orbital cellulitis  • A dermatologist may be called to confirm the diagnosis of cellulitis or suggest alternative diagnoses  • Specialist nurses may advise on dressings and bandaging       What is the management of recurrent cellulitis? Patients with recurrent cellulitis should:  • Avoid trauma, wear long sleeves and pants in high risk activities, eg gardening  • Keep skin clean and well moisturized, with nails well-tended  • Avoid having blood tests taken from the affected limb  • Treat fungal infections of hands and feet early  • Keep swollen limbs elevated during rest periods to aid lymphatic circulation  Those with chronic lymphedema may benefit from compression garments  Patients with 2 or more episodes of cellulitis may benefit from chronic suppressive antibiotic treatment with low-dose penicillin V or erythromycin, for one to two years  After successful treatment, the skin may flake or peel off as it heals  This can be itchy      Attending note:  MSSA cellultis, ID and vanco then dc/d on doxy, renewed 1 week because still draining and symptomatic f/u up in another week  Scribe Attestation    I,:  Nahun Braxton am acting as a scribe while in the presence of the attending physician :       I,:  Kat Perez MD personally performed the services described in this documentation    as scribed in my presence :

## 2023-01-05 NOTE — ASSESSMENT & PLAN NOTE
Patient initially treated with IV vancomycin and then was switched to doxycycline improving to be seen by dermatology today for follow-up following treatment plan reviewed from the hospital as follows  Bedside aspiration was done previously and bedside I&D done on 12/24  • Discontinued IV cefazolin  Vancomycin started 12/27  • Likely transition to Doxy tomorrow per ID  • WBC count trending up  • Imaging negative for abscess  • Continue dressing changes per surgery  status post skin biopsy by surgery per ID recommendation  • D/W dermatology on call who reviewed pictures and per dermatology could be malignancy related or Paget's disease, less likely Sweet syndrome    Dermatology recommended obtaining some tissue for tissue culture however per surgery too late to get culture from tissue   • Follow-up with surgery after discharge to discuss biopsy results and possible follow-up with dermatology aced on biopsy results

## 2023-01-06 ENCOUNTER — TELEPHONE (OUTPATIENT)
Dept: HEMATOLOGY ONCOLOGY | Facility: MEDICAL CENTER | Age: 83
End: 2023-01-06

## 2023-01-07 NOTE — ASSESSMENT & PLAN NOTE
Wt Readings from Last 3 Encounters:   01/05/23 100 kg (221 lb)   01/05/23 99 8 kg (220 lb)   12/31/22 105 kg (231 lb 11 3 oz)     Patient hospital was treated with IV Lasix and was switched to Aldactone Lasix with low-salt diet fluid restriction Daily weight monitoring for now CHF seems to be controlled

## 2023-01-07 NOTE — ASSESSMENT & PLAN NOTE
Lab Results   Component Value Date    HGBA1C 6 7 (H) 12/20/2022   Diabetes seems to be controlled on the basis of A1c due to the angioedema initially metformin and Albaro Herrera was held but now continued diabetic diet counseling hypoglycemia protocol in place blood sugar monitoring at home diabetic foot exam normal and yearly dilated eye exam

## 2023-01-07 NOTE — ASSESSMENT & PLAN NOTE
The CT scan PET scan reviewed from the last visit with me patient feels better no difficulty breathing awaiting to be evaluated by oncologist awaiting to be reevaluated and awaiting for the repeat CAT scan for the follow-up

## 2023-01-07 NOTE — PROGRESS NOTES
Dr Jessie Beltran Office Visit Note  23     Parish Jimenez [de-identified] y o  male MRN: [id number]  : 1940    Assessment:     1  Cellulitis of chest wall  Assessment & Plan:    Patient initially treated with IV vancomycin and then was switched to doxycycline improving to be seen by dermatology today for follow-up following treatment plan reviewed from the hospital as follows  Bedside aspiration was done previously and bedside I&D done on   • Discontinued IV cefazolin  Vancomycin started   • Likely transition to Doxy tomorrow per ID  • WBC count trending up  • Imaging negative for abscess  • Continue dressing changes per surgery  status post skin biopsy by surgery per ID recommendation  • D/W dermatology on call who reviewed pictures and per dermatology could be malignancy related or Paget's disease, less likely Sweet syndrome  Dermatology recommended obtaining some tissue for tissue culture however per surgery too late to get culture from tissue   • Follow-up with surgery after discharge to discuss biopsy results and possible follow-up with dermatology aced on biopsy results      2  Chronic obstructive pulmonary disease, unspecified COPD type ([facility] )    3  Malignant neoplasm of unspecified part of unspecified bronchus or lung (HCC)  -     guaifenesin-codeine (GUAIFENESIN AC) 100-10 MG/5ML liquid; Take 5 mL by mouth 3 (three) times a day as needed for cough (Patient not taking: Reported on 2023)    4  Chronic diastolic (congestive) heart failure ([facility] )    5  Alcohol dependence, uncomplicated ([facility] )    6  Depression, recurrent ([facility] )    7  Type 2 diabetes mellitus with diabetic polyneuropathy, without long-term current use of insulin ([facility] )    8  Other persistent atrial fibrillation ([facility] )  Assessment & Plan:  Heart rate was not well controlled previously but has improved  • Cardizem drip was discontinued and transitioned to oral Cardizem  Also received doses of IV digoxin previously    Digoxin p o  discontinued today  • continue Coreg  Eliquis on hold due to oozing from biopsy site overnight  Sutures placed by surgery and can restart Eliquis tomm if no further bleeding   • Cardio input Coreg and Cardizem on discharge      9  Chronic cough  -     guaifenesin-codeine (GUAIFENESIN AC) 100-10 MG/5ML liquid; Take 5 mL by mouth 3 (three) times a day as needed for cough (Patient not taking: Reported on 1/7/2023)    10  Coronary artery disease involving native coronary artery of native heart without angina pectoris  Assessment & Plan:  Nuclear stress test November 2021 was normal continue statin aspirin follow-up with cardiology      11  Septic shock ([facility] )  Assessment & Plan:  Patient hospital record reviewed met criteria for septic shock lactic acid was normal on discharge discharge on doxycycline following with a course of treatment for the cellulitis discussed at length with the patient  Likely due to right lateral chest wall cellulitis, tachycardia- possibly secondary to underlying A  fib  • BCx-negative so far  Wound culture - staph  • Off Rocephin  Discontinued IV Ancef  Currently on Vanco added as noted above  • ID following, recs appreciated      12  Angioedema, subsequent encounter  Assessment & Plan:   The course of the disease symptoms and the treatment during the hospitalization reviewed as follows now everything has resolved and the suspected medication Mysoline Lipitor losartan has been discontinued new list medication reconciliation done  patient on mysoline, atorvastatin and losartan which could cause angioedema/ urticaria- both held   - rash is mostly on trunk and upper thigh and is very pruritic   - first incidence   - also has swelling lips but no swelling of tongue   Denies chest pain or dizziness or sob or palpitations or findings to suggest bronchospasm   - clinically and hemodynamically stable   - given a dose of 125 mng solumedrol, benadryl 25 mg IV and pepcid 20 mg IV   - patient 's pruritis returned once benadryl worse off  PLAN:   Reordered benadryl, Pepcid, solumedrol ordered for later today   calamine and topical hydrocortisone lotion   Montelukast started           Discussion Summary and Plan: Today's care plan and medications were reviewed with patient in detail and all their questions answered to their satisfaction  Chief Complaint   Patient presents with   • Transition of Care Management      Subjective:  TCM Call     Date and time call was made  1/3/2023  1:08 PM    Hospital care reviewed  Records reviewed    Patient was hospitialized at  [address] [place] [address]    Date of Admission  12/19/22    Date of discharge  12/31/22    Diagnosis  severe sepsis    Disposition  Home    Were the patients medications reviewed and updated  Yes    Current Symptoms  None      TCM Call     Post hospital issues  None    Should patient be enrolled in anticoag monitoring? No    Scheduled for follow up? Yes    Patients specialists  Cardiologist; Other (comment)    Cardiologist name  Mona Krueger    Other specialists contcat #  [facility]    Did you obtain your prescribed medications  Yes    Do you need help managing your prescriptions or medications  No    Is transportation to your appointment needed  No    I have advised the patient to call PCP with any new or worsening symptoms    [address] or [facility] other    Support System  Spouse; Children    The type of support provided  Emotional; Physical    Do you have social support  Yes, as much as I need    Are you recieving any outpatient services  Yes    What type of services  [facility]    Are you recieving home care services  Yes    Types of home care services  Home PT;  Other (comment)    Comment  PT and OT    Are you using any community resources  No    Current waiver services  No    Have you fallen in the last 12 months  No    Interperter language line needed  No    Counseling  Family    Counseling topics  Activities of daily living; Importance of RX   compliance; Home health agency benefits   Patient treated for multiple conditions as listed under visit diagnoses mainly septic shock acute kidney injury angioedema urticaria cellulitis chest wall atrial fibrillation with rapid rate overall controlled and stable was discharged on doxycycline the following the hospital course reviewed and discussed with the patient to be followed by dermatology oncology in the cardiology  The hospital course reviewed medication reconciliation done discussed with the patient to be compliant to be seen by dermatologist today and reviewed as follows  Kaye Pineda is a [de-identified] y o  male patient who originally presented to the hospital on 12/19/2022 due to angioedema and itchy rash  Patient angioedema resolved, stop any possible included diabetic medications Jardiance  Patient also had axillary cellulitis on the right side, status post I&D with no drainage and punch biopsy to same location  Patient was treated with several IV antibiotics and will be discharged with 3 more days of p o  antibiotics per infectious disease recommendations  Patient will also have 2 following up with dermatology regarding skin infection and angioedema and you Uticaria  Patient also has a history of A  fib and was seen by cardiology with changes to medication and will be discharged with Cardizem and Coreg and continue home Eliquis  All other chronic conditions managed and patient will have the follow-up with PCP, patient counseled to keep a diary to identify triggers for potential angioedema    Patient has a history of underlying malignancy and needs to follow-up with specialist       The following portions of the patient's history were reviewed and updated as appropriate: allergies, current medications, past family history, past medical history, past social history, past surgical history and problem list     Review of Systems   Constitutional: Positive for activity change and fatigue  Negative for appetite change, chills, diaphoresis, fever and unexpected weight change  HENT: Negative for congestion, dental problem, drooling, ear discharge, ear pain, facial swelling, hearing loss, mouth sores, nosebleeds, postnasal drip, rhinorrhea, sinus pressure, sneezing, sore throat, tinnitus, trouble swallowing and voice change  Eyes: Negative for photophobia, pain, discharge, redness, itching and visual disturbance  Respiratory: Positive for cough and shortness of breath  Negative for apnea, choking, chest tightness, wheezing and stridor  Cardiovascular: Positive for leg swelling  Negative for chest pain and palpitations  Gastrointestinal: Negative for abdominal distention, abdominal pain, anal bleeding, blood in stool, constipation, diarrhea, nausea, rectal pain and vomiting  Endocrine: Negative for cold intolerance, heat intolerance, polydipsia, polyphagia and polyuria  Genitourinary: Negative for decreased urine volume, difficulty urinating, dysuria, enuresis, flank pain, frequency, genital sores, hematuria and urgency  Musculoskeletal: Positive for arthralgias, back pain, gait problem and joint swelling  Negative for myalgias, neck pain and neck stiffness  Skin: Negative for color change, pallor, rash and wound  Allergic/Immunologic: Negative  Negative for environmental allergies, food allergies and immunocompromised state  Neurological: Positive for dizziness  Negative for tremors, seizures, syncope, facial asymmetry, speech difficulty, weakness, light-headedness, numbness and headaches  Psychiatric/Behavioral: Negative for agitation, behavioral problems, confusion, decreased concentration, dysphoric mood, hallucinations, self-injury, sleep disturbance and suicidal ideas  The patient is nervous/anxious  The patient is not hyperactive            Historical Information   Patient Active Problem List   Diagnosis   • Corns   • Pain in both feet   • Diabetic polyneuropathy associated with type 2 diabetes mellitus ([place] [address] )   • Onychomycosis   • Tinea pedis of both feet   • Lung mass   • Hyperlipidemia   • Squamous cell carcinoma of lung (HCC)   • Shortness of breath   • Daytime hypersomnolence   • Chronic diastolic heart failure (HCC)   • SYLVESTER (obstructive sleep apnea)   • Prostate cancer (HCC)   • GERD (gastroesophageal reflux disease)   • CAD (coronary artery disease)   • Other persistent atrial fibrillation (HCC)   • Alcohol dependence ([place] [address] )   • Acute respiratory failure with hypoxemia (HCC)   • Depression, recurrent (HCC)   • COVID-19   • Angioedema   • Septic shock (HCC)   • Cellulitis   • Cellulitis of chest wall   • Acute kidney injury (BUDDY) with acute tubular necrosis (ATN) ([facility])   • Chronic obstructive pulmonary disease, unspecified COPD type ([place] [address] )     Past Medical History:   Diagnosis Date   • Abdominal pain    • Anxiety    • Arthritis    • Asthma    • Atrial fibrillation ([facility])    • Back pain    • Bleeding ulcer    • Bronchitis    • Cancer ([place] [address] )     prostate 2011- radiation   • Cardiac disease     cardiac stent x1   • Cataract     starting   • Chronic diastolic (congestive) heart failure (HCC)    • Diabetes mellitus ([place] [address] )     boarderline diabetic    • GERD (gastroesophageal reflux disease)    • History of radiation therapy 2010    Prostate seeds (brachytherapy) and EBRT   • Hyperlipidemia    • Hypertension    • 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, bilateral    • Low back pain    • Lung cancer ([place] [address] )    • Lung mass    • Myocardial infarction ([place] [address] )     mild 1999   • Obesity    • Prostate cancer ([place] [address] )    • Shortness of breath    • Sleep apnea     sleep study 11/22   • Wears dentures     full set   • Wears glasses      Past Surgical History:   Procedure Laterality Date   • ABDOMINAL HERNIA REPAIR     • ABDOMINAL SURGERY      bleeding ulcer, cyst removed from abd   • COLONOSCOPY     • CORONARY ANGIOPLASTY WITH STENT PLACEMENT  1999    x1    • ESOPHAGOGASTRODUODENOSCOPY     • IR BIOPSY LUNG 10/05/2021   • IR THORACENTESIS  2021   • KNEE SURGERY Left    • [place] [address] INCL FLUOR GDNCE DX W/CELL WASHG SPX N/A 2021    Procedure: John Byers;  Surgeon: Jenn Huerta MD;  Location: [facility];  Service: Thoracic   • [place] MEDIASTINOSCOPY WITH LYMPH NODE BIOPSY/IES N/A 2021    Procedure: Wilhemena Estimable;  Surgeon: Jenn Huerta MD;  Location: [facility];  Service: Thoracic   • PROSTATE SURGERY       Social History     Substance and Sexual Activity   Alcohol Use Yes   • Alcohol/week: 10 0 - 12 0 standard drinks   • Types: 7 Glasses of wine, 3 - 5 Cans of beer per week    Comment: few beers day; glass of red wine at dinner     Social History     Substance and Sexual Activity   Drug Use Never     Social History     Tobacco Use   Smoking Status Former   • Packs/day:  00   • Years: 30    • Pack years:    • Types: Cigarettes   • Quit date:    • Years since quittin 0   Smokeless Tobacco Never     Family History   Problem Relation Age of Onset   • Prostate cancer Brother    • Cancer Maternal Uncle         colo rectal cancer   • Cancer Paternal Aunt      Health Maintenance Due   Topic   • Hepatitis B Vaccine (1 of 3 - 3-dose series)   • Pneumococcal Vaccine: 65+ Years (1 - PCV)   • COVID-19 Vaccine (3 - Moderna risk series)   • Depression Remission PHQ    • Kidney Health Evaluation: Microalbumin/Creatinine Ratio       Meds/Allergies       Current Outpatient Medications:   •  acetaminophen (TYLENOL) 325 mg tablet, Take 2 tablets (650 mg total) by mouth every 6 (six) hours as needed for mild pain, headaches or fever, Disp: 30 tablet, Rfl: 0  •  albuterol (2 5 mg/3 mL) 0 083 % nebulizer solution, Take 2 5 mg by nebulization As needed per patient's wife , Disp: , Rfl:   •  apixaban (Eliquis) 5 mg, Take 1 tablet (5 mg total) by mouth 2 (two) times a day, Disp: 60 tablet, Rfl: 5  •  atorvastatin (LIPITOR) 10 mg tablet, Take 1 tablet (10 mg total) by mouth daily with dinner Do not start before January 1, 2023 , Disp: 30 tablet, Rfl: 1  •  atorvastatin (LIPITOR) 10 mg tablet, Take 1 tablet (10 mg total) by mouth daily, Disp: 30 tablet, Rfl: 1  •  calamine-zinc oxide 8-8 %, Apply topically 4 (four) times a day, Disp: 118 mL, Rfl: 0  •  carvedilol (COREG) 25 mg tablet, Take 1 tablet (25 mg total) by mouth 2 (two) times a day with meals, Disp: 60 tablet, Rfl: 1  •  diltiazem (CARDIZEM SR) 120 mg 12 hr capsule, Take 1 capsule (120 mg total) by mouth every 12 (twelve) hours, Disp: 60 capsule, Rfl: 1  •  folic acid (FOLVITE) 1 mg tablet, Take 1 tablet (1 mg total) by mouth daily, Disp: 30 tablet, Rfl: 0  •  hydrALAZINE (APRESOLINE) 25 mg tablet, Take 1 tablet (25 mg total) by mouth every 12 (twelve) hours, Disp: 60 tablet, Rfl: 1  •  hydrALAZINE (APRESOLINE) 25 mg tablet, Take 1 tablet (25 mg total) by mouth every 12 (twelve) hours, Disp: 60 tablet, Rfl: 1  •  hydrocortisone 2 5 % cream, Apply topically 2 (two) times a day Apply twice a day affected area the trunk, Disp: 20 g, Rfl: 2  •  hydrOXYzine HCL (ATARAX) 25 mg tablet, Take 1 tablet (25 mg total) by mouth every 6 (six) hours as needed for itching, allergies or anxiety, Disp: 24 tablet, Rfl: 0  •  hydrOXYzine HCL (ATARAX) 25 mg tablet, Take 1 tablet (25 mg total) by mouth every 6 (six) hours as needed for itching, allergies or anxiety, Disp: 24 tablet, Rfl: 0  •  metFORMIN (GLUCOPHAGE) 500 mg tablet, Take 1 tablet (500 mg total) by mouth 2 (two) times a day, Disp: 180 tablet, Rfl: 1  •  Multiple Vitamin (MULTI-VITAMIN DAILY PO), Take by mouth daily  , Disp: , Rfl:   •  omeprazole (PriLOSEC) 20 mg delayed release capsule, Take 1 capsule (20 mg total) by mouth daily, Disp: 90 capsule, Rfl: 0  •  primidone (MYSOLINE) 50 mg tablet, Take 1 tablet (50 mg total) by mouth every 12 (twelve) hours, Disp: 30 tablet, Rfl: 1  •  spironolactone (ALDACTONE) 25 mg tablet, Take 1 tablet (25 mg total) by mouth daily Do not start before December 28, 2022 , Disp: 30 tablet, Rfl: 1  •  thiamine 100 MG tablet, Take 1 tablet (100 mg total) by mouth daily, Disp: 30 tablet, Rfl: 0  •  Trelegy Ellipta 100-62 5-25 MCG/INH inhaler, INHALE ONE PUFF DAILY; RINSE MOUTH AFTER USE, Disp: 60 each, Rfl: 5  •  doxycycline (ADOXA) 100 MG tablet, Take 1 tablet (100 mg total) by mouth 2 (two) times a day for 7 days, Disp: 14 tablet, Rfl: 0  •  guaifenesin-codeine (GUAIFENESIN AC) 100-10 MG/5ML liquid, Take 5 mL by mouth 3 (three) times a day as needed for cough (Patient not taking: Reported on 1/7/2023), Disp: 118 mL, Rfl: 0      Objective:    Vitals:   /56   Pulse 87   Temp 97 8 °F (36 6 °C)   Resp 17   Ht 5' 11" (1 803 m)   Wt 99 8 kg (220 lb)   SpO2 96%   BMI 30 68 kg/m²   Body mass index is 30 68 kg/m²  Vitals:    01/05/23 1042   Weight: 99 8 kg (220 lb)       Physical Exam  Constitutional:       General: He is not in acute distress  Appearance: He is well-developed  He is obese  He is not ill-appearing, toxic-appearing or diaphoretic  HENT:      Head: Normocephalic and atraumatic  Right Ear: External ear normal       Left Ear: External ear normal       Nose: Nose normal       Mouth/Throat:      Pharynx: No oropharyngeal exudate  Eyes:      General: Lids are normal  Lids are everted, no foreign bodies appreciated  No scleral icterus  Right eye: No discharge  Left eye: No discharge  Conjunctiva/sclera: Conjunctivae normal       Pupils: Pupils are equal, round, and reactive to light  Neck:      Thyroid: No thyromegaly  Vascular: Normal carotid pulses  No carotid bruit, hepatojugular reflux or JVD  Trachea: No tracheal tenderness or tracheal deviation  Cardiovascular:      Rate and Rhythm: Normal rate and regular rhythm  Pulses: Normal pulses  Heart sounds: Normal heart sounds  No murmur heard  No friction rub  No gallop  Pulmonary:      Effort: Pulmonary effort is normal  No respiratory distress        Breath sounds: No stridor  Rhonchi and rales present  No wheezing  Chest:      Chest wall: No tenderness  Abdominal:      General: Bowel sounds are normal  There is no distension  Palpations: Abdomen is soft  There is no mass  Tenderness: There is no abdominal tenderness  There is no guarding or rebound  Musculoskeletal:         General: No tenderness or deformity  Normal range of motion  Cervical back: Normal range of motion and neck supple  No edema, erythema or rigidity  No spinous process tenderness or muscular tenderness  Normal range of motion  Right lower leg: Edema present  Left lower leg: Edema present  Lymphadenopathy:      Head:      Right side of head: No submental, submandibular, tonsillar, preauricular or posterior auricular adenopathy  Left side of head: No submental, submandibular, tonsillar, preauricular, posterior auricular or occipital adenopathy  Cervical: No cervical adenopathy  Right cervical: No superficial, deep or posterior cervical adenopathy  Left cervical: No superficial, deep or posterior cervical adenopathy  Upper Body:      Right upper body: No pectoral adenopathy  Left upper body: No pectoral adenopathy  Skin:     General: Skin is warm and dry  Coloration: Skin is not pale  Findings: No erythema or rash  Neurological:      Mental Status: He is alert and oriented to person, place, and time  Cranial Nerves: No cranial nerve deficit  Sensory: No sensory deficit  Motor: Weakness present  No tremor, abnormal muscle tone or seizure activity  Coordination: Coordination normal       Gait: Gait abnormal       Deep Tendon Reflexes: Reflexes are normal and symmetric  Reflexes normal    Psychiatric:         Behavior: Behavior normal          Thought Content:  Thought content normal          Judgment: Judgment normal          Lab Review   Orders Only on 01/03/2023   Component Date Value Ref Range Status   • AFB Stain 12/28/2022 No acid fast bacilli seen   Preliminary   No results displayed because visit has over 200 results  Telephone on 11/29/2022   Component Date Value Ref Range Status   • Right Eye Diabetic Retinopathy 09/09/2021 None   Final   • Left Eye Diabetic Retinopathy 09/09/2021 None   Final         Patient Instructions   Hypertension( High Blood Pressure ):    Continue Home BP check daily and bring log, if you are not checking consider checking daily    Take your Blood Pressure medicine as advised    Do not take your Blood pressure medicine if systolic Blood Pressure (top number) is less than 100 or heart rate less than 60  Notify you physician  Diabetes    Check your fingerstick Accu-Chek once a day  Please check your fingerstick Accu-Chek different time of the day either at 7:00 a m  or 11:00 a m  for for p m  4 9:00 p m  Mega Mejia Follow Diabetic 1800 calorie diet for diabetes as advised  If you would sugar less than 80 or more than 300 to please call us on next visit is day  If the sugar is less than 80 follow-up hypoglycemia instructions as advised  Take your diabetic medicine as advised  Cholesterol    Eat low cholesterol diet    Continue taking your cholesterol medicine as advised    Call if any side effects    Lipid Profile and LFT prior to next visit or as advised  ( You should Get periodically to monitor liver side effects)    KNOW YOUR DIABETIC GOAL( HBA1C AND SUGAR), BLOOD PRESSURE TARGET NUMBER AND CHOLESTEROL( LDL, HDL AND TRIGLYCERIDE)   Yamileth Malik MD        "This note has been constructed using a voice recognition system  Therefore there may be syntax, spelling, and/or grammatical errors   Please call if you have any questions  "

## 2023-01-09 LAB — FUNGUS SPEC CULT: NORMAL

## 2023-01-10 ENCOUNTER — TELEPHONE (OUTPATIENT)
Dept: CARDIOLOGY CLINIC | Facility: CLINIC | Age: 83
End: 2023-01-10

## 2023-01-10 LAB
MYCOBACTERIUM SPEC CULT: NORMAL
RHODAMINE-AURAMINE STN SPEC: NORMAL

## 2023-01-10 NOTE — TELEPHONE ENCOUNTER
YULISA called today regarding a low heart rate read by her 51, 54  Has an upcoming appointment - does he need to be seen sooner?

## 2023-01-12 ENCOUNTER — OFFICE VISIT (OUTPATIENT)
Dept: DERMATOLOGY | Facility: CLINIC | Age: 83
End: 2023-01-12

## 2023-01-12 VITALS — HEIGHT: 71 IN | BODY MASS INDEX: 30.8 KG/M2 | WEIGHT: 220 LBS | TEMPERATURE: 97.3 F

## 2023-01-12 DIAGNOSIS — L03.111 CELLULITIS OF RIGHT AXILLA: Primary | ICD-10-CM

## 2023-01-12 RX ORDER — DOXYCYCLINE 100 MG/1
100 TABLET ORAL 2 TIMES DAILY
Qty: 14 TABLET | Refills: 0 | Status: SHIPPED | OUTPATIENT
Start: 2023-01-12 | End: 2023-01-19

## 2023-01-12 NOTE — PATIENT INSTRUCTIONS
Assessment and Plan:  Based on a thorough discussion of this condition and the management approach to it (including a comprehensive discussion of the known risks, side effects and potential benefits of treatment), the patient (family) agrees to implement the following specific plan:  Suture removed   Start another round of Doxycycline 100 mg twice a day for 7 days   Continue warm compresses multiple times a day   Wash area with Hibiclens   If start getting fever or getting sicker, go straight to ER     Cellulitis is a common and potentially serious infection caused by bacteria  The bacteria infect the deep layers of skin and tissue beneath the skin  The first sign of cellulitis is usually red and swollen skin  When you touch the infected area, it often feels warm and tender  This infection can show up anywhere on the skin  Adults often get it in a lower leg  In children, cellulitis tends to appear on the face or neck  Without treatment, the infection can spread quickly  The bacteria may travel to lymph nodes and into the bloodstream  This can lead to a blood infection or permanently damage lymph vessels, which are part of your immune system  Other complications can also develop  Cellulitis affects people of all ages and races  Some risk factors for cellulitis include:  Cracked skin in feet, eg due to athlete's foot, tinea pedis or cracked heels, allows bacteria to enter the body  Venous disease, eg gravitational eczema, leg ulceration, and/or lymphedema  Current or prior injury   Immunodeficiency and immunosuppressive medications  Diabetes, obesity, pregnancy, alcoholism  Kidney and liver disease    What causes cellulitis? The most common bacteria causing cellulitis are Streptococcus pyogenes (two thirds of cases) and Staphylococcus aureus (one third)   Rare causes of cellulitis include:   Pseudomonas aeruginosa, usually in a puncture wound of foot or hand  Haemophilus influenzae, in children with facial cellulitis  Anaerobes, Eikenella, Streptococcus viridans, due to human bite  Pasteurella multocida, due to cat or dog bite  Vibrio vulnificus, due to salt water exposure, eg coral injury  Aeromonas hydrophila from fresh or salt water exposure, eg following leech bites  Erysipelothrix (erysipeloid), in butchers     What are the symptoms of cellulitis? Cellulitis can affect any site, most often a limb    It is usually one-sided; two-sided disease is more often due to another condition such as venous stasis or contact dermatitis in the legs  It can occur by itself or complicate an underlying skin condition or wound  The first sign of the illness is often feeling unwell, with fever, chills and shakes (rigors)  This is due to bacteria in the blood stream (bacteremia)  Systemic symptoms are soon followed by development of a localized area of painful, red, swollen skin  Other signs include:    Dimpled skin (peau d'orange)  Warmth  Blistering  Erosions and ulceration  Abscess formation  Purpura: petechiae, ecchymoses, or hemorrhagic blisters  These may appear as purple spots under the skin    Cellulitis may be associated with lymphangiitis and lymphadenitis, which are due to bacteria within lymph vessels and local lymph glands  A red line tracks from the site of infection to nearby tender, swollen lymph glands  How do we diagnose cellulitis? Cellulitis can usually be diagnosed with a good history and skin exam  It is important to recall recent injuries, medical conditions, and all medications you take  Blood tests can show increased white blood cell counts and other inflammatory mediators  Imaging can be performed to check for heart and lung involvement  Your doctor may be able to take a wound culture to determine what bacteria are present       In severe cases, MRI can show whether or not the infection has progressed to necrotizing fasciitis, which is a surgical emergency that requires removal of rapidly spreading dead tissue  How do we treat cellulitis? Cellulitis is potentially serious and may require a multidisciplinary team  The patient should rest and elevate the affected limb  The edge of the involved area of swelling should be marked to monitor progression or regression of the infection  If there are no signs of systemic illness or extensive cellulitis, patients can be treated with oral antibiotics at home, for a minimum of 5-10 days  In some cases, antibiotics are continued until all signs of infection have cleared (redness, pain and swelling), sometimes for several months  Treatment should also include:  Analgesia to reduce pain eg NSAIDs, Tylenol  Adequate water/fluid intake  Management of co-existing skin conditions like venous dermatitis or tinea pedis    More severe cellulitis and systemic symptoms should be treated with fluids, intravenous antibiotics and oxygen  The choice of antibiotics depends on local protocols based on prevalent organisms and their resistance patterns and may be altered according to culture results  Penicillin-based antibiotics are often chosen (eg penicillin G or flucloxacillin)  Amoxicillin and clavulanic acid provide broad-spectrum cover if unusual bacteria are suspected  Cephalosporins are also commonly used (eg ceftriaxone, cefotaxime or cefazolin)  Clindamycin, sulfamethoxazole/trimethoprim, doxycycline and vancomycin are used in patients with penicillin or cephalosporin allergy, or where infection with methicillin-resistant Staphylococcus aureus is suspected    Broad-spectrum antibiotics may also include linezolid, ceftaroline, or daptomycin    Sometimes oral probenecid is added to maintain antibiotic levels in the blood  Treatment may be switched to oral antibiotics when fever has settled and cellulitis has improved  Multidisciplinary care    An internal medicine physician is consulted to assess and manage sepsis    The infectious diseases service can advise on microbiology and choice of antibiotic  A surgeon is called to drain an abscess, debride necrotic tissue, and relieve compression symptoms, eg compartment syndrome  An ophthalmologist should be involved in case of orbital cellulitis  A dermatologist may be called to confirm the diagnosis of cellulitis or suggest alternative diagnoses  Specialist nurses may advise on dressings and bandaging  What is the management of recurrent cellulitis? Patients with recurrent cellulitis should:  Avoid trauma, wear long sleeves and pants in high risk activities, eg gardening  Keep skin clean and well moisturized, with nails well-tended  Avoid having blood tests taken from the affected limb  Treat fungal infections of hands and feet early  Keep swollen limbs elevated during rest periods to aid lymphatic circulation  Those with chronic lymphedema may benefit from compression garments  Patients with 2 or more episodes of cellulitis may benefit from chronic suppressive antibiotic treatment with low-dose penicillin V or erythromycin, for one to two years  After successful treatment, the skin may flake or peel off as it heals  This can be itchy

## 2023-01-12 NOTE — PROGRESS NOTES
[facility]     Patient Name: Alona Oreilly  Encounter Date: 01/12/2023    Have you been cared for by a [place] [address] Dermatologist in the last 3 years and, if so, which description applies to you? Yes  I have been here within the last 3 years, and my medical history has NOT changed since that time  I am MALE/not capable of bearing children  REVIEW OF SYSTEMS:  Have you recently had or currently have any of the following? · No changes in my recent health  PAST MEDICAL HISTORY:  Have you personally ever had or currently have any of the following? If "YES," then please provide more detail  · No changes in my medical history  FAMILY HISTORY:  Any "first degree relatives" (parent, brother, sister, or child) with the following? • No changes in my family's known health  PATIENT EXPERIENCE:    • Do you want the Dermatologist to perform a COMPLETE skin exam today including a clinical examination under the "bra and underwear" areas? NO  • If necessary, do we have your permission to call and leave a detailed message on your Preferred Phone number that includes your specific medical information?   Yes      No Known Allergies   Current Outpatient Medications:   •  acetaminophen (TYLENOL) 325 mg tablet, Take 2 tablets (650 mg total) by mouth every 6 (six) hours as needed for mild pain, headaches or fever, Disp: 30 tablet, Rfl: 0  •  albuterol (2 5 mg/3 mL) 0 083 % nebulizer solution, Take 2 5 mg by nebulization As needed per patient's wife , Disp: , Rfl:   •  apixaban (Eliquis) 5 mg, Take 1 tablet (5 mg total) by mouth 2 (two) times a day, Disp: 60 tablet, Rfl: 5  •  atorvastatin (LIPITOR) 10 mg tablet, Take 1 tablet (10 mg total) by mouth daily with dinner Do not start before January 1, 2023 , Disp: 30 tablet, Rfl: 1  •  calamine-zinc oxide 8-8 %, Apply topically 4 (four) times a day, Disp: 118 mL, Rfl: 0  •  carvedilol (COREG) 25 mg tablet, Take 1 tablet (25 mg total) by mouth 2 (two) times a day with meals, Disp: 60 tablet, Rfl: 1  •  folic acid (FOLVITE) 1 mg tablet, Take 1 tablet (1 mg total) by mouth daily, Disp: 30 tablet, Rfl: 0  •  hydrALAZINE (APRESOLINE) 25 mg tablet, Take 1 tablet (25 mg total) by mouth every 12 (twelve) hours, Disp: 60 tablet, Rfl: 1  •  hydrALAZINE (APRESOLINE) 25 mg tablet, Take 1 tablet (25 mg total) by mouth every 12 (twelve) hours, Disp: 60 tablet, Rfl: 1  •  hydrocortisone 2 5 % cream, Apply topically 2 (two) times a day Apply twice a day affected area the trunk, Disp: 20 g, Rfl: 2  •  hydrOXYzine HCL (ATARAX) 25 mg tablet, Take 1 tablet (25 mg total) by mouth every 6 (six) hours as needed for itching, allergies or anxiety, Disp: 24 tablet, Rfl: 0  •  hydrOXYzine HCL (ATARAX) 25 mg tablet, Take 1 tablet (25 mg total) by mouth every 6 (six) hours as needed for itching, allergies or anxiety, Disp: 24 tablet, Rfl: 0  •  metFORMIN (GLUCOPHAGE) 500 mg tablet, Take 1 tablet (500 mg total) by mouth 2 (two) times a day, Disp: 180 tablet, Rfl: 1  •  Multiple Vitamin (MULTI-VITAMIN DAILY PO), Take by mouth daily  , Disp: , Rfl:   •  omeprazole (PriLOSEC) 20 mg delayed release capsule, Take 1 capsule (20 mg total) by mouth daily, Disp: 90 capsule, Rfl: 0  •  primidone (MYSOLINE) 50 mg tablet, Take 1 tablet (50 mg total) by mouth every 12 (twelve) hours, Disp: 30 tablet, Rfl: 1  •  spironolactone (ALDACTONE) 25 mg tablet, Take 1 tablet (25 mg total) by mouth daily Do not start before December 28, 2022 , Disp: 30 tablet, Rfl: 1  •  thiamine 100 MG tablet, Take 1 tablet (100 mg total) by mouth daily, Disp: 30 tablet, Rfl: 0  •  Trelegy Ellipta 100-62 5-25 MCG/INH inhaler, INHALE ONE PUFF DAILY; RINSE MOUTH AFTER USE, Disp: 60 each, Rfl: 5  •  atorvastatin (LIPITOR) 10 mg tablet, Take 1 tablet (10 mg total) by mouth daily (Patient not taking: Reported on 1/12/2023), Disp: 30 tablet, Rfl: 1  •  diltiazem (CARDIZEM SR) 120 mg 12 hr capsule, Take 1 capsule (120 mg total) by mouth every 12 (twelve) hours (Patient not taking: Reported on 1/12/2023), Disp: 60 capsule, Rfl: 1  •  doxycycline (ADOXA) 100 MG tablet, Take 1 tablet (100 mg total) by mouth 2 (two) times a day for 7 days (Patient not taking: Reported on 1/12/2023), Disp: 14 tablet, Rfl: 0  •  guaifenesin-codeine (GUAIFENESIN AC) 100-10 MG/5ML liquid, Take 5 mL by mouth 3 (three) times a day as needed for cough (Patient not taking: Reported on 1/7/2023), Disp: 118 mL, Rfl: 0          • Whom besides the patient is providing clinical information about today's encounter?   o NO ADDITIONAL HISTORIAN (patient alone provided history)    Physical Exam and Assessment/Plan by Diagnosis:    Suture removal    Date/Time: 1/12/2023 10:08 AM  Performed by: Steffany Tellez MA  Authorized by: Addison Hanna MD   Universal Protocol:  Consent: Verbal consent obtained  Risks and benefits: risks, benefits and alternatives were discussed  Consent given by: patient  Timeout called at: 1/12/2023 10:08 AM   Patient understanding: patient states understanding of the procedure being performed  Patient consent: the patient's understanding of the procedure matches consent given  Procedure consent: procedure consent matches procedure scheduled  Relevant documents: relevant documents present and verified  Test results: test results not available  Site marked: the operative site was not marked  Radiology Images displayed and confirmed  If images not available, report reviewed: imaging studies not available        Patient location:  Clinic  Location:     Laterality:  Right    Location:  Trunk    Trunk location:  Axilla  Procedure details: Tools used:  Suture removal kit    Wound appearance:  No sign(s) of infection, good wound healing and clean  Post-procedure details:     Post-removal:  No dressing applied    Patient tolerance of procedure:   Tolerated well, no immediate complications      CELLULITIS    Physical Exam:  • Anatomic Location Affected:  Right axilla • Morphological Description:  Induration and erythema with mild purulence from biopsy sites   • Pertinent Positives:  • Pertinent Negatives: Additional History of Present Condition:  Patient here for follow up for cellulitis on Right Axilla,  Patient states he has had no pain  Wife states the area does have an odor  Patient has finished Doxycline x 7 days that was prescribed for him  Patient also has sutures that need to be removed  Patient's wife notes improvement in redness compared to prior  Assessment and Plan:  Based on a thorough discussion of this condition and the management approach to it (including a comprehensive discussion of the known risks, side effects and potential benefits of treatment), the patient (family) agrees to implement the following specific plan:  • Suture removed   • Continue Doxycycline 100 mg twice a day for 7 days   • Continue warm compresses multiple times a day   • Wash area with Hibiclens daily  • If start getting fever or getting sicker, go straight to ER  • Follow up in 2 weeks     Cellulitis is a common and potentially serious infection caused by bacteria  The bacteria infect the deep layers of skin and tissue beneath the skin  The first sign of cellulitis is usually red and swollen skin  When you touch the infected area, it often feels warm and tender  This infection can show up anywhere on the skin  Adults often get it in a lower leg  In children, cellulitis tends to appear on the face or neck  Without treatment, the infection can spread quickly  The bacteria may travel to lymph nodes and into the bloodstream  This can lead to a blood infection or permanently damage lymph vessels, which are part of your immune system  Other complications can also develop  Cellulitis affects people of all ages and races   Some risk factors for cellulitis include:  • Cracked skin in feet, eg due to athlete's foot, tinea pedis or cracked heels, allows bacteria to enter the body  • Venous disease, eg gravitational eczema, leg ulceration, and/or lymphedema  • Current or prior injury   • Immunodeficiency and immunosuppressive medications  • Diabetes, obesity, pregnancy, alcoholism  • Kidney and liver disease    What causes cellulitis? The most common bacteria causing cellulitis are Streptococcus pyogenes (two thirds of cases) and Staphylococcus aureus (one third)  Rare causes of cellulitis include:   • Pseudomonas aeruginosa, usually in a puncture wound of foot or hand  • Haemophilus influenzae, in children with facial cellulitis  • Anaerobes, Eikenella, Streptococcus viridans, due to human bite  • Pasteurella multocida, due to cat or dog bite  • Vibrio vulnificus, due to salt water exposure, eg coral injury  • Aeromonas hydrophila from fresh or salt water exposure, eg following leech bites  • Erysipelothrix (erysipeloid), in butchers     What are the symptoms of cellulitis? Cellulitis can affect any site, most often a limb    • It is usually one-sided; two-sided disease is more often due to another condition such as venous stasis or contact dermatitis in the legs  • It can occur by itself or complicate an underlying skin condition or wound  The first sign of the illness is often feeling unwell, with fever, chills and shakes (rigors)  This is due to bacteria in the blood stream (bacteremia)  Systemic symptoms are soon followed by development of a localized area of painful, red, swollen skin  Other signs include:    • Dimpled skin (peau d'orange)  • Warmth  • Blistering  • Erosions and ulceration  • Abscess formation  • Purpura: petechiae, ecchymoses, or hemorrhagic blisters  These may appear as purple spots under the skin    Cellulitis may be associated with lymphangiitis and lymphadenitis, which are due to bacteria within lymph vessels and local lymph glands  A red line tracks from the site of infection to nearby tender, swollen lymph glands      How do we diagnose cellulitis? Cellulitis can usually be diagnosed with a good history and skin exam  It is important to recall recent injuries, medical conditions, and all medications you take  Blood tests can show increased white blood cell counts and other inflammatory mediators  Imaging can be performed to check for heart and lung involvement  Your doctor may be able to take a wound culture to determine what bacteria are present  In severe cases, MRI can show whether or not the infection has progressed to necrotizing fasciitis, which is a surgical emergency that requires removal of rapidly spreading dead tissue  How do we treat cellulitis? Cellulitis is potentially serious and may require a multidisciplinary team  The patient should rest and elevate the affected limb  The edge of the involved area of swelling should be marked to monitor progression or regression of the infection  If there are no signs of systemic illness or extensive cellulitis, patients can be treated with oral antibiotics at home, for a minimum of 5-10 days  In some cases, antibiotics are continued until all signs of infection have cleared (redness, pain and swelling), sometimes for several months  Treatment should also include:  • Analgesia to reduce pain eg NSAIDs, Tylenol  • Adequate water/fluid intake  • Management of co-existing skin conditions like venous dermatitis or tinea pedis    More severe cellulitis and systemic symptoms should be treated with fluids, intravenous antibiotics and oxygen  The choice of antibiotics depends on local protocols based on prevalent organisms and their resistance patterns and may be altered according to culture results      • Penicillin-based antibiotics are often chosen (eg penicillin G or flucloxacillin)  • Amoxicillin and clavulanic acid provide broad-spectrum cover if unusual bacteria are suspected  • Cephalosporins are also commonly used (eg ceftriaxone, cefotaxime or cefazolin)  • Clindamycin, sulfamethoxazole/trimethoprim, doxycycline and vancomycin are used in patients with penicillin or cephalosporin allergy, or where infection with methicillin-resistant Staphylococcus aureus is suspected    • Broad-spectrum antibiotics may also include linezolid, ceftaroline, or daptomycin    Sometimes oral probenecid is added to maintain antibiotic levels in the blood  Treatment may be switched to oral antibiotics when fever has settled and cellulitis has improved  Multidisciplinary care    • An internal medicine physician is consulted to assess and manage sepsis  • The infectious diseases service can advise on microbiology and choice of antibiotic  • A surgeon is called to drain an abscess, debride necrotic tissue, and relieve compression symptoms, eg compartment syndrome  • An ophthalmologist should be involved in case of orbital cellulitis  • A dermatologist may be called to confirm the diagnosis of cellulitis or suggest alternative diagnoses  • Specialist nurses may advise on dressings and bandaging  What is the management of recurrent cellulitis? Patients with recurrent cellulitis should:  • Avoid trauma, wear long sleeves and pants in high risk activities, eg gardening  • Keep skin clean and well moisturized, with nails well-tended  • Avoid having blood tests taken from the affected limb  • Treat fungal infections of hands and feet early  • Keep swollen limbs elevated during rest periods to aid lymphatic circulation  Those with chronic lymphedema may benefit from compression garments  Patients with 2 or more episodes of cellulitis may benefit from chronic suppressive antibiotic treatment with low-dose penicillin V or erythromycin, for one to two years  After successful treatment, the skin may flake or peel off as it heals  This can be itchy          Sandra Valle, PGY-4  Dermatology  [facility]      Scribe Attestation    I,:  Fabian Garcia MA am acting as a scribe while in the presence of the attending physician :       I,:  Nancy Blackburn MD personally performed the services described in this documentation    as scribed in my presence :

## 2023-01-17 LAB
MYCOBACTERIUM SPEC CULT: NORMAL
RHODAMINE-AURAMINE STN SPEC: NORMAL

## 2023-01-23 LAB — FUNGUS SPEC CULT: NORMAL

## 2023-01-26 ENCOUNTER — APPOINTMENT (OUTPATIENT)
Dept: RADIATION ONCOLOGY | Facility: HOSPITAL | Age: 83
End: 2023-01-26

## 2023-01-27 ENCOUNTER — HOSPITAL ENCOUNTER (OUTPATIENT)
Dept: RADIOLOGY | Facility: HOSPITAL | Age: 83
Discharge: HOME | End: 2023-01-27

## 2023-01-27 DIAGNOSIS — C34.92 SQUAMOUS CELL CARCINOMA OF LEFT LUNG (HCC): ICD-10-CM

## 2023-01-30 LAB — FUNGUS SPEC CULT: NORMAL

## 2023-01-31 LAB
MYCOBACTERIUM SPEC CULT: NORMAL
RHODAMINE-AURAMINE STN SPEC: NORMAL

## 2023-02-02 ENCOUNTER — RADIATION ONCOLOGY FOLLOW-UP (OUTPATIENT)
Dept: RADIATION ONCOLOGY | Facility: HOSPITAL | Age: 83
End: 2023-02-02
Attending: RADIOLOGY

## 2023-02-02 ENCOUNTER — CLINICAL SUPPORT (OUTPATIENT)
Dept: RADIATION ONCOLOGY | Facility: HOSPITAL | Age: 83
End: 2023-02-02
Attending: RADIOLOGY

## 2023-02-02 VITALS
WEIGHT: 214 LBS | BODY MASS INDEX: 29.85 KG/M2 | RESPIRATION RATE: 16 BRPM | OXYGEN SATURATION: 96 % | SYSTOLIC BLOOD PRESSURE: 110 MMHG | HEART RATE: 75 BPM | TEMPERATURE: 98.2 F | DIASTOLIC BLOOD PRESSURE: 75 MMHG

## 2023-02-02 DIAGNOSIS — K21.9 GASTROESOPHAGEAL REFLUX DISEASE WITHOUT ESOPHAGITIS: ICD-10-CM

## 2023-02-02 DIAGNOSIS — C34.92 SQUAMOUS CELL CARCINOMA OF LEFT LUNG (HCC): Primary | Chronic | ICD-10-CM

## 2023-02-02 DIAGNOSIS — C34.92 SQUAMOUS CELL CARCINOMA OF LEFT LUNG (HCC): Primary | ICD-10-CM

## 2023-02-02 RX ORDER — OMEPRAZOLE 20 MG/1
20 CAPSULE, DELAYED RELEASE ORAL DAILY
Qty: 90 CAPSULE | Refills: 0 | Status: SHIPPED | OUTPATIENT
Start: 2023-02-02 | End: 2023-02-06

## 2023-02-02 NOTE — PROGRESS NOTES
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  The pt completed a course of SBRT to the [facility] on 02/11/2022  The pt was last seen in radiation on 10/27/22  He returns today for his follow up      11/16/22 - Neurology, [place] not well controlled  Pt to optimize primidone - 100mg bid  Pt to get MRI on back - may refer to pain management     12/19/22 - 12/31/22 - Admitted to hospital  Urticaria - itching, hives, swelling of face and lips  Denies SOB - feeling tight, lump in throat  Swelling under right arm/cellulitis  Biopsy benign  Pt treated with several IV antibiotics - will be discharged with 3 more days of antibiotics        12/20/22 - XR chest portable  IMPRESSION:  Increasing pleuroparenchymal opacity at the left base consistent with enlargement of the known mass and probably associated effusion/atelectasis      12/20/22 - US axilla  IMPRESSION:  No discrete fluid collection is identified in the right axilla      12/23/22 - CT chest w contrast  IMPRESSION:  Skin thickening and subcutaneous edematous changes in the right axillary region   No focal fluid collection or mass identified   Findings most consistent with cellulitis  Stable background emphysema     Increasing left lower lobe consolidation due to increasing left pleural effusion     The previously noted rounded mass in the left lower lobe is more difficult to discern on today's exam due to the increased effusion and atelectasis, but a slightly hypodense area noted on image 2/42 measuring 3 6 cm is approximately the same as prior    Stable gallstones without surrounding inflammation      01/27/23 -CT chest wo contrast Slightly decreased left pleural effusion with improved passive atelectasis of the left lower lobe  Previously noted left lower lobe mass is difficult to visualize on noncontrast exam but appears likely unchanged from most recent study  2   Similar appearance of skin thickening and subcutaneous stranding in the right axillary region which could represent cellulitis           Upcomin23 - Vera Poplin  02/10/23 - Hem Niles Bull[facility]  23 - Neurology, Tariq Adame       Historical Information   Oncology History   Squamous cell carcinoma of lung ([place] [address] )   10/5/2021 Initial Diagnosis    Squamous cell carcinoma of lung ([place] [address] )     10/5/2021 Biopsy    Final Diagnosis   A  Lung, Left Lower Lobe, :  - Invasive squamous carcinoma, moderately differentiated, keratinizing type  - Tumor is highlighted with P 40 immunoperoxidase stain   - Prominent tumor necrosis is noted  2022 - 2022 Radiation    [facility] SBRT [facility] 6X 5 / 5 1,000 0 5,000 9      Treatment Dates:  2022 - 2022            Past Medical History:   Diagnosis Date   • Abdominal pain    • Anxiety    • Arthritis    • Asthma    • Atrial fibrillation [facility])    • Back pain    • Bleeding ulcer    • Bronchitis    • Cancer [facility])     prostate - radiation   • Cardiac disease     cardiac stent x1   • Cataract     starting   • Chronic diastolic (congestive) heart failure (HCC)    • Diabetes mellitus ([place] [address] )     boarderline diabetic    • GERD (gastroesophageal reflux disease)    • History of radiation therapy 2010    Prostate seeds (brachytherapy) and EBRT   • Hyperlipidemia    • Hypertension    • 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, bilateral    • Low back pain    • Lung cancer ([place] [address] )    • Lung mass    • Myocardial infarction ([place] [address] )     mild    • Obesity    • Prostate cancer ([place] [address] )    • Shortness of breath    • Sleep apnea     sleep study    • Wears dentures     full set   • Wears glasses      Past Surgical History:   Procedure Laterality Date   • ABDOMINAL HERNIA REPAIR     • ABDOMINAL SURGERY bleeding ulcer, cyst removed from abd   • COLONOSCOPY     • CORONARY ANGIOPLASTY WITH STENT PLACEMENT  1999    x1    • ESOPHAGOGASTRODUODENOSCOPY     • IR BIOPSY LUNG  10/05/2021   • IR THORACENTESIS  2021   • KNEE SURGERY Left    • IN [address] INCL FLUOR GDNCE DX W/CELL WASHG SPX N/A 2021    Procedure: BRONCHOSCOPY FLEXIBLE;  Surgeon: Juliano Figueredo MD;  Location: [facility];  Service: Thoracic   • IN MEDIASTINOSCOPY WITH LYMPH NODE BIOPSY/IES N/A 2021    Procedure: MEDIASTINOSCOPY;  Surgeon: Juliano Figueredo MD;  Location: [facility];  Service: Thoracic   • PROSTATE SURGERY         Social History   Social History     Substance and Sexual Activity   Alcohol Use Yes   • Alcohol/week: 10 0 - 12 0 standard drinks   • Types: 7 Glasses of wine, 3 - 5 Cans of beer per week    Comment: few beers day; glass of red wine at dinner     Social History     Substance and Sexual Activity   Drug Use Never     Social History     Tobacco Use   Smoking Status Former   • Packs/day:    • Years:    • Pack years:    • Types: Cigarettes   • Quit date:    • Years since quittin 1   Smokeless Tobacco Never         Meds/Allergies     Current Outpatient Medications:   •  acetaminophen (TYLENOL) 325 mg tablet, Take 2 tablets (650 mg total) by mouth every 6 (six) hours as needed for mild pain, headaches or fever, Disp: 30 tablet, Rfl: 0  •  albuterol (2 5 mg/3 mL) 0 083 % nebulizer solution, Take 2 5 mg by nebulization As needed per patient's wife , Disp: , Rfl:   •  apixaban (Eliquis) 5 mg, Take 1 tablet (5 mg total) by mouth 2 (two) times a day, Disp: 60 tablet, Rfl: 5  •  atorvastatin (LIPITOR) 10 mg tablet, Take 1 tablet (10 mg total) by mouth daily with dinner Do not start before 2023 , Disp: 30 tablet, Rfl: 1  •  calamine-zinc oxide 8-8 %, Apply topically 4 (four) times a day, Disp: 118 mL, Rfl: 0  •  carvedilol (COREG) 25 mg tablet, Take 1 tablet (25 mg total) by mouth 2 (two) times a day with meals, Disp: 60 tablet, Rfl: 1  •  folic acid (FOLVITE) 1 mg tablet, Take 1 tablet (1 mg total) by mouth daily, Disp: 30 tablet, Rfl: 0  •  hydrALAZINE (APRESOLINE) 25 mg tablet, Take 1 tablet (25 mg total) by mouth every 12 (twelve) hours, Disp: 60 tablet, Rfl: 1  •  hydrALAZINE (APRESOLINE) 25 mg tablet, Take 1 tablet (25 mg total) by mouth every 12 (twelve) hours, Disp: 60 tablet, Rfl: 1  •  hydrocortisone 2 5 % cream, Apply topically 2 (two) times a day Apply twice a day affected area the trunk, Disp: 20 g, Rfl: 2  •  metFORMIN (GLUCOPHAGE) 500 mg tablet, Take 1 tablet (500 mg total) by mouth 2 (two) times a day, Disp: 180 tablet, Rfl: 1  •  Multiple Vitamin (MULTI-VITAMIN DAILY PO), Take by mouth daily  , Disp: , Rfl:   •  omeprazole (PriLOSEC) 20 mg delayed release capsule, Take 1 capsule (20 mg total) by mouth daily, Disp: 90 capsule, Rfl: 0  •  primidone (MYSOLINE) 50 mg tablet, Take 1 tablet (50 mg total) by mouth every 12 (twelve) hours, Disp: 30 tablet, Rfl: 1  •  spironolactone (ALDACTONE) 25 mg tablet, Take 1 tablet (25 mg total) by mouth daily Do not start before December 28, 2022 , Disp: 30 tablet, Rfl: 1  •  thiamine 100 MG tablet, Take 1 tablet (100 mg total) by mouth daily, Disp: 30 tablet, Rfl: 0  •  Trelegy Ellipta 100-62 5-25 MCG/INH inhaler, INHALE ONE PUFF DAILY; RINSE MOUTH AFTER USE, Disp: 60 each, Rfl: 5  •  atorvastatin (LIPITOR) 10 mg tablet, Take 1 tablet (10 mg total) by mouth daily (Patient not taking: Reported on 1/12/2023), Disp: 30 tablet, Rfl: 1  •  diltiazem (CARDIZEM SR) 120 mg 12 hr capsule, Take 1 capsule (120 mg total) by mouth every 12 (twelve) hours (Patient not taking: Reported on 1/12/2023), Disp: 60 capsule, Rfl: 1  •  guaifenesin-codeine (GUAIFENESIN AC) 100-10 MG/5ML liquid, Take 5 mL by mouth 3 (three) times a day as needed for cough (Patient not taking: Reported on 1/7/2023), Disp: 118 mL, Rfl: 0  •  hydrOXYzine HCL (ATARAX) 25 mg tablet, Take 1 tablet (25 mg total) by mouth every 6 (six) hours as needed for itching, allergies or anxiety (Patient not taking: Reported on 2/2/2023), Disp: 24 tablet, Rfl: 0  •  hydrOXYzine HCL (ATARAX) 25 mg tablet, Take 1 tablet (25 mg total) by mouth every 6 (six) hours as needed for itching, allergies or anxiety (Patient not taking: Reported on 2/2/2023), Disp: 24 tablet, Rfl: 0  No Known Allergies      Review of Systems   Constitutional: Positive for activity change (improving since hospitalization), appetite change (improving now) and unexpected weight change (15 pound loss since hospitalization)  HENT: Negative  Eyes: Negative  Respiratory: Positive for cough (productive with yellow, improving since hospitalization) and shortness of breath (at baseline)  Cardiovascular: Negative  Gastrointestinal: Positive for diarrhea (occasional)  Endocrine: Negative  Genitourinary: Negative  Musculoskeletal: Negative  Skin:        Small pin hole remains in right axilla with light yellow drainage  Allergic/Immunologic: Negative  Neurological: Negative  Hematological: Negative  Psychiatric/Behavioral: Negative            OBJECTIVE:   /75   Pulse 75   Temp 98 2 °F (36 8 °C)   Resp 16   Wt 97 1 kg (214 lb)   SpO2 96%   BMI 29 85 kg/m²   Pain Assessment:  0  ECOG/[place]/WHO: 1    Physical Exam   He is breathing comfortably  Conversing appropriately  He does have slight decreased breath sounds in the left base  Abdomen obese soft nontender  He is ambulating dependently  RESULTS    Lab Results: No results found for this or any previous visit (from the past 672 hour(s))  Imaging Studies:CT chest wo contrast    Result Date: 2/2/2023  Narrative: CT CHEST WITHOUT IV CONTRAST INDICATION:   C34 92: Malignant neoplasm of unspecified part of left bronchus or lung  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 lobe post SBRT 2/2022  COMPARISON:  CT chest 12/23/2022   TECHNIQUE: CT examination of the chest was performed without intravenous contrast   Axial, sagittal, and coronal 2D reformatted images were created from the source data and submitted for interpretation  Radiation dose length product (DLP) for this visit:  538 66 mGy-cm   This examination, like all CT scans performed in the [facility], was performed utilizing techniques to minimize radiation dose exposure, including the use of iterative  reconstruction and automated exposure control  FINDINGS: LARGE AIRWAYS: Minimal stranding secretions in the mid trachea  LUNGS:  Mild background emphysematous changes  Stable juxtapleural reticulation predominantly in the right lung base and lateral middle lobe  No new consolidation  Scattered tiny pulmonary nodules which are unchanged dating back to 9/2021  No new or suspicious nodules  Improved aeration of the left lower lobe, which is still partially atelectatic from pleural effusion  Previously noted left lower lobe mass is difficult to visualize on noncontrast exam, but approximate size can be inferred from the contour of adjacent atelectatic lung, likely unchanged measuring approximately 3 5 x 3 1 cm (previously 3 6 x 3 1 cm) on series 2/87  PLEURA:  No pneumothorax  Small left pleural effusion, slightly decreased from prior study  HEART: Mild cardiomegaly  Moderate coronary artery calcification  VESSELS: Normal caliber thoracic aorta with mild atherosclerotic plaque  Dilated main pulmonary artery measuring 34 mm  MEDIASTINUM AND ANDRE:  Within normal limits  No lymphadenopathy  CHEST WALL AND LOWER NECK:   Similar appearance of skin thickening and subcutaneous stranding in the right axillary region (e g  series 2/27) which could represent cellulitis, only partially visualized on current study  VISUALIZED STRUCTURES IN THE UPPER ABDOMEN:  Cholecystolithiasis without findings of acute cholecystitis  Postsurgical changes of gastric bypass  BONES:  Moderate multilevel degenerative changes in the spine  Vertebral body height is maintained  No acute fracture or destructive osseous lesion  Impression: 1  Slightly decreased left pleural effusion with improved passive atelectasis of the left lower lobe  Previously noted left lower lobe mass is difficult to visualize on noncontrast exam but appears likely unchanged from most recent study  2   Similar appearance of skin thickening and subcutaneous stranding in the right axillary region which could represent cellulitis  Workstation performed: [id number]           Assessment/Plan:  No orders of the defined types were placed in this encounter  Gwsteven Anton is a [de-identified]y o  year old male who is 1 year post SBRT of the lung to the left lower lobe  Since he was last seen he was hospitalized with right axillary cellulitis as well as sepsis  He underwent imaging during that time which revealed left pleural effusion  He had follow-up CT of the chest which reveals decreased diffusion and stability in his left lower lobe lesion  As they mention it was difficult to fully assess his lesion I am ordering a shorter interval 4-month CT scan with follow-up thereafter to confirm stability  Claudetta Eric, MD  2/3/7415,10:34 AM    Portions of the record may have been created with voice recognition software   Occasional wrong word or "sound a like" substitutions may have occurred due to the inherent limitations of voice recognition software   Read the chart carefully and recognize, using context, where substitutions have occurred

## 2023-02-02 NOTE — PROGRESS NOTES
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  The pt completed a course of SBRT to the LLL on 02/11/2022  The pt was last seen in radiation on 10/27/22  He returns today for his follow up     11/16/22 - Neurology, Callum Dinh not well controlled  Pt to optimize primidone - 100mg bid  Pt to get MRI on back - may refer to pain management    12/19/22 - 12/31/22 - Admitted to hospital  Urticaria - itching, hives, swelling of face and lips  Denies SOB - feeling tight, lump in throat  Swelling under right arm/cellulitis  Biopsy benign  Pt treated with several IV antibiotics - will be discharged with 3 more days of antibiotics      12/20/22 - XR chest portable  IMPRESSION:  Increasing pleuroparenchymal opacity at the left base consistent with enlargement of the known mass and probably associated effusion/atelectasis  12/20/22 - US axilla  IMPRESSION:  No discrete fluid collection is identified in the right axilla  12/23/22 - CT chest w contrast  IMPRESSION:  Skin thickening and subcutaneous edematous changes in the right axillary region  No focal fluid collection or mass identified  Findings most consistent with cellulitis  Stable background emphysema  Increasing left lower lobe consolidation due to increasing left pleural effusion  The previously noted rounded mass in the left lower lobe is more difficult to discern on today's exam due to the increased effusion and atelectasis, but a slightly hypodense area noted on image 2/42 measuring 3 6 cm is approximately the same as prior  Stable gallstones without surrounding inflammation  01/27/23 -CT chest wo contrast Slightly decreased left pleural effusion with improved passive atelectasis of the left lower lobe  Previously noted left lower lobe mass is difficult to visualize on noncontrast exam but appears likely unchanged from most recent study    2   Similar appearance of skin thickening and subcutaneous stranding in the right axillary region which could represent cellulitis          Upcomin23 - Arn Tom  02/10/23 - Hem Jacob Reece  23 - Neurology, Guillermo Mondragon      Follow up visit     Oncology History Overview Note   Patient is a [de-identified] y o male with 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  The pt completed a course of SBRT to the LLL on 2022  The pt was last seen in radiation on 10/27/22  He returns today for his follow up     22 - Neurology, Yi Pro not well controlled  Pt to optimize primidone - 100mg bid  Pt to get MRI on back - may refer to pain management    22 - 22 - Admitted to hospital  Urticaria - itching, hives, swelling of face and lips  Denies SOB - feeling tight, lump in throat  Pt treated with several IV antibiotics - will be discharged with 3 more days of antibiotics      22 - XR chest portable  IMPRESSION:  Increasing pleuroparenchymal opacity at the left base consistent with enlargement of the known mass and probably associated effusion/atelectasis  22 - US axilla  IMPRESSION:  No discrete fluid collection is identified in the right axilla  22 - CT chest w contrast  IMPRESSION:  Skin thickening and subcutaneous edematous changes in the right axillary region  No focal fluid collection or mass identified  Findings most consistent with cellulitis  Stable background emphysema  Increasing left lower lobe consolidation due to increasing left pleural effusion  The previously noted rounded mass in the left lower lobe is more difficult to discern on today's exam due to the increased effusion and atelectasis, but a slightly hypodense area noted on image 2/42 measuring 3 6 cm is approximately the same as prior  Stable gallstones without surrounding inflammation      23 -CT chest wo contrast  (Prep done )    Upcomin23 - Arn Tom  02/10/23 - Kris Kaur  23 - Neurology, [place]     Squamous cell carcinoma of lung ([facility] [address] )   10/5/2021 Initial Diagnosis    Squamous cell carcinoma of lung ([facility] [address] )     10/5/2021 Biopsy    Final Diagnosis   A  Lung, Left Lower Lobe, :  - Invasive squamous carcinoma, moderately differentiated, keratinizing type  - Tumor is highlighted with P 40 immunoperoxidase stain   - Prominent tumor necrosis is noted  2/2/2022 - 2/11/2022 Radiation    BH SBRT LLL 6X 5 / 5 1,000 0 5,000 9      Treatment Dates:  2/2/2022 - 2/11/2022  Review of Systems:  Review of Systems   Constitutional: Positive for activity change (improving since hospitalization), appetite change (improving now) and unexpected weight change (15 pound loss since hospitalization)  HENT: Negative  Eyes: Negative  Respiratory: Positive for cough (productive with yellow, improving since hospitalization) and shortness of breath (at baseline)  Cardiovascular: Negative  Gastrointestinal: Positive for diarrhea (occasional)  Endocrine: Negative  Genitourinary: Negative  Musculoskeletal: Negative  Skin:        Small pin hole remains in right axilla with light yellow drainage  Allergic/Immunologic: Negative  Neurological: Negative  Hematological: Negative  Psychiatric/Behavioral: Negative          Clinical Trial: no    IPSS Questionnaire (AUA-7):    Teaching    Health Maintenance   Topic Date Due   • Pneumococcal Vaccine: 65+ Years (1 - PCV) Never done   • COVID-19 Vaccine (3 - Moderna risk series) 12/03/2021   • Depression Remission PHQ  07/12/2022   • Kidney Health Evaluation: Microalbumin/Creatinine Ratio  02/21/2023   • HEMOGLOBIN A1C  06/20/2023   • Medicare Annual Wellness Visit (AWV)  06/29/2023   • Diabetic Foot Exam  08/25/2023   • DM Eye Exam  09/09/2023   • Fall Risk  10/17/2023   • BMI: Followup Plan  11/28/2023   • Kidney Health Evaluation: GFR  12/29/2023   • BMI: Adult  01/12/2024   • Hepatitis C Screening  Completed   • Influenza Vaccine  Completed • HIB Vaccine  Aged Out   • IPV Vaccine  Aged Out   • Hepatitis A Vaccine  Aged Out   • Meningococcal ACWY Vaccine  Aged Out   • HPV Vaccine  Aged Out     Patient Active Problem List   Diagnosis   • Corns   • Pain in both feet   • Diabetic polyneuropathy associated with type 2 diabetes mellitus ([place] [address] )   • Onychomycosis   • Tinea pedis of both feet   • Lung mass   • Hyperlipidemia   • Squamous cell carcinoma of lung (HCC)   • Shortness of breath   • Daytime hypersomnolence   • Chronic diastolic heart failure (HCC)   • SYLVESTER (obstructive sleep apnea)   • Prostate cancer (HCC)   • GERD (gastroesophageal reflux disease)   • CAD (coronary artery disease)   • Other persistent atrial fibrillation (HCC)   • Alcohol dependence ([place] [address] )   • Acute respiratory failure with hypoxemia (HCC)   • Depression, recurrent (HCC)   • COVID-19   • Angioedema   • Septic shock (HCC)   • Cellulitis   • Cellulitis of chest wall   • Acute kidney injury (BUDDY) with acute tubular necrosis (ATN) ([facility])   • Chronic obstructive pulmonary disease, unspecified COPD type ([place] [address] )     Past Medical History:   Diagnosis Date   • Abdominal pain    • Anxiety    • Arthritis    • Asthma    • Atrial fibrillation ([place] [address] )    • Back pain    • Bleeding ulcer    • Bronchitis    • Cancer ([place] [address] )     prostate 2011- radiation   • Cardiac disease     cardiac stent x1   • Cataract     starting   • Chronic diastolic (congestive) heart failure (HCC)    • Diabetes mellitus ([place] [address] )     boarderline diabetic    • GERD (gastroesophageal reflux disease)    • History of radiation therapy 2010    Prostate seeds (brachytherapy) and EBRT   • Hyperlipidemia    • Hypertension    • 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, bilateral    • Low back pain    • Lung cancer ([facility] )    • Lung mass    • Myocardial infarction ([place] [address] )     mild 1999   • Obesity    • Prostate cancer ([place] [address] )    • Shortness of breath    • Sleep apnea     sleep study 11/22   • Wears dentures     full set   • Wears glasses      Past Surgical History:   Procedure Laterality Date   • ABDOMINAL HERNIA REPAIR     • ABDOMINAL SURGERY      bleeding ulcer, cyst removed from abd   • COLONOSCOPY     • CORONARY ANGIOPLASTY WITH STENT PLACEMENT  1999    x1    • ESOPHAGOGASTRODUODENOSCOPY     • IR BIOPSY LUNG  10/05/2021   • IR THORACENTESIS  2021   • KNEE SURGERY Left    • [place] [address] INCL FLUOR GDNCE DX W/CELL WASHG SPX N/A 2021    Procedure: BRONCHOSCOPY FLEXIBLE;  Surgeon: Osmany Phelan MD;  Location: [facility];  Service: Thoracic   • MT MEDIASTINOSCOPY WITH LYMPH NODE BIOPSY/IES N/A 2021    Procedure: MEDIASTINOSCOPY;  Surgeon: Osmany Phelan MD;  Location: [facility];  Service: Thoracic   • PROSTATE SURGERY       Family History   Problem Relation Age of Onset   • Prostate cancer Brother    • Cancer Maternal Uncle         colo rectal cancer   • Cancer Paternal Aunt      Social History     Socioeconomic History   • Marital status: /Civil Union     Spouse name: Not on file   • Number of children: Not on file   • Years of education: Not on file   • Highest education level: Not on file   Occupational History   • Not on file   Tobacco Use   • Smoking status: Former     Packs/day: 1 00     Years: 30 00     Pack years: 30 00     Types: Cigarettes     Quit date:      Years since quittin 1   • Smokeless tobacco: Never   Vaping Use   • Vaping Use: Never used   Substance and Sexual Activity   • Alcohol use:  Yes     Alcohol/week: 10 0 - 12 0 standard drinks     Types: 7 Glasses of wine, 3 - 5 Cans of beer per week     Comment: few beers day; glass of red wine at dinner   • Drug use: Never   • Sexual activity: Not on file   Other Topics Concern   • Not on file   Social History Narrative   • Not on file     Social Determinants of Health     Financial Resource Strain: Not on file   Food Insecurity: No Food Insecurity   • Worried About Running Out of Food in the Last Year: Never true   • Ran Out of Food in the Last Year: Never true Transportation Needs: No Transportation Needs   • Lack of Transportation (Medical): No   • Lack of Transportation (Non-Medical):  No   Physical Activity: Not on file   Stress: Not on file   Social Connections: Not on file   Intimate Partner Violence: Not on file   Housing Stability: Low Risk    • Unable to Pay for Housing in the Last Year: No   • Number of Places Lived in the Last Year: 1   • Unstable Housing in the Last Year: No       Current Outpatient Medications:   •  acetaminophen (TYLENOL) 325 mg tablet, Take 2 tablets (650 mg total) by mouth every 6 (six) hours as needed for mild pain, headaches or fever, Disp: 30 tablet, Rfl: 0  •  albuterol (2 5 mg/3 mL) 0 083 % nebulizer solution, Take 2 5 mg by nebulization As needed per patient's wife , Disp: , Rfl:   •  apixaban (Eliquis) 5 mg, Take 1 tablet (5 mg total) by mouth 2 (two) times a day, Disp: 60 tablet, Rfl: 5  •  atorvastatin (LIPITOR) 10 mg tablet, Take 1 tablet (10 mg total) by mouth daily with dinner Do not start before January 1, 2023 , Disp: 30 tablet, Rfl: 1  •  calamine-zinc oxide 8-8 %, Apply topically 4 (four) times a day, Disp: 118 mL, Rfl: 0  •  carvedilol (COREG) 25 mg tablet, Take 1 tablet (25 mg total) by mouth 2 (two) times a day with meals, Disp: 60 tablet, Rfl: 1  •  folic acid (FOLVITE) 1 mg tablet, Take 1 tablet (1 mg total) by mouth daily, Disp: 30 tablet, Rfl: 0  •  hydrALAZINE (APRESOLINE) 25 mg tablet, Take 1 tablet (25 mg total) by mouth every 12 (twelve) hours, Disp: 60 tablet, Rfl: 1  •  hydrALAZINE (APRESOLINE) 25 mg tablet, Take 1 tablet (25 mg total) by mouth every 12 (twelve) hours, Disp: 60 tablet, Rfl: 1  •  hydrocortisone 2 5 % cream, Apply topically 2 (two) times a day Apply twice a day affected area the trunk, Disp: 20 g, Rfl: 2  •  metFORMIN (GLUCOPHAGE) 500 mg tablet, Take 1 tablet (500 mg total) by mouth 2 (two) times a day, Disp: 180 tablet, Rfl: 1  •  Multiple Vitamin (MULTI-VITAMIN DAILY PO), Take by mouth daily , Disp: , Rfl:   •  omeprazole (PriLOSEC) 20 mg delayed release capsule, Take 1 capsule (20 mg total) by mouth daily, Disp: 90 capsule, Rfl: 0  •  primidone (MYSOLINE) 50 mg tablet, Take 1 tablet (50 mg total) by mouth every 12 (twelve) hours, Disp: 30 tablet, Rfl: 1  •  spironolactone (ALDACTONE) 25 mg tablet, Take 1 tablet (25 mg total) by mouth daily Do not start before December 28, 2022 , Disp: 30 tablet, Rfl: 1  •  thiamine 100 MG tablet, Take 1 tablet (100 mg total) by mouth daily, Disp: 30 tablet, Rfl: 0  •  Trelegy Ellipta 100-62 5-25 MCG/INH inhaler, INHALE ONE PUFF DAILY; RINSE MOUTH AFTER USE, Disp: 60 each, Rfl: 5  •  atorvastatin (LIPITOR) 10 mg tablet, Take 1 tablet (10 mg total) by mouth daily (Patient not taking: Reported on 1/12/2023), Disp: 30 tablet, Rfl: 1  •  diltiazem (CARDIZEM SR) 120 mg 12 hr capsule, Take 1 capsule (120 mg total) by mouth every 12 (twelve) hours (Patient not taking: Reported on 1/12/2023), Disp: 60 capsule, Rfl: 1  •  guaifenesin-codeine (GUAIFENESIN AC) 100-10 MG/5ML liquid, Take 5 mL by mouth 3 (three) times a day as needed for cough (Patient not taking: Reported on 1/7/2023), Disp: 118 mL, Rfl: 0  •  hydrOXYzine HCL (ATARAX) 25 mg tablet, Take 1 tablet (25 mg total) by mouth every 6 (six) hours as needed for itching, allergies or anxiety (Patient not taking: Reported on 2/2/2023), Disp: 24 tablet, Rfl: 0  •  hydrOXYzine HCL (ATARAX) 25 mg tablet, Take 1 tablet (25 mg total) by mouth every 6 (six) hours as needed for itching, allergies or anxiety (Patient not taking: Reported on 2/2/2023), Disp: 24 tablet, Rfl: 0  No Known Allergies  Vitals:    02/02/23 0906   BP: 110/75   Pulse: 75   Resp: 16   Temp: 98 2 °F (36 8 °C)   SpO2: 96%   Weight: 97 1 kg (214 lb)      Pain Score: 0-No pain

## 2023-02-06 ENCOUNTER — OFFICE VISIT (OUTPATIENT)
Dept: INTERNAL MEDICINE CLINIC | Facility: CLINIC | Age: 83
End: 2023-02-06

## 2023-02-06 VITALS
HEART RATE: 93 BPM | OXYGEN SATURATION: 98 % | RESPIRATION RATE: 16 BRPM | BODY MASS INDEX: 29.93 KG/M2 | HEIGHT: 71 IN | TEMPERATURE: 98 F | SYSTOLIC BLOOD PRESSURE: 118 MMHG | WEIGHT: 213.8 LBS | DIASTOLIC BLOOD PRESSURE: 90 MMHG

## 2023-02-06 DIAGNOSIS — J96.01 ACUTE RESPIRATORY FAILURE WITH HYPOXEMIA (HCC): Primary | ICD-10-CM

## 2023-02-06 DIAGNOSIS — J43.2 CENTRILOBULAR EMPHYSEMA (HCC): ICD-10-CM

## 2023-02-06 DIAGNOSIS — L03.313 CELLULITIS OF CHEST WALL: ICD-10-CM

## 2023-02-06 DIAGNOSIS — E11.42 DIABETIC POLYNEUROPATHY ASSOCIATED WITH TYPE 2 DIABETES MELLITUS (HCC): ICD-10-CM

## 2023-02-06 DIAGNOSIS — Z13.0 SCREENING FOR DEFICIENCY ANEMIA: ICD-10-CM

## 2023-02-06 DIAGNOSIS — I25.10 CORONARY ARTERY DISEASE INVOLVING NATIVE CORONARY ARTERY OF NATIVE HEART WITHOUT ANGINA PECTORIS: ICD-10-CM

## 2023-02-06 DIAGNOSIS — J44.9 CHRONIC OBSTRUCTIVE PULMONARY DISEASE, UNSPECIFIED COPD TYPE (HCC): ICD-10-CM

## 2023-02-06 DIAGNOSIS — E78.2 MIXED HYPERLIPIDEMIA: ICD-10-CM

## 2023-02-06 DIAGNOSIS — C34.92 SQUAMOUS CELL CARCINOMA OF LEFT LUNG (HCC): Chronic | ICD-10-CM

## 2023-02-06 LAB — FUNGUS SPEC CULT: NORMAL

## 2023-02-06 NOTE — ASSESSMENT & PLAN NOTE
Last CAT scan of the chest results reviewed minimal pleural effusion atelectasis improved with left left lung mass seen by radiation therapy and advised to follow-up with the oncology

## 2023-02-06 NOTE — ASSESSMENT & PLAN NOTE
Seen by dermatology initially treated with IV vancomycin followed by doxycycline finished a course of doxycycline cellulitis resolved

## 2023-02-06 NOTE — ASSESSMENT & PLAN NOTE
Lab Results   Component Value Date    HGBA1C 6 7 (H) 12/20/2022   Diabetes seems to be controlled on the basis of A1c continue metformin 500 twice a day with diabetic diet patient lost 7 pounds since the last visit hypoglycemia protocol in place a dilated eye exam once a year foot exam normal advised home blood sugar monitoring

## 2023-02-06 NOTE — PROGRESS NOTES
Dr Mee Em Office Visit Note  23     Verona Betts [de-identified] y o  male MRN: [id number]  : 1940    Assessment:     1  Acute respiratory failure with hypoxemia (HCC)  Assessment & Plan:  Requiring oxygen supplementation on admission up to 4 L initially during the hospitalization creatinine improved now not on oxygen acute respiratory failure with hypoxemia resolved continue Proventil as needed and trilogy      2  Centrilobular emphysema ([facility] )  -     fluticasone-umeclidinium-vilanterol (Trelegy Ellipta) 100-62 5-25 mcg/actuation inhaler; Rinse mouth after use  3  Diabetic polyneuropathy associated with type 2 diabetes mellitus ([facility] )  Assessment & Plan:    Lab Results   Component Value Date    HGBA1C 6 7 (H) 2022   Diabetes seems to be controlled on the basis of A1c continue metformin 500 twice a day with diabetic diet patient lost 7 pounds since the last visit hypoglycemia protocol in place a dilated eye exam once a year foot exam normal advised home blood sugar monitoring    Orders:  -     Hemoglobin A1C; Future; Expected date: 2023  -     Microalbumin / creatinine urine ratio; Future; Expected date: 2023  -     Hemoglobin A1c (w/out EAG) ([facility] ONLY); Future; Expected date: 2023    4  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)  Assessment & Plan:  Last CAT scan of the chest results reviewed minimal pleural effusion atelectasis improved with left left lung mass seen by radiation therapy and advised to follow-up with the oncology    Orders:  -     Comprehensive metabolic panel; Future; Expected date: 2023    5  Chronic obstructive pulmonary disease, unspecified COPD type ([facility] )  Assessment & Plan:  Improved not on oxygen continue Trelegy and Proventil as needed      6  Mixed hyperlipidemia  Assessment & Plan:  Fat low-cholesterol diet Lipitor well-controlled check lipid profile before next visit    Orders:  -     Lipid Panel with Direct LDL reflex;  Future; Expected date: 05/06/2023    7  Screening for deficiency anemia  -     CBC and differential; Future; Expected date: 05/06/2023    8  Coronary artery disease involving native coronary artery of native heart without angina pectoris  Assessment & Plan:  The nuclear stress test done November negative for ischemia continue aspirin Coreg and Eliquis stable    Orders:  -     Comprehensive metabolic panel; Future; Expected date: 05/06/2023    9  Cellulitis of chest wall  Assessment & Plan:  Seen by dermatology initially treated with IV vancomycin followed by doxycycline finished a course of doxycycline cellulitis resolved            Discussion Summary and Plan: Today's care plan and medications were reviewed with patient in detail and all their questions answered to their satisfaction  No chief complaint on file  Chief complaint   for the labs and follow-up for the chronic medical condition  Subjective:  Patient came for follow-up chronic medical condition listed in the visit diagnosis recently had a CAT scan of the chest done which shows pleural effusion minimal with atelectasis improved and persistent lung mass unchanged finish the radiation therapy also seen by dermatology finish the course of doxycycline for cellulitis trunk along with abscess right axilla supposed to go for the blood work lost the prescription given a new prescription for complete blood work and at present denies any chest pain or difficulty breathing      The following portions of the patient's history were reviewed and updated as appropriate: allergies, current medications, past family history, past medical history, past social history, past surgical history and problem list     Review of Systems   Constitutional: Positive for activity change and fatigue  Negative for appetite change, chills, diaphoresis, fever and unexpected weight change     HENT: Negative for congestion, dental problem, drooling, ear discharge, ear pain, facial swelling, hearing loss, mouth sores, nosebleeds, postnasal drip, rhinorrhea, sinus pressure, sneezing, sore throat, tinnitus, trouble swallowing and voice change  Eyes: Negative for photophobia, pain, discharge, redness, itching and visual disturbance  Respiratory: Positive for cough and shortness of breath  Negative for apnea, choking, chest tightness, wheezing and stridor  Cardiovascular: Negative for chest pain and palpitations  Gastrointestinal: Negative for abdominal distention, abdominal pain, anal bleeding, blood in stool, constipation, diarrhea, nausea, rectal pain and vomiting  Endocrine: Negative for cold intolerance, heat intolerance, polydipsia, polyphagia and polyuria  Genitourinary: Negative for decreased urine volume, difficulty urinating, dysuria, enuresis, flank pain, frequency, genital sores, hematuria and urgency  Musculoskeletal: Positive for arthralgias, back pain, gait problem and joint swelling  Negative for myalgias, neck pain and neck stiffness  Skin: Negative for color change, pallor, rash and wound  Allergic/Immunologic: Negative  Negative for environmental allergies, food allergies and immunocompromised state  Neurological: Positive for dizziness  Negative for tremors, seizures, syncope, facial asymmetry, speech difficulty, weakness, light-headedness, numbness and headaches  Psychiatric/Behavioral: Negative for agitation, behavioral problems, confusion, decreased concentration, dysphoric mood, hallucinations, self-injury, sleep disturbance and suicidal ideas  The patient is nervous/anxious  The patient is not hyperactive            Historical Information   Patient Active Problem List   Diagnosis   • Corns   • Pain in both feet   • Diabetic polyneuropathy associated with type 2 diabetes mellitus (HCC)   • Onychomycosis   • Tinea pedis of both feet   • Lung mass   • Hyperlipidemia   • Squamous cell carcinoma of lung (HCC)   • Shortness of breath   • Daytime hypersomnolence   • Chronic diastolic heart failure (HCC)   • SYLVESTER (obstructive sleep apnea)   • Prostate cancer (HCC)   • GERD (gastroesophageal reflux disease)   • CAD (coronary artery disease)   • Other persistent atrial fibrillation (HCC)   • Alcohol dependence ([place] [address] )   • Acute respiratory failure with hypoxemia (HCC)   • Depression, recurrent (HCC)   • COVID-19   • Angioedema   • Septic shock (HCC)   • Cellulitis   • Cellulitis of chest wall   • Acute kidney injury (BUDDY) with acute tubular necrosis (ATN) (HCC)   • Chronic obstructive pulmonary disease, unspecified COPD type ([place] [address] )     Past Medical History:   Diagnosis Date   • Abdominal pain    • Anxiety    • Arthritis    • Asthma    • Atrial fibrillation (HCC)    • Back pain    • Bleeding ulcer    • Bronchitis    • Cancer [facility])     prostate 2011- radiation   • Cardiac disease     cardiac stent x1   • Cataract     starting   • Chronic diastolic (congestive) heart failure (HCC)    • Diabetes mellitus ([place] [address] )     boarderline diabetic    • GERD (gastroesophageal reflux disease)    • History of radiation therapy 2010    Prostate seeds (brachytherapy) and EBRT   • Hyperlipidemia    • Hypertension    • 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, bilateral    • Low back pain    • Lung cancer ([place] [address] )    • Lung mass    • Myocardial infarction ([place] [address] )     mild 1999   • Obesity    • Prostate cancer ([place] [address] )    • Shortness of breath    • Sleep apnea     sleep study 11/22   • Wears dentures     full set   • Wears glasses      Past Surgical History:   Procedure Laterality Date   • ABDOMINAL HERNIA REPAIR     • ABDOMINAL SURGERY      bleeding ulcer, cyst removed from abd   • COLONOSCOPY     • CORONARY ANGIOPLASTY WITH STENT PLACEMENT  1999    x1    • ESOPHAGOGASTRODUODENOSCOPY     • IR BIOPSY LUNG  10/05/2021   • IR THORACENTESIS  11/08/2021   • KNEE SURGERY Left    • [address] INCL FLUOR GDNCE DX W/CELL WASHG SPX N/A 11/29/2021    Procedure: BRONCHOSCOPY FLEXIBLE;  Surgeon: Aniyah Luke MD;  Location: [facility];  Service: Thoracic   • IA MEDIASTINOSCOPY WITH LYMPH NODE BIOPSY/IES N/A 2021    Procedure: MEDIASTINOSCOPY;  Surgeon: Mary Mcdonald MD;  Location: [facility];  Service: Thoracic   • PROSTATE SURGERY       Social History     Substance and Sexual Activity   Alcohol Use Yes   • Alcohol/week: 10 0 - 12 0 standard drinks   • Types: 7 Glasses of wine, 3 - 5 Cans of beer per week    Comment: few beers day; glass of red wine at dinner     Social History     Substance and Sexual Activity   Drug Use Never     Social History     Tobacco Use   Smoking Status Former   • Packs/day:    • Years: 30    • Pack years:    • Types: Cigarettes   • Quit date:    • Years since quittin 1   Smokeless Tobacco Never     Family History   Problem Relation Age of Onset   • Prostate cancer Brother    • Cancer Maternal Uncle         colo rectal cancer   • Cancer Paternal Aunt      Health Maintenance Due   Topic   • Pneumococcal Vaccine: 65+ Years (1 - PCV)   • COVID-19 Vaccine (3 - Moderna risk series)   • Kidney Health Evaluation: Microalbumin/Creatinine Ratio       Meds/Allergies       Current Outpatient Medications:   •  acetaminophen (TYLENOL) 325 mg tablet, Take 2 tablets (650 mg total) by mouth every 6 (six) hours as needed for mild pain, headaches or fever, Disp: 30 tablet, Rfl: 0  •  albuterol (2 5 mg/3 mL) 0 083 % nebulizer solution, Take 2 5 mg by nebulization As needed per patient's wife , Disp: , Rfl:   •  apixaban (Eliquis) 5 mg, Take 1 tablet (5 mg total) by mouth 2 (two) times a day, Disp: 60 tablet, Rfl: 5  •  carvedilol (COREG) 25 mg tablet, Take 1 tablet (25 mg total) by mouth 2 (two) times a day with meals, Disp: 60 tablet, Rfl: 1  •  fluticasone-umeclidinium-vilanterol (Trelegy Ellipta) 100-62 5-25 mcg/actuation inhaler, Rinse mouth after use , Disp: 60 each, Rfl: 5  •  hydrALAZINE (APRESOLINE) 25 mg tablet, Take 1 tablet (25 mg total) by mouth every 12 (twelve) hours, Disp: 60 tablet, Rfl: 1  •  hydrALAZINE (APRESOLINE) 25 mg tablet, Take 1 tablet (25 mg total) by mouth every 12 (twelve) hours, Disp: 60 tablet, Rfl: 1  •  hydrocortisone 2 5 % cream, Apply topically 2 (two) times a day Apply twice a day affected area the trunk, Disp: 20 g, Rfl: 2  •  metFORMIN (GLUCOPHAGE) 500 mg tablet, Take 1 tablet (500 mg total) by mouth 2 (two) times a day, Disp: 180 tablet, Rfl: 1  •  Multiple Vitamin (MULTI-VITAMIN DAILY PO), Take by mouth daily  , Disp: , Rfl:   •  primidone (MYSOLINE) 50 mg tablet, Take 1 tablet (50 mg total) by mouth every 12 (twelve) hours, Disp: 30 tablet, Rfl: 1  •  spironolactone (ALDACTONE) 25 mg tablet, Take 1 tablet (25 mg total) by mouth daily Do not start before December 28, 2022 , Disp: 30 tablet, Rfl: 1  •  thiamine 100 MG tablet, Take 1 tablet (100 mg total) by mouth daily (Patient not taking: Reported on 2/6/2023), Disp: 30 tablet, Rfl: 0      Objective:    Vitals:   /90   Pulse 93   Temp 98 °F (36 7 °C)   Resp 16   Ht 5' 11" (1 803 m)   Wt 97 kg (213 lb 12 8 oz)   SpO2 98%   BMI 29 82 kg/m²   Body mass index is 29 82 kg/m²  Vitals:    02/06/23 1050   Weight: 97 kg (213 lb 12 8 oz)       Physical Exam  Vitals and nursing note reviewed  Constitutional:       General: He is not in acute distress  Appearance: He is well-developed  He is obese  He is not ill-appearing, toxic-appearing or diaphoretic  HENT:      Head: Normocephalic and atraumatic  Right Ear: External ear normal       Left Ear: External ear normal       Nose: Nose normal       Mouth/Throat:      Pharynx: No oropharyngeal exudate  Eyes:      General: Lids are normal  Lids are everted, no foreign bodies appreciated  No scleral icterus  Right eye: No discharge  Left eye: No discharge  Conjunctiva/sclera: Conjunctivae normal       Pupils: Pupils are equal, round, and reactive to light  Neck:      Thyroid: No thyromegaly  Vascular: Normal carotid pulses   No carotid bruit, hepatojugular reflux or JVD  Trachea: No tracheal tenderness or tracheal deviation  Cardiovascular:      Rate and Rhythm: Normal rate and regular rhythm  Pulses: Normal pulses  Heart sounds: Normal heart sounds  No murmur heard  No friction rub  No gallop  Pulmonary:      Effort: Pulmonary effort is normal  No respiratory distress  Breath sounds: No stridor  Rhonchi and rales present  No wheezing  Chest:      Chest wall: No tenderness  Abdominal:      General: Bowel sounds are normal  There is no distension  Palpations: Abdomen is soft  There is no mass  Tenderness: There is no abdominal tenderness  There is no guarding or rebound  Musculoskeletal:         General: No tenderness or deformity  Normal range of motion  Cervical back: Normal range of motion and neck supple  No edema, erythema or rigidity  No spinous process tenderness or muscular tenderness  Normal range of motion  Right lower leg: Edema present  Left lower leg: Edema present  Lymphadenopathy:      Head:      Right side of head: No submental, submandibular, tonsillar, preauricular or posterior auricular adenopathy  Left side of head: No submental, submandibular, tonsillar, preauricular, posterior auricular or occipital adenopathy  Cervical: No cervical adenopathy  Right cervical: No superficial, deep or posterior cervical adenopathy  Left cervical: No superficial, deep or posterior cervical adenopathy  Upper Body:      Right upper body: No pectoral adenopathy  Left upper body: No pectoral adenopathy  Skin:     General: Skin is warm and dry  Coloration: Skin is not pale  Findings: No erythema or rash  Neurological:      Mental Status: He is alert and oriented to person, place, and time  Cranial Nerves: No cranial nerve deficit  Sensory: No sensory deficit  Motor: Weakness present  No tremor, abnormal muscle tone or seizure activity        Coordination: Coordination normal       Gait: Gait abnormal       Deep Tendon Reflexes: Reflexes are normal and symmetric  Reflexes normal    Psychiatric:         Behavior: Behavior normal          Thought Content: Thought content normal          Judgment: Judgment normal          Lab Review   Orders Only on 01/03/2023   Component Date Value Ref Range Status   • AFB Culture 12/28/2022 No AFB Isolated at 3 Weeks   Preliminary   • AFB Stain 12/28/2022 No acid fast bacilli seen   Preliminary   • Fungus (Mycology) Culture 12/28/2022 No Fungus Isolated at 4 Weeks   Final   No results displayed because visit has over 200 results  Patient Instructions       Type 2 Diabetes Management for Adults   AMBULATORY CARE:   Type 2 diabetes  is a disease that affects how your body uses glucose (sugar)  Either your body cannot make enough insulin, or it cannot use the insulin correctly  It is important to keep diabetes controlled to prevent damage to your heart, blood vessels, and other organs  Have someone call your local emergency number (911 in the [address] [facility]) if:   · You cannot be woken  · You have signs of diabetic ketoacidosis:     ? confusion, fatigue    ? vomiting    ? rapid heartbeat    ? fruity smelling breath    ? extreme thirst    ? dry mouth and skin    · You have any of the following signs of a heart attack:      ? Squeezing, pressure, or pain in your chest    ? You may  also have any of the following:     § Discomfort or pain in your back, neck, jaw, stomach, or arm    § Shortness of breath    § Nausea or vomiting    § Lightheadedness or a sudden cold sweat    · You have any of the following signs of a stroke:      ? Numbness or drooping on one side of your face     ? Weakness in an arm or leg    ? Confusion or difficulty speaking    ?  Dizziness, a severe headache, or vision loss    Call your doctor or diabetes care team if:   · You have a sore or wound that will not heal     · You have a change in the amount you urinate  · Your blood sugar levels are higher than your target goals  · You often have lower blood sugar levels than your target goals  · Your skin is red, dry, warm, or swollen  · You have trouble coping with diabetes, or you feel anxious or depressed  · You have questions or concerns about your condition or care  What you need to know about high blood sugar levels:  High blood sugar levels may not cause any symptoms  You may feel more thirsty or urinate more often than usual  Over time, high blood sugar levels can damage your nerves, blood vessels, tissues, and organs  The following can increase your blood sugar levels:  · Large meals or large amounts of carbohydrates at one time    · Less physical activity    · Stress    · Illness    · A lower dose of medicine or insulin, or a late dose    What you need to know about low blood sugar levels: You can prevent symptoms such as shakiness, dizziness, irritability, or confusion by preventing your blood sugar levels from going too low  · Treat a low blood sugar level right away  ? Drink 4 ounces of juice or have 1 tube of glucose gel  ? Check your blood sugar level again 10 to 15 minutes later  ? When the level goes back to normal, eat a meal or snack to prevent another decrease  · Keep glucose gel, raisins, or hard candy with you at all times to treat a low blood sugar level  · Your blood sugar level can get too low if you take diabetes medicine or insulin and do not eat enough food  · If you use insulin, check your blood sugar level before you exercise  ? If your blood sugar level is below 100 mg/dL, eat 4 crackers or 2 ounces of raisins, or drink 4 ounces of juice  ? Check your level every 30 minutes if you exercise more than 1 hour  ? You may need a snack during or after exercise  What you can do to manage your blood sugar levels:   · Check your blood sugar levels as directed and as needed    Several items are available to use to check your levels  You may need to check by testing a drop of blood in a glucose monitor  You may instead be given a continuous glucose monitoring (CGM) device  The device is worn at all times  The CGM checks your blood sugar level every 5 minutes  It sends results to an electronic device such as a smart phone  A CGM can be used with or without an insulin pump  Talk with your provider to find out which method is best for you  The goal for blood sugar levels before meals  is between 80 and 130 mg/dL and 2 hours after eating  is lower than 180 mg/dL  · Make healthy food choices  Work with a dietitian to develop a meal plan that works for you and your schedule  A dietitian can help you learn how to eat the right amount of carbohydrates during your meals and snacks  Carbohydrates can raise your blood sugar level if you eat too many at one time  Examples of foods that contain carbohydrates are breads, cereals, rice, pasta, and sweets  · Get regular physical activity  Physical activity can help you get to your target blood sugar level goal and manage your weight  Get at least 150 minutes of moderate to vigorous aerobic physical activity each week  Do not miss more than 2 days in a row  Do not sit longer than 30 minutes at a time  Your healthcare provider can help you create an activity plan  The plan can include the best activities for you and can help you build your strength and endurance  · Maintain a healthy weight  Ask your healthcare provider what a healthy weight is for you  Ask him or her to help you create a safe weight loss plan if you are overweight  · Take your diabetes medicine or insulin as directed  You may need diabetes medicine, insulin, or both to help control your blood sugar levels  Your healthcare provider will teach you how and when to take your diabetes medicine or insulin   You will also be taught about side effects oral diabetes medicine can cause  Insulin may be injected, or given through a pump or pen  You and your care team will discuss which method is best for you  ? An insulin pump  is an implanted device that gives your insulin 24 hours a day  An insulin pump prevents the need for multiple insulin injections in a day  ? An insulin pen  is a device prefilled with the right amount of insulin  ? You and your family members will be taught how to draw up and give insulin  if this is the best method for you  Your education team will also teach you how to dispose of needles and syringes  ? You will learn how much insulin you need  and when to give it  You will be taught when not to give insulin  You will also be taught what to do if your blood sugar level drops too low  This may happen if you take insulin and do not eat the right amount of carbohydrates  Other things you can do to manage type 2 diabetes:   · Wear medical alert identification  Wear medical alert jewelry or carry a card that says you have diabetes  Ask your provider where to get these items  · Do not smoke  Nicotine and other chemicals in cigarettes and cigars can cause lung and blood vessel damage  It also makes it more difficult to manage your diabetes  Ask your provider for information if you currently smoke and need help to quit  Do not use e-cigarettes or smokeless tobacco in place of cigarettes or to help you quit  They still contain nicotine  · Check your feet each day for cuts, scratches, calluses, or other wounds  Look for redness and swelling, and feel for warmth  Wear shoes that fit well  Check your shoes for rocks or other objects that can hurt your feet  Do not walk barefoot or wear shoes without socks  Wear cotton socks to help keep your feet dry  · Ask about vaccines you may need  You have a higher risk for serious illness if you get the flu, pneumonia, COVID-19, or hepatitis   Ask your provider if you should get vaccines to prevent these or other diseases, and when to get the vaccines  · Talk to your care team if you become stressed about diabetes care  Sometimes being able to fit diabetes care into your life can cause increased stress  The stress can cause you not to take care of yourself properly  Your care team can help by offering tips about self-care  Your care team may suggest you talk to a mental health provider  The provider can listen and offer help with self-care issues  Follow up with your doctor or diabetes care team as directed: You may need to have blood tests done before your follow-up visit  The test results will show if changes need to be made in your treatment or self-care  Write down your questions so you remember to ask them during your visits  Talk to your provider if you cannot afford your medicine  © Copyright [employer] 2022 Information is for End User's use only and may not be sold, redistributed or otherwise used for commercial purposes  All illustrations and images included in CareNotes® are the copyrighted property of [employer] Inc  or [facility]   The above information is an  only  It is not intended as medical advice for individual conditions or treatments  Talk to your doctor, nurse or pharmacist before following any medical regimen to see if it is safe and effective for you  Clari Quiroz MD        "This note has been constructed using a voice recognition system  Therefore there may be syntax, spelling, and/or grammatical errors   Please call if you have any questions  "

## 2023-02-06 NOTE — PATIENT INSTRUCTIONS

## 2023-02-06 NOTE — ASSESSMENT & PLAN NOTE
The nuclear stress test done November negative for ischemia continue aspirin Coreg and Eliquis stable

## 2023-02-07 ENCOUNTER — OFFICE VISIT (OUTPATIENT)
Dept: CARDIOLOGY CLINIC | Facility: CLINIC | Age: 83
End: 2023-02-07

## 2023-02-07 VITALS
HEIGHT: 71 IN | WEIGHT: 213 LBS | OXYGEN SATURATION: 100 % | DIASTOLIC BLOOD PRESSURE: 60 MMHG | SYSTOLIC BLOOD PRESSURE: 116 MMHG | BODY MASS INDEX: 29.82 KG/M2 | HEART RATE: 105 BPM

## 2023-02-07 DIAGNOSIS — I50.32 CHRONIC DIASTOLIC HEART FAILURE (HCC): ICD-10-CM

## 2023-02-07 DIAGNOSIS — I25.10 CORONARY ARTERY DISEASE INVOLVING NATIVE CORONARY ARTERY OF NATIVE HEART WITHOUT ANGINA PECTORIS: ICD-10-CM

## 2023-02-07 DIAGNOSIS — I48.19 PERSISTENT ATRIAL FIBRILLATION (HCC): Primary | ICD-10-CM

## 2023-02-07 DIAGNOSIS — I25.10 ATHEROSCLEROSIS OF NATIVE CORONARY ARTERY OF NATIVE HEART WITHOUT ANGINA PECTORIS: ICD-10-CM

## 2023-02-07 DIAGNOSIS — E78.2 MIXED HYPERLIPIDEMIA: ICD-10-CM

## 2023-02-07 DIAGNOSIS — I10 PRIMARY HYPERTENSION: ICD-10-CM

## 2023-02-07 RX ORDER — LATANOPROST 50 UG/ML
0.01 SOLUTION/ DROPS OPHTHALMIC
COMMUNITY
Start: 2023-01-24

## 2023-02-07 NOTE — PROGRESS NOTES
Cardiology   Gautam Velasco DO, Kelvin Pastor MD, Funmi Ashley MD, Magdiel Da Silva MD, [facility] [place]  -------------------------------------------------------------------  Count includes the [facility]  [address], [address], [address] [place] [address], [facility]  [phone]    Cardiology Follow Up  Agustina Perez  1940  [id number]          Assessment/Plan:    1  Persistent atrial fibrillation ([address] )  -   Patient remains rate controlled with carvediloll  He is asymptomatic without any palpitations  -   Tolerating Eliquis  No further bleed from axillary site  2  Chronic diastolic heart failure (HCC)  -   Echocardiogram showed ejection fraction of 50-55%  3  Atherosclerosis of native coronary artery of native heart without angina pectoris  -   Patient has a history of coronary disease with prior PCI over 20 years ago  -   Stress test did not show any ischemia or infarction  4   Hypertension  -   Blood pressure controlled with losartan 50 mg daily  6  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[address] )  -  Continue atorvastatin  Interval History:     Agustina Perez is [de-identified] y o  male here for followup of atrial fibirllation  Since his last visit, he was admitted to [place] [address] in December 2022 with right axillary cellulitis/sepsis  He was also noted to have atrial fibrillation with rapid ventricular response along with elevated blood pressure  He was treated with IV antibiotics along with Cardizem and digoxin  In the past, he was on atenolol which was switched to carvedilol  Eliquis was held during hospitalization due to heavy bleeding from axillary biopsy site  He resumed Eliquis once bleeding stopped  Since hospital discharge he has been feeling well without any chest pain, shortness of breath or palpitations  He denies any LE Edema, orthopnea or PND  He has completed radiation therapy for lung mass  He did not require chemotherapy  Previously, he had sleep study done which was abnormal   Went for CPAP fitting but could not tolerate CPAP machine  Previously, the patient was admitted to [facility] on September 29, 2021 with shortness of breath  Workup in the emergency room revealed RSV and a left lower lobe lung mass  He was also found to be in atrial fibrillation  He was started on atenolol and Eliquis  Echocardiogram showed ejection fraction of 50-55% and mild valve disease  Patient had a PET scan done on October 29th which showed a 4 5 x 4 cm left lower lobe lung nodule with paratracheal nodes mildly hypermetabolic and a small mildly hypermetabolic left pleural effusion  He was evaluated by CT surgery who did not believe patient was candidate for resection  He underwent radiation therapy      The following portions of the patient's history were reviewed and updated as appropriate: allergies, current medications, past family history, past medical history, past social history, past surgical history, and problem list        Current Outpatient Medications:   •  acetaminophen (TYLENOL) 325 mg tablet, Take 2 tablets (650 mg total) by mouth every 6 (six) hours as needed for mild pain, headaches or fever, Disp: 30 tablet, Rfl: 0  •  albuterol (2 5 mg/3 mL) 0 083 % nebulizer solution, Take 2 5 mg by nebulization As needed per patient's wife , Disp: , Rfl:   •  apixaban (Eliquis) 5 mg, Take 1 tablet (5 mg total) by mouth 2 (two) times a day, Disp: 60 tablet, Rfl: 5  •  carvedilol (COREG) 25 mg tablet, Take 1 tablet (25 mg total) by mouth 2 (two) times a day with meals, Disp: 60 tablet, Rfl: 1  •  fluticasone-umeclidinium-vilanterol (Trelegy Ellipta) 100-62 5-25 mcg/actuation inhaler, Rinse mouth after use , Disp: 60 each, Rfl: 5  •  hydrALAZINE (APRESOLINE) 25 mg tablet, Take 1 tablet (25 mg total) by mouth every 12 (twelve) hours, Disp: 60 tablet, Rfl: 1  •  hydrALAZINE (APRESOLINE) 25 mg tablet, Take 1 tablet (25 mg total) by mouth every 12 (twelve) hours, Disp: 60 tablet, Rfl: 1  •  hydrocortisone 2 5 % cream, Apply topically 2 (two) times a day Apply twice a day affected area the trunk, Disp: 20 g, Rfl: 2  •  latanoprost (XALATAN) 0 005 % ophthalmic solution, Administer 0 005 mL to both eyes daily at bedtime, Disp: , Rfl:   •  metFORMIN (GLUCOPHAGE) 500 mg tablet, Take 1 tablet (500 mg total) by mouth 2 (two) times a day, Disp: 180 tablet, Rfl: 1  •  Multiple Vitamin (MULTI-VITAMIN DAILY PO), Take by mouth daily  , Disp: , Rfl:   •  primidone (MYSOLINE) 50 mg tablet, Take 1 tablet (50 mg total) by mouth every 12 (twelve) hours, Disp: 30 tablet, Rfl: 1  •  spironolactone (ALDACTONE) 25 mg tablet, Take 1 tablet (25 mg total) by mouth daily Do not start before December 28, 2022 , Disp: 30 tablet, Rfl: 1  •  thiamine 100 MG tablet, Take 1 tablet (100 mg total) by mouth daily, Disp: 30 tablet, Rfl: 0        Review of Systems:  Review of Systems   Respiratory: Negative for shortness of breath  Cardiovascular: Negative for chest pain and leg swelling  All other systems reviewed and are negative  Physical Exam:  Vitals:  Vitals:    02/07/23 1006   BP: 116/60   BP Location: Left arm   Patient Position: Sitting   Cuff Size: Standard   Pulse: 105   SpO2: 100%   Weight: 96 6 kg (213 lb)   Height: 5' 11" (1 803 m)     Physical Exam   Constitutional: He appears healthy  No distress  HENT:   Nose: Nose normal    Mouth/Throat: Dentition is normal  Oropharynx is clear  Eyes: Pupils are equal, round, and reactive to light  Conjunctivae are normal    Neck: No JVD present  Cardiovascular: Normal rate and normal heart sounds  An irregularly irregular rhythm present  Exam reveals no gallop and no friction rub  No murmur heard  Pulmonary/Chest: Effort normal and breath sounds normal  He has no wheezes  He has no rales  Musculoskeletal:         General: No edema  Cervical back: Normal range of motion and neck supple  Neurological: He is alert and oriented to person, place, and time  Skin: Skin is warm and dry  Cardiographics:  EKG: Personally reviewed   Atrial fibrillation   Last known EF: 50-55%    This note was completed in part utilizing M-Modal Fluency Direct Software  Grammatical errors, random word insertions, spelling mistakes, and incomplete sentences can be an occasional consequence of this system secondary to software limitations, ambient noise, and hardware issues  If you have any questions or concerns about the content, text, or information contained within the body of this dictation, please contact the provider for clarification

## 2023-02-09 ENCOUNTER — OFFICE VISIT (OUTPATIENT)
Dept: DERMATOLOGY | Age: 83
End: 2023-02-09

## 2023-02-09 VITALS — HEIGHT: 71 IN | BODY MASS INDEX: 30.04 KG/M2 | TEMPERATURE: 98.5 F | WEIGHT: 214.6 LBS

## 2023-02-09 DIAGNOSIS — L03.319 CELLULITIS OF TRUNK, UNSPECIFIED SITE OF TRUNK: Primary | ICD-10-CM

## 2023-02-09 NOTE — PROGRESS NOTES
[facility] Note     Patient Name: Rhonda Castillo  Encounter Date: 2/9/23     Have you been cared for by a [facility] Dermatologist in the last 3 years and, if so, which description applies to you? Yes  I have been here within the last 3 years, and my medical history has NOT changed since that time  I am MALE/not capable of bearing children  REVIEW OF SYSTEMS:  Have you recently had or currently have any of the following? · No changes in my recent health  PAST MEDICAL HISTORY:  Have you personally ever had or currently have any of the following? If "YES," then please provide more detail  · No changes in my medical history  FAMILY HISTORY:  Any "first degree relatives" (parent, brother, sister, or child) with the following? • No changes in my family's known health  PATIENT EXPERIENCE:    • Do you want the Dermatologist to perform a COMPLETE skin exam today including a clinical examination under the "bra and underwear" areas? NO  • If necessary, do we have your permission to call and leave a detailed message on your Preferred Phone number that includes your specific medical information?   Yes      No Known Allergies   Current Outpatient Medications:   •  acetaminophen (TYLENOL) 325 mg tablet, Take 2 tablets (650 mg total) by mouth every 6 (six) hours as needed for mild pain, headaches or fever, Disp: 30 tablet, Rfl: 0  •  albuterol (2 5 mg/3 mL) 0 083 % nebulizer solution, Take 2 5 mg by nebulization As needed per patient's wife , Disp: , Rfl:   •  apixaban (Eliquis) 5 mg, Take 1 tablet (5 mg total) by mouth 2 (two) times a day, Disp: 60 tablet, Rfl: 5  •  carvedilol (COREG) 25 mg tablet, Take 1 tablet (25 mg total) by mouth 2 (two) times a day with meals, Disp: 60 tablet, Rfl: 1  •  fluticasone-umeclidinium-vilanterol (Trelegy Ellipta) 100-62 5-25 mcg/actuation inhaler, Rinse mouth after use , Disp: 60 each, Rfl: 5  •  hydrALAZINE (APRESOLINE) 25 mg tablet, Take 1 tablet (25 mg total) by mouth every 12 (twelve) hours, Disp: 60 tablet, Rfl: 1  •  hydrALAZINE (APRESOLINE) 25 mg tablet, Take 1 tablet (25 mg total) by mouth every 12 (twelve) hours, Disp: 60 tablet, Rfl: 1  •  hydrocortisone 2 5 % cream, Apply topically 2 (two) times a day Apply twice a day affected area the trunk, Disp: 20 g, Rfl: 2  •  latanoprost (XALATAN) 0 005 % ophthalmic solution, Administer 0 005 mL to both eyes daily at bedtime, Disp: , Rfl:   •  metFORMIN (GLUCOPHAGE) 500 mg tablet, Take 1 tablet (500 mg total) by mouth 2 (two) times a day, Disp: 180 tablet, Rfl: 1  •  Multiple Vitamin (MULTI-VITAMIN DAILY PO), Take by mouth daily  , Disp: , Rfl:   •  primidone (MYSOLINE) 50 mg tablet, Take 1 tablet (50 mg total) by mouth every 12 (twelve) hours, Disp: 30 tablet, Rfl: 1  •  spironolactone (ALDACTONE) 25 mg tablet, Take 1 tablet (25 mg total) by mouth daily Do not start before December 28, 2022 , Disp: 30 tablet, Rfl: 1  •  thiamine 100 MG tablet, Take 1 tablet (100 mg total) by mouth daily, Disp: 30 tablet, Rfl: 0          • Whom besides the patient is providing clinical information about today's encounter?   o NO ADDITIONAL HISTORIAN (patient alone provided history)    Physical Exam and Assessment/Plan by Diagnosis:    CELLULITIS FOLLOW UP    Physical Exam:  • Anatomic Location Affected:  Right Axilla  • Morphological Description:  Induration with scaring, no erythema no drainage  • Pertinent Positives:  • Pertinent Negatives: Additional History of Present Condition:  Took doxycycline 100 mg twice daily for 7 days  Patient reported improvement  Assessment and Plan:  Based on a thorough discussion of this condition and the management approach to it (including a comprehensive discussion of the known risks, side effects and potential benefits of treatment), the patient (family) agrees to implement the following specific plan:  • Resolved  If symptoms recurs please call the office      Cellulitis is a common and potentially serious infection caused by bacteria  The bacteria infect the deep layers of skin and tissue beneath the skin  The first sign of cellulitis is usually red and swollen skin  When you touch the infected area, it often feels warm and tender  This infection can show up anywhere on the skin  Adults often get it in a lower leg  In children, cellulitis tends to appear on the face or neck  Without treatment, the infection can spread quickly  The bacteria may travel to lymph nodes and into the bloodstream  This can lead to a blood infection or permanently damage lymph vessels, which are part of your immune system  Other complications can also develop  Cellulitis affects people of all ages and races  Some risk factors for cellulitis include:  • Cracked skin in feet, eg due to athlete's foot, tinea pedis or cracked heels, allows bacteria to enter the body  • Venous disease, eg gravitational eczema, leg ulceration, and/or lymphedema  • Current or prior injury   • Immunodeficiency and immunosuppressive medications  • Diabetes, obesity, pregnancy, alcoholism  • Kidney and liver disease    What causes cellulitis? The most common bacteria causing cellulitis are Streptococcus pyogenes (two thirds of cases) and Staphylococcus aureus (one third)  Rare causes of cellulitis include:   • Pseudomonas aeruginosa, usually in a puncture wound of foot or hand  • Haemophilus influenzae, in children with facial cellulitis  • Anaerobes, Eikenella, Streptococcus viridans, due to human bite  • Pasteurella multocida, due to cat or dog bite  • Vibrio vulnificus, due to salt water exposure, eg coral injury  • Aeromonas hydrophila from fresh or salt water exposure, eg following leech bites  • Erysipelothrix (erysipeloid), in butchers     What are the symptoms of cellulitis?     Cellulitis can affect any site, most often a limb    • It is usually one-sided; two-sided disease is more often due to another condition such as venous stasis or contact dermatitis in the legs  • It can occur by itself or complicate an underlying skin condition or wound  The first sign of the illness is often feeling unwell, with fever, chills and shakes (rigors)  This is due to bacteria in the blood stream (bacteremia)  Systemic symptoms are soon followed by development of a localized area of painful, red, swollen skin  Other signs include:    • Dimpled skin (peau d'orange)  • Warmth  • Blistering  • Erosions and ulceration  • Abscess formation  • Purpura: petechiae, ecchymoses, or hemorrhagic blisters  These may appear as purple spots under the skin    Cellulitis may be associated with lymphangiitis and lymphadenitis, which are due to bacteria within lymph vessels and local lymph glands  A red line tracks from the site of infection to nearby tender, swollen lymph glands  How do we diagnose cellulitis? Cellulitis can usually be diagnosed with a good history and skin exam  It is important to recall recent injuries, medical conditions, and all medications you take  Blood tests can show increased white blood cell counts and other inflammatory mediators  Imaging can be performed to check for heart and lung involvement  Your doctor may be able to take a wound culture to determine what bacteria are present  In severe cases, MRI can show whether or not the infection has progressed to necrotizing fasciitis, which is a surgical emergency that requires removal of rapidly spreading dead tissue  How do we treat cellulitis? Cellulitis is potentially serious and may require a multidisciplinary team  The patient should rest and elevate the affected limb  The edge of the involved area of swelling should be marked to monitor progression or regression of the infection  If there are no signs of systemic illness or extensive cellulitis, patients can be treated with oral antibiotics at home, for a minimum of 5-10 days   In some cases, antibiotics are continued until all signs of infection have cleared (redness, pain and swelling), sometimes for several months  Treatment should also include:  • Analgesia to reduce pain eg NSAIDs, Tylenol  • Adequate water/fluid intake  • Management of co-existing skin conditions like venous dermatitis or tinea pedis    More severe cellulitis and systemic symptoms should be treated with fluids, intravenous antibiotics and oxygen  The choice of antibiotics depends on local protocols based on prevalent organisms and their resistance patterns and may be altered according to culture results  • Penicillin-based antibiotics are often chosen (eg penicillin G or flucloxacillin)  • Amoxicillin and clavulanic acid provide broad-spectrum cover if unusual bacteria are suspected  • Cephalosporins are also commonly used (eg ceftriaxone, cefotaxime or cefazolin)  • Clindamycin, sulfamethoxazole/trimethoprim, doxycycline and vancomycin are used in patients with penicillin or cephalosporin allergy, or where infection with methicillin-resistant Staphylococcus aureus is suspected    • Broad-spectrum antibiotics may also include linezolid, ceftaroline, or daptomycin    Sometimes oral probenecid is added to maintain antibiotic levels in the blood  Treatment may be switched to oral antibiotics when fever has settled and cellulitis has improved  Multidisciplinary care    • An internal medicine physician is consulted to assess and manage sepsis  • The infectious diseases service can advise on microbiology and choice of antibiotic  • A surgeon is called to drain an abscess, debride necrotic tissue, and relieve compression symptoms, eg compartment syndrome  • An ophthalmologist should be involved in case of orbital cellulitis  • A dermatologist may be called to confirm the diagnosis of cellulitis or suggest alternative diagnoses  • Specialist nurses may advise on dressings and bandaging  What is the management of recurrent cellulitis?     Patients with recurrent cellulitis should:  • Avoid trauma, wear long sleeves and pants in high risk activities, eg gardening  • Keep skin clean and well moisturized, with nails well-tended  • Avoid having blood tests taken from the affected limb  • Treat fungal infections of hands and feet early  • Keep swollen limbs elevated during rest periods to aid lymphatic circulation  Those with chronic lymphedema may benefit from compression garments  Patients with 2 or more episodes of cellulitis may benefit from chronic suppressive antibiotic treatment with low-dose penicillin V or erythromycin, for one to two years  After successful treatment, the skin may flake or peel off as it heals  This can be itchy    Scribe Attestation    I,:  Donato Husbands am acting as a scribe while in the presence of the attending physician :       I,:  Navjot Meyers MD personally performed the services described in this documentation    as scribed in my presence :

## 2023-02-09 NOTE — PATIENT INSTRUCTIONS
CELLULITIS FOLLOW UP    Assessment and Plan:  Based on a thorough discussion of this condition and the management approach to it (including a comprehensive discussion of the known risks, side effects and potential benefits of treatment), the patient (family) agrees to implement the following specific plan:  Resolved  If symptoms recurs please call the office  Cellulitis is a common and potentially serious infection caused by bacteria  The bacteria infect the deep layers of skin and tissue beneath the skin  The first sign of cellulitis is usually red and swollen skin  When you touch the infected area, it often feels warm and tender  This infection can show up anywhere on the skin  Adults often get it in a lower leg  In children, cellulitis tends to appear on the face or neck  Without treatment, the infection can spread quickly  The bacteria may travel to lymph nodes and into the bloodstream  This can lead to a blood infection or permanently damage lymph vessels, which are part of your immune system  Other complications can also develop  Cellulitis affects people of all ages and races  Some risk factors for cellulitis include:  Cracked skin in feet, eg due to athlete's foot, tinea pedis or cracked heels, allows bacteria to enter the body  Venous disease, eg gravitational eczema, leg ulceration, and/or lymphedema  Current or prior injury   Immunodeficiency and immunosuppressive medications  Diabetes, obesity, pregnancy, alcoholism  Kidney and liver disease    What causes cellulitis? The most common bacteria causing cellulitis are Streptococcus pyogenes (two thirds of cases) and Staphylococcus aureus (one third)   Rare causes of cellulitis include:   Pseudomonas aeruginosa, usually in a puncture wound of foot or hand  Haemophilus influenzae, in children with facial cellulitis  Anaerobes, Eikenella, Streptococcus viridans, due to human bite  Pasteurella multocida, due to cat or dog bite  Vibrio vulnificus, due to salt water exposure, eg coral injury  Aeromonas hydrophila from fresh or salt water exposure, eg following leech bites  Erysipelothrix (erysipeloid), in butchers     What are the symptoms of cellulitis? Cellulitis can affect any site, most often a limb    It is usually one-sided; two-sided disease is more often due to another condition such as venous stasis or contact dermatitis in the legs  It can occur by itself or complicate an underlying skin condition or wound  The first sign of the illness is often feeling unwell, with fever, chills and shakes (rigors)  This is due to bacteria in the blood stream (bacteremia)  Systemic symptoms are soon followed by development of a localized area of painful, red, swollen skin  Other signs include:    Dimpled skin (peau d'orange)  Warmth  Blistering  Erosions and ulceration  Abscess formation  Purpura: petechiae, ecchymoses, or hemorrhagic blisters  These may appear as purple spots under the skin    Cellulitis may be associated with lymphangiitis and lymphadenitis, which are due to bacteria within lymph vessels and local lymph glands  A red line tracks from the site of infection to nearby tender, swollen lymph glands  How do we diagnose cellulitis? Cellulitis can usually be diagnosed with a good history and skin exam  It is important to recall recent injuries, medical conditions, and all medications you take  Blood tests can show increased white blood cell counts and other inflammatory mediators  Imaging can be performed to check for heart and lung involvement  Your doctor may be able to take a wound culture to determine what bacteria are present  In severe cases, MRI can show whether or not the infection has progressed to necrotizing fasciitis, which is a surgical emergency that requires removal of rapidly spreading dead tissue  How do we treat cellulitis?     Cellulitis is potentially serious and may require a multidisciplinary team  The patient should rest and elevate the affected limb  The edge of the involved area of swelling should be marked to monitor progression or regression of the infection  If there are no signs of systemic illness or extensive cellulitis, patients can be treated with oral antibiotics at home, for a minimum of 5-10 days  In some cases, antibiotics are continued until all signs of infection have cleared (redness, pain and swelling), sometimes for several months  Treatment should also include:  Analgesia to reduce pain eg NSAIDs, Tylenol  Adequate water/fluid intake  Management of co-existing skin conditions like venous dermatitis or tinea pedis    More severe cellulitis and systemic symptoms should be treated with fluids, intravenous antibiotics and oxygen  The choice of antibiotics depends on local protocols based on prevalent organisms and their resistance patterns and may be altered according to culture results  Penicillin-based antibiotics are often chosen (eg penicillin G or flucloxacillin)  Amoxicillin and clavulanic acid provide broad-spectrum cover if unusual bacteria are suspected  Cephalosporins are also commonly used (eg ceftriaxone, cefotaxime or cefazolin)  Clindamycin, sulfamethoxazole/trimethoprim, doxycycline and vancomycin are used in patients with penicillin or cephalosporin allergy, or where infection with methicillin-resistant Staphylococcus aureus is suspected    Broad-spectrum antibiotics may also include linezolid, ceftaroline, or daptomycin    Sometimes oral probenecid is added to maintain antibiotic levels in the blood  Treatment may be switched to oral antibiotics when fever has settled and cellulitis has improved  Multidisciplinary care    An internal medicine physician is consulted to assess and manage sepsis  The infectious diseases service can advise on microbiology and choice of antibiotic    A surgeon is called to drain an abscess, debride necrotic tissue, and relieve compression symptoms, eg compartment syndrome  An ophthalmologist should be involved in case of orbital cellulitis  A dermatologist may be called to confirm the diagnosis of cellulitis or suggest alternative diagnoses  Specialist nurses may advise on dressings and bandaging  What is the management of recurrent cellulitis? Patients with recurrent cellulitis should:  Avoid trauma, wear long sleeves and pants in high risk activities, eg gardening  Keep skin clean and well moisturized, with nails well-tended  Avoid having blood tests taken from the affected limb  Treat fungal infections of hands and feet early  Keep swollen limbs elevated during rest periods to aid lymphatic circulation  Those with chronic lymphedema may benefit from compression garments  Patients with 2 or more episodes of cellulitis may benefit from chronic suppressive antibiotic treatment with low-dose penicillin V or erythromycin, for one to two years  After successful treatment, the skin may flake or peel off as it heals  This can be itchy

## 2023-02-10 ENCOUNTER — OFFICE VISIT (OUTPATIENT)
Dept: HEMATOLOGY ONCOLOGY | Facility: MEDICAL CENTER | Age: 83
End: 2023-02-10

## 2023-02-10 VITALS
RESPIRATION RATE: 16 BRPM | TEMPERATURE: 97.2 F | HEIGHT: 71 IN | SYSTOLIC BLOOD PRESSURE: 144 MMHG | BODY MASS INDEX: 30.13 KG/M2 | OXYGEN SATURATION: 97 % | HEART RATE: 60 BPM | DIASTOLIC BLOOD PRESSURE: 84 MMHG | WEIGHT: 215.2 LBS

## 2023-02-10 DIAGNOSIS — C34.92 SQUAMOUS CELL CARCINOMA OF LEFT LUNG (HCC): Chronic | ICD-10-CM

## 2023-02-10 DIAGNOSIS — J44.9 CHRONIC OBSTRUCTIVE PULMONARY DISEASE, UNSPECIFIED COPD TYPE (HCC): Primary | ICD-10-CM

## 2023-02-10 NOTE — PROGRESS NOTES
Valentino Shape  1940  [facility] [place] [address] [address] [facility] [id number]    DISCUSSION/SUMMARY:    61-year-old male with a number of medical and cardiac problems previously found to have a left lower lobe mass  Biopsy demonstrated squamous cell carcinoma  IR perform thoracentesis, minimal amount of fluid was removed but there was no evidence of metastatic disease; cytology was negative  Patient was seen by thoracic surgery and underwent mediastinoscopy  There was no evidence of metastatic disease in the sampled lymph nodes (2R, 4R, 4L, 7)  Tumor was 4 5 cm  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  Patient is status post SPRT  Mr Wilma Zavaleta continues to do OK from a lung cancer standpoint  Patient will follow-up with Dr Nataliya Saez as directed  Recent CAT scan did not demonstrate any clear evidence for recurrence  Follow-up CT chest has been ordered  NCCN guidelines 1 2022 state that for patients with T2b N0 lesions, negative mediastinal lymph nodes, medically inoperable, initial treatment options include definitive RT  Guidelines also state that patients can be considered for adjuvant chemotherapy for high risk disease  High risk is defined as poorly differentiated tumors, pulmonary neuroendocrine tumors, vascular invasion, wedge resection, tumors > 4 cm (this patient) visceral pleural involvement and unknown lymph node status  Mr Wilma Zavaleta has only 1 known risk factor  As discussed previously, patient has multiple comorbidities and a had +/- performance status at that time  Adjuvant treatments were not given  Mr Wilma Zavaleta is to return to this office in 8 months (staggering medical oncology and radiation oncology office visits)  Patient is to call if he has any other oncology questions or concerns  Carefully review your medication list and verify that the list is accurate and up-to-date  Please call the hematology/oncology office if there are medications missing from the list, medications on the list that you are not currently taking or if there is a dosage or instruction that is different from how you're taking that medication  Patient goals and areas of care:  Lung cancer surveillance  Barriers to care: none  Patient is able to self-care   ______________________________________________________________________________________    Chief Complaint   Patient presents with   • Follow-up     Squamous cell carcinoma of left lung     History of Present Illness:  [de-identified] male with multiple medical and cardiac issue recently seen in the emergency room because of a cough  Workup demonstrated a left lower lobe mass  Biopsy demonstrated squamous cell carcinoma  Workup did not demonstrated evidence of metastatic disease; mediastinal ostomy was negative  Patient was seen by thoracic surgery but was not felt to be a surgical candidate  Patient was subsequently seen by radiation oncology and underwent SPRT  Patient returns for follow-up  Mr Joaquin Terrazas newly developed cellulitis in the right axilla - etiology unclear  Patient completed antibiotics  Patient still with fatigue, slightly less/better than before  No shortness of breath or dyspnea on exertion  Activities are limited but the same as before  Appetite is good, weight is stable  No GI or  problems  No pain control issues  Has received his COVID-vaccine and booster  Patient was diagnosed with prostate cancer (approximately 10 + years ago) and underwent seeds and external beam radiation  No evidence of recurrence since that time  Review of Systems   Constitutional: Positive for fatigue  HENT: Negative  Eyes: Negative  Respiratory: Negative  Cardiovascular: Negative  Gastrointestinal: Negative  Endocrine: Negative  Genitourinary: Negative  Musculoskeletal: Negative  Skin: Negative  Allergic/Immunologic: Negative  Neurological: Negative  Hematological: Negative  Easy bruising   Psychiatric/Behavioral: The patient is nervous/anxious  All other systems reviewed and are negative      Patient Active Problem List   Diagnosis   • Corns   • Pain in both feet   • Diabetic polyneuropathy associated with type 2 diabetes mellitus ([place] [address] )   • Onychomycosis   • Tinea pedis of both feet   • Lung mass   • Hyperlipidemia   • Squamous cell carcinoma of lung (HCC)   • Shortness of breath   • Daytime hypersomnolence   • Chronic diastolic heart failure (HCC)   • SYLVESTER (obstructive sleep apnea)   • Prostate cancer (HCC)   • GERD (gastroesophageal reflux disease)   • CAD (coronary artery disease)   • Other persistent atrial fibrillation ([facility])   • Alcohol dependence ([place] [address] )   • Acute respiratory failure with hypoxemia ([facility])   • Depression, recurrent ([facility])   • COVID-19   • Angioedema   • Septic shock ([facility])   • Cellulitis   • Cellulitis of chest wall   • Acute kidney injury (BUDDY) with acute tubular necrosis (ATN) ([facility])   • Chronic obstructive pulmonary disease, unspecified COPD type ([place] [address] )     Past Medical History:   Diagnosis Date   • Abdominal pain    • Anxiety    • Arthritis    • Asthma    • Atrial fibrillation ([facility])    • Back pain    • Bleeding ulcer    • Bronchitis    • Cancer ([place] [address] )     prostate 2011- radiation   • Cardiac disease     cardiac stent x1   • Cataract     starting   • Chronic diastolic (congestive) heart failure (HCC)    • Diabetes mellitus ([place] [address] )     boarderline diabetic    • GERD (gastroesophageal reflux disease)    • History of radiation therapy 2010    Prostate seeds (brachytherapy) and EBRT   • Hyperlipidemia    • Hypertension    • 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, bilateral    • Low back pain    • Lung cancer ([place] [address] )    • Lung mass    • Myocardial infarction ([place] [address] )     mild 1999   • Obesity    • Prostate cancer ([place] [address] )    • Shortness of breath    • Sleep apnea     sleep study 11/22   • Wears dentures     full set   • Wears glasses      Past Surgical History:   Procedure Laterality Date   • ABDOMINAL HERNIA REPAIR     • ABDOMINAL SURGERY      bleeding ulcer, cyst removed from abd   • COLONOSCOPY     • CORONARY ANGIOPLASTY WITH STENT PLACEMENT  1999    x1    • ESOPHAGOGASTRODUODENOSCOPY     • IR BIOPSY LUNG  10/05/2021   • IR THORACENTESIS  2021   • KNEE SURGERY Left    • OR [address] INCL FLUOR GDNCE DX W/CELL WASHG SPX N/A 2021    Procedure: BRONCHOSCOPY FLEXIBLE;  Surgeon: Nelly Lamas MD;  Location: [facility];  Service: Thoracic   • OR MEDIASTINOSCOPY WITH LYMPH NODE BIOPSY/IES N/A 2021    Procedure: MEDIASTINOSCOPY;  Surgeon: Nelly Lamas MD;  Location: [facility];  Service: Thoracic   • PROSTATE SURGERY       Family history: 3 sons in good general health, no known familial or genetic diseases    Social History     Socioeconomic History   • Marital status: /Civil Union     Spouse name: Not on file   • Number of children: Not on file   • Years of education: Not on file   • Highest education level: Not on file   Occupational History   • Not on file   Tobacco Use   • Smoking status: Former     Packs/day: 1 00     Years: 30 00     Pack years: 30 00     Types: Cigarettes     Quit date:      Years since quittin 1   • Smokeless tobacco: Never   Vaping Use   • Vaping Use: Never used   Substance and Sexual Activity   • Alcohol use: Not Currently     Alcohol/week: 10 0 - 12 0 standard drinks     Types: 7 Glasses of wine, 3 - 5 Cans of beer per week     Comment: few beers day; glass of red wine at dinner   • Drug use: Never   • Sexual activity: Not on file   Other Topics Concern   • Not on file   Social History Narrative   • Not on file     Social Determinants of Health     Financial Resource Strain: Not on file   Food Insecurity: No Food Insecurity   • Worried About Running Out of Food in the Last Year: Never true   • Ran Out of Food in the Last Year: Never true Transportation Needs: No Transportation Needs   • Lack of Transportation (Medical): No   • Lack of Transportation (Non-Medical):  No   Physical Activity: Not on file   Stress: Not on file   Social Connections: Not on file   Intimate Partner Violence: Not on file   Housing Stability: Low Risk    • Unable to Pay for Housing in the Last Year: No   • Number of Places Lived in the Last Year: 1   • Unstable Housing in the Last Year: No   Social history:  40 pack-year history discontinued 20 years ago, patient drinks 2-4 beers a day off and on, no drug abuse, 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)    Current Outpatient Medications:   •  acetaminophen (TYLENOL) 325 mg tablet, Take 2 tablets (650 mg total) by mouth every 6 (six) hours as needed for mild pain, headaches or fever, Disp: 30 tablet, Rfl: 0  •  albuterol (2 5 mg/3 mL) 0 083 % nebulizer solution, Take 2 5 mg by nebulization As needed per patient's wife , Disp: , Rfl:   •  apixaban (Eliquis) 5 mg, Take 1 tablet (5 mg total) by mouth 2 (two) times a day, Disp: 60 tablet, Rfl: 5  •  carvedilol (COREG) 25 mg tablet, Take 1 tablet (25 mg total) by mouth 2 (two) times a day with meals, Disp: 60 tablet, Rfl: 1  •  fluticasone-umeclidinium-vilanterol (Trelegy Ellipta) 100-62 5-25 mcg/actuation inhaler, Rinse mouth after use , Disp: 60 each, Rfl: 5  •  hydrALAZINE (APRESOLINE) 25 mg tablet, Take 1 tablet (25 mg total) by mouth every 12 (twelve) hours, Disp: 60 tablet, Rfl: 1  •  hydrocortisone 2 5 % cream, Apply topically 2 (two) times a day Apply twice a day affected area the trunk, Disp: 20 g, Rfl: 2  •  latanoprost (XALATAN) 0 005 % ophthalmic solution, Administer 0 005 mL to both eyes daily at bedtime, Disp: , Rfl:   •  metFORMIN (GLUCOPHAGE) 500 mg tablet, Take 1 tablet (500 mg total) by mouth 2 (two) times a day, Disp: 180 tablet, Rfl: 1  •  Multiple Vitamin (MULTI-VITAMIN DAILY PO), Take by mouth daily  , Disp: , Rfl:   •  primidone (MYSOLINE) 50 mg tablet, Take 1 tablet (50 mg total) by mouth every 12 (twelve) hours, Disp: 30 tablet, Rfl: 1  •  spironolactone (ALDACTONE) 25 mg tablet, Take 1 tablet (25 mg total) by mouth daily Do not start before December 28, 2022 , Disp: 30 tablet, Rfl: 1  •  hydrALAZINE (APRESOLINE) 25 mg tablet, Take 1 tablet (25 mg total) by mouth every 12 (twelve) hours, Disp: 60 tablet, Rfl: 1  •  thiamine 100 MG tablet, Take 1 tablet (100 mg total) by mouth daily (Patient not taking: Reported on 2/10/2023), Disp: 30 tablet, Rfl: 0    No Known Allergies    Vitals:    02/10/23 1002   BP: 144/84   Pulse: 60   Resp: 16   Temp: (!) 97 2 °F (36 2 °C)   SpO2: 97%     Physical Exam  Constitutional:       Appearance: He is well-developed  Comments: Obese male, no respiratory distress, no signs of pain   HENT:      Head: Normocephalic and atraumatic  Right Ear: External ear normal       Left Ear: External ear normal    Eyes:      Conjunctiva/sclera: Conjunctivae normal       Pupils: Pupils are equal, round, and reactive to light  Cardiovascular:      Rate and Rhythm: Normal rate and regular rhythm  Heart sounds: Normal heart sounds  Pulmonary:      Effort: Pulmonary effort is normal       Breath sounds: Normal breath sounds  Comments: Distant breath sounds bilaterally, clear  Abdominal:      General: Bowel sounds are normal       Palpations: Abdomen is soft  Comments: Soft, nontender, obese, cannot palpate liver or spleen, +bowel sounds, no guarding   Musculoskeletal:         General: Normal range of motion  Cervical back: Normal range of motion and neck supple  Skin:     General: Skin is warm        Comments: Relatively good color, warm, moist, few scattered upper extremity purpura, right axilla skin is thicker than normal, area is somewhat indurated but no fluctuance, no inflammation, no redness, no swelling   Neurological:      Mental Status: He is alert and oriented to person, place, and time       Deep Tendon Reflexes: Reflexes are normal and symmetric  Psychiatric:         Behavior: Behavior normal          Thought Content: Thought content normal          Judgment: Judgment normal      Extremities:  No lower extremity edema bilaterally, no cords, pulses are 1+   Lymphatics:  No adenopathy in the neck, supraclavicular region, axilla bilateral    Labs    12/31/2022 WBC = 11 02 hemoglobin = 12 7 hematocrit = 39 6 platelet = 365 neutrophil = 79%    12/29/2022 BUN = 11 creatinine = 0 74    Imaging    1/27/2023 CT chest without contrast    IMPRESSION:     1   Slightly decreased left pleural effusion with improved passive atelectasis of the left lower lobe  Previously noted left lower lobe mass is difficult to visualize on noncontrast exam but appears likely unchanged from most recent study  2   Similar appearance of skin thickening and subcutaneous stranding in the right axillary region which could represent cellulitis        05/04/2022 chest x-ray portable  Impression stated left small effusion and parenchymal process appears unchanged  05/02/2022 CT scan the chest without contrast    IMPRESSION:  1  Increase in size of a left lower lobe patchy opacity with new right basilar patchy densities concerning for pulmonary infiltrates with additional patchy and reticulonodular changes lingular segment left upper lobe and right middle lobe also likely related to infectious process  2   Left lower lobe cavitary mass has decreased in size now measuring 3 3 x 1 9 cm, previously 4 1 x 2 2 cm  3   Stable COPD and pulmonary fibrosis with the latter likely related to radiation change      11/19/2021 MRI brain  Impression stated white matter changes suggestive of chronic microangiopathy  No acute intracranial pathology  10/29/2021 PET-CT    1  Hypermetabolic necrotic 4 5 x 4 cm left lower lung mass compatible with known malignancy    2   2 mildly hypermetabolic right paratracheal mediastinal nodes for which early metastases are not excluded  3   Small mildly hypermetabolic left pleural effusion for which malignant effusion is not excluded  4   Additional scattered tiny nodular lung densities may be reassessed on follow-up CT  5   Probable reactive subcentimeter right cervical node may be reassessed on follow-up PET/CT  6   No hypermetabolic soft tissue metastases in the abdomen, pelvis  7   Minimal inhomogeneous FDG activity in the sacrum and left posterior iliac, though without dominant focal lesion  This may be reassessed on follow-up exam     Pathology    Case Report   Surgical Pathology Report                         Case: [id number]                                    Authorizing Provider: Tre Case MD       Collected:           11/29/2021 1005               Ordering Location:     [address]      Received:            11/29/2021 [address] [place] [address] Operating Room                                                       Pathologist:           Evita Pimentel MD                                                         Specimens:   A) - Lymph Node, level 7                                                                             B) - Lymph Node, level 4R                                                                            C) - Lymph Node, level 2R                                                                            D) - Lymph Node, level 4L                                                                  Final Diagnosis   A  Lymph node, level 7, excision:  -  Portions of benign lymph node with reactive change and anthracosis, negative for granulomas, lymphoma, or metastatic carcinoma  B  Lymph node, level 4R, excision:  -  Portions of benign lymph node with reactive change and anthracosis, negative for granulomas, lymphoma, or metastatic carcinoma      C  Lymph node, level 2R, excision:  -  Portions of benign lymph node with reactive change and anthracosis, negative for granulomas, lymphoma, or metastatic carcinoma  D  Lymph node, level 4L, excision:  -  Benign lymph node with reactive change and anthracosis, negative for granulomas, lymphoma, or metastatic carcinoma       Electronically signed by Jt Wilkes MD on 12/2/2021 at 11:29 AM       Case Report   Surgical Pathology Report                         Case: [id number]                                    Authorizing Provider: Lora Solares MD      Collected:           10/05/2021 1058               Ordering Location:     [address] [place] [address] Received:            10/05/2021 1120                                      CAT Scan                                                                      Pathologist:           Leah Talbot MD                                                         Specimen:    Lung, Left Lower Lobe                                                                      Final Diagnosis   A  Lung, Left Lower Lobe, :  - Invasive squamous carcinoma, moderately differentiated, keratinizing type  - Tumor is highlighted with P 40 immunoperoxidase stain   - Prominent tumor necrosis is noted       Best representative block with tumor A1  This case was reviewed at the intradepartmental  conference     RADHA Lopez, Pulmonology, is notified of the diagnosis in [facility] via [address] on 10/06/2021  at 2 40 pm    Electronically signed by [id number], MD on 10/6/2021 at  2:44 PM

## 2023-02-11 ENCOUNTER — APPOINTMENT (EMERGENCY)
Dept: RADIOLOGY | Facility: HOSPITAL | Age: 83
End: 2023-02-11

## 2023-02-11 ENCOUNTER — HOSPITAL ENCOUNTER (EMERGENCY)
Facility: HOSPITAL | Age: 83
Discharge: HOME | End: 2023-02-11
Attending: EMERGENCY MEDICINE

## 2023-02-11 VITALS
DIASTOLIC BLOOD PRESSURE: 86 MMHG | WEIGHT: 215.17 LBS | OXYGEN SATURATION: 95 % | HEART RATE: 98 BPM | RESPIRATION RATE: 26 BRPM | TEMPERATURE: 98.3 F | SYSTOLIC BLOOD PRESSURE: 145 MMHG | BODY MASS INDEX: 30.01 KG/M2
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
## 2023-02-11 DIAGNOSIS — S09.90XA HEAD INJURY: ICD-10-CM

## 2023-02-11 DIAGNOSIS — S00.81XA FOREHEAD ABRASION: ICD-10-CM

## 2023-02-11 DIAGNOSIS — S00.83XA FOREHEAD CONTUSION: ICD-10-CM

## 2023-02-11 DIAGNOSIS — T07.XXXA ABRASIONS OF MULTIPLE SITES: ICD-10-CM

## 2023-02-11 DIAGNOSIS — S50.311A ABRASION OF RIGHT ELBOW: ICD-10-CM

## 2023-02-11 NOTE — ED PROVIDER NOTES
History  Chief Complaint   Patient presents with   • Fall     States he tripped on a lip outside and fell onto concrete, denies LOC  Patient on Eliquis  Patient abrasion right forehead, skin right elbow, right hand and abrasion right knee  Denies neck/chest/abdomen pain      Patient states he tripped as he was taking out the garbage and had a mechanical fall on the concrete  Patient put his hands out to protect himself but still hit his head  Did not lose consciousness  He had suffered abrasions to his forehead and scalp as well as right hand and elbow  He has a superficial abrasion on his knee  Patient is on Eliquis  He was awake and alert, complains of no lightheadedness or dizziness, no nausea or vomiting  Denies any neck or spinal injury or pain  Prior to Admission Medications   Prescriptions Last Dose Informant Patient Reported? Taking?    Multiple Vitamin (MULTI-VITAMIN DAILY PO)   Yes No   Sig: Take by mouth daily     acetaminophen (TYLENOL) 325 mg tablet   No No   Sig: Take 2 tablets (650 mg total) by mouth every 6 (six) hours as needed for mild pain, headaches or fever   albuterol (2 5 mg/3 mL) 0 083 % nebulizer solution   Yes No   Sig: Take 2 5 mg by nebulization As needed per patient's wife    apixaban (Eliquis) 5 mg   No No   Sig: Take 1 tablet (5 mg total) by mouth 2 (two) times a day   carvedilol (COREG) 25 mg tablet   No No   Sig: Take 1 tablet (25 mg total) by mouth 2 (two) times a day with meals   fluticasone-umeclidinium-vilanterol (Trelegy Ellipta) 100-62 5-25 mcg/actuation inhaler   No No   Sig: Rinse mouth after use    hydrALAZINE (APRESOLINE) 25 mg tablet   No No   Sig: Take 1 tablet (25 mg total) by mouth every 12 (twelve) hours   hydrALAZINE (APRESOLINE) 25 mg tablet   No No   Sig: Take 1 tablet (25 mg total) by mouth every 12 (twelve) hours   hydrocortisone 2 5 % cream   No No   Sig: Apply topically 2 (two) times a day Apply twice a day affected area the trunk   latanoprost (XALATAN) 0 005 % ophthalmic solution   Yes No   Sig: Administer 0 005 mL to both eyes daily at bedtime   metFORMIN (GLUCOPHAGE) 500 mg tablet   No No   Sig: Take 1 tablet (500 mg total) by mouth 2 (two) times a day   primidone (MYSOLINE) 50 mg tablet   No No   Sig: Take 1 tablet (50 mg total) by mouth every 12 (twelve) hours   spironolactone (ALDACTONE) 25 mg tablet   No No   Sig: Take 1 tablet (25 mg total) by mouth daily Do not start before December 28, 2022     thiamine 100 MG tablet   No No   Sig: Take 1 tablet (100 mg total) by mouth daily   Patient not taking: Reported on 2/10/2023      Facility-Administered Medications: None       Past Medical History:   Diagnosis Date   • Abdominal pain    • Anxiety    • Arthritis    • Asthma    • Atrial fibrillation (HCC)    • Back pain    • Bleeding ulcer    • Bronchitis    • Cancer [facility])     prostate 2011- radiation   • Cardiac disease     cardiac stent x1   • Cataract     starting   • Chronic diastolic (congestive) heart failure (HCC)    • Diabetes mellitus ([address] )     boarderline diabetic    • GERD (gastroesophageal reflux disease)    • History of radiation therapy 2010    Prostate seeds (brachytherapy) and EBRT   • Hyperlipidemia    • Hypertension    • 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, bilateral    • Low back pain    • Lung cancer ([address] )    • Lung mass    • Myocardial infarction ([address] )     mild 1999   • Obesity    • Prostate cancer ([address] )    • Shortness of breath    • Sleep apnea     sleep study 11/22   • Wears dentures     full set   • Wears glasses        Past Surgical History:   Procedure Laterality Date   • ABDOMINAL HERNIA REPAIR     • ABDOMINAL SURGERY      bleeding ulcer, cyst removed from abd   • COLONOSCOPY     • CORONARY ANGIOPLASTY WITH STENT PLACEMENT  1999    x1    • ESOPHAGOGASTRODUODENOSCOPY     • IR BIOPSY LUNG  10/05/2021   • IR THORACENTESIS  11/08/2021   • KNEE SURGERY Left    • ME [address] INCL FLUOR GDNCE DX W/CELL WASHG SPX N/A 11/29/2021    Procedure: BRONCHOSCOPY FLEXIBLE;  Surgeon: Vangie Coleman MD;  Location: [facility];  Service: Thoracic   • [place] MEDIASTINOSCOPY WITH LYMPH NODE BIOPSY/IES N/A 2021    Procedure: Gaylan Neither;  Surgeon: Vangie Coleman MD;  Location: [facility];  Service: Thoracic   • PROSTATE SURGERY         Family History   Problem Relation Age of Onset   • Prostate cancer Brother    • Cancer Maternal Uncle         colo rectal cancer   • Cancer Paternal Aunt      I have reviewed and agree with the history as documented  E-Cigarette/Vaping   • E-Cigarette Use Never User      E-Cigarette/Vaping Substances   • Nicotine No    • THC No    • CBD No    • Flavoring No    • Other No    • Unknown No      Social History     Tobacco Use   • Smoking status: Former     Packs/day:      Years:      Pack years: 30      Types: Cigarettes     Quit date:      Years since quittin 1   • Smokeless tobacco: Never   Vaping Use   • Vaping Use: Never used   Substance Use Topics   • Alcohol use: Not Currently     Alcohol/week: 10 0 - 12 0 standard drinks     Types: 7 Glasses of wine, 3 - 5 Cans of beer per week     Comment: few beers day; glass of red wine at dinner   • Drug use: Never       Review of Systems   Constitutional: Negative for chills and fever  HENT: Negative for congestion and sore throat  Eyes: Negative for visual disturbance  Respiratory: Negative for shortness of breath  Cardiovascular: Negative for chest pain  Gastrointestinal: Negative for abdominal pain, nausea and vomiting  Genitourinary: Negative for dysuria  Musculoskeletal: Positive for arthralgias  Negative for back pain and neck pain  Skin: Positive for rash and wound  Neurological: Negative for dizziness, syncope, weakness, light-headedness and headaches  Hematological: Bruises/bleeds easily  Psychiatric/Behavioral: Negative for confusion  All other systems reviewed and are negative        Physical Exam  Physical Exam  Vitals and nursing note reviewed  Constitutional:       Appearance: Normal appearance  HENT:      Head:      Comments: Soft tissue swelling or abrasion of the right forehead  No laceration     Right Ear: External ear normal       Left Ear: External ear normal       Nose: Nose normal       Mouth/Throat:      Pharynx: Oropharynx is clear  Eyes:      Conjunctiva/sclera: Conjunctivae normal    Cardiovascular:      Rate and Rhythm: Normal rate and regular rhythm  Pulses: Normal pulses  Pulmonary:      Effort: Pulmonary effort is normal    Abdominal:      Palpations: Abdomen is soft  Tenderness: There is no abdominal tenderness  Musculoskeletal:         General: Signs of injury present  Normal range of motion  Cervical back: Normal range of motion and neck supple  No tenderness  Skin:     General: Skin is warm  Capillary Refill: Capillary refill takes less than 2 seconds  Neurological:      General: No focal deficit present  Mental Status: He is alert and oriented to person, place, and time     Psychiatric:         Mood and Affect: Mood normal          Behavior: Behavior normal          Vital Signs  ED Triage Vitals   Temperature Pulse Respirations Blood Pressure SpO2   02/11/23 1834 02/11/23 1834 02/11/23 1834 02/11/23 1834 02/11/23 1834   98 3 °F (36 8 °C) 100 20 145/87 95 %      Temp Source Heart Rate Source Patient Position - Orthostatic VS BP Location FiO2 (%)   02/11/23 1834 02/11/23 1834 02/11/23 1834 02/11/23 1834 --   Tympanic Monitor Sitting Left arm       Pain Score       02/11/23 1933       No Pain           Vitals:    02/11/23 1834 02/11/23 1930   BP: 145/87 131/78   Pulse: 100 92   Patient Position - Orthostatic VS: Sitting          Visual Acuity  Visual Acuity    Flowsheet Row Most Recent Value   L Pupil Size (mm) 2   R Pupil Size (mm) 2          ED Medications  Medications - No data to display    Diagnostic Studies  Results Reviewed     None                 CT head without contrast   Final Result by Lenore Khoury DO (02/11 2008)      No acute intracranial abnormality  Workstation performed: [id number]         XR hand 3+ views RIGHT    (Results Pending)              Procedures  Procedures         ED Course                                             Medical Decision Making  Patient has multiple abrasions contusions  No lacerations which are suturable  Wounds cleansed and dressed  We will have him continue Eliquis    Amount and/or Complexity of Data Reviewed  Radiology: ordered  Disposition  Final diagnoses:   Fall, initial encounter   Head injury   Forehead abrasion   Forehead contusion   Abrasion of right elbow   Abrasions of multiple sites     Time reflects when diagnosis was documented in both MDM as applicable and the Disposition within this note     Time User Action Codes Description Comment    2/11/2023  8:14 PM Karole Patient Add [O75  DSZP] Fall, initial encounter     2/11/2023  8:15 PM Karole Patient Add [B99 [id number]] Head injury     2/11/2023  8:16 PM Shirleyann Alert A Add [S00 [id number]] Forehead abrasion     2/11/2023  8:16 PM Karole Patient Add [R56 [id number]] Forehead contusion     2/11/2023  8:16 PM Karole Patient Add [S50 311A] Abrasion of right elbow     2/11/2023  8:17 PM Shirleyann Alert A Add [T07  XXXA] Abrasions of multiple sites       ED Disposition     ED Disposition   Discharge    Condition   Stable    Date/Time   Sat Feb 11, 2023  8:14 PM    Comment   Lisy Garay discharge to home/self care  Follow-up Information     Follow up With Specialties Details Why Contact Info    Norma Guerrero MD Internal Medicine Schedule an appointment as soon as possible for a visit   [address] [place] [address]  [phone]            Patient's Medications   Discharge Prescriptions    No medications on file       No discharge procedures on file      PDMP Review       Value Time User    PDMP Reviewed  Yes 1/5/2023 11:08 Martina Causey MD          ED Provider  Electronically Signed by           Melissa Cain MD  02/11/23 2018

## 2023-02-12 NOTE — ED NOTES
Pt  Returned from CT scan is alert awake and oriented  Placed on monitor and noted patient is in Afib but states he always is HR between   He  states back  pain when moving  Pt    Is waiting for ct results and wife is at [facility] [phone] [facility]  02/11/23 1936

## 2023-02-14 ENCOUNTER — OFFICE VISIT (OUTPATIENT)
Dept: PODIATRY | Facility: CLINIC | Age: 83
End: 2023-02-14

## 2023-02-14 VITALS
RESPIRATION RATE: 17 BRPM | SYSTOLIC BLOOD PRESSURE: 145 MMHG | WEIGHT: 215 LBS | HEIGHT: 71 IN | BODY MASS INDEX: 30.1 KG/M2 | DIASTOLIC BLOOD PRESSURE: 86 MMHG

## 2023-02-14 DIAGNOSIS — L84 CORNS: ICD-10-CM

## 2023-02-14 DIAGNOSIS — M79.671 PAIN IN BOTH FEET: ICD-10-CM

## 2023-02-14 DIAGNOSIS — E11.42 DIABETIC POLYNEUROPATHY ASSOCIATED WITH TYPE 2 DIABETES MELLITUS (HCC): Primary | ICD-10-CM

## 2023-02-14 DIAGNOSIS — I70.209 PERIPHERAL ARTERIOSCLEROSIS (HCC): ICD-10-CM

## 2023-02-14 DIAGNOSIS — M79.672 PAIN IN BOTH FEET: ICD-10-CM

## 2023-02-14 LAB
MYCOBACTERIUM SPEC CULT: NORMAL
RHODAMINE-AURAMINE STN SPEC: NORMAL

## 2023-02-14 NOTE — PROGRESS NOTES
Assessment/Plan:  Patient has pain in his feet and toes with ambulation   He has mycosis of nail and skin  He has plantar pre ulcerative skin lesions      Plan   Diabetic foot exam performed   Mycotic nails debrided   cream ordered   Patient will use daily, he will apply cream to feet b i d  For 4 weeks   Calluses debrided   Procedures performed without pain or complication            Subjective:  Patient has pain in his feet with ambulation   No history of trauma    He has pain when he wears shoes   He has pain in his toes       Patient ID: Spenser Clayton is a 82 y o  male      Patient is diabetic  Mercedes Alfaro has pain in his feet and toes with ambulation   Has pain from calluses   He has ingrown toenails and dry scaly skin   He is requesting refill of topical antifungal         Review of Systems   Constitutional: No fever or chills, feels well, no tiredness, no recent weight loss or weight gain   Eyes: No complaints of red eyes, no eyesight problems    ENT: no complaints of loss of hearing, no nosebleeds, no sore throat   Cardiovascular: No complaints of chest pain, no palpitations, no leg claudication or lower extremity edema   Respiratory: No complaints of shortness of breath, no wheezing, no cough   Gastrointestinal: No complaints of abdominal pain, no constipation, no nausea or vomiting, no diarrhea or bloody stools   Genitourinary: No complaints of dysuria or incontinence, no hesitancy, no nocturia   Musculoskeletal: limb pain, but-- as noted in HPI   Integumentary: No complaints of skin rash or lesion, no itching or dry skin, no skin wounds   Neurological: No complaints of headache, no confusion, no numbness or tingling, no dizziness   Psychiatric: No suicidal thoughts, no anxiety, no depression   Endocrine: No muscle weakness, no frequent urination, no excessive thirst, no feelings of weakness       Active Problems  1  Acquired ankle/foot deformity (592 09) (O49 453)   2  Arthritis (056 32) (M19 90)   3  Atherosclerosis of arteries of extremities (440 20) (I70 209)   4  Calcaneal spur (726 73) (M77 30)   5  Callus (700) (L84)   6  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)   7  Diabetes mellitus with neuropathy (250 60,357 2) (E11 40)   8  Diabetic neuropathy (250 60,357 2) (E11 40)   9  Hypercholesterolemia (272 0) (E78 00)   10  Hypertension (401 9) (I10)   11  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)   12  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)   13  Onychomycosis (110 1) (B35 1)   14  Orthopedic aftercare (V54 9) (Z47 89)   15  Pain in both feet (729 5) (M79 671,M79 672)   16  Pain in extremity (729 5) (M79 609)   17  Pes planus, congenital (754 61) (Q66 50)   18  Plantar 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 2)   19  Plantar wart (078 12) (B07 0)   20  Prostate cancer (185) (C61)   21  Sprain of right shoulder (840 9) (S43 401A)   22  Tinea pedis (110 4) (B35 3)     Past Medical History   · Orthopedic aftercare (V54 9) (Z47 89)     Surgical History   · History of Gastric Surgery   · History of Hernia Repair   · History of Previous Stent Placement Total Number Performed ___     The surgical history was reviewed and updated today        Family History  Family History    · Family history of Arthritis (V17 7)   · Family history of Cancer     The family history was reviewed and updated today        Social History      · Beer Consumption (___ Bottles Per Day)   · Daily Coffee Consumption (1  Cups/Day)   · Former smoker (K71 85) (Z87 891)  The social history was reviewed and updated today  Allergies  1  No Known Drug Allergies      Physical Exam  Left Foot: Appearance: Normal except as noted: excessive pronation-- and-- pes planus  Right Foot: Appearance: Normal except as noted: excessive pronation-- and-- pes planus  Left Ankle: ROM: limited ROM in all planes   Right Ankle: ROM: limited ROM in all planes   Neurological Exam: performed   Light touch was intact bilaterally  Vibratory sensation was intact bilaterally  Response to monofilament test was intact bilaterally  Deep tendon reflexes: patellar reflex present bilaterally-- and-- achilles reflex present bilaterally  Vascular Exam: performed Dorsalis pedis pulses were One/4 bilaterally  Posterior tibial pulses were One/4 bilaterally  Elevation Pallor: present bilaterally  Capillary refill time was greater than 3 seconds bilaterally-- and-- Q  9 findings bilateral  Negative digital hair noted  Positive abnormal cooling noted  Toenails: All of the toenails were elongated,-- hypertrophied,-- discolored,-- ingrown,-- shown to have subungual debris,-- tender-- and-- Severely mycotic with onychauxis     Socks and shoes removed, Right Foot Findings: swollen, erythematous and dry   The sensory exam showed diminished vibratory sensation at the level of the toes  Diminished tactile sensation with monofilament testing throughout the right foot   Socks and shoes removed, Left Foot Findings: swollen, erythematous and dry   The sensory exam showed diminished vibratory sensation at the level of the toes  Diminished tactile sensation with monofilament testing throughout the left foot  Capillary refills findings on the right were delayed in the toes   Pulses:  1+ in the posterior tibialis on the right  1+ in the dorsalis pedis on the right   Capillary refills findings on the left were delayed in the toes   Pulses:  1+ in the posterior tibialis on the left  1+ in the dorsalis pedis on the left   Assign Risk Category: 2: Loss of protective sensation with or without weakness, deformity, callus, pre-ulcer, or history of ulceration  High risk  Hyperkeratosis: present on both first toes,-- present on both fifth sub metatarsals-- and-- [place] tinea pedis, bilateral, is noted  Shoe Gear Evaluation: performed ()  Right Foot: width: d-- and-- length: 11   Left Foot: width: d-- and-- length: 11  Recommendation(s): athletic shoes     Patient's shoes and socks removed  Right Foot/Ankle   Right Foot Inspection  Skin Exam: dry skin, callus and callus               Toe Exam: swelling  Sensory   Vibration: diminished  Proprioception: diminished      Vascular  Capillary refills: elevated        Left Foot/Ankle  Left Foot Inspection  Skin Exam: dry skin and callus              Toe Exam: swelling and erythema                   Sensory   Vibration: diminished  Proprioception: diminished     Vascular  Capillary refills: elevated     Right Foot/Ankle   Right Foot Inspection        Sensory   Vibration: diminished  Proprioception: diminished  Monofilament testing: diminished     Vascular  Capillary refills: < 3 seconds              Left Foot/Ankle  Left Foot Inspection        Sensory   Vibration: diminished  Proprioception: diminished  Monofilament testing: diminished     Vascular  Capillary refills: < 3 seconds           Assign Risk Category  Deformity present  Loss of protective sensation  Weak pulses  Risk: 2

## 2023-02-21 ENCOUNTER — APPOINTMENT (OUTPATIENT)
Dept: LAB | Facility: HOSPITAL | Age: 83
End: 2023-02-21

## 2023-02-21 DIAGNOSIS — D64.9 ANEMIA, UNSPECIFIED TYPE: ICD-10-CM

## 2023-02-21 DIAGNOSIS — Z13.6 SCREENING FOR CARDIOVASCULAR CONDITION: ICD-10-CM

## 2023-02-21 DIAGNOSIS — E11.42 DIABETIC POLYNEUROPATHY ASSOCIATED WITH TYPE 2 DIABETES MELLITUS (HCC): ICD-10-CM

## 2023-02-21 DIAGNOSIS — R79.89 ABNORMAL LFTS: ICD-10-CM

## 2023-02-21 DIAGNOSIS — E55.9 VITAMIN D DEFICIENCY: ICD-10-CM

## 2023-02-21 DIAGNOSIS — C61 CANCER OF PROSTATE (HCC): ICD-10-CM

## 2023-02-21 DIAGNOSIS — C34.92 SQUAMOUS CELL CARCINOMA OF LEFT LUNG (HCC): Chronic | ICD-10-CM

## 2023-02-21 DIAGNOSIS — E78.00 HYPERCHOLESTEREMIA: ICD-10-CM

## 2023-02-21 DIAGNOSIS — E11.42 TYPE 2 DIABETES MELLITUS WITH DIABETIC POLYNEUROPATHY, WITHOUT LONG-TERM CURRENT USE OF INSULIN (HCC): ICD-10-CM

## 2023-02-21 DIAGNOSIS — E78.2 MIXED HYPERLIPIDEMIA: ICD-10-CM

## 2023-02-21 LAB
ALBUMIN SERPL BCP-MCNC: 3.4 G/DL (ref 3.5–5)
ALP SERPL-CCNC: 87 U/L (ref 34–104)
ALT SERPL W P-5'-P-CCNC: 12 U/L (ref 7–52)
ANION GAP SERPL CALCULATED.3IONS-SCNC: 5 MMOL/L (ref 4–13)
AST SERPL W P-5'-P-CCNC: 16 U/L (ref 13–39)
BACTERIA UR QL AUTO: ABNORMAL /HPF
BASOPHILS # BLD AUTO: 0.03 THOUSANDS/ÂΜL (ref 0–0.1)
BASOPHILS NFR BLD AUTO: 0 % (ref 0–1)
BILIRUB SERPL-MCNC: 0.46 MG/DL (ref 0.2–1)
BILIRUB UR QL STRIP: NEGATIVE
BUN SERPL-MCNC: 12 MG/DL (ref 5–25)
CALCIUM ALBUM COR SERPL-MCNC: 10.2 MG/DL (ref 8.3–10.1)
CALCIUM SERPL-MCNC: 9.7 MG/DL (ref 8.4–10.2)
CHLORIDE SERPL-SCNC: 103 MMOL/L (ref 96–108)
CHOLEST SERPL-MCNC: 108 MG/DL
CLARITY UR: CLEAR
CO2 SERPL-SCNC: 30 MMOL/L (ref 21–32)
COLOR UR: YELLOW
CREAT SERPL-MCNC: 0.94 MG/DL (ref 0.6–1.3)
CREAT UR-MCNC: 174 MG/DL
EOSINOPHIL # BLD AUTO: 0.2 THOUSAND/ÂΜL (ref 0–0.61)
EOSINOPHIL NFR BLD AUTO: 2 % (ref 0–6)
ERYTHROCYTE [DISTWIDTH] IN BLOOD BY AUTOMATED COUNT: 13.2 % (ref 11.6–15.1)
GFR SERPL CREATININE-BSD FRML MDRD: 76 ML/MIN/1.73SQ M
GLUCOSE P FAST SERPL-MCNC: 138 MG/DL (ref 65–99)
GLUCOSE UR STRIP-MCNC: NEGATIVE MG/DL
HCT VFR BLD AUTO: 41.6 % (ref 36.5–49.3)
HDLC SERPL-MCNC: 33 MG/DL
HGB BLD-MCNC: 13.3 G/DL (ref 12–17)
HGB UR QL STRIP.AUTO: NEGATIVE
IMM GRANULOCYTES # BLD AUTO: 0.02 THOUSAND/UL (ref 0–0.2)
IMM GRANULOCYTES NFR BLD AUTO: 0 % (ref 0–2)
KETONES UR STRIP-MCNC: ABNORMAL MG/DL
LDLC SERPL CALC-MCNC: 56 MG/DL (ref 0–100)
LEUKOCYTE ESTERASE UR QL STRIP: NEGATIVE
LYMPHOCYTES # BLD AUTO: 1.03 THOUSANDS/ÂΜL (ref 0.6–4.47)
LYMPHOCYTES NFR BLD AUTO: 12 % (ref 14–44)
MCH RBC QN AUTO: 32.2 PG (ref 26.8–34.3)
MCHC RBC AUTO-ENTMCNC: 32 G/DL (ref 31.4–37.4)
MCV RBC AUTO: 101 FL (ref 82–98)
MICROALBUMIN UR-MCNC: 240 MG/L (ref 0–20)
MICROALBUMIN/CREAT 24H UR: 138 MG/G CREATININE (ref 0–30)
MONOCYTES # BLD AUTO: 0.57 THOUSAND/ÂΜL (ref 0.17–1.22)
MONOCYTES NFR BLD AUTO: 7 % (ref 4–12)
MYCOBACTERIUM SPEC CULT: NORMAL
NEUTROPHILS # BLD AUTO: 6.71 THOUSANDS/ÂΜL (ref 1.85–7.62)
NEUTS SEG NFR BLD AUTO: 79 % (ref 43–75)
NITRITE UR QL STRIP: NEGATIVE
NON-SQ EPI CELLS URNS QL MICRO: ABNORMAL /HPF
NRBC BLD AUTO-RTO: 0 /100 WBCS
PH UR STRIP.AUTO: 6 [PH]
PLATELET # BLD AUTO: 304 THOUSANDS/UL (ref 149–390)
PMV BLD AUTO: 9.2 FL (ref 8.9–12.7)
POTASSIUM SERPL-SCNC: 5.1 MMOL/L (ref 3.5–5.3)
PROT SERPL-MCNC: 6.5 G/DL (ref 6.4–8.4)
PROT UR STRIP-MCNC: ABNORMAL MG/DL
PSA SERPL-MCNC: <0.1 NG/ML (ref 0–4)
RBC # BLD AUTO: 4.13 MILLION/UL (ref 3.88–5.62)
RBC #/AREA URNS AUTO: ABNORMAL /HPF
RHODAMINE-AURAMINE STN SPEC: NORMAL
SODIUM SERPL-SCNC: 138 MMOL/L (ref 135–147)
SP GR UR STRIP.AUTO: 1.01 (ref 1–1.03)
TRIGL SERPL-MCNC: 95 MG/DL
UROBILINOGEN UR QL STRIP.AUTO: 1 E.U./DL
WBC # BLD AUTO: 8.56 THOUSAND/UL (ref 4.31–10.16)
WBC #/AREA URNS AUTO: ABNORMAL /HPF

## 2023-02-22 LAB
EST. AVERAGE GLUCOSE BLD GHB EST-MCNC: 143 MG/DL
HBA1C MFR BLD: 6.6 %

## 2023-03-01 DIAGNOSIS — I48.19 PERSISTENT ATRIAL FIBRILLATION (HCC): ICD-10-CM

## 2023-03-01 DIAGNOSIS — E78.2 MIXED HYPERLIPIDEMIA: Primary | ICD-10-CM

## 2023-03-01 DIAGNOSIS — N17.9 ACUTE KIDNEY INJURY (HCC): ICD-10-CM

## 2023-03-01 LAB
25(OH)D2 SERPL-MCNC: <1 NG/ML
25(OH)D3 SERPL-MCNC: 31 NG/ML
25(OH)D3+25(OH)D2 SERPL-MCNC: 31 NG/ML

## 2023-03-01 RX ORDER — CARVEDILOL 25 MG/1
25 TABLET ORAL 2 TIMES DAILY WITH MEALS
Qty: 180 TABLET | Refills: 1 | Status: SHIPPED | OUTPATIENT
Start: 2023-03-01

## 2023-03-01 RX ORDER — ATORVASTATIN CALCIUM 10 MG/1
10 TABLET, FILM COATED ORAL DAILY
COMMUNITY
End: 2023-03-01 | Stop reason: SDUPTHER

## 2023-03-01 RX ORDER — HYDRALAZINE HYDROCHLORIDE 25 MG/1
25 TABLET, FILM COATED ORAL EVERY 12 HOURS
Qty: 180 TABLET | Refills: 1 | Status: SHIPPED | OUTPATIENT
Start: 2023-03-01

## 2023-03-01 RX ORDER — ATORVASTATIN CALCIUM 10 MG/1
10 TABLET, FILM COATED ORAL DAILY
Qty: 90 TABLET | Refills: 1 | Status: SHIPPED | OUTPATIENT
Start: 2023-03-01

## 2023-03-01 RX ORDER — SPIRONOLACTONE 25 MG/1
25 TABLET ORAL DAILY
Qty: 90 TABLET | Refills: 1 | Status: SHIPPED | OUTPATIENT
Start: 2023-03-01

## 2023-03-13 DIAGNOSIS — E11.42 TYPE 2 DIABETES MELLITUS WITH DIABETIC POLYNEUROPATHY, WITHOUT LONG-TERM CURRENT USE OF INSULIN (HCC): ICD-10-CM

## 2023-03-28 NOTE — TELEPHONE ENCOUNTER
I spoke with wife, made aware of advice to DC diltiazem     Will monitor heart rate and make us aware if it exceeds 100bpm  [employer] Counseling:  I discussed with the patient the risks of mycophenolate mofetil including but not limited to infection/immunosuppression, GI upset, hypokalemia, hypercholesterolemia, bone marrow suppression, lymphoproliferative disorders, malignancy, GI ulceration/bleed/perforation, colitis, interstitial lung disease, kidney failure, progressive multifocal leukoencephalopathy, and birth defects.  The patient understands that monitoring is required including a baseline creatinine and regular CBC testing. In addition, patient must alert us immediately if symptoms of infection or other concerning signs are noted.

## 2023-04-05 ENCOUNTER — OFFICE VISIT (OUTPATIENT)
Dept: INTERNAL MEDICINE CLINIC | Facility: CLINIC | Age: 83
End: 2023-04-05

## 2023-04-05 VITALS
RESPIRATION RATE: 16 BRPM | SYSTOLIC BLOOD PRESSURE: 120 MMHG | HEART RATE: 78 BPM | BODY MASS INDEX: 29.34 KG/M2 | OXYGEN SATURATION: 98 % | HEIGHT: 71 IN | DIASTOLIC BLOOD PRESSURE: 78 MMHG | TEMPERATURE: 97.8 F | WEIGHT: 209.6 LBS

## 2023-04-05 DIAGNOSIS — I50.32 CHRONIC DIASTOLIC HEART FAILURE (HCC): ICD-10-CM

## 2023-04-05 DIAGNOSIS — G47.33 OSA (OBSTRUCTIVE SLEEP APNEA): ICD-10-CM

## 2023-04-05 DIAGNOSIS — I48.19 OTHER PERSISTENT ATRIAL FIBRILLATION (HCC): ICD-10-CM

## 2023-04-05 DIAGNOSIS — J44.9 CHRONIC OBSTRUCTIVE PULMONARY DISEASE, UNSPECIFIED COPD TYPE (HCC): ICD-10-CM

## 2023-04-05 DIAGNOSIS — E11.42 DIABETIC POLYNEUROPATHY ASSOCIATED WITH TYPE 2 DIABETES MELLITUS (HCC): ICD-10-CM

## 2023-04-05 DIAGNOSIS — I25.10 CORONARY ARTERY DISEASE INVOLVING NATIVE CORONARY ARTERY OF NATIVE HEART WITHOUT ANGINA PECTORIS: ICD-10-CM

## 2023-04-05 DIAGNOSIS — F10.21 ALCOHOL DEPENDENCE IN REMISSION (HCC): Primary | ICD-10-CM

## 2023-04-05 DIAGNOSIS — J43.2 CENTRILOBULAR EMPHYSEMA (HCC): ICD-10-CM

## 2023-04-05 NOTE — ASSESSMENT & PLAN NOTE
Lab Results   Component Value Date    HGBA1C 6 6 (H) 02/21/2023   Diabetes very well controlled continue diabetic diet hypoglycemia protocol in place dilated eye exam done foot exam normal continue metformin minimal symptoms due to neuropathy no intervention needed off gabapentin

## 2023-04-05 NOTE — ASSESSMENT & PLAN NOTE
Wt Readings from Last 3 Encounters:   04/05/23 95 1 kg (209 lb 9 6 oz)   02/14/23 97 5 kg (215 lb)   02/11/23 97 6 kg (215 lb 2 7 oz)   Stable euvolemic CHF compensated continue low-salt diet fluid restriction Daily weight monitoring follow-up with cardiology continue Aldactone

## 2023-04-05 NOTE — ASSESSMENT & PLAN NOTE
It should be there, please check again. I dont know what time Patient not using trilogy as instructed some difficulty breathing intermittent wheezing and coughing advised to use every day counseling done and was refilled

## 2023-04-05 NOTE — ASSESSMENT & PLAN NOTE
Continue Proventil as needed Trelegy very noncompliant and d continue Proventil neb treatment at home

## 2023-04-05 NOTE — PROGRESS NOTES
BMI Counseling: Body mass index is 29 23 kg/m²  The BMI is above normal  Nutrition recommendations include decreasing portion sizes, encouraging healthy choices of fruits and vegetables, decreasing fast food intake, consuming healthier snacks, limiting drinks that contain sugar, moderation in carbohydrate intake, increasing intake of lean protein, reducing intake of saturated and trans fat and reducing intake of cholesterol  Exercise recommendations include strength training exercises  No pharmacotherapy was ordered  Rationale for BMI follow-up plan is due to patient being overweight or obese

## 2023-04-05 NOTE — PROGRESS NOTES
Dr Neelam Simmons Office Visit Note  23     Bekah Howard [de-identified] y o  male MRN: [id number]  : 1940    Assessment:     1  Alcohol dependence in remission [facility])  Assessment & Plan:  In remission patient stopped drinking alcohol completely for a year and a half counseling done      2  Diabetic polyneuropathy associated with type 2 diabetes mellitus ([facility] )  Assessment & Plan:    Lab Results   Component Value Date    HGBA1C 6 6 (H) 2023   Diabetes very well controlled continue diabetic diet hypoglycemia protocol in place dilated eye exam done foot exam normal continue metformin minimal symptoms due to neuropathy no intervention needed off gabapentin      3  Chronic obstructive pulmonary disease, unspecified COPD type ([place] [address] )  Assessment & Plan:  Continue Proventil as needed Trelegy very noncompliant and d continue Proventil neb treatment at home      4  Centrilobular emphysema ([place] [address] )  Assessment & Plan:  Patient not using trilogy as instructed some difficulty breathing intermittent wheezing and coughing advised to use every day counseling done and was refilled    Orders:  -     fluticasone-umeclidinium-vilanterol (Trelegy Ellipta) 100-62 5-25 mcg/actuation inhaler; Rinse mouth after use  5  SYLVESTER (obstructive sleep apnea)  Assessment & Plan:  Cannot tolerate CPAP machine not using at present time      6  Chronic diastolic heart failure (HCC)  Assessment & Plan:  Wt Readings from Last 3 Encounters:   23 95 1 kg (209 lb 9 6 oz)   23 97 5 kg (215 lb)   23 97 6 kg (215 lb 2 7 oz)   Stable euvolemic CHF compensated continue low-salt diet fluid restriction Daily weight monitoring follow-up with cardiology continue Aldactone            7  Other persistent atrial fibrillation (HCC)  Assessment & Plan:  Rate controlled continue Coreg and Eliquis      8   Coronary artery disease involving native coronary artery of native heart without angina pectoris  Assessment & Plan:  No chest pain stable continue baby aspirin and statin Coreg            Discussion Summary and Plan: Today's care plan and medications were reviewed with patient in detail and all their questions answered to their satisfaction  Chief Complaint   Patient presents with   • Follow-up      Subjective:  Patient came in complaining of dizzy event especially when he stands up or bend down patient lost about 8 pounds since the last visit for that reason patient's blood pressure is coming down possibly developing orthostatic hypotension hydralazine was discontinued and patient fell on February 11 with some bleeding on the scalp was on Eliquis in the emergency room records reviewed no loss of consciousness patient feeling dizzy intermittently seen by oncology awaiting CT of the lung for follow-up lung cancer occasional coughing occasional difficulty breathing      The following portions of the patient's history were reviewed and updated as appropriate: allergies, current medications, past family history, past medical history, past social history, past surgical history and problem list     Review of Systems   Constitutional: Positive for activity change and fatigue  Negative for appetite change, chills, diaphoresis, fever and unexpected weight change  HENT: Negative for congestion, dental problem, drooling, ear discharge, ear pain, facial swelling, hearing loss, mouth sores, nosebleeds, postnasal drip, rhinorrhea, sinus pressure, sneezing, sore throat, tinnitus, trouble swallowing and voice change  Eyes: Negative for photophobia, pain, discharge, redness, itching and visual disturbance  Respiratory: Positive for cough and shortness of breath  Negative for apnea, choking, chest tightness, wheezing and stridor  Cardiovascular: Negative for chest pain and palpitations  Gastrointestinal: Negative for abdominal distention, abdominal pain, anal bleeding, blood in stool, constipation, diarrhea, nausea, rectal pain and vomiting     Endocrine: Negative for cold intolerance, heat intolerance, polydipsia, polyphagia and polyuria  Genitourinary: Negative for decreased urine volume, difficulty urinating, dysuria, enuresis, flank pain, frequency, genital sores, hematuria and urgency  Musculoskeletal: Positive for arthralgias, back pain and gait problem  Negative for myalgias, neck pain and neck stiffness  Skin: Negative for color change, pallor, rash and wound  Allergic/Immunologic: Negative  Negative for environmental allergies, food allergies and immunocompromised state  Neurological: Positive for dizziness  Negative for tremors, seizures, syncope, facial asymmetry, speech difficulty, weakness, light-headedness, numbness and headaches  Psychiatric/Behavioral: Negative for agitation, behavioral problems, confusion, decreased concentration, dysphoric mood, hallucinations, self-injury, sleep disturbance and suicidal ideas  The patient is nervous/anxious  The patient is not hyperactive            Historical Information   Patient Active Problem List   Diagnosis   • Corns   • Pain in both feet   • Diabetic polyneuropathy associated with type 2 diabetes mellitus ([facility])   • Onychomycosis   • Tinea pedis of both feet   • Lung mass   • Hyperlipidemia   • Squamous cell carcinoma of lung ([facility])   • Shortness of breath   • Daytime hypersomnolence   • Chronic diastolic heart failure ([facility])   • SYLVESTER (obstructive sleep apnea)   • Prostate cancer ([facility])   • GERD (gastroesophageal reflux disease)   • CAD (coronary artery disease)   • Other persistent atrial fibrillation ([facility])   • Alcohol dependence ([place] [address] )   • Acute respiratory failure with hypoxemia ([facility])   • Depression, recurrent ([facility])   • COVID-19   • Angioedema   • Septic shock ([facility])   • Cellulitis   • Cellulitis of chest wall   • Acute kidney injury (BUDDY) with acute tubular necrosis (ATN) ([facility])   • Chronic obstructive pulmonary disease, unspecified COPD type ([facility] )   • Centrilobular emphysema ([facility] )     Past Medical History: Diagnosis Date   • Abdominal pain    • Anxiety    • Arthritis    • Asthma    • Atrial fibrillation [facility])    • Back pain    • Bleeding ulcer    • Bronchitis    • Cancer [facility])     prostate 2011- radiation   • Cardiac disease     cardiac stent x1   • Cataract     starting   • Chronic diastolic (congestive) heart failure (HCC)    • Diabetes mellitus (HCC)     boarderline diabetic    • GERD (gastroesophageal reflux disease)    • History of radiation therapy 2010    Prostate seeds (brachytherapy) and EBRT   • Hyperlipidemia    • Hypertension    • 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, bilateral    • Low back pain    • Lung cancer ([facility] )    • Lung mass    • Myocardial infarction ([facility] )     mild 1999   • Obesity    • [facility] ([facility] )    • Shortness of breath    • Sleep apnea     sleep study 11/22   • Wears dentures     full set   • Wears glasses      Past Surgical History:   Procedure Laterality Date   • ABDOMINAL HERNIA REPAIR     • ABDOMINAL SURGERY      bleeding ulcer, cyst removed from abd   • COLONOSCOPY     • CORONARY ANGIOPLASTY WITH STENT PLACEMENT  1999    x1    • ESOPHAGOGASTRODUODENOSCOPY     • IR BIOPSY LUNG  10/05/2021   • IR THORACENTESIS  11/08/2021   • KNEE SURGERY Left    • ID [id number] INCL FLUOR GDNCE DX W/CELL WASHG 100 [facility] N/A 11/29/2021    Procedure: BRONCHOSCOPY FLEXIBLE;  Surgeon: Susi Slade MD;  Location: [facility];  Service: Thoracic   • ID MEDIASTINOSCOPY WITH LYMPH NODE BIOPSY/IES N/A 11/29/2021    Procedure: MEDIASTINOSCOPY;  Surgeon: Susi Slade MD;  Location: [facility];  Service: Thoracic   • PROSTATE SURGERY       Social History     Substance and Sexual Activity   Alcohol Use Not Currently   • Alcohol/week: 10 0 - 12 0 standard drinks   • Types: 7 Glasses of wine, 3 - 5 Cans of beer per week    Comment: few beers day; glass of red wine at dinner     Social History     Substance and Sexual Activity   Drug Use Never     Social History     Tobacco Use   Smoking Status Former   • Packs/day:    • Years: 30 00   • Pack years: 30 00   • Types: Cigarettes   • Quit date:    • Years since quittin 2   Smokeless Tobacco Never     Family History   Problem Relation Age of Onset   • Prostate cancer Brother    • Cancer Maternal Uncle         colo rectal cancer   • Cancer Paternal Aunt      Health Maintenance Due   Topic   • Pneumococcal Vaccine: 65+ Years (1 - PCV)   • COVID-19 Vaccine (3 - Moderna risk series)   • Medicare Annual Wellness Visit (AWV)       Meds/Allergies       Current Outpatient Medications:   •  acetaminophen (TYLENOL) 325 mg tablet, Take 2 tablets (650 mg total) by mouth every 6 (six) hours as needed for mild pain, headaches or fever, Disp: 30 tablet, Rfl: 0  •  albuterol (2 5 mg/3 mL) 0 083 % nebulizer solution, Take 2 5 mg by nebulization As needed per patient's wife , Disp: , Rfl:   •  apixaban (Eliquis) 5 mg, Take 1 tablet (5 mg total) by mouth 2 (two) times a day, Disp: 60 tablet, Rfl: 5  •  atorvastatin (LIPITOR) 10 mg tablet, Take 1 tablet (10 mg total) by mouth daily, Disp: 90 tablet, Rfl: 1  •  carvedilol (COREG) 25 mg tablet, Take 1 tablet (25 mg total) by mouth 2 (two) times a day with meals, Disp: 180 tablet, Rfl: 1  •  fluticasone-umeclidinium-vilanterol (Trelegy Ellipta) 100-62 5-25 mcg/actuation inhaler, Rinse mouth after use , Disp: 60 each, Rfl: 5  •  hydrocortisone 2 5 % cream, Apply topically 2 (two) times a day Apply twice a day affected area the trunk, Disp: 20 g, Rfl: 2  •  latanoprost (XALATAN) 0 005 % ophthalmic solution, Administer 0 005 mL to both eyes daily at bedtime, Disp: , Rfl:   •  metFORMIN (GLUCOPHAGE) 500 mg tablet, Take 1 tablet (500 mg total) by mouth 2 (two) times a day, Disp: 180 tablet, Rfl: 1  •  Multiple Vitamin (MULTI-VITAMIN DAILY PO), Take by mouth daily  , Disp: , Rfl:   •  primidone (MYSOLINE) 50 mg tablet, Take 1 tablet (50 mg total) by mouth every 12 (twelve) hours, Disp: 30 tablet, Rfl: 1  •  spironolactone (ALDACTONE) 25 mg tablet, "Take 1 tablet (25 mg total) by mouth daily, Disp: 90 tablet, Rfl: 1  •  thiamine 100 MG tablet, Take 1 tablet (100 mg total) by mouth daily (Patient not taking: Reported on 2/10/2023), Disp: 30 tablet, Rfl: 0      Objective:    Vitals:   /78   Pulse 78   Temp 97 8 °F (36 6 °C)   Resp 16   Ht 5' 11\" (1 803 m)   Wt 95 1 kg (209 lb 9 6 oz)   SpO2 98%   BMI 29 23 kg/m²   Body mass index is 29 23 kg/m²  Vitals:    04/05/23 1030   Weight: 95 1 kg (209 lb 9 6 oz)       Physical Exam  Vitals and nursing note reviewed  Constitutional:       General: He is not in acute distress  Appearance: He is well-developed  He is obese  He is not ill-appearing, toxic-appearing or diaphoretic  HENT:      Head: Normocephalic and atraumatic  Right Ear: External ear normal       Left Ear: External ear normal       Nose: Nose normal       Mouth/Throat:      Pharynx: No oropharyngeal exudate  Eyes:      General: Lids are normal  Lids are everted, no foreign bodies appreciated  No scleral icterus  Right eye: No discharge  Left eye: No discharge  Conjunctiva/sclera: Conjunctivae normal       Pupils: Pupils are equal, round, and reactive to light  Neck:      Thyroid: No thyromegaly  Vascular: Normal carotid pulses  No carotid bruit, hepatojugular reflux or JVD  Trachea: No tracheal tenderness or tracheal deviation  Cardiovascular:      Rate and Rhythm: Normal rate and regular rhythm  Pulses:           Dorsalis pedis pulses are 2+ on the right side and 2+ on the left side  Posterior tibial pulses are 1+ on the right side and 1+ on the left side  Heart sounds: Normal heart sounds  No murmur heard  No friction rub  No gallop  Pulmonary:      Effort: Pulmonary effort is normal  No respiratory distress  Breath sounds: No stridor  Rhonchi and rales present  No wheezing  Chest:      Chest wall: No tenderness     Abdominal:      General: Bowel sounds are normal  " There is no distension  Palpations: Abdomen is soft  There is no mass  Tenderness: There is no abdominal tenderness  There is no guarding or rebound  Musculoskeletal:         General: No tenderness or deformity  Normal range of motion  Cervical back: Normal range of motion and neck supple  No edema, erythema or rigidity  No spinous process tenderness or muscular tenderness  Normal range of motion  Feet:      Right foot:      Skin integrity: No ulcer, skin breakdown, erythema, warmth, callus or dry skin  Left foot:      Skin integrity: No ulcer, skin breakdown, erythema, warmth, callus or dry skin  Lymphadenopathy:      Head:      Right side of head: No submental, submandibular, tonsillar, preauricular or posterior auricular adenopathy  Left side of head: No submental, submandibular, tonsillar, preauricular, posterior auricular or occipital adenopathy  Cervical: No cervical adenopathy  Right cervical: No superficial, deep or posterior cervical adenopathy  Left cervical: No superficial, deep or posterior cervical adenopathy  Upper Body:      Right upper body: No pectoral adenopathy  Left upper body: No pectoral adenopathy  Skin:     General: Skin is warm and dry  Coloration: Skin is not pale  Findings: No erythema or rash  Neurological:      Mental Status: He is alert and oriented to person, place, and time  Cranial Nerves: No cranial nerve deficit  Sensory: No sensory deficit  Motor: Weakness present  No tremor, abnormal muscle tone or seizure activity  Coordination: Coordination normal       Gait: Gait abnormal       Deep Tendon Reflexes: Reflexes are normal and symmetric  Reflexes normal    Psychiatric:         Behavior: Behavior normal          Thought Content:  Thought content normal          Judgment: Judgment normal          Lab Review   Appointment on 02/21/2023   Component Date Value Ref Range Status   • Cholesterol 02/21/2023 108  See Comment mg/dL Final    Cholesterol:         Pediatric <18 Years        Desirable          <170 mg/dL      Borderline High    170-199 mg/dL      High               >=200 mg/dL        Adult >=18 Years            Desirable         <200 mg/dL      Borderline High   200-239 mg/dL      High              >239 mg/dL     • Triglycerides 02/21/2023 95  See Comment mg/dL Final    Triglyceride:     0-9Y            <75mg/dL     10Y-17Y         <90 mg/dL       >=18Y     Normal          <150 mg/dL     Borderline High 150-199 mg/dL     High            200-499 mg/dL        Very High       >499 mg/dL    Specimen collection should occur prior to N-Acetylcysteine or Metamizole administration due to the potential for falsely depressed results  • HDL, Direct 02/21/2023 33 (L)  >=40 mg/dL Final   • LDL Calculated 02/21/2023 56  0 - 100 mg/dL Final    LDL Cholesterol:     Optimal           <100 mg/dl     Near Optimal      100-129 mg/dl     Above Optimal       Borderline High 130-159 mg/dl       High            160-189 mg/dl       Very High       >189 mg/dl         This screening LDL is a calculated result  It does not have the accuracy of the Direct Measured LDL in the monitoring of patients with hyperlipidemia and/or statin therapy  Direct Measure LDL (TXA087) must be ordered separately in these patients     • Color, UA 02/21/2023 Yellow   Final   • Clarity, UA 02/21/2023 Clear   Final   • Specific Gravity, UA 02/21/2023 1 015  1 000 - 1 030 Final   • pH, UA 02/21/2023 6 0  5 0, 5 5, 6 0, 6 5, 7 0, 7 5, 8 0, 8 5, 9 0 Final   • Leukocytes, UA 02/21/2023 Negative  Negative Final   • Nitrite, UA 02/21/2023 Negative  Negative Final   • Protein, UA 02/21/2023 30 (1+) (A)  Negative mg/dl Final   • Glucose, UA 02/21/2023 Negative  Negative mg/dl Final   • Ketones, UA 02/21/2023 Trace (A)  Negative mg/dl Final   • Urobilinogen, UA 02/21/2023 1 0  0 2, 1 0 E U /dl E U /dl Final   • Bilirubin, UA 02/21/2023 Negative Negative Final   • Occult Blood, UA 02/21/2023 Negative  Negative Final   • RBC, UA 02/21/2023 None Seen  None Seen, 2-4 /hpf Final   • WBC, UA 02/21/2023 None Seen  None Seen, 2-4, 5-60 /hpf Final   • Epithelial Cells 02/21/2023 None Seen  None Seen, Occasional /hpf Final   • Bacteria, UA 02/21/2023 None Seen  None Seen, Occasional /hpf Final   • PSA 02/21/2023 <0 1  0 0 - 4 0 ng/mL Final    American Urological Association Guidelines define biochemical recurrence of prostate cancer as a detectable or rising PSA value post-radical prostatectomy that is greater than or equal to 0 2 ng/mL with a second confirmatory level of greater than or equal to 0 2 ng/mL  • Sodium 02/21/2023 138  135 - 147 mmol/L Final   • Potassium 02/21/2023 5 1  3 5 - 5 3 mmol/L Final   • Chloride 02/21/2023 103  96 - 108 mmol/L Final   • CO2 02/21/2023 30  21 - 32 mmol/L Final   • ANION GAP 02/21/2023 5  4 - 13 mmol/L Final   • BUN 02/21/2023 12  5 - 25 mg/dL Final   • Creatinine 02/21/2023 0 94  0 60 - 1 30 mg/dL Final    Standardized to IDMS reference method   • Glucose, Fasting 02/21/2023 138 (H)  65 - 99 mg/dL Final    Specimen collection should occur prior to Sulfasalazine administration due to the potential for falsely depressed results  Specimen collection should occur prior to Sulfapyridine administration due to the potential for falsely elevated results  • Calcium 02/21/2023 9 7  8 4 - 10 2 mg/dL Final   • Corrected Calcium 02/21/2023 10 2 (H)  8 3 - 10 1 mg/dL Final   • AST 02/21/2023 16  13 - 39 U/L Final    Specimen collection should occur prior to Sulfasalazine administration due to the potential for falsely depressed results  • ALT 02/21/2023 12  7 - 52 U/L Final    Specimen collection should occur prior to Sulfasalazine administration due to the potential for falsely depressed results      • Alkaline Phosphatase 02/21/2023 87  34 - 104 U/L Final   • Total Protein 02/21/2023 6 5  6 4 - 8 4 g/dL Final   • Albumin 02/21/2023 3 4 (L)  3 5 - 5 0 g/dL Final   • Total Bilirubin 02/21/2023 0 46  0 20 - 1 00 mg/dL Final   • eGFR 02/21/2023 76  ml/min/1 73sq m Final   • WBC 02/21/2023 8 56  4 31 - 10 16 Thousand/uL Final   • RBC 02/21/2023 4 13  3 88 - 5 62 Million/uL Final   • Hemoglobin 02/21/2023 13 3  12 0 - 17 0 g/dL Final   • Hematocrit 02/21/2023 41 6  36 5 - 49 3 % Final   • MCV 02/21/2023 101 (H)  82 - 98 fL Final   • MCH 02/21/2023 32 2  26 8 - 34 3 pg Final   • MCHC 02/21/2023 32 0  31 4 - 37 4 g/dL Final   • RDW 02/21/2023 13 2  11 6 - 15 1 % Final   • MPV 02/21/2023 9 2  8 9 - 12 7 fL Final   • Platelets 84/92/9351 304  149 - 390 Thousands/uL Final   • nRBC 02/21/2023 0  /100 WBCs Final   • Neutrophils Relative 02/21/2023 79 (H)  43 - 75 % Final   • Immat GRANS % 02/21/2023 0  0 - 2 % Final   • Lymphocytes Relative 02/21/2023 12 (L)  14 - 44 % Final   • Monocytes Relative 02/21/2023 7  4 - 12 % Final   • Eosinophils Relative 02/21/2023 2  0 - 6 % Final   • Basophils Relative 02/21/2023 0  0 - 1 % Final   • Neutrophils Absolute 02/21/2023 6 71  1 85 - 7 62 Thousands/µL Final   • Immature Grans Absolute 02/21/2023 0 02  0 00 - 0 20 Thousand/uL Final   • Lymphocytes Absolute 02/21/2023 1 03  0 60 - 4 47 Thousands/µL Final   • Monocytes Absolute 02/21/2023 0 57  0 17 - 1 22 Thousand/µL Final   • Eosinophils Absolute 02/21/2023 0 20  0 00 - 0 61 Thousand/µL Final   • Basophils Absolute 02/21/2023 0 03  0 00 - 0 10 Thousands/µL Final   • Hemoglobin A1C 02/21/2023 6 6 (H)  Normal 3 8-5 6%; PreDiabetic 5 7-6 4%; Diabetic >=6 5%; Glycemic control for adults with diabetes <7 0% % Final   • EAG 02/21/2023 143  mg/dl Final   • 25-HYDROXY VIT D 02/21/2023 31  ng/mL Final    Reference Range: All Ages: Target levels 30 - 100   • 25-Hydroxy D2 02/21/2023 <1 0  ng/mL Final    This test was developed and its performance characteristics  determined by Aga Castellanos  It has not been cleared or approved  by the Food and Drug Administration     • 25-HYDROXY VIT D3 02/21/2023 31  ng/mL Final    This test was developed and its performance characteristics  determined by Gavin Nickerson  It has not been cleared or approved  by the Food and Drug Administration  Patient Instructions   Heart Failure   AMBULATORY CARE:   Heart failure  is a condition that does not allow your heart to fill or pump properly  Not enough oxygen in your blood gets to your organs and tissues  Fluid may not move through your body properly  Fluid may build up and cause swelling and trouble breathing  This is known as congestive heart failure  It is important to manage your health to improve your quality of life  Signs and symptoms  depend on the type of heart failure you have and how severe it is  You may have any of the following:  Trouble breathing with activity that worsens to trouble breathing at rest    Shortness of breath while lying flat    Severe shortness of breath and coughing at night that usually wakes you    Feeling lightheaded when you stand up    Purple color around your mouth and nails    Confusion or anxiety    Chest pain at night    Periods of no breathing, then breathing fast    Lack of energy (often worsened by physical activity), or trouble sleeping    Swelling in your ankles, legs, or abdomen    Heartbeat that is fast or not regular    Fingers and toes feel cool to the touch    Call your local emergency number (911 in the [address] [facility]) if:   You have any of the following signs of a heart attack:      Squeezing, pressure, or pain in your chest    You may  also have any of the following:     Discomfort or pain in your back, neck, jaw, stomach, or arm    Shortness of breath    Nausea or vomiting    Lightheadedness or a sudden cold sweat      Seek immediate care if:   Your heartbeat is fast, slow, or uneven all the time        Call your doctor if:   You have symptoms of worsening heart failure:      Shortness of breath at rest, at night, or that is getting worse in any way    Weight gain of 3 or more pounds (1 4 kg) in a day, or more than your healthcare provider says is okay    More swelling in your legs or ankles    Abdominal pain or swelling    More coughing    Loss of appetite    Feeling tired all the time    You feel depressed, or you have lost interest in things you used to enjoy  You often feel worried or afraid  You have questions or concerns about your condition or care  Treatment:  Heart failure is often caused by damage or injury to your heart  The damage may be caused by other heart problems, diabetes, or high blood pressure  The damage may have also been caused by an infection  Your healthcare providers will help you manage any other health conditions that may be causing your heart failure  The goals of treatment are to manage, slow, or reverse heart damage  Treatment may include any of the following:  Medicines  may be needed to help regulate your heart rhythm  You may also need medicines to lower your blood pressure, and to decrease extra fluids  Oxygen  may help you breathe easier if your oxygen level is lower than normal  A CPAP machine may be used to keep your airway open while you sleep  Cardiac rehab  is a program run by specialists who will help you safely strengthen your heart  In the program you will learn about exercise, relaxation, stress management, and heart-healthy nutrition  Cardiac rehab may be recommended if your heart failure is not severe  Surgery  can be done to implant a pacemaker or another device in your chest to regulate your heart rhythm  Other types of surgery can open blocked heart vessels, replace a damaged heart valve, or remove scar tissue  Manage swelling from extra fluid:   Elevate (raise) your legs above the level of your heart  This will help with fluid that builds up in your legs or ankles  Elevate your legs as often as possible during the day  Prop your legs on pillows or blankets to keep them elevated comfortably   Try not to stand for long periods of time during the day  Move around to keep your blood circulating  Limit sodium (salt)  Ask how much sodium you can have each day  Your healthcare provider may give you a limit, such as 2,300 milligrams (mg) a day  Your provider or a dietitian can teach you how to read food labels for the number of mg in a food  He or she can also help you find ways to have less salt  For example, if you add salt to food as you cook, do not add more at the table  Drink liquids as directed  You may need to limit the amount of liquid you drink within 24 hours  Your healthcare provider will tell you how much liquid to have and which liquids are best for you  He or she may tell you to limit liquid to 1 5 to 2 liters in a day  He or she will also tell you how often to drink liquid throughout the day  Weigh yourself every morning  Use the same scale, in the same spot  Do this after you use the bathroom, but before you eat or drink  Wear the same type of clothing each time  Write down your weight and call your healthcare provider if you have a sudden weight gain  Swelling and weight gain are signs of fluid buildup  Manage heart failure: Your quality of life may improve with treatment and the following:  Do not smoke  Nicotine and other chemicals in cigarettes and cigars can cause lung and heart damage  Ask your healthcare provider for information if you currently smoke and need help to quit  E-cigarettes or smokeless tobacco still contain nicotine  Talk to your healthcare provider before you use these products  Do not drink alcohol or use illegal drugs  Alcohol and drugs can increase your risk for high blood pressure, diabetes, and coronary artery disease  Eat heart-healthy foods  Heart-healthy foods include fruits, vegetables, lean meat (such as beef, chicken, or pork), and low-fat dairy products  Fatty fish such as salmon and tuna are also heart healthy   Other heart-healthy foods include walnuts, whole-grain breads, and legumes such as lazo beans  Replace butter and margarine with heart-healthy oils such as olive oil or canola oil  Your provider or a dietitian can help you create heart-healthy meal plans  Manage any chronic health conditions you have  These include high blood pressure, diabetes, obesity, high cholesterol, metabolic syndrome, and COPD  You will have fewer symptoms if you manage these health conditions  Follow your healthcare provider's recommendations and follow up with him or her regularly  Maintain a healthy weight  Being overweight can increase your risk for high blood pressure, diabetes, and coronary artery disease  These conditions can make your symptoms worse  Ask your healthcare provider what a healthy weight is for you  Ask him or her to help you create a weight loss plan, if needed  Stay active  Activity can help keep your symptoms from getting worse  Walking is a type of physical activity that helps maintain your strength and improve your mood  Physical activity also helps you manage your weight  Work with your healthcare provider to create an exercise plan that is right for you  Get vaccines as directed  The flu, COVID-19, and pneumonia can be severe for a person who has heart failure  Vaccines protect you from these infections  Get a flu vaccine every year as soon as it is recommended, usually in September or October  Get a COVID-19 vaccine and booster as directed  You may also need the pneumonia vaccine  Your healthcare provider can tell you if you need other vaccines, and when to get them  Follow up with your doctor or cardiologist within 7 days or as directed: You may need to return for other tests  You may need home health care  A healthcare provider will monitor your vital signs, weight, and make sure your medicines are working  Write down your questions so you remember to ask them during your visits    For support or "more information:  Heart failure can be difficult to manage  It may be helpful to talk with others who have heart failure  You may learn how to better manage your condition or get emotional support  For more information:  [address] [place] [address]  Miles Erazo   Phone: [phone]  Web Address: https://LiveRe/  [employer]     © Copyright [employer] 2022 Information is for End User's use only and may not be sold, redistributed or otherwise used for commercial purposes  The above information is an  only  It is not intended as medical advice for individual conditions or treatments  Talk to your doctor, nurse or pharmacist before following any medical regimen to see if it is safe and effective for you  Jacob Post MD        \"This note has been constructed using a voice recognition system  Therefore there may be syntax, spelling, and/or grammatical errors  Please call if you have any questions   \"  "

## 2023-04-05 NOTE — PATIENT INSTRUCTIONS
Heart Failure   AMBULATORY CARE:   Heart failure  is a condition that does not allow your heart to fill or pump properly  Not enough oxygen in your blood gets to your organs and tissues  Fluid may not move through your body properly  Fluid may build up and cause swelling and trouble breathing  This is known as congestive heart failure  It is important to manage your health to improve your quality of life  Signs and symptoms  depend on the type of heart failure you have and how severe it is  You may have any of the following:  Trouble breathing with activity that worsens to trouble breathing at rest    Shortness of breath while lying flat    Severe shortness of breath and coughing at night that usually wakes you    Feeling lightheaded when you stand up    Purple color around your mouth and nails    Confusion or anxiety    Chest pain at night    Periods of no breathing, then breathing fast    Lack of energy (often worsened by physical activity), or trouble sleeping    Swelling in your ankles, legs, or abdomen    Heartbeat that is fast or not regular    Fingers and toes feel cool to the touch    Call your local emergency number (911 in the [address] [facility]) if:   You have any of the following signs of a heart attack:      Squeezing, pressure, or pain in your chest    You may  also have any of the following:     Discomfort or pain in your back, neck, jaw, stomach, or arm    Shortness of breath    Nausea or vomiting    Lightheadedness or a sudden cold sweat      Seek immediate care if:   Your heartbeat is fast, slow, or uneven all the time        Call your doctor if:   You have symptoms of worsening heart failure:      Shortness of breath at rest, at night, or that is getting worse in any way    Weight gain of 3 or more pounds (1 4 kg) in a day, or more than your healthcare provider says is okay    More swelling in your legs or ankles    Abdominal pain or swelling    More coughing    Loss of appetite    Feeling tired all the time    You feel depressed, or you have lost interest in things you used to enjoy  You often feel worried or afraid  You have questions or concerns about your condition or care  Treatment:  Heart failure is often caused by damage or injury to your heart  The damage may be caused by other heart problems, diabetes, or high blood pressure  The damage may have also been caused by an infection  Your healthcare providers will help you manage any other health conditions that may be causing your heart failure  The goals of treatment are to manage, slow, or reverse heart damage  Treatment may include any of the following:  Medicines  may be needed to help regulate your heart rhythm  You may also need medicines to lower your blood pressure, and to decrease extra fluids  Oxygen  may help you breathe easier if your oxygen level is lower than normal  A CPAP machine may be used to keep your airway open while you sleep  Cardiac rehab  is a program run by specialists who will help you safely strengthen your heart  In the program you will learn about exercise, relaxation, stress management, and heart-healthy nutrition  Cardiac rehab may be recommended if your heart failure is not severe  Surgery  can be done to implant a pacemaker or another device in your chest to regulate your heart rhythm  Other types of surgery can open blocked heart vessels, replace a damaged heart valve, or remove scar tissue  Manage swelling from extra fluid:   Elevate (raise) your legs above the level of your heart  This will help with fluid that builds up in your legs or ankles  Elevate your legs as often as possible during the day  Prop your legs on pillows or blankets to keep them elevated comfortably  Try not to stand for long periods of time during the day  Move around to keep your blood circulating  Limit sodium (salt)  Ask how much sodium you can have each day   Your healthcare provider may give you a limit, such as 2,300 milligrams (mg) a day  Your provider or a dietitian can teach you how to read food labels for the number of mg in a food  He or she can also help you find ways to have less salt  For example, if you add salt to food as you cook, do not add more at the table  Drink liquids as directed  You may need to limit the amount of liquid you drink within 24 hours  Your healthcare provider will tell you how much liquid to have and which liquids are best for you  He or she may tell you to limit liquid to 1 5 to 2 liters in a day  He or she will also tell you how often to drink liquid throughout the day  Weigh yourself every morning  Use the same scale, in the same spot  Do this after you use the bathroom, but before you eat or drink  Wear the same type of clothing each time  Write down your weight and call your healthcare provider if you have a sudden weight gain  Swelling and weight gain are signs of fluid buildup  Manage heart failure: Your quality of life may improve with treatment and the following:  Do not smoke  Nicotine and other chemicals in cigarettes and cigars can cause lung and heart damage  Ask your healthcare provider for information if you currently smoke and need help to quit  E-cigarettes or smokeless tobacco still contain nicotine  Talk to your healthcare provider before you use these products  Do not drink alcohol or use illegal drugs  Alcohol and drugs can increase your risk for high blood pressure, diabetes, and coronary artery disease  Eat heart-healthy foods  Heart-healthy foods include fruits, vegetables, lean meat (such as beef, chicken, or pork), and low-fat dairy products  Fatty fish such as salmon and tuna are also heart healthy  Other heart-healthy foods include walnuts, whole-grain breads, and legumes such as lazo beans  Replace butter and margarine with heart-healthy oils such as olive oil or canola oil   Your provider or a dietitian can help you create heart-healthy meal plans  Manage any chronic health conditions you have  These include high blood pressure, diabetes, obesity, high cholesterol, metabolic syndrome, and COPD  You will have fewer symptoms if you manage these health conditions  Follow your healthcare provider's recommendations and follow up with him or her regularly  Maintain a healthy weight  Being overweight can increase your risk for high blood pressure, diabetes, and coronary artery disease  These conditions can make your symptoms worse  Ask your healthcare provider what a healthy weight is for you  Ask him or her to help you create a weight loss plan, if needed  Stay active  Activity can help keep your symptoms from getting worse  Walking is a type of physical activity that helps maintain your strength and improve your mood  Physical activity also helps you manage your weight  Work with your healthcare provider to create an exercise plan that is right for you  Get vaccines as directed  The flu, COVID-19, and pneumonia can be severe for a person who has heart failure  Vaccines protect you from these infections  Get a flu vaccine every year as soon as it is recommended, usually in September or October  Get a COVID-19 vaccine and booster as directed  You may also need the pneumonia vaccine  Your healthcare provider can tell you if you need other vaccines, and when to get them  Follow up with your doctor or cardiologist within 7 days or as directed: You may need to return for other tests  You may need home health care  A healthcare provider will monitor your vital signs, weight, and make sure your medicines are working  Write down your questions so you remember to ask them during your visits  For support or more information:  Heart failure can be difficult to manage  It may be helpful to talk with others who have heart failure  You may learn how to better manage your condition or get emotional support   For more information:  [address]  Miles Erazo   Phone: [phone]  Web Address: https://www strong com/  org     © Copyright [employer] 2022 Information is for End User's use only and may not be sold, redistributed or otherwise used for commercial purposes  The above information is an  only  It is not intended as medical advice for individual conditions or treatments  Talk to your doctor, nurse or pharmacist before following any medical regimen to see if it is safe and effective for you

## 2023-04-25 ENCOUNTER — OFFICE VISIT (OUTPATIENT)
Dept: PODIATRY | Facility: CLINIC | Age: 83
End: 2023-04-25

## 2023-04-25 VITALS
RESPIRATION RATE: 17 BRPM | WEIGHT: 209 LBS | BODY MASS INDEX: 29.26 KG/M2 | HEIGHT: 71 IN | SYSTOLIC BLOOD PRESSURE: 120 MMHG | DIASTOLIC BLOOD PRESSURE: 78 MMHG

## 2023-04-25 DIAGNOSIS — M79.672 PAIN IN BOTH FEET: ICD-10-CM

## 2023-04-25 DIAGNOSIS — B35.1 ONYCHOMYCOSIS: ICD-10-CM

## 2023-04-25 DIAGNOSIS — L84 CORNS: ICD-10-CM

## 2023-04-25 DIAGNOSIS — B35.3 TINEA PEDIS OF BOTH FEET: ICD-10-CM

## 2023-04-25 DIAGNOSIS — E11.42 DIABETIC POLYNEUROPATHY ASSOCIATED WITH TYPE 2 DIABETES MELLITUS (HCC): Primary | ICD-10-CM

## 2023-04-25 DIAGNOSIS — I70.209 PERIPHERAL ARTERIOSCLEROSIS (HCC): ICD-10-CM

## 2023-04-25 DIAGNOSIS — M79.671 PAIN IN BOTH FEET: ICD-10-CM

## 2023-04-25 NOTE — PROGRESS NOTES
Assessment/Plan:  Patient has pain in his feet and toes with ambulation   He has mycosis of nail and skin  He has plantar pre ulcerative skin lesions      Plan   Diabetic foot exam performed   Mycotic nails debrided   cream ordered   Patient will use daily, he will apply cream to feet b i d  For 4 weeks   Calluses debrided   Procedures performed without pain or complication            Subjective:  Patient has pain in his feet with ambulation   No history of trauma    He has pain when he wears shoes   He has pain in his toes       Patient ID: Spenser Clayton is a 82 y o  male      Patient is diabetic  Nancy Angélica has pain in his feet and toes with ambulation   Has pain from calluses   He has ingrown toenails and dry scaly skin   He is requesting refill of topical antifungal         Review of Systems   Constitutional: No fever or chills, feels well, no tiredness, no recent weight loss or weight gain   Eyes: No complaints of red eyes, no eyesight problems    ENT: no complaints of loss of hearing, no nosebleeds, no sore throat   Cardiovascular: No complaints of chest pain, no palpitations, no leg claudication or lower extremity edema   Respiratory: No complaints of shortness of breath, no wheezing, no cough   Gastrointestinal: No complaints of abdominal pain, no constipation, no nausea or vomiting, no diarrhea or bloody stools   Genitourinary: No complaints of dysuria or incontinence, no hesitancy, no nocturia   Musculoskeletal: limb pain, but-- as noted in HPI   Integumentary: No complaints of skin rash or lesion, no itching or dry skin, no skin wounds   Neurological: No complaints of headache, no confusion, no numbness or tingling, no dizziness   Psychiatric: No suicidal thoughts, no anxiety, no depression   Endocrine: No muscle weakness, no frequent urination, no excessive thirst, no feelings of weakness       Active Problems  1  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)   2  Arthritis (048 23) (M19 90)   3  Atherosclerosis of arteries of extremities (440 20) (I70 209)   4  Calcaneal spur (726 73) (M77 30)   5  Callus (700) (L84)   6  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)   7  Diabetes mellitus with neuropathy (250 60,357 2) (E11 40)   8  Diabetic neuropathy (250 60,357 2) (E11 40)   9  Hypercholesterolemia (272 0) (E78 00)   10  Hypertension (401 9) (I10)   11  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)   12  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)   13  Onychomycosis (110 1) (B35 1)   14  Orthopedic aftercare (V54 9) (Z47 89)   15  Pain in both feet (729 5) (M79 671,M79 672)   16  Pain in extremity (729 5) (M79 609)   17  Pes planus, congenital (754 61) (Q66 50)   18  Plantar 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 2)   19  Plantar wart (078 12) (B07 0)   20  Prostate cancer (185) (C61)   21  Sprain of right shoulder (840 9) (S43 401A)   22  Tinea pedis (110 4) (B35 3)     Past Medical History   · Orthopedic aftercare (V54 9) (Z47 89)     Surgical History   · History of Gastric Surgery   · History of Hernia Repair   · History of Previous Stent Placement Total Number Performed ___     The surgical history was reviewed and updated today        Family History  Family History    · Family history of Arthritis (V17 7)   · Family history of Cancer     The family history was reviewed and updated today        Social History      · Beer Consumption (___ Bottles Per Day)   · Daily Coffee Consumption (1  Cups/Day)   · Former smoker (B14 30) (Z87 891)  The social history was reviewed and updated today  Allergies  1  No Known Drug Allergies      Physical Exam  Left Foot: Appearance: Normal except as noted: excessive pronation-- and-- pes planus  Right Foot: Appearance: Normal except as noted: excessive pronation-- and-- pes planus  Left Ankle: ROM: limited ROM in all planes   Right Ankle: ROM: limited ROM in all planes   Neurological Exam: performed   Light touch was intact bilaterally  Vibratory sensation was intact bilaterally  Response to monofilament test was intact bilaterally  Deep tendon reflexes: patellar reflex present bilaterally-- and-- achilles reflex present bilaterally  Vascular Exam: performed Dorsalis pedis pulses were One/4 bilaterally  Posterior tibial pulses were One/4 bilaterally  Elevation Pallor: present bilaterally  Capillary refill time was greater than 3 seconds bilaterally-- and-- Q  9 findings bilateral  Negative digital hair noted  Positive abnormal cooling noted  Toenails: All of the toenails were elongated,-- hypertrophied,-- discolored,-- ingrown,-- shown to have subungual debris,-- tender-- and-- Severely mycotic with onychauxis     Socks and shoes removed, Right Foot Findings: swollen, erythematous and dry   The sensory exam showed diminished vibratory sensation at the level of the toes  Diminished tactile sensation with monofilament testing throughout the right foot   Socks and shoes removed, Left Foot Findings: swollen, erythematous and dry   The sensory exam showed diminished vibratory sensation at the level of the toes  Diminished tactile sensation with monofilament testing throughout the left foot  Capillary refills findings on the right were delayed in the toes   Pulses:  1+ in the posterior tibialis on the right  1+ in the dorsalis pedis on the right   Capillary refills findings on the left were delayed in the toes   Pulses:  1+ in the posterior tibialis on the left  1+ in the dorsalis pedis on the left   Assign Risk Category: 2: Loss of protective sensation with or without weakness, deformity, callus, pre-ulcer, or history of ulceration  High risk  Hyperkeratosis: present on both first toes,-- present on both fifth sub metatarsals-- and-- [place] tinea pedis, bilateral, is noted  Shoe Gear Evaluation: performed ()  Right Foot: width: d-- and-- length: 11   Left Foot: width: d-- and-- length: 11  Recommendation(s): athletic shoes     Patient's shoes and socks removed  Right Foot/Ankle   Right Foot Inspection  Skin Exam: dry skin, callus and callus               Toe Exam: swelling  Sensory   Vibration: diminished  Proprioception: diminished      Vascular  Capillary refills: elevated        Left Foot/Ankle  Left Foot Inspection  Skin Exam: dry skin and callus              Toe Exam: swelling and erythema                   Sensory   Vibration: diminished  Proprioception: diminished     Vascular  Capillary refills: elevated     Right Foot/Ankle   Right Foot Inspection        Sensory   Vibration: diminished  Proprioception: diminished  Monofilament testing: diminished     Vascular  Capillary refills: < 3 seconds              Left Foot/Ankle  Left Foot Inspection        Sensory   Vibration: diminished  Proprioception: diminished  Monofilament testing: diminished     Vascular  Capillary refills: < 3 seconds           Assign Risk Category  Deformity present  Loss of protective sensation  Weak pulses  Risk: 2

## 2023-05-01 ENCOUNTER — OFFICE VISIT (OUTPATIENT)
Dept: GASTROENTEROLOGY | Facility: CLINIC | Age: 83
End: 2023-05-01

## 2023-05-01 VITALS
DIASTOLIC BLOOD PRESSURE: 71 MMHG | BODY MASS INDEX: 29.68 KG/M2 | WEIGHT: 212 LBS | SYSTOLIC BLOOD PRESSURE: 119 MMHG | HEIGHT: 71 IN | HEART RATE: 98 BPM

## 2023-05-01 DIAGNOSIS — Z86.010 PERSONAL HISTORY OF COLONIC POLYPS: ICD-10-CM

## 2023-05-01 DIAGNOSIS — K21.9 GASTROESOPHAGEAL REFLUX DISEASE WITHOUT ESOPHAGITIS: Primary | ICD-10-CM

## 2023-05-01 DIAGNOSIS — C34.32 MALIGNANT NEOPLASM OF LOWER LOBE OF LEFT LUNG (HCC): ICD-10-CM

## 2023-05-01 RX ORDER — OMEPRAZOLE 20 MG/1
20 CAPSULE, DELAYED RELEASE ORAL DAILY
COMMUNITY
End: 2023-05-04 | Stop reason: SDUPTHER

## 2023-05-01 NOTE — PROGRESS NOTES
[facility] - Outpatient Consultation  Rivka Nunes 82 y o  male MRN: [id number]  Encounter: [id number]          ASSESSMENT AND PLAN:      1  Gastroesophageal reflux disease without esophagitis    2  Personal history of colonic polyps    3  Malignant neoplasm of lower lobe of left lung 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, 4 5 cm lesion, T2 N0 M0, treated with radiation treatment  Given his overall health, comorbid condition and quality of life, radiation treatment was opted as per the notes  I do not see the urgency of any GI evaluation at this time  I will hold off any polyp surveillance at this time given his health and above diagnosis  We can reassess after his lung cancer situation is better understood and define in few months  They will call us as needed  Discussed with patient's wife as well     ______________________________________________________________________    HPI:      Patient came in for evaluation of his history of colon polyp  He had a colonoscopy in adenoma polyp removed about 6 6 years ago  He denies any significant lower GI symptoms appetite is fair weight stable no blood in the stools melena hematochezia, moves his bowels generally regularly especially if he watches his diet, occasional constipation but managing it with fluids and fiber  Denies dysphagia coughing choking spells nocturnal reflux regurgitation bronchitis pneumonias  In the meanwhile he was diagnosed with squamous cell cancer left lower lobe, T2 N0 M0, received radiation treatment and waiting for MR imaging to determine further plan of action    Diet medications more than 10 systems reviewed  REVIEW OF SYSTEMS:    CONSTITUTIONAL: Denies any fever, chills, rigors, and weight loss  HEENT: No earache or tinnitus  CARDIOVASCULAR: No chest pain or palpitations  RESPIRATORY: Denies any cough, hemoptysis, shortness of breath or dyspnea on exertion  GASTROINTESTINAL: As noted in the History of Present Illness  GENITOURINARY: Denies any hematuria or dysuria  NEUROLOGIC: No dizziness or vertigo  MUSCULOSKELETAL: Denies any joint swellings  SKIN: Denies skin rashes or itching  ENDOCRINE: Denies excessive thirst  Denies intolerance to heat or cold  PSYCHOSOCIAL: Denies depression or anxiety  Denies any recent memory loss         Historical Information   Past Medical History:   Diagnosis Date    Abdominal pain     Anxiety     Arthritis     Asthma     Atrial fibrillation (HCC)     Back pain     Bleeding ulcer     Bronchitis     Cancer [facility])     prostate 2011- radiation    Cardiac disease     cardiac stent x1    Cataract     starting    Chronic diastolic (congestive) heart failure (HCC)     Diabetes mellitus (HCC)     boarderline diabetic     GERD (gastroesophageal reflux disease)     History of radiation therapy 2010    Prostate seeds (brachytherapy) and EBRT    Hyperlipidemia     Hypertension     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, bilateral     Low back pain     Lung cancer ([address] )     Lung mass     Myocardial infarction ([address] )     mild 1999    Obesity     Prostate cancer ([address] )     Shortness of breath     Sleep apnea     sleep study 11/22    Wears dentures     full set    Wears glasses      Past Surgical History:   Procedure Laterality Date    ABDOMINAL HERNIA REPAIR      ABDOMINAL SURGERY      bleeding ulcer, cyst removed from abd    COLONOSCOPY      CORONARY ANGIOPLASTY WITH STENT PLACEMENT  1999    x1     ESOPHAGOGASTRODUODENOSCOPY      IR BIOPSY LUNG  10/05/2021    IR THORACENTESIS  11/08/2021    KNEE SURGERY Left     [place] "[address] INCL FLUOR GDNCE DX W/CELL WASHG [address] [facility] N/A 2021    Procedure: Brittany Spine;  Surgeon: Av Galvez MD;  Location: [facility] MAIN OR;  Service: Thoracic    NE MEDIASTINOSCOPY WITH LYMPH NODE BIOPSY/IES N/A 2021    Procedure: MEDIASTINOSCOPY;  Surgeon: Av Galvez MD;  Location: [facility];  Service: Thoracic    PROSTATE SURGERY       Social History   Social History     Substance and Sexual Activity   Alcohol Use Not Currently    Alcohol/week: 10 0 - 12 0 standard drinks    Types: 7 Glasses of wine, 3 - 5 Cans of beer per week    Comment: few beers day; glass of red wine at dinner     Social History     Substance and Sexual Activity   Drug Use Never     Social History     Tobacco Use   Smoking Status Former    Packs/day:  00    Years: 30     Pack years: 30     Types: Cigarettes    Quit date: 36    Years since quittin 3   Smokeless Tobacco Never     Family History   Problem Relation Age of Onset    Prostate cancer Brother     Cancer Maternal Uncle         colo rectal cancer    Cancer Paternal Aunt        Meds/Allergies       Current Outpatient Medications:     acetaminophen (TYLENOL) 325 mg tablet    albuterol (2 5 mg/3 mL) 0 083 % nebulizer solution    apixaban (Eliquis) 5 mg    atorvastatin (LIPITOR) 10 mg tablet    carvedilol (COREG) 25 mg tablet    ciclopirox (LOPROX) 0 77 % cream    fluticasone-umeclidinium-vilanterol (Trelegy Ellipta) 100-62 5-25 mcg/actuation inhaler    hydrocortisone 2 5 % cream    latanoprost (XALATAN) 0 005 % ophthalmic solution    metFORMIN (GLUCOPHAGE) 500 mg tablet    Multiple Vitamin (MULTI-VITAMIN DAILY PO)    omeprazole (PriLOSEC) 20 mg delayed release capsule    primidone (MYSOLINE) 50 mg tablet    spironolactone (ALDACTONE) 25 mg tablet    thiamine 100 MG tablet    No Known Allergies        Objective     Blood pressure 119/71, pulse 98, height 5' 11\" (1 803 m), weight 96 2 kg (212 lb)   Body mass index is 29 57 " kg/m²         PHYSICAL EXAM:      General Appearance:   Alert, cooperative, no distress   HEENT:   Normocephalic, atraumatic, anicteric  Neck:  Supple, symmetrical, trachea midline   Lungs:   Clear to auscultation bilaterally; no rales, rhonchi or wheezing; respirations unlabored    Heart[de-identified]   Regular rate and rhythm; no murmur  Abdomen:   Soft, non-tender, non-distended; normal bowel sounds; no masses, no organomegaly    Genitalia:   Deferred    Rectal:   Deferred    Extremities:  No cyanosis, clubbing or edema    Skin:  No jaundice, rashes, or lesions    Lymph nodes:  No palpable cervical lymphadenopathy        Lab Results:   No visits with results within 1 Day(s) from this visit     Latest known visit with results is:   Appointment on 02/21/2023   Component Date Value    Cholesterol 02/21/2023 108     Triglycerides 02/21/2023 95     HDL, Direct 02/21/2023 33 (L)     LDL Calculated 02/21/2023 56     Color, UA 02/21/2023 Yellow     Clarity, UA 02/21/2023 Clear     Specific Gravity, UA 02/21/2023 1 015     pH, UA 02/21/2023 6 0     Leukocytes, UA 02/21/2023 Negative     Nitrite, UA 02/21/2023 Negative     Protein, UA 02/21/2023 30 (1+) (A)     Glucose, UA 02/21/2023 Negative     Ketones, UA 02/21/2023 Trace (A)     Urobilinogen, UA 02/21/2023 1 0     Bilirubin, UA 02/21/2023 Negative     Occult Blood, UA 02/21/2023 Negative     RBC, UA 02/21/2023 None Seen     WBC, UA 02/21/2023 None Seen     Epithelial Cells 02/21/2023 None Seen     Bacteria, UA 02/21/2023 None Seen     PSA 02/21/2023 <0 1     Sodium 02/21/2023 138     Potassium 02/21/2023 5 1     Chloride 02/21/2023 103     CO2 02/21/2023 30     ANION GAP 02/21/2023 5     BUN 02/21/2023 12     Creatinine 02/21/2023 0 94     Glucose, Fasting 02/21/2023 138 (H)     Calcium 02/21/2023 9 7     Corrected Calcium 02/21/2023 10 2 (H)     AST 02/21/2023 16     ALT 02/21/2023 12     Alkaline Phosphatase 02/21/2023 87     Total Protein 02/21/2023 6 5     Albumin 02/21/2023 3 4 (L)     Total Bilirubin 02/21/2023 0 46     eGFR 02/21/2023 76     WBC 02/21/2023 8 56     RBC 02/21/2023 4 13     Hemoglobin 02/21/2023 13 3     Hematocrit 02/21/2023 41 6     MCV 02/21/2023 101 (H)     MCH 02/21/2023 32 2     MCHC 02/21/2023 32 0     RDW 02/21/2023 13 2     MPV 02/21/2023 9 2     Platelets 17/83/1854 304     nRBC 02/21/2023 0     Neutrophils Relative 02/21/2023 79 (H)     Immat GRANS % 02/21/2023 0     Lymphocytes Relative 02/21/2023 12 (L)     Monocytes Relative 02/21/2023 7     Eosinophils Relative 02/21/2023 2     Basophils Relative 02/21/2023 0     Neutrophils Absolute 02/21/2023 6 71     Immature Grans Absolute 02/21/2023 0 02     Lymphocytes Absolute 02/21/2023 1 03     Monocytes Absolute 02/21/2023 0 57     Eosinophils Absolute 02/21/2023 0 20     Basophils Absolute 02/21/2023 0 03     Hemoglobin A1C 02/21/2023 6 6 (H)     EAG 02/21/2023 143     25-HYDROXY VIT D 02/21/2023 31     25-Hydroxy D2 02/21/2023 <1 0     25-HYDROXY VIT D3 02/21/2023 31          Radiology Results:   No results found

## 2023-05-03 ENCOUNTER — TELEPHONE (OUTPATIENT)
Dept: NEUROLOGY | Facility: CLINIC | Age: 83
End: 2023-05-03

## 2023-05-04 DIAGNOSIS — K21.9 GASTROESOPHAGEAL REFLUX DISEASE WITHOUT ESOPHAGITIS: Primary | ICD-10-CM

## 2023-05-04 RX ORDER — OMEPRAZOLE 20 MG/1
20 CAPSULE, DELAYED RELEASE ORAL DAILY
Qty: 90 CAPSULE | Refills: 1 | Status: SHIPPED | OUTPATIENT
Start: 2023-05-04

## 2023-05-05 DIAGNOSIS — G93.40 ENCEPHALOPATHY: ICD-10-CM

## 2023-05-05 RX ORDER — PRIMIDONE 50 MG/1
TABLET ORAL
Qty: 360 TABLET | Refills: 3 | Status: SHIPPED | OUTPATIENT
Start: 2023-05-05

## 2023-05-16 ENCOUNTER — OFFICE VISIT (OUTPATIENT)
Dept: NEUROLOGY | Facility: CLINIC | Age: 83
End: 2023-05-16

## 2023-05-16 VITALS
HEIGHT: 71 IN | TEMPERATURE: 97.6 F | DIASTOLIC BLOOD PRESSURE: 75 MMHG | OXYGEN SATURATION: 96 % | BODY MASS INDEX: 28.84 KG/M2 | WEIGHT: 206 LBS | HEART RATE: 111 BPM | SYSTOLIC BLOOD PRESSURE: 111 MMHG

## 2023-05-16 DIAGNOSIS — G25.0 TREMOR, ESSENTIAL: Primary | ICD-10-CM

## 2023-05-16 DIAGNOSIS — G93.40 ENCEPHALOPATHY: ICD-10-CM

## 2023-05-16 RX ORDER — PRIMIDONE 50 MG/1
TABLET ORAL
Qty: 180 TABLET | Refills: 1 | Status: SHIPPED | OUTPATIENT
Start: 2023-05-16

## 2023-05-16 NOTE — PROGRESS NOTES
Return NeuroOutpatient Note        Mc Duran  [id number]  71 y o   1940        Chronic bilateral low back pain without sciatica and Tremor, essential        History obtained from:  Patient and wife     HPI/Subjective:    Mc Duran is an 83 yo M with PMH of ET presents as f/u  Per my previous history, patient has tremors in both hands for over 2 years  Onset is when he's doing something such as using utensils, writing, holding cup, fixing something  Patient was previously on higher dose of primidone and was still having tremors  Today he says he's on 50mg bid but it seems to work well for him  Wife says his tremors are now rarely noticeable  She does state that he sleeps a lot  He denies resting tremor      Previously, patient had reported lifting heavy containers and had had pain  We had ordered LS spine MRI but he forgot about it  Today he denies any pain  He doesn't lift anything heavy anymore  He has tendency to wheeze  He takes inhaler prn so we have not used propranolol  He is found to have lung mass and will soon have radiation therapy       Past Medical History:   Diagnosis Date   • Abdominal pain    • Anxiety    • Arthritis    • Asthma    • Atrial fibrillation [facility])    • Back pain    • Bleeding ulcer    • Bronchitis    • Cancer [facility])     prostate 2011- radiation   • Cardiac disease     cardiac stent x1   • Cataract     starting   • Chronic diastolic (congestive) heart failure (HCC)    • Diabetes mellitus ([facility] )     boarderline diabetic    • GERD (gastroesophageal reflux disease)    • History of radiation therapy 2010    Prostate seeds (brachytherapy) and EBRT   • Hyperlipidemia    • Hypertension    • 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, bilateral    • Low back pain    • Lung cancer ([facility] )    • Lung mass    • Myocardial infarction [facility])     mild 1999   • Obesity    • Prostate cancer ([facility] )    • Shortness of breath    • Sleep apnea     sleep study 11/22   • Wears dentures     full set   • Wears glasses      Social History     Socioeconomic History   • Marital status: /Civil Union     Spouse name: Not on file   • Number of children: Not on file   • Years of education: Not on file   • Highest education level: Not on file   Occupational History   • Not on file   Tobacco Use   • Smoking status: Former     Packs/day: 1 00     Years: 30 00     Pack years: 30 00     Types: Cigarettes     Quit date: 36     Years since quittin 3   • Smokeless tobacco: Never   Vaping Use   • Vaping Use: Never used   Substance and Sexual Activity   • Alcohol use: Not Currently     Alcohol/week: 10 0 - 12 0 standard drinks     Types: 7 Glasses of wine, 3 - 5 Cans of beer per week     Comment: few beers day; glass of red wine at dinner   • Drug use: Never   • Sexual activity: Not on file   Other Topics Concern   • Not on file   Social History Narrative   • Not on file     Social Determinants of Health     Financial Resource Strain: Not on file   Food Insecurity: No Food Insecurity   • Worried About Running Out of Food in the Last Year: Never true   • Ran Out of Food in the Last Year: Never true   Transportation Needs: No Transportation Needs   • Lack of Transportation (Medical): No   • Lack of Transportation (Non-Medical):  No   Physical Activity: Not on file   Stress: Not on file   Social Connections: Not on file   Intimate Partner Violence: Not on file   Housing Stability: Low Risk    • Unable to Pay for Housing in the Last Year: No   • Number of Places Lived in the Last Year: 1   • Unstable Housing in the Last Year: No     Family History   Problem Relation Age of Onset   • Prostate cancer Brother    • Cancer Maternal Uncle         colo rectal cancer   • Cancer Paternal Aunt      No Known Allergies  Current Outpatient Medications on File Prior to Visit   Medication Sig Dispense Refill   • acetaminophen (TYLENOL) 325 mg tablet Take 2 tablets (650 mg total) by mouth every 6 (six) hours as needed for mild pain, headaches or fever 30 tablet 0   • albuterol (2 5 mg/3 mL) 0 083 % nebulizer solution Take 2 5 mg by nebulization As needed per patient's wife      • apixaban (Eliquis) 5 mg Take 1 tablet (5 mg total) by mouth 2 (two) times a day 60 tablet 5   • atorvastatin (LIPITOR) 10 mg tablet Take 1 tablet (10 mg total) by mouth daily 90 tablet 1   • carvedilol (COREG) 25 mg tablet Take 1 tablet (25 mg total) by mouth 2 (two) times a day with meals 180 tablet 1   • ciclopirox (LOPROX) 0 77 % cream Apply topically 2 (two) times a day for 20 days 45 g 1   • fluticasone-umeclidinium-vilanterol (Trelegy Ellipta) 100-62 5-25 mcg/actuation inhaler Rinse mouth after use  60 each 5   • hydrocortisone 2 5 % cream Apply topically 2 (two) times a day Apply twice a day affected area the trunk 20 g 2   • latanoprost (XALATAN) 0 005 % ophthalmic solution Administer 0 005 mL to both eyes daily at bedtime     • metFORMIN (GLUCOPHAGE) 500 mg tablet Take 1 tablet (500 mg total) by mouth 2 (two) times a day 180 tablet 1   • Multiple Vitamin (MULTI-VITAMIN DAILY PO) Take by mouth daily       • omeprazole (PriLOSEC) 20 mg delayed release capsule Take 1 capsule (20 mg total) by mouth daily 90 capsule 1   • spironolactone (ALDACTONE) 25 mg tablet Take 1 tablet (25 mg total) by mouth daily 90 tablet 1   • [DISCONTINUED] primidone (MYSOLINE) 50 mg tablet Take 2 tabs twice a day (Patient taking differently: Take 1 tabs twice a day) 360 tablet 3   • thiamine 100 MG tablet Take 1 tablet (100 mg total) by mouth daily (Patient not taking: Reported on 2/10/2023) 30 tablet 0     No current facility-administered medications on file prior to visit  Review of Systems   Refer to positive review of systems in HPI     Review of Systems    Constitutional- No fever  Eyes- No visual change  ENT- Hearing normal  CV- No chest pain  Resp- No Shortness of breath  GI- No diarrhea  - Bladder normal  MS- No Arthritis   Skin- No rash  Psych- No depression  Endo- No DM  Heme- No "nodes    Vitals:    05/16/23 0949   BP: 111/75   BP Location: Right arm   Patient Position: Sitting   Cuff Size: Standard   Pulse: (!) 111   Temp: 97 6 °F (36 4 °C)   TempSrc: Oral   SpO2: 96%   Weight: 93 4 kg (206 lb)   Height: 5' 11\" (1 803 m)       PHYSICAL EXAM:  Appearance: No Acute Distress  Ophthalmoscopic: Disc Flat, Normal fundus  Mental status:  Orientation: Awake, Alert, and Orientedx3  Memory: Registation 3/3 Recall 3/3  Attention: normal  Knowledge: good  Language: No aphasia  Speech: No dysarthria  Cranial Nerves:  2 No Visual Defect on Confrontation, Pupils round, equal, reactive to light  3,4,6 Extraocular Movements Intact, no nystagmus  5 Facial Sensation Intact  7 No facial asymmetry  8 Intact hearing  9,10 Palate symmetric, normal gag  11 Good shoulder shrug  12 Tongue Midline  Gait: Stable  Coordination: subtle postural tremor in right hand  No ataxia with finger to nose testing, and heel to shin  Sensory: Intact, Symmetric to pinprick, light touch, vibration, and joint position  Muscle Tone: Normal              Muscle exam:  Arm Right Left Leg Right Left   Deltoid 5/5 5/5 Iliopsoas 5/5 5/5   Biceps 5/5 5/5 Quads 5/5 5/5   Triceps 5/5 5/5 Hamstrings 5/5 5/5   Wrist Extension 5/5 5/5 Ankle Dorsi Flexion 5/5 5/5   Wrist Flexion 5/5 5/5 Ankle Plantar Flexion 5/5 5/5   Interossei 5/5 5/5 Ankle Eversion 5/5 5/5   APB 5/5 5/5 Ankle Inversion 5/5 5/5       Reflexes   RJ BJ TJ KJ AJ Plantars Link's   Right 2+ 2+ 2+ 2+  Downgoing Not present   Left 2+ 2+ 2+ 2+  Downgoing Not present     Personal review of  Labs:                  Diagnoses and all orders for this visit:        1  Tremor, essential        2  lung mass  primidone (MYSOLINE) 50 mg tablet          Patient is currently doing well in terms of tremors  Due to drowsiness, will ask him to take both 50mg tabs at night  Patient currently doesn't have lower back pain so will defer imaging     He is meeting with radiation oncology soon to have " his lung mass treated               Total time of encounter:  30 min  More than 50% of the time was used in counseling and/or coordination of care  Extent of counseling and/or coordination of care        Mari Cook, MD [facility]  [address]  [address]  [phone]

## 2023-05-17 ENCOUNTER — TELEPHONE (OUTPATIENT)
Dept: NEUROLOGY | Facility: CLINIC | Age: 83
End: 2023-05-17

## 2023-05-17 DIAGNOSIS — I10 HYPERTENSION, UNSPECIFIED TYPE: ICD-10-CM

## 2023-05-17 DIAGNOSIS — I48.91 NEW ONSET ATRIAL FIBRILLATION (HCC): ICD-10-CM

## 2023-05-17 RX ORDER — APIXABAN 5 MG/1
TABLET, FILM COATED ORAL
Qty: 60 TABLET | Refills: 5 | Status: SHIPPED | OUTPATIENT
Start: 2023-05-17

## 2023-05-17 NOTE — TELEPHONE ENCOUNTER
Recd vm  Hello, my name is Rell Díaz. I'm calling from [facility]. i have a question on a certain patient please. We were given the 2 prescriptions on the same date For Madison zafar date of birth [birth year]. 1 was for a primidone  50 milligram. 2 tabs twice a day and the other one was primidone 50 milligrams. Just 2 tabs daily at 8 PM. So please let me know which is the correct dosage. It says here that there was a cancel request for the 2 tabs twice today. I just wanted to verify that please, I just want to be all that picky  but I just wanted to confirm the dosage. All right? If you could just give me a call. That'd be great. My name is Rell Díaz. Uh, phone number here is [phone]. Thank you. prescription for primidone 50 mg with instructions:    Sig: Take 2 tabs twice a day, discontinued 1 tab bid      office noted documents:   Patient is currently doing well in terms of tremors.    Due to drowsiness, will ask him to take both 50mg tabs at night    I confirmed with pharmacist that prescription should be primidone 50 mg #180 tablets 1 refill with instructions: Sig: Take 2 tabs at 8pm

## 2023-05-17 NOTE — TELEPHONE ENCOUNTER
Pippa Salazar MD  You 1 hour ago (11:30 AM)     He was feeling drowsy during day so we have asked him to take both 50mg tabs at night. Please relay. Patient aware and verbalized understanding.

## 2023-05-22 NOTE — LETTER
RICO MESSINA    Patient Age: 79 year old   Refill request by: Pharmacy fax  Refill to be: Faxed to [facility]    Medication requested to be refilled:   SYNTHROID 150 MCG TAB       WEIGHT AND HEIGHT:   Wt Readings from Last 1 Encounters:   04/05/23 66.7 kg (147 lb)     Ht Readings from Last 1 Encounters:   04/05/23 5' 2\" (1.575 m)     BMI Readings from Last 1 Encounters:   04/05/23 26.89 kg/m²       ALLERGIES:  Egg yolk   (food or med)  Current Outpatient Medications   Medication Sig Dispense Refill   • estradiol (ESTRACE) 0.1 MG/GM vaginal cream Place 0.5gm to vulva twice weekly 42.5 g 3   • clobetasol (TEMOVATE) 0.05 % ointment Apply topically 2 times daily. 30 g 0   • Synthroid 150 MCG tablet Take 1 tablet six days a week and 1 and 1/2 tablets one day a week. 96 tablet 1   • albuterol 108 (90 Base) MCG/ACT inhaler Inhale 1 puff into the lungs every 4 hours as needed for Shortness of Breath or Wheezing. 54 g 0     Current Facility-Administered Medications   Medication Dose Route Frequency Provider Last Rate Last Admin   • DENOSumab (PROLIA) SubQ injection 60 mg  60 mg Subcutaneous Q6 Months Chanel Osorio, CNP   60 mg at 01/30/23 1335     PHARMACY to use: [facility]          Pharmacy preference(s) on file:   [id number] [place], [place] - [address] [facility] [address]  [address]  [facility] [address]  Phone: [phone] Fax: [phone]      CALL BACK INFO: Ok to leave response (including medical information) on answering machine  ROUTING: Patient's physician/staff        PCP: Esthela Coburn DO         INS: Payor: MEDICARE / Plan: PARTA AND B / Product Type: MEDICARE   PATIENT ADDRESS:  [address] [place] [id number]   October 21, 2022     Marline Hernandez PA-C  [address] [facility]  [address]    Patient: Ayaan Casas   YOB: 1940   Date of Visit: 10/21/2022     Dear Dr Bri Chanel      Thank you for referring Quita Deist to me for evaluation  Below are the relevant portions of my assessment and plan of care  If you have questions, please do not hesitate to call me  I look forward to following Ami Blackmon along with you           Sincerely,        Dorota Ramírez MD        CC: No Recipients    Progress Notes:

## 2023-05-29 DIAGNOSIS — I48.19 PERSISTENT ATRIAL FIBRILLATION (HCC): ICD-10-CM

## 2023-05-29 DIAGNOSIS — E78.2 MIXED HYPERLIPIDEMIA: ICD-10-CM

## 2023-05-30 RX ORDER — ATORVASTATIN CALCIUM 10 MG/1
TABLET, FILM COATED ORAL
Qty: 90 TABLET | Refills: 1 | Status: SHIPPED | OUTPATIENT
Start: 2023-05-30

## 2023-05-30 RX ORDER — SPIRONOLACTONE 25 MG/1
TABLET ORAL
Qty: 90 TABLET | Refills: 1 | Status: SHIPPED | OUTPATIENT
Start: 2023-05-30

## 2023-05-31 NOTE — ASSESSMENT & PLAN NOTE
Requiring oxygen supplementation on admission up to 4 L initially during the hospitalization creatinine improved now not on oxygen acute respiratory failure with hypoxemia resolved continue Proventil as needed and trilogy Topical Ketoconazole Counseling: Patient counseled that this medication may cause skin irritation or allergic reactions.  In the event of skin irritation, the patient was advised to reduce the amount of the drug applied or use it less frequently.   The patient verbalized understanding of the proper use and possible adverse effects of ketoconazole.  All of the patient's questions and concerns were addressed.

## 2023-06-02 ENCOUNTER — TELEPHONE (OUTPATIENT)
Dept: RADIATION ONCOLOGY | Facility: HOSPITAL | Age: 83
End: 2023-06-02

## 2023-06-02 ENCOUNTER — HOSPITAL ENCOUNTER (OUTPATIENT)
Dept: RADIOLOGY | Facility: HOSPITAL | Age: 83
Discharge: HOME | End: 2023-06-02

## 2023-06-02 DIAGNOSIS — C34.92 SQUAMOUS CELL CARCINOMA OF LEFT LUNG (HCC): ICD-10-CM

## 2023-06-02 DIAGNOSIS — C34.92 SQUAMOUS CELL CARCINOMA OF LEFT LUNG (HCC): Primary | ICD-10-CM

## 2023-06-02 NOTE — TELEPHONE ENCOUNTER
Pt advised to go to the ER today for quicker evaluation and thoracentesis for large left pleural effusion, per Dr Guillen Rm  Spoke with pt's wife Pola Rawls and she will take Julissa Dunlap today

## 2023-06-02 NOTE — TELEPHONE ENCOUNTER
Dr Marcial Lozada received notification from radiology today that CT chest shows large pleural effusion, left lung  Called pt to see how he is feeling, he has shortness of breath, no energy, denies pain, he has some cough with scant blood and fluid  Per Dr Marcial Lozada, orders in for IR thoracentesis  Pt and his wife appreciated the call, await scheduling with IR

## 2023-06-05 ENCOUNTER — HOSPITAL ENCOUNTER (OUTPATIENT)
Dept: NON INVASIVE DIAGNOSTICS | Facility: HOSPITAL | Age: 83
Discharge: HOME | End: 2023-06-05
Admitting: RADIOLOGY
Payer: COMMERCIAL

## 2023-06-05 VITALS
OXYGEN SATURATION: 94 % | DIASTOLIC BLOOD PRESSURE: 70 MMHG | SYSTOLIC BLOOD PRESSURE: 115 MMHG | HEART RATE: 88 BPM | RESPIRATION RATE: 18 BRPM

## 2023-06-05 DIAGNOSIS — C34.92 SQUAMOUS CELL CARCINOMA OF LEFT LUNG (HCC): ICD-10-CM

## 2023-06-05 PROCEDURE — 32555 ASPIRATE PLEURA W/ IMAGING: CPT

## 2023-06-05 PROCEDURE — 32555 ASPIRATE PLEURA W/ IMAGING: CPT | Performed by: RADIOLOGY

## 2023-06-05 PROCEDURE — A7048 VACUUM DRAIN BOTTLE/TUBE KIT: HCPCS

## 2023-06-05 RX ORDER — LIDOCAINE WITH 8.4% SOD BICARB 0.9%(10ML)
SYRINGE (ML) INJECTION AS NEEDED
Status: COMPLETED | OUTPATIENT
Start: 2023-06-05 | End: 2023-06-05

## 2023-06-05 RX ADMIN — Medication 20 ML: at 09:50

## 2023-06-05 NOTE — DISCHARGE INSTRUCTIONS
Thoracentesis   WHAT YOU NEED TO KNOW:   A thoracentesis is a procedure to remove extra fluid or air from between your lungs and your inner chest wall  Air or fluid buildup may make it hard for you to breathe  A thoracentesis allows your lungs to expand fully so you can breathe more easily  DISCHARGE INSTRUCTIONS:   Medicines:   Pain medicine: You may be given a prescription medicine to decrease pain  Do not wait until the pain is severe before you take your medicine  Antibiotics: This medicine helps fight or prevent an infection  Take your medicine as directed  Call your healthcare provider if you think your medicine is not helping or if you have side effects  Tell him if you are allergic to any medicine  Keep a list of the medicines, vitamins, and herbs you take  Include the amounts, and when and why you take them  Bring the list or the pill bottles to follow-up visits  Carry your medicine list with you in case of an emergency  Follow up with your healthcare provider as directed:  Write down your questions so you remember to ask them during your visits  Rest:  Rest when you feel it is needed  Slowly start to do more each day  Return to your daily activities as directed  Do not smoke: If you smoke, it is never too late to quit  Ask for information about how to stop smoking if you need help  Contact your healthcare provider if:   You have a fever  Your puncture site is red, warm, swollen, or draining pus  You have questions or concerns about your procedure, medicine, or care  If you have any questions regarding, call the IR department @ [phone]  Seek care immediately or call [phone] if:   Blood soaks through your bandage  There is blood in your spit

## 2023-06-05 NOTE — SEDATION DOCUMENTATION
Procedure ended, pt tolerated with some coughing at the end  Vss, band aid to site  1500 ml yellow fluid removed  No labs needed  Pt  discharged home with wife, instructions reviewed and given

## 2023-06-06 ENCOUNTER — OFFICE VISIT (OUTPATIENT)
Dept: INTERNAL MEDICINE CLINIC | Facility: CLINIC | Age: 83
End: 2023-06-06
Payer: COMMERCIAL

## 2023-06-06 VITALS
WEIGHT: 201.8 LBS | BODY MASS INDEX: 28.25 KG/M2 | HEART RATE: 80 BPM | DIASTOLIC BLOOD PRESSURE: 68 MMHG | OXYGEN SATURATION: 98 % | TEMPERATURE: 98 F | RESPIRATION RATE: 17 BRPM | SYSTOLIC BLOOD PRESSURE: 118 MMHG | HEIGHT: 71 IN

## 2023-06-06 DIAGNOSIS — E11.42 DIABETIC POLYNEUROPATHY ASSOCIATED WITH TYPE 2 DIABETES MELLITUS (HCC): ICD-10-CM

## 2023-06-06 DIAGNOSIS — I50.32 CHRONIC DIASTOLIC HEART FAILURE (HCC): ICD-10-CM

## 2023-06-06 DIAGNOSIS — I11.0 HYPERTENSIVE HEART DISEASE WITH CHRONIC DIASTOLIC CONGESTIVE HEART FAILURE (HCC): Primary | ICD-10-CM

## 2023-06-06 DIAGNOSIS — E55.9 VITAMIN D DEFICIENCY: ICD-10-CM

## 2023-06-06 DIAGNOSIS — C34.92 SQUAMOUS CELL CARCINOMA OF LEFT LUNG (HCC): Chronic | ICD-10-CM

## 2023-06-06 DIAGNOSIS — R25.1 TREMORS OF NERVOUS SYSTEM: ICD-10-CM

## 2023-06-06 DIAGNOSIS — J43.2 CENTRILOBULAR EMPHYSEMA (HCC): ICD-10-CM

## 2023-06-06 DIAGNOSIS — I50.32 HYPERTENSIVE HEART DISEASE WITH CHRONIC DIASTOLIC CONGESTIVE HEART FAILURE (HCC): Primary | ICD-10-CM

## 2023-06-06 DIAGNOSIS — I25.10 CORONARY ARTERY DISEASE INVOLVING NATIVE CORONARY ARTERY OF NATIVE HEART WITHOUT ANGINA PECTORIS: ICD-10-CM

## 2023-06-06 DIAGNOSIS — Z13.0 SCREENING FOR DEFICIENCY ANEMIA: ICD-10-CM

## 2023-06-06 DIAGNOSIS — E78.2 MIXED HYPERLIPIDEMIA: ICD-10-CM

## 2023-06-06 PROBLEM — I11.9 HYPERTENSIVE HEART DISEASE: Status: ACTIVE | Noted: 2023-06-06

## 2023-06-06 PROBLEM — I71.40 ABDOMINAL AORTIC ANEURYSM (HCC): Status: ACTIVE | Noted: 2023-06-06

## 2023-06-06 PROCEDURE — 99905: CPT | Performed by: INTERNAL MEDICINE

## 2023-06-06 PROCEDURE — 99214 OFFICE O/P EST MOD 30 MIN: CPT | Performed by: INTERNAL MEDICINE

## 2023-06-06 NOTE — ASSESSMENT & PLAN NOTE
Patient euvolemic CHF compensated continue controlling the blood pressure with Coreg low-salt diet fluid restriction stable

## 2023-06-06 NOTE — PROGRESS NOTES
Assessment and Plan:     Problem List Items Addressed This Visit        Endocrine    Diabetic polyneuropathy associated with type 2 diabetes mellitus ([facility] )       Lab Results   Component Value Date    HGBA1C 6 6 (H) 02/21/2023   Blood sugar control on the basis of A1c continue diabetic diet hypoglycemia protocol in place foot exam normal we will check A1c and a complete blood work lipid profile continue metformin         Relevant Orders    Comprehensive metabolic panel    Urinalysis with microscopic    Hemoglobin A1C    Albumin / creatinine urine ratio       Respiratory    Squamous cell carcinoma of lung (HCC) (Chronic)     Followed by oncology had a CT of the lung done which showsLarge left pleural effusion occupying most of the left hemithorax   Compressive atelectasis of most of the left lung with aeration of approximately 50% of the left upper lobe awaiting to be evaluated by oncology after 2 weeks         Relevant Orders    CBC and differential    Comprehensive metabolic panel    Centrilobular emphysema (HCC)     Some difficulty breathing on exertion followed by pulmonary continue Proventil as needed and Trelegy            Cardiovascular and Mediastinum    Chronic diastolic heart failure ([facility] )     Wt Readings from Last 3 Encounters:   06/06/23 91 5 kg (201 lb 12 8 oz)   05/16/23 93 4 kg (206 lb)   05/01/23 96 2 kg (212 lb)   Now CHF compensated patient euvolemic continue Aldactone low-salt diet fluid restriction Daily weight monitoring and continue Coreg               Relevant Orders    Comprehensive metabolic panel    CAD (coronary artery disease)     No chest pain continue aspirin Eliquis statin         Hypertensive heart disease - Primary     Patient euvolemic CHF compensated continue controlling the blood pressure with Coreg low-salt diet fluid restriction stable         Relevant Orders    Comprehensive metabolic panel       Other    Hyperlipidemia    Relevant Orders    Lipid Panel with Direct LDL reflex Tremors of nervous system     Treated with Mysoline seen by neurology improved neurology consult reviewed        Other Visit Diagnoses     Screening for deficiency anemia        Relevant Orders    CBC and differential    Vitamin D deficiency        Relevant Orders    Vitamin D 25 hydroxy           Preventive health issues were discussed with patient, and age appropriate screening tests were ordered as noted in patient's After Visit Summary  Personalized health advice and appropriate referrals for health education or preventive services given if needed, as noted in patient's After Visit Summary  History of Present Illness:     Patient presents for a Medicare Wellness Visit    Came in follow-up chronic medical condition listed under visit diagnosis patient recently had difficulty breathing coughing underwent CT of the chest showed left large pleural effusion underwent thoracentesis feeling much better awaiting to be seen by oncology patient claims is feeling weak tired and no appetite was given complete blood work including A1c on metformin was advised high risk for hypoglycemia since no appetite may be not having enough caloric intake advised to monitor blood sugar at home the records reviewed also seen by neurology for tremor started Mysoline improved seen by Dr Myrna Hernández for liver lesion no further work-up was recommended 3 neurology consult and GI consult reviewed     Patient Care Team:  Veronica Rubio MD as PCP - General (Internal Medicine)  MD Jairo Sorto DO (Cardiology)  Eleazar Stephens MD (Radiation Oncology)  Magdaleno Laird MD as Surgeon (General Surgery)  Diabetic Foot Exam    Patient's shoes and socks removed  Right Foot/Ankle   Right Foot Inspection  Skin Exam: skin normal and skin intact  No dry skin, no warmth, no callus, no erythema, no maceration, no abnormal color, no pre-ulcer, no ulcer and no callus  Toe Exam: ROM and strength within normal limits       Sensory Monofilament testing: intact    Vascular  The right DP pulse is 2+  The right PT pulse is 1+  Left Foot/Ankle  Left Foot Inspection  Skin Exam: skin normal and skin intact  No dry skin, no warmth, no erythema, no maceration, normal color, no pre-ulcer, no ulcer and no callus  Toe Exam: ROM and strength within normal limits  Sensory   Monofilament testing: intact    Vascular  The left DP pulse is 2+  The left PT pulse is 1+  Assign Risk Category  No deformity present  No loss of protective sensation  No weak pulses  Risk: 0     Review of Systems:     Review of Systems   Constitutional: Positive for activity change and appetite change  Negative for chills, diaphoresis, fatigue, fever and unexpected weight change  HENT: Negative for congestion, dental problem, drooling, ear discharge, ear pain, facial swelling, hearing loss, mouth sores, nosebleeds, postnasal drip, rhinorrhea, sinus pressure, sneezing, sore throat, tinnitus, trouble swallowing and voice change  Eyes: Negative for photophobia, pain, discharge, redness, itching and visual disturbance  Respiratory: Positive for shortness of breath  Negative for apnea, cough, choking, chest tightness, wheezing and stridor  Cardiovascular: Negative for chest pain, palpitations and leg swelling  Gastrointestinal: Positive for abdominal pain and nausea  Negative for abdominal distention, anal bleeding, blood in stool, constipation, diarrhea, rectal pain and vomiting  Endocrine: Negative for cold intolerance, heat intolerance, polydipsia, polyphagia and polyuria  Genitourinary: Negative for decreased urine volume, difficulty urinating, dysuria, enuresis, flank pain, frequency, genital sores, hematuria and urgency  Musculoskeletal: Positive for arthralgias and back pain  Negative for gait problem, joint swelling, myalgias, neck pain and neck stiffness  Skin: Negative for color change, pallor, rash and wound     Allergic/Immunologic: Negative  Negative for environmental allergies, food allergies and immunocompromised state  Neurological: Negative for dizziness, tremors, seizures, syncope, facial asymmetry, speech difficulty, weakness, light-headedness, numbness and headaches  Psychiatric/Behavioral: Negative for agitation, behavioral problems, confusion, decreased concentration, dysphoric mood, hallucinations, self-injury, sleep disturbance and suicidal ideas  The patient is not nervous/anxious and is not hyperactive           Problem List:     Patient Active Problem List   Diagnosis   • Corns   • Pain in both feet   • Diabetic polyneuropathy associated with type 2 diabetes mellitus (HCC)   • Onychomycosis   • Tinea pedis of both feet   • Lung mass   • Hyperlipidemia   • Squamous cell carcinoma of lung (HCC)   • Shortness of breath   • Daytime hypersomnolence   • Chronic diastolic heart failure ([facility])   • SYLVESTER (obstructive sleep apnea)   • Prostate cancer ([facility])   • GERD (gastroesophageal reflux disease)   • CAD (coronary artery disease)   • Other persistent atrial fibrillation ([facility])   • Alcohol dependence ([facility] )   • Acute respiratory failure with hypoxemia ([facility])   • Depression, recurrent ([facility])   • COVID-19   • Angioedema   • Septic shock ([facility])   • Cellulitis   • Cellulitis of chest wall   • Acute kidney injury (BUDDY) with acute tubular necrosis (ATN) ([facility])   • Chronic obstructive pulmonary disease, unspecified COPD type ([facility])   • Centrilobular emphysema ([facility])   • Abdominal aortic aneurysm ([facility] )   • Hypertensive heart disease   • Tremors of nervous system      Past Medical and Surgical History:     Past Medical History:   Diagnosis Date   • Abdominal pain    • Anxiety    • Arthritis    • Asthma    • Atrial fibrillation ([facility] )    • Back pain    • Bleeding ulcer    • Bronchitis    • Cancer [facility])     prostate 2011- radiation   • Cardiac disease     cardiac stent x1   • Cataract     starting   • Chronic diastolic (congestive) heart failure ([facility] )    • Diabetes mellitus ([place] [address] )     boarderline diabetic    • GERD (gastroesophageal reflux disease)    • History of radiation therapy 2010    Prostate seeds (brachytherapy) and EBRT   • Hyperlipidemia    • Hypertension    • 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, bilateral    • Low back pain    • Lung cancer ([facility] )    • Lung mass    • Myocardial infarction ([place] [address] )     mild    • Obesity    • Prostate cancer ([place] [address] )    • Shortness of breath    • Sleep apnea     sleep study    • Wears dentures     full set   • Wears glasses      Past Surgical History:   Procedure Laterality Date   • ABDOMINAL HERNIA REPAIR     • ABDOMINAL SURGERY      bleeding ulcer, cyst removed from abd   • COLONOSCOPY     • CORONARY ANGIOPLASTY WITH STENT PLACEMENT  1999    x1    • ESOPHAGOGASTRODUODENOSCOPY     • IR BIOPSY LUNG  10/05/2021   • IR THORACENTESIS  2021   • KNEE SURGERY Left    • [place] [address] INCL FLUOR GDNCE DX W/CELL WASHG SPX N/A 2021    Procedure: BRONCHOSCOPY FLEXIBLE;  Surgeon: Yousuf Farnsworth MD;  Location: [facility];  Service: Thoracic   • [place] MEDIASTINOSCOPY WITH LYMPH NODE BIOPSY/IES N/A 2021    Procedure: MEDIASTINOSCOPY;  Surgeon: Yousuf Farnsworth MD;  Location: [facility];  Service: Thoracic   • PROSTATE SURGERY        Family History:     Family History   Problem Relation Age of Onset   • Prostate cancer Brother    • Cancer Maternal Uncle         colo rectal cancer   • Cancer Paternal Aunt       Social History:     Social History     Socioeconomic History   • Marital status: /Civil Union     Spouse name: None   • Number of children: None   • Years of education: None   • Highest education level: None   Occupational History   • None   Tobacco Use   • Smoking status: Former     Packs/day: 1 00     Years: 30 00     Total pack years: 30 00     Types: Cigarettes     Quit date:      Years since quittin 4   • Smokeless tobacco: Never   Vaping Use   • Vaping Use: Never used   Substance and Sexual Activity   • Alcohol use: Not Currently     Alcohol/week: 10 0 - 12 0 standard drinks of alcohol     Types: 7 Glasses of wine, 3 - 5 Cans of beer per week     Comment: few beers day; glass of red wine at dinner   • Drug use: Never   • Sexual activity: None   Other Topics Concern   • None   Social History Narrative   • None     Social Determinants of Health     Financial Resource Strain: Low Risk  (6/6/2023)    Overall Financial Resource Strain (CARDIA)    • Difficulty of Paying Living Expenses: Not very hard   Food Insecurity: No Food Insecurity (12/19/2022)    Hunger Vital Sign    • Worried About Running Out of Food in the Last Year: Never true    • Ran Out of Food in the Last Year: Never true   Transportation Needs: No Transportation Needs (6/6/2023)    PRAPARE - Transportation    • Lack of Transportation (Medical): No    • Lack of Transportation (Non-Medical): No   Physical Activity: Not on file   Stress: Not on file   Social Connections: Not on file   Intimate Partner Violence: Not on file   Housing Stability: Low Risk  (12/19/2022)    Housing Stability Vital Sign    • Unable to Pay for Housing in the Last Year: No    • Number of Places Lived in the Last Year: 1    • Unstable Housing in the Last Year: No      Medications and Allergies:     Current Outpatient Medications   Medication Sig Dispense Refill   • acetaminophen (TYLENOL) 325 mg tablet Take 2 tablets (650 mg total) by mouth every 6 (six) hours as needed for mild pain, headaches or fever 30 tablet 0   • albuterol (2 5 mg/3 mL) 0 083 % nebulizer solution Take 2 5 mg by nebulization As needed per patient's wife      • atorvastatin (LIPITOR) 10 mg tablet TAKE 1 TABLET DAILY 90 tablet 1   • carvedilol (COREG) 25 mg tablet Take 1 tablet (25 mg total) by mouth 2 (two) times a day with meals 180 tablet 1   • Eliquis 5 MG TAKE 1 TABLET TWO TIMES A DAY 60 tablet 5   • fluticasone-umeclidinium-vilanterol (Trelegy Ellipta) 100-62 5-25 mcg/actuation inhaler Rinse mouth after use  60 each 5   • hydrocortisone 2 5 % cream Apply topically 2 (two) times a day Apply twice a day affected area the trunk 20 g 2   • latanoprost (XALATAN) 0 005 % ophthalmic solution Administer 0 005 mL to both eyes daily at bedtime     • metFORMIN (GLUCOPHAGE) 500 mg tablet Take 1 tablet (500 mg total) by mouth 2 (two) times a day 180 tablet 1   • Multiple Vitamin (MULTI-VITAMIN DAILY PO) Take by mouth daily       • omeprazole (PriLOSEC) 20 mg delayed release capsule Take 1 capsule (20 mg total) by mouth daily 90 capsule 1   • primidone (MYSOLINE) 50 mg tablet Take 2 tabs at 8pm  180 tablet 1   • spironolactone (ALDACTONE) 25 mg tablet TAKE 1 TABLET DAILY 90 tablet 1   • ciclopirox (LOPROX) 0 77 % cream Apply topically 2 (two) times a day for 20 days 45 g 1   • thiamine 100 MG tablet Take 1 tablet (100 mg total) by mouth daily (Patient not taking: Reported on 2/10/2023) 30 tablet 0     No current facility-administered medications for this visit  No Known Allergies   Immunizations:     Immunization History   Administered Date(s) Administered   • COVID-19 MODERNA VACC 0 25 ML IM BOOSTER 11/05/2021   • COVID-19 MODERNA VACC 0 5 ML IM 02/26/2021, 03/26/2021   • Influenza, high dose seasonal 0 7 mL 11/28/2022   • Tdap 01/07/2021      Health Maintenance:         Topic Date Due   • Hepatitis C Screening  Completed         Topic Date Due   • Pneumococcal Vaccine: 65+ Years (1 - PCV) Never done   • COVID-19 Vaccine (3 - Fredrich Martha risk series) 12/03/2021      Medicare Screening Tests and Risk Assessments:     Justin Haskins is here for his Subsequent Wellness visit  Health Risk Assessment:   Patient rates overall health as fair  Patient feels that their physical health rating is same  Patient is satisfied with their life  Eyesight was rated as slightly worse  Hearing was rated as same  Patient feels that their emotional and mental health rating is same  Patients states they are always angry   Patient states they are always unusually tired/fatigued  Pain experienced in the last 7 days has been none  Patient states that he has experienced weight loss or gain in last 6 months  Down 10 pounds    Fall Risk Screening: In the past year, patient has experienced: history of falling in past year    Number of falls: 2 or more  Injured during fall?: Yes    Feels unsteady when standing or walking?: Yes    Worried about falling?: Yes      Home Safety:  Patient does not have trouble with stairs inside or outside of their home  Patient has working smoke alarms and has working carbon monoxide detector  Home safety hazards include: none  Home is safe    Nutrition:   Current diet is Regular  Medications:   Patient is currently taking over-the-counter supplements  OTC medications include: see medication list  Patient is not able to manage medications  Wife gets the medications ready    Activities of Daily Living (ADLs)/Instrumental Activities of Daily Living (IADLs):   Walk and transfer into and out of bed and chair?: Yes  Dress and groom yourself?: Yes    Bathe or shower yourself?: Yes    Feed yourself? Yes  Do your laundry/housekeeping?: No  Manage your money, pay your bills and track your expenses?: No  Make your own meals?: No    Do your own shopping?: No    ADL comments: Wife helps with ADLs    Previous Hospitalizations:   Any hospitalizations or ED visits within the last 12 months?: Yes    How many hospitalizations have you had in the last year?: 1-2    Hospitalization Comments: [facility] under his arm biopsies done  In for over 2 weeks  Advance Care Planning:   Living will: Yes    Durable POA for healthcare:  Yes    Advanced directive: Yes    Advanced directive counseling given: Yes    Five wishes given: Yes    Patient declined ACP directive: Yes    End of Life Decisions reviewed with patient: Yes    Provider agrees with end of life decisions: Yes      Cognitive Screening:   Provider or family/friend/caregiver concerned regarding cognition?: "No    PREVENTIVE SCREENINGS      Cardiovascular Screening:    General: Screening Not Indicated and History Lipid Disorder      Diabetes Screening:     General: Screening Not Indicated and History Diabetes      Colorectal Cancer Screening:     General: Screening Not Indicated      Prostate Cancer Screening:    General: History Prostate Cancer and Screening Not Indicated      Abdominal Aortic Aneurysm (AAA) Screening:    Risk factors include: tobacco use        General: Screening Not Indicated and History AAA      Lung Cancer Screening:     General: Screening Not Indicated and History Lung Cancer      Hepatitis C Screening:    General: Screening Current    Screening, Brief Intervention, and Referral to Treatment (SBIRT)    Screening      AUDIT-C Screenin) How often did you have a drink containing alcohol in the past year? never  2) How many drinks did you have on a typical day when you were drinking in the past year? 0  3) How often did you have 6 or more drinks on one occasion in the past year? never    AUDIT-C Score: 0  Interpretation: Score 0-3 (male): Negative screen for alcohol misuse    Single Item Drug Screening:  How often have you used an illegal drug (including marijuana) or a prescription medication for non-medical reasons in the past year? never    Single Item Drug Screen Score: 0  Interpretation: Negative screen for possible drug use disorder    Other Counseling Topics:   Skin self-exam      No results found  Physical Exam:     /68   Pulse 80   Temp 98 °F (36 7 °C)   Resp 17   Ht 5' 11\" (1 803 m)   Wt 91 5 kg (201 lb 12 8 oz)   SpO2 98%   BMI 28 15 kg/m²     Physical Exam  Vitals and nursing note reviewed  Constitutional:       General: He is not in acute distress  Appearance: He is well-developed  HENT:      Head: Normocephalic and atraumatic  Eyes:      Conjunctiva/sclera: Conjunctivae normal    Cardiovascular:      Rate and Rhythm: Normal rate and regular rhythm        " Pulses: no weak pulses          Dorsalis pedis pulses are 2+ on the right side and 2+ on the left side  Posterior tibial pulses are 1+ on the right side and 1+ on the left side  Heart sounds: Murmur heard  Pulmonary:      Effort: Pulmonary effort is normal  No respiratory distress  Breath sounds: Rhonchi and rales present  Abdominal:      Palpations: Abdomen is soft  Tenderness: There is no abdominal tenderness  Musculoskeletal:         General: No swelling  Cervical back: Neck supple  Feet:      Right foot:      Skin integrity: No ulcer, skin breakdown, erythema, warmth, callus or dry skin  Left foot:      Skin integrity: No ulcer, skin breakdown, erythema, warmth, callus or dry skin  Skin:     General: Skin is warm and dry  Capillary Refill: Capillary refill takes less than 2 seconds  Neurological:      Mental Status: He is alert        Gait: Gait abnormal    Psychiatric:         Mood and Affect: Mood normal           Veronica Rubio MD

## 2023-06-06 NOTE — ASSESSMENT & PLAN NOTE
Followed by oncology had a CT of the lung done which showsLarge left pleural effusion occupying most of the left hemithorax   Compressive atelectasis of most of the left lung with aeration of approximately 50% of the left upper lobe awaiting to be evaluated by oncology after 2 weeks

## 2023-06-06 NOTE — PATIENT INSTRUCTIONS
Medicare Preventive Visit Patient Instructions  Thank you for completing your Welcome to Medicare Visit or Medicare Annual Wellness Visit today  Your next wellness visit will be due in one year (6/6/2024)  The screening/preventive services that you may require over the next 5-10 years are detailed below  Some tests may not apply to you based off risk factors and/or age  Screening tests ordered at today's visit but not completed yet may show as past due  Also, please note that scanned in results may not display below  Preventive Screenings:  Service Recommendations Previous Testing/Comments   Colorectal Cancer Screening  · Colonoscopy    · Fecal Occult Blood Test (FOBT)/Fecal Immunochemical Test (FIT)  · Fecal DNA/Cologuard Test  · Flexible Sigmoidoscopy Age: 39-70 years old   Colonoscopy: every 10 years (May be performed more frequently if at higher risk)  OR  FOBT/FIT: every 1 year  OR  Cologuard: every 3 years  OR  Sigmoidoscopy: every 5 years  Screening may be recommended earlier than age 39 if at higher risk for colorectal cancer  Also, an individualized decision between you and your healthcare provider will decide whether screening between the ages of 74-80 would be appropriate  Colonoscopy: Not on file  FOBT/FIT: Not on file  Cologuard: Not on file  Sigmoidoscopy: Not on file          Prostate Cancer Screening Individualized decision between patient and health care provider in men between ages of 53-78   Medicare will cover every 12 months beginning on the day after your 50th birthday PSA: <0 1 ng/mL           Hepatitis C Screening Once for adults born between 1945 and 1965  More frequently in patients at high risk for Hepatitis C Hep C Antibody: 02/21/2022        Diabetes Screening 1-2 times per year if you're at risk for diabetes or have pre-diabetes Fasting glucose: 138 mg/dL (2/21/2023)  A1C: 6 6 % (2/21/2023)      Cholesterol Screening Once every 5 years if you don't have a lipid disorder   May order more often based on risk factors  Lipid panel: 02/21/2023         Other Preventive Screenings Covered by Medicare:  1  Abdominal Aortic Aneurysm (AAA) Screening: covered once if your at risk  You're considered to be at risk if you have a family history of AAA or a male between the age of 73-68 who smoking at least 100 cigarettes in your lifetime  2  Lung Cancer Screening: covers low dose CT scan once per year if you meet all of the following conditions: (1) Age 50-69; (2) No signs or symptoms of lung cancer; (3) Current smoker or have quit smoking within the last 15 years; (4) You have a tobacco smoking history of at least 20 pack years (packs per day x number of years you smoked); (5) You get a written order from a healthcare provider  3  Glaucoma Screening: covered annually if you're considered high risk: (1) You have diabetes OR (2) Family history of glaucoma OR (3)  aged 48 and older OR (3)  American aged 72 and older  3  Osteoporosis Screening: covered every 2 years if you meet one of the following conditions: (1) Have a vertebral abnormality; (2) On glucocorticoid therapy for more than 3 months; (3) Have primary hyperparathyroidism; (4) On osteoporosis medications and need to assess response to drug therapy  5  HIV Screening: covered annually if you're between the age of 12-76  Also covered annually if you are younger than 13 and older than 72 with risk factors for HIV infection  For pregnant patients, it is covered up to 3 times per pregnancy      Immunizations:  Immunization Recommendations   Influenza Vaccine Annual influenza vaccination during flu season is recommended for all persons aged >= 6 months who do not have contraindications   Pneumococcal Vaccine   * Pneumococcal conjugate vaccine = PCV13 (Prevnar 13), PCV15 (Vaxneuvance), PCV20 (Prevnar 20)  * Pneumococcal polysaccharide vaccine = PPSV23 (Pneumovax) Adults 25-60 years old: 1-3 doses may be recommended based on certain risk factors  Adults 72 years old: 1-2 doses may be recommended based off what pneumonia vaccine you previously received   Hepatitis B Vaccine 3 dose series if at intermediate or high risk (ex: diabetes, end stage renal disease, liver disease)   Tetanus (Td) Vaccine - COST NOT COVERED BY MEDICARE PART B Following completion of primary series, a booster dose should be given every 10 years to maintain immunity against tetanus  Td may also be given as tetanus wound prophylaxis  Tdap Vaccine - COST NOT COVERED BY MEDICARE PART B Recommended at least once for all adults  For pregnant patients, recommended with each pregnancy  Shingles Vaccine (Shingrix) - COST NOT COVERED BY MEDICARE PART B  2 shot series recommended in those aged 48 and above     Health Maintenance Due:      Topic Date Due   • Hepatitis C Screening  Completed     Immunizations Due:      Topic Date Due   • Pneumococcal Vaccine: 65+ Years (1 - PCV) Never done   • COVID-19 Vaccine (3 - Moderna risk series) 12/03/2021     Advance Directives   What are advance directives? Advance directives are legal documents that state your wishes and plans for medical care  These plans are made ahead of time in case you lose your ability to make decisions for yourself  Advance directives can apply to any medical decision, such as the treatments you want, and if you want to donate organs  What are the types of advance directives? There are many types of advance directives, and each state has rules about how to use them  You may choose a combination of any of the following:  · Living will: This is a written record of the treatment you want  You can also choose which treatments you do not want, which to limit, and which to stop at a certain time  This includes surgery, medicine, IV fluid, and tube feedings  · Durable power of  for healthcare [facility]):   This is a written record that states who you want to make healthcare choices for you when you are unable to make them for yourself  This person, called a proxy, is usually a family member or a friend  You may choose more than 1 proxy  · Do not resuscitate (DNR) order:  A DNR order is used in case your heart stops beating or you stop breathing  It is a request not to have certain forms of treatment, such as CPR  A DNR order may be included in other types of advance directives  · Medical directive: This covers the care that you want if you are in a coma, near death, or unable to make decisions for yourself  You can list the treatments you want for each condition  Treatment may include pain medicine, surgery, blood transfusions, dialysis, IV or tube feedings, and a ventilator (breathing machine)  · Values history: This document has questions about your views, beliefs, and how you feel and think about life  This information can help others choose the care that you would choose  Why are advance directives important? An advance directive helps you control your care  Although spoken wishes may be used, it is better to have your wishes written down  Spoken wishes can be misunderstood, or not followed  Treatments may be given even if you do not want them  An advance directive may make it easier for your family to make difficult choices about your care  Weight Management   Why it is important to manage your weight:  Being overweight increases your risk of health conditions such as heart disease, high blood pressure, type 2 diabetes, and certain types of cancer  It can also increase your risk for osteoarthritis, sleep apnea, and other respiratory problems  Aim for a slow, steady weight loss  Even a small amount of weight loss can lower your risk of health problems  How to lose weight safely:  A safe and healthy way to lose weight is to eat fewer calories and get regular exercise  You can lose up about 1 pound a week by decreasing the number of calories you eat by 500 calories each day     Healthy meal plan for weight management:  A healthy meal plan includes a variety of foods, contains fewer calories, and helps you stay healthy  A healthy meal plan includes the following:  · Eat whole-grain foods more often  A healthy meal plan should contain fiber  Fiber is the part of grains, fruits, and vegetables that is not broken down by your body  Whole-grain foods are healthy and provide extra fiber in your diet  Some examples of whole-grain foods are whole-wheat breads and pastas, oatmeal, brown rice, and bulgur  · Eat a variety of vegetables every day  Include dark, leafy greens such as spinach, kale, lex greens, and mustard greens  Eat yellow and orange vegetables such as carrots, sweet potatoes, and winter squash  · Eat a variety of fruits every day  Choose fresh or canned fruit (canned in its own juice or light syrup) instead of juice  Fruit juice has very little or no fiber  · Eat low-fat dairy foods  Drink fat-free (skim) milk or 1% milk  Eat fat-free yogurt and low-fat cottage cheese  Try low-fat cheeses such as mozzarella and other reduced-fat cheeses  · Choose meat and other protein foods that are low in fat  Choose beans or other legumes such as split peas or lentils  Choose fish, skinless poultry (chicken or turkey), or lean cuts of red meat (beef or pork)  Before you cook meat or poultry, cut off any visible fat  · Use less fat and oil  Try baking foods instead of frying them  Add less fat, such as margarine, sour cream, regular salad dressing and mayonnaise to foods  Eat fewer high-fat foods  Some examples of high-fat foods include french fries, doughnuts, ice cream, and cakes  · Eat fewer sweets  Limit foods and drinks that are high in sugar  This includes candy, cookies, regular soda, and sweetened drinks  Exercise:  Exercise at least 30 minutes per day on most days of the week  Some examples of exercise include walking, biking, dancing, and swimming   You can also fit in more physical activity by taking the stairs instead of the elevator or parking farther away from stores  Ask your healthcare provider about the best exercise plan for you  © Copyright [employer] 2018 Information is for End User's use only and may not be sold, redistributed or otherwise used for commercial purposes   All illustrations and images included in CareNotes® are the copyrighted property of A D A M , Inc  or [address] [place] [address] [employer]

## 2023-06-06 NOTE — ASSESSMENT & PLAN NOTE
Lab Results   Component Value Date    HGBA1C 6 6 (H) 02/21/2023   Blood sugar control on the basis of A1c continue diabetic diet hypoglycemia protocol in place foot exam normal we will check A1c and a complete blood work lipid profile continue metformin

## 2023-06-06 NOTE — ASSESSMENT & PLAN NOTE
Wt Readings from Last 3 Encounters:   06/06/23 91 5 kg (201 lb 12 8 oz)   05/16/23 93 4 kg (206 lb)   05/01/23 96 2 kg (212 lb)   Now CHF compensated patient euvolemic continue Aldactone low-salt diet fluid restriction Daily weight monitoring and continue Coreg

## 2023-06-06 NOTE — ASSESSMENT & PLAN NOTE
Some difficulty breathing on exertion followed by pulmonary continue Proventil as needed and Trelegy

## 2023-06-08 ENCOUNTER — APPOINTMENT (OUTPATIENT)
Dept: RADIATION ONCOLOGY | Facility: HOSPITAL | Age: 83
End: 2023-06-08
Payer: COMMERCIAL

## 2023-06-12 ENCOUNTER — CLINICAL SUPPORT (OUTPATIENT)
Dept: RADIATION ONCOLOGY | Facility: HOSPITAL | Age: 83
End: 2023-06-12
Attending: RADIOLOGY
Payer: COMMERCIAL

## 2023-06-12 ENCOUNTER — APPOINTMENT (OUTPATIENT)
Dept: LAB | Facility: HOSPITAL | Age: 83
End: 2023-06-12
Attending: INTERNAL MEDICINE
Payer: COMMERCIAL

## 2023-06-12 VITALS
HEIGHT: 71 IN | WEIGHT: 202.6 LBS | OXYGEN SATURATION: 95 % | BODY MASS INDEX: 28.36 KG/M2 | DIASTOLIC BLOOD PRESSURE: 84 MMHG | SYSTOLIC BLOOD PRESSURE: 128 MMHG | TEMPERATURE: 98.9 F | RESPIRATION RATE: 18 BRPM | HEART RATE: 88 BPM

## 2023-06-12 DIAGNOSIS — I50.32 HYPERTENSIVE HEART DISEASE WITH CHRONIC DIASTOLIC CONGESTIVE HEART FAILURE (HCC): ICD-10-CM

## 2023-06-12 DIAGNOSIS — Z13.0 SCREENING FOR DEFICIENCY ANEMIA: ICD-10-CM

## 2023-06-12 DIAGNOSIS — C34.90 MALIGNANT NEOPLASM OF BRONCHUS AND LUNG (HCC): Primary | ICD-10-CM

## 2023-06-12 DIAGNOSIS — E11.42 DIABETIC POLYNEUROPATHY ASSOCIATED WITH TYPE 2 DIABETES MELLITUS (HCC): ICD-10-CM

## 2023-06-12 DIAGNOSIS — C34.92 SQUAMOUS CELL CARCINOMA OF LEFT LUNG (HCC): Primary | Chronic | ICD-10-CM

## 2023-06-12 DIAGNOSIS — I50.32 CHRONIC DIASTOLIC HEART FAILURE (HCC): ICD-10-CM

## 2023-06-12 DIAGNOSIS — I11.0 HYPERTENSIVE HEART DISEASE WITH CHRONIC DIASTOLIC CONGESTIVE HEART FAILURE (HCC): ICD-10-CM

## 2023-06-12 DIAGNOSIS — C34.92 SQUAMOUS CELL CARCINOMA OF LEFT LUNG (HCC): Chronic | ICD-10-CM

## 2023-06-12 DIAGNOSIS — E78.2 MIXED HYPERLIPIDEMIA: ICD-10-CM

## 2023-06-12 LAB
ALBUMIN SERPL BCP-MCNC: 3.6 G/DL (ref 3.5–5)
ALP SERPL-CCNC: 99 U/L (ref 34–104)
ALT SERPL W P-5'-P-CCNC: 19 U/L (ref 7–52)
ANION GAP SERPL CALCULATED.3IONS-SCNC: 6 MMOL/L (ref 4–13)
AST SERPL W P-5'-P-CCNC: 19 U/L (ref 13–39)
BACTERIA UR QL AUTO: ABNORMAL /HPF
BASOPHILS # BLD AUTO: 0.02 THOUSANDS/ÂΜL (ref 0–0.1)
BASOPHILS NFR BLD AUTO: 0 % (ref 0–1)
BILIRUB SERPL-MCNC: 0.48 MG/DL (ref 0.2–1)
BILIRUB UR QL STRIP: NEGATIVE
BUN SERPL-MCNC: 14 MG/DL (ref 5–25)
CALCIUM SERPL-MCNC: 9.6 MG/DL (ref 8.4–10.2)
CHLORIDE SERPL-SCNC: 101 MMOL/L (ref 96–108)
CHOLEST SERPL-MCNC: 115 MG/DL
CLARITY UR: CLEAR
CO2 SERPL-SCNC: 29 MMOL/L (ref 21–32)
COLOR UR: YELLOW
CREAT SERPL-MCNC: 1.01 MG/DL (ref 0.6–1.3)
CREAT UR-MCNC: 192 MG/DL
EOSINOPHIL # BLD AUTO: 0.16 THOUSAND/ÂΜL (ref 0–0.61)
EOSINOPHIL NFR BLD AUTO: 2 % (ref 0–6)
ERYTHROCYTE [DISTWIDTH] IN BLOOD BY AUTOMATED COUNT: 13.6 % (ref 11.6–15.1)
GFR SERPL CREATININE-BSD FRML MDRD: 69 ML/MIN/1.73SQ M
GLUCOSE P FAST SERPL-MCNC: 130 MG/DL (ref 65–99)
GLUCOSE UR STRIP-MCNC: NEGATIVE MG/DL
HCT VFR BLD AUTO: 44.7 % (ref 36.5–49.3)
HDLC SERPL-MCNC: 26 MG/DL
HGB BLD-MCNC: 14.2 G/DL (ref 12–17)
HGB UR QL STRIP.AUTO: NEGATIVE
IMM GRANULOCYTES # BLD AUTO: 0.04 THOUSAND/UL (ref 0–0.2)
IMM GRANULOCYTES NFR BLD AUTO: 1 % (ref 0–2)
KETONES UR STRIP-MCNC: NEGATIVE MG/DL
LDLC SERPL CALC-MCNC: 66 MG/DL (ref 0–100)
LEUKOCYTE ESTERASE UR QL STRIP: NEGATIVE
LYMPHOCYTES # BLD AUTO: 1.08 THOUSANDS/ÂΜL (ref 0.6–4.47)
LYMPHOCYTES NFR BLD AUTO: 13 % (ref 14–44)
MCH RBC QN AUTO: 30.5 PG (ref 26.8–34.3)
MCHC RBC AUTO-ENTMCNC: 31.8 G/DL (ref 31.4–37.4)
MCV RBC AUTO: 96 FL (ref 82–98)
MICROALBUMIN UR-MCNC: 92.1 MG/L (ref 0–20)
MICROALBUMIN/CREAT 24H UR: 48 MG/G CREATININE (ref 0–30)
MONOCYTES # BLD AUTO: 0.52 THOUSAND/ÂΜL (ref 0.17–1.22)
MONOCYTES NFR BLD AUTO: 6 % (ref 4–12)
NEUTROPHILS # BLD AUTO: 6.75 THOUSANDS/ÂΜL (ref 1.85–7.62)
NEUTS SEG NFR BLD AUTO: 78 % (ref 43–75)
NITRITE UR QL STRIP: NEGATIVE
NON-SQ EPI CELLS URNS QL MICRO: ABNORMAL /HPF
NRBC BLD AUTO-RTO: 0 /100 WBCS
PH UR STRIP.AUTO: 6 [PH]
PLATELET # BLD AUTO: 289 THOUSANDS/UL (ref 149–390)
PMV BLD AUTO: 9.5 FL (ref 8.9–12.7)
POTASSIUM SERPL-SCNC: 5.2 MMOL/L (ref 3.5–5.3)
PROT SERPL-MCNC: 7.1 G/DL (ref 6.4–8.4)
PROT UR STRIP-MCNC: ABNORMAL MG/DL
RBC # BLD AUTO: 4.65 MILLION/UL (ref 3.88–5.62)
RBC #/AREA URNS AUTO: ABNORMAL /HPF
SODIUM SERPL-SCNC: 136 MMOL/L (ref 135–147)
SP GR UR STRIP.AUTO: 1.01 (ref 1–1.03)
TRIGL SERPL-MCNC: 113 MG/DL
UROBILINOGEN UR QL STRIP.AUTO: 1 E.U./DL
WBC # BLD AUTO: 8.57 THOUSAND/UL (ref 4.31–10.16)
WBC #/AREA URNS AUTO: ABNORMAL /HPF

## 2023-06-12 PROCEDURE — 36415 COLL VENOUS BLD VENIPUNCTURE: CPT

## 2023-06-12 PROCEDURE — 82043 UR ALBUMIN QUANTITATIVE: CPT

## 2023-06-12 PROCEDURE — 83036 HEMOGLOBIN GLYCOSYLATED A1C: CPT

## 2023-06-12 PROCEDURE — 99214 OFFICE O/P EST MOD 30 MIN: CPT | Performed by: RADIOLOGY

## 2023-06-12 PROCEDURE — 80061 LIPID PANEL: CPT

## 2023-06-12 PROCEDURE — 82570 ASSAY OF URINE CREATININE: CPT

## 2023-06-12 PROCEDURE — 80053 COMPREHEN METABOLIC PANEL: CPT

## 2023-06-12 PROCEDURE — 81001 URINALYSIS AUTO W/SCOPE: CPT

## 2023-06-12 PROCEDURE — 85025 COMPLETE CBC W/AUTO DIFF WBC: CPT

## 2023-06-12 PROCEDURE — 99211 OFF/OP EST MAY X REQ PHY/QHP: CPT | Performed by: RADIOLOGY

## 2023-06-12 NOTE — PROGRESS NOTES
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  The pt completed a course of SBRT to the LLL on 02/11/2022  The pt was last seen in radiation on 2/2/2023  He returns today for his follow up     2/10/2023 HemOnc: Continue surveillance  F/U 8 months     2/11/2023 To ED: fall  CT head: No acute intracranial abnormality  6/2/2023 CT chest: Large left pleural effusion occupying most of the left hemithorax  Compressive atelectasis of most of the left lung with aeration of approximately 50% of the left upper lobe  The known left lower lobe mass is obscured on today's study by the surrounding atelectasis    6/5/2023 IR thoracentesis Successful ultrasound-guided left thoracentesis yielding 1500 cc of clear pleural fluid    10/20/2023 HemOnc F/U       Oncology History   Squamous cell carcinoma of lung ([facility] )   10/5/2021 Initial Diagnosis    Squamous cell carcinoma of lung ([facility] )     10/5/2021 Biopsy    Final Diagnosis   A  Lung, Left Lower Lobe, :  - Invasive squamous carcinoma, moderately differentiated, keratinizing type  - Tumor is highlighted with P 40 immunoperoxidase stain   - Prominent tumor necrosis is noted  2/2/2022 - 2/11/2022 Radiation    BH SBRT LLL 6X 5 / 5 1,000 0 5,000 9      Treatment Dates:  2/2/2022 - 2/11/2022  Review of Systems:  Review of Systems   Constitutional: Positive for activity change, appetite change (Decreased, unchanged ) and fatigue  HENT: Negative  Eyes: Negative  Respiratory: Positive for cough (Daily, productive with yellow phlegm) and shortness of breath  Cardiovascular: Negative  Gastrointestinal: Positive for constipation  Endocrine: Negative  Genitourinary: Negative  Musculoskeletal: Negative  Skin: Negative  Allergic/Immunologic: Negative  Neurological: Negative  Hematological: Negative  Psychiatric/Behavioral: Negative          Clinical Trial: no          Health Maintenance   Topic Date Due   • Pneumococcal Vaccine: 65+ Years (1 - PCV) Never done   • Falls: Plan of Care  Never done   • COVID-19 Vaccine (3 - Moderna risk series) 12/03/2021   • Medicare Annual Wellness Visit (AWV)  06/29/2023   • HEMOGLOBIN A1C  08/21/2023   • DM Eye Exam  09/09/2023   • Depression Remission PHQ  12/06/2023   • BMI: Followup Plan  04/05/2024   • Fall Risk  06/06/2024   • BMI: Adult  06/06/2024   • Diabetic Foot Exam  06/06/2024   • Hepatitis C Screening  Completed   • Influenza Vaccine  Completed   • HIB Vaccine  Aged Out   • IPV Vaccine  Aged Out   • Hepatitis A Vaccine  Aged Out   • Meningococcal ACWY Vaccine  Aged Out   • HPV Vaccine  Aged Out     Patient Active Problem List   Diagnosis   • Corns   • Pain in both feet   • Diabetic polyneuropathy associated with type 2 diabetes mellitus ([facility] )   • Onychomycosis   • Tinea pedis of both feet   • Lung mass   • Hyperlipidemia   • Squamous cell carcinoma of lung ([facility] )   • Shortness of breath   • Daytime hypersomnolence   • Chronic diastolic heart failure (HCC)   • SYLVESTER (obstructive sleep apnea)   • Prostate cancer ([facility])   • GERD (gastroesophageal reflux disease)   • CAD (coronary artery disease)   • Other persistent atrial fibrillation ([facility])   • Alcohol dependence ([facility] )   • Acute respiratory failure with hypoxemia ([facility])   • Depression, recurrent ([facility] )   • COVID-19   • Angioedema   • Septic shock ([facility])   • Cellulitis   • Cellulitis of chest wall   • Acute kidney injury (BUDDY) with acute tubular necrosis (ATN) ([facility])   • Chronic obstructive pulmonary disease, unspecified COPD type ([facility] )   • Centrilobular emphysema ([facility] )   • Abdominal aortic aneurysm ([facility] )   • Hypertensive heart disease   • Tremors of nervous system     Past Medical History:   Diagnosis Date   • Abdominal pain    • Anxiety    • Arthritis    • Asthma    • Atrial fibrillation ([facility] )    • Back pain    • Bleeding ulcer    • Bronchitis    • Cancer [facility])     prostate 2011- radiation   • Cardiac disease cardiac stent x1   • Cataract     starting   • Chronic diastolic (congestive) heart failure (HCC)    • Diabetes mellitus ([facility] )     boarderline diabetic    • GERD (gastroesophageal reflux disease)    • History of radiation therapy 2010    Prostate seeds (brachytherapy) and EBRT   • Hyperlipidemia    • Hypertension    • 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, bilateral    • Low back pain    • Lung cancer ([facility] )    • Lung mass    • Myocardial infarction ([facility] )     mild 1999   • Obesity    • Prostate cancer ([facility] )    • Shortness of breath    • Sleep apnea     sleep study 11/22   • Wears dentures     full set   • Wears glasses      Past Surgical History:   Procedure Laterality Date   • ABDOMINAL HERNIA REPAIR     • ABDOMINAL SURGERY      bleeding ulcer, cyst removed from abd   • COLONOSCOPY     • CORONARY ANGIOPLASTY WITH STENT PLACEMENT  1999    x1    • ESOPHAGOGASTRODUODENOSCOPY     • IR BIOPSY LUNG  10/05/2021   • IR THORACENTESIS  11/08/2021   • IR THORACENTESIS  6/5/2023   • KNEE SURGERY Left    • [place] [address] INCL FLUOR GDNCE DX W/CELL WASHG [facility] N/A 11/29/2021    Procedure: BRONCHOSCOPY FLEXIBLE;  Surgeon: Rocky Valenzuela MD;  Location: [facility];  Service: Thoracic   • VT MEDIASTINOSCOPY WITH LYMPH NODE BIOPSY/IES N/A 11/29/2021    Procedure: MEDIASTINOSCOPY;  Surgeon: Rocky Valenzuela MD;  Location: [facility];  Service: Thoracic   • PROSTATE SURGERY       Family History   Problem Relation Age of Onset   • Prostate cancer Brother    • Cancer Maternal Uncle         colo rectal cancer   • Cancer Paternal Aunt      Social History     Socioeconomic History   • Marital status: /Civil Union     Spouse name: Not on file   • Number of children: Not on file   • Years of education: Not on file   • Highest education level: Not on file   Occupational History   • Not on file   Tobacco Use   • Smoking status: Former     Packs/day: 1 00     Years: 30 00     Total pack years: 30 00     Types: Cigarettes     Quit date: 1999 Years since quittin 4   • Smokeless tobacco: Never   Vaping Use   • Vaping Use: Never used   Substance and Sexual Activity   • Alcohol use: Not Currently     Alcohol/week: 10 0 - 12 0 standard drinks of alcohol     Types: 7 Glasses of wine, 3 - 5 Cans of beer per week     Comment: few beers day; glass of red wine at dinner   • Drug use: Never   • Sexual activity: Not on file   Other Topics Concern   • Not on file   Social History Narrative   • Not on file     Social Determinants of Health     Financial Resource Strain: Low Risk  (2023)    Overall Financial Resource Strain (CARDIA)    • Difficulty of Paying Living Expenses: Not very hard   Food Insecurity: No Food Insecurity (2022)    Hunger Vital Sign    • Worried About Running Out of Food in the Last Year: Never true    • Ran Out of Food in the Last Year: Never true   Transportation Needs: No Transportation Needs (2023)    PRAPARE - Transportation    • Lack of Transportation (Medical): No    • Lack of Transportation (Non-Medical):  No   Physical Activity: Not on file   Stress: Not on file   Social Connections: Not on file   Intimate Partner Violence: Not on file   Housing Stability: Low Risk  (2022)    Housing Stability Vital Sign    • Unable to Pay for Housing in the Last Year: No    • Number of Places Lived in the Last Year: 1    • Unstable Housing in the Last Year: No       Current Outpatient Medications:   •  acetaminophen (TYLENOL) 325 mg tablet, Take 2 tablets (650 mg total) by mouth every 6 (six) hours as needed for mild pain, headaches or fever, Disp: 30 tablet, Rfl: 0  •  albuterol (2 5 mg/3 mL) 0 083 % nebulizer solution, Take 2 5 mg by nebulization As needed per patient's wife , Disp: , Rfl:   •  atorvastatin (LIPITOR) 10 mg tablet, TAKE 1 TABLET DAILY, Disp: 90 tablet, Rfl: 1  •  carvedilol (COREG) 25 mg tablet, Take 1 tablet (25 mg total) by mouth 2 (two) times a day with meals, Disp: 180 tablet, Rfl: 1  •  ciclopirox (LOPROX) "0 77 % cream, Apply topically 2 (two) times a day for 20 days, Disp: 45 g, Rfl: 1  •  Eliquis 5 MG, TAKE 1 TABLET TWO TIMES A DAY, Disp: 60 tablet, Rfl: 5  •  fluticasone-umeclidinium-vilanterol (Trelegy Ellipta) 100-62 5-25 mcg/actuation inhaler, Rinse mouth after use , Disp: 60 each, Rfl: 5  •  hydrocortisone 2 5 % cream, Apply topically 2 (two) times a day Apply twice a day affected area the trunk, Disp: 20 g, Rfl: 2  •  latanoprost (XALATAN) 0 005 % ophthalmic solution, Administer 0 005 mL to both eyes daily at bedtime, Disp: , Rfl:   •  metFORMIN (GLUCOPHAGE) 500 mg tablet, Take 1 tablet (500 mg total) by mouth 2 (two) times a day, Disp: 180 tablet, Rfl: 1  •  Multiple Vitamin (MULTI-VITAMIN DAILY PO), Take by mouth daily  , Disp: , Rfl:   •  omeprazole (PriLOSEC) 20 mg delayed release capsule, Take 1 capsule (20 mg total) by mouth daily, Disp: 90 capsule, Rfl: 1  •  primidone (MYSOLINE) 50 mg tablet, Take 2 tabs at 8pm , Disp: 180 tablet, Rfl: 1  •  spironolactone (ALDACTONE) 25 mg tablet, TAKE 1 TABLET DAILY, Disp: 90 tablet, Rfl: 1  •  thiamine 100 MG tablet, Take 1 tablet (100 mg total) by mouth daily (Patient not taking: Reported on 2/10/2023), Disp: 30 tablet, Rfl: 0  No Known Allergies  Vitals:    06/12/23 0813   BP: 128/84   BP Location: Left arm   Patient Position: Sitting   Cuff Size: Standard   Pulse: 88   Resp: 18   Temp: 98 9 °F (37 2 °C)   TempSrc: Temporal   SpO2: 95%   Weight: 91 9 kg (202 lb 9 6 oz)   Height: 5' 11\" (1 803 m)      Pain Score: 0-No pain  "

## 2023-06-12 NOTE — PROGRESS NOTES
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  The pt completed a course of SBRT to the [facility] on 02/11/2022  The pt was last seen in radiation on 2/2/2023  He returns today for his follow up      2/10/2023 HemOnc: Continue surveillance  F/U 8 months      2/11/2023 To ED: fall  CT head: No acute intracranial abnormality      6/2/2023 CT chest: Large left pleural effusion occupying most of the left hemithorax  Compressive atelectasis of most of the left lung with aeration of approximately 50% of the left upper lobe  The known left lower lobe mass is obscured on today's study by the surrounding atelectasis     6/5/2023 IR thoracentesis Successful ultrasound-guided left thoracentesis yielding 1500 cc of clear pleural fluid     10/20/2023 HemOnc F/U      He feels much improved since his thoracentesis  Unfortunately cytology was not performed  The fluid was clear  He feels his respiratory function is back to baseline    Historical Information   Oncology History   Squamous cell carcinoma of lung ([facility] )   10/5/2021 Initial Diagnosis    Squamous cell carcinoma of lung ([facility] )     10/5/2021 Biopsy    Final Diagnosis   A  Lung, Left Lower Lobe, :  - Invasive squamous carcinoma, moderately differentiated, keratinizing type  - Tumor is highlighted with P 40 immunoperoxidase stain   - Prominent tumor necrosis is noted  2/2/2022 - 2/11/2022 Radiation    [facility] SBRT [facility] 6X 5 / 5 1,000 0 5,000 9      Treatment Dates:  2/2/2022 - 2/11/2022            Past Medical History:   Diagnosis Date   • Abdominal pain    • Anxiety    • Arthritis    • Asthma    • Atrial fibrillation [facility])    • Back pain    • Bleeding ulcer    • Bronchitis    • Cancer [facility])     prostate 2011- radiation   • Cardiac disease     cardiac stent x1   • Cataract     starting   • Chronic diastolic (congestive) heart failure (HCC)    • Diabetes mellitus ([facility] )     boarderline diabetic    • GERD (gastroesophageal reflux disease)    • History of radiation therapy 2010    Prostate seeds (brachytherapy) and EBRT   • Hyperlipidemia    • Hypertension    • 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, bilateral    • Low back pain    • Lung cancer ([facility] )    • Lung mass    • Myocardial infarction ([facility] )     mild    • Obesity    • Prostate cancer ([facility] )    • Shortness of breath    • Sleep apnea     sleep study    • Wears dentures     full set   • Wears glasses      Past Surgical History:   Procedure Laterality Date   • ABDOMINAL HERNIA REPAIR     • ABDOMINAL SURGERY      bleeding ulcer, cyst removed from abd   • COLONOSCOPY     • CORONARY ANGIOPLASTY WITH STENT PLACEMENT  1999    x1    • ESOPHAGOGASTRODUODENOSCOPY     • IR BIOPSY LUNG  10/05/2021   • IR THORACENTESIS  2021   • IR THORACENTESIS  2023   • KNEE SURGERY Left    • AL [address] INCL FLUOR GDNCE DX W/CELL WASHG [facility] N/A 2021    Procedure: BRONCHOSCOPY FLEXIBLE;  Surgeon: Janis Jasso MD;  Location: [facility];  Service: Thoracic   • AL MEDIASTINOSCOPY WITH LYMPH NODE BIOPSY/IES N/A 2021    Procedure: MEDIASTINOSCOPY;  Surgeon: Janis Jasso MD;  Location: [facility];  Service: Thoracic   • PROSTATE SURGERY         Social History   Social History     Substance and Sexual Activity   Alcohol Use Not Currently   • Alcohol/week: 10 0 - 12 0 standard drinks of alcohol   • Types: 7 Glasses of wine, 3 - 5 Cans of beer per week    Comment: few beers day; glass of red wine at dinner     Social History     Substance and Sexual Activity   Drug Use Never     Social History     Tobacco Use   Smoking Status Former   • Packs/day: 1 00   • Years: 30 00   • Total pack years: 30 00   • Types: Cigarettes   • Quit date:    • Years since quittin 4 Smokeless Tobacco Never         Meds/Allergies     Current Outpatient Medications:   •  acetaminophen (TYLENOL) 325 mg tablet, Take 2 tablets (650 mg total) by mouth every 6 (six) hours as needed for mild pain, headaches or fever, Disp: 30 tablet, Rfl: 0  •  albuterol (2 5 mg/3 mL) 0 083 % nebulizer solution, Take 2 5 mg by nebulization As needed per patient's wife , Disp: , Rfl:   •  atorvastatin (LIPITOR) 10 mg tablet, TAKE 1 TABLET DAILY, Disp: 90 tablet, Rfl: 1  •  carvedilol (COREG) 25 mg tablet, Take 1 tablet (25 mg total) by mouth 2 (two) times a day with meals, Disp: 180 tablet, Rfl: 1  •  ciclopirox (LOPROX) 0 77 % cream, Apply topically 2 (two) times a day for 20 days, Disp: 45 g, Rfl: 1  •  Eliquis 5 MG, TAKE 1 TABLET TWO TIMES A DAY, Disp: 60 tablet, Rfl: 5  •  fluticasone-umeclidinium-vilanterol (Trelegy Ellipta) 100-62 5-25 mcg/actuation inhaler, Rinse mouth after use , Disp: 60 each, Rfl: 5  •  hydrocortisone 2 5 % cream, Apply topically 2 (two) times a day Apply twice a day affected area the trunk, Disp: 20 g, Rfl: 2  •  latanoprost (XALATAN) 0 005 % ophthalmic solution, Administer 0 005 mL to both eyes daily at bedtime, Disp: , Rfl:   •  metFORMIN (GLUCOPHAGE) 500 mg tablet, Take 1 tablet (500 mg total) by mouth 2 (two) times a day, Disp: 180 tablet, Rfl: 1  •  Multiple Vitamin (MULTI-VITAMIN DAILY PO), Take by mouth daily  , Disp: , Rfl:   •  omeprazole (PriLOSEC) 20 mg delayed release capsule, Take 1 capsule (20 mg total) by mouth daily, Disp: 90 capsule, Rfl: 1  •  primidone (MYSOLINE) 50 mg tablet, Take 2 tabs at 8pm , Disp: 180 tablet, Rfl: 1  •  spironolactone (ALDACTONE) 25 mg tablet, TAKE 1 TABLET DAILY, Disp: 90 tablet, Rfl: 1  •  thiamine 100 MG tablet, Take 1 tablet (100 mg total) by mouth daily (Patient not taking: Reported on 2/10/2023), Disp: 30 tablet, Rfl: 0  No Known Allergies      Review of Systems   Constitutional: Positive for activity change, appetite change (Decreased, "unchanged ) and fatigue  HENT: Negative  Eyes: Negative  Respiratory: Positive for cough (Daily, productive with yellow phlegm) and shortness of breath  Cardiovascular: Negative  Gastrointestinal: Positive for constipation  Endocrine: Negative  Genitourinary: Negative  Musculoskeletal: Negative  Skin: Negative  Allergic/Immunologic: Negative  Neurological: Negative  Hematological: Negative  Psychiatric/Behavioral: Negative  OBJECTIVE:   /84 (BP Location: Left arm, Patient Position: Sitting, Cuff Size: Standard)   Pulse 88   Temp 98 9 °F (37 2 °C) (Temporal)   Resp 18   Ht 5' 11\" (1 803 m)   Wt 91 9 kg (202 lb 9 6 oz)   SpO2 95%   BMI 28 26 kg/m²   Pain Assessment:  0  ECOG/Zubrod/WHO: 1 - Symptomatic but completely ambulatory    Physical Exam   He has no supraclavicular adenopathy  He does have decreased breath sounds at the left base  He is breathing comfortably  Ambulating dependently without assistive device  Conversing appropriately        RESULTS    Lab Results: No results found for this or any previous visit (from the past 672 hour(s))  Imaging Studies:IR OUT-Patient Thoracentesis    Result Date: 6/6/2023  Narrative: Ultrasound-guided thoracentesis Clinical History: Shortness of breath  Technique: The patient was brought to the interventional radiology area and placed in the sitting position on the side of a stretcher  The left chest was then examined with ultrasound and the skin was marked  The skin was then prepped, and draped in usual sterile fashion  Lidocaine was administered to the skin and a small skin incision was made  Under direct ultrasound guidance, a 5 [employer] Krystal Yueh multi sidehole catheter was advanced into the pleural space  1500  cc of clear pleural fluid was aspirated    The patient tolerated the procedure well and suffered no complications  Impression: Impression: 1   Successful ultrasound-guided left thoracentesis yielding 1500 cc " of clear pleural fluid  Workstation performed: [id number]     CT chest wo contrast    Result Date: 6/2/2023  Narrative: CT CHEST WITHOUT IV CONTRAST INDICATION:   C34 92: Malignant neoplasm of unspecified part of left bronchus or lung  COMPARISON: CT chest 1/27/2023  TECHNIQUE: CT examination of the chest was performed without intravenous contrast  Multiplanar 2D reformatted images were created from the source data  This examination, like all CT scans performed in the [facility], was performed utilizing techniques to minimize radiation dose exposure, including the use of iterative reconstruction and automated exposure control  Radiation dose length product (DLP) for this visit:  653 49 mGy-cm FINDINGS: LUNGS: Large left pleural effusion occupying most of the left hemithorax  Compressive atelectasis of most of the left lung with aeration of approximately 50% of the left upper lobe  The previously seen left lower lobe mass is now obscured  PLEURA:  Unremarkable  HEART/GREAT VESSELS: Coronary artery calcifications  No significant pericardial effusion  No thoracic aortic aneurysm  MEDIASTINUM AND ANDRE:  Unremarkable  CHEST WALL AND LOWER NECK:  Unremarkable  VISUALIZED STRUCTURES IN THE UPPER ABDOMEN: Status post gastric bypass  Cholelithiasis without signs of cholecystitis  OSSEOUS STRUCTURES:  No acute fracture or destructive osseous lesion  Impression: Large left pleural effusion occupying most of the left hemithorax  Compressive atelectasis of most of the left lung with aeration of approximately 50% of the left upper lobe  The known left lower lobe mass is obscured on today's study by the surrounding atelectasis  Workstation performed: [id number]           Assessment/Plan:  No orders of the defined types were placed in this encounter  Maria Luz Frye is a 82y o  year old male who is 16 months status post SBRT to the left lower lobe    Surveillance CT scan showed a large left pleural "effusion occupying most of the left hemithorax  There is compressive atelectasis of the left lung  The mid left lower lobe lesion was obscured  He did undergo thoracentesis with improvement symptomatically and feels he is back to his baseline now  Unfortunately cytology was not obtained  We discussed short interval follow-up CT scan to assess treated site  He will undergo CT of the chest in 3 months and then follow-up with oncology  Should this remain stable will return to our office in 6 months  We discussed that should he have recurrent respiratory symptoms he should have thoracentesis as this can recur with cytology  Alton Amador MD  7/27/0753,3:04 AM    Portions of the record may have been created with voice recognition software   Occasional wrong word or \"sound a like\" substitutions may have occurred due to the inherent limitations of voice recognition software   Read the chart carefully and recognize, using context, where substitutions have occurred        "

## 2023-06-13 DIAGNOSIS — E11.42 TYPE 2 DIABETES MELLITUS WITH DIABETIC POLYNEUROPATHY, WITHOUT LONG-TERM CURRENT USE OF INSULIN (HCC): ICD-10-CM

## 2023-06-13 LAB
EST. AVERAGE GLUCOSE BLD GHB EST-MCNC: 140 MG/DL
HBA1C MFR BLD: 6.5 %

## 2023-06-16 DIAGNOSIS — I48.91 NEW ONSET ATRIAL FIBRILLATION (HCC): ICD-10-CM

## 2023-06-16 DIAGNOSIS — I10 HYPERTENSION, UNSPECIFIED TYPE: ICD-10-CM

## 2023-07-05 ENCOUNTER — OFFICE VISIT (OUTPATIENT)
Dept: PODIATRY | Facility: CLINIC | Age: 83
End: 2023-07-05
Payer: COMMERCIAL

## 2023-07-05 VITALS
DIASTOLIC BLOOD PRESSURE: 68 MMHG | BODY MASS INDEX: 28.14 KG/M2 | WEIGHT: 201 LBS | SYSTOLIC BLOOD PRESSURE: 118 MMHG | HEIGHT: 71 IN | RESPIRATION RATE: 17 BRPM

## 2023-07-05 DIAGNOSIS — B35.1 ONYCHOMYCOSIS: ICD-10-CM

## 2023-07-05 DIAGNOSIS — B35.3 TINEA PEDIS OF BOTH FEET: ICD-10-CM

## 2023-07-05 DIAGNOSIS — I70.209 PERIPHERAL ARTERIOSCLEROSIS (HCC): ICD-10-CM

## 2023-07-05 DIAGNOSIS — M79.672 PAIN IN BOTH FEET: ICD-10-CM

## 2023-07-05 DIAGNOSIS — E11.42 DIABETIC POLYNEUROPATHY ASSOCIATED WITH TYPE 2 DIABETES MELLITUS (HCC): Primary | ICD-10-CM

## 2023-07-05 DIAGNOSIS — M79.671 PAIN IN BOTH FEET: ICD-10-CM

## 2023-07-05 DIAGNOSIS — L84 CORNS: ICD-10-CM

## 2023-07-05 PROCEDURE — 99213 OFFICE O/P EST LOW 20 MIN: CPT | Performed by: PODIATRIST

## 2023-07-05 NOTE — PROGRESS NOTES
Assessment/Plan:  Patient has pain in his feet and toes with ambulation.  He has mycosis of nail and skin. He has plantar pre ulcerative skin lesions.     Plan.  Diabetic foot exam performed.  Mycotic nails debrided.  cream ordered.  Patient will use daily, he will apply cream to feet b.i.d. For 4 weeks.  Calluses debrided.  Procedures performed without pain or complication.           Subjective:  Patient has pain in his feet with ambulation.  No history of trauma .  He has pain when he wears shoes.  He has pain in his toes.      Patient ID: Spenser Clayton is a 82 y.o. male.     Patient is diabetic. Hemanth Mandujano has pain in his feet and toes with ambulation.  Has pain from calluses.  He has ingrown toenails and dry scaly skin.  He is requesting refill of topical antifungal.        Review of Systems   Constitutional: No fever or chills, feels well, no tiredness, no recent weight loss or weight gain.  Eyes: No complaints of red eyes, no eyesight problems.   ENT: no complaints of loss of hearing, no nosebleeds, no sore throat.  Cardiovascular: No complaints of chest pain, no palpitations, no leg claudication or lower extremity edema.  Respiratory: No complaints of shortness of breath, no wheezing, no cough.  Gastrointestinal: No complaints of abdominal pain, no constipation, no nausea or vomiting, no diarrhea or bloody stools.  Genitourinary: No complaints of dysuria or incontinence, no hesitancy, no nocturia.  Musculoskeletal: limb pain, but-- as noted in HPI.  Integumentary: No complaints of skin rash or lesion, no itching or dry skin, no skin wounds.  Neurological: No complaints of headache, no confusion, no numbness or tingling, no dizziness.  Psychiatric: No suicidal thoughts, no anxiety, no depression.  Endocrine: No muscle weakness, no frequent urination, no excessive thirst, no feelings of weakness.      Active Problems  1. 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 Arthritis (716.90) (M19.90)   3. Atherosclerosis of arteries of extremities (440.20) (I70.209)   4. Calcaneal spur (726.73) (M77.30)   5. Callus (700) (L84)   6. 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 Diabetes mellitus with neuropathy (250.60,357.2) (E11.40)   8. Diabetic neuropathy (250.60,357.2) (E11.40)   9. Hypercholesterolemia (272.0) (E78.00)   10. Hypertension (401.9) (I10)   11. 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 Onychomycosis (110.1) (B35.1)   14. Orthopedic aftercare (V54.9) (Z47.89)   15. Pain in both feet (729.5) (M79.671,M79.672)   16. Pain in extremity (729.5) (M79.609)   17. Pes planus, congenital (754.61) (Q66.50)   18. Plantar fibromatosis (728.71) (M72.2)   19. Plantar wart (078.12) (B07.0)   20. Prostate cancer (185) (C61)   21. Sprain of right shoulder (840.9) (S43.401A)   22. Tinea pedis (110.4) (B35.3)     Past Medical History   · Orthopedic aftercare (V54.9) (Z47.89)     Surgical History   · History of Gastric Surgery   · History of Hernia Repair   · History of Previous Stent Placement Total Number Performed ___     The surgical history was reviewed and updated today.       Family History  Family History    · Family history of Arthritis (V17.7)   · Family history of Cancer     The family history was reviewed and updated today.       Social History      · Beer Consumption (___ Bottles Per Day)   · Daily Coffee Consumption (1  Cups/Day)   · Former smoker (M47.26) (Z87.891)  The social history was reviewed and updated today. Allergies  1. No Known Drug Allergies      Physical Exam  Left Foot: Appearance: Normal except as noted: excessive pronation-- and-- pes planus. Right Foot: Appearance: Normal except as noted: excessive pronation-- and-- pes planus. Left Ankle: ROM: limited ROM in all planes   Right Ankle: ROM: limited ROM in all planes   Neurological Exam: performed.  Light touch was intact bilaterally. Vibratory sensation was intact bilaterally. Response to monofilament test was intact bilaterally. Deep tendon reflexes: patellar reflex present bilaterally-- and-- achilles reflex present bilaterally. Vascular Exam: performed Dorsalis pedis pulses were One/4 bilaterally. Posterior tibial pulses were One/4 bilaterally. Elevation Pallor: present bilaterally. Capillary refill time was greater than 3 seconds bilaterally-- and-- Q. 9 findings bilateral. Negative digital hair noted. Positive abnormal cooling noted. Toenails: All of the toenails were elongated,-- hypertrophied,-- discolored,-- ingrown,-- shown to have subungual debris,-- tender-- and-- Severely mycotic with onychauxis.    Socks and shoes removed, Right Foot Findings: swollen, erythematous and dry.  The sensory exam showed diminished vibratory sensation at the level of the toes. Diminished tactile sensation with monofilament testing throughout the right foot.  Socks and shoes removed, Left Foot Findings: swollen, erythematous and dry.  The sensory exam showed diminished vibratory sensation at the level of the toes. Diminished tactile sensation with monofilament testing throughout the left foot. Capillary refills findings on the right were delayed in the toes.  Pulses:  1+ in the posterior tibialis on the right  1+ in the dorsalis pedis on the right.  Capillary refills findings on the left were delayed in the toes.  Pulses:  1+ in the posterior tibialis on the left  1+ in the dorsalis pedis on the left.  Assign Risk Category: 2: Loss of protective sensation with or without weakness, deformity, callus, pre-ulcer, or history of ulceration. High risk. Hyperkeratosis: present on both first toes,-- present on both fifth sub metatarsals-- and-- [place] tinea pedis, bilateral, is noted. Shoe Gear Evaluation: performed (). Right Foot: width: d-- and-- length: 11.  Left Foot: width: d-- and-- length: 11. Recommendation(s): athletic shoes     Patient's shoes and socks removed. Right Foot/Ankle   Right Foot Inspection  Skin Exam: dry skin, callus and callus               Toe Exam: swelling  Sensory   Vibration: diminished  Proprioception: diminished      Vascular  Capillary refills: elevated        Left Foot/Ankle  Left Foot Inspection  Skin Exam: dry skin and callus              Toe Exam: swelling and erythema                   Sensory   Vibration: diminished  Proprioception: diminished     Vascular  Capillary refills: elevated     Right Foot/Ankle   Right Foot Inspection        Sensory   Vibration: diminished  Proprioception: diminished  Monofilament testing: diminished     Vascular  Capillary refills: < 3 seconds              Left Foot/Ankle  Left Foot Inspection        Sensory   Vibration: diminished  Proprioception: diminished  Monofilament testing: diminished     Vascular  Capillary refills: < 3 seconds         Patient's shoes and socks removed. Right Foot/Ankle   Right Foot Inspection      Toe Exam: right toe deformity. Sensory   Vibration: diminished      Vascular  Capillary refills: < 3 seconds          Left Foot/Ankle  Left Foot Inspection      Toe Exam: left toe deformity.      Sensory   Vibration: diminished      Vascular  Capillary refills: < 3 seconds        Assign Risk Category  Deformity present  Loss of protective sensation  Weak pulses  Risk: 2

## 2023-07-27 ENCOUNTER — RA CDI HCC (OUTPATIENT)
Dept: OTHER | Facility: HOSPITAL | Age: 83
End: 2023-07-27

## 2023-07-27 NOTE — PROGRESS NOTES
[address] [facility] coding opportunities          Chart Reviewed number of suggestions sent to Provider: 1   E11.51  Patients Insurance     Medicare Insurance: [employer]

## 2023-08-01 ENCOUNTER — OFFICE VISIT (OUTPATIENT)
Dept: INTERNAL MEDICINE CLINIC | Facility: CLINIC | Age: 83
End: 2023-08-01
Payer: COMMERCIAL

## 2023-08-01 VITALS
HEIGHT: 71 IN | OXYGEN SATURATION: 98 % | DIASTOLIC BLOOD PRESSURE: 80 MMHG | WEIGHT: 202.4 LBS | SYSTOLIC BLOOD PRESSURE: 118 MMHG | BODY MASS INDEX: 28.34 KG/M2 | TEMPERATURE: 98 F | HEART RATE: 108 BPM | RESPIRATION RATE: 16 BRPM

## 2023-08-01 DIAGNOSIS — E11.40 TYPE 2 DIABETES MELLITUS WITH DIABETIC NEUROPATHY, WITHOUT LONG-TERM CURRENT USE OF INSULIN (HCC): Primary | ICD-10-CM

## 2023-08-01 DIAGNOSIS — Z00.00 ENCOUNTER FOR SUBSEQUENT ANNUAL WELLNESS VISIT IN MEDICARE PATIENT: ICD-10-CM

## 2023-08-01 DIAGNOSIS — F10.21 ALCOHOL DEPENDENCE IN REMISSION (HCC): ICD-10-CM

## 2023-08-01 DIAGNOSIS — I50.32 HYPERTENSIVE HEART DISEASE WITH CHRONIC DIASTOLIC CONGESTIVE HEART FAILURE (HCC): ICD-10-CM

## 2023-08-01 DIAGNOSIS — I48.19 OTHER PERSISTENT ATRIAL FIBRILLATION (HCC): ICD-10-CM

## 2023-08-01 DIAGNOSIS — I50.32 CHRONIC DIASTOLIC HEART FAILURE (HCC): ICD-10-CM

## 2023-08-01 DIAGNOSIS — J43.2 CENTRILOBULAR EMPHYSEMA (HCC): ICD-10-CM

## 2023-08-01 DIAGNOSIS — I25.10 CORONARY ARTERY DISEASE INVOLVING NATIVE CORONARY ARTERY OF NATIVE HEART WITHOUT ANGINA PECTORIS: ICD-10-CM

## 2023-08-01 DIAGNOSIS — I11.0 HYPERTENSIVE HEART DISEASE WITH CHRONIC DIASTOLIC CONGESTIVE HEART FAILURE (HCC): ICD-10-CM

## 2023-08-01 DIAGNOSIS — C34.92 SQUAMOUS CELL CARCINOMA OF LEFT LUNG (HCC): Chronic | ICD-10-CM

## 2023-08-01 PROBLEM — E11.42 DIABETIC POLYNEUROPATHY ASSOCIATED WITH TYPE 2 DIABETES MELLITUS (HCC): Status: RESOLVED | Noted: 2018-02-15 | Resolved: 2023-08-01

## 2023-08-01 LAB
LEFT EYE DIABETIC RETINOPATHY: NORMAL
RIGHT EYE DIABETIC RETINOPATHY: NORMAL

## 2023-08-01 PROCEDURE — G0439 PPPS, SUBSEQ VISIT: HCPCS | Performed by: INTERNAL MEDICINE

## 2023-08-01 PROCEDURE — 3288F FALL RISK ASSESSMENT DOCD: CPT | Performed by: INTERNAL MEDICINE

## 2023-08-01 PROCEDURE — 99214 OFFICE O/P EST MOD 30 MIN: CPT | Performed by: INTERNAL MEDICINE

## 2023-08-01 PROCEDURE — 1003F LEVEL OF ACTIVITY ASSESS: CPT | Performed by: INTERNAL MEDICINE

## 2023-08-01 NOTE — ASSESSMENT & PLAN NOTE
Followed by oncology had a CT of the lung done which showsLarge left pleural effusion occupying most of the left hemithorax.  Compressive atelectasis of most of the left lung with aeration of approximately 50% of the left upper lobe awaiting to be evaluated by oncology after 2 weeks and then underwent thoracentesis still having some difficulty breathing coughing awaiting to be evaluated by oncologist and repeat CT of the chest

## 2023-08-01 NOTE — PATIENT INSTRUCTIONS
Medicare Preventive Visit Patient Instructions  Thank you for completing your Welcome to Medicare Visit or Medicare Annual Wellness Visit today. Your next wellness visit will be due in one year (8/1/2024). The screening/preventive services that you may require over the next 5-10 years are detailed below. Some tests may not apply to you based off risk factors and/or age. Screening tests ordered at today's visit but not completed yet may show as past due. Also, please note that scanned in results may not display below. Preventive Screenings:  Service Recommendations Previous Testing/Comments   Colorectal Cancer Screening  · Colonoscopy    · Fecal Occult Blood Test (FOBT)/Fecal Immunochemical Test (FIT)  · Fecal DNA/Cologuard Test  · Flexible Sigmoidoscopy Age: 43-73 years old   Colonoscopy: every 10 years (May be performed more frequently if at higher risk)  OR  FOBT/FIT: every 1 year  OR  Cologuard: every 3 years  OR  Sigmoidoscopy: every 5 years  Screening may be recommended earlier than age 39 if at higher risk for colorectal cancer. Also, an individualized decision between you and your healthcare provider will decide whether screening between the ages of 77-80 would be appropriate. Colonoscopy: Not on file  FOBT/FIT: Not on file  Cologuard: Not on file  Sigmoidoscopy: Not on file          Prostate Cancer Screening Individualized decision between patient and health care provider in men between ages of 53-66   Medicare will cover every 12 months beginning on the day after your 50th birthday PSA: <0.1 ng/mL           Hepatitis C Screening Once for adults born between 1945 and 1965  More frequently in patients at high risk for Hepatitis C Hep C Antibody: 02/21/2022        Diabetes Screening 1-2 times per year if you're at risk for diabetes or have pre-diabetes Fasting glucose: 130 mg/dL (6/12/2023)  A1C: 6.5 % (6/12/2023)      Cholesterol Screening Once every 5 years if you don't have a lipid disorder.  May order more often based on risk factors. Lipid panel: 06/12/2023         Other Preventive Screenings Covered by Medicare:  1. Abdominal Aortic Aneurysm (AAA) Screening: covered once if your at risk. You're considered to be at risk if you have a family history of AAA or a male between the age of 70-76 who smoking at least 100 cigarettes in your lifetime. 2. Lung Cancer Screening: covers low dose CT scan once per year if you meet all of the following conditions: (1) Age 48-67; (2) No signs or symptoms of lung cancer; (3) Current smoker or have quit smoking within the last 15 years; (4) You have a tobacco smoking history of at least 20 pack years (packs per day x number of years you smoked); (5) You get a written order from a healthcare provider. 3. Glaucoma Screening: covered annually if you're considered high risk: (1) You have diabetes OR (2) Family history of glaucoma OR (3)  aged 48 and older OR (3)  American aged 72 and older  3. Osteoporosis Screening: covered every 2 years if you meet one of the following conditions: (1) Have a vertebral abnormality; (2) On glucocorticoid therapy for more than 3 months; (3) Have primary hyperparathyroidism; (4) On osteoporosis medications and need to assess response to drug therapy. 5. HIV Screening: covered annually if you're between the age of 14-79. Also covered annually if you are younger than 13 and older than 72 with risk factors for HIV infection. For pregnant patients, it is covered up to 3 times per pregnancy.     Immunizations:  Immunization Recommendations   Influenza Vaccine Annual influenza vaccination during flu season is recommended for all persons aged >= 6 months who do not have contraindications   Pneumococcal Vaccine   * Pneumococcal conjugate vaccine = PCV13 (Prevnar 13), PCV15 (Vaxneuvance), PCV20 (Prevnar 20)  * Pneumococcal polysaccharide vaccine = PPSV23 (Pneumovax) Adults 20-63 years old: 1-3 doses may be recommended based on certain risk factors  Adults 72 years old: 1-2 doses may be recommended based off what pneumonia vaccine you previously received   Hepatitis B Vaccine 3 dose series if at intermediate or high risk (ex: diabetes, end stage renal disease, liver disease)   Tetanus (Td) Vaccine - COST NOT COVERED BY MEDICARE PART B Following completion of primary series, a booster dose should be given every 10 years to maintain immunity against tetanus. Td may also be given as tetanus wound prophylaxis. Tdap Vaccine - COST NOT COVERED BY MEDICARE PART B Recommended at least once for all adults. For pregnant patients, recommended with each pregnancy. Shingles Vaccine (Shingrix) - COST NOT COVERED BY MEDICARE PART B  2 shot series recommended in those aged 48 and above     Health Maintenance Due:      Topic Date Due   • Hepatitis C Screening  Completed     Immunizations Due:      Topic Date Due   • Pneumococcal Vaccine: 65+ Years (1 - PCV) Never done   • COVID-19 Vaccine (3 - Moderna risk series) 12/03/2021   • Influenza Vaccine (1) 09/01/2023     Advance Directives   What are advance directives? Advance directives are legal documents that state your wishes and plans for medical care. These plans are made ahead of time in case you lose your ability to make decisions for yourself. Advance directives can apply to any medical decision, such as the treatments you want, and if you want to donate organs. What are the types of advance directives? There are many types of advance directives, and each state has rules about how to use them. You may choose a combination of any of the following:  · Living will: This is a written record of the treatment you want. You can also choose which treatments you do not want, which to limit, and which to stop at a certain time. This includes surgery, medicine, IV fluid, and tube feedings. · Durable power of  for healthcare [facility]):   This is a written record that states who you want to make healthcare choices for you when you are unable to make them for yourself. This person, called a proxy, is usually a family member or a friend. You may choose more than 1 proxy. · Do not resuscitate (DNR) order:  A DNR order is used in case your heart stops beating or you stop breathing. It is a request not to have certain forms of treatment, such as CPR. A DNR order may be included in other types of advance directives. · Medical directive: This covers the care that you want if you are in a coma, near death, or unable to make decisions for yourself. You can list the treatments you want for each condition. Treatment may include pain medicine, surgery, blood transfusions, dialysis, IV or tube feedings, and a ventilator (breathing machine). · Values history: This document has questions about your views, beliefs, and how you feel and think about life. This information can help others choose the care that you would choose. Why are advance directives important? An advance directive helps you control your care. Although spoken wishes may be used, it is better to have your wishes written down. Spoken wishes can be misunderstood, or not followed. Treatments may be given even if you do not want them. An advance directive may make it easier for your family to make difficult choices about your care. Weight Management   Why it is important to manage your weight:  Being overweight increases your risk of health conditions such as heart disease, high blood pressure, type 2 diabetes, and certain types of cancer. It can also increase your risk for osteoarthritis, sleep apnea, and other respiratory problems. Aim for a slow, steady weight loss. Even a small amount of weight loss can lower your risk of health problems. How to lose weight safely:  A safe and healthy way to lose weight is to eat fewer calories and get regular exercise.  You can lose up about 1 pound a week by decreasing the number of calories you eat by 500 calories each day.   Healthy meal plan for weight management:  A healthy meal plan includes a variety of foods, contains fewer calories, and helps you stay healthy. A healthy meal plan includes the following:  · Eat whole-grain foods more often. A healthy meal plan should contain fiber. Fiber is the part of grains, fruits, and vegetables that is not broken down by your body. Whole-grain foods are healthy and provide extra fiber in your diet. Some examples of whole-grain foods are whole-wheat breads and pastas, oatmeal, brown rice, and bulgur. · Eat a variety of vegetables every day. Include dark, leafy greens such as spinach, kale, lex greens, and mustard greens. Eat yellow and orange vegetables such as carrots, sweet potatoes, and winter squash. · Eat a variety of fruits every day. Choose fresh or canned fruit (canned in its own juice or light syrup) instead of juice. Fruit juice has very little or no fiber. · Eat low-fat dairy foods. Drink fat-free (skim) milk or 1% milk. Eat fat-free yogurt and low-fat cottage cheese. Try low-fat cheeses such as mozzarella and other reduced-fat cheeses. · Choose meat and other protein foods that are low in fat. Choose beans or other legumes such as split peas or lentils. Choose fish, skinless poultry (chicken or turkey), or lean cuts of red meat (beef or pork). Before you cook meat or poultry, cut off any visible fat. · Use less fat and oil. Try baking foods instead of frying them. Add less fat, such as margarine, sour cream, regular salad dressing and mayonnaise to foods. Eat fewer high-fat foods. Some examples of high-fat foods include french fries, doughnuts, ice cream, and cakes. · Eat fewer sweets. Limit foods and drinks that are high in sugar. This includes candy, cookies, regular soda, and sweetened drinks. Exercise:  Exercise at least 30 minutes per day on most days of the week. Some examples of exercise include walking, biking, dancing, and swimming.  You can also fit in more physical activity by taking the stairs instead of the elevator or parking farther away from stores. Ask your healthcare provider about the best exercise plan for you. © Copyright [employer] 2018 Information is for End User's use only and may not be sold, redistributed or otherwise used for commercial purposes.  All illustrations and images included in CareNotes® are the copyrighted property of [employer] or [address] [place] [address]

## 2023-08-01 NOTE — ASSESSMENT & PLAN NOTE
Lab Results   Component Value Date    HGBA1C 6.5 (H) 06/12/2023   Diabetes very well controlled base of A1c continue metformin hypoglycemia protocol in place cut down metformin 501 a day due to the risk of hypoglycemia yearly dilated eye exam foot exam normal diabetic diet

## 2023-08-01 NOTE — ASSESSMENT & PLAN NOTE
Patient's blood pressure control continue Coreg patient euvolemic CHF compensated low-salt diet fluid restriction and daily weight monitoring blood pressure controlled CHF controlled on Aldactone 25 mg daily

## 2023-08-01 NOTE — PROGRESS NOTES
Assessment and Plan:     Problem List Items Addressed This Visit        Endocrine    Type 2 diabetes mellitus with diabetic neuropathy, without long-term current use of insulin ([address]) - Primary       Lab Results   Component Value Date    HGBA1C 6.5 (H) 06/12/2023   Diabetes very well controlled base of A1c continue metformin hypoglycemia protocol in place cut down metformin 501 a day due to the risk of hypoglycemia yearly dilated eye exam foot exam normal diabetic diet            Respiratory    Squamous cell carcinoma of lung (HCC) (Chronic)     Followed by oncology had a CT of the lung done which showsLarge left pleural effusion occupying most of the left hemithorax.  Compressive atelectasis of most of the left lung with aeration of approximately 50% of the left upper lobe awaiting to be evaluated by oncology after 2 weeks and then underwent thoracentesis still having some difficulty breathing coughing awaiting to be evaluated by oncologist and repeat CT of the chest         Centrilobular emphysema ([address])     Intermittent difficulty breathing followed by pulmonary continue Proventil as needed and trilogy symptoms partially controlled            Cardiovascular and Mediastinum    Chronic diastolic heart failure ([address])     Wt Readings from Last 3 Encounters:   08/01/23 91.8 kg (202 lb 6.4 oz)   07/05/23 91.2 kg (201 lb)   06/12/23 91.9 kg (202 lb 9.6 oz)     Patient euvolemic CHF compensated continue low-salt diet fluid restriction Daily weight monitoring continue Aldactone follow-up with cardiology             CAD (coronary artery disease)     9 no chest pain continue Eliquis statin Coreg aspirin stable         Other persistent atrial fibrillation ([address])     Patient is rate controlled continue Eliquis Coreg follow-up with cardiology         Hypertensive heart disease     Patient's blood pressure control continue Coreg patient euvolemic CHF compensated low-salt diet fluid restriction and daily weight monitoring blood pressure controlled CHF controlled on Aldactone 25 mg daily            Other    Alcohol dependence ([address])     In remission stop drinking alcohol completely for more than a year counseling done         Encounter for subsequent annual wellness visit in Medicare patient        Preventive health issues were discussed with patient, and age appropriate screening tests were ordered as noted in patient's After Visit Summary. Personalized health advice and appropriate referrals for health education or preventive services given if needed, as noted in patient's After Visit Summary. History of Present Illness:     Patient presents for a Medicare Wellness Visit    Patient came in follow-up chronic medical condition listed under visit diagnosis complaining of some intermittent difficulty breathing coughing awaiting to be seen by oncologist and repeat CT of the chest large pleural effusion underwent thoracentesis yesterday difficulty breathing improved also all the blood work reviewed CMP unremarkable A1c suggests diabetes very well controlled with risk of hypoglycemia some metformin cut down to once a day intermittent difficulty breathing on mild to moderate exertion with coughing follow-up chronic medical condition listed under visit diagnosis stable including CHF coronary artery disease and hypertension     Patient Care Team:  Jessica Escalante MD as PCP - General (Internal Medicine)  MD Lucy Gonzalez DO (Cardiology)  Rc Oliver MD (Radiation Oncology)  Mozella Kayser, MD as Surgeon (General Surgery)     Review of Systems:     Review of Systems   Constitutional: Positive for activity change and fatigue. Negative for appetite change, chills, diaphoresis, fever and unexpected weight change.    HENT: Negative for congestion, dental problem, drooling, ear discharge, ear pain, facial swelling, hearing loss, mouth sores, nosebleeds, postnasal drip, rhinorrhea, sinus pressure, sneezing, sore throat, tinnitus, trouble swallowing and voice change. Eyes: Negative for photophobia, pain, discharge, redness, itching and visual disturbance. Respiratory: Positive for cough and shortness of breath. Negative for apnea, choking, chest tightness, wheezing and stridor. Cardiovascular: Negative for chest pain and palpitations. Gastrointestinal: Negative for abdominal distention, abdominal pain, anal bleeding, blood in stool, constipation, diarrhea, nausea, rectal pain and vomiting. Endocrine: Negative for cold intolerance, heat intolerance, polydipsia, polyphagia and polyuria. Genitourinary: Negative for decreased urine volume, difficulty urinating, dysuria, enuresis, flank pain, frequency, genital sores, hematuria and urgency. Musculoskeletal: Positive for arthralgias and back pain. Negative for myalgias, neck pain and neck stiffness. Skin: Negative for color change, pallor, rash and wound. Allergic/Immunologic: Negative. Negative for environmental allergies, food allergies and immunocompromised state. Neurological: Positive for dizziness. Negative for tremors, seizures, syncope, facial asymmetry, speech difficulty, weakness, light-headedness, numbness and headaches. Psychiatric/Behavioral: Negative for agitation, behavioral problems, confusion, decreased concentration, dysphoric mood, hallucinations, self-injury, sleep disturbance and suicidal ideas. The patient is nervous/anxious. The patient is not hyperactive.          Problem List:     Patient Active Problem List   Diagnosis   • Corns   • Pain in both feet   • Onychomycosis   • Tinea pedis of both feet   • Lung mass   • Hyperlipidemia   • Type 2 diabetes mellitus with diabetic neuropathy, without long-term current use of insulin (HCC)   • Squamous cell carcinoma of lung (HCC)   • Shortness of breath   • Daytime hypersomnolence   • Chronic diastolic heart failure (HCC)   • SYLVESTER (obstructive sleep apnea)   • Prostate cancer ([address])   • GERD (gastroesophageal reflux disease)   • CAD (coronary artery disease)   • Other persistent atrial fibrillation (HCC)   • Alcohol dependence ([address])   • Acute respiratory failure with hypoxemia (HCC)   • Depression, recurrent (HCC)   • COVID-19   • Angioedema   • Septic shock (HCC)   • Cellulitis   • Cellulitis of chest wall   • Acute kidney injury (BUDDY) with acute tubular necrosis (ATN) (HCC)   • Chronic obstructive pulmonary disease, unspecified COPD type ([address])   • Centrilobular emphysema (HCC)   • Abdominal aortic aneurysm (HCC)   • Hypertensive heart disease   • Tremors of nervous system   • Encounter for subsequent annual wellness visit in Medicare patient      Past Medical and Surgical History:     Past Medical History:   Diagnosis Date   • Abdominal pain    • Anxiety    • Arthritis    • Asthma    • Atrial fibrillation ([address])    • Back pain    • Bleeding ulcer    • Bronchitis    • Cancer ([address])     prostate 2011- radiation   • Cardiac disease     cardiac stent x1   • Cataract     starting   • Chronic diastolic (congestive) heart failure (HCC)    • Diabetes mellitus ([address])     boarderline diabetic    • GERD (gastroesophageal reflux disease)    • History of radiation therapy 2010    Prostate seeds (brachytherapy) and EBRT   • Hyperlipidemia    • Hypertension    • 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, bilateral    • Low back pain    • Lung cancer ([address])    • Lung mass    • Myocardial infarction ([address])     mild 1999   • Obesity    • Prostate cancer ([address])    • Shortness of breath    • Sleep apnea     sleep study 11/22   • Wears dentures     full set   • Wears glasses      Past Surgical History:   Procedure Laterality Date   • ABDOMINAL HERNIA REPAIR     • ABDOMINAL SURGERY      bleeding ulcer, cyst removed from abd   • COLONOSCOPY     • CORONARY ANGIOPLASTY WITH STENT PLACEMENT  1999    x1    • ESOPHAGOGASTRODUODENOSCOPY     • IR BIOPSY LUNG  10/05/2021   • IR THORACENTESIS  11/08/2021   • IR THORACENTESIS  6/5/2023   • KNEE SURGERY Left    • CT [facility] INCL FLUOR GDNCE DX W/CELL WASHG [facility] N/A 2021    Procedure: Aide Esquivel;  Surgeon: Ana Truong MD;  Location: [facility];  Service: Thoracic   • [place] MEDIASTINOSCOPY WITH LYMPH NODE BIOPSY/IES N/A 2021    Procedure: MEDIASTINOSCOPY;  Surgeon: Ana Truong MD;  Location: [facility];  Service: Thoracic   • PROSTATE SURGERY        Family History:     Family History   Problem Relation Age of Onset   • Prostate cancer Brother    • Cancer Maternal Uncle         colo rectal cancer   • Cancer Paternal Aunt       Social History:     Social History     Socioeconomic History   • Marital status: /Civil Union     Spouse name: None   • Number of children: None   • Years of education: None   • Highest education level: None   Occupational History   • None   Tobacco Use   • Smoking status: Former     Packs/day: 1.00     Years: 30.00     Total pack years: 30.00     Types: Cigarettes     Quit date:      Years since quittin.5   • Smokeless tobacco: Never   Vaping Use   • Vaping Use: Never used   Substance and Sexual Activity   • Alcohol use: Not Currently     Alcohol/week: 10.0 - 12.0 standard drinks of alcohol     Types: 7 Glasses of wine, 3 - 5 Cans of beer per week     Comment: few beers day; glass of red wine at dinner   • Drug use: Never   • Sexual activity: None   Other Topics Concern   • None   Social History Narrative   • None     Social Determinants of Health     Financial Resource Strain: Medium Risk (2023)    Overall Financial Resource Strain (CARDIA)    • Difficulty of Paying Living Expenses: Somewhat hard   Food Insecurity: No Food Insecurity (2022)    Hunger Vital Sign    • Worried About Running Out of Food in the Last Year: Never true    • Ran Out of Food in the Last Year: Never true   Transportation Needs: No Transportation Needs (2023)    PRAPARE - Transportation    • Lack of Transportation (Medical): No    • Lack of Transportation (Non-Medical):  No Physical Activity: Not on file   Stress: Not on file   Social Connections: Not on file   Intimate Partner Violence: Not on file   Housing Stability: Low Risk  (12/19/2022)    Housing Stability Vital Sign    • Unable to Pay for Housing in the Last Year: No    • Number of Places Lived in the Last Year: 1    • Unstable Housing in the Last Year: No      Medications and Allergies:     Current Outpatient Medications   Medication Sig Dispense Refill   • acetaminophen (TYLENOL) 325 mg tablet Take 2 tablets (650 mg total) by mouth every 6 (six) hours as needed for mild pain, headaches or fever 30 tablet 0   • albuterol (2.5 mg/3 mL) 0.083 % nebulizer solution Take 2.5 mg by nebulization As needed per patient's wife      • apixaban (Eliquis) 5 mg Take 1 tablet (5 mg total) by mouth 2 (two) times a day 180 tablet 1   • atorvastatin (LIPITOR) 10 mg tablet TAKE 1 TABLET DAILY 90 tablet 1   • carvedilol (COREG) 25 mg tablet Take 1 tablet (25 mg total) by mouth 2 (two) times a day with meals 180 tablet 1   • fluticasone-umeclidinium-vilanterol (Trelegy Ellipta) 100-62.5-25 mcg/actuation inhaler Rinse mouth after use. 60 each 5   • hydrocortisone 2.5 % cream Apply topically 2 (two) times a day Apply twice a day affected area the trunk 20 g 2   • latanoprost (XALATAN) 0.005 % ophthalmic solution Administer 0.005 mL to both eyes daily at bedtime     • metFORMIN (GLUCOPHAGE) 500 mg tablet TAKE 1 TABLET 2 TIMES A  tablet 1   • Multiple Vitamin (MULTI-VITAMIN DAILY PO) Take by mouth daily       • omeprazole (PriLOSEC) 20 mg delayed release capsule Take 1 capsule (20 mg total) by mouth daily 90 capsule 1   • primidone (MYSOLINE) 50 mg tablet Take 2 tabs at 8pm. 180 tablet 1   • spironolactone (ALDACTONE) 25 mg tablet TAKE 1 TABLET DAILY 90 tablet 1   • ciclopirox (LOPROX) 0.77 % cream Apply topically 2 (two) times a day for 20 days 45 g 1     No current facility-administered medications for this visit.      No Known Allergies Immunizations:     Immunization History   Administered Date(s) Administered   • COVID-19 MODERNA VACC 0.25 ML IM BOOSTER 11/05/2021   • COVID-19 MODERNA VACC 0.5 ML IM 02/26/2021, 03/26/2021   • Influenza, high dose seasonal 0.7 mL 11/28/2022   • Tdap 01/07/2021      Health Maintenance:         Topic Date Due   • Hepatitis C Screening  Completed         Topic Date Due   • Pneumococcal Vaccine: 65+ Years (1 - PCV) Never done   • COVID-19 Vaccine (3 - Moderna risk series) 12/03/2021   • Influenza Vaccine (1) 09/01/2023      Medicare Screening Tests and Risk Assessments:     Olamide Gomez is here for his Subsequent Wellness visit. Health Risk Assessment:   Patient rates overall health as fair. Patient feels that their physical health rating is slightly better. Patient is satisfied with their life. Eyesight was rated as same. Hearing was rated as same. Patient feels that their emotional and mental health rating is same. Patients states they are always angry. Patient states they are always unusually tired/fatigued. Pain experienced in the last 7 days has been none. Patient states that he has experienced no weight loss or gain in last 6 months. Stays the same    Fall Risk Screening: In the past year, patient has experienced: history of falling in past year    Number of falls: 2 or more  Injured during fall?: Yes    Feels unsteady when standing or walking?: Yes    Worried about falling?: Yes      Home Safety:  Patient does not have trouble with stairs inside or outside of their home. Patient has working smoke alarms and has working carbon monoxide detector. Home safety hazards include: none. Home is safe. Nutrition:   Current diet is Regular. Hardly eats or eats very little. Medications:   Patient is currently taking over-the-counter supplements. OTC medications include: see medication list. Patient is not able to manage medications. Wife gives him his medication.     Activities of Daily Living (ADLs)/Instrumental Activities of Daily Living (IADLs):   Walk and transfer into and out of bed and chair?: Yes  Dress and groom yourself?: Yes    Bathe or shower yourself?: Yes    Feed yourself? Yes  Do your laundry/housekeeping?: No  Manage your money, pay your bills and track your expenses?: No  Make your own meals?: No    Do your own shopping?: No    ADL comments: Wife does the shopping, laundry, meals, and pays the bills. Previous Hospitalizations:   Any hospitalizations or ED visits within the last 12 months?: No      Advance Care Planning:   Living will: Yes    Durable POA for healthcare: Yes    Advanced directive: Yes    Advanced directive counseling given: Yes    End of Life Decisions reviewed with patient: Yes    Provider agrees with end of life decisions: Yes      Cognitive Screening:   Provider or family/friend/caregiver concerned regarding cognition?: No    PREVENTIVE SCREENINGS      Cardiovascular Screening:    General: Screening Not Indicated and History Lipid Disorder      Diabetes Screening:     General: Screening Not Indicated and History Diabetes      Prostate Cancer Screening:    General: History Prostate Cancer and Screening Not Indicated      Abdominal Aortic Aneurysm (AAA) Screening:    Risk factors include: tobacco use        General: Screening Not Indicated and History AAA      Lung Cancer Screening:     General: Screening Not Indicated and History Lung Cancer      Hepatitis C Screening:    General: Screening Current    Screening, Brief Intervention, and Referral to Treatment (SBIRT)    Screening      AUDIT-C Screenin) How often did you have a drink containing alcohol in the past year? monthly or less  2) How many drinks did you have on a typical day when you were drinking in the past year? 1 to 2  3) How often did you have 6 or more drinks on one occasion in the past year? never    AUDIT-C Score: 1  Interpretation: Score 0-3 (male): Negative screen for alcohol misuse    Single Item Drug Screening:   How often have you used an illegal drug (including marijuana) or a prescription medication for non-medical reasons in the past year? never    Single Item Drug Screen Score: 0  Interpretation: Negative screen for possible drug use disorder    Other Counseling Topics:   Skin self-exam and calcium and vitamin D intake. No results found. Physical Exam:     /80   Pulse (!) 108   Temp 98 °F (36.7 °C)   Resp 16   Ht 5' 11" (1.803 m)   Wt 91.8 kg (202 lb 6.4 oz)   SpO2 98%   BMI 28.23 kg/m²     Physical Exam  Vitals and nursing note reviewed. Constitutional:       General: He is not in acute distress. Appearance: He is well-developed. HENT:      Head: Normocephalic and atraumatic. Eyes:      Conjunctiva/sclera: Conjunctivae normal.   Cardiovascular:      Rate and Rhythm: Normal rate and regular rhythm. Pulses:           Dorsalis pedis pulses are 2+ on the right side and 2+ on the left side. Posterior tibial pulses are 1+ on the right side and 1+ on the left side. Heart sounds: Murmur heard. Pulmonary:      Effort: Pulmonary effort is normal. No respiratory distress. Breath sounds: Rhonchi and rales present. Abdominal:      Palpations: Abdomen is soft. Tenderness: There is no abdominal tenderness. Musculoskeletal:         General: No swelling. Cervical back: Neck supple. Feet:      Right foot:      Skin integrity: No ulcer, skin breakdown, erythema, warmth, callus or dry skin. Left foot:      Skin integrity: No ulcer, skin breakdown, erythema, warmth, callus or dry skin. Skin:     General: Skin is warm and dry. Capillary Refill: Capillary refill takes less than 2 seconds. Neurological:      Mental Status: He is alert.       Gait: Gait abnormal.   Psychiatric:         Mood and Affect: Mood normal.          Darren Ordonez MD

## 2023-08-01 NOTE — ASSESSMENT & PLAN NOTE
Wt Readings from Last 3 Encounters:   08/01/23 91.8 kg (202 lb 6.4 oz)   07/05/23 91.2 kg (201 lb)   06/12/23 91.9 kg (202 lb 9.6 oz)     Patient euvolemic CHF compensated continue low-salt diet fluid restriction Daily weight monitoring continue Aldactone follow-up with cardiology

## 2023-08-01 NOTE — ASSESSMENT & PLAN NOTE
Intermittent difficulty breathing followed by pulmonary continue Proventil as needed and trilogy symptoms partially controlled

## 2023-08-08 DIAGNOSIS — F33.9 DEPRESSION, RECURRENT (HCC): Primary | ICD-10-CM

## 2023-08-08 RX ORDER — MIRTAZAPINE 7.5 MG/1
7.5 TABLET, FILM COATED ORAL
Qty: 90 TABLET | Refills: 3 | Status: SHIPPED | OUTPATIENT
Start: 2023-08-08

## 2023-08-10 ENCOUNTER — OFFICE VISIT (OUTPATIENT)
Dept: CARDIOLOGY CLINIC | Facility: CLINIC | Age: 83
End: 2023-08-10
Payer: COMMERCIAL

## 2023-08-10 VITALS
SYSTOLIC BLOOD PRESSURE: 112 MMHG | HEART RATE: 67 BPM | BODY MASS INDEX: 28.42 KG/M2 | OXYGEN SATURATION: 97 % | HEIGHT: 71 IN | DIASTOLIC BLOOD PRESSURE: 62 MMHG | WEIGHT: 203 LBS

## 2023-08-10 DIAGNOSIS — I25.10 CORONARY ARTERY DISEASE INVOLVING NATIVE CORONARY ARTERY OF NATIVE HEART WITHOUT ANGINA PECTORIS: ICD-10-CM

## 2023-08-10 DIAGNOSIS — I48.19 PERSISTENT ATRIAL FIBRILLATION (HCC): Primary | ICD-10-CM

## 2023-08-10 DIAGNOSIS — I50.32 CHRONIC DIASTOLIC HEART FAILURE (HCC): ICD-10-CM

## 2023-08-10 DIAGNOSIS — I10 PRIMARY HYPERTENSION: ICD-10-CM

## 2023-08-10 DIAGNOSIS — E78.2 MIXED HYPERLIPIDEMIA: ICD-10-CM

## 2023-08-10 PROCEDURE — 93000 ELECTROCARDIOGRAM COMPLETE: CPT | Performed by: INTERNAL MEDICINE

## 2023-08-10 PROCEDURE — 99214 OFFICE O/P EST MOD 30 MIN: CPT | Performed by: INTERNAL MEDICINE

## 2023-08-10 NOTE — PROGRESS NOTES
Cardiology   Reann Dale DO, Stephenie King MD, Isabel Goode MD, Levi Mosley MD, [facility] - [place]  -------------------------------------------------------------------  [facility] ORTHOPEDIC [facility]  [address] [place] [address], [address] [place] [address]  [phone]    Cardiology Follow Up  Gareld Ahumada  1940  [id number]          Assessment/Plan:    1. Persistent atrial fibrillation ([address])  -   Patient remains rate controlled with carvediloll. He is asymptomatic without any palpitations. -   Tolerating Eliquis. No further bleed from axillary site. 2. Chronic diastolic heart failure (HCC)  -   Echocardiogram showed ejection fraction of 50-55%. 3. Atherosclerosis of native coronary artery of native heart without angina pectoris  -   Patient has a history of coronary disease with prior PCI over 20 years ago. -   Stress test did not show any ischemia or infarction. 4.  Hypertension  -   Blood pressure controlled with losartan 50 mg daily. 6. 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[address])  -  Continue atorvastatin. Interval History:     Gareld Ahumada is 82 y.o. male here for followup of atrial fibirllation. Since his last visit, he has been feeling well.  he denies any palpitations, chest pain, LE edema, orthopnea or PND. He has chronic dyspnea which has been unchanged recently. Diet is overall unchanged. There has not been a significant change in weight. He has completed radiation therapy for lung mass. He did not require chemotherapy. Has appointment with oncologist in September for follow up. Previously, he had sleep study done which was abnormal.  Went for CPAP fitting but could not tolerate CPAP machine. Previously, the patient was admitted to [facility] on September 29, 2021 with shortness of breath. Workup in the emergency room revealed RSV and a left lower lobe lung mass.   He was also found to be in atrial fibrillation. He was started on atenolol and Eliquis. Echocardiogram showed ejection fraction of 50-55% and mild valve disease. Patient had a PET scan done on October 29th which showed a 4.5 x 4 cm left lower lobe lung nodule with paratracheal nodes mildly hypermetabolic and a small mildly hypermetabolic left pleural effusion. He was evaluated by CT surgery who did not believe patient was candidate for resection. He underwent radiation therapy.     The following portions of the patient's history were reviewed and updated as appropriate: allergies, current medications, past family history, past medical history, past social history, past surgical history, and problem list.       Current Outpatient Medications:   •  acetaminophen (TYLENOL) 325 mg tablet, Take 2 tablets (650 mg total) by mouth every 6 (six) hours as needed for mild pain, headaches or fever, Disp: 30 tablet, Rfl: 0  •  albuterol (2.5 mg/3 mL) 0.083 % nebulizer solution, Take 2.5 mg by nebulization As needed per patient's wife , Disp: , Rfl:   •  apixaban (Eliquis) 5 mg, Take 1 tablet (5 mg total) by mouth 2 (two) times a day, Disp: 180 tablet, Rfl: 1  •  atorvastatin (LIPITOR) 10 mg tablet, TAKE 1 TABLET DAILY, Disp: 90 tablet, Rfl: 1  •  carvedilol (COREG) 25 mg tablet, Take 1 tablet (25 mg total) by mouth 2 (two) times a day with meals, Disp: 180 tablet, Rfl: 1  •  ciclopirox (LOPROX) 0.77 % cream, Apply topically 2 (two) times a day for 20 days, Disp: 45 g, Rfl: 1  •  fluticasone-umeclidinium-vilanterol (Trelegy Ellipta) 100-62.5-25 mcg/actuation inhaler, Rinse mouth after use., Disp: 60 each, Rfl: 5  •  hydrocortisone 2.5 % cream, Apply topically 2 (two) times a day Apply twice a day affected area the trunk, Disp: 20 g, Rfl: 2  •  latanoprost (XALATAN) 0.005 % ophthalmic solution, Administer 0.005 mL to both eyes daily at bedtime, Disp: , Rfl:   •  metFORMIN (GLUCOPHAGE) 500 mg tablet, TAKE 1 TABLET 2 TIMES A DAY (Patient taking differently: Take 500 mg by mouth daily with breakfast), Disp: 180 tablet, Rfl: 1  •  mirtazapine (REMERON) 7.5 MG tablet, Take 1 tablet (7.5 mg total) by mouth daily at bedtime, Disp: 90 tablet, Rfl: 3  •  Multiple Vitamin (MULTI-VITAMIN DAILY PO), Take by mouth daily  , Disp: , Rfl:   •  omeprazole (PriLOSEC) 20 mg delayed release capsule, Take 1 capsule (20 mg total) by mouth daily, Disp: 90 capsule, Rfl: 1  •  primidone (MYSOLINE) 50 mg tablet, Take 2 tabs at 8pm., Disp: 180 tablet, Rfl: 1  •  spironolactone (ALDACTONE) 25 mg tablet, TAKE 1 TABLET DAILY, Disp: 90 tablet, Rfl: 1        Review of Systems:  Review of Systems   Respiratory: Positive for shortness of breath. Cardiovascular: Negative for chest pain, palpitations and leg swelling. Musculoskeletal: Positive for arthralgias, gait problem and myalgias. All other systems reviewed and are negative. Physical Exam:  Vitals:  Vitals:    08/10/23 1419   BP: 112/62   BP Location: Left arm   Patient Position: Sitting   Cuff Size: Standard   Pulse: 67   SpO2: 97%   Weight: 92.1 kg (203 lb)   Height: 5' 11" (1.803 m)     Physical Exam   Constitutional: He appears healthy. No distress. HENT:   Nose: Nose normal.   Mouth/Throat: Oropharynx is clear. Eyes: Pupils are equal, round, and reactive to light. Conjunctivae are normal.   Neck: No JVD present. Cardiovascular: Normal rate. An irregularly irregular rhythm present. Exam reveals no gallop and no friction rub. Murmur heard. Pulmonary/Chest: Effort normal and breath sounds normal. He has no wheezes. He has no rales. Musculoskeletal:         General: No edema. Cervical back: Normal range of motion and neck supple. Neurological: He is alert and oriented to person, place, and time. Skin: Skin is warm and dry.         Cardiographics:  EKG: Personally reviewed   Atrial flutter  With rate 67  Last known EF: 50-55%    This note was completed in part utilizing M-Modal Fluency Direct Software. Grammatical errors, random word insertions, spelling mistakes, and incomplete sentences can be an occasional consequence of this system secondary to software limitations, ambient noise, and hardware issues. If you have any questions or concerns about the content, text, or information contained within the body of this dictation, please contact the provider for clarification.

## 2023-08-17 DIAGNOSIS — G93.40 ENCEPHALOPATHY: ICD-10-CM

## 2023-08-17 RX ORDER — PRIMIDONE 50 MG/1
TABLET ORAL
Qty: 180 TABLET | Refills: 1 | Status: SHIPPED | OUTPATIENT
Start: 2023-08-17

## 2023-08-29 DIAGNOSIS — I48.19 PERSISTENT ATRIAL FIBRILLATION (HCC): ICD-10-CM

## 2023-08-29 DIAGNOSIS — E78.2 MIXED HYPERLIPIDEMIA: ICD-10-CM

## 2023-08-29 RX ORDER — ATORVASTATIN CALCIUM 10 MG/1
10 TABLET, FILM COATED ORAL DAILY
Qty: 90 TABLET | Refills: 1 | Status: SHIPPED | OUTPATIENT
Start: 2023-08-29

## 2023-08-29 RX ORDER — SPIRONOLACTONE 25 MG/1
25 TABLET ORAL DAILY
Qty: 90 TABLET | Refills: 1 | Status: SHIPPED | OUTPATIENT
Start: 2023-08-29

## 2023-08-29 RX ORDER — CARVEDILOL 25 MG/1
25 TABLET ORAL 2 TIMES DAILY WITH MEALS
Qty: 180 TABLET | Refills: 1 | Status: SHIPPED | OUTPATIENT
Start: 2023-08-29

## 2023-09-02 DIAGNOSIS — K21.9 GASTROESOPHAGEAL REFLUX DISEASE WITHOUT ESOPHAGITIS: ICD-10-CM

## 2023-09-05 RX ORDER — OMEPRAZOLE 20 MG/1
20 CAPSULE, DELAYED RELEASE ORAL DAILY
Qty: 90 CAPSULE | Refills: 1 | Status: SHIPPED | OUTPATIENT
Start: 2023-09-05

## 2023-09-07 DIAGNOSIS — B35.3 TINEA PEDIS OF BOTH FEET: ICD-10-CM

## 2023-09-12 ENCOUNTER — HOSPITAL ENCOUNTER (OUTPATIENT)
Facility: HOSPITAL | Age: 83
Setting detail: OBSERVATION
Discharge: HOME | End: 2023-09-13
Attending: EMERGENCY MEDICINE | Admitting: INTERNAL MEDICINE
Payer: COMMERCIAL

## 2023-09-12 ENCOUNTER — HOSPITAL ENCOUNTER (OUTPATIENT)
Dept: RADIOLOGY | Facility: HOSPITAL | Age: 83
Discharge: HOME | End: 2023-09-12
Payer: COMMERCIAL

## 2023-09-12 ENCOUNTER — TELEPHONE (OUTPATIENT)
Dept: RADIATION ONCOLOGY | Facility: CLINIC | Age: 83
End: 2023-09-12

## 2023-09-12 DIAGNOSIS — J84.10 PULMONARY FIBROSIS (HCC): ICD-10-CM

## 2023-09-12 DIAGNOSIS — E11.42 TYPE 2 DIABETES MELLITUS WITH DIABETIC POLYNEUROPATHY, WITHOUT LONG-TERM CURRENT USE OF INSULIN (HCC): ICD-10-CM

## 2023-09-12 DIAGNOSIS — E87.5 HYPERKALEMIA: ICD-10-CM

## 2023-09-12 DIAGNOSIS — J90 RECURRENT PLEURAL EFFUSION ON LEFT: ICD-10-CM

## 2023-09-12 DIAGNOSIS — R06.02 SOB (SHORTNESS OF BREATH): ICD-10-CM

## 2023-09-12 DIAGNOSIS — Z85.118 HISTORY OF LUNG CANCER: ICD-10-CM

## 2023-09-12 DIAGNOSIS — I48.92 ATRIAL FLUTTER (HCC): ICD-10-CM

## 2023-09-12 DIAGNOSIS — C34.90 MALIGNANT NEOPLASM OF BRONCHUS AND LUNG (HCC): ICD-10-CM

## 2023-09-12 DIAGNOSIS — J90 PLEURAL EFFUSION: Primary | ICD-10-CM

## 2023-09-12 LAB
2HR DELTA HS TROPONIN: -1 NG/L
ALBUMIN SERPL BCP-MCNC: 3.5 G/DL (ref 3.5–5)
ALP SERPL-CCNC: 99 U/L (ref 34–104)
ALT SERPL W P-5'-P-CCNC: 14 U/L (ref 7–52)
ANION GAP SERPL CALCULATED.3IONS-SCNC: 5 MMOL/L
AST SERPL W P-5'-P-CCNC: 14 U/L (ref 13–39)
BASOPHILS # BLD AUTO: 0.04 THOUSANDS/ÂΜL (ref 0–0.1)
BASOPHILS NFR BLD AUTO: 1 % (ref 0–1)
BILIRUB SERPL-MCNC: 0.44 MG/DL (ref 0.2–1)
BNP SERPL-MCNC: 115 PG/ML (ref 0–100)
BUN SERPL-MCNC: 15 MG/DL (ref 5–25)
CALCIUM SERPL-MCNC: 9.3 MG/DL (ref 8.4–10.2)
CARDIAC TROPONIN I PNL SERPL HS: 4 NG/L
CARDIAC TROPONIN I PNL SERPL HS: 5 NG/L
CHLORIDE SERPL-SCNC: 103 MMOL/L (ref 96–108)
CO2 SERPL-SCNC: 28 MMOL/L (ref 21–32)
CREAT SERPL-MCNC: 1.1 MG/DL (ref 0.6–1.3)
EOSINOPHIL # BLD AUTO: 0.41 THOUSAND/ÂΜL (ref 0–0.61)
EOSINOPHIL NFR BLD AUTO: 5 % (ref 0–6)
ERYTHROCYTE [DISTWIDTH] IN BLOOD BY AUTOMATED COUNT: 14.5 % (ref 11.6–15.1)
GFR SERPL CREATININE-BSD FRML MDRD: 62 ML/MIN/1.73SQ M
GLUCOSE SERPL-MCNC: 100 MG/DL (ref 65–140)
GLUCOSE SERPL-MCNC: 150 MG/DL (ref 65–140)
HCT VFR BLD AUTO: 40.7 % (ref 36.5–49.3)
HGB BLD-MCNC: 12.7 G/DL (ref 12–17)
IMM GRANULOCYTES # BLD AUTO: 0.03 THOUSAND/UL (ref 0–0.2)
IMM GRANULOCYTES NFR BLD AUTO: 0 % (ref 0–2)
LYMPHOCYTES # BLD AUTO: 0.96 THOUSANDS/ÂΜL (ref 0.6–4.47)
LYMPHOCYTES NFR BLD AUTO: 12 % (ref 14–44)
MAGNESIUM SERPL-MCNC: 1.9 MG/DL (ref 1.9–2.7)
MCH RBC QN AUTO: 31 PG (ref 26.8–34.3)
MCHC RBC AUTO-ENTMCNC: 31.2 G/DL (ref 31.4–37.4)
MCV RBC AUTO: 99 FL (ref 82–98)
MONOCYTES # BLD AUTO: 0.67 THOUSAND/ÂΜL (ref 0.17–1.22)
MONOCYTES NFR BLD AUTO: 8 % (ref 4–12)
NEUTROPHILS # BLD AUTO: 5.97 THOUSANDS/ÂΜL (ref 1.85–7.62)
NEUTS SEG NFR BLD AUTO: 74 % (ref 43–75)
NRBC BLD AUTO-RTO: 0 /100 WBCS
PLATELET # BLD AUTO: 271 THOUSANDS/UL (ref 149–390)
PMV BLD AUTO: 9.3 FL (ref 8.9–12.7)
POTASSIUM SERPL-SCNC: 5.6 MMOL/L (ref 3.5–5.3)
PROT SERPL-MCNC: 6.5 G/DL (ref 6.4–8.4)
RBC # BLD AUTO: 4.1 MILLION/UL (ref 3.88–5.62)
SODIUM SERPL-SCNC: 136 MMOL/L (ref 135–147)
WBC # BLD AUTO: 8.08 THOUSAND/UL (ref 4.31–10.16)

## 2023-09-12 PROCEDURE — 99285 EMERGENCY DEPT VISIT HI MDM: CPT | Performed by: EMERGENCY MEDICINE

## 2023-09-12 PROCEDURE — 83735 ASSAY OF MAGNESIUM: CPT | Performed by: EMERGENCY MEDICINE

## 2023-09-12 PROCEDURE — 83880 ASSAY OF NATRIURETIC PEPTIDE: CPT | Performed by: EMERGENCY MEDICINE

## 2023-09-12 PROCEDURE — 82948 REAGENT STRIP/BLOOD GLUCOSE: CPT

## 2023-09-12 PROCEDURE — 32555 ASPIRATE PLEURA W/ IMAGING: CPT

## 2023-09-12 PROCEDURE — 94640 AIRWAY INHALATION TREATMENT: CPT

## 2023-09-12 PROCEDURE — G1004 CDSM NDSC: HCPCS

## 2023-09-12 PROCEDURE — 71250 CT THORAX DX C-: CPT

## 2023-09-12 PROCEDURE — 94760 N-INVAS EAR/PLS OXIMETRY 1: CPT

## 2023-09-12 PROCEDURE — 36415 COLL VENOUS BLD VENIPUNCTURE: CPT | Performed by: EMERGENCY MEDICINE

## 2023-09-12 PROCEDURE — 80053 COMPREHEN METABOLIC PANEL: CPT | Performed by: EMERGENCY MEDICINE

## 2023-09-12 PROCEDURE — 85025 COMPLETE CBC W/AUTO DIFF WBC: CPT | Performed by: EMERGENCY MEDICINE

## 2023-09-12 PROCEDURE — 99223 1ST HOSP IP/OBS HIGH 75: CPT | Performed by: INTERNAL MEDICINE

## 2023-09-12 PROCEDURE — 93005 ELECTROCARDIOGRAM TRACING: CPT

## 2023-09-12 PROCEDURE — NC001 PR NO CHARGE: Performed by: STUDENT IN AN ORGANIZED HEALTH CARE EDUCATION/TRAINING PROGRAM

## 2023-09-12 PROCEDURE — 99285 EMERGENCY DEPT VISIT HI MDM: CPT

## 2023-09-12 PROCEDURE — 84484 ASSAY OF TROPONIN QUANT: CPT | Performed by: EMERGENCY MEDICINE

## 2023-09-12 RX ORDER — CARVEDILOL 25 MG/1
25 TABLET ORAL 2 TIMES DAILY WITH MEALS
Status: DISCONTINUED | OUTPATIENT
Start: 2023-09-13 | End: 2023-09-13 | Stop reason: HOSPADM

## 2023-09-12 RX ORDER — FLUTICASONE FUROATE AND VILANTEROL 100; 25 UG/1; UG/1
1 POWDER RESPIRATORY (INHALATION) DAILY
Status: DISCONTINUED | OUTPATIENT
Start: 2023-09-13 | End: 2023-09-13 | Stop reason: HOSPADM

## 2023-09-12 RX ORDER — ACETYLCYSTEINE 200 MG/ML
3 SOLUTION ORAL; RESPIRATORY (INHALATION)
Status: DISCONTINUED | OUTPATIENT
Start: 2023-09-12 | End: 2023-09-13 | Stop reason: HOSPADM

## 2023-09-12 RX ORDER — INSULIN LISPRO 100 [IU]/ML
1-6 INJECTION, SOLUTION INTRAVENOUS; SUBCUTANEOUS
Status: DISCONTINUED | OUTPATIENT
Start: 2023-09-13 | End: 2023-09-13 | Stop reason: HOSPADM

## 2023-09-12 RX ORDER — ALBUTEROL SULFATE 2.5 MG/3ML
2.5 SOLUTION RESPIRATORY (INHALATION) EVERY 8 HOURS
Status: DISCONTINUED | OUTPATIENT
Start: 2023-09-12 | End: 2023-09-13 | Stop reason: HOSPADM

## 2023-09-12 RX ORDER — MIRTAZAPINE 15 MG/1
7.5 TABLET, FILM COATED ORAL
Status: DISCONTINUED | OUTPATIENT
Start: 2023-09-12 | End: 2023-09-13 | Stop reason: HOSPADM

## 2023-09-12 RX ORDER — ATORVASTATIN CALCIUM 10 MG/1
10 TABLET, FILM COATED ORAL
Status: DISCONTINUED | OUTPATIENT
Start: 2023-09-13 | End: 2023-09-13 | Stop reason: HOSPADM

## 2023-09-12 RX ORDER — LATANOPROST 50 UG/ML
1 SOLUTION/ DROPS OPHTHALMIC
Status: DISCONTINUED | OUTPATIENT
Start: 2023-09-12 | End: 2023-09-13 | Stop reason: HOSPADM

## 2023-09-12 RX ORDER — PRIMIDONE 50 MG/1
100 TABLET ORAL
Status: DISCONTINUED | OUTPATIENT
Start: 2023-09-12 | End: 2023-09-13 | Stop reason: HOSPADM

## 2023-09-12 RX ORDER — ACETYLCYSTEINE 200 MG/ML
3 SOLUTION ORAL; RESPIRATORY (INHALATION)
Status: DISCONTINUED | OUTPATIENT
Start: 2023-09-12 | End: 2023-09-12

## 2023-09-12 RX ORDER — PANTOPRAZOLE SODIUM 20 MG/1
20 TABLET, DELAYED RELEASE ORAL
Status: DISCONTINUED | OUTPATIENT
Start: 2023-09-13 | End: 2023-09-13 | Stop reason: HOSPADM

## 2023-09-12 RX ORDER — INSULIN LISPRO 100 [IU]/ML
1-5 INJECTION, SOLUTION INTRAVENOUS; SUBCUTANEOUS
Status: DISCONTINUED | OUTPATIENT
Start: 2023-09-12 | End: 2023-09-13 | Stop reason: HOSPADM

## 2023-09-12 RX ADMIN — ACETYLCYSTEINE 600 MG: 200 INHALANT RESPIRATORY (INHALATION) at 20:18

## 2023-09-12 RX ADMIN — LATANOPROST 1 DROP: 50 SOLUTION OPHTHALMIC at 22:28

## 2023-09-12 RX ADMIN — APIXABAN 5 MG: 5 TABLET, FILM COATED ORAL at 20:24

## 2023-09-12 RX ADMIN — PRIMIDONE 100 MG: 50 TABLET ORAL at 22:26

## 2023-09-12 RX ADMIN — ALBUTEROL SULFATE 2.5 MG: 2.5 SOLUTION RESPIRATORY (INHALATION) at 20:18

## 2023-09-12 RX ADMIN — MIRTAZAPINE 7.5 MG: 15 TABLET, FILM COATED ORAL at 22:26

## 2023-09-12 RX ADMIN — INSULIN LISPRO 1 UNITS: 100 INJECTION, SOLUTION INTRAVENOUS; SUBCUTANEOUS at 22:28

## 2023-09-12 NOTE — ED PROVIDER NOTES
History  Chief Complaint   Patient presents with   • Shortness of Breath     Pt had cat scan this am needs para     82-year-old male with past history of hypertension, hyperlipidemia, diabetes, CAD status post stent x1, GERD, prostate cancer status postradiation, CHF, SYLVESTER, squamous cell carcinoma of lung, recurrent pleural effusion, presents to the ED for evaluation of shortness of breath and thoracentesis. Patient has had worsening shortness of breath. Patient had an outpatient CT chest done that showed moderate pleural effusion. PCP sent patient to the ED for thoracentesis. History provided by:  Patient  Shortness of Breath  Associated symptoms: no abdominal pain, no chest pain, no cough, no ear pain, no fever, no rash, no sore throat and no vomiting        Prior to Admission Medications   Prescriptions Last Dose Informant Patient Reported? Taking?    Multiple Vitamin (MULTI-VITAMIN DAILY PO)  Self Yes No   Sig: Take by mouth daily     acetaminophen (TYLENOL) 325 mg tablet  Self No No   Sig: Take 2 tablets (650 mg total) by mouth every 6 (six) hours as needed for mild pain, headaches or fever   albuterol (2.5 mg/3 mL) 0.083 % nebulizer solution  Self Yes No   Sig: Take 2.5 mg by nebulization As needed per patient's wife    apixaban (Eliquis) 5 mg   No No   Sig: Take 1 tablet (5 mg total) by mouth 2 (two) times a day   atorvastatin (LIPITOR) 10 mg tablet   No No   Sig: Take 1 tablet (10 mg total) by mouth daily   carvedilol (COREG) 25 mg tablet   No No   Sig: Take 1 tablet (25 mg total) by mouth 2 (two) times a day with meals   ciclopirox (LOPROX) 0.77 % cream   No No   Sig: APPLY TOPICALLY 2 (TWO) TIMES A DAY FOR 20 DAYS   fluticasone-umeclidinium-vilanterol (Trelegy Ellipta) 100-62.5-25 mcg/actuation inhaler  Self No No   Sig: Rinse mouth after use.   hydrocortisone 2.5 % cream  Self No No   Sig: Apply topically 2 (two) times a day Apply twice a day affected area the trunk   latanoprost (XALATAN) 0.005 % ophthalmic solution  Self Yes No   Sig: Administer 0.005 mL to both eyes daily at bedtime   metFORMIN (GLUCOPHAGE) 500 mg tablet   No No   Sig: TAKE 1 TABLET 2 TIMES A DAY   Patient taking differently: Take 500 mg by mouth daily with breakfast   mirtazapine (REMERON) 7.5 MG tablet   No No   Sig: Take 1 tablet (7.5 mg total) by mouth daily at bedtime   omeprazole (PriLOSEC) 20 mg delayed release capsule   No No   Sig: TAKE 1 CAPSULE DAILY   primidone (MYSOLINE) 50 mg tablet   No No   Sig: TAKE 2 TABS AT 8PM.   spironolactone (ALDACTONE) 25 mg tablet   No No   Sig: Take 1 tablet (25 mg total) by mouth daily      Facility-Administered Medications: None       Past Medical History:   Diagnosis Date   • Abdominal pain    • Anxiety    • Arthritis    • Asthma    • Atrial fibrillation (HCC)    • Back pain    • Bleeding ulcer    • Bronchitis    • Cancer [facility])     prostate 2011- radiation   • Cardiac disease     cardiac stent x1   • Cataract     starting   • Chronic diastolic (congestive) heart failure (HCC)    • Diabetes mellitus (HCC)     boarderline diabetic    • GERD (gastroesophageal reflux disease)    • History of radiation therapy 2010    Prostate seeds (brachytherapy) and EBRT   • Hyperlipidemia    • Hypertension    • 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, bilateral    • Low back pain    • Lung cancer ([address])    • Lung mass    • Myocardial infarction ([address])     mild 1999   • Obesity    • Prostate cancer ([address])    • Shortness of breath    • Sleep apnea     sleep study 11/22   • Wears dentures     full set   • Wears glasses        Past Surgical History:   Procedure Laterality Date   • ABDOMINAL HERNIA REPAIR     • ABDOMINAL SURGERY      bleeding ulcer, cyst removed from abd   • COLONOSCOPY     • CORONARY ANGIOPLASTY WITH STENT PLACEMENT  1999    x1    • ESOPHAGOGASTRODUODENOSCOPY     • IR BIOPSY LUNG  10/05/2021   • IR THORACENTESIS  11/08/2021   • IR THORACENTESIS  6/5/2023   • KNEE SURGERY Left    •  [address] INCL FLUOR GDNCE DX W/CELL WASHG SPX N/A 2021    Procedure: Mary Freedman;  Surgeon: Pascual Ellis MD;  Location: [facility];  Service: Thoracic   • [place] MEDIASTINOSCOPY WITH LYMPH NODE BIOPSY/IES N/A 2021    Procedure: Bailey Yassine;  Surgeon: Pascual Ellis MD;  Location: [facility];  Service: Thoracic   • PROSTATE SURGERY         Family History   Problem Relation Age of Onset   • Prostate cancer Brother    • Cancer Maternal Uncle         colo rectal cancer   • Cancer Paternal Aunt      I have reviewed and agree with the history as documented. E-Cigarette/Vaping   • E-Cigarette Use Never User      E-Cigarette/Vaping Substances   • Nicotine No    • THC No    • CBD No    • Flavoring No    • Other No    • Unknown No      Social History     Tobacco Use   • Smoking status: Former     Packs/day: 1.00     Years: 30.00     Total pack years: 30.00     Types: Cigarettes     Quit date:      Years since quittin.7   • Smokeless tobacco: Never   Vaping Use   • Vaping Use: Never used   Substance Use Topics   • Alcohol use: Not Currently     Alcohol/week: 10.0 - 12.0 standard drinks of alcohol     Types: 7 Glasses of wine, 3 - 5 Cans of beer per week     Comment: few beers day; glass of red wine at dinner   • Drug use: Never       Review of Systems   Constitutional: Negative for chills and fever. HENT: Negative for ear pain and sore throat. Eyes: Negative for pain and visual disturbance. Respiratory: Positive for shortness of breath. Negative for cough. Cardiovascular: Negative for chest pain and palpitations. Gastrointestinal: Negative for abdominal pain and vomiting. Genitourinary: Negative for dysuria and hematuria. Musculoskeletal: Negative for arthralgias and back pain. Skin: Negative for color change and rash. Neurological: Negative for seizures and syncope. All other systems reviewed and are negative. Physical Exam  Physical Exam  Vitals and nursing note reviewed. Constitutional:       General: He is not in acute distress. Appearance: He is well-developed. HENT:      Head: Normocephalic and atraumatic. Eyes:      Conjunctiva/sclera: Conjunctivae normal.   Cardiovascular:      Rate and Rhythm: Normal rate and regular rhythm. Heart sounds: No murmur heard. Pulmonary:      Effort: Pulmonary effort is normal. No respiratory distress. Breath sounds: Normal breath sounds. Comments: Scattered rales noted to bibasilar lungs. Abdominal:      Palpations: Abdomen is soft. Tenderness: There is no abdominal tenderness. Musculoskeletal:         General: No swelling. Cervical back: Neck supple. Skin:     General: Skin is warm and dry. Capillary Refill: Capillary refill takes less than 2 seconds. Neurological:      Mental Status: He is alert.    Psychiatric:         Mood and Affect: Mood normal.         Vital Signs  ED Triage Vitals   Temperature Pulse Respirations Blood Pressure SpO2   09/12/23 1605 09/12/23 1605 09/12/23 1605 09/12/23 1622 09/12/23 1605   98 °F (36.7 °C) 69 18 137/69 98 %      Temp Source Heart Rate Source Patient Position - Orthostatic VS BP Location FiO2 (%)   09/12/23 1605 09/12/23 1605 09/12/23 1622 09/12/23 1622 --   Tympanic Monitor Sitting Right arm       Pain Score       09/12/23 1622       No Pain           Vitals:    09/12/23 1605 09/12/23 1622   BP:  137/69   Pulse: 69    Patient Position - Orthostatic VS:  Sitting         Visual Acuity      ED Medications  Medications - No data to display    Diagnostic Studies  Results Reviewed     Procedure Component Value Units Date/Time    HS Troponin 0hr (reflex protocol) [[id number]]  (Normal) Collected: 09/12/23 1701    Lab Status: Final result Specimen: Blood from Arm, Right Updated: 09/12/23 1734     hs TnI 0hr 5 ng/L     HS Troponin I 2hr [[id number]]     Lab Status: No result Specimen: Blood     B-Type Natriuretic Peptide(BNP) [[id number]]  (Abnormal) Collected: 09/12/23 1701    Lab Status: Final result Specimen: Blood from Arm, Right Updated: 09/12/23 1733      pg/mL     Comprehensive metabolic panel [[id number]]  (Abnormal) Collected: 09/12/23 1701    Lab Status: Final result Specimen: Blood from Arm, Right Updated: 09/12/23 1725     Sodium 136 mmol/L      Potassium 5.6 mmol/L      Chloride 103 mmol/L      CO2 28 mmol/L      ANION GAP 5 mmol/L      BUN 15 mg/dL      Creatinine 1.10 mg/dL      Glucose 100 mg/dL      Calcium 9.3 mg/dL      AST 14 U/L      ALT 14 U/L      Alkaline Phosphatase 99 U/L      Total Protein 6.5 g/dL      Albumin 3.5 g/dL      Total Bilirubin 0.44 mg/dL      eGFR 62 ml/min/1.73sq m     Narrative:      National Kidney Disease Foundation guidelines for Chronic Kidney Disease (CKD):   •  Stage 1 with normal or high GFR (GFR > 90 mL/min/1.73 square meters)  •  Stage 2 Mild CKD (GFR = 60-89 mL/min/1.73 square meters)  •  Stage 3A Moderate CKD (GFR = 45-59 mL/min/1.73 square meters)  •  Stage 3B Moderate CKD (GFR = 30-44 mL/min/1.73 square meters)  •  Stage 4 Severe CKD (GFR = 15-29 mL/min/1.73 square meters)  •  Stage 5 End Stage CKD (GFR <15 mL/min/1.73 square meters)  Note: GFR calculation is accurate only with a steady state creatinine    Magnesium [[id number]]  (Normal) Collected: 09/12/23 1701    Lab Status: Final result Specimen: Blood from Arm, Right Updated: 09/12/23 1725     Magnesium 1.9 mg/dL     CBC and differential [[id number]]  (Abnormal) Collected: 09/12/23 1701    Lab Status: Final result Specimen: Blood from Arm, Right Updated: 09/12/23 1708     WBC 8.08 Thousand/uL      RBC 4.10 Million/uL      Hemoglobin 12.7 g/dL      Hematocrit 40.7 %      MCV 99 fL      MCH 31.0 pg      MCHC 31.2 g/dL      RDW 14.5 %      MPV 9.3 fL      Platelets 976 Thousands/uL      nRBC 0 /100 WBCs      Neutrophils Relative 74 %      Immat GRANS % 0 %      Lymphocytes Relative 12 %      Monocytes Relative 8 %      Eosinophils Relative 5 %      Basophils Relative 1 % Neutrophils Absolute 5.97 Thousands/µL      Immature Grans Absolute 0.03 Thousand/uL      Lymphocytes Absolute 0.96 Thousands/µL      Monocytes Absolute 0.67 Thousand/µL      Eosinophils Absolute 0.41 Thousand/µL      Basophils Absolute 0.04 Thousands/µL     UA w Reflex to Microscopic w Reflex to Culture [[id number]]     Lab Status: No result Specimen: Urine                  No orders to display              Procedures  ECG 12 Lead Documentation Only    Date/Time: 9/12/2023 5:23 PM    Performed by: Etelvina Link DO  Authorized by: Etelvina Dears,     Indications / Diagnosis:  Shortness of breath  ECG reviewed by me, the ED Provider: yes    Previous ECG:     Previous ECG:  Compared to current    Similarity:  No change    Comparison to cardiac monitor: Yes    Interpretation:     Interpretation: abnormal    Comments:      Irregular rhythm, rate 66, normal axis, normal intervals, no acute ST elevations noted, atrial flutter with variable AV block noted that is unchanged from previous study. ED Course  ED Course as of 09/12/23 1750   Tue Sep 12, 2023   1621 Spoke with IR nurse who recommends admit patient for fluoroscopy in the morning. Currently there is no IR physician available. CT Scan from earlier today:  CT CHEST WITHOUT IV CONTRAST     INDICATION:   C34.90: Malignant neoplasm of unspecified part of unspecified bronchus or lung. 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 Left thoracentesis for 1500 mL on 6/5/2023.     COMPARISON: Chest CT 6/2/2023, 1/27/2023, 9/28/2021.     TECHNIQUE: Chest CT without intravenous contrast.  Axial, sagittal, coronal 2D reformats and coronal MIPS from source data.     Radiation dose length product (DLP):  490 mGy-cm .  Radiation dose exposure minimized using iterative reconstruction and automated exposure control.     FINDINGS:     LUNGS: Complete compressive atelectasis of the left lower lobe due to recurrent large left pleural effusion with low-attenuation masslike opacity in the left lower lobe. Mild compressive atelectasis of the inferior lingula.     Redemonstration of mild basilar predominant reticulation and traction bronchiolectasis in the right lung.     Mild paraseptal emphysema. Stable benign intrapulmonary lymph node abutting the minor fissure (3/113).    AIRWAYS: No significant filling defects.     PLEURA:  Unremarkable.     HEART/GREAT VESSELS: Moderate cardiomegaly. Moderate coronary artery calcification indicating atherosclerotic heart disease. Pulmonary artery enlargement.     MEDIASTINUM AND ANDRE:  Unremarkable.     CHEST WALL AND LOWER NECK: Moderate right and mild left gynecomastia. 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     UPPER ABDOMEN: Gastric surgery. Cholelithiasis. Right upper pole renal cyst.     OSSEOUS STRUCTURES: Mild degenerative disease in the spine. Healed left rib fractures.     IMPRESSION:     Recurrent large left pleural effusion, similar to the prethoracentesis chest CT from 6/2/2023, with complete compressive atelectasis of the left lower lobe and inferior lingula.     Extensive low-attenuation in the collapsed left lower lobe. While this could be due to endobronchial mucous and drowned lung, recurrent tumor cannot be excluded.     Redemonstration of mild pulmonary fibrosis.     Pulmonary artery enlargement which can be seen with pulmonary hypertension.        Workstation performed: [id number]                                       Medical Decision Making  Patient presenting with worsening pleural effusion. Spoke with IR team who recommended admitting patient with scheduled for IR thoracentesis in the morning. Patient agrees with admission plans. His blood work showed mild elevation in potassium level. No acute EKG changes noted. Patient denies any chest pain.     Amount and/or Complexity of Data Reviewed  Labs: ordered. Decision-making details documented in ED Course. Radiology:  Decision-making details documented in ED Course. ECG/medicine tests: ordered and independent interpretation performed. Decision-making details documented in ED Course. Risk  Decision regarding hospitalization. Disposition  Final diagnoses:   Pleural effusion   SOB (shortness of breath)   History of lung cancer   Pulmonary fibrosis (HCC)   Atrial flutter ([address])     Time reflects when diagnosis was documented in both MDM as applicable and the Disposition within this note     Time User Action Codes Description Comment    9/12/2023  5:00 PM Andreas De Jesus Add [J90] Pleural effusion     9/12/2023  5:00 PM Andreas De Jesus Add [R06.02] SOB (shortness of breath)     9/12/2023  5:01 PM Ethan Peña Add [Z85.118] History of lung cancer     9/12/2023  5:04 PM Chance De Jesus Add [J84.10] Pulmonary fibrosis ([address])     9/12/2023  5:40 PM Ethan Peña Add [I48.92] Atrial flutter [facility])       ED Disposition     ED Disposition   Admit    Condition   Stable    Date/Time   Tue Sep 12, 2023  5:01 PM    Comment   Case was discussed with Dr. Michael Toure and the patient's admission status was agreed to be Admission Status: observation status to the service of Dr. Michael Toure. Follow-up Information    None         Patient's Medications   Discharge Prescriptions    No medications on file       No discharge procedures on file.     PDMP Review       Value Time User    PDMP Reviewed  Yes 1/5/2023 11:08 AM Dio Owens MD          ED Provider  Electronically Signed by           Ethan Peña DO  09/12/23 5913

## 2023-09-12 NOTE — PLAN OF CARE
Problem: PAIN - ADULT  Goal: Verbalizes/displays adequate comfort level or baseline comfort level  Description: Interventions:  - Encourage patient to monitor pain and request assistance  - Assess pain using appropriate pain scale  - Administer analgesics based on type and severity of pain and evaluate response  - Implement non-pharmacological measures as appropriate and evaluate response  - Consider cultural and social influences on pain and pain management  - Notify physician/advanced practitioner if interventions unsuccessful or patient reports new pain  Outcome: Progressing     Problem: SAFETY ADULT  Goal: Patient will remain free of falls  Description: INTERVENTIONS:  - Educate patient/family on patient safety including physical limitations  - Instruct patient to call for assistance with activity   - Consult OT/PT to assist with strengthening/mobility   - Keep Call bell within reach  - Keep bed low and locked with side rails adjusted as appropriate  - Keep care items and personal belongings within reach  - Initiate and maintain comfort rounds  - Make Fall Risk Sign visible to staff  - Offer Toileting every 2 Hours, in advance of need  - Initiate/Maintain bed alarm  - Obtain necessary fall risk management equipment: bed alarm, yellow socks   - Apply yellow socks and bracelet for high fall risk patients  - Consider moving patient to room near nurses station  Outcome: Progressing     Problem: DISCHARGE PLANNING  Goal: Discharge to home or other facility with appropriate resources  Description: INTERVENTIONS:  - Identify barriers to discharge w/patient and caregiver  - Arrange for needed discharge resources and transportation as appropriate  - Identify discharge learning needs (meds, wound care, etc.)  - Arrange for interpretive services to assist at discharge as needed  - Refer to Case Management Department for coordinating discharge planning if the patient needs post-hospital services based on physician/advanced practitioner order or complex needs related to functional status, cognitive ability, or social support system  Outcome: Progressing     Problem: Knowledge Deficit  Goal: Patient/family/caregiver demonstrates understanding of disease process, treatment plan, medications, and discharge instructions  Description: Complete learning assessment and assess knowledge base.   Interventions:  - Provide teaching at level of understanding  - Provide teaching via preferred learning methods  Outcome: Progressing     Problem: RESPIRATORY - ADULT  Goal: Achieves optimal ventilation and oxygenation  Description: INTERVENTIONS:  - Assess for changes in respiratory status  - Assess for changes in mentation and behavior  - Position to facilitate oxygenation and minimize respiratory effort  - Oxygen administered by appropriate delivery if ordered  - Initiate smoking cessation education as indicated  - Encourage broncho-pulmonary hygiene including cough, deep breathe, Incentive Spirometry  - Assess the need for suctioning and aspirate as needed  - Assess and instruct to report SOB or any respiratory difficulty  - Respiratory Therapy support as indicated  Outcome: Progressing

## 2023-09-12 NOTE — H&P
[id number] [facility]  [facility]  Name: Raúl Quezada 82 y.o. male I MRN: [id number]  Unit/Bed#: 2 [address] Date of Admission: 9/12/2023   Date of Service: 9/12/2023 I Hospital Day: 0      Assessment/Plan   * Recurrent pleural effusion on left  Assessment & Plan  CT chest: Recurrent large left pleural effusion, similar to the prethoracentesis chest CT from 6/2/2023, with complete compressive atelectasis of the left lower lobe and inferior lingula. Extensive low-attenuation in the collapsed left lower lobe. While this could be due to endobronchial mucous and drowned lung, recurrent tumor cannot be excluded.   · Last thoracentesis was in June, no cytology was performed  · IR consult for thoracentesis with cytology  · Nebulizer treatments with Mucomyst  · Encourage incentive spirometry  · Pulmonology consult    Squamous cell carcinoma of lung ([address])  Assessment & Plan  · Follows with radiation oncology Dr. 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 in February 2022  · Outpatient CT chest concerning for potential recurring tumor  · Will obtain thoracentesis with cytology to evaluate for malignant pleural effusion  · Pulmonology consulted    Tremors of nervous system  Assessment & Plan  · Follows with neurology  · Continue primidone    Centrilobular emphysema ([address])  Assessment & Plan  · Continue home inhalers and nebulizer treatments  · Does not appear in acute exacerbation    Other persistent atrial fibrillation (HCC)  Assessment & Plan  · Continue Eliquis and carvedilol    CAD (coronary artery disease)  Assessment & Plan  · Prior PCI over 20 years ago  · Denies current chest pain  · Initial troponin negative  · Continue statin, eliquis, and carvedilol    Chronic diastolic heart failure ([address])  Assessment & Plan  Wt Readings from Last 3 Encounters:   09/12/23 92.1 kg (203 lb)   08/10/23 92.1 kg (203 lb)   08/01/23 91.8 kg (202 lb 6.4 oz)     ECHO 12/2022: EF 50-55%  · BNP 115  · Hold aldactone in setting of hyperkalemia  · Continue coreg  · Does not appear volume overloaded    Type 2 diabetes mellitus with diabetic neuropathy, without long-term current use of insulin (HCC)  Assessment & Plan  Lab Results   Component Value Date    HGBA1C 6.5 (H) 06/12/2023       No results for input(s): "POCGLU" in the last 72 hours. Blood Sugar Average: Last 72 hrs:  · hold home metformin  · SSI and accucheks ac + hs  · Diabetic diet    Hyperlipidemia  Assessment & Plan  · Continue statin       VTE Pharmacologic Prophylaxis: VTE Score: 5 Moderate Risk (Score 3-4) - Pharmacological DVT Prophylaxis Ordered: apixaban (Eliquis). Code Status: Level 3 - DNAR and DNI  Discussion with family: Patient declined call to . Anticipated Length of Stay: Patient will be admitted on an observation basis with an anticipated length of stay of less than 2 midnights secondary to pleural effusion. Total Time Spent on Date of Encounter in care of patient: 65 mins. This time was spent on one or more of the following: performing physical exam; counseling and coordination of care; obtaining or reviewing history; documenting in the medical record; reviewing/ordering tests, medications or procedures; communicating with other healthcare professionals and discussing with patient's family/caregivers. Chief Complaint: pleural effusion seen on CT    History of Present Illness:  Mirtha Barton is a 82 y.o. male with a PMH of HTN, CHF, T2DM, HLD, CAD, PAF, emphysema, lung cancer s/p radiation, who presents after being sent in by radiation oncologist after CT chest was done showing recurrent left pleural effusion. Patient reports he feels better than last time he needed pleural effusion back in June but does admit to some dyspnea on exertion. Patient has also been coughing up sputum. Has otherwise been feeling okay.  Denies fever, chills, chest pain, shortness of breath at rest, nausea, vomiting, abdominal pain, or leg swelling. Review of Systems:  Review of Systems   Constitutional: Negative for chills and fever. HENT: Negative for ear pain and sore throat. Eyes: Negative for pain and visual disturbance. Respiratory: Positive for cough and shortness of breath. Cardiovascular: Negative for chest pain and palpitations. Gastrointestinal: Negative for abdominal pain and vomiting. Genitourinary: Negative for dysuria and hematuria. Musculoskeletal: Negative for arthralgias and back pain. Skin: Negative for color change and rash. Neurological: Negative for seizures and syncope. All other systems reviewed and are negative.       Past Medical and Surgical History:   Past Medical History:   Diagnosis Date   • Abdominal pain    • Anxiety    • Arthritis    • Asthma    • Atrial fibrillation (HCC)    • Back pain    • Bleeding ulcer    • Bronchitis    • Cancer [facility])     prostate 2011- radiation   • Cardiac disease     cardiac stent x1   • Cataract     starting   • Chronic diastolic (congestive) heart failure (HCC)    • Diabetes mellitus ([address])     boarderline diabetic    • GERD (gastroesophageal reflux disease)    • History of radiation therapy 2010    Prostate seeds (brachytherapy) and EBRT   • Hyperlipidemia    • Hypertension    • 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, bilateral    • Low back pain    • Lung cancer ([address])    • Lung mass    • Myocardial infarction ([address])     mild 1999   • Obesity    • Prostate cancer ([address])    • Shortness of breath    • Sleep apnea     sleep study 11/22   • Wears dentures     full set   • Wears glasses        Past Surgical History:   Procedure Laterality Date   • ABDOMINAL HERNIA REPAIR     • ABDOMINAL SURGERY      bleeding ulcer, cyst removed from abd   • COLONOSCOPY     • CORONARY ANGIOPLASTY WITH STENT PLACEMENT  1999    x1    • ESOPHAGOGASTRODUODENOSCOPY     • IR BIOPSY LUNG  10/05/2021   • IR THORACENTESIS  11/08/2021   • IR THORACENTESIS  6/5/2023   • KNEE SURGERY Left    • SD [facility] INCL FLUOR GDNCE DX W/CELL WASHG [facility] N/A 11/29/2021    Procedure: Ld Choi;  Surgeon: Javed Roberts MD;  Location: [facility];  Service: Thoracic   • [place] MEDIASTINOSCOPY WITH LYMPH NODE BIOPSY/IES N/A 11/29/2021    Procedure: Alexa Alpers;  Surgeon: Javed Roberts MD;  Location: [facility];  Service: Thoracic   • PROSTATE SURGERY         Meds/Allergies:  Prior to Admission medications    Medication Sig Start Date End Date Taking? Authorizing Provider   acetaminophen (TYLENOL) 325 mg tablet Take 2 tablets (650 mg total) by mouth every 6 (six) hours as needed for mild pain, headaches or fever 5/4/22  Yes Christa Lozada PA-C   albuterol (2.5 mg/3 mL) 0.083 % nebulizer solution Take 2.5 mg by nebulization As needed per patient's wife    Yes Historical Provider, MD   apixaban (Eliquis) 5 mg Take 1 tablet (5 mg total) by mouth 2 (two) times a day 6/16/23  Yes Harrison Jonas MD   atorvastatin (LIPITOR) 10 mg tablet Take 1 tablet (10 mg total) by mouth daily 8/29/23  Yes Harrison Jonas MD   carvedilol (COREG) 25 mg tablet Take 1 tablet (25 mg total) by mouth 2 (two) times a day with meals 8/29/23  Yes Harrison Jonas MD   ciclopirox (LOPROX) 0.77 % cream APPLY TOPICALLY 2 (TWO) TIMES A DAY FOR 20 DAYS 9/7/23 9/27/23 Yes Aida Love DPM   fluticasone-umeclidinium-vilanterol (Trelegy Ellipta) 185-67.7-76 mcg/actuation inhaler Rinse mouth after use.  4/5/23  Yes Harrison Jonas MD   hydrocortisone 2.5 % cream Apply topically 2 (two) times a day Apply twice a day affected area the trunk 11/28/22  Yes Harrison Jonas MD   latanoprost (XALATAN) 0.005 % ophthalmic solution Administer 0.005 mL to both eyes daily at bedtime 1/24/23  Yes Historical Provider, MD   metFORMIN (GLUCOPHAGE) 500 mg tablet TAKE 1 TABLET 2 TIMES A DAY  Patient taking differently: Take 500 mg by mouth daily with breakfast 6/13/23  Yes Harrison Jonas MD   mirtazapine (REMERON) 7.5 MG tablet Take 1 tablet (7.5 mg total) by mouth daily at bedtime 23  Yes Nellie Weir MD   Multiple Vitamin (MULTI-VITAMIN DAILY PO) Take by mouth daily     Yes Historical Provider, MD   omeprazole (PriLOSEC) 20 mg delayed release capsule TAKE 1 CAPSULE DAILY 23  Yes Nellie Weir MD   primidone (MYSOLINE) 50 mg tablet TAKE 2 TABS AT 8PM. 23  Yes SIMI Conklin   spironolactone (ALDACTONE) 25 mg tablet Take 1 tablet (25 mg total) by mouth daily 23  Yes Nellie Weir MD     I have reviewed home medications with patient personally. Allergies: No Known Allergies    Social History:  Marital Status: /Civil Union   Patient Pre-hospital Living Situation: Home  Patient Pre-hospital Level of Mobility: walks  Patient Pre-hospital Diet Restrictions: none  Substance Use History:   Social History     Substance and Sexual Activity   Alcohol Use Not Currently   • Alcohol/week: 10.0 - 12.0 standard drinks of alcohol   • Types: 7 Glasses of wine, 3 - 5 Cans of beer per week    Comment: few beers day; glass of red wine at dinner     Social History     Tobacco Use   Smoking Status Former   • Packs/day: 1.00   • Years: 30.00   • Total pack years: 30.00   • Types: Cigarettes   • Quit date:    • Years since quittin.7   Smokeless Tobacco Never     Social History     Substance and Sexual Activity   Drug Use Never       Family History:  Family History   Problem Relation Age of Onset   • Prostate cancer Brother    • Cancer Maternal Uncle         colo rectal cancer   • Cancer Paternal Aunt        Physical Exam:     Vitals:   Blood Pressure: 156/97 (23 1829)  Pulse: 71 (23 1829)  Temperature: 98 °F (36.7 °C) (23 1605)  Temp Source: Tympanic (23 1605)  Respirations: 18 (23 1605)  Height: 5' 10" (177.8 cm) (23 1623)  Weight - Scale: 92.1 kg (203 lb) (23 1623)  SpO2: 96 % (23 1829)    Physical Exam  Vitals and nursing note reviewed.    Constitutional: General: He is not in acute distress. Appearance: He is well-developed. Cardiovascular:      Rate and Rhythm: Normal rate. Rhythm irregular. Heart sounds: Murmur heard. Pulmonary:      Effort: Pulmonary effort is normal. No respiratory distress. Breath sounds: Decreased breath sounds present. Abdominal:      Palpations: Abdomen is soft. Tenderness: There is no abdominal tenderness. Musculoskeletal:         General: No swelling. Cervical back: Neck supple. Skin:     General: Skin is warm and dry. Capillary Refill: Capillary refill takes less than 2 seconds. Neurological:      Mental Status: He is alert. Psychiatric:         Mood and Affect: Mood normal.         Additional Data:     Lab Results:  Results from last 7 days   Lab Units 09/12/23  1701   WBC Thousand/uL 8.08   HEMOGLOBIN g/dL 12.7   HEMATOCRIT % 40.7   PLATELETS Thousands/uL 271   NEUTROS PCT % 74   LYMPHS PCT % 12*   MONOS PCT % 8   EOS PCT % 5     Results from last 7 days   Lab Units 09/12/23  1701   SODIUM mmol/L 136   POTASSIUM mmol/L 5.6*   CHLORIDE mmol/L 103   CO2 mmol/L 28   BUN mg/dL 15   CREATININE mg/dL 1.10   ANION GAP mmol/L 5   CALCIUM mg/dL 9.3   ALBUMIN g/dL 3.5   TOTAL BILIRUBIN mg/dL 0.44   ALK PHOS U/L 99   ALT U/L 14   AST U/L 14   GLUCOSE RANDOM mg/dL 100                       Lines/Drains:  Invasive Devices     Peripheral Intravenous Line  Duration           Peripheral IV 09/12/23 Proximal;Right;Ventral (anterior) Forearm <1 day                    Imaging: No pertinent imaging reviewed. IR IN-Patient Thoracentesis    (Results Pending)       EKG and Other Studies Reviewed on Admission:   · EKG: Atrial flutter. HR 66.    ** Please Note: This note has been constructed using a voice recognition system.  **

## 2023-09-12 NOTE — ASSESSMENT & PLAN NOTE
· Prior PCI over 20 years ago  · Denies current chest pain  · Initial troponin negative  · Continue statin, eliquis, and carvedilol

## 2023-09-12 NOTE — ASSESSMENT & PLAN NOTE
Wt Readings from Last 3 Encounters:   09/12/23 92.1 kg (203 lb)   08/10/23 92.1 kg (203 lb)   08/01/23 91.8 kg (202 lb 6.4 oz)     ECHO 12/2022: EF 50-55%  ·   · Hold aldactone in setting of hyperkalemia  · Continue coreg  · Does not appear volume overloaded

## 2023-09-12 NOTE — ASSESSMENT & PLAN NOTE
· Follows with radiation oncology Dr. 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

## 2023-09-12 NOTE — ASSESSMENT & PLAN NOTE
Lab Results   Component Value Date    HGBA1C 6.5 (H) 06/12/2023       No results for input(s): "POCGLU" in the last 72 hours.     Blood Sugar Average: Last 72 hrs:  · hold home metformin  · SSI and accucheks ac + hs  · Diabetic diet

## 2023-09-12 NOTE — TELEPHONE ENCOUNTER
Patient  underwent CT of the chest today. I called the patient after receiving the results indicating recurrent large left pleural effusion similar to CT of the chest from 6/2/2023 with complete atelectasis of the left lower lobe and inferior lingula. Extensive collapse of the left lower lobe. I was unable to speak to the patient as he was not home however was able to speak to his wife. She indicates that he does get short of breath needs to sit down frequently. Discussed that he needs thoracentesis which should improve his breathing. We also discussed that cytology should be sent to rule out malignant pleural effusion. He should also be evaluated by pulmonologist as there is question of endobronchial mucous or potentially recurrent tumor. The wife notes plan to go on vacation tomorrow however she will take him to the emergency room today.

## 2023-09-12 NOTE — ASSESSMENT & PLAN NOTE
CT chest: Recurrent large left pleural effusion, similar to the prethoracentesis chest CT from 6/2/2023, with complete compressive atelectasis of the left lower lobe and inferior lingula. Extensive low-attenuation in the collapsed left lower lobe. While this could be due to endobronchial mucous and drowned lung, recurrent tumor cannot be excluded.   · Last thoracentesis was in June, no cytology was performed  · IR consult for thoracentesis with cytology  · Nebulizer treatments with Mucomyst  · Encourage incentive spirometry  · Pulmonology consult

## 2023-09-13 ENCOUNTER — APPOINTMENT (OUTPATIENT)
Dept: NON INVASIVE DIAGNOSTICS | Facility: HOSPITAL | Age: 83
End: 2023-09-13
Payer: COMMERCIAL

## 2023-09-13 ENCOUNTER — APPOINTMENT (OUTPATIENT)
Dept: RADIOLOGY | Facility: HOSPITAL | Age: 83
End: 2023-09-13
Payer: COMMERCIAL

## 2023-09-13 VITALS
HEIGHT: 70 IN | RESPIRATION RATE: 18 BRPM | DIASTOLIC BLOOD PRESSURE: 74 MMHG | TEMPERATURE: 97.4 F | BODY MASS INDEX: 29.06 KG/M2 | HEART RATE: 74 BPM | SYSTOLIC BLOOD PRESSURE: 120 MMHG | WEIGHT: 203 LBS | OXYGEN SATURATION: 94 %

## 2023-09-13 LAB
ANION GAP SERPL CALCULATED.3IONS-SCNC: 7 MMOL/L
APPEARANCE FLD: CLEAR
BUN SERPL-MCNC: 11 MG/DL (ref 5–25)
CALCIUM SERPL-MCNC: 9.3 MG/DL (ref 8.4–10.2)
CHLORIDE SERPL-SCNC: 106 MMOL/L (ref 96–108)
CO2 SERPL-SCNC: 25 MMOL/L (ref 21–32)
COLOR FLD: NORMAL
CREAT SERPL-MCNC: 0.75 MG/DL (ref 0.6–1.3)
EOSINOPHIL NFR FLD MANUAL: 3 %
ERYTHROCYTE [DISTWIDTH] IN BLOOD BY AUTOMATED COUNT: 14.4 % (ref 11.6–15.1)
GFR SERPL CREATININE-BSD FRML MDRD: 86 ML/MIN/1.73SQ M
GLUCOSE FLD-MCNC: 119 MG/DL
GLUCOSE P FAST SERPL-MCNC: 100 MG/DL (ref 65–99)
GLUCOSE SERPL-MCNC: 100 MG/DL (ref 65–140)
GLUCOSE SERPL-MCNC: 135 MG/DL (ref 65–140)
GLUCOSE SERPL-MCNC: 135 MG/DL (ref 65–140)
GLUCOSE SERPL-MCNC: 97 MG/DL (ref 65–140)
HCT VFR BLD AUTO: 39.4 % (ref 36.5–49.3)
HGB BLD-MCNC: 12.8 G/DL (ref 12–17)
HISTIOCYTES NFR FLD: 6 %
LDH FLD L TO P-CCNC: 157 U/L
LYMPHOCYTES NFR BLD AUTO: 62 %
MCH RBC QN AUTO: 31.8 PG (ref 26.8–34.3)
MCHC RBC AUTO-ENTMCNC: 32.5 G/DL (ref 31.4–37.4)
MCV RBC AUTO: 98 FL (ref 82–98)
MONO+MESO NFR FLD MANUAL: 2 %
MONOCYTES NFR BLD AUTO: 8 %
NEUTS SEG NFR BLD AUTO: 19 %
PH BODY FLUID: 7.5
PLATELET # BLD AUTO: 231 THOUSANDS/UL (ref 149–390)
PMV BLD AUTO: 9.3 FL (ref 8.9–12.7)
POTASSIUM SERPL-SCNC: 4.2 MMOL/L (ref 3.5–5.3)
PROT FLD-MCNC: 4.5 G/DL
RBC # BLD AUTO: 4.03 MILLION/UL (ref 3.88–5.62)
SITE: NORMAL
SODIUM SERPL-SCNC: 138 MMOL/L (ref 135–147)
TOTAL CELLS COUNTED SPEC: 100
WBC # BLD AUTO: 6.38 THOUSAND/UL (ref 4.31–10.16)
WBC # FLD MANUAL: 478 /UL

## 2023-09-13 PROCEDURE — 82945 GLUCOSE OTHER FLUID: CPT

## 2023-09-13 PROCEDURE — 94640 AIRWAY INHALATION TREATMENT: CPT

## 2023-09-13 PROCEDURE — 85027 COMPLETE CBC AUTOMATED: CPT

## 2023-09-13 PROCEDURE — 32555 ASPIRATE PLEURA W/ IMAGING: CPT | Performed by: RADIOLOGY

## 2023-09-13 PROCEDURE — 83986 ASSAY PH BODY FLUID NOS: CPT

## 2023-09-13 PROCEDURE — 87070 CULTURE OTHR SPECIMN AEROBIC: CPT

## 2023-09-13 PROCEDURE — 89051 BODY FLUID CELL COUNT: CPT

## 2023-09-13 PROCEDURE — 83615 LACTATE (LD) (LDH) ENZYME: CPT

## 2023-09-13 PROCEDURE — 99239 HOSP IP/OBS DSCHRG MGMT >30: CPT | Performed by: NURSE PRACTITIONER

## 2023-09-13 PROCEDURE — 71045 X-RAY EXAM CHEST 1 VIEW: CPT

## 2023-09-13 PROCEDURE — 87205 SMEAR GRAM STAIN: CPT

## 2023-09-13 PROCEDURE — NC001 PR NO CHARGE: Performed by: RADIOLOGY

## 2023-09-13 PROCEDURE — 88305 TISSUE EXAM BY PATHOLOGIST: CPT | Performed by: PATHOLOGY

## 2023-09-13 PROCEDURE — 88112 CYTOPATH CELL ENHANCE TECH: CPT | Performed by: PATHOLOGY

## 2023-09-13 PROCEDURE — 99223 1ST HOSP IP/OBS HIGH 75: CPT | Performed by: STUDENT IN AN ORGANIZED HEALTH CARE EDUCATION/TRAINING PROGRAM

## 2023-09-13 PROCEDURE — 94664 DEMO&/EVAL PT USE INHALER: CPT

## 2023-09-13 PROCEDURE — 32555 ASPIRATE PLEURA W/ IMAGING: CPT | Performed by: STUDENT IN AN ORGANIZED HEALTH CARE EDUCATION/TRAINING PROGRAM

## 2023-09-13 PROCEDURE — 82948 REAGENT STRIP/BLOOD GLUCOSE: CPT

## 2023-09-13 PROCEDURE — 94760 N-INVAS EAR/PLS OXIMETRY 1: CPT

## 2023-09-13 PROCEDURE — 84157 ASSAY OF PROTEIN OTHER: CPT

## 2023-09-13 PROCEDURE — 80048 BASIC METABOLIC PNL TOTAL CA: CPT

## 2023-09-13 RX ORDER — LIDOCAINE HYDROCHLORIDE 20 MG/ML
INJECTION, SOLUTION EPIDURAL; INFILTRATION; INTRACAUDAL; PERINEURAL
Status: COMPLETED
Start: 2023-09-13 | End: 2023-09-13

## 2023-09-13 RX ORDER — LIDOCAINE WITH 8.4% SOD BICARB 0.9%(10ML)
SYRINGE (ML) INJECTION AS NEEDED
Status: COMPLETED | OUTPATIENT
Start: 2023-09-13 | End: 2023-09-13

## 2023-09-13 RX ADMIN — ALBUTEROL SULFATE 2.5 MG: 2.5 SOLUTION RESPIRATORY (INHALATION) at 07:14

## 2023-09-13 RX ADMIN — LIDOCAINE HYDROCHLORIDE 100 MG: 20 INJECTION, SOLUTION EPIDURAL; INFILTRATION; INTRACAUDAL; PERINEURAL at 15:54

## 2023-09-13 RX ADMIN — CARVEDILOL 25 MG: 25 TABLET, FILM COATED ORAL at 17:21

## 2023-09-13 RX ADMIN — APIXABAN 5 MG: 5 TABLET, FILM COATED ORAL at 17:21

## 2023-09-13 RX ADMIN — PANTOPRAZOLE SODIUM 20 MG: 20 TABLET, DELAYED RELEASE ORAL at 06:30

## 2023-09-13 RX ADMIN — UMECLIDINIUM 1 PUFF: 62.5 AEROSOL, POWDER ORAL at 15:13

## 2023-09-13 RX ADMIN — LIDOCAINE HYDROCHLORIDE 15 ML: 10 INJECTION, SOLUTION INFILTRATION; PERINEURAL at 08:56

## 2023-09-13 RX ADMIN — ATORVASTATIN CALCIUM 10 MG: 10 TABLET, FILM COATED ORAL at 17:21

## 2023-09-13 RX ADMIN — ACETYLCYSTEINE 600 MG: 200 INHALANT RESPIRATORY (INHALATION) at 07:14

## 2023-09-13 RX ADMIN — APIXABAN 5 MG: 5 TABLET, FILM COATED ORAL at 08:26

## 2023-09-13 RX ADMIN — ALBUTEROL SULFATE 2.5 MG: 2.5 SOLUTION RESPIRATORY (INHALATION) at 15:14

## 2023-09-13 RX ADMIN — ACETYLCYSTEINE 600 MG: 200 INHALANT RESPIRATORY (INHALATION) at 15:14

## 2023-09-13 RX ADMIN — CARVEDILOL 25 MG: 25 TABLET, FILM COATED ORAL at 07:37

## 2023-09-13 RX ADMIN — FLUTICASONE FUROATE AND VILANTEROL TRIFENATATE 1 PUFF: 100; 25 POWDER RESPIRATORY (INHALATION) at 15:12

## 2023-09-13 NOTE — ASSESSMENT & PLAN NOTE
Wt Readings from Last 3 Encounters:   09/12/23 92.1 kg (203 lb)   08/10/23 92.1 kg (203 lb)   08/01/23 91.8 kg (202 lb 6.4 oz)     ECHO 12/2022: EF 50-55%  ·   · Held aldactone in setting of hyperkalemia; improved from 5.6-4.2  · Continue coreg  · We will have patient resume Aldactone on discharge, BMP in 1 week

## 2023-09-13 NOTE — PLAN OF CARE
Problem: PAIN - ADULT  Goal: Verbalizes/displays adequate comfort level or baseline comfort level  Description: Interventions:  - Encourage patient to monitor pain and request assistance  - Assess pain using appropriate pain scale  - Administer analgesics based on type and severity of pain and evaluate response  - Implement non-pharmacological measures as appropriate and evaluate response  - Consider cultural and social influences on pain and pain management  - Notify physician/advanced practitioner if interventions unsuccessful or patient reports new pain  Outcome: Progressing     Problem: SAFETY ADULT  Goal: Patient will remain free of falls  Description: INTERVENTIONS:  - Educate patient/family on patient safety including physical limitations  - Instruct patient to call for assistance with activity   - Consult OT/PT to assist with strengthening/mobility   - Keep Call bell within reach  - Keep bed low and locked with side rails adjusted as appropriate  - Keep care items and personal belongings within reach  - Initiate and maintain comfort rounds  - Make Fall Risk Sign visible to staff  - Offer Toileting every  Hours, in advance of need  - Initiate/Maintain alarm  - Obtain necessary fall risk management equipment:   - Apply yellow socks and bracelet for high fall risk patients  - Consider moving patient to room near nurses station  Outcome: Progressing     Problem: DISCHARGE PLANNING  Goal: Discharge to home or other facility with appropriate resources  Description: INTERVENTIONS:  - Identify barriers to discharge w/patient and caregiver  - Arrange for needed discharge resources and transportation as appropriate  - Identify discharge learning needs (meds, wound care, etc.)  - Arrange for interpretive services to assist at discharge as needed  - Refer to Case Management Department for coordinating discharge planning if the patient needs post-hospital services based on physician/advanced practitioner order or complex needs related to functional status, cognitive ability, or social support system  Outcome: Progressing     Problem: Knowledge Deficit  Goal: Patient/family/caregiver demonstrates understanding of disease process, treatment plan, medications, and discharge instructions  Description: Complete learning assessment and assess knowledge base.   Interventions:  - Provide teaching at level of understanding  - Provide teaching via preferred learning methods  Outcome: Progressing     Problem: RESPIRATORY - ADULT  Goal: Achieves optimal ventilation and oxygenation  Description: INTERVENTIONS:  - Assess for changes in respiratory status  - Assess for changes in mentation and behavior  - Position to facilitate oxygenation and minimize respiratory effort  - Oxygen administered by appropriate delivery if ordered  - Initiate smoking cessation education as indicated  - Encourage broncho-pulmonary hygiene including cough, deep breathe, Incentive Spirometry  - Assess the need for suctioning and aspirate as needed  - Assess and instruct to report SOB or any respiratory difficulty  - Respiratory Therapy support as indicated  Outcome: Progressing     Problem: MOBILITY - ADULT  Goal: Maintain or return to baseline ADL function  Description: INTERVENTIONS:  -  Assess patient's ability to carry out ADLs; assess patient's baseline for ADL function and identify physical deficits which impact ability to perform ADLs (bathing, care of mouth/teeth, toileting, grooming, dressing, etc.)  - Assess/evaluate cause of self-care deficits   - Assess range of motion  - Assess patient's mobility; develop plan if impaired  - Assess patient's need for assistive devices and provide as appropriate  - Encourage maximum independence but intervene and supervise when necessary  - Involve family in performance of ADLs  - Assess for home care needs following discharge   - Consider OT consult to assist with ADL evaluation and planning for discharge  - Provide patient education as appropriate  Outcome: Progressing  Goal: Maintains/Returns to pre admission functional level  Description: INTERVENTIONS:  - Perform BMAT or MOVE assessment daily.   - Set and communicate daily mobility goal to care team and patient/family/caregiver. - Collaborate with rehabilitation services on mobility goals if consulted  - Perform Range of Motion  times a day. - Reposition patient every  hours.   - Dangle patient  times a day  - Stand patient  times a day  - Ambulate patient  times a day  - Out of bed to chair  times a day   - Out of bed for meals  times a day  - Out of bed for toileting  - Record patient progress and toleration of activity level   Outcome: Progressing

## 2023-09-13 NOTE — ASSESSMENT & PLAN NOTE
· Follows with radiation oncology Dr. 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 · Pulmonary indicated patient would be clear for discharge after 5 PM if remains stable.   · They also indicated that patient should have close follow-up in the next 1 to 2 weeks post discharge for potential placement of Asept catheter  · Patient will follow-up with outpatient oncology, follow-up on cytology reports 1 to 2 weeks postdischarge

## 2023-09-13 NOTE — ASSESSMENT & PLAN NOTE
Lab Results   Component Value Date    HGBA1C 6.5 (H) 06/12/2023       Recent Labs     09/12/23  2223 09/13/23  0711 09/13/23  1056 09/13/23  1601   POCGLU 150* 97 135 135       Blood Sugar Average: Last 72 hrs:  · (P) 129.25     Resume home medication metformin on discharge  Diabetic diet

## 2023-09-13 NOTE — CONSULTS
Consult - Pulmonary  Sage Cook 82 y.o. male MRN: [id number]  Unit/Bed#: [address] [place] [address] Encounter: [id number]    Reason for Consult: Pleural Effusion    Assessment/Plan:  1. Pleural Effusion - High concerns for malignant - Patient improved with previous thoracentesis and has residual fluid. Went over options of PleurX catheter, repeat thoracentesis and Talc pleurodesis. Due to time constraints and patient wishes he will undergo a thoracentesis today and see pulmonary as an outpatient. · Plan for Thoracentesis  - Tentative plan for Arecept catheter next week, hold eliquis Monday for procedure either Thursday or Friday. · Follow up pleural studies   2. COPD  · Continue home regimen   3. AFIb on eliquis  4. SCC   · Concerns for progression        _____________________________________________________________________      HPI:    Sage Cook is a 82 y.o. 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 Review of Systems:    Full 12 point review of systems was performed. Aside from what was mentioned in the HPI, it is otherwise negative.       Historical Information   Past Medical History:   Diagnosis Date   • Abdominal pain    • Anxiety    • Arthritis    • Asthma    • Atrial fibrillation (HCC)    • Back pain    • Bleeding ulcer    • Bronchitis    • Cancer [facility])     prostate 2011- radiation   • Cardiac disease     cardiac stent x1   • Cataract     starting   • Chronic diastolic (congestive) heart failure (HCC)    • Diabetes mellitus ([address])     [facility]    • GERD (gastroesophageal reflux disease)    • History of radiation therapy 2010    Prostate seeds (brachytherapy) and EBRT   • Hyperlipidemia    • Hypertension    • 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, bilateral    • Low back pain    • Lung cancer (HCC)    • Lung mass • Myocardial infarction [facility])     mild    • Obesity    • Prostate cancer ([address])    • Shortness of breath    • Sleep apnea     sleep study    • Wears dentures     full set   • Wears glasses      Past Surgical History:   Procedure Laterality Date   • ABDOMINAL HERNIA REPAIR     • ABDOMINAL SURGERY      bleeding ulcer, cyst removed from abd   • COLONOSCOPY     • CORONARY ANGIOPLASTY WITH STENT PLACEMENT  1999    x1    • ESOPHAGOGASTRODUODENOSCOPY     • IR BIOPSY LUNG  10/05/2021   • IR THORACENTESIS  2021   • IR THORACENTESIS  2023   • KNEE SURGERY Left    •  [address] INCL FLUOR GDNCE DX W/CELL WASHG SPX N/A 2021    Procedure: BRONCHOSCOPY FLEXIBLE;  Surgeon: Bryant Pringle MD;  Location: [facility];  Service: Thoracic   • ID MEDIASTINOSCOPY WITH LYMPH NODE BIOPSY/IES N/A 2021    Procedure: MEDIASTINOSCOPY;  Surgeon: Bryant Pringle MD;  Location: [facility];  Service: Thoracic   • PROSTATE SURGERY       Social History   Social History     Substance and Sexual Activity   Alcohol Use Not Currently   • Alcohol/week: 10.0 - 12.0 standard drinks of alcohol   • Types: 7 Glasses of wine, 3 - 5 Cans of beer per week    Comment: few beers day; glass of red wine at dinner     Social History     Substance and Sexual Activity   Drug Use Never     Social History     Tobacco Use   Smoking Status Former   • Packs/day: 1.00   • Years: 30.00   • Total pack years: 30.00   • Types: Cigarettes   • Quit date:    • Years since quittin.7   Smokeless Tobacco Never       Family History:   Family History   Problem Relation Age of Onset   • Prostate cancer Brother    • Cancer Maternal Uncle         colo rectal cancer   • Cancer Paternal Aunt        Medications:  Pertinent medications were reviewed  Current Facility-Administered Medications   Medication Dose Route Frequency Provider Last Rate   • albuterol  2.5 mg Nebulization Q8H Nidia Hernández PA-C      And   • acetylcysteine  3 mL Nebulization Q8H Tattnall Back, PA-C     • apixaban  5 mg Oral BID Cinthya Back, PADorisC     • atorvastatin  10 mg Oral Daily With Dinner Cinthya Back, PA-C     • carvedilol  25 mg Oral BID With Meals Tattnall Back, PA-C     • Fluticasone Furoate-Vilanterol  1 puff Inhalation Daily Tattnall Back, BEREKET      And   • umeclidinium  1 puff Inhalation Daily Cinthya Back, PA-C     • insulin lispro  1-5 Units Subcutaneous HS Tattnall Back, PA-C     • insulin lispro  1-6 Units Subcutaneous TID AC Tattnall Back, PADorisC     • latanoprost  1 drop Both Eyes HS Cinthya Back, PA-C     • mirtazapine  7.5 mg Oral HS Tattnall Back, PA-C     • pantoprazole  20 mg Oral Early Morning Tattnall Back, PA-C     • primidone  100 mg Oral HS Tattnall Back, PADorisC           No Known Allergies      Vitals:   /74 (BP Location: Right arm)   Pulse 74   Temp (!) 97.4 °F (36.3 °C) (Oral)   Resp 18   Ht 5' 10" (1.778 m)   Wt 92.1 kg (203 lb)   SpO2 95%   BMI 29.13 kg/m²   Body mass index is 29.13 kg/m². SpO2: 95 %,   SpO2 Activity: At Rest,   O2 Device: None (Room air)    No intake or output data in the 24 hours ending 09/13/23 1504  Invasive Devices     Peripheral Intravenous Line  Duration           Peripheral IV 09/12/23 Proximal;Right;Ventral (anterior) Forearm <1 day                Physical Exam:  Physical Exam  Gen: NAD  Neuro: AxO 3  Card: RRR, min JVD  Lungs: Diminish left   Abdomen: Soft NT ND  Extremities: No edema        Diagnostic Data:  Lab: I have personally reviewed pertinent lab results. ,   CBC:  Results from last 7 days   Lab Units 09/13/23  0634   WBC Thousand/uL 6.38   HEMOGLOBIN g/dL 12.8   HEMATOCRIT % 39.4   PLATELETS Thousands/uL 231      CMP:   Lab Results   Component Value Date    SODIUM 138 09/13/2023    K 4.2 09/13/2023     09/13/2023    CO2 25 09/13/2023    BUN 11 09/13/2023    CREATININE 0.75 09/13/2023    CALCIUM 9.3 09/13/2023    AST 14 09/12/2023    ALT 14 09/12/2023    ALKPHOS 99 09/12/2023    EGFR 86 09/13/2023   ,   PT/INR: No results found for: "PT", "INR",   Magnesium: No components found for: "MAG",  Phosphorous: No results found for: "PHOS"    ABG: No results found for: "PHART", "TSZ6DTE", "PO2ART", "RRZ7FBS", "Z9EKNMXP", "BEART", "SOURCE",     Microbiology:        Imaging: I have personally reviewed the pertinent imaging studies on the PACS system  CT Chest  Recurrent large left pleural effusion, similar to the prethoracentesis chest CT from 6/2/2023, with complete compressive atelectasis of the left lower lobe and inferior lingula.     Extensive low-attenuation in the collapsed left lower lobe. While this could be due to endobronchial mucous and drowned lung, recurrent tumor cannot be excluded.     Redemonstration of mild pulmonary fibrosis    Code Status: Level 3 - DNAR and DNI      Tono Carvalho MD  [facility]    Portions of the record may have been created with voice recognition software. Occasional wrong word or "sound a like" substitutions may have occurred due to the inherent limitations of voice recognition software. Read the chart carefully and recognize, using context, where substitutions have occurred.

## 2023-09-13 NOTE — UTILIZATION REVIEW
Initial Clinical Review    Admission: Date/Time/Statement:   Admission Orders (From admission, onward)     Ordered        09/12/23 1702  Place in Observation  Once                      Orders Placed This Encounter   Procedures   • Place in Observation     Standing Status:   Standing     Number of Occurrences:   1     Order Specific Question:   Level of Care     Answer:   Med Surg [16]     ED Arrival Information     Expected   -    Arrival   9/12/2023 15:22    Acuity   Urgent            Means of arrival   Walk-In    Escorted by   Spouse    Service   Hospitalist    Admission type   Emergency            Arrival complaint   sob           Chief Complaint   Patient presents with   • Shortness of Breath     Pt had cat scan this am needs para       Initial Presentation:   61-year-old male with past history of hypertension, hyperlipidemia, diabetes, CAD status post stent x1, GERD, prostate cancer status postradiation, CHF, SYLVESTER, squamous cell carcinoma of lung, recurrent pleural effusion, presents to the ED for evaluation of shortness of breath and thoracentesis. Patient has had worsening shortness of breath. Patient had an outpatient CT chest done that showed moderate pleural effusion. Presents with scattered rales. Admission work-up showing hyperkalemia, elevated NP. Placed in observation status for recurrent R pleural effusion.        ED Triage Vitals   Temperature Pulse Respirations Blood Pressure SpO2   09/12/23 1605 09/12/23 1605 09/12/23 1605 09/12/23 1622 09/12/23 1605   98 °F (36.7 °C) 69 18 137/69 98 %      Temp Source Heart Rate Source Patient Position - Orthostatic VS BP Location FiO2 (%)   09/12/23 1605 09/12/23 1605 09/12/23 1622 09/12/23 1622 --   Tympanic Monitor Sitting Right arm       Pain Score       09/12/23 1622       No Pain          Wt Readings from Last 1 Encounters:   09/12/23 92.1 kg (203 lb)     Additional Vital Signs:   Date/Time Temp Pulse Resp BP MAP (mmHg) SpO2 O2 Device Patient Position - Orthostatic VS   09/13/23 07:53:21 97.2 °F (36.2 °C) Abnormal  99 16 133/87 102 95 % -- --   09/13/23 07:29:23 97.8 °F (36.6 °C) 81 18 151/91 111 98 % None (Room air) Lying   09/13/23 0714 -- 79 18 -- -- 94 % None (Room air) --   09/13/23 03:41:34 97.2 °F (36.2 °C) Abnormal  85 18 119/79 92 94 % None (Room air) Lying   09/12/23 22:33:37 97.7 °F (36.5 °C) 79 17 158/67 97 92 % None (Room air) Lying   09/12/23 2020 -- -- -- -- -- 95 % -- --   09/12/23 18:29:59 98 °F (36.7 °C) 71 17 156/97 117 96 % None (Room air) Lying   09/12/23 1622 -- -- -- 137/69 -- -- -- Sitting   09/12/23 1605 98 °F (36.7 °C) 69 18 -- -- 98 % None (Room air) --       Pertinent Labs/Diagnostic Test Results:   IR IN-Patient Thoracentesis    (Results Pending)         Results from last 7 days   Lab Units 09/13/23  0634 09/12/23  1701   WBC Thousand/uL 6.38 8.08   HEMOGLOBIN g/dL 12.8 12.7   HEMATOCRIT % 39.4 40.7   PLATELETS Thousands/uL 231 271   NEUTROS ABS Thousands/µL  --  5.97         Results from last 7 days   Lab Units 09/13/23  0634 09/12/23  1701   SODIUM mmol/L 138 136   POTASSIUM mmol/L 4.2 5.6*   CHLORIDE mmol/L 106 103   CO2 mmol/L 25 28   ANION GAP mmol/L 7 5   BUN mg/dL 11 15   CREATININE mg/dL 0.75 1.10   EGFR ml/min/1.73sq m 86 62   CALCIUM mg/dL 9.3 9.3   MAGNESIUM mg/dL  --  1.9     Results from last 7 days   Lab Units 09/12/23  1701   AST U/L 14   ALT U/L 14   ALK PHOS U/L 99   TOTAL PROTEIN g/dL 6.5   ALBUMIN g/dL 3.5   TOTAL BILIRUBIN mg/dL 0.44     Results from last 7 days   Lab Units 09/13/23  0711 09/12/23  2223   POC GLUCOSE mg/dl 97 150*     Results from last 7 days   Lab Units 09/13/23  0634 09/12/23  1701   GLUCOSE RANDOM mg/dL 100 100             No results found for: "BETA-HYDROXYBUTYRATE"                   Results from last 7 days   Lab Units 09/12/23  2042 09/12/23  1701   HS TNI 0HR ng/L  --  5   HS TNI 2HR ng/L 4  --    HSTNI D2 ng/L -1  --                                  Results from last 7 days   Lab Units 09/12/23  1701   BNP pg/mL 115*                                                                                       ED Treatment:   Medication Administration from 09/12/2023 1522 to 09/12/2023 1807     None        Past Medical History:   Diagnosis Date   • Abdominal pain    • Anxiety    • Arthritis    • Asthma    • Atrial fibrillation (HCC)    • Back pain    • Bleeding ulcer    • Bronchitis    • Cancer [facility])     prostate 2011- radiation   • Cardiac disease     cardiac stent x1   • Cataract     starting   • Chronic diastolic (congestive) heart failure (HCC)    • Diabetes mellitus (HCC)     boarderline diabetic    • GERD (gastroesophageal reflux disease)    • History of radiation therapy 2010    Prostate seeds (brachytherapy) and EBRT   • Hyperlipidemia    • Hypertension    • 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, bilateral    • Low back pain    • Lung cancer ([address])    • Lung mass    • Myocardial infarction ([address])     mild 1999   • Obesity    • Prostate cancer ([address])    • Shortness of breath    • Sleep apnea     sleep study 11/22   • Wears dentures     full set   • Wears glasses      Present on Admission:  • Squamous cell carcinoma of lung (HCC)  • Chronic diastolic heart failure (HCC)  • Type 2 diabetes mellitus with diabetic neuropathy, without long-term current use of insulin (HCC)  • CAD (coronary artery disease)  • Other persistent atrial fibrillation (HCC)  • Centrilobular emphysema (HCC)  • Tremors of nervous system  • Hyperlipidemia      Admitting Diagnosis: Atrial flutter (HCC) [I48.92]  Pulmonary fibrosis (HCC) [J84.10]  SOB (shortness of breath) [R06.02]  Pleural effusion [J90]  History of lung cancer [Z85.118]  Age/Sex: 82 y.o. male  Admission Orders:  Telemetry  Cont pulse ox  scd  accuchecks  Consult pulmonary     Scheduled Medications:  albuterol, 2.5 mg, Nebulization, Q8H   And  acetylcysteine, 3 mL, Nebulization, Q8H  apixaban, 5 mg, Oral, BID  atorvastatin, 10 mg, Oral, Daily With Dinner  carvedilol, 25 mg, Oral, BID With Meals  Fluticasone Furoate-Vilanterol, 1 puff, Inhalation, Daily   And  umeclidinium, 1 puff, Inhalation, Daily  insulin lispro, 1-5 Units, Subcutaneous, HS  insulin lispro, 1-6 Units, Subcutaneous, TID AC  latanoprost, 1 drop, Both Eyes, HS  mirtazapine, 7.5 mg, Oral, HS  pantoprazole, 20 mg, Oral, Early Morning  primidone, 100 mg, Oral, HS      Network Utilization Review Department  ATTENTION: Please call with any questions or concerns to [phone] and carefully listen to the prompts so that you are directed to the right person. All voicemails are confidential.  Harleen Pierre all requests for admission clinical reviews, approved or denied determinations and any other requests to dedicated fax number below belonging to the campus where the patient is receiving treatment.  List of dedicated fax numbers for the Facilities:  [place] DENIALS (Administrative/Medical Necessity) [phone] [facility] [facility] [phone]   [address] [place] [address] Drive [phone]   [facility] [address] [facility] [phone]   [address] [facility] [address] [phone]   [address] [facility] [address] [facility] [address] [phone]

## 2023-09-13 NOTE — ASSESSMENT & PLAN NOTE
· Prior PCI over 20 years ago  · Denies current chest pain  · Troponins negative  · Continue statin, eliquis, and carvedilol

## 2023-09-13 NOTE — DISCHARGE INSTRUCTIONS
Thoracentesis   WHAT YOU NEED TO KNOW:   A thoracentesis is a procedure to remove extra fluid or air from between your lungs and your inner chest wall. Air or fluid buildup may make it hard for you to breathe. A thoracentesis allows your lungs to expand fully so you can breathe more easily. DISCHARGE INSTRUCTIONS:     Small amount of shoulder pain and bloody sputum is normal after a Thoracentesis. Rest:  Rest when you feel it is needed. Slowly start to do more each day. Return to your daily activities as directed. Resume your normal diet. Small sips of flat soda will help mild nausea. Do not smoke: If you smoke, it is never too late to quit. Ask for information about how to stop smoking if you need help. Contact Interventional Radiology at [phone] [place] PATIENTS: Contact Interventional Radiology at [phone]) Eduardo Levin PATIENTS: Contact Interventional Radiology at [phone]) if:   You have a fever. Your puncture site is red, warm, swollen, or draining pus. You have questions or concerns about your procedure, medicine, or care. Seek care immediately or call 911 if:   Severe chest pain with inspiration and shortness of breath    Large amounts of blood in your sputum    Follow up with your healthcare provider as directed.

## 2023-09-13 NOTE — ASSESSMENT & PLAN NOTE
CT chest: Recurrent large left pleural effusion, similar to the prethoracentesis chest CT from 6/2/2023, with complete compressive atelectasis of the left lower lobe and inferior lingula. Extensive low-attenuation in the collapsed left lower lobe. While this could be due to endobronchial mucous and drowned lung, recurrent tumor cannot be excluded. · Last thoracentesis was in June, no cytology was performed  · IR consult for thoracentesis with cytology; removed 1500 ml; later in day pulmonary did additional thoracentesis obtaining additional 100 ml  · Patient is to have close follow-up with outpatient pulmonary in the next 1 to 2 weeks, pulmonary did discuss with patient potentially placement of a septic catheter.   · Patient will be following up with outpatient pulmonary regarding cytology  · He will continue to follow with his outpatient oncology  · O2 sats remained stable on room air mid to upper 90s  · Chest x ray post thoracentesis shows moderate left pleural effusion, no pneumothorax as per radiology

## 2023-09-13 NOTE — DISCHARGE SUMMARY
[address]  Discharge- Raúl Grate 1940, 82 y.o. male MRN: [id number]  Unit/Bed#: 2 [place] [address] Encounter: [id number]  Primary Care Provider: Malcolm Eaton MD   Date and time admitted to hospital: 9/12/2023  4:16 PM    * Recurrent pleural effusion on left  Assessment & Plan  CT chest: Recurrent large left pleural effusion, similar to the prethoracentesis chest CT from 6/2/2023, with complete compressive atelectasis of the left lower lobe and inferior lingula. Extensive low-attenuation in the collapsed left lower lobe. While this could be due to endobronchial mucous and drowned lung, recurrent tumor cannot be excluded. · Last thoracentesis was in June, no cytology was performed  · IR consult for thoracentesis with cytology; removed 1500 ml; later in day pulmonary did additional thoracentesis obtaining additional 100 ml  · Patient is to have close follow-up with outpatient pulmonary in the next 1 to 2 weeks, pulmonary did discuss with patient potentially placement of a septic catheter. · Patient will be following up with outpatient pulmonary regarding cytology  · He will continue to follow with his outpatient oncology  · O2 sats remained stable on room air mid to upper 90s  · Chest x ray post thoracentesis shows moderate left pleural effusion, no pneumothorax as per radiology     Squamous cell carcinoma of lung ([address])  Assessment & Plan  · Follows with radiation oncology Dr. 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 · Pulmonary indicated patient would be clear for discharge after 5 PM if remains stable.   · They also indicated that patient should have close follow-up in the next 1 to 2 weeks post discharge for potential placement of Asept catheter  · Patient will follow-up with outpatient oncology, follow-up on cytology reports 1 to 2 weeks postdischarge    Type 2 diabetes mellitus with diabetic neuropathy, without long-term current use of insulin [facility])  Assessment & Plan  Lab Results   Component Value Date    HGBA1C 6.5 (H) 06/12/2023       Recent Labs     09/12/23  2223 09/13/23  0711 09/13/23  1056 09/13/23  1601   POCGLU 150* 97 135 135       Blood Sugar Average: Last 72 hrs:  · (P) 129.25     Resume home medication metformin on discharge  Diabetic diet    Other persistent atrial fibrillation (HCC)  Assessment & Plan  · Continue Eliquis and carvedilol  · Follow-up outpatient PCP 1 to 2 weeks postdischarge    Chronic diastolic heart failure (HCC)  Assessment & Plan  Wt Readings from Last 3 Encounters:   09/12/23 92.1 kg (203 lb)   08/10/23 92.1 kg (203 lb)   08/01/23 91.8 kg (202 lb 6.4 oz)     ECHO 12/2022: EF 50-55%  ·   · Held aldactone in setting of hyperkalemia; improved from 5.6-4.2  · Continue coreg  · We will have patient resume Aldactone on discharge, BMP in 1 week    CAD (coronary artery disease)  Assessment & Plan  · Prior PCI over 20 years ago  · Denies current chest pain  · Troponins negative  · Continue statin, eliquis, and carvedilol    Centrilobular emphysema (HCC)  Assessment & Plan  · Continue home inhalers and nebulizer treatments  · Does not appear in acute exacerbation  · Follow-up outpatient PCP 1 to 2 weeks postdischarge    Hyperlipidemia  Assessment & Plan  · Continue statin  · Healthy diet    Tremors of nervous system  Assessment & Plan  · Follows with neurology  · Continue primidone    Medical Problems     Resolved Problems  Date Reviewed: 9/13/2023   None       Discharging Physician / Practitioner: SIIM Armstrong  PCP: Morris Rawls MD  Admission Date:   Admission Orders (From admission, onward) Ordered        09/12/23 1702  Place in Observation  Once                      Discharge Date: 09/13/23    Consultations During Hospital Stay:  · Interventional radiology  · Pulmonology    Procedures Performed:   · Interventional radiology thoracentesis which yielded 1500 mL sent for cytology studies  · Pulmonology thoracentesis later in the afternoon yielded 100 mL    Significant Findings / Test Results:   · X-ray performed showed moderate to large left pleural effusion no appreciable pneumothorax as per radiology report  · CT performed on 9/12 showed recurrent large left pleural effusion similar to prethoracentesis chest CT from 6/2/2023 with complete compressive atelectasis of the left lower lobe and inferior lingula, extensive low-attenuation in the collapsed left lower lobe, while this could be due to endobronchial mucous syndrome lung recurrent tumor cannot be excluded, redemonstration of mild pulmonary fibrosis, pulmonary artery enlargement which can be seen with pulmonary hypertension as per radiology report. Incidental Findings:   · None      Test Results Pending at Discharge (will require follow up): · Follow-up on cytology from thoracentesis     Outpatient Tests Requested:  · BMP in 1 week    Complications: None    Reason for Admission: Patient sent in by MD for abnormal CT scan of chest.    Hospital Course:   Earlene Enciso is a 82 y.o. male patient has medical history of hypertension, CHF, lung cancer status post radiation, diabetes, hyperlipidemia and CAD who originally presented to the hospital on 9/12/2023 due to abnormal CT scan of the chest which showed recurrent left pleural effusion. Patient did report some exertional shortness of breath with occasional cough. Did demonstrate mild hyperkalemia with potassium 5.6, Aldactone held during hospitalization. Decreased to 4.2 prior to discharge. .  Interventional radiology was consulted.   Patient underwent thoracentesis with interventional radiology for the removal of 1500 mL of fluid which is being sent for cytology studies, patient will follow-up with results with pulmonary and oncology outpatient. Pulmonary was consulted during hospitalization, did additional thoracentesis on left side for 100 mL more prior to discharge. Chest x-ray post thoracentesis shows moderate left pleural effusion similar in appearance to radiograph obtained earlier the same day no appreciable pneumothorax as per radiology report. Discussed with pulmonary, indicated that if patient remains stable on room air may discharge after 5 PM.  He will also follow-up with pulmonary in the next 1 to 2 weeks and discuss options including placement of Asept catheter outpatient. Patient will resume his Aldactone on discharge, BMP in 1 week with results to PCP ordered. This was discussed with the patient and his family at the bedside, they verbalized understanding and are agreeable to the plan. Patient is discharged home today. Please see above list of diagnoses and related plan for additional information. Condition at Discharge: good    Discharge Day Visit / Exam:   Subjective: Patient seen sitting up resting comfortably in bed, denies any shortness of breath. Feels his breathing is at baseline. Denies any pain. Is hopeful to be going home this evening. Vitals: Blood Pressure: 120/74 (09/13/23 1412)  Pulse: 74 (09/13/23 1412)  Temperature: (!) 97.4 °F (36.3 °C) (09/13/23 1412)  Temp Source: Oral (09/13/23 1412)  Respirations: 18 (09/13/23 1412)  Height: 5' 10" (177.8 cm) (09/12/23 1623)  Weight - Scale: 92.1 kg (203 lb) (09/12/23 1623)  SpO2: 94 % (09/13/23 1709)  Exam:   Physical Exam  Vitals and nursing note reviewed. HENT:      Head: Normocephalic. Nose: Nose normal.      Mouth/Throat:      Mouth: Mucous membranes are moist.   Eyes:      Extraocular Movements: Extraocular movements intact.       Conjunctiva/sclera: Conjunctivae normal.      Pupils: Pupils are equal, round, and reactive to light. Cardiovascular:      Rate and Rhythm: Normal rate. Rhythm irregular. Heart sounds: Murmur heard. Pulmonary:      Effort: Pulmonary effort is normal.      Comments: Diminished left  Abdominal:      General: Bowel sounds are normal. There is no distension. Palpations: Abdomen is soft. Tenderness: There is no abdominal tenderness. Genitourinary:     Comments: Voiding spontaneously   Musculoskeletal:         General: Normal range of motion. Cervical back: Normal range of motion. Right lower leg: No edema. Left lower leg: No edema. Skin:     General: Skin is warm and dry. Capillary Refill: Capillary refill takes less than 2 seconds. Neurological:      General: No focal deficit present. Mental Status: He is alert and oriented to person, place, and time. Psychiatric:         Mood and Affect: Mood normal.         Behavior: Behavior normal.         Thought Content: Thought content normal.         Judgment: Judgment normal.         Discussion with Family: Updated  (wife and daughter) at bedside. Discharge instructions/Information to patient and family:   See after visit summary for information provided to patient and family. Provisions for Follow-Up Care:  See after visit summary for information related to follow-up care and any pertinent home health orders. Disposition:   Home    Planned Readmission: No     Discharge Statement:  I spent greater than 45 minutes discharging the patient. This time was spent on the day of discharge. I had direct contact with the patient on the day of discharge. Greater than 50% of the total time was spent examining patient, answering all patient questions, arranging and discussing plan of care with patient as well as directly providing post-discharge instructions. Additional time then spent on discharge activities.     Discharge Medications:  See after visit summary for reconciled discharge medications provided to patient and/or family.       **Please Note: This note may have been constructed using a voice recognition system**

## 2023-09-13 NOTE — PROCEDURES
Thoracentesis (Bedside)    Date/Time: 9/12/2023 4:16 PM    Performed by: Sasha Fung MD  Authorized by: Sasha Fung MD    Patient location:  Bedside  Other Assisting Provider: Yes (comment)    Consent:     Consent obtained:  Verbal and written    Consent given by:  Patient    Risks discussed:  Bleeding, infection, pneumothorax, pain and incomplete drainage    Alternatives discussed:  No treatment  Universal protocol:     Patient identity confirmed:  Verbally with patient and arm band  Indications:     Procedure Purpose: diagnostic and therapeutic      Indications: pleural effusion    Procedure details:     Standard thoracentesis cath kit used: Yes      Patient position:  Sitting    Laterality:  Left    Location:  Midscapular line    Ultrasound guidance: yes      Ultrasound image availability:  Not saved    Number of attempts:  1    Needle gauge:  16    Drainage color:  Vonda    Drainage characteristics:  Serosanguinous    Fluid removed amount:  100  Post-procedure details:     Chest x-ray performed: yes

## 2023-09-13 NOTE — PLAN OF CARE
Problem: PAIN - ADULT  Goal: Verbalizes/displays adequate comfort level or baseline comfort level  Description: Interventions:  - Encourage patient to monitor pain and request assistance  - Assess pain using appropriate pain scale  - Administer analgesics based on type and severity of pain and evaluate response  - Implement non-pharmacological measures as appropriate and evaluate response  - Consider cultural and social influences on pain and pain management  - Notify physician/advanced practitioner if interventions unsuccessful or patient reports new pain  9/13/2023 1721 by Chilo Rodriguez RN  Outcome: Completed  9/13/2023 0739 by Chilo Rodriguez RN  Outcome: Progressing     Problem: SAFETY ADULT  Goal: Patient will remain free of falls  Description: INTERVENTIONS:  - Educate patient/family on patient safety including physical limitations  - Instruct patient to call for assistance with activity   - Consult OT/PT to assist with strengthening/mobility   - Keep Call bell within reach  - Keep bed low and locked with side rails adjusted as appropriate  - Keep care items and personal belongings within reach  - Initiate and maintain comfort rounds  - Make Fall Risk Sign visible to staff  - Offer Toileting every Hours, in advance of need  - Initiate/Maintain alarm  - Obtain necessary fall risk management equipment:   - Apply yellow socks and bracelet for high fall risk patients  - Consider moving patient to room near nurses station  9/13/2023 1721 by Chilo Rodriguez RN  Outcome: Completed  9/13/2023 0739 by Chilo Rodriguez RN  Outcome: Progressing     Problem: DISCHARGE PLANNING  Goal: Discharge to home or other facility with appropriate resources  Description: INTERVENTIONS:  - Identify barriers to discharge w/patient and caregiver  - Arrange for needed discharge resources and transportation as appropriate  - Identify discharge learning needs (meds, wound care, etc.)  - Arrange for interpretive services to assist at discharge as needed  - Refer to Case Management Department for coordinating discharge planning if the patient needs post-hospital services based on physician/advanced practitioner order or complex needs related to functional status, cognitive ability, or social support system  9/13/2023 1721 by Jaret Childs RN  Outcome: Completed  9/13/2023 0739 by Jaret Childs RN  Outcome: Progressing     Problem: Knowledge Deficit  Goal: Patient/family/caregiver demonstrates understanding of disease process, treatment plan, medications, and discharge instructions  Description: Complete learning assessment and assess knowledge base.   Interventions:  - Provide teaching at level of understanding  - Provide teaching via preferred learning methods  9/13/2023 1721 by Jaret Childs RN  Outcome: Completed  9/13/2023 0739 by Jaret Childs RN  Outcome: Progressing     Problem: RESPIRATORY - ADULT  Goal: Achieves optimal ventilation and oxygenation  Description: INTERVENTIONS:  - Assess for changes in respiratory status  - Assess for changes in mentation and behavior  - Position to facilitate oxygenation and minimize respiratory effort  - Oxygen administered by appropriate delivery if ordered  - Initiate smoking cessation education as indicated  - Encourage broncho-pulmonary hygiene including cough, deep breathe, Incentive Spirometry  - Assess the need for suctioning and aspirate as needed  - Assess and instruct to report SOB or any respiratory difficulty  - Respiratory Therapy support as indicated  9/13/2023 1721 by Jaret Childs RN  Outcome: Completed  9/13/2023 0739 by Jaret Childs RN  Outcome: Progressing     Problem: MOBILITY - ADULT  Goal: Maintain or return to baseline ADL function  Description: INTERVENTIONS:  -  Assess patient's ability to carry out ADLs; assess patient's baseline for ADL function and identify physical deficits which impact ability to perform ADLs (bathing, care of mouth/teeth, toileting, grooming, dressing, etc.)  - Assess/evaluate cause of self-care deficits   - Assess range of motion  - Assess patient's mobility; develop plan if impaired  - Assess patient's need for assistive devices and provide as appropriate  - Encourage maximum independence but intervene and supervise when necessary  - Involve family in performance of ADLs  - Assess for home care needs following discharge   - Consider OT consult to assist with ADL evaluation and planning for discharge  - Provide patient education as appropriate  9/13/2023 1721 by Todd Clark RN  Outcome: Completed  9/13/2023 0739 by Todd Clark RN  Outcome: Progressing  Goal: Maintains/Returns to pre admission functional level  Description: INTERVENTIONS:  - Perform BMAT or MOVE assessment daily.   - Set and communicate daily mobility goal to care team and patient/family/caregiver. - Collaborate with rehabilitation services on mobility goals if consulted  - Perform Range of Motion  times a day. - Reposition patient every  hours.   - Dangle patient  times a day  - Stand patient  times a day  - Ambulate patient  times a day  - Out of bed to chair  times a day   - Out of bed for meals  times a day  - Out of bed for toileting  - Record patient progress and toleration of activity level   9/13/2023 1721 by Todd Clark RN  Outcome: Completed  9/13/2023 0739 by Todd Clark RN  Outcome: Progressing

## 2023-09-13 NOTE — ASSESSMENT & PLAN NOTE
· Continue home inhalers and nebulizer treatments  · Does not appear in acute exacerbation  · Follow-up outpatient PCP 1 to 2 weeks postdischarge

## 2023-09-15 LAB
ATRIAL RATE: 366 BPM
QRS AXIS: -7 DEGREES
QRSD INTERVAL: 88 MS
QT INTERVAL: 382 MS
QTC INTERVAL: 400 MS
T WAVE AXIS: 33 DEGREES
VENTRICULAR RATE: 66 BPM

## 2023-09-15 PROCEDURE — 93010 ELECTROCARDIOGRAM REPORT: CPT | Performed by: INTERNAL MEDICINE

## 2023-09-16 LAB
BACTERIA SPEC BFLD CULT: NO GROWTH
GRAM STN SPEC: NORMAL
GRAM STN SPEC: NORMAL

## 2023-09-18 ENCOUNTER — DOCUMENTATION (OUTPATIENT)
Dept: RADIATION ONCOLOGY | Facility: CLINIC | Age: 83
End: 2023-09-18

## 2023-09-18 DIAGNOSIS — C34.90 MALIGNANT NEOPLASM OF BRONCHUS AND LUNG (HCC): Primary | ICD-10-CM

## 2023-09-18 PROCEDURE — 88112 CYTOPATH CELL ENHANCE TECH: CPT | Performed by: PATHOLOGY

## 2023-09-18 PROCEDURE — 88305 TISSUE EXAM BY PATHOLOGIST: CPT | Performed by: PATHOLOGY

## 2023-09-18 NOTE — PROGRESS NOTES
Spoke with patient's wife, James Rushing. Advised that Dr. Jarad Schumacher recommending repeat CT scan in 1 month and to reschedule follow-up appointment for 1 week after CT scan.   Verbalized understanding of instructions

## 2023-09-18 NOTE — ASSESSMENT & PLAN NOTE
• Is on 9/12/2023 discharged on 13 September 2023 after thoracentesis complete work-up improved and no need for home oxygen hospital records reviewed as follows and further plan treatment as follows  •   •   •   •   •   •   • Follows with radiation oncology Dr. 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 • Pulmonary indicated patient would be clear for discharge after 5 PM if remains stable.   • They also indicated that patient should have close follow-up in the next 1 to 2 weeks post discharge for potential placement of Asept catheter  • Patient will follow-up with outpatient oncology, follow-up on cytology reports 1 to 2 weeks postdischarge

## 2023-09-18 NOTE — ASSESSMENT & PLAN NOTE
history of left side recurrent pleural effusion awaiting repeat thoracentesis after 2 days Eliquis is on hold after thoracentesis to be evaluated by pulmonary about making a decision and about the septic catheter if is recurrent much improved after the thoracentesis remaining follow-up plan finding as follows from the hospital record reviewed CT of the chest Labs all reviewed          CT chest: Recurrent large left pleural effusion, similar to the prethoracentesis chest CT from 6/2/2023, with complete compressive atelectasis of the left lower lobe and inferior lingula. Extensive low-attenuation in the collapsed left lower lobe. While this could be due to endobronchial mucous and drowned lung, recurrent tumor cannot be excluded. • Last thoracentesis was in June, no cytology was performed  • IR consult for thoracentesis with cytology; removed 1500 ml; later in day pulmonary did additional thoracentesis obtaining additional 100 ml  • Patient is to have close follow-up with outpatient pulmonary in the next 1 to 2 weeks, pulmonary did discuss with patient potentially placement of a septic catheter.   • Patient will be following up with outpatient pulmonary regarding cytology  • He will continue to follow with his outpatient oncology  • O2 sats remained stable on room air mid to upper 90s  • Chest x ray post thoracentesis shows moderate left pleural effusion, no pneumothorax as per radiology

## 2023-09-19 ENCOUNTER — TELEPHONE (OUTPATIENT)
Dept: RADIATION ONCOLOGY | Facility: CLINIC | Age: 83
End: 2023-09-19

## 2023-09-19 ENCOUNTER — TELEPHONE (OUTPATIENT)
Age: 83
End: 2023-09-19

## 2023-09-19 ENCOUNTER — OFFICE VISIT (OUTPATIENT)
Dept: INTERNAL MEDICINE CLINIC | Facility: CLINIC | Age: 83
End: 2023-09-19
Payer: COMMERCIAL

## 2023-09-19 VITALS
HEIGHT: 70 IN | BODY MASS INDEX: 28.77 KG/M2 | WEIGHT: 201 LBS | SYSTOLIC BLOOD PRESSURE: 130 MMHG | HEART RATE: 83 BPM | RESPIRATION RATE: 16 BRPM | DIASTOLIC BLOOD PRESSURE: 78 MMHG | OXYGEN SATURATION: 97 % | TEMPERATURE: 98 F

## 2023-09-19 DIAGNOSIS — C34.92 SQUAMOUS CELL CARCINOMA OF LEFT LUNG (HCC): Primary | Chronic | ICD-10-CM

## 2023-09-19 DIAGNOSIS — R25.1 TREMORS OF NERVOUS SYSTEM: ICD-10-CM

## 2023-09-19 DIAGNOSIS — E11.40 TYPE 2 DIABETES MELLITUS WITH DIABETIC NEUROPATHY, WITHOUT LONG-TERM CURRENT USE OF INSULIN (HCC): ICD-10-CM

## 2023-09-19 DIAGNOSIS — J43.2 CENTRILOBULAR EMPHYSEMA (HCC): ICD-10-CM

## 2023-09-19 DIAGNOSIS — I25.10 CORONARY ARTERY DISEASE INVOLVING NATIVE CORONARY ARTERY OF NATIVE HEART WITHOUT ANGINA PECTORIS: ICD-10-CM

## 2023-09-19 DIAGNOSIS — I50.32 CHRONIC DIASTOLIC HEART FAILURE (HCC): ICD-10-CM

## 2023-09-19 DIAGNOSIS — J90 RECURRENT PLEURAL EFFUSION ON LEFT: ICD-10-CM

## 2023-09-19 PROCEDURE — 99214 OFFICE O/P EST MOD 30 MIN: CPT | Performed by: INTERNAL MEDICINE

## 2023-09-19 RX ORDER — GUAIFENESIN AND CODEINE PHOSPHATE 100; 10 MG/5ML; MG/5ML
10 SOLUTION ORAL 3 TIMES DAILY PRN
Qty: 180 ML | Refills: 0 | Status: SHIPPED | OUTPATIENT
Start: 2023-09-19

## 2023-09-19 NOTE — ASSESSMENT & PLAN NOTE
Wt Readings from Last 3 Encounters:   09/19/23 91.2 kg (201 lb)   09/12/23 92.1 kg (203 lb)   08/10/23 92.1 kg (203 lb)     Continue fluid restriction and low-salt diet Daily weight monitoring CHF compensated patient euvolemic ejection fraction 50 to 55% Aldactone on hold due to the hyperkalemia continue Coreg awaiting BMP

## 2023-09-19 NOTE — TELEPHONE ENCOUNTER
----- Message from Chelsea Wang MD sent at 9/19/2023 10:06 AM EDT -----  Hi,    I was hoping to get this gentlemen set up for an outpatient thoracentesis at [facility]. We can overbook him for 11:40 if we do it in the clinic. He take eliquis and has been holding it since 9/18.     Leah Adkins

## 2023-09-19 NOTE — PROGRESS NOTES
Dr. Ortega Nose Office Visit Note  23     Francy Catalan 82 y.o. male MRN: [id number]  : 1940    Assessment:     1. Squamous cell carcinoma of left lung [facility])  Assessment & Plan:    Is on 2023 discharged on 2023 after thoracentesis complete work-up improved and no need for home oxygen hospital records reviewed as follows and further plan treatment as follows              Follows with radiation oncology Dr. Charles Mayorga  Squamous cell carcinoma of left lower lobe completed radiation therapy in 2022  Outpatient CT chest concerning for potential recurring tumor  Thoracentesis with interventional radiology performed with cytology to evaluate for malignant pleural effusion  Pulmonology consulted  Noted above patient had additional thoracentesis in the afternoon obtaining 100 mL more. Pulmonary indicated patient would be clear for discharge after 5 PM if remains stable. They also indicated that patient should have close follow-up in the next 1 to 2 weeks post discharge for potential placement of Asept catheter  Patient will follow-up with outpatient oncology, follow-up on cytology reports 1 to 2 weeks postdischarge    Orders:  -     guaifenesin-codeine (GUAIFENESIN AC) 100-10 MG/5ML liquid; Take 10 mL by mouth 3 (three) times a day as needed for cough    2. Recurrent pleural effusion on left  Assessment & Plan:   history of left side recurrent pleural effusion awaiting repeat thoracentesis after 2 days Eliquis is on hold after thoracentesis to be evaluated by pulmonary about making a decision and about the septic catheter if is recurrent much improved after the thoracentesis remaining follow-up plan finding as follows from the hospital record reviewed CT of the chest Labs all reviewed          CT chest: Recurrent large left pleural effusion, similar to the prethoracentesis chest CT from 2023, with complete compressive atelectasis of the left lower lobe and inferior lingula.  Extensive low-attenuation in the collapsed left lower lobe. While this could be due to endobronchial mucous and drowned lung, recurrent tumor cannot be excluded. Last thoracentesis was in June, no cytology was performed  IR consult for thoracentesis with cytology; removed 1500 ml; later in day pulmonary did additional thoracentesis obtaining additional 100 ml  Patient is to have close follow-up with outpatient pulmonary in the next 1 to 2 weeks, pulmonary did discuss with patient potentially placement of a septic catheter. Patient will be following up with outpatient pulmonary regarding cytology  He will continue to follow with his outpatient oncology  O2 sats remained stable on room air mid to upper 90s  Chest x ray post thoracentesis shows moderate left pleural effusion, no pneumothorax as per radiology       3. Type 2 diabetes mellitus with diabetic neuropathy, without long-term current use of insulin (HCC)  Assessment & Plan:    Lab Results   Component Value Date    HGBA1C 6.5 (H) 06/12/2023   Blood sugar very well controlled on the basis of A1c continue diabetic diet continue metformin hypoglycemia protocol in place yearly dilated eye exam foot exam normal    Orders:  -     Hemoglobin A1C; Future    4. Chronic diastolic heart failure (HCC)  Assessment & Plan:  Wt Readings from Last 3 Encounters:   09/19/23 91.2 kg (201 lb)   09/12/23 92.1 kg (203 lb)   08/10/23 92.1 kg (203 lb)     Continue fluid restriction and low-salt diet Daily weight monitoring CHF compensated patient euvolemic ejection fraction 50 to 55% Aldactone on hold due to the hyperkalemia continue Coreg awaiting BMP          5. Tremors of nervous system    6. Coronary artery disease involving native coronary artery of native heart without angina pectoris  Assessment & Plan:  Chest pain  Continue Lipitor on Eliquis baby aspirin prior PCI over 20 years ago      7.  Centrilobular emphysema ([address])  Assessment & Plan:  Proventil as needed and neb treatment follow-up with the pulmonary            Discussion Summary and Plan: Today's care plan and medications were reviewed with patient in detail and all their questions answered to their satisfaction. Chief Complaint   Patient presents with   • Follow-up     Feels good just tired. Subjective:  Patient came in for follow-up for being hospitalized for recurrent left pleural effusion and stayed overnight discharged next day on September 13, 2023 all the hospital records reviewed underwent thoracentesis the CT of the chest Labs reviewed patient improved denies any pain awaiting to have repeat thoracentesis done after 2 days and depending on the results to be seen by pulmonary regarding septic catheter for recurrent thoracentesis and also to be seen by radiation oncologist for suspected recurrence of the squamous cell carcinoma of the lung hypoglycemia patient still losing weight all the hospital record reviewed    Hospital course as follows    Rosa Carbajal is a 82 y.o. male patient has medical history of hypertension, CHF, lung cancer status post radiation, diabetes, hyperlipidemia and CAD who originally presented to the hospital on 9/12/2023 due to abnormal CT scan of the chest which showed recurrent left pleural effusion. Patient did report some exertional shortness of breath with occasional cough. Did demonstrate mild hyperkalemia with potassium 5.6, Aldactone held during hospitalization. Decreased to 4.2 prior to discharge. .  Interventional radiology was consulted. Patient underwent thoracentesis with interventional radiology for the removal of 1500 mL of fluid which is being sent for cytology studies, patient will follow-up with results with pulmonary and oncology outpatient. Pulmonary was consulted during hospitalization, did additional thoracentesis on left side for 100 mL more prior to discharge.   Chest x-ray post thoracentesis shows moderate left pleural effusion similar in appearance to radiograph obtained earlier the same day no appreciable pneumothorax as per radiology report. Discussed with pulmonary, indicated that if patient remains stable on room air may discharge after 5 PM.  He will also follow-up with pulmonary in the next 1 to 2 weeks and discuss options including placement of Asept catheter outpatient. Patient will resume his Aldactone on discharge, BMP in 1 week with results to PCP ordered. This was discussed with the patient and his family at the bedside, they verbalized understanding and are agreeable to the plan. The following portions of the patient's history were reviewed and updated as appropriate: allergies, current medications, past family history, past medical history, past social history, past surgical history and problem list.    Review of Systems   Constitutional: Positive for activity change and fatigue. Negative for appetite change, chills, diaphoresis, fever and unexpected weight change. HENT: Negative for congestion, dental problem, drooling, ear discharge, ear pain, facial swelling, hearing loss, mouth sores, nosebleeds, postnasal drip, rhinorrhea, sinus pressure, sneezing, sore throat, tinnitus, trouble swallowing and voice change. Eyes: Negative for photophobia, pain, discharge, redness, itching and visual disturbance. Respiratory: Positive for cough and shortness of breath. Negative for apnea, choking, chest tightness, wheezing and stridor. Cardiovascular: Negative for chest pain and palpitations. Gastrointestinal: Negative for abdominal distention, abdominal pain, anal bleeding, blood in stool, constipation, diarrhea, nausea, rectal pain and vomiting. Endocrine: Negative for cold intolerance, heat intolerance, polydipsia, polyphagia and polyuria. Genitourinary: Negative for decreased urine volume, difficulty urinating, dysuria, enuresis, flank pain, frequency, genital sores, hematuria and urgency.    Musculoskeletal: Positive for arthralgias and back pain. Negative for myalgias, neck pain and neck stiffness. Skin: Negative for color change, pallor, rash and wound. Allergic/Immunologic: Negative. Negative for environmental allergies, food allergies and immunocompromised state. Neurological: Positive for dizziness. Negative for tremors, seizures, syncope, facial asymmetry, speech difficulty, weakness, light-headedness, numbness and headaches. Psychiatric/Behavioral: Negative for agitation, behavioral problems, confusion, decreased concentration, dysphoric mood, hallucinations, self-injury, sleep disturbance and suicidal ideas. The patient is nervous/anxious. The patient is not hyperactive.           Historical Information   Patient Active Problem List   Diagnosis   • Corns   • Pain in both feet   • Onychomycosis   • Tinea pedis of both feet   • Lung mass   • Hyperlipidemia   • Type 2 diabetes mellitus with diabetic neuropathy, without long-term current use of insulin ([facility])   • Squamous cell carcinoma of lung ([facility])   • Shortness of breath   • Daytime hypersomnolence   • Chronic diastolic heart failure ([facility])   • SYLVESTER (obstructive sleep apnea)   • Prostate cancer ([facility])   • GERD (gastroesophageal reflux disease)   • CAD (coronary artery disease)   • Other persistent atrial fibrillation ([facility])   • Alcohol dependence ([address])   • Acute respiratory failure with hypoxemia ([facility])   • Depression, recurrent ([facility])   • COVID-19   • Angioedema   • Septic shock ([facility])   • Cellulitis   • Cellulitis of chest wall   • Acute kidney injury (BUDDY) with acute tubular necrosis (ATN) ([facility])   • Chronic obstructive pulmonary disease, unspecified COPD type ([address])   • Centrilobular emphysema ([address])   • Abdominal aortic aneurysm ([address])   • Hypertensive heart disease   • Tremors of nervous system   • Encounter for subsequent annual wellness visit in Medicare patient   • Recurrent pleural effusion on left     Past Medical History:   Diagnosis Date   • Abdominal pain    • Anxiety    • Arthritis • Asthma    • Atrial fibrillation [facility])    • Back pain    • Bleeding ulcer    • Bronchitis    • Cancer [facility])     prostate 2011- radiation   • Cardiac disease     cardiac stent x1   • Cataract     starting   • Chronic diastolic (congestive) heart failure (HCC)    • Diabetes mellitus (HCC)     boarderline diabetic    • GERD (gastroesophageal reflux disease)    • History of radiation therapy 2010    Prostate seeds (brachytherapy) and EBRT   • Hyperlipidemia    • Hypertension    • 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, bilateral    • Low back pain    • Lung cancer ([address])    • Lung mass    • Myocardial infarction ([address])     mild 1999   • Obesity    • Prostate cancer ([address])    • Shortness of breath    • Sleep apnea     sleep study 11/22   • Wears dentures     full set   • Wears glasses      Past Surgical History:   Procedure Laterality Date   • ABDOMINAL HERNIA REPAIR     • ABDOMINAL SURGERY      bleeding ulcer, cyst removed from abd   • COLONOSCOPY     • CORONARY ANGIOPLASTY WITH STENT PLACEMENT  1999    x1    • ESOPHAGOGASTRODUODENOSCOPY     • IR BIOPSY LUNG  10/05/2021   • IR THORACENTESIS  11/08/2021   • IR THORACENTESIS  6/5/2023   • IR THORACENTESIS  9/13/2023   • KNEE SURGERY Left    •  [address] INCL FLUOR GDNCE DX W/CELL WASHG SPX N/A 11/29/2021    Procedure: BRONCHOSCOPY FLEXIBLE;  Surgeon: Ziggy Vasquez MD;  Location: [facility];  Service: Thoracic   • MS MEDIASTINOSCOPY WITH LYMPH NODE BIOPSY/IES N/A 11/29/2021    Procedure: MEDIASTINOSCOPY;  Surgeon: Ziggy Vasquez MD;  Location: [facility];  Service: Thoracic   • PROSTATE SURGERY       Social History     Substance and Sexual Activity   Alcohol Use Not Currently   • Alcohol/week: 10.0 - 12.0 standard drinks of alcohol   • Types: 7 Glasses of wine, 3 - 5 Cans of beer per week    Comment: few beers day; glass of red wine at dinner     Social History     Substance and Sexual Activity   Drug Use Never     Social History     Tobacco Use   Smoking Status Former   • Packs/day: 1.00   • Years: 30.00   • Total pack years: 30.00   • Types: Cigarettes   • Quit date:    • Years since quittin.7   Smokeless Tobacco Never     Family History   Problem Relation Age of Onset   • Prostate cancer Brother    • Cancer Maternal Uncle         colo rectal cancer   • Cancer Paternal Aunt      Health Maintenance Due   Topic   • Pneumococcal Vaccine: 65+ Years (1 - PCV)   • Falls: Plan of Care    • COVID-19 Vaccine (3 - Moderna risk series)   • Influenza Vaccine (1)   • HEMOGLOBIN A1C       Meds/Allergies       Current Outpatient Medications:   •  acetaminophen (TYLENOL) 325 mg tablet, Take 2 tablets (650 mg total) by mouth every 6 (six) hours as needed for mild pain, headaches or fever, Disp: 30 tablet, Rfl: 0  •  albuterol (2.5 mg/3 mL) 0.083 % nebulizer solution, Take 2.5 mg by nebulization As needed per patient's wife , Disp: , Rfl:   •  apixaban (Eliquis) 5 mg, Take 1 tablet (5 mg total) by mouth 2 (two) times a day, Disp: 180 tablet, Rfl: 1  •  atorvastatin (LIPITOR) 10 mg tablet, Take 1 tablet (10 mg total) by mouth daily, Disp: 90 tablet, Rfl: 1  •  carvedilol (COREG) 25 mg tablet, Take 1 tablet (25 mg total) by mouth 2 (two) times a day with meals, Disp: 180 tablet, Rfl: 1  •  ciclopirox (LOPROX) 0.77 % cream, APPLY TOPICALLY 2 (TWO) TIMES A DAY FOR 20 DAYS, Disp: 45 g, Rfl: 1  •  fluticasone-umeclidinium-vilanterol (Trelegy Ellipta) 100-62.5-25 mcg/actuation inhaler, Rinse mouth after use., Disp: 60 each, Rfl: 5  •  guaifenesin-codeine (GUAIFENESIN AC) 100-10 MG/5ML liquid, Take 10 mL by mouth 3 (three) times a day as needed for cough, Disp: 180 mL, Rfl: 0  •  hydrocortisone 2.5 % cream, Apply topically 2 (two) times a day Apply twice a day affected area the trunk, Disp: 20 g, Rfl: 2  •  latanoprost (XALATAN) 0.005 % ophthalmic solution, Administer 0.005 mL to both eyes daily at bedtime, Disp: , Rfl:   •  metFORMIN (GLUCOPHAGE) 500 mg tablet, Take 1 tablet (500 mg total) by mouth daily with breakfast, Disp: , Rfl:   •  mirtazapine (REMERON) 7.5 MG tablet, Take 1 tablet (7.5 mg total) by mouth daily at bedtime, Disp: 90 tablet, Rfl: 3  •  Multiple Vitamin (MULTI-VITAMIN DAILY PO), Take by mouth daily  , Disp: , Rfl:   •  omeprazole (PriLOSEC) 20 mg delayed release capsule, TAKE 1 CAPSULE DAILY, Disp: 90 capsule, Rfl: 1  •  primidone (MYSOLINE) 50 mg tablet, TAKE 2 TABS AT 8PM., Disp: 180 tablet, Rfl: 1  •  spironolactone (ALDACTONE) 25 mg tablet, Take 1 tablet (25 mg total) by mouth daily, Disp: 90 tablet, Rfl: 1      Objective:    Vitals:   /78   Pulse 83   Temp 98 °F (36.7 °C)   Resp 16   Ht 5' 10" (1.778 m)   Wt 91.2 kg (201 lb)   SpO2 97%   BMI 28.84 kg/m²   Body mass index is 28.84 kg/m². Vitals:    09/19/23 1203   Weight: 91.2 kg (201 lb)       Physical Exam  Vitals and nursing note reviewed. Constitutional:       General: He is not in acute distress. Appearance: He is well-developed. He is obese. He is not ill-appearing, toxic-appearing or diaphoretic. HENT:      Head: Normocephalic and atraumatic. Right Ear: External ear normal.      Left Ear: External ear normal.      Nose: Nose normal.      Mouth/Throat:      Pharynx: No oropharyngeal exudate. Eyes:      General: Lids are normal. Lids are everted, no foreign bodies appreciated. No scleral icterus. Right eye: No discharge. Left eye: No discharge. Conjunctiva/sclera: Conjunctivae normal.      Pupils: Pupils are equal, round, and reactive to light. Neck:      Thyroid: No thyromegaly. Vascular: Normal carotid pulses. No carotid bruit, hepatojugular reflux or JVD. Trachea: No tracheal tenderness or tracheal deviation. Cardiovascular:      Rate and Rhythm: Normal rate and regular rhythm. Pulses: Normal pulses. Heart sounds: Normal heart sounds. No murmur heard. No friction rub. No gallop.    Pulmonary:      Effort: Pulmonary effort is normal. No respiratory distress. Breath sounds: No stridor. Rhonchi and rales present. No wheezing. Comments: Decreased air entry on the left worse than the right  Chest:      Chest wall: No tenderness. Abdominal:      General: Bowel sounds are normal. There is no distension. Palpations: Abdomen is soft. There is no mass. Tenderness: There is no abdominal tenderness. There is no guarding or rebound. Musculoskeletal:         General: No tenderness or deformity. Normal range of motion. Cervical back: Normal range of motion and neck supple. No edema, erythema or rigidity. No spinous process tenderness or muscular tenderness. Normal range of motion. Right lower leg: Edema present. Left lower leg: Edema present. Lymphadenopathy:      Head:      Right side of head: No submental, submandibular, tonsillar, preauricular or posterior auricular adenopathy. Left side of head: No submental, submandibular, tonsillar, preauricular, posterior auricular or occipital adenopathy. Cervical: No cervical adenopathy. Right cervical: No superficial, deep or posterior cervical adenopathy. Left cervical: No superficial, deep or posterior cervical adenopathy. Upper Body:      Right upper body: No pectoral adenopathy. Left upper body: No pectoral adenopathy. Skin:     General: Skin is warm and dry. Coloration: Skin is not pale. Findings: No erythema or rash. Neurological:      Mental Status: He is alert and oriented to person, place, and time. Cranial Nerves: No cranial nerve deficit. Sensory: No sensory deficit. Motor: Weakness present. No tremor, abnormal muscle tone or seizure activity. Coordination: Coordination normal.      Gait: Gait abnormal.      Deep Tendon Reflexes: Reflexes are normal and symmetric. Reflexes normal.   Psychiatric:         Behavior: Behavior normal.         Thought Content:  Thought content normal.         Judgment: Judgment normal. Lab Review   Admission on 09/12/2023, Discharged on 09/13/2023   Component Date Value Ref Range Status   • WBC 09/12/2023 8.08  4.31 - 10.16 Thousand/uL Final   • RBC 09/12/2023 4.10  3.88 - 5.62 Million/uL Final   • Hemoglobin 09/12/2023 12.7  12.0 - 17.0 g/dL Final   • Hematocrit 09/12/2023 40.7  36.5 - 49.3 % Final   • MCV 09/12/2023 99 (H)  82 - 98 fL Final   • MCH 09/12/2023 31.0  26.8 - 34.3 pg Final   • MCHC 09/12/2023 31.2 (L)  31.4 - 37.4 g/dL Final   • RDW 09/12/2023 14.5  11.6 - 15.1 % Final   • MPV 09/12/2023 9.3  8.9 - 12.7 fL Final   • Platelets 81/87/4272 271  149 - 390 Thousands/uL Final   • nRBC 09/12/2023 0  /100 WBCs Final   • Neutrophils Relative 09/12/2023 74  43 - 75 % Final   • Immat GRANS % 09/12/2023 0  0 - 2 % Final   • Lymphocytes Relative 09/12/2023 12 (L)  14 - 44 % Final   • Monocytes Relative 09/12/2023 8  4 - 12 % Final   • Eosinophils Relative 09/12/2023 5  0 - 6 % Final   • Basophils Relative 09/12/2023 1  0 - 1 % Final   • Neutrophils Absolute 09/12/2023 5.97  1.85 - 7.62 Thousands/µL Final   • Immature Grans Absolute 09/12/2023 0.03  0.00 - 0.20 Thousand/uL Final   • Lymphocytes Absolute 09/12/2023 0.96  0.60 - 4.47 Thousands/µL Final   • Monocytes Absolute 09/12/2023 0.67  0.17 - 1.22 Thousand/µL Final   • Eosinophils Absolute 09/12/2023 0.41  0.00 - 0.61 Thousand/µL Final   • Basophils Absolute 09/12/2023 0.04  0.00 - 0.10 Thousands/µL Final   • Sodium 09/12/2023 136  135 - 147 mmol/L Final   • Potassium 09/12/2023 5.6 (H)  3.5 - 5.3 mmol/L Final   • Chloride 09/12/2023 103  96 - 108 mmol/L Final   • CO2 09/12/2023 28  21 - 32 mmol/L Final   • ANION GAP 09/12/2023 5  mmol/L Final   • BUN 09/12/2023 15  5 - 25 mg/dL Final   • Creatinine 09/12/2023 1.10  0.60 - 1.30 mg/dL Final    Standardized to IDMS reference method   • Glucose 09/12/2023 100  65 - 140 mg/dL Final    If the patient is fasting, the ADA then defines impaired fasting glucose as > 100 mg/dL and diabetes as > or equal to 123 mg/dL. • Calcium 09/12/2023 9.3  8.4 - 10.2 mg/dL Final   • AST 09/12/2023 14  13 - 39 U/L Final   • ALT 09/12/2023 14  7 - 52 U/L Final    Specimen collection should occur prior to Sulfasalazine administration due to the potential for falsely depressed results. • Alkaline Phosphatase 09/12/2023 99  34 - 104 U/L Final   • Total Protein 09/12/2023 6.5  6.4 - 8.4 g/dL Final   • Albumin 09/12/2023 3.5  3.5 - 5.0 g/dL Final   • Total Bilirubin 09/12/2023 0.44  0.20 - 1.00 mg/dL Final    Use of this assay is not recommended for patients undergoing treatment with eltrombopag due to the potential for falsely elevated results. N-acetyl-p-benzoquinone imine (metabolite of Acetaminophen) will generate erroneously low results in samples for patients that have taken an overdose of Acetaminophen. • eGFR 09/12/2023 62  ml/min/1.73sq m Final   • Magnesium 09/12/2023 1.9  1.9 - 2.7 mg/dL Final   • hs TnI 0hr 09/12/2023 5  "Refer to ACS Flowchart"- see link ng/L Final    Comment:                                              Initial (time 0) result  If >=50 ng/L, Myocardial injury suggested ;  Type of myocardial injury and treatment strategy  to be determined. If 5-49 ng/L, a delta result at 2 hours and or 4 hours will be needed to further evaluate. If <4 ng/L, and chest pain has been >3 hours since onset, patient may qualify for discharge based on the HEART score in the ED. If <5 ng/L and <3hours since onset of chest pain, a delta result at 2 hours will be needed to further evaluate. HS Troponin 99th Percentile URL of a Health Population=12 ng/L with a 95% Confidence Interval of 8-18 ng/L. Second Troponin (time 2 hours)  If calculated delta >= 20 ng/L,  Myocardial injury suggested ; Type of myocardial injury and treatment strategy to be determined. If 5-49 ng/L and the calculated delta is 5-19 ng/L, consult medical service for evaluation.   Continue evaluation for ischemia on ecg and other possible etiology and repeat hs troponin at 4 hours. If delta                            is <5 ng/L at 2 hours, consider discharge based on risk stratification via the HEART score (if in ED), or BETTY risk score in IP/Observation. HS Troponin 99th Percentile URL of a Health Population=12 ng/L with a 95% Confidence Interval of 8-18 ng/L. • BNP 09/12/2023 115 (H)  0 - 100 pg/mL Final   • hs TnI 2hr 09/12/2023 4  "Refer to ACS Flowchart"- see link ng/L Final    Comment:                                              Initial (time 0) result  If >=50 ng/L, Myocardial injury suggested ;  Type of myocardial injury and treatment strategy  to be determined. If 5-49 ng/L, a delta result at 2 hours and or 4 hours will be needed to further evaluate. If <4 ng/L, and chest pain has been >3 hours since onset, patient may qualify for discharge based on the HEART score in the ED. If <5 ng/L and <3hours since onset of chest pain, a delta result at 2 hours will be needed to further evaluate. HS Troponin 99th Percentile URL of a Health Population=12 ng/L with a 95% Confidence Interval of 8-18 ng/L. Second Troponin (time 2 hours)  If calculated delta >= 20 ng/L,  Myocardial injury suggested ; Type of myocardial injury and treatment strategy to be determined. If 5-49 ng/L and the calculated delta is 5-19 ng/L, consult medical service for evaluation. Continue evaluation for ischemia on ecg and other possible etiology and repeat hs troponin at 4 hours. If delta                            is <5 ng/L at 2 hours, consider discharge based on risk stratification via the HEART score (if in ED), or BETTY risk score in IP/Observation. HS Troponin 99th Percentile URL of a Health Population=12 ng/L with a 95% Confidence Interval of 8-18 ng/L.    • Delta 2hr hsTnI 09/12/2023 -1  <20 ng/L Final   • POC Glucose 09/12/2023 150 (H)  65 - 140 mg/dl Final   • Sodium 09/13/2023 138  135 - 147 mmol/L Final   • Potassium 09/13/2023 4.2  3.5 - 5.3 mmol/L Final   • Chloride 09/13/2023 106  96 - 108 mmol/L Final   • CO2 09/13/2023 25  21 - 32 mmol/L Final   • ANION GAP 09/13/2023 7  mmol/L Final   • BUN 09/13/2023 11  5 - 25 mg/dL Final   • Creatinine 09/13/2023 0.75  0.60 - 1.30 mg/dL Final    Standardized to IDMS reference method   • Glucose 09/13/2023 100  65 - 140 mg/dL Final    If the patient is fasting, the ADA then defines impaired fasting glucose as > 100 mg/dL and diabetes as > or equal to 123 mg/dL. • Glucose, Fasting 09/13/2023 100 (H)  65 - 99 mg/dL Final   • Calcium 09/13/2023 9.3  8.4 - 10.2 mg/dL Final   • eGFR 09/13/2023 86  ml/min/1.73sq m Final   • WBC 09/13/2023 6.38  4.31 - 10.16 Thousand/uL Final   • RBC 09/13/2023 4.03  3.88 - 5.62 Million/uL Final   • Hemoglobin 09/13/2023 12.8  12.0 - 17.0 g/dL Final   • Hematocrit 09/13/2023 39.4  36.5 - 49.3 % Final   • MCV 09/13/2023 98  82 - 98 fL Final   • MCH 09/13/2023 31.8  26.8 - 34.3 pg Final   • MCHC 09/13/2023 32.5  31.4 - 37.4 g/dL Final   • RDW 09/13/2023 14.4  11.6 - 15.1 % Final   • Platelets 96/56/2774 231  149 - 390 Thousands/uL Final   • MPV 09/13/2023 9.3  8.9 - 12.7 fL Final   • POC Glucose 09/13/2023 97  65 - 140 mg/dl Final   • Site 09/13/2023 Pleural Left   Final   • Color, Fluid 09/13/2023 Light Yellow  Clear, Colorless,Yellow Final   • Clarity, Fluid 09/13/2023 Clear  Clear Final   • WBC, Fluid 09/13/2023 478  /ul Final    The reference range and other method performance specifications have not been established for this body fluid. The test result must be integrated into the clinical context for interpretation.    • Body Fluid Culture, Sterile 09/13/2023 No growth   Final   • Gram Stain Result 09/13/2023 1+ Mononuclear Cells   Final   • Gram Stain Result 09/13/2023 No organisms seen   Final   • Glucose, Fluid 09/13/2023 119  mg/dL Final    The reference range is not defined for this body fluid test. The test result must be integrated into the clinical context for interpretation. • LD, Fluid 09/13/2023 157  U/L Final    The reference range is not defined for this body fluid test. The test result must be integrated into the clinical context for interpretation. • Case Report 09/13/2023    Final                    Value:Non-gynecologic Cytology                          Case: [id number]                                  Authorizing Provider:  Kinsey Metcalf PA-C         Collected:           09/13/2023 [id number]              Ordering Location:     [address] Received:            09/13/2023 [address] [facility]                                                                      Pathologist:           Devendra Godfrey MD                                                    Specimens:   A) - Thoracentesis, Left                                                                            B) - Thoracentesis, Cell Block, Left                                                      • Final Diagnosis 09/13/2023    Final                    Value: This result contains rich text formatting which cannot be displayed here. • Gross Description 09/13/2023    Final                    Value: This result contains rich text formatting which cannot be displayed here. • Additional Information 09/13/2023    Final                    Value: This result contains rich text formatting which cannot be displayed here. • [address] [place] BODY FLUID 09/13/2023 7.5   Final    The reference range and other method performance specifications have not been established for this body fluid. The test result must be integrated into the clinical context for interpretation. • Protein, Fluid 09/13/2023 4.5  g/dL Final    The reference range is not defined for this body fluid test. The test result must be integrated into the clinical context for interpretation.    • Total Counted 09/13/2023 100   Final   • Neutrophils % (Fluid) 09/13/2023 19  % Final   • Lymphs % (Fluid) 09/13/2023 62  % Final • Eosinophils % (Fluid) 09/13/2023 3  % Final   • Mesothelial % (Fluid) 09/13/2023 2  % Final   • Histiocyte % (Fluid) 09/13/2023 6  % Final   • Monocytes % (Fluid) 09/13/2023 8  % Final   • POC Glucose 09/13/2023 135  65 - 140 mg/dl Final   • POC Glucose 09/13/2023 135  65 - 140 mg/dl Final   • Ventricular Rate 09/12/2023 66  BPM Final   • Atrial Rate 09/12/2023 366  BPM Final   • QRSD Interval 09/12/2023 88  ms Final   • QT Interval 09/12/2023 382  ms Final   • QTC Interval 09/12/2023 400  ms Final   • QRS Axis 09/12/2023 -7  degrees Final   • T Wave Axis 09/12/2023 33  degrees Final   Orders Only on 08/01/2023   Component Date Value Ref Range Status   • 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 Lung cancer is the leading cause of cancer deaths worldwide. Cigarette smoking causes most lung cancer, but it can also develop in people who do not smoke. Types of lung cancer: The main differences between the 2 major types is the cells the cancer starts in and how it grows:  Non-small cell lung cancer (NSCLC)  is the most common type of lung cancer. Adenocarcinoma, squamous cell carcinoma, and large cell carcinoma are the examples of NSCLC. Small cell lung cancer (SCLC)  is less common and is sometimes called oat cell cancer. These types of cells are small and round and can be more aggressive than NSCLC. Common signs and symptoms:   Chest pain    Shortness of breath or wheezing    A cough that will not go away, and gets worse over time    Coughing up blood    Hoarseness    Loss of appetite or weight loss without trying    Headache    Call your local emergency number (911 in the [address]) if:   You have severe chest pain when you take a deep breath or cough. You have new or worse trouble breathing.     Seek care immediately if:   You cannot think clearly. You cough up blood, or more blood than before. Your lips or nails look blue or pale. Call your doctor or oncologist if:   You have a fever. You are vomiting and cannot keep food or liquids down. You have questions or concerns about your condition or care. Treatment for lung cancer  may include the following:  Surgery  may be needed to remove the lung cancer. Part of your lymph nodes may be removed to check for signs of cancer. Surgery may also be needed if the cancer cannot be removed completely. In this case surgery may help treat complications or decrease your symptoms. Radiation therapy  uses beams of intense energy, such as x-rays, to shrink or kill cancer cells. Chemotherapy medicines  are used to kill cancer cells. Chemo may be given as a pill or in an IV. Chemo can be used by itself, before with or after surgery, and with radiation. Targeted medicine therapy  focuses on specific targets inside cancer cells. Immunotherapy  stimulates your immune system to fight the cancer. Cancer cells produce substances that help them hide from your immune system. Immunotherapy can block these substances and help your body identify the cancer cells so they can be destroyed. Lower your risk for lung cancer:   Do not smoke, and avoid secondhand smoke. Nicotine and other chemicals in cigarettes and cigars can cause lung damage. Ask your healthcare provider for information if you currently smoke and need help to quit. E-cigarettes or smokeless tobacco still contain nicotine. Talk to your healthcare provider before you use these products. Have lung cancer screening, if recommended. Lung cancer screening is a test done every year to find lung cancer early. Screening is different from diagnosis because screening is used before you have any signs or symptoms.  Screening is offered to adults aged 48 to 80 who have at least a 20-pack year history of smoking. Pack-years are the number of cigarette packs you smoked multiplied by the number of years you smoked. Examples are 1 pack of cigarettes each day for 20 years, or 2 packs each day for 10 years. Lung cancer screening has benefits and risks. Talk with your healthcare provider about the benefits and risks to help you decide if lung cancer screening is right for you. Have your home tested for radon. Do this especially if you live in an area where radon is a known problem. Wear protective gear  if you work with substances or chemicals that can cause cancer. Avoid exposure as much as you can. Follow safety precautions. Eat a variety of healthy foods. Healthy foods include fruits, vegetables, whole-grain breads, low-fat dairy products, beans, lean meats, and fish. Be physically active throughout the day. Physical activity such as exercise can help increase your energy level and fight illness. Be physically active for at least 30 minutes per day, on most days of the week. Include aerobic activity, such as walking or riding a bicycle. Also include strength training at least 2 times each week. Your healthcare providers can help you create a physical activity plan. Follow up with your doctor or oncologist as directed: You will need to see your oncologist for ongoing treatment. Write down your questions so you remember to ask them during your visits. © Copyright [employer] 2022 Information is for End User's use only and may not be sold, redistributed or otherwise used for commercial purposes. The above information is an  only. It is not intended as medical advice for individual conditions or treatments. Talk to your doctor, nurse or pharmacist before following any medical regimen to see if it is safe and effective for you. Bianca Meza MD        "This note has been constructed using a voice recognition system. Therefore there may be syntax, spelling, and/or grammatical errors.  Please call if you have any questions. "

## 2023-09-19 NOTE — TELEPHONE ENCOUNTER
Call to patient home. Left message on voicemail  to inform that Dr. Jeremy Headley wants him to see pulmonologist first and we are cancelling CT scan of chest that is scheduled for 9/25/23. Asked him to please contact office to confirm that he has received message.   Call to central scheduling  to cancel appointment for 9/25

## 2023-09-19 NOTE — PATIENT INSTRUCTIONS
Lung Cancer   AMBULATORY CARE:   Lung cancer  is a type of cancer that starts in the lungs. Lung cancer is the leading cause of cancer deaths worldwide. Cigarette smoking causes most lung cancer, but it can also develop in people who do not smoke. Types of lung cancer: The main differences between the 2 major types is the cells the cancer starts in and how it grows:  Non-small cell lung cancer (NSCLC)  is the most common type of lung cancer. Adenocarcinoma, squamous cell carcinoma, and large cell carcinoma are the examples of NSCLC. Small cell lung cancer (SCLC)  is less common and is sometimes called oat cell cancer. These types of cells are small and round and can be more aggressive than NSCLC. Common signs and symptoms:   Chest pain    Shortness of breath or wheezing    A cough that will not go away, and gets worse over time    Coughing up blood    Hoarseness    Loss of appetite or weight loss without trying    Headache    Call your local emergency number (911 in the [address]) if:   You have severe chest pain when you take a deep breath or cough. You have new or worse trouble breathing. Seek care immediately if:   You cannot think clearly. You cough up blood, or more blood than before. Your lips or nails look blue or pale. Call your doctor or oncologist if:   You have a fever. You are vomiting and cannot keep food or liquids down. You have questions or concerns about your condition or care. Treatment for lung cancer  may include the following:  Surgery  may be needed to remove the lung cancer. Part of your lymph nodes may be removed to check for signs of cancer. Surgery may also be needed if the cancer cannot be removed completely. In this case surgery may help treat complications or decrease your symptoms. Radiation therapy  uses beams of intense energy, such as x-rays, to shrink or kill cancer cells. Chemotherapy medicines  are used to kill cancer cells.  Chemo may be given as a pill or in an IV. Chemo can be used by itself, before with or after surgery, and with radiation. Targeted medicine therapy  focuses on specific targets inside cancer cells. Immunotherapy  stimulates your immune system to fight the cancer. Cancer cells produce substances that help them hide from your immune system. Immunotherapy can block these substances and help your body identify the cancer cells so they can be destroyed. Lower your risk for lung cancer:   Do not smoke, and avoid secondhand smoke. Nicotine and other chemicals in cigarettes and cigars can cause lung damage. Ask your healthcare provider for information if you currently smoke and need help to quit. E-cigarettes or smokeless tobacco still contain nicotine. Talk to your healthcare provider before you use these products. Have lung cancer screening, if recommended. Lung cancer screening is a test done every year to find lung cancer early. Screening is different from diagnosis because screening is used before you have any signs or symptoms. Screening is offered to adults aged 48 to 80 who have at least a 20-pack year history of smoking. Pack-years are the number of cigarette packs you smoked multiplied by the number of years you smoked. Examples are 1 pack of cigarettes each day for 20 years, or 2 packs each day for 10 years. Lung cancer screening has benefits and risks. Talk with your healthcare provider about the benefits and risks to help you decide if lung cancer screening is right for you. Have your home tested for radon. Do this especially if you live in an area where radon is a known problem. Wear protective gear  if you work with substances or chemicals that can cause cancer. Avoid exposure as much as you can. Follow safety precautions. Eat a variety of healthy foods. Healthy foods include fruits, vegetables, whole-grain breads, low-fat dairy products, beans, lean meats, and fish.          Be physically active throughout the day. Physical activity such as exercise can help increase your energy level and fight illness. Be physically active for at least 30 minutes per day, on most days of the week. Include aerobic activity, such as walking or riding a bicycle. Also include strength training at least 2 times each week. Your healthcare providers can help you create a physical activity plan. Follow up with your doctor or oncologist as directed: You will need to see your oncologist for ongoing treatment. Write down your questions so you remember to ask them during your visits. © Copyright Travis Sin 2022 Information is for End User's use only and may not be sold, redistributed or otherwise used for commercial purposes. The above information is an  only. It is not intended as medical advice for individual conditions or treatments. Talk to your doctor, nurse or pharmacist before following any medical regimen to see if it is safe and effective for you.

## 2023-09-19 NOTE — ASSESSMENT & PLAN NOTE
Lab Results   Component Value Date    HGBA1C 6.5 (H) 06/12/2023   Blood sugar very well controlled on the basis of A1c continue diabetic diet continue metformin hypoglycemia protocol in place yearly dilated eye exam foot exam normal

## 2023-09-19 NOTE — TELEPHONE ENCOUNTER
Called patient on cell. He is aware of the thoro for 09/21 at [facility] [place].

## 2023-09-20 ENCOUNTER — OFFICE VISIT (OUTPATIENT)
Dept: PODIATRY | Facility: CLINIC | Age: 83
End: 2023-09-20
Payer: COMMERCIAL

## 2023-09-20 ENCOUNTER — APPOINTMENT (OUTPATIENT)
Dept: LAB | Facility: HOSPITAL | Age: 83
End: 2023-09-20
Payer: COMMERCIAL

## 2023-09-20 VITALS
SYSTOLIC BLOOD PRESSURE: 130 MMHG | HEIGHT: 70 IN | BODY MASS INDEX: 28.77 KG/M2 | WEIGHT: 201 LBS | RESPIRATION RATE: 17 BRPM | DIASTOLIC BLOOD PRESSURE: 78 MMHG

## 2023-09-20 DIAGNOSIS — E87.5 HYPERKALEMIA: ICD-10-CM

## 2023-09-20 DIAGNOSIS — E11.40 TYPE 2 DIABETES MELLITUS WITH DIABETIC NEUROPATHY, WITHOUT LONG-TERM CURRENT USE OF INSULIN (HCC): ICD-10-CM

## 2023-09-20 DIAGNOSIS — B35.1 ONYCHOMYCOSIS: ICD-10-CM

## 2023-09-20 DIAGNOSIS — E11.42 DIABETIC POLYNEUROPATHY ASSOCIATED WITH TYPE 2 DIABETES MELLITUS (HCC): Primary | ICD-10-CM

## 2023-09-20 DIAGNOSIS — I70.209 PERIPHERAL ARTERIOSCLEROSIS (HCC): ICD-10-CM

## 2023-09-20 DIAGNOSIS — M79.671 PAIN IN BOTH FEET: ICD-10-CM

## 2023-09-20 DIAGNOSIS — M79.672 PAIN IN BOTH FEET: ICD-10-CM

## 2023-09-20 DIAGNOSIS — B35.3 TINEA PEDIS OF BOTH FEET: ICD-10-CM

## 2023-09-20 LAB
ANION GAP SERPL CALCULATED.3IONS-SCNC: 5 MMOL/L
BUN SERPL-MCNC: 15 MG/DL (ref 5–25)
CALCIUM SERPL-MCNC: 9.2 MG/DL (ref 8.4–10.2)
CHLORIDE SERPL-SCNC: 100 MMOL/L (ref 96–108)
CO2 SERPL-SCNC: 31 MMOL/L (ref 21–32)
CREAT SERPL-MCNC: 0.86 MG/DL (ref 0.6–1.3)
EST. AVERAGE GLUCOSE BLD GHB EST-MCNC: 151 MG/DL
GFR SERPL CREATININE-BSD FRML MDRD: 81 ML/MIN/1.73SQ M
GLUCOSE P FAST SERPL-MCNC: 127 MG/DL (ref 65–99)
HBA1C MFR BLD: 6.9 %
POTASSIUM SERPL-SCNC: 4.9 MMOL/L (ref 3.5–5.3)
SODIUM SERPL-SCNC: 136 MMOL/L (ref 135–147)

## 2023-09-20 PROCEDURE — 83036 HEMOGLOBIN GLYCOSYLATED A1C: CPT

## 2023-09-20 PROCEDURE — 36415 COLL VENOUS BLD VENIPUNCTURE: CPT

## 2023-09-20 PROCEDURE — 80048 BASIC METABOLIC PNL TOTAL CA: CPT

## 2023-09-20 PROCEDURE — 99213 OFFICE O/P EST LOW 20 MIN: CPT | Performed by: PODIATRIST

## 2023-09-20 RX ORDER — ATENOLOL 25 MG/1
TABLET ORAL
COMMUNITY
Start: 2023-07-28

## 2023-09-20 RX ORDER — ASPIRIN 81 MG/1
TABLET ORAL
COMMUNITY
Start: 2023-07-28

## 2023-09-20 NOTE — PROGRESS NOTES
Assessment/Plan:  Patient has pain in his feet and toes with ambulation.  He has mycosis of nail and skin. He has plantar pre ulcerative skin lesions.     Plan.  Chart reviewed. Patient educated on care of the diabetic foot. Diabetic foot exam performed.  Mycotic nails debrided.  cream ordered.  Patient will use daily, he will apply cream to feet b.i.d. For 4 weeks.  Calluses debrided.  Procedures performed without pain or complication.           Subjective:  Patient has pain in his feet with ambulation.  No history of trauma .  He has pain when he wears shoes.  He has pain in his toes.      Patient ID: Spenser Clayton is [id number]. o. male.     Patient is diabetic. Darnell Baca has pain in his feet and toes with ambulation.  Has pain from calluses.  He has ingrown toenails and dry scaly skin.  He is requesting refill of topical antifungal.        Review of Systems   Constitutional: No fever or chills, feels well, no tiredness, no recent weight loss or weight gain.  Eyes: No complaints of red eyes, no eyesight problems.   ENT: no complaints of loss of hearing, no nosebleeds, no sore throat.  Cardiovascular: No complaints of chest pain, no palpitations, no leg claudication or lower extremity edema.  Respiratory: No complaints of shortness of breath, no wheezing, no cough.  Gastrointestinal: No complaints of abdominal pain, no constipation, no nausea or vomiting, no diarrhea or bloody stools.  Genitourinary: No complaints of dysuria or incontinence, no hesitancy, no nocturia.  Musculoskeletal: limb pain, but-- as noted in HPI.  Integumentary: No complaints of skin rash or lesion, no itching or dry skin, no skin wounds.  Neurological: No complaints of headache, no confusion, no numbness or tingling, no dizziness.  Psychiatric: No suicidal thoughts, no anxiety, no depression.  Endocrine: No muscle weakness, no frequent urination, no excessive thirst, no feelings of weakness.      Active Problems  1. 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 Arthritis (716.90) (M19.90)   3. Atherosclerosis of arteries of extremities (440.20) (I70.209)   4. Calcaneal spur (726.73) (M77.30)   5. Callus (700) (L84)   6. 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 Diabetes mellitus with neuropathy (250.60,357.2) (E11.40)   8. Diabetic neuropathy (250.60,357.2) (E11.40)   9. Hypercholesterolemia (272.0) (E78.00)   10. Hypertension (401.9) (I10)   11. 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 Onychomycosis (110.1) (B35.1)   14. Orthopedic aftercare (V54.9) (Z47.89)   15. Pain in both feet (729.5) (M79.671,M79.672)   16. Pain in extremity (729.5) (M79.609)   17. Pes planus, congenital (754.61) (Q66.50)   18. Plantar fibromatosis (728.71) (M72.2)   19. Plantar wart (078.12) (B07.0)   20. Prostate cancer (185) (C61)   21. Sprain of right shoulder (840.9) (S43.401A)   22. Tinea pedis (110.4) (B35.3)     Past Medical History   · Orthopedic aftercare (V54.9) (Z47.89)     Surgical History   · History of Gastric Surgery   · History of Hernia Repair   · History of Previous Stent Placement Total Number Performed ___     The surgical history was reviewed and updated today.       Family History  Family History    · Family history of Arthritis (V17.7)   · Family history of Cancer     The family history was reviewed and updated today.       Social History      · Beer Consumption (___ Bottles Per Day)   · Daily Coffee Consumption (1  Cups/Day)   · Former smoker (U34.51) (Z87.891)  The social history was reviewed and updated today. Allergies  1. No Known Drug Allergies      Physical Exam  Left Foot: Appearance: Normal except as noted: excessive pronation-- and-- pes planus. Right Foot: Appearance: Normal except as noted: excessive pronation-- and-- pes planus.    Left Ankle: ROM: limited ROM in all planes   Right Ankle: ROM: limited ROM in all planes   Neurological Exam: performed. Light touch was intact bilaterally. Vibratory sensation was intact bilaterally. Response to monofilament test was intact bilaterally. Deep tendon reflexes: patellar reflex present bilaterally-- and-- achilles reflex present bilaterally. Vascular Exam: performed Dorsalis pedis pulses were One/4 bilaterally. Posterior tibial pulses were One/4 bilaterally. Elevation Pallor: present bilaterally. Capillary refill time was greater than 3 seconds bilaterally-- and-- Q. 9 findings bilateral. Negative digital hair noted. Positive abnormal cooling noted. Toenails: All of the toenails were elongated,-- hypertrophied,-- discolored,-- ingrown,-- shown to have subungual debris,-- tender-- and-- Severely mycotic with onychauxis.    Socks and shoes removed, Right Foot Findings: swollen, erythematous and dry.  The sensory exam showed diminished vibratory sensation at the level of the toes. Diminished tactile sensation with monofilament testing throughout the right foot.  Socks and shoes removed, Left Foot Findings: swollen, erythematous and dry.  The sensory exam showed diminished vibratory sensation at the level of the toes. Diminished tactile sensation with monofilament testing throughout the left foot. Capillary refills findings on the right were delayed in the toes.  Pulses:  1+ in the posterior tibialis on the right  1+ in the dorsalis pedis on the right.  Capillary refills findings on the left were delayed in the toes.  Pulses:  1+ in the posterior tibialis on the left  1+ in the dorsalis pedis on the left.  Assign Risk Category: 2: Loss of protective sensation with or without weakness, deformity, callus, pre-ulcer, or history of ulceration. High risk. Hyperkeratosis: present on both first toes,-- present on both fifth sub metatarsals-- and-- [place] tinea pedis, bilateral, is noted. Shoe Gear Evaluation: performed (). Right Foot: width: d-- and-- length: 11.  Left Foot: width: d-- and-- length: 11. Recommendation(s): athletic shoes     Patient's shoes and socks removed. Right Foot/Ankle   Right Foot Inspection  Skin Exam: dry skin, callus and callus               Toe Exam: swelling  Sensory   Vibration: diminished  Proprioception: diminished      Vascular  Capillary refills: elevated        Left Foot/Ankle  Left Foot Inspection  Skin Exam: dry skin and callus              Toe Exam: swelling and erythema                   Sensory   Vibration: diminished  Proprioception: diminished     Vascular  Capillary refills: elevated     Right Foot/Ankle   Right Foot Inspection        Sensory   Vibration: diminished  Proprioception: diminished  Monofilament testing: diminished     Vascular  Capillary refills: < 3 seconds              Left Foot/Ankle  Left Foot Inspection        Sensory   Vibration: diminished  Proprioception: diminished  Monofilament testing: diminished     Vascular  Capillary refills: < 3 seconds         Patient's shoes and socks removed.     Right Foot/Ankle   Right Foot Inspection        Toe Exam: right toe deformity.      Sensory   Vibration: diminished      Vascular  Capillary refills: < 3 seconds              Left Foot/Ankle  Left Foot Inspection        Toe Exam: left toe deformity.      Sensory   Vibration: diminished      Vascular  Capillary refills: < 3 seconds           Assign Risk Category  Deformity present  Loss of protective sensation  Weak pulses  Risk: 2

## 2023-09-21 ENCOUNTER — OFFICE VISIT (OUTPATIENT)
Dept: PULMONOLOGY | Facility: CLINIC | Age: 83
End: 2023-09-21
Payer: COMMERCIAL

## 2023-09-21 ENCOUNTER — HOSPITAL ENCOUNTER (OUTPATIENT)
Dept: RADIOLOGY | Facility: HOSPITAL | Age: 83
Discharge: HOME | End: 2023-09-21
Payer: COMMERCIAL

## 2023-09-21 VITALS
OXYGEN SATURATION: 96 % | WEIGHT: 200.6 LBS | SYSTOLIC BLOOD PRESSURE: 116 MMHG | HEART RATE: 75 BPM | TEMPERATURE: 96.7 F | BODY MASS INDEX: 28.78 KG/M2 | DIASTOLIC BLOOD PRESSURE: 74 MMHG

## 2023-09-21 DIAGNOSIS — J90 PLEURAL EFFUSION: Primary | ICD-10-CM

## 2023-09-21 DIAGNOSIS — C34.92 SQUAMOUS CELL CARCINOMA OF LEFT LUNG (HCC): Chronic | ICD-10-CM

## 2023-09-21 DIAGNOSIS — J90 PLEURAL EFFUSION: ICD-10-CM

## 2023-09-21 LAB
APPEARANCE FLD: CLEAR
COLOR FLD: YELLOW
GLUCOSE FLD-MCNC: 148 MG/DL
LDH FLD L TO P-CCNC: 168 U/L
LYMPHOCYTES NFR BLD AUTO: 36 %
MONO+MESO NFR FLD MANUAL: 28 %
MONOCYTES NFR BLD AUTO: 10 %
NEUTS SEG NFR BLD AUTO: 26 %
PROT FLD-MCNC: 4.1 G/DL
SITE: NORMAL
TOTAL CELLS COUNTED SPEC: 100
TRIGL FLD-MCNC: 16 MG/DL
WBC # FLD MANUAL: 753 /UL

## 2023-09-21 PROCEDURE — 88305 TISSUE EXAM BY PATHOLOGIST: CPT | Performed by: PATHOLOGY

## 2023-09-21 PROCEDURE — 82945 GLUCOSE OTHER FLUID: CPT | Performed by: STUDENT IN AN ORGANIZED HEALTH CARE EDUCATION/TRAINING PROGRAM

## 2023-09-21 PROCEDURE — 84478 ASSAY OF TRIGLYCERIDES: CPT | Performed by: STUDENT IN AN ORGANIZED HEALTH CARE EDUCATION/TRAINING PROGRAM

## 2023-09-21 PROCEDURE — 83615 LACTATE (LD) (LDH) ENZYME: CPT | Performed by: STUDENT IN AN ORGANIZED HEALTH CARE EDUCATION/TRAINING PROGRAM

## 2023-09-21 PROCEDURE — 87102 FUNGUS ISOLATION CULTURE: CPT | Performed by: STUDENT IN AN ORGANIZED HEALTH CARE EDUCATION/TRAINING PROGRAM

## 2023-09-21 PROCEDURE — 32555 ASPIRATE PLEURA W/ IMAGING: CPT | Performed by: STUDENT IN AN ORGANIZED HEALTH CARE EDUCATION/TRAINING PROGRAM

## 2023-09-21 PROCEDURE — 89051 BODY FLUID CELL COUNT: CPT | Performed by: STUDENT IN AN ORGANIZED HEALTH CARE EDUCATION/TRAINING PROGRAM

## 2023-09-21 PROCEDURE — 84311 SPECTROPHOTOMETRY: CPT | Performed by: STUDENT IN AN ORGANIZED HEALTH CARE EDUCATION/TRAINING PROGRAM

## 2023-09-21 PROCEDURE — 87070 CULTURE OTHR SPECIMN AEROBIC: CPT | Performed by: STUDENT IN AN ORGANIZED HEALTH CARE EDUCATION/TRAINING PROGRAM

## 2023-09-21 PROCEDURE — 87205 SMEAR GRAM STAIN: CPT | Performed by: STUDENT IN AN ORGANIZED HEALTH CARE EDUCATION/TRAINING PROGRAM

## 2023-09-21 PROCEDURE — 71046 X-RAY EXAM CHEST 2 VIEWS: CPT

## 2023-09-21 PROCEDURE — 88112 CYTOPATH CELL ENHANCE TECH: CPT | Performed by: PATHOLOGY

## 2023-09-21 PROCEDURE — 84157 ASSAY OF PROTEIN OTHER: CPT | Performed by: STUDENT IN AN ORGANIZED HEALTH CARE EDUCATION/TRAINING PROGRAM

## 2023-09-21 NOTE — H&P (VIEW-ONLY)
H&P Exam - Imani Little 82 y.o. male MRN: [id number]    Unit/Bed#:  Encounter: [id number]    Assessment:  1. Pleural Effusion - High concerns for malignant  - Negative cytology on recent thoracentesis    Plan:  Bedside thoracentesis - Drained 1800 cc - Repeat CT to evaluate SCC  - Post procedure ok to resume Eliquis tonight  - Repeat CT of chest with contrast -   - Post procedure Xray  - Thoracentesis labs    History of Present Illness   Imani Little is a 82 y.o. 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     Review of Systems  Negative except per HPI    Historical Information   Past Medical History:   Diagnosis Date   • Abdominal pain    • Anxiety    • Arthritis    • Asthma    • Atrial fibrillation ([address])    • Back pain    • Bleeding ulcer    • Bronchitis    • Cancer [facility])     prostate 2011- radiation   • Cardiac disease     cardiac stent x1   • Cataract     starting   • Chronic diastolic (congestive) heart failure (HCC)    • Diabetes mellitus ([address])     boarderline diabetic    • GERD (gastroesophageal reflux disease)    • History of radiation therapy 2010    Prostate seeds (brachytherapy) and EBRT   • Hyperlipidemia    • Hypertension    • 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, bilateral    • Low back pain    • Lung cancer ([address])    • Lung mass    • Myocardial infarction ([address])     mild 1999   • Obesity    • Prostate cancer ([address])    • Shortness of breath    • Sleep apnea     sleep study 11/22   • Wears dentures     full set   • Wears glasses      Past Surgical History:   Procedure Laterality Date   • ABDOMINAL HERNIA REPAIR     • ABDOMINAL SURGERY      bleeding ulcer, cyst removed from abd   • COLONOSCOPY     • CORONARY ANGIOPLASTY WITH STENT PLACEMENT  1999    x1    • ESOPHAGOGASTRODUODENOSCOPY     • IR BIOPSY LUNG 10/05/2021   • IR THORACENTESIS  2021   • IR THORACENTESIS  2023   • IR THORACENTESIS  2023   • KNEE SURGERY Left    •  [address] INCL FLUOR GDNCE DX W/CELL WASHG SPX N/A 2021    Procedure: BRONCHOSCOPY FLEXIBLE;  Surgeon: Jeffery Restrepo MD;  [facility];  Service: Thoracic   • DE MEDIASTINOSCOPY WITH LYMPH NODE BIOPSY/IES N/A 2021    Procedure: Ritika Harnorth;  Surgeon: Jeffery Restrepo MD;  [facility];  Service: Thoracic   • PROSTATE SURGERY       Social History   Social History     Substance and Sexual Activity   Alcohol Use Not Currently   • Alcohol/week: 10.0 - 12.0 standard drinks of alcohol   • Types: 7 Glasses of wine, 3 - 5 Cans of beer per week    Comment: few beers day; glass of red wine at dinner     Social History     Substance and Sexual Activity   Drug Use Never     Social History     Tobacco Use   Smoking Status Former   • Packs/day: 1.00   • Years: 30.00   • Total pack years: 30.00   • Types: Cigarettes   • Quit date:    • Years since quittin.7   Smokeless Tobacco Never     E-Cigarette Use: Never User     E-Cigarette/Vaping Substances   • Nicotine No    • THC No    • CBD No    • Flavoring No    • Other No    • Unknown No        Family History: non-contributory    Meds/Allergies   all medications and allergies reviewed  No Known Allergies    Objective       Current Vitals:   Blood Pressure: 116/74 (23 1136)  Pulse: 75 (23 1136)  Temperature: (!) 96.7 °F (35.9 °C) (23 1136)  Weight - Scale: 91 kg (200 lb 9.6 oz) (23 1136)  SpO2: 96 % (23 1140)    Physical Exam  Gen: NAD  Neuro: AxO 3  Card: RRR, min JVD  Lungs: Diminished L base  Abdomen: Soft NT ND  Extremities: No edema      Counseling / Coordination of Care:   Greater than 50% of total time was spent with the patient and / or family counseling and / or coordination of care. A description of the counseling / coordination of care: 60 min.

## 2023-09-21 NOTE — H&P
H&P Exam - Karen Flower 82 y.o. male MRN: [id number]    Unit/Bed#:  Encounter: [id number]    Assessment:  1. Pleural Effusion - High concerns for malignant  - Negative cytology on recent thoracentesis    Plan:  Bedside thoracentesis - Drained 1800 cc - Repeat CT to evaluate SCC  - Post procedure ok to resume Eliquis tonight  - Repeat CT of chest with contrast -   - Post procedure Xray  - Thoracentesis labs    History of Present Illness   Karen Flower is a 82 y.o. 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     Review of Systems  Negative except per HPI    Historical Information   Past Medical History:   Diagnosis Date   • Abdominal pain    • Anxiety    • Arthritis    • Asthma    • Atrial fibrillation ([address])    • Back pain    • Bleeding ulcer    • Bronchitis    • Cancer [facility])     prostate 2011- radiation   • Cardiac disease     cardiac stent x1   • Cataract     starting   • Chronic diastolic (congestive) heart failure (HCC)    • Diabetes mellitus ([address])     boarderline diabetic    • GERD (gastroesophageal reflux disease)    • History of radiation therapy 2010    Prostate seeds (brachytherapy) and EBRT   • Hyperlipidemia    • Hypertension    • 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, bilateral    • Low back pain    • Lung cancer ([address])    • Lung mass    • Myocardial infarction ([address])     mild 1999   • Obesity    • Prostate cancer ([address])    • Shortness of breath    • Sleep apnea     sleep study 11/22   • Wears dentures     full set   • Wears glasses      Past Surgical History:   Procedure Laterality Date   • ABDOMINAL HERNIA REPAIR     • ABDOMINAL SURGERY      bleeding ulcer, cyst removed from abd   • COLONOSCOPY     • CORONARY ANGIOPLASTY WITH STENT PLACEMENT  1999    x1    • ESOPHAGOGASTRODUODENOSCOPY     • IR BIOPSY LUNG 10/05/2021   • IR THORACENTESIS  2021   • IR THORACENTESIS  2023   • IR THORACENTESIS  2023   • KNEE SURGERY Left    •  [address] INCL FLUOR GDNCE DX W/CELL WASHG SPX N/A 2021    Procedure: BRONCHOSCOPY FLEXIBLE;  Surgeon: Bryant Pringle MD;  [facility];  Service: Thoracic   • [place] MEDIASTINOSCOPY WITH LYMPH NODE BIOPSY/IES N/A 2021    Procedure: Teddy Graver;  Surgeon: Bryant Pringle MD;  [facility];  Service: Thoracic   • PROSTATE SURGERY       Social History   Social History     Substance and Sexual Activity   Alcohol Use Not Currently   • Alcohol/week: 10.0 - 12.0 standard drinks of alcohol   • Types: 7 Glasses of wine, 3 - 5 Cans of beer per week    Comment: few beers day; glass of red wine at dinner     Social History     Substance and Sexual Activity   Drug Use Never     Social History     Tobacco Use   Smoking Status Former   • Packs/day: 1.00   • Years: 30.00   • Total pack years: 30.00   • Types: Cigarettes   • Quit date:    • Years since quittin.7   Smokeless Tobacco Never     E-Cigarette Use: Never User     E-Cigarette/Vaping Substances   • Nicotine No    • THC No    • CBD No    • Flavoring No    • Other No    • Unknown No        Family History: non-contributory    Meds/Allergies   all medications and allergies reviewed  No Known Allergies    Objective       Current Vitals:   Blood Pressure: 116/74 (23 1136)  Pulse: 75 (23 1136)  Temperature: (!) 96.7 °F (35.9 °C) (23 1136)  Weight - Scale: 91 kg (200 lb 9.6 oz) (23 1136)  SpO2: 96 % (23 1140)    Physical Exam  Gen: NAD  Neuro: AxO 3  Card: RRR, min JVD  Lungs: Diminished L base  Abdomen: Soft NT ND  Extremities: No edema      Counseling / Coordination of Care:   Greater than 50% of total time was spent with the patient and / or family counseling and / or coordination of care. A description of the counseling / coordination of care: 60 min.

## 2023-09-21 NOTE — PROGRESS NOTES
Thoracentesis (Bedside)    Date/Time: 9/21/2023 11:40 AM    Performed by: Linda Edward MD  Authorized by: Linda Edward MD    Patient location:  Bedside  Consent:     Consent obtained:  Verbal and written    Consent given by:  Patient    Risks discussed:  Bleeding, infection and pneumothorax    Alternatives discussed:  No treatment  Universal protocol:     Patient identity confirmed:  Verbally with patient  Indications:     Procedure Purpose: diagnostic and therapeutic      Indications: pleural effusion    Procedure details:     Preparation: Patient was prepped and draped in usual sterile fashion      Standard thoracentesis cath kit used: Yes      Patient position:  Sitting    Laterality:  Left    Location:  Midscapular line    Puncture method:  Over-the-needle catheter    Ultrasound guidance: yes      Reason for ultrasound: Identify fluid collection and guide catheter placement.       Ultrasound image availability:  Not saved    Number of attempts:  1    Drainage color:  Vonda    Drainage characteristics:  Clear    Fluid removed amount:  1800  Post-procedure details:     Chest x-ray performed: yes

## 2023-09-22 ENCOUNTER — TELEPHONE (OUTPATIENT)
Age: 83
End: 2023-09-22

## 2023-09-24 LAB
BACTERIA SPEC BFLD CULT: NO GROWTH
GRAM STN SPEC: NORMAL

## 2023-09-25 LAB — FUNGUS SPEC CULT: NORMAL

## 2023-09-26 PROCEDURE — 88305 TISSUE EXAM BY PATHOLOGIST: CPT | Performed by: PATHOLOGY

## 2023-09-26 PROCEDURE — 88112 CYTOPATH CELL ENHANCE TECH: CPT | Performed by: PATHOLOGY

## 2023-09-28 ENCOUNTER — HOSPITAL ENCOUNTER (OUTPATIENT)
Dept: RADIOLOGY | Facility: HOSPITAL | Age: 83
Discharge: HOME | End: 2023-09-28
Attending: STUDENT IN AN ORGANIZED HEALTH CARE EDUCATION/TRAINING PROGRAM
Payer: COMMERCIAL

## 2023-09-28 DIAGNOSIS — C34.92 SQUAMOUS CELL CARCINOMA OF LEFT LUNG (HCC): Chronic | ICD-10-CM

## 2023-09-28 DIAGNOSIS — J90 PLEURAL EFFUSION: ICD-10-CM

## 2023-09-28 PROCEDURE — 71260 CT THORAX DX C+: CPT

## 2023-09-28 PROCEDURE — G1004 CDSM NDSC: HCPCS

## 2023-09-28 RX ADMIN — IOHEXOL 100 ML: 350 INJECTION, SOLUTION INTRAVENOUS at 12:36

## 2023-09-29 LAB — MISCELLANEOUS LAB TEST RESULT: NORMAL

## 2023-09-30 PROBLEM — Z00.00 ENCOUNTER FOR SUBSEQUENT ANNUAL WELLNESS VISIT IN MEDICARE PATIENT: Status: RESOLVED | Noted: 2023-08-01 | Resolved: 2023-09-30

## 2023-10-02 ENCOUNTER — RADIATION ONCOLOGY FOLLOW-UP (OUTPATIENT)
Dept: RADIATION ONCOLOGY | Facility: CLINIC | Age: 83
End: 2023-10-02
Attending: RADIOLOGY
Payer: COMMERCIAL

## 2023-10-02 VITALS
RESPIRATION RATE: 18 BRPM | TEMPERATURE: 97.6 F | WEIGHT: 198.85 LBS | DIASTOLIC BLOOD PRESSURE: 65 MMHG | HEIGHT: 70 IN | HEART RATE: 84 BPM | SYSTOLIC BLOOD PRESSURE: 110 MMHG | BODY MASS INDEX: 28.47 KG/M2 | OXYGEN SATURATION: 96 %

## 2023-10-02 DIAGNOSIS — C34.92 SQUAMOUS CELL CARCINOMA OF LEFT LUNG (HCC): Primary | Chronic | ICD-10-CM

## 2023-10-02 LAB — FUNGUS SPEC CULT: NORMAL

## 2023-10-02 PROCEDURE — 99211 OFF/OP EST MAY X REQ PHY/QHP: CPT | Performed by: RADIOLOGY

## 2023-10-02 PROCEDURE — 99214 OFFICE O/P EST MOD 30 MIN: CPT | Performed by: RADIOLOGY

## 2023-10-02 NOTE — PROGRESS NOTES
Tawanda Merritt 1940 is a 82 y.o. 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 The pt completed a course of SBRT to the LLL on 02/11/2022. Last seen in radiation oncology on 6/12/23.    9/12/23  CT Chest wo contrast  IMPRESSION:  Recurrent large left pleural effusion, similar to the prethoracentesis chest CT from 6/2/2023, with complete compressive atelectasis of the left lower lobe and inferior lingula. Extensive low-attenuation in the collapsed left lower lobe. While this could be due to endobronchial mucous and drowned lung, recurrent tumor cannot be excluded. Redemonstration of mild pulmonary fibrosis. Pulmonary artery enlargement which can be seen with pulmonary hypertension. 9/12/23-9/13/23  [facility]  Admitted for SOB and recurrent pleural effusion  IR thoracentesis completed with 1500cc clear yellow pleural fluid drained    9/13/23  Non-Gynecologic Cytology  A.-B. Thoracentesis, Left (Thin-prep and cell block preparations):    Negative for malignancy. Benign mesothelial cells and histiocytes. Scattered lymphocytes. Proteinaceous material.  Satisfactory for evaluation. 9/21/23  [facility]  Bedside thoracentesis - Drained 1800 cc     9/21/23 Non-Gynecologic Cytology  Final Diagnosis  A.-B. Pleural Fluid, Left (Thin-prep and cell block preparations):  Negative for malignancy. Benign mesothelial cells and histiocytes. Scattered lymphocytes. Proteinaceous material.  Satisfactory for evaluation. 9/28/23  CT of chest  IMPRESSION:  Since September 12, 2023, decrease in size of the still large left pleural effusion with commensurate decrease in left-sided passive atelectasis. Persistent hypoattenuation in the collapsed portion of the left lower lobe.  Given that this is in the vicinity of the treated tumor, and has increased in size since December 2022, short interval contrast-enhanced CT is advised following resolution of the pleural effusion for further assessment. Upcoming:  10/20/23  Destiny Bowman, Medical Oncology            Follow up visit     Oncology History   Squamous cell carcinoma of lung ([address])   10/5/2021 Initial Diagnosis    Squamous cell carcinoma of lung ([address])     10/5/2021 Biopsy    Final Diagnosis   A. Lung, Left Lower Lobe, :  - Invasive squamous carcinoma, moderately differentiated, keratinizing type. - Tumor is highlighted with P 40 immunoperoxidase stain.  - Prominent tumor necrosis is noted. 2/2/2022 - 2/11/2022 Radiation    [facility] SBRT LLL 6X 5 / 5 1,000 0 5,000 9      Treatment Dates:  2/2/2022 - 2/11/2022. Review of Systems:  Review of Systems   Constitutional: Positive for activity change, appetite change, fatigue and unexpected weight change (down from 235lb in January 2023). Negative for chills and fever. HENT: Negative. Negative for sore throat, tinnitus and trouble swallowing. Reports having decreased appetite and poor taste   Eyes: Negative. Negative for visual disturbance. Respiratory: Positive for cough (occasionally coughs up blood per wife, happened last week) and shortness of breath (with exertion). Negative for chest tightness and wheezing. Moist cough with yellow mucus/phlegm   Cardiovascular: Negative. Negative for chest pain and leg swelling. Gastrointestinal: Negative for abdominal pain, blood in stool, constipation, diarrhea, nausea and vomiting. Endocrine: Positive for cold intolerance (gets cold easily). Genitourinary: Negative for decreased urine volume, difficulty urinating, dysuria, frequency, hematuria and urgency. Steady stream  Nocturia x 1   Musculoskeletal: Positive for back pain (chronic). Skin: Negative. Eczema   Allergic/Immunologic: Negative. Negative for environmental allergies. Neurological: Positive for weakness (generalized). Negative for dizziness, light-headedness, numbness and headaches. Hematological: Bruises/bleeds easily. Psychiatric/Behavioral: Negative for sleep disturbance.         Scored moderate on depression screening       Clinical Trial: no          Teaching completed    Health Maintenance   Topic Date Due   • Pneumococcal Vaccine: 65+ Years (1 - PCV) Never done   • Falls: Plan of Care  Never done   • COVID-19 Vaccine (3 - Moderna risk series) 12/03/2021   • Influenza Vaccine (1) 09/01/2023   • HEMOGLOBIN A1C  03/20/2024   • Depression Remission PHQ  04/02/2024   • BMI: Followup Plan  04/05/2024   • Diabetic Foot Exam  07/05/2024   • Fall Risk  08/01/2024   • Medicare Annual Wellness Visit (AWV)  08/01/2024   • BMI: Adult  09/21/2024   • DM Eye Exam  08/01/2025   • Hepatitis C Screening  Completed   • HIB Vaccine  Aged Out   • IPV Vaccine  Aged Out   • Hepatitis A Vaccine  Aged Out   • Meningococcal ACWY Vaccine  Aged Out   • HPV Vaccine  Aged Out     Patient Active Problem List   Diagnosis   • Corns   • Pain in both feet   • Onychomycosis   • Tinea pedis of both feet   • Lung mass   • Hyperlipidemia   • Type 2 diabetes mellitus with diabetic neuropathy, without long-term current use of insulin (HCC)   • Squamous cell carcinoma of lung (HCC)   • Shortness of breath   • Daytime hypersomnolence   • Chronic diastolic heart failure (HCC)   • SYLVESTER (obstructive sleep apnea)   • Prostate cancer (HCC)   • GERD (gastroesophageal reflux disease)   • CAD (coronary artery disease)   • Other persistent atrial fibrillation (HCC)   • Alcohol dependence ([address])   • Acute respiratory failure with hypoxemia ([facility])   • Depression, recurrent ([facility])   • COVID-19   • Angioedema   • Septic shock ([facility])   • Cellulitis   • Cellulitis of chest wall   • Acute kidney injury (BUDDY) with acute tubular necrosis (ATN) ([facility])   • Chronic obstructive pulmonary disease, unspecified COPD type ([address])   • Centrilobular emphysema ([address])   • Abdominal aortic aneurysm ([address])   • Hypertensive heart disease   • Tremors of nervous system   • Recurrent pleural effusion on left Past Medical History:   Diagnosis Date   • Abdominal pain    • Anxiety    • Arthritis    • Asthma    • Atrial fibrillation [facility])    • Back pain    • Bleeding ulcer    • Bronchitis    • Cancer [facility])     prostate 2011- radiation   • Cardiac disease     cardiac stent x1   • Cataract     starting   • Chronic diastolic (congestive) heart failure (HCC)    • Diabetes mellitus ([address])     boarderline diabetic    • GERD (gastroesophageal reflux disease)    • History of radiation therapy 2010    Prostate seeds (brachytherapy) and EBRT   • Hyperlipidemia    • Hypertension    • 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, bilateral    • Low back pain    • Lung cancer ([address])    • Lung mass    • Myocardial infarction ([address])     mild 1999   • Obesity    • Prostate cancer ([address])    • Shortness of breath    • Sleep apnea     sleep study 11/22   • Wears dentures     full set   • Wears glasses      Past Surgical History:   Procedure Laterality Date   • ABDOMINAL HERNIA REPAIR     • ABDOMINAL SURGERY      bleeding ulcer, cyst removed from abd   • COLONOSCOPY     • CORONARY ANGIOPLASTY WITH STENT PLACEMENT  1999    x1    • ESOPHAGOGASTRODUODENOSCOPY     • IR BIOPSY LUNG  10/05/2021   • IR THORACENTESIS  11/08/2021   • IR THORACENTESIS  6/5/2023   • IR THORACENTESIS  9/13/2023   • KNEE SURGERY Left    •  [address] INCL FLUOR GDNCE DX W/CELL WASHG SPX N/A 11/29/2021    Procedure: BRONCHOSCOPY FLEXIBLE;  Surgeon: Ursula Trujillo MD;  [facility];  Service: Thoracic   • [place] MEDIASTINOSCOPY WITH LYMPH NODE BIOPSY/IES N/A 11/29/2021    Procedure: MEDIASTINOSCOPY;  Surgeon: Ursula Trujillo MD;  [facility];  Service: Thoracic   • PROSTATE SURGERY       Family History   Problem Relation Age of Onset   • Prostate cancer Brother    • Cancer Maternal Uncle         colo rectal cancer   • Cancer Paternal Aunt      Social History     Socioeconomic History   • Marital status: /Civil Union     Spouse name: Not on file   • Number of children: Not on file   • Years of education: Not on file   • Highest education level: Not on file   Occupational History   • Not on file   Tobacco Use   • Smoking status: Former     Packs/day: 1.00     Years: 30.00     Total pack years: 30.00     Types: Cigarettes     Quit date: 38     Years since quittin.7   • Smokeless tobacco: Never   Vaping Use   • Vaping Use: Never used   Substance and Sexual Activity   • Alcohol use: Yes     Alcohol/week: 4.0 - 6.0 standard drinks of alcohol     Types: 1 Glasses of wine, 3 - 5 Cans of beer per week     Comment: few beers day   • Drug use: Never   • Sexual activity: Not on file   Other Topics Concern   • Not on file   Social History Narrative   • Not on file     Social Determinants of Health     Financial Resource Strain: Medium Risk (2023)    Overall Financial Resource Strain (CARDIA)    • Difficulty of Paying Living Expenses: Somewhat hard   Food Insecurity: No Food Insecurity (2022)    Hunger Vital Sign    • Worried About Running Out of Food in the Last Year: Never true    • Ran Out of Food in the Last Year: Never true   Transportation Needs: No Transportation Needs (2023)    PRAPARE - Transportation    • Lack of Transportation (Medical): No    • Lack of Transportation (Non-Medical):  No   Physical Activity: Not on file   Stress: Not on file   Social Connections: Not on file   Intimate Partner Violence: Not on file   Housing Stability: Low Risk  (2022)    Housing Stability Vital Sign    • Unable to Pay for Housing in the Last Year: No    • Number of Places Lived in the Last Year: 1    • Unstable Housing in the Last Year: No       Current Outpatient Medications:   •  acetaminophen (TYLENOL) 325 mg tablet, Take 2 tablets (650 mg total) by mouth every 6 (six) hours as needed for mild pain, headaches or fever, Disp: 30 tablet, Rfl: 0  •  albuterol (2.5 mg/3 mL) 0.083 % nebulizer solution, Take 2.5 mg by nebulization As needed per patient's wife , Disp: , Rfl:   •  apixaban (Eliquis) 5 mg, Take 1 tablet (5 mg total) by mouth 2 (two) times a day, Disp: 180 tablet, Rfl: 1  •  atenolol (TENORMIN) 25 mg tablet, , Disp: , Rfl:   •  atorvastatin (LIPITOR) 10 mg tablet, Take 1 tablet (10 mg total) by mouth daily, Disp: 90 tablet, Rfl: 1  •  carvedilol (COREG) 25 mg tablet, Take 1 tablet (25 mg total) by mouth 2 (two) times a day with meals, Disp: 180 tablet, Rfl: 1  •  ciclopirox (LOPROX) 0.77 % cream, Apply topically 2 (two) times a day for 20 days, Disp: 45 g, Rfl: 2  •  fluticasone-umeclidinium-vilanterol (Trelegy Ellipta) 100-62.5-25 mcg/actuation inhaler, Rinse mouth after use., Disp: 60 each, Rfl: 5  •  guaifenesin-codeine (GUAIFENESIN AC) 100-10 MG/5ML liquid, Take 10 mL by mouth 3 (three) times a day as needed for cough, Disp: 180 mL, Rfl: 0  •  halobetasol (ULTRAVATE) 0.05 % cream, , Disp: , Rfl:   •  hydrocortisone 2.5 % cream, Apply topically 2 (two) times a day Apply twice a day affected area the trunk, Disp: 20 g, Rfl: 2  •  latanoprost (XALATAN) 0.005 % ophthalmic solution, Administer 0.005 mL to both eyes daily at bedtime, Disp: , Rfl:   •  metFORMIN (GLUCOPHAGE) 500 mg tablet, Take 1 tablet (500 mg total) by mouth daily with breakfast, Disp: , Rfl:   •  mirtazapine (REMERON) 7.5 MG tablet, Take 1 tablet (7.5 mg total) by mouth daily at bedtime, Disp: 90 tablet, Rfl: 3  •  Multiple Vitamin (MULTI-VITAMIN DAILY PO), Take by mouth daily  , Disp: , Rfl:   •  omeprazole (PriLOSEC) 20 mg delayed release capsule, TAKE 1 CAPSULE DAILY, Disp: 90 capsule, Rfl: 1  •  primidone (MYSOLINE) 50 mg tablet, TAKE 2 TABS AT 8PM., Disp: 180 tablet, Rfl: 1  •  spironolactone (ALDACTONE) 25 mg tablet, Take 1 tablet (25 mg total) by mouth daily, Disp: 90 tablet, Rfl: 1  •  aspirin (ECOTRIN LOW STRENGTH) 81 mg EC tablet, , Disp: , Rfl:   No Known Allergies  Vitals:    10/02/23 1340   BP: 110/65   BP Location: Left arm   Patient Position: Sitting   Cuff Size: Standard   Pulse: 84 Resp: 18   Temp: 97.6 °F (36.4 °C)   TempSrc: Temporal   SpO2: 96%   Weight: 90.2 kg (198 lb 13.7 oz)   Height: 5' 10" (1.778 m)      Pain Score: 0-No pain

## 2023-10-02 NOTE — PROGRESS NOTES
Follow-up - Radiation Oncology   Mariana Parachute 1940 82 y.o. male [id number]      History of Present Illness   Cancer Staging   Squamous cell carcinoma of lung ([address])  Staging form: Lung, AJCC 8th Edition  - Clinical: cT2, cN0, cM0 - Signed by Dea Chapman MD on 10/2/2023      Interval History:  Mariana Mazariegos 1940 is a 82 y.o. 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 The pt completed a course of SBRT to the LLL on 02/11/2022. Last seen in radiation oncology on 6/12/23.     9/12/23  CT Chest wo contrast  IMPRESSION:  Recurrent large left pleural effusion, similar to the prethoracentesis chest CT from 6/2/2023, with complete compressive atelectasis of the left lower lobe and inferior lingula.     Extensive low-attenuation in the collapsed left lower lobe. While this could be due to endobronchial mucous and drowned lung, recurrent tumor cannot be excluded. Redemonstration of mild pulmonary fibrosis. Pulmonary artery enlargement which can be seen with pulmonary hypertension.     9/12/23-9/13/23  [facility]  Admitted for SOB and recurrent pleural effusion  IR thoracentesis completed with 1500cc clear yellow pleural fluid drained     9/13/23  Non-Gynecologic Cytology  A.-B.  Thoracentesis, Left (Thin-prep and cell block preparations):    Negative for malignancy. Benign mesothelial cells and histiocytes. Scattered lymphocytes. Proteinaceous material.  Satisfactory for evaluation.     9/21/23  [facility]  Bedside thoracentesis - Drained 1800 cc      9/21/23 Non-Gynecologic Cytology  Final Diagnosis  A.-B. Pleural Fluid, Left (Thin-prep and cell block preparations):  Negative for malignancy. Benign mesothelial cells and histiocytes. Scattered lymphocytes. Proteinaceous material.  Satisfactory for evaluation.   9/28/23  CT of chest  IMPRESSION:  Since September 12, 2023, decrease in size of the still large left pleural effusion with commensurate decrease in left-sided passive atelectasis. Persistent hypoattenuation in the collapsed portion of the left lower lobe. Given that this is in the vicinity of the treated tumor, and has increased in size since December 2022, short interval contrast-enhanced CT is advised following resolution of the pleural effusion for further assessment.        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 Lung, Left Lower Lobe, :  - Invasive squamous carcinoma, moderately differentiated, keratinizing type. - Tumor is highlighted with P 40 immunoperoxidase stain.  - Prominent tumor necrosis is noted.          2/2/2022 - 2/11/2022 Radiation    BH SBRT LLL 6X 5 / 5 1,000 0 5,000 9      Treatment Dates:  2/2/2022 - 2/11/2022.      10/2/2023 -  Cancer Staged    Staging form: Lung, AJCC 8th Edition  - Clinical: cT2, cN0, cM0 - Signed by Asa Kaye MD on 10/2/2023           Past Medical History:   Diagnosis Date   • Abdominal pain    • Anxiety    • Arthritis    • Asthma    • Atrial fibrillation (HCC)    • Back pain    • Bleeding ulcer    • Bronchitis    • Cancer [facility])     prostate 2011- radiation   • Cardiac disease     cardiac stent x1   • Cataract     starting   • Chronic diastolic (congestive) heart failure (HCC)    • Diabetes mellitus ([address])     boarderline diabetic    • GERD (gastroesophageal reflux disease)    • History of radiation therapy 2010    Prostate seeds (brachytherapy) and EBRT   • Hyperlipidemia    • Hypertension    • 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, bilateral    • Low back pain    • Lung cancer ([address])    • Lung mass    • Myocardial infarction [facility])     mild 1999   • Obesity    • Prostate cancer ([address])    • Shortness of breath    • Sleep apnea     sleep study 11/22   • Wears dentures     full set   • Wears glasses      Past Surgical History: Procedure Laterality Date   • ABDOMINAL HERNIA REPAIR     • ABDOMINAL SURGERY      bleeding ulcer, cyst removed from abd   • COLONOSCOPY     • CORONARY ANGIOPLASTY WITH STENT PLACEMENT  1999    x1    • ESOPHAGOGASTRODUODENOSCOPY     • IR BIOPSY LUNG  10/05/2021   • IR THORACENTESIS  2021   • IR THORACENTESIS  2023   • IR THORACENTESIS  2023   • KNEE SURGERY Left    •  [address] INCL FLUOR GDNCE DX W/CELL WASHG SPX N/A 2021    Procedure: BRONCHOSCOPY FLEXIBLE;  Surgeon: Katiuska Tuttle MD;  Location: [facility];  Service: Thoracic   • [place] MEDIASTINOSCOPY WITH LYMPH NODE BIOPSY/IES N/A 2021    Procedure: MEDIASTINOSCOPY;  Surgeon: Katiuska Tuttle MD;  Location: [facility];  Service: Thoracic   • PROSTATE SURGERY         Social History   Social History     Substance and Sexual Activity   Alcohol Use Yes   • Alcohol/week: 4.0 - 6.0 standard drinks of alcohol   • Types: 1 Glasses of wine, 3 - 5 Cans of beer per week    Comment: few beers day     Social History     Substance and Sexual Activity   Drug Use Never     Social History     Tobacco Use   Smoking Status Former   • Packs/day: 1.00   • Years: 30.00   • Total pack years: 30.00   • Types: Cigarettes   • Quit date:    • Years since quittin.7   Smokeless Tobacco Never         Meds/Allergies     Current Outpatient Medications:   •  acetaminophen (TYLENOL) 325 mg tablet, Take 2 tablets (650 mg total) by mouth every 6 (six) hours as needed for mild pain, headaches or fever, Disp: 30 tablet, Rfl: 0  •  albuterol (2.5 mg/3 mL) 0.083 % nebulizer solution, Take 2.5 mg by nebulization As needed per patient's wife , Disp: , Rfl:   •  apixaban (Eliquis) 5 mg, Take 1 tablet (5 mg total) by mouth 2 (two) times a day, Disp: 180 tablet, Rfl: 1  •  atenolol (TENORMIN) 25 mg tablet, , Disp: , Rfl:   •  atorvastatin (LIPITOR) 10 mg tablet, Take 1 tablet (10 mg total) by mouth daily, Disp: 90 tablet, Rfl: 1  •  carvedilol (COREG) 25 mg tablet, Take 1 tablet (25 mg total) by mouth 2 (two) times a day with meals, Disp: 180 tablet, Rfl: 1  •  ciclopirox (LOPROX) 0.77 % cream, Apply topically 2 (two) times a day for 20 days, Disp: 45 g, Rfl: 2  •  fluticasone-umeclidinium-vilanterol (Trelegy Ellipta) 100-62.5-25 mcg/actuation inhaler, Rinse mouth after use., Disp: 60 each, Rfl: 5  •  guaifenesin-codeine (GUAIFENESIN AC) 100-10 MG/5ML liquid, Take 10 mL by mouth 3 (three) times a day as needed for cough, Disp: 180 mL, Rfl: 0  •  halobetasol (ULTRAVATE) 0.05 % cream, , Disp: , Rfl:   •  hydrocortisone 2.5 % cream, Apply topically 2 (two) times a day Apply twice a day affected area the trunk, Disp: 20 g, Rfl: 2  •  latanoprost (XALATAN) 0.005 % ophthalmic solution, Administer 0.005 mL to both eyes daily at bedtime, Disp: , Rfl:   •  metFORMIN (GLUCOPHAGE) 500 mg tablet, Take 1 tablet (500 mg total) by mouth daily with breakfast, Disp: , Rfl:   •  mirtazapine (REMERON) 7.5 MG tablet, Take 1 tablet (7.5 mg total) by mouth daily at bedtime, Disp: 90 tablet, Rfl: 3  •  Multiple Vitamin (MULTI-VITAMIN DAILY PO), Take by mouth daily  , Disp: , Rfl:   •  omeprazole (PriLOSEC) 20 mg delayed release capsule, TAKE 1 CAPSULE DAILY, Disp: 90 capsule, Rfl: 1  •  primidone (MYSOLINE) 50 mg tablet, TAKE 2 TABS AT 8PM., Disp: 180 tablet, Rfl: 1  •  spironolactone (ALDACTONE) 25 mg tablet, Take 1 tablet (25 mg total) by mouth daily, Disp: 90 tablet, Rfl: 1  •  aspirin (ECOTRIN LOW STRENGTH) 81 mg EC tablet, , Disp: , Rfl:   No Known Allergies      Review of Systems   Constitutional: Positive for activity change, appetite change, fatigue and unexpected weight change (down from 235lb in January 2023). Negative for chills and fever. HENT: Negative. Negative for sore throat, tinnitus and trouble swallowing. Reports having decreased appetite and poor taste   Eyes: Negative. Negative for visual disturbance.    Respiratory: Positive for cough (occasionally coughs up blood per wife, happened last week) and shortness of breath (with exertion). Negative for chest tightness and wheezing. Moist cough with yellow mucus/phlegm   Cardiovascular: Negative. Negative for chest pain and leg swelling. Gastrointestinal: Negative for abdominal pain, blood in stool, constipation, diarrhea, nausea and vomiting. Endocrine: Positive for cold intolerance (gets cold easily). Genitourinary: Negative for decreased urine volume, difficulty urinating, dysuria, frequency, hematuria and urgency. Steady stream  Nocturia x 1   Musculoskeletal: Positive for back pain (chronic). Skin: Negative. Eczema   Allergic/Immunologic: Negative. Negative for environmental allergies. Neurological: Positive for weakness (generalized). Negative for dizziness, light-headedness, numbness and headaches. Hematological: Bruises/bleeds easily. Psychiatric/Behavioral: Negative for sleep disturbance. Scored moderate on depression screening             OBJECTIVE:   /65 (BP Location: Left arm, Patient Position: Sitting, Cuff Size: Standard)   Pulse 84   Temp 97.6 °F (36.4 °C) (Temporal)   Resp 18   Ht 5' 10" (1.778 m)   Wt 90.2 kg (198 lb 13.7 oz)   SpO2 96%   BMI 28.53 kg/m²   Pain Assessment:  0  ECOG/Zubrod/WHO: 2 - Symptomatic, <50% confined to bed    Physical Exam   Patient is conversing appropriately. He has decreased breath sounds in his left lung field from the base approximately intermediate up. Not on oxygen. Abdomen obese nontender. Ambulating independently without assistive device.         RESULTS    Lab Results:   Recent Results (from the past 672 hour(s))   CBC and differential    Collection Time: 09/12/23  5:01 PM   Result Value Ref Range    WBC 8.08 4.31 - 10.16 Thousand/uL    RBC 4.10 3.88 - 5.62 Million/uL    Hemoglobin 12.7 12.0 - 17.0 g/dL    Hematocrit 40.7 36.5 - 49.3 %    MCV 99 (H) 82 - 98 fL    MCH 31.0 26.8 - 34.3 pg    MCHC 31.2 (L) 31.4 - 37.4 g/dL RDW 14.5 11.6 - 15.1 %    MPV 9.3 8.9 - 12.7 fL    Platelets 882 646 - 841 Thousands/uL    nRBC 0 /100 WBCs    Neutrophils Relative 74 43 - 75 %    Immat GRANS % 0 0 - 2 %    Lymphocytes Relative 12 (L) 14 - 44 %    Monocytes Relative 8 4 - 12 %    Eosinophils Relative 5 0 - 6 %    Basophils Relative 1 0 - 1 %    Neutrophils Absolute 5.97 1.85 - 7.62 Thousands/µL    Immature Grans Absolute 0.03 0.00 - 0.20 Thousand/uL    Lymphocytes Absolute 0.96 0.60 - 4.47 Thousands/µL    Monocytes Absolute 0.67 0.17 - 1.22 Thousand/µL    Eosinophils Absolute 0.41 0.00 - 0.61 Thousand/µL    Basophils Absolute 0.04 0.00 - 0.10 Thousands/µL   Comprehensive metabolic panel    Collection Time: 09/12/23  5:01 PM   Result Value Ref Range    Sodium 136 135 - 147 mmol/L    Potassium 5.6 (H) 3.5 - 5.3 mmol/L    Chloride 103 96 - 108 mmol/L    CO2 28 21 - 32 mmol/L    ANION GAP 5 mmol/L    BUN 15 5 - 25 mg/dL    Creatinine 1.10 0.60 - 1.30 mg/dL    Glucose 100 65 - 140 mg/dL    Calcium 9.3 8.4 - 10.2 mg/dL    AST 14 13 - 39 U/L    ALT 14 7 - 52 U/L    Alkaline Phosphatase 99 34 - 104 U/L    Total Protein 6.5 6.4 - 8.4 g/dL    Albumin 3.5 3.5 - 5.0 g/dL    Total Bilirubin 0.44 0.20 - 1.00 mg/dL    eGFR 62 ml/min/1.73sq m   Magnesium    Collection Time: 09/12/23  5:01 PM   Result Value Ref Range    Magnesium 1.9 1.9 - 2.7 mg/dL   HS Troponin 0hr (reflex protocol)    Collection Time: 09/12/23  5:01 PM   Result Value Ref Range    hs TnI 0hr 5 "Refer to ACS Flowchart"- see link ng/L   B-Type Natriuretic Peptide(BNP)    Collection Time: 09/12/23  5:01 PM   Result Value Ref Range     (H) 0 - 100 pg/mL   ECG 12 lead    Collection Time: 09/12/23  5:23 PM   Result Value Ref Range    Ventricular Rate 66 BPM    Atrial Rate 366 BPM    ND Interval  ms    QRSD Interval 88 ms    QT Interval 382 ms    QTC Interval 400 ms    P Axis  degrees    QRS Axis -7 degrees    T Wave [place] 33 degrees   HS Troponin I 2hr    Collection Time: 09/12/23  8:42 PM Result Value Ref Range    hs TnI 2hr 4 "Refer to ACS Flowchart"- see link ng/L    Delta 2hr hsTnI -1 <20 ng/L   Fingerstick Glucose (POCT)    Collection Time: 09/12/23 10:23 PM   Result Value Ref Range    POC Glucose 150 (H) 65 - 140 mg/dl   Basic metabolic panel    Collection Time: 09/13/23  6:34 AM   Result Value Ref Range    Sodium 138 135 - 147 mmol/L    Potassium 4.2 3.5 - 5.3 mmol/L    Chloride 106 96 - 108 mmol/L    CO2 25 21 - 32 mmol/L    ANION GAP 7 mmol/L    BUN 11 5 - 25 mg/dL    Creatinine 0.75 0.60 - 1.30 mg/dL    Glucose 100 65 - 140 mg/dL    Glucose, Fasting 100 (H) 65 - 99 mg/dL    Calcium 9.3 8.4 - 10.2 mg/dL    eGFR 86 ml/min/1.73sq m   CBC (With Platelets)    Collection Time: 09/13/23  6:34 AM   Result Value Ref Range    WBC 6.38 4.31 - 10.16 Thousand/uL    RBC 4.03 3.88 - 5.62 Million/uL    Hemoglobin 12.8 12.0 - 17.0 g/dL    Hematocrit 39.4 36.5 - 49.3 %    MCV 98 82 - 98 fL    MCH 31.8 26.8 - 34.3 pg    MCHC 32.5 31.4 - 37.4 g/dL    RDW 14.4 11.6 - 15.1 %    Platelets 445 774 - 799 Thousands/uL    MPV 9.3 8.9 - 12.7 fL   Fingerstick Glucose (POCT)    Collection Time: 09/13/23  7:11 AM   Result Value Ref Range    POC Glucose 97 65 - 140 mg/dl   Body fluid white cell count with differential    Collection Time: 09/13/23  9:08 AM   Result Value Ref Range    Site Pleural Left     Color, Fluid Light Yellow Clear, Colorless,Yellow    Clarity, Fluid Clear Clear    WBC, Fluid 478 /ul   Body fluid culture (Pleural Fluid Culture) and Gram stain    Collection Time: 09/13/23  9:08 AM    Specimen: Pleural, Left;  Body Fluid   Result Value Ref Range    Body Fluid Culture, Sterile No growth     Gram Stain Result 1+ Mononuclear Cells     Gram Stain Result No organisms seen    Glucose, body fluid    Collection Time: 09/13/23  9:08 AM   Result Value Ref Range    Glucose, Fluid 119 mg/dL   LD (LDH), Body Fluid    Collection Time: 09/13/23  9:08 AM   Result Value Ref Range    LD, Fluid 157 U/L   Non-gynecologic cytology    Collection Time: 09/13/23  9:08 AM   Result Value Ref Range    Case Report       Non-gynecologic Cytology                          Case: [id number]                                  Authorizing Provider:  Murali Owens PA-C         Collected:           09/13/2023 [id number]              Ordering Location:     [address] Received:            09/13/2023 [address] [facility]                                                                      Pathologist:           Chevy Hanna MD                                                    Specimens:   A) - Thoracentesis, Left                                                                            B) - Thoracentesis, Cell Block, Left                                                       Final Diagnosis       A.-B. Thoracentesis, Left (Thin-prep and cell block preparations):    Negative for malignancy. Benign mesothelial cells and histiocytes. Scattered lymphocytes. Proteinaceous material.    Satisfactory for evaluation. Gross Description       A.  100mL, light yellow, hazy, received in CytoLyt     B. Moderate cell button, white       Additional Information       C[employer]'s FDA approved ,  and ThinPrep Imaging Duo System are utilized with strict adherence to the 's instruction manual to prepare gynecologic and non-gynecologic cytology specimens for the production of ThinPrep slides as well as for gynecologic ThinPrep imaging. These processes have been validated by our laboratory and/or by the . These tests were developed and their performance characteristics determined by [facility] or [address] [place] [address]. They may not be cleared or approved by the U.S. Food and Drug Administration. The FDA has determined that such clearance or approval is not necessary. These tests are used for clinical purposes.  They should not be regarded as investigational or for research. This laboratory has been approved by CLIA 88, designated as a high-complexity laboratory and is qualified to perform these tests. Interpretation performed at [facility], [address] [facility]  [id number]     pH, body fluid    Collection Time: 09/13/23  9:08 AM   Result Value Ref Range    PH BODY FLUID 7.5    Total Protein, body fluid    Collection Time: 09/13/23  9:08 AM   Result Value Ref Range    Protein, Fluid 4.5 g/dL   Body Fluid Diff    Collection Time: 09/13/23  9:08 AM   Result Value Ref Range    Total Counted 100     Neutrophils % (Fluid) 19 %    Lymphs % (Fluid) 62 %    Eosinophils % (Fluid) 3 %    Mesothelial % (Fluid) 2 %    Histiocyte % (Fluid) 6 %    Monocytes % (Fluid) 8 %   Fingerstick Glucose (POCT)    Collection Time: 09/13/23 10:56 AM   Result Value Ref Range    POC Glucose 135 65 - 140 mg/dl   Fingerstick Glucose (POCT)    Collection Time: 09/13/23  4:01 PM   Result Value Ref Range    POC Glucose 135 65 - 140 mg/dl   Basic metabolic panel (BMP)    Collection Time: 09/20/23  8:51 AM   Result Value Ref Range    Sodium 136 135 - 147 mmol/L    Potassium 4.9 3.5 - 5.3 mmol/L    Chloride 100 96 - 108 mmol/L    CO2 31 21 - 32 mmol/L    ANION GAP 5 mmol/L    BUN 15 5 - 25 mg/dL    Creatinine 0.86 0.60 - 1.30 mg/dL    Glucose, Fasting 127 (H) 65 - 99 mg/dL    Calcium 9.2 8.4 - 10.2 mg/dL    eGFR 81 ml/min/1.73sq m   Hemoglobin A1C    Collection Time: 09/20/23  8:51 AM   Result Value Ref Range    Hemoglobin A1C 6.9 (H) Normal 4.0-5.6%; PreDiabetic 5.7-6.4%;  Diabetic >=6.5%; Glycemic control for adults with diabetes <7.0% %     mg/dl   LD (LDH), Body Fluid    Collection Time: 09/21/23  1:11 PM   Result Value Ref Range    LD, Fluid 168 U/L   Total Protein, Fluid    Collection Time: 09/21/23  1:11 PM   Result Value Ref Range    Protein, Fluid 4.1 g/dL   Body fluid white cell count with differential    Collection Time: 09/21/23  1:11 PM   Result Value Ref Range    Site Pleural, Left     Color, Fluid Yellow Clear, Colorless,Yellow    Clarity, Fluid Clear Clear    WBC, Fluid 753 /ul   Body fluid culture and Gram stain    Collection Time: 09/21/23  1:11 PM    Specimen: Pleural, Left; Body Fluid   Result Value Ref Range    Body Fluid Culture, Sterile No growth     Gram Stain Result No Polys or Bacteria seen    Fungal culture    Collection Time: 09/21/23  1:11 PM    Specimen: Pleural, Left   Result Value Ref Range    Fungus (Mycology) Culture No Fungus Isolated at 1 Week    Miscellaneous Lab Test; Adenosine Deaminase Level    Collection Time: 09/21/23  1:11 PM   Result Value Ref Range    Miscellaneous Lab Test Result ADA - Adenosine deaminase    Triglycerides, body fluid    Collection Time: 09/21/23  1:11 PM   Result Value Ref Range    Triglycerides, Fluid 16 mg/dL   Non-gynecologic cytology    Collection Time: 09/21/23  1:11 PM   Result Value Ref Range    Case Report       Non-gynecologic Cytology                          Case: [id number]                                  Authorizing Provider:  Mckenzie Mackay MD      Collected:           09/21/2023 1311              Ordering Location:     [facility]         Received:            09/21/2023 [address] [place] [address]                                                            Pathologist:           Paz Shea MD                                                             Specimens:   A) - Pleural Fluid, Left                                                                            B) - Pleural Fluid, Left, Cell Block                                                       Final Diagnosis       A.-B. Pleural Fluid, Left (Thin-prep and cell block preparations):    Negative for malignancy. Benign mesothelial cells and histiocytes. Scattered lymphocytes. Proteinaceous material.    Satisfactory for evaluation.         Gross Description       A.  100mL, bright yellow, slightly hazy, received in CytoLyt B.  Moderate to large cell button, white       Clinical Information       Per CT report: N    Narrative & Impression  CT CHEST WITHOUT IV CONTRAST     INDICATION:   C34.90: Malignant neoplasm of unspecified part of unspecified bronchus or lung. 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 Left thoracentesis for 1500 mL on 6/5/2023. COMPARISON: Chest CT 6/2/2023, 1/27/2023, 9/28/2021. TECHNIQUE: Chest CT without intravenous contrast.  Axial, sagittal, coronal 2D reformats and coronal MIPS from source data. Radiation dose length product (DLP):  490 mGy-cm . Radiation dose exposure minimized using iterative reconstruction and automated exposure control. FINDINGS:     LUNGS: Complete compressive atelectasis of the left lower lobe due to recurrent large left pleural effusion with low-attenuation masslike opacity in the left lower lobe. Mild compressive atelectasis of the inferior lingula. Redemonstration of mild basilar predominant reticulation and traction bronchiolectasis in  the right lung. Mild paraseptal emphysema. Stable benign intrapulmonary lymph node abutting the minor fissure (3/113). AIRWAYS: No significant filling defects. PLEURA:  Unremarkable. HEART/GREAT VESSELS: Moderate cardiomegaly. Moderate coronary artery calcification indicating atherosclerotic heart disease. Pulmonary artery enlargement. MEDIASTINUM AND ANDRE:  Unremarkable. CHEST WALL AND LOWER NECK: Moderate right and mild left gynecomastia. 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 UPPER ABDOMEN: Gastric surgery. Cholelithiasis. Right upper pole renal cyst.     OSSEOUS STRUCTURES: Mild degenerative disease in the spine. Healed left rib fractures.      IMPRESSION:     Recurrent large left pleural effusion, similar to the prethoracentesis chest CT from 6/2/2023, with complete compressive atelectasis of the left lower lobe and inferior lingula. Extensive low-attenuation in the collapsed left  lower lobe. While this could be due to endobronchial mucous and drowned lung, recurrent tumor cannot be excluded. Redemonstration of mild pulmonary fibrosis. Pulmonary artery enlargement which can be seen with pulmonary hypertension. Additional Information       [employer]'s FDA approved ,  and ThinPrep Imaging Duo System are utilized with strict adherence to the 's instruction manual to prepare gynecologic and non-gynecologic cytology specimens for the production of ThinPrep slides as well as for gynecologic ThinPrep imaging. These processes have been validated by our laboratory and/or by the . These tests were developed and their performance characteristics determined by [facility] or [address] [place], [place] [address]e . They may not be cleared or approved by the U.S. Food and Drug Administration. The FDA has determined that such clearance or approval is not necessary. These tests are used for clinical purposes. They should not be regarded as investigational or for research. This laboratory has been approved by IA 88, designated as a high-complexity laboratory and is qualified to perform these tests. Glucose, body fluid    Collection Time: 09/21/23  1:11 PM   Result Value Ref Range    Glucose, Fluid 148 mg/dL   Body Fluid Diff    Collection Time: 09/21/23  1:11 PM   Result Value Ref Range    Total Counted 100     Neutrophils % (Fluid) 26 %    Lymphs % (Fluid) 36 %    Mesothelial % (Fluid) 28 %    Monocytes % (Fluid) 10 %       Imaging Studies:CT chest with contrast    Result Date: 9/29/2023  Narrative: CT CHEST WITH IV CONTRAST INDICATION:   J90: Pleural effusion, not elsewhere classified C34.92: Malignant neoplasm of unspecified part of left bronchus or lung.  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 COMPARISON: Multiple prior CT examinations of the chest commencing September 28, 2021 with direct comparison made to September 12, 2023. TECHNIQUE: CT examination of the chest was performed. Multiplanar 2D reformatted images were created from the source data. This examination, like all CT scans performed in the [facility], was performed utilizing techniques to minimize radiation dose exposure, including the use of iterative reconstruction and automated exposure control. Radiation dose length product (DLP) for this visit:  543.97 mGy-cm IV Contrast:  100 mL of iohexol (OMNIPAQUE) FINDINGS: LUNGS: Persistent though decreased left lower lobe and lingular passive atelectasis. Approximate 6.3 cm rounded region of heterogeneous hypoattenuation in the collapsed lower lower lobe is similar to prior study, and may exert mass effect on the adjacent  atelectatic lung (series 2, image 65). This measured 3.6 cm on December 23, 2022 Mild basilar predominant peripheral reticulation in the right lower lobe is similar to prior study. Mild emphysema. Unchanged benign 3 mm intrapulmonary lymph node abutting the minor fissure (series 4, image 51). PLEURA: Large left pleural effusion is slightly decreased compared to prior study. No right pleural effusion. HEART/GREAT VESSELS: Cardiomegaly. Coronary calcifications and atherosclerotic calcifications of the aorta. No thoracic aortic aneurysm. MEDIASTINUM AND ANDRE:  Unremarkable. CHEST WALL AND LOWER NECK: Bilateral gynecomastia. VISUALIZED STRUCTURES IN THE UPPER ABDOMEN: Gallstones without findings of acute cholecystitis. 1.7 cm right renal cyst. Postsurgical change of the stomach. OSSEOUS STRUCTURES: Spinal degenerative changes are noted. No acute fracture or destructive osseous lesion. Impression: Since September 12, 2023, decrease in size of the still large left pleural effusion with commensurate decrease in left-sided passive atelectasis. Persistent hypoattenuation in the collapsed portion of the left lower lobe. Given that this is in the vicinity of the treated tumor, and has increased in size since December 2022, short interval contrast-enhanced CT is advised following resolution of the pleural effusion for further assessment. Workstation performed: [id number]     XR chest pa & lateral    Result Date: 9/23/2023  Narrative: CHEST INDICATION:   J90: Pleural effusion, not elsewhere classified. COMPARISON: CXR 9/13/2023, chest CT 9/12/2023. EXAM PERFORMED/VIEWS:  XR CHEST PA & LATERAL. DUAL ENERGY SUBTRACTION. FINDINGS: Mild cardiomegaly. Moderate loculated left effusion, decreased from 9/13/2023, with left base atelectasis. No pneumothorax. Upper abdomen normal.  Bones normal for age. Impression: Moderate loculated left effusion, decreased from 9/13/2023, with left base atelectasis. Workstation performed: [id number]     Thoracentesis (Bedside)    Result Date: 9/21/2023  Narrative: Quita Bray MD     9/21/2023  1:40 PM Thoracentesis (Bedside) Date/Time: 9/21/2023 11:40 AM Performed by: Quita Bray MD Authorized by: Quita Bray MD  Patient location:  Bedside Consent:   Consent obtained:  Verbal and written   Consent given by:  Patient   Risks discussed:  Bleeding, infection and pneumothorax   Alternatives discussed:  No treatment Universal protocol:   Patient identity confirmed:  Verbally with patient Indications:   Procedure Purpose: diagnostic and therapeutic    Indications: pleural effusion  Procedure details:   Preparation: Patient was prepped and draped in usual sterile fashion    Standard thoracentesis cath kit used: Yes    Patient position:  Sitting   Laterality:  Left   Location:  Midscapular line   Puncture method:  Over-the-needle catheter   Ultrasound guidance: yes    Reason for ultrasound: Identify fluid collection and guide catheter placement.     Ultrasound image availability:  Not saved   Number of attempts:  1 Drainage color:  Vonda   Drainage characteristics:  Clear   Fluid removed amount:  1800 Post-procedure details:   Chest x-ray performed: yes      IR IN-Patient Thoracentesis    Result Date: 9/13/2023  Narrative: Ultrasound-guided thoracentesis Clinical History: Shortness of breath. Technique: The patient was brought to the interventional radiology area and placed in the sitting position on the side of a stretcher. The left chest was then examined with ultrasound and the skin was marked. The skin was then prepped, and draped in usual sterile fashion. Lidocaine was administered to the skin and a small skin incision was made. Under direct ultrasound guidance, a 5 Yasir multi-sidehole catheter was advanced into the pleural space. 1500  cc of clear yellow pleural fluid was aspirated. . The patient tolerated the procedure well and suffered no complications. Impression: Impression: 1. Successful ultrasound-guided thoracentesis yielding 1500 cc of clear yellow left pleural fluid. Workstation performed: [id number]     XR chest portable    Result Date: 9/13/2023  Narrative: CHEST INDICATION:   Status post thoracentesis. COMPARISON:  Prior chest x-ray performed earlier the same day. EXAM PERFORMED/VIEWS:  XR CHEST PORTABLE FINDINGS: Cardiac silhouette is partially obscured by the left pleural effusion. The cardiac silhouette appears enlarged but stable from prior exams. Moderate left pleural effusion appears similar in size to radiograph obtained earlier the same day. No appreciable pneumothorax. The medial left apex is partially obscured by soft tissues. No evidence of acute osseous abnormality. Impression: 1. Moderate left pleural effusion is similar in appearance to radiograph obtained earlier the same day. No appreciable pneumothorax. Please note portions of the medial left apex are obscured by overlying soft tissue.  Workstation performed: [id number]     XR chest portable    Result Date: 9/13/2023  Narrative: CHEST INDICATION:   post thoracentesis. COMPARISON: CT chest 9/12/2023. Chest x-ray 12/20/2022. EXAM PERFORMED/VIEWS:  XR CHEST PORTABLE FINDINGS: Cardiomediastinal silhouette is partially obscured by left pleural effusion. The cardiac silhouette appears enlarged but stable from prior studies. Moderate to large left pleural effusion. No appreciable pneumothorax. The right lung is clear. No evidence of acute osseous abnormality. Impression: 1. Moderate to large left pleural effusion. No appreciable pneumothorax. Workstation performed: [id number]     Thoracentesis (Bedside)    Result Date: 9/12/2023  Narrative: Matt Cutler MD     9/13/2023  4:12 PM Thoracentesis (Bedside) Date/Time: 9/12/2023 4:16 PM Performed by: Matt Cutler MD Authorized by: Matt Cutler MD  Patient location:  Bedside Other Assisting Provider: Yes (comment)  Consent:   Consent obtained:  Verbal and written   Consent given by:  Patient   Risks discussed:  Bleeding, infection, pneumothorax, pain and incomplete drainage   Alternatives discussed:  No treatment Universal protocol:   Patient identity confirmed:  Verbally with patient and arm band Indications:   Procedure Purpose: diagnostic and therapeutic    Indications: pleural effusion  Procedure details:   Standard thoracentesis cath kit used: Yes    Patient position:  Sitting   Laterality:  Left   Location:  Midscapular line   Ultrasound guidance: yes    Ultrasound image availability:  Not saved   Number of attempts:  1   Needle gauge:  16   Drainage color:  Vonda   Drainage characteristics:  Serosanguinous   Fluid removed amount:  100 Post-procedure details:   Chest x-ray performed: yes      CT chest wo contrast    Result Date: 9/12/2023  Narrative: CT CHEST WITHOUT IV CONTRAST INDICATION:   C34.90: Malignant neoplasm of unspecified part of unspecified bronchus or lung.  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 Left thoracentesis for 1500 mL on 6/5/2023. COMPARISON: Chest CT 6/2/2023, 1/27/2023, 9/28/2021. TECHNIQUE: Chest CT without intravenous contrast.  Axial, sagittal, coronal 2D reformats and coronal MIPS from source data. Radiation dose length product (DLP):  490 mGy-cm . Radiation dose exposure minimized using iterative reconstruction and automated exposure control. FINDINGS: LUNGS: Complete compressive atelectasis of the left lower lobe due to recurrent large left pleural effusion with low-attenuation masslike opacity in the left lower lobe. Mild compressive atelectasis of the inferior lingula. Redemonstration of mild basilar predominant reticulation and traction bronchiolectasis in the right lung. Mild paraseptal emphysema. Stable benign intrapulmonary lymph node abutting the minor fissure (3/113). AIRWAYS: No significant filling defects. PLEURA:  Unremarkable. HEART/GREAT VESSELS: Moderate cardiomegaly. Moderate coronary artery calcification indicating atherosclerotic heart disease. Pulmonary artery enlargement. MEDIASTINUM AND ANDRE:  Unremarkable. CHEST WALL AND LOWER NECK: Moderate right and mild left gynecomastia. 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 UPPER ABDOMEN: Gastric surgery. Cholelithiasis. Right upper pole renal cyst. OSSEOUS STRUCTURES: Mild degenerative disease in the spine. Healed left rib fractures. Impression: Recurrent large left pleural effusion, similar to the prethoracentesis chest CT from 6/2/2023, with complete compressive atelectasis of the left lower lobe and inferior lingula. Extensive low-attenuation in the collapsed left lower lobe. While this could be due to endobronchial mucous and drowned lung, recurrent tumor cannot be excluded. Redemonstration of mild pulmonary fibrosis. Pulmonary artery enlargement which can be seen with pulmonary hypertension.  Workstation performed: [id number] Assessment/Plan:  Orders Placed This Encounter   Procedures   • NM PET CT skull base to mid thigh        Kiko Arellano is a 82y.o. 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 He was referred to the emergency room recently secondary to CT scan showing recurrent large left pleural effusion. He has since undergone 2 thoracentesis with cytology negative. His most recent CT from 9/28/23 shows decreased size of his large left pleural effusion. It appears his primary tumor has increased in size  (difficult to assess previously due to large effusion). Recommendation was for short interval CT scan. We discussed obtaining PET scan at this time. He is in agreement. He will have follow-up after his PET scan. Tamara Ragsdale MD  10/2/2023,2:28 PM    Portions of the record may have been created with voice recognition software.  Occasional wrong word or "sound a like" substitutions may have occurred due to the inherent limitations of voice recognition software.  Read the chart carefully and recognize, using context, where substitutions have occurred.

## 2023-10-03 ENCOUNTER — TELEPHONE (OUTPATIENT)
Dept: NUTRITION | Facility: CLINIC | Age: 83
End: 2023-10-03

## 2023-10-03 NOTE — TELEPHONE ENCOUNTER
Received notification by [facility] RN, Norma Gentile., on 10/2/23 that pt has triggered for oncology nutrition care (reason for referral: Malnutrition Screening Tool (MST) Triggers: scored a 5 indicating >/=34# (>/=15.4 kg) recent wt loss and is eating poorly due to a decreased appetite. (Date of MST: 10/2/23)). Shelly Freedman and introduced self and explained the reason for today's call. Spoke with Rachel Lyons today who reports he has a decreased appetite, mainly d/t nothing tasting good. We reviewed MNT for dysgeusia- he did report that sweet foods taste better. Discussed the F.A.S.S. Concept - add extra Fat, Acidity (as long as you do not have mouth or throat pain), Salt, and Sweetness to foods to help improve flavor. Also encouraged him to try Boost/Ensure shakes as he has not tried them in the past.     Rachel Lyons is >1 year post treatment at this time. Discussed that oncology nutrition services are for patients undergoing treatment. Offered to refer him to MNT, but he stated today's information was helpful. Offered to send him some handouts which he was appreciative of. Mailed to patient's home: [address], Ensure/Boost Coupons, Taste and small changes handout, High Calorie/High Protein snack ideas    Provided this RD’s contact information asking that Rachel Lyons reach out prn. All questions/concerns addressed at this time.

## 2023-10-09 LAB — FUNGUS SPEC CULT: NORMAL

## 2023-10-16 ENCOUNTER — PREP FOR PROCEDURE (OUTPATIENT)
Dept: PULMONOLOGY | Facility: CLINIC | Age: 83
End: 2023-10-16

## 2023-10-16 ENCOUNTER — HOSPITAL ENCOUNTER (EMERGENCY)
Facility: HOSPITAL | Age: 83
Discharge: HOME | End: 2023-10-16
Payer: COMMERCIAL

## 2023-10-16 ENCOUNTER — DOCUMENTATION (OUTPATIENT)
Age: 83
End: 2023-10-16

## 2023-10-16 ENCOUNTER — HOSPITAL ENCOUNTER (OUTPATIENT)
Dept: RADIOLOGY | Facility: HOSPITAL | Age: 83
Discharge: HOME | End: 2023-10-16
Payer: COMMERCIAL

## 2023-10-16 VITALS
BODY MASS INDEX: 28.35 KG/M2 | WEIGHT: 198 LBS | DIASTOLIC BLOOD PRESSURE: 58 MMHG | OXYGEN SATURATION: 98 % | SYSTOLIC BLOOD PRESSURE: 121 MMHG | HEART RATE: 77 BPM | TEMPERATURE: 97.9 F | HEIGHT: 70 IN | RESPIRATION RATE: 18 BRPM

## 2023-10-16 DIAGNOSIS — J90 PLEURAL EFFUSION: Primary | ICD-10-CM

## 2023-10-16 DIAGNOSIS — R06.02 SOB (SHORTNESS OF BREATH): ICD-10-CM

## 2023-10-16 DIAGNOSIS — J90 PLEURAL EFFUSION ON LEFT: Primary | ICD-10-CM

## 2023-10-16 DIAGNOSIS — J90 PLEURAL EFFUSION: ICD-10-CM

## 2023-10-16 LAB — FUNGUS SPEC CULT: NORMAL

## 2023-10-16 PROCEDURE — 71046 X-RAY EXAM CHEST 2 VIEWS: CPT

## 2023-10-16 PROCEDURE — 99284 EMERGENCY DEPT VISIT MOD MDM: CPT | Performed by: PHYSICIAN ASSISTANT

## 2023-10-16 PROCEDURE — 99284 EMERGENCY DEPT VISIT MOD MDM: CPT

## 2023-10-16 NOTE — PROGRESS NOTES
I called and spoke with patient and his wife. They were in the radiology department at [facility]. Reviewed his chest x-ray with very large effusion. They are going to go over to the ER to get his effusion tapped. I will text ER and request them to only remove 1 L so patient can keep his appointment on 10/18 for asept catheter placement. Encouraged him to call back with any further questions or concerns in the meantime.  Plan was discussed with Dr. Kareen Hein

## 2023-10-17 NOTE — ED PROVIDER NOTES
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 Patient says that last night he felt nauseous and "wrung out". This prompted his wife to contact his pulmonologist for evaluation. She expressed concerns over the phone that he "may need to have his lung drained again". An x-ray was ordered, x-ray showed large left-sided pleural effusion and patient was referred to the emergency department for further evaluation. Patient does endorse increased fatigue. He specifically denies shortness of breath, orthopnea, exertional dyspnea, hypoxia, PND, wheezing, stridor, fever, chills, vomiting, diarrhea. He does state he is coughing a little bit more and that this does happen when his pleural effusion recurs. History provided by:  Patient  Evaluation of Abnormal Diagnostic Test      Prior to Admission Medications   Prescriptions Last Dose Informant Patient Reported? Taking?    Multiple Vitamin (MULTI-VITAMIN DAILY PO)  Spouse/Significant Other Yes No   Sig: Take by mouth daily     acetaminophen (TYLENOL) 325 mg tablet  Spouse/Significant Other No No   Sig: Take 2 tablets (650 mg total) by mouth every 6 (six) hours as needed for mild pain, headaches or fever   albuterol (2.5 mg/3 mL) 0.083 % nebulizer solution  Spouse/Significant Other Yes No   Sig: Take 2.5 mg by nebulization As needed per patient's wife    apixaban (Eliquis) 5 mg  Spouse/Significant Other No No   Sig: Take 1 tablet (5 mg total) by mouth 2 (two) times a day   aspirin (ECOTRIN LOW STRENGTH) 81 mg EC tablet  Spouse/Significant Other Yes No   Patient not taking: Reported on 9/21/2023   atenolol (TENORMIN) 25 mg tablet  Spouse/Significant Other Yes No   atorvastatin (LIPITOR) 10 mg tablet  Spouse/Significant Other No No   Sig: Take 1 tablet (10 mg total) by mouth daily   carvedilol (COREG) 25 mg tablet  Spouse/Significant Other No No   Sig: Take 1 tablet (25 mg total) by mouth 2 (two) times a day with meals   ciclopirox (LOPROX) 0.77 % cream  Spouse/Significant Other No No   Sig: Apply topically 2 (two) times a day for 20 days   fluticasone-umeclidinium-vilanterol (Trelegy Ellipta) 100-62.5-25 mcg/actuation inhaler  Spouse/Significant Other No No   Sig: Rinse mouth after use.    guaifenesin-codeine (GUAIFENESIN AC) 100-10 MG/5ML liquid  Spouse/Significant Other No No   Sig: Take 10 mL by mouth 3 (three) times a day as needed for cough   halobetasol (ULTRAVATE) 0.05 % cream  Spouse/Significant Other Yes No   hydrocortisone 2.5 % cream  Spouse/Significant Other No No   Sig: Apply topically 2 (two) times a day Apply twice a day affected area the trunk   latanoprost (XALATAN) 0.005 % ophthalmic solution  Spouse/Significant Other Yes No   Sig: Administer 0.005 mL to both eyes daily at bedtime   metFORMIN (GLUCOPHAGE) 500 mg tablet  Spouse/Significant Other No No   Sig: Take 1 tablet (500 mg total) by mouth daily with breakfast   mirtazapine (REMERON) 7.5 MG tablet  Spouse/Significant Other No No   Sig: Take 1 tablet (7.5 mg total) by mouth daily at bedtime   omeprazole (PriLOSEC) 20 mg delayed release capsule  Spouse/Significant Other No No   Sig: TAKE 1 CAPSULE DAILY   primidone (MYSOLINE) 50 mg tablet  Spouse/Significant Other No No   Sig: TAKE 2 TABS AT 8PM.   spironolactone (ALDACTONE) 25 mg tablet  Spouse/Significant Other No No   Sig: Take 1 tablet (25 mg total) by mouth daily      Facility-Administered Medications: None       Past Medical History:   Diagnosis Date    Abdominal pain     Anxiety     Arthritis Asthma     Atrial fibrillation ([address])     Back pain     Bleeding ulcer     Bronchitis     Cancer ([address])     prostate 2011- radiation    Cardiac disease     cardiac stent x1    Cataract     starting    Chronic diastolic (congestive) heart failure (HCC)     Diabetes mellitus (HCC)     boarderline diabetic     GERD (gastroesophageal reflux disease)     History of radiation therapy 2010    Prostate seeds (brachytherapy) and EBRT    Hyperlipidemia     Hypertension     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, bilateral     Low back pain     Lung cancer ([address])     Lung mass     Myocardial infarction ([address])     mild 1999    Obesity     Prostate cancer ([address])     Shortness of breath     Sleep apnea     sleep study 11/22    Wears dentures     full set    Wears glasses        Past Surgical History:   Procedure Laterality Date    ABDOMINAL HERNIA REPAIR      ABDOMINAL SURGERY      bleeding ulcer, cyst removed from abd    [address]    x1     ESOPHAGOGASTRODUODENOSCOPY      IR BIOPSY LUNG  10/05/2021    IR THORACENTESIS  11/08/2021    IR THORACENTESIS  6/5/2023    IR THORACENTESIS  9/13/2023    KNEE SURGERY Left      [address] INCL FLUOR GDNCE DX W/CELL WASHG SPX N/A 11/29/2021    Procedure: BRONCHOSCOPY FLEXIBLE;  Surgeon: Wagner Bender MD;  Location: [facility];  Service: Thoracic    MS MEDIASTINOSCOPY WITH LYMPH NODE BIOPSY/IES N/A 11/29/2021    Procedure: Lenell Lock;  Surgeon: Wagner Bender MD;  Location: [facility];  Service: Thoracic    PROSTATE SURGERY         Family History   Problem Relation Age of Onset    Prostate cancer Brother     Cancer Maternal Uncle         colo rectal cancer    Cancer Paternal Aunt      I have reviewed and agree with the history as documented.     E-Cigarette/Vaping    E-Cigarette Use Never User      E-Cigarette/Vaping Substances    Nicotine No     THC No     CBD No     Flavoring No     Other No     Unknown No      Social History     Tobacco Use    Smoking status: Former     Packs/day: 1.00     Years: 30.00     Total pack years: 30.00     Types: Cigarettes     Quit date:      Years since quittin.8    Smokeless tobacco: Never   Vaping Use    Vaping Use: Never used   Substance Use Topics    Alcohol use: Yes     Alcohol/week: 4.0 - 6.0 standard drinks of alcohol     Types: 1 Glasses of wine, 3 - 5 Cans of beer per week     Comment: few beers day    Drug use: Never       Review of Systems   Constitutional:  Positive for appetite change and fatigue. Negative for chills and fever. HENT: Negative. Negative for ear pain and sore throat. Eyes: Negative. Negative for pain and visual disturbance. Respiratory: Negative. Negative for cough and shortness of breath. Cardiovascular:  Negative for chest pain and palpitations. Gastrointestinal:  Positive for nausea. Negative for abdominal pain and vomiting. Endocrine: Negative. Genitourinary: Negative. Negative for dysuria and hematuria. Musculoskeletal: Negative. Negative for arthralgias and back pain. Skin: Negative. Negative for color change and rash. Allergic/Immunologic: Negative. Neurological: Negative. Negative for seizures and syncope. Hematological: Negative. Psychiatric/Behavioral: Negative. All other systems reviewed and are negative. Physical Exam  Physical Exam  Vitals and nursing note reviewed. Constitutional:       General: He is not in acute distress. Appearance: He is well-developed. HENT:      Head: Normocephalic and atraumatic. Nose: Nose normal.      Mouth/Throat:      Mouth: Mucous membranes are moist.   Eyes:      General: No scleral icterus. Conjunctiva/sclera: Conjunctivae normal.      Pupils: Pupils are equal, round, and reactive to light. Cardiovascular:      Rate and Rhythm: Normal rate and regular rhythm. Pulses: Normal pulses. Heart sounds: Normal heart sounds. No murmur heard.   Pulmonary:      Effort: Pulmonary effort is normal. No tachypnea, respiratory distress or retractions. Breath sounds: No stridor. Examination of the left-middle field reveals decreased breath sounds. Examination of the left-lower field reveals decreased breath sounds. Decreased breath sounds present. No wheezing, rhonchi or rales. Comments: E to a egophony on the left  Abdominal:      Palpations: Abdomen is soft. Tenderness: There is no abdominal tenderness. Musculoskeletal:         General: No swelling or tenderness. Cervical back: Normal range of motion and neck supple. Right lower leg: No edema. Left lower leg: No edema. Skin:     General: Skin is warm and dry. Capillary Refill: Capillary refill takes less than 2 seconds. Neurological:      General: No focal deficit present. Mental Status: He is alert and oriented to person, place, and time. Cranial Nerves: No cranial nerve deficit. Psychiatric:         Mood and Affect: Mood normal.         Behavior: Behavior normal.         Vital Signs  ED Triage Vitals [10/16/23 1550]   Temperature Pulse Respirations Blood Pressure SpO2   97.9 °F (36.6 °C) 77 18 121/58 98 %      Temp src Heart Rate Source Patient Position - Orthostatic VS BP Location FiO2 (%)   -- -- -- -- --      Pain Score       No Pain           Vitals:    10/16/23 1550   BP: 121/58   Pulse: 77         Visual Acuity      ED Medications  Medications - No data to display    Diagnostic Studies  Results Reviewed       None                   No orders to display              Procedures  Procedures         ED Course                               SBIRT 20yo+      Flowsheet Row Most Recent Value   Initial Alcohol Screen: US AUDIT-C     1. How often do you have a drink containing alcohol? 0 Filed at: 10/16/2023 1550   2. How many drinks containing alcohol do you have on a typical day you are drinking? 0 Filed at: 10/16/2023 1550   3a. Male UNDER 65:  How often do you have five or more drinks on one occasion? 0 Filed at: 10/16/2023 1550   3b. FEMALE Any Age, or MALE 65+: How often do you have 4 or more drinks on one occassion? 0 Filed at: 10/16/2023 1550   Audit-C Score 0 Filed at: 10/16/2023 1550   JIMMY: How many times in the past year have you. .. Used an illegal drug or used a prescription medication for non-medical reasons? Never Filed at: 10/16/2023 1550                      Medical Decision Making  Problems Addressed:  Pleural effusion on left: acute illness or injury     Details: Patient with recurrent left-sided pleural effusion  At this time patient has no respiratory symptoms  On evaluation he is resting comfortably with SPO2 of 98% on room air  Heart rate and respiratory rate are normal  He is able to ambulate without difficulty  Discussed thoracentesis with patient and his wife. Patient is scheduled for Acept catheter on Wednesday and is still currently taking his Eliquis. As patient currently does not have symptoms shared decision making with patient and his wife we will defer thoracentesis at this time and patient will return to the ED if he does become symptomatic    Amount and/or Complexity of Data Reviewed  External Data Reviewed: radiology and notes. Disposition  Final diagnoses:   Pleural effusion on left     Time reflects when diagnosis was documented in both MDM as applicable and the Disposition within this note       Time User Action Codes Description Comment    10/16/2023  4:45 PM Pavithra Whitman Add [J90] Pleural effusion on left           ED Disposition       ED Disposition   Discharge    Condition   Stable    Date/Time   Mon Oct 16, 2023 1644    [facility] discharge to home/self care.                    Follow-up Information       Follow up With Specialties Details Why Contact Info Additional Information    Dio Owens MD Internal Medicine Go to  As needed [facility]  [phone]       [facility] [address] [facility] Emergency Department Emergency Medicine Go to  If symptoms worsen [address] [address]  [address] [facility] Emergency Department, [address] [place], [place], [address]    Jay Trevino MD Pulmonary Disease, Critical Care Medicine, Internal Medicine Go to  If symptoms worsen [address] [facility]  [phone]               Discharge Medication List as of 10/16/2023  4:46 PM        CONTINUE these medications which have NOT CHANGED    Details   acetaminophen (TYLENOL) 325 mg tablet Take 2 tablets (650 mg total) by mouth every 6 (six) hours as needed for mild pain, headaches or fever, Starting Wed 5/4/2022, Normal      albuterol (2.5 mg/3 mL) 0.083 % nebulizer solution Take 2.5 mg by nebulization As needed per patient's wife , Historical Med      apixaban (Eliquis) 5 mg Take 1 tablet (5 mg total) by mouth 2 (two) times a day, Starting Fri 6/16/2023, Normal      aspirin (ECOTRIN LOW STRENGTH) 81 mg EC tablet Starting Fri 7/28/2023, Historical Med      atenolol (TENORMIN) 25 mg tablet Starting Fri 7/28/2023, Historical Med      atorvastatin (LIPITOR) 10 mg tablet Take 1 tablet (10 mg total) by mouth daily, Starting Tue 8/29/2023, Normal      carvedilol (COREG) 25 mg tablet Take 1 tablet (25 mg total) by mouth 2 (two) times a day with meals, Starting Tue 8/29/2023, Normal      ciclopirox (LOPROX) 0.77 % cream Apply topically 2 (two) times a day for 20 days, Starting Wed 9/20/2023, Until Tue 10/10/2023, Normal      fluticasone-umeclidinium-vilanterol (Trelegy Ellipta) 100-62.5-25 mcg/actuation inhaler Rinse mouth after use., Normal      guaifenesin-codeine (GUAIFENESIN AC) 100-10 MG/5ML liquid Take 10 mL by mouth 3 (three) times a day as needed for cough, Starting Tue 9/19/2023, Normal      halobetasol (ULTRAVATE) 0.05 % cream Starting Sat 7/29/2023, Historical Med      hydrocortisone 2.5 % cream Apply topically 2 (two) times a day Apply twice a day affected area the trunk, Starting Mon 11/28/2022, Normal      latanoprost (XALATAN) 0.005 % ophthalmic solution Administer 0.005 mL to both eyes daily at bedtime, Starting Tue 1/24/2023, Historical Med      metFORMIN (GLUCOPHAGE) 500 mg tablet Take 1 tablet (500 mg total) by mouth daily with breakfast, Starting Wed 9/13/2023, No Print      mirtazapine (REMERON) 7.5 MG tablet Take 1 tablet (7.5 mg total) by mouth daily at bedtime, Starting Tue 8/8/2023, Normal      Multiple Vitamin (MULTI-VITAMIN DAILY PO) Take by mouth daily  , Historical Med      omeprazole (PriLOSEC) 20 mg delayed release capsule TAKE 1 CAPSULE DAILY, Starting Tue 9/5/2023, Normal      primidone (MYSOLINE) 50 mg tablet TAKE 2 TABS AT 8PM., Normal      spironolactone (ALDACTONE) 25 mg tablet Take 1 tablet (25 mg total) by mouth daily, Starting Tue 8/29/2023, Normal             No discharge procedures on file.     PDMP Review         Value Time User    PDMP Reviewed  Yes 9/19/2023 12:21 PM Catarino Bobby MD            ED Provider  Electronically Signed by             Margarita Finn PA-C  10/17/23 0537

## 2023-10-18 ENCOUNTER — HOSPITAL ENCOUNTER (OUTPATIENT)
Dept: SURGERY | Facility: HOSPITAL | Age: 83
Discharge: HOME | End: 2023-10-18
Attending: STUDENT IN AN ORGANIZED HEALTH CARE EDUCATION/TRAINING PROGRAM
Payer: COMMERCIAL

## 2023-10-18 ENCOUNTER — HOSPITAL ENCOUNTER (OUTPATIENT)
Dept: RADIOLOGY | Facility: HOSPITAL | Age: 83
Discharge: HOME | End: 2023-10-18
Payer: COMMERCIAL

## 2023-10-18 VITALS
HEART RATE: 96 BPM | SYSTOLIC BLOOD PRESSURE: 119 MMHG | TEMPERATURE: 97.3 F | DIASTOLIC BLOOD PRESSURE: 81 MMHG | RESPIRATION RATE: 16 BRPM | OXYGEN SATURATION: 96 %

## 2023-10-18 DIAGNOSIS — J90 PLEURAL EFFUSION: ICD-10-CM

## 2023-10-18 PROCEDURE — C1729 CATH, DRAINAGE: HCPCS

## 2023-10-18 PROCEDURE — 71045 X-RAY EXAM CHEST 1 VIEW: CPT

## 2023-10-18 PROCEDURE — 32550 INSERT PLEURAL CATH: CPT | Performed by: STUDENT IN AN ORGANIZED HEALTH CARE EDUCATION/TRAINING PROGRAM

## 2023-10-18 PROCEDURE — 88112 CYTOPATH CELL ENHANCE TECH: CPT | Performed by: SPECIALIST

## 2023-10-18 PROCEDURE — 88305 TISSUE EXAM BY PATHOLOGIST: CPT | Performed by: SPECIALIST

## 2023-10-18 NOTE — INTERVAL H&P NOTE
H&P reviewed. After examining the patient I find no changes in the patients condition since the H&P had been written. 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 No complaints today, just feeling slightly anxious today. O:    Vitals:    10/18/23 0853   BP: 154/72   Pulse: 94   Resp: 16   Temp: (!) 97 °F (36.1 °C)   SpO2: 97%     Physical Exam   Gen: tearful, anxious  HEENT: EOMI, PERRLA  Heart: S1,S2, regular rate  Lungs: Diminished L base  Abd: Soft, NTND  Extremities: No peripheral edema    A/P: Plan for asept catheter placement for recurrent left pleural effusions and follow-up in the outpatient office.

## 2023-10-18 NOTE — PROCEDURES
Pleural catheter insertion    Date/Time: 10/18/2023 8:42 AM    Performed by: Yakov Llamas DO  Authorized by: Waqas Costello MD    Patient Location: GI procedure room    Other Assisting Provider: Yes (comment) (Dr. Waqas Costello)      Universal Protocol:  Consent obtained: written  Risks and benefits: Risks, benefits and alternatives were discussed     Consent given by: patient  Patient states understanding of procedure being performed: yes    Patient's understanding of procedure matches consent: yes    Patient's consent matches procedures scheduled: yes    Relevant documents present and verified: yes    Test results available and properly labeled: yes    Site/side marked: yes    Imaging studies available: yes    Ultrasound guidance: yes    Patient identity confirmed: verbally with patient and arm band  Time out: Immediately prior to the procedure a time out was called      Procedure Details:     PRE OPERATIVE DIAGNOSIS: 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 Risks of the procedure were discussed including, but not limited to: Infection, Bleeding, Pain, Pneumothorax and Injury to surrounding structures. Images reviewed prior to the procedure. Final Time Out was performed. Left  hemithorax evaluated with bedside ultrasound, fluid pocked identified and appropriate for TPC insertion. ASA: 3    MALLAMPATI SCORE:  III    Analgesia: Subcutaneous Lidocaine 30 cc. Was other anesthesia used? No.    PROCEDURE:  The patient was placed on supine lateral decubitus position. Using US guidance, pleural fluid pocket was successfully identified. Site marked on skin. Site was prepped and draped in sterile fashion. SQ tissue infiltrated with 1% lidocaine with epi. The finder needle was advanced over rib - 5, anterior axillary line. Pleural fluid obtained successfully. Finder needle was removed.  The introducer needle was then advanced in similar location to finder and flash of pleural fluid obtained. Wire was easily advanced and introducer needle was removed. A 1cm skin incision at wire entry site. An additional 1cm incision was made approximately 4cm inferior to the wire insertion site. Using a trochar the catheter was tunneled under the skin from anterior to posterior incision sites. The cuff was seated approximately 1cm beyond insertion site. The pleural insertion tract was then serially dilated, and the dilatator/breakaway sheath was inserted. The catheter was then inserted thru the breakaway sheath, as the sheath was removed. The catheter was fully seated under the skin. Using tunneled pleural catheter bottles 1000 cc was drained. Catheter was sutured in place - 2 suture(s) at wire insertion site and 1 at catheter insertion site. Sterile dressing was then placed. Post procedure Chest X-ray was reviewed and showed Adequate catheter placement, without any apparent complications. COMPLICATIONS:  None    ESTIMATED BLOOD LOSS:  None    RECOMMENDATIONS:   Drainage is recommended every other day until output is less than 100cc for three consecutive drainage attempts. Suture removal will be arranged in 7 days.      Travis Childs DO

## 2023-10-19 ENCOUNTER — TELEPHONE (OUTPATIENT)
Dept: NEUROLOGY | Facility: CLINIC | Age: 83
End: 2023-10-19

## 2023-10-20 ENCOUNTER — OFFICE VISIT (OUTPATIENT)
Dept: HEMATOLOGY ONCOLOGY | Facility: MEDICAL CENTER | Age: 83
End: 2023-10-20
Payer: COMMERCIAL

## 2023-10-20 ENCOUNTER — DOCUMENTATION (OUTPATIENT)
Age: 83
End: 2023-10-20

## 2023-10-20 VITALS
HEIGHT: 70 IN | DIASTOLIC BLOOD PRESSURE: 68 MMHG | TEMPERATURE: 97.9 F | BODY MASS INDEX: 28.6 KG/M2 | OXYGEN SATURATION: 92 % | WEIGHT: 199.8 LBS | SYSTOLIC BLOOD PRESSURE: 114 MMHG | HEART RATE: 80 BPM | RESPIRATION RATE: 17 BRPM

## 2023-10-20 DIAGNOSIS — J90 PLEURAL EFFUSION: Primary | ICD-10-CM

## 2023-10-20 DIAGNOSIS — C34.92 SQUAMOUS CELL CARCINOMA OF LEFT LUNG (HCC): Primary | ICD-10-CM

## 2023-10-20 DIAGNOSIS — C61 PROSTATE CANCER (HCC): ICD-10-CM

## 2023-10-20 PROCEDURE — 99214 OFFICE O/P EST MOD 30 MIN: CPT | Performed by: INTERNAL MEDICINE

## 2023-10-20 NOTE — PROGRESS NOTES
Clarence Friend  1940  [facility] [place] [id number] [facility] [address]    DISCUSSION/SUMMARY:    82-year-old male with a number of medical and cardiac problems previously found to have a left lower lobe mass. Initial biopsy demonstrated squamous cell carcinoma. IR performed thoracentesis, minimal amount of fluid was removed but there was no evidence of metastatic disease; cytology was negative. Patient was seen by thoracic surgery and underwent mediastinoscopy. There was no evidence of metastatic disease in the sampled lymph nodes (2R, 4R, 4L, 7). Tumor was 4.5 cm. 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 Patient is status post SPRT. Mr. Jacquelyn Aguero continues to develop recurrent pleural effusions being drained by pulmonology at [facility]. Cytology has been negative. Recurrent effusions may be from recurrence of lung cancer. Patient had pleural catheter placed on 10/18/23. Recent CT scan showed decrease in size of pleural effusion and atelectasis, in vicinity of treated tumor and recommended short interval CT. Pt has repeat PET scan scheduled for October 30th. Patient will also continue to follow-up with Dr. Martha Reynaga as directed. NCCN guidelines 1.2022 state that for patients with T2b N0 lesions, negative mediastinal lymph nodes, medically inoperable, initial treatment options include definitive RT. Guidelines also state that patients can be considered for adjuvant chemotherapy for high risk disease. High risk is defined as poorly differentiated tumors, pulmonary neuroendocrine tumors, vascular invasion, wedge resection, tumors > 4 cm (this patient) visceral pleural involvement and unknown lymph node status. Mr. Jacquelyn Aguero has only 1 known risk factor. As discussed previously, patient has multiple comorbidities and a had +/- performance status at that time.   Adjuvant treatments were not given.    Given continued weight loss and recurrent effusions, discussed potential need for further treatment depending on PET scan results including immunotherapy vs chemo, however will wait until further information is available. Mr. Aiden Sue is to return to this office in 2 weeks after PET scan to discuss findings and next steps. Patient is to call if he has any other oncology questions or concerns. Carefully review your medication list and verify that the list is accurate and up-to-date. Please call the hematology/oncology office if there are medications missing from the list, medications on the list that you are not currently taking or if there is a dosage or instruction that is different from how you're taking that medication. Patient goals and areas of care:  Lung cancer surveillance  Barriers to care: none  Patient is able to self-care.  ______________________________________________________________________________________    Chief Complaint   Patient presents with   • Follow-up     History of Present Illness:  44-year-old male with multiple medical and cardiac issue recently seen in the emergency room because of a cough. Workup demonstrated a left lower lobe mass. Biopsy demonstrated squamous cell carcinoma. Workup did not demonstrated evidence of metastatic disease; mediastinal ostomy was negative. Patient was seen by thoracic surgery but was not felt to be a surgical candidate. Patient was subsequently seen by radiation oncology and underwent SPRT. Patient returns for follow-up. Patient still with fatigue, and progressive weight loss, 25 lbs over the past year. No shortness of breath or dyspnea on exertion. Activities are limited but the same as before. Appetite is still poor. No GI or  problems. No pain control issues. Has received his COVID-vaccine and booster.     Patient was diagnosed with prostate cancer (approximately 10 + years ago) and underwent seeds and external beam radiation. No evidence of recurrence since that time. Review of Systems   Constitutional:  Positive for fatigue. Poor appetite and continuing weight loss   HENT: Negative. Eyes: Negative. Respiratory: Negative. Cardiovascular: Negative. Gastrointestinal: Negative. Endocrine: Negative. Genitourinary: Negative. Musculoskeletal: Negative. Skin: Negative. Allergic/Immunologic: Negative. Neurological: Negative. Hematological: Negative. Easy bruising   Psychiatric/Behavioral:  Negative for self-injury. All other systems reviewed and are negative.     Patient Active Problem List   Diagnosis   • Corns   • Pain in both feet   • Onychomycosis   • Tinea pedis of both feet   • Lung mass   • Hyperlipidemia   • Type 2 diabetes mellitus with diabetic neuropathy, without long-term current use of insulin ([facility])   • Squamous cell carcinoma of lung ([facility])   • Shortness of breath   • Daytime hypersomnolence   • Chronic diastolic heart failure ([facility])   • SYLVESTER (obstructive sleep apnea)   • Prostate cancer ([facility])   • GERD (gastroesophageal reflux disease)   • CAD (coronary artery disease)   • Other persistent atrial fibrillation ([facility])   • Alcohol dependence ([address])   • Acute respiratory failure with hypoxemia ([facility])   • Depression, recurrent ([facility])   • COVID-19   • Angioedema   • Septic shock ([facility])   • Cellulitis   • Cellulitis of chest wall   • Acute kidney injury (BUDDY) with acute tubular necrosis (ATN) ([facility])   • Chronic obstructive pulmonary disease, unspecified COPD type ([address])   • Centrilobular emphysema ([address])   • Abdominal aortic aneurysm ([address])   • Hypertensive heart disease   • Tremors of nervous system   • Recurrent pleural effusion on left     Past Medical History:   Diagnosis Date   • Abdominal pain    • Anxiety    • Arthritis    • Asthma    • Atrial fibrillation ([address])    • Back pain    • Bleeding ulcer    • Bronchitis    • Cancer [facility])     prostate 2011- radiation   • Cardiac Smokeless tobacco: Never   Vaping Use   • Vaping Use: Never used   Substance and Sexual Activity   • Alcohol use: Yes     Alcohol/week: 4.0 - 6.0 standard drinks of alcohol     Types: 1 Glasses of wine, 3 - 5 Cans of beer per week     Comment: few beers day   • Drug use: Never   • Sexual activity: Not on file   Other Topics Concern   • Not on file   Social History Narrative   • Not on file     Social Determinants of Health     Financial Resource Strain: Medium Risk (8/1/2023)    Overall Financial Resource Strain (CARDIA)    • Difficulty of Paying Living Expenses: Somewhat hard   Food Insecurity: No Food Insecurity (12/19/2022)    Hunger Vital Sign    • Worried About Running Out of Food in the Last Year: Never true    • Ran Out of Food in the Last Year: Never true   Transportation Needs: No Transportation Needs (8/1/2023)    PRAPARE - Transportation    • Lack of Transportation (Medical): No    • Lack of Transportation (Non-Medical):  No   Physical Activity: Not on file   Stress: Not on file   Social Connections: Not on file   Intimate Partner Violence: Not on file   Housing Stability: Low Risk  (12/19/2022)    Housing Stability Vital Sign    • Unable to Pay for Housing in the Last Year: No    • Number of Places Lived in the Last Year: 1    • Unstable Housing in the Last Year: No   Social history:  40 pack-year history discontinued 20 years ago, patient drinks 2-4 beers a day off and on, no drug abuse, 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)    Current Outpatient Medications:   •  acetaminophen (TYLENOL) 325 mg tablet, Take 2 tablets (650 mg total) by mouth every 6 (six) hours as needed for mild pain, headaches or fever, Disp: 30 tablet, Rfl: 0  •  albuterol (2.5 mg/3 mL) 0.083 % nebulizer solution, Take 2.5 mg by nebulization As needed per patient's wife , Disp: , Rfl:   •  apixaban (Eliquis) 5 mg, Take 1 tablet (5 mg total) by mouth 2 (two) times a day, Disp: 180 tablet, Rfl: 1  • atenolol (TENORMIN) 25 mg tablet, , Disp: , Rfl:   •  atorvastatin (LIPITOR) 10 mg tablet, Take 1 tablet (10 mg total) by mouth daily, Disp: 90 tablet, Rfl: 1  •  carvedilol (COREG) 25 mg tablet, Take 1 tablet (25 mg total) by mouth 2 (two) times a day with meals, Disp: 180 tablet, Rfl: 1  •  ciclopirox (LOPROX) 0.77 % cream, Apply topically 2 (two) times a day for 20 days, Disp: 45 g, Rfl: 2  •  fluticasone-umeclidinium-vilanterol (Trelegy Ellipta) 100-62.5-25 mcg/actuation inhaler, Rinse mouth after use., Disp: 60 each, Rfl: 5  •  guaifenesin-codeine (GUAIFENESIN AC) 100-10 MG/5ML liquid, Take 10 mL by mouth 3 (three) times a day as needed for cough, Disp: 180 mL, Rfl: 0  •  halobetasol (ULTRAVATE) 0.05 % cream, , Disp: , Rfl:   •  hydrocortisone 2.5 % cream, Apply topically 2 (two) times a day Apply twice a day affected area the trunk, Disp: 20 g, Rfl: 2  •  latanoprost (XALATAN) 0.005 % ophthalmic solution, Administer 0.005 mL to both eyes daily at bedtime, Disp: , Rfl:   •  metFORMIN (GLUCOPHAGE) 500 mg tablet, Take 1 tablet (500 mg total) by mouth daily with breakfast, Disp: , Rfl:   •  mirtazapine (REMERON) 7.5 MG tablet, Take 1 tablet (7.5 mg total) by mouth daily at bedtime, Disp: 90 tablet, Rfl: 3  •  Multiple Vitamin (MULTI-VITAMIN DAILY PO), Take by mouth daily  , Disp: , Rfl:   •  omeprazole (PriLOSEC) 20 mg delayed release capsule, TAKE 1 CAPSULE DAILY, Disp: 90 capsule, Rfl: 1  •  primidone (MYSOLINE) 50 mg tablet, TAKE 2 TABS AT 8PM., Disp: 180 tablet, Rfl: 1  •  spironolactone (ALDACTONE) 25 mg tablet, Take 1 tablet (25 mg total) by mouth daily, Disp: 90 tablet, Rfl: 1  •  aspirin (ECOTRIN LOW STRENGTH) 81 mg EC tablet, , Disp: , Rfl:     No Known Allergies    Vitals:    10/20/23 0926   BP: 114/68   Pulse: 80   Resp: 17   Temp: 97.9 °F (36.6 °C)   SpO2: 92%     Physical Exam  Constitutional:       Appearance: He is well-developed.       Comments: Obese male, no respiratory distress, no signs of pain HENT:      Head: Normocephalic and atraumatic. Right Ear: External ear normal.      Left Ear: External ear normal.   Eyes:      Conjunctiva/sclera: Conjunctivae normal.      Pupils: Pupils are equal, round, and reactive to light. Cardiovascular:      Rate and Rhythm: Normal rate and regular rhythm. Heart sounds: Normal heart sounds. Pulmonary:      Effort: Pulmonary effort is normal.      Comments: Left chest wall dressing (pleural catheter) C/D/I. Decreased breath sounds left lower lobe  Abdominal:      General: Bowel sounds are normal. There is distension. Palpations: Abdomen is soft. Comments: Obese, distended, soft +bowel sounds, no guarding   Musculoskeletal:         General: Normal range of motion. Cervical back: Normal range of motion and neck supple. Skin:     General: Skin is warm. Comments: Relatively good color, warm, moist, scattered mild areas of ecchymosis upper extremities   Neurological:      Mental Status: He is alert and oriented to person, place, and time. Deep Tendon Reflexes: Reflexes are normal and symmetric. Psychiatric:         Behavior: Behavior normal.         Thought Content: Thought content normal.         Judgment: Judgment normal.     Extremities:  No lower extremity edema bilaterally, no cords, pulses are 1+   Lymphatics:  No adenopathy in the neck, supraclavicular region, axilla bilateral    Labs    9/13/2023: WBC 6.38, Hemoglobin = 12.8, MCV = 98, Platelets = 695    22/07/0388 WBC = 11.02 hemoglobin = 12.7 hematocrit = 39.6 platelet = 831 neutrophil = 79%    12/29/2022 BUN = 11 creatinine = 0.74    Imaging    9/28/23: CT Chest w contrast:   Impression: Since September 12, 2023, decrease in size of the still large left pleural effusion with commensurate decrease in left-sided passive atelectasis. Persistent hypoattenuation in the collapsed portion of the left lower lobe.  Given that this is in the   vicinity of the treated tumor, and has increased in size since December 2022, short interval contrast-enhanced CT is advised following resolution of the pleural effusion for further assessment. 1/27/2023 CT chest without contrast  IMPRESSION:  1. Slightly decreased left pleural effusion with improved passive atelectasis of the left lower lobe. Previously noted left lower lobe mass is difficult to visualize on noncontrast exam but appears likely unchanged from most recent study. 2.  Similar appearance of skin thickening and subcutaneous stranding in the right axillary region which could represent cellulitis. 05/04/2022 chest x-ray portable. Impression stated left small effusion and parenchymal process appears unchanged. 05/02/2022 CT scan the chest without contrast    IMPRESSION:  1. Increase in size of a left lower lobe patchy opacity with new right basilar patchy densities concerning for pulmonary infiltrates with additional patchy and reticulonodular changes lingular segment left upper lobe and right middle lobe also likely related to infectious process. 2.  Left lower lobe cavitary mass has decreased in size now measuring 3.3 x 1.9 cm, previously 4.1 x 2.2 cm. 3.  Stable COPD and pulmonary fibrosis with the latter likely related to radiation change. 11/19/2021 MRI brain. Impression stated white matter changes suggestive of chronic microangiopathy. No acute intracranial pathology. 10/29/2021 PET-CT    1. Hypermetabolic necrotic 4.5 x 4 cm left lower lung mass compatible with known malignancy. 2.  2 mildly hypermetabolic right paratracheal mediastinal nodes for which early metastases are not excluded. 3.  Small mildly hypermetabolic left pleural effusion for which malignant effusion is not excluded. 4.  Additional scattered tiny nodular lung densities may be reassessed on follow-up CT. 5.  Probable reactive subcentimeter right cervical node may be reassessed on follow-up PET/CT.   6.  No hypermetabolic soft tissue metastases in the abdomen, pelvis. 7.  Minimal inhomogeneous FDG activity in the sacrum and left posterior iliac, though without dominant focal lesion. This may be reassessed on follow-up exam.    Pathology    Case Report   Surgical Pathology Report                         Case: [id number]                                    Authorizing Provider:  Ursula Trujillo MD       Collected:           11/29/2021 1005               Ordering Location:     [facility]      Received:            11/29/2021 [address]                                                       Pathologist:           Chevy Mcgee MD                                                         Specimens:   A) - Lymph Node, level 7                                                                             B) - Lymph Node, level 4R                                                                            C) - Lymph Node, level 2R                                                                            D) - Lymph Node, level 4L                                                                  Final Diagnosis   A. Lymph node, level 7, excision:  -  Portions of benign lymph node with reactive change and anthracosis, negative for granulomas, lymphoma, or metastatic carcinoma. B. Lymph node, level 4R, excision:  -  Portions of benign lymph node with reactive change and anthracosis, negative for granulomas, lymphoma, or metastatic carcinoma. C. Lymph node, level 2R, excision:  -  Portions of benign lymph node with reactive change and anthracosis, negative for granulomas, lymphoma, or metastatic carcinoma. D. Lymph node, level 4L, excision:  -  Benign lymph node with reactive change and anthracosis, negative for granulomas, lymphoma, or metastatic carcinoma.       Electronically signed by Chevy Mcgee MD on 12/2/2021 at 11:29 AM       Case Report   Surgical Pathology Report Case: [id number]                                    Authorizing Provider:  Chasity Quinteros MD      Collected:           10/05/2021 1058               Ordering Location:     [address] Received:            10/05/2021 1120                                      CAT Scan                                                                      Pathologist:           iX Jerez MD                                                         Specimen:    Lung, Left Lower Lobe                                                                      Final Diagnosis   A. Lung, Left Lower Lobe, :  - Invasive squamous carcinoma, moderately differentiated, keratinizing type. - Tumor is highlighted with P 40 immunoperoxidase stain.  - Prominent tumor necrosis is noted. Best representative block with tumor A1. This case was reviewed at the intradepartmental  conference.    RADHA Torres, Pulmonology, is notified of the diagnosis in [facility] via [address] [place] [address] on 10/06/2021  at 2.40 pm.   Electronically signed by Xi Jerez MD on 10/6/2021 at  2:44 PM

## 2023-10-20 NOTE — PROGRESS NOTES
Please see the hematology/oncology follow-up note dictated by the family practice resident from October 20, 2023.

## 2023-10-23 ENCOUNTER — TELEPHONE (OUTPATIENT)
Dept: PULMONOLOGY | Facility: CLINIC | Age: 83
End: 2023-10-23

## 2023-10-23 LAB — FUNGUS SPEC CULT: NORMAL

## 2023-10-23 PROCEDURE — 88112 CYTOPATH CELL ENHANCE TECH: CPT | Performed by: SPECIALIST

## 2023-10-23 PROCEDURE — 88305 TISSUE EXAM BY PATHOLOGIST: CPT | Performed by: SPECIALIST

## 2023-10-23 NOTE — TELEPHONE ENCOUNTER
Pulmonary    Called patient to discuss negative cytology from indwelling pleural catheter. Advised he has a appointment at [facility] on 10/26 at 9 AM to remove sutures with [facility] NP.      Fantasma Lucero MD  Pulmonary and Critical Care Medicine

## 2023-10-26 ENCOUNTER — OFFICE VISIT (OUTPATIENT)
Dept: PULMONOLOGY | Facility: CLINIC | Age: 83
End: 2023-10-26

## 2023-10-26 DIAGNOSIS — R91.8 LUNG MASS: Primary | ICD-10-CM

## 2023-10-26 PROCEDURE — RECHECK: Performed by: NURSE PRACTITIONER

## 2023-10-26 NOTE — PROGRESS NOTES
Patient was seen in office today for suture removal after Pleurx catheter was placed on 10/16/2023    Since Pleurx was placed drainage;    10/20/2023- 1L  10/30/2023- 500mL    I was able to remove 3 sutures from patient. Patient tolerated procedure. An intact dressing was placed over Pleurx.   Patient will follow-up in 3 months

## 2023-10-30 ENCOUNTER — HOSPITAL ENCOUNTER (OUTPATIENT)
Dept: NUCLEAR MEDICINE | Facility: HOSPITAL | Age: 83
Discharge: HOME | End: 2023-10-30
Payer: COMMERCIAL

## 2023-10-30 DIAGNOSIS — C34.92 SQUAMOUS CELL CARCINOMA OF LEFT LUNG (HCC): ICD-10-CM

## 2023-10-30 LAB — GLUCOSE SERPL-MCNC: 147 MG/DL (ref 65–140)

## 2023-10-30 PROCEDURE — 78815 PET IMAGE W/CT SKULL-THIGH: CPT

## 2023-10-30 PROCEDURE — G1004 CDSM NDSC: HCPCS

## 2023-10-30 PROCEDURE — 82948 REAGENT STRIP/BLOOD GLUCOSE: CPT

## 2023-10-30 PROCEDURE — A9552 F18 FDG: HCPCS

## 2023-11-02 ENCOUNTER — OFFICE VISIT (OUTPATIENT)
Dept: NEUROLOGY | Facility: CLINIC | Age: 83
End: 2023-11-02
Payer: COMMERCIAL

## 2023-11-02 VITALS
OXYGEN SATURATION: 100 % | TEMPERATURE: 96 F | WEIGHT: 197 LBS | HEIGHT: 70 IN | BODY MASS INDEX: 28.2 KG/M2 | DIASTOLIC BLOOD PRESSURE: 78 MMHG | SYSTOLIC BLOOD PRESSURE: 126 MMHG | HEART RATE: 77 BPM

## 2023-11-02 DIAGNOSIS — C34.90 LUNG CANCER (HCC): ICD-10-CM

## 2023-11-02 DIAGNOSIS — G25.0 TREMOR, ESSENTIAL: Primary | ICD-10-CM

## 2023-11-02 DIAGNOSIS — G93.40 ENCEPHALOPATHY: ICD-10-CM

## 2023-11-02 PROCEDURE — 99214 OFFICE O/P EST MOD 30 MIN: CPT | Performed by: PSYCHIATRY & NEUROLOGY

## 2023-11-02 RX ORDER — PRIMIDONE 50 MG/1
TABLET ORAL
Qty: 180 TABLET | Refills: 1 | Status: SHIPPED | OUTPATIENT
Start: 2023-11-02

## 2023-11-02 NOTE — PROGRESS NOTES
Return NeuroOutpatient Note        Bucky Edmondson  [id number]  66 y.o.  1940       Tremor, essential and lung mass        History obtained from:  Patient and wife    HPI/Subjective:    Bucky Edmondson is an 83 yo M with PMH of ET presents as f/u. Per my previous history, patient has tremors in both hands for over 2.5 years. He had described them when he would do something, use utensils, write, hold cup, fix something. Patient was previously on higher dose of primidone and was still having tremors. For past 2 visits, even at 100mg at night of primidone, his tremors are well controlled. He denies resting tremor. Previously, patient had reported lifting heavy containers and had had pain. We had ordered LS spine MRI but he forgot about it. He denies it now. He has tendency to wheeze. He takes inhaler prn so we have not used propranolol. He is found to have lung mass which based on recent PET scan size is progressing. He is seeing heme/onc and radiation oncologist soon.         Past Medical History:   Diagnosis Date   • Abdominal pain    • Anxiety    • Arthritis    • Asthma    • Atrial fibrillation [facility])    • Back pain    • Bleeding ulcer    • Bronchitis    • Cancer [facility])     prostate 2011- radiation   • Cardiac disease     cardiac stent x1   • Cataract     starting   • Chronic diastolic (congestive) heart failure (HCC)    • Diabetes mellitus ([address] [facility])     boarderline diabetic    • GERD (gastroesophageal reflux disease)    • History of radiation therapy 2010    Prostate seeds (brachytherapy) and EBRT   • Hyperlipidemia    • Hypertension    • 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, bilateral    • Low back pain    • Lung cancer ([address])    • Lung mass    • Myocardial infarction [facility])     mild 1999   • Obesity    • Prostate cancer ([address] [facility])    • Shortness of breath    • Sleep apnea     sleep study 11/22   • Wears dentures     full set   • Wears glasses      Social History     Socioeconomic History   • Marital status: /Civil [employer]     Spouse name: Not on file   • Number of children: Not on file   • Years of education: Not on file   • Highest education level: Not on file   Occupational History   • Not on file   Tobacco Use   • Smoking status: Former     Packs/day: 1.00     Years: 30.00     Total pack years: 30.00     Types: Cigarettes     Quit date: 36     Years since quittin.8   • Smokeless tobacco: Never   Vaping Use   • Vaping Use: Never used   Substance and Sexual Activity   • Alcohol use: Yes     Alcohol/week: 4.0 - 6.0 standard drinks of alcohol     Types: 1 Glasses of wine, 3 - 5 Cans of beer per week     Comment: few beers day   • Drug use: Never   • Sexual activity: Not on file   Other Topics Concern   • Not on file   Social History Narrative   • Not on file     Social Determinants of Health     Financial Resource Strain: Medium Risk (2023)    Overall Financial Resource Strain (CARDIA)    • Difficulty of Paying Living Expenses: Somewhat hard   Food Insecurity: No Food Insecurity (2022)    Hunger Vital Sign    • Worried About Running Out of Food in the Last Year: Never true    • Ran Out of Food in the Last Year: Never true   Transportation Needs: No Transportation Needs (2023)    PRAPARE - Transportation    • Lack of Transportation (Medical): No    • Lack of Transportation (Non-Medical):  No   Physical Activity: Not on file   Stress: Not on file   Social Connections: Not on file   Intimate Partner Violence: Not on file   Housing Stability: Low Risk  (2022)    Housing Stability Vital Sign    • Unable to Pay for Housing in the Last Year: No    • Number of Places Lived in the Last Year: 1    • Unstable Housing in the Last Year: No     Family History   Problem Relation Age of Onset   • Prostate cancer Brother    • Cancer Maternal Uncle         colo rectal cancer   • Cancer Paternal Aunt      No Known Allergies  Current Outpatient Medications on File Prior to Visit   Medication Sig Dispense Refill   • acetaminophen (TYLENOL) 325 mg tablet Take 2 tablets (650 mg total) by mouth every 6 (six) hours as needed for mild pain, headaches or fever 30 tablet 0   • albuterol (2.5 mg/3 mL) 0.083 % nebulizer solution Take 2.5 mg by nebulization As needed per patient's wife      • apixaban (Eliquis) 5 mg Take 1 tablet (5 mg total) by mouth 2 (two) times a day 180 tablet 1   • atenolol (TENORMIN) 25 mg tablet      • atorvastatin (LIPITOR) 10 mg tablet Take 1 tablet (10 mg total) by mouth daily 90 tablet 1   • carvedilol (COREG) 25 mg tablet Take 1 tablet (25 mg total) by mouth 2 (two) times a day with meals 180 tablet 1   • ciclopirox (LOPROX) 0.77 % cream Apply topically 2 (two) times a day for 20 days (Patient taking differently: Apply topically if needed) 45 g 2   • fluticasone-umeclidinium-vilanterol (Trelegy Ellipta) 100-62.5-25 mcg/actuation inhaler Rinse mouth after use.  60 each 5   • guaifenesin-codeine (GUAIFENESIN AC) 100-10 MG/5ML liquid Take 10 mL by mouth 3 (three) times a day as needed for cough 180 mL 0   • halobetasol (ULTRAVATE) 0.05 % cream      • hydrocortisone 2.5 % cream Apply topically 2 (two) times a day Apply twice a day affected area the trunk 20 g 2   • latanoprost (XALATAN) 0.005 % ophthalmic solution Administer 0.005 mL to both eyes daily at bedtime     • metFORMIN (GLUCOPHAGE) 500 mg tablet Take 1 tablet (500 mg total) by mouth daily with breakfast     • mirtazapine (REMERON) 7.5 MG tablet Take 1 tablet (7.5 mg total) by mouth daily at bedtime 90 tablet 3   • Multiple Vitamin (MULTI-VITAMIN DAILY PO) Take by mouth daily       • omeprazole (PriLOSEC) 20 mg delayed release capsule TAKE 1 CAPSULE DAILY 90 capsule 1   • spironolactone (ALDACTONE) 25 mg tablet Take 1 tablet (25 mg total) by mouth daily 90 tablet 1   • [DISCONTINUED] primidone (MYSOLINE) 50 mg tablet TAKE 2 TABS AT 8PM. 180 tablet 1   • aspirin (ECOTRIN LOW STRENGTH) 81 mg EC tablet  (Patient not taking: Reported on 9/21/2023) No current facility-administered medications on file prior to visit. Review of Systems   Refer to positive review of systems in HPI. Review of Systems    Constitutional- No fever  Eyes- No visual change  ENT- Hearing normal  CV- No chest pain  Resp- No Shortness of breath  GI- No diarrhea  - Bladder normal  MS- No Arthritis   Skin- No rash  Psych- No depression  Endo- No DM  Heme- No nodes    Vitals:    11/02/23 0954   BP: 126/78   BP Location: Left arm   Patient Position: Sitting   Cuff Size: Standard   Pulse: 77   Temp: (!) 96 °F (35.6 °C)   TempSrc: Tympanic   SpO2: 100%   Weight: 89.4 kg (197 lb)   Height: 5' 10" (1.778 m)       PHYSICAL EXAM:  Appearance: No Acute Distress  Ophthalmoscopic: Disc Flat, Normal fundus  Mental status:  Orientation: Awake, Alert, and Orientedx3  Memory: Registation 3/3 Recall 3/3  Attention: normal  Knowledge: good  Language: No aphasia  Speech: No dysarthria  Cranial Nerves:  2 No Visual Defect on Confrontation, Pupils round, equal, reactive to light  3,4,6 Extraocular Movements Intact, no nystagmus  5 Facial Sensation Intact  7 No facial asymmetry  8 Intact hearing  9,10 Palate symmetric, normal gag  11 Good shoulder shrug  12 Tongue Midline  Gait: Stable  Coordination: very mild ET.  No ataxia with finger to nose testing, and heel to shin  Sensory: Intact, Symmetric to pinprick, light touch, vibration, and joint position  Muscle Tone: Normal              Muscle exam:  Arm Right Left Leg Right Left   Deltoid 5/5 5/5 Iliopsoas 5/5 5/5   Biceps 5/5 5/5 Quads 5/5 5/5   Triceps 5/5 5/5 Hamstrings 5/5 5/5   Wrist Extension 5/5 5/5 Ankle Dorsi Flexion 5/5 5/5   Wrist Flexion 5/5 5/5 Ankle Plantar Flexion 5/5 5/5   Interossei 5/5 5/5 Ankle Eversion 5/5 5/5   APB 5/5 5/5 Ankle Inversion 5/5 5/5       Reflexes   RJ BJ TJ KJ AJ Plantars Link's   Right 2+ 2+ 2+ 2+ 2+ Downgoing Not present   Left 2+ 2+ 2+ 2+ 2+ Downgoing Not present     Personal review of  Labs: Diagnoses and all orders for this visit:      -     primidone (MYSOLINE) 50 mg tablet; Take 2 tabs at 8pm.      1. Tremor, essential        2. lung mass  primidone (MYSOLINE) 50 mg tablet      3. Lung cancer ([address])              Patient's ET in hands are well controlled at present. Will resume prmidone 100mg qhs.    We addressed his PET scan results and f/u with heme/onc and radiation oncologist.               Total time of encounter:  30 min  More than 50% of the time was used in counseling and/or coordination of care  Extent of counseling and/or coordination of care        Eustacio Friends, MD [facility]  [address]  [phone]

## 2023-11-03 ENCOUNTER — OFFICE VISIT (OUTPATIENT)
Dept: HEMATOLOGY ONCOLOGY | Facility: MEDICAL CENTER | Age: 83
End: 2023-11-03
Payer: COMMERCIAL

## 2023-11-03 VITALS
WEIGHT: 198 LBS | TEMPERATURE: 97.4 F | BODY MASS INDEX: 28.35 KG/M2 | SYSTOLIC BLOOD PRESSURE: 114 MMHG | DIASTOLIC BLOOD PRESSURE: 68 MMHG | HEIGHT: 70 IN | RESPIRATION RATE: 17 BRPM | OXYGEN SATURATION: 100 % | HEART RATE: 65 BPM

## 2023-11-03 DIAGNOSIS — C34.92 SQUAMOUS CELL CARCINOMA OF LEFT LUNG (HCC): Primary | Chronic | ICD-10-CM

## 2023-11-03 PROCEDURE — 99215 OFFICE O/P EST HI 40 MIN: CPT | Performed by: INTERNAL MEDICINE

## 2023-11-03 NOTE — PROGRESS NOTES
Lars Rocío  1940  [facility] [place] [address] [address] [facility] [id number]     DISCUSSION/SUMMARY:     47-year-old male with a number of medical and cardiac problems previously found to have a left lower lobe mass. Initial biopsy demonstrated squamous cell carcinoma. IR recently performed thoracentesis, minimal amount of fluid was removed but there was no evidence of metastatic disease; cytology was negative. Patient was seen by thoracic surgery and underwent mediastinoscopy. There was no evidence of metastatic disease in the sampled lymph nodes (2R, 4R, 4L, 7). Tumor was 4.5 cm. 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 Patient was treated with SBRT. Recently patient has had problems with recurrent pleural effusions on the left side. Patient has a Pleurx catheter in place. Patient states that he needs to drain the fluid approximately once a week, approximately 500 cc. PET/CT demonstrated evidence of recurrence in the left lower lobe, same area where the original malignancy was found. There is no clear evidence of spread to either hilar regions or the mediastinum but radiologist commented on 1 right paratracheal lymph node but very +/-. There was nonspecific FDG activity in the sacrum and left posterior iliac bone, seen before. Unknown clinical significance. Mr. Aiden Sue feels more or less okay, clinically there are no concerning findings. Patient is stable from a respiratory standpoint. As above, the PET/CT does not clearly demonstrate metastatic/distant disease. Patient has a pending appointment with Dr. Roger Clay. The question is if any additional local treatment can be given (+/- concurrent systemic treatment). Patient stated that previously he was told he was not a surgical candidate because of his cardiac status.      Mr. Aiden Sue is to return to this office in 3-4 weeks but this may change depending upon the above. Possibly patient will need to be discussed at the thoracic tumor board. Patient is to call if he has any other oncology questions or concerns. Carefully review your medication list and verify that the list is accurate and up-to-date. Please call the hematology/oncology office if there are medications missing from the list, medications on the list that you are not currently taking or if there is a dosage or instruction that is different from how you're taking that medication. Patient goals and areas of care: Follow-up with Dr. Madan Dinh, make decisions regarding treatment  Barriers to care: none  Patient is able to self-care  ______________________________________________________________________________________         Chief Complaint   Patient presents with    Follow-up - recurrent non-small cell lung carcinoma      History of Present Illness:  53-year-old male with multiple medical and cardiac issue recently seen in the emergency room because of a cough. Workup demonstrated a left lower lobe mass. Biopsy demonstrated squamous cell carcinoma. Workup did not demonstrated evidence of metastatic disease; mediastinal ostomy was negative. Patient was seen by thoracic surgery but was not felt to be a surgical candidate. Patient was subsequently seen by radiation oncology and underwent SPRT. Patient returns for follow-up. Patient still with fatigue, and progressive weight loss, 25 lbs over the past year. No shortness of breath or dyspnea on exertion. Activities are limited but the same as before. Appetite is still poor. No GI or  problems. No pain control issues. Patient was diagnosed with prostate cancer (approximately 10 + years ago) and underwent seeds and external beam radiation. No evidence of recurrence since that time. Review of Systems   Constitutional:  Positive for fatigue. Poor appetite and continuing weight loss   HENT: Negative.      Eyes: Negative. Respiratory: Negative. Cardiovascular: Negative. Gastrointestinal: Negative. Endocrine: Negative. Genitourinary: Negative. Musculoskeletal: Negative. Skin: Negative. Allergic/Immunologic: Negative. Neurological: Negative. Hematological: Negative. Easy bruising   Psychiatric/Behavioral:  Negative for self-injury. All other systems reviewed and are negative.          Patient Active Problem List   Diagnosis    Corns    Pain in both feet    Onychomycosis    Tinea pedis of both feet    Lung mass    Hyperlipidemia    Type 2 diabetes mellitus with diabetic neuropathy, without long-term current use of insulin ([facility])    Squamous cell carcinoma of lung (HCC)    Shortness of breath    Daytime hypersomnolence    Chronic diastolic heart failure (HCC)    SYLVESTER (obstructive sleep apnea)    Prostate cancer (HCC)    GERD (gastroesophageal reflux disease)    CAD (coronary artery disease)    Other persistent atrial fibrillation (HCC)    Alcohol dependence ([address])    Acute respiratory failure with hypoxemia ([facility])    Depression, recurrent ([facility])    COVID-19    Angioedema    Septic shock ([facility])    Cellulitis    Cellulitis of chest wall    Acute kidney injury (BUDDY) with acute tubular necrosis (ATN) ([facility])    Chronic obstructive pulmonary disease, unspecified COPD type ([address])    Centrilobular emphysema ([address])    Abdominal aortic aneurysm ([address])    Hypertensive heart disease    Tremors of nervous system    Recurrent pleural effusion on left      Medical History        Past Medical History:   Diagnosis Date    Abdominal pain      Anxiety      Arthritis      Asthma      Atrial fibrillation ([address])      Back pain      Bleeding ulcer      Bronchitis      Cancer ([address])       prostate 2011- radiation    Cardiac disease       cardiac stent x1    Cataract       starting    Chronic diastolic (congestive) heart failure ([address])      Diabetes mellitus ([address])       boarderline diabetic     GERD (gastroesophageal reflux disease)      History of radiation therapy 2010     Prostate seeds (brachytherapy) and EBRT    Hyperlipidemia      Hypertension      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, bilateral      Low back pain      Lung cancer ([address])      Lung mass      Myocardial infarction ([address])       mild 1999    Obesity      Prostate cancer ([address])      Shortness of breath      Sleep apnea       sleep study     Wears dentures       full set    Wears glasses           Surgical History         Past Surgical History:   Procedure Laterality Date    ABDOMINAL HERNIA REPAIR        ABDOMINAL SURGERY         bleeding ulcer, cyst removed from abd    [address]     x1     ESOPHAGOGASTRODUODENOSCOPY        IR BIOPSY LUNG   10/05/2021    IR THORACENTESIS   2021    IR THORACENTESIS   2023    IR THORACENTESIS   2023    KNEE SURGERY Left       [address] INCL FLUOR GDNCE DX W/CELL WASHG [address] [facility] N/A 2021     Procedure: Tara Mcclellan;  Surgeon: Jonas Blancas MD;  Location: [facility];  Service: Thoracic    [facility] MEDIASTINOSCOPY WITH LYMPH NODE BIOPSY/IES N/A 2021     Procedure: MEDIASTINOSCOPY;  Surgeon: Jonas Blancas MD;  Location: [facility];  Service: Thoracic    PROSTATE SURGERY             Family history: 3 sons in good general health, no known familial or genetic diseases     Social History               Socioeconomic History    Marital status: /Civil Union       Spouse name: Not on file    Number of children: Not on file    Years of education: Not on file    Highest education level: Not on file   Occupational History    Not on file   Tobacco Use    Smoking status: Former       Packs/day: 1.00       Years: 30.00       Total pack years: 30.00       Types: Cigarettes       Quit date:        Years since quittin.8    Smokeless tobacco: Never   Vaping Use    Vaping Use: Never used   Substance and Sexual Activity    Alcohol use:  Yes       Alcohol/week: 4.0 - 6.0 standard drinks of alcohol       Types: 1 Glasses of wine, 3 - 5 Cans of beer per week       Comment: few beers day    Drug use: Never    Sexual activity: Not on file   Other Topics Concern    Not on file   Social History Narrative    Not on file      Social Determinants of Health           Financial Resource Strain: Medium Risk (8/1/2023)     Overall Financial Resource Strain (CARDIA)      Difficulty of Paying Living Expenses: Somewhat hard   Food Insecurity: No Food Insecurity (12/19/2022)     Hunger Vital Sign      Worried About Running Out of Food in the Last Year: Never true      Ran Out of Food in the Last Year: Never true   Transportation Needs: No Transportation Needs (8/1/2023)     PRAPARE - Transportation      Lack of Transportation (Medical): No      Lack of Transportation (Non-Medical):  No   Physical Activity: Not on file   Stress: Not on file   Social Connections: Not on file   Intimate Partner Violence: Not on file   Housing Stability: Low Risk  (12/19/2022)     Housing Stability Vital Sign      Unable to Pay for Housing in the Last Year: No      Number of Places Lived in the Last Year: 1      Unstable Housing in the Last Year: No      Social history:  40 pack-year history discontinued 20 years ago, patient drinks 2-4 beers a day off and on, no drug abuse, 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)     Current Outpatient Medications:     acetaminophen (TYLENOL) 325 mg tablet, Take 2 tablets (650 mg total) by mouth every 6 (six) hours as needed for mild pain, headaches or fever, Disp: 30 tablet, Rfl: 0    albuterol (2.5 mg/3 mL) 0.083 % nebulizer solution, Take 2.5 mg by nebulization As needed per patient's wife , Disp: , Rfl:     apixaban (Eliquis) 5 mg, Take 1 tablet (5 mg total) by mouth 2 (two) times a day, Disp: 180 tablet, Rfl: 1    atenolol (TENORMIN) 25 mg tablet, , Disp: , Rfl:     atorvastatin (LIPITOR) 10 mg tablet, Take 1 tablet (10 mg total) by mouth daily, Disp: 90 tablet, Rfl: 1    carvedilol (COREG) 25 mg tablet, Take 1 tablet (25 mg total) by mouth 2 (two) times a day with meals, Disp: 180 tablet, Rfl: 1    ciclopirox (LOPROX) 0.77 % cream, Apply topically 2 (two) times a day for 20 days, Disp: 45 g, Rfl: 2    fluticasone-umeclidinium-vilanterol (Trelegy Ellipta) 100-62.5-25 mcg/actuation inhaler, Rinse mouth after use., Disp: 60 each, Rfl: 5    guaifenesin-codeine (GUAIFENESIN AC) 100-10 MG/5ML liquid, Take 10 mL by mouth 3 (three) times a day as needed for cough, Disp: 180 mL, Rfl: 0    halobetasol (ULTRAVATE) 0.05 % cream, , Disp: , Rfl:     hydrocortisone 2.5 % cream, Apply topically 2 (two) times a day Apply twice a day affected area the trunk, Disp: 20 g, Rfl: 2    latanoprost (XALATAN) 0.005 % ophthalmic solution, Administer 0.005 mL to both eyes daily at bedtime, Disp: , Rfl:     metFORMIN (GLUCOPHAGE) 500 mg tablet, Take 1 tablet (500 mg total) by mouth daily with breakfast, Disp: , Rfl:     mirtazapine (REMERON) 7.5 MG tablet, Take 1 tablet (7.5 mg total) by mouth daily at bedtime, Disp: 90 tablet, Rfl: 3    Multiple Vitamin (MULTI-VITAMIN DAILY PO), Take by mouth daily  , Disp: , Rfl:     omeprazole (PriLOSEC) 20 mg delayed release capsule, TAKE 1 CAPSULE DAILY, Disp: 90 capsule, Rfl: 1    primidone (MYSOLINE) 50 mg tablet, TAKE 2 TABS AT 8PM., Disp: 180 tablet, Rfl: 1    spironolactone (ALDACTONE) 25 mg tablet, Take 1 tablet (25 mg total) by mouth daily, Disp: 90 tablet, Rfl: 1    aspirin (ECOTRIN LOW STRENGTH) 81 mg EC tablet, , Disp: , Rfl:      No Known Allergies         Vitals:     10/20/23 0926   BP: 114/68   Pulse: 80   Resp: 17   Temp: 97.9 °F (36.6 °C)   SpO2: 92%      Physical Exam  Constitutional:       Appearance: He is well-developed. Comments: Obese male, no respiratory distress, no signs of pain   HENT:      Head: Normocephalic and atraumatic.       Right Ear: External ear normal.      Left Ear: External ear normal.   Eyes: Conjunctiva/sclera: Conjunctivae normal.      Pupils: Pupils are equal, round, and reactive to light. Cardiovascular:      Rate and Rhythm: Normal rate and regular rhythm. Heart sounds: Normal heart sounds. Pulmonary:      Effort: Pulmonary effort is normal.      Comments: Left chest wall dressing (pleural catheter) C/D/I. Decreased breath sounds left lower lobe  Abdominal:      General: Bowel sounds are normal. There is distension. Palpations: Abdomen is soft. Comments: Obese, distended, soft +bowel sounds, no guarding   Musculoskeletal:         General: Normal range of motion. Cervical back: Normal range of motion and neck supple. Skin:     General: Skin is warm. Comments: Relatively good color, warm, moist, scattered mild areas of ecchymosis upper extremities   Neurological:      Mental Status: He is alert and oriented to person, place, and time. Deep Tendon Reflexes: Reflexes are normal and symmetric. Psychiatric:         Behavior: Behavior normal.         Thought Content: Thought content normal.         Judgment: Judgment normal.      Extremities:  No lower extremity edema bilaterally, no cords, pulses are 1+   Lymphatics:  No adenopathy in the neck, supraclavicular region, axilla bilateral     Labs     9/13/2023: WBC 6.38, Hemoglobin = 12.8, MCV = 98, Platelets = 267     72/13/1832 WBC = 11.02 hemoglobin = 12.7 hematocrit = 39.6 platelet = 490 neutrophil = 79%     12/29/2022 BUN = 11 creatinine = 0.74     Imaging    10/30/2023 PET/CT    CHEST:  Best seen on image 76 of series 4 is a hypermetabolic centrally photopenic left lower lobe lung mass measuring 7.1 x 6.4 cm with max SUV of 12.0, previously measuring 4.5 x 4.0 cm with max SUV of 19.7 on PET/CT dated 10/29/2021 and approximately 4.3 x 3.7 cm when utilizing similar measurement technique and CT of chest dated 12/23/2022. The interval growth in size since 12/23/2022 suggest progression of hypermetabolic malignancy. Subtle FDG activity is noted with a stable in size prominent right paratracheal lymph node on image 58 of series 4 measuring 1 1.4 cm in short axis with max SUV of 2.8, previously 3.5. The stability favors benign reactive lymph node with early pamela metastatic disease considered less likely    IMPRESSION:     1. Hypermetabolic left lower lobe lung malignancy has significantly increased in size since 12/23/2022 suggesting progression of malignancy. 2. Again noted is nonspecific FDG activity involving the sacrum and left posterior iliac bone without definitive correlate on low-dose unenhanced CT of uncertain clinical significance. Findings can be further characterized with MRI of the bony pelvis as clinically indicated. 3. Moderate left pleural effusion. 9/28/23: CT Chest w contrast:     Impression: Since September 12, 2023, decrease in size of the still large left pleural effusion with commensurate decrease in left-sided passive atelectasis. Persistent hypoattenuation in the collapsed portion of the left lower lobe. Given that this is in the   vicinity of the treated tumor, and has increased in size since December 2022, short interval contrast-enhanced CT is advised following resolution of the pleural effusion for further assessment. 1/27/2023 CT chest without contrast    IMPRESSION:  1. Slightly decreased left pleural effusion with improved passive atelectasis of the left lower lobe. Previously noted left lower lobe mass is difficult to visualize on noncontrast exam but appears likely unchanged from most recent study. 2.  Similar appearance of skin thickening and subcutaneous stranding in the right axillary region which could represent cellulitis. 05/04/2022 chest x-ray portable. Impression stated left small effusion and parenchymal process appears unchanged. 05/02/2022 CT scan the chest without contrast     IMPRESSION:  1.   Increase in size of a left lower lobe patchy opacity with new right basilar patchy densities concerning for pulmonary infiltrates with additional patchy and reticulonodular changes lingular segment left upper lobe and right middle lobe also likely related to infectious process. 2.  Left lower lobe cavitary mass has decreased in size now measuring 3.3 x 1.9 cm, previously 4.1 x 2.2 cm. 3.  Stable COPD and pulmonary fibrosis with the latter likely related to radiation change. 11/19/2021 MRI brain. Impression stated white matter changes suggestive of chronic microangiopathy. No acute intracranial pathology. 10/29/2021 PET-CT     1. Hypermetabolic necrotic 4.5 x 4 cm left lower lung mass compatible with known malignancy. 2.  2 mildly hypermetabolic right paratracheal mediastinal nodes for which early metastases are not excluded. 3.  Small mildly hypermetabolic left pleural effusion for which malignant effusion is not excluded. 4.  Additional scattered tiny nodular lung densities may be reassessed on follow-up CT. 5.  Probable reactive subcentimeter right cervical node may be reassessed on follow-up PET/CT. 6.  No hypermetabolic soft tissue metastases in the abdomen, pelvis. 7.  Minimal inhomogeneous FDG activity in the sacrum and left posterior iliac, though without dominant focal lesion.   This may be reassessed on follow-up exam.     Pathology     Case Report   Surgical Pathology Report                         Case: [id number]                                    Authorizing Provider:  Fide Fraga MD       Collected:           11/29/2021 1005               Ordering Location:     [facility]      Received:            11/29/2021 [address] [address]                                                       Pathologist:           Marla Clark MD                                                         Specimens:   A) - Lymph Node, level 7 B) - Lymph Node, level 4R                                                                            C) - Lymph Node, level 2R                                                                            D) - Lymph Node, level 4L                                                                   Final Diagnosis   A. Lymph node, level 7, excision:  -  Portions of benign lymph node with reactive change and anthracosis, negative for granulomas, lymphoma, or metastatic carcinoma. B. Lymph node, level 4R, excision:  -  Portions of benign lymph node with reactive change and anthracosis, negative for granulomas, lymphoma, or metastatic carcinoma. C. Lymph node, level 2R, excision:  -  Portions of benign lymph node with reactive change and anthracosis, negative for granulomas, lymphoma, or metastatic carcinoma. D. Lymph node, level 4L, excision:  -  Benign lymph node with reactive change and anthracosis, negative for granulomas, lymphoma, or metastatic carcinoma. Electronically signed by Cami Nichole MD on 12/2/2021 at 11:29 AM         Case Report   Surgical Pathology Report                         Case: [id number]                                    Authorizing Provider:  Marcella Mccullough MD      Collected:           10/05/2021 1058               Ordering Location:     [address] Received:            10/05/2021 1120                                      CAT Scan                                                                      Pathologist:           Antony Johnson MD                                                         Specimen:    Lung, Left Lower Lobe                                                                       Final Diagnosis   A. Lung, Left Lower Lobe, :  - Invasive squamous carcinoma, moderately differentiated, keratinizing type. - Tumor is highlighted with P 40 immunoperoxidase stain.  - Prominent tumor necrosis is noted.       Best representative block with tumor A1.  This case was reviewed at the intradepartmental  conference.    RADHA Cohn, Pulmonology, is notified of the diagnosis in [facility] via [address] [place] [address] on 10/06/2021  at 2.40 pm.   Electronically signed by Vikki Jin MD on 10/6/2021 at  2:44 PM

## 2023-11-07 ENCOUNTER — CLINICAL SUPPORT (OUTPATIENT)
Dept: RADIATION ONCOLOGY | Facility: CLINIC | Age: 83
End: 2023-11-07
Attending: RADIOLOGY
Payer: COMMERCIAL

## 2023-11-07 ENCOUNTER — RADIATION ONCOLOGY FOLLOW-UP (OUTPATIENT)
Dept: RADIATION ONCOLOGY | Facility: CLINIC | Age: 83
End: 2023-11-07
Payer: COMMERCIAL

## 2023-11-07 VITALS
BODY MASS INDEX: 28.41 KG/M2 | OXYGEN SATURATION: 97 % | WEIGHT: 197.97 LBS | TEMPERATURE: 97.9 F | SYSTOLIC BLOOD PRESSURE: 106 MMHG | DIASTOLIC BLOOD PRESSURE: 72 MMHG | HEART RATE: 66 BPM

## 2023-11-07 DIAGNOSIS — C34.90 SQUAMOUS CELL CARCINOMA OF LUNG, UNSPECIFIED LATERALITY (HCC): Primary | Chronic | ICD-10-CM

## 2023-11-07 DIAGNOSIS — C34.92 SQUAMOUS CELL CARCINOMA OF LEFT LUNG (HCC): Primary | Chronic | ICD-10-CM

## 2023-11-07 PROCEDURE — 99211 OFF/OP EST MAY X REQ PHY/QHP: CPT | Performed by: RADIOLOGY

## 2023-11-07 PROCEDURE — 99215 OFFICE O/P EST HI 40 MIN: CPT | Performed by: RADIOLOGY

## 2023-11-07 NOTE — PROGRESS NOTES
Abby Nielson 1940 is a 82 y.o. 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 The pt completed a course of SBRT to the LLL on 02/11/2022. Last seen in radiation oncology on 10/2/23.     10/18/23 Left Pleural catheter insertion with 1000ml drained. Final Diagnosis   A-B. Pleural, Left (ThinPrep and cell block preparations): Atypical cellular changes seen. Rare atypical cells favor reactive. Background of benign mesothelial cells, neutrophils, lymphocytes and histiocytes. RECOMMENDATIONS:   Drainage is recommended every other day until output is less than 100cc for three consecutive drainage attempts. 10/20/23 Hem Onc Saji Navarrete/Bridger James  Continues to develop recurrent pleural effusions and draining with pulmonary. Cytology has been negative. Recurrent effusions may be from recurrence of lung cancer. Recent CT scan showed decrease in size of pleural effusion and atelectasis, in vicinity of treated tumor and recommended short interval CT. Given continued weight loss and recurrent effusions, discussed potential need for further treatment depending on PET scan results including immunotherapy vs chemo. F/u in 2 weeks after PET scan. 10/30/23 PET SCAN  CHEST:  Best seen on image 76 of series 4 is a hypermetabolic centrally photopenic left lower lobe lung mass measuring 7.1 x 6.4 cm with max SUV of 12.0, previously measuring 4.5 x 4.0 cm with max SUV of 19.7 on PET/CT dated 10/29/2021 and approximately 4.3 x   3.7 cm when utilizing similar measurement technique and CT of chest dated 12/23/2022. The interval growth in size since 12/23/2022 suggest progression of hypermetabolic malignancy. Subtle FDG activity is noted with a stable in size prominent right paratracheal lymph node on image 58 of series 4 measuring 1 1.4 cm in short axis with max SUV of 2.8, previously 3.5. The stability favors benign reactive lymph node with early pamela   metastatic disease considered less likely.   Mild patchy FDG activity associated with moderate right and mild left gynecomastia without focality, therefore favored to reflect physiologic activity. Stable mildly hypermetabolic soft tissue density deep to the bilateral scapula most consistent with benign elastofibroma dorsi. Minimal to mild nonspecific FDG activity associated with moderate left pleural effusion which could reflect malignant pleural effusion although recent cytology report demonstrated left pleural fluid was negative for malignancy. CT images: Bilateral gynecomastia. Atherosclerotic vascular calcifications including those of the coronary arteries are noted. Left pleural drainage catheter in place. Mild nonspecific subpleural interstitial thickening. ABDOMEN:  Subtle subcentimeter focus of FDG activity involving the left adrenal gland on PET image 205 with max SUV of 3.8 without definitive correlate on low-dose unenhanced CT and therefore of uncertain clinical significance and favored to reflect benign adrenal   activity with early metastatic disease not excluded. IMPRESSION:  1. Hypermetabolic left lower lobe lung malignancy has significantly increased in size since 2022 suggesting progression of malignancy. 2. Again noted is nonspecific FDG activity involving the sacrum and left posterior iliac bone without definitive correlate on low-dose unenhanced CT of uncertain clinical significance. Findings can be further characterized with MRI of the bony pelvis as clinically indicated. 3. Moderate left pleural effusion. 10/26/23 Pulmonary Sherryll SIMI Coon  Since Pleurx was placed drainage;  10/20/2023- 1L  10/30/2023- 500mL  3 sutures removed, f/u in 3 months  23 Wife drained cath for 550ml     Upcomin/3/23 Hem Onc  23 Pulmonary    Follow up visit     Oncology History   Squamous cell carcinoma of lung ([address])   10/5/2021 Initial Diagnosis    Squamous cell carcinoma of lung ([address])     10/5/2021 Biopsy    Final Diagnosis   A. Lung, Left Lower Lobe, :  - Invasive squamous carcinoma, moderately differentiated, keratinizing type. - Tumor is highlighted with P 40 immunoperoxidase stain.  - Prominent tumor necrosis is noted. 2/2/2022 - 2/11/2022 Radiation    BH SBRT LLL 6X 5 / 5 1,000 0 5,000 9      Treatment Dates:  2/2/2022 - 2/11/2022.      10/2/2023 -  Cancer Staged    Staging form: Lung, AJCC 8th Edition  - Clinical: cT2, cN0, cM0 - Signed by Maylin Muñoz MD on 10/2/2023           Review of Systems:  Review of Systems   Constitutional:  Positive for activity change, appetite change, chills, fatigue and unexpected weight change (down from 235lb in January 2023). Negative for fever. HENT: Negative. Negative for sore throat, tinnitus and trouble swallowing. Reports having decreased appetite and poor taste   Eyes: Negative. Negative for visual disturbance. Respiratory:  Positive for cough (frequent productive cough for yellow sputum) and shortness of breath (with exertion). Negative for chest tightness and wheezing. Has not needed nebulizer   Cardiovascular: Negative. Negative for chest pain and leg swelling. Gastrointestinal:  Positive for constipation. Negative for abdominal pain, blood in stool, diarrhea, nausea and vomiting. Endocrine: Positive for cold intolerance (gets cold easily). Genitourinary:  Negative for decreased urine volume, difficulty urinating, dysuria, frequency, hematuria and urgency. Steady stream  Nocturia x 1   Musculoskeletal: Negative. Negative for back pain. Skin: Negative. Eczema   Allergic/Immunologic: Negative. Negative for environmental allergies. Neurological:  Positive for weakness (generalized). Negative for dizziness, light-headedness, numbness and headaches. Hematological:  Bruises/bleeds easily. Psychiatric/Behavioral:  Negative for sleep disturbance. Depression score=27.   Does not want referral       Clinical Trial: no    Teaching completed    Health Maintenance   Topic Date Due    Pneumococcal Vaccine: 65+ Years (1 - PCV) Never done    Falls: Plan of Care  Never done    COVID-19 Vaccine (3 - Moderna risk series) 12/03/2021    Influenza Vaccine (1) 09/01/2023    BMI: Followup Plan  04/05/2024    HEMOGLOBIN A1C  03/20/2024    Depression Remission PHQ  04/02/2024    Diabetic Foot Exam  07/05/2024    Fall Risk  08/01/2024    Medicare Annual Wellness Visit (AWV)  08/01/2024    BMI: Adult  11/03/2024    DM Eye Exam  08/01/2025    Hepatitis C Screening  Completed    HIB Vaccine  Aged Out    IPV Vaccine  Aged Out    Hepatitis A Vaccine  Aged Out    Meningococcal ACWY Vaccine  Aged Out    HPV Vaccine  Aged Out     Patient Active Problem List   Diagnosis    Corns    Pain in both feet    Onychomycosis    Tinea pedis of both feet    Lung mass    Hyperlipidemia    Type 2 diabetes mellitus with diabetic neuropathy, without long-term current use of insulin (HCC)    Squamous cell carcinoma of lung (HCC)    Shortness of breath    Daytime hypersomnolence    Chronic diastolic heart failure (HCC)    SYLVESTER (obstructive sleep apnea)    Prostate cancer (HCC)    GERD (gastroesophageal reflux disease)    CAD (coronary artery disease)    Other persistent atrial fibrillation (HCC)    Alcohol dependence ([address])    Acute respiratory failure with hypoxemia (HCC)    Depression, recurrent (HCC)    COVID-19    Angioedema    Septic shock ([address])    Cellulitis    Cellulitis of chest wall    Acute kidney injury (BUDDY) with acute tubular necrosis (ATN) (HCC)    Chronic obstructive pulmonary disease, unspecified COPD type ([address])    Centrilobular emphysema ([address])    Abdominal aortic aneurysm ([address])    Hypertensive heart disease    Tremors of nervous system    Recurrent pleural effusion on left     Past Medical History:   Diagnosis Date    Abdominal pain     Anxiety     Arthritis     Asthma     Atrial fibrillation ([address])     Back pain     Bleeding ulcer     Bronchitis     Cancer ([address]) prostate 2011- radiation    Cardiac disease     cardiac stent x1    Cataract     starting    Chronic diastolic (congestive) heart failure (HCC)     Diabetes mellitus ([address])     boarderline diabetic     GERD (gastroesophageal reflux disease)     History of radiation therapy 2010    Prostate seeds (brachytherapy) and EBRT    Hyperlipidemia     Hypertension     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, bilateral     Low back pain     Lung cancer ([address])     Lung mass     Myocardial infarction ([address])     mild 1999    Obesity     Prostate cancer ([address])     Shortness of breath     Sleep apnea     sleep study 11/22    Wears dentures     full set    Wears glasses      Past Surgical History:   Procedure Laterality Date    ABDOMINAL HERNIA REPAIR      ABDOMINAL SURGERY      bleeding ulcer, cyst removed from abd    [address]    x1     ESOPHAGOGASTRODUODENOSCOPY      IR BIOPSY LUNG  10/05/2021    IR THORACENTESIS  11/08/2021    IR THORACENTESIS  6/5/2023    IR THORACENTESIS  9/13/2023    KNEE SURGERY Left      [address] INCL FLUOR GDNCE DX W/CELL WASHG [facility] N/A 11/29/2021    Procedure: BRONCHOSCOPY FLEXIBLE;  Surgeon: Asa Posadas MD;  Location: [facility];  Service: Thoracic    VT MEDIASTINOSCOPY WITH LYMPH NODE BIOPSY/IES N/A 11/29/2021    Procedure: Mariel Ontiveros;  Surgeon: Asa Posadas MD;  Location: [facility];  Service: Thoracic    PROSTATE SURGERY       Family History   Problem Relation Age of Onset    Prostate cancer Brother     Cancer Maternal Uncle         colo rectal cancer    Cancer Paternal Aunt      Social History     Socioeconomic History    Marital status: /Civil Union     Spouse name: Not on file    Number of children: Not on file    Years of education: Not on file    Highest education level: Not on file   Occupational History    Not on file   Tobacco Use    Smoking status: Former     Packs/day: 1.00     Years: 30.00     Total pack years: 30.00     Types: Cigarettes     Quit date:      Years since quittin.8    Smokeless tobacco: Never   Vaping Use    Vaping Use: Never used   Substance and Sexual Activity    Alcohol use: Yes     Alcohol/week: 4.0 - 6.0 standard drinks of alcohol     Types: 1 Glasses of wine, 3 - 5 Cans of beer per week     Comment: few beers day    Drug use: Never    Sexual activity: Not on file   Other Topics Concern    Not on file   Social History Narrative    Not on file     Social Determinants of Health     Financial Resource Strain: Medium Risk (2023)    Overall Financial Resource Strain (CARDIA)     Difficulty of Paying Living Expenses: Somewhat hard   Food Insecurity: No Food Insecurity (2022)    Hunger Vital Sign     Worried About Running Out of Food in the Last Year: Never true     Ran Out of Food in the Last Year: Never true   Transportation Needs: No Transportation Needs (2023)    PRAPARE - Transportation     Lack of Transportation (Medical): No     Lack of Transportation (Non-Medical):  No   Physical Activity: Not on file   Stress: Not on file   Social Connections: Not on file   Intimate Partner Violence: Not on file   Housing Stability: Low Risk  (2022)    Housing Stability Vital Sign     Unable to Pay for Housing in the Last Year: No     Number of Places Lived in the Last Year: 1     Unstable Housing in the Last Year: No       Current Outpatient Medications:     acetaminophen (TYLENOL) 325 mg tablet, Take 2 tablets (650 mg total) by mouth every 6 (six) hours as needed for mild pain, headaches or fever, Disp: 30 tablet, Rfl: 0    albuterol (2.5 mg/3 mL) 0.083 % nebulizer solution, Take 2.5 mg by nebulization As needed per patient's wife , Disp: , Rfl:     apixaban (Eliquis) 5 mg, Take 1 tablet (5 mg total) by mouth 2 (two) times a day, Disp: 180 tablet, Rfl: 1    aspirin (ECOTRIN LOW STRENGTH) 81 mg EC tablet, , Disp: , Rfl:     atenolol (TENORMIN) 25 mg tablet, , Disp: , Rfl:     atorvastatin (LIPITOR) 10 mg tablet, Take 1 tablet (10 mg total) by mouth daily, Disp: 90 tablet, Rfl: 1    carvedilol (COREG) 25 mg tablet, Take 1 tablet (25 mg total) by mouth 2 (two) times a day with meals, Disp: 180 tablet, Rfl: 1    ciclopirox (LOPROX) 0.77 % cream, Apply topically 2 (two) times a day for 20 days (Patient taking differently: Apply topically if needed), Disp: 45 g, Rfl: 2    fluticasone-umeclidinium-vilanterol (Trelegy Ellipta) 100-62.5-25 mcg/actuation inhaler, Rinse mouth after use., Disp: 60 each, Rfl: 5    guaifenesin-codeine (GUAIFENESIN AC) 100-10 MG/5ML liquid, Take 10 mL by mouth 3 (three) times a day as needed for cough, Disp: 180 mL, Rfl: 0    halobetasol (ULTRAVATE) 0.05 % cream, , Disp: , Rfl:     hydrocortisone 2.5 % cream, Apply topically 2 (two) times a day Apply twice a day affected area the trunk, Disp: 20 g, Rfl: 2    latanoprost (XALATAN) 0.005 % ophthalmic solution, Administer 0.005 mL to both eyes daily at bedtime, Disp: , Rfl:     metFORMIN (GLUCOPHAGE) 500 mg tablet, Take 1 tablet (500 mg total) by mouth daily with breakfast, Disp: , Rfl:     mirtazapine (REMERON) 7.5 MG tablet, Take 1 tablet (7.5 mg total) by mouth daily at bedtime, Disp: 90 tablet, Rfl: 3    Multiple Vitamin (MULTI-VITAMIN DAILY PO), Take by mouth daily  , Disp: , Rfl:     omeprazole (PriLOSEC) 20 mg delayed release capsule, TAKE 1 CAPSULE DAILY, Disp: 90 capsule, Rfl: 1    primidone (MYSOLINE) 50 mg tablet, Take 2 tabs at 8pm., Disp: 180 tablet, Rfl: 1    spironolactone (ALDACTONE) 25 mg tablet, Take 1 tablet (25 mg total) by mouth daily, Disp: 90 tablet, Rfl: 1  No Known Allergies  There were no vitals filed for this visit.

## 2023-11-07 NOTE — PROGRESS NOTES
Follow-up - Radiation Oncology   Nell Vaughn 1940 82 y.o. male [id number]      History of Present Illness   Cancer Staging   Squamous cell carcinoma of lung ([address])  Staging form: Lung, AJCC 8th Edition  - Clinical: cT2, cN0, cM0 - Signed by Asa Kaye MD on 10/2/2023      Interval History:    Nell Vaughn 1940 is a 82 y.o. 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 The pt completed a course of SBRT to the LLL on 02/11/2022. Last seen in radiation oncology on 10/2/23.      10/18/23 Left Pleural catheter insertion with 1000ml drained. Final Diagnosis   A-B. Pleural, Left (ThinPrep and cell block preparations): Atypical cellular changes seen. Rare atypical cells favor reactive. Background of benign mesothelial cells, neutrophils, lymphocytes and histiocytes. RECOMMENDATIONS:   Drainage is recommended every other day until output is less than 100cc for three consecutive drainage attempts. 10/20/23 Hem Onc Capri Navarrete/Bridger James  Continues to develop recurrent pleural effusions and draining with pulmonary. Cytology has been negative. Recurrent effusions may be from recurrence of lung cancer. Recent CT scan showed decrease in size of pleural effusion and atelectasis, in vicinity of treated tumor and recommended short interval CT. Given continued weight loss and recurrent effusions, discussed potential need for further treatment depending on PET scan results including immunotherapy vs chemo. F/u in 2 weeks after PET scan. 10/30/23 PET SCAN  CHEST:  Best seen on image 76 of series 4 is a hypermetabolic centrally photopenic left lower lobe lung mass measuring 7.1 x 6.4 cm with max SUV of 12.0, previously measuring 4.5 x 4.0 cm with max SUV of 19.7 on PET/CT dated 10/29/2021 and approximately 4.3 x   3.7 cm when utilizing similar measurement technique and CT of chest dated 12/23/2022.  The interval growth in size since 12/23/2022 suggest progression of hypermetabolic malignancy. Subtle FDG activity is noted with a stable in size prominent right paratracheal lymph node on image 58 of series 4 measuring 1 1.4 cm in short axis with max SUV of 2.8, previously 3.5. The stability favors benign reactive lymph node with early pamela   metastatic disease considered less likely. Mild patchy FDG activity associated with moderate right and mild left gynecomastia without focality, therefore favored to reflect physiologic activity. Stable mildly hypermetabolic soft tissue density deep to the bilateral scapula most consistent with benign elastofibroma dorsi. Minimal to mild nonspecific FDG activity associated with moderate left pleural effusion which could reflect malignant pleural effusion although recent cytology report demonstrated left pleural fluid was negative for malignancy. CT images: Bilateral gynecomastia. Atherosclerotic vascular calcifications including those of the coronary arteries are noted. Left pleural drainage catheter in place. Mild nonspecific subpleural interstitial thickening. ABDOMEN:  Subtle subcentimeter focus of FDG activity involving the left adrenal gland on PET image 205 with max SUV of 3.8 without definitive correlate on low-dose unenhanced CT and therefore of uncertain clinical significance and favored to reflect benign adrenal   activity with early metastatic disease not excluded. IMPRESSION:  1. Hypermetabolic left lower lobe lung malignancy has significantly increased in size since 12/23/2022 suggesting progression of malignancy. 2. Again noted is nonspecific FDG activity involving the sacrum and left posterior iliac bone without definitive correlate on low-dose unenhanced CT of uncertain clinical significance. Findings can be further characterized with MRI of the bony pelvis as clinically indicated. 3. Moderate left pleural effusion.      10/26/23 Pulmonary Shira Beam, CRNP  Since Pleurx was placed drainage;  10/20/2023- 1L  10/30/2023- 500mL  3 sutures removed, f/u in 3 months  23 Wife drained cath for 550ml      Upcomin/3/23 Hem Onc  23 Pulmonary       Historical Information   Oncology History   Squamous cell carcinoma of lung ([address])   10/5/2021 Initial Diagnosis    Squamous cell carcinoma of lung ([address])     10/5/2021 Biopsy    Final Diagnosis   A. Lung, Left Lower Lobe, :  - Invasive squamous carcinoma, moderately differentiated, keratinizing type. - Tumor is highlighted with P 40 immunoperoxidase stain.  - Prominent tumor necrosis is noted.          2022 - 2022 Radiation    [facility] SBRT LLL 6X 5 / 5 1,000 0 5,000 9      Treatment Dates:  2022 - 2022.      10/2/2023 -  Cancer Staged    Staging form: Lung, AJCC 8th Edition  - Clinical: cT2, cN0, cM0 - Signed by Dilshad Santiago MD on 10/2/2023           Past Medical History:   Diagnosis Date    Abdominal pain     Anxiety     Arthritis     Asthma     Atrial fibrillation (HCC)     Back pain     Bleeding ulcer     Bronchitis     Cancer ([address])     prostate - radiation    Cardiac disease     cardiac stent x1    Cataract     starting    Chronic diastolic (congestive) heart failure ([address])     Diabetes mellitus ([address])     boarderline diabetic     GERD (gastroesophageal reflux disease)     History of radiation therapy     Prostate seeds (brachytherapy) and EBRT    Hyperlipidemia     Hypertension     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, bilateral     Low back pain     Lung cancer ([address])     Lung mass     Myocardial infarction ([address])     mild     Obesity     Prostate cancer ([address])     Shortness of breath     Sleep apnea     sleep study     Wears dentures     full set    Wears glasses      Past Surgical History:   Procedure Laterality Date    ABDOMINAL HERNIA REPAIR      ABDOMINAL SURGERY      bleeding ulcer, cyst removed from abd    COLONOSCOPY      CORONARY ANGIOPLASTY WITH STENT PLACEMENT  1999    x1     ESOPHAGOGASTRODUODENOSCOPY      IR BIOPSY LUNG  10/05/2021    IR THORACENTESIS 2021    IR THORACENTESIS  2023    IR THORACENTESIS  2023    KNEE SURGERY Left      [address] INCL FLUOR GDNCE DX W/CELL WASHG SPX N/A 2021    Procedure: Sarahi Daniels;  Surgeon: Eleanor Hedrick MD;  Location: [facility];  Service: Thoracic    [facility] MEDIASTINOSCOPY WITH LYMPH NODE BIOPSY/IES N/A 2021    Procedure: Beti Moyer;  Surgeon: Eleanor Hedrick MD;  Location: [facility];  Service: Thoracic    PROSTATE SURGERY         Social History   Social History     Substance and Sexual Activity   Alcohol Use Yes    Alcohol/week: 4.0 - 6.0 standard drinks of alcohol    Types: 1 Glasses of wine, 3 - 5 Cans of beer per week    Comment: socially     Social History     Substance and Sexual Activity   Drug Use Never     Social History     Tobacco Use   Smoking Status Former    Packs/day: 1.00    Years: 30.00    Total pack years: 30.00    Types: Cigarettes    Quit date:     Years since quittin.8   Smokeless Tobacco Never         Meds/Allergies     Current Outpatient Medications:     acetaminophen (TYLENOL) 325 mg tablet, Take 2 tablets (650 mg total) by mouth every 6 (six) hours as needed for mild pain, headaches or fever, Disp: 30 tablet, Rfl: 0    albuterol (2.5 mg/3 mL) 0.083 % nebulizer solution, Take 2.5 mg by nebulization As needed per patient's wife , Disp: , Rfl:     apixaban (Eliquis) 5 mg, Take 1 tablet (5 mg total) by mouth 2 (two) times a day, Disp: 180 tablet, Rfl: 1    atenolol (TENORMIN) 25 mg tablet, , Disp: , Rfl:     atorvastatin (LIPITOR) 10 mg tablet, Take 1 tablet (10 mg total) by mouth daily, Disp: 90 tablet, Rfl: 1    carvedilol (COREG) 25 mg tablet, Take 1 tablet (25 mg total) by mouth 2 (two) times a day with meals, Disp: 180 tablet, Rfl: 1    ciclopirox (LOPROX) 0.77 % cream, Apply topically 2 (two) times a day for 20 days (Patient taking differently: Apply topically if needed), Disp: 45 g, Rfl: 2    fluticasone-umeclidinium-vilanterol (Trelegy Ellipta) 100-62.5-25 mcg/actuation inhaler, Rinse mouth after use., Disp: 60 each, Rfl: 5    guaifenesin-codeine (GUAIFENESIN AC) 100-10 MG/5ML liquid, Take 10 mL by mouth 3 (three) times a day as needed for cough, Disp: 180 mL, Rfl: 0    halobetasol (ULTRAVATE) 0.05 % cream, , Disp: , Rfl:     hydrocortisone 2.5 % cream, Apply topically 2 (two) times a day Apply twice a day affected area the trunk, Disp: 20 g, Rfl: 2    latanoprost (XALATAN) 0.005 % ophthalmic solution, Administer 0.005 mL to both eyes daily at bedtime, Disp: , Rfl:     metFORMIN (GLUCOPHAGE) 500 mg tablet, Take 1 tablet (500 mg total) by mouth daily with breakfast, Disp: , Rfl:     mirtazapine (REMERON) 7.5 MG tablet, Take 1 tablet (7.5 mg total) by mouth daily at bedtime, Disp: 90 tablet, Rfl: 3    Multiple Vitamin (MULTI-VITAMIN DAILY PO), Take by mouth daily  , Disp: , Rfl:     omeprazole (PriLOSEC) 20 mg delayed release capsule, TAKE 1 CAPSULE DAILY, Disp: 90 capsule, Rfl: 1    primidone (MYSOLINE) 50 mg tablet, Take 2 tabs at 8pm., Disp: 180 tablet, Rfl: 1    spironolactone (ALDACTONE) 25 mg tablet, Take 1 tablet (25 mg total) by mouth daily, Disp: 90 tablet, Rfl: 1    aspirin (ECOTRIN LOW STRENGTH) 81 mg EC tablet, , Disp: , Rfl:   No Known Allergies      Review of Systems  Constitutional:  Positive for activity change, appetite change, chills, fatigue and unexpected weight change (down from 235lb in January 2023). Negative for fever. HENT: Negative. Negative for sore throat, tinnitus and trouble swallowing. Reports having decreased appetite and poor taste   Eyes: Negative. Negative for visual disturbance. Respiratory:  Positive for cough (frequent productive cough for yellow sputum) and shortness of breath (with exertion). Negative for chest tightness and wheezing. Has not needed nebulizer   Cardiovascular: Negative. Negative for chest pain and leg swelling. Gastrointestinal:  Positive for constipation.  Negative for abdominal pain, blood in stool, diarrhea, nausea and vomiting. Endocrine: Positive for cold intolerance (gets cold easily). Genitourinary:  Negative for decreased urine volume, difficulty urinating, dysuria, frequency, hematuria and urgency. Steady stream  Nocturia x 1   Musculoskeletal: Negative. Negative for back pain. Skin: Negative. Eczema   Allergic/Immunologic: Negative. Negative for environmental allergies. Neurological:  Positive for weakness (generalized). Negative for dizziness, light-headedness, numbness and headaches. Hematological:  Bruises/bleeds easily. Psychiatric/Behavioral:  Negative for sleep disturbance. Depression score=27. Does not want referral       OBJECTIVE:   /72 (BP Location: Left arm, Patient Position: Sitting, Cuff Size: Large)   Pulse 66   Temp 97.9 °F (36.6 °C) (Temporal)   Wt 89.8 kg (197 lb 15.6 oz)   SpO2 97%   BMI 28.41 kg/m²   Pain Assessment:  0  ECOG/Zubrod/WHO: 2 - Symptomatic, <50% confined to bed    Physical Exam   There is no supraclavicular adenopathy. He has Pleurx catheter in the left chest wall. No surrounding erythema. Lungs clear. Abdomen obese soft nontender. Ambulating independently. Conversing appropriately.   Was emotional today        RESULTS    Lab Results:   Recent Results (from the past 672 hour(s))   Non-gynecologic cytology    Collection Time: 10/18/23 10:15 AM   Result Value Ref Range    Case Report       Non-gynecologic Cytology                          Case: [id number]                                  Authorizing Provider:  Malachi Ovalles MD            Collected:           10/18/2023 1015              Ordering Location:     [facility]        Received:            10/18/2023 [address] [facility]                                                                                 Procedure Unit Pathologist:           Luz Velazquez MD                                                     Specimens:   A) - Pleural, Left                                                                                  B) - Pleural, Left, CELL BLOCK                                                             Final Diagnosis       A-B. Pleural, Left (ThinPrep and cell block preparations): Atypical cellular changes seen. Rare atypical cells favor reactive. Background of benign mesothelial cells, neutrophils, lymphocytes and histiocytes. Satisfactory for evaluation. Gross Description       A. 80 mL of yellow, mucoid fluid, received in [employer]to[employer]   B. Moderate cell button, white/red       Clinical Information       SCC active cancer, pleural effusion now with pleural catheter drainage    Additional Information       [employer]'s FDA approved ,  and ThinPrep Imaging Duo System are utilized with strict adherence to the 's instruction manual to prepare gynecologic and non-gynecologic cytology specimens for the production of ThinPrep slides as well as for gynecologic ThinPrep imaging. These processes have been validated by our laboratory and/or by the . These tests were developed and their performance characteristics determined by [facility] or [address] [place] [address]. They may not be cleared or approved by the U.S. Food and Drug Administration. The FDA has determined that such clearance or approval is not necessary. These tests are used for clinical purposes. They should not be regarded as investigational or for research. This laboratory has been approved by CLIA 88, designated as a high-complexity laboratory and is qualified to perform these tests.   Interpretation performed at , [address] [place] [id number]      Fingerstick Glucose (POCT)    Collection Time: 10/30/23 12:35 PM   Result Value Ref Range    POC Glucose 147 (H) 65 - 140 mg/dl Imaging Studies:NM PET CT skull base to mid thigh    Result Date: 10/31/2023  Narrative: PET/CT SCAN INDICATION: C34.92: Malignant neoplasm of unspecified part of left bronchus or lung, restaging The following clinical information was obtained directly from EPIC: h/o squamous cell carcinoma LLL dx 10/2021. S/P SBRT to LLL completed 2/2022. Recently large recurrent (L) pleural effusion and collapse of LLL. Recurrent tumor cannot be excluded. Cytology from 9/21/23 (L) pleural fluid (-) for malignancy MODIFIER: PS COMPARISON: PET/CT dated 10/29/2021, CT of chest dated 12/23/2022, and CT of chest dated 9/28/2023, CELL TYPE:  bx 10/5/21 LLL (+) invasive squamous cell carcinoma moderately differentiated, keratinizing TECHNIQUE:   8.7 mCi F-18-FDG administered IV. Multiplanar attenuation corrected and non attenuation corrected PET images are available for interpretation, and contiguous, low dose, axial CT sections were obtained from the skull base through the femurs. Intravenous contrast material was not utilized. This examination, like all CT scans performed in the [facility], was performed utilizing techniques to minimize radiation dose exposure, including the use of iterative reconstruction and automated exposure control. Fasting serum glucose: 147 mg/dl FINDINGS: VISUALIZED BRAIN: No acute abnormalities are seen. HEAD/NECK: There is a physiologic distribution of FDG. No FDG avid cervical adenopathy is seen. CT images: Intracranial atherosclerotic calcification is noted. CHEST: Best seen on image 76 of series 4 is a hypermetabolic centrally photopenic left lower lobe lung mass measuring 7.1 x 6.4 cm with max SUV of 12.0, previously measuring 4.5 x 4.0 cm with max SUV of 19.7 on PET/CT dated 10/29/2021 and approximately 4.3 x 3.7 cm when utilizing similar measurement technique and CT of chest dated 12/23/2022.  The interval growth in size since 12/23/2022 suggest progression of hypermetabolic malignancy. Subtle FDG activity is noted with a stable in size prominent right paratracheal lymph node on image 58 of series 4 measuring 1 1.4 cm in short axis with max SUV of 2.8, previously 3.5. The stability favors benign reactive lymph node with early pamela metastatic disease considered less likely. Mild patchy FDG activity associated with moderate right and mild left gynecomastia without focality, therefore favored to reflect physiologic activity. Stable mildly hypermetabolic soft tissue density deep to the bilateral scapula most consistent with benign elastofibroma dorsi. Minimal to mild nonspecific FDG activity associated with moderate left pleural effusion which could reflect malignant pleural effusion although recent cytology report demonstrated left pleural fluid was negative for malignancy. CT images: Bilateral gynecomastia. Atherosclerotic vascular calcifications including those of the coronary arteries are noted. Left pleural drainage catheter in place. Mild nonspecific subpleural interstitial thickening. ABDOMEN: Subtle subcentimeter focus of FDG activity involving the left adrenal gland on PET image 205 with max SUV of 3.8 without definitive correlate on low-dose unenhanced CT and therefore of uncertain clinical significance and favored to reflect benign adrenal  activity with early metastatic disease not excluded. CT images: Cholelithiasis. Atherosclerotic vascular calcifications are noted. 1.4 cm mid pole right renal hypodensity on image 115 of series 4 cannot be characterized as a simple cyst. Duodenal diverticulum. Postsurgical changes about the stomach. Diverticulosis coli. PELVIS: Mild nonspecific heterogeneous, nonfocal FDG activity involving the prostate gland. CT images: Brachytherapy seeds within the prostate gland. Right-sided hydrocele.  OSSEOUS STRUCTURES: Again noted is nonspecific patchy FDG activity involving the sacrum (for example right sacrum on PET image 146 with max SUV of 4.6, previously 3.5) and left posterior iliac region. CT images: Degenerative changes are noted involving the spine. Impression: 1. Hypermetabolic left lower lobe lung malignancy has significantly increased in size since 12/23/2022 suggesting progression of malignancy. 2. Again noted is nonspecific FDG activity involving the sacrum and left posterior iliac bone without definitive correlate on low-dose unenhanced CT of uncertain clinical significance. Findings can be further characterized with MRI of the bony pelvis as clinically indicated. 3. Moderate left pleural effusion. Please see above for details and additional findings. The study was marked in EPIC for significant notification. Workstation performed: [id number]     XR chest portable    Result Date: 10/18/2023  Narrative: CHEST INDICATION:   post Left pleural catheter insertion. COMPARISON: Chest x-ray 10/16/2020 EXAM PERFORMED/VIEWS:  XR CHEST PORTABLE FINDINGS: Large pleural effusion obscures left heart border. Left percutaneous chest tube is in place. Low lung volumes with atelectasis at the left base. No pneumothorax. Osseous structures appear within normal limits for patient age. Impression: Large left pleural effusion and percutaneous chest tube in place.  Workstation performed: [id number]     Pleural catheter insertion    Result Date: 10/18/2023  Narrative: Juani Jade DO     10/18/2023 10:32 AM Pleural catheter insertion Date/Time: 10/18/2023 8:42 AM Performed by: Juani Jade DO Authorized by: Bianka Montenegro MD  Patient Location: GI procedure room Other Assisting Provider: Yes (comment) (Dr. Bianka Montenegro)  Universal Protocol: Consent obtained: written Risks and benefits: Risks, benefits and alternatives were discussed   Consent given by: patient Patient states understanding of procedure being performed: yes  Patient's understanding of procedure matches consent: yes  Patient's consent matches procedures scheduled: yes  Relevant documents present and verified: yes  Test results available and properly labeled: yes  Site/side marked: yes  Imaging studies available: yes  Ultrasound guidance: yes  Patient identity confirmed: verbally with patient and arm band Time out: Immediately prior to the procedure a time out was called  Procedure Details: PRE OPERATIVE DIAGNOSIS: Suspected malignant pleural effusion LOCATION:   Left  Hemithorax Consent: Informed consent was obtained. Risks of the procedure were discussed including, but not limited to: Infection, Bleeding, Pain, Pneumothorax and Injury to surrounding structures. Images reviewed prior to the procedure. Final Time Out was performed. Left  hemithorax evaluated with bedside ultrasound, fluid pocked identified and appropriate for TPC insertion. ASA: 3 MALLAMPATI SCORE:  III Analgesia: Subcutaneous Lidocaine 30 cc. Was other anesthesia used? No. PROCEDURE:  The patient was placed on supine lateral decubitus position. Using US guidance, pleural fluid pocket was successfully identified. Site marked on skin. Site was prepped and draped in sterile fashion. SQ tissue infiltrated with 1% lidocaine with epi. The finder needle was advanced over rib - 5, anterior axillary line. Pleural fluid obtained successfully. Finder needle was removed. The introducer needle was then advanced in similar location to finder and flash of pleural fluid obtained. Wire was easily advanced and introducer needle was removed. A 1cm skin incision at wire entry site. An additional 1cm incision was made approximately 4cm inferior to the wire insertion site. Using a trochar the catheter was tunneled under the skin from anterior to posterior incision sites. The cuff was seated approximately 1cm beyond insertion site. The pleural insertion tract was then serially dilated, and the dilatator/breakaway sheath was inserted. The catheter was then inserted thru the breakaway sheath, as the sheath was removed.   The catheter was fully seated under the skin. Using tunneled pleural catheter bottles 1000 cc was drained. Catheter was sutured in place - 2 suture(s) at wire insertion site and 1 at catheter insertion site. Sterile dressing was then placed. Post procedure Chest X-ray was reviewed and showed Adequate catheter placement, without any apparent complications. COMPLICATIONS:  None ESTIMATED BLOOD LOSS:  None RECOMMENDATIONS:   Drainage is recommended every other day until output is less than 100cc for three consecutive drainage attempts. Suture removal will be arranged in 7 days. XR chest pa & lateral    Result Date: 10/16/2023  Narrative: CHEST INDICATION:   J90: Pleural effusion, not elsewhere classified R06.02: Shortness of breath. COMPARISON: Chest radiograph dated 9/21/2023. EXAM PERFORMED/VIEWS:  XR CHEST PA & LATERAL FINDINGS: Cardiomediastinal silhouette appears unremarkable. There is a large left pleural effusion with subjacent compressive atelectasis. Underlying mass/airspace consolidation is not excluded. The right lung is grossly clear. No pneumothorax. Osseous structures appear within normal limits for patient age. Impression: Large left pleural effusion with subjacent compressive atelectasis; underlying mass/airspace consolidation is not excluded. Workstation performed: [id number]           Assessment/Plan:  No orders of the defined types were placed in this encounter. Darvin Donald is a 82y.o. year old male with history of T2N0 left lower lobe squamous cell carcinoma who is nearly 2 years post SBRT for left lower lobe carcinoma. He has had recurrent pleural effusions status post multiple upper centesis. He now has catheter in place. His most recent cytology returned with some atypical cells. He underwent PET/CT scan which now visualizes his lesion better since effusion has been drained. This reveals hypermetabolic left lower lobe mass.   There are some nonspecific findings and the sacrum and iliac region without CT correlate. He is asymptomatic at those sites. Reviewed that the area of uptake is in the same region as his prior SBRT. I am also concerned of his recurrent effusions although definitive malignancy has not been identified is only atypical cells). I am not recommending any additional radiation as his activity is in the same area previously treated. I have referred him back to Dr. Tonja Ontiveros who he saw last week upcoming follow-up for consideration of drug therapy. Did not need any further follow-up appointments for him however will be happy to see him should the need arise in the future. Marisela Mcgee MD  11/7/2023,3:40 PM    Portions of the record may have been created with voice recognition software. Occasional wrong word or "sound a like" substitutions may have occurred due to the inherent limitations of voice recognition software. Read the chart carefully and recognize, using context, where substitutions have occurred.

## 2023-11-21 ENCOUNTER — OFFICE VISIT (OUTPATIENT)
Dept: INTERNAL MEDICINE CLINIC | Facility: CLINIC | Age: 83
End: 2023-11-21
Payer: COMMERCIAL

## 2023-11-21 VITALS
SYSTOLIC BLOOD PRESSURE: 124 MMHG | OXYGEN SATURATION: 98 % | BODY MASS INDEX: 29.2 KG/M2 | TEMPERATURE: 98 F | WEIGHT: 204 LBS | DIASTOLIC BLOOD PRESSURE: 80 MMHG | HEART RATE: 74 BPM | HEIGHT: 70 IN

## 2023-11-21 DIAGNOSIS — Z23 ENCOUNTER FOR IMMUNIZATION: ICD-10-CM

## 2023-11-21 DIAGNOSIS — E11.40 TYPE 2 DIABETES MELLITUS WITH DIABETIC NEUROPATHY, WITHOUT LONG-TERM CURRENT USE OF INSULIN (HCC): Primary | ICD-10-CM

## 2023-11-21 DIAGNOSIS — I25.10 CORONARY ARTERY DISEASE INVOLVING NATIVE CORONARY ARTERY OF NATIVE HEART WITHOUT ANGINA PECTORIS: ICD-10-CM

## 2023-11-21 DIAGNOSIS — C34.92 SQUAMOUS CELL CARCINOMA OF LEFT LUNG (HCC): Chronic | ICD-10-CM

## 2023-11-21 DIAGNOSIS — I50.32 CHRONIC DIASTOLIC HEART FAILURE (HCC): ICD-10-CM

## 2023-11-21 PROCEDURE — 99213 OFFICE O/P EST LOW 20 MIN: CPT | Performed by: INTERNAL MEDICINE

## 2023-11-21 PROCEDURE — 90662 IIV NO PRSV INCREASED AG IM: CPT | Performed by: INTERNAL MEDICINE

## 2023-11-21 PROCEDURE — G0008 ADMIN INFLUENZA VIRUS VAC: HCPCS | Performed by: INTERNAL MEDICINE

## 2023-11-21 RX ORDER — CODEINE PHOSPHATE/GUAIFENESIN 10-100MG/5
10 LIQUID (ML) ORAL 3 TIMES DAILY PRN
Qty: 473 ML | Refills: 0 | Status: SHIPPED | OUTPATIENT
Start: 2023-11-21

## 2023-11-21 NOTE — PROGRESS NOTES
Dr. Fatuma Welch Office Visit Note  23     Mariana Syed 82 y.o. male MRN: [id number]  : 1940    Assessment:     1. Type 2 diabetes mellitus with diabetic neuropathy, without long-term current use of insulin (HCC)  Assessment & Plan:    Lab Results   Component Value Date    HGBA1C 6.9 (H) 2023   Blood sugar very well controlled on the basis of A1c continue diabetic diet continue metformin hypoglycemia protocol in place yearly dilated eye exam foot exam normal monitoring blood sugar intermittently at home in the range of between 70 and 100 risk for hypoglycemia discontinue metformin very poor appetite due to the metastatic lung cancer      2. Squamous cell carcinoma of left lung [facility])  Assessment & Plan:    Is on 2023 discharged on 2023 after thoracentesis complete work-up improved and no need for home oxygen hospital records reviewed as follows and further plan treatment as follows              Follows with radiation oncology Dr. 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 Pulmonary indicated patient would be clear for discharge after 5 PM if remains stable.   They also indicated that patient should have close follow-up in the next 1 to 2 weeks post discharge for potential placement of Asept catheter  Patient will follow-up with outpatient oncology, follow-up on cytology reports 1 to 2 weeks postdischarge  Repeat CT of the abdomen pelvis lungs done shows recurrence of the same site consulted radiation patient not a candidate for radiation recurrent pleural effusion now has catheter and awaiting to be seen by oncologist tomorrow to consider chemo  Intractable coughing  Remaining above follow-up plan finding as above reviewed from a previous progress note    Orders:  -     guaifenesin-codeine (GUAIFENESIN AC) 100-10 MG/5ML liquid; Take 10 mL by mouth 3 (three) times a day as needed for cough    3. Encounter for immunization  -     influenza vaccine, high-dose, PF 0.7 mL (FLUZONE HIGH-DOSE)    4. Coronary artery disease involving native coronary artery of native heart without angina pectoris  Assessment & Plan:  No chest pain  Continue Lipitor on Eliquis baby aspirin prior PCI over 20 years ago no chest pain stable continue current preventive measures      5. Chronic diastolic heart failure (HCC)  Assessment & Plan:  Wt Readings from Last 3 Encounters:   11/21/23 92.5 kg (204 lb)   11/07/23 89.8 kg (197 lb 15.6 oz)   11/03/23 89.8 kg (198 lb)     Patient euvolemic CHF compensated continue low-salt diet fluid restriction Daily weight monitoring                  Discussion Summary and Plan: Today's care plan and medications were reviewed with patient in detail and all their questions answered to their satisfaction.     Chief Complaint   Patient presents with   • Follow-up      Subjective:  Patient came in follow-up chronic medical condition listed visit diagnosis been having recurrent left pleural effusion seen by radiation oncologist had a CT of the lung with PET scan done reviewed recurrence of the same site and patient has drainage catheters been draining since then difficulty breathing improved appetite improved awaiting to be seen by oncology tomorrow for possible chemo for further treatment risk for hypoglycemia due to the loss of appetite and monitoring blood sugar at home and arranging between 70 and 100 discontinued metformin also patient filled out advance care planning        The following portions of the patient's history were reviewed and updated as appropriate: allergies, current medications, past family history, past medical history, past social history, past surgical history and problem list.    Review of Systems   Constitutional:  Positive for activity change and fatigue. Negative for appetite change, chills, diaphoresis, fever and unexpected weight change. HENT:  Negative for congestion, dental problem, drooling, ear discharge, ear pain, facial swelling, hearing loss, mouth sores, nosebleeds, postnasal drip, rhinorrhea, sinus pressure, sneezing, sore throat, tinnitus, trouble swallowing and voice change. Eyes:  Negative for photophobia, pain, discharge, redness, itching and visual disturbance. Respiratory:  Positive for cough and shortness of breath. Negative for apnea, choking, chest tightness, wheezing and stridor. Cardiovascular:  Negative for chest pain and palpitations. Gastrointestinal:  Negative for abdominal distention, abdominal pain, anal bleeding, blood in stool, constipation, diarrhea, nausea, rectal pain and vomiting. Endocrine: Negative for cold intolerance, heat intolerance, polydipsia, polyphagia and polyuria. Genitourinary:  Negative for decreased urine volume, difficulty urinating, dysuria, enuresis, flank pain, frequency, genital sores, hematuria and urgency. Musculoskeletal:  Positive for arthralgias and back pain. Negative for myalgias, neck pain and neck stiffness. Skin:  Negative for color change, pallor, rash and wound. Allergic/Immunologic: Negative. Negative for environmental allergies, food allergies and immunocompromised state. Neurological:  Positive for dizziness. Negative for tremors, seizures, syncope, facial asymmetry, speech difficulty, weakness, light-headedness, numbness and headaches. Psychiatric/Behavioral:  Negative for agitation, behavioral problems, confusion, decreased concentration, dysphoric mood, hallucinations, self-injury, sleep disturbance and suicidal ideas. The patient is nervous/anxious. The patient is not hyperactive.           Historical Information   Patient Active Problem List   Diagnosis   • Corns   • Pain in both feet   • Onychomycosis   • Tinea pedis of both feet   • Lung mass   • Hyperlipidemia   • Type 2 diabetes mellitus with diabetic neuropathy, without long-term current use of insulin (HCC)   • Squamous cell carcinoma of lung (HCC)   • Shortness of breath   • Daytime hypersomnolence   • Chronic diastolic heart failure (HCC)   • SYLVESTER (obstructive sleep apnea)   • Prostate cancer (HCC)   • GERD (gastroesophageal reflux disease)   • CAD (coronary artery disease)   • Other persistent atrial fibrillation (HCC)   • Alcohol dependence ([address])   • Acute respiratory failure with hypoxemia (HCC)   • Depression, recurrent (HCC)   • COVID-19   • Angioedema   • Septic shock (HCC)   • Cellulitis   • Cellulitis of chest wall   • Acute kidney injury (BUDDY) with acute tubular necrosis (ATN) (HCC)   • Chronic obstructive pulmonary disease, unspecified COPD type ([address])   • Centrilobular emphysema (HCC)   • Abdominal aortic aneurysm (HCC)   • Hypertensive heart disease   • Tremors of nervous system   • Recurrent pleural effusion on left     Past Medical History:   Diagnosis Date   • Abdominal pain    • Anxiety    • Arthritis    • Asthma    • Atrial fibrillation (HCC)    • Back pain    • Bleeding ulcer    • Bronchitis    • Cancer ([address])     prostate 2011- radiation   • Cardiac disease     cardiac stent x1   • Cataract     starting   • Chronic diastolic (congestive) heart failure (HCC)    • Diabetes mellitus ([address])     boarderline diabetic    • GERD (gastroesophageal reflux disease)    • History of radiation therapy 2010    Prostate seeds (brachytherapy) and EBRT   • Hyperlipidemia    • Hypertension    • 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, bilateral    • Low back pain    • Lung cancer ([address])    • Lung mass    • Myocardial infarction ([address])     mild 1999   • Obesity    • Prostate cancer ([address])    • Shortness of breath    • Sleep apnea     sleep study 11/22   • Wears dentures     full set   • Wears glasses Past Surgical History:   Procedure Laterality Date   • ABDOMINAL HERNIA REPAIR     • ABDOMINAL SURGERY      bleeding ulcer, cyst removed from abd   • COLONOSCOPY     • CORONARY ANGIOPLASTY WITH STENT PLACEMENT  1999    x1    • ESOPHAGOGASTRODUODENOSCOPY     • IR BIOPSY LUNG  10/05/2021   • IR THORACENTESIS  2021   • IR THORACENTESIS  2023   • IR THORACENTESIS  2023   • KNEE SURGERY Left    •  [address] INCL FLUOR GDNCE DX W/CELL WASHG SPX N/A 2021    Procedure: BRONCHOSCOPY FLEXIBLE;  Surgeon: Berta Luna MD;  Location: [facility];  Service: Thoracic   • ME MEDIASTINOSCOPY WITH LYMPH NODE BIOPSY/IES N/A 2021    Procedure: MEDIASTINOSCOPY;  Surgeon: Berta Luna MD;  Location: [facility];  Service: Thoracic   • PROSTATE SURGERY       Social History     Substance and Sexual Activity   Alcohol Use Yes   • Alcohol/week: 4.0 - 6.0 standard drinks of alcohol   • Types: 1 Glasses of wine, 3 - 5 Cans of beer per week    Comment: socially     Social History     Substance and Sexual Activity   Drug Use Never     Social History     Tobacco Use   Smoking Status Former   • Packs/day: 1.00   • Years: 30.00   • Total pack years: 30.00   • Types: Cigarettes   • Quit date:    • Years since quittin.9   Smokeless Tobacco Never     Family History   Problem Relation Age of Onset   • Prostate cancer Brother    • Cancer Maternal Uncle         colo rectal cancer   • Cancer Paternal Aunt      Health Maintenance Due   Topic   • Pneumococcal Vaccine: 65+ Years (1 - PCV)   • Falls: Plan of Care    • COVID-19 Vaccine (3 - Moderna risk series)   • BMI: Followup Plan       Meds/Allergies       Current Outpatient Medications:   •  acetaminophen (TYLENOL) 325 mg tablet, Take 2 tablets (650 mg total) by mouth every 6 (six) hours as needed for mild pain, headaches or fever, Disp: 30 tablet, Rfl: 0  •  albuterol (2.5 mg/3 mL) 0.083 % nebulizer solution, Take 2.5 mg by nebulization As needed per patient's wife , Disp: , Rfl:   •  apixaban (Eliquis) 5 mg, Take 1 tablet (5 mg total) by mouth 2 (two) times a day, Disp: 180 tablet, Rfl: 1  •  atenolol (TENORMIN) 25 mg tablet, , Disp: , Rfl:   •  atorvastatin (LIPITOR) 10 mg tablet, Take 1 tablet (10 mg total) by mouth daily, Disp: 90 tablet, Rfl: 1  •  carvedilol (COREG) 25 mg tablet, Take 1 tablet (25 mg total) by mouth 2 (two) times a day with meals, Disp: 180 tablet, Rfl: 1  •  fluticasone-umeclidinium-vilanterol (Trelegy Ellipta) 100-62.5-25 mcg/actuation inhaler, Rinse mouth after use., Disp: 60 each, Rfl: 5  •  guaifenesin-codeine (GUAIFENESIN AC) 100-10 MG/5ML liquid, Take 10 mL by mouth 3 (three) times a day as needed for cough, Disp: 473 mL, Rfl: 0  •  halobetasol (ULTRAVATE) 0.05 % cream, , Disp: , Rfl:   •  hydrocortisone 2.5 % cream, Apply topically 2 (two) times a day Apply twice a day affected area the trunk, Disp: 20 g, Rfl: 2  •  latanoprost (XALATAN) 0.005 % ophthalmic solution, Administer 0.005 mL to both eyes daily at bedtime, Disp: , Rfl:   •  mirtazapine (REMERON) 7.5 MG tablet, Take 1 tablet (7.5 mg total) by mouth daily at bedtime, Disp: 90 tablet, Rfl: 3  •  Multiple Vitamin (MULTI-VITAMIN DAILY PO), Take by mouth daily  , Disp: , Rfl:   •  omeprazole (PriLOSEC) 20 mg delayed release capsule, TAKE 1 CAPSULE DAILY, Disp: 90 capsule, Rfl: 1  •  primidone (MYSOLINE) 50 mg tablet, Take 2 tabs at 8pm., Disp: 180 tablet, Rfl: 1  •  spironolactone (ALDACTONE) 25 mg tablet, Take 1 tablet (25 mg total) by mouth daily, Disp: 90 tablet, Rfl: 1  •  aspirin (ECOTRIN LOW STRENGTH) 81 mg EC tablet, , Disp: , Rfl:       Objective:    Vitals:   /80   Pulse 74   Temp 98 °F (36.7 °C)   Ht 5' 10" (1.778 m)   Wt 92.5 kg (204 lb)   SpO2 98%   BMI 29.27 kg/m²   Body mass index is 29.27 kg/m². Vitals:    11/21/23 1059   Weight: 92.5 kg (204 lb)       Physical Exam  Vitals and nursing note reviewed.    Constitutional:       General: He is not in acute distress. Appearance: He is well-developed. He is obese. He is not ill-appearing, toxic-appearing or diaphoretic. HENT:      Head: Normocephalic and atraumatic. Right Ear: External ear normal.      Left Ear: External ear normal.      Nose: Nose normal.      Mouth/Throat:      Pharynx: No oropharyngeal exudate. Eyes:      General: Lids are normal. Lids are everted, no foreign bodies appreciated. No scleral icterus. Right eye: No discharge. Left eye: No discharge. Conjunctiva/sclera: Conjunctivae normal.      Pupils: Pupils are equal, round, and reactive to light. Neck:      Thyroid: No thyromegaly. Vascular: Normal carotid pulses. No carotid bruit, hepatojugular reflux or JVD. Trachea: No tracheal tenderness or tracheal deviation. Cardiovascular:      Rate and Rhythm: Normal rate and regular rhythm. Pulses:           Dorsalis pedis pulses are 2+ on the right side and 2+ on the left side. Posterior tibial pulses are 1+ on the right side and 1+ on the left side. Heart sounds: Normal heart sounds. No murmur heard. No friction rub. No gallop. Pulmonary:      Effort: Pulmonary effort is normal. No respiratory distress. Breath sounds: No stridor. Rales present. No wheezing. Comments: Decreased air entry bilateral left worse than the right  Chest:      Chest wall: No tenderness. Abdominal:      General: Bowel sounds are normal. There is no distension. Palpations: Abdomen is soft. There is no mass. Tenderness: There is no abdominal tenderness. There is no guarding or rebound. Musculoskeletal:         General: No tenderness or deformity. Normal range of motion. Cervical back: Normal range of motion and neck supple. No edema, erythema or rigidity. No spinous process tenderness or muscular tenderness. Normal range of motion.    Feet:      Right foot:      Skin integrity: No ulcer, skin breakdown, erythema, warmth, callus or dry skin. Left foot:      Skin integrity: No ulcer, skin breakdown, erythema, warmth, callus or dry skin. Lymphadenopathy:      Head:      Right side of head: No submental, submandibular, tonsillar, preauricular or posterior auricular adenopathy. Left side of head: No submental, submandibular, tonsillar, preauricular, posterior auricular or occipital adenopathy. Cervical: No cervical adenopathy. Right cervical: No superficial, deep or posterior cervical adenopathy. Left cervical: No superficial, deep or posterior cervical adenopathy. Upper Body:      Right upper body: No pectoral adenopathy. Left upper body: No pectoral adenopathy. Skin:     General: Skin is warm and dry. Coloration: Skin is not pale. Findings: No erythema or rash. Neurological:      Mental Status: He is alert and oriented to person, place, and time. Cranial Nerves: No cranial nerve deficit. Sensory: No sensory deficit. Motor: Weakness present. No tremor, abnormal muscle tone or seizure activity. Coordination: Coordination normal.      Gait: Gait abnormal.      Deep Tendon Reflexes: Reflexes are normal and symmetric. Reflexes normal.   Psychiatric:         Behavior: Behavior normal.         Thought Content:  Thought content normal.         Judgment: Judgment normal.         Lab Review   Hospital Outpatient Visit on 10/30/2023   Component Date Value Ref Range Status   • POC Glucose 10/30/2023 147 (H)  65 - 140 mg/dl Final   Hospital Outpatient Visit on 10/18/2023   Component Date Value Ref Range Status   • Case Report 10/18/2023    Final                    Value:Non-gynecologic Cytology                          Case: [id number]                                  Authorizing Provider:  Chris Choi MD            Collected:           10/18/2023 1015              Ordering Location:     [facility]        Received:            10/18/2023 [address] [place] [address] Ambulatory                                                                                 Procedure Unit                                                               Pathologist:           Navjot Watkins MD                                                     Specimens:   A) - Pleural, Left                                                                                  B) - Pleural, Left, CELL BLOCK                                                            • Final Diagnosis 10/18/2023    Final                    Value: This result contains rich text formatting which cannot be displayed here. • Gross Description 10/18/2023    Final                    Value: This result contains rich text formatting which cannot be displayed here. • Clinical Information 10/18/2023    Final                    Value:SCC active cancer, pleural effusion now with pleural catheter drainage   • Additional Information 10/18/2023    Final                    Value: This result contains rich text formatting which cannot be displayed here. Patient Instructions   Lung Cancer   AMBULATORY CARE:   Lung cancer  is a type of cancer that starts in the lungs. Lung cancer is the leading cause of cancer deaths worldwide. Cigarette smoking causes most lung cancer, but it can also develop in people who do not smoke. Types of lung cancer: The main differences between the 2 major types is the cells the cancer starts in and how it grows:  Non-small cell lung cancer (NSCLC)  is the most common type of lung cancer. Adenocarcinoma, squamous cell carcinoma, and large cell carcinoma are the examples of NSCLC. Small cell lung cancer (SCLC)  is less common and is sometimes called oat cell cancer. These types of cells are small and round and can be more aggressive than NSCLC.     Common signs and symptoms:   Chest pain    Shortness of breath or wheezing    A cough that will not go away, and gets worse over time    Coughing up blood    Hoarseness    Loss of appetite or weight loss without trying    Headache    Call your local emergency number (911 in the [address]) if:   You have severe chest pain when you take a deep breath or cough. You have new or worse trouble breathing. Seek care immediately if:   You cannot think clearly. You cough up blood, or more blood than before. Your lips or nails look blue or pale. Call your doctor or oncologist if:   You have a fever. You are vomiting and cannot keep food or liquids down. You have questions or concerns about your condition or care. Treatment for lung cancer  may include the following:  Surgery  may be needed to remove the lung cancer. Part of your lymph nodes may be removed to check for signs of cancer. Surgery may also be needed if the cancer cannot be removed completely. In this case surgery may help treat complications or decrease your symptoms. Radiation therapy  uses beams of intense energy, such as x-rays, to shrink or kill cancer cells. Chemotherapy medicines  are used to kill cancer cells. Chemo may be given as a pill or in an IV. Chemo can be used by itself, before with or after surgery, and with radiation. Targeted medicine therapy  focuses on specific targets inside cancer cells. Immunotherapy  stimulates your immune system to fight the cancer. Cancer cells produce substances that help them hide from your immune system. Immunotherapy can block these substances and help your body identify the cancer cells so they can be destroyed. Lower your risk for lung cancer:   Do not smoke, and avoid secondhand smoke. Nicotine and other chemicals in cigarettes and cigars can cause lung damage. Ask your healthcare provider for information if you currently smoke and need help to quit. E-cigarettes or smokeless tobacco still contain nicotine. Talk to your healthcare provider before you use these products. Have lung cancer screening, if recommended.   Screening is a test done to find lung cancer early. Screening may be recommended if you are a heavy smoker or you quit within the past 15 years. A heavy smoker means at least 1 pack each day for 20 years or 2 packs each day for 10 years. You can have screening if you are at least 48years old, never had lung cancer, and do not have any symptoms. Screening may be done 1 time each year until you are 77 to 80. Ask your provider for more information about lung cancer screening. Have your home tested for radon. Do this especially if you live in an area where radon is a known problem. Wear protective gear  if you work with substances or chemicals that can cause cancer. Avoid exposure as much as you can. Follow safety precautions. Eat a variety of healthy foods. Healthy foods include fruits, vegetables, whole-grain breads, low-fat dairy products, beans, lean meats, and fish. Be physically active throughout the day. Physical activity such as exercise can help increase your energy level and fight illness. Be physically active for at least 30 minutes per day, on most days of the week. Include aerobic activity, such as walking or riding a bicycle. Also include strength training at least 2 times each week. Your healthcare providers can help you create a physical activity plan. Follow up with your doctor or oncologist as directed: You will need to see your oncologist for ongoing treatment. Write down your questions so you remember to ask them during your visits. © Copyright Loletta Gosselin 2023 Information is for End User's use only and may not be sold, redistributed or otherwise used for commercial purposes. The above information is an  only. It is not intended as medical advice for individual conditions or treatments. Talk to your doctor, nurse or pharmacist before following any medical regimen to see if it is safe and effective for you.        Kaylah Hilario MD        "This note has been constructed using a voice recognition system. Therefore there may be syntax, spelling, and/or grammatical errors.  Please call if you have any questions. "

## 2023-11-21 NOTE — ASSESSMENT & PLAN NOTE
Is on 9/12/2023 discharged on 13 September 2023 after thoracentesis complete work-up improved and no need for home oxygen hospital records reviewed as follows and further plan treatment as follows              Follows with radiation oncology Dr. 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 Pulmonary indicated patient would be clear for discharge after 5 PM if remains stable.   They also indicated that patient should have close follow-up in the next 1 to 2 weeks post discharge for potential placement of Asept catheter  Patient will follow-up with outpatient oncology, follow-up on cytology reports 1 to 2 weeks postdischarge  Repeat CT of the abdomen pelvis lungs done shows recurrence of the same site consulted radiation patient not a candidate for radiation recurrent pleural effusion now has catheter and awaiting to be seen by oncologist tomorrow to consider chemo  Intractable coughing  Remaining above follow-up plan finding as above reviewed from a previous progress note

## 2023-11-21 NOTE — ASSESSMENT & PLAN NOTE
Wt Readings from Last 3 Encounters:   11/21/23 92.5 kg (204 lb)   11/07/23 89.8 kg (197 lb 15.6 oz)   11/03/23 89.8 kg (198 lb)     Patient euvolemic CHF compensated continue low-salt diet fluid restriction Daily weight monitoring

## 2023-11-21 NOTE — ASSESSMENT & PLAN NOTE
No chest pain  Continue Lipitor on Eliquis baby aspirin prior PCI over 20 years ago no chest pain stable continue current preventive measures

## 2023-11-21 NOTE — PATIENT INSTRUCTIONS
Lung Cancer   AMBULATORY CARE:   Lung cancer  is a type of cancer that starts in the lungs. Lung cancer is the leading cause of cancer deaths worldwide. Cigarette smoking causes most lung cancer, but it can also develop in people who do not smoke. Types of lung cancer: The main differences between the 2 major types is the cells the cancer starts in and how it grows:  Non-small cell lung cancer (NSCLC)  is the most common type of lung cancer. Adenocarcinoma, squamous cell carcinoma, and large cell carcinoma are the examples of NSCLC. Small cell lung cancer (SCLC)  is less common and is sometimes called oat cell cancer. These types of cells are small and round and can be more aggressive than NSCLC. Common signs and symptoms:   Chest pain    Shortness of breath or wheezing    A cough that will not go away, and gets worse over time    Coughing up blood    Hoarseness    Loss of appetite or weight loss without trying    Headache    Call your local emergency number (911 in the [address]) if:   You have severe chest pain when you take a deep breath or cough. You have new or worse trouble breathing. Seek care immediately if:   You cannot think clearly. You cough up blood, or more blood than before. Your lips or nails look blue or pale. Call your doctor or oncologist if:   You have a fever. You are vomiting and cannot keep food or liquids down. You have questions or concerns about your condition or care. Treatment for lung cancer  may include the following:  Surgery  may be needed to remove the lung cancer. Part of your lymph nodes may be removed to check for signs of cancer. Surgery may also be needed if the cancer cannot be removed completely. In this case surgery may help treat complications or decrease your symptoms. Radiation therapy  uses beams of intense energy, such as x-rays, to shrink or kill cancer cells. Chemotherapy medicines  are used to kill cancer cells.  Chemo may be given as a pill or in an IV. Chemo can be used by itself, before with or after surgery, and with radiation. Targeted medicine therapy  focuses on specific targets inside cancer cells. Immunotherapy  stimulates your immune system to fight the cancer. Cancer cells produce substances that help them hide from your immune system. Immunotherapy can block these substances and help your body identify the cancer cells so they can be destroyed. Lower your risk for lung cancer:   Do not smoke, and avoid secondhand smoke. Nicotine and other chemicals in cigarettes and cigars can cause lung damage. Ask your healthcare provider for information if you currently smoke and need help to quit. E-cigarettes or smokeless tobacco still contain nicotine. Talk to your healthcare provider before you use these products. Have lung cancer screening, if recommended. Screening is a test done to find lung cancer early. Screening may be recommended if you are a heavy smoker or you quit within the past 15 years. A heavy smoker means at least 1 pack each day for 20 years or 2 packs each day for 10 years. You can have screening if you are at least 48years old, never had lung cancer, and do not have any symptoms. Screening may be done 1 time each year until you are 77 to 80. Ask your provider for more information about lung cancer screening. Have your home tested for radon. Do this especially if you live in an area where radon is a known problem. Wear protective gear  if you work with substances or chemicals that can cause cancer. Avoid exposure as much as you can. Follow safety precautions. Eat a variety of healthy foods. Healthy foods include fruits, vegetables, whole-grain breads, low-fat dairy products, beans, lean meats, and fish. Be physically active throughout the day. Physical activity such as exercise can help increase your energy level and fight illness.  Be physically active for at least 30 minutes per day, on most days of the week. Include aerobic activity, such as walking or riding a bicycle. Also include strength training at least 2 times each week. Your healthcare providers can help you create a physical activity plan. Follow up with your doctor or oncologist as directed: You will need to see your oncologist for ongoing treatment. Write down your questions so you remember to ask them during your visits. © Copyright [employer] 2023 Information is for End User's use only and may not be sold, redistributed or otherwise used for commercial purposes. The above information is an  only. It is not intended as medical advice for individual conditions or treatments. Talk to your doctor, nurse or pharmacist before following any medical regimen to see if it is safe and effective for you.

## 2023-11-21 NOTE — ASSESSMENT & PLAN NOTE
Lab Results   Component Value Date    HGBA1C 6.9 (H) 09/20/2023   Blood sugar very well controlled on the basis of A1c continue diabetic diet continue metformin hypoglycemia protocol in place yearly dilated eye exam foot exam normal monitoring blood sugar intermittently at home in the range of between 70 and 100 risk for hypoglycemia discontinue metformin very poor appetite due to the metastatic lung cancer

## 2023-11-22 ENCOUNTER — OFFICE VISIT (OUTPATIENT)
Dept: HEMATOLOGY ONCOLOGY | Facility: MEDICAL CENTER | Age: 83
End: 2023-11-22
Payer: COMMERCIAL

## 2023-11-22 ENCOUNTER — TELEPHONE (OUTPATIENT)
Dept: HEMATOLOGY ONCOLOGY | Facility: MEDICAL CENTER | Age: 83
End: 2023-11-22

## 2023-11-22 ENCOUNTER — DOCUMENTATION (OUTPATIENT)
Dept: HEMATOLOGY ONCOLOGY | Facility: MEDICAL CENTER | Age: 83
End: 2023-11-22

## 2023-11-22 VITALS
TEMPERATURE: 97.9 F | HEIGHT: 70 IN | DIASTOLIC BLOOD PRESSURE: 76 MMHG | HEART RATE: 75 BPM | WEIGHT: 201 LBS | RESPIRATION RATE: 18 BRPM | SYSTOLIC BLOOD PRESSURE: 126 MMHG | OXYGEN SATURATION: 98 % | BODY MASS INDEX: 28.77 KG/M2

## 2023-11-22 DIAGNOSIS — C34.92 SQUAMOUS CELL CARCINOMA OF LEFT LUNG (HCC): Primary | ICD-10-CM

## 2023-11-22 DIAGNOSIS — J44.9 CHRONIC OBSTRUCTIVE PULMONARY DISEASE, UNSPECIFIED COPD TYPE (HCC): ICD-10-CM

## 2023-11-22 PROCEDURE — 99215 OFFICE O/P EST HI 40 MIN: CPT | Performed by: INTERNAL MEDICINE

## 2023-11-22 NOTE — TELEPHONE ENCOUNTER
Patient seen by Dr Michael Gonzalez  To begin pembrolizumab every 3 weeks  Orders entered and appt requests signed  Will send to  to arrange  Patient aware of plan

## 2023-11-22 NOTE — TELEPHONE ENCOUNTER
What would be a preferred day of the week that would work best for your infusion appointment? Any day  Do you prefer mornings or afternoons for your appointments? PM  Are there any days or dates that do not work for your schedule, including any upcoming vacations? N/a  We are going to try our best to schedule you at the infusion center closest to your home. In the event that we are unable to what would be your next preferred infusion site or sites? [place]  Do you have transportation to take you to all of your appointments?  yes

## 2023-11-22 NOTE — PROGRESS NOTES
Voicemail was left for patient that his follow up with Dr. Lucina Barnes has been scheduled for Mon. 12/11/23 at 1pm, I asked him to call back to confirm.

## 2023-11-22 NOTE — PROGRESS NOTES
Sierra Carrillo  1940  [facility] [place] [id number] [address] [facility] [id number]     DISCUSSION/SUMMARY:     82-year-old male with a number of medical and cardiac problems previously found to have a left lower lobe mass. Initial biopsy demonstrated squamous cell carcinoma. IR recently performed thoracentesis, minimal amount of fluid was removed but there was no evidence of metastatic disease; cytology was negative. Patient was seen by thoracic surgery and underwent mediastinoscopy. There was no evidence of metastatic disease in the sampled lymph nodes (2R, 4R, 4L, 7). Tumor was 4.5 cm. 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 Patient was treated with SBRT and then went on to surveillance. More recently patient has had problems with recurrent pleural effusions on the left side. Patient has a Pleurx catheter in place. Patient states that he needs to drain the fluid approximately once a week, approximately 500 cc. Patient seems to be stable from a respiratory standpoint. Recent PET/CT demonstrated evidence of recurrence in the left lower lobe, same area where the original malignancy was found. There is no clear evidence of spread to either hilar regions or the mediastinum but radiologist commented on 1 right paratracheal lymph node but very +/-. There was nonspecific FDG activity in the sacrum and left posterior iliac bone, seen before. Unknown clinical significance. Mr. Destin Vallejo is recently seen/reevaluated by Dr. Wes Ramirez. She does not feel that patient would be a candidate for any additional RT to the area. This leaves systemic options. NCCN guidelines 5.2023 state that for patients with squamous cell carcinoma, advanced or metastatic disease, preferred is pembrolizumab, pembrolizumab/carboplatinum/paclitaxel; there are other options. Sarika is in process.   The plan is for patient to begin pembrolizumab only (category 1). Mr. Kim Martinez understands that the systemic regimen may need manipulation in the future when the test results are available. Regimen  Pembrolizumab 200 mg IV flat dose day 1  Cycle length = 21 days  Goal = palliation and prolongation of life    Nursing staff spent time with patient/wife today going over the specifics of pembrolizumab, side effects and toxicities. Patient was overwhelmed with the information today. In the future, we will discuss port placement. Mr. Kim Martinez is to return to this office in 4 weeks but this may change depending upon the above. Patient is to call if he has any other oncology questions or concerns. Carefully review your medication list and verify that the list is accurate and up-to-date. Please call the hematology/oncology office if there are medications missing from the list, medications on the list that you are not currently taking or if there is a dosage or instruction that is different from how you're taking that medication. Patient goals and areas of care: Begin pembrolizumab  Barriers to care: none  Patient is able to self-care  ______________________________________________________________________________________         Chief Complaint   Patient presents with    Follow-up - recurrent non-small cell lung carcinoma      History of Present Illness:  82-year-old male with multiple medical and cardiac issue recently seen in the emergency room because of a cough. Workup demonstrated a left lower lobe mass. Biopsy demonstrated squamous cell carcinoma. Workup did not demonstrated evidence of metastatic disease; mediastinal ostomy was negative. Patient was seen by thoracic surgery but was not felt to be a surgical candidate. Patient was subsequently seen by radiation oncology and underwent SPRT. Repeat scanning is consistent with recurrence at the original site. Patient has a Pleurx catheter in place.     Mr. Kim Martinez states feeling okay, about the same as before. No shortness of breath at rest, mild dyspnea on exertion. No chest pain or pressure. No fevers or signs of infection. Appetite is okay, weight is stable. Activities are limited but the same as before. Wife estimates that she takes 500+ cc of fluid each week; yellow to brown-colored but never bloody. Patient was diagnosed with prostate cancer (approximately 10 + years ago) and underwent seeds and external beam radiation. No evidence of recurrence since that time. Review of Systems   Constitutional:  Positive for fatigue. Poor appetite and continuing weight loss   HENT: Negative. Eyes: Negative. Respiratory: Negative. Cardiovascular: Negative. Gastrointestinal: Negative. Endocrine: Negative. Genitourinary: Negative. Musculoskeletal: Negative. Skin: Negative. Allergic/Immunologic: Negative. Neurological: Negative. Hematological: Negative. Easy bruising   Psychiatric/Behavioral:  Negative for self-injury. All other systems reviewed and are negative.          Patient Active Problem List   Diagnosis    Corns    Pain in both feet    Onychomycosis    Tinea pedis of both feet    Lung mass    Hyperlipidemia    Type 2 diabetes mellitus with diabetic neuropathy, without long-term current use of insulin (HCC)    Squamous cell carcinoma of lung (HCC)    Shortness of breath    Daytime hypersomnolence    Chronic diastolic heart failure (HCC)    SYLVESTER (obstructive sleep apnea)    Prostate cancer (HCC)    GERD (gastroesophageal reflux disease)    CAD (coronary artery disease)    Other persistent atrial fibrillation (HCC)    Alcohol dependence (HCC)    Acute respiratory failure with hypoxemia (HCC)    Depression, recurrent (HCC)    COVID-19    Angioedema    Septic shock (HCC)    Cellulitis    Cellulitis of chest wall    Acute kidney injury (BUDDY) with acute tubular necrosis (ATN) (HCC)    Chronic obstructive pulmonary disease, unspecified COPD type ([address])    Centrilobular emphysema ([address])    Abdominal aortic aneurysm (HCC)    Hypertensive heart disease    Tremors of nervous system    Recurrent pleural effusion on left      Medical History        Past Medical History:   Diagnosis Date    Abdominal pain      Anxiety      Arthritis      Asthma      Atrial fibrillation (HCC)      Back pain      Bleeding ulcer      Bronchitis      Cancer ([address])       prostate 2011- radiation    Cardiac disease       cardiac stent x1    Cataract       starting    Chronic diastolic (congestive) heart failure (HCC)      Diabetes mellitus ([address])       boarderline diabetic     GERD (gastroesophageal reflux disease)      History of radiation therapy 2010     Prostate seeds (brachytherapy) and EBRT    Hyperlipidemia      Hypertension      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, bilateral      Low back pain      Lung cancer (HCC)      Lung mass      Myocardial infarction ([address])       mild 1999    Obesity      Prostate cancer ([address])      Shortness of breath      Sleep apnea       sleep study 11/22    Wears dentures       full set    Wears glasses           Surgical History         Past Surgical History:   Procedure Laterality Date    ABDOMINAL HERNIA REPAIR        ABDOMINAL SURGERY         bleeding ulcer, cyst removed from abd    COLONOSCOPY        CORONARY ANGIOPLASTY WITH STENT PLACEMENT   1999     x1     ESOPHAGOGASTRODUODENOSCOPY        IR BIOPSY LUNG   10/05/2021    IR THORACENTESIS   11/08/2021    IR THORACENTESIS   6/5/2023    IR THORACENTESIS   9/13/2023    KNEE SURGERY Left       [address] INCL FLUOR GDNCE DX W/CELL WASHG SPX N/A 11/29/2021     Procedure: BRONCHOSCOPY FLEXIBLE;  Surgeon: Pollo Dalton MD;  Location: [facility];  Service: Thoracic    MS MEDIASTINOSCOPY WITH LYMPH NODE BIOPSY/IES N/A 11/29/2021     Procedure: MEDIASTINOSCOPY;  Surgeon: Pollo Dalton MD;  Location: [facility];  Service: Thoracic    PROSTATE SURGERY             Family history: 3 sons in good general health, no known familial or genetic diseases     Social History               Socioeconomic History    Marital status: /Civil Union       Spouse name: Not on file    Number of children: Not on file    Years of education: Not on file    Highest education level: Not on file   Occupational History    Not on file   Tobacco Use    Smoking status: Former       Packs/day: 1.00       Years: 30.00       Total pack years: 30.00       Types: Cigarettes       Quit date:        Years since quittin.8    Smokeless tobacco: Never   Vaping Use    Vaping Use: Never used   Substance and Sexual Activity    Alcohol use: Yes       Alcohol/week: 4.0 - 6.0 standard drinks of alcohol       Types: 1 Glasses of wine, 3 - 5 Cans of beer per week       Comment: few beers day    Drug use: Never    Sexual activity: Not on file   Other Topics Concern    Not on file   Social History Narrative    Not on file      Social Determinants of Health           Financial Resource Strain: Medium Risk (2023)     Overall Financial Resource Strain (CARDIA)      Difficulty of Paying Living Expenses: Somewhat hard   Food Insecurity: No Food Insecurity (2022)     Hunger Vital Sign      Worried About Running Out of Food in the Last Year: Never true      Ran Out of Food in the Last Year: Never true   Transportation Needs: No Transportation Needs (2023)     PRAPARE - Transportation      Lack of Transportation (Medical): No      Lack of Transportation (Non-Medical):  No   Physical Activity: Not on file   Stress: Not on file   Social Connections: Not on file   Intimate Partner Violence: Not on file   Housing Stability: Low Risk  (2022)     Housing Stability Vital Sign      Unable to Pay for Housing in the Last Year: No      Number of Places Lived in the Last Year: 1      Unstable Housing in the Last Year: No      Social history:  40 pack-year history discontinued 20 years ago, patient drinks 2-4 beers a day off and on, no drug abuse, 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)     Current Outpatient Medications:     acetaminophen (TYLENOL) 325 mg tablet, Take 2 tablets (650 mg total) by mouth every 6 (six) hours as needed for mild pain, headaches or fever, Disp: 30 tablet, Rfl: 0    albuterol (2.5 mg/3 mL) 0.083 % nebulizer solution, Take 2.5 mg by nebulization As needed per patient's wife , Disp: , Rfl:     apixaban (Eliquis) 5 mg, Take 1 tablet (5 mg total) by mouth 2 (two) times a day, Disp: 180 tablet, Rfl: 1    atenolol (TENORMIN) 25 mg tablet, , Disp: , Rfl:     atorvastatin (LIPITOR) 10 mg tablet, Take 1 tablet (10 mg total) by mouth daily, Disp: 90 tablet, Rfl: 1    carvedilol (COREG) 25 mg tablet, Take 1 tablet (25 mg total) by mouth 2 (two) times a day with meals, Disp: 180 tablet, Rfl: 1    ciclopirox (LOPROX) 0.77 % cream, Apply topically 2 (two) times a day for 20 days, Disp: 45 g, Rfl: 2    fluticasone-umeclidinium-vilanterol (Trelegy Ellipta) 100-62.5-25 mcg/actuation inhaler, Rinse mouth after use., Disp: 60 each, Rfl: 5    guaifenesin-codeine (GUAIFENESIN AC) 100-10 MG/5ML liquid, Take 10 mL by mouth 3 (three) times a day as needed for cough, Disp: 180 mL, Rfl: 0    halobetasol (ULTRAVATE) 0.05 % cream, , Disp: , Rfl:     hydrocortisone 2.5 % cream, Apply topically 2 (two) times a day Apply twice a day affected area the trunk, Disp: 20 g, Rfl: 2    latanoprost (XALATAN) 0.005 % ophthalmic solution, Administer 0.005 mL to both eyes daily at bedtime, Disp: , Rfl:     metFORMIN (GLUCOPHAGE) 500 mg tablet, Take 1 tablet (500 mg total) by mouth daily with breakfast, Disp: , Rfl:     mirtazapine (REMERON) 7.5 MG tablet, Take 1 tablet (7.5 mg total) by mouth daily at bedtime, Disp: 90 tablet, Rfl: 3    Multiple Vitamin (MULTI-VITAMIN DAILY PO), Take by mouth daily  , Disp: , Rfl:     omeprazole (PriLOSEC) 20 mg delayed release capsule, TAKE 1 CAPSULE DAILY, Disp: 90 capsule, Rfl: 1    primidone (MYSOLINE) 50 mg tablet, TAKE 2 TABS AT 8PM., Disp: 180 tablet, Rfl: 1    spironolactone (ALDACTONE) 25 mg tablet, Take 1 tablet (25 mg total) by mouth daily, Disp: 90 tablet, Rfl: 1    aspirin (ECOTRIN LOW STRENGTH) 81 mg EC tablet, , Disp: , Rfl:      No Known Allergies         Vitals:     10/20/23 0926   BP: 114/68   Pulse: 80   Resp: 17   Temp: 97.9 °F (36.6 °C)   SpO2: 92%      Physical Exam  Constitutional:       Appearance: He is well-developed. Comments: Obese male, no respiratory distress, no signs of pain   HENT:      Head: Normocephalic and atraumatic. Right Ear: External ear normal.      Left Ear: External ear normal.   Eyes:      Conjunctiva/sclera: Conjunctivae normal.      Pupils: Pupils are equal, round, and reactive to light. Cardiovascular:      Rate and Rhythm: Normal rate and regular rhythm. Heart sounds: Normal heart sounds. Pulmonary:      Effort: Pulmonary effort is normal.      Comments: Left chest wall dressing (pleural catheter) C/D/I. Decreased breath sounds left lower lobe  Abdominal:      General: Bowel sounds are normal. There is distension. Palpations: Abdomen is soft. Comments: Obese, distended, soft +bowel sounds, no guarding   Musculoskeletal:         General: Normal range of motion. Cervical back: Normal range of motion and neck supple. Skin:     General: Skin is warm. Comments: Relatively good color, warm, moist, scattered mild areas of ecchymosis upper extremities   Neurological:      Mental Status: He is alert and oriented to person, place, and time. Deep Tendon Reflexes: Reflexes are normal and symmetric. Psychiatric:         Behavior: Behavior normal.         Thought Content:  Thought content normal.         Judgment: Judgment normal.      Extremities:  No lower extremity edema bilaterally, no cords, pulses are 1+   Lymphatics:  No adenopathy in the neck, supraclavicular region, axilla bilateral     Labs     9/13/2023 WBC = 6.38 hemoglobin = 12.8 hematocrit = 39 platelet = 099 BUN = 11 creatinine = 0.75     Imaging    10/30/2023 PET/CT    CHEST:  Best seen on image 76 of series 4 is a hypermetabolic centrally photopenic left lower lobe lung mass measuring 7.1 x 6.4 cm with max SUV of 12.0, previously measuring 4.5 x 4.0 cm with max SUV of 19.7 on PET/CT dated 10/29/2021 and approximately 4.3 x 3.7 cm when utilizing similar measurement technique and CT of chest dated 12/23/2022. The interval growth in size since 12/23/2022 suggest progression of hypermetabolic malignancy. Subtle FDG activity is noted with a stable in size prominent right paratracheal lymph node on image 58 of series 4 measuring 1 1.4 cm in short axis with max SUV of 2.8, previously 3.5. The stability favors benign reactive lymph node with early pamela metastatic disease considered less likely    IMPRESSION:     1. Hypermetabolic left lower lobe lung malignancy has significantly increased in size since 12/23/2022 suggesting progression of malignancy. 2. Again noted is nonspecific FDG activity involving the sacrum and left posterior iliac bone without definitive correlate on low-dose unenhanced CT of uncertain clinical significance. Findings can be further characterized with MRI of the bony pelvis as clinically indicated. 3. Moderate left pleural effusion. 9/28/23: CT Chest w contrast:     Impression: Since September 12, 2023, decrease in size of the still large left pleural effusion with commensurate decrease in left-sided passive atelectasis. Persistent hypoattenuation in the collapsed portion of the left lower lobe. Given that this is in the   vicinity of the treated tumor, and has increased in size since December 2022, short interval contrast-enhanced CT is advised following resolution of the pleural effusion for further assessment. 1/27/2023 CT chest without contrast    IMPRESSION:  1.   Slightly decreased left pleural effusion with improved passive atelectasis of the left lower lobe. Previously noted left lower lobe mass is difficult to visualize on noncontrast exam but appears likely unchanged from most recent study. 2.  Similar appearance of skin thickening and subcutaneous stranding in the right axillary region which could represent cellulitis. 05/04/2022 chest x-ray portable. Impression stated left small effusion and parenchymal process appears unchanged. 05/02/2022 CT scan the chest without contrast     IMPRESSION:  1. Increase in size of a left lower lobe patchy opacity with new right basilar patchy densities concerning for pulmonary infiltrates with additional patchy and reticulonodular changes lingular segment left upper lobe and right middle lobe also likely related to infectious process. 2.  Left lower lobe cavitary mass has decreased in size now measuring 3.3 x 1.9 cm, previously 4.1 x 2.2 cm. 3.  Stable COPD and pulmonary fibrosis with the latter likely related to radiation change. 11/19/2021 MRI brain. Impression stated white matter changes suggestive of chronic microangiopathy. No acute intracranial pathology. 10/29/2021 PET-CT     1. Hypermetabolic necrotic 4.5 x 4 cm left lower lung mass compatible with known malignancy. 2.  2 mildly hypermetabolic right paratracheal mediastinal nodes for which early metastases are not excluded. 3.  Small mildly hypermetabolic left pleural effusion for which malignant effusion is not excluded. 4.  Additional scattered tiny nodular lung densities may be reassessed on follow-up CT. 5.  Probable reactive subcentimeter right cervical node may be reassessed on follow-up PET/CT. 6.  No hypermetabolic soft tissue metastases in the abdomen, pelvis. 7.  Minimal inhomogeneous FDG activity in the sacrum and left posterior iliac, though without dominant focal lesion.   This may be reassessed on follow-up exam.     Pathology     Case Report   Surgical Pathology Report                         Case: [id number] Authorizing Provider:  Katiuska Tuttle MD       Collected:           11/29/2021 1005               Ordering Location:     [facility]      Received:            11/29/2021 [address] [facility] Operating Room                                                       Pathologist:           Marielena Hobson MD                                                         Specimens:   A) - Lymph Node, level 7                                                                             B) - Lymph Node, level 4R                                                                            C) - Lymph Node, level 2R                                                                            D) - Lymph Node, level 4L                                                                   Final Diagnosis   A. Lymph node, level 7, excision:  -  Portions of benign lymph node with reactive change and anthracosis, negative for granulomas, lymphoma, or metastatic carcinoma. B. Lymph node, level 4R, excision:  -  Portions of benign lymph node with reactive change and anthracosis, negative for granulomas, lymphoma, or metastatic carcinoma. C. Lymph node, level 2R, excision:  -  Portions of benign lymph node with reactive change and anthracosis, negative for granulomas, lymphoma, or metastatic carcinoma. D. Lymph node, level 4L, excision:  -  Benign lymph node with reactive change and anthracosis, negative for granulomas, lymphoma, or metastatic carcinoma.       Electronically signed by Marielena Hobson MD on 12/2/2021 at 11:29 AM         Case Report   Surgical Pathology Report                         Case: [id number]                                    Authorizing Provider:  Debbie Ventura MD      Collected:           10/05/2021 1058               Ordering Location:     [address] [place] [address] Drive Received:            10/05/2021 1120                                      CAT Scan                                                                      Pathologist:           Chad Leung MD                                                         Specimen:    Lung, Left Lower Lobe                                                                       Final Diagnosis   A. Lung, Left Lower Lobe, :  - Invasive squamous carcinoma, moderately differentiated, keratinizing type. - Tumor is highlighted with P 40 immunoperoxidase stain.  - Prominent tumor necrosis is noted. Best representative block with tumor A1. This case was reviewed at the intradepartmental  conference.    RADHA Lutz, Pulmonology, is notified of the diagnosis in [facility] via [address] [place] [address] on 10/06/2021  at 2.40 pm.   Electronically signed by Chad Leung MD on 10/6/2021 at  2:44 PM

## 2023-11-25 DIAGNOSIS — I48.91 NEW ONSET ATRIAL FIBRILLATION (HCC): ICD-10-CM

## 2023-11-25 DIAGNOSIS — I10 HYPERTENSION, UNSPECIFIED TYPE: ICD-10-CM

## 2023-11-26 RX ORDER — SODIUM CHLORIDE 9 MG/ML
20 INJECTION, SOLUTION INTRAVENOUS ONCE
OUTPATIENT
Start: 2023-12-11

## 2023-11-27 RX ORDER — APIXABAN 5 MG/1
5 TABLET, FILM COATED ORAL 2 TIMES DAILY
Qty: 180 TABLET | Refills: 1 | Status: SHIPPED | OUTPATIENT
Start: 2023-11-27

## 2023-11-28 DIAGNOSIS — I48.19 PERSISTENT ATRIAL FIBRILLATION (HCC): ICD-10-CM

## 2023-11-28 RX ORDER — CARVEDILOL 25 MG/1
TABLET ORAL
Qty: 180 TABLET | Refills: 1 | Status: SHIPPED | OUTPATIENT
Start: 2023-11-28

## 2023-11-28 RX ORDER — SPIRONOLACTONE 25 MG/1
25 TABLET ORAL DAILY
Qty: 90 TABLET | Refills: 1 | Status: SHIPPED | OUTPATIENT
Start: 2023-11-28

## 2023-11-29 ENCOUNTER — TELEPHONE (OUTPATIENT)
Dept: HEMATOLOGY ONCOLOGY | Facility: MEDICAL CENTER | Age: 83
End: 2023-11-29

## 2023-11-29 DIAGNOSIS — K21.9 GASTROESOPHAGEAL REFLUX DISEASE WITHOUT ESOPHAGITIS: ICD-10-CM

## 2023-11-29 RX ORDER — OMEPRAZOLE 20 MG/1
20 CAPSULE, DELAYED RELEASE ORAL DAILY
Qty: 90 CAPSULE | Refills: 1 | Status: SHIPPED | OUTPATIENT
Start: 2023-11-29

## 2023-11-29 NOTE — TELEPHONE ENCOUNTER
Mena Yuen, RN  Hello,    Just wanted to give you a heads up. His insurance is requiring the PD-L1, EGFR and ALK results prior to making a decision on the [facility] authorization. I see that he is scheduled for 12/1. Do you have a time frame on when the results might come back?      Left voicemail for patient to discuss  CARIS not yet resulted  Will need to postpone treatment one week  Will send to [facility] to delay appt

## 2023-12-01 ENCOUNTER — OFFICE VISIT (OUTPATIENT)
Age: 83
End: 2023-12-01
Payer: COMMERCIAL

## 2023-12-01 VITALS
BODY MASS INDEX: 28.77 KG/M2 | RESPIRATION RATE: 18 BRPM | HEIGHT: 70 IN | DIASTOLIC BLOOD PRESSURE: 73 MMHG | SYSTOLIC BLOOD PRESSURE: 110 MMHG | HEART RATE: 93 BPM | WEIGHT: 201 LBS

## 2023-12-01 DIAGNOSIS — M79.672 PAIN IN BOTH FEET: ICD-10-CM

## 2023-12-01 DIAGNOSIS — M79.671 PAIN IN BOTH FEET: ICD-10-CM

## 2023-12-01 DIAGNOSIS — B35.3 TINEA PEDIS OF BOTH FEET: ICD-10-CM

## 2023-12-01 DIAGNOSIS — B35.1 ONYCHOMYCOSIS: ICD-10-CM

## 2023-12-01 DIAGNOSIS — I70.209 PERIPHERAL ARTERIOSCLEROSIS (HCC): ICD-10-CM

## 2023-12-01 DIAGNOSIS — E11.42 DIABETIC POLYNEUROPATHY ASSOCIATED WITH TYPE 2 DIABETES MELLITUS (HCC): Primary | ICD-10-CM

## 2023-12-01 PROCEDURE — 99213 OFFICE O/P EST LOW 20 MIN: CPT | Performed by: PODIATRIST

## 2023-12-01 NOTE — PROGRESS NOTES
Assessment/Plan:  Patient has pain in his feet and toes with ambulation. He has mycosis of nail and skin. He has plantar pre ulcerative skin lesions. Plan. Chart reviewed. Patient educated on care of the diabetic foot. Diabetic foot exam performed. Mycotic nails debrided. Nails debrided mechanically with nail nipper. Patient will use daily, he will apply cream to feet b.i.d. For 4 weeks. Calluses debrided. Procedures performed without pain or complication. Subjective:  Patient has pain in his feet with ambulation. No history of trauma . He has pain when he wears shoes. He has pain in his toes. Patient ID: Angeline Conley is a 82 y.o. male. Patient is diabetic. He has pain in his feet and toes with ambulation. Has pain from calluses. He has ingrown toenails and dry scaly skin. He is requesting refill of topical antifungal.        Review of Systems   Constitutional: No fever or chills, feels well, no tiredness, no recent weight loss or weight gain. Eyes: No complaints of red eyes, no eyesight problems. ENT: no complaints of loss of hearing, no nosebleeds, no sore throat. Cardiovascular: No complaints of chest pain, no palpitations, no leg claudication or lower extremity edema. Respiratory: No complaints of shortness of breath, no wheezing, no cough. Gastrointestinal: No complaints of abdominal pain, no constipation, no nausea or vomiting, no diarrhea or bloody stools. Genitourinary: No complaints of dysuria or incontinence, no hesitancy, no nocturia. Musculoskeletal: limb pain, but-- as noted in HPI. Integumentary: No complaints of skin rash or lesion, no itching or dry skin, no skin wounds. Neurological: No complaints of headache, no confusion, no numbness or tingling, no dizziness. Psychiatric: No suicidal thoughts, no anxiety, no depression. Endocrine: No muscle weakness, no frequent urination, no excessive thirst, no feelings of weakness. Active Problems  1. 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.70) (M21.969)   2. Arthritis (716.90) (M19.90)   3. Atherosclerosis of arteries of extremities (440.20) (I70.209)   4. Calcaneal spur (726.73) (M77.30)   5. Callus (700) (L84)   6. 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 Diabetic neuropathy (250.60,357.2) (E11.40)   9. Hypercholesterolemia (272.0) (E78.00)   10. Hypertension (401.9) (I10)   11. 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 Onychomycosis (110.1) (B35.1)   14. Orthopedic aftercare (V54.9) (Z47.89)   15. Pain in both feet (729.5) (M79.671,M79.672)   16. Pain in extremity (729.5) (M79.609)   17. Pes planus, congenital (754.61) (Q66.50)   18. 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 Plantar wart (078.12) (B07.0)   20. Prostate cancer (185) (C61)   21. Sprain of right shoulder (840.9) (S43.401A)   22. Tinea pedis (110.4) (B35.3)     Past Medical History   · Orthopedic aftercare (V54.9) (Z47.89)     Surgical History   · History of Gastric Surgery   · History of Hernia Repair   · History of Previous Stent Placement Total Number Performed ___     The surgical history was reviewed and updated today. Family History  Family History    · Family history of Arthritis (V17.7)   · Family history of Cancer     The family history was reviewed and updated today. Social History      · Beer Consumption (___ [employer] Per Day)   · Daily Coffee Consumption (1  Cups/Day)   · Former smoker (Q27.74) (K26.931)  The social history was reviewed and updated today. Allergies  1. No Known Drug Allergies      Physical Exam  Left Foot: Appearance: Normal except as noted: excessive pronation-- and-- pes planus. Right Foot: Appearance: Normal except as noted: excessive pronation-- and-- pes planus.    Left Ankle: ROM: limited ROM in all planes   Right Ankle: ROM: limited ROM in all planes   Neurological Exam: performed. Light touch was intact bilaterally. Vibratory sensation was intact bilaterally. Response to monofilament test was intact bilaterally. Deep tendon reflexes: patellar reflex present bilaterally-- and-- achilles reflex present bilaterally. Vascular Exam: performed Dorsalis pedis pulses were One/4 bilaterally. Posterior tibial pulses were One/4 bilaterally. Elevation Pallor: present bilaterally. Capillary refill time was greater than 3 seconds bilaterally-- and-- Q. 9 findings bilateral. Negative digital hair noted. Positive abnormal cooling noted. Toenails: All of the toenails were elongated,-- hypertrophied,-- discolored,-- ingrown,-- shown to have subungual debris,-- tender-- and-- Severely mycotic with onychauxis. Socks and shoes removed, Right Foot Findings: swollen, erythematous and dry. The sensory exam showed diminished vibratory sensation at the level of the toes. Diminished tactile sensation with monofilament testing throughout the right foot. Socks and shoes removed, Left Foot Findings: swollen, erythematous and dry. The sensory exam showed diminished vibratory sensation at the level of the toes. Diminished tactile sensation with monofilament testing throughout the left foot. Capillary refills findings on the right were delayed in the toes. Pulses:  1+ in the posterior tibialis on the right  1+ in the dorsalis pedis on the right. Capillary refills findings on the left were delayed in the toes. Pulses:  1+ in the posterior tibialis on the left  1+ in the dorsalis pedis on the left. Assign Risk Category: 2: Loss of protective sensation with or without weakness, deformity, callus, pre-ulcer, or history of ulceration. High risk. Hyperkeratosis: present on both first toes,-- present on both fifth sub metatarsals-- and-- [place] tinea pedis, bilateral, is noted. Shoe Gear Evaluation: performed (). Right Foot: width: d-- and-- length: 11.  Left Foot: width: d-- and-- length: 11. Recommendation(s): athletic shoes     Patient's shoes and socks removed. Right Foot/Ankle   Right Foot Inspection  Skin Exam: dry skin, callus and callus               Toe Exam: swelling  Sensory   Vibration: diminished  Proprioception: diminished      Vascular  Capillary refills: elevated        Left Foot/Ankle  Left Foot Inspection  Skin Exam: dry skin and callus              Toe Exam: swelling and erythema                   Sensory   Vibration: diminished  Proprioception: diminished     Vascular  Capillary refills: elevated     Right Foot/Ankle   Right Foot Inspection        Sensory   Vibration: diminished  Proprioception: diminished  Monofilament testing: diminished     Vascular  Capillary refills: < 3 seconds              Left Foot/Ankle  Left Foot Inspection        Sensory   Vibration: diminished  Proprioception: diminished  Monofilament testing: diminished     Vascular  Capillary refills: < 3 seconds         Patient's shoes and socks removed. Right Foot/Ankle   Right Foot Inspection        Toe Exam: right toe deformity. Sensory   Vibration: diminished      Vascular  Capillary refills: < 3 seconds              Left Foot/Ankle  Left Foot Inspection        Toe Exam: left toe deformity.       Sensory   Vibration: diminished      Vascular  Capillary refills: < 3 seconds           Assign Risk Category  Deformity present  Loss of protective sensation  Weak pulses  Risk: 2

## 2023-12-04 ENCOUNTER — TELEPHONE (OUTPATIENT)
Dept: INFUSION CENTER | Facility: HOSPITAL | Age: 83
End: 2023-12-04

## 2023-12-04 ENCOUNTER — APPOINTMENT (OUTPATIENT)
Dept: LAB | Facility: HOSPITAL | Age: 83
End: 2023-12-04
Payer: COMMERCIAL

## 2023-12-04 DIAGNOSIS — C34.92 SQUAMOUS CELL CARCINOMA OF LEFT LUNG (HCC): ICD-10-CM

## 2023-12-04 LAB
ALBUMIN SERPL BCP-MCNC: 3.3 G/DL (ref 3.5–5)
ALP SERPL-CCNC: 108 U/L (ref 34–104)
ALT SERPL W P-5'-P-CCNC: 16 U/L (ref 7–52)
ANION GAP SERPL CALCULATED.3IONS-SCNC: 6 MMOL/L
AST SERPL W P-5'-P-CCNC: 18 U/L (ref 13–39)
BASOPHILS # BLD AUTO: 0.05 THOUSANDS/ÂΜL (ref 0–0.1)
BASOPHILS NFR BLD AUTO: 1 % (ref 0–1)
BILIRUB SERPL-MCNC: 0.44 MG/DL (ref 0.2–1)
BUN SERPL-MCNC: 15 MG/DL (ref 5–25)
CALCIUM ALBUM COR SERPL-MCNC: 9.8 MG/DL (ref 8.3–10.1)
CALCIUM SERPL-MCNC: 9.2 MG/DL (ref 8.4–10.2)
CHLORIDE SERPL-SCNC: 101 MMOL/L (ref 96–108)
CO2 SERPL-SCNC: 29 MMOL/L (ref 21–32)
CREAT SERPL-MCNC: 0.95 MG/DL (ref 0.6–1.3)
EOSINOPHIL # BLD AUTO: 0.4 THOUSAND/ÂΜL (ref 0–0.61)
EOSINOPHIL NFR BLD AUTO: 5 % (ref 0–6)
ERYTHROCYTE [DISTWIDTH] IN BLOOD BY AUTOMATED COUNT: 14.1 % (ref 11.6–15.1)
GFR SERPL CREATININE-BSD FRML MDRD: 74 ML/MIN/1.73SQ M
GLUCOSE SERPL-MCNC: 185 MG/DL (ref 65–140)
HCT VFR BLD AUTO: 42.4 % (ref 36.5–49.3)
HGB BLD-MCNC: 13.8 G/DL (ref 12–17)
IMM GRANULOCYTES # BLD AUTO: 0.02 THOUSAND/UL (ref 0–0.2)
IMM GRANULOCYTES NFR BLD AUTO: 0 % (ref 0–2)
LYMPHOCYTES # BLD AUTO: 0.92 THOUSANDS/ÂΜL (ref 0.6–4.47)
LYMPHOCYTES NFR BLD AUTO: 11 % (ref 14–44)
MCH RBC QN AUTO: 32.3 PG (ref 26.8–34.3)
MCHC RBC AUTO-ENTMCNC: 32.5 G/DL (ref 31.4–37.4)
MCV RBC AUTO: 99 FL (ref 82–98)
MONOCYTES # BLD AUTO: 0.47 THOUSAND/ÂΜL (ref 0.17–1.22)
MONOCYTES NFR BLD AUTO: 6 % (ref 4–12)
NEUTROPHILS # BLD AUTO: 6.31 THOUSANDS/ÂΜL (ref 1.85–7.62)
NEUTS SEG NFR BLD AUTO: 77 % (ref 43–75)
NRBC BLD AUTO-RTO: 0 /100 WBCS
PLATELET # BLD AUTO: 290 THOUSANDS/UL (ref 149–390)
PMV BLD AUTO: 9.4 FL (ref 8.9–12.7)
POTASSIUM SERPL-SCNC: 4.9 MMOL/L (ref 3.5–5.3)
PROT SERPL-MCNC: 6.7 G/DL (ref 6.4–8.4)
RBC # BLD AUTO: 4.27 MILLION/UL (ref 3.88–5.62)
SODIUM SERPL-SCNC: 136 MMOL/L (ref 135–147)
T3FREE SERPL-MCNC: 3.31 PG/ML (ref 2.5–3.9)
TSH SERPL DL<=0.05 MIU/L-ACNC: 2.98 UIU/ML (ref 0.45–4.5)
WBC # BLD AUTO: 8.17 THOUSAND/UL (ref 4.31–10.16)

## 2023-12-04 PROCEDURE — 85025 COMPLETE CBC W/AUTO DIFF WBC: CPT

## 2023-12-04 PROCEDURE — 80053 COMPREHEN METABOLIC PANEL: CPT

## 2023-12-04 PROCEDURE — 84443 ASSAY THYROID STIM HORMONE: CPT

## 2023-12-04 PROCEDURE — 36415 COLL VENOUS BLD VENIPUNCTURE: CPT

## 2023-12-04 PROCEDURE — 84481 FREE ASSAY (FT-3): CPT

## 2023-12-04 NOTE — TELEPHONE ENCOUNTER
Spoke with pt - reviewed new pt info - and when and where to get his lab work done prior to his treatments - will go today for blood work

## 2023-12-05 ENCOUNTER — TELEPHONE (OUTPATIENT)
Dept: HEMATOLOGY ONCOLOGY | Facility: MEDICAL CENTER | Age: 83
End: 2023-12-05

## 2023-12-05 NOTE — TELEPHONE ENCOUNTER
PDL1 status not yet known  Reached out to [employer] rep for status  Will have to postpone treatment   Will send to [facility] to change appt to 12/11/2023   Patient aware

## 2023-12-06 ENCOUNTER — HOSPITAL ENCOUNTER (OUTPATIENT)
Dept: INFUSION CENTER | Facility: HOSPITAL | Age: 83
Discharge: HOME | End: 2023-12-06
Attending: INTERNAL MEDICINE

## 2023-12-07 ENCOUNTER — TELEPHONE (OUTPATIENT)
Dept: INFUSION CENTER | Facility: HOSPITAL | Age: 83
End: 2023-12-07

## 2023-12-07 ENCOUNTER — TELEPHONE (OUTPATIENT)
Dept: HEMATOLOGY ONCOLOGY | Facility: CLINIC | Age: 83
End: 2023-12-07

## 2023-12-07 NOTE — TELEPHONE ENCOUNTER
Patient Call    Who are you speaking with? Spouse    If it is not the patient, are they listed on an active communication consent form? Yes   What is the reason for this call? Josue Blancas is calling to find out if the insurance approved the patient's infusion treatment on 12/11/2023. Josue Blancas states they have denied two treatments in the past and they just want to make sure this is approved before they go to the appointment. Does this require a call back? Yes   If a call back is required, please list [facility] call back number [phone]   If a call back is required, advise that a message will be forwarded to their care team and someone will return their call as soon as possible. Did you relay this information to the patient?  Yes

## 2023-12-08 ENCOUNTER — TELEPHONE (OUTPATIENT)
Dept: HEMATOLOGY ONCOLOGY | Facility: MEDICAL CENTER | Age: 83
End: 2023-12-08

## 2023-12-08 LAB
CARIS GENOMIC LOH - EXOME: NORMAL
CARIS MSI - EXOME: NORMAL
CARIS PD-L1 (22C3): NEGATIVE
CARIS PD-L1 (SP263): NEGATIVE
CARIS PD-L1 FDA (28-8): NEGATIVE
CARIS PD-L1 FDA(SP142): NEGATIVE
CARIS PTEN: NEGATIVE
CARIS TMB - EXOME: NORMAL

## 2023-12-08 NOTE — TELEPHONE ENCOUNTER
Immunotherapy approved, first dose scheduled for 12/11/2023  Patient aware  F/U appt with Dr Ramona Saeed cancelled for 12/11/2023

## 2023-12-11 ENCOUNTER — HOSPITAL ENCOUNTER (OUTPATIENT)
Dept: INFUSION CENTER | Facility: HOSPITAL | Age: 83
Discharge: HOME | End: 2023-12-11
Attending: INTERNAL MEDICINE
Payer: COMMERCIAL

## 2023-12-11 VITALS
OXYGEN SATURATION: 96 % | SYSTOLIC BLOOD PRESSURE: 128 MMHG | DIASTOLIC BLOOD PRESSURE: 66 MMHG | HEART RATE: 73 BPM | TEMPERATURE: 98.5 F | WEIGHT: 205.25 LBS | RESPIRATION RATE: 18 BRPM | BODY MASS INDEX: 29.45 KG/M2

## 2023-12-11 DIAGNOSIS — C34.92 SQUAMOUS CELL CARCINOMA OF LEFT LUNG (HCC): Primary | ICD-10-CM

## 2023-12-11 PROCEDURE — 96413 CHEMO IV INFUSION 1 HR: CPT

## 2023-12-11 RX ORDER — SODIUM CHLORIDE 9 MG/ML
20 INJECTION, SOLUTION INTRAVENOUS ONCE
Status: COMPLETED | OUTPATIENT
Start: 2023-12-11 | End: 2023-12-11

## 2023-12-11 RX ADMIN — SODIUM CHLORIDE 20 ML/HR: 9 INJECTION, SOLUTION INTRAVENOUS at 13:13

## 2023-12-11 RX ADMIN — SODIUM CHLORIDE 200 MG: 9 INJECTION, SOLUTION INTRAVENOUS at 13:13

## 2023-12-13 ENCOUNTER — TELEPHONE (OUTPATIENT)
Dept: HEMATOLOGY ONCOLOGY | Facility: CLINIC | Age: 83
End: 2023-12-13

## 2023-12-13 NOTE — TELEPHONE ENCOUNTER
Patient Call    Who are you speaking with? [employer]    If it is not the patient, are they listed on an active communication consent form? N/A   What is the reason for this call? [facility] wanted to give codes for pre determination testing. Approving [id number]- quantity of 2  Approving [id number]- quantity of 1    [id number] x1- denied  [id number] x1- denied    Can call for a Peer to Peer  Case number: [id number]   Does this require a call back? Yes   If a call back is required, please list best call back number [phone]  [phone]   If a call back is required, advise that a message will be forwarded to their care team and someone will return their call as soon as possible. Did you relay this information to the patient?  Yes

## 2023-12-15 NOTE — TELEPHONE ENCOUNTER
Hi All, this is a somatic/tumor genetic test ordered by the patient's oncologist. This was not ordered by the cancer risk and genetics program. We do not handle billing for tumor genetic testing ordered by oncologists. This patient has not seen any of our providers so I'm guessing this is related to his [employer] testing.

## 2023-12-18 ENCOUNTER — OFFICE VISIT (OUTPATIENT)
Dept: PULMONOLOGY | Facility: CLINIC | Age: 83
End: 2023-12-18
Payer: COMMERCIAL

## 2023-12-18 ENCOUNTER — HOSPITAL ENCOUNTER (OUTPATIENT)
Dept: RADIOLOGY | Facility: HOSPITAL | Age: 83
Discharge: HOME | End: 2023-12-18
Payer: COMMERCIAL

## 2023-12-18 VITALS
TEMPERATURE: 96.1 F | DIASTOLIC BLOOD PRESSURE: 72 MMHG | OXYGEN SATURATION: 99 % | BODY MASS INDEX: 29.09 KG/M2 | SYSTOLIC BLOOD PRESSURE: 128 MMHG | WEIGHT: 203.2 LBS | HEART RATE: 72 BPM | HEIGHT: 70 IN

## 2023-12-18 DIAGNOSIS — J90 PLEURAL EFFUSION: Primary | ICD-10-CM

## 2023-12-18 DIAGNOSIS — J90 PLEURAL EFFUSION: ICD-10-CM

## 2023-12-18 DIAGNOSIS — C34.92 SQUAMOUS CELL CARCINOMA OF LEFT LUNG (HCC): ICD-10-CM

## 2023-12-18 DIAGNOSIS — R06.09 DYSPNEA ON EXERTION: ICD-10-CM

## 2023-12-18 PROCEDURE — 71046 X-RAY EXAM CHEST 2 VIEWS: CPT

## 2023-12-18 PROCEDURE — 99214 OFFICE O/P EST MOD 30 MIN: CPT | Performed by: STUDENT IN AN ORGANIZED HEALTH CARE EDUCATION/TRAINING PROGRAM

## 2023-12-18 NOTE — PROGRESS NOTES
Consultation - Pulmonary Medicine   Bridger Clayton 83 y.o. male MRN: [id number]      Reason for Consult: Pleural effusion    Bridger Clayton is a 83 y.o. male with a PMH of past medical history of HTN, Afib, CHF, Lung SCC(Radiation - recurrent effusion undiagnosed- lymph predominat), DM, HLD, CAD  and recurrent pleural effusion here for follow up     Pleural Effusion - Negative for malignancy - Exudative - Ascept Catheter placed 10/16/23 Symptoms improving with drainage. Plan to evaluate with CXR and berna benefit from more frequent draiange.   - Continue qweek drainage, dressing changes  - CXR     SCC - Lung Stage T2b  - Started pembrolizumab        Rodrigo Sal MD  [facility]    _____________________________________________________________________      Interval Hx: 12/18/23  Symptoms overall improve, decreased shortness of breath  Tolerated Asept Catheter Placement -Place 10/16/23 - Drainage 550ml q week  No chest pain, catheter C/D/I   No infectious symptoms  Diffuse pruritus  Started chemotherapy      HPI:    Bridger Clayton is a 83 y.o. 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  10/25/21  FEV1/FVC Ratio: 81 %  FEV1: 1.75 L     58 % predicted  Forced Vital Capacity: 2.15 L    52 % predicted  After administration of bronchodilator:  There is improvement in both FEV1 and FVC     Lung volumes: Total Lung Capacity 66 % predicted Residual volume 70 % predicted     DLCO NOT corrected for patients hemoglobin level: 54 % predicted     Flow volume loop:  Consistent with obstruction     Interpretation:     Spirometry demonstrates moderate restriction  There is improvement after administration of  bronchodilator  Lung volumes show moderate restriction  Moderate diffusion impairment       Imaging:  I personally reviewed the images on the PAC system pertinent to today's visit  PET Scan  IMPRESSION:     1. Hypermetabolic left lower lobe lung malignancy has significantly increased in size since 12/23/2022 suggesting progression of malignancy.     2. Again noted is nonspecific FDG activity involving the sacrum and left posterior iliac bone without definitive correlate on low-dose unenhanced CT of uncertain clinical significance. Findings can be further characterized with MRI of the bony pelvis as   clinically indicated.     3. Moderate left pleural effusion.     CXR  IMPRESSION:     Large left pleural effusion and percutaneous chest tube in place.     Review of Systems:  Aside from what is mentioned in the HPI, the review of systems otherwise negative.    Immunization History   Administered Date(s) Administered    COVID-19 MODERNA VACC 0.25 ML IM BOOSTER 11/05/2021    COVID-19 MODERNA VACC 0.5 ML IM 02/26/2021, 03/26/2021    Influenza, high dose seasonal 0.7 mL 11/28/2022, 11/21/2023    Tdap 01/07/2021        Current Medications:    Current Outpatient Medications:     acetaminophen (TYLENOL) 325 mg tablet, Take 2 tablets (650 mg total) by mouth every 6 (six) hours as needed for mild pain, headaches or fever, Disp: 30 tablet, Rfl: 0    albuterol (2.5 mg/3 mL) 0.083 % nebulizer solution, Take 2.5 mg by nebulization As needed per patient's wife , Disp: , Rfl:     apixaban (Eliquis) 5 mg, TAKE 1 TABLET 2 TIMES A DAY, Disp: 180 tablet, Rfl: 1    atorvastatin (LIPITOR) 10 mg tablet, Take 1 tablet (10 mg total) by mouth daily, Disp: 90 tablet, Rfl: 1    carvedilol (COREG) 25 mg tablet, TAKE 1 TABLET TWO TIMES A DAY WITH MEALS, Disp: 180 tablet, Rfl: 1    ciclopirox (LOPROX) 0.77 % cream, Apply topically 2 (two) times a day for 20 days, Disp: 45 g, Rfl: 2    fluticasone-umeclidinium-vilanterol (Trelegy Ellipta)  100-62.5-25 mcg/actuation inhaler, Rinse mouth after use., Disp: 60 each, Rfl: 5    guaifenesin-codeine (GUAIFENESIN AC) 100-10 MG/5ML liquid, Take 10 mL by mouth 3 (three) times a day as needed for cough, Disp: 473 mL, Rfl: 0    halobetasol (ULTRAVATE) 0.05 % cream, , Disp: , Rfl:     hydrocortisone 2.5 % cream, Apply topically 2 (two) times a day Apply twice a day affected area the trunk, Disp: 20 g, Rfl: 2    latanoprost (XALATAN) 0.005 % ophthalmic solution, Administer 0.005 mL to both eyes daily at bedtime, Disp: , Rfl:     mirtazapine (REMERON) 7.5 MG tablet, Take 1 tablet (7.5 mg total) by mouth daily at bedtime, Disp: 90 tablet, Rfl: 3    Multiple Vitamin (MULTI-VITAMIN DAILY PO), Take by mouth daily  , Disp: , Rfl:     omeprazole (PriLOSEC) 20 mg delayed release capsule, Take 1 capsule (20 mg total) by mouth daily, Disp: 90 capsule, Rfl: 1    primidone (MYSOLINE) 50 mg tablet, Take 2 tabs at 8pm., Disp: 180 tablet, Rfl: 1    spironolactone (ALDACTONE) 25 mg tablet, TAKE 1 TABLET DAILY, Disp: 90 tablet, Rfl: 1    aspirin (ECOTRIN LOW STRENGTH) 81 mg EC tablet, , Disp: , Rfl:     atenolol (TENORMIN) 25 mg tablet, , Disp: , Rfl:     Historical Information   Past Medical History:   Diagnosis Date    Abdominal pain     Anxiety     Arthritis     Asthma     Atrial fibrillation (HCC)     Back pain     Bleeding ulcer     Bronchitis     Cancer (HCC)     prostate 2011- radiation    Cardiac disease     cardiac stent x1    Cataract     starting    Chronic diastolic (congestive) heart failure (HCC)     Diabetes mellitus (HCC)     boarderline diabetic     GERD (gastroesophageal reflux disease)     History of radiation therapy 2010    Prostate seeds (brachytherapy) and EBRT    Hyperlipidemia     Hypertension     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, bilateral     Low back pain     Lung cancer (HCC)     Lung mass     Myocardial infarction (HCC)     mild 1999    Obesity     Prostate cancer (HCC)     Shortness of breath     Sleep  "apnea     sleep study     Wears dentures     full set    Wears glasses      Past Surgical History:   Procedure Laterality Date    ABDOMINAL HERNIA REPAIR      ABDOMINAL SURGERY      bleeding ulcer, cyst removed from abd    COLONOSCOPY      CORONARY ANGIOPLASTY WITH STENT PLACEMENT  1999    x1     ESOPHAGOGASTRODUODENOSCOPY      IR BIOPSY LUNG  10/05/2021    IR THORACENTESIS  2021    IR THORACENTESIS  2023    IR THORACENTESIS  2023    KNEE SURGERY Left     [place] BRNCHSC INCL FLUOR GDNCE DX W/CELL WASHG SPX N/A 2021    Procedure: BRONCHOSCOPY FLEXIBLE;  Surgeon: Cachorro Jason MD;  Location: [facility];  Service: Thoracic    [place] MEDIASTINOSCOPY WITH LYMPH NODE BIOPSY/IES N/A 2021    Procedure: MEDIASTINOSCOPY;  Surgeon: Cachorro Jason MD;  Location: [facility];  Service: Thoracic    PROSTATE SURGERY       Social History   Social History     Tobacco Use   Smoking Status Former    Current packs/day: 0.00    Average packs/day: 1 pack/day for 30.0 years (30.0 ttl pk-yrs)    Types: Cigarettes    Start date:     Quit date:     Years since quittin.9   Smokeless Tobacco Never       Family History:   Family History   Problem Relation Age of Onset    Prostate cancer Brother     Cancer Maternal Uncle         colo rectal cancer    Cancer Paternal Aunt          PhysicalExamination:  Vitals:   /72 (BP Location: Left arm, Patient Position: Sitting, Cuff Size: Standard)   Pulse 72   Temp (!) 96.1 °F (35.6 °C) (Tympanic)   Ht 5' 10\" (1.778 m)   Wt 92.2 kg (203 lb 3.2 oz)   SpO2 99%   BMI 29.16 kg/m²     Appearance -- NAD, speaking full sentences  HEENT -- anicteric sclera, clear OP, MMM  Neck -- no JVD  Heart -- RRR, no murmurs  Lungs -- Diminished R  Abdomen -- soft, NTND, +bs  Extremities -- WWP, no LE edema  Skin -- no rash  Neuro -- A&Ox3, wnl  Psych -- no obvious depression or hallucination        Diagnostic Data:  Labs:  I personally reviewed the most recent laboratory " "data pertinent to today's visit    Lab Results   Component Value Date    WBC 8.17 12/04/2023    HGB 13.8 12/04/2023    HCT 42.4 12/04/2023    MCV 99 (H) 12/04/2023     12/04/2023     Lab Results   Component Value Date    CALCIUM 9.2 12/04/2023    K 4.9 12/04/2023    CO2 29 12/04/2023     12/04/2023    BUN 15 12/04/2023    CREATININE 0.95 12/04/2023     No results found for: \"IGE\"  Lab Results   Component Value Date    ALT 16 12/04/2023    AST 18 12/04/2023    ALKPHOS 108 (H) 12/04/2023           I have spent a total time of 45 minutes on 12/18/23 in caring for this patient including Diagnostic results, Prognosis, Risks and benefits of tx options, Instructions for management, Patient and family education, Importance of tx compliance, Risk factor reductions, Impressions, Counseling / Coordination of care, Documenting in the medical record, Reviewing / ordering tests, medicine, procedures  , Obtaining or reviewing history  , and Communicating with other healthcare professionals .   _        "

## 2023-12-21 ENCOUNTER — TELEPHONE (OUTPATIENT)
Dept: PULMONOLOGY | Facility: MEDICAL CENTER | Age: 83
End: 2023-12-21

## 2023-12-21 NOTE — TELEPHONE ENCOUNTER
Discussed results of Xray, I recommended patient go up to every other day drainage. Supplies are already set up and should go through his pharmacy for more.    Rodrigo Sal MD  Pulmonary and Critical Care

## 2023-12-26 RX ORDER — SODIUM CHLORIDE 9 MG/ML
20 INJECTION, SOLUTION INTRAVENOUS ONCE
Status: CANCELLED | OUTPATIENT
Start: 2024-01-02

## 2023-12-27 ENCOUNTER — APPOINTMENT (OUTPATIENT)
Dept: LAB | Facility: HOSPITAL | Age: 83
End: 2023-12-27
Payer: COMMERCIAL

## 2023-12-27 DIAGNOSIS — I48.91 NEW ONSET ATRIAL FIBRILLATION (HCC): ICD-10-CM

## 2023-12-27 DIAGNOSIS — C34.92 SQUAMOUS CELL CARCINOMA OF LEFT LUNG (HCC): ICD-10-CM

## 2023-12-27 DIAGNOSIS — I10 HYPERTENSION, UNSPECIFIED TYPE: ICD-10-CM

## 2023-12-27 LAB
ALBUMIN SERPL BCP-MCNC: 3.2 G/DL (ref 3.5–5)
ALP SERPL-CCNC: 108 U/L (ref 34–104)
ALT SERPL W P-5'-P-CCNC: 23 U/L (ref 7–52)
ANION GAP SERPL CALCULATED.3IONS-SCNC: 5 MMOL/L
AST SERPL W P-5'-P-CCNC: 19 U/L (ref 13–39)
BASOPHILS # BLD AUTO: 0.04 THOUSANDS/ÂΜL (ref 0–0.1)
BASOPHILS NFR BLD AUTO: 0 % (ref 0–1)
BILIRUB SERPL-MCNC: 0.66 MG/DL (ref 0.2–1)
BUN SERPL-MCNC: 16 MG/DL (ref 5–25)
CALCIUM ALBUM COR SERPL-MCNC: 9.8 MG/DL (ref 8.3–10.1)
CALCIUM SERPL-MCNC: 9.2 MG/DL (ref 8.4–10.2)
CHLORIDE SERPL-SCNC: 98 MMOL/L (ref 96–108)
CO2 SERPL-SCNC: 30 MMOL/L (ref 21–32)
CREAT SERPL-MCNC: 1.01 MG/DL (ref 0.6–1.3)
EOSINOPHIL # BLD AUTO: 0.58 THOUSAND/ÂΜL (ref 0–0.61)
EOSINOPHIL NFR BLD AUTO: 6 % (ref 0–6)
ERYTHROCYTE [DISTWIDTH] IN BLOOD BY AUTOMATED COUNT: 13.4 % (ref 11.6–15.1)
GFR SERPL CREATININE-BSD FRML MDRD: 69 ML/MIN/1.73SQ M
GLUCOSE P FAST SERPL-MCNC: 280 MG/DL (ref 65–99)
HCT VFR BLD AUTO: 41.5 % (ref 36.5–49.3)
HGB BLD-MCNC: 13.6 G/DL (ref 12–17)
IMM GRANULOCYTES # BLD AUTO: 0.04 THOUSAND/UL (ref 0–0.2)
IMM GRANULOCYTES NFR BLD AUTO: 0 % (ref 0–2)
LYMPHOCYTES # BLD AUTO: 0.82 THOUSANDS/ÂΜL (ref 0.6–4.47)
LYMPHOCYTES NFR BLD AUTO: 8 % (ref 14–44)
MCH RBC QN AUTO: 32.2 PG (ref 26.8–34.3)
MCHC RBC AUTO-ENTMCNC: 32.8 G/DL (ref 31.4–37.4)
MCV RBC AUTO: 98 FL (ref 82–98)
MONOCYTES # BLD AUTO: 0.42 THOUSAND/ÂΜL (ref 0.17–1.22)
MONOCYTES NFR BLD AUTO: 4 % (ref 4–12)
NEUTROPHILS # BLD AUTO: 8.33 THOUSANDS/ÂΜL (ref 1.85–7.62)
NEUTS SEG NFR BLD AUTO: 82 % (ref 43–75)
NRBC BLD AUTO-RTO: 0 /100 WBCS
PLATELET # BLD AUTO: 336 THOUSANDS/UL (ref 149–390)
PMV BLD AUTO: 9.4 FL (ref 8.9–12.7)
POTASSIUM SERPL-SCNC: 5 MMOL/L (ref 3.5–5.3)
PROT SERPL-MCNC: 6.9 G/DL (ref 6.4–8.4)
RBC # BLD AUTO: 4.22 MILLION/UL (ref 3.88–5.62)
SODIUM SERPL-SCNC: 133 MMOL/L (ref 135–147)
T3FREE SERPL-MCNC: 2.84 PG/ML (ref 2.5–3.9)
TSH SERPL DL<=0.05 MIU/L-ACNC: 2.68 UIU/ML (ref 0.45–4.5)
WBC # BLD AUTO: 10.23 THOUSAND/UL (ref 4.31–10.16)

## 2023-12-27 PROCEDURE — 85025 COMPLETE CBC W/AUTO DIFF WBC: CPT

## 2023-12-27 PROCEDURE — 84443 ASSAY THYROID STIM HORMONE: CPT

## 2023-12-27 PROCEDURE — 84481 FREE ASSAY (FT-3): CPT

## 2023-12-27 PROCEDURE — 36415 COLL VENOUS BLD VENIPUNCTURE: CPT

## 2023-12-27 PROCEDURE — 80053 COMPREHEN METABOLIC PANEL: CPT

## 2023-12-27 NOTE — TELEPHONE ENCOUNTER
Reason for call:   [x] Refill   [] Prior Auth  [] Other:     Office:   [x] PCP/Provider -   [] Specialty/Provider -     Medication: eliquis 5 mg 1 tablet twice a day    Quantity: 90    Pharmacy: [facility] [address] [phone]     Does the patient have enough for 3 days?   [] Yes   [x] No - Send as HP to POD

## 2024-01-02 ENCOUNTER — HOSPITAL ENCOUNTER (OUTPATIENT)
Dept: INFUSION CENTER | Facility: HOSPITAL | Age: 84
Discharge: HOME | End: 2024-01-02
Attending: INTERNAL MEDICINE
Payer: COMMERCIAL

## 2024-01-02 VITALS
TEMPERATURE: 97.2 F | BODY MASS INDEX: 28.63 KG/M2 | DIASTOLIC BLOOD PRESSURE: 68 MMHG | WEIGHT: 199.52 LBS | HEART RATE: 82 BPM | OXYGEN SATURATION: 97 % | SYSTOLIC BLOOD PRESSURE: 123 MMHG | RESPIRATION RATE: 18 BRPM

## 2024-01-02 DIAGNOSIS — C34.92 SQUAMOUS CELL CARCINOMA OF LEFT LUNG (HCC): Primary | ICD-10-CM

## 2024-01-02 RX ORDER — SODIUM CHLORIDE 9 MG/ML
20 INJECTION, SOLUTION INTRAVENOUS ONCE
Status: COMPLETED | OUTPATIENT
Start: 2024-01-02 | End: 2024-01-02

## 2024-01-02 RX ADMIN — SODIUM CHLORIDE 20 ML/HR: 0.9 INJECTION, SOLUTION INTRAVENOUS at 11:29

## 2024-01-02 RX ADMIN — SODIUM CHLORIDE 200 MG: 9 INJECTION, SOLUTION INTRAVENOUS at 11:29

## 2024-01-02 NOTE — PROGRESS NOTES
Bridger Clayton  tolerated treatment well with no complications.      Bridger Clayton is aware of future appt on 1/22/24 at 10:30.     AVS printed and given to Bridger Clayton:  Yes

## 2024-01-02 NOTE — PLAN OF CARE
Problem: Potential for Falls  Goal: Patient will remain free of falls  Description: INTERVENTIONS:  - Educate patient/family on patient safety including physical limitations  - Instruct patient to call for assistance with activity   - Keep Call bell within reach    Outcome: Progressing

## 2024-01-09 ENCOUNTER — OFFICE VISIT (OUTPATIENT)
Dept: HEMATOLOGY ONCOLOGY | Facility: MEDICAL CENTER | Age: 84
End: 2024-01-09
Payer: COMMERCIAL

## 2024-01-09 VITALS
WEIGHT: 197 LBS | OXYGEN SATURATION: 99 % | DIASTOLIC BLOOD PRESSURE: 66 MMHG | BODY MASS INDEX: 28.2 KG/M2 | HEART RATE: 86 BPM | SYSTOLIC BLOOD PRESSURE: 106 MMHG | TEMPERATURE: 97.5 F | HEIGHT: 70 IN | RESPIRATION RATE: 16 BRPM

## 2024-01-09 DIAGNOSIS — C34.92 SQUAMOUS CELL CARCINOMA OF LEFT LUNG (HCC): Primary | Chronic | ICD-10-CM

## 2024-01-09 PROCEDURE — 99215 OFFICE O/P EST HI 40 MIN: CPT | Performed by: INTERNAL MEDICINE

## 2024-01-09 RX ORDER — SODIUM CHLORIDE 9 MG/ML
20 INJECTION, SOLUTION INTRAVENOUS ONCE
OUTPATIENT
Start: 2024-01-22

## 2024-01-09 NOTE — PROGRESS NOTES
haris KADEN Clayton  1940  [facility] [address] [address]     DISCUSSION/SUMMARY:     83-year-old male with a number of medical and cardiac problems previously found to have a left lower lobe mass. Initial biopsy demonstrated squamous cell carcinoma.  IR recently performed thoracentesis, minimal amount of fluid was removed but there was no evidence of metastatic disease; cytology was negative.  Patient was seen by thoracic surgery and underwent mediastinoscopy.  There was no evidence of metastatic disease in the sampled lymph nodes (2R, 4R, 4L, 7).  Tumor was 4.5 cm.  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  Patient was treated with SBRT and then went on to surveillance.    More recently patient has had problems with recurrent pleural effusions on the left side.  Patient has a Pleurx catheter in place.  Patient states that he needs to drain the fluid approximately once a week, approximately 500 cc.  This has not changed since beginning the pembrolizumab.  Patient is otherwise stable from a respiratory standpoint.    Recent PET/CT demonstrated evidence of recurrence in the left lower lobe, same area where the original malignancy was found.  There is no clear evidence of spread to either hilar regions or the mediastinum but radiologist commented on 1 right paratracheal lymph node but very +/-.  There was nonspecific FDG activity in the sacrum and left posterior iliac bone, seen before.  Unknown clinical significance.    Mr. Clayton was recently seen/reevaluated by Dr. Anguiano.  She did not feel that patient would be a candidate for any additional RT to the area.  This leaves systemic options.  NCCN guidelines 5.2023 state that for patients with squamous cell carcinoma, advanced or metastatic disease, preferred is pembrolizumab, pembrolizumab/carboplatin/paclitaxel; there are other options.      Sarika  results recently became available.  PD-L1 IHC was negative.  We rediscussed options.   and Mrs. Clayton demonstrate relatively good understanding of the situation; patient is still overwhelmed and basically relying on his wife to make all decisions.  Because the PD-L1 was negative and patient continues to put out the same amount of pleural fluid, we discussed adding an agent.  I do not believe patient would be able to tolerate both Taxol and carboplatin.  The plan is to add Taxol only.  Usual adult dose for non-small cell lung carcinoma is 135 mg/m2 IV every 3 weeks.  Patient will continue this with the pembrolizumab.      Regimen  Pembrolizumab 200 mg IV flat dose day 1  Paclitaxel 135 mg/m2 IV day 1 (85%)  Cycle length = 21 days  Goal = palliation and prolongation of life    Nursing staff spent time with patient/wife today going over the specifics of echo Taxol, side effects and toxicities.  Literature was provided.    Mr. Clayton is to return to this office in 3 weeks.  Patient is to call if he has any other oncology questions or concerns.     Carefully review your medication list and verify that the list is accurate and up-to-date. Please call the hematology/oncology office if there are medications missing from the list, medications on the list that you are not currently taking or if there is a dosage or instruction that is different from how you're taking that medication.     Patient goals and areas of care: Continue with pembrolizumab, add paclitaxel  Barriers to care: none  Patient is able to self-care  ______________________________________________________________________________________         Chief Complaint   Patient presents with    Follow-up - recurrent non-small cell lung carcinoma      History of Present Illness:  83-year-old male with multiple medical and cardiac issue recently seen in the emergency room because of a cough.  Workup demonstrated a left lower lobe mass.  Biopsy demonstrated squamous  cell carcinoma.  Workup did not demonstrated evidence of metastatic disease; mediastinal ostomy was negative.  Patient was seen by thoracic surgery but was not felt to be a surgical candidate.  Patient was subsequently seen by radiation oncology and underwent SPRT.  Repeat scanning is consistent with recurrence at the original site.  Patient has a Pleurx catheter in place.  Recently patient began pembrolizumab.    Mr. Clayton has completed 2 cycles of therapy; has tolerated the pembrolizumab well.  Patient states feeling okay, baseline.  No shortness of breath or dyspnea exertion.  No problems with the pembrolizumab.  Appetite is okay, weight has been about the same as before, patient has lost 2 pounds.  No bruising or bleeding issues.  No fevers or signs of infection.  Activities are limited but about the same as before.    Patient was diagnosed with prostate cancer (approximately 10 + years ago) and underwent seeds and external beam radiation.  No evidence of recurrence since that time.       Review of Systems   Constitutional:  Positive for fatigue.        Poor appetite and continuing weight loss   HENT: Negative.     Eyes: Negative.    Respiratory: Negative.     Cardiovascular: Negative.    Gastrointestinal: Negative.    Endocrine: Negative.    Genitourinary: Negative.    Musculoskeletal: Negative.    Skin: Negative.    Allergic/Immunologic: Negative.    Neurological: Negative.    Hematological: Negative.         Easy bruising   Psychiatric/Behavioral:  Negative for self-injury.    All other systems reviewed and are negative.         Patient Active Problem List   Diagnosis    Corns    Pain in both feet    Onychomycosis    Tinea pedis of both feet    Lung mass    Hyperlipidemia    Type 2 diabetes mellitus with diabetic neuropathy, without long-term current use of insulin (HCC)    Squamous cell carcinoma of lung (HCC)    Shortness of breath    Daytime hypersomnolence    Chronic diastolic heart failure (HCC)    SYLVESTER  (obstructive sleep apnea)    Prostate cancer (HCC)    GERD (gastroesophageal reflux disease)    CAD (coronary artery disease)    Other persistent atrial fibrillation (HCC)    Alcohol dependence (HCC)    Acute respiratory failure with hypoxemia (HCC)    Depression, recurrent (HCC)    COVID-19    Angioedema    Septic shock (HCC)    Cellulitis    Cellulitis of chest wall    Acute kidney injury (BUDDY) with acute tubular necrosis (ATN) (HCC)    Chronic obstructive pulmonary disease, unspecified COPD type (HCC)    Centrilobular emphysema (HCC)    Abdominal aortic aneurysm (HCC)    Hypertensive heart disease    Tremors of nervous system    Recurrent pleural effusion on left      Medical History        Past Medical History:   Diagnosis Date    Abdominal pain      Anxiety      Arthritis      Asthma      Atrial fibrillation (HCC)      Back pain      Bleeding ulcer      Bronchitis      Cancer (HCC)       prostate 2011- radiation    Cardiac disease       cardiac stent x1    Cataract       starting    Chronic diastolic (congestive) heart failure (HCC)      Diabetes mellitus (HCC)       boarderline diabetic     GERD (gastroesophageal reflux disease)      History of radiation therapy 2010     Prostate seeds (brachytherapy) and EBRT    Hyperlipidemia      Hypertension      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, bilateral      Low back pain      Lung cancer (HCC)      Lung mass      Myocardial infarction (HCC)       mild 1999    Obesity      Prostate cancer (HCC)      Shortness of breath      Sleep apnea       sleep study 11/22    Wears dentures       full set    Wears glasses           Surgical History         Past Surgical History:   Procedure Laterality Date    ABDOMINAL HERNIA REPAIR        ABDOMINAL SURGERY         bleeding ulcer, cyst removed from abd    COLONOSCOPY        CORONARY ANGIOPLASTY WITH STENT PLACEMENT   1999     x1     ESOPHAGOGASTRODUODENOSCOPY        IR BIOPSY LUNG   10/05/2021    IR THORACENTESIS   11/08/2021    IR  THORACENTESIS   2023    IR THORACENTESIS   2023    KNEE SURGERY Left      KS BRNCHSC INCL FLUOR GDNCE DX W/CELL WASHG SPX N/A 2021     Procedure: BRONCHOSCOPY FLEXIBLE;  Surgeon: Cachorro Jason MD;  Location: [facility];  Service: Thoracic    KS MEDIASTINOSCOPY WITH LYMPH NODE BIOPSY/IES N/A 2021     Procedure: MEDIASTINOSCOPY;  Surgeon: Cachorro Jason MD;  Location: [facility];  Service: Thoracic    PROSTATE SURGERY             Family history: 3 sons in good general health, no known familial or genetic diseases     Social History               Socioeconomic History    Marital status: /Civil Union       Spouse name: Not on file    Number of children: Not on file    Years of education: Not on file    Highest education level: Not on file   Occupational History    Not on file   Tobacco Use    Smoking status: Former       Packs/day: 1.00       Years: 30.00       Total pack years: 30.00       Types: Cigarettes       Quit date:        Years since quittin.8    Smokeless tobacco: Never   Vaping Use    Vaping Use: Never used   Substance and Sexual Activity    Alcohol use: Yes       Alcohol/week: 4.0 - 6.0 standard drinks of alcohol       Types: 1 Glasses of wine, 3 - 5 Cans of beer per week       Comment: few beers day    Drug use: Never    Sexual activity: Not on file   Other Topics Concern    Not on file   Social History Narrative    Not on file      Social Determinants of Health           Financial Resource Strain: Medium Risk (2023)     Overall Financial Resource Strain (CARDIA)      Difficulty of Paying Living Expenses: Somewhat hard   Food Insecurity: No Food Insecurity (2022)     Hunger Vital Sign      Worried About Running Out of Food in the Last Year: Never true      Ran Out of Food in the Last Year: Never true   Transportation Needs: No Transportation Needs (2023)     PRAPARE - Transportation      Lack of Transportation (Medical): No      Lack of  Transportation (Non-Medical): No   Physical Activity: Not on file   Stress: Not on file   Social Connections: Not on file   Intimate Partner Violence: Not on file   Housing Stability: Low Risk  (12/19/2022)     Housing Stability Vital Sign      Unable to Pay for Housing in the Last Year: No      Number of Places Lived in the Last Year: 1      Unstable Housing in the Last Year: No      Social history:  40 pack-year history discontinued 20 years ago, patient drinks 2-4 beers a day off and on, no drug abuse, 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)     Current Outpatient Medications:     acetaminophen (TYLENOL) 325 mg tablet, Take 2 tablets (650 mg total) by mouth every 6 (six) hours as needed for mild pain, headaches or fever, Disp: 30 tablet, Rfl: 0    albuterol (2.5 mg/3 mL) 0.083 % nebulizer solution, Take 2.5 mg by nebulization As needed per patient's wife , Disp: , Rfl:     apixaban (Eliquis) 5 mg, Take 1 tablet (5 mg total) by mouth 2 (two) times a day, Disp: 180 tablet, Rfl: 1    atenolol (TENORMIN) 25 mg tablet, , Disp: , Rfl:     atorvastatin (LIPITOR) 10 mg tablet, Take 1 tablet (10 mg total) by mouth daily, Disp: 90 tablet, Rfl: 1    carvedilol (COREG) 25 mg tablet, Take 1 tablet (25 mg total) by mouth 2 (two) times a day with meals, Disp: 180 tablet, Rfl: 1    ciclopirox (LOPROX) 0.77 % cream, Apply topically 2 (two) times a day for 20 days, Disp: 45 g, Rfl: 2    fluticasone-umeclidinium-vilanterol (Trelegy Ellipta) 100-62.5-25 mcg/actuation inhaler, Rinse mouth after use., Disp: 60 each, Rfl: 5    guaifenesin-codeine (GUAIFENESIN AC) 100-10 MG/5ML liquid, Take 10 mL by mouth 3 (three) times a day as needed for cough, Disp: 180 mL, Rfl: 0    halobetasol (ULTRAVATE) 0.05 % cream, , Disp: , Rfl:     hydrocortisone 2.5 % cream, Apply topically 2 (two) times a day Apply twice a day affected area the trunk, Disp: 20 g, Rfl: 2    latanoprost (XALATAN) 0.005 % ophthalmic solution,  Administer 0.005 mL to both eyes daily at bedtime, Disp: , Rfl:     metFORMIN (GLUCOPHAGE) 500 mg tablet, Take 1 tablet (500 mg total) by mouth daily with breakfast, Disp: , Rfl:     mirtazapine (REMERON) 7.5 MG tablet, Take 1 tablet (7.5 mg total) by mouth daily at bedtime, Disp: 90 tablet, Rfl: 3    Multiple Vitamin (MULTI-VITAMIN DAILY PO), Take by mouth daily  , Disp: , Rfl:     omeprazole (PriLOSEC) 20 mg delayed release capsule, TAKE 1 CAPSULE DAILY, Disp: 90 capsule, Rfl: 1    primidone (MYSOLINE) 50 mg tablet, TAKE 2 TABS AT 8PM., Disp: 180 tablet, Rfl: 1    spironolactone (ALDACTONE) 25 mg tablet, Take 1 tablet (25 mg total) by mouth daily, Disp: 90 tablet, Rfl: 1    aspirin (ECOTRIN LOW STRENGTH) 81 mg EC tablet, , Disp: , Rfl:      No Known Allergies         Vitals:     10/20/23 0926   BP: 114/68   Pulse: 80   Resp: 17   Temp: 97.9 °F (36.6 °C)   SpO2: 92%      Physical Exam  Constitutional:       Appearance: He is well-developed.      Comments: Obese male, no respiratory distress, no signs of pain   HENT:      Head: Normocephalic and atraumatic.      Right Ear: External ear normal.      Left Ear: External ear normal.   Eyes:      Conjunctiva/sclera: Conjunctivae normal.      Pupils: Pupils are equal, round, and reactive to light.   Cardiovascular:      Rate and Rhythm: Normal rate and regular rhythm.      Heart sounds: Normal heart sounds.   Pulmonary:      Effort: Pulmonary effort is normal.      Comments: Left chest wall dressing (pleural catheter) C/D/I. Decreased breath sounds left lower lobe  Abdominal:      General: Bowel sounds are normal. There is distension.      Palpations: Abdomen is soft.      Comments: Obese, distended, soft +bowel sounds, no guarding   Musculoskeletal:         General: Normal range of motion.      Cervical back: Normal range of motion and neck supple.   Skin:     General: Skin is warm.      Comments: Relatively good color, warm, moist, scattered mild areas of ecchymosis  upper extremities   Neurological:      Mental Status: He is alert and oriented to person, place, and time.      Deep Tendon Reflexes: Reflexes are normal and symmetric.   Psychiatric:         Behavior: Behavior normal.         Thought Content: Thought content normal.         Judgment: Judgment normal.      Extremities:  No lower extremity edema bilaterally, no cords, pulses are 1+   Lymphatics:  No adenopathy in the neck, supraclavicular region, axilla bilateral     Labs    12/27/2023 WBC = 10.23 hemoglobin = 13.6 hematocrit = 41.5 platelet = 336 neutrophil = 82% TSH = 2.677 BUN = 16 creatinine = 1.01 calcium = 9.2 LFTs WNL     9/13/2023 WBC = 6.38 hemoglobin = 12.8 hematocrit = 39 platelet = 231 BUN = 11 creatinine = 0.75     Imaging    10/30/2023 PET/CT    CHEST:  Best seen on image 76 of series 4 is a hypermetabolic centrally photopenic left lower lobe lung mass measuring 7.1 x 6.4 cm with max SUV of 12.0, previously measuring 4.5 x 4.0 cm with max SUV of 19.7 on PET/CT dated 10/29/2021 and approximately 4.3 x 3.7 cm when utilizing similar measurement technique and CT of chest dated 12/23/2022. The interval growth in size since 12/23/2022 suggest progression of hypermetabolic malignancy.     Subtle FDG activity is noted with a stable in size prominent right paratracheal lymph node on image 58 of series 4 measuring 1 1.4 cm in short axis with max SUV of 2.8, previously 3.5. The stability favors benign reactive lymph node with early pamela metastatic disease considered less likely    IMPRESSION:     1. Hypermetabolic left lower lobe lung malignancy has significantly increased in size since 12/23/2022 suggesting progression of malignancy.  2. Again noted is nonspecific FDG activity involving the sacrum and left posterior iliac bone without definitive correlate on low-dose unenhanced CT of uncertain clinical significance. Findings can be further characterized with MRI of the bony pelvis as clinically indicated.  3.  Moderate left pleural effusion.     9/28/23: CT Chest w contrast:     Impression: Since September 12, 2023, decrease in size of the still large left pleural effusion with commensurate decrease in left-sided passive atelectasis. Persistent hypoattenuation in the collapsed portion of the left lower lobe. Given that this is in the   vicinity of the treated tumor, and has increased in size since December 2022, short interval contrast-enhanced CT is advised following resolution of the pleural effusion for further assessment.     1/27/2023 CT chest without contrast    IMPRESSION:  1.  Slightly decreased left pleural effusion with improved passive atelectasis of the left lower lobe. Previously noted left lower lobe mass is difficult to visualize on noncontrast exam but appears likely unchanged from most recent study.  2.  Similar appearance of skin thickening and subcutaneous stranding in the right axillary region which could represent cellulitis.       05/04/2022 chest x-ray portable.  Impression stated left small effusion and parenchymal process appears unchanged.     05/02/2022 CT scan the chest without contrast     IMPRESSION:  1.  Increase in size of a left lower lobe patchy opacity with new right basilar patchy densities concerning for pulmonary infiltrates with additional patchy and reticulonodular changes lingular segment left upper lobe and right middle lobe also likely related to infectious process.  2.  Left lower lobe cavitary mass has decreased in size now measuring 3.3 x 1.9 cm, previously 4.1 x 2.2 cm.  3.  Stable COPD and pulmonary fibrosis with the latter likely related to radiation change.     11/19/2021 MRI brain.  Impression stated white matter changes suggestive of chronic microangiopathy.  No acute intracranial pathology.     10/29/2021 PET-CT     1.  Hypermetabolic necrotic 4.5 x 4 cm left lower lung mass compatible with known malignancy.  2.  2 mildly hypermetabolic right paratracheal mediastinal  nodes for which early metastases are not excluded.  3.  Small mildly hypermetabolic left pleural effusion for which malignant effusion is not excluded.  4.  Additional scattered tiny nodular lung densities may be reassessed on follow-up CT.  5.  Probable reactive subcentimeter right cervical node may be reassessed on follow-up PET/CT.  6.  No hypermetabolic soft tissue metastases in the abdomen, pelvis.  7.  Minimal inhomogeneous FDG activity in the sacrum and left posterior iliac, though without dominant focal lesion.  This may be reassessed on follow-up exam.     Pathology    12/7/2023 [facility].  No action mutations were detected.  Tumor mutational burden = 6 = low.  Patient had a pathologic variant in PTEN and TP53.  PDL IHC TPS = 0%, negative.     Case Report   Surgical Pathology Report                         Case: [id number]                                    Authorizing Provider:  Cachorro Jason MD       Collected:           11/29/2021 1005               Ordering Location:     [facility]      Received:            11/29/2021 [address] [place] [address] Operating Room                                                       Pathologist:           Juan José Dietz MD                                                         Specimens:   A) - Lymph Node, level 7                                                                             B) - Lymph Node, level 4R                                                                            C) - Lymph Node, level 2R                                                                            D) - Lymph Node, level 4L                                                                   Final Diagnosis   A. Lymph node, level 7, excision:  -  Portions of benign lymph node with reactive change and anthracosis, negative for granulomas, lymphoma, or metastatic carcinoma.    B. Lymph node, level 4R, excision:  -  Portions of benign lymph node with  reactive change and anthracosis, negative for granulomas, lymphoma, or metastatic carcinoma.    C. Lymph node, level 2R, excision:  -  Portions of benign lymph node with reactive change and anthracosis, negative for granulomas, lymphoma, or metastatic carcinoma.    D. Lymph node, level 4L, excision:  -  Benign lymph node with reactive change and anthracosis, negative for granulomas, lymphoma, or metastatic carcinoma.      Electronically signed by Juan José Dietz MD on 12/2/2021 at 11:29 AM         Case Report   Surgical Pathology Report                         Case: [id number]                                    Authorizing Provider:  Elmer Laird MD      Collected:           10/05/2021 1058               Ordering Location:     [facility] Received:            10/05/2021 1120                                      CAT Scan                                                                      Pathologist:           Mary Dean MD                                                         Specimen:    Lung, Left Lower Lobe                                                                       Final Diagnosis   A. Lung, Left Lower Lobe, :  - Invasive squamous carcinoma, moderately differentiated, keratinizing type.  - Tumor is highlighted with P 40 immunoperoxidase stain.  - Prominent tumor necrosis is noted.      Best representative block with tumor A1.  This case was reviewed at the intradepartmental  conference.   RADHA Navarrete, Pulmonology, is notified of the diagnosis in [place]ook via [employer] on 10/06/2021  at 2.40 pm.   Electronically signed by Mary Dean MD on 10/6/2021 at  2:44 PM

## 2024-01-15 ENCOUNTER — RA CDI HCC (OUTPATIENT)
Dept: OTHER | Facility: HOSPITAL | Age: 84
End: 2024-01-15

## 2024-01-15 NOTE — PROGRESS NOTES
E11.51, I11.0  HCC coding opportunities          Chart Reviewed number of suggestions sent to Provider: 2     Patients Insurance     Medicare Insurance: [employer] Advantage

## 2024-01-17 ENCOUNTER — APPOINTMENT (OUTPATIENT)
Dept: LAB | Facility: HOSPITAL | Age: 84
End: 2024-01-17
Payer: COMMERCIAL

## 2024-01-17 DIAGNOSIS — C34.92 SQUAMOUS CELL CARCINOMA OF LEFT LUNG (HCC): ICD-10-CM

## 2024-01-17 LAB
ALBUMIN SERPL BCP-MCNC: 3 G/DL (ref 3.5–5)
ALP SERPL-CCNC: 130 U/L (ref 34–104)
ALT SERPL W P-5'-P-CCNC: 24 U/L (ref 7–52)
ANION GAP SERPL CALCULATED.3IONS-SCNC: 8 MMOL/L
AST SERPL W P-5'-P-CCNC: 22 U/L (ref 13–39)
BASOPHILS # BLD AUTO: 0.04 THOUSANDS/ÂΜL (ref 0–0.1)
BASOPHILS NFR BLD AUTO: 0 % (ref 0–1)
BILIRUB SERPL-MCNC: 0.61 MG/DL (ref 0.2–1)
BUN SERPL-MCNC: 19 MG/DL (ref 5–25)
CALCIUM ALBUM COR SERPL-MCNC: 9.5 MG/DL (ref 8.3–10.1)
CALCIUM SERPL-MCNC: 8.7 MG/DL (ref 8.4–10.2)
CHLORIDE SERPL-SCNC: 99 MMOL/L (ref 96–108)
CO2 SERPL-SCNC: 25 MMOL/L (ref 21–32)
CREAT SERPL-MCNC: 0.98 MG/DL (ref 0.6–1.3)
EOSINOPHIL # BLD AUTO: 0.72 THOUSAND/ÂΜL (ref 0–0.61)
EOSINOPHIL NFR BLD AUTO: 8 % (ref 0–6)
ERYTHROCYTE [DISTWIDTH] IN BLOOD BY AUTOMATED COUNT: 13.1 % (ref 11.6–15.1)
GFR SERPL CREATININE-BSD FRML MDRD: 72 ML/MIN/1.73SQ M
GLUCOSE SERPL-MCNC: 309 MG/DL (ref 65–140)
HCT VFR BLD AUTO: 42.1 % (ref 36.5–49.3)
HGB BLD-MCNC: 14 G/DL (ref 12–17)
IMM GRANULOCYTES # BLD AUTO: 0.03 THOUSAND/UL (ref 0–0.2)
IMM GRANULOCYTES NFR BLD AUTO: 0 % (ref 0–2)
LYMPHOCYTES # BLD AUTO: 0.95 THOUSANDS/ÂΜL (ref 0.6–4.47)
LYMPHOCYTES NFR BLD AUTO: 10 % (ref 14–44)
MCH RBC QN AUTO: 32 PG (ref 26.8–34.3)
MCHC RBC AUTO-ENTMCNC: 33.3 G/DL (ref 31.4–37.4)
MCV RBC AUTO: 96 FL (ref 82–98)
MONOCYTES # BLD AUTO: 0.56 THOUSAND/ÂΜL (ref 0.17–1.22)
MONOCYTES NFR BLD AUTO: 6 % (ref 4–12)
NEUTROPHILS # BLD AUTO: 7.03 THOUSANDS/ÂΜL (ref 1.85–7.62)
NEUTS SEG NFR BLD AUTO: 76 % (ref 43–75)
NRBC BLD AUTO-RTO: 0 /100 WBCS
PLATELET # BLD AUTO: 288 THOUSANDS/UL (ref 149–390)
PMV BLD AUTO: 9.4 FL (ref 8.9–12.7)
POTASSIUM SERPL-SCNC: 4.8 MMOL/L (ref 3.5–5.3)
PROT SERPL-MCNC: 6.5 G/DL (ref 6.4–8.4)
RBC # BLD AUTO: 4.38 MILLION/UL (ref 3.88–5.62)
SODIUM SERPL-SCNC: 132 MMOL/L (ref 135–147)
T3FREE SERPL-MCNC: 3.56 PG/ML (ref 2.5–3.9)
T4 FREE SERPL-MCNC: 1.41 NG/DL (ref 0.61–1.12)
TSH SERPL DL<=0.05 MIU/L-ACNC: 0.23 UIU/ML (ref 0.45–4.5)
WBC # BLD AUTO: 9.33 THOUSAND/UL (ref 4.31–10.16)

## 2024-01-17 PROCEDURE — 80053 COMPREHEN METABOLIC PANEL: CPT

## 2024-01-17 PROCEDURE — 84443 ASSAY THYROID STIM HORMONE: CPT

## 2024-01-17 PROCEDURE — 84481 FREE ASSAY (FT-3): CPT

## 2024-01-17 PROCEDURE — 84439 ASSAY OF FREE THYROXINE: CPT

## 2024-01-17 PROCEDURE — 85025 COMPLETE CBC W/AUTO DIFF WBC: CPT

## 2024-01-17 PROCEDURE — 36415 COLL VENOUS BLD VENIPUNCTURE: CPT

## 2024-01-21 ENCOUNTER — NURSE TRIAGE (OUTPATIENT)
Dept: OTHER | Facility: OTHER | Age: 84
End: 2024-01-21

## 2024-01-21 ENCOUNTER — APPOINTMENT (EMERGENCY)
Dept: RADIOLOGY | Facility: HOSPITAL | Age: 84
End: 2024-01-21
Payer: COMMERCIAL

## 2024-01-21 ENCOUNTER — HOSPITAL ENCOUNTER (EMERGENCY)
Facility: HOSPITAL | Age: 84
Discharge: HOME | End: 2024-01-21
Attending: EMERGENCY MEDICINE | Admitting: EMERGENCY MEDICINE
Payer: COMMERCIAL

## 2024-01-21 ENCOUNTER — TELEPHONE (OUTPATIENT)
Dept: HEMATOLOGY ONCOLOGY | Facility: CLINIC | Age: 84
End: 2024-01-21

## 2024-01-21 ENCOUNTER — TELEPHONE (OUTPATIENT)
Dept: OTHER | Facility: OTHER | Age: 84
End: 2024-01-21

## 2024-01-21 VITALS
SYSTOLIC BLOOD PRESSURE: 120 MMHG | OXYGEN SATURATION: 98 % | HEART RATE: 72 BPM | TEMPERATURE: 97.3 F | RESPIRATION RATE: 24 BRPM | DIASTOLIC BLOOD PRESSURE: 62 MMHG

## 2024-01-21 DIAGNOSIS — J90 PLEURAL EFFUSION: Primary | ICD-10-CM

## 2024-01-21 DIAGNOSIS — C34.90 LUNG CANCER (HCC): ICD-10-CM

## 2024-01-21 PROCEDURE — 71046 X-RAY EXAM CHEST 2 VIEWS: CPT

## 2024-01-21 PROCEDURE — 99283 EMERGENCY DEPT VISIT LOW MDM: CPT

## 2024-01-21 PROCEDURE — 99284 EMERGENCY DEPT VISIT MOD MDM: CPT | Performed by: EMERGENCY MEDICINE

## 2024-01-21 NOTE — TELEPHONE ENCOUNTER
Call from patient's wife that there is a blood clot in the thoracic tube.  Patient has Pleurx catheter.  I told her that medical oncologist do not deal with Pleurx catheter and she could call thoracic surgery.  Patient had been to thoracic surgeon.  He said patient is going for chemotherapy tomorrow and she is going to let nurse know when she is there and nurse could take it on there depending upon who put the catheter, thoracic surgeon or IR.  Nurse close discuss with Dr. Bridger James, patient's primary oncologist.

## 2024-01-21 NOTE — TELEPHONE ENCOUNTER
Patient calling in for hem/onc. PEP will be paging provider with concerns   Reason for Disposition  • Affirmative: Did you page the on call provider?    Protocols used: WALTER NO TRIAGE REQUIRED

## 2024-01-21 NOTE — ED PROVIDER NOTES
History  Chief Complaint   Patient presents with    Post-op Problem     Had tube in L chest since October. Significant other states she connects tube to bottle, usually drains about 5ooml every other day, noted a blood clot in tube, now wont drain, no pain, no breathing problems. 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  He had a malignant effusion which has been drained with a thoracostomy tube since October of last year.  Wife states she is usually able to get fluid out of the tube every other day.  However since yesterday he has not been able to get any fluid out.  Patient feels well and is not having any fever chest pain or shortness of breath.  They are concerned however because they are supposed to restart chemotherapy tomorrow and did not know if the tube would have to be changed.         Prior to Admission Medications   Prescriptions Last Dose Informant Patient Reported? Taking?   Multiple Vitamin (MULTI-VITAMIN DAILY PO)  Spouse/Significant Other Yes No   Sig: Take by mouth daily     acetaminophen (TYLENOL) 325 mg tablet  Spouse/Significant Other No No   Sig: Take 2 tablets (650 mg total) by mouth every 6 (six) hours as needed for mild pain, headaches or fever   albuterol (2.5 mg/3 mL) 0.083 % nebulizer solution  Spouse/Significant Other Yes No   Sig: Take 2.5 mg by nebulization As needed per patient's wife    apixaban (Eliquis) 5 mg   No No   Sig: Take 1 tablet (5 mg total) by mouth 2 (two) times a day   aspirin (ECOTRIN LOW STRENGTH) 81 mg EC tablet  Spouse/Significant Other Yes No   Patient not taking: Reported on 9/21/2023   atenolol (TENORMIN) 25 mg tablet  Spouse/Significant Other Yes No   Patient not taking: Reported on 12/18/2023   atorvastatin (LIPITOR) 10 mg tablet  Spouse/Significant Other No No   Sig: Take 1 tablet (10 mg total) by mouth daily   carvedilol (COREG) 25 mg tablet  Spouse/Significant Other No No   Sig: TAKE 1 TABLET TWO TIMES  A DAY WITH MEALS   ciclopirox (LOPROX) 0.77 % cream  Spouse/Significant Other No No   Sig: Apply topically 2 (two) times a day for 20 days   fluticasone-umeclidinium-vilanterol (Trelegy Ellipta) 100-62.5-25 mcg/actuation inhaler  Spouse/Significant Other No No   Sig: Rinse mouth after use.   guaifenesin-codeine (GUAIFENESIN AC) 100-10 MG/5ML liquid  Spouse/Significant Other No No   Sig: Take 10 mL by mouth 3 (three) times a day as needed for cough   halobetasol (ULTRAVATE) 0.05 % cream  Spouse/Significant Other Yes No   hydrocortisone 2.5 % cream  Spouse/Significant Other No No   Sig: Apply topically 2 (two) times a day Apply twice a day affected area the trunk   latanoprost (XALATAN) 0.005 % ophthalmic solution  Spouse/Significant Other Yes No   Sig: Administer 0.005 mL to both eyes daily at bedtime   mirtazapine (REMERON) 7.5 MG tablet  Spouse/Significant Other No No   Sig: Take 1 tablet (7.5 mg total) by mouth daily at bedtime   omeprazole (PriLOSEC) 20 mg delayed release capsule  Spouse/Significant Other No No   Sig: Take 1 capsule (20 mg total) by mouth daily   primidone (MYSOLINE) 50 mg tablet  Spouse/Significant Other No No   Sig: Take 2 tabs at 8pm.   spironolactone (ALDACTONE) 25 mg tablet  Spouse/Significant Other No No   Sig: TAKE 1 TABLET DAILY      Facility-Administered Medications: None       Past Medical History:   Diagnosis Date    Abdominal pain     Anxiety     Arthritis     Asthma     Atrial fibrillation (HCC)     Back pain     Bleeding ulcer     Bronchitis     Cancer (HCC)     prostate 2011- radiation    Cardiac disease     cardiac stent x1    Cataract     starting    Chronic diastolic (congestive) heart failure (HCC)     Diabetes mellitus (HCC)     boarderline diabetic     GERD (gastroesophageal reflux disease)     History of radiation therapy 2010    Prostate seeds (brachytherapy) and EBRT    Hyperlipidemia     Hypertension     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, bilateral     Low back pain     Lung  cancer (HCC)     Lung mass     Myocardial infarction (HCC)     mild     Obesity     Prostate cancer (HCC)     Shortness of breath     Sleep apnea     sleep study     Wears dentures     full set    Wears glasses        Past Surgical History:   Procedure Laterality Date    ABDOMINAL HERNIA REPAIR      ABDOMINAL SURGERY      bleeding ulcer, cyst removed from abd    COLONOSCOPY      CORONARY ANGIOPLASTY WITH STENT PLACEMENT  1999    x1     ESOPHAGOGASTRODUODENOSCOPY      IR BIOPSY LUNG  10/05/2021    IR THORACENTESIS  2021    IR THORACENTESIS  2023    IR THORACENTESIS  2023    KNEE SURGERY Left     [place] BRNCHSC INCL FLUOR GDNCE DX W/CELL WASHG SPX N/A 2021    Procedure: BRONCHOSCOPY FLEXIBLE;  Surgeon: Cachorro Jason MD;  [facility];  Service: Thoracic    NM MEDIASTINOSCOPY WITH LYMPH NODE BIOPSY/IES N/A 2021    Procedure: MEDIASTINOSCOPY;  Surgeon: Cachorro Jason MD;  Location[facility];  Service: Thoracic    PROSTATE SURGERY         Family History   Problem Relation Age of Onset    Prostate cancer Brother     Cancer Maternal Uncle         colo rectal cancer    Cancer Paternal Aunt      I have reviewed and agree with the history as documented.    E-Cigarette/Vaping    E-Cigarette Use Never User      E-Cigarette/Vaping Substances    Nicotine No     THC No     CBD No     Flavoring No     Other No     Unknown No      Social History     Tobacco Use    Smoking status: Former     Current packs/day: 0.00     Average packs/day: 1 pack/day for 30.0 years (30.0 ttl pk-yrs)     Types: Cigarettes     Start date:      Quit date:      Years since quittin.0    Smokeless tobacco: Never   Vaping Use    Vaping status: Never Used   Substance Use Topics    Alcohol use: Yes     Alcohol/week: 4.0 - 6.0 standard drinks of alcohol     Types: 1 Glasses of wine, 3 - 5 Cans of beer per week     Comment: socially    Drug use: Never       Review of Systems   Constitutional:  Negative  for chills and fever.   HENT:  Negative for congestion.    Eyes:  Negative for visual disturbance.   Respiratory:  Negative for cough and shortness of breath.    Cardiovascular:  Negative for chest pain.   Gastrointestinal:  Negative for abdominal pain and vomiting.   Genitourinary:  Negative for dysuria.   Musculoskeletal:  Negative for back pain.   Skin:  Negative for color change and rash.   Neurological:  Negative for weakness and headaches.   Hematological:  Does not bruise/bleed easily.   Psychiatric/Behavioral:  Negative for confusion.    All other systems reviewed and are negative.      Physical Exam  Physical Exam  Vitals and nursing note reviewed.   Constitutional:       Appearance: Normal appearance.   HENT:      Head: Normocephalic.      Right Ear: External ear normal.      Left Ear: External ear normal.      Nose: Nose normal.      Mouth/Throat:      Mouth: Mucous membranes are moist.   Eyes:      Conjunctiva/sclera: Conjunctivae normal.   Cardiovascular:      Rate and Rhythm: Normal rate and regular rhythm.      Pulses: Normal pulses.   Pulmonary:      Effort: Pulmonary effort is normal.   Chest:      Chest wall: No tenderness.   Abdominal:      Palpations: Abdomen is soft.      Tenderness: There is no abdominal tenderness.   Musculoskeletal:         General: Normal range of motion.      Cervical back: Normal range of motion.   Skin:     General: Skin is warm and dry.      Capillary Refill: Capillary refill takes less than 2 seconds.      Findings: Rash present.      Comments: Patient has pigment changes concerning for tinea versicolor on the chest.  There is also pigmentary changes status postradiation therapy   Neurological:      General: No focal deficit present.      Mental Status: He is alert and oriented to person, place, and time.   Psychiatric:         Mood and Affect: Mood normal.         Behavior: Behavior normal.         Vital Signs  ED Triage Vitals [01/21/24 1442]   Temperature Pulse  Respirations Blood Pressure SpO2   (!) 97.3 °F (36.3 °C) 72 (!) 24 120/62 98 %      Temp Source Heart Rate Source Patient Position - Orthostatic VS BP Location FiO2 (%)   Tympanic Monitor Sitting Right arm --      Pain Score       No Pain           Vitals:    01/21/24 1442   BP: 120/62   Pulse: 72   Patient Position - Orthostatic VS: Sitting         Visual Acuity      ED Medications  Medications - No data to display    Diagnostic Studies  Results Reviewed       None                   XR chest 2 views    (Results Pending)              Procedures  Procedures         ED Course                               SBIRT 22yo+      Flowsheet Row Most Recent Value   Initial Alcohol Screen: US AUDIT-C     1. How often do you have a drink containing alcohol? 0 Filed at: 01/21/2024 1554   2. How many drinks containing alcohol do you have on a typical day you are drinking?  0 Filed at: 01/21/2024 1554   3a. Male UNDER 65: How often do you have five or more drinks on one occasion? 0 Filed at: 01/21/2024 1554   3b. FEMALE Any Age, or MALE 65+: How often do you have 4 or more drinks on one occassion? 0 Filed at: 01/21/2024 1554   Audit-C Score 0 Filed at: 01/21/2024 1554   JIMMY: How many times in the past year have you...    Used an illegal drug or used a prescription medication for non-medical reasons? Never Filed at: 01/21/2024 1554                      Medical Decision Making  Tube insertion site is clean dry and intact.  There is no sign of infection.    Chest x-ray shows what appears to be a loculated effusion on the left.  There is no fluid around the tip of the catheter in the posterior lung.  Patient may require new tube if the existing loculated effusion worsens    Amount and/or Complexity of Data Reviewed  Radiology: ordered.             Disposition  Final diagnoses:   Pleural effusion   Lung cancer (HCC)     Time reflects when diagnosis was documented in both MDM as applicable and the Disposition within this note       Time  User Action Codes Description Comment    1/21/2024  3:47 PM ReyesJohn bennett Add [J90] Pleural effusion     1/21/2024  3:48 PM ReyesJohn bennett Add [C34.90] Lung cancer (HCC)           ED Disposition       ED Disposition   Discharge    Condition   Stable    Date/Time   Sun Jan 21, 2024 0646    Comment   Bridger Clayton discharge to home/self care.                   Follow-up Information       Follow up With Specialties Details Why Contact Info    Bridger James MD Hematology and Oncology, Hematology, Oncology Schedule an appointment as soon as possible for a visit   [address] [place] [id number]  [phone]              Discharge Medication List as of 1/21/2024  3:48 PM        CONTINUE these medications which have NOT CHANGED    Details   acetaminophen (TYLENOL) 325 mg tablet Take 2 tablets (650 mg total) by mouth every 6 (six) hours as needed for mild pain, headaches or fever, Starting Wed 5/4/2022, Normal      albuterol (2.5 mg/3 mL) 0.083 % nebulizer solution Take 2.5 mg by nebulization As needed per patient's wife , Historical Med      apixaban (Eliquis) 5 mg Take 1 tablet (5 mg total) by mouth 2 (two) times a day, Starting Wed 12/27/2023, Normal      aspirin (ECOTRIN LOW STRENGTH) 81 mg EC tablet Starting Fri 7/28/2023, Historical Med      atenolol (TENORMIN) 25 mg tablet Starting Fri 7/28/2023, Historical Med      atorvastatin (LIPITOR) 10 mg tablet Take 1 tablet (10 mg total) by mouth daily, Starting Tue 8/29/2023, Normal      carvedilol (COREG) 25 mg tablet TAKE 1 TABLET TWO TIMES A DAY WITH MEALS, Normal      ciclopirox (LOPROX) 0.77 % cream Apply topically 2 (two) times a day for 20 days, Starting Fri 12/1/2023, Until Thu 12/21/2023, Normal      fluticasone-umeclidinium-vilanterol (Trelegy Ellipta) 100-62.5-25 mcg/actuation inhaler Rinse mouth after use., Normal      guaifenesin-codeine (GUAIFENESIN AC) 100-10 MG/5ML liquid Take 10 mL by mouth 3 (three) times a day as needed for cough,  Starting Tue 11/21/2023, Normal      halobetasol (ULTRAVATE) 0.05 % cream Starting Sat 7/29/2023, Historical Med      hydrocortisone 2.5 % cream Apply topically 2 (two) times a day Apply twice a day affected area the trunk, Starting Mon 11/28/2022, Normal      latanoprost (XALATAN) 0.005 % ophthalmic solution Administer 0.005 mL to both eyes daily at bedtime, Starting Tue 1/24/2023, Historical Med      mirtazapine (REMERON) 7.5 MG tablet Take 1 tablet (7.5 mg total) by mouth daily at bedtime, Starting Tue 8/8/2023, Normal      Multiple Vitamin (MULTI-VITAMIN DAILY PO) Take by mouth daily  , Historical Med      omeprazole (PriLOSEC) 20 mg delayed release capsule Take 1 capsule (20 mg total) by mouth daily, Starting Wed 11/29/2023, Normal      primidone (MYSOLINE) 50 mg tablet Take 2 tabs at 8pm., Normal      spironolactone (ALDACTONE) 25 mg tablet TAKE 1 TABLET DAILY, Starting Tue 11/28/2023, Normal             No discharge procedures on file.    PDMP Review         Value Time User    PDMP Reviewed  Yes 11/21/2023 11:30 AM Vivek Tatum MD            ED Provider  Electronically Signed by             John Reyes MD  01/21/24 2084       John Reyes MD  01/21/24 1961

## 2024-01-21 NOTE — TELEPHONE ENCOUNTER
"Regarding: blood clot in tube  ----- Message from Ivelisse Godinez sent at 1/21/2024  1:13 PM EST -----  \"My  has a blood clot in his tube and I can't get it out. I don't know what to do. A doctor was supposed to call me earlier and I never got a call.\"    "

## 2024-01-22 ENCOUNTER — HOSPITAL ENCOUNTER (OUTPATIENT)
Dept: INFUSION CENTER | Facility: HOSPITAL | Age: 84
Discharge: HOME | End: 2024-01-22
Attending: INTERNAL MEDICINE
Payer: COMMERCIAL

## 2024-01-22 ENCOUNTER — DOCUMENTATION (OUTPATIENT)
Dept: HEMATOLOGY ONCOLOGY | Facility: CLINIC | Age: 84
End: 2024-01-22

## 2024-01-22 VITALS
HEIGHT: 70 IN | WEIGHT: 198.19 LBS | DIASTOLIC BLOOD PRESSURE: 69 MMHG | SYSTOLIC BLOOD PRESSURE: 134 MMHG | OXYGEN SATURATION: 98 % | TEMPERATURE: 95 F | RESPIRATION RATE: 20 BRPM | HEART RATE: 85 BPM | BODY MASS INDEX: 28.37 KG/M2

## 2024-01-22 DIAGNOSIS — C34.92 SQUAMOUS CELL CARCINOMA OF LEFT LUNG (HCC): Primary | ICD-10-CM

## 2024-01-22 PROCEDURE — 96413 CHEMO IV INFUSION 1 HR: CPT

## 2024-01-22 PROCEDURE — 96415 CHEMO IV INFUSION ADDL HR: CPT

## 2024-01-22 PROCEDURE — 96367 TX/PROPH/DG ADDL SEQ IV INF: CPT

## 2024-01-22 PROCEDURE — 96417 CHEMO IV INFUS EACH ADDL SEQ: CPT

## 2024-01-22 RX ORDER — SODIUM CHLORIDE 9 MG/ML
20 INJECTION, SOLUTION INTRAVENOUS ONCE
Status: COMPLETED | OUTPATIENT
Start: 2024-01-22 | End: 2024-01-22

## 2024-01-22 RX ADMIN — SODIUM CHLORIDE 200 MG: 9 INJECTION, SOLUTION INTRAVENOUS at 12:38

## 2024-01-22 RX ADMIN — PACLITAXEL 282 MG: 6 INJECTION, SOLUTION, CONCENTRATE INTRAVENOUS at 13:30

## 2024-01-22 RX ADMIN — DEXAMETHASONE SODIUM PHOSPHATE: 10 INJECTION, SOLUTION INTRAMUSCULAR; INTRAVENOUS at 11:26

## 2024-01-22 RX ADMIN — FAMOTIDINE: 20 INJECTION, SOLUTION INTRAVENOUS at 11:03

## 2024-01-22 RX ADMIN — SODIUM CHLORIDE 20 ML/HR: 0.9 INJECTION, SOLUTION INTRAVENOUS at 11:03

## 2024-01-22 RX ADMIN — DIPHENHYDRAMINE HYDROCHLORIDE 25 MG: 50 INJECTION, SOLUTION INTRAMUSCULAR; INTRAVENOUS at 11:48

## 2024-01-22 NOTE — PROGRESS NOTES
I sent a message to Dr. Bridger James that this patient is  scheduled to have chemotherapy today and there was some problem with his Pleurx catheter yesterday and he was seen in  ED and he may check on that.

## 2024-01-22 NOTE — PROGRESS NOTES
Bridger Clayton  tolerated treatment well with no complications.      Bridger Clayton is aware of future appt on 2/12/24 at 0900.     AVS printed and given to Bridger Clayton:  Yes  Bridger Clayton)

## 2024-01-23 ENCOUNTER — TELEPHONE (OUTPATIENT)
Age: 84
End: 2024-01-23

## 2024-01-23 ENCOUNTER — OFFICE VISIT (OUTPATIENT)
Dept: PULMONOLOGY | Facility: MEDICAL CENTER | Age: 84
End: 2024-01-23
Payer: COMMERCIAL

## 2024-01-23 VITALS
DIASTOLIC BLOOD PRESSURE: 56 MMHG | HEIGHT: 70 IN | HEART RATE: 55 BPM | RESPIRATION RATE: 16 BRPM | TEMPERATURE: 98.4 F | SYSTOLIC BLOOD PRESSURE: 100 MMHG | WEIGHT: 197 LBS | OXYGEN SATURATION: 99 % | BODY MASS INDEX: 28.2 KG/M2

## 2024-01-23 DIAGNOSIS — R06.09 DYSPNEA ON EXERTION: ICD-10-CM

## 2024-01-23 DIAGNOSIS — C34.92 SQUAMOUS CELL CARCINOMA OF LEFT LUNG (HCC): ICD-10-CM

## 2024-01-23 DIAGNOSIS — J90 PLEURAL EFFUSION: Primary | ICD-10-CM

## 2024-01-23 PROCEDURE — 99215 OFFICE O/P EST HI 40 MIN: CPT | Performed by: STUDENT IN AN ORGANIZED HEALTH CARE EDUCATION/TRAINING PROGRAM

## 2024-01-23 NOTE — TELEPHONE ENCOUNTER
CHEST TUBE CLOGGED.  Wife tried to unclog, unable to, re-guided wife how to unclog, still unable to.      Pt scheduled for urgent appt today, 1/23/24 arrival 10:25 SHAI.

## 2024-01-23 NOTE — TELEPHONE ENCOUNTER
Patients wife called stating that over the weekend she had brought Spenser into the ER and since he got home over the weekend there is a blood clot clogged in his chest tube to drain him. I transferred the call for triage. Thank you

## 2024-01-23 NOTE — PROGRESS NOTES
Consultation - Pulmonary Medicine   Bridger Clayton 83 y.o. male MRN: [id number]      Reason for Consult: Pleural effusion    Bridger Clayton is a 83 y.o. male with a PMH of past medical history of HTN, Afib, CHF, Lung SCC(Radiation - recurrent effusion undiagnosed- lymph predominat), DM, HLD, CAD  and recurrent pleural effusion here for follow up     Pleural Effusion - Negative for malignancy - Exudative - Ascept Catheter placed 10/16/23 Symptoms improving with drainage. Required flushing of catheter in clinic to remove clot. 550ml brown serous fluid drained  - Every other day drainage  - Visiting Nurse support    SCC - Lung Stage T2b - Not a candidate for further RT, Started Taxol/Pembrolizumab    Rodrigo Sal MD  [facility]    _____________________________________________________________________      Interval Hx 1/23/28:  Difficulty with draining, clogged  Every other day drainage - 500-550ml  ET: Dyspnea 1 flight  No infectious symptoms - denies cough  Chemo regimen Pembrolizumab, Taxol       Interval Hx: 12/18/23  Symptoms overall improve, decreased shortness of breath  Tolerated Asept Catheter Placement -Place 10/16/23 - Drainage 550ml q week  No chest pain, catheter C/D/I   No infectious symptoms  Diffuse pruritus  Started chemotherapy      HPI:    Bridger Clayton is a 83 y.o. 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  10/25/21  FEV1/FVC Ratio: 81 %  FEV1: 1.75 L     58 % predicted  Forced Vital Capacity: 2.15 L    52 % predicted  After administration of bronchodilator:  There is improvement in both FEV1 and FVC     Lung volumes: Total Lung Capacity 66 % predicted Residual volume 70 % predicted      DLCO NOT corrected for patients hemoglobin level: 54 % predicted     Flow volume loop:  Consistent with obstruction     Interpretation:     Spirometry demonstrates moderate restriction  There is improvement after administration of bronchodilator  Lung volumes show moderate restriction  Moderate diffusion impairment       Imaging:  I personally reviewed the images on the PAC system pertinent to today's visit  PET Scan  IMPRESSION:     1. Hypermetabolic left lower lobe lung malignancy has significantly increased in size since 12/23/2022 suggesting progression of malignancy.     2. Again noted is nonspecific FDG activity involving the sacrum and left posterior iliac bone without definitive correlate on low-dose unenhanced CT of uncertain clinical significance. Findings can be further characterized with MRI of the bony pelvis as   clinically indicated.     3. Moderate left pleural effusion.     CXR  IMPRESSION:     Large left pleural effusion and percutaneous chest tube in place.     Review of Systems:  Aside from what is mentioned in the HPI, the review of systems otherwise negative.    Immunization History   Administered Date(s) Administered    COVID-19 MODERNA VACC 0.25 ML IM BOOSTER 11/05/2021    COVID-19 MODERNA VACC 0.5 ML IM 02/26/2021, 03/26/2021    Influenza, high dose seasonal 0.7 mL 11/28/2022, 11/21/2023    Tdap 01/07/2021        Current Medications:    Current Outpatient Medications:     acetaminophen (TYLENOL) 325 mg tablet, Take 2 tablets (650 mg total) by mouth every 6 (six) hours as needed for mild pain, headaches or fever, Disp: 30 tablet, Rfl: 0    apixaban (Eliquis) 5 mg, Take 1 tablet (5 mg total) by mouth 2 (two) times a day, Disp: 180 tablet, Rfl: 0    atorvastatin (LIPITOR) 10 mg tablet, Take 1 tablet (10 mg total) by mouth daily, Disp: 90 tablet, Rfl: 1    carvedilol (COREG) 25 mg tablet, TAKE 1 TABLET TWO TIMES A DAY WITH MEALS, Disp: 180 tablet, Rfl: 1    ciclopirox (LOPROX) 0.77 % cream,  Apply topically 2 (two) times a day for 20 days, Disp: 45 g, Rfl: 2    guaifenesin-codeine (GUAIFENESIN AC) 100-10 MG/5ML liquid, Take 10 mL by mouth 3 (three) times a day as needed for cough, Disp: 473 mL, Rfl: 0    halobetasol (ULTRAVATE) 0.05 % cream, , Disp: , Rfl:     hydrocortisone 2.5 % cream, Apply topically 2 (two) times a day Apply twice a day affected area the trunk, Disp: 20 g, Rfl: 2    latanoprost (XALATAN) 0.005 % ophthalmic solution, Administer 0.005 mL to both eyes daily at bedtime, Disp: , Rfl:     mirtazapine (REMERON) 7.5 MG tablet, Take 1 tablet (7.5 mg total) by mouth daily at bedtime, Disp: 90 tablet, Rfl: 3    Multiple Vitamin (MULTI-VITAMIN DAILY PO), Take by mouth daily  , Disp: , Rfl:     omeprazole (PriLOSEC) 20 mg delayed release capsule, Take 1 capsule (20 mg total) by mouth daily, Disp: 90 capsule, Rfl: 1    primidone (MYSOLINE) 50 mg tablet, Take 2 tabs at 8pm., Disp: 180 tablet, Rfl: 1    spironolactone (ALDACTONE) 25 mg tablet, TAKE 1 TABLET DAILY, Disp: 90 tablet, Rfl: 1    albuterol (2.5 mg/3 mL) 0.083 % nebulizer solution, Take 2.5 mg by nebulization As needed per patient's wife  (Patient not taking: Reported on 1/23/2024), Disp: , Rfl:     aspirin (ECOTRIN LOW STRENGTH) 81 mg EC tablet, , Disp: , Rfl:     atenolol (TENORMIN) 25 mg tablet, , Disp: , Rfl:     fluticasone-umeclidinium-vilanterol (Trelegy Ellipta) 100-62.5-25 mcg/actuation inhaler, Rinse mouth after use. (Patient not taking: Reported on 1/23/2024), Disp: 60 each, Rfl: 5  No current facility-administered medications for this visit.    Facility-Administered Medications Ordered in Other Visits:     alteplase (CATHFLO) injection 2 mg, 2 mg, Intracatheter, Q1MIN PRN, Bridger James MD    Historical Information   Past Medical History:   Diagnosis Date    Abdominal pain     Anxiety     Arthritis     Asthma     Atrial fibrillation (HCC)     Back pain     Bleeding ulcer     Bronchitis     Cancer (HCC)     prostate 2011-  radiation    Cardiac disease     cardiac stent x1    Cataract     starting    Chronic diastolic (congestive) heart failure (HCC)     Diabetes mellitus (HCC)     boarderline diabetic     GERD (gastroesophageal reflux disease)     History of radiation therapy     Prostate seeds (brachytherapy) and EBRT    Hyperlipidemia     Hypertension     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, bilateral     Low back pain     Lung cancer (HCC)     Lung mass     Myocardial infarction (HCC)     mild     Obesity     Prostate cancer (HCC)     Shortness of breath     Sleep apnea     sleep study     Wears dentures     full set    Wears glasses      Past Surgical History:   Procedure Laterality Date    ABDOMINAL HERNIA REPAIR      ABDOMINAL SURGERY      bleeding ulcer, cyst removed from abd    COLONOSCOPY      CORONARY ANGIOPLASTY WITH STENT PLACEMENT  1999    x1     ESOPHAGOGASTRODUODENOSCOPY      IR BIOPSY LUNG  10/05/2021    IR THORACENTESIS  2021    IR THORACENTESIS  2023    IR THORACENTESIS  2023    KNEE SURGERY Left     AZ BRNCHSC INCL FLUOR GDNCE DX W/CELL WASHG SPX N/A 2021    Procedure: BRONCHOSCOPY FLEXIBLE;  Surgeon: Cachorro Jason MD;  Location: [facility];  Service: Thoracic    AZ MEDIASTINOSCOPY WITH LYMPH NODE BIOPSY/IES N/A 2021    Procedure: MEDIASTINOSCOPY;  Surgeon: Cachorro Jason MD;  Location[facility];  Service: Thoracic    PROSTATE SURGERY       Social History   Social History     Tobacco Use   Smoking Status Former    Current packs/day: 0.00    Average packs/day: 1 pack/day for 30.0 years (30.0 ttl pk-yrs)    Types: Cigarettes    Start date:     Quit date:     Years since quittin.0   Smokeless Tobacco Never       Family History:   Family History   Problem Relation Age of Onset    Prostate cancer Brother     Cancer Maternal Uncle         colo rectal cancer    Cancer Paternal Aunt          PhysicalExamination:  Vitals:   /56 (BP Location: Left arm, Patient  "Position: Sitting, Cuff Size: Standard)   Pulse 55   Temp 98.4 °F (36.9 °C) (Temporal)   Resp 16   Ht 5' 10\" (1.778 m)   Wt 89.4 kg (197 lb)   SpO2 99%   BMI 28.27 kg/m²   Physical Exam: Unchanged  Appearance -- NAD, speaking full sentences  HEENT -- anicteric sclera, clear OP, MMM  Neck -- no JVD  Heart -- RRR, no murmurs  Lungs -- Diminished R  Abdomen -- soft, NTND, +bs  Extremities -- WWP, no LE edema  Skin -- no rash  Neuro -- A&Ox3, wnl  Psych -- no obvious depression or hallucination        Diagnostic Data:  Labs:  I personally reviewed the most recent laboratory data pertinent to today's visit    Lab Results   Component Value Date    WBC 9.33 01/17/2024    HGB 14.0 01/17/2024    HCT 42.1 01/17/2024    MCV 96 01/17/2024     01/17/2024     Lab Results   Component Value Date    CALCIUM 8.7 01/17/2024    K 4.8 01/17/2024    CO2 25 01/17/2024    CL 99 01/17/2024    BUN 19 01/17/2024    CREATININE 0.98 01/17/2024     No results found for: \"IGE\"  Lab Results   Component Value Date    ALT 24 01/17/2024    AST 22 01/17/2024    ALKPHOS 130 (H) 01/17/2024           I have spent a total time of 45 minutes on 01/23/24 in caring for this patient including Diagnostic results, Prognosis, Risks and benefits of tx options, Instructions for management, Patient and family education, Importance of tx compliance, Risk factor reductions, Impressions, Counseling / Coordination of care, Documenting in the medical record, Reviewing / ordering tests, medicine, procedures  , Obtaining or reviewing history  , and Communicating with other healthcare professionals .   _        "

## 2024-01-25 ENCOUNTER — TELEPHONE (OUTPATIENT)
Dept: HEMATOLOGY ONCOLOGY | Facility: CLINIC | Age: 84
End: 2024-01-25

## 2024-01-25 DIAGNOSIS — C61 PROSTATE CANCER (HCC): Primary | ICD-10-CM

## 2024-01-25 NOTE — TELEPHONE ENCOUNTER
Patient received 3 doses of immunotherapy  Patient has diffuse pruritus with faint rash on torso  Discussed with Dr Jacob Boo dose pac prescribed  Patient has f/u 2/2/2024 prior to next chemo appt  Wife verbalizes understanding of plan

## 2024-01-25 NOTE — TELEPHONE ENCOUNTER
Patient Call    Who are you speaking with? Patient    If it is not the patient, are they listed on an active communication consent form? Yes   What is the reason for this call? Patient and his wife calling as he just got chemo on Monday and is itching all the time and has a rash and does not know what else he should take. Also getting knee pain which started after the chemo. All started on Monday.   Does this require a call back? Yes   If a call back is required, please list best call back number [phone]   If a call back is required, advise that a message will be forwarded to their care team and someone will return their call as soon as possible.   Did you relay this information to the patient? Yes

## 2024-01-26 ENCOUNTER — TELEPHONE (OUTPATIENT)
Dept: HEMATOLOGY ONCOLOGY | Facility: CLINIC | Age: 84
End: 2024-01-26

## 2024-01-26 RX ORDER — METHYLPREDNISOLONE 4 MG/1
TABLET ORAL
Qty: 1 EACH | Refills: 0 | Status: SHIPPED | OUTPATIENT
Start: 2024-01-26

## 2024-01-26 NOTE — TELEPHONE ENCOUNTER
Patient Call    Who are you speaking with? Patient    If it is not the patient, are they listed on an active communication consent form? Yes   What is the reason for this call? Calling as the medication has not been sent to the pharmacy.    [3:26 PM] Benedict Gibbons  yes dr de la fuente just sent it a few minutes ago. the patient should call the pharmacy to see if its ready yet. thank you!   heart 1   Does this require a call back? No   If a call back is required, please list best call back number N/A   If a call back is required, advise that a message will be forwarded to their care team and someone will return their call as soon as possible.   Did you relay this information to the patient? Yes

## 2024-01-26 NOTE — TELEPHONE ENCOUNTER
Patient Call    Who are you speaking with? Spouse    If it is not the patient, are they listed on an active communication consent form? Yes   What is the reason for this call? Taylor calling in regards to patient's symptoms of itching and rash and the medication request.  Taylor would like to know when the medication will be sent over to the pharmacy.  Taylor states that patient did not sleep well because of the rash and knee pain. Taylor would like a call back to discuss patient's symptoms and medication request.    Does this require a call back? Yes   If a call back is required, please list best call back number [phone]   If a call back is required, advise that a message will be forwarded to their care team and someone will return their call as soon as possible.   Did you relay this information to the patient? Yes

## 2024-01-30 ENCOUNTER — NURSE TRIAGE (OUTPATIENT)
Age: 84
End: 2024-01-30

## 2024-01-30 ENCOUNTER — TELEPHONE (OUTPATIENT)
Age: 84
End: 2024-01-30

## 2024-01-30 ENCOUNTER — OFFICE VISIT (OUTPATIENT)
Dept: PULMONOLOGY | Facility: CLINIC | Age: 84
End: 2024-01-30
Payer: COMMERCIAL

## 2024-01-30 VITALS
TEMPERATURE: 98.4 F | RESPIRATION RATE: 12 BRPM | SYSTOLIC BLOOD PRESSURE: 110 MMHG | HEART RATE: 72 BPM | DIASTOLIC BLOOD PRESSURE: 60 MMHG | HEIGHT: 70 IN | BODY MASS INDEX: 28.49 KG/M2 | WEIGHT: 199 LBS | OXYGEN SATURATION: 97 %

## 2024-01-30 DIAGNOSIS — C34.92 SQUAMOUS CELL CARCINOMA OF LEFT LUNG (HCC): Chronic | ICD-10-CM

## 2024-01-30 DIAGNOSIS — J90 RECURRENT PLEURAL EFFUSION ON LEFT: Primary | ICD-10-CM

## 2024-01-30 PROCEDURE — 99213 OFFICE O/P EST LOW 20 MIN: CPT | Performed by: NURSE PRACTITIONER

## 2024-01-30 NOTE — TELEPHONE ENCOUNTER
"Reason for Disposition   Nursing judgment    Answer Assessment - Initial Assessment Questions  1. REASON FOR CALL: \"What is your main concern right now?\"      Ascept catheter not draining again  2. ONSET: \"When did this start?\"      Today  3. OTHER      Same thing happened last week, went to ED and then went to office to have it flushed    Protocols used: No Protocol Available-ADULT-[place]    Pt's spouse calling due to ascept not draining. She explains this happened last week and was clogged with a clot. He had it flushed in the office and it worked up until today. No urgent follow ups available at [facility] today. Scheduled for urgent follow up today at 1:30 at [facility].   "

## 2024-01-30 NOTE — TELEPHONE ENCOUNTER
Taylor called to regarding Spenser Tube Being clogged she isnt able to drain it out . Transfer call to [facility]

## 2024-01-31 NOTE — PROGRESS NOTES
Pulmonary Follow-Up Note   Bridger Clayton 83 y.o. 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    Problem List Items Addressed This Visit       Squamous cell carcinoma of lung (HCC) (Chronic)     Follows with radiation oncology         Recurrent pleural effusion on left - Primary     Patient has left side Asept catheter  Drains every other day at home  Presented today due to catheter being clogged with small blood clot  Was able to access and flush catheter under sterile technique  550 cc dark yellow clear fluid drained  Patient tolerated well, denied any chest pain or shortness of breath              No follow-ups on file.    All of Spenser's questions were answered prior to leaving the office today. He will follow-up as scheduled or sooner should the need arise. He is aware to call our office with any further questions or concerns.    History of Present Illness   Reason for Visit: Asept catheter not draining  Chief Complaint: Asept catheter not draining  HPI: Bridger Clayton is a 83 y.o. male who presents to the office today from home with complaints of Asept catheter being clogged, catheter drained in office patient tolerated well.    Review of Systems   Constitutional:  Negative for chills, fatigue and fever.   Respiratory:  Negative for cough, shortness of breath and wheezing.        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, bilateral     Low back pain     Lung cancer (HCC)     Lung mass     Myocardial infarction (HCC)     mild 1999    Obesity     Prostate cancer (HCC)     Shortness of breath     Sleep apnea     sleep study 11/22    Wears dentures     full set    Wears glasses      Past Surgical History:   Procedure Laterality Date    ABDOMINAL HERNIA REPAIR      ABDOMINAL SURGERY      bleeding ulcer, cyst removed from abd    COLONOSCOPY      CORONARY ANGIOPLASTY WITH STENT PLACEMENT  1999    x1     ESOPHAGOGASTRODUODENOSCOPY      IR BIOPSY LUNG  10/05/2021    IR THORACENTESIS  11/08/2021    IR THORACENTESIS  6/5/2023    IR THORACENTESIS  9/13/2023    KNEE SURGERY Left     KY BRNCHSC INCL FLUOR GDNCE DX W/CELL WASHG SPX N/A 11/29/2021    Procedure: BRONCHOSCOPY FLEXIBLE;  Surgeon: Cachorro Jason MD;  Location: [facility];  Service: Thoracic    [place] MEDIASTINOSCOPY WITH LYMPH NODE BIOPSY/IES N/A 11/29/2021    Procedure: MEDIASTINOSCOPY;  Surgeon: Cachorro Jason MD;  Location: [facility];  Service: Thoracic    PROSTATE SURGERY       Family History   Problem Relation Age of Onset    Prostate cancer Brother     Cancer Maternal Uncle         colo rectal cancer    Cancer Paternal Aunt      Social History   Social History     Substance and Sexual Activity   Alcohol Use Yes    Alcohol/week: 4.0 - 6.0 standard drinks of alcohol    Types: 1 Glasses of wine, 3 - 5 Cans of beer per week    Comment: socially     Social History     Substance and Sexual Activity   Drug Use Never     Social History     Tobacco Use   Smoking Status Former    Current packs/day: 0.00    Average packs/day: 1 pack/day for 30.0 years (30.0 ttl pk-yrs)     Types: Cigarettes    Start date:     Quit date:     Years since quittin.0   Smokeless Tobacco Never     E-Cigarette/Vaping    E-Cigarette Use Never User      E-Cigarette/Vaping Substances    Nicotine No     THC No     CBD No     Flavoring No     Other No     Unknown No        Meds/Allergies     Current Outpatient Medications:     acetaminophen (TYLENOL) 325 mg tablet, Take 2 tablets (650 mg total) by mouth every 6 (six) hours as needed for mild pain, headaches or fever, Disp: 30 tablet, Rfl: 0    apixaban (Eliquis) 5 mg, Take 1 tablet (5 mg total) by mouth 2 (two) times a day, Disp: 180 tablet, Rfl: 0    atenolol (TENORMIN) 25 mg tablet, , Disp: , Rfl:     atorvastatin (LIPITOR) 10 mg tablet, Take 1 tablet (10 mg total) by mouth daily, Disp: 90 tablet, Rfl: 1    carvedilol (COREG) 25 mg tablet, TAKE 1 TABLET TWO TIMES A DAY WITH MEALS, Disp: 180 tablet, Rfl: 1    ciclopirox (LOPROX) 0.77 % cream, Apply topically 2 (two) times a day for 20 days, Disp: 45 g, Rfl: 2    fluticasone-umeclidinium-vilanterol (Trelegy Ellipta) 100-62.5-25 mcg/actuation inhaler, Rinse mouth after use., Disp: 60 each, Rfl: 5    guaifenesin-codeine (GUAIFENESIN AC) 100-10 MG/5ML liquid, Take 10 mL by mouth 3 (three) times a day as needed for cough, Disp: 473 mL, Rfl: 0    halobetasol (ULTRAVATE) 0.05 % cream, , Disp: , Rfl:     hydrocortisone 2.5 % cream, Apply topically 2 (two) times a day Apply twice a day affected area the trunk, Disp: 20 g, Rfl: 2    latanoprost (XALATAN) 0.005 % ophthalmic solution, Administer 0.005 mL to both eyes daily at bedtime, Disp: , Rfl:     methylPREDNISolone 4 MG tablet therapy pack, Use as directed on package, Disp: 1 each, Rfl: 0    mirtazapine (REMERON) 7.5 MG tablet, Take 1 tablet (7.5 mg total) by mouth daily at bedtime, Disp: 90 tablet, Rfl: 3    Multiple Vitamin (MULTI-VITAMIN DAILY PO), Take by mouth daily  , Disp: , Rfl:     omeprazole (PriLOSEC) 20 mg delayed release capsule, Take 1  "capsule (20 mg total) by mouth daily, Disp: 90 capsule, Rfl: 1    primidone (MYSOLINE) 50 mg tablet, Take 2 tabs at 8pm., Disp: 180 tablet, Rfl: 1    spironolactone (ALDACTONE) 25 mg tablet, TAKE 1 TABLET DAILY, Disp: 90 tablet, Rfl: 1    albuterol (2.5 mg/3 mL) 0.083 % nebulizer solution, Take 2.5 mg by nebulization As needed per patient's wife  (Patient not taking: Reported on 1/23/2024), Disp: , Rfl:     aspirin (ECOTRIN LOW STRENGTH) 81 mg EC tablet, , Disp: , Rfl:   No Known Allergies    Vitals: Blood pressure 110/60, pulse 72, temperature 98.4 °F (36.9 °C), temperature source Tympanic, resp. rate 12, height 5' 10\" (1.778 m), weight 90.3 kg (199 lb), SpO2 97%. Body mass index is 28.55 kg/m². Oxygen Therapy  SpO2: 97 %RA    Physical Exam:  Physical Exam  Constitutional:       General: He is not in acute distress.  HENT:      Head: Normocephalic and atraumatic.   Cardiovascular:      Rate and Rhythm: Normal rate and regular rhythm.   Pulmonary:      Effort: Pulmonary effort is normal. No respiratory distress.      Breath sounds: No wheezing, rhonchi or rales.      Comments: Diminished breath sounds in bilateral bases  Chest:      Chest wall: No tenderness.   Abdominal:      General: Bowel sounds are normal.   Musculoskeletal:      Cervical back: Normal range of motion.   Skin:     General: Skin is warm and dry.   Neurological:      Mental Status: He is alert and oriented to person, place, and time.   Psychiatric:         Mood and Affect: Mood normal.         Behavior: Behavior normal.         Thought Content: Thought content normal.         Judgment: Judgment normal.       SIMI Angulo  [facility]      Portions of the record may have been created with voice recognition software.  Occasional wrong word or \"sound a like\" substitutions may have occurred due to the inherent limitations of voice recognition software.  Read the chart carefully and recognize, using context, " where substitutions have occurred or contact the dictating provider.

## 2024-01-31 NOTE — ASSESSMENT & PLAN NOTE
Patient has left side Asept catheter  Drains every other day at home  Presented today due to catheter being clogged with small blood clot  Was able to access and flush catheter under sterile technique  550 cc dark yellow clear fluid drained  Patient tolerated well, denied any chest pain or shortness of breath

## 2024-02-01 ENCOUNTER — TELEPHONE (OUTPATIENT)
Dept: PULMONOLOGY | Facility: CLINIC | Age: 84
End: 2024-02-01

## 2024-02-01 NOTE — TELEPHONE ENCOUNTER
Called [facility] pharmacy in attempt to obtain size change for pts Vacuum Bottle Size. Their phone line is not working, went ahead and sent a contact request. If they in please transfer to my direct line.    Thank You   Megan Turk   [phone]

## 2024-02-02 ENCOUNTER — OFFICE VISIT (OUTPATIENT)
Dept: HEMATOLOGY ONCOLOGY | Facility: MEDICAL CENTER | Age: 84
End: 2024-02-02
Payer: COMMERCIAL

## 2024-02-02 VITALS
HEIGHT: 70 IN | DIASTOLIC BLOOD PRESSURE: 48 MMHG | SYSTOLIC BLOOD PRESSURE: 94 MMHG | RESPIRATION RATE: 15 BRPM | HEART RATE: 70 BPM | WEIGHT: 199 LBS | BODY MASS INDEX: 28.49 KG/M2 | TEMPERATURE: 97.4 F

## 2024-02-02 DIAGNOSIS — C34.92 SQUAMOUS CELL CARCINOMA OF LEFT LUNG (HCC): Primary | Chronic | ICD-10-CM

## 2024-02-02 PROCEDURE — 99214 OFFICE O/P EST MOD 30 MIN: CPT | Performed by: INTERNAL MEDICINE

## 2024-02-02 NOTE — TELEPHONE ENCOUNTER
Spoke with Ginny from [facility] pharmacy, she will be processing and delivering order for 1L vacuum bottles to pts home today.    Thank You

## 2024-02-02 NOTE — PROGRESS NOTES
haris ALFONSO Forrest  1940  [facility]  [address] [facility] [id number]     DISCUSSION/SUMMARY:     83-year-old male with a number of medical and cardiac problems previously found to have a left lower lobe mass. Initial biopsy demonstrated squamous cell carcinoma.  IR recently performed thoracentesis, minimal amount of fluid was removed but there was no evidence of metastatic disease; cytology was negative.  Patient was seen by thoracic surgery and underwent mediastinoscopy.  There was no evidence of metastatic disease in the sampled lymph nodes (2R, 4R, 4L, 7).  Tumor was 4.5 cm.  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  Patient was treated with SBRT and then went on to surveillance.    More recently patient has had problems with recurrent pleural effusions on the left side.  Patient has a Pleurx catheter in place.  Patient states that he needs to drain the fluid approximately once every 2 to 3 days.  This actually has worsened since beginning the pembrolizumab.  As before, patient is otherwise stable from a respiratory standpoint.    Recent PET/CT demonstrated evidence of recurrence in the left lower lobe, same area where the original malignancy was found.  There is no clear evidence of spread to either hilar regions or the mediastinum but radiologist commented on 1 right paratracheal lymph node but very +/-.  There was nonspecific FDG activity in the sacrum and left posterior iliac bone, seen before.  Unknown clinical significance.    Caris results recently became available.  PD-L1 IHC was negative.  We rediscussed options.   and Mrs. Clayton understand that the pembrolizumab may not be effective.  Additionally, patient is having significant side effects from the ICI.  The plan is to discontinue the pembrolizumab and continue with Taxol only.  Hopefully within the next week or 2, patient's skin will get  better and a second chemo agent can be added as long as patient is tolerating the Taxol.    Regimen  Pembrolizumab 200 mg IV flat dose day 1 (discontinued)  Paclitaxel 135 mg/m2 IV day 1 (85%)  Cycle length = 21 days  Goal = palliation and prolongation of life    Mr. Clayton was given a 6-week follow-up but this may change.  The plan is to give a few cycles of the new regimen and then rescan.  Patient knows to call if he has any other questions or concerns.    Carefully review your medication list and verify that the list is accurate and up-to-date. Please call the hematology/oncology office if there are medications missing from the list, medications on the list that you are not currently taking or if there is a dosage or instruction that is different from how you're taking that medication.     Patient goals and areas of care: Continue with pembrolizumab, add paclitaxel  Barriers to care: none  Patient is able to self-care  ______________________________________________________________________________________         Chief Complaint   Patient presents with    Follow-up - recurrent non-small cell lung carcinoma      History of Present Illness:  83-year-old male with multiple medical and cardiac issue recently seen in the emergency room because of a cough.  Workup demonstrated a left lower lobe mass.  Biopsy demonstrated squamous cell carcinoma.  Workup did not demonstrated evidence of metastatic disease; mediastinal ostomy was negative.  Patient was seen by thoracic surgery but was not felt to be a surgical candidate.  Patient was subsequently seen by radiation oncology and underwent SPRT.  Repeat scanning is consistent with recurrence at the original site.  Patient has a Pleurx catheter in place.  Recently patient began pembrolizumab.    Mr. Clayton completed a number of cycles of pembrolizumab but had significant issues with rash and cough.  Itchiness is a quality-of-life issue.  Taxol was recently added.  Patient  states tolerating the chemotherapy administration relatively well, tolerated the Taxol.  Fatigue is the same as before.  Wife is removing 500 cc from the Pleurx catheter every other day.  No shortness of breath at rest, + dyspnea on exertion.  No fevers or signs of infection.  No bruising or bleeding issues.  Activities are limited, about the same as before.    Patient was diagnosed with prostate cancer (approximately 10 + years ago) and underwent seeds and external beam radiation.  No evidence of recurrence since that time.       Review of Systems   Constitutional:  Positive for fatigue.        Poor appetite and continuing weight loss   HENT: Negative.     Eyes: Negative.    Respiratory: Negative.     Cardiovascular: Negative.    Gastrointestinal: Negative.    Endocrine: Negative.    Genitourinary: Negative.    Musculoskeletal: Negative.    Skin: Negative.    Allergic/Immunologic: Negative.    Neurological: Negative.    Hematological: Negative.         Easy bruising   Psychiatric/Behavioral:  Negative for self-injury.    All other systems reviewed and are negative.         Patient Active Problem List   Diagnosis    Corns    Pain in both feet    Onychomycosis    Tinea pedis of both feet    Lung mass    Hyperlipidemia    Type 2 diabetes mellitus with diabetic neuropathy, without long-term current use of insulin (HCC)    Squamous cell carcinoma of lung (HCC)    Shortness of breath    Daytime hypersomnolence    Chronic diastolic heart failure (HCC)    SYLVESTER (obstructive sleep apnea)    Prostate cancer (HCC)    GERD (gastroesophageal reflux disease)    CAD (coronary artery disease)    Other persistent atrial fibrillation (HCC)    Alcohol dependence (HCC)    Acute respiratory failure with hypoxemia (HCC)    Depression, recurrent (HCC)    COVID-19    Angioedema    Septic shock (HCC)    Cellulitis    Cellulitis of chest wall    Acute kidney injury (BUDDY) with acute tubular necrosis (ATN) (HCC)    Chronic obstructive pulmonary  disease, unspecified COPD type (HCC)    Centrilobular emphysema (HCC)    Abdominal aortic aneurysm (HCC)    Hypertensive heart disease    Tremors of nervous system    Recurrent pleural effusion on left      Medical History        Past Medical History:   Diagnosis Date    Abdominal pain      Anxiety      Arthritis      Asthma      Atrial fibrillation (HCC)      Back pain      Bleeding ulcer      Bronchitis      Cancer (HCC)       prostate 2011- radiation    Cardiac disease       cardiac stent x1    Cataract       starting    Chronic diastolic (congestive) heart failure (HCC)      Diabetes mellitus (HCC)       boarderline diabetic     GERD (gastroesophageal reflux disease)      History of radiation therapy 2010     Prostate seeds (brachytherapy) and EBRT    Hyperlipidemia      Hypertension      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, bilateral      Low back pain      Lung cancer (HCC)      Lung mass      Myocardial infarction (HCC)       mild 1999    Obesity      Prostate cancer (HCC)      Shortness of breath      Sleep apnea       sleep study 11/22    Wears dentures       full set    Wears glasses           Surgical History         Past Surgical History:   Procedure Laterality Date    ABDOMINAL HERNIA REPAIR        ABDOMINAL SURGERY         bleeding ulcer, cyst removed from abd    COLONOSCOPY        CORONARY ANGIOPLASTY WITH STENT PLACEMENT   1999     x1     ESOPHAGOGASTRODUODENOSCOPY        IR BIOPSY LUNG   10/05/2021    IR THORACENTESIS   11/08/2021    IR THORACENTESIS   6/5/2023    IR THORACENTESIS   9/13/2023    KNEE SURGERY Left      ID BRNCHSC INCL FLUOR GDNCE DX W/CELL WASHG SPX N/A 11/29/2021     Procedure: BRONCHOSCOPY FLEXIBLE;  Surgeon: Cachorro Jason MD;  Location: [facility];  Service: Thoracic    ID MEDIASTINOSCOPY WITH LYMPH NODE BIOPSY/IES N/A 11/29/2021     Procedure: MEDIASTINOSCOPY;  Surgeon: Cachorro Jason MD;  Location: [facility];  Service: Thoracic    PROSTATE SURGERY             Family history: 3  sons in good general health, no known familial or genetic diseases     Social History               Socioeconomic History    Marital status: /Civil Union       Spouse name: Not on file    Number of children: Not on file    Years of education: Not on file    Highest education level: Not on file   Occupational History    Not on file   Tobacco Use    Smoking status: Former       Packs/day: 1.00       Years: 30.00       Total pack years: 30.00       Types: Cigarettes       Quit date:        Years since quittin.8    Smokeless tobacco: Never   Vaping Use    Vaping Use: Never used   Substance and Sexual Activity    Alcohol use: Yes       Alcohol/week: 4.0 - 6.0 standard drinks of alcohol       Types: 1 Glasses of wine, 3 - 5 Cans of beer per week       Comment: few beers day    Drug use: Never    Sexual activity: Not on file   Other Topics Concern    Not on file   Social History Narrative    Not on file      Social Determinants of Health           Financial Resource Strain: Medium Risk (2023)     Overall Financial Resource Strain (CARDIA)      Difficulty of Paying Living Expenses: Somewhat hard   Food Insecurity: No Food Insecurity (2022)     Hunger Vital Sign      Worried About Running Out of Food in the Last Year: Never true      Ran Out of Food in the Last Year: Never true   Transportation Needs: No Transportation Needs (2023)     PRAPARE - Transportation      Lack of Transportation (Medical): No      Lack of Transportation (Non-Medical): No   Physical Activity: Not on file   Stress: Not on file   Social Connections: Not on file   Intimate Partner Violence: Not on file   Housing Stability: Low Risk  (2022)     Housing Stability Vital Sign      Unable to Pay for Housing in the Last Year: No      Number of Places Lived in the Last Year: 1      Unstable Housing in the Last Year: No      Social history:  40 pack-year history discontinued 20 years ago, patient drinks 2-4 beers a day off  and on, no drug abuse, 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)     Current Outpatient Medications:     acetaminophen (TYLENOL) 325 mg tablet, Take 2 tablets (650 mg total) by mouth every 6 (six) hours as needed for mild pain, headaches or fever, Disp: 30 tablet, Rfl: 0    albuterol (2.5 mg/3 mL) 0.083 % nebulizer solution, Take 2.5 mg by nebulization As needed per patient's wife , Disp: , Rfl:     apixaban (Eliquis) 5 mg, Take 1 tablet (5 mg total) by mouth 2 (two) times a day, Disp: 180 tablet, Rfl: 1    atenolol (TENORMIN) 25 mg tablet, , Disp: , Rfl:     atorvastatin (LIPITOR) 10 mg tablet, Take 1 tablet (10 mg total) by mouth daily, Disp: 90 tablet, Rfl: 1    carvedilol (COREG) 25 mg tablet, Take 1 tablet (25 mg total) by mouth 2 (two) times a day with meals, Disp: 180 tablet, Rfl: 1    ciclopirox (LOPROX) 0.77 % cream, Apply topically 2 (two) times a day for 20 days, Disp: 45 g, Rfl: 2    fluticasone-umeclidinium-vilanterol (Trelegy Ellipta) 100-62.5-25 mcg/actuation inhaler, Rinse mouth after use., Disp: 60 each, Rfl: 5    guaifenesin-codeine (GUAIFENESIN AC) 100-10 MG/5ML liquid, Take 10 mL by mouth 3 (three) times a day as needed for cough, Disp: 180 mL, Rfl: 0    halobetasol (ULTRAVATE) 0.05 % cream, , Disp: , Rfl:     hydrocortisone 2.5 % cream, Apply topically 2 (two) times a day Apply twice a day affected area the trunk, Disp: 20 g, Rfl: 2    latanoprost (XALATAN) 0.005 % ophthalmic solution, Administer 0.005 mL to both eyes daily at bedtime, Disp: , Rfl:     metFORMIN (GLUCOPHAGE) 500 mg tablet, Take 1 tablet (500 mg total) by mouth daily with breakfast, Disp: , Rfl:     mirtazapine (REMERON) 7.5 MG tablet, Take 1 tablet (7.5 mg total) by mouth daily at bedtime, Disp: 90 tablet, Rfl: 3    Multiple Vitamin (MULTI-VITAMIN DAILY PO), Take by mouth daily  , Disp: , Rfl:     omeprazole (PriLOSEC) 20 mg delayed release capsule, TAKE 1 CAPSULE DAILY, Disp: 90 capsule, Rfl: 1     primidone (MYSOLINE) 50 mg tablet, TAKE 2 TABS AT 8PM., Disp: 180 tablet, Rfl: 1    spironolactone (ALDACTONE) 25 mg tablet, Take 1 tablet (25 mg total) by mouth daily, Disp: 90 tablet, Rfl: 1    aspirin (ECOTRIN LOW STRENGTH) 81 mg EC tablet, , Disp: , Rfl:      No Known Allergies         Vitals:     10/20/23 0926   BP: 114/68   Pulse: 80   Resp: 17   Temp: 97.9 °F (36.6 °C)   SpO2: 92%      Physical Exam  Constitutional:       Appearance: He is well-developed.      Comments: Obese male, no respiratory distress, no signs of pain   HENT:      Head: Normocephalic and atraumatic.      Right Ear: External ear normal.      Left Ear: External ear normal.   Eyes:      Conjunctiva/sclera: Conjunctivae normal.      Pupils: Pupils are equal, round, and reactive to light.   Cardiovascular:      Rate and Rhythm: Normal rate and regular rhythm.      Heart sounds: Normal heart sounds.   Pulmonary:      Effort: Pulmonary effort is normal.      Comments: Left chest wall dressing (pleural catheter) C/D/I. Decreased breath sounds left lower lobe  Abdominal:      General: Bowel sounds are normal. There is distension.      Palpations: Abdomen is soft.      Comments: Obese, distended, soft +bowel sounds, no guarding   Musculoskeletal:         General: Normal range of motion.      Cervical back: Normal range of motion and neck supple.   Skin:     General: Skin is warm.      Comments: Relatively good color, warm, moist, scattered mild areas of ecchymosis upper extremities   Neurological:      Mental Status: He is alert and oriented to person, place, and time.      Deep Tendon Reflexes: Reflexes are normal and symmetric.   Psychiatric:         Behavior: Behavior normal.         Thought Content: Thought content normal.         Judgment: Judgment normal.      Extremities:  No lower extremity edema bilaterally, no cords, pulses are 1+   Lymphatics:  No adenopathy in the neck, supraclavicular region, axilla bilateral     Labs    1/17/2024 WBC  = 9.33 hemoglobin = 14 hematocrit = 42 platelet = 288 neutrophil = 76% BUN = 19 creatinine = 0.98 calcium = 8.7 LFTs WNL alkaline phosphatase = 130 TSH = 0.234    12/27/2023 WBC = 10.23 hemoglobin = 13.6 hematocrit = 41.5 platelet = 336 neutrophil = 82% TSH = 2.677 BUN = 16 creatinine = 1.01 calcium = 9.2 LFTs WNL    Imaging    10/30/2023 PET/CT    CHEST:  Best seen on image 76 of series 4 is a hypermetabolic centrally photopenic left lower lobe lung mass measuring 7.1 x 6.4 cm with max SUV of 12.0, previously measuring 4.5 x 4.0 cm with max SUV of 19.7 on PET/CT dated 10/29/2021 and approximately 4.3 x 3.7 cm when utilizing similar measurement technique and CT of chest dated 12/23/2022. The interval growth in size since 12/23/2022 suggest progression of hypermetabolic malignancy.     Subtle FDG activity is noted with a stable in size prominent right paratracheal lymph node on image 58 of series 4 measuring 1 1.4 cm in short axis with max SUV of 2.8, previously 3.5. The stability favors benign reactive lymph node with early pamela metastatic disease considered less likely    IMPRESSION:     1. Hypermetabolic left lower lobe lung malignancy has significantly increased in size since 12/23/2022 suggesting progression of malignancy.  2. Again noted is nonspecific FDG activity involving the sacrum and left posterior iliac bone without definitive correlate on low-dose unenhanced CT of uncertain clinical significance. Findings can be further characterized with MRI of the bony pelvis as clinically indicated.  3. Moderate left pleural effusion.     9/28/23: CT Chest w contrast:     Impression: Since September 12, 2023, decrease in size of the still large left pleural effusion with commensurate decrease in left-sided passive atelectasis. Persistent hypoattenuation in the collapsed portion of the left lower lobe. Given that this is in the   vicinity of the treated tumor, and has increased in size since December 2022, short  interval contrast-enhanced CT is advised following resolution of the pleural effusion for further assessment.     1/27/2023 CT chest without contrast    IMPRESSION:  1.  Slightly decreased left pleural effusion with improved passive atelectasis of the left lower lobe. Previously noted left lower lobe mass is difficult to visualize on noncontrast exam but appears likely unchanged from most recent study.  2.  Similar appearance of skin thickening and subcutaneous stranding in the right axillary region which could represent cellulitis.       05/04/2022 chest x-ray portable.  Impression stated left small effusion and parenchymal process appears unchanged.     05/02/2022 CT scan the chest without contrast     IMPRESSION:  1.  Increase in size of a left lower lobe patchy opacity with new right basilar patchy densities concerning for pulmonary infiltrates with additional patchy and reticulonodular changes lingular segment left upper lobe and right middle lobe also likely related to infectious process.  2.  Left lower lobe cavitary mass has decreased in size now measuring 3.3 x 1.9 cm, previously 4.1 x 2.2 cm.  3.  Stable COPD and pulmonary fibrosis with the latter likely related to radiation change.     11/19/2021 MRI brain.  Impression stated white matter changes suggestive of chronic microangiopathy.  No acute intracranial pathology.     10/29/2021 PET-CT     1.  Hypermetabolic necrotic 4.5 x 4 cm left lower lung mass compatible with known malignancy.  2.  2 mildly hypermetabolic right paratracheal mediastinal nodes for which early metastases are not excluded.  3.  Small mildly hypermetabolic left pleural effusion for which malignant effusion is not excluded.  4.  Additional scattered tiny nodular lung densities may be reassessed on follow-up CT.  5.  Probable reactive subcentimeter right cervical node may be reassessed on follow-up PET/CT.  6.  No hypermetabolic soft tissue metastases in the abdomen, pelvis.  7.   Minimal inhomogeneous FDG activity in the sacrum and left posterior iliac, though without dominant focal lesion.  This may be reassessed on follow-up exam.     Pathology    12/7/2023 Caris.  No action mutations were detected.  Tumor mutational burden = 6 = low.  Patient had a pathologic variant in PTEN and TP53.  PDL IHC TPS = 0%, negative.     Case Report   Surgical Pathology Report                         Case: [id number]                                    Authorizing Provider:  Cachorro Jason MD       Collected:           11/29/2021 100               Ordering Location:     [facility]      Received:            11/29/2021 [address] [place] [address] Operating Room                                                       Pathologist:           Juan José Dietz MD                                                         Specimens:   A) - Lymph Node, level 7                                                                             B) - Lymph Node, level 4R                                                                            C) - Lymph Node, level 2R                                                                            D) - Lymph Node, level 4L                                                                   Final Diagnosis   A. Lymph node, level 7, excision:  -  Portions of benign lymph node with reactive change and anthracosis, negative for granulomas, lymphoma, or metastatic carcinoma.    B. Lymph node, level 4R, excision:  -  Portions of benign lymph node with reactive change and anthracosis, negative for granulomas, lymphoma, or metastatic carcinoma.    C. Lymph node, level 2R, excision:  -  Portions of benign lymph node with reactive change and anthracosis, negative for granulomas, lymphoma, or metastatic carcinoma.    D. Lymph node, level 4L, excision:  -  Benign lymph node with reactive change and anthracosis, negative for granulomas, lymphoma, or metastatic  carcinoma.      Electronically signed by Juan José Dietz MD on 12/2/2021 at 11:29 AM         Case Report   Surgical Pathology Report                         Case: [id number]                                    Authorizing Provider:  Elmer Laird MD      Collected:           10/05/2021 1058               Ordering Location:     Replaced by [facility] Received:            10/05/2021 1120                                      CAT Scan                                                                      Pathologist:           Mary Dean MD                                                         Specimen:    Lung, Left Lower Lobe                                                                       Final Diagnosis   A. Lung, Left Lower Lobe, :  - Invasive squamous carcinoma, moderately differentiated, keratinizing type.  - Tumor is highlighted with P 40 immunoperoxidase stain.  - Prominent tumor necrosis is noted.      Best representative block with tumor A1.  This case was reviewed at the intradepartmental  conference.   RADHA Navarrete, Pulmonology, is notified of the diagnosis in [employer] on 10/06/2021  at 2.40 pm.   Electronically signed by Mary Dean MD on 10/6/2021 at  2:44 PM

## 2024-02-05 RX ORDER — SODIUM CHLORIDE 9 MG/ML
20 INJECTION, SOLUTION INTRAVENOUS ONCE
Status: CANCELLED | OUTPATIENT
Start: 2024-02-12

## 2024-02-07 ENCOUNTER — APPOINTMENT (OUTPATIENT)
Dept: LAB | Facility: HOSPITAL | Age: 84
End: 2024-02-07
Attending: INTERNAL MEDICINE
Payer: COMMERCIAL

## 2024-02-07 DIAGNOSIS — C34.92 SQUAMOUS CELL CARCINOMA OF LEFT LUNG (HCC): ICD-10-CM

## 2024-02-07 LAB
ALBUMIN SERPL BCP-MCNC: 2.5 G/DL (ref 3.5–5)
ALP SERPL-CCNC: 168 U/L (ref 34–104)
ALT SERPL W P-5'-P-CCNC: 40 U/L (ref 7–52)
ANION GAP SERPL CALCULATED.3IONS-SCNC: 5 MMOL/L
AST SERPL W P-5'-P-CCNC: 34 U/L (ref 13–39)
BASOPHILS # BLD AUTO: 0.07 THOUSANDS/ÂΜL (ref 0–0.1)
BASOPHILS NFR BLD AUTO: 1 % (ref 0–1)
BILIRUB SERPL-MCNC: 0.4 MG/DL (ref 0.2–1)
BUN SERPL-MCNC: 12 MG/DL (ref 5–25)
CALCIUM ALBUM COR SERPL-MCNC: 9.8 MG/DL (ref 8.3–10.1)
CALCIUM SERPL-MCNC: 8.6 MG/DL (ref 8.4–10.2)
CHLORIDE SERPL-SCNC: 101 MMOL/L (ref 96–108)
CO2 SERPL-SCNC: 29 MMOL/L (ref 21–32)
CREAT SERPL-MCNC: 0.93 MG/DL (ref 0.6–1.3)
EOSINOPHIL # BLD AUTO: 0.62 THOUSAND/ÂΜL (ref 0–0.61)
EOSINOPHIL NFR BLD AUTO: 11 % (ref 0–6)
ERYTHROCYTE [DISTWIDTH] IN BLOOD BY AUTOMATED COUNT: 13.2 % (ref 11.6–15.1)
GFR SERPL CREATININE-BSD FRML MDRD: 76 ML/MIN/1.73SQ M
GLUCOSE SERPL-MCNC: 188 MG/DL (ref 65–140)
HCT VFR BLD AUTO: 39.3 % (ref 36.5–49.3)
HGB BLD-MCNC: 12.3 G/DL (ref 12–17)
IMM GRANULOCYTES # BLD AUTO: 0.05 THOUSAND/UL (ref 0–0.2)
IMM GRANULOCYTES NFR BLD AUTO: 1 % (ref 0–2)
LYMPHOCYTES # BLD AUTO: 0.93 THOUSANDS/ÂΜL (ref 0.6–4.47)
LYMPHOCYTES NFR BLD AUTO: 16 % (ref 14–44)
MCH RBC QN AUTO: 30.8 PG (ref 26.8–34.3)
MCHC RBC AUTO-ENTMCNC: 31.3 G/DL (ref 31.4–37.4)
MCV RBC AUTO: 98 FL (ref 82–98)
MONOCYTES # BLD AUTO: 1.23 THOUSAND/ÂΜL (ref 0.17–1.22)
MONOCYTES NFR BLD AUTO: 21 % (ref 4–12)
NEUTROPHILS # BLD AUTO: 2.93 THOUSANDS/ÂΜL (ref 1.85–7.62)
NEUTS SEG NFR BLD AUTO: 50 % (ref 43–75)
NRBC BLD AUTO-RTO: 0 /100 WBCS
PLATELET # BLD AUTO: 408 THOUSANDS/UL (ref 149–390)
PMV BLD AUTO: 9.1 FL (ref 8.9–12.7)
POTASSIUM SERPL-SCNC: 5.5 MMOL/L (ref 3.5–5.3)
PROT SERPL-MCNC: 5.7 G/DL (ref 6.4–8.4)
RBC # BLD AUTO: 4 MILLION/UL (ref 3.88–5.62)
SODIUM SERPL-SCNC: 135 MMOL/L (ref 135–147)
T3FREE SERPL-MCNC: 2.89 PG/ML (ref 2.5–3.9)
TSH SERPL DL<=0.05 MIU/L-ACNC: 3.71 UIU/ML (ref 0.45–4.5)
WBC # BLD AUTO: 5.83 THOUSAND/UL (ref 4.31–10.16)

## 2024-02-07 PROCEDURE — 80053 COMPREHEN METABOLIC PANEL: CPT

## 2024-02-07 PROCEDURE — 36415 COLL VENOUS BLD VENIPUNCTURE: CPT

## 2024-02-07 PROCEDURE — 84481 FREE ASSAY (FT-3): CPT

## 2024-02-07 PROCEDURE — 84443 ASSAY THYROID STIM HORMONE: CPT

## 2024-02-07 PROCEDURE — 85025 COMPLETE CBC W/AUTO DIFF WBC: CPT

## 2024-02-12 ENCOUNTER — TELEPHONE (OUTPATIENT)
Dept: HEMATOLOGY ONCOLOGY | Facility: MEDICAL CENTER | Age: 84
End: 2024-02-12

## 2024-02-12 ENCOUNTER — HOSPITAL ENCOUNTER (OUTPATIENT)
Dept: INFUSION CENTER | Facility: HOSPITAL | Age: 84
Discharge: HOME | End: 2024-02-12
Attending: INTERNAL MEDICINE
Payer: COMMERCIAL

## 2024-02-12 VITALS
DIASTOLIC BLOOD PRESSURE: 62 MMHG | HEIGHT: 70 IN | HEART RATE: 94 BPM | SYSTOLIC BLOOD PRESSURE: 126 MMHG | OXYGEN SATURATION: 95 % | RESPIRATION RATE: 18 BRPM | BODY MASS INDEX: 28.78 KG/M2 | WEIGHT: 201.06 LBS | TEMPERATURE: 97.5 F

## 2024-02-12 DIAGNOSIS — C34.92 SQUAMOUS CELL CARCINOMA OF LEFT LUNG (HCC): Primary | ICD-10-CM

## 2024-02-12 PROCEDURE — 96367 TX/PROPH/DG ADDL SEQ IV INF: CPT

## 2024-02-12 PROCEDURE — 96375 TX/PRO/DX INJ NEW DRUG ADDON: CPT

## 2024-02-12 PROCEDURE — 96413 CHEMO IV INFUSION 1 HR: CPT

## 2024-02-12 PROCEDURE — 96415 CHEMO IV INFUSION ADDL HR: CPT

## 2024-02-12 RX ORDER — SODIUM CHLORIDE 9 MG/ML
20 INJECTION, SOLUTION INTRAVENOUS ONCE
Status: COMPLETED | OUTPATIENT
Start: 2024-02-12 | End: 2024-02-12

## 2024-02-12 RX ADMIN — DIPHENHYDRAMINE HYDROCHLORIDE 25 MG: 50 INJECTION, SOLUTION INTRAMUSCULAR; INTRAVENOUS at 09:57

## 2024-02-12 RX ADMIN — SODIUM CHLORIDE 20 ML/HR: 0.9 INJECTION, SOLUTION INTRAVENOUS at 09:26

## 2024-02-12 RX ADMIN — PACLITAXEL 282 MG: 6 INJECTION, SOLUTION, CONCENTRATE INTRAVENOUS at 10:47

## 2024-02-12 RX ADMIN — DEXAMETHASONE SODIUM PHOSPHATE: 10 INJECTION, SOLUTION INTRAMUSCULAR; INTRAVENOUS at 09:27

## 2024-02-12 RX ADMIN — FAMOTIDINE 20 MG: 20 INJECTION, SOLUTION INTRAVENOUS at 10:20

## 2024-02-12 NOTE — TELEPHONE ENCOUNTER
Patient at infusion for treatment   C/O itching of arms interfering with sleep  D/W Dr James  Patient to contact PCP   Infusion RN will communicate to patient and wife

## 2024-02-12 NOTE — PROGRESS NOTES
Bridger Clayton  tolerated treatment well with no complications.      Bridger Clayton is aware of future appt on 3/4/24 at 0900.     AVS printed and given to Bridger Clayton:  Yes x

## 2024-02-12 NOTE — PROGRESS NOTES
Patient states rash has improved but he still has itching arms,back and underneath his arms. Patient states he takes benadryl at night but it doesn't help. He has difficulty sleeping at night because of the itching. Kaykay Larsen RN notified and patient is to follow-up with his PCP. Wife and patient notified.

## 2024-02-16 DIAGNOSIS — L29.9 ITCHING: Primary | ICD-10-CM

## 2024-02-16 RX ORDER — HYDROXYZINE HYDROCHLORIDE 25 MG/1
25 TABLET, FILM COATED ORAL EVERY 6 HOURS PRN
Qty: 120 TABLET | Refills: 0 | Status: SHIPPED | OUTPATIENT
Start: 2024-02-16

## 2024-02-16 RX ORDER — HYDROXYZINE HYDROCHLORIDE 25 MG/1
25 TABLET, FILM COATED ORAL EVERY 6 HOURS PRN
COMMUNITY
End: 2024-02-16 | Stop reason: SDUPTHER

## 2024-02-17 DIAGNOSIS — G93.40 ENCEPHALOPATHY: ICD-10-CM

## 2024-02-19 RX ORDER — PRIMIDONE 50 MG/1
TABLET ORAL
Qty: 180 TABLET | Refills: 1 | Status: SHIPPED | OUTPATIENT
Start: 2024-02-19

## 2024-02-21 ENCOUNTER — RA CDI HCC (OUTPATIENT)
Dept: OTHER | Facility: HOSPITAL | Age: 84
End: 2024-02-21

## 2024-02-26 ENCOUNTER — APPOINTMENT (OUTPATIENT)
Dept: LAB | Facility: HOSPITAL | Age: 84
End: 2024-02-26
Payer: COMMERCIAL

## 2024-02-26 DIAGNOSIS — C61 PROSTATE CANCER (HCC): ICD-10-CM

## 2024-02-26 DIAGNOSIS — C34.92 SQUAMOUS CELL CARCINOMA OF LEFT LUNG (HCC): ICD-10-CM

## 2024-02-26 LAB
ALBUMIN SERPL BCP-MCNC: 3 G/DL (ref 3.5–5)
ALP SERPL-CCNC: 144 U/L (ref 34–104)
ALT SERPL W P-5'-P-CCNC: 20 U/L (ref 7–52)
ANION GAP SERPL CALCULATED.3IONS-SCNC: 6 MMOL/L
AST SERPL W P-5'-P-CCNC: 17 U/L (ref 13–39)
BASOPHILS # BLD MANUAL: 0 THOUSAND/UL (ref 0–0.1)
BASOPHILS NFR MAR MANUAL: 0 % (ref 0–1)
BILIRUB SERPL-MCNC: 0.53 MG/DL (ref 0.2–1)
BUN SERPL-MCNC: 14 MG/DL (ref 5–25)
CALCIUM ALBUM COR SERPL-MCNC: 9.3 MG/DL (ref 8.3–10.1)
CALCIUM SERPL-MCNC: 8.5 MG/DL (ref 8.4–10.2)
CHLORIDE SERPL-SCNC: 99 MMOL/L (ref 96–108)
CO2 SERPL-SCNC: 29 MMOL/L (ref 21–32)
CREAT SERPL-MCNC: 0.89 MG/DL (ref 0.6–1.3)
EOSINOPHIL # BLD MANUAL: 1.02 THOUSAND/UL (ref 0–0.4)
EOSINOPHIL NFR BLD MANUAL: 15 % (ref 0–6)
ERYTHROCYTE [DISTWIDTH] IN BLOOD BY AUTOMATED COUNT: 14.6 % (ref 11.6–15.1)
GFR SERPL CREATININE-BSD FRML MDRD: 80 ML/MIN/1.73SQ M
GLUCOSE P FAST SERPL-MCNC: 138 MG/DL (ref 65–99)
HCT VFR BLD AUTO: 40.4 % (ref 36.5–49.3)
HGB BLD-MCNC: 13 G/DL (ref 12–17)
LYMPHOCYTES # BLD AUTO: 0.88 THOUSAND/UL (ref 0.6–4.47)
LYMPHOCYTES # BLD AUTO: 13 % (ref 14–44)
MACROCYTES BLD QL AUTO: PRESENT
MCH RBC QN AUTO: 30.6 PG (ref 26.8–34.3)
MCHC RBC AUTO-ENTMCNC: 32.2 G/DL (ref 31.4–37.4)
MCV RBC AUTO: 95 FL (ref 82–98)
MONOCYTES # BLD AUTO: 0.68 THOUSAND/UL (ref 0–1.22)
MONOCYTES NFR BLD: 10 % (ref 4–12)
NEUTROPHILS # BLD MANUAL: 4.21 THOUSAND/UL (ref 1.85–7.62)
NEUTS BAND NFR BLD MANUAL: 2 % (ref 0–8)
NEUTS SEG NFR BLD AUTO: 60 % (ref 43–75)
PLATELET # BLD AUTO: 430 THOUSANDS/UL (ref 149–390)
PLATELET BLD QL SMEAR: ABNORMAL
PMV BLD AUTO: 9.2 FL (ref 8.9–12.7)
POLYCHROMASIA BLD QL SMEAR: PRESENT
POTASSIUM SERPL-SCNC: 5 MMOL/L (ref 3.5–5.3)
PROT SERPL-MCNC: 6.3 G/DL (ref 6.4–8.4)
PSA SERPL-MCNC: 0.01 NG/ML (ref 0–4)
RBC # BLD AUTO: 4.25 MILLION/UL (ref 3.88–5.62)
RBC MORPH BLD: PRESENT
SODIUM SERPL-SCNC: 134 MMOL/L (ref 135–147)
T3FREE SERPL-MCNC: 2.85 PG/ML (ref 2.5–3.9)
T4 FREE SERPL-MCNC: 0.65 NG/DL (ref 0.61–1.12)
TSH SERPL DL<=0.05 MIU/L-ACNC: 9.22 UIU/ML (ref 0.45–4.5)
WBC # BLD AUTO: 6.79 THOUSAND/UL (ref 4.31–10.16)

## 2024-02-26 PROCEDURE — 80053 COMPREHEN METABOLIC PANEL: CPT

## 2024-02-26 PROCEDURE — 85007 BL SMEAR W/DIFF WBC COUNT: CPT

## 2024-02-26 PROCEDURE — 85027 COMPLETE CBC AUTOMATED: CPT

## 2024-02-26 PROCEDURE — 84443 ASSAY THYROID STIM HORMONE: CPT

## 2024-02-26 PROCEDURE — 84153 ASSAY OF PSA TOTAL: CPT

## 2024-02-26 PROCEDURE — 84439 ASSAY OF FREE THYROXINE: CPT

## 2024-02-26 PROCEDURE — 84481 FREE ASSAY (FT-3): CPT

## 2024-02-26 PROCEDURE — 36415 COLL VENOUS BLD VENIPUNCTURE: CPT

## 2024-02-26 RX ORDER — SODIUM CHLORIDE 9 MG/ML
20 INJECTION, SOLUTION INTRAVENOUS ONCE
OUTPATIENT
Start: 2024-03-04

## 2024-02-27 DIAGNOSIS — E78.2 MIXED HYPERLIPIDEMIA: ICD-10-CM

## 2024-02-27 DIAGNOSIS — I48.19 PERSISTENT ATRIAL FIBRILLATION (HCC): ICD-10-CM

## 2024-02-27 RX ORDER — SPIRONOLACTONE 25 MG/1
25 TABLET ORAL DAILY
Qty: 90 TABLET | Refills: 1 | Status: SHIPPED | OUTPATIENT
Start: 2024-02-27

## 2024-02-27 RX ORDER — ATORVASTATIN CALCIUM 10 MG/1
10 TABLET, FILM COATED ORAL DAILY
Qty: 90 TABLET | Refills: 1 | Status: SHIPPED | OUTPATIENT
Start: 2024-02-27

## 2024-02-28 ENCOUNTER — OFFICE VISIT (OUTPATIENT)
Dept: INTERNAL MEDICINE CLINIC | Facility: CLINIC | Age: 84
End: 2024-02-28
Payer: COMMERCIAL

## 2024-02-28 VITALS
SYSTOLIC BLOOD PRESSURE: 118 MMHG | HEART RATE: 78 BPM | BODY MASS INDEX: 28.77 KG/M2 | DIASTOLIC BLOOD PRESSURE: 68 MMHG | WEIGHT: 201 LBS | HEIGHT: 70 IN | OXYGEN SATURATION: 96 %

## 2024-02-28 DIAGNOSIS — T78.40XA ALLERGIC REACTION TO DRUG, INITIAL ENCOUNTER: Primary | ICD-10-CM

## 2024-02-28 DIAGNOSIS — I71.41 PARARENAL ABDOMINAL AORTIC ANEURYSM (AAA) WITHOUT RUPTURE (HCC): ICD-10-CM

## 2024-02-28 DIAGNOSIS — J96.01 ACUTE RESPIRATORY FAILURE WITH HYPOXEMIA (HCC): ICD-10-CM

## 2024-02-28 DIAGNOSIS — K21.9 GASTROESOPHAGEAL REFLUX DISEASE WITHOUT ESOPHAGITIS: ICD-10-CM

## 2024-02-28 DIAGNOSIS — I50.32 CHRONIC DIASTOLIC HEART FAILURE (HCC): ICD-10-CM

## 2024-02-28 DIAGNOSIS — F10.21 ALCOHOL DEPENDENCE IN REMISSION (HCC): ICD-10-CM

## 2024-02-28 DIAGNOSIS — F33.9 DEPRESSION, RECURRENT (HCC): ICD-10-CM

## 2024-02-28 DIAGNOSIS — E11.40 TYPE 2 DIABETES MELLITUS WITH DIABETIC NEUROPATHY, WITHOUT LONG-TERM CURRENT USE OF INSULIN (HCC): ICD-10-CM

## 2024-02-28 DIAGNOSIS — J44.9 CHRONIC OBSTRUCTIVE PULMONARY DISEASE, UNSPECIFIED COPD TYPE (HCC): ICD-10-CM

## 2024-02-28 DIAGNOSIS — C34.92 SQUAMOUS CELL CARCINOMA OF LEFT LUNG (HCC): Chronic | ICD-10-CM

## 2024-02-28 DIAGNOSIS — I48.19 OTHER PERSISTENT ATRIAL FIBRILLATION (HCC): ICD-10-CM

## 2024-02-28 PROBLEM — R21 RASH DUE TO ALLERGY: Status: ACTIVE | Noted: 2024-02-28

## 2024-02-28 PROCEDURE — 99214 OFFICE O/P EST MOD 30 MIN: CPT | Performed by: INTERNAL MEDICINE

## 2024-02-28 RX ORDER — HYDROXYZINE 50 MG/1
50 TABLET, FILM COATED ORAL EVERY 8 HOURS PRN
Qty: 90 TABLET | Refills: 1 | Status: SHIPPED | OUTPATIENT
Start: 2024-02-28

## 2024-02-28 RX ORDER — OMEPRAZOLE 20 MG/1
20 CAPSULE, DELAYED RELEASE ORAL DAILY
Qty: 90 CAPSULE | Refills: 1 | Status: SHIPPED | OUTPATIENT
Start: 2024-02-28

## 2024-02-28 RX ORDER — METHYLPREDNISOLONE 4 MG/1
TABLET ORAL
Qty: 21 EACH | Refills: 0 | Status: SHIPPED | OUTPATIENT
Start: 2024-02-28

## 2024-02-28 RX ORDER — MIRTAZAPINE 7.5 MG/1
7.5 TABLET, FILM COATED ORAL
Qty: 90 TABLET | Refills: 3 | Status: SHIPPED | OUTPATIENT
Start: 2024-02-28

## 2024-02-28 NOTE — ASSESSMENT & PLAN NOTE
Acute respiratory failure with hypoxemia resolved patient not on oxygen no further intervention needed follow-up with the pulmonary continue current current inhaler for detail referred to under COPD

## 2024-02-28 NOTE — ASSESSMENT & PLAN NOTE
Continue Proventil as needed Trelegy very noncompliant and d continue Proventil neb treatment at home    Patient difficulty breathing on exertion some improvement with current therapy continue follow-up with the pulmonary    Continue management medication as follows

## 2024-02-28 NOTE — PROGRESS NOTES
Depression Screening and Follow-up Plan: Patient advised to follow-up with PCP for further management.     Dr. Tatum's Office Visit Note  24     Bridger Clayton 83 y.o. male MRN: [id number]  : 1940    Assessment:     1. Allergic reaction to drug, initial encounter  Assessment & Plan:  Patient is on chemo for squamous cell lung CA been having some itching rash for the last 2-1/2 months initially was treated with Medrol Dosepak and Atarax 25 mg 3 times a day as needed for itching some improvement but getting worse both upper lower extremity trunk somewhat so cannot sleep at nighttime due to the intractable itching we will treat as follows    Medrol Dosepak to use as directed    Increase the Atarax 50 mg 3 times a day as needed for itching    To be seen by dermatology    Orders:  -     hydrOXYzine HCL (ATARAX) 50 mg tablet; Take 1 tablet (50 mg total) by mouth every 8 (eight) hours as needed for itching  -     methylPREDNISolone 4 MG tablet therapy pack; Use as directed on package  -     Ambulatory Referral to Dermatology; Future    2. Acute respiratory failure with hypoxemia (HCC)  Assessment & Plan:  Acute respiratory failure with hypoxemia resolved patient not on oxygen no further intervention needed follow-up with the pulmonary continue current current inhaler for detail referred to under COPD      3. Chronic obstructive pulmonary disease, unspecified COPD type (HCC)  Assessment & Plan:  Continue Proventil as needed Trelegy very noncompliant and d continue Proventil neb treatment at home    Patient difficulty breathing on exertion some improvement with current therapy continue follow-up with the pulmonary    Continue management medication as follows          4. Chronic diastolic heart failure (HCC)  Assessment & Plan:  Wt Readings from Last 3 Encounters:   24 91.2 kg (201 lb)   24 91.2 kg (201 lb 1 oz)   24 90.3 kg (199 lb)       Patient euvolemic CHF compensated continue low-salt diet  fluid restriction Daily weight monitoring and follow-up with the cardiology          5. Depression, recurrent (HCC)  Assessment & Plan:  For now symptoms controlled in remission    Agree continue management as follows    Remeron 7.5 mg daily at bedtime    Orders:  -     mirtazapine (REMERON) 7.5 MG tablet; Take 1 tablet (7.5 mg total) by mouth daily at bedtime    6. Other persistent atrial fibrillation (HCC)  Assessment & Plan:  Continue Eliquis and carvedilol  For now patient sinus rhythm rate controlled    Agree continue management medication as follows    Coreg 25 mg twice a day    Eliquis 5 mg twice a day    Follow with cardiology      7. Pararenal abdominal aortic aneurysm (AAA) without rupture ([facility])  Assessment & Plan:  Yearly surveillance with ultrasound patient refused continue preventive measures    Controlled blood pressure    A1c very well-controlled    Continue Lipitor 10 mg daily      8. Alcohol dependence in remission ([facility])  Assessment & Plan:  Patient quit drinking almost more than 2 years completely asymptomatic reinforcement counseling done will monitor closely      9. Type 2 diabetes mellitus with diabetic neuropathy, without long-term current use of insulin ([facility])  Assessment & Plan:    Lab Results   Component Value Date    HGBA1C 6.9 (H) 09/20/2023   Blood sugar very well controlled on the basis of A1c continue diabetic diet continue metformin hypoglycemia protocol in place yearly dilated eye exam foot exam normal monitoring blood sugar intermittently at home in the range of between 70 and 100 risk for hypoglycemia discontinue metformin very poor appetite due to the metastatic lung cancer due to the risk of hypoglycemia not on any oral hypoglycemic agent    Continue diabetic diet monitor blood sugar at home diabetes control with diet only    Orders:  -     Hemoglobin A1C; Future    10. 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 Clayton was given a 6-week follow-up but this may change.  The plan is to give a few cycles of the new regimen and then rescan.  Patient knows to call if he has any other questions or concerns.    Above reviewed from the oncology note follow-up with oncology            Discussion Summary and Plan:  Today's care plan and medications were reviewed with patient in detail and all their questions answered to their satisfaction.    Chief Complaint   Patient presents with   • Follow-up     Needs something for sleep.very itchy more out night.      Subjective:  Patient is on chemo for squamous cell lung CA been having some itching rash for the last 2-1/2 months initially was treated with Medrol Dosepak and Atarax 25 mg 3 times a day as needed for itching some improvement but getting worse both upper lower extremity trunk somewhat so cannot sleep at nighttime due to the intractable itching patient followed by oncology awaiting chemo next week has a chest tube drain drainage is minimal some difficulty breathing on exertion denies any chest pain for detail refer to assessment plan under visit diagnosis        The following portions of the patient's history were reviewed and updated as appropriate: allergies, current medications, past family history, past medical history, past social history, past surgical history and problem list.    Review of Systems   Constitutional:  Positive for appetite change and fatigue. Negative for chills, diaphoresis, fever and unexpected weight change.   HENT:  Negative for congestion, dental problem, drooling, ear discharge, ear pain, facial swelling, hearing loss, mouth sores, nosebleeds, postnasal drip, rhinorrhea, sinus pressure, sneezing, sore throat, tinnitus, trouble swallowing and voice change.    Eyes:  Negative for photophobia, pain, discharge, redness, itching and  visual disturbance.   Respiratory:  Positive for shortness of breath. Negative for apnea, choking, chest tightness, wheezing and stridor.    Cardiovascular:  Negative for chest pain and palpitations.   Gastrointestinal:  Negative for abdominal distention, abdominal pain, anal bleeding, blood in stool, constipation, diarrhea, nausea, rectal pain and vomiting.   Endocrine: Negative for cold intolerance, heat intolerance, polydipsia, polyphagia and polyuria.   Genitourinary:  Negative for decreased urine volume, difficulty urinating, dysuria, enuresis, flank pain, frequency, genital sores, hematuria and urgency.   Musculoskeletal:  Positive for arthralgias, back pain and gait problem. Negative for myalgias, neck pain and neck stiffness.   Skin:  Positive for rash. Negative for color change, pallor and wound.   Allergic/Immunologic: Negative.  Negative for environmental allergies, food allergies and immunocompromised state.   Neurological:  Negative for tremors, seizures, syncope, facial asymmetry, speech difficulty, weakness, light-headedness, numbness and headaches.   Psychiatric/Behavioral:  Negative for agitation, behavioral problems, confusion, decreased concentration, dysphoric mood, hallucinations, self-injury, sleep disturbance and suicidal ideas. The patient is nervous/anxious. The patient is not hyperactive.          Historical Information   Patient Active Problem List   Diagnosis   • Corns   • Pain in both feet   • Onychomycosis   • Tinea pedis of both feet   • Lung mass   • Hyperlipidemia   • Type 2 diabetes mellitus with diabetic neuropathy, without long-term current use of insulin (HCC)   • Squamous cell carcinoma of lung (HCC)   • Shortness of breath   • Daytime hypersomnolence   • Chronic diastolic heart failure (HCC)   • SYLVESTER (obstructive sleep apnea)   • Prostate cancer (HCC)   • GERD (gastroesophageal reflux disease)   • CAD (coronary artery disease)   • Other persistent atrial fibrillation ([facility])   •  Alcohol dependence (HCC)   • Acute respiratory failure with hypoxemia (HCC)   • Depression, recurrent (HCC)   • COVID-19   • Angioedema   • Septic shock (HCC)   • Cellulitis   • Cellulitis of chest wall   • Acute kidney injury (BUDDY) with acute tubular necrosis (ATN) (HCC)   • Chronic obstructive pulmonary disease, unspecified COPD type (HCC)   • Centrilobular emphysema (HCC)   • Abdominal aortic aneurysm (HCC)   • Hypertensive heart disease   • Tremors of nervous system   • Recurrent pleural effusion on left   • Allergic reaction caused by a drug     Past Medical History:   Diagnosis Date   • Abdominal pain    • Anxiety    • Arthritis    • Asthma    • Atrial fibrillation (HCC)    • Back pain    • Bleeding ulcer    • Bronchitis    • Cancer (HCC)     prostate 2011- radiation   • Cardiac disease     cardiac stent x1   • Cataract     starting   • Chronic diastolic (congestive) heart failure (HCC)    • Diabetes mellitus (HCC)     boarderline diabetic    • GERD (gastroesophageal reflux disease)    • History of radiation therapy 2010    Prostate seeds (brachytherapy) and EBRT   • Hyperlipidemia    • Hypertension    • 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, bilateral    • Low back pain    • Lung cancer (HCC)    • Lung mass    • Myocardial infarction (HCC)     mild 1999   • Obesity    • Prostate cancer (HCC)    • Shortness of breath    • Sleep apnea     sleep study 11/22   • Wears dentures     full set   • Wears glasses      Past Surgical History:   Procedure Laterality Date   • ABDOMINAL HERNIA REPAIR     • ABDOMINAL SURGERY      bleeding ulcer, cyst removed from abd   • COLONOSCOPY     • CORONARY ANGIOPLASTY WITH STENT PLACEMENT  1999    x1    • ESOPHAGOGASTRODUODENOSCOPY     • IR BIOPSY LUNG  10/05/2021   • IR THORACENTESIS  11/08/2021   • IR THORACENTESIS  6/5/2023   • IR THORACENTESIS  9/13/2023   • KNEE SURGERY Left    • W[place] BRNCHSC INCL FLUOR GDNCE DX W/CELL WASHG SPX N/A 11/29/2021    Procedure: BRONCHOSCOPY FLEXIBLE;   Surgeon: Cachorro Jason MD;  Location: [facility];  Service: Thoracic   • MI MEDIASTINOSCOPY WITH LYMPH NODE BIOPSY/IES N/A 2021    Procedure: MEDIASTINOSCOPY;  Surgeon: Cachorro Jason MD;  Location: [facility];  Service: Thoracic   • PROSTATE SURGERY       Social History     Substance and Sexual Activity   Alcohol Use Yes   • Alcohol/week: 4.0 - 6.0 standard drinks of alcohol   • Types: 1 Glasses of wine, 3 - 5 Cans of beer per week    Comment: socially     Social History     Substance and Sexual Activity   Drug Use Never     Social History     Tobacco Use   Smoking Status Former   • Current packs/day: 0.00   • Average packs/day: 1 pack/day for 30.0 years (30.0 ttl pk-yrs)   • Types: Cigarettes   • Start date:    • Quit date:    • Years since quittin.1   Smokeless Tobacco Never     Family History   Problem Relation Age of Onset   • Prostate cancer Brother    • Cancer Maternal Uncle         colo rectal cancer   • Cancer Paternal Aunt      Health Maintenance Due   Topic   • Pneumococcal Vaccine: 65+ Years (1 of 2 - PCV)   • Depression Follow-up Plan    • Zoster Vaccine (1 of 2)   • Falls: Plan of Care    • COVID-19 Vaccine (3 - Moderna risk series)   • HEMOGLOBIN A1C       Meds/Allergies       Current Outpatient Medications:   •  acetaminophen (TYLENOL) 325 mg tablet, Take 2 tablets (650 mg total) by mouth every 6 (six) hours as needed for mild pain, headaches or fever, Disp: 30 tablet, Rfl: 0  •  apixaban (Eliquis) 5 mg, Take 1 tablet (5 mg total) by mouth 2 (two) times a day, Disp: 180 tablet, Rfl: 0  •  atenolol (TENORMIN) 25 mg tablet, , Disp: , Rfl:   •  atorvastatin (LIPITOR) 10 mg tablet, Take 1 tablet (10 mg total) by mouth daily, Disp: 90 tablet, Rfl: 1  •  carvedilol (COREG) 25 mg tablet, TAKE 1 TABLET TWO TIMES A DAY WITH MEALS, Disp: 180 tablet, Rfl: 1  •  fluticasone-umeclidinium-vilanterol (Trelegy Ellipta) 100-62.5-25 mcg/actuation inhaler, Rinse mouth after use., Disp: 60  "each, Rfl: 5  •  halobetasol (ULTRAVATE) 0.05 % cream, , Disp: , Rfl:   •  hydrocortisone 2.5 % cream, Apply topically 2 (two) times a day Apply twice a day affected area the trunk, Disp: 20 g, Rfl: 2  •  hydrOXYzine HCL (ATARAX) 50 mg tablet, Take 1 tablet (50 mg total) by mouth every 8 (eight) hours as needed for itching, Disp: 90 tablet, Rfl: 1  •  latanoprost (XALATAN) 0.005 % ophthalmic solution, Administer 0.005 mL to both eyes daily at bedtime, Disp: , Rfl:   •  methylPREDNISolone 4 MG tablet therapy pack, Use as directed on package, Disp: 21 each, Rfl: 0  •  mirtazapine (REMERON) 7.5 MG tablet, Take 1 tablet (7.5 mg total) by mouth daily at bedtime, Disp: 90 tablet, Rfl: 3  •  Multiple Vitamin (MULTI-VITAMIN DAILY PO), Take by mouth daily  , Disp: , Rfl:   •  omeprazole (PriLOSEC) 20 mg delayed release capsule, Take 1 capsule (20 mg total) by mouth daily, Disp: 90 capsule, Rfl: 1  •  primidone (MYSOLINE) 50 mg tablet, TAKE 2 TABS AT 8PM., Disp: 180 tablet, Rfl: 1  •  spironolactone (ALDACTONE) 25 mg tablet, Take 1 tablet (25 mg total) by mouth daily, Disp: 90 tablet, Rfl: 1  •  albuterol (2.5 mg/3 mL) 0.083 % nebulizer solution, Take 2.5 mg by nebulization As needed per patient's wife  (Patient not taking: Reported on 1/23/2024), Disp: , Rfl:   •  aspirin (ECOTRIN LOW STRENGTH) 81 mg EC tablet, , Disp: , Rfl:   •  ciclopirox (LOPROX) 0.77 % cream, Apply topically 2 (two) times a day for 20 days, Disp: 45 g, Rfl: 2  •  guaifenesin-codeine (GUAIFENESIN AC) 100-10 MG/5ML liquid, Take 10 mL by mouth 3 (three) times a day as needed for cough (Patient not taking: Reported on 2/28/2024), Disp: 473 mL, Rfl: 0      Objective:    Vitals:   /68   Pulse 78   Ht 5' 10\" (1.778 m)   Wt 91.2 kg (201 lb)   SpO2 96%   BMI 28.84 kg/m²   Body mass index is 28.84 kg/m².  Vitals:    02/28/24 0839   Weight: 91.2 kg (201 lb)       Physical Exam  Vitals and nursing note reviewed.   Constitutional:       General: He is not " in acute distress.     Appearance: He is well-developed. He is obese. He is not ill-appearing, toxic-appearing or diaphoretic.   HENT:      Head: Normocephalic and atraumatic.      Right Ear: External ear normal.      Left Ear: External ear normal.      Nose: Nose normal.      Mouth/Throat:      Pharynx: No oropharyngeal exudate.   Eyes:      General: Lids are normal. Lids are everted, no foreign bodies appreciated. No scleral icterus.        Right eye: No discharge.         Left eye: No discharge.      Conjunctiva/sclera: Conjunctivae normal.      Pupils: Pupils are equal, round, and reactive to light.   Neck:      Thyroid: No thyromegaly.      Vascular: Normal carotid pulses. No carotid bruit, hepatojugular reflux or JVD.      Trachea: No tracheal tenderness or tracheal deviation.   Cardiovascular:      Rate and Rhythm: Normal rate and regular rhythm.      Pulses:           Dorsalis pedis pulses are 2+ on the right side and 2+ on the left side.        Posterior tibial pulses are 1+ on the right side and 1+ on the left side.      Heart sounds: Normal heart sounds. No murmur heard.     No friction rub. No gallop.   Pulmonary:      Effort: Pulmonary effort is normal. No respiratory distress.      Breath sounds: No stridor. Rhonchi and rales present. No wheezing.      Comments: Decreased air entry bilateral left worse than the right  Chest:      Chest wall: No tenderness.   Abdominal:      General: Bowel sounds are normal. There is no distension.      Palpations: Abdomen is soft. There is no mass.      Tenderness: There is no abdominal tenderness. There is no guarding or rebound.   Musculoskeletal:         General: No tenderness or deformity. Normal range of motion.      Cervical back: Normal range of motion and neck supple. No edema, erythema or rigidity. No spinous process tenderness or muscular tenderness. Normal range of motion.   Feet:      Right foot:      Skin integrity: No ulcer, skin breakdown, erythema,  warmth, callus or dry skin.      Left foot:      Skin integrity: No ulcer, skin breakdown, erythema, warmth, callus or dry skin.   Lymphadenopathy:      Head:      Right side of head: No submental, submandibular, tonsillar, preauricular or posterior auricular adenopathy.      Left side of head: No submental, submandibular, tonsillar, preauricular, posterior auricular or occipital adenopathy.      Cervical: No cervical adenopathy.      Right cervical: No superficial, deep or posterior cervical adenopathy.     Left cervical: No superficial, deep or posterior cervical adenopathy.      Upper Body:      Right upper body: No pectoral adenopathy.      Left upper body: No pectoral adenopathy.   Skin:     General: Skin is warm and dry.      Coloration: Skin is not pale.      Findings: Erythema and rash present.   Neurological:      Mental Status: He is alert and oriented to person, place, and time.      Cranial Nerves: No cranial nerve deficit.      Sensory: No sensory deficit.      Motor: Weakness present. No tremor, abnormal muscle tone or seizure activity.      Coordination: Coordination normal.      Gait: Gait abnormal.      Deep Tendon Reflexes: Reflexes are normal and symmetric. Reflexes normal.   Psychiatric:         Behavior: Behavior normal.         Thought Content: Thought content normal.         Judgment: Judgment normal.         Lab Review   Appointment on 02/26/2024   Component Date Value Ref Range Status   • WBC 02/26/2024 6.79  4.31 - 10.16 Thousand/uL Final   • RBC 02/26/2024 4.25  3.88 - 5.62 Million/uL Final   • Hemoglobin 02/26/2024 13.0  12.0 - 17.0 g/dL Final   • Hematocrit 02/26/2024 40.4  36.5 - 49.3 % Final   • MCV 02/26/2024 95  82 - 98 fL Final   • MCH 02/26/2024 30.6  26.8 - 34.3 pg Final   • MCHC 02/26/2024 32.2  31.4 - 37.4 g/dL Final   • RDW 02/26/2024 14.6  11.6 - 15.1 % Final   • MPV 02/26/2024 9.2  8.9 - 12.7 fL Final   • Platelets 02/26/2024 430 (H)  149 - 390 Thousands/uL Final   • Sodium  02/26/2024 134 (L)  135 - 147 mmol/L Final   • Potassium 02/26/2024 5.0  3.5 - 5.3 mmol/L Final   • Chloride 02/26/2024 99  96 - 108 mmol/L Final   • CO2 02/26/2024 29  21 - 32 mmol/L Final   • ANION GAP 02/26/2024 6  mmol/L Final   • BUN 02/26/2024 14  5 - 25 mg/dL Final   • Creatinine 02/26/2024 0.89  0.60 - 1.30 mg/dL Final    Standardized to IDMS reference method   • Glucose, Fasting 02/26/2024 138 (H)  65 - 99 mg/dL Final   • Calcium 02/26/2024 8.5  8.4 - 10.2 mg/dL Final   • Corrected Calcium 02/26/2024 9.3  8.3 - 10.1 mg/dL Final   • AST 02/26/2024 17  13 - 39 U/L Final   • ALT 02/26/2024 20  7 - 52 U/L Final    Specimen collection should occur prior to Sulfasalazine administration due to the potential for falsely depressed results.    • Alkaline Phosphatase 02/26/2024 144 (H)  34 - 104 U/L Final   • Total Protein 02/26/2024 6.3 (L)  6.4 - 8.4 g/dL Final   • Albumin 02/26/2024 3.0 (L)  3.5 - 5.0 g/dL Final   • Total Bilirubin 02/26/2024 0.53  0.20 - 1.00 mg/dL Final    Use of this assay is not recommended for patients undergoing treatment with eltrombopag due to the potential for falsely elevated results.  N-acetyl-p-benzoquinone imine (metabolite of Acetaminophen) will generate erroneously low results in samples for patients that have taken an overdose of Acetaminophen.   • eGFR 02/26/2024 80  ml/min/1.73sq m Final   • TSH 3RD GENERATON 02/26/2024 9.225 (H)  0.450 - 4.500 uIU/mL Final    Adult TSH (3rd generation) reference range follows the recommended guidelines of the American Thyroid Association, January, 2020.   • T3, Free 02/26/2024 2.85  2.50 - 3.90 pg/mL Final    Specimens with biotin concentrations > 10 ng/mL can lead to significant (> 10%) positive bias in result.   • PSA, Diagnostic 02/26/2024 0.01  0.00 - 4.00 ng/mL Final    [employer] Access chemiluminescent immunoassay. Confirm baseline values for patients being serially monitored.   • Free T4 02/26/2024 0.65  0.61 - 1.12 ng/dL Final     Specimens with biotin concentrations > 10 ng/mL can lead to significant (> 10%) positive bias in result.   • Segmented % 02/26/2024 60  43 - 75 % Final   • Bands % 02/26/2024 2  0 - 8 % Final   • Lymphocytes % 02/26/2024 13 (L)  14 - 44 % Final   • Monocytes % 02/26/2024 10  4 - 12 % Final   • Eosinophils, % 02/26/2024 15 (H)  0 - 6 % Final   • Basophils % 02/26/2024 0  0 - 1 % Final   • Absolute Neutrophils 02/26/2024 4.21  1.85 - 7.62 Thousand/uL Final   • Lymphocytes Absolute 02/26/2024 0.88  0.60 - 4.47 Thousand/uL Final   • Monocytes Absolute 02/26/2024 0.68  0.00 - 1.22 Thousand/uL Final   • Eosinophils Absolute 02/26/2024 1.02 (H)  0.00 - 0.40 Thousand/uL Final   • Basophils Absolute 02/26/2024 0.00  0.00 - 0.10 Thousand/uL Final   • RBC Morphology 02/26/2024 Present   Final   • Platelet Estimate 02/26/2024 Increased (A)  Adequate Final   • Macrocytes 02/26/2024 Present   Final   • Polychromasia 02/26/2024 Present   Final   Appointment on 02/07/2024   Component Date Value Ref Range Status   • WBC 02/07/2024 5.83  4.31 - 10.16 Thousand/uL Final   • RBC 02/07/2024 4.00  3.88 - 5.62 Million/uL Final   • Hemoglobin 02/07/2024 12.3  12.0 - 17.0 g/dL Final   • Hematocrit 02/07/2024 39.3  36.5 - 49.3 % Final   • MCV 02/07/2024 98  82 - 98 fL Final   • MCH 02/07/2024 30.8  26.8 - 34.3 pg Final   • MCHC 02/07/2024 31.3 (L)  31.4 - 37.4 g/dL Final   • RDW 02/07/2024 13.2  11.6 - 15.1 % Final   • MPV 02/07/2024 9.1  8.9 - 12.7 fL Final   • Platelets 02/07/2024 408 (H)  149 - 390 Thousands/uL Final   • nRBC 02/07/2024 0  /100 WBCs Final   • Neutrophils Relative 02/07/2024 50  43 - 75 % Final   • Immat GRANS % 02/07/2024 1  0 - 2 % Final   • Lymphocytes Relative 02/07/2024 16  14 - 44 % Final   • Monocytes Relative 02/07/2024 21 (H)  4 - 12 % Final   • Eosinophils Relative 02/07/2024 11 (H)  0 - 6 % Final   • Basophils Relative 02/07/2024 1  0 - 1 % Final   • Neutrophils Absolute 02/07/2024 2.93  1.85 - 7.62 Thousands/µL  Final   • Immature Grans Absolute 02/07/2024 0.05  0.00 - 0.20 Thousand/uL Final   • Lymphocytes Absolute 02/07/2024 0.93  0.60 - 4.47 Thousands/µL Final   • Monocytes Absolute 02/07/2024 1.23 (H)  0.17 - 1.22 Thousand/µL Final   • Eosinophils Absolute 02/07/2024 0.62 (H)  0.00 - 0.61 Thousand/µL Final   • Basophils Absolute 02/07/2024 0.07  0.00 - 0.10 Thousands/µL Final   • Sodium 02/07/2024 135  135 - 147 mmol/L Final   • Potassium 02/07/2024 5.5 (H)  3.5 - 5.3 mmol/L Final   • Chloride 02/07/2024 101  96 - 108 mmol/L Final   • CO2 02/07/2024 29  21 - 32 mmol/L Final   • ANION GAP 02/07/2024 5  mmol/L Final   • BUN 02/07/2024 12  5 - 25 mg/dL Final   • Creatinine 02/07/2024 0.93  0.60 - 1.30 mg/dL Final    Standardized to IDMS reference method   • Glucose 02/07/2024 188 (H)  65 - 140 mg/dL Final    If the patient is fasting, the ADA then defines impaired fasting glucose as > 100 mg/dL and diabetes as > or equal to 123 mg/dL.   • Calcium 02/07/2024 8.6  8.4 - 10.2 mg/dL Final   • Corrected Calcium 02/07/2024 9.8  8.3 - 10.1 mg/dL Final   • AST 02/07/2024 34  13 - 39 U/L Final   • ALT 02/07/2024 40  7 - 52 U/L Final    Specimen collection should occur prior to Sulfasalazine administration due to the potential for falsely depressed results.    • Alkaline Phosphatase 02/07/2024 168 (H)  34 - 104 U/L Final   • Total Protein 02/07/2024 5.7 (L)  6.4 - 8.4 g/dL Final   • Albumin 02/07/2024 2.5 (L)  3.5 - 5.0 g/dL Final   • Total Bilirubin 02/07/2024 0.40  0.20 - 1.00 mg/dL Final    Use of this assay is not recommended for patients undergoing treatment with eltrombopag due to the potential for falsely elevated results.  N-acetyl-p-benzoquinone imine (metabolite of Acetaminophen) will generate erroneously low results in samples for patients that have taken an overdose of Acetaminophen.   • eGFR 02/07/2024 76  ml/min/1.73sq m Final   • TSH 3RD GENERATON 02/07/2024 3.715  0.450 - 4.500 uIU/mL Final    Adult TSH (3rd  generation) reference range follows the recommended guidelines of the American Thyroid Association, January, 2020.   • T3, Free 02/07/2024 2.89  2.50 - 3.90 pg/mL Final    Specimens with biotin concentrations > 10 ng/mL can lead to significant (> 10%) positive bias in result.   Appointment on 01/17/2024   Component Date Value Ref Range Status   • Sodium 01/17/2024 132 (L)  135 - 147 mmol/L Final   • Potassium 01/17/2024 4.8  3.5 - 5.3 mmol/L Final   • Chloride 01/17/2024 99  96 - 108 mmol/L Final   • CO2 01/17/2024 25  21 - 32 mmol/L Final   • ANION GAP 01/17/2024 8  mmol/L Final   • BUN 01/17/2024 19  5 - 25 mg/dL Final   • Creatinine 01/17/2024 0.98  0.60 - 1.30 mg/dL Final    Standardized to IDMS reference method   • Glucose 01/17/2024 309 (H)  65 - 140 mg/dL Final    If the patient is fasting, the ADA then defines impaired fasting glucose as > 100 mg/dL and diabetes as > or equal to 123 mg/dL.   • Calcium 01/17/2024 8.7  8.4 - 10.2 mg/dL Final   • Corrected Calcium 01/17/2024 9.5  8.3 - 10.1 mg/dL Final   • AST 01/17/2024 22  13 - 39 U/L Final   • ALT 01/17/2024 24  7 - 52 U/L Final    Specimen collection should occur prior to Sulfasalazine administration due to the potential for falsely depressed results.    • Alkaline Phosphatase 01/17/2024 130 (H)  34 - 104 U/L Final   • Total Protein 01/17/2024 6.5  6.4 - 8.4 g/dL Final   • Albumin 01/17/2024 3.0 (L)  3.5 - 5.0 g/dL Final   • Total Bilirubin 01/17/2024 0.61  0.20 - 1.00 mg/dL Final    Use of this assay is not recommended for patients undergoing treatment with eltrombopag due to the potential for falsely elevated results.  N-acetyl-p-benzoquinone imine (metabolite of Acetaminophen) will generate erroneously low results in samples for patients that have taken an overdose of Acetaminophen.   • eGFR 01/17/2024 72  ml/min/1.73sq m Final   • TSH 3RD GENERATON 01/17/2024 0.234 (L)  0.450 - 4.500 uIU/mL Final    Adult TSH (3rd generation) reference range follows the  recommended guidelines of the [employer], January, 2020.   • T3, Free 01/17/2024 3.56  2.50 - 3.90 pg/mL Final    Specimens with biotin concentrations > 10 ng/mL can lead to significant (> 10%) positive bias in result.   • WBC 01/17/2024 9.33  4.31 - 10.16 Thousand/uL Final   • RBC 01/17/2024 4.38  3.88 - 5.62 Million/uL Final   • Hemoglobin 01/17/2024 14.0  12.0 - 17.0 g/dL Final   • Hematocrit 01/17/2024 42.1  36.5 - 49.3 % Final   • MCV 01/17/2024 96  82 - 98 fL Final   • MCH 01/17/2024 32.0  26.8 - 34.3 pg Final   • MCHC 01/17/2024 33.3  31.4 - 37.4 g/dL Final   • RDW 01/17/2024 13.1  11.6 - 15.1 % Final   • MPV 01/17/2024 9.4  8.9 - 12.7 fL Final   • Platelets 01/17/2024 288  149 - 390 Thousands/uL Final   • nRBC 01/17/2024 0  /100 WBCs Final   • Neutrophils Relative 01/17/2024 76 (H)  43 - 75 % Final   • Immat GRANS % 01/17/2024 0  0 - 2 % Final   • Lymphocytes Relative 01/17/2024 10 (L)  14 - 44 % Final   • Monocytes Relative 01/17/2024 6  4 - 12 % Final   • Eosinophils Relative 01/17/2024 8 (H)  0 - 6 % Final   • Basophils Relative 01/17/2024 0  0 - 1 % Final   • Neutrophils Absolute 01/17/2024 7.03  1.85 - 7.62 Thousands/µL Final   • Immature Grans Absolute 01/17/2024 0.03  0.00 - 0.20 Thousand/uL Final   • Lymphocytes Absolute 01/17/2024 0.95  0.60 - 4.47 Thousands/µL Final   • Monocytes Absolute 01/17/2024 0.56  0.17 - 1.22 Thousand/µL Final   • Eosinophils Absolute 01/17/2024 0.72 (H)  0.00 - 0.61 Thousand/µL Final   • Basophils Absolute 01/17/2024 0.04  0.00 - 0.10 Thousands/µL Final   • Free T4 01/17/2024 1.41 (H)  0.61 - 1.12 ng/dL Final    Specimens with biotin concentrations > 10 ng/mL can lead to significant (> 10%) positive bias in result.         Patient Instructions   General Allergic Reaction   WHAT YOU NEED TO KNOW:   An allergic reaction is your body's response to an allergen. Allergens include medicines, food, insect stings, animal dander, mold, latex, chemicals, and dust  mites. Pollen from trees, grass, and weeds can also cause an allergic reaction. An allergic reaction can range from mild to severe.  DISCHARGE INSTRUCTIONS:   Call 911 for signs or symptoms of anaphylaxis,  such as trouble breathing, swelling in your mouth or throat, or wheezing. You may also have itching, a rash, hives, or feel like you are going to faint.  Return to the emergency department if:   You have a skin rash, hives, swelling, or itching that is starting to get worse.    Your throat tightens, or your lips or tongue swell.    You have trouble swallowing or speaking.    You have worsening nausea, diarrhea, or abdominal cramps, or you are vomiting.    You have chest pain or tightness.    Contact your healthcare provider if:   You have questions or concerns about your condition or care.      Medicines:  You may need any of the following:  Medicines  may be given to relieve certain allergy symptoms such as itching, sneezing, and swelling. You may take them as a pill or use drops in your nose or eyes. Topical treatments may be given to put directly on your skin to help decrease itching or swelling.    Epinephrine  may be prescribed if you are at risk for anaphylaxis. This is a severe allergic reaction that can be life-threatening. Your healthcare provider will tell you if you need to keep epinephrine with you. You will be taught when and how to use it.    Take your medicine as directed.  Contact your healthcare provider if you think your medicine is not helping or if you have side effects. Tell your provider if you are allergic to any medicine. Keep a list of the medicines, vitamins, and herbs you take. Include the amounts, and when and why you take them. Bring the list or the pill bottles to follow-up visits. Carry your medicine list with you in case of an emergency.    Follow up with your doctor as directed:  Write down your questions so you remember to ask them during your visits.  Manage your symptoms:  "  Avoid allergens.  You may need to have allergy testing with your healthcare provider or a specialist to find your allergens.    Use cold compresses on your skin or eyes.  This will help soothe skin or eyes affected by the allergic reaction. You can make a cold compress by soaking a washcloth in cool water. Wring out the extra water before you apply the washcloth.    Rinse your nasal passages with a saline solution.  Daily rinsing may help clear allergens out of your nose. Use distilled water if possible. You can also boil tap water and then let it cool before you use it. Do not use tap water without boiling it first.    Do not smoke.  Nicotine and other chemicals in cigarettes and cigars can make an allergic reaction worse, and can also cause lung damage. Ask your healthcare provider for information if you currently smoke and need help to quit. E-cigarettes or smokeless tobacco still contain nicotine. Talk to your healthcare provider before you use these products.    © Copyright [employer] 2023 Information is for End User's use only and may not be sold, redistributed or otherwise used for commercial purposes.  The above information is an  only. It is not intended as medical advice for individual conditions or treatments. Talk to your doctor, nurse or pharmacist before following any medical regimen to see if it is safe and effective for you.       Vivek Tatum MD        \"This note has been constructed using a voice recognition system.Therefore there may be syntax, spelling, and/or grammatical errors. Please call if you have any questions. \"  "

## 2024-02-28 NOTE — PATIENT INSTRUCTIONS
General Allergic Reaction   WHAT YOU NEED TO KNOW:   An allergic reaction is your body's response to an allergen. Allergens include medicines, food, insect stings, animal dander, mold, latex, chemicals, and dust mites. Pollen from trees, grass, and weeds can also cause an allergic reaction. An allergic reaction can range from mild to severe.  DISCHARGE INSTRUCTIONS:   Call 911 for signs or symptoms of anaphylaxis,  such as trouble breathing, swelling in your mouth or throat, or wheezing. You may also have itching, a rash, hives, or feel like you are going to faint.  Return to the emergency department if:   You have a skin rash, hives, swelling, or itching that is starting to get worse.    Your throat tightens, or your lips or tongue swell.    You have trouble swallowing or speaking.    You have worsening nausea, diarrhea, or abdominal cramps, or you are vomiting.    You have chest pain or tightness.    Contact your healthcare provider if:   You have questions or concerns about your condition or care.      Medicines:  You may need any of the following:  Medicines  may be given to relieve certain allergy symptoms such as itching, sneezing, and swelling. You may take them as a pill or use drops in your nose or eyes. Topical treatments may be given to put directly on your skin to help decrease itching or swelling.    Epinephrine  may be prescribed if you are at risk for anaphylaxis. This is a severe allergic reaction that can be life-threatening. Your healthcare provider will tell you if you need to keep epinephrine with you. You will be taught when and how to use it.    Take your medicine as directed.  Contact your healthcare provider if you think your medicine is not helping or if you have side effects. Tell your provider if you are allergic to any medicine. Keep a list of the medicines, vitamins, and herbs you take. Include the amounts, and when and why you take them. Bring the list or the pill bottles to follow-up  visits. Carry your medicine list with you in case of an emergency.    Follow up with your doctor as directed:  Write down your questions so you remember to ask them during your visits.  Manage your symptoms:   Avoid allergens.  You may need to have allergy testing with your healthcare provider or a specialist to find your allergens.    Use cold compresses on your skin or eyes.  This will help soothe skin or eyes affected by the allergic reaction. You can make a cold compress by soaking a washcloth in cool water. Wring out the extra water before you apply the washcloth.    Rinse your nasal passages with a saline solution.  Daily rinsing may help clear allergens out of your nose. Use distilled water if possible. You can also boil tap water and then let it cool before you use it. Do not use tap water without boiling it first.    Do not smoke.  Nicotine and other chemicals in cigarettes and cigars can make an allergic reaction worse, and can also cause lung damage. Ask your healthcare provider for information if you currently smoke and need help to quit. E-cigarettes or smokeless tobacco still contain nicotine. Talk to your healthcare provider before you use these products.    © Copyright [employer] 2023 Information is for End User's use only and may not be sold, redistributed or otherwise used for commercial purposes.  The above information is an  only. It is not intended as medical advice for individual conditions or treatments. Talk to your doctor, nurse or pharmacist before following any medical regimen to see if it is safe and effective for you.

## 2024-02-28 NOTE — ASSESSMENT & PLAN NOTE
Yearly surveillance with ultrasound patient refused continue preventive measures    Controlled blood pressure    A1c very well-controlled    Continue Lipitor 10 mg daily

## 2024-02-28 NOTE — ASSESSMENT & PLAN NOTE
Continue Eliquis and carvedilol  For now patient sinus rhythm rate controlled    Agree continue management medication as follows    Coreg 25 mg twice a day    Eliquis 5 mg twice a day    Follow with cardiology

## 2024-02-28 NOTE — ASSESSMENT & PLAN NOTE
Patient is on chemo for squamous cell lung CA been having some itching rash for the last 2-1/2 months initially was treated with Medrol Dosepak and Atarax 25 mg 3 times a day as needed for itching some improvement but getting worse both upper lower extremity trunk somewhat so cannot sleep at nighttime due to the intractable itching we will treat as follows    Medrol Dosepak to use as directed    Increase the Atarax 50 mg 3 times a day as needed for itching    To be seen by dermatology

## 2024-02-28 NOTE — ASSESSMENT & PLAN NOTE
Patient quit drinking almost more than 2 years completely asymptomatic reinforcement counseling done will monitor closely

## 2024-02-28 NOTE — ASSESSMENT & PLAN NOTE
For now symptoms controlled in remission    Agree continue management as follows    Remeron 7.5 mg daily at bedtime

## 2024-02-28 NOTE — ASSESSMENT & PLAN NOTE
Regimen  Pembrolizumab 200 mg IV flat dose day 1 (discontinued)  Paclitaxel 135 mg/m2 IV day 1 (85%)  Cycle length = 21 days  Goal = palliation and prolongation of life     Mr. Clayton was given a 6-week follow-up but this may change.  The plan is to give a few cycles of the new regimen and then rescan.  Patient knows to call if he has any other questions or concerns.    Above reviewed from the oncology note follow-up with oncology

## 2024-02-28 NOTE — ASSESSMENT & PLAN NOTE
Lab Results   Component Value Date    HGBA1C 6.9 (H) 09/20/2023   Blood sugar very well controlled on the basis of A1c continue diabetic diet continue metformin hypoglycemia protocol in place yearly dilated eye exam foot exam normal monitoring blood sugar intermittently at home in the range of between 70 and 100 risk for hypoglycemia discontinue metformin very poor appetite due to the metastatic lung cancer due to the risk of hypoglycemia not on any oral hypoglycemic agent    Continue diabetic diet monitor blood sugar at home diabetes control with diet only

## 2024-02-28 NOTE — ASSESSMENT & PLAN NOTE
Wt Readings from Last 3 Encounters:   02/28/24 91.2 kg (201 lb)   02/12/24 91.2 kg (201 lb 1 oz)   02/02/24 90.3 kg (199 lb)       Patient euvolemic CHF compensated continue low-salt diet fluid restriction Daily weight monitoring and follow-up with the cardiology

## 2024-03-04 ENCOUNTER — HOSPITAL ENCOUNTER (OUTPATIENT)
Dept: INFUSION CENTER | Facility: HOSPITAL | Age: 84
Discharge: HOME | End: 2024-03-04
Attending: INTERNAL MEDICINE
Payer: COMMERCIAL

## 2024-03-04 VITALS
BODY MASS INDEX: 28.47 KG/M2 | TEMPERATURE: 97.6 F | WEIGHT: 198.85 LBS | HEART RATE: 121 BPM | DIASTOLIC BLOOD PRESSURE: 88 MMHG | RESPIRATION RATE: 18 BRPM | OXYGEN SATURATION: 97 % | SYSTOLIC BLOOD PRESSURE: 136 MMHG | HEIGHT: 70 IN

## 2024-03-04 DIAGNOSIS — C34.92 SQUAMOUS CELL CARCINOMA OF LEFT LUNG (HCC): Primary | ICD-10-CM

## 2024-03-04 PROCEDURE — 96367 TX/PROPH/DG ADDL SEQ IV INF: CPT

## 2024-03-04 PROCEDURE — 96413 CHEMO IV INFUSION 1 HR: CPT

## 2024-03-04 PROCEDURE — 96415 CHEMO IV INFUSION ADDL HR: CPT

## 2024-03-04 PROCEDURE — 96375 TX/PRO/DX INJ NEW DRUG ADDON: CPT

## 2024-03-04 RX ORDER — SODIUM CHLORIDE 9 MG/ML
20 INJECTION, SOLUTION INTRAVENOUS ONCE
Status: COMPLETED | OUTPATIENT
Start: 2024-03-04 | End: 2024-03-04

## 2024-03-04 RX ADMIN — DIPHENHYDRAMINE HYDROCHLORIDE 25 MG: 50 INJECTION, SOLUTION INTRAMUSCULAR; INTRAVENOUS at 09:35

## 2024-03-04 RX ADMIN — FAMOTIDINE 20 MG: 10 INJECTION, SOLUTION INTRAVENOUS at 10:00

## 2024-03-04 RX ADMIN — DEXAMETHASONE SODIUM PHOSPHATE: 10 INJECTION, SOLUTION INTRAMUSCULAR; INTRAVENOUS at 09:12

## 2024-03-04 RX ADMIN — SODIUM CHLORIDE 20 ML/HR: 0.9 INJECTION, SOLUTION INTRAVENOUS at 09:11

## 2024-03-04 RX ADMIN — PACLITAXEL 282 MG: 6 INJECTION, SOLUTION, CONCENTRATE INTRAVENOUS at 10:21

## 2024-03-04 NOTE — PROGRESS NOTES
Bridger Clayton  tolerated treatment well with no complications.      Bridger Clayton is aware of future appt on 3/25 at 0930.     AVS printed and given to Bridger Clayton:  Yes

## 2024-03-13 ENCOUNTER — OFFICE VISIT (OUTPATIENT)
Age: 84
End: 2024-03-13
Payer: COMMERCIAL

## 2024-03-13 VITALS
HEART RATE: 86 BPM | SYSTOLIC BLOOD PRESSURE: 106 MMHG | WEIGHT: 200 LBS | HEIGHT: 70 IN | DIASTOLIC BLOOD PRESSURE: 65 MMHG | BODY MASS INDEX: 28.63 KG/M2 | RESPIRATION RATE: 17 BRPM

## 2024-03-13 DIAGNOSIS — I70.209 PERIPHERAL ARTERIOSCLEROSIS (HCC): ICD-10-CM

## 2024-03-13 DIAGNOSIS — M79.672 PAIN IN BOTH FEET: ICD-10-CM

## 2024-03-13 DIAGNOSIS — B35.1 ONYCHOMYCOSIS: ICD-10-CM

## 2024-03-13 DIAGNOSIS — B35.3 TINEA PEDIS OF BOTH FEET: ICD-10-CM

## 2024-03-13 DIAGNOSIS — E11.42 DIABETIC POLYNEUROPATHY ASSOCIATED WITH TYPE 2 DIABETES MELLITUS (HCC): Primary | ICD-10-CM

## 2024-03-13 DIAGNOSIS — M79.671 PAIN IN BOTH FEET: ICD-10-CM

## 2024-03-13 PROCEDURE — 99213 OFFICE O/P EST LOW 20 MIN: CPT | Performed by: PODIATRIST

## 2024-03-13 NOTE — PROGRESS NOTES
Assessment/Plan:  Patient has pain in his feet and toes with ambulation.  He has mycosis of nail and skin. He has plantar pre ulcerative skin lesions.     Plan.  Chart reviewed.  Patient educated on care of the diabetic foot.  Diabetic foot exam performed.  Mycotic nails reduced.  Patient advised on foot hygiene.  Proper shoe sizing recommended.  Patient will use daily, he will apply cream to feet b.i.d. For 4 weeks.  Calluses debrided.  Procedures performed without pain or complication.           Subjective:  Patient has pain in his feet with ambulation.  No history of trauma .  He has pain when he wears shoes.  He has pain in his toes.      Patient ID: Spenser Clayton is a 83 y.o. male.     Patient is diabetic.  He has pain in his feet and toes with ambulation.  Has pain from calluses.  He has ingrown toenails and dry scaly skin.  He is requesting refill of topical antifungal.        Review of Systems   Constitutional: No fever or chills, feels well, no tiredness, no recent weight loss or weight gain.  Eyes: No complaints of red eyes, no eyesight problems.  ENT: no complaints of loss of hearing, no nosebleeds, no sore throat.  Cardiovascular: No complaints of chest pain, no palpitations, no leg claudication or lower extremity edema.  Respiratory: No complaints of shortness of breath, no wheezing, no cough.  Gastrointestinal: No complaints of abdominal pain, no constipation, no nausea or vomiting, no diarrhea or bloody stools.  Genitourinary: No complaints of dysuria or incontinence, no hesitancy, no nocturia.  Musculoskeletal: limb pain, but-- as noted in HPI.  Integumentary: No complaints of skin rash or lesion, no itching or dry skin, no skin wounds.  Neurological: No complaints of headache, no confusion, no numbness or tingling, no dizziness.  Psychiatric: No suicidal thoughts, no anxiety, no depression.  Endocrine: No muscle weakness, no frequent urination, no excessive thirst, no feelings of weakness.       Active Problems  1. 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.70) (M21.969)   2. Arthritis (716.90) (M19.90)   3. Atherosclerosis of arteries of extremities (440.20) (I70.209)   4. Calcaneal spur (726.73) (M77.30)   5. Callus (700) (L84)   6. 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 Diabetic neuropathy (250.60,357.2) (E11.40)   9. Hypercholesterolemia (272.0) (E78.00)   10. Hypertension (401.9) (I10)   11. 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 Onychomycosis (110.1) (B35.1)   14. Orthopedic aftercare (V54.9) (Z47.89)   15. Pain in both feet (729.5) (M79.671,M79.672)   16. Pain in extremity (729.5) (M79.609)   17. Pes planus, congenital (754.61) (Q66.50)   18. 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 Plantar wart (078.12) (B07.0)   20. Prostate cancer (185) (C61)   21. Sprain of right shoulder (840.9) (S43.401A)   22. Tinea pedis (110.4) (B35.3)     Past Medical History   · Orthopedic aftercare (V54.9) (Z47.89)     Surgical History   · History of Gastric Surgery   · History of Hernia Repair   · History of Previous Stent Placement Total Number Performed ___     The surgical history was reviewed and updated today.       Family History  Family History    · Family history of Arthritis (V17.7)   · Family history of Cancer     The family history was reviewed and updated today.       Social History      · Beer Consumption (___ Bottles Per Day)   · Daily Coffee Consumption (1  Cups/Day)   · Former smoker (V15.82) (Z87.891)  The social history was reviewed and updated today.   Allergies  1. No Known Drug Allergies      Physical Exam  Left Foot: Appearance: Normal except as noted: excessive pronation-- and-- pes planus.   Right Foot: Appearance: Normal except as noted: excessive pronation-- and-- pes planus.   Left Ankle: ROM: limited ROM in all planes    Right Ankle: ROM: limited ROM in all planes   Neurological Exam: performed. Light touch was intact bilaterally. Vibratory sensation was intact bilaterally. Response to monofilament test was intact bilaterally. Deep tendon reflexes: patellar reflex present bilaterally-- and-- achilles reflex present bilaterally.   Vascular Exam: performed Dorsalis pedis pulses were One/4 bilaterally. Posterior tibial pulses were One/4 bilaterally. Elevation Pallor: present bilaterally. Capillary refill time was greater than 3 seconds bilaterally-- and-- Q. 9 findings bilateral. Negative digital hair noted. Positive abnormal cooling noted.   Toenails: All of the toenails were elongated,-- hypertrophied,-- discolored,-- ingrown,-- shown to have subungual debris,-- tender-- and-- Severely mycotic with onychauxis.    Socks and shoes removed, Right Foot Findings: swollen, erythematous and dry.  The sensory exam showed diminished vibratory sensation at the level of the toes. Diminished tactile sensation with monofilament testing throughout the right foot.  Socks and shoes removed, Left Foot Findings: swollen, erythematous and dry.  The sensory exam showed diminished vibratory sensation at the level of the toes. Diminished tactile sensation with monofilament testing throughout the left foot. Capillary refills findings on the right were delayed in the toes.  Pulses:  1+ in the posterior tibialis on the right  1+ in the dorsalis pedis on the right.  Capillary refills findings on the left were delayed in the toes.  Pulses:  1+ in the posterior tibialis on the left  1+ in the dorsalis pedis on the left.  Assign Risk Category: 2: Loss of protective sensation with or without weakness, deformity, callus, pre-ulcer, or history of ulceration. High risk.   Hyperkeratosis: present on both first toes,-- present on both fifth sub metatarsals-- and-- [place] tinea pedis, bilateral, is noted.   Shoe Gear Evaluation: performed (). Right Foot:  width: d-- and-- length: 11. Left Foot: width: d-- and-- length: 11. Recommendation(s): athletic shoes     Patient's shoes and socks removed.Right Foot/Ankle   Right Foot Inspection  Skin Exam: dry skin, callus and callus               Toe Exam: swelling  Sensory   Vibration: diminished  Proprioception: diminished      Vascular  Capillary refills: elevated        Left Foot/Ankle  Left Foot Inspection  Skin Exam: dry skin and callus              Toe Exam: swelling and erythema                   Sensory   Vibration: diminished  Proprioception: diminished     Vascular  Capillary refills: elevated     Right Foot/Ankle   Right Foot Inspection        Sensory   Vibration: diminished  Proprioception: diminished  Monofilament testing: diminished     Vascular  Capillary refills: < 3 seconds              Left Foot/Ankle  Left Foot Inspection        Sensory   Vibration: diminished  Proprioception: diminished  Monofilament testing: diminished     Vascular  Capillary refills: < 3 seconds         Patient's shoes and socks removed.     Right Foot/Ankle   Right Foot Inspection        Toe Exam: right toe deformity.      Sensory   Vibration: diminished      Vascular  Capillary refills: < 3 seconds              Left Foot/Ankle  Left Foot Inspection        Toe Exam: left toe deformity.      Sensory   Vibration: diminished      Vascular  Capillary refills: < 3 seconds           Assign Risk Category  Deformity present  Loss of protective sensation  Weak pulses  Risk: 2

## 2024-03-15 ENCOUNTER — OFFICE VISIT (OUTPATIENT)
Dept: HEMATOLOGY ONCOLOGY | Facility: MEDICAL CENTER | Age: 84
End: 2024-03-15
Payer: COMMERCIAL

## 2024-03-15 VITALS
TEMPERATURE: 98 F | OXYGEN SATURATION: 97 % | HEIGHT: 70 IN | SYSTOLIC BLOOD PRESSURE: 98 MMHG | RESPIRATION RATE: 17 BRPM | DIASTOLIC BLOOD PRESSURE: 56 MMHG | WEIGHT: 199 LBS | HEART RATE: 73 BPM | BODY MASS INDEX: 28.49 KG/M2

## 2024-03-15 DIAGNOSIS — C34.92 SQUAMOUS CELL CARCINOMA OF LEFT LUNG (HCC): Primary | Chronic | ICD-10-CM

## 2024-03-15 PROCEDURE — 99214 OFFICE O/P EST MOD 30 MIN: CPT | Performed by: INTERNAL MEDICINE

## 2024-03-15 RX ORDER — TRIAMCINOLONE ACETONIDE 1 MG/G
CREAM TOPICAL
COMMUNITY
Start: 2024-02-29

## 2024-03-15 NOTE — PROGRESS NOTES
haris Clayton  1940  [facility] HEMATOLOGY [facility] SHAIBO Garcia [facility] [id number]     DISCUSSION/SUMMARY:     83-year-old male with a number of medical and cardiac problems previously found to have a left lower lobe mass. Initial biopsy demonstrated squamous cell carcinoma.  IR recently performed thoracentesis, minimal amount of fluid was removed but there was no evidence of metastatic disease; cytology was negative.  Patient was seen by thoracic surgery and underwent mediastinoscopy.  There was no evidence of metastatic disease in the sampled lymph nodes (2R, 4R, 4L, 7).  Tumor was 4.5 cm.  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  Patient was treated with SBRT and then went on to surveillance.    Recent PET/CT demonstrated evidence of recurrence in the left lower lobe, same area where the original malignancy was found.  There is no clear evidence of spread to either hilar regions or the mediastinum but radiologist commented on 1 right paratracheal lymph node but very +/-.  There was nonspecific FDG activity in the sacrum and left posterior iliac bone, seen before.  Unknown clinical significance.    More recently patient has had problems with recurrent pleural effusions on the left side.  Patient has a Pleurx catheter in place.  Recently wife was draining approximately 500 cc every 2 to 3 days; never got better while patient was on the pembrolizumab only.  Recently patient had side effects (rash) from the pembrolizumab that were difficult to treat/control.  The volume has decreased significantly since patient has started the Taxol.    Patient continues to tolerate the paclitaxel relatively well.  Recent blood work was actually pretty good.  Clinically there are no concerning findings.  The plan is to continue with the Taxol only.  Patient is to return in 6 weeks.  At that time we will reschedule the next set of  scans.    Regimen  Paclitaxel 135 mg/m2 IV day 1 (85%)  Cycle length = 21 days  Goal = palliation and prolongation of life    Mr. Clayton knows to call if he has any other questions or concerns.    Carefully review your medication list and verify that the list is accurate and up-to-date. Please call the hematology/oncology office if there are medications missing from the list, medications on the list that you are not currently taking or if there is a dosage or instruction that is different from how you're taking that medication.     Patient goals and areas of care: Continue with paclitaxel only  Barriers to care: none  Patient is able to self-care  ______________________________________________________________________________________         Chief Complaint   Patient presents with    Follow-up - recurrent non-small cell lung carcinoma      History of Present Illness:  83-year-old male with multiple medical and cardiac issue recently seen in the emergency room because of a cough.  Workup demonstrated a left lower lobe mass.  Biopsy demonstrated squamous cell carcinoma.  Workup did not demonstrated evidence of metastatic disease; mediastinal ostomy was negative.  Patient was seen by thoracic surgery but was not felt to be a surgical candidate.  Patient was subsequently seen by radiation oncology and underwent SPRT.  Repeat scanning is consistent with recurrence at the original site.  Patient has a left-sided Pleurx catheter in place.  Recently patient began pembrolizumab initially alone but unfortunately developed a significant rash not responding well to steroids.  The rash was itchy and a quality-of-life issue.  Options were discussed and patient began Taxol.    Presently Mr. Clayton states feeling okay, slightly better than before.  Less cough, no shortness of breath at rest.  Wife states that she has not been able to remove fluid from the left Pleurx catheter recently.  No fevers or signs of infection.  No pain  control issues.  Appetites okay, weight is stable.  No specific problems with the Taxol.  Rash is gone.    Patient was diagnosed with prostate cancer (approximately 10 + years ago) and underwent seeds and external beam radiation.  No evidence of recurrence since that time.       Review of Systems   Constitutional:  Positive for fatigue.        Poor appetite and continuing weight loss   HENT: Negative.     Eyes: Negative.    Respiratory: Negative.     Cardiovascular: Negative.    Gastrointestinal: Negative.    Endocrine: Negative.    Genitourinary: Negative.    Musculoskeletal: Negative.    Skin: Negative.    Allergic/Immunologic: Negative.    Neurological: Negative.    Hematological: Negative.         Easy bruising   Psychiatric/Behavioral:  Negative for self-injury.    All other systems reviewed and are negative.         Patient Active Problem List   Diagnosis    Corns    Pain in both feet    Onychomycosis    Tinea pedis of both feet    Lung mass    Hyperlipidemia    Type 2 diabetes mellitus with diabetic neuropathy, without long-term current use of insulin (HCC)    Squamous cell carcinoma of lung (HCC)    Shortness of breath    Daytime hypersomnolence    Chronic diastolic heart failure (HCC)    SYLVESTER (obstructive sleep apnea)    Prostate cancer (HCC)    GERD (gastroesophageal reflux disease)    CAD (coronary artery disease)    Other persistent atrial fibrillation (HCC)    Alcohol dependence (HCC)    Acute respiratory failure with hypoxemia (HCC)    Depression, recurrent (HCC)    COVID-19    Angioedema    Septic shock (HCC)    Cellulitis    Cellulitis of chest wall    Acute kidney injury (BUDDY) with acute tubular necrosis (ATN) (HCC)    Chronic obstructive pulmonary disease, unspecified COPD type (HCC)    Centrilobular emphysema (HCC)    Abdominal aortic aneurysm (HCC)    Hypertensive heart disease    Tremors of nervous system    Recurrent pleural effusion on left      Medical History        Past Medical History:    Diagnosis Date    Abdominal pain      Anxiety      Arthritis      Asthma      Atrial fibrillation (HCC)      Back pain      Bleeding ulcer      Bronchitis      Cancer (HCC)       prostate 2011- radiation    Cardiac disease       cardiac stent x1    Cataract       starting    Chronic diastolic (congestive) heart failure (HCC)      Diabetes mellitus (HCC)       boarderline diabetic     GERD (gastroesophageal reflux disease)      History of radiation therapy 2010     Prostate seeds (brachytherapy) and EBRT    Hyperlipidemia      Hypertension      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, bilateral      Low back pain      Lung cancer (HCC)      Lung mass      Myocardial infarction (HCC)       mild 1999    Obesity      Prostate cancer (HCC)      Shortness of breath      Sleep apnea       sleep study 11/22    Wears dentures       full set    Wears glasses           Surgical History         Past Surgical History:   Procedure Laterality Date    ABDOMINAL HERNIA REPAIR        ABDOMINAL SURGERY         bleeding ulcer, cyst removed from abd    COLONOSCOPY        CORONARY ANGIOPLASTY WITH STENT PLACEMENT   1999     x1     ESOPHAGOGASTRODUODENOSCOPY        IR BIOPSY LUNG   10/05/2021    IR THORACENTESIS   11/08/2021    IR THORACENTESIS   6/5/2023    IR THORACENTESIS   9/13/2023    KNEE SURGERY Left      NH BRNCHSC INCL FLUOR GDNCE DX W/CELL WASHG SPX N/A 11/29/2021     Procedure: BRONCHOSCOPY FLEXIBLE;  Surgeon: Cachorro Jason MD;  Location: [facility];  Service: Thoracic    NH MEDIASTINOSCOPY WITH LYMPH NODE BIOPSY/IES N/A 11/29/2021     Procedure: MEDIASTINOSCOPY;  Surgeon: Cachorro Jason MD;  Location: [facility];  Service: Thoracic    PROSTATE SURGERY             Family history: 3 sons in good general health, no known familial or genetic diseases     Social History               Socioeconomic History    Marital status: /Civil Union       Spouse name: Not on file    Number of children: Not on file    Years of education:  Not on file    Highest education level: Not on file   Occupational History    Not on file   Tobacco Use    Smoking status: Former       Packs/day: 1.00       Years: 30.00       Total pack years: 30.00       Types: Cigarettes       Quit date:        Years since quittin.8    Smokeless tobacco: Never   Vaping Use    Vaping Use: Never used   Substance and Sexual Activity    Alcohol use: Yes       Alcohol/week: 4.0 - 6.0 standard drinks of alcohol       Types: 1 Glasses of wine, 3 - 5 Cans of beer per week       Comment: few beers day    Drug use: Never    Sexual activity: Not on file   Other Topics Concern    Not on file   Social History Narrative    Not on file      Social Determinants of Health           Financial Resource Strain: Medium Risk (2023)     Overall Financial Resource Strain (CARDIA)      Difficulty of Paying Living Expenses: Somewhat hard   Food Insecurity: No Food Insecurity (2022)     Hunger Vital Sign      Worried About Running Out of Food in the Last Year: Never true      Ran Out of Food in the Last Year: Never true   Transportation Needs: No Transportation Needs (2023)     PRAPARE - Transportation      Lack of Transportation (Medical): No      Lack of Transportation (Non-Medical): No   Physical Activity: Not on file   Stress: Not on file   Social Connections: Not on file   Intimate Partner Violence: Not on file   Housing Stability: Low Risk  (2022)     Housing Stability Vital Sign      Unable to Pay for Housing in the Last Year: No      Number of Places Lived in the Last Year: 1      Unstable Housing in the Last Year: No      Social history:  40 pack-year history discontinued 20 years ago, patient drinks 2-4 beers a day off and on, no drug abuse, 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)     Current Outpatient Medications:     acetaminophen (TYLENOL) 325 mg tablet, Take 2 tablets (650 mg total) by mouth every 6 (six) hours as needed for mild  pain, headaches or fever, Disp: 30 tablet, Rfl: 0    albuterol (2.5 mg/3 mL) 0.083 % nebulizer solution, Take 2.5 mg by nebulization As needed per patient's wife , Disp: , Rfl:     apixaban (Eliquis) 5 mg, Take 1 tablet (5 mg total) by mouth 2 (two) times a day, Disp: 180 tablet, Rfl: 1    atenolol (TENORMIN) 25 mg tablet, , Disp: , Rfl:     atorvastatin (LIPITOR) 10 mg tablet, Take 1 tablet (10 mg total) by mouth daily, Disp: 90 tablet, Rfl: 1    carvedilol (COREG) 25 mg tablet, Take 1 tablet (25 mg total) by mouth 2 (two) times a day with meals, Disp: 180 tablet, Rfl: 1    ciclopirox (LOPROX) 0.77 % cream, Apply topically 2 (two) times a day for 20 days, Disp: 45 g, Rfl: 2    fluticasone-umeclidinium-vilanterol (Trelegy Ellipta) 100-62.5-25 mcg/actuation inhaler, Rinse mouth after use., Disp: 60 each, Rfl: 5    guaifenesin-codeine (GUAIFENESIN AC) 100-10 MG/5ML liquid, Take 10 mL by mouth 3 (three) times a day as needed for cough, Disp: 180 mL, Rfl: 0    halobetasol (ULTRAVATE) 0.05 % cream, , Disp: , Rfl:     hydrocortisone 2.5 % cream, Apply topically 2 (two) times a day Apply twice a day affected area the trunk, Disp: 20 g, Rfl: 2    latanoprost (XALATAN) 0.005 % ophthalmic solution, Administer 0.005 mL to both eyes daily at bedtime, Disp: , Rfl:     metFORMIN (GLUCOPHAGE) 500 mg tablet, Take 1 tablet (500 mg total) by mouth daily with breakfast, Disp: , Rfl:     mirtazapine (REMERON) 7.5 MG tablet, Take 1 tablet (7.5 mg total) by mouth daily at bedtime, Disp: 90 tablet, Rfl: 3    Multiple Vitamin (MULTI-VITAMIN DAILY PO), Take by mouth daily  , Disp: , Rfl:     omeprazole (PriLOSEC) 20 mg delayed release capsule, TAKE 1 CAPSULE DAILY, Disp: 90 capsule, Rfl: 1    primidone (MYSOLINE) 50 mg tablet, TAKE 2 TABS AT 8PM., Disp: 180 tablet, Rfl: 1    spironolactone (ALDACTONE) 25 mg tablet, Take 1 tablet (25 mg total) by mouth daily, Disp: 90 tablet, Rfl: 1    aspirin (ECOTRIN LOW STRENGTH) 81 mg EC tablet, , Disp:  , Rfl:      No Known Allergies         Vitals:     10/20/23 0926   BP: 114/68   Pulse: 80   Resp: 17   Temp: 97.9 °F (36.6 °C)   SpO2: 92%      Physical Exam  Constitutional:       Appearance: He is well-developed.      Comments: Obese male, no respiratory distress, no signs of pain   HENT:      Head: Normocephalic and atraumatic.      Right Ear: External ear normal.      Left Ear: External ear normal.   Eyes:      Conjunctiva/sclera: Conjunctivae normal.      Pupils: Pupils are equal, round, and reactive to light.   Cardiovascular:      Rate and Rhythm: Normal rate and regular rhythm.      Heart sounds: Normal heart sounds.   Pulmonary:      Effort: Pulmonary effort is normal.      Comments: Left chest wall dressing (pleural catheter) C/D/I. Decreased breath sounds left lower lobe  Abdominal:      General: Bowel sounds are normal. There is distension.      Palpations: Abdomen is soft.      Comments: Obese, distended, soft +bowel sounds, no guarding   Musculoskeletal:         General: Normal range of motion.      Cervical back: Normal range of motion and neck supple.   Skin:     General: Skin is warm.      Comments: Relatively good color, warm, moist, scattered mild areas of ecchymosis upper extremities   Neurological:      Mental Status: He is alert and oriented to person, place, and time.      Deep Tendon Reflexes: Reflexes are normal and symmetric.   Psychiatric:         Behavior: Behavior normal.         Thought Content: Thought content normal.         Judgment: Judgment normal.      Extremities:  No lower extremity edema bilaterally, no cords, pulses are 1+   Lymphatics:  No adenopathy in the neck, supraclavicular region, axilla bilateral     Labs    2/26/2024 WBC = 6.79 hemoglobin = 13 hematocrit = 40.4 platelet = 430 BUN = 14 creatinine = 0.89 AST = 17 ALT = 20 alkaline phosphatase = 144 total bilirubin = 0.53 PSA = 0.01    1/17/2024 WBC = 9.33 hemoglobin = 14 hematocrit = 42 platelet = 288 neutrophil = 76%  BUN = 19 creatinine = 0.98 calcium = 8.7 LFTs WNL alkaline phosphatase = 130 TSH = 0.234    Imaging    10/30/2023 PET/CT    CHEST:  Best seen on image 76 of series 4 is a hypermetabolic centrally photopenic left lower lobe lung mass measuring 7.1 x 6.4 cm with max SUV of 12.0, previously measuring 4.5 x 4.0 cm with max SUV of 19.7 on PET/CT dated 10/29/2021 and approximately 4.3 x 3.7 cm when utilizing similar measurement technique and CT of chest dated 12/23/2022. The interval growth in size since 12/23/2022 suggest progression of hypermetabolic malignancy.     Subtle FDG activity is noted with a stable in size prominent right paratracheal lymph node on image 58 of series 4 measuring 1 1.4 cm in short axis with max SUV of 2.8, previously 3.5. The stability favors benign reactive lymph node with early pamela metastatic disease considered less likely    IMPRESSION:     1. Hypermetabolic left lower lobe lung malignancy has significantly increased in size since 12/23/2022 suggesting progression of malignancy.  2. Again noted is nonspecific FDG activity involving the sacrum and left posterior iliac bone without definitive correlate on low-dose unenhanced CT of uncertain clinical significance. Findings can be further characterized with MRI of the bony pelvis as clinically indicated.  3. Moderate left pleural effusion.     9/28/23: CT Chest w contrast:     Impression: Since September 12, 2023, decrease in size of the still large left pleural effusion with commensurate decrease in left-sided passive atelectasis. Persistent hypoattenuation in the collapsed portion of the left lower lobe. Given that this is in the   vicinity of the treated tumor, and has increased in size since December 2022, short interval contrast-enhanced CT is advised following resolution of the pleural effusion for further assessment.     1/27/2023 CT chest without contrast    IMPRESSION:  1.  Slightly decreased left pleural effusion with improved  passive atelectasis of the left lower lobe. Previously noted left lower lobe mass is difficult to visualize on noncontrast exam but appears likely unchanged from most recent study.  2.  Similar appearance of skin thickening and subcutaneous stranding in the right axillary region which could represent cellulitis.       05/04/2022 chest x-ray portable.  Impression stated left small effusion and parenchymal process appears unchanged.     05/02/2022 CT scan the chest without contrast     IMPRESSION:  1.  Increase in size of a left lower lobe patchy opacity with new right basilar patchy densities concerning for pulmonary infiltrates with additional patchy and reticulonodular changes lingular segment left upper lobe and right middle lobe also likely related to infectious process.  2.  Left lower lobe cavitary mass has decreased in size now measuring 3.3 x 1.9 cm, previously 4.1 x 2.2 cm.  3.  Stable COPD and pulmonary fibrosis with the latter likely related to radiation change.     11/19/2021 MRI brain.  Impression stated white matter changes suggestive of chronic microangiopathy.  No acute intracranial pathology.     10/29/2021 PET-CT     1.  Hypermetabolic necrotic 4.5 x 4 cm left lower lung mass compatible with known malignancy.  2.  2 mildly hypermetabolic right paratracheal mediastinal nodes for which early metastases are not excluded.  3.  Small mildly hypermetabolic left pleural effusion for which malignant effusion is not excluded.  4.  Additional scattered tiny nodular lung densities may be reassessed on follow-up CT.  5.  Probable reactive subcentimeter right cervical node may be reassessed on follow-up PET/CT.  6.  No hypermetabolic soft tissue metastases in the abdomen, pelvis.  7.  Minimal inhomogeneous FDG activity in the sacrum and left posterior iliac, though without dominant focal lesion.  This may be reassessed on follow-up exam.     Pathology    12/7/2023 [facility].  No action mutations were detected.   Tumor mutational burden = 6 = low.  Patient had a pathologic variant in PTEN and TP53.  PDL IHC TPS = 0%, negative.     Case Report   Surgical Pathology Report                         Case: [id number]                                    Authorizing Provider:  Cachorro Jason MD       Collected:           11/29/2021 1005               Ordering Location:     [facility]      Received:            11/29/2021 [address] [place] [address] Operating Room                                                       Pathologist:           Juan José Dietz MD                                                         Specimens:   A) - Lymph Node, level 7                                                                             B) - Lymph Node, level 4R                                                                            C) - Lymph Node, level 2R                                                                            D) - Lymph Node, level 4L                                                                   Final Diagnosis   A. Lymph node, level 7, excision:  -  Portions of benign lymph node with reactive change and anthracosis, negative for granulomas, lymphoma, or metastatic carcinoma.    B. Lymph node, level 4R, excision:  -  Portions of benign lymph node with reactive change and anthracosis, negative for granulomas, lymphoma, or metastatic carcinoma.    C. Lymph node, level 2R, excision:  -  Portions of benign lymph node with reactive change and anthracosis, negative for granulomas, lymphoma, or metastatic carcinoma.    D. Lymph node, level 4L, excision:  -  Benign lymph node with reactive change and anthracosis, negative for granulomas, lymphoma, or metastatic carcinoma.      Electronically signed by Juan José Dietz MD on 12/2/2021 at 11:29 AM         Case Report   Surgical Pathology Report                         Case: [id number]                                    Authorizing  Provider:  Elmer Laird MD      Collected:           10/05/2021 1058               Ordering Location:     On license of [facility] Received:            10/05/2021 1120                                      CAT Scan                                                                      Pathologist:           Mary Dean MD                                                         Specimen:    Lung, Left Lower Lobe                                                                       Final Diagnosis   A. Lung, Left Lower Lobe, :  - Invasive squamous carcinoma, moderately differentiated, keratinizing type.  - Tumor is highlighted with P 40 immunoperoxidase stain.  - Prominent tumor necrosis is noted.      Best representative block with tumor A1.  This case was reviewed at the intradepartmental  conference.   RADHA Navarrete, Pulmonology, is notified of the diagnosis in [facility] via [facility] on 10/06/2021  at 2.40 pm.   Electronically signed by Mary Dean MD on 10/6/2021 at  2:44 PM

## 2024-03-18 RX ORDER — SODIUM CHLORIDE 9 MG/ML
20 INJECTION, SOLUTION INTRAVENOUS ONCE
Status: CANCELLED | OUTPATIENT
Start: 2024-03-25

## 2024-03-20 ENCOUNTER — APPOINTMENT (OUTPATIENT)
Dept: LAB | Facility: HOSPITAL | Age: 84
End: 2024-03-20
Attending: INTERNAL MEDICINE
Payer: COMMERCIAL

## 2024-03-20 DIAGNOSIS — C34.92 SQUAMOUS CELL CARCINOMA OF LEFT LUNG (HCC): ICD-10-CM

## 2024-03-20 DIAGNOSIS — E11.40 TYPE 2 DIABETES MELLITUS WITH DIABETIC NEUROPATHY, WITHOUT LONG-TERM CURRENT USE OF INSULIN (HCC): ICD-10-CM

## 2024-03-20 LAB
ALBUMIN SERPL BCP-MCNC: 3.1 G/DL (ref 3.5–5)
ALP SERPL-CCNC: 168 U/L (ref 34–104)
ALT SERPL W P-5'-P-CCNC: 45 U/L (ref 7–52)
ANION GAP SERPL CALCULATED.3IONS-SCNC: 6 MMOL/L (ref 4–13)
AST SERPL W P-5'-P-CCNC: 47 U/L (ref 13–39)
BASOPHILS # BLD AUTO: 0.05 THOUSANDS/ÂΜL (ref 0–0.1)
BASOPHILS NFR BLD AUTO: 1 % (ref 0–1)
BILIRUB SERPL-MCNC: 0.69 MG/DL (ref 0.2–1)
BUN SERPL-MCNC: 15 MG/DL (ref 5–25)
CALCIUM ALBUM COR SERPL-MCNC: 9.8 MG/DL (ref 8.3–10.1)
CALCIUM SERPL-MCNC: 9.1 MG/DL (ref 8.4–10.2)
CHLORIDE SERPL-SCNC: 96 MMOL/L (ref 96–108)
CO2 SERPL-SCNC: 30 MMOL/L (ref 21–32)
CREAT SERPL-MCNC: 1.02 MG/DL (ref 0.6–1.3)
EOSINOPHIL # BLD AUTO: 1.43 THOUSAND/ÂΜL (ref 0–0.61)
EOSINOPHIL NFR BLD AUTO: 18 % (ref 0–6)
ERYTHROCYTE [DISTWIDTH] IN BLOOD BY AUTOMATED COUNT: 15.2 % (ref 11.6–15.1)
EST. AVERAGE GLUCOSE BLD GHB EST-MCNC: 200 MG/DL
GFR SERPL CREATININE-BSD FRML MDRD: 68 ML/MIN/1.73SQ M
GLUCOSE P FAST SERPL-MCNC: 213 MG/DL (ref 65–99)
HBA1C MFR BLD: 8.6 %
HCT VFR BLD AUTO: 37.5 % (ref 36.5–49.3)
HGB BLD-MCNC: 11.5 G/DL (ref 12–17)
IMM GRANULOCYTES # BLD AUTO: 0.06 THOUSAND/UL (ref 0–0.2)
IMM GRANULOCYTES NFR BLD AUTO: 1 % (ref 0–2)
LYMPHOCYTES # BLD AUTO: 0.86 THOUSANDS/ÂΜL (ref 0.6–4.47)
LYMPHOCYTES NFR BLD AUTO: 11 % (ref 14–44)
MCH RBC QN AUTO: 28.6 PG (ref 26.8–34.3)
MCHC RBC AUTO-ENTMCNC: 30.7 G/DL (ref 31.4–37.4)
MCV RBC AUTO: 93 FL (ref 82–98)
MONOCYTES # BLD AUTO: 0.96 THOUSAND/ÂΜL (ref 0.17–1.22)
MONOCYTES NFR BLD AUTO: 12 % (ref 4–12)
NEUTROPHILS # BLD AUTO: 4.55 THOUSANDS/ÂΜL (ref 1.85–7.62)
NEUTS SEG NFR BLD AUTO: 57 % (ref 43–75)
NRBC BLD AUTO-RTO: 0 /100 WBCS
PLATELET # BLD AUTO: 506 THOUSANDS/UL (ref 149–390)
PMV BLD AUTO: 9.1 FL (ref 8.9–12.7)
POTASSIUM SERPL-SCNC: 5.4 MMOL/L (ref 3.5–5.3)
PROT SERPL-MCNC: 7 G/DL (ref 6.4–8.4)
RBC # BLD AUTO: 4.02 MILLION/UL (ref 3.88–5.62)
SODIUM SERPL-SCNC: 132 MMOL/L (ref 135–147)
T3FREE SERPL-MCNC: 3.21 PG/ML (ref 2.5–3.9)
T4 FREE SERPL-MCNC: 0.69 NG/DL (ref 0.61–1.12)
TSH SERPL DL<=0.05 MIU/L-ACNC: 11.56 UIU/ML (ref 0.45–4.5)
WBC # BLD AUTO: 7.91 THOUSAND/UL (ref 4.31–10.16)

## 2024-03-20 PROCEDURE — 36415 COLL VENOUS BLD VENIPUNCTURE: CPT

## 2024-03-20 PROCEDURE — 80053 COMPREHEN METABOLIC PANEL: CPT

## 2024-03-20 PROCEDURE — 84443 ASSAY THYROID STIM HORMONE: CPT

## 2024-03-20 PROCEDURE — 85025 COMPLETE CBC W/AUTO DIFF WBC: CPT

## 2024-03-20 PROCEDURE — 83036 HEMOGLOBIN GLYCOSYLATED A1C: CPT

## 2024-03-20 PROCEDURE — 84481 FREE ASSAY (FT-3): CPT

## 2024-03-20 PROCEDURE — 84439 ASSAY OF FREE THYROXINE: CPT

## 2024-03-21 DIAGNOSIS — C34.92 SQUAMOUS CELL CARCINOMA OF LEFT LUNG (HCC): Primary | ICD-10-CM

## 2024-03-22 ENCOUNTER — OFFICE VISIT (OUTPATIENT)
Dept: CARDIOLOGY CLINIC | Facility: CLINIC | Age: 84
End: 2024-03-22
Payer: COMMERCIAL

## 2024-03-22 VITALS
HEART RATE: 80 BPM | SYSTOLIC BLOOD PRESSURE: 120 MMHG | DIASTOLIC BLOOD PRESSURE: 60 MMHG | BODY MASS INDEX: 28.77 KG/M2 | WEIGHT: 201 LBS | OXYGEN SATURATION: 97 % | HEIGHT: 70 IN

## 2024-03-22 DIAGNOSIS — E78.2 MIXED HYPERLIPIDEMIA: ICD-10-CM

## 2024-03-22 DIAGNOSIS — I25.10 CORONARY ARTERY DISEASE INVOLVING NATIVE CORONARY ARTERY OF NATIVE HEART WITHOUT ANGINA PECTORIS: ICD-10-CM

## 2024-03-22 DIAGNOSIS — I10 PRIMARY HYPERTENSION: ICD-10-CM

## 2024-03-22 DIAGNOSIS — I50.32 CHRONIC DIASTOLIC HEART FAILURE (HCC): ICD-10-CM

## 2024-03-22 DIAGNOSIS — I25.10 ATHEROSCLEROSIS OF NATIVE CORONARY ARTERY OF NATIVE HEART WITHOUT ANGINA PECTORIS: ICD-10-CM

## 2024-03-22 DIAGNOSIS — I48.19 PERSISTENT ATRIAL FIBRILLATION (HCC): Primary | ICD-10-CM

## 2024-03-22 PROCEDURE — 99214 OFFICE O/P EST MOD 30 MIN: CPT | Performed by: INTERNAL MEDICINE

## 2024-03-22 PROCEDURE — 93000 ELECTROCARDIOGRAM COMPLETE: CPT | Performed by: INTERNAL MEDICINE

## 2024-03-22 NOTE — PROGRESS NOTES
Cardiology   Renard Horton DO, [facility]  Craig Zuluaga MD, [facility]  Singh Mercedes MD, [facility]  Tricia Matias MD, [facility]  -------------------------------------------------------------------  [facility]  [address], [address], [address]  [place], [place], [address]  [phone]    Cardiology Follow Up  Bridger Clayton  1940  [id number]          Assessment/Plan:    1. Persistent atrial fibrillation (HCC)  -   Patient remains rate controlled with carvedilol.  He is asymptomatic without any palpitations.  -   Tolerating Eliquis.   Dose was increased to 2.5 mg twice daily by oncologist.    2. Chronic diastolic heart failure (HCC)  -   Echocardiogram showed ejection fraction of 50-55%.       3. Atherosclerosis of native coronary artery of native heart without angina pectoris  -   Patient has a history of coronary disease with prior PCI over 20 years ago.  -   Stress test did not show any ischemia or infarction.      4.  Hypertension  -   Blood pressure controlled with losartan 50 mg daily.  -He is also on spironolactone which will be stopped due to hyperkalemia    6. 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HCC)  -  Continue atorvastatin.      Interval History:     Bridger Clayton is 83 y.o. male here for followup of atrial fibrillation.     Since his last visit, he continues to undergo treatment for lung cancer.  He is undergoing chemotherapy.  He feels pruritus and fatigue.  He also feels cold all the time.  He denies any chest pain.  He feels some exertional dyspnea which has been unchanged from previous.  Weight has been unchanged.    Previously, he had sleep study done which was abnormal.  Went for CPAP fitting but could not tolerate CPAP machine.  Previously, the patient was admitted to [facility] on September 29, 2021 with shortness of breath.  Workup in the emergency room revealed RSV and a left lower lobe lung mass.  He was also found to be in atrial  fibrillation.   He was started on atenolol and Eliquis. Echocardiogram showed ejection fraction of 50-55% and mild valve disease.   Patient had a PET scan done on October 29th which showed a 4.5 x 4 cm left lower lobe lung nodule with paratracheal nodes mildly hypermetabolic and a small mildly hypermetabolic left pleural effusion. He was evaluated by CT surgery who did not believe patient was candidate for resection.  He underwent radiation therapy.    The following portions of the patient's history were reviewed and updated as appropriate: allergies, current medications, past family history, past medical history, past social history, past surgical history, and problem list.       Current Outpatient Medications:     acetaminophen (TYLENOL) 325 mg tablet, Take 2 tablets (650 mg total) by mouth every 6 (six) hours as needed for mild pain, headaches or fever, Disp: 30 tablet, Rfl: 0    albuterol (2.5 mg/3 mL) 0.083 % nebulizer solution, Take 2.5 mg by nebulization As needed per patient's wife, Disp: , Rfl:     apixaban (Eliquis) 5 mg, Take 1 tablet (5 mg total) by mouth 2 (two) times a day (Patient taking differently: Take 2.5 mg by mouth 2 (two) times a day), Disp: 180 tablet, Rfl: 0    atenolol (TENORMIN) 25 mg tablet, , Disp: , Rfl:     atorvastatin (LIPITOR) 10 mg tablet, Take 1 tablet (10 mg total) by mouth daily, Disp: 90 tablet, Rfl: 1    carvedilol (COREG) 25 mg tablet, TAKE 1 TABLET TWO TIMES A DAY WITH MEALS, Disp: 180 tablet, Rfl: 1    fluticasone-umeclidinium-vilanterol (Trelegy Ellipta) 100-62.5-25 mcg/actuation inhaler, Rinse mouth after use., Disp: 60 each, Rfl: 5    halobetasol (ULTRAVATE) 0.05 % cream, , Disp: , Rfl:     hydrocortisone 2.5 % cream, Apply topically 2 (two) times a day Apply twice a day affected area the trunk, Disp: 20 g, Rfl: 2    hydrOXYzine HCL (ATARAX) 50 mg tablet, Take 1 tablet (50 mg total) by mouth every 8 (eight) hours as needed for itching, Disp: 90 tablet, Rfl: 1     "latanoprost (XALATAN) 0.005 % ophthalmic solution, Administer 0.005 mL to both eyes daily at bedtime, Disp: , Rfl:     mirtazapine (REMERON) 7.5 MG tablet, Take 1 tablet (7.5 mg total) by mouth daily at bedtime, Disp: 90 tablet, Rfl: 3    Multiple Vitamin (MULTI-VITAMIN DAILY PO), Take by mouth daily  , Disp: , Rfl:     omeprazole (PriLOSEC) 20 mg delayed release capsule, TAKE 1 CAPSULE DAILY, Disp: 90 capsule, Rfl: 1    primidone (MYSOLINE) 50 mg tablet, TAKE 2 TABS AT 8PM., Disp: 180 tablet, Rfl: 1    spironolactone (ALDACTONE) 25 mg tablet, Take 1 tablet (25 mg total) by mouth daily, Disp: 90 tablet, Rfl: 1    triamcinolone (KENALOG) 0.1 % cream, , Disp: , Rfl:     aspirin (ECOTRIN LOW STRENGTH) 81 mg EC tablet, , Disp: , Rfl:     ciclopirox (LOPROX) 0.77 % cream, Apply topically 2 (two) times a day for 20 days, Disp: 45 g, Rfl: 2    guaifenesin-codeine (GUAIFENESIN AC) 100-10 MG/5ML liquid, Take 10 mL by mouth 3 (three) times a day as needed for cough (Patient not taking: Reported on 2/28/2024), Disp: 473 mL, Rfl: 0    methylPREDNISolone 4 MG tablet therapy pack, Use as directed on package (Patient not taking: Reported on 3/22/2024), Disp: 21 each, Rfl: 0        Review of Systems:  Review of Systems   Constitutional:  Positive for fatigue.   Respiratory:  Positive for shortness of breath.    Cardiovascular:  Negative for chest pain, palpitations and leg swelling.   Musculoskeletal:  Positive for arthralgias and myalgias.   All other systems reviewed and are negative.        Physical Exam:  Vitals:  Vitals:    03/22/24 1537   BP: 120/60   BP Location: Right arm   Patient Position: Sitting   Cuff Size: Standard   Pulse: 80   SpO2: 97%   Weight: 91.2 kg (201 lb)   Height: 5' 10\" (1.778 m)     Physical Exam   Constitutional: He appears healthy. No distress.   Eyes: Pupils are equal, round, and reactive to light. Conjunctivae are normal.   Neck: No JVD present.   Cardiovascular: Normal rate. An irregular rhythm " present. Exam reveals no gallop and no friction rub.   Murmur heard.  Pulmonary/Chest: Effort normal and breath sounds normal. He has no wheezes. He has no rales.   Musculoskeletal:         General: No tenderness, deformity or edema.      Cervical back: Normal range of motion and neck supple.   Neurological: He is alert and oriented to person, place, and time.   Skin: Skin is warm and dry.        Cardiographics:  EKG: Personally reviewed   Atrial flutter  With rate 85  Last known EF: 50-55%    This note was completed in part utilizing M-Modal Fluency Direct Software.  Grammatical errors, random word insertions, spelling mistakes, and incomplete sentences can be an occasional consequence of this system secondary to software limitations, ambient noise, and hardware issues.  If you have any questions or concerns about the content, text, or information contained within the body of this dictation, please contact the provider for clarification.

## 2024-03-25 ENCOUNTER — HOSPITAL ENCOUNTER (OUTPATIENT)
Dept: INFUSION CENTER | Facility: HOSPITAL | Age: 84
Discharge: HOME | End: 2024-03-25
Attending: INTERNAL MEDICINE
Payer: COMMERCIAL

## 2024-03-25 VITALS
HEART RATE: 109 BPM | OXYGEN SATURATION: 97 % | SYSTOLIC BLOOD PRESSURE: 139 MMHG | HEIGHT: 70 IN | RESPIRATION RATE: 18 BRPM | BODY MASS INDEX: 28.5 KG/M2 | TEMPERATURE: 97 F | WEIGHT: 199.08 LBS | DIASTOLIC BLOOD PRESSURE: 78 MMHG

## 2024-03-25 DIAGNOSIS — C34.92 SQUAMOUS CELL CARCINOMA OF LEFT LUNG (HCC): Primary | ICD-10-CM

## 2024-03-25 PROCEDURE — 96413 CHEMO IV INFUSION 1 HR: CPT

## 2024-03-25 PROCEDURE — 96375 TX/PRO/DX INJ NEW DRUG ADDON: CPT

## 2024-03-25 PROCEDURE — 96368 THER/DIAG CONCURRENT INF: CPT

## 2024-03-25 PROCEDURE — 96415 CHEMO IV INFUSION ADDL HR: CPT

## 2024-03-25 RX ORDER — SODIUM CHLORIDE 9 MG/ML
20 INJECTION, SOLUTION INTRAVENOUS ONCE
Status: COMPLETED | OUTPATIENT
Start: 2024-03-25 | End: 2024-03-25

## 2024-03-25 RX ADMIN — PACLITAXEL 282 MG: 6 INJECTION, SOLUTION, CONCENTRATE INTRAVENOUS at 11:03

## 2024-03-25 RX ADMIN — DEXAMETHASONE SODIUM PHOSPHATE: 10 INJECTION, SOLUTION INTRAMUSCULAR; INTRAVENOUS at 10:16

## 2024-03-25 RX ADMIN — DIPHENHYDRAMINE HYDROCHLORIDE 25 MG: 50 INJECTION, SOLUTION INTRAMUSCULAR; INTRAVENOUS at 10:38

## 2024-03-25 RX ADMIN — SODIUM CHLORIDE 20 ML/HR: 0.9 INJECTION, SOLUTION INTRAVENOUS at 09:53

## 2024-03-25 RX ADMIN — FAMOTIDINE 20 MG: 10 INJECTION INTRAVENOUS at 09:54

## 2024-03-25 NOTE — PROGRESS NOTES
..Bridger Clayton  tolerated treatment well with no complications.      Bridger Clayton is aware of future appt on 4/15 at 0900.     AVS printed and given to Bridger Clayton: yes

## 2024-03-25 NOTE — PLAN OF CARE
Problem: Potential for Falls  Goal: Patient will remain free of falls  Description: INTERVENTIONS:  - Educate patient/family on patient safety including physical limitations  - Instruct patient to call for assistance with activity   - Keep Call bell within reach  Outcome: Progressing     Problem: INFECTION - ADULT  Goal: Absence or prevention of progression during hospitalization  Description: INTERVENTIONS:  - Assess and monitor for signs and symptoms of infection  - Monitor lab/diagnostic results  - Monitor all insertion sites, i.e. indwelling lines, tubes, and drains  - Monitor endotracheal if appropriate and nasal secretions for changes in amount and color  - [place] appropriate cooling/warming therapies per order  - Administer medications as ordered  - Instruct and encourage patient and family to use good hand hygiene technique  - Identify and instruct in appropriate isolation precautions for identified infection/condition  Outcome: Progressing  Goal: Absence of fever/infection during neutropenic period  Description: INTERVENTIONS:  - Monitor WBC    Outcome: Progressing     Problem: DISCHARGE PLANNING  Goal: Discharge to home or other facility with appropriate resources  Description: INTERVENTIONS:  - Identify barriers to discharge w/patient and caregiver  - Arrange for needed discharge resources and transportation as appropriate  - Identify discharge learning needs (meds, wound care, etc.)  - Arrange for interpretive services to assist at discharge as needed  - Refer to Case Management Department for coordinating discharge planning if the patient needs post-hospital services based on physician/advanced practitioner order or complex needs related to functional status, cognitive ability, or social support system  Outcome: Progressing     Problem: Knowledge Deficit  Goal: Patient/family/caregiver demonstrates understanding of disease process, treatment plan, medications, and discharge  instructions  Description: Complete learning assessment and assess knowledge base.  Interventions:  - Provide teaching at level of understanding  - Provide teaching via preferred learning methods  Outcome: Progressing

## 2024-03-29 DIAGNOSIS — I48.19 PERSISTENT ATRIAL FIBRILLATION (HCC): ICD-10-CM

## 2024-03-29 RX ORDER — CARVEDILOL 25 MG/1
25 TABLET ORAL 2 TIMES DAILY WITH MEALS
Qty: 180 TABLET | Refills: 1 | Status: SHIPPED | OUTPATIENT
Start: 2024-03-29

## 2024-04-08 RX ORDER — SODIUM CHLORIDE 9 MG/ML
20 INJECTION, SOLUTION INTRAVENOUS ONCE
OUTPATIENT
Start: 2024-04-15

## 2024-04-11 ENCOUNTER — APPOINTMENT (OUTPATIENT)
Dept: LAB | Facility: HOSPITAL | Age: 84
End: 2024-04-11
Payer: COMMERCIAL

## 2024-04-11 DIAGNOSIS — C34.92 SQUAMOUS CELL CARCINOMA OF LEFT LUNG (HCC): ICD-10-CM

## 2024-04-11 LAB
ALBUMIN SERPL BCP-MCNC: 3 G/DL (ref 3.5–5)
ALP SERPL-CCNC: 130 U/L (ref 34–104)
ALT SERPL W P-5'-P-CCNC: 26 U/L (ref 7–52)
ANION GAP SERPL CALCULATED.3IONS-SCNC: 7 MMOL/L (ref 4–13)
AST SERPL W P-5'-P-CCNC: 25 U/L (ref 13–39)
BASOPHILS # BLD AUTO: 0.11 THOUSANDS/ÂΜL (ref 0–0.1)
BASOPHILS NFR BLD AUTO: 2 % (ref 0–1)
BILIRUB SERPL-MCNC: 0.53 MG/DL (ref 0.2–1)
BUN SERPL-MCNC: 10 MG/DL (ref 5–25)
CALCIUM ALBUM COR SERPL-MCNC: 9.7 MG/DL (ref 8.3–10.1)
CALCIUM SERPL-MCNC: 8.9 MG/DL (ref 8.4–10.2)
CHLORIDE SERPL-SCNC: 100 MMOL/L (ref 96–108)
CO2 SERPL-SCNC: 30 MMOL/L (ref 21–32)
CREAT SERPL-MCNC: 0.88 MG/DL (ref 0.6–1.3)
EOSINOPHIL # BLD AUTO: 1.18 THOUSAND/ÂΜL (ref 0–0.61)
EOSINOPHIL NFR BLD AUTO: 17 % (ref 0–6)
ERYTHROCYTE [DISTWIDTH] IN BLOOD BY AUTOMATED COUNT: 15.9 % (ref 11.6–15.1)
GFR SERPL CREATININE-BSD FRML MDRD: 79 ML/MIN/1.73SQ M
GLUCOSE P FAST SERPL-MCNC: 191 MG/DL (ref 65–99)
HCT VFR BLD AUTO: 39.4 % (ref 36.5–49.3)
HGB BLD-MCNC: 11.8 G/DL (ref 12–17)
IMM GRANULOCYTES # BLD AUTO: 0.09 THOUSAND/UL (ref 0–0.2)
IMM GRANULOCYTES NFR BLD AUTO: 1 % (ref 0–2)
LYMPHOCYTES # BLD AUTO: 0.94 THOUSANDS/ÂΜL (ref 0.6–4.47)
LYMPHOCYTES NFR BLD AUTO: 14 % (ref 14–44)
MCH RBC QN AUTO: 27.4 PG (ref 26.8–34.3)
MCHC RBC AUTO-ENTMCNC: 29.9 G/DL (ref 31.4–37.4)
MCV RBC AUTO: 92 FL (ref 82–98)
MONOCYTES # BLD AUTO: 1.29 THOUSAND/ÂΜL (ref 0.17–1.22)
MONOCYTES NFR BLD AUTO: 19 % (ref 4–12)
NEUTROPHILS # BLD AUTO: 3.18 THOUSANDS/ÂΜL (ref 1.85–7.62)
NEUTS SEG NFR BLD AUTO: 47 % (ref 43–75)
NRBC BLD AUTO-RTO: 0 /100 WBCS
PLATELET # BLD AUTO: 527 THOUSANDS/UL (ref 149–390)
PMV BLD AUTO: 9.1 FL (ref 8.9–12.7)
POTASSIUM SERPL-SCNC: 4.9 MMOL/L (ref 3.5–5.3)
PROT SERPL-MCNC: 6.3 G/DL (ref 6.4–8.4)
RBC # BLD AUTO: 4.3 MILLION/UL (ref 3.88–5.62)
SODIUM SERPL-SCNC: 137 MMOL/L (ref 135–147)
WBC # BLD AUTO: 6.79 THOUSAND/UL (ref 4.31–10.16)

## 2024-04-11 PROCEDURE — 85025 COMPLETE CBC W/AUTO DIFF WBC: CPT

## 2024-04-11 PROCEDURE — 80053 COMPREHEN METABOLIC PANEL: CPT

## 2024-04-11 PROCEDURE — 36415 COLL VENOUS BLD VENIPUNCTURE: CPT

## 2024-04-15 ENCOUNTER — HOSPITAL ENCOUNTER (OUTPATIENT)
Dept: INFUSION CENTER | Facility: HOSPITAL | Age: 84
Discharge: HOME | End: 2024-04-15
Attending: INTERNAL MEDICINE
Payer: COMMERCIAL

## 2024-04-15 VITALS
HEART RATE: 114 BPM | TEMPERATURE: 97.4 F | BODY MASS INDEX: 28.97 KG/M2 | OXYGEN SATURATION: 95 % | HEIGHT: 70 IN | RESPIRATION RATE: 18 BRPM | SYSTOLIC BLOOD PRESSURE: 135 MMHG | DIASTOLIC BLOOD PRESSURE: 73 MMHG | WEIGHT: 202.38 LBS

## 2024-04-15 DIAGNOSIS — C34.92 SQUAMOUS CELL CARCINOMA OF LEFT LUNG (HCC): Primary | ICD-10-CM

## 2024-04-15 PROCEDURE — 96413 CHEMO IV INFUSION 1 HR: CPT

## 2024-04-15 PROCEDURE — 96375 TX/PRO/DX INJ NEW DRUG ADDON: CPT

## 2024-04-15 PROCEDURE — 96415 CHEMO IV INFUSION ADDL HR: CPT

## 2024-04-15 PROCEDURE — 96367 TX/PROPH/DG ADDL SEQ IV INF: CPT

## 2024-04-15 RX ORDER — SODIUM CHLORIDE 9 MG/ML
20 INJECTION, SOLUTION INTRAVENOUS ONCE
Status: COMPLETED | OUTPATIENT
Start: 2024-04-15 | End: 2024-04-15

## 2024-04-15 RX ADMIN — DIPHENHYDRAMINE HYDROCHLORIDE 25 MG: 50 INJECTION, SOLUTION INTRAMUSCULAR; INTRAVENOUS at 10:03

## 2024-04-15 RX ADMIN — FAMOTIDINE 20 MG: 10 INJECTION, SOLUTION INTRAVENOUS at 09:18

## 2024-04-15 RX ADMIN — DEXAMETHASONE SODIUM PHOSPHATE: 10 INJECTION, SOLUTION INTRAMUSCULAR; INTRAVENOUS at 09:41

## 2024-04-15 RX ADMIN — PACLITAXEL 282 MG: 6 INJECTION, SOLUTION, CONCENTRATE INTRAVENOUS at 10:32

## 2024-04-15 RX ADMIN — SODIUM CHLORIDE 20 ML/HR: 0.9 INJECTION, SOLUTION INTRAVENOUS at 09:17

## 2024-04-15 NOTE — PROGRESS NOTES
..Bridger Clayton  tolerated treatment well with no complications.      Bridger Clayton is aware of future appt on 5/6 at 0900.     AVS printed and given to Bridger Clayton: Yes

## 2024-04-22 ENCOUNTER — HOSPITAL ENCOUNTER (OUTPATIENT)
Dept: RADIOLOGY | Facility: HOSPITAL | Age: 84
Discharge: HOME | End: 2024-04-22
Payer: COMMERCIAL

## 2024-04-22 ENCOUNTER — OFFICE VISIT (OUTPATIENT)
Dept: PULMONOLOGY | Facility: MEDICAL CENTER | Age: 84
End: 2024-04-22
Payer: COMMERCIAL

## 2024-04-22 VITALS
HEART RATE: 107 BPM | SYSTOLIC BLOOD PRESSURE: 122 MMHG | OXYGEN SATURATION: 98 % | DIASTOLIC BLOOD PRESSURE: 64 MMHG | RESPIRATION RATE: 12 BRPM | WEIGHT: 205.8 LBS | HEIGHT: 70 IN | BODY MASS INDEX: 29.46 KG/M2 | TEMPERATURE: 98.4 F

## 2024-04-22 DIAGNOSIS — T78.40XA ALLERGIC REACTION TO DRUG, INITIAL ENCOUNTER: ICD-10-CM

## 2024-04-22 DIAGNOSIS — J90 PLEURAL EFFUSION: Primary | ICD-10-CM

## 2024-04-22 DIAGNOSIS — J90 PLEURAL EFFUSION: ICD-10-CM

## 2024-04-22 DIAGNOSIS — C34.92 SQUAMOUS CELL CARCINOMA OF LEFT LUNG (HCC): ICD-10-CM

## 2024-04-22 DIAGNOSIS — J43.2 CENTRILOBULAR EMPHYSEMA (HCC): ICD-10-CM

## 2024-04-22 PROCEDURE — 71046 X-RAY EXAM CHEST 2 VIEWS: CPT

## 2024-04-22 PROCEDURE — 99214 OFFICE O/P EST MOD 30 MIN: CPT | Performed by: STUDENT IN AN ORGANIZED HEALTH CARE EDUCATION/TRAINING PROGRAM

## 2024-04-22 PROCEDURE — G2211 COMPLEX E/M VISIT ADD ON: HCPCS | Performed by: STUDENT IN AN ORGANIZED HEALTH CARE EDUCATION/TRAINING PROGRAM

## 2024-04-22 RX ORDER — HYDROXYZINE 50 MG/1
TABLET, FILM COATED ORAL
Qty: 90 TABLET | Refills: 1 | Status: SHIPPED | OUTPATIENT
Start: 2024-04-22

## 2024-04-22 NOTE — PROGRESS NOTES
Consultation - Pulmonary Medicine   Bridger Clayton 84 y.o. male MRN: [id number]      Reason for Consult: Pleural effusion    Bridger Clayton is a 84 y.o. male with a PMH of past medical history of HTN, Afib, CHF, Lung SCC(Radiation - recurrent effusion undiagnosed- lymph predominat), DM, HLD, CAD  and recurrent pleural effusion here for follow up     Pleural Effusion - Negative for malignancy - Exudative - Ascept Catheter placed 10/16/23 Symptoms improving with drainage. Now with very little drainage for over one month. Plan to remove catherter and will refer to internationalist.   - CXR  - Plan for removal   - Follow up 3 months    SCC - Lung Stage T2b - Taxol - Drainage improving with regimen and patient tolerating it.       Rodrigo Sal MD  [facility]    _____________________________________________________________________      Interval Hs 4/22/24:  Overall doing ok  SOB with minimal exertion- deconditioned  Drainage slowed down - roughly 1 month  Minimal improvement wit Pembrolizumab - Continue Taxol only - showing better response       Interval Hx 1/23/28:  Difficulty with draining, clogged  Every other day drainage - 500-550ml  ET: Dyspnea 1 flight  No infectious symptoms - denies cough  Chemo regimen Pembrolizumab, Taxol       Interval Hx: 12/18/23  Symptoms overall improve, decreased shortness of breath  Tolerated Asept Catheter Placement -Place 10/16/23 - Drainage 550ml q week  No chest pain, catheter C/D/I   No infectious symptoms  Diffuse pruritus  Started chemotherapy      HPI:    Bridger lCayton is a 84 y.o. 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  10/25/21  FEV1/FVC Ratio: 81 %  FEV1:  1.75 L     58 % predicted  Forced Vital Capacity: 2.15 L    52 % predicted  After administration of bronchodilator:  There is improvement in both FEV1 and FVC     Lung volumes: Total Lung Capacity 66 % predicted Residual volume 70 % predicted     DLCO NOT corrected for patients hemoglobin level: 54 % predicted     Flow volume loop:  Consistent with obstruction     Interpretation:     Spirometry demonstrates moderate restriction  There is improvement after administration of bronchodilator  Lung volumes show moderate restriction  Moderate diffusion impairment       Imaging:  I personally reviewed the images on the PAC system pertinent to today's visit  PET Scan  IMPRESSION:     1. Hypermetabolic left lower lobe lung malignancy has significantly increased in size since 12/23/2022 suggesting progression of malignancy.     2. Again noted is nonspecific FDG activity involving the sacrum and left posterior iliac bone without definitive correlate on low-dose unenhanced CT of uncertain clinical significance. Findings can be further characterized with MRI of the bony pelvis as   clinically indicated.     3. Moderate left pleural effusion.     CXR  IMPRESSION:     Large left pleural effusion and percutaneous chest tube in place.     Review of Systems:  Aside from what is mentioned in the HPI, the review of systems otherwise negative.    Immunization History   Administered Date(s) Administered    COVID-19 MODERNA VACC 0.25 ML IM BOOSTER 11/05/2021    COVID-19 MODERNA VACC 0.5 ML IM 02/26/2021, 03/26/2021    Influenza, high dose seasonal 0.7 mL 11/28/2022, 11/21/2023    Tdap 01/07/2021        Current Medications:    Current Outpatient Medications:     acetaminophen (TYLENOL) 325 mg tablet, Take 2 tablets (650 mg total) by mouth every 6 (six) hours as needed for mild pain, headaches or fever, Disp: 30 tablet, Rfl: 0    albuterol (2.5 mg/3 mL) 0.083 % nebulizer solution, Take 2.5 mg by nebulization As needed per patient's wife,  Disp: , Rfl:     apixaban (Eliquis) 5 mg, Take 1 tablet (5 mg total) by mouth 2 (two) times a day (Patient taking differently: Take 2.5 mg by mouth 2 (two) times a day), Disp: 180 tablet, Rfl: 0    atenolol (TENORMIN) 25 mg tablet, , Disp: , Rfl:     atorvastatin (LIPITOR) 10 mg tablet, Take 1 tablet (10 mg total) by mouth daily, Disp: 90 tablet, Rfl: 1    carvedilol (COREG) 25 mg tablet, Take 1 tablet (25 mg total) by mouth 2 (two) times a day with meals, Disp: 180 tablet, Rfl: 1    fluticasone-umeclidinium-vilanterol (Trelegy Ellipta) 100-62.5-25 mcg/actuation inhaler, Rinse mouth after use., Disp: 60 each, Rfl: 5    halobetasol (ULTRAVATE) 0.05 % cream, , Disp: , Rfl:     hydrocortisone 2.5 % cream, Apply topically 2 (two) times a day Apply twice a day affected area the trunk, Disp: 20 g, Rfl: 2    hydrOXYzine HCL (ATARAX) 50 mg tablet, Take 1 tablet (50 mg total) by mouth every 8 (eight) hours as needed for itching, Disp: 90 tablet, Rfl: 1    latanoprost (XALATAN) 0.005 % ophthalmic solution, Administer 0.005 mL to both eyes daily at bedtime, Disp: , Rfl:     mirtazapine (REMERON) 7.5 MG tablet, Take 1 tablet (7.5 mg total) by mouth daily at bedtime, Disp: 90 tablet, Rfl: 3    Multiple Vitamin (MULTI-VITAMIN DAILY PO), Take by mouth daily  , Disp: , Rfl:     omeprazole (PriLOSEC) 20 mg delayed release capsule, TAKE 1 CAPSULE DAILY, Disp: 90 capsule, Rfl: 1    primidone (MYSOLINE) 50 mg tablet, TAKE 2 TABS AT 8PM., Disp: 180 tablet, Rfl: 1    triamcinolone (KENALOG) 0.1 % cream, , Disp: , Rfl:     aspirin (ECOTRIN LOW STRENGTH) 81 mg EC tablet, , Disp: , Rfl:     ciclopirox (LOPROX) 0.77 % cream, Apply topically 2 (two) times a day for 20 days, Disp: 45 g, Rfl: 2    guaifenesin-codeine (GUAIFENESIN AC) 100-10 MG/5ML liquid, Take 10 mL by mouth 3 (three) times a day as needed for cough (Patient not taking: Reported on 2/28/2024), Disp: 473 mL, Rfl: 0    methylPREDNISolone 4 MG tablet therapy pack, Use as directed  on package (Patient not taking: Reported on 3/22/2024), Disp: 21 each, Rfl: 0    Historical Information   Past Medical History:   Diagnosis Date    Abdominal pain     Anxiety     Arthritis     Asthma     Atrial fibrillation (HCC)     Back pain     Bleeding ulcer     Bronchitis     Cancer (HCC)     prostate 2011- radiation    Cardiac disease     cardiac stent x1    Cataract     starting    Chronic diastolic (congestive) heart failure (HCC)     Diabetes mellitus (HCC)     boarderline diabetic     GERD (gastroesophageal reflux disease)     History of radiation therapy 2010    Prostate seeds (brachytherapy) and EBRT    Hyperlipidemia     Hypertension     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, bilateral     Low back pain     Lung cancer (HCC)     Lung mass     Myocardial infarction (HCC)     mild 1999    Obesity     Prostate cancer (HCC)     Shortness of breath     Sleep apnea     sleep study 11/22    Wears dentures     full set    Wears glasses      Past Surgical History:   Procedure Laterality Date    ABDOMINAL HERNIA REPAIR      ABDOMINAL SURGERY      bleeding ulcer, cyst removed from abd    COLONOSCOPY      CORONARY ANGIOPLASTY WITH STENT PLACEMENT  1999    x1     ESOPHAGOGASTRODUODENOSCOPY      IR BIOPSY LUNG  10/05/2021    IR THORACENTESIS  11/08/2021    IR THORACENTESIS  6/5/2023    IR THORACENTESIS  9/13/2023    KNEE SURGERY Left     SD BRNCHSC INCL FLUOR GDNCE DX W/CELL WASHG SPX N/A 11/29/2021    Procedure: BRONCHOSCOPY FLEXIBLE;  Surgeon: Cachorro Jason MD;  Location: [facility];  Service: Thoracic    SD MEDIASTINOSCOPY WITH LYMPH NODE BIOPSY/IES N/A 11/29/2021    Procedure: MEDIASTINOSCOPY;  Surgeon: Cachorro Jason MD;  Location: [facility];  Service: Thoracic    PROSTATE SURGERY       Social History   Social History     Tobacco Use   Smoking Status Former    Current packs/day: 0.00    Average packs/day: 1 pack/day for 30.0 years (30.0 ttl pk-yrs)    Types: Cigarettes    Start date: 1969    Quit date: 1999     "Years since quittin.3   Smokeless Tobacco Never       Family History:   Family History   Problem Relation Age of Onset    Prostate cancer Brother     Cancer Maternal Uncle         colo rectal cancer    Cancer Paternal Aunt          PhysicalExamination:  Vitals:   /64 (BP Location: Left arm, Patient Position: Sitting, Cuff Size: Extra-Large)   Pulse (!) 107   Temp 98.4 °F (36.9 °C) (Temporal)   Resp 12   Ht 5' 10\" (1.778 m)   Wt 93.4 kg (205 lb 12.8 oz)   SpO2 98%   BMI 29.53 kg/m²     Appearance -- NAD, speaking full sentences  HEENT -- anicteric sclera, clear OP, MMM  Neck -- no JVD  Heart -- RRR, no murmurs  Lungs -- Diminished R  Abdomen -- soft, NTND, +bs  Extremities -- WWP, no LE edema  Skin -- Sun exposed rash, excoriation, Drain - no erythema, non-tender  Neuro -- A&Ox3, wnl  Psych -- no obvious depression or hallucination        Diagnostic Data:  Labs:  I personally reviewed the most recent laboratory data pertinent to today's visit    Lab Results   Component Value Date    WBC 6.79 2024    HGB 11.8 (L) 2024    HCT 39.4 2024    MCV 92 2024     (H) 2024     Lab Results   Component Value Date    CALCIUM 8.9 2024    K 4.9 2024    CO2 30 2024     2024    BUN 10 2024    CREATININE 0.88 2024     No results found for: \"IGE\"  Lab Results   Component Value Date    ALT 26 2024    AST 25 2024    ALKPHOS 130 (H) 2024           I have spent a total time of 30 minutes on 24 in caring for this patient including Diagnostic results, Prognosis, Risks and benefits of tx options, Instructions for management, Patient and family education, Importance of tx compliance, Risk factor reductions, Impressions, Counseling / Coordination of care, Documenting in the medical record, Reviewing / ordering tests, medicine, procedures  , Obtaining or reviewing history  , and Communicating with other healthcare professionals " .   _

## 2024-04-26 ENCOUNTER — OFFICE VISIT (OUTPATIENT)
Dept: HEMATOLOGY ONCOLOGY | Facility: MEDICAL CENTER | Age: 84
End: 2024-04-26
Payer: COMMERCIAL

## 2024-04-26 VITALS
WEIGHT: 205 LBS | SYSTOLIC BLOOD PRESSURE: 122 MMHG | HEART RATE: 84 BPM | DIASTOLIC BLOOD PRESSURE: 76 MMHG | OXYGEN SATURATION: 98 % | BODY MASS INDEX: 29.35 KG/M2 | HEIGHT: 70 IN | TEMPERATURE: 97.7 F | RESPIRATION RATE: 17 BRPM

## 2024-04-26 DIAGNOSIS — C34.32 MALIGNANT NEOPLASM OF LOWER LOBE, LEFT BRONCHUS OR LUNG (HCC): ICD-10-CM

## 2024-04-26 DIAGNOSIS — C34.92 SQUAMOUS CELL CARCINOMA OF LEFT LUNG (HCC): Primary | Chronic | ICD-10-CM

## 2024-04-26 PROCEDURE — 99214 OFFICE O/P EST MOD 30 MIN: CPT | Performed by: INTERNAL MEDICINE

## 2024-04-26 NOTE — PROGRESS NOTES
haris KADEN Clayton  1940  [facility]  [address] [place] [id number]     DISCUSSION/SUMMARY:     83-year-old male with a number of medical and cardiac problems previously found to have a left lower lobe mass. Initial biopsy demonstrated squamous cell carcinoma.  IR recently performed thoracentesis, minimal amount of fluid was removed but there was no evidence of metastatic disease; cytology was negative.  Patient was seen by thoracic surgery and underwent mediastinoscopy.  There was no evidence of metastatic disease in the sampled lymph nodes (2R, 4R, 4L, 7).  Tumor was 4.5 cm.  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  Patient was treated with SBRT and then went on to surveillance.    Eventual follow-up PET/CT demonstrated evidence of recurrence in the left lower lobe, same area where the original malignancy was found.  There was no clear evidence of spread to either hilar regions or the mediastinum but radiologist commented on 1 right paratracheal lymph node but very +/-.  There was nonspecific FDG activity in the sacrum and left posterior iliac bone, seen before.  Unknown clinical significance.    At that time, patient also began to have more problems with recurrent left-sided pleural effusions requiring a Pleurx catheter.  Previously wife was draining approximately 500 cc every 2 to 3 days.  Discontinued while patient was started on pembrolizumab, did not get significantly better.  Additionally patient developed a rash while on pembrolizumab.  The ICI was eventually discontinued and patient was started on paclitaxel.  Patient has completed 7 cycles.    Mr. Clayton continues to tolerate the Taxol well.  Recent blood work was okay/acceptable.  The rash is less/better than before.  No recent left-sided thoracentesis.  Patient has a pending appointment with pulmonary, the Pleurx catheter may in fact be  removed.  The plan is to continue with the Taxol for the time being.  Patient has been rescheduled for follow-up scanning.    Regimen  Paclitaxel 135 mg/m2 IV day 1 (85%)  Cycle length = 21 days  Goal = palliation and prolongation of life    Mr. Clayton knows to call if he has any other questions or concerns.  Patient was given a 6-week follow-up appointment but this may change if the scan results demonstrated concerning abnormalities.    Carefully review your medication list and verify that the list is accurate and up-to-date. Please call the hematology/oncology office if there are medications missing from the list, medications on the list that you are not currently taking or if there is a dosage or instruction that is different from how you're taking that medication.     Patient goals and areas of care: Continue with paclitaxel, repeat PET/CT  Barriers to care: none  Patient is able to self-care  ______________________________________________________________________________________         Chief Complaint   Patient presents with    Follow-up - recurrent non-small cell lung carcinoma      History of Present Illness:  83-year-old male with multiple medical and cardiac issue recently seen in the emergency room because of a cough.  Workup demonstrated a left lower lobe mass.  Biopsy demonstrated squamous cell carcinoma.  Workup did not demonstrated evidence of metastatic disease; mediastinoscopy was negative.  Patient was seen by thoracic surgery but was not felt to be a surgical candidate.  Patient was subsequently seen by radiation oncology and underwent SPRT.  Patient then went on surveillance.      Eventual repeat scanning was consistent with recurrence at the original site.  Mr. Clayton required a left-sided Pleurx catheter placed because of recurrent left-sided pleural effusion.  Patient was initially treated with pembrolizumab only but developed a significant rash requiring steroids.  Because of the rash and the  itchiness, patient did not wish to continue with the pembrolizumab; this was discontinued.  Options were discussed and patient began paclitaxel.  Patient has completed 7 cycles.    Presently Mr. Clayton states feeling okay, same as before.  The rash continues to slowly improve, less itching, no new lesions.  Wife states that she has not been able to remove any fluid from the Pleurx catheter for the past month.  No shortness of breath at rest, + dyspnea on exertion, same as before.  No fevers or signs of infection.  Appetite is okay, weight is stable.  Activities are limited but the same as before.  Patient has noticed minimal numbness in fingertips but no dropping of objects, no numbness in the toes, no tripping or falling.    Patient was diagnosed with prostate cancer (approximately 10 + years ago) and underwent seeds and external beam radiation.  No evidence of recurrence since that time.       Review of Systems   Constitutional:  Positive for fatigue.        Poor appetite and continuing weight loss   HENT: Negative.     Eyes: Negative.    Respiratory: Negative.     Cardiovascular: Negative.    Gastrointestinal: Negative.    Endocrine: Negative.    Genitourinary: Negative.    Musculoskeletal: Negative.    Skin: Negative.    Allergic/Immunologic: Negative.    Neurological: Negative.    Hematological: Negative.         Easy bruising   Psychiatric/Behavioral:  Negative for self-injury.    All other systems reviewed and are negative.         Patient Active Problem List   Diagnosis    Corns    Pain in both feet    Onychomycosis    Tinea pedis of both feet    Lung mass    Hyperlipidemia    Type 2 diabetes mellitus with diabetic neuropathy, without long-term current use of insulin (HCC)    Squamous cell carcinoma of lung (HCC)    Shortness of breath    Daytime hypersomnolence    Chronic diastolic heart failure (HCC)    SYLVESTER (obstructive sleep apnea)    Prostate cancer (HCC)    GERD (gastroesophageal reflux disease)    CAD  (coronary artery disease)    Other persistent atrial fibrillation (HCC)    Alcohol dependence (HCC)    Acute respiratory failure with hypoxemia (HCC)    Depression, recurrent (HCC)    COVID-19    Angioedema    Septic shock (HCC)    Cellulitis    Cellulitis of chest wall    Acute kidney injury (BUDDY) with acute tubular necrosis (ATN) (HCC)    Chronic obstructive pulmonary disease, unspecified COPD type (HCC)    Centrilobular emphysema (HCC)    Abdominal aortic aneurysm (HCC)    Hypertensive heart disease    Tremors of nervous system    Recurrent pleural effusion on left      Medical History        Past Medical History:   Diagnosis Date    Abdominal pain      Anxiety      Arthritis      Asthma      Atrial fibrillation (HCC)      Back pain      Bleeding ulcer      Bronchitis      Cancer (HCC)       prostate 2011- radiation    Cardiac disease       cardiac stent x1    Cataract       starting    Chronic diastolic (congestive) heart failure (HCC)      Diabetes mellitus (HCC)       boarderline diabetic     GERD (gastroesophageal reflux disease)      History of radiation therapy 2010     Prostate seeds (brachytherapy) and EBRT    Hyperlipidemia      Hypertension      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, bilateral      Low back pain      Lung cancer (HCC)      Lung mass      Myocardial infarction (HCC)       mild 1999    Obesity      Prostate cancer (HCC)      Shortness of breath      Sleep apnea       sleep study 11/22    Wears dentures       full set    Wears glasses           Surgical History         Past Surgical History:   Procedure Laterality Date    ABDOMINAL HERNIA REPAIR        ABDOMINAL SURGERY         bleeding ulcer, cyst removed from abd    COLONOSCOPY        CORONARY ANGIOPLASTY WITH STENT PLACEMENT   1999     x1     ESOPHAGOGASTRODUODENOSCOPY        IR BIOPSY LUNG   10/05/2021    IR THORACENTESIS   11/08/2021    IR THORACENTESIS   6/5/2023    IR THORACENTESIS   9/13/2023    KNEE SURGERY Left      [facility] INCL FLUOR  GDNCE DX W/CELL WASHG SPX N/A 2021     Procedure: BRONCHOSCOPY FLEXIBLE;  Surgeon: Cachorro Jason MD;  Location: [facility];  Service: Thoracic    MA MEDIASTINOSCOPY WITH LYMPH NODE BIOPSY/IES N/A 2021     Procedure: MEDIASTINOSCOPY;  Surgeon: Cachorro Jason MD;  Location: [facility];  Service: Thoracic    PROSTATE SURGERY             Family history: 3 sons in good general health, no known familial or genetic diseases     Social History               Socioeconomic History    Marital status: /Civil Union       Spouse name: Not on file    Number of children: Not on file    Years of education: Not on file    Highest education level: Not on file   Occupational History    Not on file   Tobacco Use    Smoking status: Former       Packs/day: 1.00       Years: 30.00       Total pack years: 30.00       Types: Cigarettes       Quit date:        Years since quittin.8    Smokeless tobacco: Never   Vaping Use    Vaping Use: Never used   Substance and Sexual Activity    Alcohol use: Yes       Alcohol/week: 4.0 - 6.0 standard drinks of alcohol       Types: 1 Glasses of wine, 3 - 5 Cans of beer per week       Comment: few beers day    Drug use: Never    Sexual activity: Not on file   Other Topics Concern    Not on file   Social History Narrative    Not on file      Social Determinants of Health           Financial Resource Strain: Medium Risk (2023)     Overall Financial Resource Strain (CARDIA)      Difficulty of Paying Living Expenses: Somewhat hard   Food Insecurity: No Food Insecurity (2022)     Hunger Vital Sign      Worried About Running Out of Food in the Last Year: Never true      Ran Out of Food in the Last Year: Never true   Transportation Needs: No Transportation Needs (2023)     PRAPARE - Transportation      Lack of Transportation (Medical): No      Lack of Transportation (Non-Medical): No   Physical Activity: Not on file   Stress: Not on file   Social Connections: Not on  file   Intimate Partner Violence: Not on file   Housing Stability: Low Risk  (12/19/2022)     Housing Stability Vital Sign      Unable to Pay for Housing in the Last Year: No      Number of Places Lived in the Last Year: 1      Unstable Housing in the Last Year: No      Social history:  40 pack-year history discontinued 20 years ago, patient drinks 2-4 beers a day off and on, no drug abuse, 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)     Current Outpatient Medications:     acetaminophen (TYLENOL) 325 mg tablet, Take 2 tablets (650 mg total) by mouth every 6 (six) hours as needed for mild pain, headaches or fever, Disp: 30 tablet, Rfl: 0    albuterol (2.5 mg/3 mL) 0.083 % nebulizer solution, Take 2.5 mg by nebulization As needed per patient's wife , Disp: , Rfl:     apixaban (Eliquis) 5 mg, Take 1 tablet (5 mg total) by mouth 2 (two) times a day, Disp: 180 tablet, Rfl: 1    atenolol (TENORMIN) 25 mg tablet, , Disp: , Rfl:     atorvastatin (LIPITOR) 10 mg tablet, Take 1 tablet (10 mg total) by mouth daily, Disp: 90 tablet, Rfl: 1    carvedilol (COREG) 25 mg tablet, Take 1 tablet (25 mg total) by mouth 2 (two) times a day with meals, Disp: 180 tablet, Rfl: 1    ciclopirox (LOPROX) 0.77 % cream, Apply topically 2 (two) times a day for 20 days, Disp: 45 g, Rfl: 2    fluticasone-umeclidinium-vilanterol (Trelegy Ellipta) 100-62.5-25 mcg/actuation inhaler, Rinse mouth after use., Disp: 60 each, Rfl: 5    guaifenesin-codeine (GUAIFENESIN AC) 100-10 MG/5ML liquid, Take 10 mL by mouth 3 (three) times a day as needed for cough, Disp: 180 mL, Rfl: 0    halobetasol (ULTRAVATE) 0.05 % cream, , Disp: , Rfl:     hydrocortisone 2.5 % cream, Apply topically 2 (two) times a day Apply twice a day affected area the trunk, Disp: 20 g, Rfl: 2    latanoprost (XALATAN) 0.005 % ophthalmic solution, Administer 0.005 mL to both eyes daily at bedtime, Disp: , Rfl:     metFORMIN (GLUCOPHAGE) 500 mg tablet, Take 1 tablet  (500 mg total) by mouth daily with breakfast, Disp: , Rfl:     mirtazapine (REMERON) 7.5 MG tablet, Take 1 tablet (7.5 mg total) by mouth daily at bedtime, Disp: 90 tablet, Rfl: 3    Multiple Vitamin (MULTI-VITAMIN DAILY PO), Take by mouth daily  , Disp: , Rfl:     omeprazole (PriLOSEC) 20 mg delayed release capsule, TAKE 1 CAPSULE DAILY, Disp: 90 capsule, Rfl: 1    primidone (MYSOLINE) 50 mg tablet, TAKE 2 TABS AT 8PM., Disp: 180 tablet, Rfl: 1    spironolactone (ALDACTONE) 25 mg tablet, Take 1 tablet (25 mg total) by mouth daily, Disp: 90 tablet, Rfl: 1    aspirin (ECOTRIN LOW STRENGTH) 81 mg EC tablet, , Disp: , Rfl:      No Known Allergies         Vitals:     10/20/23 0926   BP: 114/68   Pulse: 80   Resp: 17   Temp: 97.9 °F (36.6 °C)   SpO2: 92%     Physical Exam  Constitutional:       Appearance: He is well-developed.      Comments: Obese male, no respiratory distress, no signs of pain   HENT:      Head: Normocephalic and atraumatic.      Right Ear: External ear normal.      Left Ear: External ear normal.   Eyes:      Conjunctiva/sclera: Conjunctivae normal.      Pupils: Pupils are equal, round, and reactive to light.   Cardiovascular:      Rate and Rhythm: Normal rate and regular rhythm.      Heart sounds: Normal heart sounds.   Pulmonary:      Effort: Pulmonary effort is normal.      Comments: Left chest wall dressing (pleural catheter) C/D/I. Decreased breath sounds left lower lobe  Abdominal:      General: Bowel sounds are normal     Palpations: Abdomen is soft.      Comments: Obese, nontender, sign bowel sounds, cannot palpate liver or spleen  Musculoskeletal:         General: Normal range of motion.      Cervical back: Normal range of motion and neck supple.   Skin:     General: Skin is warm.      Comments: Relatively good color, warm, moist, scattered mild areas of ecchymosis upper extremities more previous rash from the ICI for the most part resolved  Neurological:      Mental Status: He is alert and  oriented to person, place, and time.      Deep Tendon Reflexes: Reflexes are normal and symmetric.   Psychiatric:         Behavior: Behavior normal.         Thought Content: Thought content normal.         Judgment: Judgment normal.      Extremities:  No lower extremity edema bilaterally, no cords, pulses are 1+   Lymphatics:  No adenopathy in the neck, supraclavicular region, axilla bilateral     Labs    4/11/2024 WBC = 6.79 hemoglobin = 11.8 hematocrit = 39.4 platelet = 527 neutrophil = 47% BUN = 10 creatinine = 0.88 calcium = 8.9 alkaline phosphatase = 130, other LFTs WNL    2/26/2024 WBC = 6.79 hemoglobin = 13 hematocrit = 40.4 platelet = 430 BUN = 14 creatinine = 0.89 AST = 17 ALT = 20 alkaline phosphatase = 144 total bilirubin = 0.53 PSA = 0.01    Imaging    10/30/2023 PET/CT    CHEST:  Best seen on image 76 of series 4 is a hypermetabolic centrally photopenic left lower lobe lung mass measuring 7.1 x 6.4 cm with max SUV of 12.0, previously measuring 4.5 x 4.0 cm with max SUV of 19.7 on PET/CT dated 10/29/2021 and approximately 4.3 x 3.7 cm when utilizing similar measurement technique and CT of chest dated 12/23/2022. The interval growth in size since 12/23/2022 suggest progression of hypermetabolic malignancy.     Subtle FDG activity is noted with a stable in size prominent right paratracheal lymph node on image 58 of series 4 measuring 1 1.4 cm in short axis with max SUV of 2.8, previously 3.5. The stability favors benign reactive lymph node with early pamela metastatic disease considered less likely    IMPRESSION:     1. Hypermetabolic left lower lobe lung malignancy has significantly increased in size since 12/23/2022 suggesting progression of malignancy.  2. Again noted is nonspecific FDG activity involving the sacrum and left posterior iliac bone without definitive correlate on low-dose unenhanced CT of uncertain clinical significance. Findings can be further characterized with MRI of the bony pelvis as  clinically indicated.  3. Moderate left pleural effusion.     9/28/23: CT Chest w contrast:     Impression: Since September 12, 2023, decrease in size of the still large left pleural effusion with commensurate decrease in left-sided passive atelectasis. Persistent hypoattenuation in the collapsed portion of the left lower lobe. Given that this is in the   vicinity of the treated tumor, and has increased in size since December 2022, short interval contrast-enhanced CT is advised following resolution of the pleural effusion for further assessment.     1/27/2023 CT chest without contrast    IMPRESSION:  1.  Slightly decreased left pleural effusion with improved passive atelectasis of the left lower lobe. Previously noted left lower lobe mass is difficult to visualize on noncontrast exam but appears likely unchanged from most recent study.  2.  Similar appearance of skin thickening and subcutaneous stranding in the right axillary region which could represent cellulitis.       05/04/2022 chest x-ray portable.  Impression stated left small effusion and parenchymal process appears unchanged.     05/02/2022 CT scan the chest without contrast     IMPRESSION:  1.  Increase in size of a left lower lobe patchy opacity with new right basilar patchy densities concerning for pulmonary infiltrates with additional patchy and reticulonodular changes lingular segment left upper lobe and right middle lobe also likely related to infectious process.  2.  Left lower lobe cavitary mass has decreased in size now measuring 3.3 x 1.9 cm, previously 4.1 x 2.2 cm.  3.  Stable COPD and pulmonary fibrosis with the latter likely related to radiation change.     11/19/2021 MRI brain.  Impression stated white matter changes suggestive of chronic microangiopathy.  No acute intracranial pathology.     10/29/2021 PET-CT     1.  Hypermetabolic necrotic 4.5 x 4 cm left lower lung mass compatible with known malignancy.  2.  2 mildly hypermetabolic right  paratracheal mediastinal nodes for which early metastases are not excluded.  3.  Small mildly hypermetabolic left pleural effusion for which malignant effusion is not excluded.  4.  Additional scattered tiny nodular lung densities may be reassessed on follow-up CT.  5.  Probable reactive subcentimeter right cervical node may be reassessed on follow-up PET/CT.  6.  No hypermetabolic soft tissue metastases in the abdomen, pelvis.  7.  Minimal inhomogeneous FDG activity in the sacrum and left posterior iliac, though without dominant focal lesion.  This may be reassessed on follow-up exam.     Pathology    12/7/2023 Caris.  No action mutations were detected.  Tumor mutational burden = 6 = low.  Patient had a pathologic variant in PTEN and TP53.  PDL IHC TPS = 0%, negative.     Case Report   Surgical Pathology Report                         Case: [id number]                                    Authorizing Provider:  Cachorro Jason MD       Collected:           11/29/2021 1005               Ordering Location:     [facility]      Received:            11/29/2021 [address] [place] [address] Operating Room                                                       Pathologist:           Juan José Dietz MD                                                         Specimens:   A) - Lymph Node, level 7                                                                             B) - Lymph Node, level 4R                                                                            C) - Lymph Node, level 2R                                                                            D) - Lymph Node, level 4L                                                                   Final Diagnosis   A. Lymph node, level 7, excision:  -  Portions of benign lymph node with reactive change and anthracosis, negative for granulomas, lymphoma, or metastatic carcinoma.    B. Lymph node, level 4R, excision:  -  Portions of  benign lymph node with reactive change and anthracosis, negative for granulomas, lymphoma, or metastatic carcinoma.    C. Lymph node, level 2R, excision:  -  Portions of benign lymph node with reactive change and anthracosis, negative for granulomas, lymphoma, or metastatic carcinoma.    D. Lymph node, level 4L, excision:  -  Benign lymph node with reactive change and anthracosis, negative for granulomas, lymphoma, or metastatic carcinoma.      Electronically signed by Juan José Dietz MD on 12/2/2021 at 11:29 AM         Case Report   Surgical Pathology Report                         Case: [id number]                                    Authorizing Provider:  Elmer Laird MD      Collected:           10/05/2021 1058               Ordering Location:     [facility] Received:            10/05/2021 1120                                      CAT Scan                                                                      Pathologist:           Mary Dean MD                                                         Specimen:    Lung, Left Lower Lobe                                                                       Final Diagnosis   A. Lung, Left Lower Lobe, :  - Invasive squamous carcinoma, moderately differentiated, keratinizing type.  - Tumor is highlighted with P 40 immunoperoxidase stain.  - Prominent tumor necrosis is noted.      Best representative block with tumor A1.  This case was reviewed at the intradepartmental  conference.   RADHA Navarrete, Pulmonology, is notified of the diagnosis in [employer] via [employer] on 10/06/2021  at 2.40 pm.   Electronically signed by Mary Dean MD on 10/6/2021 at  2:44 PM

## 2024-04-29 ENCOUNTER — OFFICE VISIT (OUTPATIENT)
Dept: PULMONOLOGY | Facility: MEDICAL CENTER | Age: 84
End: 2024-04-29
Payer: COMMERCIAL

## 2024-04-29 VITALS
RESPIRATION RATE: 12 BRPM | SYSTOLIC BLOOD PRESSURE: 122 MMHG | WEIGHT: 205.2 LBS | TEMPERATURE: 97.3 F | HEART RATE: 87 BPM | DIASTOLIC BLOOD PRESSURE: 62 MMHG | HEIGHT: 70 IN | BODY MASS INDEX: 29.38 KG/M2 | OXYGEN SATURATION: 96 %

## 2024-04-29 DIAGNOSIS — J90 RECURRENT PLEURAL EFFUSION ON LEFT: Primary | ICD-10-CM

## 2024-04-29 PROCEDURE — 32552 REMOVE LUNG CATHETER: CPT | Performed by: STUDENT IN AN ORGANIZED HEALTH CARE EDUCATION/TRAINING PROGRAM

## 2024-04-29 RX ORDER — SODIUM CHLORIDE 9 MG/ML
20 INJECTION, SOLUTION INTRAVENOUS ONCE
Status: CANCELLED | OUTPATIENT
Start: 2024-05-06

## 2024-04-29 NOTE — PATIENT INSTRUCTIONS
It was a pleasure seeing you today!    Catheter removed   Follow up with Dr. Roxanne Castano MD  Pulmonary and Critical Care Medicine

## 2024-04-29 NOTE — PROGRESS NOTES
Indwelling pleural catheter removal    Date/Time: 4/29/2024 10:20 AM    Performed by: Carlitos Castano MD  Authorized by: Carlitos Castano MD    Patient location:  Other (comment) (Pulmonary clinic)  Consent:     Consent obtained:  Verbal    Consent given by:  Patient  Pre-procedure details:     Skin preparation:  Antiseptic wash and ChloraPrep  Anesthesia (see MAR for exact dosages):     Anesthesia method:  Local infiltration    Local anesthetic:  Lidocaine 1% w/o epi  Procedure Detail:     Equipment used:  Suture removal kit    Procedure note (site, laterality, method, findings):  Left chest wall  Post-procedure details:     Patient tolerance of procedure:  Tolerated well, no immediate complications  Comments:      Left indwelling Sterling catheter was inspected, it is noted that the cough was approximately 3 inches out of the chest wall.  I aspirated and obtained to mL of dark milk colored fluid.  This elicited no pain.  Unsure if the catheter was truly in his chest at this point but by reviewing his chest x-rays, there has been no significant reaccumulation.  Based on this I injected lidocaine around the site of the tube and easily withdrew the catheter.  The catheter was intact.  I placed a 4 x 4 with Tegaderm over it and advised to continue this for another 24 hours.  Procedure completed with no complications.

## 2024-05-02 ENCOUNTER — APPOINTMENT (OUTPATIENT)
Dept: LAB | Facility: HOSPITAL | Age: 84
End: 2024-05-02
Payer: COMMERCIAL

## 2024-05-02 DIAGNOSIS — C34.92 SQUAMOUS CELL CARCINOMA OF LEFT LUNG (HCC): ICD-10-CM

## 2024-05-02 LAB
ALBUMIN SERPL BCP-MCNC: 3.1 G/DL (ref 3.5–5)
ALP SERPL-CCNC: 129 U/L (ref 34–104)
ALT SERPL W P-5'-P-CCNC: 31 U/L (ref 7–52)
ANION GAP SERPL CALCULATED.3IONS-SCNC: 6 MMOL/L (ref 4–13)
ANISOCYTOSIS BLD QL SMEAR: PRESENT
AST SERPL W P-5'-P-CCNC: 42 U/L (ref 13–39)
BASOPHILS # BLD MANUAL: 0.12 THOUSAND/UL (ref 0–0.1)
BASOPHILS NFR MAR MANUAL: 2 % (ref 0–1)
BILIRUB SERPL-MCNC: 0.52 MG/DL (ref 0.2–1)
BUN SERPL-MCNC: 15 MG/DL (ref 5–25)
CALCIUM ALBUM COR SERPL-MCNC: 9.3 MG/DL (ref 8.3–10.1)
CALCIUM SERPL-MCNC: 8.6 MG/DL (ref 8.4–10.2)
CHLORIDE SERPL-SCNC: 102 MMOL/L (ref 96–108)
CO2 SERPL-SCNC: 28 MMOL/L (ref 21–32)
CREAT SERPL-MCNC: 0.91 MG/DL (ref 0.6–1.3)
EOSINOPHIL # BLD MANUAL: 1.6 THOUSAND/UL (ref 0–0.4)
EOSINOPHIL NFR BLD MANUAL: 27 % (ref 0–6)
ERYTHROCYTE [DISTWIDTH] IN BLOOD BY AUTOMATED COUNT: 16.7 % (ref 11.6–15.1)
GFR SERPL CREATININE-BSD FRML MDRD: 78 ML/MIN/1.73SQ M
GLUCOSE SERPL-MCNC: 212 MG/DL (ref 65–140)
HCT VFR BLD AUTO: 39.1 % (ref 36.5–49.3)
HGB BLD-MCNC: 11.8 G/DL (ref 12–17)
LYMPHOCYTES # BLD AUTO: 0.53 THOUSAND/UL (ref 0.6–4.47)
LYMPHOCYTES # BLD AUTO: 7 % (ref 14–44)
MCH RBC QN AUTO: 27.4 PG (ref 26.8–34.3)
MCHC RBC AUTO-ENTMCNC: 30.2 G/DL (ref 31.4–37.4)
MCV RBC AUTO: 91 FL (ref 82–98)
MONOCYTES # BLD AUTO: 1.01 THOUSAND/UL (ref 0–1.22)
MONOCYTES NFR BLD: 17 % (ref 4–12)
NEUTROPHILS # BLD MANUAL: 2.67 THOUSAND/UL (ref 1.85–7.62)
NEUTS SEG NFR BLD AUTO: 45 % (ref 43–75)
PLATELET # BLD AUTO: 440 THOUSANDS/UL (ref 149–390)
PLATELET BLD QL SMEAR: ABNORMAL
PMV BLD AUTO: 9.4 FL (ref 8.9–12.7)
POTASSIUM SERPL-SCNC: 4.4 MMOL/L (ref 3.5–5.3)
PROT SERPL-MCNC: 6.5 G/DL (ref 6.4–8.4)
RBC # BLD AUTO: 4.31 MILLION/UL (ref 3.88–5.62)
RBC MORPH BLD: PRESENT
SODIUM SERPL-SCNC: 136 MMOL/L (ref 135–147)
VARIANT LYMPHS # BLD AUTO: 2 %
WBC # BLD AUTO: 5.93 THOUSAND/UL (ref 4.31–10.16)

## 2024-05-02 PROCEDURE — 36415 COLL VENOUS BLD VENIPUNCTURE: CPT

## 2024-05-02 PROCEDURE — 85027 COMPLETE CBC AUTOMATED: CPT

## 2024-05-02 PROCEDURE — 85007 BL SMEAR W/DIFF WBC COUNT: CPT

## 2024-05-02 PROCEDURE — 80053 COMPREHEN METABOLIC PANEL: CPT

## 2024-05-06 ENCOUNTER — HOSPITAL ENCOUNTER (OUTPATIENT)
Dept: INFUSION CENTER | Facility: HOSPITAL | Age: 84
Discharge: HOME | End: 2024-05-06
Attending: INTERNAL MEDICINE
Payer: COMMERCIAL

## 2024-05-06 VITALS
SYSTOLIC BLOOD PRESSURE: 128 MMHG | DIASTOLIC BLOOD PRESSURE: 73 MMHG | HEIGHT: 70 IN | BODY MASS INDEX: 29.6 KG/M2 | RESPIRATION RATE: 18 BRPM | OXYGEN SATURATION: 97 % | HEART RATE: 95 BPM | WEIGHT: 206.79 LBS | TEMPERATURE: 96.8 F

## 2024-05-06 DIAGNOSIS — C34.92 SQUAMOUS CELL CARCINOMA OF LEFT LUNG (HCC): Primary | ICD-10-CM

## 2024-05-06 PROCEDURE — 96367 TX/PROPH/DG ADDL SEQ IV INF: CPT

## 2024-05-06 PROCEDURE — 96375 TX/PRO/DX INJ NEW DRUG ADDON: CPT

## 2024-05-06 PROCEDURE — 96415 CHEMO IV INFUSION ADDL HR: CPT

## 2024-05-06 PROCEDURE — 96413 CHEMO IV INFUSION 1 HR: CPT

## 2024-05-06 RX ORDER — SODIUM CHLORIDE 9 MG/ML
20 INJECTION, SOLUTION INTRAVENOUS ONCE
Status: COMPLETED | OUTPATIENT
Start: 2024-05-06 | End: 2024-05-06

## 2024-05-06 RX ADMIN — SODIUM CHLORIDE 20 ML/HR: 0.9 INJECTION, SOLUTION INTRAVENOUS at 09:23

## 2024-05-06 RX ADMIN — FAMOTIDINE 20 MG: 10 INJECTION, SOLUTION INTRAVENOUS at 10:13

## 2024-05-06 RX ADMIN — DEXAMETHASONE SODIUM PHOSPHATE: 10 INJECTION, SOLUTION INTRAMUSCULAR; INTRAVENOUS at 09:23

## 2024-05-06 RX ADMIN — DIPHENHYDRAMINE HYDROCHLORIDE 25 MG: 50 INJECTION, SOLUTION INTRAMUSCULAR; INTRAVENOUS at 09:46

## 2024-05-06 RX ADMIN — PACLITAXEL 282 MG: 6 INJECTION, SOLUTION, CONCENTRATE INTRAVENOUS at 10:34

## 2024-05-06 NOTE — PROGRESS NOTES
Bridger Clayton  tolerated treatment well with no complications.      Bridger Clayton is aware of future appt on 5/28 at 0900.     AVS printed and given to Bridger Clayton:  Yes

## 2024-05-21 ENCOUNTER — TELEPHONE (OUTPATIENT)
Dept: NEUROLOGY | Facility: CLINIC | Age: 84
End: 2024-05-21

## 2024-05-21 RX ORDER — SODIUM CHLORIDE 9 MG/ML
20 INJECTION, SOLUTION INTRAVENOUS ONCE
Status: CANCELLED | OUTPATIENT
Start: 2024-05-28

## 2024-05-21 NOTE — TELEPHONE ENCOUNTER
5/21/24, 0913    Spenser Clayton and the phone number is [phone]. And we need a prescription filled of primidone 50 milligrams. And it says take 2 tablets at 8 PM. Thank you.    Chart reviewed  Primidone was sent to [facility] pharmacy, 90 day supply w/ 1 refills on 2/19/24    Called [phone], spoke to pt's wife Myla and made aware of the above. She verbalized understanding. She will call [facility] pharmacy

## 2024-05-23 ENCOUNTER — APPOINTMENT (OUTPATIENT)
Dept: LAB | Facility: HOSPITAL | Age: 84
End: 2024-05-23
Payer: COMMERCIAL

## 2024-05-23 DIAGNOSIS — C34.92 SQUAMOUS CELL CARCINOMA OF LEFT LUNG (HCC): ICD-10-CM

## 2024-05-23 LAB
ALBUMIN SERPL BCP-MCNC: 3.3 G/DL (ref 3.5–5)
ALP SERPL-CCNC: 139 U/L (ref 34–104)
ALT SERPL W P-5'-P-CCNC: 41 U/L (ref 7–52)
ANION GAP SERPL CALCULATED.3IONS-SCNC: 4 MMOL/L (ref 4–13)
AST SERPL W P-5'-P-CCNC: 46 U/L (ref 13–39)
BASOPHILS # BLD AUTO: 0.13 THOUSANDS/ÂΜL (ref 0–0.1)
BASOPHILS NFR BLD AUTO: 2 % (ref 0–1)
BILIRUB SERPL-MCNC: 0.45 MG/DL (ref 0.2–1)
BUN SERPL-MCNC: 12 MG/DL (ref 5–25)
CALCIUM ALBUM COR SERPL-MCNC: 9.7 MG/DL (ref 8.3–10.1)
CALCIUM SERPL-MCNC: 9.1 MG/DL (ref 8.4–10.2)
CHLORIDE SERPL-SCNC: 102 MMOL/L (ref 96–108)
CO2 SERPL-SCNC: 29 MMOL/L (ref 21–32)
CREAT SERPL-MCNC: 0.91 MG/DL (ref 0.6–1.3)
EOSINOPHIL # BLD AUTO: 0.87 THOUSAND/ÂΜL (ref 0–0.61)
EOSINOPHIL NFR BLD AUTO: 15 % (ref 0–6)
ERYTHROCYTE [DISTWIDTH] IN BLOOD BY AUTOMATED COUNT: 16.7 % (ref 11.6–15.1)
GFR SERPL CREATININE-BSD FRML MDRD: 78 ML/MIN/1.73SQ M
GLUCOSE P FAST SERPL-MCNC: 154 MG/DL (ref 65–99)
HCT VFR BLD AUTO: 41.6 % (ref 36.5–49.3)
HGB BLD-MCNC: 12.7 G/DL (ref 12–17)
IMM GRANULOCYTES # BLD AUTO: 0.03 THOUSAND/UL (ref 0–0.2)
IMM GRANULOCYTES NFR BLD AUTO: 1 % (ref 0–2)
LYMPHOCYTES # BLD AUTO: 0.93 THOUSANDS/ÂΜL (ref 0.6–4.47)
LYMPHOCYTES NFR BLD AUTO: 16 % (ref 14–44)
MCH RBC QN AUTO: 27.2 PG (ref 26.8–34.3)
MCHC RBC AUTO-ENTMCNC: 30.5 G/DL (ref 31.4–37.4)
MCV RBC AUTO: 89 FL (ref 82–98)
MONOCYTES # BLD AUTO: 1.18 THOUSAND/ÂΜL (ref 0.17–1.22)
MONOCYTES NFR BLD AUTO: 21 % (ref 4–12)
NEUTROPHILS # BLD AUTO: 2.53 THOUSANDS/ÂΜL (ref 1.85–7.62)
NEUTS SEG NFR BLD AUTO: 45 % (ref 43–75)
NRBC BLD AUTO-RTO: 0 /100 WBCS
PLATELET # BLD AUTO: 483 THOUSANDS/UL (ref 149–390)
PMV BLD AUTO: 9.1 FL (ref 8.9–12.7)
POTASSIUM SERPL-SCNC: 4.3 MMOL/L (ref 3.5–5.3)
PROT SERPL-MCNC: 6.8 G/DL (ref 6.4–8.4)
RBC # BLD AUTO: 4.67 MILLION/UL (ref 3.88–5.62)
SODIUM SERPL-SCNC: 135 MMOL/L (ref 135–147)
WBC # BLD AUTO: 5.67 THOUSAND/UL (ref 4.31–10.16)

## 2024-05-23 PROCEDURE — 36415 COLL VENOUS BLD VENIPUNCTURE: CPT

## 2024-05-23 PROCEDURE — 85025 COMPLETE CBC W/AUTO DIFF WBC: CPT

## 2024-05-23 PROCEDURE — 80053 COMPREHEN METABOLIC PANEL: CPT

## 2024-05-28 ENCOUNTER — HOSPITAL ENCOUNTER (OUTPATIENT)
Dept: INFUSION CENTER | Facility: HOSPITAL | Age: 84
Discharge: HOME | End: 2024-05-28
Attending: INTERNAL MEDICINE
Payer: COMMERCIAL

## 2024-05-28 VITALS
DIASTOLIC BLOOD PRESSURE: 77 MMHG | HEIGHT: 70 IN | SYSTOLIC BLOOD PRESSURE: 168 MMHG | BODY MASS INDEX: 28.91 KG/M2 | HEART RATE: 85 BPM | RESPIRATION RATE: 20 BRPM | WEIGHT: 201.94 LBS | OXYGEN SATURATION: 93 % | TEMPERATURE: 96.8 F

## 2024-05-28 DIAGNOSIS — C34.92 SQUAMOUS CELL CARCINOMA OF LEFT LUNG (HCC): Primary | ICD-10-CM

## 2024-05-28 DIAGNOSIS — E78.2 MIXED HYPERLIPIDEMIA: ICD-10-CM

## 2024-05-28 RX ORDER — SODIUM CHLORIDE 9 MG/ML
20 INJECTION, SOLUTION INTRAVENOUS ONCE
Status: COMPLETED | OUTPATIENT
Start: 2024-05-28 | End: 2024-05-28

## 2024-05-28 RX ADMIN — PACLITAXEL 282 MG: 6 INJECTION, SOLUTION, CONCENTRATE INTRAVENOUS at 10:20

## 2024-05-28 RX ADMIN — SODIUM CHLORIDE 20 ML/HR: 0.9 INJECTION, SOLUTION INTRAVENOUS at 09:08

## 2024-05-28 RX ADMIN — DEXAMETHASONE SODIUM PHOSPHATE: 10 INJECTION, SOLUTION INTRAMUSCULAR; INTRAVENOUS at 09:08

## 2024-05-28 RX ADMIN — FAMOTIDINE 20 MG: 10 INJECTION, SOLUTION INTRAVENOUS at 09:53

## 2024-05-28 RX ADMIN — DIPHENHYDRAMINE HYDROCHLORIDE 25 MG: 50 INJECTION, SOLUTION INTRAMUSCULAR; INTRAVENOUS at 09:28

## 2024-05-28 NOTE — PROGRESS NOTES
Bridger Clayton  tolerated treatment well with no complications.      Bridger Clayton is aware of future appt on 6/17 at 0900.     AVS printed and given to Bridger Clayton:  Yes

## 2024-05-29 ENCOUNTER — TELEPHONE (OUTPATIENT)
Age: 84
End: 2024-05-29

## 2024-05-29 DIAGNOSIS — K21.9 GASTROESOPHAGEAL REFLUX DISEASE WITHOUT ESOPHAGITIS: ICD-10-CM

## 2024-05-29 RX ORDER — OMEPRAZOLE 20 MG/1
20 CAPSULE, DELAYED RELEASE ORAL DAILY
Qty: 90 CAPSULE | Refills: 1 | Status: SHIPPED | OUTPATIENT
Start: 2024-05-29

## 2024-05-29 RX ORDER — ATORVASTATIN CALCIUM 10 MG/1
10 TABLET, FILM COATED ORAL DAILY
Qty: 90 TABLET | Refills: 1 | Status: SHIPPED | OUTPATIENT
Start: 2024-05-29

## 2024-05-29 NOTE — TELEPHONE ENCOUNTER
Patient's wife, Taylor, called requesting refill for omeprazole. She was made aware medication was refilled on 02/28/24 for 90  days with 1 refill at [place] pharmacy. Taylor was instructed to contact the pharmacy to obtain refills of medication. She verbalized understanding.

## 2024-06-04 ENCOUNTER — HOSPITAL ENCOUNTER (OUTPATIENT)
Dept: NUCLEAR MEDICINE | Facility: HOSPITAL | Age: 84
Discharge: HOME | End: 2024-06-04
Attending: INTERNAL MEDICINE
Payer: COMMERCIAL

## 2024-06-04 DIAGNOSIS — C34.92 SQUAMOUS CELL CARCINOMA OF LEFT LUNG (HCC): Chronic | ICD-10-CM

## 2024-06-04 DIAGNOSIS — C34.32 MALIGNANT NEOPLASM OF LOWER LOBE, LEFT BRONCHUS OR LUNG (HCC): ICD-10-CM

## 2024-06-04 LAB — GLUCOSE SERPL-MCNC: 210 MG/DL (ref 65–140)

## 2024-06-04 PROCEDURE — 82948 REAGENT STRIP/BLOOD GLUCOSE: CPT

## 2024-06-05 ENCOUNTER — OFFICE VISIT (OUTPATIENT)
Age: 84
End: 2024-06-05
Payer: COMMERCIAL

## 2024-06-05 VITALS
WEIGHT: 201 LBS | SYSTOLIC BLOOD PRESSURE: 132 MMHG | HEART RATE: 85 BPM | HEIGHT: 70 IN | BODY MASS INDEX: 28.77 KG/M2 | DIASTOLIC BLOOD PRESSURE: 70 MMHG | RESPIRATION RATE: 17 BRPM

## 2024-06-05 DIAGNOSIS — I70.209 PERIPHERAL ARTERIOSCLEROSIS (HCC): ICD-10-CM

## 2024-06-05 DIAGNOSIS — B35.3 TINEA PEDIS OF BOTH FEET: ICD-10-CM

## 2024-06-05 DIAGNOSIS — M79.672 PAIN IN BOTH FEET: Primary | ICD-10-CM

## 2024-06-05 DIAGNOSIS — M79.671 PAIN IN BOTH FEET: Primary | ICD-10-CM

## 2024-06-05 DIAGNOSIS — B35.1 ONYCHOMYCOSIS: ICD-10-CM

## 2024-06-05 DIAGNOSIS — E11.42 DIABETIC POLYNEUROPATHY ASSOCIATED WITH TYPE 2 DIABETES MELLITUS (HCC): ICD-10-CM

## 2024-06-05 PROCEDURE — 99213 OFFICE O/P EST LOW 20 MIN: CPT | Performed by: PODIATRIST

## 2024-06-05 NOTE — PROGRESS NOTES
Assessment/Plan:  Patient has pain in his feet and toes with ambulation.  He has mycosis of nail and skin. He has plantar pre ulcerative skin lesions.     Plan.  Chart reviewed.  Patient educated on care of the diabetic foot.  Diabetic foot exam performed.    Patient advised on foot hygiene.  Proper shoe sizing recommended.  Patient will use daily, he will apply cream to feet b.i.d. For 4 weeks.  Foot hygiene instruction given.  Shoe sizing and style recommendations made.  Watch for signs of infection.  Aftercare instruction given.           Subjective:  Patient has pain in his feet with ambulation.  No history of trauma .  He has pain when he wears shoes.  He has pain in his toes.      Patient ID: Spenser Clayton is a 84 y.o. male.     Patient is diabetic.  He has pain in his feet and toes with ambulation.  Has pain from calluses.  He has ingrown toenails and dry scaly skin.  He is requesting refill of topical antifungal.        Review of Systems   Constitutional: No fever or chills, feels well, no tiredness, no recent weight loss or weight gain.  Eyes: No complaints of red eyes, no eyesight problems.  ENT: no complaints of loss of hearing, no nosebleeds, no sore throat.  Cardiovascular: No complaints of chest pain, no palpitations, no leg claudication or lower extremity edema.  Respiratory: No complaints of shortness of breath, no wheezing, no cough.  Gastrointestinal: No complaints of abdominal pain, no constipation, no nausea or vomiting, no diarrhea or bloody stools.  Genitourinary: No complaints of dysuria or incontinence, no hesitancy, no nocturia.  Musculoskeletal: limb pain, but-- as noted in HPI.  Integumentary: No complaints of skin rash or lesion, no itching or dry skin, no skin wounds.  Neurological: No complaints of headache, no confusion, no numbness or tingling, no dizziness.  Psychiatric: No suicidal thoughts, no anxiety, no depression.  Endocrine: No muscle weakness, no frequent urination, no  excessive thirst, no feelings of weakness.      Active Problems  1. 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.70) (M21.969)   2. Arthritis (716.90) (M19.90)   3. Atherosclerosis of arteries of extremities (440.20) (I70.209)   4. Calcaneal spur (726.73) (M77.30)   5. Callus (700) (L84)   6. 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 Diabetic neuropathy (250.60,357.2) (E11.40)   9. Hypercholesterolemia (272.0) (E78.00)   10. Hypertension (401.9) (I10)   11. 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 Onychomycosis (110.1) (B35.1)   14. Orthopedic aftercare (V54.9) (Z47.89)   15. Pain in both feet (729.5) (M79.671,M79.672)   16. Pain in extremity (729.5) (M79.609)   17. Pes planus, congenital (754.61) (Q66.50)   18. 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 Plantar wart (078.12) (B07.0)   20. Prostate cancer (185) (C61)   21. Sprain of right shoulder (840.9) (S43.401A)   22. Tinea pedis (110.4) (B35.3)     Past Medical History   · Orthopedic aftercare (V54.9) (Z47.89)     Surgical History   · History of Gastric Surgery   · History of Hernia Repair   · History of Previous Stent Placement Total Number Performed ___     The surgical history was reviewed and updated today.       Family History  Family History    · Family history of Arthritis (V17.7)   · Family history of Cancer     The family history was reviewed and updated today.       Social History      · Beer Consumption (___ Bottles Per Day)   · Daily Coffee Consumption (1  Cups/Day)   · Former smoker (V15.82) (Z87.891)  The social history was reviewed and updated today.   Allergies  1. No Known Drug Allergies      Physical Exam  Left Foot: Appearance: Normal except as noted: excessive pronation-- and-- pes planus.   Right Foot: Appearance: Normal except as noted: excessive pronation-- and-- pes planus.    Left Ankle: ROM: limited ROM in all planes   Right Ankle: ROM: limited ROM in all planes   Neurological Exam: performed. Light touch was intact bilaterally. Vibratory sensation was intact bilaterally. Response to monofilament test was intact bilaterally. Deep tendon reflexes: patellar reflex present bilaterally-- and-- achilles reflex present bilaterally.   Vascular Exam: performed Dorsalis pedis pulses were One/4 bilaterally. Posterior tibial pulses were One/4 bilaterally. Elevation Pallor: present bilaterally. Capillary refill time was greater than 3 seconds bilaterally-- and-- Q. 9 findings bilateral. Negative digital hair noted. Positive abnormal cooling noted.   Toenails: All of the toenails were elongated,-- hypertrophied,-- discolored,-- ingrown,-- shown to have subungual debris,-- tender-- and-- Severely mycotic with onychauxis.    Socks and shoes removed, Right Foot Findings: swollen, erythematous and dry.  The sensory exam showed diminished vibratory sensation at the level of the toes. Diminished tactile sensation with monofilament testing throughout the right foot.  Socks and shoes removed, Left Foot Findings: swollen, erythematous and dry.  The sensory exam showed diminished vibratory sensation at the level of the toes. Diminished tactile sensation with monofilament testing throughout the left foot. Capillary refills findings on the right were delayed in the toes.  Pulses:  1+ in the posterior tibialis on the right  1+ in the dorsalis pedis on the right.  Capillary refills findings on the left were delayed in the toes.  Pulses:  1+ in the posterior tibialis on the left  1+ in the dorsalis pedis on the left.  Assign Risk Category: 2: Loss of protective sensation with or without weakness, deformity, callus, pre-ulcer, or history of ulceration. High risk.   Hyperkeratosis: present on both first toes,-- present on both fifth sub metatarsals-- and-- [place] tinea pedis, bilateral, is noted.   Shoe Gear  Evaluation: performed (). Right Foot: width: d-- and-- length: 11. Left Foot: width: d-- and-- length: 11. Recommendation(s): athletic shoes     Patient's shoes and socks removed.Right Foot/Ankle   Right Foot Inspection  Skin Exam: dry skin, callus and callus               Toe Exam: swelling  Sensory   Vibration: diminished  Proprioception: diminished      Vascular  Capillary refills: elevated        Left Foot/Ankle  Left Foot Inspection  Skin Exam: dry skin and callus              Toe Exam: swelling and erythema                   Sensory   Vibration: diminished  Proprioception: diminished     Vascular  Capillary refills: elevated     Right Foot/Ankle   Right Foot Inspection        Sensory   Vibration: diminished  Proprioception: diminished  Monofilament testing: diminished     Vascular  Capillary refills: < 3 seconds              Left Foot/Ankle  Left Foot Inspection        Sensory   Vibration: diminished  Proprioception: diminished  Monofilament testing: diminished     Vascular  Capillary refills: < 3 seconds         Patient's shoes and socks removed.     Right Foot/Ankle   Right Foot Inspection        Toe Exam: right toe deformity.      Sensory   Vibration: diminished      Vascular  Capillary refills: < 3 seconds              Left Foot/Ankle  Left Foot Inspection        Toe Exam: left toe deformity.      Sensory   Vibration: diminished      Vascular  Capillary refills: < 3 seconds     Patient's shoes and socks removed.    Right Foot/Ankle   Right Foot Inspection      Toe Exam: right toe deformity.     Sensory   Vibration: diminished  Proprioception: diminished  Monofilament testing: diminished    Vascular  Capillary refills: < 3 seconds      Left Foot/Ankle  Left Foot Inspection      Toe Exam: left toe deformity.     Sensory   Vibration: diminished  Proprioception: diminished  Monofilament testing: diminished    Vascular  Capillary refills: < 3 seconds      Assign Risk Category  Deformity present  Loss of  protective sensation  Weak pulses  Risk: 2

## 2024-06-06 ENCOUNTER — HOSPITAL ENCOUNTER (OUTPATIENT)
Dept: NUCLEAR MEDICINE | Facility: HOSPITAL | Age: 84
End: 2024-06-06
Attending: INTERNAL MEDICINE
Payer: COMMERCIAL

## 2024-06-06 ENCOUNTER — RA CDI HCC (OUTPATIENT)
Dept: OTHER | Facility: HOSPITAL | Age: 84
End: 2024-06-06

## 2024-06-06 DIAGNOSIS — C34.92 SQUAMOUS CELL CARCINOMA OF LEFT LUNG (HCC): Chronic | ICD-10-CM

## 2024-06-06 LAB — GLUCOSE SERPL-MCNC: 169 MG/DL (ref 65–140)

## 2024-06-06 PROCEDURE — 82948 REAGENT STRIP/BLOOD GLUCOSE: CPT

## 2024-06-06 PROCEDURE — 78815 PET IMAGE W/CT SKULL-THIGH: CPT

## 2024-06-06 PROCEDURE — A9552 F18 FDG: HCPCS

## 2024-06-06 NOTE — TELEPHONE ENCOUNTER
Reason for call:   [x] Refill   [] Prior Auth  [] Other:     Office:   [x] PCP/Provider - Vivek Tatum MD   [] Specialty/Provider -     Medication: guaifenesin-codeine (GUAIFENESIN AC) 100-10 MG/5ML liquid     Dose/Frequency: Take 10 mL by mouth 3 (three) times a day as needed for cough     Quantity: 473ml    [facility] [address] [phone]    Does the patient have enough for 3 days?   [] Yes   [x] No - Send as HP to POD

## 2024-06-07 RX ORDER — CODEINE PHOSPHATE/GUAIFENESIN 10-100MG/5
10 LIQUID (ML) ORAL 3 TIMES DAILY PRN
Qty: 473 ML | Refills: 0 | Status: SHIPPED | OUTPATIENT
Start: 2024-06-07

## 2024-06-10 ENCOUNTER — OFFICE VISIT (OUTPATIENT)
Dept: HEMATOLOGY ONCOLOGY | Facility: MEDICAL CENTER | Age: 84
End: 2024-06-10
Payer: COMMERCIAL

## 2024-06-10 ENCOUNTER — TELEPHONE (OUTPATIENT)
Dept: HEMATOLOGY ONCOLOGY | Facility: MEDICAL CENTER | Age: 84
End: 2024-06-10

## 2024-06-10 VITALS
BODY MASS INDEX: 28.63 KG/M2 | OXYGEN SATURATION: 97 % | DIASTOLIC BLOOD PRESSURE: 58 MMHG | HEART RATE: 78 BPM | WEIGHT: 200 LBS | TEMPERATURE: 98 F | SYSTOLIC BLOOD PRESSURE: 118 MMHG | RESPIRATION RATE: 19 BRPM | HEIGHT: 70 IN

## 2024-06-10 DIAGNOSIS — C34.92 SQUAMOUS CELL CARCINOMA OF LEFT LUNG (HCC): Primary | Chronic | ICD-10-CM

## 2024-06-10 PROCEDURE — 99214 OFFICE O/P EST MOD 30 MIN: CPT | Performed by: INTERNAL MEDICINE

## 2024-06-10 RX ORDER — SODIUM CHLORIDE 9 MG/ML
20 INJECTION, SOLUTION INTRAVENOUS ONCE
OUTPATIENT
Start: 2024-06-25

## 2024-06-10 NOTE — PROGRESS NOTES
haris KADEN Clayton  1940  [facility]  [address] [place] [phone]     DISCUSSION/SUMMARY:     84-year-old male with a number of medical and cardiac problems previously found to have a left lower lobe mass. Initial biopsy demonstrated squamous cell carcinoma.  IR recently performed thoracentesis, minimal amount of fluid was removed but there was no evidence of metastatic disease; cytology was negative.  Patient was seen by thoracic surgery and underwent mediastinoscopy.  There was no evidence of metastatic disease in the sampled lymph nodes (2R, 4R, 4L, 7).  Tumor was 4.5 cm.  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  Patient was treated with SBRT and then went on to surveillance.    Eventual follow-up PET/CT demonstrated evidence of recurrence in the left lower lobe, same area where the original malignancy was found.  There was no clear evidence of spread to either hilar regions or the mediastinum but radiologist commented on 1 right paratracheal lymph node but very +/-.  There was nonspecific FDG activity in the sacrum and left posterior iliac bone, seen before.  Unknown clinical significance.    At that time, patient also began to have more problems with recurrent left-sided pleural effusions requiring a Pleurx catheter.  Previously wife was draining approximately 500 cc every 2 to 3 days.  Discontinued while patient was started on pembrolizumab, did not get significantly better.  Additionally patient developed a rash while on pembrolizumab.  The ICI was eventually discontinued and patient was started on paclitaxel.  Patient has completed 9 cycles.    Mr. Clayton continues to tolerate the Taxol well.  Recent blood work was okay/acceptable.  The rash is less/better than before.  PleurX catheter has been removed.  Recent follow-up PET/CT demonstrated response to treatment, no evidence of disease  progression.  This is obviously good.  The plan is to continue with the Taxol.    Regimen  Paclitaxel 135 mg/m2 IV day 1 (85%)  Cycle length = 21 days  Goal = palliation and prolongation of life    Mr. Clayton is having more issues with IV access.  Patient agrees to port placement.  This has been ordered.  The next cycle can be pushed back 1 to 2 weeks so that port can be put in and patient get a small break from treatment.    Patient is to return here in 3 to 4 weeks.    Carefully review your medication list and verify that the list is accurate and up-to-date. Please call the hematology/oncology office if there are medications missing from the list, medications on the list that you are not currently taking or if there is a dosage or instruction that is different from how you're taking that medication.     Patient goals and areas of care: Continue with paclitaxel, port placement  Barriers to care: none  Patient is able to self-care  ______________________________________________________________________________________         Chief Complaint   Patient presents with    Follow-up - recurrent non-small cell lung carcinoma      History of Present Illness:  84 year-old male with multiple medical and cardiac issue recently seen in the emergency room because of a cough.  Workup demonstrated a left lower lobe mass.  Biopsy demonstrated squamous cell carcinoma.  Workup did not demonstrated evidence of metastatic disease; mediastinoscopy was negative.  Patient was seen by thoracic surgery but was not felt to be a surgical candidate.  Patient was subsequently seen by radiation oncology and underwent SPRT.  Patient then went on surveillance.      Eventual repeat scanning was consistent with recurrence at the original site.  Mr. Clayton required a left-sided Pleurx catheter placed because of recurrent left-sided pleural effusion.  Patient was initially treated with pembrolizumab only but developed a significant rash requiring  steroids.  Because of the rash and the itchiness, patient did not wish to continue with the pembrolizumab; this was discontinued.  Options were discussed and patient began paclitaxel.  Patient has completed 9 cycles.    Presently Mr. Clayton states feeling okay, same as before.  The rash is less/better than before.  No respiratory issues, PleurX catheter has been removed.  Appetite is okay, weight is stable.  No fevers or signs of infection.  Activities are limited but the same as before.  Patient has some numbness and tingling in his fingertips but no dropping objects.  No tripping or falling.    Patient was diagnosed with prostate cancer (approximately 10 + years ago) and underwent seeds and external beam radiation.  No evidence of recurrence since that time.       Review of Systems   Constitutional:  Positive for fatigue.        Poor appetite and continuing weight loss   HENT: Negative.     Eyes: Negative.    Respiratory: Negative.     Cardiovascular: Negative.    Gastrointestinal: Negative.    Endocrine: Negative.    Genitourinary: Negative.    Musculoskeletal: Negative.    Skin: Negative.    Allergic/Immunologic: Negative.    Neurological: Negative.    Hematological: Negative.         Easy bruising   Psychiatric/Behavioral:  Negative for self-injury.    All other systems reviewed and are negative.         Patient Active Problem List   Diagnosis    Corns    Pain in both feet    Onychomycosis    Tinea pedis of both feet    Lung mass    Hyperlipidemia    Type 2 diabetes mellitus with diabetic neuropathy, without long-term current use of insulin (HCC)    Squamous cell carcinoma of lung (HCC)    Shortness of breath    Daytime hypersomnolence    Chronic diastolic heart failure (HCC)    SYLVESTER (obstructive sleep apnea)    Prostate cancer (HCC)    GERD (gastroesophageal reflux disease)    CAD (coronary artery disease)    Other persistent atrial fibrillation (HCC)    Alcohol dependence (HCC)    Acute respiratory failure  with hypoxemia (HCC)    Depression, recurrent (HCC)    COVID-19    Angioedema    Septic shock (HCC)    Cellulitis    Cellulitis of chest wall    Acute kidney injury (BUDDY) with acute tubular necrosis (ATN) (HCC)    Chronic obstructive pulmonary disease, unspecified COPD type (HCC)    Centrilobular emphysema (HCC)    Abdominal aortic aneurysm (HCC)    Hypertensive heart disease    Tremors of nervous system    Recurrent pleural effusion on left      Medical History        Past Medical History:   Diagnosis Date    Abdominal pain      Anxiety      Arthritis      Asthma      Atrial fibrillation (HCC)      Back pain      Bleeding ulcer      Bronchitis      Cancer (HCC)       prostate 2011- radiation    Cardiac disease       cardiac stent x1    Cataract       starting    Chronic diastolic (congestive) heart failure (HCC)      Diabetes mellitus (HCC)       boarderline diabetic     GERD (gastroesophageal reflux disease)      History of radiation therapy 2010     Prostate seeds (brachytherapy) and EBRT    Hyperlipidemia      Hypertension      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, bilateral      Low back pain      Lung cancer (HCC)      Lung mass      Myocardial infarction (HCC)       mild 1999    Obesity      Prostate cancer (HCC)      Shortness of breath      Sleep apnea       sleep study 11/22    Wears dentures       full set    Wears glasses           Surgical History         Past Surgical History:   Procedure Laterality Date    ABDOMINAL HERNIA REPAIR        ABDOMINAL SURGERY         bleeding ulcer, cyst removed from abd    COLONOSCOPY        CORONARY ANGIOPLASTY WITH STENT PLACEMENT   1999     x1     ESOPHAGOGASTRODUODENOSCOPY        IR BIOPSY LUNG   10/05/2021    IR THORACENTESIS   11/08/2021    IR THORACENTESIS   6/5/2023    IR THORACENTESIS   9/13/2023    KNEE SURGERY Left      [facility] INCL FLUOR GDNCE DX W/CELL WASHG SPX N/A 11/29/2021     Procedure: BRONCHOSCOPY FLEXIBLE;  Surgeon: Cachorro Jason MD;  Location: [facility]  MAIN OR;  Service: Thoracic    RI MEDIASTINOSCOPY WITH LYMPH NODE BIOPSY/IES N/A 2021     Procedure: MEDIASTINOSCOPY;  Surgeon: Cachorro Jason MD;  Location: [facility] MAIN OR;  Service: Thoracic    PROSTATE SURGERY             Family history: 3 sons in good general health, no known familial or genetic diseases     Social History               Socioeconomic History    Marital status: /Civil Union       Spouse name: Not on file    Number of children: Not on file    Years of education: Not on file    Highest education level: Not on file   Occupational History    Not on file   Tobacco Use    Smoking status: Former       Packs/day: 1.00       Years: 30.00       Total pack years: 30.00       Types: Cigarettes       Quit date:        Years since quittin.8    Smokeless tobacco: Never   Vaping Use    Vaping Use: Never used   Substance and Sexual Activity    Alcohol use: Yes       Alcohol/week: 4.0 - 6.0 standard drinks of alcohol       Types: 1 Glasses of wine, 3 - 5 Cans of beer per week       Comment: few beers day    Drug use: Never    Sexual activity: Not on file   Other Topics Concern    Not on file   Social History Narrative    Not on file      Social Determinants of Health           Financial Resource Strain: Medium Risk (2023)     Overall Financial Resource Strain (CARDIA)      Difficulty of Paying Living Expenses: Somewhat hard   Food Insecurity: No Food Insecurity (2022)     Hunger Vital Sign      Worried About Running Out of Food in the Last Year: Never true      Ran Out of Food in the Last Year: Never true   Transportation Needs: No Transportation Needs (2023)     PRAPARE - Transportation      Lack of Transportation (Medical): No      Lack of Transportation (Non-Medical): No   Physical Activity: Not on file   Stress: Not on file   Social Connections: Not on file   Intimate Partner Violence: Not on file   Housing Stability: Low Risk  (2022)     Housing Stability Vital Sign       Unable to Pay for Housing in the Last Year: No      Number of Places Lived in the Last Year: 1      Unstable Housing in the Last Year: No      Social history:  40 pack-year history discontinued 20 years ago, patient drinks 2-4 beers a day off and on, no drug abuse, 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)     Current Outpatient Medications:     acetaminophen (TYLENOL) 325 mg tablet, Take 2 tablets (650 mg total) by mouth every 6 (six) hours as needed for mild pain, headaches or fever, Disp: 30 tablet, Rfl: 0    albuterol (2.5 mg/3 mL) 0.083 % nebulizer solution, Take 2.5 mg by nebulization As needed per patient's wife , Disp: , Rfl:     apixaban (Eliquis) 5 mg, Take 1 tablet (5 mg total) by mouth 2 (two) times a day, Disp: 180 tablet, Rfl: 1    atenolol (TENORMIN) 25 mg tablet, , Disp: , Rfl:     atorvastatin (LIPITOR) 10 mg tablet, Take 1 tablet (10 mg total) by mouth daily, Disp: 90 tablet, Rfl: 1    carvedilol (COREG) 25 mg tablet, Take 1 tablet (25 mg total) by mouth 2 (two) times a day with meals, Disp: 180 tablet, Rfl: 1    ciclopirox (LOPROX) 0.77 % cream, Apply topically 2 (two) times a day for 20 days, Disp: 45 g, Rfl: 2    fluticasone-umeclidinium-vilanterol (Trelegy Ellipta) 100-62.5-25 mcg/actuation inhaler, Rinse mouth after use., Disp: 60 each, Rfl: 5    guaifenesin-codeine (GUAIFENESIN AC) 100-10 MG/5ML liquid, Take 10 mL by mouth 3 (three) times a day as needed for cough, Disp: 180 mL, Rfl: 0    halobetasol (ULTRAVATE) 0.05 % cream, , Disp: , Rfl:     hydrocortisone 2.5 % cream, Apply topically 2 (two) times a day Apply twice a day affected area the trunk, Disp: 20 g, Rfl: 2    latanoprost (XALATAN) 0.005 % ophthalmic solution, Administer 0.005 mL to both eyes daily at bedtime, Disp: , Rfl:     metFORMIN (GLUCOPHAGE) 500 mg tablet, Take 1 tablet (500 mg total) by mouth daily with breakfast, Disp: , Rfl:     mirtazapine (REMERON) 7.5 MG tablet, Take 1 tablet (7.5 mg  total) by mouth daily at bedtime, Disp: 90 tablet, Rfl: 3    Multiple Vitamin (MULTI-VITAMIN DAILY PO), Take by mouth daily  , Disp: , Rfl:     omeprazole (PriLOSEC) 20 mg delayed release capsule, TAKE 1 CAPSULE DAILY, Disp: 90 capsule, Rfl: 1    primidone (MYSOLINE) 50 mg tablet, TAKE 2 TABS AT 8PM., Disp: 180 tablet, Rfl: 1    spironolactone (ALDACTONE) 25 mg tablet, Take 1 tablet (25 mg total) by mouth daily, Disp: 90 tablet, Rfl: 1    aspirin (ECOTRIN LOW STRENGTH) 81 mg EC tablet, , Disp: , Rfl:      No Known Allergies         Vitals:     10/20/23 0926   BP: 114/68   Pulse: 80   Resp: 17   Temp: 97.9 °F (36.6 °C)   SpO2: 92%     Physical Exam  Constitutional:       Appearance: He is well-developed.      Comments: Obese male, no respiratory distress, no signs of pain   HENT:      Head: Normocephalic and atraumatic.      Right Ear: External ear normal.      Left Ear: External ear normal.   Eyes:      Conjunctiva/sclera: Conjunctivae normal.      Pupils: Pupils are equal, round, and reactive to light.   Cardiovascular:      Rate and Rhythm: Normal rate and regular rhythm.      Heart sounds: Normal heart sounds.   Pulmonary:      Effort: Pulmonary effort is normal.      Comments: Decreased breath sounds left lower lobe  Abdominal:      General: Bowel sounds are normal     Palpations: Abdomen is soft.      Comments: Obese, nontender, sign bowel sounds, cannot palpate liver or spleen  Musculoskeletal:         General: Normal range of motion.      Cervical back: Normal range of motion and neck supple.   Skin:     General: Skin is warm.      Comments: Relatively good color, warm, moist, scattered mild areas of ecchymosis   Neurological:      Mental Status: He is alert and oriented to person, place, and time.      Deep Tendon Reflexes: Reflexes are normal and symmetric.   Psychiatric:         Behavior: Behavior normal.         Thought Content: Thought content normal.         Judgment: Judgment normal.      Extremities:   No lower extremity edema bilaterally, no cords, pulses are 1+   Lymphatics:  No adenopathy in the neck, supraclavicular region, axilla bilateral     Labs    5/23/2024 WBC = 5.67 hemoglobin = 12.7 hematocrit = 41.6 platelet = 43 neutrophil = 45% BUN = 12 creatinine = 0.91 calcium = 9.1 LFTs WNL    4/11/2024 WBC = 6.79 hemoglobin = 11.8 hematocrit = 39.4 platelet = 527 neutrophil = 47% BUN = 10 creatinine = 0.88 calcium = 8.9 alkaline phosphatase = 130, other LFTs WNL    Imaging    6/6/2024 PET/CT    IMPRESSION:    1. Necrotic left lower lung mass demonstrates persistent but diminished predominantly peripheral FDG activity. This may represent partial response to therapy, though residual viable tumor may still remain. There is new air within this necrotic mass, for   which infection is not excluded. Correlate clinically.  2. Decreased but persistent small left pleural effusion. There is associated FDG activity which may be inflammatory/infectious. Malignant effusion is not excluded.  3. Healing right anterior rib fractures. Nonspecific but stable asymmetric focus of activity in the right sacrum.  4. Otherwise no new hypermetabolic metastases.    10/30/2023 PET/CT    CHEST:  Best seen on image 76 of series 4 is a hypermetabolic centrally photopenic left lower lobe lung mass measuring 7.1 x 6.4 cm with max SUV of 12.0, previously measuring 4.5 x 4.0 cm with max SUV of 19.7 on PET/CT dated 10/29/2021 and approximately 4.3 x 3.7 cm when utilizing similar measurement technique and CT of chest dated 12/23/2022. The interval growth in size since 12/23/2022 suggest progression of hypermetabolic malignancy.     Subtle FDG activity is noted with a stable in size prominent right paratracheal lymph node on image 58 of series 4 measuring 1 1.4 cm in short axis with max SUV of 2.8, previously 3.5. The stability favors benign reactive lymph node with early pamela metastatic disease considered less likely    IMPRESSION:     1.  Hypermetabolic left lower lobe lung malignancy has significantly increased in size since 12/23/2022 suggesting progression of malignancy.  2. Again noted is nonspecific FDG activity involving the sacrum and left posterior iliac bone without definitive correlate on low-dose unenhanced CT of uncertain clinical significance. Findings can be further characterized with MRI of the bony pelvis as clinically indicated.  3. Moderate left pleural effusion.     9/28/23: CT Chest w contrast:     Impression: Since September 12, 2023, decrease in size of the still large left pleural effusion with commensurate decrease in left-sided passive atelectasis. Persistent hypoattenuation in the collapsed portion of the left lower lobe. Given that this is in the   vicinity of the treated tumor, and has increased in size since December 2022, short interval contrast-enhanced CT is advised following resolution of the pleural effusion for further assessment.     1/27/2023 CT chest without contrast    IMPRESSION:  1.  Slightly decreased left pleural effusion with improved passive atelectasis of the left lower lobe. Previously noted left lower lobe mass is difficult to visualize on noncontrast exam but appears likely unchanged from most recent study.  2.  Similar appearance of skin thickening and subcutaneous stranding in the right axillary region which could represent cellulitis.       05/04/2022 chest x-ray portable.  Impression stated left small effusion and parenchymal process appears unchanged.     05/02/2022 CT scan the chest without contrast     IMPRESSION:  1.  Increase in size of a left lower lobe patchy opacity with new right basilar patchy densities concerning for pulmonary infiltrates with additional patchy and reticulonodular changes lingular segment left upper lobe and right middle lobe also likely related to infectious process.  2.  Left lower lobe cavitary mass has decreased in size now measuring 3.3 x 1.9 cm, previously 4.1 x  2.2 cm.  3.  Stable COPD and pulmonary fibrosis with the latter likely related to radiation change.     11/19/2021 MRI brain.  Impression stated white matter changes suggestive of chronic microangiopathy.  No acute intracranial pathology.     10/29/2021 PET-CT     1.  Hypermetabolic necrotic 4.5 x 4 cm left lower lung mass compatible with known malignancy.  2.  2 mildly hypermetabolic right paratracheal mediastinal nodes for which early metastases are not excluded.  3.  Small mildly hypermetabolic left pleural effusion for which malignant effusion is not excluded.  4.  Additional scattered tiny nodular lung densities may be reassessed on follow-up CT.  5.  Probable reactive subcentimeter right cervical node may be reassessed on follow-up PET/CT.  6.  No hypermetabolic soft tissue metastases in the abdomen, pelvis.  7.  Minimal inhomogeneous FDG activity in the sacrum and left posterior iliac, though without dominant focal lesion.  This may be reassessed on follow-up exam.     Pathology    12/7/2023 [facility].  No action mutations were detected.  Tumor mutational burden = 6 = low.  Patient had a pathologic variant in PTEN and TP53.  PDL IHC TPS = 0%, negative.     Case Report   Surgical Pathology Report                         Case: [id number]                                    Authorizing Provider:  Cachorro Jason MD       Collected:           11/29/2021 1005               Ordering Location:     [facility]      Received:            11/29/2021 [address] [place] [address] Operating Room                                                       Pathologist:           Juan José Dietz MD                                                         Specimens:   A) - Lymph Node, level 7                                                                             B) - Lymph Node, level 4R                                                                            C) - Lymph Node, level 2R                                                                             D) - Lymph Node, level 4L                                                                   Final Diagnosis   A. Lymph node, level 7, excision:  -  Portions of benign lymph node with reactive change and anthracosis, negative for granulomas, lymphoma, or metastatic carcinoma.    B. Lymph node, level 4R, excision:  -  Portions of benign lymph node with reactive change and anthracosis, negative for granulomas, lymphoma, or metastatic carcinoma.    C. Lymph node, level 2R, excision:  -  Portions of benign lymph node with reactive change and anthracosis, negative for granulomas, lymphoma, or metastatic carcinoma.    D. Lymph node, level 4L, excision:  -  Benign lymph node with reactive change and anthracosis, negative for granulomas, lymphoma, or metastatic carcinoma.      Electronically signed by Juan José Dietz MD on 12/2/2021 at 11:29 AM         Case Report   Surgical Pathology Report                         Case: [id number]                                    Authorizing Provider:  Elmer Laird MD      Collected:           10/05/2021 1058               Ordering Location[facility]     [facility] hospital Received:            10/05/2021 1120                                      CAT Scan                                                                      Pathologist:           Mary Dean MD                                                         Specimen:    Lung, Left Lower Lobe                                                                       Final Diagnosis   A. Lung, Left Lower Lobe, :  - Invasive squamous carcinoma, moderately differentiated, keratinizing type.  - Tumor is highlighted with P 40 immunoperoxidase stain.  - Prominent tumor necrosis is noted.      Best representative block with tumor A1.  This case was reviewed at the intradepartmental  conference.   RADHA Navarrete, Pulmonology, is notified of the  diagnosis in EPIC via [employer] on 10/06/2021  at 2.40 pm.   Electronically signed by Mary Dean MD on 10/6/2021 at  2:44 PM

## 2024-06-10 NOTE — TELEPHONE ENCOUNTER
Patient seen by Dr James  Lack of venous access  PAC to be inserted 6/19/2024  Ok to postpone next cycle of chemo until AFTER PAC placed  Will send to  to arrange

## 2024-06-12 ENCOUNTER — OFFICE VISIT (OUTPATIENT)
Dept: INTERNAL MEDICINE CLINIC | Facility: CLINIC | Age: 84
End: 2024-06-12
Payer: COMMERCIAL

## 2024-06-12 VITALS
OXYGEN SATURATION: 98 % | HEIGHT: 70 IN | BODY MASS INDEX: 28.63 KG/M2 | WEIGHT: 200 LBS | DIASTOLIC BLOOD PRESSURE: 80 MMHG | SYSTOLIC BLOOD PRESSURE: 122 MMHG | HEART RATE: 70 BPM

## 2024-06-12 DIAGNOSIS — L50.9 URTICARIA: ICD-10-CM

## 2024-06-12 DIAGNOSIS — E11.40 TYPE 2 DIABETES MELLITUS WITH DIABETIC NEUROPATHY, WITHOUT LONG-TERM CURRENT USE OF INSULIN (HCC): Primary | ICD-10-CM

## 2024-06-12 PROCEDURE — 99213 OFFICE O/P EST LOW 20 MIN: CPT | Performed by: INTERNAL MEDICINE

## 2024-06-12 PROCEDURE — G2211 COMPLEX E/M VISIT ADD ON: HCPCS | Performed by: INTERNAL MEDICINE

## 2024-06-12 RX ORDER — HYDROXYZINE 50 MG/1
50 TABLET, FILM COATED ORAL
Qty: 90 TABLET | Refills: 1 | Status: SHIPPED | OUTPATIENT
Start: 2024-06-12

## 2024-06-12 NOTE — PROGRESS NOTES
Dr. Tatum's Office Visit Note  24     Bridger Clayton 84 y.o. male MRN: [id number]  : 1940    Assessment:     1. Type 2 diabetes mellitus with diabetic neuropathy, without long-term current use of insulin ([facility])  Assessment & Plan:    Lab Results   Component Value Date    HGBA1C 8.6 (H) 2024   A1c 8.6 elevated after discontinuing metformin due to hypoglycemia now uncontrolled    Not monitoring blood sugar at home    Restart metformin 500 twice a day with diabetic diet    Hypoglycemia protocol in place    Foot exam normal    Dilated eye exam once a year  Orders:  -     metFORMIN (GLUCOPHAGE) 500 mg tablet; Take 1 tablet (500 mg total) by mouth 2 (two) times a day with meals  -     Hemoglobin A1C; Future  2. Urticaria  Assessment & Plan:  Intermittent itching rash maculopapular improved in the past with Atarax    Reviewed and continue Atarax 50 mg at bedtime due to side effects like sleepiness for prevention    Continue Claritin 1 a day seems to be helping resolve the rash itching  Orders:  -     hydrOXYzine HCL (ATARAX) 50 mg tablet; Take 1 tablet (50 mg total) by mouth daily at bedtime        Discussion Summary and Plan:  Today's care plan and medications were reviewed with patient in detail and all their questions answered to their satisfaction.    Chief Complaint   Patient presents with   • Follow-up      Subjective:  HPI    The following portions of the patient's history were reviewed and updated as appropriate: allergies, current medications, past family history, past medical history, past social history, past surgical history and problem list.    Review of Systems   Constitutional:  Positive for appetite change and fatigue. Negative for activity change, chills, diaphoresis, fever and unexpected weight change.   HENT:  Positive for drooling. Negative for congestion, dental problem, ear discharge, ear pain, facial swelling, hearing loss, mouth sores, nosebleeds, postnasal drip, rhinorrhea, sinus  pressure, sneezing, sore throat, tinnitus, trouble swallowing and voice change.    Eyes:  Negative for photophobia, pain, discharge, redness, itching and visual disturbance.   Respiratory:  Positive for shortness of breath. Negative for apnea, cough, choking, chest tightness, wheezing and stridor.    Cardiovascular:  Negative for chest pain, palpitations and leg swelling.   Gastrointestinal:  Negative for abdominal distention, abdominal pain, anal bleeding, blood in stool, constipation, diarrhea, nausea, rectal pain and vomiting.   Endocrine: Negative for cold intolerance, heat intolerance, polydipsia, polyphagia and polyuria.   Genitourinary:  Negative for decreased urine volume, difficulty urinating, dysuria, enuresis, flank pain, frequency, genital sores, hematuria and urgency.   Musculoskeletal:  Positive for arthralgias, back pain, gait problem and joint swelling. Negative for myalgias, neck pain and neck stiffness.   Skin:  Negative for color change, pallor, rash and wound.   Allergic/Immunologic: Negative.  Negative for environmental allergies, food allergies and immunocompromised state.   Neurological:  Negative for dizziness, tremors, seizures, syncope, facial asymmetry, speech difficulty, weakness, light-headedness, numbness and headaches.   Psychiatric/Behavioral:  Negative for agitation, behavioral problems, confusion, decreased concentration, dysphoric mood, hallucinations, self-injury, sleep disturbance and suicidal ideas. The patient is not nervous/anxious and is not hyperactive.          Historical Information   Patient Active Problem List   Diagnosis   • Corns   • Pain in both feet   • Onychomycosis   • Tinea pedis of both feet   • Lung mass   • Hyperlipidemia   • Type 2 diabetes mellitus with diabetic neuropathy, without long-term current use of insulin (HCC)   • Squamous cell carcinoma of lung (HCC)   • Shortness of breath   • Daytime hypersomnolence   • Chronic diastolic heart failure (HCC)   •  SYLVESTER (obstructive sleep apnea)   • Prostate cancer (HCC)   • GERD (gastroesophageal reflux disease)   • CAD (coronary artery disease)   • Other persistent atrial fibrillation (HCC)   • Alcohol dependence (HCC)   • Acute respiratory failure with hypoxemia (HCC)   • Depression, recurrent (HCC)   • COVID-19   • Angioedema   • Septic shock (HCC)   • Cellulitis   • Cellulitis of chest wall   • Acute kidney injury (BUDDY) with acute tubular necrosis (ATN) (HCC)   • Chronic obstructive pulmonary disease, unspecified COPD type (HCC)   • Centrilobular emphysema (HCC)   • Abdominal aortic aneurysm (HCC)   • Hypertensive heart disease   • Tremors of nervous system   • Recurrent pleural effusion on left   • Allergic reaction caused by a drug   • Urticaria     Past Medical History:   Diagnosis Date   • Abdominal pain    • Anxiety    • Arthritis    • Asthma    • Atrial fibrillation (HCC)    • Back pain    • Bleeding ulcer    • Bronchitis    • Cancer (HCC)     prostate 2011- radiation   • Cardiac disease     cardiac stent x1   • Cataract     starting   • Chronic diastolic (congestive) heart failure (HCC)    • Diabetes mellitus (HCC)     boarderline diabetic    • GERD (gastroesophageal reflux disease)    • History of radiation therapy 2010    Prostate seeds (brachytherapy) and EBRT   • Hyperlipidemia    • Hypertension    • 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, bilateral    • Low back pain    • Lung cancer (HCC)    • Lung mass    • Myocardial infarction (HCC)     mild 1999   • Obesity    • Prostate cancer (HCC)    • Shortness of breath    • Sleep apnea     sleep study 11/22   • Wears dentures     full set   • Wears glasses      Past Surgical History:   Procedure Laterality Date   • ABDOMINAL HERNIA REPAIR     • ABDOMINAL SURGERY      bleeding ulcer, cyst removed from abd   • COLONOSCOPY     • CORONARY ANGIOPLASTY WITH STENT PLACEMENT  1999    x1    • ESOPHAGOGASTRODUODENOSCOPY     • IR BIOPSY LUNG  10/05/2021   • IR THORACENTESIS  11/08/2021    • IR THORACENTESIS  2023   • IR THORACENTESIS  2023   • KNEE SURGERY Left    • UT BRNCC INCL FLUOR GDNCE DX W/CELL WASHG SPX N/A 2021    Procedure: BRONCHOSCOPY FLEXIBLE;  Surgeon: Cachorro Jason MD;  Location: [facility];  Service: Thoracic   • UT MEDIASTINOSCOPY WITH LYMPH NODE BIOPSY/IES N/A 2021    Procedure: MEDIASTINOSCOPY;  Surgeon: Cachorro Jason MD;  Location: [facility];  Service: Thoracic   • PROSTATE SURGERY       Social History     Substance and Sexual Activity   Alcohol Use Yes   • Alcohol/week: 4.0 - 6.0 standard drinks of alcohol   • Types: 1 Glasses of wine, 3 - 5 Cans of beer per week    Comment: socially     Social History     Substance and Sexual Activity   Drug Use Never     Social History     Tobacco Use   Smoking Status Former   • Current packs/day: 0.00   • Average packs/day: 1 pack/day for 30.0 years (30.0 ttl pk-yrs)   • Types: Cigarettes   • Start date:    • Quit date:    • Years since quittin.4   Smokeless Tobacco Never     Family History   Problem Relation Age of Onset   • Prostate cancer Brother    • Cancer Maternal Uncle         colo rectal cancer   • Cancer Paternal Aunt      Health Maintenance Due   Topic   • Pneumococcal Vaccine: 65+ Years (1 of 2 - PCV)   • Depression Follow-up Plan    • Zoster Vaccine (1 of 2)   • RSV Vaccine Age 60+ Years (1 - 1-dose 60+ series)   • Falls: Plan of Care    • COVID-19 Vaccine (3 - Moderna risk series)   • Kidney Health Evaluation: Albumin/Creatinine Ratio    • Diabetic Foot Exam    • Fall Risk    • Medicare Annual Wellness Visit (AWV)    • Depression Screening       Meds/Allergies       Current Outpatient Medications:   •  acetaminophen (TYLENOL) 325 mg tablet, Take 2 tablets (650 mg total) by mouth every 6 (six) hours as needed for mild pain, headaches or fever, Disp: 30 tablet, Rfl: 0  •  albuterol (2.5 mg/3 mL) 0.083 % nebulizer solution, Take 2.5 mg by nebulization As needed per patient's wife, Disp:  , Rfl:   •  apixaban (Eliquis) 5 mg, Take 1 tablet (5 mg total) by mouth 2 (two) times a day (Patient taking differently: Take 2.5 mg by mouth 2 (two) times a day), Disp: 180 tablet, Rfl: 0  •  atenolol (TENORMIN) 25 mg tablet, , Disp: , Rfl:   •  atorvastatin (LIPITOR) 10 mg tablet, TAKE 1 TABLET (10 MG TOTAL) BY MOUTH DAILY, Disp: 90 tablet, Rfl: 1  •  carvedilol (COREG) 25 mg tablet, Take 1 tablet (25 mg total) by mouth 2 (two) times a day with meals, Disp: 180 tablet, Rfl: 1  •  ciclopirox (LOPROX) 0.77 % cream, Apply topically 2 (two) times a day for 20 days, Disp: 45 g, Rfl: 2  •  fluticasone-umeclidinium-vilanterol (Trelegy Ellipta) 100-62.5-25 mcg/actuation inhaler, Rinse mouth after use., Disp: 60 each, Rfl: 5  •  guaifenesin-codeine (GUAIFENESIN AC) 100-10 MG/5ML liquid, Take 10 mL by mouth 3 (three) times a day as needed for cough, Disp: 473 mL, Rfl: 0  •  halobetasol (ULTRAVATE) 0.05 % cream, , Disp: , Rfl:   •  hydrocortisone 2.5 % cream, Apply topically 2 (two) times a day Apply twice a day affected area the trunk, Disp: 20 g, Rfl: 2  •  hydrOXYzine HCL (ATARAX) 50 mg tablet, Take 1 tablet (50 mg total) by mouth daily at bedtime, Disp: 90 tablet, Rfl: 1  •  latanoprost (XALATAN) 0.005 % ophthalmic solution, Administer 0.005 mL to both eyes daily at bedtime, Disp: , Rfl:   •  metFORMIN (GLUCOPHAGE) 500 mg tablet, Take 1 tablet (500 mg total) by mouth 2 (two) times a day with meals, Disp: 180 tablet, Rfl: 1  •  mirtazapine (REMERON) 7.5 MG tablet, Take 1 tablet (7.5 mg total) by mouth daily at bedtime, Disp: 90 tablet, Rfl: 3  •  Multiple Vitamin (MULTI-VITAMIN DAILY PO), Take by mouth daily  , Disp: , Rfl:   •  omeprazole (PriLOSEC) 20 mg delayed release capsule, Take 1 capsule (20 mg total) by mouth daily, Disp: 90 capsule, Rfl: 1  •  primidone (MYSOLINE) 50 mg tablet, TAKE 2 TABS AT 8PM., Disp: 180 tablet, Rfl: 1  •  triamcinolone (KENALOG) 0.1 % cream, , Disp: , Rfl:   •  aspirin (ECOTRIN LOW  "STRENGTH) 81 mg EC tablet, , Disp: , Rfl:       Objective:    Vitals:   /80   Pulse 70   Ht 5' 10\" (1.778 m)   Wt 90.7 kg (200 lb)   SpO2 98%   BMI 28.70 kg/m²   Body mass index is 28.7 kg/m².  Vitals:    06/12/24 1133   Weight: 90.7 kg (200 lb)       Physical Exam  Vitals and nursing note reviewed.   Constitutional:       General: He is not in acute distress.     Appearance: He is well-developed. He is obese. He is not ill-appearing, toxic-appearing or diaphoretic.   HENT:      Head: Normocephalic and atraumatic.      Right Ear: External ear normal.      Left Ear: External ear normal.      Nose: Nose normal.      Mouth/Throat:      Pharynx: No oropharyngeal exudate.   Eyes:      General: Lids are normal. Lids are everted, no foreign bodies appreciated. No scleral icterus.        Right eye: No discharge.         Left eye: No discharge.      Conjunctiva/sclera: Conjunctivae normal.      Pupils: Pupils are equal, round, and reactive to light.   Neck:      Thyroid: No thyromegaly.      Vascular: Normal carotid pulses. No carotid bruit, hepatojugular reflux or JVD.      Trachea: No tracheal tenderness or tracheal deviation.   Cardiovascular:      Rate and Rhythm: Normal rate and regular rhythm.      Pulses:           Dorsalis pedis pulses are 2+ on the right side and 2+ on the left side.        Posterior tibial pulses are 1+ on the right side and 1+ on the left side.      Heart sounds: Murmur heard.      No friction rub. No gallop.   Pulmonary:      Effort: Pulmonary effort is normal. No respiratory distress.      Breath sounds: No stridor. Rales present. No wheezing.      Comments: Decreased air entry bilateral left worse than the right  Chest:      Chest wall: No tenderness.   Abdominal:      General: Bowel sounds are normal. There is no distension.      Palpations: Abdomen is soft. There is no mass.      Tenderness: There is no abdominal tenderness. There is no guarding or rebound.   Musculoskeletal:       "   General: No tenderness or deformity. Normal range of motion.      Cervical back: Normal range of motion and neck supple. No edema, erythema or rigidity. No spinous process tenderness or muscular tenderness. Normal range of motion.   Feet:      Right foot:      Skin integrity: No ulcer, skin breakdown, erythema, warmth, callus or dry skin.      Left foot:      Skin integrity: No ulcer, skin breakdown, erythema, warmth, callus or dry skin.   Lymphadenopathy:      Head:      Right side of head: No submental, submandibular, tonsillar, preauricular or posterior auricular adenopathy.      Left side of head: No submental, submandibular, tonsillar, preauricular, posterior auricular or occipital adenopathy.      Cervical: No cervical adenopathy.      Right cervical: No superficial, deep or posterior cervical adenopathy.     Left cervical: No superficial, deep or posterior cervical adenopathy.      Upper Body:      Right upper body: No pectoral adenopathy.      Left upper body: No pectoral adenopathy.   Skin:     General: Skin is warm and dry.      Coloration: Skin is not pale.      Findings: No erythema or rash.   Neurological:      Mental Status: He is alert and oriented to person, place, and time.      Cranial Nerves: No cranial nerve deficit.      Sensory: No sensory deficit.      Motor: Weakness present. No tremor, abnormal muscle tone or seizure activity.      Coordination: Coordination normal.      Gait: Gait abnormal.      Deep Tendon Reflexes: Reflexes are normal and symmetric. Reflexes normal.   Psychiatric:         Behavior: Behavior normal.         Thought Content: Thought content normal.         Judgment: Judgment normal.       Lab Review   Hospital Outpatient Visit on 06/06/2024   Component Date Value Ref Range Status   • POC Glucose 06/06/2024 169 (H)  65 - 140 mg/dl Final   Hospital Outpatient Visit on 06/04/2024   Component Date Value Ref Range Status   • POC Glucose 06/04/2024 210 (H)  65 - 140 mg/dl Final    Appointment on 05/23/2024   Component Date Value Ref Range Status   • WBC 05/23/2024 5.67  4.31 - 10.16 Thousand/uL Final   • RBC 05/23/2024 4.67  3.88 - 5.62 Million/uL Final   • Hemoglobin 05/23/2024 12.7  12.0 - 17.0 g/dL Final   • Hematocrit 05/23/2024 41.6  36.5 - 49.3 % Final   • MCV 05/23/2024 89  82 - 98 fL Final   • MCH 05/23/2024 27.2  26.8 - 34.3 pg Final   • MCHC 05/23/2024 30.5 (L)  31.4 - 37.4 g/dL Final   • RDW 05/23/2024 16.7 (H)  11.6 - 15.1 % Final   • MPV 05/23/2024 9.1  8.9 - 12.7 fL Final   • Platelets 05/23/2024 483 (H)  149 - 390 Thousands/uL Final   • nRBC 05/23/2024 0  /100 WBCs Final   • Segmented % 05/23/2024 45  43 - 75 % Final   • Immature Grans % 05/23/2024 1  0 - 2 % Final   • Lymphocytes % 05/23/2024 16  14 - 44 % Final   • Monocytes % 05/23/2024 21 (H)  4 - 12 % Final   • Eosinophils Relative 05/23/2024 15 (H)  0 - 6 % Final   • Basophils Relative 05/23/2024 2 (H)  0 - 1 % Final   • Absolute Neutrophils 05/23/2024 2.53  1.85 - 7.62 Thousands/µL Final   • Absolute Immature Grans 05/23/2024 0.03  0.00 - 0.20 Thousand/uL Final   • Absolute Lymphocytes 05/23/2024 0.93  0.60 - 4.47 Thousands/µL Final   • Absolute Monocytes 05/23/2024 1.18  0.17 - 1.22 Thousand/µL Final   • Eosinophils Absolute 05/23/2024 0.87 (H)  0.00 - 0.61 Thousand/µL Final   • Basophils Absolute 05/23/2024 0.13 (H)  0.00 - 0.10 Thousands/µL Final   • Sodium 05/23/2024 135  135 - 147 mmol/L Final   • Potassium 05/23/2024 4.3  3.5 - 5.3 mmol/L Final   • Chloride 05/23/2024 102  96 - 108 mmol/L Final   • CO2 05/23/2024 29  21 - 32 mmol/L Final   • ANION GAP 05/23/2024 4  4 - 13 mmol/L Final   • BUN 05/23/2024 12  5 - 25 mg/dL Final   • Creatinine 05/23/2024 0.91  0.60 - 1.30 mg/dL Final    Standardized to IDMS reference method   • Glucose, Fasting 05/23/2024 154 (H)  65 - 99 mg/dL Final   • Calcium 05/23/2024 9.1  8.4 - 10.2 mg/dL Final   • Corrected Calcium 05/23/2024 9.7  8.3 - 10.1 mg/dL Final   • AST 05/23/2024 46  (H)  13 - 39 U/L Final   • ALT 05/23/2024 41  7 - 52 U/L Final    Specimen collection should occur prior to Sulfasalazine administration due to the potential for falsely depressed results.    • Alkaline Phosphatase 05/23/2024 139 (H)  34 - 104 U/L Final   • Total Protein 05/23/2024 6.8  6.4 - 8.4 g/dL Final   • Albumin 05/23/2024 3.3 (L)  3.5 - 5.0 g/dL Final   • Total Bilirubin 05/23/2024 0.45  0.20 - 1.00 mg/dL Final    Use of this assay is not recommended for patients undergoing treatment with eltrombopag due to the potential for falsely elevated results.  N-acetyl-p-benzoquinone imine (metabolite of Acetaminophen) will generate erroneously low results in samples for patients that have taken an overdose of Acetaminophen.   • eGFR 05/23/2024 78  ml/min/1.73sq m Final   Appointment on 05/02/2024   Component Date Value Ref Range Status   • WBC 05/02/2024 5.93  4.31 - 10.16 Thousand/uL Final   • RBC 05/02/2024 4.31  3.88 - 5.62 Million/uL Final   • Hemoglobin 05/02/2024 11.8 (L)  12.0 - 17.0 g/dL Final   • Hematocrit 05/02/2024 39.1  36.5 - 49.3 % Final   • MCV 05/02/2024 91  82 - 98 fL Final   • MCH 05/02/2024 27.4  26.8 - 34.3 pg Final   • MCHC 05/02/2024 30.2 (L)  31.4 - 37.4 g/dL Final   • RDW 05/02/2024 16.7 (H)  11.6 - 15.1 % Final   • MPV 05/02/2024 9.4  8.9 - 12.7 fL Final   • Platelets 05/02/2024 440 (H)  149 - 390 Thousands/uL Final   • Sodium 05/02/2024 136  135 - 147 mmol/L Final   • Potassium 05/02/2024 4.4  3.5 - 5.3 mmol/L Final   • Chloride 05/02/2024 102  96 - 108 mmol/L Final   • CO2 05/02/2024 28  21 - 32 mmol/L Final   • ANION GAP 05/02/2024 6  4 - 13 mmol/L Final   • BUN 05/02/2024 15  5 - 25 mg/dL Final   • Creatinine 05/02/2024 0.91  0.60 - 1.30 mg/dL Final    Standardized to IDMS reference method   • Glucose 05/02/2024 212 (H)  65 - 140 mg/dL Final    If the patient is fasting, the ADA then defines impaired fasting glucose as > 100 mg/dL and diabetes as > or equal to 123 mg/dL.   • Calcium  05/02/2024 8.6  8.4 - 10.2 mg/dL Final   • Corrected Calcium 05/02/2024 9.3  8.3 - 10.1 mg/dL Final   • AST 05/02/2024 42 (H)  13 - 39 U/L Final   • ALT 05/02/2024 31  7 - 52 U/L Final    Specimen collection should occur prior to Sulfasalazine administration due to the potential for falsely depressed results.    • Alkaline Phosphatase 05/02/2024 129 (H)  34 - 104 U/L Final   • Total Protein 05/02/2024 6.5  6.4 - 8.4 g/dL Final   • Albumin 05/02/2024 3.1 (L)  3.5 - 5.0 g/dL Final   • Total Bilirubin 05/02/2024 0.52  0.20 - 1.00 mg/dL Final    Use of this assay is not recommended for patients undergoing treatment with eltrombopag due to the potential for falsely elevated results.  N-acetyl-p-benzoquinone imine (metabolite of Acetaminophen) will generate erroneously low results in samples for patients that have taken an overdose of Acetaminophen.   • eGFR 05/02/2024 78  ml/min/1.73sq m Final   • Segmented % 05/02/2024 45  43 - 75 % Final   • Lymphocytes % 05/02/2024 7 (L)  14 - 44 % Final   • Monocytes % 05/02/2024 17 (H)  4 - 12 % Final   • Eosinophils % 05/02/2024 27 (H)  0 - 6 % Final   • Basophils % 05/02/2024 2 (H)  0 - 1 % Final   • Atypical Lymphocytes % 05/02/2024 2 (H)  <=0 % Final   • Absolute Neutrophils 05/02/2024 2.67  1.85 - 7.62 Thousand/uL Final   • Absolute Lymphocytes 05/02/2024 0.53 (L)  0.60 - 4.47 Thousand/uL Final   • Absolute Monocytes 05/02/2024 1.01  0.00 - 1.22 Thousand/uL Final   • Absolute Eosinophils 05/02/2024 1.60 (H)  0.00 - 0.40 Thousand/uL Final   • Absolute Basophils 05/02/2024 0.12 (H)  0.00 - 0.10 Thousand/uL Final   • RBC Morphology 05/02/2024 Present   Final   • Platelet Estimate 05/02/2024 Increased (A)  Adequate Final   • Anisocytosis 05/02/2024 Present   Final         Patient Instructions   Diabetes    Check your fingerstick Accu-Chek once a day.    Please check your fingerstick Accu-Chek different time of the day either at 7:00 a.m. or 11:00 a.m. for for p.m. 4 9:00  "p.m..    Follow Diabetic diet for diabetes as advised.    If you would sugar less than 80 or more than 300 to please call us on next visit is day.    If the sugar is less than 80 follow-up hypoglycemia instructions as advised.    Take your diabetic medicine as advised.            Vivek Tatum MD        \"This note has been constructed using a voice recognition system.Therefore there may be syntax, spelling, and/or grammatical errors. Please call if you have any questions. \"  "

## 2024-06-12 NOTE — H&P (VIEW-ONLY)
Dr. Tatum's Office Visit Note  24     Bridger Clayton 84 y.o. male MRN: [id number]  : 1940    Assessment:     1. Type 2 diabetes mellitus with diabetic neuropathy, without long-term current use of insulin ([facility])  Assessment & Plan:    Lab Results   Component Value Date    HGBA1C 8.6 (H) 2024   A1c 8.6 elevated after discontinuing metformin due to hypoglycemia now uncontrolled    Not monitoring blood sugar at home    Restart metformin 500 twice a day with diabetic diet    Hypoglycemia protocol in place    Foot exam normal    Dilated eye exam once a year  Orders:  -     metFORMIN (GLUCOPHAGE) 500 mg tablet; Take 1 tablet (500 mg total) by mouth 2 (two) times a day with meals  -     Hemoglobin A1C; Future  2. Urticaria  Assessment & Plan:  Intermittent itching rash maculopapular improved in the past with Atarax    Reviewed and continue Atarax 50 mg at bedtime due to side effects like sleepiness for prevention    Continue Claritin 1 a day seems to be helping resolve the rash itching  Orders:  -     hydrOXYzine HCL (ATARAX) 50 mg tablet; Take 1 tablet (50 mg total) by mouth daily at bedtime        Discussion Summary and Plan:  Today's care plan and medications were reviewed with patient in detail and all their questions answered to their satisfaction.    Chief Complaint   Patient presents with   • Follow-up      Subjective:  HPI    The following portions of the patient's history were reviewed and updated as appropriate: allergies, current medications, past family history, past medical history, past social history, past surgical history and problem list.    Review of Systems   Constitutional:  Positive for appetite change and fatigue. Negative for activity change, chills, diaphoresis, fever and unexpected weight change.   HENT:  Positive for drooling. Negative for congestion, dental problem, ear discharge, ear pain, facial swelling, hearing loss, mouth sores, nosebleeds, postnasal drip, rhinorrhea, sinus  pressure, sneezing, sore throat, tinnitus, trouble swallowing and voice change.    Eyes:  Negative for photophobia, pain, discharge, redness, itching and visual disturbance.   Respiratory:  Positive for shortness of breath. Negative for apnea, cough, choking, chest tightness, wheezing and stridor.    Cardiovascular:  Negative for chest pain, palpitations and leg swelling.   Gastrointestinal:  Negative for abdominal distention, abdominal pain, anal bleeding, blood in stool, constipation, diarrhea, nausea, rectal pain and vomiting.   Endocrine: Negative for cold intolerance, heat intolerance, polydipsia, polyphagia and polyuria.   Genitourinary:  Negative for decreased urine volume, difficulty urinating, dysuria, enuresis, flank pain, frequency, genital sores, hematuria and urgency.   Musculoskeletal:  Positive for arthralgias, back pain, gait problem and joint swelling. Negative for myalgias, neck pain and neck stiffness.   Skin:  Negative for color change, pallor, rash and wound.   Allergic/Immunologic: Negative.  Negative for environmental allergies, food allergies and immunocompromised state.   Neurological:  Negative for dizziness, tremors, seizures, syncope, facial asymmetry, speech difficulty, weakness, light-headedness, numbness and headaches.   Psychiatric/Behavioral:  Negative for agitation, behavioral problems, confusion, decreased concentration, dysphoric mood, hallucinations, self-injury, sleep disturbance and suicidal ideas. The patient is not nervous/anxious and is not hyperactive.          Historical Information   Patient Active Problem List   Diagnosis   • Corns   • Pain in both feet   • Onychomycosis   • Tinea pedis of both feet   • Lung mass   • Hyperlipidemia   • Type 2 diabetes mellitus with diabetic neuropathy, without long-term current use of insulin (HCC)   • Squamous cell carcinoma of lung (HCC)   • Shortness of breath   • Daytime hypersomnolence   • Chronic diastolic heart failure (HCC)   •  SYLVESTER (obstructive sleep apnea)   • Prostate cancer (HCC)   • GERD (gastroesophageal reflux disease)   • CAD (coronary artery disease)   • Other persistent atrial fibrillation (HCC)   • Alcohol dependence (HCC)   • Acute respiratory failure with hypoxemia (HCC)   • Depression, recurrent (HCC)   • COVID-19   • Angioedema   • Septic shock (HCC)   • Cellulitis   • Cellulitis of chest wall   • Acute kidney injury (BUDDY) with acute tubular necrosis (ATN) (HCC)   • Chronic obstructive pulmonary disease, unspecified COPD type (HCC)   • Centrilobular emphysema (HCC)   • Abdominal aortic aneurysm (HCC)   • Hypertensive heart disease   • Tremors of nervous system   • Recurrent pleural effusion on left   • Allergic reaction caused by a drug   • Urticaria     Past Medical History:   Diagnosis Date   • Abdominal pain    • Anxiety    • Arthritis    • Asthma    • Atrial fibrillation (HCC)    • Back pain    • Bleeding ulcer    • Bronchitis    • Cancer (HCC)     prostate 2011- radiation   • Cardiac disease     cardiac stent x1   • Cataract     starting   • Chronic diastolic (congestive) heart failure (HCC)    • Diabetes mellitus (HCC)     boarderline diabetic    • GERD (gastroesophageal reflux disease)    • History of radiation therapy 2010    Prostate seeds (brachytherapy) and EBRT   • Hyperlipidemia    • Hypertension    • 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, bilateral    • Low back pain    • Lung cancer (HCC)    • Lung mass    • Myocardial infarction (HCC)     mild 1999   • Obesity    • Prostate cancer (HCC)    • Shortness of breath    • Sleep apnea     sleep study 11/22   • Wears dentures     full set   • Wears glasses      Past Surgical History:   Procedure Laterality Date   • ABDOMINAL HERNIA REPAIR     • ABDOMINAL SURGERY      bleeding ulcer, cyst removed from abd   • COLONOSCOPY     • CORONARY ANGIOPLASTY WITH STENT PLACEMENT  1999    x1    • ESOPHAGOGASTRODUODENOSCOPY     • IR BIOPSY LUNG  10/05/2021   • IR THORACENTESIS  11/08/2021    • IR THORACENTESIS  2023   • IR THORACENTESIS  2023   • KNEE SURGERY Left    • NC BRNCC INCL FLUOR GDNCE DX W/CELL WASHG SPX N/A 2021    Procedure: BRONCHOSCOPY FLEXIBLE;  Surgeon: Cachorro Jason MD;  Location: [facility];  Service: Thoracic   • NC MEDIASTINOSCOPY WITH LYMPH NODE BIOPSY/IES N/A 2021    Procedure: MEDIASTINOSCOPY;  Surgeon: Cachorro Jason MD;  Location: [facility];  Service: Thoracic   • PROSTATE SURGERY       Social History     Substance and Sexual Activity   Alcohol Use Yes   • Alcohol/week: 4.0 - 6.0 standard drinks of alcohol   • Types: 1 Glasses of wine, 3 - 5 Cans of beer per week    Comment: socially     Social History     Substance and Sexual Activity   Drug Use Never     Social History     Tobacco Use   Smoking Status Former   • Current packs/day: 0.00   • Average packs/day: 1 pack/day for 30.0 years (30.0 ttl pk-yrs)   • Types: Cigarettes   • Start date:    • Quit date:    • Years since quittin.4   Smokeless Tobacco Never     Family History   Problem Relation Age of Onset   • Prostate cancer Brother    • Cancer Maternal Uncle         colo rectal cancer   • Cancer Paternal Aunt      Health Maintenance Due   Topic   • Pneumococcal Vaccine: 65+ Years (1 of 2 - PCV)   • Depression Follow-up Plan    • Zoster Vaccine (1 of 2)   • RSV Vaccine Age 60+ Years (1 - 1-dose 60+ series)   • Falls: Plan of Care    • COVID-19 Vaccine (3 - Moderna risk series)   • Kidney Health Evaluation: Albumin/Creatinine Ratio    • Diabetic Foot Exam    • Fall Risk    • Medicare Annual Wellness Visit (AWV)    • Depression Screening       Meds/Allergies       Current Outpatient Medications:   •  acetaminophen (TYLENOL) 325 mg tablet, Take 2 tablets (650 mg total) by mouth every 6 (six) hours as needed for mild pain, headaches or fever, Disp: 30 tablet, Rfl: 0  •  albuterol (2.5 mg/3 mL) 0.083 % nebulizer solution, Take 2.5 mg by nebulization As needed per patient's wife, Disp:  , Rfl:   •  apixaban (Eliquis) 5 mg, Take 1 tablet (5 mg total) by mouth 2 (two) times a day (Patient taking differently: Take 2.5 mg by mouth 2 (two) times a day), Disp: 180 tablet, Rfl: 0  •  atenolol (TENORMIN) 25 mg tablet, , Disp: , Rfl:   •  atorvastatin (LIPITOR) 10 mg tablet, TAKE 1 TABLET (10 MG TOTAL) BY MOUTH DAILY, Disp: 90 tablet, Rfl: 1  •  carvedilol (COREG) 25 mg tablet, Take 1 tablet (25 mg total) by mouth 2 (two) times a day with meals, Disp: 180 tablet, Rfl: 1  •  ciclopirox (LOPROX) 0.77 % cream, Apply topically 2 (two) times a day for 20 days, Disp: 45 g, Rfl: 2  •  fluticasone-umeclidinium-vilanterol (Trelegy Ellipta) 100-62.5-25 mcg/actuation inhaler, Rinse mouth after use., Disp: 60 each, Rfl: 5  •  guaifenesin-codeine (GUAIFENESIN AC) 100-10 MG/5ML liquid, Take 10 mL by mouth 3 (three) times a day as needed for cough, Disp: 473 mL, Rfl: 0  •  halobetasol (ULTRAVATE) 0.05 % cream, , Disp: , Rfl:   •  hydrocortisone 2.5 % cream, Apply topically 2 (two) times a day Apply twice a day affected area the trunk, Disp: 20 g, Rfl: 2  •  hydrOXYzine HCL (ATARAX) 50 mg tablet, Take 1 tablet (50 mg total) by mouth daily at bedtime, Disp: 90 tablet, Rfl: 1  •  latanoprost (XALATAN) 0.005 % ophthalmic solution, Administer 0.005 mL to both eyes daily at bedtime, Disp: , Rfl:   •  metFORMIN (GLUCOPHAGE) 500 mg tablet, Take 1 tablet (500 mg total) by mouth 2 (two) times a day with meals, Disp: 180 tablet, Rfl: 1  •  mirtazapine (REMERON) 7.5 MG tablet, Take 1 tablet (7.5 mg total) by mouth daily at bedtime, Disp: 90 tablet, Rfl: 3  •  Multiple Vitamin (MULTI-VITAMIN DAILY PO), Take by mouth daily  , Disp: , Rfl:   •  omeprazole (PriLOSEC) 20 mg delayed release capsule, Take 1 capsule (20 mg total) by mouth daily, Disp: 90 capsule, Rfl: 1  •  primidone (MYSOLINE) 50 mg tablet, TAKE 2 TABS AT 8PM., Disp: 180 tablet, Rfl: 1  •  triamcinolone (KENALOG) 0.1 % cream, , Disp: , Rfl:   •  aspirin (ECOTRIN LOW  "STRENGTH) 81 mg EC tablet, , Disp: , Rfl:       Objective:    Vitals:   /80   Pulse 70   Ht 5' 10\" (1.778 m)   Wt 90.7 kg (200 lb)   SpO2 98%   BMI 28.70 kg/m²   Body mass index is 28.7 kg/m².  Vitals:    06/12/24 1133   Weight: 90.7 kg (200 lb)       Physical Exam  Vitals and nursing note reviewed.   Constitutional:       General: He is not in acute distress.     Appearance: He is well-developed. He is obese. He is not ill-appearing, toxic-appearing or diaphoretic.   HENT:      Head: Normocephalic and atraumatic.      Right Ear: External ear normal.      Left Ear: External ear normal.      Nose: Nose normal.      Mouth/Throat:      Pharynx: No oropharyngeal exudate.   Eyes:      General: Lids are normal. Lids are everted, no foreign bodies appreciated. No scleral icterus.        Right eye: No discharge.         Left eye: No discharge.      Conjunctiva/sclera: Conjunctivae normal.      Pupils: Pupils are equal, round, and reactive to light.   Neck:      Thyroid: No thyromegaly.      Vascular: Normal carotid pulses. No carotid bruit, hepatojugular reflux or JVD.      Trachea: No tracheal tenderness or tracheal deviation.   Cardiovascular:      Rate and Rhythm: Normal rate and regular rhythm.      Pulses:           Dorsalis pedis pulses are 2+ on the right side and 2+ on the left side.        Posterior tibial pulses are 1+ on the right side and 1+ on the left side.      Heart sounds: Murmur heard.      No friction rub. No gallop.   Pulmonary:      Effort: Pulmonary effort is normal. No respiratory distress.      Breath sounds: No stridor. Rales present. No wheezing.      Comments: Decreased air entry bilateral left worse than the right  Chest:      Chest wall: No tenderness.   Abdominal:      General: Bowel sounds are normal. There is no distension.      Palpations: Abdomen is soft. There is no mass.      Tenderness: There is no abdominal tenderness. There is no guarding or rebound.   Musculoskeletal:       "   General: No tenderness or deformity. Normal range of motion.      Cervical back: Normal range of motion and neck supple. No edema, erythema or rigidity. No spinous process tenderness or muscular tenderness. Normal range of motion.   Feet:      Right foot:      Skin integrity: No ulcer, skin breakdown, erythema, warmth, callus or dry skin.      Left foot:      Skin integrity: No ulcer, skin breakdown, erythema, warmth, callus or dry skin.   Lymphadenopathy:      Head:      Right side of head: No submental, submandibular, tonsillar, preauricular or posterior auricular adenopathy.      Left side of head: No submental, submandibular, tonsillar, preauricular, posterior auricular or occipital adenopathy.      Cervical: No cervical adenopathy.      Right cervical: No superficial, deep or posterior cervical adenopathy.     Left cervical: No superficial, deep or posterior cervical adenopathy.      Upper Body:      Right upper body: No pectoral adenopathy.      Left upper body: No pectoral adenopathy.   Skin:     General: Skin is warm and dry.      Coloration: Skin is not pale.      Findings: No erythema or rash.   Neurological:      Mental Status: He is alert and oriented to person, place, and time.      Cranial Nerves: No cranial nerve deficit.      Sensory: No sensory deficit.      Motor: Weakness present. No tremor, abnormal muscle tone or seizure activity.      Coordination: Coordination normal.      Gait: Gait abnormal.      Deep Tendon Reflexes: Reflexes are normal and symmetric. Reflexes normal.   Psychiatric:         Behavior: Behavior normal.         Thought Content: Thought content normal.         Judgment: Judgment normal.       Lab Review   Hospital Outpatient Visit on 06/06/2024   Component Date Value Ref Range Status   • POC Glucose 06/06/2024 169 (H)  65 - 140 mg/dl Final   Hospital Outpatient Visit on 06/04/2024   Component Date Value Ref Range Status   • POC Glucose 06/04/2024 210 (H)  65 - 140 mg/dl Final    Appointment on 05/23/2024   Component Date Value Ref Range Status   • WBC 05/23/2024 5.67  4.31 - 10.16 Thousand/uL Final   • RBC 05/23/2024 4.67  3.88 - 5.62 Million/uL Final   • Hemoglobin 05/23/2024 12.7  12.0 - 17.0 g/dL Final   • Hematocrit 05/23/2024 41.6  36.5 - 49.3 % Final   • MCV 05/23/2024 89  82 - 98 fL Final   • MCH 05/23/2024 27.2  26.8 - 34.3 pg Final   • MCHC 05/23/2024 30.5 (L)  31.4 - 37.4 g/dL Final   • RDW 05/23/2024 16.7 (H)  11.6 - 15.1 % Final   • MPV 05/23/2024 9.1  8.9 - 12.7 fL Final   • Platelets 05/23/2024 483 (H)  149 - 390 Thousands/uL Final   • nRBC 05/23/2024 0  /100 WBCs Final   • Segmented % 05/23/2024 45  43 - 75 % Final   • Immature Grans % 05/23/2024 1  0 - 2 % Final   • Lymphocytes % 05/23/2024 16  14 - 44 % Final   • Monocytes % 05/23/2024 21 (H)  4 - 12 % Final   • Eosinophils Relative 05/23/2024 15 (H)  0 - 6 % Final   • Basophils Relative 05/23/2024 2 (H)  0 - 1 % Final   • Absolute Neutrophils 05/23/2024 2.53  1.85 - 7.62 Thousands/µL Final   • Absolute Immature Grans 05/23/2024 0.03  0.00 - 0.20 Thousand/uL Final   • Absolute Lymphocytes 05/23/2024 0.93  0.60 - 4.47 Thousands/µL Final   • Absolute Monocytes 05/23/2024 1.18  0.17 - 1.22 Thousand/µL Final   • Eosinophils Absolute 05/23/2024 0.87 (H)  0.00 - 0.61 Thousand/µL Final   • Basophils Absolute 05/23/2024 0.13 (H)  0.00 - 0.10 Thousands/µL Final   • Sodium 05/23/2024 135  135 - 147 mmol/L Final   • Potassium 05/23/2024 4.3  3.5 - 5.3 mmol/L Final   • Chloride 05/23/2024 102  96 - 108 mmol/L Final   • CO2 05/23/2024 29  21 - 32 mmol/L Final   • ANION GAP 05/23/2024 4  4 - 13 mmol/L Final   • BUN 05/23/2024 12  5 - 25 mg/dL Final   • Creatinine 05/23/2024 0.91  0.60 - 1.30 mg/dL Final    Standardized to IDMS reference method   • Glucose, Fasting 05/23/2024 154 (H)  65 - 99 mg/dL Final   • Calcium 05/23/2024 9.1  8.4 - 10.2 mg/dL Final   • Corrected Calcium 05/23/2024 9.7  8.3 - 10.1 mg/dL Final   • AST 05/23/2024 46  (H)  13 - 39 U/L Final   • ALT 05/23/2024 41  7 - 52 U/L Final    Specimen collection should occur prior to Sulfasalazine administration due to the potential for falsely depressed results.    • Alkaline Phosphatase 05/23/2024 139 (H)  34 - 104 U/L Final   • Total Protein 05/23/2024 6.8  6.4 - 8.4 g/dL Final   • Albumin 05/23/2024 3.3 (L)  3.5 - 5.0 g/dL Final   • Total Bilirubin 05/23/2024 0.45  0.20 - 1.00 mg/dL Final    Use of this assay is not recommended for patients undergoing treatment with eltrombopag due to the potential for falsely elevated results.  N-acetyl-p-benzoquinone imine (metabolite of Acetaminophen) will generate erroneously low results in samples for patients that have taken an overdose of Acetaminophen.   • eGFR 05/23/2024 78  ml/min/1.73sq m Final   Appointment on 05/02/2024   Component Date Value Ref Range Status   • WBC 05/02/2024 5.93  4.31 - 10.16 Thousand/uL Final   • RBC 05/02/2024 4.31  3.88 - 5.62 Million/uL Final   • Hemoglobin 05/02/2024 11.8 (L)  12.0 - 17.0 g/dL Final   • Hematocrit 05/02/2024 39.1  36.5 - 49.3 % Final   • MCV 05/02/2024 91  82 - 98 fL Final   • MCH 05/02/2024 27.4  26.8 - 34.3 pg Final   • MCHC 05/02/2024 30.2 (L)  31.4 - 37.4 g/dL Final   • RDW 05/02/2024 16.7 (H)  11.6 - 15.1 % Final   • MPV 05/02/2024 9.4  8.9 - 12.7 fL Final   • Platelets 05/02/2024 440 (H)  149 - 390 Thousands/uL Final   • Sodium 05/02/2024 136  135 - 147 mmol/L Final   • Potassium 05/02/2024 4.4  3.5 - 5.3 mmol/L Final   • Chloride 05/02/2024 102  96 - 108 mmol/L Final   • CO2 05/02/2024 28  21 - 32 mmol/L Final   • ANION GAP 05/02/2024 6  4 - 13 mmol/L Final   • BUN 05/02/2024 15  5 - 25 mg/dL Final   • Creatinine 05/02/2024 0.91  0.60 - 1.30 mg/dL Final    Standardized to IDMS reference method   • Glucose 05/02/2024 212 (H)  65 - 140 mg/dL Final    If the patient is fasting, the ADA then defines impaired fasting glucose as > 100 mg/dL and diabetes as > or equal to 123 mg/dL.   • Calcium  05/02/2024 8.6  8.4 - 10.2 mg/dL Final   • Corrected Calcium 05/02/2024 9.3  8.3 - 10.1 mg/dL Final   • AST 05/02/2024 42 (H)  13 - 39 U/L Final   • ALT 05/02/2024 31  7 - 52 U/L Final    Specimen collection should occur prior to Sulfasalazine administration due to the potential for falsely depressed results.    • Alkaline Phosphatase 05/02/2024 129 (H)  34 - 104 U/L Final   • Total Protein 05/02/2024 6.5  6.4 - 8.4 g/dL Final   • Albumin 05/02/2024 3.1 (L)  3.5 - 5.0 g/dL Final   • Total Bilirubin 05/02/2024 0.52  0.20 - 1.00 mg/dL Final    Use of this assay is not recommended for patients undergoing treatment with eltrombopag due to the potential for falsely elevated results.  N-acetyl-p-benzoquinone imine (metabolite of Acetaminophen) will generate erroneously low results in samples for patients that have taken an overdose of Acetaminophen.   • eGFR 05/02/2024 78  ml/min/1.73sq m Final   • Segmented % 05/02/2024 45  43 - 75 % Final   • Lymphocytes % 05/02/2024 7 (L)  14 - 44 % Final   • Monocytes % 05/02/2024 17 (H)  4 - 12 % Final   • Eosinophils % 05/02/2024 27 (H)  0 - 6 % Final   • Basophils % 05/02/2024 2 (H)  0 - 1 % Final   • Atypical Lymphocytes % 05/02/2024 2 (H)  <=0 % Final   • Absolute Neutrophils 05/02/2024 2.67  1.85 - 7.62 Thousand/uL Final   • Absolute Lymphocytes 05/02/2024 0.53 (L)  0.60 - 4.47 Thousand/uL Final   • Absolute Monocytes 05/02/2024 1.01  0.00 - 1.22 Thousand/uL Final   • Absolute Eosinophils 05/02/2024 1.60 (H)  0.00 - 0.40 Thousand/uL Final   • Absolute Basophils 05/02/2024 0.12 (H)  0.00 - 0.10 Thousand/uL Final   • RBC Morphology 05/02/2024 Present   Final   • Platelet Estimate 05/02/2024 Increased (A)  Adequate Final   • Anisocytosis 05/02/2024 Present   Final         Patient Instructions   Diabetes    Check your fingerstick Accu-Chek once a day.    Please check your fingerstick Accu-Chek different time of the day either at 7:00 a.m. or 11:00 a.m. for for p.m. 4 9:00  "p.m..    Follow Diabetic diet for diabetes as advised.    If you would sugar less than 80 or more than 300 to please call us on next visit is day.    If the sugar is less than 80 follow-up hypoglycemia instructions as advised.    Take your diabetic medicine as advised.            Vivek Tatum MD        \"This note has been constructed using a voice recognition system.Therefore there may be syntax, spelling, and/or grammatical errors. Please call if you have any questions. \"  "

## 2024-06-12 NOTE — ASSESSMENT & PLAN NOTE
Lab Results   Component Value Date    HGBA1C 8.6 (H) 03/20/2024   A1c 8.6 elevated after discontinuing metformin due to hypoglycemia now uncontrolled    Not monitoring blood sugar at home    Restart metformin 500 twice a day with diabetic diet    Hypoglycemia protocol in place    Foot exam normal    Dilated eye exam once a year   <-- Click to add NO pertinent Past Medical History

## 2024-06-12 NOTE — ASSESSMENT & PLAN NOTE
Intermittent itching rash maculopapular improved in the past with Atarax    Reviewed and continue Atarax 50 mg at bedtime due to side effects like sleepiness for prevention    Continue Claritin 1 a day seems to be helping resolve the rash itching

## 2024-06-18 ENCOUNTER — TELEPHONE (OUTPATIENT)
Dept: RADIOLOGY | Facility: HOSPITAL | Age: 84
End: 2024-06-18

## 2024-06-18 RX ORDER — SODIUM CHLORIDE 9 MG/ML
30 INJECTION, SOLUTION INTRAVENOUS CONTINUOUS
Status: CANCELLED | OUTPATIENT
Start: 2024-06-18

## 2024-06-18 NOTE — NURSING NOTE
Pre-procedure Instructions for Interventional Radiology  [address]  [address]  INTERVENTIONAL RADIOLOGY [phone]    You are scheduled for a/an port placement.  On Wednesday, June 19.    Your arrival time is 0945.  Our Interventional Radiology nurse will notify you a few days before your procedure with the exact arrival time and location to arrive.  To prepare for your procedure:  Please arrange for someone to drive you home after the procedure and stay with you until the next morning if you are instructed to do so.  This is typically for patients receiving some type of sedative or anesthetic for the procedure.  DO NOT EAT OR DRINK ANYTHING after midnight on the evening before your procedure including candy & gum.  ONLY SIPS OF WATER with your medications are allowed on the morning of your procedure.  TAKE ALL OF YOUR REGULAR MEDICATIONS THE MORNING OF YOUR PROCEDURE with sips of water!  We may call you to stop some of your blood sugar, blood pressure and blood thinning medications depending on the procedure.  Please take all of these medications unless we instruct you to stop them.  If you have an allergy to x-ray dye, please contact Interventional Radiology for an x-ray dye preparation which usually consists of an oral steroid and Benadryl.  The day of your procedure:  Bring a list of the medications you take at home.  Bring medications you take for breathing problems (such as inhalers), medications for chest pain, or both.  Bring a case for your glasses or contacts.  Bring your insurance card and a form of photo ID.  Please leave all valuables such as credit cards and jewelry at home.  Report to the registration desk in the main lobby at the [facility] [place].  Ask to be directed to the Short Procedure Unit on the 2nd floor.  While your procedure is being performed, your family may wait in the Waiting Room on the 2nd floor. If they need to leave, they may provide a  number to be called following the procedure.   Be prepared to stay overnight just in case. Sometimes procedures will indicate the need for further observation or treatment.   If you are scheduled for a follow-up visit with the Interventional Radiologist after your procedure, you will be called with a date and time.  Special Instructions (Medications to stop taking before your procedure etc.):   none

## 2024-06-19 ENCOUNTER — HOSPITAL ENCOUNTER (OUTPATIENT)
Dept: NON INVASIVE DIAGNOSTICS | Facility: HOSPITAL | Age: 84
Discharge: HOME | End: 2024-06-19
Attending: INTERNAL MEDICINE
Payer: COMMERCIAL

## 2024-06-19 VITALS
HEART RATE: 70 BPM | HEIGHT: 70 IN | RESPIRATION RATE: 18 BRPM | SYSTOLIC BLOOD PRESSURE: 148 MMHG | OXYGEN SATURATION: 96 % | WEIGHT: 199.3 LBS | DIASTOLIC BLOOD PRESSURE: 80 MMHG | TEMPERATURE: 97.3 F | BODY MASS INDEX: 28.53 KG/M2

## 2024-06-19 DIAGNOSIS — C34.92 SQUAMOUS CELL CARCINOMA OF LEFT LUNG (HCC): ICD-10-CM

## 2024-06-19 LAB — GLUCOSE SERPL-MCNC: 148 MG/DL (ref 65–140)

## 2024-06-19 PROCEDURE — 76937 US GUIDE VASCULAR ACCESS: CPT

## 2024-06-19 PROCEDURE — 36561 INSERT TUNNELED CV CATH: CPT | Performed by: RADIOLOGY

## 2024-06-19 PROCEDURE — C1788 PORT, INDWELLING, IMP: HCPCS

## 2024-06-19 PROCEDURE — 77001 FLUOROGUIDE FOR VEIN DEVICE: CPT | Performed by: RADIOLOGY

## 2024-06-19 PROCEDURE — 36561 INSERT TUNNELED CV CATH: CPT

## 2024-06-19 PROCEDURE — 76937 US GUIDE VASCULAR ACCESS: CPT | Performed by: RADIOLOGY

## 2024-06-19 PROCEDURE — 77001 FLUOROGUIDE FOR VEIN DEVICE: CPT

## 2024-06-19 PROCEDURE — C1894 INTRO/SHEATH, NON-LASER: HCPCS

## 2024-06-19 PROCEDURE — 82948 REAGENT STRIP/BLOOD GLUCOSE: CPT

## 2024-06-19 PROCEDURE — 99152 MOD SED SAME PHYS/QHP 5/>YRS: CPT | Performed by: RADIOLOGY

## 2024-06-19 RX ORDER — SODIUM CHLORIDE 9 MG/ML
30 INJECTION, SOLUTION INTRAVENOUS CONTINUOUS
Status: DISCONTINUED | OUTPATIENT
Start: 2024-06-19 | End: 2024-06-20 | Stop reason: HOSPADM

## 2024-06-19 RX ORDER — MIDAZOLAM HYDROCHLORIDE 2 MG/2ML
INJECTION, SOLUTION INTRAMUSCULAR; INTRAVENOUS AS NEEDED
Status: COMPLETED | OUTPATIENT
Start: 2024-06-19 | End: 2024-06-19

## 2024-06-19 RX ORDER — FENTANYL CITRATE 50 UG/ML
INJECTION, SOLUTION INTRAMUSCULAR; INTRAVENOUS AS NEEDED
Status: COMPLETED | OUTPATIENT
Start: 2024-06-19 | End: 2024-06-19

## 2024-06-19 RX ORDER — CEFAZOLIN SODIUM 2 G/50ML
SOLUTION INTRAVENOUS
Status: COMPLETED | OUTPATIENT
Start: 2024-06-19 | End: 2024-06-19

## 2024-06-19 RX ADMIN — FENTANYL CITRATE 50 MCG: 50 INJECTION, SOLUTION INTRAMUSCULAR; INTRAVENOUS at 12:22

## 2024-06-19 RX ADMIN — MIDAZOLAM 1 MG: 1 INJECTION INTRAMUSCULAR; INTRAVENOUS at 12:21

## 2024-06-19 RX ADMIN — FENTANYL CITRATE 50 MCG: 50 INJECTION, SOLUTION INTRAMUSCULAR; INTRAVENOUS at 12:06

## 2024-06-19 RX ADMIN — CEFAZOLIN SODIUM 2000 MG: 2 SOLUTION INTRAVENOUS at 12:07

## 2024-06-19 RX ADMIN — SODIUM CHLORIDE 30 ML/HR: 0.9 INJECTION, SOLUTION INTRAVENOUS at 10:02

## 2024-06-19 RX ADMIN — MIDAZOLAM 1 MG: 1 INJECTION INTRAMUSCULAR; INTRAVENOUS at 12:06

## 2024-06-19 NOTE — INTERVAL H&P NOTE
Update: (This section must be completed if the H&P was completed greater than 24 hrs to procedure or admission)    H&P reviewed. After examining the patient, I find no changed to the H&P since it had been written.    Patient re-evaluated. Accept as history and physical.    Marciano Lux MD/June 19, 2024/12:41 PM

## 2024-06-19 NOTE — PERIOPERATIVE NURSING NOTE
Received patient from PACU post port placement. Patient is awake, alert and oriented x 4. No pain reported. Right upper chest sites x2 is clean,dry and glue is intact, minimal bruising and no drainage. Vital signs are stable. Patient can be discharged home at 1345. Patient tolerating fluids orally.

## 2024-06-19 NOTE — PERIOPERATIVE NURSING NOTE
Discharge instructions reviewed with patient and his wife verbally and written instruction provided. Right upper chest port site and insertion site. All questions answered. Patient assisted with dressing.

## 2024-06-19 NOTE — SEDATION DOCUMENTATION
Procedure ended, pt tolerated without incident, vss, right chest incision with skin glue intact. Pt to PACU in stable condition.

## 2024-06-19 NOTE — BRIEF OP NOTE (RAD/CATH)
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    Surgeon: Marciano Lux MD     Assistant: None. No qualified resident was available.    Blood loss: Minimal    Specimens: None     Findings: Right chest port ready for use.    Complications: None immediate.    Anesthesia: conscious sedation

## 2024-06-19 NOTE — DISCHARGE INSTRUCTIONS
Implanted Venous Access Port     WHAT YOU NEED TO KNOW:   An implanted venous access port is a device used to give treatments and take blood. It may also be called a central venous access device (CVAD). The port is a small container that is placed under your skin, usually in your upper chest. The port is attached to a catheter that enters a large vein.   DISCHARGE INSTRUCTIONS:   Resume your normal diet. Small sips of flat soda will help with mild nausea.  Prevent an infection:   Wash your hands often.  Use soap and water. Clean your hands before and after you care for your port. Remind everyone who cares for your port to wash their hands.   Check your skin for infection every day.  Look for redness, swelling, or fluid oozing from the port site.  Care for your port:   1. You may shower beginning 48 hours after procedure.     2.  Leave glue in place.    3. It is normal for some bruising to occur.    4. Use Tylenol for pain.    5. Limit use of arm on the side that your port was placed. Lift nothing heavier than 5 pounds for 1 week, and then gradually increase activity as tolerated.    6. DO NOT apply ointment, lotion or cream to port site until incision is healed. Allow glue to fall off. DO NOT attempt to peel glue from skin even it it begins to flake.     7. After the port incision is healed you may swim, bathe.  Notify the Interventional Radiologist if you have any of the followin. Fever above 101 F    2. Increased redness or swelling after 1st day.     3. Increased pain after 1st day.    4. Any sign of infection (drainage from port site, skin separation, hot to touch).    5. Persistent nausea or vomiting.    Contact Interventional Radiology at [phone] ([facility] PATIENTS: Contact Interventional Radiology at [phone]) ([facility] PATIENTS: Contact Interventional Radiology at [phone]).Procedural Sedation   WHAT YOU NEED TO KNOW:   Procedural sedation is medicine used during procedures to help you feel  relaxed and calm. You will remember little to none of the procedure. After sedation you may feel tired, weak, or unsteady on your feet. You may also have trouble concentrating or short-term memory loss. These symptoms should go away in 24 hours or less.   DISCHARGE INSTRUCTIONS:   Call 911 or have someone else call for any of the following:   You have sudden trouble breathing.     You cannot be woken.     Contact Interventional Radiology at [phone]   [facility] PATIENTS: Contact Interventional Radiology at [phone] [facility] PATIENTS: Contact Interventional Radiology at [phone]) if any of the following occur:      You have a severe headache or dizziness.     Your heart is beating faster than usual.    You have a fever or chills.     Your skin is itchy, swollen, or you have a rash.     You have nausea or are vomiting for more than 8 hours after the procedure.      You have questions or concerns about your condition or care.  Self-care:   Have someone stay with you for 24 hours. This person can drive you to errands and help you do things around the house. This person can also watch for problems.      Rest and do quiet activities for 24 hours. Do not exercise, ride a bike, or play sports. Stand up slowly to prevent dizziness and falls. Take short walks around the house with another person. Slowly return to your usual activities the next day.      Do not drive or use dangerous machines or tools for 24 hours. You may injure yourself or others. Examples include a lawnmower, saw, or drill. Do not return to work for 24 hours if you use dangerous machines or tools for work.      Do not make important decisions for 24 hours. For example, do not sign important papers or invest money.      Drink liquids as directed. Liquids help flush the sedation medicine out of your body. Ask how much liquid to drink each day and which liquids are best for you.      Eat small, frequent meals to prevent nausea and vomiting. Start with  clear liquids such as juice or broth. If you do not vomit after clear liquids, you can eat your usual foods.      Do not drink alcohol or take medicines that make you drowsy. This includes medicines that help you sleep and anxiety medicines. Ask your healthcare provider if it is safe for you to take pain medicine.  Follow up with your healthcare provider as directed: Write down your questions so you remember to ask them during your visits.

## 2024-06-21 ENCOUNTER — APPOINTMENT (OUTPATIENT)
Dept: LAB | Facility: HOSPITAL | Age: 84
End: 2024-06-21
Payer: COMMERCIAL

## 2024-06-21 DIAGNOSIS — E11.40 TYPE 2 DIABETES MELLITUS WITH DIABETIC NEUROPATHY, WITHOUT LONG-TERM CURRENT USE OF INSULIN (HCC): ICD-10-CM

## 2024-06-21 DIAGNOSIS — C34.92 SQUAMOUS CELL CARCINOMA OF LEFT LUNG (HCC): ICD-10-CM

## 2024-06-21 LAB
ALBUMIN SERPL BCG-MCNC: 3.4 G/DL (ref 3.5–5)
ALP SERPL-CCNC: 130 U/L (ref 34–104)
ALT SERPL W P-5'-P-CCNC: 20 U/L (ref 7–52)
ANION GAP SERPL CALCULATED.3IONS-SCNC: 8 MMOL/L (ref 4–13)
AST SERPL W P-5'-P-CCNC: 27 U/L (ref 13–39)
BASOPHILS # BLD AUTO: 0.08 THOUSANDS/ÂΜL (ref 0–0.1)
BASOPHILS NFR BLD AUTO: 1 % (ref 0–1)
BILIRUB SERPL-MCNC: 0.41 MG/DL (ref 0.2–1)
BUN SERPL-MCNC: 13 MG/DL (ref 5–25)
CALCIUM ALBUM COR SERPL-MCNC: 9.9 MG/DL (ref 8.3–10.1)
CALCIUM SERPL-MCNC: 9.4 MG/DL (ref 8.4–10.2)
CHLORIDE SERPL-SCNC: 101 MMOL/L (ref 96–108)
CO2 SERPL-SCNC: 28 MMOL/L (ref 21–32)
CREAT SERPL-MCNC: 0.92 MG/DL (ref 0.6–1.3)
EOSINOPHIL # BLD AUTO: 0.43 THOUSAND/ÂΜL (ref 0–0.61)
EOSINOPHIL NFR BLD AUTO: 4 % (ref 0–6)
ERYTHROCYTE [DISTWIDTH] IN BLOOD BY AUTOMATED COUNT: 16.7 % (ref 11.6–15.1)
EST. AVERAGE GLUCOSE BLD GHB EST-MCNC: 180 MG/DL
GFR SERPL CREATININE-BSD FRML MDRD: 77 ML/MIN/1.73SQ M
GLUCOSE P FAST SERPL-MCNC: 141 MG/DL (ref 65–99)
HBA1C MFR BLD: 7.9 %
HCT VFR BLD AUTO: 44.6 % (ref 36.5–49.3)
HGB BLD-MCNC: 13.6 G/DL (ref 12–17)
IMM GRANULOCYTES # BLD AUTO: 0.09 THOUSAND/UL (ref 0–0.2)
IMM GRANULOCYTES NFR BLD AUTO: 1 % (ref 0–2)
LYMPHOCYTES # BLD AUTO: 0.9 THOUSANDS/ÂΜL (ref 0.6–4.47)
LYMPHOCYTES NFR BLD AUTO: 8 % (ref 14–44)
MCH RBC QN AUTO: 26.8 PG (ref 26.8–34.3)
MCHC RBC AUTO-ENTMCNC: 30.5 G/DL (ref 31.4–37.4)
MCV RBC AUTO: 88 FL (ref 82–98)
MONOCYTES # BLD AUTO: 0.86 THOUSAND/ÂΜL (ref 0.17–1.22)
MONOCYTES NFR BLD AUTO: 8 % (ref 4–12)
NEUTROPHILS # BLD AUTO: 9.09 THOUSANDS/ÂΜL (ref 1.85–7.62)
NEUTS SEG NFR BLD AUTO: 78 % (ref 43–75)
NRBC BLD AUTO-RTO: 0 /100 WBCS
PLATELET # BLD AUTO: 315 THOUSANDS/UL (ref 149–390)
PMV BLD AUTO: 9.2 FL (ref 8.9–12.7)
POTASSIUM SERPL-SCNC: 4.2 MMOL/L (ref 3.5–5.3)
PROT SERPL-MCNC: 6.9 G/DL (ref 6.4–8.4)
RBC # BLD AUTO: 5.08 MILLION/UL (ref 3.88–5.62)
SODIUM SERPL-SCNC: 137 MMOL/L (ref 135–147)
WBC # BLD AUTO: 11.45 THOUSAND/UL (ref 4.31–10.16)

## 2024-06-21 PROCEDURE — 36415 COLL VENOUS BLD VENIPUNCTURE: CPT

## 2024-06-21 PROCEDURE — 85025 COMPLETE CBC W/AUTO DIFF WBC: CPT

## 2024-06-21 PROCEDURE — 83036 HEMOGLOBIN GLYCOSYLATED A1C: CPT

## 2024-06-21 PROCEDURE — 80053 COMPREHEN METABOLIC PANEL: CPT

## 2024-06-24 ENCOUNTER — EVALUATION (OUTPATIENT)
Dept: PHYSICAL THERAPY | Facility: CLINIC | Age: 84
End: 2024-06-24
Payer: COMMERCIAL

## 2024-06-24 DIAGNOSIS — R53.0 NEOPLASTIC MALIGNANT RELATED FATIGUE: ICD-10-CM

## 2024-06-24 DIAGNOSIS — C34.92 SQUAMOUS CELL CARCINOMA OF LEFT LUNG (HCC): Primary | ICD-10-CM

## 2024-06-24 DIAGNOSIS — R53.1 GENERALIZED WEAKNESS: ICD-10-CM

## 2024-06-24 PROCEDURE — 97110 THERAPEUTIC EXERCISES: CPT | Performed by: PHYSICAL THERAPIST

## 2024-06-24 PROCEDURE — 97163 PT EVAL HIGH COMPLEX 45 MIN: CPT | Performed by: PHYSICAL THERAPIST

## 2024-06-24 NOTE — PROGRESS NOTES
Cancer Care Evaluation    Today's Date: 2024  Patient name: Bridger Clayton  : 1940  MRN: [id number]  Referring provider: Bridger James MD  Dx:  Encounter Diagnosis     ICD-10-CM    1. 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 Neoplastic malignant related fatigue  R53.0 PT plan of care cert/re-cert      3. Generalized weakness  R53.1 PT plan of care cert/re-cert          Start Time: 905  Stop Time: 100  Total time in clinic (min): 60 minutes       Assessment/Plan    Bridger Clayton is a 84 y.o. year old male with complaints of weakness, fatigue, and loss of balance.  Patient's presentation is consistent with side effects of current chemotherapy regimen of Paclitaxel every 3 weeks with the following impairments found on evaluation: weakness, fatigue, decreased endurance and balance. Relevant medical history includes current diagnosis of L lung squamous cell carcinoma, PMH prostate cancer, CAD, CHF, HTN, AAA, COPD, diabetes, PMH cellulitis. The listed physical impairments contribute to the following functional limitations: decreased tolerance to standing and walking, difficulty getting out of a chair and car. Spenser is a good candidate for physical therapy and would benefit from skilled physical therapy to address the above impairments in order to allow the patient to achieve the goals listed and return to prior level of function.       Short-Term Goals:   1. Patient will improve LE strength by 1/2 grade or better in all myotomes in 4-6 weeks  2. Patient will be able to complete 6 minute walk test with 1 seated rest break in 4-6 weeks  3. Patient will improve Romberg on ground by 10 seconds in 4-6 weeks    Long-Term Goals:   1. Increase time in HR intensity range to 10-15 minutes without pain/ SOB/ increasing HR beyond prescribed range/ increasing RPE beyond prescribed range  2. Patient will be able to negotiate stairs in  "home at discharge with minimal SOB  3.  Patient independent with HEP at time of discharge.       Plan  Patient would benefit from: skilled physical therapy  Referral necessary: No  Planned therapy interventions: manual therapy, massage, muscle pump exercises, neuromuscular re-education, therapeutic activities, therapeutic exercise, stretching, strengthening, graded activity, functional ROM exercises, flexibility and compression  Frequency: 1-2x week  Duration in weeks: 12  Plan of Care beginning date:06/24/24   Plan of Care expiration date: 9/1/24  Treatment plan discussed with: patient and wife Amna    Subjective/History:    Cancer history and treatments- history obtained from patient and wife Amna  Type of CA: squamous cell carcinoma of left lung  Stage: [id number]  Surgical excision: yes; thoracotomy with medianostomy   Current Post-op precautions? Yes; post-op port placement, was told he cannot lift more than 5 pounds  Lymph node dissection? Yes; all negative  Radiation: 7 high dose treatments total per wife.   Chemotherapy: Per note from Dr. James on 6/10/24:  Regimen: Paclitaxel 135 mg/m2 IV day 1 (85%), Cycle length = 21 days, Goal = palliation and prolongation of life; port placement 6/19/24  Side effects of CA/Treatment: alopecia, appetite changes, constipation, cough, depression, dizzyness, dyspnea on exertion, fatigue, fever and chills, nausea, neuropathies - fingertips, shortness of breath, and weight loss, itching   Care team: Dr. James [facility]  FACIT outcome measure: NV  Score at eval: NV   Chief Complaint: weakness in LE  HPI: Per [facility] Office Visit on 6/10/24 \"History of Present Illness:  84 year-old male with multiple medical and cardiac issue recently seen in the emergency room because of a cough.  Workup demonstrated a left lower lobe mass.  Biopsy demonstrated squamous cell carcinoma.  Workup did not demonstrated evidence of metastatic disease; mediastinoscopy was negative.  Patient was seen by " "thoracic surgery but was not felt to be a surgical candidate.  Patient was subsequently seen by radiation oncology and underwent SPRT.  Patient then went on surveillance.    Eventual repeat scanning was consistent with recurrence at the original site.  Mr. Clayton required a left-sided Pleurx catheter placed because of recurrent left-sided pleural effusion.  Patient was initially treated with pembrolizumab only but developed a significant rash requiring steroids.  Because of the rash and the itchiness, patient did not wish to continue with the pembrolizumab; this was discontinued.  Options were discussed and patient began paclitaxel.  Patient has completed 9 cycles.  Presently Mr. Clayton states feeling okay, same as before.  The rash is less/better than before.  No respiratory issues, PleurX catheter has been removed.  Appetite is okay, weight is stable.  No fevers or signs of infection.  Activities are limited but the same as before.  Patient has some numbness and tingling in his fingertips but no dropping objects.  No tripping or falling.  Patient was diagnosed with prostate cancer (approximately 10 + years ago) and underwent seeds and external beam radiation.  No evidence of recurrence since that time.    Relevant PMHx: CHF, CAD, atrial fibrillation, AAA, HTN, SYLVESTER, COPD, emphysema, GERD, diaetes, PMH prostate cancer, depression, cellulitis of chest wall   Pain: Patient denies pain  Function: Patient reports limited with walking and stair negotiation in home, currently has 1st floor arrangement with bedroom and walk-in shower  Working Status: Retired  Exercise status: None  Patient goals: \"get a little stronger.\"     Objective  IE performed on 06/24/24  TUG: Perform NV seconds  Romberg EO on ground: 20 seconds with minimal to moderate sway  Romberg EC on ground: 8 seconds with moderate sway  Romberg EO on foam: 10 seconds with moderate sway  Romberg EC on foam: NP today   R Tandem stance: NP today  L Tandem stance: " "NP today  5 time sit to stand: 35 seconds **patient only performed 4 repetitions**  2 minute walk test: 200 feet, patient ending test at 1:50 secondary to \"shakiness\"    Gait: increased step width, decreased step length and push-off bilaterally, increased visual reliance observed. Patient using SPC today during IE, but does have rollator.     Pre- Treatment Vitals  BP: 122/70 mm Hg  HR: 76 bpm  O2 saturation: 95 %     LE myotomes  Hip flexion: 3/5 bilaterally  Hip abduction: 3/5 bilaterally tested in hooklying  Hip adduction: 3/5 bilaterally tested in hooklying  Hip extension: 2+/5 bilaterally tested in hooklying  Knee extension: 3+/5 bilaterally  Knee flexion: 3+/5 bilaterally  Plantarflexion: 2+/5 bilaterally  Dorsiflexion: 2+/5 bilaterally         Precautions: active cancer with chemo every 3 weeks; FALL RISK, AAA, CHF, COPD, CAD, HTN, SYLVESTER, AAA    Insurance:  AMA/CMS Eval/ Re-eval POC expires FOTO Auth Status Co-Insurance                                                    6/24   2.    3.    4.   5.    6.      7.    8.    9.    10.    11.    12.      13.    14.    15.    16.  17.  18.      19.    20.   21.   22.   23.   24.      25.    26. 27. 28. 29. 30.   31. 32.  33. 34. 35. 36.          Daily Treatment Diary     Manuals             Hamstring stretch                          Therapeutic Exercise             Bike              Hamstring stretch seated                                       Neuro Re-ed             Quad sets             SLR flexion              Bridges              Clamshells              Hip abd/add             Romberg EO ground/ foam                                                    Therapeutic Activity             Step ups             Lateral step ups             STS             Lateral walking                                                                                                                                  Modalities                                   "

## 2024-06-25 ENCOUNTER — HOSPITAL ENCOUNTER (OUTPATIENT)
Dept: INFUSION CENTER | Facility: HOSPITAL | Age: 84
Discharge: HOME | End: 2024-06-25
Attending: INTERNAL MEDICINE
Payer: COMMERCIAL

## 2024-06-25 VITALS
HEART RATE: 88 BPM | RESPIRATION RATE: 18 BRPM | OXYGEN SATURATION: 97 % | TEMPERATURE: 98.2 F | WEIGHT: 200 LBS | DIASTOLIC BLOOD PRESSURE: 68 MMHG | HEIGHT: 70 IN | SYSTOLIC BLOOD PRESSURE: 122 MMHG | BODY MASS INDEX: 28.63 KG/M2

## 2024-06-25 DIAGNOSIS — C34.92 SQUAMOUS CELL CARCINOMA OF LEFT LUNG (HCC): Primary | Chronic | ICD-10-CM

## 2024-06-25 DIAGNOSIS — C34.92 SQUAMOUS CELL CARCINOMA OF LEFT LUNG (HCC): Primary | ICD-10-CM

## 2024-06-25 RX ORDER — SODIUM CHLORIDE 9 MG/ML
20 INJECTION, SOLUTION INTRAVENOUS ONCE
Status: COMPLETED | OUTPATIENT
Start: 2024-06-25 | End: 2024-06-25

## 2024-06-25 RX ADMIN — SODIUM CHLORIDE 20 ML/HR: 0.9 INJECTION, SOLUTION INTRAVENOUS at 10:28

## 2024-06-25 RX ADMIN — DEXAMETHASONE SODIUM PHOSPHATE: 10 INJECTION, SOLUTION INTRAMUSCULAR; INTRAVENOUS at 10:28

## 2024-06-25 RX ADMIN — DIPHENHYDRAMINE HYDROCHLORIDE 25 MG: 50 INJECTION, SOLUTION INTRAMUSCULAR; INTRAVENOUS at 11:05

## 2024-06-25 NOTE — PROGRESS NOTES
Order to hold Taxol infusion today, per Kaykay Larsen RN. Pt sees Lucila Pettit PA-C on 7/1/24 at 0925. Pt has next apt with [facility] on 7/16/24 at 0800, but this might change depending on appointment with Lucila. Pt and pt's wife aware of plan at this time. Kaykay Larsen RN is putting in a referral to palliative care to help manage neuropathy. AVS provided and reviewed.

## 2024-06-25 NOTE — PROGRESS NOTES
Pt to clinic for Taxol infusion. Pt presents to clinic with c/o worsening neuropathy since last treatment. Pt stated the neuropathy is impacting his activities of daily living, such as buttoning his shirt and taking pills, his wife assists with these tasks. Message sent to Kaykay Larsen RN.

## 2024-06-28 ENCOUNTER — CLINICAL SUPPORT (OUTPATIENT)
Dept: PALLIATIVE MEDICINE | Facility: CLINIC | Age: 84
End: 2024-06-28

## 2024-06-28 ENCOUNTER — CONSULT (OUTPATIENT)
Dept: PALLIATIVE MEDICINE | Facility: CLINIC | Age: 84
End: 2024-06-28

## 2024-06-28 VITALS
RESPIRATION RATE: 16 BRPM | BODY MASS INDEX: 29.06 KG/M2 | HEIGHT: 70 IN | HEART RATE: 87 BPM | WEIGHT: 203 LBS | OXYGEN SATURATION: 97 % | SYSTOLIC BLOOD PRESSURE: 122 MMHG | TEMPERATURE: 97.2 F | DIASTOLIC BLOOD PRESSURE: 78 MMHG

## 2024-06-28 DIAGNOSIS — G62.0 CHEMOTHERAPY-INDUCED PERIPHERAL NEUROPATHY (HCC): ICD-10-CM

## 2024-06-28 DIAGNOSIS — Z71.89 GOALS OF CARE, COUNSELING/DISCUSSION: ICD-10-CM

## 2024-06-28 DIAGNOSIS — Z71.89 COUNSELING AND COORDINATION OF CARE: Primary | ICD-10-CM

## 2024-06-28 DIAGNOSIS — R11.0 CHEMOTHERAPY-INDUCED NAUSEA: ICD-10-CM

## 2024-06-28 DIAGNOSIS — T45.1X5A CHEMOTHERAPY-INDUCED NAUSEA: ICD-10-CM

## 2024-06-28 DIAGNOSIS — Z51.5 PALLIATIVE CARE ENCOUNTER: ICD-10-CM

## 2024-06-28 DIAGNOSIS — C34.92 SQUAMOUS CELL CARCINOMA OF LEFT LUNG (HCC): Primary | Chronic | ICD-10-CM

## 2024-06-28 DIAGNOSIS — E11.40 TYPE 2 DIABETES MELLITUS WITH DIABETIC NEUROPATHY, WITHOUT LONG-TERM CURRENT USE OF INSULIN (HCC): ICD-10-CM

## 2024-06-28 DIAGNOSIS — Z71.89 ADVANCED CARE PLANNING/COUNSELING DISCUSSION: ICD-10-CM

## 2024-06-28 DIAGNOSIS — I50.32 CHRONIC DIASTOLIC HEART FAILURE (HCC): ICD-10-CM

## 2024-06-28 DIAGNOSIS — T45.1X5A CHEMOTHERAPY-INDUCED PERIPHERAL NEUROPATHY (HCC): ICD-10-CM

## 2024-06-28 DIAGNOSIS — K59.00 CONSTIPATION: ICD-10-CM

## 2024-06-28 PROBLEM — F10.20 ALCOHOL DEPENDENCE (HCC): Status: RESOLVED | Noted: 2022-05-01 | Resolved: 2024-06-28

## 2024-06-28 RX ORDER — GABAPENTIN 300 MG/1
300 CAPSULE ORAL 2 TIMES DAILY
Qty: 60 CAPSULE | Refills: 2 | Status: SHIPPED | OUTPATIENT
Start: 2024-06-28

## 2024-06-28 RX ORDER — ONDANSETRON HYDROCHLORIDE 8 MG/1
8 TABLET, FILM COATED ORAL EVERY 8 HOURS PRN
Qty: 20 TABLET | Refills: 2 | Status: SHIPPED | OUTPATIENT
Start: 2024-06-28

## 2024-06-28 NOTE — ASSESSMENT & PLAN NOTE
Patient has completed advanced directives (the [facility] Advanced Directive) naming his wife then his sons as surrogate healthcare decision-maker(s). In EMR, reviewed today. Advanced directive counseling given.

## 2024-06-28 NOTE — PROGRESS NOTES
"Palliative Outpatient Assessment of Need    LSW completed an assessment of need which was completed with (patient, family, or both) in the office or via phone/video conference    Relationship status:   Duration of relationship: 59 years   Name of significant other: Taylor  Children and Ages: 3 sons   Pets: None  Other important family information: Sons are local and supportive  Living situation (where and whom): Resides with spouse    Patient's primary caregiver: Wife assists as needed    Any limitations of caregiver:  Environmental concerns or barriers:   history: Served in the [employer]--is not service-connected through the [facility]  Employment history/source of income:  for the [place] [employer] prior to [employer]  Disability:    Concerns regarding literacy: None  Spirituality/ Faith: Presbyterian   Patient's strengths, social supports, and resources: Supportive family and Jewish support ( visits 1x/month)  Cultural information:   Mental Health current or previous: None. Pt reports once in a while feeling down, but immediately \"picks right back up\"  Substance use or history: Hx smoking  Sleep: Naps frequently during the day  Exercise: As tolerated due to fatigue/weakness  Diet/nutrition: Appetite fluctuates  Durable Medical Equipment needs: Cane, rollator, high-rise toilet  Transportation: Wife transports  Financial concerns: None  Advanced Directive: Copy of AD on file designating wife Taylor as substitute decision maker with sons Bridger and Yobany as alternates  Other medical or social work providers involved: Oncology; Pulmonology  Patient/caregiver current level of coping: Pt appears to be coping well emotionally overall at this time. Reviewed [facility] SW available for on-going emotional support   Understanding: Pt appears understanding of current medical status  Patient/family concerns and areas of need: Neuropathy; Fatigue/Weakness  Patient's interests: Watching TV    I have spent 35 minutes with " Patient and family today in which greater than 50% of this time was spent in counseling/coordination of care.    *All questions may not be answered due to constraints.  Follow-up discussions may need to occur

## 2024-06-28 NOTE — ASSESSMENT & PLAN NOTE
Continue full disease-directed cares. Patient states that he would not place any limits on his cares, though he would not wish to be on long term life support or ventilation if there is no reasonable hope of meaningful recovery.

## 2024-06-28 NOTE — ASSESSMENT & PLAN NOTE
Wt Readings from Last 3 Encounters:   06/28/24 92.1 kg (203 lb)   06/25/24 90.7 kg (200 lb)   06/19/24 90.4 kg (199 lb 4.7 oz)   Informs plan of care; management per Cardiology.

## 2024-06-28 NOTE — ASSESSMENT & PLAN NOTE
Recurrent SCC of the left lung, receiving paclitaxel. Pembrolizumab dc’d s/t rash, some ongoing generalized pruritus (for which he is using antihistamines and topical steroids).  Per literature review paclitaxel can cause peripheral neuropathy (42% to 70%; grades 3/4: ? 7%) or worsen existing neuropathy.

## 2024-06-28 NOTE — PROGRESS NOTES
Ambulatory Visit - CONSULT  Name: Bridger Clayton      : 1940      MRN: [id number]  Encounter Provider: Ashish Cloon MD  Encounter Date: 2024   Encounter department: [facility] PALLIATIVE CARE [place]    Assessment & Plan   1. 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 Pembrolizumab dc’d s/t rash, some ongoing generalized pruritus (for which he is using antihistamines and topical steroids).  Per literature review paclitaxel can cause peripheral neuropathy (42% to 70%; grades 3/4: ? 7%) or worsen existing neuropathy.  Orders:  -     Ambulatory Referral to Palliative Care  -     gabapentin (Neurontin) 300 mg capsule; Take 1 capsule (300 mg total) by mouth 2 (two) times a day  -     ondansetron (ZOFRAN) 8 mg tablet; Take 1 tablet (8 mg total) by mouth every 8 (eight) hours as needed for nausea or vomiting  2. Type 2 diabetes mellitus with diabetic neuropathy, without long-term current use of insulin (HCC)  -     gabapentin (Neurontin) 300 mg capsule; Take 1 capsule (300 mg total) by mouth 2 (two) times a day  3. Chronic diastolic heart failure (HCC)  Assessment & Plan:  Wt Readings from Last 3 Encounters:   24 92.1 kg (203 lb)   24 90.7 kg (200 lb)   24 90.4 kg (199 lb 4.7 oz)   Informs plan of care; management per Cardiology.  4. Chemotherapy-induced peripheral neuropathy (HCC)  Assessment & Plan:  History of diabetic neuropathy, likely worsened by recent chemotherapies.  Start gabapentin and ramp up to 300 mg BID. May adjust further in the coming weeks and months. Kidney function reviewed today, no issues noted.  Patient has not been on gabapentinoid therapy in the past (per chart review, confirmed by patient verbally).  Patient may benefit from MMJ or recreational marijuana (for neuropathy); as he is a [place] resident [facility] is unable to certify. Provided information on obtaining MMJ / legal recreational marijuana in  [place].  Orders:  -     gabapentin (Neurontin) 300 mg capsule; Take 1 capsule (300 mg total) by mouth 2 (two) times a day  5. Constipation  Assessment & Plan:  Counseled today on bowel regimen for constipation.  6. Chemotherapy-induced nausea  -     ondansetron (ZOFRAN) 8 mg tablet; Take 1 tablet (8 mg total) by mouth every 8 (eight) hours as needed for nausea or vomiting  7. Palliative care encounter  Assessment & Plan:  Time spent introducing the concept of palliative care in general, and the [facility] offering in specific. Assessed patient's understanding of his palliative diagnosis and current plan of treatment.  Emotional / psychosocial support provided today. Patient denies mood issues (though he is taking mirtazapine for chronic depression).  Reviewed notes (PT, PCP, Medical Oncology, Pulmonology, Podiatry, Cardiology), labs (6/21/24 Cr 0.92, alb 3.4, Hb 13.6, A1c 7.9), imaging + procedures (6/6/24 PET CT). See below for more data.  Return in about 1 month (around 7/28/2024).  Medication safety issues addressed - no driving under the influence of narcotics (including opioids), watch for adverse effects including AMS or respiratory depression (slowed breathing), keep medications stored in a safe/locked environment, do not use alcohol while opioids or other narcotics are in one's system, do not travel with more than the minimum number of tablets or capsules required for the trip.  I have personally queried the patient's controlled substance dispensing history in the Prescription Drug Monitoring Program. No indication for opioids from [facility] at this time.  Orders:  -     gabapentin (Neurontin) 300 mg capsule; Take 1 capsule (300 mg total) by mouth 2 (two) times a day  -     ondansetron (ZOFRAN) 8 mg tablet; Take 1 tablet (8 mg total) by mouth every 8 (eight) hours as needed for nausea or vomiting  8. Advanced care planning/counseling discussion  Assessment & Plan:  Patient has completed advanced directives ([facility]) naming his wife then his sons as surrogate healthcare decision-maker(s). In EMR, reviewed today. Advanced directive counseling given.  9. Goals of care, counseling/discussion  Assessment & Plan:  Continue full disease-directed cares. Patient states that he would not place any limits on his cares, though he would not wish to be on long term life support or ventilation if there is no reasonable hope of meaningful recovery.      Subjective   Chief Complaint   Patient presents with    Cancer    Counseling    Consult    Peripheral Neuropathy    Congestive Heart Failure    Goals    Gait Problem        History of Present Illness     Bridger Clayton is a 84 y.o. male w/ recurrent SCC of the left lung, receiving paclitaxel (pembrolizumab dc’d s/t rash); recurrent L pleural effusions, chronic dCHF, CAD, HTN, AAA, GERD, COPD, depression, persistent Afib on Eliquis, diabetes w/ diabetic neuropathy, SYLVESTER, h/o prostate cancer. He follows w/ Dr KELSEY James (Medical Oncology), Dr Griffin (Podiatry), Dr Horton (Cardiology).    Patient is new to [facility] clinic; referred by Medical Oncology for symptom management.    Patient's primary complaint today is neuropathy, focused in his bilateral hands, particularly the fingers. He has also had issues with neuropathy in his lower extremities, particularly his feet. He has a history of diabetic neuropathy, and this has been much more prominent in recent weeks, since starting treatments for his cancer. Neuropathy manifests as loss of sensation and paresthesia, without significant pain. He has not been on gabapentin or pregabalin therapy in the past.    Patient states his mood is \"all right\". He is taking mirtazapine, initially prescribed for the chronic depression. He has had a significant improvement in his appetite recently. He has episodic nausea, and is amenable to having a nausea medication prescribed. He thinks that his most recent bout of nausea was due to something he drank " "(ginger ale). He has occasional constipation, and has not yet been trying many medications to help with this. He has significant generalized pruritus, but he does note that the rash he had in the recent past has abated. He is using Benadryl, Atarax, topical steroids.    Patient states he is going to physical therapy for balance issues and weakness. He has a rollator walker and a single-point cane. Using the cane today.    Patient lives with his wife Myla. They have 3 adult sons. Family is very supportive. He has an advance directive on file.    Patient states that he quit smoking approximately 24 years ago. He states that he has not had any alcohol in \"over a year\".    The following portions of the medical history were reviewed: past medical history, surgical history, problem list, medication list, family history, and social history.    Past Medical History:   Diagnosis Date    Abdominal pain     Alcohol dependence (HCC) 05/01/2022    Anxiety     Arthritis     Asthma     Atrial fibrillation (HCC)     Back pain     Bleeding ulcer     Bronchitis     Cancer (HCC)     prostate 2011- radiation    Cardiac disease     cardiac stent x1    Cataract     starting    Chronic diastolic (congestive) heart failure (HCC)     Diabetes mellitus (HCC)     boarderline diabetic     GERD (gastroesophageal reflux disease)     History of radiation therapy 2010    Prostate seeds (brachytherapy) and EBRT    Hyperlipidemia     Hypertension     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, bilateral     Low back pain     Lung cancer (HCC)     Lung mass     Myocardial infarction (HCC)     mild 1999    Obesity     Prostate cancer (HCC)     Shortness of breath     Sleep apnea     sleep study 11/22    Wears dentures     full set    Wears glasses      Past Surgical History:   Procedure Laterality Date    ABDOMINAL HERNIA REPAIR      ABDOMINAL SURGERY      bleeding ulcer, cyst removed from abd    COLONOSCOPY      CORONARY ANGIOPLASTY WITH STENT PLACEMENT  1999 "    x1     ESOPHAGOGASTRODUODENOSCOPY      IR BIOPSY LUNG  10/05/2021    IR PORT PLACEMENT  2024    IR THORACENTESIS  2021    IR THORACENTESIS  2023    IR THORACENTESIS  2023    KNEE SURGERY Left     OR BRNCHSC INCL FLUOR GDNCE DX W/CELL WASHG SPX N/A 2021    Procedure: BRONCHOSCOPY FLEXIBLE;  Surgeon: Cachorro Jason MD;  Location: [facility];  Service: Thoracic    OR MEDIASTINOSCOPY WITH LYMPH NODE BIOPSY/IES N/A 2021    Procedure: MEDIASTINOSCOPY;  Surgeon: Cachorro Jason MD;  Location: [facility];  Service: Thoracic    PROSTATE SURGERY       Social History     Socioeconomic History    Marital status: /Civil Union     Spouse name: None    Number of children: None    Years of education: None    Highest education level: None   Occupational History    None   Tobacco Use    Smoking status: Former     Current packs/day: 0.00     Average packs/day: 1 pack/day for 30.0 years (30.0 ttl pk-yrs)     Types: Cigarettes     Start date:      Quit date:      Years since quittin.5    Smokeless tobacco: Never   Vaping Use    Vaping status: Never Used   Substance and Sexual Activity    Alcohol use: Yes     Alcohol/week: 4.0 - 6.0 standard drinks of alcohol     Types: 1 Glasses of wine, 3 - 5 Cans of beer per week     Comment: socially    Drug use: Never    Sexual activity: None   Other Topics Concern    None   Social History Narrative    From 24  note:    Relationship status:     Duration of relationship: 59 years     Name of significant other: Taylor    Children and Ages: 3 sons     Pets: None    Other important family information: Sons are local and supportive    Living situation (where and whom): Resides with spouse      Patient's primary caregiver: Wife assists as needed       history: Served in the [employer]--is not [facility]    Employment history/source of income:  for the [place] [employer] prior to [employer]     Spirituality/ [facility]: [facility]     Patient's strengths, social supports, and resources: Supportive family and Orthodoxy support ( visits 1x/month)    Durable Medical Equipment needs: Cane, rollator, high-rise toilet    Transportation: Wife transports    Advanced Directive: Copy of AD on file designating wife Taylor as substitute decision maker with sons Bridger and Yobany as alternates    Patient's interests: Watching TV     Social Determinants of Health     Financial Resource Strain: Medium Risk (8/1/2023)    Overall Financial Resource Strain (CARDIA)     Difficulty of Paying Living Expenses: Somewhat hard   Food Insecurity: No Food Insecurity (12/19/2022)    Hunger Vital Sign     Worried About Running Out of Food in the Last Year: Never true     Ran Out of Food in the Last Year: Never true   Transportation Needs: No Transportation Needs (8/1/2023)    PRAPARE - Transportation     Lack of Transportation (Medical): No     Lack of Transportation (Non-Medical): No   Physical Activity: Not on file   Stress: Not on file   Social Connections: Not on file   Intimate Partner Violence: Not on file   Housing Stability: Low Risk  (12/19/2022)    Housing Stability Vital Sign     Unable to Pay for Housing in the Last Year: No     Number of Places Lived in the Last Year: 1     Unstable Housing in the Last Year: No     Family History   Problem Relation Age of Onset    Prostate cancer Brother     Cancer Maternal Uncle         colo rectal cancer    Cancer Paternal Aunt        Current Outpatient Medications:     acetaminophen (TYLENOL) 325 mg tablet, Take 2 tablets (650 mg total) by mouth every 6 (six) hours as needed for mild pain, headaches or fever, Disp: 30 tablet, Rfl: 0    albuterol (2.5 mg/3 mL) 0.083 % nebulizer solution, Take 2.5 mg by nebulization As needed per patient's wife, Disp: , Rfl:     apixaban (Eliquis) 5 mg, Take 1 tablet (5 mg total) by mouth 2 (two) times a day (Patient taking differently: Take 2.5 mg by  mouth 2 (two) times a day), Disp: 180 tablet, Rfl: 0    atenolol (TENORMIN) 25 mg tablet, , Disp: , Rfl:     atorvastatin (LIPITOR) 10 mg tablet, TAKE 1 TABLET (10 MG TOTAL) BY MOUTH DAILY, Disp: 90 tablet, Rfl: 1    carvedilol (COREG) 25 mg tablet, Take 1 tablet (25 mg total) by mouth 2 (two) times a day with meals, Disp: 180 tablet, Rfl: 1    fluticasone-umeclidinium-vilanterol (Trelegy Ellipta) 100-62.5-25 mcg/actuation inhaler, Rinse mouth after use., Disp: 60 each, Rfl: 5    gabapentin (Neurontin) 300 mg capsule, Take 1 capsule (300 mg total) by mouth 2 (two) times a day, Disp: 60 capsule, Rfl: 2    guaifenesin-codeine (GUAIFENESIN AC) 100-10 MG/5ML liquid, Take 10 mL by mouth 3 (three) times a day as needed for cough, Disp: 473 mL, Rfl: 0    halobetasol (ULTRAVATE) 0.05 % cream, , Disp: , Rfl:     hydrocortisone 2.5 % cream, Apply topically 2 (two) times a day Apply twice a day affected area the trunk, Disp: 20 g, Rfl: 2    hydrOXYzine HCL (ATARAX) 50 mg tablet, Take 1 tablet (50 mg total) by mouth daily at bedtime, Disp: 90 tablet, Rfl: 1    latanoprost (XALATAN) 0.005 % ophthalmic solution, Administer 0.005 mL to both eyes daily at bedtime, Disp: , Rfl:     metFORMIN (GLUCOPHAGE) 500 mg tablet, Take 1 tablet (500 mg total) by mouth 2 (two) times a day with meals, Disp: 180 tablet, Rfl: 1    mirtazapine (REMERON) 7.5 MG tablet, Take 1 tablet (7.5 mg total) by mouth daily at bedtime, Disp: 90 tablet, Rfl: 3    Multiple Vitamin (MULTI-VITAMIN DAILY PO), Take by mouth daily  , Disp: , Rfl:     omeprazole (PriLOSEC) 20 mg delayed release capsule, Take 1 capsule (20 mg total) by mouth daily, Disp: 90 capsule, Rfl: 1    ondansetron (ZOFRAN) 8 mg tablet, Take 1 tablet (8 mg total) by mouth every 8 (eight) hours as needed for nausea or vomiting, Disp: 20 tablet, Rfl: 2    primidone (MYSOLINE) 50 mg tablet, TAKE 2 TABS AT 8PM., Disp: 180 tablet, Rfl: 1    triamcinolone (KENALOG) 0.1 % cream, , Disp: , Rfl:     aspirin  "(ECOTRIN LOW STRENGTH) 81 mg EC tablet, , Disp: , Rfl:     ciclopirox (LOPROX) 0.77 % cream, Apply topically 2 (two) times a day for 20 days, Disp: 45 g, Rfl: 2  No current facility-administered medications for this visit.    Objective   /78 (BP Location: Left arm, Patient Position: Sitting, Cuff Size: Adult)   Pulse 87   Temp (!) 97.2 °F (36.2 °C) (Temporal)   Resp 16   Ht 5' 10\" (1.778 m)   Wt 92.1 kg (203 lb)   SpO2 97%   BMI 29.13 kg/m²   Physical Exam  Vitals reviewed.   Constitutional:       General: He is not in acute distress.     Appearance: He is well-groomed and overweight. He is not toxic-appearing.   HENT:      Head: Normocephalic and atraumatic.      Right Ear: External ear normal.      Left Ear: External ear normal.   Eyes:      General: No scleral icterus.        Right eye: No discharge.         Left eye: No discharge.      Extraocular Movements: Extraocular movements intact.      Conjunctiva/sclera: Conjunctivae normal.      Pupils: Pupils are equal, round, and reactive to light.   Cardiovascular:      Rate and Rhythm: Normal rate.   Pulmonary:      Effort: Pulmonary effort is normal. No tachypnea, bradypnea, accessory muscle usage or respiratory distress.      Comments: Able to speak comfortably in complete sentences on room air at rest.  Abdominal:      General: Abdomen is protuberant. There is no distension.      Tenderness: There is no guarding.   Musculoskeletal:      Cervical back: Normal range of motion.      Right lower leg: No edema.      Left lower leg: No edema.   Skin:     General: Skin is dry.      Coloration: Skin is not pale.   Neurological:      Mental Status: He is alert and oriented to person, place, and time.      Cranial Nerves: No dysarthria or facial asymmetry.      Gait: Gait abnormal (using 1PC).   Psychiatric:         Attention and Perception: Attention normal.         Mood and Affect: Mood and affect normal.         Speech: Speech normal.         Behavior: " Behavior normal. Behavior is cooperative.         Thought Content: Thought content normal.         Cognition and Memory: Cognition and memory normal.         Judgment: Judgment normal.          Recent labs:  Lab Results   Component Value Date/Time    SODIUM 137 06/21/2024 11:15 AM    K 4.2 06/21/2024 11:15 AM    BUN 13 06/21/2024 11:15 AM    CREATININE 0.92 06/21/2024 11:15 AM    GLUC 212 (H) 05/02/2024 12:41 PM    CALCIUM 9.4 06/21/2024 11:15 AM    AST 27 06/21/2024 11:15 AM    ALT 20 06/21/2024 11:15 AM    ALB 3.4 (L) 06/21/2024 11:15 AM    TP 6.9 06/21/2024 11:15 AM    EGFR 77 06/21/2024 11:15 AM     Lab Results   Component Value Date/Time    HGB 13.6 06/21/2024 11:15 AM    WBC 11.45 (H) 06/21/2024 11:15 AM     06/21/2024 11:15 AM    INR 1.34 (H) 12/29/2022 09:58 PM    PTT 32 12/29/2022 09:58 PM     Lab Results   Component Value Date/Time    [id number] 11.561 (H) 03/20/2024 10:51 AM       Recent Imaging:  Procedure: IR port placement    Result Date: 6/19/2024  Narrative: Procedure: 1. Ultrasound guided right sided internal jugular venous access. 2. Right sided chest port placement. Indication: 84year-old male with left lung cancer requiring port placement for chemotherapy. Fluoroscopy time: 0.6 minutes Images: Multiple Guidance: Ultrasound was utilized for initial vessel access.  Fluoroscopy was used for the rest of the procedure. Sedation: Moderate conscious sedation was utilized under my direct supervision administered by trained independent provider. A total of 30 minutes sedation time was utilized. I was present at the initial dose of sedation medication. Technique/Findings: After discussion of the benefits and risks of the procedure which included but are not limited to bleeding, infection, air embolism, and port dysfunction were discussed with the patient, questions were answered and written informed consent was obtained. The patient was placed supine on the fluoroscopy suite table.  A timeout was  performed.  A limited ultrasound examination of the right  neck demonstrated a patent right  internal jugular vein without evidence of thrombus.  The right neck and upper chest were prepped and draped in the usual sterile fashion. All elements of maximal sterile barrier technique were followed (cap, mask, sterile gown, sterile gloves, large sterile sheet, hand hygiene, and 2% chlorhexidine for cutaneous antisepsis).  An appropriate entry site was chosen under ultrasound guidance.  The overlying skin and the right upper chest wall were anesthetized with 1% lidocaine. Under direct ultrasound visualization a micropuncture needle was advanced into the right  internal jugular vein with permanent recording and reporting.  Using standard Seldinger technique the introducer wire was advanced through the needle which was then  removed.  A small dermatotomy was made and the micropuncture sheath was placed over the wire.  Wire was used to estimate the length of the catheter using fluoroscopy. Next attention was turned to placing the port.  A small linear 4 cm incision was made approximately two fingerbreadths beneath the right clavicle and a pocket for the port was created using blunt dissection.  The dignity port was assembled and placed into the pocket.   The catheter was tunneled under the skin to the right internal jugular venous access site in the neck.  The catheter was measured and cut using the 018 wire. The micropuncture sheath was exchanged for the peel away sheath and the catheter was advanced into its final position at the cavoatrial junction utilizing fluoroscopic guidance. The incision was closed in layers, first with an interrupted layer of 3-0 Vicryl and then with Histoacryl glue.  Histoacryl glue was also applied to the internal jugular venous access site. The port was accessed and noted to flush and aspirate without difficulty and then hep-locked. The patient tolerated the procedure well without any immediate  complications.     Impression: Impression: Technically successful placement of right sided chest port which is ready to use. Workstation performed: [id number]     Procedure: NM PET CT skull base to mid thigh    Result Date: 6/6/2024  Narrative: PET/CT SCAN INDICATION: C34.92: Malignant neoplasm of unspecified part of left bronchus or lung C34.32: Malignant neoplasm of lower lobe, left bronchus or lung MODIFIER: PS COMPARISON: PET/CT 10/30/2023 CELL TYPE: TECHNIQUE:   8.7 mCi F-18-FDG administered IV. Multiplanar attenuation corrected and non attenuation corrected PET images are available for interpretation, and contiguous, low dose, axial CT sections were obtained from the skull base through the femurs. Intravenous contrast material was not utilized. This examination, like all CT scans performed in the [facility], was performed utilizing techniques to minimize radiation dose exposure, including the use of iterative reconstruction and automated exposure control. Fasting serum glucose: 169 mg/dl FINDINGS: VISUALIZED BRAIN: No acute abnormalities are seen. HEAD/NECK: There is a physiologic distribution of FDG. No FDG avid cervical adenopathy is seen. CT images: Unremarkable. CHEST: Left lower lung mass measuring approximately 9 x 7 cm demonstrates persistent but decreased predominantly peripheral FDG activity prior SUV 5.7. Prior measurement 7.1 x 6.4 cm, SUV 12. Persistent central low density suggesting necrosis with new air. Coexistent infection is not excluded. There is an unchanged right paratracheal node measuring 1.3 cm, SUV 2.6, prior measurement 1.3 cm, SUV of 2.6. This is nonspecific but may be reactive given its stability. Decreased but persistent small left pleural effusion with FDG activity, SUV 3.2. This may be inflammatory/infectious. Malignant effusion is not entirely scooted. There is likely at least partial loculation of the superior posterior aspect of this effusion. Minimal uptake  in small bilateral axillary nodes may be reactive. Otherwise no new FDG avid soft tissue lesions are seen. CT images: Coronary atherosclerosis. Gynecomastia. 4 mm nodule along the right minor fissure image 4/73 is stable from prior CT. Benign bilateral elastofibroma dorsi. ABDOMEN: No FDG avid soft tissue lesions are seen. CT images: Atherosclerotic aorta and branch vessels. Cholelithiasis. Colon and duodenal diverticula. Status post gastric bypass. Anterior abdominal wall mesh. PELVIS: No FDG avid soft tissue lesions are seen. CT images: Right hydrocele, stable. Prostate seeds. OSSEOUS STRUCTURES: New healing fractures of the right anterior third, fourth and fifth ribs and likely seventh rib with associated FDG activity. There is a persistent asymmetric focus of activity in the right sacrum, but similar to prior exams, current SUV 4.2, prior SUV 4.6. Prior SUV from 10/29/2021 was 3.5. No new FDG avid lesions are seen. CT images: Spine degenerative change.     Impression: 1. Necrotic left lower lung mass demonstrates persistent but diminished predominantly peripheral FDG activity. This may represent partial response to therapy, though residual viable tumor may still remain. There is new air within this necrotic mass, for which infection is not excluded. Correlate clinically. 2. Decreased but persistent small left pleural effusion. There is associated FDG activity which may be inflammatory/infectious. Malignant effusion is not excluded. 3. Healing right anterior rib fractures. Nonspecific but stable asymmetric focus of activity in the right sacrum. 4. Otherwise no new hypermetabolic metastases. Workstation performed: [id number]     Procedure: XR chest pa & lateral    Result Date: 5/1/2024  Narrative: XR CHEST PA & LATERAL INDICATION: J90: Pleural effusion, not elsewhere classified. COMPARISON: 1/21/2024 FINDINGS: Redemonstration of a chest tube at the left lung base. Increasing left lower lung opacity which may  "represent a combination of mass and pleural effusion. No pneumothorax . Normal cardiomediastinal silhouette. Bones are unremarkable for age. Normal upper abdomen.     Impression: Increasing left lower lung opacity which may represent combination of mass and pleural effusion. Workstation performed: [id number]      45+ minutes total time spent on 6/28/2024 in caring for this patient including symptom assessment and management, medication review, medication adjustment, psychosocial support, chart review, imaging review, lab review, advanced directives, goals of care, medical marijuana, supportive listening, and anticipatory guidance. All of the patient's questions were answered during this discussion.    Ashish Colon MD  [facility]  [phone]    Portions of this document may have been created using dictation software and as such some \"sound alike\" terms may have been generated by the system. Do not hesitate to contact me with any questions or clarifications.  "

## 2024-06-28 NOTE — PATIENT INSTRUCTIONS
"It was good to see you today. Thank you for coming in.    Start gabapentin for neuropathy; we recommend tapering up gradually.  Starting today, take 300mg every evening or at bedtime; do this for 3 days.  On day 7, take 300mg every 12 hours; do this for 3 days.  If you experience any adverse effects during this ramp-up please call us.  If constipation is an issue:  Drink PLENTY of water. This is important to keep the gut moving.  Some people have success w/ using prunes, prune juice, certain fruits or vegetables (apples, bananas, prunes, pears, raspberries, and vegetables like string beans, broccoli, spinach, kale, squash, lentils, peas, and beans), or fiber gummies.  Try a probiotic. This could be yogurt or kefir, or fermented beverages such as kombucha, but probiotics are also available in capsule form. Aim for 10-15 billion colony-forming-units, w/ bacteria such as Lactobacillus / Saccharomyces / Actinomyces.  Osmotic laxatives (Miralax, magnesium citrate, Milk of Magnesia) can be very useful for opioid-induced constipation (OIC); take daily to prevent OIC.  Bulk laxatives (Citrucel, Metamucil, Fibercon, Benefiber, wheat germ) are useful for constipation in patients who are not taking opioids, but are not recommended if you are taking opioids.  Colace is good for softening hard stools, or preventing constipation when opioids are being used - but does not stimulate the bowel to move things along once constipation has occurred.  You can use senna, 1 to 2 tabs, once or twice daily as needed for constipation. Use as directed on the box/bottle. Senna is also available in a tea (\"Smooth Move\"). Should that not be enough for your constipation, you can try Dulcolax.  Should that not be enough, consider an enema.  All of these medications are available over-the-counter.  Return in about 1 month (around 7/28/2024).  Call us for refills on medications that we supply, as needed.   If something changes and you need to come in " sooner, please call our office.    PRESCRIPTION REFILL REMINDER:  All medication refills should be requested prior to Noon on Friday. Any refill requests after noon on Friday would be addressed the following Monday.

## 2024-06-28 NOTE — ASSESSMENT & PLAN NOTE
History of diabetic neuropathy, likely worsened by recent chemotherapies.  Start gabapentin and ramp up to 300 mg BID. May adjust further in the coming weeks and months. Kidney function reviewed today, no issues noted.  Patient has not been on gabapentinoid therapy in the past (per chart review, confirmed by patient verbally).  Patient may benefit from MMJ or recreational marijuana (for neuropathy); as he is a [place] resident [facility] is unable to certify. Provided information on obtaining MMJ / legal recreational marijuana in [place].

## 2024-06-28 NOTE — ASSESSMENT & PLAN NOTE
Time spent introducing the concept of palliative care in general, and the [facility] offering in specific. Assessed patient's understanding of his palliative diagnosis and current plan of treatment.  Emotional / psychosocial support provided today. Patient denies mood issues (though he is taking mirtazapine for chronic depression).  Reviewed notes (PT, PCP, Medical Oncology, Pulmonology, Podiatry, Cardiology), labs (6/21/24 Cr 0.92, alb 3.4, Hb 13.6, A1c 7.9), imaging + procedures (6/6/24 PET CT). See below for more data.  Return in about 1 month (around 7/28/2024).  Medication safety issues addressed - no driving under the influence of narcotics (including opioids), watch for adverse effects including AMS or respiratory depression (slowed breathing), keep medications stored in a safe/locked environment, do not use alcohol while opioids or other narcotics are in one's system, do not travel with more than the minimum number of tablets or capsules required for the trip.  I have personally queried the patient's controlled substance dispensing history in the Prescription Drug Monitoring Program. No indication for opioids from [facility] at this time.

## 2024-07-01 ENCOUNTER — TELEPHONE (OUTPATIENT)
Dept: HEMATOLOGY ONCOLOGY | Facility: MEDICAL CENTER | Age: 84
End: 2024-07-01

## 2024-07-01 ENCOUNTER — OFFICE VISIT (OUTPATIENT)
Dept: HEMATOLOGY ONCOLOGY | Facility: MEDICAL CENTER | Age: 84
End: 2024-07-01
Payer: COMMERCIAL

## 2024-07-01 VITALS
HEIGHT: 70 IN | OXYGEN SATURATION: 96 % | DIASTOLIC BLOOD PRESSURE: 60 MMHG | BODY MASS INDEX: 29.06 KG/M2 | TEMPERATURE: 97.6 F | WEIGHT: 203 LBS | HEART RATE: 91 BPM | SYSTOLIC BLOOD PRESSURE: 104 MMHG | RESPIRATION RATE: 17 BRPM

## 2024-07-01 DIAGNOSIS — C61 PROSTATE CANCER (HCC): ICD-10-CM

## 2024-07-01 DIAGNOSIS — G62.9 NEUROPATHY: ICD-10-CM

## 2024-07-01 DIAGNOSIS — C34.32 MALIGNANT NEOPLASM OF LOWER LOBE, LEFT BRONCHUS OR LUNG (HCC): ICD-10-CM

## 2024-07-01 DIAGNOSIS — C34.92 SQUAMOUS CELL CARCINOMA OF LEFT LUNG (HCC): Primary | ICD-10-CM

## 2024-07-01 PROCEDURE — 99214 OFFICE O/P EST MOD 30 MIN: CPT | Performed by: PHYSICIAN ASSISTANT

## 2024-07-01 NOTE — PROGRESS NOTES
Bridger Clayton  1940  [facility] HEMATOLOGY ONCOLOGY SPECIALISTS SHAI Garcia [facility] [id number]     DISCUSSION/SUMMARY:     84-year-old male with a number of medical and cardiac problems previously found to have a left lower lobe mass. Initial biopsy demonstrated squamous cell carcinoma.  IR recently performed thoracentesis, minimal amount of fluid was removed but there was no evidence of metastatic disease; cytology was negative.  Patient was seen by thoracic surgery and underwent mediastinoscopy.  There was no evidence of metastatic disease in the sampled lymph nodes (2R, 4R, 4L, 7).  Tumor was 4.5 cm.  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  Patient was treated with SBRT and then went on to surveillance.    Eventual follow-up PET/CT demonstrated evidence of recurrence in the left lower lobe, same area where the original malignancy was found.  There was no clear evidence of spread to either hilar regions or the mediastinum but radiologist commented on 1 right paratracheal lymph node but very +/-.  There was nonspecific FDG activity in the sacrum and left posterior iliac bone, seen before.  Unknown clinical significance.    At that time, patient also began to have more problems with recurrent left-sided pleural effusions requiring a Pleurx catheter.  Previously wife was draining approximately 500 cc every 2 to 3 days.  Discontinued while patient was started on pembrolizumab, did not get significantly better.  Additionally patient developed a rash while on pembrolizumab.  The ICI was eventually discontinued and patient was started on paclitaxel.  Patient has completed 9 cycles.    Recent blood work was okay/acceptable.  PleurX catheter has been removed.  Recent follow-up PET/CT demonstrated response to treatment, no evidence of disease progression.  This is obviously good.  The plan is to continue with the Taxol.  Unfortunately  patient is now having increasing issues with neuropathy.  I discussed with patient and his wife today that while neuropathy can occur from Taxol it can also be related to his diabetes.  He was recently placed on Neurontin by palliative care.  He is responding to Taxol and prefer to continue, with modifications, if we are able.    Current Regimen  Paclitaxel 135 mg/m2 IV day 1 (was receiving 77% up until now)  Cycle length = 21 days  Goal = palliation and prolongation of life    Plan: Continue Paclitaxel. Dose-reduce to 70% (of original dose) due to neuropathy. Continue gabapentin per Dr. Colon. We also discussed that he needs to manage his DM2 better as this is also likely contributing.    Mr. Clayton has been having more issues with IV access.  Patient agreed to port placement.  This was placed on 6/19/2024.    Patient is to return here in 3 to 4 weeks.    Carefully review your medication list and verify that the list is accurate and up-to-date. Please call the hematology/oncology office if there are medications missing from the list, medications on the list that you are not currently taking or if there is a dosage or instruction that is different from how you're taking that medication.     Patient goals and areas of care: Continue with paclitaxel with dose-reduction. Gabapentin per palliative care.  Barriers to care: none  Patient is able to self-care  ______________________________________________________________________________________         Chief Complaint   Patient presents with    Follow-up - recurrent non-small cell lung carcinoma      History of Present Illness:  84 year-old male with multiple medical and cardiac issue recently seen in the emergency room because of a cough.  Workup demonstrated a left lower lobe mass.  Biopsy demonstrated squamous cell carcinoma.  Workup did not demonstrated evidence of metastatic disease; mediastinoscopy was negative.  Patient was seen by thoracic surgery but was not  felt to be a surgical candidate.  Patient was subsequently seen by radiation oncology and underwent SPRT.  Patient then went on surveillance.      Eventual repeat scanning was consistent with recurrence at the original site.  Mr. Clayton required a left-sided Pleurx catheter placed because of recurrent left-sided pleural effusion.  Patient was initially treated with pembrolizumab only but developed a significant rash requiring steroids.  Because of the rash and the itchiness, patient did not wish to continue with the pembrolizumab; this was discontinued.  Options were discussed and patient began paclitaxel.  Patient has completed 9 cycles.    Interval History:  Presently Mr. Clayton states feeling okay.  Since his last visit he has had Port-A-Cath placed.  He was scheduled for his last cycle of chemotherapy on 6/25/2024.  He complained of worsening neuropathy in his fingertips.  His chemotherapy was held.  No significant neuropathy in his feet.  No tripping or falling.    The rash is less/better than before.  No respiratory issues, PleurX catheter has been removed.  Appetite is okay, weight is stable.  No fevers or signs of infection.  Activities are limited but the same as before.     Patient was diagnosed with prostate cancer (approximately 10 + years ago) and underwent seeds and external beam radiation.  No evidence of recurrence since that time.       Review of Systems   Constitutional:  Positive for fatigue.        Appetite is fair.  HENT: Negative.     Eyes: Negative.    Respiratory: Negative.     Cardiovascular: Negative.    Gastrointestinal: Negative.    Endocrine: Negative.    Genitourinary: Negative.    Musculoskeletal: Negative.    Skin: Negative.    Allergic/Immunologic: Negative.    Neurological: Neuropathy in fingertips+  Hematological: Negative.         Easy bruising   Psychiatric/Behavioral:  Negative for self-injury.    All other systems reviewed and are negative.         Patient Active Problem List    Diagnosis    Corns    Pain in both feet    Onychomycosis    Tinea pedis of both feet    Lung mass    Hyperlipidemia    Type 2 diabetes mellitus with diabetic neuropathy, without long-term current use of insulin (HCC)    Squamous cell carcinoma of lung (HCC)    Shortness of breath    Daytime hypersomnolence    Chronic diastolic heart failure (HCC)    SYLVESTER (obstructive sleep apnea)    Prostate cancer (HCC)    GERD (gastroesophageal reflux disease)    CAD (coronary artery disease)    Other persistent atrial fibrillation (HCC)    Alcohol dependence (HCC)    Acute respiratory failure with hypoxemia (HCC)    Depression, recurrent (HCC)    COVID-19    Angioedema    Septic shock (HCC)    Cellulitis    Cellulitis of chest wall    Acute kidney injury (BUDDY) with acute tubular necrosis (ATN) (HCC)    Chronic obstructive pulmonary disease, unspecified COPD type (HCC)    Centrilobular emphysema (HCC)    Abdominal aortic aneurysm (HCC)    Hypertensive heart disease    Tremors of nervous system    Recurrent pleural effusion on left      Medical History        Past Medical History:   Diagnosis Date    Abdominal pain      Anxiety      Arthritis      Asthma      Atrial fibrillation (HCC)      Back pain      Bleeding ulcer      Bronchitis      Cancer (HCC)       prostate 2011- radiation    Cardiac disease       cardiac stent x1    Cataract       starting    Chronic diastolic (congestive) heart failure (HCC)      Diabetes mellitus (HCC)       boarderline diabetic     GERD (gastroesophageal reflux disease)      History of radiation therapy 2010     Prostate seeds (brachytherapy) and EBRT    Hyperlipidemia      Hypertension      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, bilateral      Low back pain      Lung cancer (HCC)      Lung mass      Myocardial infarction (HCC)       mild 1999    Obesity      Prostate cancer (HCC)      Shortness of breath      Sleep apnea       sleep study 11/22    Wears dentures       full set    Wears glasses            Surgical History         Past Surgical History:   Procedure Laterality Date    ABDOMINAL HERNIA REPAIR        ABDOMINAL SURGERY         bleeding ulcer, cyst removed from abd    COLONOSCOPY        CORONARY ANGIOPLASTY WITH STENT PLACEMENT   1999     x1     ESOPHAGOGASTRODUODENOSCOPY        IR BIOPSY LUNG   10/05/2021    IR THORACENTESIS   2021    IR THORACENTESIS   2023    IR THORACENTESIS   2023    KNEE SURGERY Left      NV BRNCHSC INCL FLUOR GDNCE DX W/CELL WASHG SPX N/A 2021     Procedure: BRONCHOSCOPY FLEXIBLE;  Surgeon: Cachorro Jason MD;  Location: [facility];  Service: Thoracic    NV MEDIASTINOSCOPY WITH LYMPH NODE BIOPSY/IES N/A 2021     Procedure: MEDIASTINOSCOPY;  Surgeon: Cachorro Jason MD;  Location: [facility];  Service: Thoracic    PROSTATE SURGERY             Family history: 3 sons in good general health, no known familial or genetic diseases     Social History               Socioeconomic History    Marital status: /Civil Union       Spouse name: Not on file    Number of children: Not on file    Years of education: Not on file    Highest education level: Not on file   Occupational History    Not on file   Tobacco Use    Smoking status: Former       Packs/day: 1.00       Years: 30.00       Total pack years: 30.00       Types: Cigarettes       Quit date:        Years since quittin.8    Smokeless tobacco: Never   Vaping Use    Vaping Use: Never used   Substance and Sexual Activity    Alcohol use: Yes       Alcohol/week: 4.0 - 6.0 standard drinks of alcohol       Types: 1 Glasses of wine, 3 - 5 Cans of beer per week       Comment: few beers day    Drug use: Never    Sexual activity: Not on file   Other Topics Concern    Not on file   Social History Narrative    Not on file      Social Determinants of Health           Financial Resource Strain: Medium Risk (2023)     Overall Financial Resource Strain (CARDIA)      Difficulty of Paying Living Expenses:  Somewhat hard   Food Insecurity: No Food Insecurity (12/19/2022)     Hunger Vital Sign      Worried About Running Out of Food in the Last Year: Never true      Ran Out of Food in the Last Year: Never true   Transportation Needs: No Transportation Needs (8/1/2023)     PRAPARE - Transportation      Lack of Transportation (Medical): No      Lack of Transportation (Non-Medical): No   Physical Activity: Not on file   Stress: Not on file   Social Connections: Not on file   Intimate Partner Violence: Not on file   Housing Stability: Low Risk  (12/19/2022)     Housing Stability Vital Sign      Unable to Pay for Housing in the Last Year: No      Number of Places Lived in the Last Year: 1      Unstable Housing in the Last Year: No      Social history:  40 pack-year history discontinued 20 years ago, patient drinks 2-4 beers a day off and on, no drug abuse, 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)     Current Outpatient Medications:     acetaminophen (TYLENOL) 325 mg tablet, Take 2 tablets (650 mg total) by mouth every 6 (six) hours as needed for mild pain, headaches or fever, Disp: 30 tablet, Rfl: 0    albuterol (2.5 mg/3 mL) 0.083 % nebulizer solution, Take 2.5 mg by nebulization As needed per patient's wife , Disp: , Rfl:     apixaban (Eliquis) 5 mg, Take 1 tablet (5 mg total) by mouth 2 (two) times a day, Disp: 180 tablet, Rfl: 1    atenolol (TENORMIN) 25 mg tablet, , Disp: , Rfl:     atorvastatin (LIPITOR) 10 mg tablet, Take 1 tablet (10 mg total) by mouth daily, Disp: 90 tablet, Rfl: 1    carvedilol (COREG) 25 mg tablet, Take 1 tablet (25 mg total) by mouth 2 (two) times a day with meals, Disp: 180 tablet, Rfl: 1    ciclopirox (LOPROX) 0.77 % cream, Apply topically 2 (two) times a day for 20 days, Disp: 45 g, Rfl: 2    fluticasone-umeclidinium-vilanterol (Trelegy Ellipta) 100-62.5-25 mcg/actuation inhaler, Rinse mouth after use., Disp: 60 each, Rfl: 5    guaifenesin-codeine (GUAIFENESIN AC)  100-10 MG/5ML liquid, Take 10 mL by mouth 3 (three) times a day as needed for cough, Disp: 180 mL, Rfl: 0    halobetasol (ULTRAVATE) 0.05 % cream, , Disp: , Rfl:     hydrocortisone 2.5 % cream, Apply topically 2 (two) times a day Apply twice a day affected area the trunk, Disp: 20 g, Rfl: 2    latanoprost (XALATAN) 0.005 % ophthalmic solution, Administer 0.005 mL to both eyes daily at bedtime, Disp: , Rfl:     metFORMIN (GLUCOPHAGE) 500 mg tablet, Take 1 tablet (500 mg total) by mouth daily with breakfast, Disp: , Rfl:     mirtazapine (REMERON) 7.5 MG tablet, Take 1 tablet (7.5 mg total) by mouth daily at bedtime, Disp: 90 tablet, Rfl: 3    Multiple Vitamin (MULTI-VITAMIN DAILY PO), Take by mouth daily  , Disp: , Rfl:     omeprazole (PriLOSEC) 20 mg delayed release capsule, TAKE 1 CAPSULE DAILY, Disp: 90 capsule, Rfl: 1    primidone (MYSOLINE) 50 mg tablet, TAKE 2 TABS AT 8PM., Disp: 180 tablet, Rfl: 1    spironolactone (ALDACTONE) 25 mg tablet, Take 1 tablet (25 mg total) by mouth daily, Disp: 90 tablet, Rfl: 1    aspirin (ECOTRIN LOW STRENGTH) 81 mg EC tablet, , Disp: , Rfl:      No Known Allergies         Vitals:     10/20/23 0926   BP: 114/68   Pulse: 80   Resp: 17   Temp: 97.9 °F (36.6 °C)   SpO2: 92%     Physical Exam  Constitutional:       Appearance: He is well-developed.      Comments: Obese male, no respiratory distress, no signs of pain   HENT:      Head: Normocephalic and atraumatic.      Right Ear: External ear normal.      Left Ear: External ear normal.   Eyes:      Conjunctiva/sclera: Conjunctivae normal.      Pupils: Pupils are equal, round, and reactive to light.   Cardiovascular:      Rate and Rhythm: Normal rate and regular rhythm.      Heart sounds: Normal heart sounds.   Pulmonary:      Effort: Pulmonary effort is normal.      Comments: Decreased breath sounds left lower lobe  Abdominal:      Palpations: Abdomen is soft.      Comments: Obese, nontender.  Musculoskeletal:         General: Normal  range of motion.      Cervical back: Normal range of motion and neck supple.   Skin:     General: Skin is warm.      Comments: Relatively good color, warm, moist, scattered mild areas of ecchymosis   Neurological:      Mental Status: He is alert and oriented to person, place, and time.   Psychiatric:         Behavior: Behavior normal.         Thought Content: Thought content normal.         Judgment: Judgment normal.      Extremities:  No lower extremity edema bilaterally.     Labs  6/21/2024 WBC 11.45, hemoglobin 13.6, platelets 315, potassium 4.2, BUN 13, creatinine 0.92, AST 27, ALT 20, alk phos 130.    5/23/2024 WBC = 5.67 hemoglobin = 12.7 hematocrit = 41.6 platelet = 43 neutrophil = 45% BUN = 12 creatinine = 0.91 calcium = 9.1 LFTs WNL    4/11/2024 WBC = 6.79 hemoglobin = 11.8 hematocrit = 39.4 platelet = 527 neutrophil = 47% BUN = 10 creatinine = 0.88 calcium = 8.9 alkaline phosphatase = 130, other LFTs WNL    Imaging    6/6/2024 PET/CT    IMPRESSION:    1. Necrotic left lower lung mass demonstrates persistent but diminished predominantly peripheral FDG activity. This may represent partial response to therapy, though residual viable tumor may still remain. There is new air within this necrotic mass, for   which infection is not excluded. Correlate clinically.  2. Decreased but persistent small left pleural effusion. There is associated FDG activity which may be inflammatory/infectious. Malignant effusion is not excluded.  3. Healing right anterior rib fractures. Nonspecific but stable asymmetric focus of activity in the right sacrum.  4. Otherwise no new hypermetabolic metastases.    10/30/2023 PET/CT    CHEST:  Best seen on image 76 of series 4 is a hypermetabolic centrally photopenic left lower lobe lung mass measuring 7.1 x 6.4 cm with max SUV of 12.0, previously measuring 4.5 x 4.0 cm with max SUV of 19.7 on PET/CT dated 10/29/2021 and approximately 4.3 x 3.7 cm when utilizing similar measurement  technique and CT of chest dated 12/23/2022. The interval growth in size since 12/23/2022 suggest progression of hypermetabolic malignancy.     Subtle FDG activity is noted with a stable in size prominent right paratracheal lymph node on image 58 of series 4 measuring 1 1.4 cm in short axis with max SUV of 2.8, previously 3.5. The stability favors benign reactive lymph node with early pamela metastatic disease considered less likely    IMPRESSION:     1. Hypermetabolic left lower lobe lung malignancy has significantly increased in size since 12/23/2022 suggesting progression of malignancy.  2. Again noted is nonspecific FDG activity involving the sacrum and left posterior iliac bone without definitive correlate on low-dose unenhanced CT of uncertain clinical significance. Findings can be further characterized with MRI of the bony pelvis as clinically indicated.  3. Moderate left pleural effusion.     9/28/23: CT Chest w contrast:     Impression: Since September 12, 2023, decrease in size of the still large left pleural effusion with commensurate decrease in left-sided passive atelectasis. Persistent hypoattenuation in the collapsed portion of the left lower lobe. Given that this is in the   vicinity of the treated tumor, and has increased in size since December 2022, short interval contrast-enhanced CT is advised following resolution of the pleural effusion for further assessment.     1/27/2023 CT chest without contrast    IMPRESSION:  1.  Slightly decreased left pleural effusion with improved passive atelectasis of the left lower lobe. Previously noted left lower lobe mass is difficult to visualize on noncontrast exam but appears likely unchanged from most recent study.  2.  Similar appearance of skin thickening and subcutaneous stranding in the right axillary region which could represent cellulitis.       05/04/2022 chest x-ray portable.  Impression stated left small effusion and parenchymal process appears  unchanged.     05/02/2022 CT scan the chest without contrast     IMPRESSION:  1.  Increase in size of a left lower lobe patchy opacity with new right basilar patchy densities concerning for pulmonary infiltrates with additional patchy and reticulonodular changes lingular segment left upper lobe and right middle lobe also likely related to infectious process.  2.  Left lower lobe cavitary mass has decreased in size now measuring 3.3 x 1.9 cm, previously 4.1 x 2.2 cm.  3.  Stable COPD and pulmonary fibrosis with the latter likely related to radiation change.     11/19/2021 MRI brain.  Impression stated white matter changes suggestive of chronic microangiopathy.  No acute intracranial pathology.     10/29/2021 PET-CT     1.  Hypermetabolic necrotic 4.5 x 4 cm left lower lung mass compatible with known malignancy.  2.  2 mildly hypermetabolic right paratracheal mediastinal nodes for which early metastases are not excluded.  3.  Small mildly hypermetabolic left pleural effusion for which malignant effusion is not excluded.  4.  Additional scattered tiny nodular lung densities may be reassessed on follow-up CT.  5.  Probable reactive subcentimeter right cervical node may be reassessed on follow-up PET/CT.  6.  No hypermetabolic soft tissue metastases in the abdomen, pelvis.  7.  Minimal inhomogeneous FDG activity in the sacrum and left posterior iliac, though without dominant focal lesion.  This may be reassessed on follow-up exam.     Pathology    12/7/2023 [facility].  No action mutations were detected.  Tumor mutational burden = 6 = low.  Patient had a pathologic variant in PTEN and TP53.  PDL IHC TPS = 0%, negative.     Case Report   Surgical Pathology Report                         Case: [id number]                                    Authorizing Provider:  Cachorro Jason MD       Collected:           11/29/2021 1005               Ordering Location:     [facility]      Received:            11/29/2021 1100                                       Hospital Operating Room                                                       Pathologist:           Juan José Dietz MD                                                         Specimens:   A) - Lymph Node, level 7                                                                             B) - Lymph Node, level 4R                                                                            C) - Lymph Node, level 2R                                                                            D) - Lymph Node, level 4L                                                                   Final Diagnosis   A. Lymph node, level 7, excision:  -  Portions of benign lymph node with reactive change and anthracosis, negative for granulomas, lymphoma, or metastatic carcinoma.    B. Lymph node, level 4R, excision:  -  Portions of benign lymph node with reactive change and anthracosis, negative for granulomas, lymphoma, or metastatic carcinoma.    C. Lymph node, level 2R, excision:  -  Portions of benign lymph node with reactive change and anthracosis, negative for granulomas, lymphoma, or metastatic carcinoma.    D. Lymph node, level 4L, excision:  -  Benign lymph node with reactive change and anthracosis, negative for granulomas, lymphoma, or metastatic carcinoma.      Electronically signed by Juan José Dietz MD on 12/2/2021 at 11:29 AM         Case Report   Surgical Pathology Report                         Case: [id number]                                    Authorizing Provider:  Elmer Laird MD      Collected:           10/05/2021 1058               Ordering Location:     [facility] Received:            10/05/2021 1120                                      CAT Scan                                                                      Pathologist:           Mary Dean MD                                                         Specimen:    Lung, Left Lower Lobe                                                                        Final Diagnosis   A. Lung, Left Lower Lobe, :  - Invasive squamous carcinoma, moderately differentiated, keratinizing type.  - Tumor is highlighted with P 40 immunoperoxidase stain.  - Prominent tumor necrosis is noted.      Best representative block with tumor A1.  This case was reviewed at the intradepartmental  conference.   RADHA Navarrete, Pulmonology, is notified of the diagnosis in [place] via [employer] on 10/06/2021  at 2.40 pm.   Electronically signed by Mary Dean MD on 10/6/2021 at  2:44 PM

## 2024-07-02 ENCOUNTER — OFFICE VISIT (OUTPATIENT)
Dept: PHYSICAL THERAPY | Facility: CLINIC | Age: 84
End: 2024-07-02
Payer: COMMERCIAL

## 2024-07-02 DIAGNOSIS — R53.1 GENERALIZED WEAKNESS: ICD-10-CM

## 2024-07-02 DIAGNOSIS — C34.92 SQUAMOUS CELL CARCINOMA OF LEFT LUNG (HCC): Primary | ICD-10-CM

## 2024-07-02 DIAGNOSIS — R53.0 NEOPLASTIC MALIGNANT RELATED FATIGUE: ICD-10-CM

## 2024-07-02 PROCEDURE — 97110 THERAPEUTIC EXERCISES: CPT | Performed by: PHYSICAL THERAPIST

## 2024-07-02 NOTE — PROGRESS NOTES
"Daily Note     Today's date: 2024  Patient name: Bridger Clayton  : 1940  MRN: [id number]  Referring provider: Bridger James MD  Dx:   Encounter Diagnosis     ICD-10-CM    1. 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 Neoplastic malignant related fatigue  R53.0       3. Generalized weakness  R53.1                      Subjective: \"I've been a little bit more unstable the past few days.  I think I'm a little bit sore from doing the home exercises.\"      Objective: See treatment diary below      Assessment: Tolerated treatment fair. Patient is fairly unsteady ambulating with single point cane today.  Guarded patient closely, however he tripped getting onto the bicycle today and feel to ground.  He hit his knee on the ground but reports everything else feels okay.  Was unable to get himself up, required MaxA of 1 therapist to come to seated position.  Patient was able to complete session following.  Low exercise tolerance is observed.  Plan to use nustep next visit.  Patient would benefit from continued PT      Plan: Continue per plan of care.       Precautions: active cancer with chemo every 3 weeks; FALL RISK, AAA, CHF, COPD, CAD, HTN, SYLVESTER, AAA    Insurance:  AMA/CMS Eval/ Re-eval POC expires FOTO Auth Status Co-Insurance                                                       2. 7   3.    4.   5.    6.      7.    8.    9.    10.    11.    12.      13.    14.    15.    16.  17.  18.      19.    20.   21.   22.   23.   24.      25.    26. 27. 28. 29. 30.   31. 32.  33. 34. 35. 36.          Daily Treatment Diary     Date of Service:            Manuals             Hamstring stretch                          Therapeutic Exercise             Bike   X5 min           Hamstring stretch seated                                           Neuro Re-ed             Quad sets  3\" x 20           SLR flexion   10x            Bridges   10x           Clamshells              Hip abd/add  Abd YellowTB 2 " x 10           Romberg EO ground/ foam                                                    Therapeutic Activity             Step ups             Lateral step ups             STS             Lateral walking                                                                                                                                  Modalities

## 2024-07-09 ENCOUNTER — OFFICE VISIT (OUTPATIENT)
Dept: PHYSICAL THERAPY | Facility: CLINIC | Age: 84
End: 2024-07-09
Payer: COMMERCIAL

## 2024-07-09 DIAGNOSIS — R53.1 GENERALIZED WEAKNESS: ICD-10-CM

## 2024-07-09 DIAGNOSIS — C34.92 SQUAMOUS CELL CARCINOMA OF LEFT LUNG (HCC): Primary | ICD-10-CM

## 2024-07-09 DIAGNOSIS — R53.0 NEOPLASTIC MALIGNANT RELATED FATIGUE: ICD-10-CM

## 2024-07-09 PROCEDURE — 97530 THERAPEUTIC ACTIVITIES: CPT | Performed by: PHYSICAL THERAPIST

## 2024-07-09 PROCEDURE — 97110 THERAPEUTIC EXERCISES: CPT | Performed by: PHYSICAL THERAPIST

## 2024-07-09 NOTE — PROGRESS NOTES
"Daily Note     Today's date: 2024  Patient name: Bridger Clayton  : 1940  MRN: [id number]  Referring provider: Bridger James MD  Dx:   Encounter Diagnosis     ICD-10-CM    1. 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 Neoplastic malignant related fatigue  R53.0       3. Generalized weakness  R53.1                        Subjective: \"I've still felt wobbly.\"    Objective: See treatment diary below      Assessment: Tolerated treatment fair. Patient with improved ability to perform SLR's on his own today.  He required AA on R only.  We added more therapeutic exercise today due to good tolerance of previous exercise.  Added gait training with rollator.  He requires cues during to assure he controls speed.  Patient would benefit from continued PT      Plan: Continue per plan of care.       Precautions: active cancer with chemo every 3 weeks; FALL RISK, AAA, CHF, COPD, CAD, HTN, SYLVESTER, AAA    Insurance:  AMA/CMS Eval/ Re-eval POC expires FOTO Auth Status Co-Insurance                                                       2. 7   3.    4.   5.    6.      7.    8.    9.    10.    11.    12.      13.    14.    15.    16.  17.  18.      19.    20.   21.   22.   23.   24.      25.    26. 27. 28. 29. 30.   31. 32.  33. 34. 35. 36.          Daily Treatment Diary     Date of Service:           Manuals             Hamstring stretch                          Therapeutic Exercise             Bike   X5 min Nustep           Hamstring stretch seated                                           Neuro Re-ed             Quad sets  3\" x 20 3\" x 20          SLR flexion   10x  2 x 10 AA for R LE          Bridges   10x           Clamshells    Yellow TB 2 x 10          Standing Hip abd/ext             Romberg EO ground/ foam             Seated HR/TR   2 x 10                                    Therapeutic Activity             Step ups             Lateral step ups             STS   2 x 5          Lateral " walking             Gait w/ rollator   X200 ft                                                                                                                  Modalities

## 2024-07-10 ENCOUNTER — TELEPHONE (OUTPATIENT)
Dept: HEMATOLOGY ONCOLOGY | Facility: CLINIC | Age: 84
End: 2024-07-10

## 2024-07-10 NOTE — TELEPHONE ENCOUNTER
Patient scheduled with PA on 7/16/2024 prior to chemo appt in order to assess neuropathy  Chemo appt moved to 7/17/2024  Appt details left on voicemail with instructions to call [facility] to discuss  Will have infusion staff give AVS at 7/12/2024 appt for labs

## 2024-07-11 ENCOUNTER — OFFICE VISIT (OUTPATIENT)
Dept: PHYSICAL THERAPY | Facility: CLINIC | Age: 84
End: 2024-07-11
Payer: COMMERCIAL

## 2024-07-11 DIAGNOSIS — R53.0 NEOPLASTIC MALIGNANT RELATED FATIGUE: ICD-10-CM

## 2024-07-11 DIAGNOSIS — R53.1 GENERALIZED WEAKNESS: ICD-10-CM

## 2024-07-11 DIAGNOSIS — C34.92 SQUAMOUS CELL CARCINOMA OF LEFT LUNG (HCC): Primary | ICD-10-CM

## 2024-07-11 PROCEDURE — 97110 THERAPEUTIC EXERCISES: CPT | Performed by: PHYSICAL THERAPIST

## 2024-07-11 PROCEDURE — 97530 THERAPEUTIC ACTIVITIES: CPT | Performed by: PHYSICAL THERAPIST

## 2024-07-11 NOTE — PROGRESS NOTES
"Daily Note     Today's date: 2024  Patient name: Bridger Clayton  : 1940  MRN: [id number]  Referring provider: Bridger James MD  Dx:   Encounter Diagnosis     ICD-10-CM    1. 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 Neoplastic malignant related fatigue  R53.0       3. Generalized weakness  R53.1                          Subjective: \"I was really tired and didn't feel well yesterday.  Today I'm a little better.\"    Objective: See treatment diary below      Assessment: Tolerated treatment fair. SLR strength continues to improve.  AA only given for last 2 repetitions on L today.  Patient continues to tolerate session and is appropriately challenged.  He demonstrates fatigue post-exercise.  Educated to continues with HEP and increasing walking at home.  He voices understanding.  Patient would benefit from continued PT      Plan: Continue per plan of care.       Precautions: active cancer with chemo every 3 weeks; FALL RISK, AAA, CHF, COPD, CAD, HTN, SYLVESTER, AAA    Insurance:  AMA/CMS Eval/ Re-eval POC expires FOTO Auth Status Co-Insurance                                                       2. 7/2   3. 7   4.   5.    6.      7.    8.    9.    10.    11.    12.      13.    14.    15.    16.  17.  18.      19.    20.   21.   22.   23.   24.      25.    26. 27. 28. 29. 30.   31. 32.  33. 34. 35. 36.          Daily Treatment Diary     Date of Service:          Manuals             Hamstring stretch                          Therapeutic Exercise             Bike   X5 min Nustep           Hamstring stretch seated                                           Neuro Re-ed             Quad sets  3\" x 20 3\" x 20 3\" x 20         SLR flexion   10x  2 x 10 AA for R LE 2 x 10 AROM         Bridges   10x           Clamshells    Yellow TB 2 x 10 red TB 2x10         Standing Hip abd/ext   1 x10 at walkers 1 x 10 at walker         Romberg EO ground/ foam             Seated HR/TR   2 x 10 2 x " 10                                   Therapeutic Activity             Step ups             Lateral step ups             STS   2 x 5 2x 5         Lateral walking             Gait w/ rollator   X200 ft X200 ft                                                                                                                 Modalities

## 2024-07-12 ENCOUNTER — HOSPITAL ENCOUNTER (OUTPATIENT)
Dept: INFUSION CENTER | Facility: HOSPITAL | Age: 84
End: 2024-07-12
Attending: INTERNAL MEDICINE
Payer: COMMERCIAL

## 2024-07-12 DIAGNOSIS — C34.92 SQUAMOUS CELL CARCINOMA OF LEFT LUNG (HCC): Primary | Chronic | ICD-10-CM

## 2024-07-12 DIAGNOSIS — C34.92 SQUAMOUS CELL CARCINOMA OF LEFT LUNG (HCC): ICD-10-CM

## 2024-07-12 LAB
ALBUMIN SERPL BCG-MCNC: 3.2 G/DL (ref 3.5–5)
ALP SERPL-CCNC: 99 U/L (ref 34–104)
ALT SERPL W P-5'-P-CCNC: 22 U/L (ref 7–52)
ANION GAP SERPL CALCULATED.3IONS-SCNC: 6 MMOL/L (ref 4–13)
AST SERPL W P-5'-P-CCNC: 23 U/L (ref 13–39)
BASOPHILS # BLD AUTO: 0.05 THOUSANDS/ÂΜL (ref 0–0.1)
BASOPHILS NFR BLD AUTO: 1 % (ref 0–1)
BILIRUB SERPL-MCNC: 0.27 MG/DL (ref 0.2–1)
BUN SERPL-MCNC: 15 MG/DL (ref 5–25)
CALCIUM ALBUM COR SERPL-MCNC: 9.4 MG/DL (ref 8.3–10.1)
CALCIUM SERPL-MCNC: 8.8 MG/DL (ref 8.4–10.2)
CHLORIDE SERPL-SCNC: 103 MMOL/L (ref 96–108)
CO2 SERPL-SCNC: 28 MMOL/L (ref 21–32)
CREAT SERPL-MCNC: 0.9 MG/DL (ref 0.6–1.3)
EOSINOPHIL # BLD AUTO: 0.82 THOUSAND/ÂΜL (ref 0–0.61)
EOSINOPHIL NFR BLD AUTO: 10 % (ref 0–6)
ERYTHROCYTE [DISTWIDTH] IN BLOOD BY AUTOMATED COUNT: 17.9 % (ref 11.6–15.1)
GFR SERPL CREATININE-BSD FRML MDRD: 79 ML/MIN/1.73SQ M
GLUCOSE SERPL-MCNC: 173 MG/DL (ref 65–140)
HCT VFR BLD AUTO: 40.4 % (ref 36.5–49.3)
HGB BLD-MCNC: 12.2 G/DL (ref 12–17)
IMM GRANULOCYTES # BLD AUTO: 0.05 THOUSAND/UL (ref 0–0.2)
IMM GRANULOCYTES NFR BLD AUTO: 1 % (ref 0–2)
LYMPHOCYTES # BLD AUTO: 0.97 THOUSANDS/ÂΜL (ref 0.6–4.47)
LYMPHOCYTES NFR BLD AUTO: 12 % (ref 14–44)
MCH RBC QN AUTO: 27.5 PG (ref 26.8–34.3)
MCHC RBC AUTO-ENTMCNC: 30.2 G/DL (ref 31.4–37.4)
MCV RBC AUTO: 91 FL (ref 82–98)
MONOCYTES # BLD AUTO: 0.61 THOUSAND/ÂΜL (ref 0.17–1.22)
MONOCYTES NFR BLD AUTO: 7 % (ref 4–12)
NEUTROPHILS # BLD AUTO: 5.97 THOUSANDS/ÂΜL (ref 1.85–7.62)
NEUTS SEG NFR BLD AUTO: 69 % (ref 43–75)
NRBC BLD AUTO-RTO: 0 /100 WBCS
PLATELET # BLD AUTO: 291 THOUSANDS/UL (ref 149–390)
PMV BLD AUTO: 9.5 FL (ref 8.9–12.7)
POTASSIUM SERPL-SCNC: 4.6 MMOL/L (ref 3.5–5.3)
PROT SERPL-MCNC: 6.2 G/DL (ref 6.4–8.4)
RBC # BLD AUTO: 4.43 MILLION/UL (ref 3.88–5.62)
SODIUM SERPL-SCNC: 137 MMOL/L (ref 135–147)
WBC # BLD AUTO: 8.47 THOUSAND/UL (ref 4.31–10.16)

## 2024-07-12 PROCEDURE — 80053 COMPREHEN METABOLIC PANEL: CPT | Performed by: INTERNAL MEDICINE

## 2024-07-12 PROCEDURE — 85025 COMPLETE CBC W/AUTO DIFF WBC: CPT | Performed by: INTERNAL MEDICINE

## 2024-07-12 NOTE — PROGRESS NOTES
Labs drawn via port  AVS provided  Confirmed next appt on 7/17 -   Advised pt that I could reach out to [facility] - to have a script called in for Emla cream since pt is having discomfort when access port.

## 2024-07-12 NOTE — PLAN OF CARE
Problem: Potential for Falls  Goal: Patient will remain free of falls  Description: INTERVENTIONS:  - Educate patient/family on patient safety including physical limitations  - Instruct patient to call for assistance with activity   - Consult OT/PT to assist with strengthening/mobility   - Keep Call bell within reach  - Keep bed low and locked with side rails adjusted as appropriate  - Keep care items and personal belongings within reach  - Initiate and maintain comfort rounds  - Make Fall Risk Sign visible to staff  -

## 2024-07-14 RX ORDER — LIDOCAINE AND PRILOCAINE 25; 25 MG/G; MG/G
CREAM TOPICAL
Qty: 30 G | Refills: 2 | Status: SHIPPED | OUTPATIENT
Start: 2024-07-14

## 2024-07-16 ENCOUNTER — HOSPITAL ENCOUNTER (OUTPATIENT)
Dept: INFUSION CENTER | Facility: HOSPITAL | Age: 84
Discharge: HOME | End: 2024-07-16
Attending: INTERNAL MEDICINE

## 2024-07-16 ENCOUNTER — TELEPHONE (OUTPATIENT)
Dept: HEMATOLOGY ONCOLOGY | Facility: MEDICAL CENTER | Age: 84
End: 2024-07-16

## 2024-07-16 ENCOUNTER — APPOINTMENT (OUTPATIENT)
Dept: PHYSICAL THERAPY | Facility: CLINIC | Age: 84
End: 2024-07-16
Payer: COMMERCIAL

## 2024-07-16 ENCOUNTER — OFFICE VISIT (OUTPATIENT)
Dept: HEMATOLOGY ONCOLOGY | Facility: MEDICAL CENTER | Age: 84
End: 2024-07-16
Payer: COMMERCIAL

## 2024-07-16 VITALS
WEIGHT: 210 LBS | DIASTOLIC BLOOD PRESSURE: 58 MMHG | RESPIRATION RATE: 17 BRPM | OXYGEN SATURATION: 96 % | SYSTOLIC BLOOD PRESSURE: 106 MMHG | TEMPERATURE: 98.3 F | HEIGHT: 70 IN | HEART RATE: 73 BPM | BODY MASS INDEX: 30.06 KG/M2

## 2024-07-16 DIAGNOSIS — G62.9 NEUROPATHY: ICD-10-CM

## 2024-07-16 DIAGNOSIS — C34.92 SQUAMOUS CELL CARCINOMA OF LEFT LUNG (HCC): Primary | ICD-10-CM

## 2024-07-16 PROCEDURE — 99214 OFFICE O/P EST MOD 30 MIN: CPT | Performed by: PHYSICIAN ASSISTANT

## 2024-07-16 NOTE — TELEPHONE ENCOUNTER
Called Taylor to inform her Lidocaine Cream would not be available at the infusion center, and that [facility] should go normal time, rather than early.  Indicated we would expedite this task tomorrow to guarantee issue resolved before following round.  Taylor verbalized understanding.

## 2024-07-16 NOTE — TELEPHONE ENCOUNTER
Left  for pt. He is scheduled to see Lucila on 8/13 at 2:40 pm. Left [facility] [phone] to confirm or rs appt.

## 2024-07-16 NOTE — PROGRESS NOTES
Bridger Clayton  1940  [facility]ATOLOG[facility] SHAI Garcia [facility] [id number]     DISCUSSION/SUMMARY:     84-year-old male with a number of medical and cardiac problems previously found to have a left lower lobe mass. Initial biopsy demonstrated squamous cell carcinoma.  IR recently performed thoracentesis, minimal amount of fluid was removed but there was no evidence of metastatic disease; cytology was negative.  Patient was seen by thoracic surgery and underwent mediastinoscopy.  There was no evidence of metastatic disease in the sampled lymph nodes (2R, 4R, 4L, 7).  Tumor was 4.5 cm.  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  Patient was treated with SBRT and then went on to surveillance.    Eventual follow-up PET/CT demonstrated evidence of recurrence in the left lower lobe, same area where the original malignancy was found.  There was no clear evidence of spread to either hilar regions or the mediastinum but radiologist commented on 1 right paratracheal lymph node but very +/-.  There was nonspecific FDG activity in the sacrum and left posterior iliac bone, seen before.  Unknown clinical significance.    At that time, patient also began to have more problems with recurrent left-sided pleural effusions requiring a Pleurx catheter.  Previously wife was draining approximately 500 cc every 2 to 3 days.  Discontinued while patient was started on pembrolizumab, did not get significantly better.  Additionally patient developed a rash while on pembrolizumab.  The ICI was eventually discontinued and patient was started on paclitaxel.  Patient has completed 9 cycles.    Recent blood work was okay/acceptable.  PleurX catheter has been removed.  Recent follow-up PET/CT demonstrated response to treatment, no evidence of disease progression.  This is obviously good.  The plan is to continue with the Taxol.  Unfortunately  patient is now having increasing issues with neuropathy.  I discussed with patient and his wife today that while neuropathy can occur from Taxol it can also be related to his diabetes.  He was recently placed on Neurontin by palliative care (prescribed on 6/28/24) with some improvement in symptoms.    Current Regimen  Paclitaxel 135 mg/m2 IV day 1 (was receiving 77% up until now)  Cycle length = 21 days  Goal = palliation and prolongation of life    I discussed exploring treatment change which patient does not want to do. He wants to continue with Taxol for now. I recommended he work on controlling his diabetes better with diet modifications, etc.    Plan: Continue Paclitaxel. Dose-reduce to 70% (of original dose) due to neuropathy. Continue gabapentin per Dr. Colon. We also discussed that he needs to manage his DM2 better as this is also likely contributing.    Mr. Clayton has been having more issues with IV access.  Patient agreed to port placement.  This was placed on 6/19/2024.    Patient is to return here in 3 to 4 weeks.    Carefully review your medication list and verify that the list is accurate and up-to-date. Please call the hematology/oncology office if there are medications missing from the list, medications on the list that you are not currently taking or if there is a dosage or instruction that is different from how you're taking that medication.     Patient goals and areas of care: Continue with paclitaxel with dose-reduction. Gabapentin per palliative care.  Barriers to care: none  Patient is able to self-care  ______________________________________________________________________________________         Chief Complaint   Patient presents with    Follow-up - recurrent non-small cell lung carcinoma      History of Present Illness:  84 year-old male with multiple medical and cardiac issue recently seen in the emergency room because of a cough.  Workup demonstrated a left lower lobe mass.  Biopsy  demonstrated squamous cell carcinoma.  Workup did not demonstrated evidence of metastatic disease; mediastinoscopy was negative.  Patient was seen by thoracic surgery but was not felt to be a surgical candidate.  Patient was subsequently seen by radiation oncology and underwent SPRT.  Patient then went on surveillance.      Eventual repeat scanning was consistent with recurrence at the original site.  Mr. Clayton required a left-sided Pleurx catheter placed because of recurrent left-sided pleural effusion.  Patient was initially treated with pembrolizumab only but developed a significant rash requiring steroids.  Because of the rash and the itchiness, patient did not wish to continue with the pembrolizumab; this was discontinued.  Options were discussed and patient began paclitaxel.  Patient has completed 9 cycles.    Interval History:  Presently Mr. Clayton states feeling okay.  He continues to deal with neuropathy. Better with gabapentin which he has been taking for only a few weeks now.    No respiratory issues, PleurX catheter has been removed.  Appetite is okay, weight is stable.  No fevers or signs of infection.  Activities are limited but the same as before.     Patient was diagnosed with prostate cancer (approximately 10 + years ago) and underwent seeds and external beam radiation.  No evidence of recurrence since that time.       Review of Systems   Constitutional:  Positive for fatigue.        Appetite is fair.  HENT: Negative.     Eyes: Negative.    Respiratory: Negative.     Cardiovascular: Negative.    Gastrointestinal: Negative.    Endocrine: Negative.    Genitourinary: Negative.    Musculoskeletal: Negative.    Skin: Negative.    Allergic/Immunologic: Negative.    Neurological: Neuropathy in fingertips+  Hematological: Negative.    Psychiatric/Behavioral:  Negative for self-injury.    All other systems reviewed and are negative.         Patient Active Problem List   Diagnosis    Corns    Pain in both  feet    Onychomycosis    Tinea pedis of both feet    Lung mass    Hyperlipidemia    Type 2 diabetes mellitus with diabetic neuropathy, without long-term current use of insulin (HCC)    Squamous cell carcinoma of lung (HCC)    Shortness of breath    Daytime hypersomnolence    Chronic diastolic heart failure (HCC)    SYLVESTER (obstructive sleep apnea)    Prostate cancer (HCC)    GERD (gastroesophageal reflux disease)    CAD (coronary artery disease)    Other persistent atrial fibrillation (HCC)    Alcohol dependence (HCC)    Acute respiratory failure with hypoxemia (HCC)    Depression, recurrent (HCC)    COVID-19    Angioedema    Septic shock (HCC)    Cellulitis    Cellulitis of chest wall    Acute kidney injury (BUDDY) with acute tubular necrosis (ATN) (HCC)    Chronic obstructive pulmonary disease, unspecified COPD type (HCC)    Centrilobular emphysema (HCC)    Abdominal aortic aneurysm (HCC)    Hypertensive heart disease    Tremors of nervous system    Recurrent pleural effusion on left      Medical History        Past Medical History:   Diagnosis Date    Abdominal pain      Anxiety      Arthritis      Asthma      Atrial fibrillation (HCC)      Back pain      Bleeding ulcer      Bronchitis      Cancer (HCC)       prostate 2011- radiation    Cardiac disease       cardiac stent x1    Cataract       starting    Chronic diastolic (congestive) heart failure (HCC)      Diabetes mellitus (HCC)       boarderline diabetic     GERD (gastroesophageal reflux disease)      History of radiation therapy 2010     Prostate seeds (brachytherapy) and EBRT    Hyperlipidemia      Hypertension      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, bilateral      Low back pain      Lung cancer (HCC)      Lung mass      Myocardial infarction (HCC)       mild 1999    Obesity      Prostate cancer (HCC)      Shortness of breath      Sleep apnea       sleep study 11/22    Wears dentures       full set    Wears glasses           Surgical History         Past Surgical  History:   Procedure Laterality Date    ABDOMINAL HERNIA REPAIR        ABDOMINAL SURGERY         bleeding ulcer, cyst removed from abd    COLONOSCOPY        CORONARY ANGIOPLASTY WITH STENT PLACEMENT   1999     x1     ESOPHAGOGASTRODUODENOSCOPY        IR BIOPSY LUNG   10/05/2021    IR THORACENTESIS   2021    IR THORACENTESIS   2023    IR THORACENTESIS   2023    KNEE SURGERY Left      NJ BRNCHSC INCL FLUOR GDNCE DX W/CELL WASHG SPX N/A 2021     Procedure: BRONCHOSCOPY FLEXIBLE;  Surgeon: Cachorro Jason MD;  Location: [facility];  Service: Thoracic    NJ MEDIASTINOSCOPY WITH LYMPH NODE BIOPSY/IES N/A 2021     Procedure: MEDIASTINOSCOPY;  Surgeon: Cachorro Jason MD;  Location: [facility];  Service: Thoracic    PROSTATE SURGERY             Family history: 3 sons in good general health, no known familial or genetic diseases     Social History               Socioeconomic History    Marital status: /Civil Union       Spouse name: Not on file    Number of children: Not on file    Years of education: Not on file    Highest education level: Not on file   Occupational History    Not on file   Tobacco Use    Smoking status: Former       Packs/day: 1.00       Years: 30.00       Total pack years: 30.00       Types: Cigarettes       Quit date:        Years since quittin.8    Smokeless tobacco: Never   Vaping Use    Vaping Use: Never used   Substance and Sexual Activity    Alcohol use: Yes       Alcohol/week: 4.0 - 6.0 standard drinks of alcohol       Types: 1 Glasses of wine, 3 - 5 Cans of beer per week       Comment: few beers day    Drug use: Never    Sexual activity: Not on file   Other Topics Concern    Not on file   Social History Narrative    Not on file      Social Determinants of Health           Financial Resource Strain: Medium Risk (2023)     Overall Financial Resource Strain (CARDIA)      Difficulty of Paying Living Expenses: Somewhat hard   Food Insecurity: No Food  Insecurity (12/19/2022)     Hunger Vital Sign      Worried About Running Out of Food in the Last Year: Never true      Ran Out of Food in the Last Year: Never true   Transportation Needs: No Transportation Needs (8/1/2023)     PRAPARE - Transportation      Lack of Transportation (Medical): No      Lack of Transportation (Non-Medical): No   Physical Activity: Not on file   Stress: Not on file   Social Connections: Not on file   Intimate Partner Violence: Not on file   Housing Stability: Low Risk  (12/19/2022)     Housing Stability Vital Sign      Unable to Pay for Housing in the Last Year: No      Number of Places Lived in the Last Year: 1      Unstable Housing in the Last Year: No      Social history:  40 pack-year history discontinued 20 years ago, patient drinks 2-4 beers a day off and on, no drug abuse, 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)     Current Outpatient Medications:     acetaminophen (TYLENOL) 325 mg tablet, Take 2 tablets (650 mg total) by mouth every 6 (six) hours as needed for mild pain, headaches or fever, Disp: 30 tablet, Rfl: 0    albuterol (2.5 mg/3 mL) 0.083 % nebulizer solution, Take 2.5 mg by nebulization As needed per patient's wife , Disp: , Rfl:     apixaban (Eliquis) 5 mg, Take 1 tablet (5 mg total) by mouth 2 (two) times a day, Disp: 180 tablet, Rfl: 1    atenolol (TENORMIN) 25 mg tablet, , Disp: , Rfl:     atorvastatin (LIPITOR) 10 mg tablet, Take 1 tablet (10 mg total) by mouth daily, Disp: 90 tablet, Rfl: 1    carvedilol (COREG) 25 mg tablet, Take 1 tablet (25 mg total) by mouth 2 (two) times a day with meals, Disp: 180 tablet, Rfl: 1    ciclopirox (LOPROX) 0.77 % cream, Apply topically 2 (two) times a day for 20 days, Disp: 45 g, Rfl: 2    fluticasone-umeclidinium-vilanterol (Trelegy Ellipta) 100-62.5-25 mcg/actuation inhaler, Rinse mouth after use., Disp: 60 each, Rfl: 5    guaifenesin-codeine (GUAIFENESIN AC) 100-10 MG/5ML liquid, Take 10 mL by mouth 3  (three) times a day as needed for cough, Disp: 180 mL, Rfl: 0    halobetasol (ULTRAVATE) 0.05 % cream, , Disp: , Rfl:     hydrocortisone 2.5 % cream, Apply topically 2 (two) times a day Apply twice a day affected area the trunk, Disp: 20 g, Rfl: 2    latanoprost (XALATAN) 0.005 % ophthalmic solution, Administer 0.005 mL to both eyes daily at bedtime, Disp: , Rfl:     metFORMIN (GLUCOPHAGE) 500 mg tablet, Take 1 tablet (500 mg total) by mouth daily with breakfast, Disp: , Rfl:     mirtazapine (REMERON) 7.5 MG tablet, Take 1 tablet (7.5 mg total) by mouth daily at bedtime, Disp: 90 tablet, Rfl: 3    Multiple Vitamin (MULTI-VITAMIN DAILY PO), Take by mouth daily  , Disp: , Rfl:     omeprazole (PriLOSEC) 20 mg delayed release capsule, TAKE 1 CAPSULE DAILY, Disp: 90 capsule, Rfl: 1    primidone (MYSOLINE) 50 mg tablet, TAKE 2 TABS AT 8PM., Disp: 180 tablet, Rfl: 1    spironolactone (ALDACTONE) 25 mg tablet, Take 1 tablet (25 mg total) by mouth daily, Disp: 90 tablet, Rfl: 1    aspirin (ECOTRIN LOW STRENGTH) 81 mg EC tablet, , Disp: , Rfl:      No Known Allergies         Vitals:     10/20/23 0926   BP: 114/68   Pulse: 80   Resp: 17   Temp: 97.9 °F (36.6 °C)   SpO2: 92%     Physical Exam  Constitutional:       Appearance: He is well-developed.      Comments: Obese male, no respiratory distress, no signs of pain   HENT:      Head: Normocephalic and atraumatic.      Right Ear: External ear normal.      Left Ear: External ear normal.   Eyes:      Conjunctiva/sclera: Conjunctivae normal.      Pupils: Pupils are equal, round, and reactive to light.   Cardiovascular:      Rate and Rhythm: Normal rate and regular rhythm.      Heart sounds: Normal heart sounds.   Pulmonary:      Effort: Pulmonary effort is normal.      Comments: Decreased breath sounds left lower lobe  Abdominal:      Palpations: Abdomen is soft.      Comments: Obese, nontender.  Musculoskeletal:         General: Normal range of motion.      Cervical back: Normal  range of motion and neck supple.   Skin:     General: Skin is warm.      Comments: Relatively good color, warm, moist, scattered mild areas of ecchymosis   Neurological:      Mental Status: He is alert and oriented to person, place, and time.   Psychiatric:         Behavior: Behavior normal.         Thought Content: Thought content normal.         Judgment: Judgment normal.      Extremities:  No lower extremity edema bilaterally.     Labs  7/12/2024 WBC 8.47, hemoglobin 12.2, platelets 291, potassium 4.6, BUN 15, creatinine 0.9, AST 23, ALT 22, alk phos 99.    6/21/2024 WBC 11.45, hemoglobin 13.6, platelets 315, potassium 4.2, BUN 13, creatinine 0.92, AST 27, ALT 20, alk phos 130.    5/23/2024 WBC = 5.67 hemoglobin = 12.7 hematocrit = 41.6 platelet = 43 neutrophil = 45% BUN = 12 creatinine = 0.91 calcium = 9.1 LFTs WNL    4/11/2024 WBC = 6.79 hemoglobin = 11.8 hematocrit = 39.4 platelet = 527 neutrophil = 47% BUN = 10 creatinine = 0.88 calcium = 8.9 alkaline phosphatase = 130, other LFTs WNL    Imaging    6/6/2024 PET/CT    IMPRESSION:    1. Necrotic left lower lung mass demonstrates persistent but diminished predominantly peripheral FDG activity. This may represent partial response to therapy, though residual viable tumor may still remain. There is new air within this necrotic mass, for   which infection is not excluded. Correlate clinically.  2. Decreased but persistent small left pleural effusion. There is associated FDG activity which may be inflammatory/infectious. Malignant effusion is not excluded.  3. Healing right anterior rib fractures. Nonspecific but stable asymmetric focus of activity in the right sacrum.  4. Otherwise no new hypermetabolic metastases.    10/30/2023 PET/CT    CHEST:  Best seen on image 76 of series 4 is a hypermetabolic centrally photopenic left lower lobe lung mass measuring 7.1 x 6.4 cm with max SUV of 12.0, previously measuring 4.5 x 4.0 cm with max SUV of 19.7 on PET/CT dated  10/29/2021 and approximately 4.3 x 3.7 cm when utilizing similar measurement technique and CT of chest dated 12/23/2022. The interval growth in size since 12/23/2022 suggest progression of hypermetabolic malignancy.     Subtle FDG activity is noted with a stable in size prominent right paratracheal lymph node on image 58 of series 4 measuring 1 1.4 cm in short axis with max SUV of 2.8, previously 3.5. The stability favors benign reactive lymph node with early pamela metastatic disease considered less likely    IMPRESSION:     1. Hypermetabolic left lower lobe lung malignancy has significantly increased in size since 12/23/2022 suggesting progression of malignancy.  2. Again noted is nonspecific FDG activity involving the sacrum and left posterior iliac bone without definitive correlate on low-dose unenhanced CT of uncertain clinical significance. Findings can be further characterized with MRI of the bony pelvis as clinically indicated.  3. Moderate left pleural effusion.     9/28/23: CT Chest w contrast:     Impression: Since September 12, 2023, decrease in size of the still large left pleural effusion with commensurate decrease in left-sided passive atelectasis. Persistent hypoattenuation in the collapsed portion of the left lower lobe. Given that this is in the   vicinity of the treated tumor, and has increased in size since December 2022, short interval contrast-enhanced CT is advised following resolution of the pleural effusion for further assessment.     1/27/2023 CT chest without contrast    IMPRESSION:  1.  Slightly decreased left pleural effusion with improved passive atelectasis of the left lower lobe. Previously noted left lower lobe mass is difficult to visualize on noncontrast exam but appears likely unchanged from most recent study.  2.  Similar appearance of skin thickening and subcutaneous stranding in the right axillary region which could represent cellulitis.       05/04/2022 chest x-ray portable.   Impression stated left small effusion and parenchymal process appears unchanged.     05/02/2022 CT scan the chest without contrast     IMPRESSION:  1.  Increase in size of a left lower lobe patchy opacity with new right basilar patchy densities concerning for pulmonary infiltrates with additional patchy and reticulonodular changes lingular segment left upper lobe and right middle lobe also likely related to infectious process.  2.  Left lower lobe cavitary mass has decreased in size now measuring 3.3 x 1.9 cm, previously 4.1 x 2.2 cm.  3.  Stable COPD and pulmonary fibrosis with the latter likely related to radiation change.     11/19/2021 MRI brain.  Impression stated white matter changes suggestive of chronic microangiopathy.  No acute intracranial pathology.     10/29/2021 PET-CT     1.  Hypermetabolic necrotic 4.5 x 4 cm left lower lung mass compatible with known malignancy.  2.  2 mildly hypermetabolic right paratracheal mediastinal nodes for which early metastases are not excluded.  3.  Small mildly hypermetabolic left pleural effusion for which malignant effusion is not excluded.  4.  Additional scattered tiny nodular lung densities may be reassessed on follow-up CT.  5.  Probable reactive subcentimeter right cervical node may be reassessed on follow-up PET/CT.  6.  No hypermetabolic soft tissue metastases in the abdomen, pelvis.  7.  Minimal inhomogeneous FDG activity in the sacrum and left posterior iliac, though without dominant focal lesion.  This may be reassessed on follow-up exam.     Pathology    12/7/2023 [facility].  No action mutations were detected.  Tumor mutational burden = 6 = low.  Patient had a pathologic variant in PTEN and TP53.  PDL IHC TPS = 0%, negative.     Case Report   Surgical Pathology Report                         Case: [id number]                                    Authorizing Provider:  Cachorro Jason MD       Collected:           11/29/2021 1005               Ordering Location:      [facility]      Received:            11/29/2021 [address] [place] [address] Operating Room                                                       Pathologist:           Juan José Dietz MD                                                         Specimens:   A) - Lymph Node, level 7                                                                             B) - Lymph Node, level 4R                                                                            C) - Lymph Node, level 2R                                                                            D) - Lymph Node, level 4L                                                                   Final Diagnosis   A. Lymph node, level 7, excision:  -  Portions of benign lymph node with reactive change and anthracosis, negative for granulomas, lymphoma, or metastatic carcinoma.    B. Lymph node, level 4R, excision:  -  Portions of benign lymph node with reactive change and anthracosis, negative for granulomas, lymphoma, or metastatic carcinoma.    C. Lymph node, level 2R, excision:  -  Portions of benign lymph node with reactive change and anthracosis, negative for granulomas, lymphoma, or metastatic carcinoma.    D. Lymph node, level 4L, excision:  -  Benign lymph node with reactive change and anthracosis, negative for granulomas, lymphoma, or metastatic carcinoma.      Electronically signed by Juan José Dietz MD on 12/2/2021 at 11:29 AM         Case Report   Surgical Pathology Report                         Case: [id number]                                    Authorizing Provider:  Elmer Laird MD      Collected:           10/05/2021 1058               Ordering Location:     [facility] Received:            10/05/2021 1120                                      CAT Scan                                                                      Pathologist:           Mary Dean MD                                                          Specimen:    Lung, Left Lower Lobe                                                                       Final Diagnosis   A. Lung, Left Lower Lobe, :  - Invasive squamous carcinoma, moderately differentiated, keratinizing type.  - Tumor is highlighted with P 40 immunoperoxidase stain.  - Prominent tumor necrosis is noted.      Best representative block with tumor A1.  This case was reviewed at the intradepartmental  conference.   RADHA Navarrete, Pulmonology, is notified of the diagnosis in [facility] via [employer] on 10/06/2021  at 2.40 pm.   Electronically signed by Mary Dean MD on 10/6/2021 at  2:44 PM

## 2024-07-17 ENCOUNTER — HOSPITAL ENCOUNTER (OUTPATIENT)
Dept: INFUSION CENTER | Facility: HOSPITAL | Age: 84
Discharge: HOME | End: 2024-07-17
Attending: INTERNAL MEDICINE
Payer: COMMERCIAL

## 2024-07-17 VITALS
WEIGHT: 208.78 LBS | HEART RATE: 81 BPM | TEMPERATURE: 98.4 F | OXYGEN SATURATION: 98 % | HEIGHT: 70 IN | SYSTOLIC BLOOD PRESSURE: 156 MMHG | DIASTOLIC BLOOD PRESSURE: 72 MMHG | BODY MASS INDEX: 29.89 KG/M2 | RESPIRATION RATE: 18 BRPM

## 2024-07-17 DIAGNOSIS — C34.92 SQUAMOUS CELL CARCINOMA OF LEFT LUNG (HCC): Primary | ICD-10-CM

## 2024-07-17 PROCEDURE — 96367 TX/PROPH/DG ADDL SEQ IV INF: CPT

## 2024-07-17 PROCEDURE — 96375 TX/PRO/DX INJ NEW DRUG ADDON: CPT

## 2024-07-17 PROCEDURE — 96415 CHEMO IV INFUSION ADDL HR: CPT

## 2024-07-17 PROCEDURE — 96413 CHEMO IV INFUSION 1 HR: CPT

## 2024-07-17 RX ORDER — SODIUM CHLORIDE 9 MG/ML
20 INJECTION, SOLUTION INTRAVENOUS ONCE
Status: COMPLETED | OUTPATIENT
Start: 2024-07-17 | End: 2024-07-17

## 2024-07-17 RX ORDER — SODIUM CHLORIDE 9 MG/ML
20 INJECTION, SOLUTION INTRAVENOUS ONCE
Status: CANCELLED | OUTPATIENT
Start: 2024-07-17

## 2024-07-17 RX ADMIN — DIPHENHYDRAMINE HYDROCHLORIDE 25 MG: 50 INJECTION, SOLUTION INTRAMUSCULAR; INTRAVENOUS at 10:54

## 2024-07-17 RX ADMIN — PACLITAXEL 256.2 MG: 6 INJECTION, SOLUTION, CONCENTRATE INTRAVENOUS at 11:15

## 2024-07-17 RX ADMIN — FAMOTIDINE 20 MG: 10 INJECTION, SOLUTION INTRAVENOUS at 10:06

## 2024-07-17 RX ADMIN — SODIUM CHLORIDE 20 ML/HR: 0.9 INJECTION, SOLUTION INTRAVENOUS at 10:06

## 2024-07-17 RX ADMIN — DEXAMETHASONE SODIUM PHOSPHATE: 10 INJECTION, SOLUTION INTRAMUSCULAR; INTRAVENOUS at 10:25

## 2024-07-17 NOTE — PLAN OF CARE
Problem: Potential for Falls  Goal: Patient will remain free of falls  Description: INTERVENTIONS:  - Educate patient/family on patient safety including physical limitations  - Instruct patient to call for assistance with activity   - Keep Call bell within reach  - Keep care items and personal belongings within reach  Outcome: Progressing     
Rd screen 2/2 intubation

## 2024-07-17 NOTE — PROGRESS NOTES
Bridger Clayton  tolerated treatment well with no complications.      Bridger Clayton is aware of future appt on 8/2/24 at 1430.     AVS printed and given to Bridger Clayton: Yes.

## 2024-07-18 ENCOUNTER — APPOINTMENT (OUTPATIENT)
Dept: PHYSICAL THERAPY | Facility: CLINIC | Age: 84
End: 2024-07-18
Payer: COMMERCIAL

## 2024-07-18 NOTE — TELEPHONE ENCOUNTER
Key: [id number] Attempt to complete PA for Lidocaine Prilocaine however, error accord during application  in Cover My Meds and will require call being place to insurance on back of card. This writer will continue to follow.     7/19/2024 this writer called pharmacy and it was approved 10 copay. No other needs.

## 2024-07-25 ENCOUNTER — OFFICE VISIT (OUTPATIENT)
Dept: PULMONOLOGY | Facility: MEDICAL CENTER | Age: 84
End: 2024-07-25
Payer: COMMERCIAL

## 2024-07-25 ENCOUNTER — APPOINTMENT (OUTPATIENT)
Dept: PHYSICAL THERAPY | Facility: CLINIC | Age: 84
End: 2024-07-25
Payer: COMMERCIAL

## 2024-07-25 ENCOUNTER — TELEPHONE (OUTPATIENT)
Dept: HEMATOLOGY ONCOLOGY | Facility: MEDICAL CENTER | Age: 84
End: 2024-07-25

## 2024-07-25 VITALS
OXYGEN SATURATION: 95 % | BODY MASS INDEX: 29.92 KG/M2 | SYSTOLIC BLOOD PRESSURE: 120 MMHG | RESPIRATION RATE: 16 BRPM | WEIGHT: 209 LBS | HEIGHT: 70 IN | DIASTOLIC BLOOD PRESSURE: 60 MMHG | HEART RATE: 68 BPM | TEMPERATURE: 98.7 F

## 2024-07-25 DIAGNOSIS — C34.92 SQUAMOUS CELL CARCINOMA OF LEFT LUNG (HCC): ICD-10-CM

## 2024-07-25 DIAGNOSIS — J90 PLEURAL EFFUSION: ICD-10-CM

## 2024-07-25 DIAGNOSIS — J43.2 CENTRILOBULAR EMPHYSEMA (HCC): ICD-10-CM

## 2024-07-25 DIAGNOSIS — R91.8 LUNG MASS: Primary | ICD-10-CM

## 2024-07-25 PROCEDURE — 99213 OFFICE O/P EST LOW 20 MIN: CPT | Performed by: STUDENT IN AN ORGANIZED HEALTH CARE EDUCATION/TRAINING PROGRAM

## 2024-07-25 PROCEDURE — G2211 COMPLEX E/M VISIT ADD ON: HCPCS | Performed by: STUDENT IN AN ORGANIZED HEALTH CARE EDUCATION/TRAINING PROGRAM

## 2024-07-25 NOTE — PROGRESS NOTES
Consultation - Pulmonary Medicine   Bridger Clayton 84 y.o. male MRN: [id number]      Reason for Consult: Pleural effusion    Bridger Clayton is a 84 y.o. male with a PMH of past medical history of HTN, Afib, CHF, Lung SCC(Radiation - recurrent effusion undiagnosed- lymph predominat), DM, HLD, CAD  and recurrent pleural effusion here for follow up     Pleural Effusion - Negative for malignancy - Exudative - Ascept Catheter previously now removed -PET scan shows minimal loculated fluid. No expansion since catheter was removed. Overall improved.    SCC - Lung Stage T2b - PET scan shows less avidity of Mass. No infectious symptoms, likely necrosis/cavitation is secondary to the malignancy rather than infectious at this moment. Will continue to monitor    Rodrigo Sal MD  [facility]    _____________________________________________________________________  Interval Hx 07/25/24:   ASEPT removed  Breathing doing well  Occasional cough - some sputum production  Denies other infectious symptoms      Interval Hs 4/22/24:  Overall doing ok  SOB with minimal exertion- deconditioned  Drainage slowed down - roughly 1 month  Minimal improvement wit Pembrolizumab - Continue Taxol only - showing better response       Interval Hx 1/23/28:  Difficulty with draining, clogged  Every other day drainage - 500-550ml  ET: Dyspnea 1 flight  No infectious symptoms - denies cough  Chemo regimen Pembrolizumab, Taxol       Interval Hx: 12/18/23  Symptoms overall improve, decreased shortness of breath  Tolerated Asept Catheter Placement -Place 10/16/23 - Drainage 550ml q week  No chest pain, catheter C/D/I   No infectious symptoms  Diffuse pruritus  Started chemotherapy      HPI:    Bridger Clayton is a 84 y.o. male with a PMH of past medical history of HTN, Afib, CHF, Lung SCC(Radiation - recurrent effusion undiagnosed- lymph predominat), DM, HLD, CAD sent in by radiation oncologist as CAT scan done as an outpatient  showed recurrent left pleural effusion - unchanged     PFT results:  The most recent pulmonary function tests were reviewed.  10/25/21  FEV1/FVC Ratio: 81 %  FEV1: 1.75 L     58 % predicted  Forced Vital Capacity: 2.15 L    52 % predicted  After administration of bronchodilator:  There is improvement in both FEV1 and FVC     Lung volumes: Total Lung Capacity 66 % predicted Residual volume 70 % predicted     DLCO NOT corrected for patients hemoglobin level: 54 % predicted     Flow volume loop:  Consistent with obstruction     Interpretation:     Spirometry demonstrates moderate restriction  There is improvement after administration of bronchodilator  Lung volumes show moderate restriction  Moderate diffusion impairment       Imaging:  I personally reviewed the images on the PAC system pertinent to today's visit  PET Scan  IMPRESSION:     1. Hypermetabolic left lower lobe lung malignancy has significantly increased in size since 12/23/2022 suggesting progression of malignancy.     2. Again noted is nonspecific FDG activity involving the sacrum and left posterior iliac bone without definitive correlate on low-dose unenhanced CT of uncertain clinical significance. Findings can be further characterized with MRI of the bony pelvis as   clinically indicated.     3. Moderate left pleural effusion.     CXR  IMPRESSION:     Large left pleural effusion and percutaneous chest tube in place.      PET 6/6/24  1. Necrotic left lower lung mass demonstrates persistent but diminished predominantly peripheral FDG activity. This may represent partial response to therapy, though residual viable tumor may still remain. There is new air within this necrotic mass, for   which infection is not excluded. Correlate clinically.  2. Decreased but persistent small left pleural effusion. There is associated FDG activity which may be inflammatory/infectious. Malignant effusion is not excluded.  3. Healing right anterior rib fractures. Nonspecific  but stable asymmetric focus of activity in the right sacrum.  4. Otherwise no new hypermetabolic metastases.           Review of Systems:  Aside from what is mentioned in the HPI, the review of systems otherwise negative.    Immunization History   Administered Date(s) Administered    COVID-19 MODERNA VACC 0.25 ML IM BOOSTER 11/05/2021    COVID-19 MODERNA VACC 0.5 ML IM 02/26/2021, 03/26/2021    Influenza Quadrivalent 3 years and older 10/21/2021    Influenza Split High Dose Preservative Free IM 12/03/2014, 09/30/2015, 10/26/2016, 11/08/2017    Influenza, high dose seasonal 0.7 mL 11/28/2022, 11/21/2023    Tdap 01/07/2021    influenza, injectable, quadrivalent 11/04/2019, 10/20/2020        Current Medications:    Current Outpatient Medications:     acetaminophen (TYLENOL) 325 mg tablet, Take 2 tablets (650 mg total) by mouth every 6 (six) hours as needed for mild pain, headaches or fever, Disp: 30 tablet, Rfl: 0    apixaban (Eliquis) 5 mg, Take 1 tablet (5 mg total) by mouth 2 (two) times a day (Patient taking differently: Take 2.5 mg by mouth 2 (two) times a day), Disp: 180 tablet, Rfl: 0    atenolol (TENORMIN) 25 mg tablet, , Disp: , Rfl:     atorvastatin (LIPITOR) 10 mg tablet, TAKE 1 TABLET (10 MG TOTAL) BY MOUTH DAILY, Disp: 90 tablet, Rfl: 1    carvedilol (COREG) 25 mg tablet, Take 1 tablet (25 mg total) by mouth 2 (two) times a day with meals, Disp: 180 tablet, Rfl: 1    ciclopirox (LOPROX) 0.77 % cream, Apply topically 2 (two) times a day for 20 days, Disp: 45 g, Rfl: 2    gabapentin (Neurontin) 300 mg capsule, Take 1 capsule (300 mg total) by mouth 2 (two) times a day, Disp: 60 capsule, Rfl: 2    guaifenesin-codeine (GUAIFENESIN AC) 100-10 MG/5ML liquid, Take 10 mL by mouth 3 (three) times a day as needed for cough, Disp: 473 mL, Rfl: 0    halobetasol (ULTRAVATE) 0.05 % cream, , Disp: , Rfl:     hydrocortisone 2.5 % cream, Apply topically 2 (two) times a day Apply twice a day affected area the trunk, Disp:  20 g, Rfl: 2    hydrOXYzine HCL (ATARAX) 50 mg tablet, Take 1 tablet (50 mg total) by mouth daily at bedtime, Disp: 90 tablet, Rfl: 1    latanoprost (XALATAN) 0.005 % ophthalmic solution, Administer 0.005 mL to both eyes daily at bedtime, Disp: , Rfl:     lidocaine-prilocaine (EMLA) cream, Apply to PAC site one hour prior to access, Disp: 30 g, Rfl: 2    metFORMIN (GLUCOPHAGE) 500 mg tablet, Take 1 tablet (500 mg total) by mouth 2 (two) times a day with meals, Disp: 180 tablet, Rfl: 1    mirtazapine (REMERON) 7.5 MG tablet, Take 1 tablet (7.5 mg total) by mouth daily at bedtime, Disp: 90 tablet, Rfl: 3    Multiple Vitamin (MULTI-VITAMIN DAILY PO), Take by mouth daily  , Disp: , Rfl:     omeprazole (PriLOSEC) 20 mg delayed release capsule, Take 1 capsule (20 mg total) by mouth daily, Disp: 90 capsule, Rfl: 1    ondansetron (ZOFRAN) 8 mg tablet, Take 1 tablet (8 mg total) by mouth every 8 (eight) hours as needed for nausea or vomiting, Disp: 20 tablet, Rfl: 2    primidone (MYSOLINE) 50 mg tablet, TAKE 2 TABS AT 8PM., Disp: 180 tablet, Rfl: 1    triamcinolone (KENALOG) 0.1 % cream, , Disp: , Rfl:     albuterol (2.5 mg/3 mL) 0.083 % nebulizer solution, Take 2.5 mg by nebulization As needed per patient's wife (Patient not taking: Reported on 7/25/2024), Disp: , Rfl:     aspirin (ECOTRIN LOW STRENGTH) 81 mg EC tablet, , Disp: , Rfl:     fluticasone-umeclidinium-vilanterol (Trelegy Ellipta) 100-62.5-25 mcg/actuation inhaler, Rinse mouth after use. (Patient not taking: Reported on 7/25/2024), Disp: 60 each, Rfl: 5    Historical Information   Past Medical History:   Diagnosis Date    Abdominal pain     Alcohol dependence (HCC) 05/01/2022    Anxiety     Arthritis     Asthma     Atrial fibrillation (HCC)     Back pain     Bleeding ulcer     Bronchitis     Cancer (HCC)     prostate 2011- radiation    Cardiac disease     cardiac stent x1    Cataract     starting    Chronic diastolic (congestive) heart failure (HCC)     Diabetes  "mellitus (HCC)     boarderline diabetic     GERD (gastroesophageal reflux disease)     History of radiation therapy     Prostate seeds (brachytherapy) and EBRT    Hyperlipidemia     Hypertension     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, bilateral     Low back pain     Lung cancer (HCC)     Lung mass     Myocardial infarction (HCC)     mild     Obesity     Prostate cancer (HCC)     Shortness of breath     Sleep apnea     sleep study     Wears dentures     full set    Wears glasses      Past Surgical History:   Procedure Laterality Date    ABDOMINAL HERNIA REPAIR      ABDOMINAL SURGERY      bleeding ulcer, cyst removed from abd    COLONOSCOPY      CORONARY ANGIOPLASTY WITH STENT PLACEMENT  1999    x1     ESOPHAGOGASTRODUODENOSCOPY      IR BIOPSY LUNG  10/05/2021    IR PORT PLACEMENT  2024    IR THORACENTESIS  2021    IR THORACENTESIS  2023    IR THORACENTESIS  2023    KNEE SURGERY Left     NV BRNCHSC INCL FLUOR GDNCE DX W/CELL WASHG SPX N/A 2021    Procedure: BRONCHOSCOPY FLEXIBLE;  Surgeon: Cachorro Jason MD;  Location: [facility];  Service: Thoracic    NV MEDIASTINOSCOPY WITH LYMPH NODE BIOPSY/IES N/A 2021    Procedure: MEDIASTINOSCOPY;  Surgeon: Cachorro Jason MD;  Location: [facility];  Service: Thoracic    PROSTATE SURGERY       Social History   Social History     Tobacco Use   Smoking Status Former    Current packs/day: 0.00    Average packs/day: 1 pack/day for 30.0 years (30.0 ttl pk-yrs)    Types: Cigarettes    Start date:     Quit date:     Years since quittin.5   Smokeless Tobacco Never       Family History:   Family History   Problem Relation Age of Onset    Prostate cancer Brother     Cancer Maternal Uncle         colo rectal cancer    Cancer Paternal Aunt          PhysicalExamination:  Vitals:   /60 (BP Location: Left arm, Patient Position: Sitting, Cuff Size: Standard)   Pulse 68   Temp 98.7 °F (37.1 °C) (Temporal)   Resp 16   Ht 5' 10\" " "(1.778 m)   Wt 94.8 kg (209 lb)   SpO2 95%   BMI 29.99 kg/m²     Appearance -- NAD, speaking full sentences  HEENT -- anicteric sclera, clear OP, MMM  Neck -- no JVD  Heart -- RRR, no murmurs  Lungs -- CTAB  Abdomen -- soft, NTND, +bs  Extremities -- WWP, no LE edema  Skin -- Sun exposed rash, excoriation, Drain site health  Neuro -- A&Ox3, wnl  Psych -- no obvious depression or hallucination        Diagnostic Data:  Labs:  I personally reviewed the most recent laboratory data pertinent to today's visit    Lab Results   Component Value Date    WBC 8.47 07/12/2024    HGB 12.2 07/12/2024    HCT 40.4 07/12/2024    MCV 91 07/12/2024     07/12/2024     Lab Results   Component Value Date    CALCIUM 8.8 07/12/2024    K 4.6 07/12/2024    CO2 28 07/12/2024     07/12/2024    BUN 15 07/12/2024    CREATININE 0.90 07/12/2024     No results found for: \"IGE\"  Lab Results   Component Value Date    ALT 22 07/12/2024    AST 23 07/12/2024    ALKPHOS 99 07/12/2024           I have spent a total time of 20 minutes on 07/25/24 in caring for this patient including Diagnostic results, Prognosis, Risks and benefits of tx options, Instructions for management, Patient and family education, Importance of tx compliance, Risk factor reductions, Impressions, Counseling / Coordination of care, Documenting in the medical record, Reviewing / ordering tests, medicine, procedures  , Obtaining or reviewing history  , and Communicating with other healthcare professionals .   _        "

## 2024-07-25 NOTE — TELEPHONE ENCOUNTER
Patient's wife Taylor came in asking about two concerns:    1) No update on EMLA cream, I indicated I would reach out to the pharmacy and call her back.    2) Patient Spenser is scheduled for catheter maintenance on 8/02 at 2:30pm and an appointment with palliative care at 3:30pm.  Patient would be unable to make palliative appointment on time, requested to have maintenance moved up even half an hour to provide sufficient time to reach palliative office.  Understanding verbalized, and I indicated I would contact infusion scheduling and confirm details of EMLA lidocaine cream.

## 2024-07-29 ENCOUNTER — TELEPHONE (OUTPATIENT)
Dept: HEMATOLOGY ONCOLOGY | Facility: CLINIC | Age: 84
End: 2024-07-29

## 2024-07-29 NOTE — TELEPHONE ENCOUNTER
Left voice mail indicating that EMLA lidocaine cream prescription was filled by pharmacy, and that script is available for pickup with $10 copay.    Additionally, indicated appointment change was relayed to infusion scheduling pool for changing, and that message would be resent. Left [facility] callback number [phone].

## 2024-07-30 ENCOUNTER — OFFICE VISIT (OUTPATIENT)
Dept: PHYSICAL THERAPY | Facility: CLINIC | Age: 84
End: 2024-07-30
Payer: COMMERCIAL

## 2024-07-30 DIAGNOSIS — C34.92 SQUAMOUS CELL CARCINOMA OF LEFT LUNG (HCC): Primary | ICD-10-CM

## 2024-07-30 DIAGNOSIS — R53.1 GENERALIZED WEAKNESS: ICD-10-CM

## 2024-07-30 DIAGNOSIS — R53.0 NEOPLASTIC MALIGNANT RELATED FATIGUE: ICD-10-CM

## 2024-07-30 PROCEDURE — 97110 THERAPEUTIC EXERCISES: CPT | Performed by: PHYSICAL THERAPIST

## 2024-07-30 PROCEDURE — 97530 THERAPEUTIC ACTIVITIES: CPT | Performed by: PHYSICAL THERAPIST

## 2024-07-30 NOTE — PROGRESS NOTES
"Daily Note     Today's date: 2024  Patient name: Bridger Clayton  : 1940  MRN: [id number]  Referring provider: Bridger James MD  Dx:   Encounter Diagnosis     ICD-10-CM    1. 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 Neoplastic malignant related fatigue  R53.0       3. Generalized weakness  R53.1                            Subjective: \"I am still tired from my chemo.  I don't even remember how long ago it was.\"      Objective: See treatment diary below      Assessment: Tolerated treatment fair.  Patient is visible fatigued today during his session.  He requires increased rest between exercise with more SOB than he has had in previous sessions.  He continues to demonstrate improved LE strength despite quicker fatigue.  We discussed how chemo affects blood counts.  Patient would benefit from continued PT      Plan: Continue per plan of care.       Precautions: active cancer with chemo every 3 weeks; FALL RISK, AAA, CHF, COPD, CAD, HTN, SYLVESTER, AAA    Insurance:  AMA/CMS Eval/ Re-eval POC expires FOTO Auth Status Co-Insurance                                                       2. 7/2   3. 7   4.   5.    6.      7.    8.    9.    10.    11.    12.      13.    14.    15.    16.  17.  18.      19.    20.   21.   22.   23.   24.      25.    26. 27. 28. 29. 30.   31. 32.  33. 34. 35. 36.          Daily Treatment Diary     Date of Service:         Manuals             Hamstring stretch                          Therapeutic Exercise             Bike   X5 min Nustep           Hamstring stretch seated                                           Neuro Re-ed             Quad sets  3\" x 20 3\" x 20 3\" x 20 5\"x 20        SLR flexion   10x  2 x 10 AA for R LE 2 x 10 AROM 2 x 10 AROM        Bridges   10x           Clamshells    Yellow TB 2 x 10 red TB 2x10 Red TB 2 x 10        Standing Hip abd/ext   1 x10 at walkers 1 x 10 at walker 1 x 10 at walker        Romberg EO ground/ " foam             Seated HR/TR   2 x 10 2 x 10 2 x 10                                  Therapeutic Activity             Step ups             Lateral step ups             STS   2 x 5 2x 5         Lateral walking             Gait w/ rollator   X200 ft X200 ft X150 ft                                                                                                                Modalities

## 2024-08-01 ENCOUNTER — APPOINTMENT (OUTPATIENT)
Dept: PHYSICAL THERAPY | Facility: CLINIC | Age: 84
End: 2024-08-01
Payer: COMMERCIAL

## 2024-08-02 ENCOUNTER — HOSPITAL ENCOUNTER (OUTPATIENT)
Dept: INFUSION CENTER | Facility: HOSPITAL | Age: 84
End: 2024-08-02
Payer: COMMERCIAL

## 2024-08-02 DIAGNOSIS — C34.92 SQUAMOUS CELL CARCINOMA OF LEFT LUNG (HCC): Primary | ICD-10-CM

## 2024-08-02 LAB
ALBUMIN SERPL BCG-MCNC: 3.3 G/DL (ref 3.5–5)
ALP SERPL-CCNC: 100 U/L (ref 34–104)
ALT SERPL W P-5'-P-CCNC: 17 U/L (ref 7–52)
ANION GAP SERPL CALCULATED.3IONS-SCNC: 6 MMOL/L (ref 4–13)
AST SERPL W P-5'-P-CCNC: 18 U/L (ref 13–39)
BASOPHILS # BLD AUTO: 0.04 THOUSANDS/ÂΜL (ref 0–0.1)
BASOPHILS NFR BLD AUTO: 1 % (ref 0–1)
BILIRUB SERPL-MCNC: 0.33 MG/DL (ref 0.2–1)
BUN SERPL-MCNC: 15 MG/DL (ref 5–25)
CALCIUM ALBUM COR SERPL-MCNC: 9.7 MG/DL (ref 8.3–10.1)
CALCIUM SERPL-MCNC: 9.1 MG/DL (ref 8.4–10.2)
CHLORIDE SERPL-SCNC: 102 MMOL/L (ref 96–108)
CO2 SERPL-SCNC: 28 MMOL/L (ref 21–32)
CREAT SERPL-MCNC: 0.88 MG/DL (ref 0.6–1.3)
EOSINOPHIL # BLD AUTO: 0.39 THOUSAND/ÂΜL (ref 0–0.61)
EOSINOPHIL NFR BLD AUTO: 9 % (ref 0–6)
ERYTHROCYTE [DISTWIDTH] IN BLOOD BY AUTOMATED COUNT: 17.5 % (ref 11.6–15.1)
GFR SERPL CREATININE-BSD FRML MDRD: 79 ML/MIN/1.73SQ M
GLUCOSE SERPL-MCNC: 273 MG/DL (ref 65–140)
HCT VFR BLD AUTO: 41.1 % (ref 36.5–49.3)
HGB BLD-MCNC: 12.5 G/DL (ref 12–17)
IMM GRANULOCYTES # BLD AUTO: 0.01 THOUSAND/UL (ref 0–0.2)
IMM GRANULOCYTES NFR BLD AUTO: 0 % (ref 0–2)
LYMPHOCYTES # BLD AUTO: 0.79 THOUSANDS/ÂΜL (ref 0.6–4.47)
LYMPHOCYTES NFR BLD AUTO: 18 % (ref 14–44)
MCH RBC QN AUTO: 27.2 PG (ref 26.8–34.3)
MCHC RBC AUTO-ENTMCNC: 30.4 G/DL (ref 31.4–37.4)
MCV RBC AUTO: 90 FL (ref 82–98)
MONOCYTES # BLD AUTO: 0.53 THOUSAND/ÂΜL (ref 0.17–1.22)
MONOCYTES NFR BLD AUTO: 12 % (ref 4–12)
NEUTROPHILS # BLD AUTO: 2.57 THOUSANDS/ÂΜL (ref 1.85–7.62)
NEUTS SEG NFR BLD AUTO: 60 % (ref 43–75)
NRBC BLD AUTO-RTO: 0 /100 WBCS
PLATELET # BLD AUTO: 357 THOUSANDS/UL (ref 149–390)
PMV BLD AUTO: 9.2 FL (ref 8.9–12.7)
POTASSIUM SERPL-SCNC: 4.5 MMOL/L (ref 3.5–5.3)
PROT SERPL-MCNC: 6.5 G/DL (ref 6.4–8.4)
RBC # BLD AUTO: 4.59 MILLION/UL (ref 3.88–5.62)
SODIUM SERPL-SCNC: 136 MMOL/L (ref 135–147)
WBC # BLD AUTO: 4.33 THOUSAND/UL (ref 4.31–10.16)

## 2024-08-02 PROCEDURE — 80053 COMPREHEN METABOLIC PANEL: CPT | Performed by: INTERNAL MEDICINE

## 2024-08-02 PROCEDURE — 85025 COMPLETE CBC W/AUTO DIFF WBC: CPT | Performed by: INTERNAL MEDICINE

## 2024-08-02 RX ORDER — SODIUM CHLORIDE 9 MG/ML
20 INJECTION, SOLUTION INTRAVENOUS ONCE
OUTPATIENT
Start: 2024-08-06

## 2024-08-02 NOTE — PROGRESS NOTES
Bridger Clayton  tolerated labs well with no complications. Aware of future appt on 8/6/24 at 900. AVS printed. Patient left clinic ambulatory.

## 2024-08-06 ENCOUNTER — HOSPITAL ENCOUNTER (OUTPATIENT)
Dept: INFUSION CENTER | Facility: HOSPITAL | Age: 84
Discharge: HOME | End: 2024-08-06
Attending: INTERNAL MEDICINE
Payer: COMMERCIAL

## 2024-08-06 VITALS
WEIGHT: 212.96 LBS | SYSTOLIC BLOOD PRESSURE: 142 MMHG | BODY MASS INDEX: 30.49 KG/M2 | HEART RATE: 82 BPM | OXYGEN SATURATION: 95 % | HEIGHT: 70 IN | DIASTOLIC BLOOD PRESSURE: 75 MMHG | TEMPERATURE: 97.5 F | RESPIRATION RATE: 18 BRPM

## 2024-08-06 DIAGNOSIS — C34.92 SQUAMOUS CELL CARCINOMA OF LEFT LUNG (HCC): Primary | ICD-10-CM

## 2024-08-06 PROCEDURE — 96367 TX/PROPH/DG ADDL SEQ IV INF: CPT

## 2024-08-06 PROCEDURE — 96375 TX/PRO/DX INJ NEW DRUG ADDON: CPT

## 2024-08-06 PROCEDURE — 96413 CHEMO IV INFUSION 1 HR: CPT

## 2024-08-06 PROCEDURE — 96415 CHEMO IV INFUSION ADDL HR: CPT

## 2024-08-06 RX ORDER — SODIUM CHLORIDE 9 MG/ML
20 INJECTION, SOLUTION INTRAVENOUS ONCE
Status: COMPLETED | OUTPATIENT
Start: 2024-08-06 | End: 2024-08-06

## 2024-08-06 RX ADMIN — SODIUM CHLORIDE 20 ML/HR: 0.9 INJECTION, SOLUTION INTRAVENOUS at 09:22

## 2024-08-06 RX ADMIN — PACLITAXEL 256.2 MG: 6 INJECTION, SOLUTION, CONCENTRATE INTRAVENOUS at 10:28

## 2024-08-06 RX ADMIN — DEXAMETHASONE SODIUM PHOSPHATE: 10 INJECTION, SOLUTION INTRAMUSCULAR; INTRAVENOUS at 09:22

## 2024-08-06 RX ADMIN — DIPHENHYDRAMINE HYDROCHLORIDE 25 MG: 50 INJECTION, SOLUTION INTRAMUSCULAR; INTRAVENOUS at 09:44

## 2024-08-06 RX ADMIN — FAMOTIDINE 20 MG: 10 INJECTION, SOLUTION INTRAVENOUS at 10:05

## 2024-08-06 NOTE — PLAN OF CARE
Problem: Potential for Falls  Goal: Patient will remain free of falls  Description: INTERVENTIONS:  - Educate patient/family on patient safety including physical limitations  - Instruct patient to call for assistance with activity   - Consult OT/PT to assist with strengthening/mobility   - Keep Call bell within reach  - Keep bed low and locked with side rails adjusted as appropriate  - Keep care items and personal belongings within reach  - Initiate and maintain comfort rounds  - Make Fall Risk Sign visible to staff  - Offer Toileting every hour in advance of need  - Initiate/Maintain alarms.   - Obtain necessary fall risk management equipment.  - Apply yellow socks and bracelet for high fall risk patients  - Consider moving patient to room near nurses station  Outcome: Progressing

## 2024-08-06 NOTE — PROGRESS NOTES
Bridger Clayton tolerated treatment well with no complications.      Bridger Clayton is aware of future appt on 8/23/24 at 1430.     AVS printed and given to Bridger Clayton: Yes.

## 2024-08-13 ENCOUNTER — OFFICE VISIT (OUTPATIENT)
Dept: HEMATOLOGY ONCOLOGY | Facility: MEDICAL CENTER | Age: 84
End: 2024-08-13
Payer: COMMERCIAL

## 2024-08-13 ENCOUNTER — OFFICE VISIT (OUTPATIENT)
Dept: PHYSICAL THERAPY | Facility: CLINIC | Age: 84
End: 2024-08-13
Payer: COMMERCIAL

## 2024-08-13 VITALS
RESPIRATION RATE: 17 BRPM | HEIGHT: 70 IN | BODY MASS INDEX: 30.78 KG/M2 | SYSTOLIC BLOOD PRESSURE: 126 MMHG | TEMPERATURE: 98 F | HEART RATE: 70 BPM | OXYGEN SATURATION: 96 % | DIASTOLIC BLOOD PRESSURE: 56 MMHG | WEIGHT: 215 LBS

## 2024-08-13 DIAGNOSIS — Z09 CHEMOTHERAPY FOLLOW-UP EXAMINATION: ICD-10-CM

## 2024-08-13 DIAGNOSIS — R53.0 NEOPLASTIC MALIGNANT RELATED FATIGUE: ICD-10-CM

## 2024-08-13 DIAGNOSIS — G62.9 NEUROPATHY: ICD-10-CM

## 2024-08-13 DIAGNOSIS — C34.92 SQUAMOUS CELL CARCINOMA OF LEFT LUNG (HCC): Primary | ICD-10-CM

## 2024-08-13 DIAGNOSIS — R53.1 GENERALIZED WEAKNESS: ICD-10-CM

## 2024-08-13 DIAGNOSIS — Z85.46 HISTORY OF PROSTATE CANCER: ICD-10-CM

## 2024-08-13 PROCEDURE — 99214 OFFICE O/P EST MOD 30 MIN: CPT | Performed by: PHYSICIAN ASSISTANT

## 2024-08-13 PROCEDURE — 97110 THERAPEUTIC EXERCISES: CPT | Performed by: PHYSICAL THERAPIST

## 2024-08-13 NOTE — PROGRESS NOTES
Bridger Clayton  1940  [facility] HEMATOLOGY ONCOLOGY SPECIALISTS SHAI Garcia [facility] [id number]     DISCUSSION/SUMMARY:     84-year-old male with a number of medical and cardiac problems previously found to have a left lower lobe mass. Initial biopsy demonstrated squamous cell carcinoma.  IR recently performed thoracentesis, minimal amount of fluid was removed but there was no evidence of metastatic disease; cytology was negative.  Patient was seen by thoracic surgery and underwent mediastinoscopy.  There was no evidence of metastatic disease in the sampled lymph nodes (2R, 4R, 4L, 7).  Tumor was 4.5 cm.  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  Patient was treated with SBRT and then went on to surveillance.    Eventual follow-up PET/CT demonstrated evidence of recurrence in the left lower lobe, same area where the original malignancy was found.  There was no clear evidence of spread to either hilar regions or the mediastinum but radiologist commented on 1 right paratracheal lymph node but very +/-.  There was nonspecific FDG activity in the sacrum and left posterior iliac bone, seen before.  Unknown clinical significance.    At that time, patient also began to have more problems with recurrent left-sided pleural effusions requiring a Pleurx catheter.  Previously wife was draining approximately 500 cc every 2 to 3 days.  Discontinued while patient was started on pembrolizumab, did not get significantly better.  Additionally patient developed a rash while on pembrolizumab.  The ICI was eventually discontinued and patient was started on paclitaxel.  Patient has completed 11 cycles.    Recent blood work was okay/acceptable.  PleurX catheter has been removed.  Recent follow-up PET/CT demonstrated response to treatment, no evidence of disease progression.  This is obviously good.  The plan is to continue with the Taxol.      Unfortunately patient is now having issues with neuropathy.  I discussed with patient and his wife today that while neuropathy can occur from Taxol it can also be related to his diabetes.  He was recently placed on Neurontin by palliative care (prescribed on 6/28/24) with some improvement in symptoms. He also complains of generalized weakness and has recently started PT.    We discussed options. I discussed exploring treatment change which patient does not want to do at this time. I also discussed giving him treatment break for a few weeks. He wants to continue with Taxol for now. I recommended he work on controlling his diabetes better with diet modifications and try to increase his physical activity as he is able.    Current Regimen  Paclitaxel 135 mg/m2 IV day 1 (was receiving 77% up until chemo on 7/17. Now receiving 70% of original dose)  Cycle length = 21 days  Goal = palliation and prolongation of life    Mr. Clayton has been having more issues with IV access.  Patient agreed to port placement.  This was placed on 6/19/2024.    Patient is to return here in 3 weeks.    Carefully review your medication list and verify that the list is accurate and up-to-date. Please call the hematology/oncology office if there are medications missing from the list, medications on the list that you are not currently taking or if there is a dosage or instruction that is different from how you're taking that medication.     Patient goals and areas of care: Continue with paclitaxel with dose-reduction. Gabapentin per palliative care.  Barriers to care: none  Patient is able to self-care  ______________________________________________________________________________________         Chief Complaint   Patient presents with    Follow-up - recurrent non-small cell lung carcinoma      History of Present Illness:  84 year-old male with multiple medical and cardiac issue recently seen in the emergency room because of a cough.  Workup  demonstrated a left lower lobe mass.  Biopsy demonstrated squamous cell carcinoma.  Workup did not demonstrated evidence of metastatic disease; mediastinoscopy was negative.  Patient was seen by thoracic surgery but was not felt to be a surgical candidate.  Patient was subsequently seen by radiation oncology and underwent SPRT.  Patient then went on surveillance.      Eventual repeat scanning was consistent with recurrence at the original site.  Mr. Clayton required a left-sided Pleurx catheter placed because of recurrent left-sided pleural effusion.  Patient was initially treated with pembrolizumab only but developed a significant rash requiring steroids.  Because of the rash and the itchiness, patient did not wish to continue with the pembrolizumab; this was discontinued.  Options were discussed and patient began paclitaxel.  Patient has completed 11 cycles.    Interval History:  Presently Mr. Clayton states feeling okay.  He says that he has been feeling more tired.  His wife says that he has been sleeping more.  He admits to weakness in his bilateral lower extremities and recently started participating in physical therapy.  He has not had any falls.      He continues to deal with neuropathy.  He says that his symptoms have improved with gabapentin.  He feels that his difficulty ambulating is not related to neuropathy but just overall weakness.     No respiratory issues.  Appetite is okay, weight is stable.  No fevers or signs of infection.  No GI issues.    Patient was diagnosed with prostate cancer (approximately 10 + years ago) and underwent seeds and external beam radiation.  No evidence of recurrence since that time.       Review of Systems   Constitutional:  Positive for fatigue.        Appetite is fair.  HENT: Negative.     Eyes: Negative.    Respiratory: Negative.     Cardiovascular: Negative.    Gastrointestinal: Negative.    Endocrine: Negative.    Genitourinary: Negative.    Musculoskeletal: Weak+  Skin:  Negative.    Allergic/Immunologic: Negative.    Neurological: Neuropathy in fingertips+  Hematological: Negative.    Psychiatric/Behavioral:  Negative for self-injury.    All other systems reviewed and are negative.         Patient Active Problem List   Diagnosis    Corns    Pain in both feet    Onychomycosis    Tinea pedis of both feet    Lung mass    Hyperlipidemia    Type 2 diabetes mellitus with diabetic neuropathy, without long-term current use of insulin (HCC)    Squamous cell carcinoma of lung (HCC)    Shortness of breath    Daytime hypersomnolence    Chronic diastolic heart failure (HCC)    SYLVESTER (obstructive sleep apnea)    Prostate cancer (HCC)    GERD (gastroesophageal reflux disease)    CAD (coronary artery disease)    Other persistent atrial fibrillation (HCC)    Alcohol dependence (HCC)    Acute respiratory failure with hypoxemia (HCC)    Depression, recurrent (HCC)    COVID-19    Angioedema    Septic shock (HCC)    Cellulitis    Cellulitis of chest wall    Acute kidney injury (BUDDY) with acute tubular necrosis (ATN) (HCC)    Chronic obstructive pulmonary disease, unspecified COPD type (HCC)    Centrilobular emphysema (HCC)    Abdominal aortic aneurysm (HCC)    Hypertensive heart disease    Tremors of nervous system    Recurrent pleural effusion on left      Medical History        Past Medical History:   Diagnosis Date    Abdominal pain      Anxiety      Arthritis      Asthma      Atrial fibrillation (HCC)      Back pain      Bleeding ulcer      Bronchitis      Cancer (HCC)       prostate 2011- radiation    Cardiac disease       cardiac stent x1    Cataract       starting    Chronic diastolic (congestive) heart failure (HCC)      Diabetes mellitus (HCC)       boarderline diabetic     GERD (gastroesophageal reflux disease)      History of radiation therapy 2010     Prostate seeds (brachytherapy) and EBRT    Hyperlipidemia      Hypertension      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, bilateral      Low back pain       Lung cancer (HCC)      Lung mass      Myocardial infarction (HCC)       mild     Obesity      Prostate cancer (HCC)      Shortness of breath      Sleep apnea       sleep study     Wears dentures       full set    Wears glasses           Surgical History         Past Surgical History:   Procedure Laterality Date    ABDOMINAL HERNIA REPAIR        ABDOMINAL SURGERY         bleeding ulcer, cyst removed from abd    COLONOSCOPY        CORONARY ANGIOPLASTY WITH STENT PLACEMENT   1999     x1     ESOPHAGOGASTRODUODENOSCOPY        IR BIOPSY LUNG   10/05/2021    IR THORACENTESIS   2021    IR THORACENTESIS   2023    IR THORACENTESIS   2023    KNEE SURGERY Left      FL BRNCHSC INCL FLUOR GDNCE DX W/CELL WASHG SPX N/A 2021     Procedure: BRONCHOSCOPY FLEXIBLE;  Surgeon: Cachorro Jason MD;  Location: [facility];  Service: Thoracic    FL MEDIASTINOSCOPY WITH LYMPH NODE BIOPSY/IES N/A 2021     Procedure: MEDIASTINOSCOPY;  Surgeon: Cachorro Jason MD;  Location: [facility];  Service: Thoracic    PROSTATE SURGERY             Family history: 3 sons in good general health, no known familial or genetic diseases     Social History               Socioeconomic History    Marital status: /Civil Union       Spouse name: Not on file    Number of children: Not on file    Years of education: Not on file    Highest education level: Not on file   Occupational History    Not on file   Tobacco Use    Smoking status: Former       Packs/day: 1.00       Years: 30.00       Total pack years: 30.00       Types: Cigarettes       Quit date:        Years since quittin.8    Smokeless tobacco: Never   Vaping Use    Vaping Use: Never used   Substance and Sexual Activity    Alcohol use: Yes       Alcohol/week: 4.0 - 6.0 standard drinks of alcohol       Types: 1 Glasses of wine, 3 - 5 Cans of beer per week       Comment: few beers day    Drug use: Never    Sexual activity: Not on file   Other Topics  Concern    Not on file   Social History Narrative    Not on file      Social Determinants of Health           Financial Resource Strain: Medium Risk (8/1/2023)     Overall Financial Resource Strain (CARDIA)      Difficulty of Paying Living Expenses: Somewhat hard   Food Insecurity: No Food Insecurity (12/19/2022)     Hunger Vital Sign      Worried About Running Out of Food in the Last Year: Never true      Ran Out of Food in the Last Year: Never true   Transportation Needs: No Transportation Needs (8/1/2023)     PRAPARE - Transportation      Lack of Transportation (Medical): No      Lack of Transportation (Non-Medical): No   Physical Activity: Not on file   Stress: Not on file   Social Connections: Not on file   Intimate Partner Violence: Not on file   Housing Stability: Low Risk  (12/19/2022)     Housing Stability Vital Sign      Unable to Pay for Housing in the Last Year: No      Number of Places Lived in the Last Year: 1      Unstable Housing in the Last Year: No      Social history:  40 pack-year history discontinued 20 years ago, patient drinks 2-4 beers a day off and on, no drug abuse, 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)     Current Outpatient Medications:     acetaminophen (TYLENOL) 325 mg tablet, Take 2 tablets (650 mg total) by mouth every 6 (six) hours as needed for mild pain, headaches or fever, Disp: 30 tablet, Rfl: 0    albuterol (2.5 mg/3 mL) 0.083 % nebulizer solution, Take 2.5 mg by nebulization As needed per patient's wife , Disp: , Rfl:     apixaban (Eliquis) 5 mg, Take 1 tablet (5 mg total) by mouth 2 (two) times a day, Disp: 180 tablet, Rfl: 1    atenolol (TENORMIN) 25 mg tablet, , Disp: , Rfl:     atorvastatin (LIPITOR) 10 mg tablet, Take 1 tablet (10 mg total) by mouth daily, Disp: 90 tablet, Rfl: 1    carvedilol (COREG) 25 mg tablet, Take 1 tablet (25 mg total) by mouth 2 (two) times a day with meals, Disp: 180 tablet, Rfl: 1    ciclopirox (LOPROX) 0.77 %  cream, Apply topically 2 (two) times a day for 20 days, Disp: 45 g, Rfl: 2    fluticasone-umeclidinium-vilanterol (Trelegy Ellipta) 100-62.5-25 mcg/actuation inhaler, Rinse mouth after use., Disp: 60 each, Rfl: 5    guaifenesin-codeine (GUAIFENESIN AC) 100-10 MG/5ML liquid, Take 10 mL by mouth 3 (three) times a day as needed for cough, Disp: 180 mL, Rfl: 0    halobetasol (ULTRAVATE) 0.05 % cream, , Disp: , Rfl:     hydrocortisone 2.5 % cream, Apply topically 2 (two) times a day Apply twice a day affected area the trunk, Disp: 20 g, Rfl: 2    latanoprost (XALATAN) 0.005 % ophthalmic solution, Administer 0.005 mL to both eyes daily at bedtime, Disp: , Rfl:     metFORMIN (GLUCOPHAGE) 500 mg tablet, Take 1 tablet (500 mg total) by mouth daily with breakfast, Disp: , Rfl:     mirtazapine (REMERON) 7.5 MG tablet, Take 1 tablet (7.5 mg total) by mouth daily at bedtime, Disp: 90 tablet, Rfl: 3    Multiple Vitamin (MULTI-VITAMIN DAILY PO), Take by mouth daily  , Disp: , Rfl:     omeprazole (PriLOSEC) 20 mg delayed release capsule, TAKE 1 CAPSULE DAILY, Disp: 90 capsule, Rfl: 1    primidone (MYSOLINE) 50 mg tablet, TAKE 2 TABS AT 8PM., Disp: 180 tablet, Rfl: 1    spironolactone (ALDACTONE) 25 mg tablet, Take 1 tablet (25 mg total) by mouth daily, Disp: 90 tablet, Rfl: 1    aspirin (ECOTRIN LOW STRENGTH) 81 mg EC tablet, , Disp: , Rfl:      No Known Allergies  Vitals:    08/13/24 1448   BP: 126/56   Pulse: 70   Resp: 17   Temp: 98 °F (36.7 °C)   SpO2: 96%       Physical Exam  Constitutional:       Appearance: He is well-developed.      Comments: Obese male, no respiratory distress, no signs of pain   HENT:      Head: Normocephalic and atraumatic.      Right Ear: External ear normal.      Left Ear: External ear normal.   Eyes:      Conjunctiva/sclera: Conjunctivae normal.      Pupils: Pupils are equal, round, and reactive to light.   Cardiovascular:      Rate and Rhythm: Normal rate and regular rhythm.      Heart sounds: Normal  heart sounds.   Pulmonary:      Effort: Pulmonary effort is normal.      Comments: Breath sounds clear throughout bilaterally.  Abdominal:      Palpations: Abdomen is soft.      Comments: Obese, nontender.  Musculoskeletal:         General: Normal range of motion.      Cervical back: Normal range of motion and neck supple.   Skin:     General: Skin is warm.      Comments: Relatively good color, warm, moist, scattered mild areas of ecchymosis   Neurological:      Mental Status: He is alert and oriented to person, place, and time.   Psychiatric:         Behavior: Behavior normal.         Thought Content: Thought content normal.         Judgment: Judgment normal.      Extremities:  No lower extremity edema bilaterally.     Labs  8/2/2024 WBC 4.33, hemoglobin 12.5, platelets 357, sodium 136, potassium 4.5, BUN 15, creatinine 0.88, glucose 273, calcium 9.1, AST 18, ALT 17.    7/12/2024 WBC 8.47, hemoglobin 12.2, platelets 291, potassium 4.6, BUN 15, creatinine 0.9, AST 23, ALT 22, alk phos 99.    6/21/2024 WBC 11.45, hemoglobin 13.6, platelets 315, potassium 4.2, BUN 13, creatinine 0.92, AST 27, ALT 20, alk phos 130.    5/23/2024 WBC = 5.67 hemoglobin = 12.7 hematocrit = 41.6 platelet = 43 neutrophil = 45% BUN = 12 creatinine = 0.91 calcium = 9.1 LFTs WNL    4/11/2024 WBC = 6.79 hemoglobin = 11.8 hematocrit = 39.4 platelet = 527 neutrophil = 47% BUN = 10 creatinine = 0.88 calcium = 8.9 alkaline phosphatase = 130, other LFTs WNL    Imaging    6/6/2024 PET/CT    IMPRESSION:    1. Necrotic left lower lung mass demonstrates persistent but diminished predominantly peripheral FDG activity. This may represent partial response to therapy, though residual viable tumor may still remain. There is new air within this necrotic mass, for   which infection is not excluded. Correlate clinically.  2. Decreased but persistent small left pleural effusion. There is associated FDG activity which may be inflammatory/infectious. Malignant  effusion is not excluded.  3. Healing right anterior rib fractures. Nonspecific but stable asymmetric focus of activity in the right sacrum.  4. Otherwise no new hypermetabolic metastases.    10/30/2023 PET/CT    CHEST:  Best seen on image 76 of series 4 is a hypermetabolic centrally photopenic left lower lobe lung mass measuring 7.1 x 6.4 cm with max SUV of 12.0, previously measuring 4.5 x 4.0 cm with max SUV of 19.7 on PET/CT dated 10/29/2021 and approximately 4.3 x 3.7 cm when utilizing similar measurement technique and CT of chest dated 12/23/2022. The interval growth in size since 12/23/2022 suggest progression of hypermetabolic malignancy.     Subtle FDG activity is noted with a stable in size prominent right paratracheal lymph node on image 58 of series 4 measuring 1 1.4 cm in short axis with max SUV of 2.8, previously 3.5. The stability favors benign reactive lymph node with early pamela metastatic disease considered less likely    IMPRESSION:     1. Hypermetabolic left lower lobe lung malignancy has significantly increased in size since 12/23/2022 suggesting progression of malignancy.  2. Again noted is nonspecific FDG activity involving the sacrum and left posterior iliac bone without definitive correlate on low-dose unenhanced CT of uncertain clinical significance. Findings can be further characterized with MRI of the bony pelvis as clinically indicated.  3. Moderate left pleural effusion.     9/28/23: CT Chest w contrast:     Impression: Since September 12, 2023, decrease in size of the still large left pleural effusion with commensurate decrease in left-sided passive atelectasis. Persistent hypoattenuation in the collapsed portion of the left lower lobe. Given that this is in the   vicinity of the treated tumor, and has increased in size since December 2022, short interval contrast-enhanced CT is advised following resolution of the pleural effusion for further assessment.     1/27/2023 CT chest without  contrast    IMPRESSION:  1.  Slightly decreased left pleural effusion with improved passive atelectasis of the left lower lobe. Previously noted left lower lobe mass is difficult to visualize on noncontrast exam but appears likely unchanged from most recent study.  2.  Similar appearance of skin thickening and subcutaneous stranding in the right axillary region which could represent cellulitis.       05/04/2022 chest x-ray portable.  Impression stated left small effusion and parenchymal process appears unchanged.     05/02/2022 CT scan the chest without contrast     IMPRESSION:  1.  Increase in size of a left lower lobe patchy opacity with new right basilar patchy densities concerning for pulmonary infiltrates with additional patchy and reticulonodular changes lingular segment left upper lobe and right middle lobe also likely related to infectious process.  2.  Left lower lobe cavitary mass has decreased in size now measuring 3.3 x 1.9 cm, previously 4.1 x 2.2 cm.  3.  Stable COPD and pulmonary fibrosis with the latter likely related to radiation change.     11/19/2021 MRI brain.  Impression stated white matter changes suggestive of chronic microangiopathy.  No acute intracranial pathology.     10/29/2021 PET-CT     1.  Hypermetabolic necrotic 4.5 x 4 cm left lower lung mass compatible with known malignancy.  2.  2 mildly hypermetabolic right paratracheal mediastinal nodes for which early metastases are not excluded.  3.  Small mildly hypermetabolic left pleural effusion for which malignant effusion is not excluded.  4.  Additional scattered tiny nodular lung densities may be reassessed on follow-up CT.  5.  Probable reactive subcentimeter right cervical node may be reassessed on follow-up PET/CT.  6.  No hypermetabolic soft tissue metastases in the abdomen, pelvis.  7.  Minimal inhomogeneous FDG activity in the sacrum and left posterior iliac, though without dominant focal lesion.  This may be reassessed on  follow-up exam.     Pathology    12/7/2023 [facility].  No action mutations were detected.  Tumor mutational burden = 6 = low.  Patient had a pathologic variant in PTEN and TP53.  PDL IHC TPS = 0%, negative.     Case Report   Surgical Pathology Report                         Case: [id number]                                    Authorizing Provider:  Cachorro Jason MD       Collected:           11/29/2021 1005               Ordering Location:     [facility]      Received:            11/29/2021 [address] [place] [address] Operating Room                                                       Pathologist:           Juan José Dietz MD                                                         Specimens:   A) - Lymph Node, level 7                                                                             B) - Lymph Node, level 4R                                                                            C) - Lymph Node, level 2R                                                                            D) - Lymph Node, level 4L                                                                   Final Diagnosis   A. Lymph node, level 7, excision:  -  Portions of benign lymph node with reactive change and anthracosis, negative for granulomas, lymphoma, or metastatic carcinoma.    B. Lymph node, level 4R, excision:  -  Portions of benign lymph node with reactive change and anthracosis, negative for granulomas, lymphoma, or metastatic carcinoma.    C. Lymph node, level 2R, excision:  -  Portions of benign lymph node with reactive change and anthracosis, negative for granulomas, lymphoma, or metastatic carcinoma.    D. Lymph node, level 4L, excision:  -  Benign lymph node with reactive change and anthracosis, negative for granulomas, lymphoma, or metastatic carcinoma.      Electronically signed by Juan José Dietz MD on 12/2/2021 at 11:29 AM         Case Report   Surgical Pathology Report                          Case: [id number]                                    Authorizing Provider:  Elmer Laird MD      Collected:           10/05/2021 1058               Ordering Location:     [facility] Received:            10/05/2021 1120                                      CAT Scan                                                                      Pathologist:           Mary Dean MD                                                         Specimen:    Lung, Left Lower Lobe                                                                       Final Diagnosis   A. Lung, Left Lower Lobe, :  - Invasive squamous carcinoma, moderately differentiated, keratinizing type.  - Tumor is highlighted with P 40 immunoperoxidase stain.  - Prominent tumor necrosis is noted.      Best representative block with tumor A1.  This case was reviewed at the intradepartmental  conference.   RADHA Navarrete, Pulmonology, is notified of the diagnosis in [facility] by [employer] via [employer] on 10/06/2021  at 2.40 pm.   Electronically signed by Mary Dean MD on 10/6/2021 at  2:44 PM

## 2024-08-13 NOTE — PROGRESS NOTES
"Daily Note     Today's date: 2024  Patient name: Bridger Clayton  : 1940  MRN: [id number]  Referring provider: Bridger James MD  Dx:   Encounter Diagnosis     ICD-10-CM    1. 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 Neoplastic malignant related fatigue  R53.0       3. Generalized weakness  R53.1                   Start Time: 1300  Stop Time: 1345  Total time in clinic (min): 45 minutes    Subjective: Bridger reports \"still tired\" upon arrival to therapy today.      Objective: See treatment diary below      Assessment: Tolerated treatment fair.  Patient is visible fatigued today during his session.  Patient demonstrating improvements in energy level as session progressed reporting \"I'm feeling better now then when I walked in\" at conclusion of session today. PT updating HEP to include seated hip abduction and adduction and issued TB today with verbalized understanding.  Patient would benefit from continued PT      Plan: Continue per plan of care.       Precautions: active cancer with chemo every 3 weeks; FALL RISK, AAA, CHF, COPD, CAD, HTN, SYLVESTER, AAA    Insurance:  AMA/CMS Eval/ Re-eval POC expires FOTO Auth Status Co-Insurance                                                       2. 7/2   3. 7/9   4.7   5.    6. 8     7.    8.    9.    10.    11.    12.      13.    14.    15.    16.  17.  18.      19.    20.   21.   22.   23.   24.      25.    26. 27. 28. 29. 30.   31. 32.  33. 34. 35. 36.          Daily Treatment Diary     Date of Service:  7        Manuals             Hamstring stretch                          Therapeutic Exercise             Bike   X5 min Nustep           Hamstring stretch seated                                           Neuro Re-ed             Quad sets  3\" x 20 3\" x 20 3\" x 20 5\"x 20 :05x 20       SLR flexion   10x  2 x 10 AA for R LE 2 x 10 AROM 2 x 10 AROM 2x10 AROM       Bridges   10x           Seated adduction      :05x 10     "   Clamshells    Yellow TB 2 x 10 red TB 2x10 Red TB 2 x 10 RTB 2x10       Standing Hip abd/ext   1 x10 at walkers 1 x 10 at walker 1 x 10 at walker NP today       Romberg EO ground/ foam             Seated HR/TR   2 x 10 2 x 10 2 x 10 2x10       SAQ      :03x 10 ea                    Therapeutic Activity             Step ups             Lateral step ups             STS   2 x 5 2x 5  2x5       Lateral walking             Gait w/ rollator   X200 ft X200 ft X150 ft                                                                                                                Modalities

## 2024-08-14 PROBLEM — Z09 CHEMOTHERAPY FOLLOW-UP EXAMINATION: Status: ACTIVE | Noted: 2024-08-14

## 2024-08-16 ENCOUNTER — OFFICE VISIT (OUTPATIENT)
Dept: PALLIATIVE MEDICINE | Facility: CLINIC | Age: 84
End: 2024-08-16
Payer: COMMERCIAL

## 2024-08-16 VITALS
OXYGEN SATURATION: 78 % | HEIGHT: 70 IN | DIASTOLIC BLOOD PRESSURE: 64 MMHG | WEIGHT: 215.2 LBS | TEMPERATURE: 97.6 F | BODY MASS INDEX: 30.81 KG/M2 | SYSTOLIC BLOOD PRESSURE: 120 MMHG | HEART RATE: 98 BPM

## 2024-08-16 DIAGNOSIS — C34.92 SQUAMOUS CELL CARCINOMA OF LEFT LUNG (HCC): Primary | Chronic | ICD-10-CM

## 2024-08-16 DIAGNOSIS — Z51.5 PALLIATIVE CARE ENCOUNTER: ICD-10-CM

## 2024-08-16 DIAGNOSIS — T45.1X5A CHEMOTHERAPY-INDUCED PERIPHERAL NEUROPATHY (HCC): ICD-10-CM

## 2024-08-16 DIAGNOSIS — G62.0 CHEMOTHERAPY-INDUCED PERIPHERAL NEUROPATHY (HCC): ICD-10-CM

## 2024-08-16 DIAGNOSIS — E11.40 TYPE 2 DIABETES MELLITUS WITH DIABETIC NEUROPATHY, WITHOUT LONG-TERM CURRENT USE OF INSULIN (HCC): ICD-10-CM

## 2024-08-16 PROCEDURE — 99213 OFFICE O/P EST LOW 20 MIN: CPT | Performed by: INTERNAL MEDICINE

## 2024-08-16 PROCEDURE — G2211 COMPLEX E/M VISIT ADD ON: HCPCS | Performed by: INTERNAL MEDICINE

## 2024-08-16 NOTE — ASSESSMENT & PLAN NOTE
Patient denies pain today but he does endorse some paresthesia, chronic diabetic neuropathy worsened by chemotherapy. He also notes loss of sensation and loss of fine motor control.  Continue gabapentin. Patient declined offer of increase in this medication.  Referring to Occupational Therapy for help with ADLs due to loss of sensation and paresthesia.  Continue work with PT.

## 2024-08-16 NOTE — PATIENT INSTRUCTIONS
It was good to see you today. Thank you for coming in.    Referring to Occupational Therapy to help w/ fine motor control issues.  Continue gabapentin for neuropathy.  Return in about 3 months (around 11/16/2024).  Call us for refills on medications that we supply, as needed.   If something changes and you need to come in sooner, please call our office.    PRESCRIPTION REFILL REMINDER:  All medication refills should be requested prior to Noon on Friday. Any refill requests after noon on Friday would be addressed the following Monday.    MEDICATION SAFETY ISSUES:   Do not drive under the influence of narcotics (including opioids), watch for adverse effects including confusion / altered mental status / respiratory depression (slowed breathing), keep medications stored in a safe/locked environment, do not use alcohol while opioids or other narcotics are in your system. Do not travel with more than the minimum number of tablets or capsules required for the trip.

## 2024-08-16 NOTE — ASSESSMENT & PLAN NOTE
Diabetes management per PCP. Continue gabapentin for neuropathy.  Lab Results   Component Value Date    HGBA1C 7.9 (H) 06/21/2024

## 2024-08-16 NOTE — ASSESSMENT & PLAN NOTE
Emotional / psychosocial support provided today.  ACP: On file.  Reviewed notes (Medical Oncology, PT, Pulmonology), labs (8/2/24 Cr 0.88, alb 3.3, Hb 12.5), imaging + procedures (6/6/24 PET CT). See below for more data.  Return in about 3 months (around 11/16/2024).  Medication safety issues addressed - no driving under the influence of narcotics (including opioids), watch for adverse effects including AMS or respiratory depression (slowed breathing), keep medications stored in a safe/locked environment, do not use alcohol while opioids or other narcotics are in one's system, do not travel with more than the minimum number of tablets or capsules required for the trip.  I have personally queried the patient's controlled substance dispensing history in the Prescription Drug Monitoring Program in compliance with regulations before I have prescribed any controlled substances. The prescription history is consistent with prescribed therapy and our practice policies.

## 2024-08-16 NOTE — PROGRESS NOTES
Ambulatory Visit  Name: Bridger Clayton      : 1940      MRN: [id number]  Encounter Provider: Ashish Colon MD  Encounter Date: 2024   Encounter department: [facility]    Assessment & Plan   1. 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 Low symptom burden outside of neuropathy.  Per literature review paclitaxel can cause peripheral neuropathy (42% to 70%; grades 3/4: ? 7%) or worsen existing neuropathy.  2. Type 2 diabetes mellitus with diabetic neuropathy, without long-term current use of insulin (HCC)  Assessment & Plan:  Diabetes management per PCP. Continue gabapentin for neuropathy.  Lab Results   Component Value Date    HGBA1C 7.9 (H) 2024     Orders:  -     Ambulatory Referral to Occupational Therapy; Future; Expected date: 2024  3. Chemotherapy-induced peripheral neuropathy (HCC)  Assessment & Plan:  Patient denies pain today but he does endorse some paresthesia, chronic diabetic neuropathy worsened by chemotherapy. He also notes loss of sensation and loss of fine motor control.  Continue gabapentin. Patient declined offer of increase in this medication.  Referring to Occupational Therapy for help with ADLs due to loss of sensation and paresthesia.  Continue work with PT.  Orders:  -     Ambulatory Referral to Occupational Therapy; Future; Expected date: 2024  4. Palliative care encounter  Assessment & Plan:  Emotional / psychosocial support provided today.  ACP: On file.  Reviewed notes (Medical Oncology, PT, Pulmonology), labs (24 Cr 0.88, alb 3.3, Hb 12.5), imaging + procedures (24 PET CT). See below for more data.  Return in about 3 months (around 2024).  Medication safety issues addressed - no driving under the influence of narcotics (including opioids), watch for adverse effects including AMS or respiratory depression (slowed breathing), keep medications stored  "in a safe/locked environment, do not use alcohol while opioids or other narcotics are in one's system, do not travel with more than the minimum number of tablets or capsules required for the trip.  I have personally queried the patient's controlled substance dispensing history in the Prescription Drug Monitoring Program in compliance with regulations before I have prescribed any controlled substances. The prescription history is consistent with prescribed therapy and our practice policies.      Subjective   Chief Complaint   Patient presents with    Cancer    Peripheral Neuropathy    Counseling    Follow-up    ADL impairment        History of Present Illness     Bridger Clayton is a 84 y.o. male w/ recurrent SCC of the left lung, receiving paclitaxel (pembrolizumab dc’d s/t rash); recurrent L pleural effusion, chronic dCHF, CAD, HTN, AAA, GERD, COPD, depression, persistent Afib on Eliquis, diabetes w/ diabetic neuropathy, SYLVESTER, h/o prostate cancer. He follows w/ Dr KELSEY James (Medical Oncology), Dr Griffin (Podiatry), Dr Horton (Cardiology).    Patient denies significant pain. He is experiencing loss of sensation and paresthesia from peripheral neuropathy (diabetic, worsened by chemotherapy). He declines offer of change in gabapentin, stating \"I can handle it\". He and his wife noted that he has challenges with ADLs such as feeding himself, due to neuropathy. He is working with physical therapy and feels things are going well. His wife Taylor notes that is more challenging for her to manage drying him off after he showers recently; he has a shower chair along with safety rails in the bathroom but due to space limitations is challenging for her to get him out of the shower and get him dried off. Wife notes that there is also an extension for the toilet seat to make it easier to transition onto and off of the toilet.    Patient denies nausea, denies vomiting. He denies recent bowel issues. He has been gaining weight recently " which is frustrating him; he would like to return to being below 200 pounds.    Patient sleeps often during the day, which can be challenging to sleep at night. Mood is stable. His breathing is fairly stable, though hot and humid air can make it more challenging to breathe.      Current Outpatient Medications:     acetaminophen (TYLENOL) 325 mg tablet, Take 2 tablets (650 mg total) by mouth every 6 (six) hours as needed for mild pain, headaches or fever, Disp: 30 tablet, Rfl: 0    apixaban (Eliquis) 5 mg, Take 1 tablet (5 mg total) by mouth 2 (two) times a day (Patient taking differently: Take 2.5 mg by mouth 2 (two) times a day), Disp: 180 tablet, Rfl: 0    atenolol (TENORMIN) 25 mg tablet, , Disp: , Rfl:     atorvastatin (LIPITOR) 10 mg tablet, TAKE 1 TABLET (10 MG TOTAL) BY MOUTH DAILY, Disp: 90 tablet, Rfl: 1    carvedilol (COREG) 25 mg tablet, Take 1 tablet (25 mg total) by mouth 2 (two) times a day with meals, Disp: 180 tablet, Rfl: 1    gabapentin (Neurontin) 300 mg capsule, Take 1 capsule (300 mg total) by mouth 2 (two) times a day, Disp: 60 capsule, Rfl: 2    guaifenesin-codeine (GUAIFENESIN AC) 100-10 MG/5ML liquid, Take 10 mL by mouth 3 (three) times a day as needed for cough, Disp: 473 mL, Rfl: 0    halobetasol (ULTRAVATE) 0.05 % cream, , Disp: , Rfl:     hydrocortisone 2.5 % cream, Apply topically 2 (two) times a day Apply twice a day affected area the trunk, Disp: 20 g, Rfl: 2    hydrOXYzine HCL (ATARAX) 50 mg tablet, Take 1 tablet (50 mg total) by mouth daily at bedtime, Disp: 90 tablet, Rfl: 1    latanoprost (XALATAN) 0.005 % ophthalmic solution, Administer 0.005 mL to both eyes daily at bedtime, Disp: , Rfl:     lidocaine-prilocaine (EMLA) cream, Apply to PAC site one hour prior to access, Disp: 30 g, Rfl: 2    metFORMIN (GLUCOPHAGE) 500 mg tablet, Take 1 tablet (500 mg total) by mouth 2 (two) times a day with meals, Disp: 180 tablet, Rfl: 1    mirtazapine (REMERON) 7.5 MG tablet, Take 1 tablet (7.5  "mg total) by mouth daily at bedtime, Disp: 90 tablet, Rfl: 3    Multiple Vitamin (MULTI-VITAMIN DAILY PO), Take by mouth daily  , Disp: , Rfl:     omeprazole (PriLOSEC) 20 mg delayed release capsule, Take 1 capsule (20 mg total) by mouth daily, Disp: 90 capsule, Rfl: 1    ondansetron (ZOFRAN) 8 mg tablet, Take 1 tablet (8 mg total) by mouth every 8 (eight) hours as needed for nausea or vomiting, Disp: 20 tablet, Rfl: 2    primidone (MYSOLINE) 50 mg tablet, TAKE 2 TABS AT 8PM., Disp: 180 tablet, Rfl: 1    triamcinolone (KENALOG) 0.1 % cream, , Disp: , Rfl:     albuterol (2.5 mg/3 mL) 0.083 % nebulizer solution, Take 2.5 mg by nebulization As needed per patient's wife (Patient not taking: Reported on 7/25/2024), Disp: , Rfl:     aspirin (ECOTRIN LOW STRENGTH) 81 mg EC tablet, , Disp: , Rfl:     ciclopirox (LOPROX) 0.77 % cream, Apply topically 2 (two) times a day for 20 days, Disp: 45 g, Rfl: 2    fluticasone-umeclidinium-vilanterol (Trelegy Ellipta) 100-62.5-25 mcg/actuation inhaler, Rinse mouth after use. (Patient not taking: Reported on 7/25/2024), Disp: 60 each, Rfl: 5    Objective   /64 (BP Location: Left arm, Patient Position: Sitting, Cuff Size: Standard)   Pulse 98   Temp 97.6 °F (36.4 °C) (Temporal)   Ht 5' 10\" (1.778 m)   Wt 97.6 kg (215 lb 3.2 oz)   SpO2 (!) 78%   BMI 30.88 kg/m²   Physical Exam  Vitals reviewed.   Constitutional:       General: He is not in acute distress.     Appearance: He is well-groomed and overweight. He is not toxic-appearing.   HENT:      Head: Normocephalic and atraumatic.      Right Ear: External ear normal.      Left Ear: External ear normal.   Eyes:      General: No scleral icterus.        Right eye: No discharge.         Left eye: No discharge.      Extraocular Movements: Extraocular movements intact.      Conjunctiva/sclera: Conjunctivae normal.      Pupils: Pupils are equal, round, and reactive to light.   Cardiovascular:      Rate and Rhythm: Normal rate. "   Pulmonary:      Effort: Pulmonary effort is normal. No tachypnea, bradypnea, accessory muscle usage or respiratory distress.      Comments: Able to speak comfortably in complete sentences on room air at rest.  Abdominal:      General: There is no distension.      Tenderness: There is no guarding.   Musculoskeletal:      Cervical back: Normal range of motion.      Right lower leg: No edema.      Left lower leg: No edema.   Skin:     General: Skin is dry.      Coloration: Skin is not pale.   Neurological:      Mental Status: He is alert and oriented to person, place, and time.      Cranial Nerves: No dysarthria or facial asymmetry.      Gait: Gait abnormal (using 1PC).   Psychiatric:         Attention and Perception: Attention normal.         Mood and Affect: Mood and affect normal.         Speech: Speech normal.         Behavior: Behavior normal. Behavior is cooperative.         Thought Content: Thought content normal.         Cognition and Memory: Cognition and memory normal.         Judgment: Judgment normal.          Recent labs:  Lab Results   Component Value Date/Time    SODIUM 136 08/02/2024 02:37 PM    K 4.5 08/02/2024 02:37 PM    BUN 15 08/02/2024 02:37 PM    CREATININE 0.88 08/02/2024 02:37 PM    GLUC 273 (H) 08/02/2024 02:37 PM    CALCIUM 9.1 08/02/2024 02:37 PM    AST 18 08/02/2024 02:37 PM    ALT 17 08/02/2024 02:37 PM    ALB 3.3 (L) 08/02/2024 02:37 PM    TP 6.5 08/02/2024 02:37 PM    EGFR 79 08/02/2024 02:37 PM     Lab Results   Component Value Date/Time    HGB 12.5 08/02/2024 02:37 PM    WBC 4.33 08/02/2024 02:37 PM     08/02/2024 02:37 PM    INR 1.34 (H) 12/29/2022 09:58 PM    PTT 32 12/29/2022 09:58 PM     Lab Results   Component Value Date/Time    PSD6AMZVSXIQ 11.561 (H) 03/20/2024 10:51 AM       Recent Imaging:  Procedure: IR port placement    Result Date: 6/19/2024  Narrative: Procedure: 1. Ultrasound guided right sided internal jugular venous access. 2. Right sided chest port placement.  Indication: 84year-old male with left lung cancer requiring port placement for chemotherapy. Fluoroscopy time: 0.6 minutes Images: Multiple Guidance: Ultrasound was utilized for initial vessel access.  Fluoroscopy was used for the rest of the procedure. Sedation: Moderate conscious sedation was utilized under my direct supervision administered by trained independent provider. A total of 30 minutes sedation time was utilized. I was present at the initial dose of sedation medication. Technique/Findings: After discussion of the benefits and risks of the procedure which included but are not limited to bleeding, infection, air embolism, and port dysfunction were discussed with the patient, questions were answered and written informed consent was obtained. The patient was placed supine on the fluoroscopy suite table.  A timeout was performed.  A limited ultrasound examination of the right  neck demonstrated a patent right  internal jugular vein without evidence of thrombus.  The right neck and upper chest were prepped and draped in the usual sterile fashion. All elements of maximal sterile barrier technique were followed (cap, mask, sterile gown, sterile gloves, large sterile sheet, hand hygiene, and 2% chlorhexidine for cutaneous antisepsis).  An appropriate entry site was chosen under ultrasound guidance.  The overlying skin and the right upper chest wall were anesthetized with 1% lidocaine. Under direct ultrasound visualization a micropuncture needle was advanced into the right  internal jugular vein with permanent recording and reporting.  Using standard Seldinger technique the introducer wire was advanced through the needle which was then  removed.  A small dermatotomy was made and the micropuncture sheath was placed over the wire.  Wire was used to estimate the length of the catheter using fluoroscopy. Next attention was turned to placing the port.  A small linear 4 cm incision was made approximately two  fingerbreadths beneath the right clavicle and a pocket for the port was created using blunt dissection.  The dignity port was assembled and placed into the pocket.   The catheter was tunneled under the skin to the right internal jugular venous access site in the neck.  The catheter was measured and cut using the 018 wire. The micropuncture sheath was exchanged for the peel away sheath and the catheter was advanced into its final position at the cavoatrial junction utilizing fluoroscopic guidance. The incision was closed in layers, first with an interrupted layer of 3-0 Vicryl and then with Histoacryl glue.  Histoacryl glue was also applied to the internal jugular venous access site. The port was accessed and noted to flush and aspirate without difficulty and then hep-locked. The patient tolerated the procedure well without any immediate complications.     Impression: Impression: Technically successful placement of right sided chest port which is ready to use. Workstation performed: [id number]     Procedure: NM PET CT skull base to mid thigh    Result Date: 6/6/2024  Narrative: PET/CT SCAN INDICATION: C34.92: Malignant neoplasm of unspecified part of left bronchus or lung C34.32: Malignant neoplasm of lower lobe, left bronchus or lung MODIFIER: PS COMPARISON: PET/CT 10/30/2023 CELL TYPE: TECHNIQUE:   8.7 mCi F-18-FDG administered IV. Multiplanar attenuation corrected and non attenuation corrected PET images are available for interpretation, and contiguous, low dose, axial CT sections were obtained from the skull base through the femurs. Intravenous contrast material was not utilized. This examination, like all CT scans performed in the [facility], was performed utilizing techniques to minimize radiation dose exposure, including the use of iterative reconstruction and automated exposure control. Fasting serum glucose: 169 mg/dl FINDINGS: VISUALIZED BRAIN: No acute abnormalities are seen. HEAD/NECK:  There is a physiologic distribution of FDG. No FDG avid cervical adenopathy is seen. CT images: Unremarkable. CHEST: Left lower lung mass measuring approximately 9 x 7 cm demonstrates persistent but decreased predominantly peripheral FDG activity prior SUV 5.7. Prior measurement 7.1 x 6.4 cm, SUV 12. Persistent central low density suggesting necrosis with new air. Coexistent infection is not excluded. There is an unchanged right paratracheal node measuring 1.3 cm, SUV 2.6, prior measurement 1.3 cm, SUV of 2.6. This is nonspecific but may be reactive given its stability. Decreased but persistent small left pleural effusion with FDG activity, SUV 3.2. This may be inflammatory/infectious. Malignant effusion is not entirely scooted. There is likely at least partial loculation of the superior posterior aspect of this effusion. Minimal uptake in small bilateral axillary nodes may be reactive. Otherwise no new FDG avid soft tissue lesions are seen. CT images: Coronary atherosclerosis. Gynecomastia. 4 mm nodule along the right minor fissure image 4/73 is stable from prior CT. Benign bilateral elastofibroma dorsi. ABDOMEN: No FDG avid soft tissue lesions are seen. CT images: Atherosclerotic aorta and branch vessels. Cholelithiasis. Colon and duodenal diverticula. Status post gastric bypass. Anterior abdominal wall mesh. PELVIS: No FDG avid soft tissue lesions are seen. CT images: Right hydrocele, stable. Prostate seeds. OSSEOUS STRUCTURES: New healing fractures of the right anterior third, fourth and fifth ribs and likely seventh rib with associated FDG activity. There is a persistent asymmetric focus of activity in the right sacrum, but similar to prior exams, current SUV 4.2, prior SUV 4.6. Prior SUV from 10/29/2021 was 3.5. No new FDG avid lesions are seen. CT images: Spine degenerative change.     Impression: 1. Necrotic left lower lung mass demonstrates persistent but diminished predominantly peripheral FDG activity.  "This may represent partial response to therapy, though residual viable tumor may still remain. There is new air within this necrotic mass, for which infection is not excluded. Correlate clinically. 2. Decreased but persistent small left pleural effusion. There is associated FDG activity which may be inflammatory/infectious. Malignant effusion is not excluded. 3. Healing right anterior rib fractures. Nonspecific but stable asymmetric focus of activity in the right sacrum. 4. Otherwise no new hypermetabolic metastases. Workstation performed: [id number]     Procedure: XR chest pa & lateral    Result Date: 5/1/2024  Narrative: XR CHEST PA & LATERAL INDICATION: J90: Pleural effusion, not elsewhere classified. COMPARISON: 1/21/2024 FINDINGS: Redemonstration of a chest tube at the left lung base. Increasing left lower lung opacity which may represent a combination of mass and pleural effusion. No pneumothorax . Normal cardiomediastinal silhouette. Bones are unremarkable for age. Normal upper abdomen.     Impression: Increasing left lower lung opacity which may represent combination of mass and pleural effusion. Workstation performed: [id number]      23 minutes total time spent on 8/16/2024 in caring for this patient including obtaining/reviewing history, symptom assessment and management, medication review / adjustment, psychosocial support, reviewing / ordering tests, medicines, imaging, procedures, supportive listening, anticipatory guidance, patient/family education, and documenting in the medical record. All patient/family questions were answered during this discussion.    Ashish Colon MD  [facility]  [phone]    Portions of this document may have been created using dictation software and as such some \"sound alike\" terms may have been generated by the system. Do not hesitate to contact me with any questions or clarifications.   "

## 2024-08-19 DIAGNOSIS — E78.2 MIXED HYPERLIPIDEMIA: ICD-10-CM

## 2024-08-19 DIAGNOSIS — G93.40 ENCEPHALOPATHY: ICD-10-CM

## 2024-08-19 RX ORDER — PRIMIDONE 50 MG/1
TABLET ORAL
Qty: 180 TABLET | Refills: 1 | Status: SHIPPED | OUTPATIENT
Start: 2024-08-19

## 2024-08-19 RX ORDER — ATORVASTATIN CALCIUM 10 MG/1
10 TABLET, FILM COATED ORAL DAILY
Qty: 90 TABLET | Refills: 1 | OUTPATIENT
Start: 2024-08-19

## 2024-08-19 NOTE — TELEPHONE ENCOUNTER
Reason for call:   [x] Refill   [] Prior Auth  [] Other:     Office:   [x] PCP/Provider -   [] Specialty/Provider -     Medication: LIPITOR    Dose/Frequency: 10 MG    Quantity: 90    Pharmacy:   [address] [address]  Phone: [phone]  Fax: [phone]  HIREN #: [id number]     Does the patient have enough for 3 days?   [] Yes   [x] No - Send as HP to POD

## 2024-08-20 ENCOUNTER — OFFICE VISIT (OUTPATIENT)
Dept: PHYSICAL THERAPY | Facility: CLINIC | Age: 84
End: 2024-08-20
Payer: COMMERCIAL

## 2024-08-20 DIAGNOSIS — R53.0 NEOPLASTIC MALIGNANT RELATED FATIGUE: ICD-10-CM

## 2024-08-20 DIAGNOSIS — C34.92 SQUAMOUS CELL CARCINOMA OF LEFT LUNG (HCC): Primary | ICD-10-CM

## 2024-08-20 DIAGNOSIS — R53.1 GENERALIZED WEAKNESS: ICD-10-CM

## 2024-08-20 PROCEDURE — 97112 NEUROMUSCULAR REEDUCATION: CPT | Performed by: PHYSICAL THERAPIST

## 2024-08-20 PROCEDURE — 97530 THERAPEUTIC ACTIVITIES: CPT | Performed by: PHYSICAL THERAPIST

## 2024-08-20 NOTE — PROGRESS NOTES
"Daily Note     Today's date: 2024  Patient name: Bridger Clayton  : 1940  MRN: [id number]  Referring provider: Bridger James MD  Dx:   Encounter Diagnosis     ICD-10-CM    1. 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 Neoplastic malignant related fatigue  R53.0       3. Generalized weakness  R53.1                      Subjective: Bridger reports he is walking around at home with his cane and sometimes without.      Objective: See treatment diary below      Assessment: Tolerated treatment well.  Patient with decreased fatigue during session today than at previous visit.  All of SLR's were performed with AROM today.  Patient was educated to continue using cane and rollator at home due to concerns regarding balance.  He voices understanding.  Patient would benefit from continued PT      Plan: Continue per plan of care.       Precautions: active cancer with chemo every 3 weeks; FALL RISK, AAA, CHF, COPD, CAD, HTN, SYLVESTER, AAA    Insurance:  AMA/CMS Eval/ Re-eval POC expires FOTO Auth Status Co-Insurance                                                       2. 7/2   3. 7   4.7   5.    6. 8     7.    8.    9.    10.    11.    12.      13.    14.    15.    16.  17.  18.      19.    20.   21.   22.   23.   24.      25.    26. 27. 28. 29. 30.   31. 32.  33. 34. 35. 36.          Daily Treatment Diary     Date of Service:  7 7      Manuals             Hamstring stretch                          Therapeutic Exercise             Bike   X5 min Nustep           Hamstring stretch seated                                           Neuro Re-ed             Quad sets  3\" x 20 3\" x 20 3\" x 20 5\"x 20 :05x 20 5\" x 20      SLR flexion   10x  2 x 10 AA for R LE 2 x 10 AROM 2 x 10 AROM 2x10 AROM 2 x 10 AROM      Bridges   10x           Seated adduction      :05x 10 5\" x 10      Clamshells    Yellow TB 2 x 10 red TB 2x10 Red TB 2 x 10 RTB 2x10 RTB 2 x 10      Standing Hip " "abd/ext   1 x10 at walkers 1 x 10 at walker 1 x 10 at walker NP today       Romberg EO ground/ foam             Seated HR/TR   2 x 10 2 x 10 2 x 10 2x10 2 x 10      SAQ      :03x 10 ea 5\" x 10 ea                   Therapeutic Activity             Step ups             Lateral step ups             STS   2 x 5 2x 5  2x5 2 x 5      Lateral walking             Gait w/ rollator   X200 ft X200 ft X150 ft  X200 ft                                                                                                              Modalities                                   "

## 2024-08-21 ENCOUNTER — TELEPHONE (OUTPATIENT)
Dept: NEUROLOGY | Facility: CLINIC | Age: 84
End: 2024-08-21

## 2024-08-23 ENCOUNTER — HOSPITAL ENCOUNTER (OUTPATIENT)
Dept: INFUSION CENTER | Facility: HOSPITAL | Age: 84
End: 2024-08-23
Payer: COMMERCIAL

## 2024-08-23 DIAGNOSIS — C34.92 SQUAMOUS CELL CARCINOMA OF LEFT LUNG (HCC): Primary | ICD-10-CM

## 2024-08-23 LAB
ALBUMIN SERPL BCG-MCNC: 3.5 G/DL (ref 3.5–5)
ALP SERPL-CCNC: 99 U/L (ref 34–104)
ALT SERPL W P-5'-P-CCNC: 20 U/L (ref 7–52)
ANION GAP SERPL CALCULATED.3IONS-SCNC: 6 MMOL/L (ref 4–13)
AST SERPL W P-5'-P-CCNC: 23 U/L (ref 13–39)
BASOPHILS # BLD AUTO: 0.07 THOUSANDS/ÂΜL (ref 0–0.1)
BASOPHILS NFR BLD AUTO: 1 % (ref 0–1)
BILIRUB SERPL-MCNC: 0.36 MG/DL (ref 0.2–1)
BUN SERPL-MCNC: 17 MG/DL (ref 5–25)
CALCIUM SERPL-MCNC: 9.2 MG/DL (ref 8.4–10.2)
CHLORIDE SERPL-SCNC: 104 MMOL/L (ref 96–108)
CO2 SERPL-SCNC: 28 MMOL/L (ref 21–32)
CREAT SERPL-MCNC: 0.95 MG/DL (ref 0.6–1.3)
EOSINOPHIL # BLD AUTO: 0.68 THOUSAND/ÂΜL (ref 0–0.61)
EOSINOPHIL NFR BLD AUTO: 12 % (ref 0–6)
ERYTHROCYTE [DISTWIDTH] IN BLOOD BY AUTOMATED COUNT: 17.1 % (ref 11.6–15.1)
GFR SERPL CREATININE-BSD FRML MDRD: 74 ML/MIN/1.73SQ M
GLUCOSE SERPL-MCNC: 181 MG/DL (ref 65–140)
HCT VFR BLD AUTO: 41.4 % (ref 36.5–49.3)
HGB BLD-MCNC: 12.8 G/DL (ref 12–17)
IMM GRANULOCYTES # BLD AUTO: 0.02 THOUSAND/UL (ref 0–0.2)
IMM GRANULOCYTES NFR BLD AUTO: 0 % (ref 0–2)
LYMPHOCYTES # BLD AUTO: 0.88 THOUSANDS/ÂΜL (ref 0.6–4.47)
LYMPHOCYTES NFR BLD AUTO: 16 % (ref 14–44)
MCH RBC QN AUTO: 28.2 PG (ref 26.8–34.3)
MCHC RBC AUTO-ENTMCNC: 30.9 G/DL (ref 31.4–37.4)
MCV RBC AUTO: 91 FL (ref 82–98)
MONOCYTES # BLD AUTO: 0.71 THOUSAND/ÂΜL (ref 0.17–1.22)
MONOCYTES NFR BLD AUTO: 13 % (ref 4–12)
NEUTROPHILS # BLD AUTO: 3.12 THOUSANDS/ÂΜL (ref 1.85–7.62)
NEUTS SEG NFR BLD AUTO: 58 % (ref 43–75)
NRBC BLD AUTO-RTO: 0 /100 WBCS
PLATELET # BLD AUTO: 335 THOUSANDS/UL (ref 149–390)
PMV BLD AUTO: 9.7 FL (ref 8.9–12.7)
POTASSIUM SERPL-SCNC: 4.3 MMOL/L (ref 3.5–5.3)
PROT SERPL-MCNC: 6.8 G/DL (ref 6.4–8.4)
RBC # BLD AUTO: 4.54 MILLION/UL (ref 3.88–5.62)
SODIUM SERPL-SCNC: 138 MMOL/L (ref 135–147)
WBC # BLD AUTO: 5.48 THOUSAND/UL (ref 4.31–10.16)

## 2024-08-23 PROCEDURE — 80053 COMPREHEN METABOLIC PANEL: CPT | Performed by: INTERNAL MEDICINE

## 2024-08-23 PROCEDURE — 85025 COMPLETE CBC W/AUTO DIFF WBC: CPT | Performed by: INTERNAL MEDICINE

## 2024-08-23 NOTE — PLAN OF CARE
Problem: Potential for Falls  Goal: Patient will remain free of falls  Description: INTERVENTIONS:  - Educate patient/family on patient safety including physical limitations  - Instruct patient to call for assistance with activity   - Keep Call bell within reach    Outcome: Progressing     Problem: Knowledge Deficit  Goal: Patient/family/caregiver demonstrates understanding of disease process, treatment plan, medications, and discharge instructions  Description: Complete learning assessment and assess knowledge base.  Interventions:  - Provide teaching at level of understanding  - Provide teaching via preferred learning methods  Outcome: Progressing

## 2024-08-27 ENCOUNTER — HOSPITAL ENCOUNTER (OUTPATIENT)
Dept: INFUSION CENTER | Facility: HOSPITAL | Age: 84
Discharge: HOME | End: 2024-08-27
Attending: INTERNAL MEDICINE
Payer: COMMERCIAL

## 2024-08-27 VITALS
DIASTOLIC BLOOD PRESSURE: 76 MMHG | WEIGHT: 213.85 LBS | OXYGEN SATURATION: 94 % | SYSTOLIC BLOOD PRESSURE: 164 MMHG | HEART RATE: 88 BPM | TEMPERATURE: 97 F | HEIGHT: 70 IN | BODY MASS INDEX: 30.61 KG/M2 | RESPIRATION RATE: 18 BRPM

## 2024-08-27 DIAGNOSIS — C34.92 SQUAMOUS CELL CARCINOMA OF LEFT LUNG (HCC): Primary | ICD-10-CM

## 2024-08-27 PROCEDURE — 96367 TX/PROPH/DG ADDL SEQ IV INF: CPT

## 2024-08-27 PROCEDURE — 96415 CHEMO IV INFUSION ADDL HR: CPT

## 2024-08-27 PROCEDURE — 96375 TX/PRO/DX INJ NEW DRUG ADDON: CPT

## 2024-08-27 PROCEDURE — 96413 CHEMO IV INFUSION 1 HR: CPT

## 2024-08-27 RX ORDER — SODIUM CHLORIDE 9 MG/ML
20 INJECTION, SOLUTION INTRAVENOUS ONCE
Status: COMPLETED | OUTPATIENT
Start: 2024-08-27 | End: 2024-08-27

## 2024-08-27 RX ORDER — SODIUM CHLORIDE 9 MG/ML
20 INJECTION, SOLUTION INTRAVENOUS ONCE
Status: CANCELLED | OUTPATIENT
Start: 2024-08-27

## 2024-08-27 RX ADMIN — DEXAMETHASONE SODIUM PHOSPHATE: 10 INJECTION, SOLUTION INTRAMUSCULAR; INTRAVENOUS at 09:50

## 2024-08-27 RX ADMIN — SODIUM CHLORIDE 20 ML/HR: 0.9 INJECTION, SOLUTION INTRAVENOUS at 09:11

## 2024-08-27 RX ADMIN — PACLITAXEL 256.2 MG: 6 INJECTION, SOLUTION, CONCENTRATE INTRAVENOUS at 10:16

## 2024-08-27 RX ADMIN — DIPHENHYDRAMINE HYDROCHLORIDE 25 MG: 50 INJECTION, SOLUTION INTRAMUSCULAR; INTRAVENOUS at 09:11

## 2024-08-27 RX ADMIN — FAMOTIDINE 20 MG: 10 INJECTION, SOLUTION INTRAVENOUS at 09:33

## 2024-08-27 NOTE — PLAN OF CARE
Problem: Potential for Falls  Goal: Patient will remain free of falls  Description: INTERVENTIONS:  - Educate patient/family on patient safety including physical limitations  - Instruct patient to call for assistance with activity   - Keep Call bell within reach  - Keep bed low and locked with side rails adjusted as appropriate  - Keep care items and personal belongings within reach  - Initiate and maintain comfort rounds  Outcome: Progressing     Problem: INFECTION - ADULT  Goal: Absence or prevention of progression during hospitalization  Description: INTERVENTIONS:  - Assess and monitor for signs and symptoms of infection  - Monitor lab/diagnostic results  - Monitor all insertion sites, i.e. indwelling lines, tubes, and drains  - Monitor endotracheal if appropriate and nasal secretions for changes in amount and color  - Zimmerman appropriate cooling/warming therapies per order  - Administer medications as ordered  - Instruct and encourage patient and family to use good hand hygiene technique  - Identify and instruct in appropriate isolation precautions for identified infection/condition  Outcome: Progressing  Goal: Absence of fever/infection during neutropenic period  Description: INTERVENTIONS:  - Monitor WBC    Outcome: Progressing     Problem: DISCHARGE PLANNING  Goal: Discharge to home or other facility with appropriate resources  Description: INTERVENTIONS:  - Identify barriers to discharge w/patient and caregiver  - Arrange for needed discharge resources and transportation as appropriate  - Identify discharge learning needs (meds, wound care, etc.)  - Arrange for interpretive services to assist at discharge as needed  - Refer to Case Management Department for coordinating discharge planning if the patient needs post-hospital services based on physician/advanced practitioner order or complex needs related to functional status, cognitive ability, or social support system  Outcome: Progressing     Problem:  Knowledge Deficit  Goal: Patient/family/caregiver demonstrates understanding of disease process, treatment plan, medications, and discharge instructions  Description: Complete learning assessment and assess knowledge base.  Interventions:  - Provide teaching at level of understanding  - Provide teaching via preferred learning methods  Outcome: Progressing

## 2024-08-27 NOTE — PROGRESS NOTES
Bridger Clayton  tolerated treatment well with no complications.      Bridger Clayton is aware of future appt on 9/16 at 1000.     AVS printed and given to Bridger Clayton: yes

## 2024-08-29 DIAGNOSIS — T45.1X5A CHEMOTHERAPY-INDUCED PERIPHERAL NEUROPATHY (HCC): ICD-10-CM

## 2024-08-29 DIAGNOSIS — K21.9 GASTROESOPHAGEAL REFLUX DISEASE WITHOUT ESOPHAGITIS: ICD-10-CM

## 2024-08-29 DIAGNOSIS — G62.0 CHEMOTHERAPY-INDUCED PERIPHERAL NEUROPATHY (HCC): ICD-10-CM

## 2024-08-29 DIAGNOSIS — C34.92 SQUAMOUS CELL CARCINOMA OF LEFT LUNG (HCC): Chronic | ICD-10-CM

## 2024-08-29 DIAGNOSIS — E11.40 TYPE 2 DIABETES MELLITUS WITH DIABETIC NEUROPATHY, WITHOUT LONG-TERM CURRENT USE OF INSULIN (HCC): ICD-10-CM

## 2024-08-29 DIAGNOSIS — Z51.5 PALLIATIVE CARE ENCOUNTER: ICD-10-CM

## 2024-08-29 RX ORDER — GABAPENTIN 300 MG/1
300 CAPSULE ORAL 2 TIMES DAILY
Qty: 60 CAPSULE | Refills: 2 | Status: SHIPPED | OUTPATIENT
Start: 2024-08-29

## 2024-09-03 ENCOUNTER — OFFICE VISIT (OUTPATIENT)
Dept: PHYSICAL THERAPY | Facility: CLINIC | Age: 84
End: 2024-09-03
Payer: COMMERCIAL

## 2024-09-03 DIAGNOSIS — R53.0 NEOPLASTIC MALIGNANT RELATED FATIGUE: ICD-10-CM

## 2024-09-03 DIAGNOSIS — R53.1 GENERALIZED WEAKNESS: ICD-10-CM

## 2024-09-03 DIAGNOSIS — C34.92 SQUAMOUS CELL CARCINOMA OF LEFT LUNG (HCC): Primary | ICD-10-CM

## 2024-09-03 PROCEDURE — 97112 NEUROMUSCULAR REEDUCATION: CPT | Performed by: PHYSICAL THERAPIST

## 2024-09-03 PROCEDURE — 97530 THERAPEUTIC ACTIVITIES: CPT | Performed by: PHYSICAL THERAPIST

## 2024-09-03 NOTE — PROGRESS NOTES
"Daily Note     Today's date: 9/3/2024  Patient name: Bridger Clayton  : 1940  MRN: [id number]  Referring provider: Bridger James MD  Dx:   Encounter Diagnosis     ICD-10-CM    1. 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 Neoplastic malignant related fatigue  R53.0       3. Generalized weakness  R53.1                        Subjective: Patient reports he can tell it is easier to get up from his chair.       Objective: See treatment diary below      Assessment: Tolerated treatment well.  Patient able to tolerate increased repetitions of most exercises today without significant increase in fatigue.  SLR continue to be challenging for patient however he can complete all sets.  Sit to stand continues to be challenging without use of UE's.    Patient would benefit from continued PT      Plan: Continue per plan of care.       Precautions: active cancer with chemo every 3 weeks; FALL RISK, AAA, CHF, COPD, CAD, HTN, SYLVESTER, AAA    Insurance:  AMA/CMS Eval/ Re-eval POC expires FOTO Auth Status Co-Insurance                                                       2. 7/   3. 7   4.7   5. 7   6. 8     7.    8.    9.    10.    11.    12.      13.    14.    15.    16.  17.  18.      19.    20.   21.   22.   23.   24.      25.    26. 27. 28. 29. 30.   31. 32.  33. 34. 35. 36.          Daily Treatment Diary     Date of Service:  7 7/9 7/11 7/30 8/13 8/20 9/3     Manuals             Hamstring stretch                          Therapeutic Exercise             Bike   X5 min Nustep           Hamstring stretch seated                                           Neuro Re-ed             Quad sets  3\" x 20 3\" x 20 3\" x 20 5\"x 20 :05x 20 5\" x 20 5\" x 20     SLR flexion   10x  2 x 10 AA for R LE 2 x 10 AROM 2 x 10 AROM 2x10 AROM 2 x 10 AROM 2 x 10 AROM     Bridges   10x           Seated adduction      :05x 10 5\" x 10 5' x 20     Clamshells    Yellow TB 2 x 10 red TB 2x10 Red TB 2 x 10 RTB 2x10 RTB 2 x 10 " "G TB 2 x 10     Standing Hip abd/ext   1 x10 at walkers 1 x 10 at walker 1 x 10 at walker NP today       Romberg EO ground/ foam             Seated HR/TR   2 x 10 2 x 10 2 x 10 2x10 2 x 10      SAQ      :03x 10 ea 5\" x 10 ea 5' 2 x 10 eac                  Therapeutic Activity             Step ups             Lateral step ups             STS   2 x 5 2x 5  2x5 2 x 5 2 x 5     Lateral walking             Gait w/ rollator   X200 ft X200 ft X150 ft  X200 ft X200 ft                                                                                                             Modalities                                   "

## 2024-09-04 ENCOUNTER — OFFICE VISIT (OUTPATIENT)
Dept: NEUROLOGY | Facility: CLINIC | Age: 84
End: 2024-09-04
Payer: COMMERCIAL

## 2024-09-04 VITALS
DIASTOLIC BLOOD PRESSURE: 68 MMHG | BODY MASS INDEX: 30.64 KG/M2 | SYSTOLIC BLOOD PRESSURE: 130 MMHG | HEIGHT: 70 IN | WEIGHT: 214 LBS | HEART RATE: 82 BPM

## 2024-09-04 DIAGNOSIS — G25.0 TREMOR, ESSENTIAL: Primary | ICD-10-CM

## 2024-09-04 DIAGNOSIS — R91.8 LUNG MASS: ICD-10-CM

## 2024-09-04 PROCEDURE — 99214 OFFICE O/P EST MOD 30 MIN: CPT | Performed by: PSYCHIATRY & NEUROLOGY

## 2024-09-04 NOTE — PROGRESS NOTES
Return NeuroOutpatient Note        Bridger Clayton  [id number]  84 y.o.  1940       Tremors (Patient reports tremors are stable)        History obtained from:  Patient and wife     HPI/Subjective:    Bridger Clayton is an 85 yo M with PMH of tremors presents as f/u. Per my previous history, patient has tremors in both hands for over 2.5 years. He had described them when he would do something, use utensils, write, hold cup, fix something.    Patient is currently taking primidone 100mg nightly. This seems to work.   He has no limitations.     Previously, patient had reported lifting heavy containers and had had pain. PT had helped. He doesn't get pain anymore.        He has tendency to wheeze. He takes inhaler prn so we have not used propranolol.   He is found to have lung mass which based on recent PET scan size was progressing and is stabilized now.         Past Medical History:   Diagnosis Date   • Abdominal pain    • Alcohol dependence (HCC) 05/01/2022   • Anxiety    • Arthritis    • Asthma    • Atrial fibrillation (HCC)    • Back pain    • Bleeding ulcer    • Bronchitis    • Cancer (HCC)     prostate 2011- radiation   • Cardiac disease     cardiac stent x1   • Cataract     starting   • Chronic diastolic (congestive) heart failure (HCC)    • Diabetes mellitus (HCC)     boarderline diabetic    • GERD (gastroesophageal reflux disease)    • History of radiation therapy 2010    Prostate seeds (brachytherapy) and EBRT   • Hyperlipidemia    • Hypertension    • 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, bilateral    • Low back pain    • Lung cancer (HCC)    • Lung mass    • Myocardial infarction (HCC)     mild 1999   • Obesity    • Prostate cancer (HCC)    • Shortness of breath    • Sleep apnea     sleep study 11/22   • Wears dentures     full set   • Wears glasses      Social History     Socioeconomic History   • Marital status: /Civil Union     Spouse name: Not on file   • Number of children: Not on file   • Years of  education: Not on file   • Highest education level: Not on file   Occupational History   • Not on file   Tobacco Use   • Smoking status: Former     Current packs/day: 0.00     Average packs/day: 1 pack/day for 30.0 years (30.0 ttl pk-yrs)     Types: Cigarettes     Start date:      Quit date:      Years since quittin.6   • Smokeless tobacco: Never   Vaping Use   • Vaping status: Never Used   Substance and Sexual Activity   • Alcohol use: Yes     Alcohol/week: 4.0 - 6.0 standard drinks of alcohol     Types: 1 Glasses of wine, 3 - 5 Cans of beer per week     Comment: socially   • Drug use: Never   • Sexual activity: Not on file   Other Topics Concern   • Not on file   Social History Narrative    From 24  note:    Relationship status:     Duration of relationship: 59 years     Name of significant other: Taylor    Children and Ages: 3 sons     Pets: None    Other important family information: Sons are local and supportive    Living situation (where and whom): Resides with spouse      Patient's primary caregiver: Wife assists as needed       history: Served in the [employer]--is not [facility]    Employment history/source of income:  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    Spirituality/ Lutheran: Presbyterian     Patient's strengths, social supports, and resources: Supportive family and Orthodoxy support ( visits 1x/month)    Durable Medical Equipment needs: Cane, rollator, high-rise toilet    Transportation: Wife transports    Advanced Directive: Copy of AD on file designating wife Taylor as substitute decision maker with sons Bridger and Yobany as alternates    Patient's interests: Watching TV     Social Determinants of Health     Financial Resource Strain: Medium Risk (2023)    Overall Financial Resource Strain (CARDIA)    • Difficulty of Paying Living Expenses: Somewhat hard   Food Insecurity: No Food Insecurity (2022)    Hunger Vital Sign    •  Worried About Running Out of Food in the Last Year: Never true    • Ran Out of Food in the Last Year: Never true   Transportation Needs: No Transportation Needs (8/1/2023)    PRAPARE - Transportation    • Lack of Transportation (Medical): No    • Lack of Transportation (Non-Medical): No   Physical Activity: Not on file   Stress: Not on file   Social Connections: Not on file   Intimate Partner Violence: Not on file   Housing Stability: Low Risk  (12/19/2022)    Housing Stability Vital Sign    • Unable to Pay for Housing in the Last Year: No    • Number of Places Lived in the Last Year: 1    • Unstable Housing in the Last Year: No     Family History   Problem Relation Age of Onset   • Prostate cancer Brother    • Cancer Maternal Uncle         colo rectal cancer   • Cancer Paternal Aunt      No Known Allergies  Current Outpatient Medications on File Prior to Visit   Medication Sig Dispense Refill   • acetaminophen (TYLENOL) 325 mg tablet Take 2 tablets (650 mg total) by mouth every 6 (six) hours as needed for mild pain, headaches or fever 30 tablet 0   • apixaban (Eliquis) 5 mg Take 1 tablet (5 mg total) by mouth 2 (two) times a day (Patient taking differently: Take 2.5 mg by mouth 2 (two) times a day) 180 tablet 0   • atenolol (TENORMIN) 25 mg tablet      • atorvastatin (LIPITOR) 10 mg tablet TAKE 1 TABLET (10 MG TOTAL) BY MOUTH DAILY 90 tablet 1   • carvedilol (COREG) 25 mg tablet Take 1 tablet (25 mg total) by mouth 2 (two) times a day with meals 180 tablet 1   • ciclopirox (LOPROX) 0.77 % cream Apply topically 2 (two) times a day for 20 days 45 g 2   • gabapentin (NEURONTIN) 300 mg capsule TAKE 1 CAPSULE BY MOUTH 2 TIMES A DAY 60 capsule 2   • guaifenesin-codeine (GUAIFENESIN AC) 100-10 MG/5ML liquid Take 10 mL by mouth 3 (three) times a day as needed for cough 473 mL 0   • halobetasol (ULTRAVATE) 0.05 % cream      • hydrOXYzine HCL (ATARAX) 50 mg tablet Take 1 tablet (50 mg total) by mouth daily at bedtime 90  tablet 1   • latanoprost (XALATAN) 0.005 % ophthalmic solution Administer 0.005 mL to both eyes daily at bedtime     • lidocaine-prilocaine (EMLA) cream Apply to PAC site one hour prior to access 30 g 2   • metFORMIN (GLUCOPHAGE) 500 mg tablet Take 1 tablet (500 mg total) by mouth 2 (two) times a day with meals 180 tablet 1   • mirtazapine (REMERON) 7.5 MG tablet Take 1 tablet (7.5 mg total) by mouth daily at bedtime 90 tablet 3   • Multiple Vitamin (MULTI-VITAMIN DAILY PO) Take by mouth daily       • omeprazole (PriLOSEC) 20 mg delayed release capsule TAKE ONE CAPSULE BY MOUTH DAILY 90 capsule 1   • ondansetron (ZOFRAN) 8 mg tablet Take 1 tablet (8 mg total) by mouth every 8 (eight) hours as needed for nausea or vomiting 20 tablet 2   • primidone (MYSOLINE) 50 mg tablet TAKE 2 TABS AT 8PM. 180 tablet 1   • triamcinolone (KENALOG) 0.1 % cream      • albuterol (2.5 mg/3 mL) 0.083 % nebulizer solution Take 2.5 mg by nebulization As needed per patient's wife (Patient not taking: Reported on 7/25/2024)     • aspirin (ECOTRIN LOW STRENGTH) 81 mg EC tablet  (Patient not taking: Reported on 9/21/2023)     • fluticasone-umeclidinium-vilanterol (Trelegy Ellipta) 100-62.5-25 mcg/actuation inhaler Rinse mouth after use. (Patient not taking: Reported on 7/25/2024) 60 each 5   • hydrocortisone 2.5 % cream Apply topically 2 (two) times a day Apply twice a day affected area the trunk (Patient not taking: Reported on 9/4/2024) 20 g 2     No current facility-administered medications on file prior to visit.         Review of Systems   Refer to positive review of systems in HPI.   Review of Systems    Constitutional- No fever  Eyes- No visual change  ENT- Hearing normal  CV- No chest pain  Resp- No Shortness of breath  GI- No diarrhea  - Bladder normal  MS- No Arthritis   Skin- No rash  Psych- No depression  Endo- No DM  Heme- No nodes    Vitals:    09/04/24 1126   BP: 130/68   BP Location: Left arm   Patient Position: Sitting  "  Cuff Size: Large   Pulse: 82   Weight: 97.1 kg (214 lb)   Height: 5' 10\" (1.778 m)       PHYSICAL EXAM:  Appearance: No Acute Distress  Ophthalmoscopic: Disc Flat, Normal fundus  Mental status:  Orientation: Awake, Alert, and Orientedx3  Memory: Registation 3/3 Recall 3/3  Attention: normal  Knowledge: good  Language: No aphasia  Speech: No dysarthria  Cranial Nerves:  2 No Visual Defect on Confrontation, Pupils round, equal, reactive to light  3,4,6 Extraocular Movements Intact, no nystagmus  5 Facial Sensation Intact  7 No facial asymmetry  8 Intact hearing  9,10 Palate symmetric, normal gag  11 Good shoulder shrug  12 Tongue Midline  Gait: Stable  Coordination: very mild ET In LUE. No ataxia with finger to nose testing, and heel to shin  Sensory: Intact, Symmetric to pinprick, light touch, vibration, and joint position  Muscle Tone: Normal              Muscle exam:  Arm Right Left Leg Right Left   Deltoid 5/5 5/5 Iliopsoas 5/5 5/5   Biceps 5/5 5/5 Quads 5/5 5/5   Triceps 5/5 5/5 Hamstrings 5/5 5/5   Wrist Extension 5/5 5/5 Ankle Dorsi Flexion 5/5 5/5   Wrist Flexion 5/5 5/5 Ankle Plantar Flexion 5/5 5/5   Interossei 5/5 5/5 Ankle Eversion 5/5 5/5   APB 5/5 5/5 Ankle Inversion 5/5 5/5       Reflexes   RJ BJ TJ KJ AJ Plantars Link's   Right 2+ 2+ 2+ 2+ 2+ Downgoing Not present   Left 2+ 2+ 2+ 2+ 2+ Downgoing Not present     Personal review of  Labs:                    Diagnoses and all orders for this visit:      1. Tremor, essential        2. Lung mass            Patient is doing well.   He is to resume primidone 100mg qhs.   Encouraged physical activity and exercise.               Total time of encounter:  30 min  More than 50% of the time was used in counseling and/or coordination of care  Extent of counseling and/or coordination of care        Facundo Juarez MD  [facility]  [address] [place] [address] [id number] [phone]    "

## 2024-09-04 NOTE — PROGRESS NOTES
Bridger Clayton  1940  [facility] HEMATOLOGY [facility] SH[facility] [id number]     DISCUSSION/SUMMARY:     84-year-old male with a number of medical and cardiac problems previously found to have a left lower lobe mass. Initial biopsy demonstrated squamous cell carcinoma.  IR performed thoracentesis, minimal amount of fluid was removed but there was no evidence of metastatic disease; cytology was negative.  Patient was seen by thoracic surgery and underwent mediastinoscopy.  There was no evidence of metastatic disease in the sampled lymph nodes (2R, 4R, 4L, 7).  Tumor was 4.5 cm.  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  Patient was treated with SBRT and then went on to surveillance.    Eventual follow-up PET/CT demonstrated evidence of recurrence in the left lower lobe, same area where the original malignancy was found.  There was no clear evidence of spread to either hilar regions or the mediastinum but radiologist commented on 1 right paratracheal lymph node but very +/-.  There was nonspecific FDG activity in the sacrum and left posterior iliac bone, seen before.  Unknown clinical significance.    At that time, patient also began to have more problems with recurrent left-sided pleural effusions requiring a Pleurx catheter.  Previously wife was draining approximately 500 cc every 2 to 3 days.  Discontinued while patient was started on pembrolizumab, did not get significantly better.  Additionally patient developed a rash while on pembrolizumab.  The ICI was eventually discontinued and patient was started on paclitaxel.  Patient has completed 9 cycles, next treatmetn is 9/17/2024..  Recent blood work was okay/acceptable.  PleurX catheter has been removed.  Recent follow-up PET/CT demonstrated response to treatment, no evidence of disease progression.  The continued on single agent dose reduced to 70%  Taxol.    Current Regimen  Paclitaxel 135 mg/m2 IV day 1 (was receiving 77% up until now)  Cycle length = 21 days  Goal = palliation and prolongation of life He was scheduled for his last cycle of chemotherapy on 6/25/2024.  He complained of worsening neuropathy in his fingertips.  Taxol was restarted. His numbnessin fingetups has nto changed. He can hold a pen or a cup but not open cans.    Patient was diagnosed with prostate cancer (approximately 10 + years ago) and underwent seeds and external beam radiation.  No evidence of recurrence since that time.       Physical Exam  Constitutional:       Appearance: He is well-developed.      Comments: Obese male, no respiratory distress, no signs of pain   HENT:      Head: Normocephalic and atraumatic.      Right Ear: External ear normal.      Left Ear: External ear normal.   Eyes:      Conjunctiva/sclera: Conjunctivae normal.      Pupils: Pupils are equal, round, and reactive to light.   Cardiovascular:      Rate and Rhythm: Normal rate and regular rhythm.      Heart sounds: Normal heart sounds.   Pulmonary:      Effort: Pulmonary effort is normal.      Comments: Decreased breath sounds left lower lobe  Abdominal:      Palpations: Abdomen is soft.      Comments: Obese, nontender.  Musculoskeletal:         General: Normal range of motion.      Cervical back: Normal range of motion and neck supple.   Skin:     General: Skin is warm.      Comments: Relatively good color, warm, moist, scattered mild areas of ecchymosis   Neurological:      Mental Status: He is alert and oriented to person, place, and time.   Psychiatric:         Behavior: Behavior normal.         Thought Content: Thought content normal.         Judgment: Judgment normal.      Extremities:  No lower extremity edema bilaterally.     Imaging     05/02/2022 CT scan the chest without contrast     IMPRESSION:  1.  Increase in size of a left lower lobe patchy opacity with new right basilar patchy densities  concerning for pulmonary infiltrates with additional patchy and reticulonodular changes lingular segment left upper lobe and right middle lobe also likely related to infectious process.  2.  Left lower lobe cavitary mass has decreased in size now measuring 3.3 x 1.9 cm, previously 4.1 x 2.2 cm.  3.  Stable COPD and pulmonary fibrosis with the latter likely related to radiation change.    MPRESSION:  1. Necrotic left lower lung mass demonstrates persistent but diminished predominantly peripheral FDG activity. This may represent partial response to therapy, though residual viable tumor may still remain. There is new air within this necrotic mass, for   which infection is not excluded. Correlate clinically.  2. Decreased but persistent small left pleural effusion. There is associated FDG activity which may be inflammatory/infectious. Malignant effusion is not excluded.  3. Healing right anterior rib fractures. Nonspecific but stable asymmetric focus of activity in the right sacrum.  4. Otherwise no new hypermetabolic metastases.    6/6/2024 PET/CT  MPRESSION:  1. Necrotic left lower lung mass demonstrates persistent but diminished predominantly peripheral FDG activity. This may represent partial response to therapy, though residual viable tumor may still remain. There is new air within this necrotic mass, for   which infection is not excluded. Correlate clinically.  2. Decreased but persistent small left pleural effusion. There is associated FDG activity which may be inflammatory/infectious. Malignant effusion is not excluded.  3. Healing right anterior rib fractures. Nonspecific but stable asymmetric focus of activity in the right sacrum.  4. Otherwise no new hypermetabolic metastases.     Pathology    12/7/2023 [facility].  No action mutations were detected.  Tumor mutational burden = 6 = low.  Patient had a pathologic variant in PTEN and TP53.  PDL IHC TPS = 0%, negative.     Surgical Pathology Report                          Case: [id number]                                    Authorizing Provider:  Elmer Laird MD      Collected:           10/05/2021 1058               Ordering Location:     [facility] Received:            10/05/2021 1120                                      CAT Scan                                                                      Pathologist:           Mary Dean MD                                                         Specimen:    Lung, Left Lower Lobe                                                                       Final Diagnosis   A. Lung, Left Lower Lobe, :  - Invasive squamous carcinoma, moderately differentiated, keratinizing type.  - Tumor is highlighted with P 40 immunoperoxidase stain.  - Prominent tumor necrosis is noted.      Best representative block with tumor A1.  This case was reviewed at the intradepartmental  conference.   RADHA Navarrete, Pulmonology, is notified of the diagnosis in [employer] via [employer] on 10/06/2021  at 2.40 pm.   Electronically signed by Mary Dean MD on 10/6/2021 at  2:44 PM       Recent imaging - 6/6/2024 PET/CT: 1. Necrotic left lower lung mass demonstrates persistent but diminished predominantly peripheral FDG activity. This may represent partial response to therapy, though residual viable tumor may still remain. There is new air within this necrotic mass, for   which infection is not excluded. Correlate clinically.  2. Decreased but persistent small left pleural effusion. There is associated FDG activity which may be inflammatory/infectious. Malignant effusion is not excluded.  3. Healing right anterior rib fractures. Nonspecific but stable asymmetric focus of activity in the right sacrum.  4. Otherwise no new hypermetabolic metastases.   Hb A1C 7.9    A/P    Restage after next Taxol  and make a decision whether to continue paclitaxel, Neuropathy stabel for now.  PET?CT scheduled.  RTC 6 weeks.

## 2024-09-05 ENCOUNTER — DOCUMENTATION (OUTPATIENT)
Dept: HEMATOLOGY ONCOLOGY | Facility: MEDICAL CENTER | Age: 84
End: 2024-09-05

## 2024-09-05 ENCOUNTER — OFFICE VISIT (OUTPATIENT)
Dept: HEMATOLOGY ONCOLOGY | Facility: MEDICAL CENTER | Age: 84
End: 2024-09-05
Payer: COMMERCIAL

## 2024-09-05 ENCOUNTER — TELEPHONE (OUTPATIENT)
Dept: HEMATOLOGY ONCOLOGY | Facility: MEDICAL CENTER | Age: 84
End: 2024-09-05

## 2024-09-05 VITALS
WEIGHT: 215 LBS | SYSTOLIC BLOOD PRESSURE: 102 MMHG | RESPIRATION RATE: 18 BRPM | HEIGHT: 70 IN | BODY MASS INDEX: 30.78 KG/M2 | HEART RATE: 74 BPM | OXYGEN SATURATION: 91 % | TEMPERATURE: 98 F | DIASTOLIC BLOOD PRESSURE: 60 MMHG

## 2024-09-05 DIAGNOSIS — C79.89 NON-SMALL CELL CARCINOMA OF LEFT LUNG METASTATIC TO ABDOMEN (HCC): ICD-10-CM

## 2024-09-05 DIAGNOSIS — C34.92 SQUAMOUS CELL CARCINOMA OF LEFT LUNG (HCC): Primary | ICD-10-CM

## 2024-09-05 DIAGNOSIS — C34.92 NON-SMALL CELL CARCINOMA OF LEFT LUNG METASTATIC TO ABDOMEN (HCC): ICD-10-CM

## 2024-09-05 PROCEDURE — 99213 OFFICE O/P EST LOW 20 MIN: CPT | Performed by: INTERNAL MEDICINE

## 2024-09-05 PROCEDURE — 1159F MED LIST DOCD IN RCRD: CPT | Performed by: INTERNAL MEDICINE

## 2024-09-05 PROCEDURE — 1160F RVW MEDS BY RX/DR IN RCRD: CPT | Performed by: INTERNAL MEDICINE

## 2024-09-05 NOTE — TELEPHONE ENCOUNTER
Voicemail left for patient that his PetCT has been scheduled for 10/1/24 at 8am (arrival time 7:45am for registration).

## 2024-09-09 ENCOUNTER — OFFICE VISIT (OUTPATIENT)
Dept: PHYSICAL THERAPY | Facility: CLINIC | Age: 84
End: 2024-09-09
Payer: COMMERCIAL

## 2024-09-09 DIAGNOSIS — R53.0 NEOPLASTIC MALIGNANT RELATED FATIGUE: ICD-10-CM

## 2024-09-09 DIAGNOSIS — R53.1 GENERALIZED WEAKNESS: ICD-10-CM

## 2024-09-09 DIAGNOSIS — C34.92 SQUAMOUS CELL CARCINOMA OF LEFT LUNG (HCC): Primary | ICD-10-CM

## 2024-09-09 PROCEDURE — 97112 NEUROMUSCULAR REEDUCATION: CPT | Performed by: PHYSICAL THERAPIST

## 2024-09-09 NOTE — PROGRESS NOTES
Cancer Care Evaluation    Today's Date: 2024  Patient name: Bridger Clayton  : 1940  MRN: [id number]  Referring provider: Bridger James MD  Dx:  Encounter Diagnosis     ICD-10-CM    1. 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)  C34.92 PT plan of care cert/re-cert      2. Neoplastic malignant related fatigue  R53.0 PT plan of care cert/re-cert      3. Generalized weakness  R53.1 PT plan of care cert/re-cert            Start Time: 1500  Stop Time: 1540  Total time in clinic (min): 40 minutes       Assessment/Plan    Bridger Clayton is a 84 y.o. year old male with complaints of weakness, fatigue, and loss of balance.   Bridger has made the following improvements since beginning PT: LXPTREEVAL: decreased pain, increased strength, increased postural control and awareness, increased balance and proprioception , increased tolerance to activities, and knowledge of pacing and [facility] phase protocol  . Bridger continues to present with the impairments as listed above including fatigue, SOB with prolonged walking and standing. Patient would continue to benefit from skilled PT to address these deficits and to maximize function.  Patient's presentation is consistent with side effects of current chemotherapy regimen of Paclitaxel every 3 weeks with the following impairments found on evaluation: weakness, fatigue, decreased endurance and balance. Relevant medical history includes current diagnosis of L lung squamous cell carcinoma, PMH prostate cancer, CAD, CHF, HTN, AAA, COPD, diabetes, PMH cellulitis. The listed physical impairments contribute to the following functional limitations: decreased tolerance to standing and walking, difficulty getting out of a chair and car. Spenser is a good candidate for physical therapy and would benefit from skilled physical therapy to address the above impairments in order to allow the patient to achieve the goals listed and return to prior level of function.       Short-Term Goals:   1. Patient  "will improve LE strength by 1/2 grade or better in all myotomes in 4-6 weeks- Met  2. Patient will be able to complete 6 minute walk test with 1 seated rest break in 4-6 weeks- Progressing  3. Patient will improve Romberg on ground by 10 seconds in 4-6 weeks- Met    Long-Term Goals:   1. Increase time in HR intensity range to 10-15 minutes without pain/ SOB/ increasing HR beyond prescribed range/ increasing RPE beyond prescribed range- Progressing   2. Patient will be able to negotiate stairs in home at discharge with minimal SOB- Progressing  3.  Patient independent with HEP at time of discharge. - Progressing       Plan  Patient would benefit from: skilled physical therapy  Referral necessary: No  Planned therapy interventions: manual therapy, massage, muscle pump exercises, neuromuscular re-education, therapeutic activities, therapeutic exercise, stretching, strengthening, graded activity, functional ROM exercises, flexibility and compression  Frequency: 1-2x week  Duration in weeks: 12  Plan of Care beginning date:09/1/24  Plan of Care expiration date: 11/30/24  Treatment plan discussed with: patient and wife Amna    Subjective/History:    RE 09/09/24     Bridger has been seen for total of 8 visits for OP PT for deconditioning secondary to cancer and cancer treatments . Patient's global rating of change is \" A little bit better (2).\" Patient reports improvements with getting in and out of bed, getting in and out of a chair, prolonged walking, step negotiation into home. Patient reporting improvements with SOB, strength, and fatigue. Patient reports most difficulty with prolonged walking. Patient would continue to benefit from skilled PT to address remaining deficits and maximize function.      Cancer history and treatments- history obtained from patient and wife Amna  Type of CA: squamous cell carcinoma of left lung  Stage: [id number]  Surgical excision: yes; thoracotomy with medianostomy   Current Post-op " "precautions? Yes; post-op port placement, was told he cannot lift more than 5 pounds  Lymph node dissection? Yes; all negative  Radiation: 7 high dose treatments total per wife.   Chemotherapy: Per note from Dr. James on 6/10/24:  Regimen: Paclitaxel 135 mg/m2 IV day 1 (85%), Cycle length = 21 days, Goal = palliation and prolongation of life; port placement 6/19/24  Side effects of CA/Treatment: alopecia, appetite changes, constipation, cough, depression, dizzyness, dyspnea on exertion, fatigue, fever and chills, nausea, neuropathies - fingertips, shortness of breath, and weight loss, itching   Care team: Dr. James [facility]  FACIT outcome measure: NV  Score at eval: NV   Chief Complaint: weakness in LE  HPI: Per [facility] Office Visit on 6/10/24 \"History of Present Illness:  84 year-old male with multiple medical and cardiac issue recently seen in the emergency room because of a cough.  Workup demonstrated a left lower lobe mass.  Biopsy demonstrated squamous cell carcinoma.  Workup did not demonstrated evidence of metastatic disease; mediastinoscopy was negative.  Patient was seen by thoracic surgery but was not felt to be a surgical candidate.  Patient was subsequently seen by radiation oncology and underwent SPRT.  Patient then went on surveillance.    Eventual repeat scanning was consistent with recurrence at the original site.  Mr. Clayton required a left-sided Pleurx catheter placed because of recurrent left-sided pleural effusion.  Patient was initially treated with pembrolizumab only but developed a significant rash requiring steroids.  Because of the rash and the itchiness, patient did not wish to continue with the pembrolizumab; this was discontinued.  Options were discussed and patient began paclitaxel.  Patient has completed 9 cycles.  Presently Mr. Clayton states feeling okay, same as before.  The rash is less/better than before.  No respiratory issues, PleurX catheter has been removed.  Appetite is okay, " "weight is stable.  No fevers or signs of infection.  Activities are limited but the same as before.  Patient has some numbness and tingling in his fingertips but no dropping objects.  No tripping or falling.  Patient was diagnosed with prostate cancer (approximately 10 + years ago) and underwent seeds and external beam radiation.  No evidence of recurrence since that time.    Relevant PMHx: CHF, CAD, atrial fibrillation, AAA, HTN, SYLVESTER, COPD, emphysema, GERD, diaetes, PMH prostate cancer, depression, cellulitis of chest wall   Pain: Patient denies pain  Function: Patient reports limited with walking and stair negotiation in home, currently has 1st floor arrangement with bedroom and walk-in shower  Working Status: Retired  Exercise status: None  Patient goals: \"get a little stronger.\"     Objective  IE performed on 06/24/24  TUG: Perform NV seconds; RE 09/09/24   Romberg EO on ground: 20 seconds with minimal to moderate sway; RE 09/09/24 40 seconds minimal sway  Romberg EC on ground: 8 seconds with moderate sway; RE 09/09/24 30 seconds minimal to moderate sway  Romberg EO on foam: 10 seconds with moderate sway; RE 09/09/24 25 seconds  Romberg EC on foam: NP today at evaluation; RE 09/09/24  7 seconds  R Tandem stance: NP today today at evaluation; RE 09/09/24 23 seconds  L Tandem stance: NP today today at evaluation; RE 09/09/24 20 seconds  5 time sit to stand: 35 seconds **patient only performed 4 repetitions**; RE 09/09/24 28.40 seconds 5 repetitions  2 minute walk test: 200 feet, patient ending test at 1:50 secondary to \"shakiness\"; RE 09/09/24 3:12 for 320 feet    Gait: increased step width, decreased step length and push-off bilaterally, increased visual reliance observed. Patient using SPC today during IE, but does have rollator; RE 09/09/24 Patient ambulating with rollator, decreased step length and increased step width compensation observed, less visual reliance needed with rollator    Pre- Treatment " "Vitals  BP: 122/70 mm Hg  HR: 76 bpm  O2 saturation: 95 %     LE myotomes  Hip flexion: 3/5 bilaterally; RE 09/09/24 3+/5  Hip abduction: 3/5 bilaterally tested in hooklying; RE 09/09/24 3+/5  Hip adduction: 3/5 bilaterally tested in hooklying; RE 09/09/24 3+/5  Hip extension: 2+/5 bilaterally tested in hooklying; RE 09/09/24 3/5  Knee extension: 3+/5 bilaterally; RE 09/09/24 4/5  Knee flexion: 3+/5 bilaterally; RE 09/09/24 4/5  Plantarflexion: 2+/5 bilaterally; RE 09/09/24 3+/5  Dorsiflexion: 2+/5 bilaterally         Precautions: active cancer with chemo every 3 weeks; FALL RISK, AAA, CHF, COPD, CAD, HTN, SYLVESTER, AAA    Insurance:  AMA/CMS Eval/ Re-eval POC expires FOTO Auth Status Co-Insurance                                                    6/24   2. 7/2   3. 7/9   4.7/11   5. 7/30   6. 8/13     7.   8/20 8. 9/3   9. 9/9 RE 10.    11.    12.      13.    14.    15.    16.  17.  18.      19.    20.   21.   22.   23.   24.      25.    26. 27. 28. 29. 30.   31. 32.  33. 34. 35. 36.          Daily Treatment Diary     Date of Service: 6/24 7/2 7/9 7/11 7/30 8/13 8/20 9/3 9/9    Manuals             Hamstring stretch                          Therapeutic Exercise             Bike   X5 min Nustep           Hamstring stretch seated                                           Neuro Re-ed             Quad sets  3\" x 20 3\" x 20 3\" x 20 5\"x 20 :05x 20 5\" x 20 5\" x 20 RE    SLR flexion   10x  2 x 10 AA for R LE 2 x 10 AROM 2 x 10 AROM 2x10 AROM 2 x 10 AROM 2 x 10 AROM RE    Bridges   10x           Seated adduction      :05x 10 5\" x 10 5' x 20 RE    Clamshells    Yellow TB 2 x 10 red TB 2x10 Red TB 2 x 10 RTB 2x10 RTB 2 x 10 G TB 2 x 10 RE    Standing Hip abd/ext   1 x10 at walkers 1 x 10 at walker 1 x 10 at walker NP today       Romberg EO ground/ foam             Seated HR/TR   2 x 10 2 x 10 2 x 10 2x10 2 x 10      SAQ      :03x 10 ea 5\" x 10 ea 5' 2 x 10 eac RE                 Therapeutic Activity             Step ups           "   Lateral step ups             STS   2 x 5 2x 5  2x5 2 x 5 2 x 5 RE    Lateral walking             Gait w/ rollator   X200 ft X200 ft X150 ft  X200 ft X200 ft                  Outcome measures         Done                                                                                   Modalities                          6

## 2024-09-16 ENCOUNTER — HOSPITAL ENCOUNTER (OUTPATIENT)
Dept: INFUSION CENTER | Facility: HOSPITAL | Age: 84
Discharge: HOME | End: 2024-09-16
Payer: COMMERCIAL

## 2024-09-16 DIAGNOSIS — C34.92 SQUAMOUS CELL CARCINOMA OF LEFT LUNG (HCC): Primary | ICD-10-CM

## 2024-09-16 LAB
ALBUMIN SERPL BCG-MCNC: 3.4 G/DL (ref 3.5–5)
ALP SERPL-CCNC: 92 U/L (ref 34–104)
ALT SERPL W P-5'-P-CCNC: 21 U/L (ref 7–52)
ANION GAP SERPL CALCULATED.3IONS-SCNC: 5 MMOL/L (ref 4–13)
AST SERPL W P-5'-P-CCNC: 23 U/L (ref 13–39)
BASOPHILS # BLD AUTO: 0.05 THOUSANDS/ΜL (ref 0–0.1)
BASOPHILS NFR BLD AUTO: 1 % (ref 0–1)
BILIRUB SERPL-MCNC: 0.39 MG/DL (ref 0.2–1)
BUN SERPL-MCNC: 21 MG/DL (ref 5–25)
CALCIUM ALBUM COR SERPL-MCNC: 9.6 MG/DL (ref 8.3–10.1)
CALCIUM SERPL-MCNC: 9.1 MG/DL (ref 8.4–10.2)
CHLORIDE SERPL-SCNC: 102 MMOL/L (ref 96–108)
CO2 SERPL-SCNC: 29 MMOL/L (ref 21–32)
CREAT SERPL-MCNC: 0.99 MG/DL (ref 0.6–1.3)
EOSINOPHIL # BLD AUTO: 0.5 THOUSAND/ΜL (ref 0–0.61)
EOSINOPHIL NFR BLD AUTO: 9 % (ref 0–6)
ERYTHROCYTE [DISTWIDTH] IN BLOOD BY AUTOMATED COUNT: 16.9 % (ref 11.6–15.1)
GFR SERPL CREATININE-BSD FRML MDRD: 70 ML/MIN/1.73SQ M
GLUCOSE SERPL-MCNC: 180 MG/DL (ref 65–140)
HCT VFR BLD AUTO: 38.4 % (ref 36.5–49.3)
HGB BLD-MCNC: 11.9 G/DL (ref 12–17)
IMM GRANULOCYTES # BLD AUTO: 0.04 THOUSAND/UL (ref 0–0.2)
IMM GRANULOCYTES NFR BLD AUTO: 1 % (ref 0–2)
LYMPHOCYTES # BLD AUTO: 0.91 THOUSANDS/ΜL (ref 0.6–4.47)
LYMPHOCYTES NFR BLD AUTO: 16 % (ref 14–44)
MCH RBC QN AUTO: 28.7 PG (ref 26.8–34.3)
MCHC RBC AUTO-ENTMCNC: 31 G/DL (ref 31.4–37.4)
MCV RBC AUTO: 93 FL (ref 82–98)
MONOCYTES # BLD AUTO: 0.71 THOUSAND/ΜL (ref 0.17–1.22)
MONOCYTES NFR BLD AUTO: 12 % (ref 4–12)
NEUTROPHILS # BLD AUTO: 3.5 THOUSANDS/ΜL (ref 1.85–7.62)
NEUTS SEG NFR BLD AUTO: 61 % (ref 43–75)
NRBC BLD AUTO-RTO: 0 /100 WBCS
PLATELET # BLD AUTO: 279 THOUSANDS/UL (ref 149–390)
PMV BLD AUTO: 9.4 FL (ref 8.9–12.7)
POTASSIUM SERPL-SCNC: 4.6 MMOL/L (ref 3.5–5.3)
PROT SERPL-MCNC: 6.3 G/DL (ref 6.4–8.4)
RBC # BLD AUTO: 4.15 MILLION/UL (ref 3.88–5.62)
SODIUM SERPL-SCNC: 136 MMOL/L (ref 135–147)
WBC # BLD AUTO: 5.71 THOUSAND/UL (ref 4.31–10.16)

## 2024-09-16 PROCEDURE — 80053 COMPREHEN METABOLIC PANEL: CPT | Performed by: INTERNAL MEDICINE

## 2024-09-16 PROCEDURE — 85025 COMPLETE CBC W/AUTO DIFF WBC: CPT | Performed by: INTERNAL MEDICINE

## 2024-09-16 RX ORDER — SODIUM CHLORIDE 9 MG/ML
20 INJECTION, SOLUTION INTRAVENOUS ONCE
Status: CANCELLED | OUTPATIENT
Start: 2024-09-17

## 2024-09-17 ENCOUNTER — HOSPITAL ENCOUNTER (OUTPATIENT)
Dept: INFUSION CENTER | Facility: HOSPITAL | Age: 84
Discharge: HOME | End: 2024-09-17
Attending: INTERNAL MEDICINE
Payer: COMMERCIAL

## 2024-09-17 VITALS
HEIGHT: 70 IN | RESPIRATION RATE: 18 BRPM | DIASTOLIC BLOOD PRESSURE: 68 MMHG | SYSTOLIC BLOOD PRESSURE: 143 MMHG | HEART RATE: 71 BPM | WEIGHT: 218.26 LBS | OXYGEN SATURATION: 96 % | TEMPERATURE: 97 F | BODY MASS INDEX: 31.25 KG/M2

## 2024-09-17 DIAGNOSIS — C34.92 SQUAMOUS CELL CARCINOMA OF LEFT LUNG (HCC): Primary | ICD-10-CM

## 2024-09-17 PROCEDURE — 96413 CHEMO IV INFUSION 1 HR: CPT

## 2024-09-17 PROCEDURE — 96375 TX/PRO/DX INJ NEW DRUG ADDON: CPT

## 2024-09-17 PROCEDURE — 96367 TX/PROPH/DG ADDL SEQ IV INF: CPT

## 2024-09-17 PROCEDURE — 96415 CHEMO IV INFUSION ADDL HR: CPT

## 2024-09-17 RX ORDER — SODIUM CHLORIDE 9 MG/ML
20 INJECTION, SOLUTION INTRAVENOUS ONCE
Status: COMPLETED | OUTPATIENT
Start: 2024-09-17 | End: 2024-09-17

## 2024-09-17 RX ADMIN — SODIUM CHLORIDE 20 ML/HR: 0.9 INJECTION, SOLUTION INTRAVENOUS at 08:07

## 2024-09-17 RX ADMIN — DIPHENHYDRAMINE HYDROCHLORIDE 25 MG: 50 INJECTION, SOLUTION INTRAMUSCULAR; INTRAVENOUS at 08:29

## 2024-09-17 RX ADMIN — FAMOTIDINE 20 MG: 10 INJECTION, SOLUTION INTRAVENOUS at 08:07

## 2024-09-17 RX ADMIN — PACLITAXEL 256.2 MG: 6 INJECTION, SOLUTION, CONCENTRATE INTRAVENOUS at 09:21

## 2024-09-17 RX ADMIN — DEXAMETHASONE SODIUM PHOSPHATE: 10 INJECTION, SOLUTION INTRAMUSCULAR; INTRAVENOUS at 08:54

## 2024-09-17 NOTE — PROGRESS NOTES
Bridger Clayton  tolerated treatment well with no complications.      Bridger Clayton is aware of future appt on 10/7 at 1230.     AVS printed and given to Bridger Clayton: yes

## 2024-09-17 NOTE — PLAN OF CARE
Problem: Potential for Falls  Goal: Patient will remain free of falls  Description: INTERVENTIONS:  - Educate patient/family on patient safety including physical limitations  - Instruct patient to call for assistance with activity   - Keep Call bell within reach  - Keep bed low and locked with side rails adjusted as appropriate  - Keep care items and personal belongings within reach  - Initiate and maintain comfort rounds  Outcome: Progressing     Problem: INFECTION - ADULT  Goal: Absence or prevention of progression during hospitalization  Description: INTERVENTIONS:  - Assess and monitor for signs and symptoms of infection  - Monitor lab/diagnostic results  - Monitor all insertion sites, i.e. indwelling lines, tubes, and drains  - Monitor endotracheal if appropriate and nasal secretions for changes in amount and color  - [place]ushing appropriate cooling/warming therapies per order  - Administer medications as ordered  - Instruct and encourage patient and family to use good hand hygiene technique  - Identify and instruct in appropriate isolation precautions for identified infection/condition  Outcome: Progressing  Goal: Absence of fever/infection during neutropenic period  Description: INTERVENTIONS:  - Monitor WBC    Outcome: Progressing     Problem: DISCHARGE PLANNING  Goal: Discharge to home or other facility with appropriate resources  Description: INTERVENTIONS:  - Identify barriers to discharge w/patient and caregiver  - Arrange for needed discharge resources and transportation as appropriate  - Identify discharge learning needs (meds, wound care, etc.)  - Arrange for interpretive services to assist at discharge as needed  - Refer to Case Management Department for coordinating discharge planning if the patient needs post-hospital services based on physician/advanced practitioner order or complex needs related to functional status, cognitive ability, or social support system  Outcome: Progressing     Problem:  Knowledge Deficit  Goal: Patient/family/caregiver demonstrates understanding of disease process, treatment plan, medications, and discharge instructions  Description: Complete learning assessment and assess knowledge base.  Interventions:  - Provide teaching at level of understanding  - Provide teaching via preferred learning methods  Outcome: Progressing

## 2024-09-20 ENCOUNTER — RA CDI HCC (OUTPATIENT)
Dept: OTHER | Facility: HOSPITAL | Age: 84
End: 2024-09-20

## 2024-09-21 DIAGNOSIS — I48.19 PERSISTENT ATRIAL FIBRILLATION (HCC): ICD-10-CM

## 2024-09-22 RX ORDER — CARVEDILOL 25 MG/1
25 TABLET ORAL 2 TIMES DAILY WITH MEALS
Qty: 180 TABLET | Refills: 1 | Status: SHIPPED | OUTPATIENT
Start: 2024-09-22

## 2024-09-24 ENCOUNTER — OFFICE VISIT (OUTPATIENT)
Dept: PHYSICAL THERAPY | Facility: CLINIC | Age: 84
End: 2024-09-24
Payer: COMMERCIAL

## 2024-09-24 DIAGNOSIS — R53.0 NEOPLASTIC MALIGNANT RELATED FATIGUE: ICD-10-CM

## 2024-09-24 DIAGNOSIS — C34.92 SQUAMOUS CELL CARCINOMA OF LEFT LUNG (HCC): Primary | ICD-10-CM

## 2024-09-24 DIAGNOSIS — R53.1 GENERALIZED WEAKNESS: ICD-10-CM

## 2024-09-24 PROCEDURE — 97110 THERAPEUTIC EXERCISES: CPT | Performed by: PHYSICAL THERAPIST

## 2024-09-24 NOTE — PROGRESS NOTES
"Daily Note     Today's date: 2024  Patient name: Bridger Clayton  : 1940  MRN: [id number]  Referring provider: Bridger James MD  Dx:   Encounter Diagnosis     ICD-10-CM    1. 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 Neoplastic malignant related fatigue  R53.0       3. Generalized weakness  R53.1                      Subjective: Bridger reports \"just don't feel good today\" upon arrival to therapy today.  Patient arrived back from vacation reporting \"I was walking while we were gone, and then when I got home, everything felt apart.\" Patient reporting increased lethargy, congestion, decreased appetite, and neck pain.       Objective: See treatment diary below      Assessment: Tolerated treatment fair. Patient demonstrating overall increased fatigue today, session limited as a result. Patient also reporting increased neck pain, PT performing gentle STM to bilateral UT today, and applying MHP to patient today with patient reporting \"feels good with the heat.\" PT recommending MHP to aid with pain at home with patient also reporting \"Forgot to take my tylenol for the neck pain today.\" PT discussing with patient and his wife that if patient not feeling better by tomorrow, to contact PCP for follow-up. Patient would benefit from continued PT      Plan: Continue per plan of care.  Progress treatment as tolerated.       Precautions: active cancer with chemo every 3 weeks; FALL RISK, AAA, CHF, COPD, CAD, HTN, SYLVESTER, AAA    Insurance:  AMA/CMS Eval/ Re-eval POC expires FOTO Auth Status Co-Insurance                                                       2. 7/2   3. 7   4.   5.    6. 8     7.    8. 9/3   9.  RE 10.    11.    12.      13.    14.    15.    16.  17.  18.      19.    20.   21.   22.   23.   24.      25.    26. 27. 28. 29. 30.   31. 32.  33. 34. 35. 36.          Daily Treatment Diary     Date of Service:  7 7/11 7/30 8/13 8/20 9/3 9/9 9/24   Manuals           " "  STM UT          Done                 Therapeutic Exercise             Bike   X5 min Nustep           Hamstring stretch seated                                           Neuro Re-ed             Quad sets  3\" x 20 3\" x 20 3\" x 20 5\"x 20 :05x 20 5\" x 20 5\" x 20 RE 2x10   SLR flexion   10x  2 x 10 AA for R LE 2 x 10 AROM 2 x 10 AROM 2x10 AROM 2 x 10 AROM 2 x 10 AROM RE    Bridges   10x           Seated adduction      :05x 10 5\" x 10 5' x 20 RE    Clamshells    Yellow TB 2 x 10 red TB 2x10 Red TB 2 x 10 RTB 2x10 RTB 2 x 10 G TB 2 x 10 RE Supine RTB 10x   Standing Hip abd/ext   1 x10 at walkers 1 x 10 at walker 1 x 10 at walker NP today       Romberg EO ground/ foam             Seated HR/TR   2 x 10 2 x 10 2 x 10 2x10 2 x 10      SAQ      :03x 10 ea 5\" x 10 ea 5' 2 x 10 eac RE 10 ea                Therapeutic Activity             Step ups             Lateral step ups             STS   2 x 5 2x 5  2x5 2 x 5 2 x 5 RE    Lateral walking             Gait w/ rollator   X200 ft X200 ft X150 ft  X200 ft X200 ft                  Outcome measures         Done                               MHP with neck ROM           10 min                                           Modalities                               "

## 2024-09-25 ENCOUNTER — TELEPHONE (OUTPATIENT)
Age: 84
End: 2024-09-25

## 2024-09-25 NOTE — TELEPHONE ENCOUNTER
Patient called in stating he received a missed call with no voicemail. Advised patient it was probably his appt reminder call for his appt on 9/27. NFA needed at this time.

## 2024-09-27 ENCOUNTER — OFFICE VISIT (OUTPATIENT)
Dept: INTERNAL MEDICINE CLINIC | Facility: CLINIC | Age: 84
End: 2024-09-27
Payer: COMMERCIAL

## 2024-09-27 VITALS
SYSTOLIC BLOOD PRESSURE: 110 MMHG | BODY MASS INDEX: 31.01 KG/M2 | RESPIRATION RATE: 16 BRPM | DIASTOLIC BLOOD PRESSURE: 58 MMHG | OXYGEN SATURATION: 98 % | HEIGHT: 70 IN | WEIGHT: 216.6 LBS | TEMPERATURE: 98.6 F | HEART RATE: 73 BPM

## 2024-09-27 DIAGNOSIS — I25.10 CORONARY ARTERY DISEASE INVOLVING NATIVE CORONARY ARTERY OF NATIVE HEART WITHOUT ANGINA PECTORIS: ICD-10-CM

## 2024-09-27 DIAGNOSIS — R11.0 CHEMOTHERAPY-INDUCED NAUSEA: ICD-10-CM

## 2024-09-27 DIAGNOSIS — C34.92 SQUAMOUS CELL CARCINOMA OF LEFT LUNG (HCC): Chronic | ICD-10-CM

## 2024-09-27 DIAGNOSIS — Z51.5 PALLIATIVE CARE ENCOUNTER: ICD-10-CM

## 2024-09-27 DIAGNOSIS — E11.40 TYPE 2 DIABETES MELLITUS WITH DIABETIC NEUROPATHY, WITHOUT LONG-TERM CURRENT USE OF INSULIN (HCC): Primary | ICD-10-CM

## 2024-09-27 DIAGNOSIS — Z00.00 ENCOUNTER FOR SUBSEQUENT ANNUAL WELLNESS VISIT (AWV) IN MEDICARE PATIENT: ICD-10-CM

## 2024-09-27 DIAGNOSIS — T45.1X5A CHEMOTHERAPY-INDUCED NAUSEA: ICD-10-CM

## 2024-09-27 PROCEDURE — 99213 OFFICE O/P EST LOW 20 MIN: CPT | Performed by: INTERNAL MEDICINE

## 2024-09-27 PROCEDURE — G0439 PPPS, SUBSEQ VISIT: HCPCS | Performed by: INTERNAL MEDICINE

## 2024-09-27 RX ORDER — ONDANSETRON 8 MG/1
8 TABLET, FILM COATED ORAL EVERY 8 HOURS PRN
Qty: 20 TABLET | Refills: 2 | Status: SHIPPED | OUTPATIENT
Start: 2024-09-27

## 2024-09-27 NOTE — ASSESSMENT & PLAN NOTE
Orders:    ondansetron (ZOFRAN) 8 mg tablet; Take 1 tablet (8 mg total) by mouth every 8 (eight) hours as needed for nausea or vomiting

## 2024-09-27 NOTE — PATIENT INSTRUCTIONS
Medicare Preventive Visit Patient Instructions  Thank you for completing your Welcome to Medicare Visit or Medicare Annual Wellness Visit today. Your next wellness visit will be due in one year (9/28/2025).  The screening/preventive services that you may require over the next 5-10 years are detailed below. Some tests may not apply to you based off risk factors and/or age. Screening tests ordered at today's visit but not completed yet may show as past due. Also, please note that scanned in results may not display below.  Preventive Screenings:  Service Recommendations Previous Testing/Comments   Colorectal Cancer Screening  Colonoscopy    Fecal Occult Blood Test (FOBT)/Fecal Immunochemical Test (FIT)  Fecal DNA/Cologuard Test  Flexible Sigmoidoscopy Age: 45-75 years old   Colonoscopy: every 10 years (May be performed more frequently if at higher risk)  OR  FOBT/FIT: every 1 year  OR  Cologuard: every 3 years  OR  Sigmoidoscopy: every 5 years  Screening may be recommended earlier than age 45 if at higher risk for colorectal cancer. Also, an individualized decision between you and your healthcare provider will decide whether screening between the ages of 76-85 would be appropriate. Colonoscopy: Not on file  FOBT/FIT: Not on file  Cologuard: Not on file  Sigmoidoscopy: Not on file          Prostate Cancer Screening Individualized decision between patient and health care provider in men between ages of 55-69   Medicare will cover every 12 months beginning on the day after your 50th birthday PSA: 0.01 ng/mL           Hepatitis C Screening Once for adults born between 1945 and 1965  More frequently in patients at high risk for Hepatitis C Hep C Antibody: 02/21/2022        Diabetes Screening 1-2 times per year if you're at risk for diabetes or have pre-diabetes Fasting glucose: 141 mg/dL (6/21/2024)  A1C: 7.9 % (6/21/2024)      Cholesterol Screening Once every 5 years if you don't have a lipid disorder. May order more often  based on risk factors. Lipid panel: 06/12/2023         Other Preventive Screenings Covered by Medicare:  Abdominal Aortic Aneurysm (AAA) Screening: covered once if your at risk. You're considered to be at risk if you have a family history of AAA or a male between the age of 65-75 who smoking at least 100 cigarettes in your lifetime.  Lung Cancer Screening: covers low dose CT scan once per year if you meet all of the following conditions: (1) Age 55-77; (2) No signs or symptoms of lung cancer; (3) Current smoker or have quit smoking within the last 15 years; (4) You have a tobacco smoking history of at least 20 pack years (packs per day x number of years you smoked); (5) You get a written order from a healthcare provider.  Glaucoma Screening: covered annually if you're considered high risk: (1) You have diabetes OR (2) Family history of glaucoma OR (3)  aged 50 and older OR (4)  American aged 65 and older  Osteoporosis Screening: covered every 2 years if you meet one of the following conditions: (1) Have a vertebral abnormality; (2) On glucocorticoid therapy for more than 3 months; (3) Have primary hyperparathyroidism; (4) On osteoporosis medications and need to assess response to drug therapy.  HIV Screening: covered annually if you're between the age of 15-65. Also covered annually if you are younger than 15 and older than 65 with risk factors for HIV infection. For pregnant patients, it is covered up to 3 times per pregnancy.    Immunizations:  Immunization Recommendations   Influenza Vaccine Annual influenza vaccination during flu season is recommended for all persons aged >= 6 months who do not have contraindications   Pneumococcal Vaccine   * Pneumococcal conjugate vaccine = PCV13 (Prevnar 13), PCV15 (Vaxneuvance), PCV20 (Prevnar 20)  * Pneumococcal polysaccharide vaccine = PPSV23 (Pneumovax) Adults 19-63 yo with certain risk factors or if 65+ yo  If never received any pneumonia vaccine:  recommend Prevnar 20 (PCV20)  Give PCV20 if previously received 1 dose of PCV13 or PPSV23   Hepatitis B Vaccine 3 dose series if at intermediate or high risk (ex: diabetes, end stage renal disease, liver disease)   Respiratory syncytial virus (RSV) Vaccine - COVERED BY MEDICARE PART D  * RSVPreF3 (Arexvy) CDC recommends that adults 60 years of age and older may receive a single dose of RSV vaccine using shared clinical decision-making (SCDM)   Tetanus (Td) Vaccine - COST NOT COVERED BY MEDICARE PART B Following completion of primary series, a booster dose should be given every 10 years to maintain immunity against tetanus. Td may also be given as tetanus wound prophylaxis.   Tdap Vaccine - COST NOT COVERED BY MEDICARE PART B Recommended at least once for all adults. For pregnant patients, recommended with each pregnancy.   Shingles Vaccine (Shingrix) - COST NOT COVERED BY MEDICARE PART B  2 shot series recommended in those 19 years and older who have or will have weakened immune systems or those 50 years and older     Health Maintenance Due:      Topic Date Due   • Hepatitis C Screening  Completed     Immunizations Due:      Topic Date Due   • Pneumococcal Vaccine: 65+ Years (1 of 2 - PCV) Never done   • COVID-19 Vaccine (3 - Moderna risk series) 12/03/2021   • Influenza Vaccine (1) 09/01/2024     Advance Directives   What are advance directives?  Advance directives are legal documents that state your wishes and plans for medical care. These plans are made ahead of time in case you lose your ability to make decisions for yourself. Advance directives can apply to any medical decision, such as the treatments you want, and if you want to donate organs.   What are the types of advance directives?  There are many types of advance directives, and each state has rules about how to use them. You may choose a combination of any of the following:  Living will:  This is a written record of the treatment you want. You can also  choose which treatments you do not want, which to limit, and which to stop at a certain time. This includes surgery, medicine, IV fluid, and tube feedings.   Durable power of  for healthcare (DPAHC):  This is a written record that states who you want to make healthcare choices for you when you are unable to make them for yourself. This person, called a proxy, is usually a family member or a friend. You may choose more than 1 proxy.  Do not resuscitate (DNR) order:  A DNR order is used in case your heart stops beating or you stop breathing. It is a request not to have certain forms of treatment, such as CPR. A DNR order may be included in other types of advance directives.  Medical directive:  This covers the care that you want if you are in a coma, near death, or unable to make decisions for yourself. You can list the treatments you want for each condition. Treatment may include pain medicine, surgery, blood transfusions, dialysis, IV or tube feedings, and a ventilator (breathing machine).  Values history:  This document has questions about your views, beliefs, and how you feel and think about life. This information can help others choose the care that you would choose.  Why are advance directives important?  An advance directive helps you control your care. Although spoken wishes may be used, it is better to have your wishes written down. Spoken wishes can be misunderstood, or not followed. Treatments may be given even if you do not want them. An advance directive may make it easier for your family to make difficult choices about your care.   Weight Management   Why it is important to manage your weight:  Being overweight increases your risk of health conditions such as heart disease, high blood pressure, type 2 diabetes, and certain types of cancer. It can also increase your risk for osteoarthritis, sleep apnea, and other respiratory problems. Aim for a slow, steady weight loss. Even a small amount of  weight loss can lower your risk of health problems.  How to lose weight safely:  A safe and healthy way to lose weight is to eat fewer calories and get regular exercise. You can lose up about 1 pound a week by decreasing the number of calories you eat by 500 calories each day.   Healthy meal plan for weight management:  A healthy meal plan includes a variety of foods, contains fewer calories, and helps you stay healthy. A healthy meal plan includes the following:  Eat whole-grain foods more often.  A healthy meal plan should contain fiber. Fiber is the part of grains, fruits, and vegetables that is not broken down by your body. Whole-grain foods are healthy and provide extra fiber in your diet. Some examples of whole-grain foods are whole-wheat breads and pastas, oatmeal, brown rice, and bulgur.  Eat a variety of vegetables every day.  Include dark, leafy greens such as spinach, kale, lex greens, and mustard greens. Eat yellow and orange vegetables such as carrots, sweet potatoes, and winter squash.   Eat a variety of fruits every day.  Choose fresh or canned fruit (canned in its own juice or light syrup) instead of juice. Fruit juice has very little or no fiber.  Eat low-fat dairy foods.  Drink fat-free (skim) milk or 1% milk. Eat fat-free yogurt and low-fat cottage cheese. Try low-fat cheeses such as mozzarella and other reduced-fat cheeses.  Choose meat and other protein foods that are low in fat.  Choose beans or other legumes such as split peas or lentils. Choose fish, skinless poultry (chicken or turkey), or lean cuts of red meat (beef or pork). Before you cook meat or poultry, cut off any visible fat.   Use less fat and oil.  Try baking foods instead of frying them. Add less fat, such as margarine, sour cream, regular salad dressing and mayonnaise to foods. Eat fewer high-fat foods. Some examples of high-fat foods include french fries, doughnuts, ice cream, and cakes.  Eat fewer sweets.  Limit foods and  drinks that are high in sugar. This includes candy, cookies, regular soda, and sweetened drinks.  Exercise:  Exercise at least 30 minutes per day on most days of the week. Some examples of exercise include walking, biking, dancing, and swimming. You can also fit in more physical activity by taking the stairs instead of the elevator or parking farther away from stores. Ask your healthcare provider about the best exercise plan for you.      © Copyright [employer] 2018 Information is for End User's use only and may not be sold, redistributed or otherwise used for commercial purposes. All illustrations and images included in CareNotes® are the copyrighted property of [employer] or [employer]

## 2024-09-27 NOTE — ASSESSMENT & PLAN NOTE
Lab Results   Component Value Date    HGBA1C 7.9 (H) 06/21/2024   Above reviewed agree and continue metformin 500 mg twice a day    Diabetic diet    Will check A1c before next visit    Dilated eye exam    Foot exam normal    Neuropathy symptoms controlled with gabapentin continue gabapentin    Hypoglycemia protocol in place    Orders:    Hemoglobin A1C; Future

## 2024-09-27 NOTE — ASSESSMENT & PLAN NOTE
No chest pain difficulty breathing agree and continue med medication risk modification prevention as follows    Eliquis 2.5 mg twice a day    Aspirin 81 mg daily    Lipitor 10 mg daily    Control blood pressure blood sugar follow with cardiology

## 2024-09-27 NOTE — PROGRESS NOTES
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)    Orders:    ondansetron (ZOFRAN) 8 mg tablet; Take 1 tablet (8 mg total) by mouth every 8 (eight) hours as needed for nausea or vomiting    Chemotherapy-induced nausea    Orders:    ondansetron (ZOFRAN) 8 mg tablet; Take 1 tablet (8 mg total) by mouth every 8 (eight) hours as needed for nausea or vomiting    Palliative care encounter    Orders:    ondansetron (ZOFRAN) 8 mg tablet; Take 1 tablet (8 mg total) by mouth every 8 (eight) hours as needed for nausea or vomiting    Coronary artery disease involving native coronary artery of native heart without angina pectoris  No chest pain difficulty breathing agree and continue med medication risk modification prevention as follows    Eliquis 2.5 mg twice a day    Aspirin 81 mg daily    Lipitor 10 mg daily    Control blood pressure blood sugar follow with cardiology             Type 2 diabetes mellitus with diabetic neuropathy, without long-term current use of insulin ([facility])    Lab Results   Component Value Date    HGBA1C 7.9 (H) 2024   Above reviewed agree and continue metformin 500 mg twice a day    Diabetic diet    Will check A1c before next visit    Dilated eye exam    Foot exam normal    Neuropathy symptoms controlled with gabapentin continue gabapentin    Hypoglycemia protocol in place    Orders:    Hemoglobin A1C; Future    Encounter for subsequent annual wellness visit (AWV) in Medicare patient           Depression Screening and Follow-up Plan: Patient's depression screening was positive with a PHQ-9 score of 14. Patient advised to follow-up with PCP for further management.       Preventive health issues were discussed with patient, and age appropriate screening tests were ordered  as noted in patient's After Visit Summary. Personalized health advice and appropriate referrals for health education or preventive services given if needed, as noted in patient's After Visit Summary.  Diabetic Foot Exam    Patient's shoes and socks removed.    Right Foot/Ankle   Right Foot Inspection  Skin Exam: skin normal and skin intact. No dry skin, no warmth, no callus, no erythema, no maceration, no abnormal color, no pre-ulcer, no ulcer and no callus.     Toe Exam: ROM and strength within normal limits.     Sensory   Monofilament testing: diminished    Vascular  The right DP pulse is 2+. The right PT pulse is 1+.     Left Foot/Ankle  Left Foot Inspection  Skin Exam: skin normal and skin intact. No dry skin, no warmth, no erythema, no maceration, normal color, no pre-ulcer, no ulcer and no callus.     Toe Exam: ROM and strength within normal limits.     Sensory   Monofilament testing: diminished    Vascular  The left DP pulse is 2+. The left PT pulse is 1+.     Assign Risk Category  No deformity present  No loss of protective sensation  No weak pulses  Risk: 0    History of Present Illness     Came in for follow-up chronic medical condition list visit diagnoses no hypoglycemia patient complains of nausea weak tired fatigue generalized weakness difficulty walking able to ambulate with walker or cane undergoing physical therapy and undergoing chemotherapy awaiting PET scan October 1 followed by oncology    Complete annual wellness exam done for counseling screening follow-up recommendations see attached encounter       Patient Care Team:  Vivek Tatum MD as PCP - General (Internal Medicine)  MD Renard Hernandez DO (Cardiology)  Roshni Anguiano MD (Radiation Oncology)  Yunior Dent MD as Surgeon (General Surgery)  Emi Tapia (Nutrition)  Bridger James MD as Medical Oncologist (Hematology and Oncology)  Lucila Pettit PA-C as Physician Assistant (Hematology and Oncology)  Ashish Colon MD  (Palliative Care)  Luis Manuel MD (Hematology and Oncology)    Review of Systems   Constitutional:  Positive for appetite change and fatigue. Negative for chills, diaphoresis, fever and unexpected weight change.   HENT:  Negative for congestion, dental problem, drooling, ear discharge, ear pain, facial swelling, hearing loss, mouth sores, nosebleeds, postnasal drip, rhinorrhea, sinus pressure, sneezing, sore throat, tinnitus, trouble swallowing and voice change.    Eyes:  Negative for photophobia, pain, discharge, redness, itching and visual disturbance.   Respiratory:  Negative for apnea, choking, chest tightness, wheezing and stridor.    Cardiovascular:  Negative for chest pain and palpitations.   Gastrointestinal:  Negative for abdominal distention, abdominal pain, anal bleeding, blood in stool, constipation, diarrhea, nausea, rectal pain and vomiting.   Endocrine: Negative for cold intolerance, heat intolerance, polydipsia, polyphagia and polyuria.   Genitourinary:  Negative for decreased urine volume, difficulty urinating, dysuria, enuresis, flank pain, frequency, genital sores, hematuria and urgency.   Musculoskeletal:  Positive for arthralgias, back pain and gait problem. Negative for myalgias, neck pain and neck stiffness.   Skin:  Positive for rash. Negative for color change, pallor and wound.   Allergic/Immunologic: Negative.  Negative for environmental allergies, food allergies and immunocompromised state.   Neurological:  Positive for numbness. Negative for tremors, seizures, syncope, facial asymmetry, speech difficulty, weakness, light-headedness and headaches.   Psychiatric/Behavioral:  Negative for agitation, behavioral problems, confusion, decreased concentration, dysphoric mood, hallucinations, self-injury, sleep disturbance and suicidal ideas. The patient is nervous/anxious. The patient is not hyperactive.      Medical History Reviewed by provider this encounter:  Tobacco  Allergies  Meds   Problems  Med Hx  Surg Hx  Fam Hx       Annual Wellness Visit Questionnaire   Bridger is here for his Subsequent Wellness visit. Last Medicare Wellness visit information reviewed, patient interviewed and updates made to the record today.      Health Risk Assessment:   Patient rates overall health as fair. Patient feels that their physical health rating is slightly worse. Patient is satisfied with their life. Eyesight was rated as same. Hearing was rated as same. Patient feels that their emotional and mental health rating is same. Patients states they are always angry. Patient states they are always unusually tired/fatigued. Pain experienced in the last 7 days has been none. Patient states that he has experienced no weight loss or gain in last 6 months. Stays the same    Depression Screening:   PHQ-9 Score: 14      Fall Risk Screening:   In the past year, patient has experienced: no history of falling in past year      Home Safety:  Patient has trouble with stairs inside or outside of their home. Patient has working smoke alarms and has working carbon monoxide detector. Home safety hazards include: none. Home is safe.    Nutrition:   Current diet is Regular. Hardly eats or eats very little.    Medications:   Patient is currently taking over-the-counter supplements. OTC medications include: see medication list. Patient is not able to manage medications. Wife gives him his medication.    Activities of Daily Living (ADLs)/Instrumental Activities of Daily Living (IADLs):   Walk and transfer into and out of bed and chair?: Yes  Dress and groom yourself?: Yes    Bathe or shower yourself?: Yes    Feed yourself? Yes  Do your laundry/housekeeping?: No  Manage your money, pay your bills and track your expenses?: No  Make your own meals?: No    Do your own shopping?: No    ADL comments: Wife does the shopping, laundry, meals, and pays the bills.    Previous Hospitalizations:   Any hospitalizations or ED visits within the last  12 months?: No      Advance Care Planning:   Living will: Yes    Durable POA for healthcare: Yes    Advanced directive: Yes    Advanced directive counseling given: Yes    End of Life Decisions reviewed with patient: Yes    Provider agrees with end of life decisions: Yes      Cognitive Screening:   Provider or family/friend/caregiver concerned regarding cognition?: No    PREVENTIVE SCREENINGS      Cardiovascular Screening:    General: Screening Not Indicated and History Lipid Disorder      Diabetes Screening:     General: Screening Not Indicated and History Diabetes      Prostate Cancer Screening:    General: History Prostate Cancer and Screening Not Indicated      Osteoporosis Screening:    General: Screening Not Indicated      Abdominal Aortic Aneurysm (AAA) Screening:    Risk factors include: tobacco use        General: Screening Not Indicated and History AAA      Lung Cancer Screening:     General: Screening Not Indicated and History Lung Cancer      Hepatitis C Screening:    General: Screening Current    Screening, Brief Intervention, and Referral to Treatment (SBIRT)    Screening  Typical number of drinks in a day: 0  Typical number of drinks in a week: 0  Interpretation: Low risk drinking behavior.    AUDIT-C Screenin) How often did you have a drink containing alcohol in the past year? monthly or less  2) How many drinks did you have on a typical day when you were drinking in the past year? 1 to 2  3) How often did you have 6 or more drinks on one occasion in the past year? never    AUDIT-C Score: 1  Interpretation: Score 0-3 (male): Negative screen for alcohol misuse    Single Item Drug Screening:  How often have you used an illegal drug (including marijuana) or a prescription medication for non-medical reasons in the past year? never    Single Item Drug Screen Score: 0  Interpretation: Negative screen for possible drug use disorder    Brief Intervention  Alcohol & drug use screenings were reviewed. No  "concerns regarding substance use disorder identified.     Other Counseling Topics:   Skin self-exam and calcium and vitamin D intake.     Social Determinants of Health     Financial Resource Strain: Medium Risk (8/1/2023)    Overall Financial Resource Strain (CARDIA)     Difficulty of Paying Living Expenses: Somewhat hard   Food Insecurity: No Food Insecurity (9/27/2024)    Hunger Vital Sign     Worried About Running Out of Food in the Last Year: Never true     Ran Out of Food in the Last Year: Never true   Transportation Needs: No Transportation Needs (9/27/2024)    PRAPARE - Transportation     Lack of Transportation (Medical): No     Lack of Transportation (Non-Medical): No   Housing Stability: Low Risk  (9/27/2024)    Housing Stability Vital Sign     Unable to Pay for Housing in the Last Year: No     Number of Times Moved in the Last Year: 0     Homeless in the Last Year: No   Utilities: Not At Risk (9/27/2024)    [facility] Utilities     Threatened with loss of utilities: No     No results found.    Objective     /58   Pulse 73   Temp 98.6 °F (37 °C)   Resp 16   Ht 5' 10\" (1.778 m)   Wt 98.2 kg (216 lb 9.6 oz)   SpO2 98%   BMI 31.08 kg/m²     Physical Exam  Vitals and nursing note reviewed.   Constitutional:       General: He is not in acute distress.     Appearance: He is well-developed. He is ill-appearing.   HENT:      Head: Normocephalic and atraumatic.   Eyes:      Conjunctiva/sclera: Conjunctivae normal.   Cardiovascular:      Rate and Rhythm: Normal rate and regular rhythm.      Pulses: no weak pulses.           Dorsalis pedis pulses are 2+ on the right side and 2+ on the left side.        Posterior tibial pulses are 1+ on the right side and 1+ on the left side.      Heart sounds: Murmur heard.   Pulmonary:      Effort: Pulmonary effort is normal. No respiratory distress.      Breath sounds: Rhonchi present.   Abdominal:      Palpations: Abdomen is soft.      Tenderness: There is no abdominal " tenderness.   Musculoskeletal:         General: No swelling.      Cervical back: Neck supple.   Feet:      Right foot:      Skin integrity: No ulcer, skin breakdown, erythema, warmth, callus or dry skin.      Left foot:      Skin integrity: No ulcer, skin breakdown, erythema, warmth, callus or dry skin.   Skin:     General: Skin is warm and dry.      Capillary Refill: Capillary refill takes less than 2 seconds.   Neurological:      General: No focal deficit present.      Mental Status: He is alert.      Gait: Gait abnormal.   Psychiatric:         Mood and Affect: Mood normal.

## 2024-09-30 DIAGNOSIS — F41.9 ANXIETY: Primary | ICD-10-CM

## 2024-09-30 RX ORDER — ALPRAZOLAM 0.5 MG
0.5 TABLET ORAL
COMMUNITY
End: 2024-09-30 | Stop reason: SDUPTHER

## 2024-09-30 RX ORDER — ALPRAZOLAM 0.5 MG
TABLET ORAL
Qty: 5 TABLET | Refills: 0 | Status: SHIPPED | OUTPATIENT
Start: 2024-09-30

## 2024-10-01 ENCOUNTER — HOSPITAL ENCOUNTER (OUTPATIENT)
Dept: NUCLEAR MEDICINE | Facility: HOSPITAL | Age: 84
Discharge: HOME | End: 2024-10-01
Payer: COMMERCIAL

## 2024-10-01 DIAGNOSIS — C34.92 SQUAMOUS CELL CARCINOMA OF LEFT LUNG (HCC): ICD-10-CM

## 2024-10-01 DIAGNOSIS — C34.92 NON-SMALL CELL CARCINOMA OF LEFT LUNG METASTATIC TO ABDOMEN (HCC): ICD-10-CM

## 2024-10-01 DIAGNOSIS — C79.89 NON-SMALL CELL CARCINOMA OF LEFT LUNG METASTATIC TO ABDOMEN (HCC): ICD-10-CM

## 2024-10-01 LAB — GLUCOSE SERPL-MCNC: 147 MG/DL (ref 65–140)

## 2024-10-01 PROCEDURE — 78815 PET IMAGE W/CT SKULL-THIGH: CPT

## 2024-10-01 PROCEDURE — 82948 REAGENT STRIP/BLOOD GLUCOSE: CPT

## 2024-10-01 PROCEDURE — A9552 F18 FDG: HCPCS

## 2024-10-07 ENCOUNTER — HOSPITAL ENCOUNTER (OUTPATIENT)
Dept: INFUSION CENTER | Facility: HOSPITAL | Age: 84
Discharge: HOME | End: 2024-10-07
Payer: COMMERCIAL

## 2024-10-07 ENCOUNTER — OFFICE VISIT (OUTPATIENT)
Dept: PHYSICAL THERAPY | Facility: CLINIC | Age: 84
End: 2024-10-07
Payer: COMMERCIAL

## 2024-10-07 DIAGNOSIS — C34.92 SQUAMOUS CELL CARCINOMA OF LEFT LUNG (HCC): Primary | ICD-10-CM

## 2024-10-07 DIAGNOSIS — E11.40 TYPE 2 DIABETES MELLITUS WITH DIABETIC NEUROPATHY, WITHOUT LONG-TERM CURRENT USE OF INSULIN (HCC): ICD-10-CM

## 2024-10-07 DIAGNOSIS — R53.0 NEOPLASTIC MALIGNANT RELATED FATIGUE: ICD-10-CM

## 2024-10-07 DIAGNOSIS — R53.1 GENERALIZED WEAKNESS: ICD-10-CM

## 2024-10-07 LAB
ALBUMIN SERPL BCG-MCNC: 3.6 G/DL (ref 3.5–5)
ALP SERPL-CCNC: 107 U/L (ref 34–104)
ALT SERPL W P-5'-P-CCNC: 27 U/L (ref 7–52)
ANION GAP SERPL CALCULATED.3IONS-SCNC: 10 MMOL/L (ref 4–13)
AST SERPL W P-5'-P-CCNC: 26 U/L (ref 13–39)
BASOPHILS # BLD AUTO: 0.08 THOUSANDS/ΜL (ref 0–0.1)
BASOPHILS NFR BLD AUTO: 1 % (ref 0–1)
BILIRUB SERPL-MCNC: 0.35 MG/DL (ref 0.2–1)
BUN SERPL-MCNC: 16 MG/DL (ref 5–25)
CALCIUM SERPL-MCNC: 9.1 MG/DL (ref 8.4–10.2)
CHLORIDE SERPL-SCNC: 102 MMOL/L (ref 96–108)
CO2 SERPL-SCNC: 27 MMOL/L (ref 21–32)
CREAT SERPL-MCNC: 1.13 MG/DL (ref 0.6–1.3)
EOSINOPHIL # BLD AUTO: 0.36 THOUSAND/ΜL (ref 0–0.61)
EOSINOPHIL NFR BLD AUTO: 5 % (ref 0–6)
ERYTHROCYTE [DISTWIDTH] IN BLOOD BY AUTOMATED COUNT: 16.1 % (ref 11.6–15.1)
EST. AVERAGE GLUCOSE BLD GHB EST-MCNC: 174 MG/DL
GFR SERPL CREATININE-BSD FRML MDRD: 60 ML/MIN/1.73SQ M
GLUCOSE SERPL-MCNC: 202 MG/DL (ref 65–140)
HBA1C MFR BLD: 7.7 %
HCT VFR BLD AUTO: 40.9 % (ref 36.5–49.3)
HGB BLD-MCNC: 12.7 G/DL (ref 12–17)
IMM GRANULOCYTES # BLD AUTO: 0.03 THOUSAND/UL (ref 0–0.2)
IMM GRANULOCYTES NFR BLD AUTO: 0 % (ref 0–2)
LYMPHOCYTES # BLD AUTO: 1.34 THOUSANDS/ΜL (ref 0.6–4.47)
LYMPHOCYTES NFR BLD AUTO: 20 % (ref 14–44)
MCH RBC QN AUTO: 28.7 PG (ref 26.8–34.3)
MCHC RBC AUTO-ENTMCNC: 31.1 G/DL (ref 31.4–37.4)
MCV RBC AUTO: 93 FL (ref 82–98)
MONOCYTES # BLD AUTO: 0.68 THOUSAND/ΜL (ref 0.17–1.22)
MONOCYTES NFR BLD AUTO: 10 % (ref 4–12)
NEUTROPHILS # BLD AUTO: 4.26 THOUSANDS/ΜL (ref 1.85–7.62)
NEUTS SEG NFR BLD AUTO: 64 % (ref 43–75)
NRBC BLD AUTO-RTO: 0 /100 WBCS
PLATELET # BLD AUTO: 346 THOUSANDS/UL (ref 149–390)
PMV BLD AUTO: 9.6 FL (ref 8.9–12.7)
POTASSIUM SERPL-SCNC: 4.5 MMOL/L (ref 3.5–5.3)
PROT SERPL-MCNC: 6.9 G/DL (ref 6.4–8.4)
RBC # BLD AUTO: 4.42 MILLION/UL (ref 3.88–5.62)
SODIUM SERPL-SCNC: 139 MMOL/L (ref 135–147)
WBC # BLD AUTO: 6.75 THOUSAND/UL (ref 4.31–10.16)

## 2024-10-07 PROCEDURE — 97112 NEUROMUSCULAR REEDUCATION: CPT | Performed by: PHYSICAL THERAPIST

## 2024-10-07 PROCEDURE — 83036 HEMOGLOBIN GLYCOSYLATED A1C: CPT

## 2024-10-07 PROCEDURE — 85025 COMPLETE CBC W/AUTO DIFF WBC: CPT | Performed by: INTERNAL MEDICINE

## 2024-10-07 PROCEDURE — 97530 THERAPEUTIC ACTIVITIES: CPT | Performed by: PHYSICAL THERAPIST

## 2024-10-07 PROCEDURE — 80053 COMPREHEN METABOLIC PANEL: CPT | Performed by: INTERNAL MEDICINE

## 2024-10-07 RX ORDER — SODIUM CHLORIDE 9 MG/ML
20 INJECTION, SOLUTION INTRAVENOUS ONCE
Status: CANCELLED | OUTPATIENT
Start: 2024-10-08

## 2024-10-07 NOTE — PROGRESS NOTES
"Daily Note     Today's date: 10/7/2024  Patient name: Bridger Clayton  : 1940  MRN: [id number]  Referring provider: Bridger James MD  Dx:   Encounter Diagnosis     ICD-10-CM    1. 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 Neoplastic malignant related fatigue  R53.0       3. Generalized weakness  R53.1                        Subjective: Bridger reports \"I'm feeling better today\" with patient also reporting improvements in neck and back pain compared to previous session. He was able to complete PET scan as well, still awaiting results from oncologist.       Objective: See treatment diary below      Assessment: Tolerated treatment fair. Patient able to resume most exercises that he was unable to perform previous session due to significant fatigue. PT adding TB rows and seated shoulder flexion today with good tolerance, patient denying any pain, stretching, or pulling along port with new additions. Patient reporting \"feeling good\" at conclusion of session.  Patient would benefit from continued PT      Plan: Continue per plan of care.  Progress treatment as tolerated.       Precautions: active cancer with chemo every 3 weeks; FALL RISK, AAA, CHF, COPD, CAD, HTN, SYLVESTER, AAA    Insurance:  AMA/CMS Eval/ Re-eval POC expires FOTO Auth Status Co-Insurance                                                       2. 7/   3. 7   4.   5.    6. 8     7.   8 8. 9/3   9.  RE 10.    11. 10/7   12.      13.    14.    15.    16.  17.  18.      19.    20.   21.   22.   23.   24.      25.    26. 27. 28. 29. 30.   31. 32.  33. 34. 35. 36.          Daily Treatment Diary     Date of Service: 10/7 7/2 7/9 7/11 7/30 8/13 8/20 9/3 9/9 9/24   Manuals             STM UT          Done                 Therapeutic Exercise             Bike   X5 min Nustep           Hamstring stretch seated               Seated shoulder flexion 2# to 80* 10x            TB rows RTB seated 10x                                  " "                   Neuro Re-ed             Quad sets  3\" x 20 3\" x 20 3\" x 20 5\"x 20 :05x 20 5\" x 20 5\" x 20 RE 2x10   SLR flexion  10 EA  10x  2 x 10 AA for R LE 2 x 10 AROM 2 x 10 AROM 2x10 AROM 2 x 10 AROM 2 x 10 AROM RE    Bridges   10x           Seated adduction      :05x 10 5\" x 10 5' x 20 RE    Clamshells  Supine RTB 2x10  Yellow TB 2 x 10 red TB 2x10 Red TB 2 x 10 RTB 2x10 RTB 2 x 10 G TB 2 x 10 RE Supine RTB 10x   Standing Hip abd/ext   1 x10 at walkers 1 x 10 at walker 1 x 10 at walker NP today       Romberg EO ground/ foam             Seated HR/TR   2 x 10 2 x 10 2 x 10 2x10 2 x 10      SAQ 2x10 ea     :03x 10 ea 5\" x 10 ea 5' 2 x 10 eac RE 10 ea                Therapeutic Activity             Step ups             Lateral step ups             STS   2 x 5 2x 5  2x5 2 x 5 2 x 5 RE    Lateral walking 3 laps table             Gait w/ rollator   X200 ft X200 ft X150 ft  X200 ft X200 ft                  Outcome measures         Done                               MHP with neck ROM           10 min                                           Modalities                               "

## 2024-10-07 NOTE — PROGRESS NOTES
Labs drawn via port    Bridger Clayton  tolerated treatment well with no complications.      Bridger Clayton is aware of future appt on 10/8/24.     AVS printed and given to Bridger Clayton:    No (Declined by Bridger Clayton)

## 2024-10-08 ENCOUNTER — HOSPITAL ENCOUNTER (OUTPATIENT)
Dept: INFUSION CENTER | Facility: HOSPITAL | Age: 84
Discharge: HOME | End: 2024-10-08
Attending: INTERNAL MEDICINE
Payer: COMMERCIAL

## 2024-10-08 VITALS
DIASTOLIC BLOOD PRESSURE: 79 MMHG | HEART RATE: 84 BPM | WEIGHT: 219.14 LBS | RESPIRATION RATE: 18 BRPM | HEIGHT: 70 IN | SYSTOLIC BLOOD PRESSURE: 152 MMHG | OXYGEN SATURATION: 97 % | TEMPERATURE: 96.8 F | BODY MASS INDEX: 31.37 KG/M2

## 2024-10-08 DIAGNOSIS — C34.92 SQUAMOUS CELL CARCINOMA OF LEFT LUNG (HCC): Primary | ICD-10-CM

## 2024-10-08 RX ORDER — SODIUM CHLORIDE 9 MG/ML
20 INJECTION, SOLUTION INTRAVENOUS ONCE
Status: COMPLETED | OUTPATIENT
Start: 2024-10-08 | End: 2024-10-08

## 2024-10-08 RX ADMIN — DIPHENHYDRAMINE HYDROCHLORIDE 25 MG: 50 INJECTION, SOLUTION INTRAMUSCULAR; INTRAVENOUS at 09:19

## 2024-10-08 RX ADMIN — SODIUM CHLORIDE 20 ML/HR: 0.9 INJECTION, SOLUTION INTRAVENOUS at 09:00

## 2024-10-08 RX ADMIN — FAMOTIDINE 20 MG: 10 INJECTION, SOLUTION INTRAVENOUS at 09:00

## 2024-10-08 RX ADMIN — DEXAMETHASONE SODIUM PHOSPHATE: 10 INJECTION, SOLUTION INTRAMUSCULAR; INTRAVENOUS at 09:49

## 2024-10-08 RX ADMIN — PACLITAXEL 256.2 MG: 6 INJECTION, SOLUTION, CONCENTRATE INTRAVENOUS at 10:31

## 2024-10-08 NOTE — PROGRESS NOTES
Bridger Clayton  tolerated treatment well with no complications.      Bridger Clayton is aware of future appt on 10/28 at 0930.     AVS printed and given to Bridger Clayton:  Yes

## 2024-10-10 ENCOUNTER — OFFICE VISIT (OUTPATIENT)
Dept: HEMATOLOGY ONCOLOGY | Facility: MEDICAL CENTER | Age: 84
End: 2024-10-10
Payer: COMMERCIAL

## 2024-10-10 VITALS
HEART RATE: 86 BPM | OXYGEN SATURATION: 98 % | HEIGHT: 70 IN | WEIGHT: 219 LBS | TEMPERATURE: 98.1 F | RESPIRATION RATE: 17 BRPM | SYSTOLIC BLOOD PRESSURE: 134 MMHG | DIASTOLIC BLOOD PRESSURE: 80 MMHG | BODY MASS INDEX: 31.35 KG/M2

## 2024-10-10 DIAGNOSIS — C34.92 SQUAMOUS CELL CARCINOMA OF LEFT LUNG (HCC): Primary | ICD-10-CM

## 2024-10-10 PROCEDURE — 99213 OFFICE O/P EST LOW 20 MIN: CPT | Performed by: INTERNAL MEDICINE

## 2024-10-10 RX ORDER — SODIUM CHLORIDE 9 MG/ML
20 INJECTION, SOLUTION INTRAVENOUS ONCE
OUTPATIENT
Start: 2024-10-29

## 2024-10-10 NOTE — PROGRESS NOTES
84-year-old male with a number of medical and cardiac problems previously found to have a left lower lobe mass. Initial biopsy demonstrated squamous cell carcinoma.  IR performed thoracentesis, minimal amount of fluid was removed but there was no evidence of metastatic disease; cytology was negative.  Patient was seen by thoracic surgery and underwent mediastinoscopy.  There was no evidence of metastatic disease in the sampled lymph nodes (2R, 4R, 4L, 7).  Tumor was 4.5 cm.  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  Patient was treated with SBRT and then went on to surveillance.     Eventual follow-up PET/CT demonstrated evidence of recurrence in the left lower lobe, same area where the original malignancy was found.  There was no clear evidence of spread to either hilar regions or the mediastinum but radiologist commented on 1 right paratracheal lymph node but very +/-.  There was nonspecific FDG activity in the sacrum and left posterior iliac bone, seen before.  Unknown clinical significance.     At that time, patient also began to have more problems with recurrent left-sided pleural effusions requiring a Pleurx catheter.  Previously wife was draining approximately 500 cc every 2 to 3 days.  Discontinued while patient was started on pembrolizumab, did not get significantly better.  Additionally patient developed a rash while on pembrolizumab.  The ICI was eventually discontinued and patient was started on paclitaxel.  Patient has completed 9 cycles, next treatmetn is 9/17/2024..  Recent blood work was okay/acceptable.  PleurX catheter has been removed.  Recent follow-up PET/CT demonstrated response to treatment, no evidence of disease progression.  The continued on single agent dose reduced to 70% Taxol.     Current Regimen  Paclitaxel 135 mg/m2 IV day 1 (was receiving 77% up until now)  Cycle length = 21 days  Goal = palliation and prolongation of life He was scheduled for his  last cycle of chemotherapy on 6/25/2024.  He complained of worsening neuropathy in his fingertips.  Taxol was restarted. His numbnessin fingetups has nto changed. He can hold a pen or a cup but not open cans.     Patient was diagnosed with prostate cancer (approximately 10 + years ago) and underwent seeds and external beam radiation.  No evidence of recurrence since that time.       Physical Exam  Constitutional:       Appearance: He is well-developed.      Comments: Obese male, no respiratory distress, no signs of pain   HENT:      Head: Normocephalic and atraumatic.      Right Ear: External ear normal.      Left Ear: External ear normal.   Eyes:      Conjunctiva/sclera: Conjunctivae normal.      Pupils: Pupils are equal, round, and reactive to light.   Cardiovascular:      Rate and Rhythm: Normal rate and regular rhythm.      Heart sounds: Normal heart sounds.   Pulmonary:      Effort: Pulmonary effort is normal.      Comments: Decreased breath sounds left lower lobe  Abdominal:      Palpations: Abdomen is soft.      Comments: Obese, nontender.  Musculoskeletal:         General: Normal range of motion.      Cervical back: Normal range of motion and neck supple.   Skin:     General: Skin is warm.      Comments: Relatively good color, warm, moist, scattered mild areas of ecchymosis   Neurological:      Mental Status: He is alert and oriented to person, place, and time.   Psychiatric:         Behavior: Behavior normal.         Thought Content: Thought content normal.         Judgment: Judgment normal.      Extremities:  No lower extremity edema bilaterally.     Imaging     05/02/2022 CT scan the chest without contrast     IMPRESSION:  1.  Increase in size of a left lower lobe patchy opacity with new right basilar patchy densities concerning for pulmonary infiltrates with additional patchy and reticulonodular changes lingular segment left upper lobe and right middle lobe also likely related to infectious process.  2.   Left lower lobe cavitary mass has decreased in size now measuring 3.3 x 1.9 cm, previously 4.1 x 2.2 cm.  3.  Stable COPD and pulmonary fibrosis with the latter likely related to radiation change.     MPRESSION:  1. Necrotic left lower lung mass demonstrates persistent but diminished predominantly peripheral FDG activity. This may represent partial response to therapy, though residual viable tumor may still remain. There is new air within this necrotic mass, for   which infection is not excluded. Correlate clinically.  2. Decreased but persistent small left pleural effusion. There is associated FDG activity which may be inflammatory/infectious. Malignant effusion is not excluded.  3. Healing right anterior rib fractures. Nonspecific but stable asymmetric focus of activity in the right sacrum.  4. Otherwise no new hypermetabolic metastases.     6/6/2024 PET/CT  MPRESSION:  1. Necrotic left lower lung mass demonstrates persistent but diminished predominantly peripheral FDG activity. This may represent partial response to therapy, though residual viable tumor may still remain. There is new air within this necrotic mass, for   which infection is not excluded. Correlate clinically.  2. Decreased but persistent small left pleural effusion. There is associated FDG activity which may be inflammatory/infectious. Malignant effusion is not excluded.  3. Healing right anterior rib fractures. Nonspecific but stable asymmetric focus of activity in the right sacrum.  4. Otherwise no new hypermetabolic metastases.     Pathology     12/7/2023 [facility].  No action mutations were detected.  Tumor mutational burden = 6 = low.  Patient had a pathologic variant in PTEN and TP53.  PDL IHC TPS = 0%, negative.     Surgical Pathology Report                         Case: [id number]                                    Authorizing Provider:  Elmer Laird MD      Collected:           10/05/2021 1058               Ordering Location:     [facility]  [facility] Received:            10/05/2021 1120                                      CAT Scan                                                                      Pathologist:           Mary Dean MD                                                         Specimen:    Lung, Left Lower Lobe                                                                       Final Diagnosis   A. Lung, Left Lower Lobe, :  - Invasive squamous carcinoma, moderately differentiated, keratinizing type.  - Tumor is highlighted with P 40 immunoperoxidase stain.  - Prominent tumor necrosis is noted.      Best representative block with tumor A1.  This case was reviewed at the intradepartmental  conference.   RADHA Navarrete, Pulmonology, is notified of the diagnosis in [employer] via [facility] on 10/06/2021  at 2.40 pm.   Electronically signed by Mary Dean MD on 10/6/2021 at  2:44 PM         [facility] - 6/6/2024 PET/CT: 1. Necrotic left lower lung mass demonstrates persistent but diminished predominantly peripheral FDG activity. This may represent partial response to therapy, though residual viable tumor may still remain. There is new air within this necrotic mass, for   which infection is not excluded. Correlate clinically.  2. Decreased but persistent small left pleural effusion. There is associated FDG activity which may be inflammatory/infectious. Malignant effusion is not excluded.  3. Healing right anterior rib fractures. Nonspecific but stable asymmetric focus of activity in the right sacrum.  4. Otherwise no new hypermetabolic metastases.     10/1/2024 -

## 2024-10-22 ENCOUNTER — OFFICE VISIT (OUTPATIENT)
Dept: PHYSICAL THERAPY | Facility: CLINIC | Age: 84
End: 2024-10-22
Payer: COMMERCIAL

## 2024-10-22 ENCOUNTER — OFFICE VISIT (OUTPATIENT)
Age: 84
End: 2024-10-22
Payer: COMMERCIAL

## 2024-10-22 VITALS
SYSTOLIC BLOOD PRESSURE: 123 MMHG | HEIGHT: 70 IN | DIASTOLIC BLOOD PRESSURE: 69 MMHG | WEIGHT: 219 LBS | BODY MASS INDEX: 31.35 KG/M2 | RESPIRATION RATE: 17 BRPM | HEART RATE: 83 BPM

## 2024-10-22 DIAGNOSIS — R53.1 GENERALIZED WEAKNESS: ICD-10-CM

## 2024-10-22 DIAGNOSIS — E11.42 DIABETIC POLYNEUROPATHY ASSOCIATED WITH TYPE 2 DIABETES MELLITUS (HCC): Primary | ICD-10-CM

## 2024-10-22 DIAGNOSIS — R53.0 NEOPLASTIC MALIGNANT RELATED FATIGUE: ICD-10-CM

## 2024-10-22 DIAGNOSIS — B35.1 ONYCHOMYCOSIS: ICD-10-CM

## 2024-10-22 DIAGNOSIS — M79.672 PAIN IN BOTH FEET: ICD-10-CM

## 2024-10-22 DIAGNOSIS — I70.209 PERIPHERAL ARTERIOSCLEROSIS (HCC): ICD-10-CM

## 2024-10-22 DIAGNOSIS — B35.3 TINEA PEDIS OF BOTH FEET: ICD-10-CM

## 2024-10-22 DIAGNOSIS — C34.92 SQUAMOUS CELL CARCINOMA OF LEFT LUNG (HCC): Primary | ICD-10-CM

## 2024-10-22 DIAGNOSIS — M79.671 PAIN IN BOTH FEET: ICD-10-CM

## 2024-10-22 PROCEDURE — 97110 THERAPEUTIC EXERCISES: CPT | Performed by: PHYSICAL THERAPIST

## 2024-10-22 PROCEDURE — 99213 OFFICE O/P EST LOW 20 MIN: CPT | Performed by: PODIATRIST

## 2024-10-22 PROCEDURE — 97112 NEUROMUSCULAR REEDUCATION: CPT | Performed by: PHYSICAL THERAPIST

## 2024-10-22 NOTE — PROGRESS NOTES
"Daily Note     Today's date: 10/22/2024  Patient name: Bridger Clayton  : 1940  MRN: [id number]  Referring provider: Bridger James MD  Dx:   No diagnosis found.                   Subjective: Bridger reports \"I'm feeling better today\" with patient also reporting improvements in neck and back pain compared to previous session. He was able to complete PET scan as well, still awaiting results from oncologist.       Objective: See treatment diary below      Assessment: Tolerated treatment fair. Patient able to resume most exercises that he was unable to perform previous session due to significant fatigue. PT adding TB horizontal abduction at 60 degrees flexion today and walking with marching today with fair tolerance.  Patient reporting \"feeling good\" at conclusion of session.  Patient would benefit from continued PT      Plan: Continue per plan of care.  Progress treatment as tolerated.       Precautions: active cancer with chemo every 3 weeks; FALL RISK, AAA, CHF, COPD, CAD, HTN, SYLVESTER, AAA    Insurance:  AMA/CMS Eval/ Re-eval POC expires FOTO Auth Status Co-Insurance                                                       2. 7/2   3. 7   4.7   5.    6. 8     7.    8. 9/3   9.  RE 10.    11. 10/7   12. 10/22     13.    14.    15.    16.  17.  18.      19.    20.   21.   22.   23.   24.      25.    26. 27. 28. 29. 30.   31. 32.  33. 34. 35. 36.          Daily Treatment Diary     Date of Service: 10/7 10/22 7/9 7/11 7/30 8/13 8/20 9/3 9/9 9/24   Manuals             STM UT          Done                 Therapeutic Exercise             Bike    Nustep           Hamstring stretch seated               Seated shoulder flexion 2# to 80* 10x            TB rows RTB seated 10x RTB seated 10x           TB horizontal abduction  RTB 2x10                                       Neuro Re-ed             Quad sets   3\" x 20 3\" x 20 5\"x 20 :05x 20 5\" x 20 5\" x 20 RE 2x10   SLR flexion  10 EA   2 x 10 AA for R LE 2 x " "10 AROM 2 x 10 AROM 2x10 AROM 2 x 10 AROM 2 x 10 AROM RE    Bridges              Seated adduction      :05x 10 5\" x 10 5' x 20 RE    Clamshells  Supine RTB 2x10 Supine RTB 2x10 Yellow TB 2 x 10 red TB 2x10 Red TB 2 x 10 RTB 2x10 RTB 2 x 10 G TB 2 x 10 RE Supine RTB 10x   Standing Hip abd/ext   1 x10 at walkers 1 x 10 at walker 1 x 10 at walker NP today       Romberg EO ground/ foam             Seated HR/TR  Stand 10 ea 2 x 10 2 x 10 2 x 10 2x10 2 x 10      SAQ 2x10 ea 1# 2x10 ea    :03x 10 ea 5\" x 10 ea 5' 2 x 10 eac RE 10 ea                Therapeutic Activity             Step ups             Lateral step ups             STS   2 x 5 2x 5  2x5 2 x 5 2 x 5 RE    Lateral walking 3 laps table  3 laps table           Gait w/ rollator  X 50 feet w/ marches X200 ft X200 ft X150 ft  X200 ft X200 ft                  Outcome measures         Done                               MHP with neck ROM           10 min                                           Modalities                               "

## 2024-10-22 NOTE — PROGRESS NOTES
Assessment/Plan:  Patient has pain in his feet and toes with ambulation.  He has mycosis of nail and skin. He has plantar pre ulcerative skin lesions.     Plan.  Chart reviewed.  Patient educated on care of the diabetic foot.  Diabetic foot exam performed.    Patient advised on foot hygiene.  Proper shoe sizing recommended.  Patient will use daily, he will apply cream to feet b.i.d. For 4 weeks.  Foot hygiene instruction given.  Shoe sizing and style recommendations made.  Watch for signs of infection.  Aftercare instruction given.           Subjective:  Patient has pain in his feet with ambulation.  No history of trauma .  He has pain when he wears shoes.  He has pain in his toes.      Patient ID: Spenser Clayton is a 84 y.o. male.     Patient is diabetic.  He has pain in his feet and toes with ambulation.  Has pain from calluses.  He has ingrown toenails and dry scaly skin.  He is requesting refill of topical antifungal.        Review of Systems   Constitutional: No fever or chills, feels well, no tiredness, no recent weight loss or weight gain.  Eyes: No complaints of red eyes, no eyesight problems.  ENT: no complaints of loss of hearing, no nosebleeds, no sore throat.  Cardiovascular: No complaints of chest pain, no palpitations, no leg claudication or lower extremity edema.  Respiratory: No complaints of shortness of breath, no wheezing, no cough.  Gastrointestinal: No complaints of abdominal pain, no constipation, no nausea or vomiting, no diarrhea or bloody stools.  Genitourinary: No complaints of dysuria or incontinence, no hesitancy, no nocturia.  Musculoskeletal: limb pain, but-- as noted in HPI.  Integumentary: No complaints of skin rash or lesion, no itching or dry skin, no skin wounds.  Neurological: No complaints of headache, no confusion, no numbness or tingling, no dizziness.  Psychiatric: No suicidal thoughts, no anxiety, no depression.  Endocrine: No muscle weakness, no frequent urination, no  excessive thirst, no feelings of weakness.      Active Problems  1. 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.70) (M21.969)   2. Arthritis (716.90) (M19.90)   3. Atherosclerosis of arteries of extremities (440.20) (I70.209)   4. Calcaneal spur (726.73) (M77.30)   5. Callus (700) (L84)   6. 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 Diabetic neuropathy (250.60,357.2) (E11.40)   9. Hypercholesterolemia (272.0) (E78.00)   10. Hypertension (401.9) (I10)   11. 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 Onychomycosis (110.1) (B35.1)   14. Orthopedic aftercare (V54.9) (Z47.89)   15. Pain in both feet (729.5) (M79.671,M79.672)   16. Pain in extremity (729.5) (M79.609)   17. Pes planus, congenital (754.61) (Q66.50)   18. 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 Plantar wart (078.12) (B07.0)   20. Prostate cancer (185) (C61)   21. Sprain of right shoulder (840.9) (S43.401A)   22. Tinea pedis (110.4) (B35.3)     Past Medical History   · Orthopedic aftercare (V54.9) (Z47.89)     Surgical History   · History of Gastric Surgery   · History of Hernia Repair   · History of Previous Stent Placement Total Number Performed ___     The surgical history was reviewed and updated today.       Family History  Family History    · Family history of Arthritis (V17.7)   · Family history of Cancer     The family history was reviewed and updated today.       Social History      · Beer Consumption (___ Bottles Per Day)   · Daily Coffee Consumption (1  Cups/Day)   · Former smoker (V15.82) (Z87.891)  The social history was reviewed and updated today.   Allergies  1. No Known Drug Allergies      Physical Exam  Left Foot: Appearance: Normal except as noted: excessive pronation-- and-- pes planus.   Right Foot: Appearance: Normal except as noted: excessive pronation-- and-- pes planus.    Left Ankle: ROM: limited ROM in all planes   Right Ankle: ROM: limited ROM in all planes   Neurological Exam: performed. Light touch was intact bilaterally. Vibratory sensation was intact bilaterally. Response to monofilament test was intact bilaterally. Deep tendon reflexes: patellar reflex present bilaterally-- and-- achilles reflex present bilaterally.   Vascular Exam: performed Dorsalis pedis pulses were One/4 bilaterally. Posterior tibial pulses were One/4 bilaterally. Elevation Pallor: present bilaterally. Capillary refill time was greater than 3 seconds bilaterally-- and-- Q. 9 findings bilateral. Negative digital hair noted. Positive abnormal cooling noted.   Toenails: All of the toenails were elongated,-- hypertrophied,-- discolored,-- ingrown,-- shown to have subungual debris,-- tender-- and-- Severely mycotic with onychauxis.    Socks and shoes removed, Right Foot Findings: swollen, erythematous and dry.  The sensory exam showed diminished vibratory sensation at the level of the toes. Diminished tactile sensation with monofilament testing throughout the right foot.  Socks and shoes removed, Left Foot Findings: swollen, erythematous and dry.  The sensory exam showed diminished vibratory sensation at the level of the toes. Diminished tactile sensation with monofilament testing throughout the left foot. Capillary refills findings on the right were delayed in the toes.  Pulses:  1+ in the posterior tibialis on the right  1+ in the dorsalis pedis on the right.  Capillary refills findings on the left were delayed in the toes.  Pulses:  1+ in the posterior tibialis on the left  1+ in the dorsalis pedis on the left.  Assign Risk Category: 2: Loss of protective sensation with or without weakness, deformity, callus, pre-ulcer, or history of ulceration. High risk.   Hyperkeratosis: present on both first toes,-- present on both fifth sub metatarsals-- and-- [place] tinea pedis, bilateral, is noted.   Shoe Gear  Evaluation: performed (). Right Foot: width: d-- and-- length: 11. Left Foot: width: d-- and-- length: 11. Recommendation(s): athletic shoes     Patient's shoes and socks removed.Right Foot/Ankle   Right Foot Inspection  Skin Exam: dry skin, callus and callus               Toe Exam: swelling  Sensory   Vibration: diminished  Proprioception: diminished      Vascular  Capillary refills: elevated        Left Foot/Ankle  Left Foot Inspection  Skin Exam: dry skin and callus              Toe Exam: swelling and erythema                   Sensory   Vibration: diminished  Proprioception: diminished     Vascular  Capillary refills: elevated     Right Foot/Ankle   Right Foot Inspection        Sensory   Vibration: diminished  Proprioception: diminished  Monofilament testing: diminished     Vascular  Capillary refills: < 3 seconds              Left Foot/Ankle  Left Foot Inspection        Sensory   Vibration: diminished  Proprioception: diminished  Monofilament testing: diminished     Vascular  Capillary refills: < 3 seconds         Patient's shoes and socks removed.     Right Foot/Ankle   Right Foot Inspection        Toe Exam: right toe deformity.      Sensory   Vibration: diminished      Vascular  Capillary refills: < 3 seconds              Left Foot/Ankle  Left Foot Inspection        Toe Exam: left toe deformity.      Sensory   Vibration: diminished      Vascular  Capillary refills: < 3 seconds     Patient's shoes and socks removed.     Right Foot/Ankle   Right Foot Inspection        Toe Exam: right toe deformity.      Sensory   Vibration: diminished  Proprioception: diminished  Monofilament testing: diminished     Vascular  Capillary refills: < 3 seconds        Left Foot/Ankle  Left Foot Inspection        Toe Exam: left toe deformity.      Sensory   Vibration: diminished  Proprioception: diminished  Monofilament testing: diminished     Vascular  Capillary refills: < 3 seconds        Assign Risk Category  Deformity  present  Loss of protective sensation  Weak pulses  Risk: 2

## 2024-10-23 ENCOUNTER — TELEPHONE (OUTPATIENT)
Age: 84
End: 2024-10-23

## 2024-10-23 NOTE — TELEPHONE ENCOUNTER
I called Bridger regarding an appointment that they have scheduled with Dr Manuel scheduled on  12/11/24 at 1:40 PM      Patient does not have a voicemail set up.  Pts appointment has been rescheduled for 12/4/24 at 1:20 PM with Dr James.

## 2024-10-27 PROBLEM — Z00.00 ENCOUNTER FOR SUBSEQUENT ANNUAL WELLNESS VISIT (AWV) IN MEDICARE PATIENT: Status: RESOLVED | Noted: 2024-06-28 | Resolved: 2024-10-27

## 2024-10-28 ENCOUNTER — HOSPITAL ENCOUNTER (OUTPATIENT)
Dept: INFUSION CENTER | Facility: HOSPITAL | Age: 84
Discharge: HOME | End: 2024-10-28
Payer: COMMERCIAL

## 2024-10-28 DIAGNOSIS — C34.92 SQUAMOUS CELL CARCINOMA OF LEFT LUNG (HCC): Primary | ICD-10-CM

## 2024-10-28 LAB
ALBUMIN SERPL BCG-MCNC: 3.5 G/DL (ref 3.5–5)
ALP SERPL-CCNC: 103 U/L (ref 34–104)
ALT SERPL W P-5'-P-CCNC: 17 U/L (ref 7–52)
ANION GAP SERPL CALCULATED.3IONS-SCNC: 7 MMOL/L (ref 4–13)
AST SERPL W P-5'-P-CCNC: 17 U/L (ref 13–39)
BASOPHILS # BLD AUTO: 0.06 THOUSANDS/ΜL (ref 0–0.1)
BASOPHILS NFR BLD AUTO: 1 % (ref 0–1)
BILIRUB SERPL-MCNC: 0.37 MG/DL (ref 0.2–1)
BUN SERPL-MCNC: 13 MG/DL (ref 5–25)
CALCIUM SERPL-MCNC: 9.1 MG/DL (ref 8.4–10.2)
CHLORIDE SERPL-SCNC: 102 MMOL/L (ref 96–108)
CO2 SERPL-SCNC: 28 MMOL/L (ref 21–32)
CREAT SERPL-MCNC: 0.99 MG/DL (ref 0.6–1.3)
EOSINOPHIL # BLD AUTO: 0.42 THOUSAND/ΜL (ref 0–0.61)
EOSINOPHIL NFR BLD AUTO: 6 % (ref 0–6)
ERYTHROCYTE [DISTWIDTH] IN BLOOD BY AUTOMATED COUNT: 15.3 % (ref 11.6–15.1)
GFR SERPL CREATININE-BSD FRML MDRD: 70 ML/MIN/1.73SQ M
GLUCOSE SERPL-MCNC: 181 MG/DL (ref 65–140)
HCT VFR BLD AUTO: 41.6 % (ref 36.5–49.3)
HGB BLD-MCNC: 12.8 G/DL (ref 12–17)
IMM GRANULOCYTES # BLD AUTO: 0.04 THOUSAND/UL (ref 0–0.2)
IMM GRANULOCYTES NFR BLD AUTO: 1 % (ref 0–2)
LYMPHOCYTES # BLD AUTO: 1.36 THOUSANDS/ΜL (ref 0.6–4.47)
LYMPHOCYTES NFR BLD AUTO: 21 % (ref 14–44)
MCH RBC QN AUTO: 28.4 PG (ref 26.8–34.3)
MCHC RBC AUTO-ENTMCNC: 30.8 G/DL (ref 31.4–37.4)
MCV RBC AUTO: 92 FL (ref 82–98)
MONOCYTES # BLD AUTO: 0.74 THOUSAND/ΜL (ref 0.17–1.22)
MONOCYTES NFR BLD AUTO: 11 % (ref 4–12)
NEUTROPHILS # BLD AUTO: 4 THOUSANDS/ΜL (ref 1.85–7.62)
NEUTS SEG NFR BLD AUTO: 60 % (ref 43–75)
NRBC BLD AUTO-RTO: 0 /100 WBCS
PLATELET # BLD AUTO: 306 THOUSANDS/UL (ref 149–390)
PMV BLD AUTO: 9.3 FL (ref 8.9–12.7)
POTASSIUM SERPL-SCNC: 4.3 MMOL/L (ref 3.5–5.3)
PROT SERPL-MCNC: 6.6 G/DL (ref 6.4–8.4)
RBC # BLD AUTO: 4.51 MILLION/UL (ref 3.88–5.62)
SODIUM SERPL-SCNC: 137 MMOL/L (ref 135–147)
WBC # BLD AUTO: 6.62 THOUSAND/UL (ref 4.31–10.16)

## 2024-10-28 PROCEDURE — 80053 COMPREHEN METABOLIC PANEL: CPT | Performed by: INTERNAL MEDICINE

## 2024-10-28 PROCEDURE — 85025 COMPLETE CBC W/AUTO DIFF WBC: CPT | Performed by: INTERNAL MEDICINE

## 2024-10-29 ENCOUNTER — HOSPITAL ENCOUNTER (OUTPATIENT)
Dept: INFUSION CENTER | Facility: HOSPITAL | Age: 84
Discharge: HOME | End: 2024-10-29
Attending: INTERNAL MEDICINE
Payer: COMMERCIAL

## 2024-10-29 ENCOUNTER — TELEPHONE (OUTPATIENT)
Age: 84
End: 2024-10-29

## 2024-10-29 VITALS
HEART RATE: 80 BPM | TEMPERATURE: 96.7 F | OXYGEN SATURATION: 96 % | WEIGHT: 218.26 LBS | DIASTOLIC BLOOD PRESSURE: 74 MMHG | HEIGHT: 70 IN | SYSTOLIC BLOOD PRESSURE: 147 MMHG | RESPIRATION RATE: 18 BRPM | BODY MASS INDEX: 31.25 KG/M2

## 2024-10-29 DIAGNOSIS — C34.92 SQUAMOUS CELL CARCINOMA OF LEFT LUNG (HCC): Primary | ICD-10-CM

## 2024-10-29 DIAGNOSIS — C34.92 PRIMARY MALIGNANT NEOPLASM OF LEFT LUNG METASTATIC TO OTHER SITE (HCC): ICD-10-CM

## 2024-10-29 DIAGNOSIS — C34.92 SQUAMOUS CELL CARCINOMA OF LEFT LUNG (HCC): Primary | Chronic | ICD-10-CM

## 2024-10-29 RX ORDER — CODEINE PHOSPHATE/GUAIFENESIN 10-100MG/5
10 LIQUID (ML) ORAL 3 TIMES DAILY PRN
Qty: 237 ML | Refills: 0 | Status: SHIPPED | OUTPATIENT
Start: 2024-10-29

## 2024-10-29 RX ORDER — SODIUM CHLORIDE 9 MG/ML
20 INJECTION, SOLUTION INTRAVENOUS ONCE
Status: COMPLETED | OUTPATIENT
Start: 2024-10-29 | End: 2024-10-29

## 2024-10-29 RX ADMIN — DIPHENHYDRAMINE HYDROCHLORIDE 25 MG: 50 INJECTION, SOLUTION INTRAMUSCULAR; INTRAVENOUS at 09:30

## 2024-10-29 RX ADMIN — DEXAMETHASONE SODIUM PHOSPHATE: 10 INJECTION, SOLUTION INTRAMUSCULAR; INTRAVENOUS at 09:08

## 2024-10-29 RX ADMIN — PACLITAXEL 256.2 MG: 6 INJECTION, SOLUTION, CONCENTRATE INTRAVENOUS at 11:39

## 2024-10-29 RX ADMIN — FAMOTIDINE 20 MG: 10 INJECTION, SOLUTION INTRAVENOUS at 09:53

## 2024-10-29 RX ADMIN — ALTEPLASE 2 MG: 2.2 INJECTION, POWDER, LYOPHILIZED, FOR SOLUTION INTRAVENOUS at 10:45

## 2024-10-29 RX ADMIN — SODIUM CHLORIDE 20 ML/HR: 0.9 INJECTION, SOLUTION INTRAVENOUS at 09:06

## 2024-10-29 NOTE — PROGRESS NOTES
Intractable coughing due to the metastatic lung cancer symptomatic treatment palliative care will give cough medicine with codeine

## 2024-10-29 NOTE — TELEPHONE ENCOUNTER
PA for (GUAIFENESIN AC) 100-10 MG/5ML  APPROVED     Date(s) approved October 29, 2024 to April 29, 2025     Case #[id number]          Pharmacy advised by    [x]Fax  []Phone call    Approval letter scanned into [employer] Yes

## 2024-10-29 NOTE — TELEPHONE ENCOUNTER
PA for (GUAIFENESIN AC) 100-10 MG/5ML SUBMITTED     via    []CMM-KEY:   [x]Surescripts-Case ID # [id number]  []Availity-Auth ID # NDC #   []Faxed to plan   []Other website   []Phone call Case ID #     Office notes sent, clinical questions answered. Awaiting determination    Turnaround time for your insurance to make a decision on your Prior Authorization can take 7-21 business days.

## 2024-10-29 NOTE — PROGRESS NOTES
Pt arrived for chemo infusion. Port accessed & positional.Blood return noted & pre-meds started. Unable to get blood return prior to chemo infusion & TPA given @ 1045.After 30m  still no blood return.PIV inserted & chemo started @ 1139. Blood return noted@ 1257 via port but chemo remained infusing in LA IV. Tolerated infusion w/out any adverse effects, offers no complaints. Confirmed next appt, AVS given

## 2024-11-08 ENCOUNTER — OFFICE VISIT (OUTPATIENT)
Dept: PHYSICAL THERAPY | Facility: CLINIC | Age: 84
End: 2024-11-08
Payer: COMMERCIAL

## 2024-11-08 DIAGNOSIS — R53.1 GENERALIZED WEAKNESS: ICD-10-CM

## 2024-11-08 DIAGNOSIS — R53.0 NEOPLASTIC MALIGNANT RELATED FATIGUE: ICD-10-CM

## 2024-11-08 DIAGNOSIS — C34.92 SQUAMOUS CELL CARCINOMA OF LEFT LUNG (HCC): Primary | ICD-10-CM

## 2024-11-08 PROCEDURE — 97112 NEUROMUSCULAR REEDUCATION: CPT | Performed by: PHYSICAL THERAPIST

## 2024-11-08 PROCEDURE — 97110 THERAPEUTIC EXERCISES: CPT | Performed by: PHYSICAL THERAPIST

## 2024-11-08 NOTE — PROGRESS NOTES
"Daily Note     Today's date: 2024  Patient name: Bridger Clayton  : 1940  MRN: [id number]  Referring provider: Bridger James MD  Dx:   Encounter Diagnosis     ICD-10-CM    1. 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 Neoplastic malignant related fatigue  R53.0       3. Generalized weakness  R53.1                          Subjective: Bridger reports \"my energy is better today, I was really feeling it after this chemo.\" Patient reporting difficulty with sleeping, PT suggesting discuss with MD further.       Objective: See treatment diary below      Assessment: Tolerated treatment fair. Patient continuing to demonstrate fatigue with most standing TE today, requiring seated rest breaks. PT adding standing calf stretch and given as part of HEP today. Patient reporting \"feeling good\" at conclusion of session. Patient's HR not exceeding 104 bpm and O2 saturation remaining above 94% when periodically checked throughout the session.  Patient would benefit from continued PT      Plan: Continue per plan of care.  Progress treatment as tolerated.       Precautions: active cancer with chemo every 3 weeks; FALL RISK, AAA, CHF, COPD, CAD, HTN, SYLVESTER, AAA    Insurance:  AMA/CMS Eval/ Re-eval POC expires FOTO Auth Status Co-Insurance                                                       2. 7/2   3. 79   4.7   5. 7   6. 8     7.   8 8. 9/3   9.  RE 10.    11. 10/7   12. 10/22     13.    14.    15.    16.  17.  18.      19.    20.   21.   22.   23.   24.      25.    26. 27. 28. 29. 30.   31. 32.  33. 34. 35. 36.          Daily Treatment Diary     Date of Service: 10/7 10/22 11/8 7/11 7/30 8/13 8/20 9/3 9/9 9/24   Manuals             STM UT          Done                 Therapeutic Exercise             Bike              Hamstring stretch seated             Seated shoulder flexion 2# to 80* 10x            TB rows RTB seated 10x RTB seated 10x RTB 2x10 seated          TB horizontal " "abduction  RTB 2x10           TB triceps extension   Seated RTB 2x10                         Neuro Re-ed             Quad sets    3\" x 20 5\"x 20 :05x 20 5\" x 20 5\" x 20 RE 2x10   SLR flexion  10 EA    2 x 10 AROM 2 x 10 AROM 2x10 AROM 2 x 10 AROM 2 x 10 AROM RE    Bridges              Seated adduction      :05x 10 5\" x 10 5' x 20 RE    Clamshells  Supine RTB 2x10 Supine RTB 2x10 Supine RTB 2x10 red TB 2x10 Red TB 2 x 10 RTB 2x10 RTB 2 x 10 G TB 2 x 10 RE Supine RTB 10x   Standing Hip abd/ext    1 x 10 at walker 1 x 10 at walker NP today       Romberg EO ground/ foam             Seated HR/TR  Stand 10 ea Stand 10 ea 2 x 10 2 x 10 2x10 2 x 10      SAQ 2x10 ea 1# 2x10 ea 2x10 ea   :03x 10 ea 5\" x 10 ea 5' 2 x 10 eac RE 10 ea                Therapeutic Activity             Step ups             Lateral step ups             STS   2 x 5 2x 5  2x5 2 x 5 2 x 5 RE    Lateral walking 3 laps table  3 laps table 4 laps no hands table          Gait w/ rollator  X 50 feet w/ marches t X200 ft X150 ft  X200 ft X200 ft                  Outcome measures         Done                               MHP with neck ROM           10 min                                           Modalities                               "

## 2024-11-14 ENCOUNTER — OFFICE VISIT (OUTPATIENT)
Dept: PHYSICAL THERAPY | Facility: CLINIC | Age: 84
End: 2024-11-14
Payer: COMMERCIAL

## 2024-11-14 DIAGNOSIS — C34.92 SQUAMOUS CELL CARCINOMA OF LEFT LUNG (HCC): Primary | ICD-10-CM

## 2024-11-14 DIAGNOSIS — R53.0 NEOPLASTIC MALIGNANT RELATED FATIGUE: ICD-10-CM

## 2024-11-14 DIAGNOSIS — R53.1 GENERALIZED WEAKNESS: ICD-10-CM

## 2024-11-14 PROCEDURE — 97112 NEUROMUSCULAR REEDUCATION: CPT | Performed by: PHYSICAL THERAPIST

## 2024-11-14 PROCEDURE — 97110 THERAPEUTIC EXERCISES: CPT | Performed by: PHYSICAL THERAPIST

## 2024-11-14 NOTE — PROGRESS NOTES
"Daily Note     Today's date: 2024  Patient name: Bridger Clayton  : 1940  MRN: [id number]  Referring provider: Bridger James MD  Dx:   No diagnosis found.                     Subjective: Bridger reports \"just tired today, I don't know why.\" Patient to have next treatment on 24.       Objective: See treatment diary below      Assessment: Tolerated treatment fair. Patient continuing to demonstrate fatigue with most standing TE today, requiring seated rest breaks. PT adding bicep curls and shoulder flexion in sitting today with fair tolerance, reporting fatigue in UE. PT trialing step-ups on foam with good stability overall, minimal UE support needed.   Patient would benefit from continued PT      Plan: Continue per plan of care.  Progress treatment as tolerated.       Precautions: active cancer with chemo every 3 weeks; FALL RISK, AAA, CHF, COPD, CAD, HTN, SYLVESTER, AAA    Insurance:  AMA/CMS Eval/ Re-eval POC expires FOTO Auth Status Co-Insurance                                                       2. 7/2   3. 7   4.7   5.    6. 8     7.    8. 9/3   9. 9 RE 10.    11. 10/7   12. 10/22     13.    14.    15.    16.  17.  18.      19.    20.   21.   22.   23.   24.      25.    26. 27. 28. 29. 30.   31. 32.  33. 34. 35. 36.          Daily Treatment Diary     Date of Service: 10/7 10/22 11/8 11/14 7/30 8/13 8/20 9/3 9/9 9/24   Manuals             STM UT          Done                 Therapeutic Exercise             Bike              Bicep curls    5# 10x         Seated shoulder flexion 2# to 80* 10x   3# to 80  * 10x         TB rows RTB seated 10x RTB seated 10x RTB 2x10 seated RTB 2x10 seated         TB horizontal abduction  RTB 2x10           TB triceps extension   Seated RTB 2x10 Seated RTB 2x10                        Neuro Re-ed             Quad sets     5\"x 20 :05x 20 5\" x 20 5\" x 20 RE 2x10   SLR flexion  10 EA     2 x 10 AROM 2x10 AROM 2 x 10 AROM 2 x 10 AROM RE    Bridges    " "          Seated adduction      :05x 10 5\" x 10 5' x 20 RE    Clamshells  Supine RTB 2x10 Supine RTB 2x10 Supine RTB 2x10 red TB 2x10 Red TB 2 x 10 RTB 2x10 RTB 2 x 10 G TB 2 x 10 RE Supine RTB 10x   Standing Hip abd/ext     1 x 10 at walker NP today       Romberg EO ground/ foam             Seated HR/TR  Stand 10 ea Stand 10 ea  2 x 10 2x10 2 x 10      SAQ 2x10 ea 1# 2x10 ea 2x10 ea 2x10 ea  :03x 10 ea 5\" x 10 ea 5' 2 x 10 eac RE 10 ea                Therapeutic Activity             Step ups    Onto foam 10 ea         Lateral step ups             STS   2 x 5 10x  2x5 2 x 5 2 x 5 RE    Lateral walking 3 laps table  3 laps table 4 laps no hands table          Gait w/ rollator  X 50 feet w/ marches t  X150 ft  X200 ft X200 ft                  Outcome measures         Done                               MHP with neck ROM           10 min                                           Modalities                               "

## 2024-11-15 DIAGNOSIS — G93.40 ENCEPHALOPATHY: ICD-10-CM

## 2024-11-15 RX ORDER — PRIMIDONE 50 MG/1
TABLET ORAL
Qty: 180 TABLET | Refills: 0 | OUTPATIENT
Start: 2024-11-15

## 2024-11-15 NOTE — TELEPHONE ENCOUNTER
Reason for call:   [x] Refill   [] Prior Auth  [] Other:     Office:   [] PCP/Provider -   [x] Specialty/Provider - Neuro    Medication: primidone (MYSOLINE) 50 mg tablet     Dose/Frequency: TAKE 2 TABS AT 8PM.     Quantity: 180    Pharmacy: [facility] [address]     Does the patient have enough for 3 days?   [x] Yes   [] No - Send as HP to POD

## 2024-11-18 ENCOUNTER — HOSPITAL ENCOUNTER (OUTPATIENT)
Dept: INFUSION CENTER | Facility: HOSPITAL | Age: 84
Discharge: HOME | End: 2024-11-18
Payer: COMMERCIAL

## 2024-11-18 DIAGNOSIS — C34.92 SQUAMOUS CELL CARCINOMA OF LEFT LUNG (HCC): Primary | ICD-10-CM

## 2024-11-18 LAB
ALBUMIN SERPL BCG-MCNC: 3.5 G/DL (ref 3.5–5)
ALP SERPL-CCNC: 103 U/L (ref 34–104)
ALT SERPL W P-5'-P-CCNC: 34 U/L (ref 7–52)
ANION GAP SERPL CALCULATED.3IONS-SCNC: 8 MMOL/L (ref 4–13)
AST SERPL W P-5'-P-CCNC: 44 U/L (ref 13–39)
BASOPHILS # BLD AUTO: 0.06 THOUSANDS/ÂΜL (ref 0–0.1)
BASOPHILS NFR BLD AUTO: 1 % (ref 0–1)
BILIRUB SERPL-MCNC: 0.36 MG/DL (ref 0.2–1)
BUN SERPL-MCNC: 16 MG/DL (ref 5–25)
CALCIUM SERPL-MCNC: 8.9 MG/DL (ref 8.4–10.2)
CHLORIDE SERPL-SCNC: 100 MMOL/L (ref 96–108)
CO2 SERPL-SCNC: 29 MMOL/L (ref 21–32)
CREAT SERPL-MCNC: 1.07 MG/DL (ref 0.6–1.3)
EOSINOPHIL # BLD AUTO: 0.37 THOUSAND/ÂΜL (ref 0–0.61)
EOSINOPHIL NFR BLD AUTO: 5 % (ref 0–6)
ERYTHROCYTE [DISTWIDTH] IN BLOOD BY AUTOMATED COUNT: 15.5 % (ref 11.6–15.1)
GFR SERPL CREATININE-BSD FRML MDRD: 64 ML/MIN/1.73SQ M
GLUCOSE SERPL-MCNC: 215 MG/DL (ref 65–140)
HCT VFR BLD AUTO: 40.2 % (ref 36.5–49.3)
HGB BLD-MCNC: 12.5 G/DL (ref 12–17)
IMM GRANULOCYTES # BLD AUTO: 0.05 THOUSAND/UL (ref 0–0.2)
IMM GRANULOCYTES NFR BLD AUTO: 1 % (ref 0–2)
LYMPHOCYTES # BLD AUTO: 1.64 THOUSANDS/ÂΜL (ref 0.6–4.47)
LYMPHOCYTES NFR BLD AUTO: 23 % (ref 14–44)
MCH RBC QN AUTO: 28.8 PG (ref 26.8–34.3)
MCHC RBC AUTO-ENTMCNC: 31.1 G/DL (ref 31.4–37.4)
MCV RBC AUTO: 93 FL (ref 82–98)
MONOCYTES # BLD AUTO: 0.81 THOUSAND/ÂΜL (ref 0.17–1.22)
MONOCYTES NFR BLD AUTO: 11 % (ref 4–12)
NEUTROPHILS # BLD AUTO: 4.16 THOUSANDS/ÂΜL (ref 1.85–7.62)
NEUTS SEG NFR BLD AUTO: 59 % (ref 43–75)
NRBC BLD AUTO-RTO: 0 /100 WBCS
PLATELET # BLD AUTO: 312 THOUSANDS/UL (ref 149–390)
PMV BLD AUTO: 9.2 FL (ref 8.9–12.7)
POTASSIUM SERPL-SCNC: 4.6 MMOL/L (ref 3.5–5.3)
PROT SERPL-MCNC: 6.5 G/DL (ref 6.4–8.4)
RBC # BLD AUTO: 4.34 MILLION/UL (ref 3.88–5.62)
SODIUM SERPL-SCNC: 137 MMOL/L (ref 135–147)
WBC # BLD AUTO: 7.09 THOUSAND/UL (ref 4.31–10.16)

## 2024-11-18 PROCEDURE — 85025 COMPLETE CBC W/AUTO DIFF WBC: CPT | Performed by: INTERNAL MEDICINE

## 2024-11-18 PROCEDURE — 80053 COMPREHEN METABOLIC PANEL: CPT | Performed by: INTERNAL MEDICINE

## 2024-11-18 RX ORDER — SODIUM CHLORIDE 9 MG/ML
20 INJECTION, SOLUTION INTRAVENOUS ONCE
Status: CANCELLED | OUTPATIENT
Start: 2024-11-19

## 2024-11-18 NOTE — PROGRESS NOTES
Labs drawn via port    Bridger Clayton  tolerated treatment well with no complications.      Bridger Clayton is aware of future appt on 11/19/24 .     AVS printed and given to Bridger Clayton:    No (Declined by Bridger Clayton)

## 2024-11-19 ENCOUNTER — HOSPITAL ENCOUNTER (OUTPATIENT)
Dept: INFUSION CENTER | Facility: HOSPITAL | Age: 84
Discharge: HOME | End: 2024-11-19
Attending: INTERNAL MEDICINE
Payer: COMMERCIAL

## 2024-11-19 VITALS
RESPIRATION RATE: 18 BRPM | OXYGEN SATURATION: 94 % | WEIGHT: 216.49 LBS | TEMPERATURE: 97.1 F | SYSTOLIC BLOOD PRESSURE: 149 MMHG | HEIGHT: 70 IN | HEART RATE: 78 BPM | BODY MASS INDEX: 30.99 KG/M2 | DIASTOLIC BLOOD PRESSURE: 73 MMHG

## 2024-11-19 DIAGNOSIS — C34.92 SQUAMOUS CELL CARCINOMA OF LEFT LUNG (HCC): Primary | ICD-10-CM

## 2024-11-19 RX ORDER — SODIUM CHLORIDE 9 MG/ML
20 INJECTION, SOLUTION INTRAVENOUS ONCE
Status: COMPLETED | OUTPATIENT
Start: 2024-11-19 | End: 2024-11-19

## 2024-11-19 RX ADMIN — DEXAMETHASONE SODIUM PHOSPHATE: 10 INJECTION, SOLUTION INTRAMUSCULAR; INTRAVENOUS at 08:53

## 2024-11-19 RX ADMIN — PACLITAXEL 256.2 MG: 6 INJECTION, SOLUTION, CONCENTRATE INTRAVENOUS at 09:59

## 2024-11-19 RX ADMIN — SODIUM CHLORIDE 20 ML/HR: 0.9 INJECTION, SOLUTION INTRAVENOUS at 08:53

## 2024-11-19 RX ADMIN — DIPHENHYDRAMINE HYDROCHLORIDE 25 MG: 50 INJECTION, SOLUTION INTRAMUSCULAR; INTRAVENOUS at 09:15

## 2024-11-19 RX ADMIN — FAMOTIDINE 20 MG: 10 INJECTION, SOLUTION INTRAVENOUS at 09:36

## 2024-11-19 NOTE — PROGRESS NOTES
Bridger Clayton  tolerated treatment well with no complications.      Bridger Clayton is aware of future appt on 12/9 at 1130.     AVS printed and given to Bridger Clayton:  Yes

## 2024-11-20 ENCOUNTER — OFFICE VISIT (OUTPATIENT)
Dept: PALLIATIVE MEDICINE | Facility: CLINIC | Age: 84
End: 2024-11-20
Payer: COMMERCIAL

## 2024-11-20 VITALS
HEIGHT: 70 IN | TEMPERATURE: 97.9 F | BODY MASS INDEX: 31.55 KG/M2 | SYSTOLIC BLOOD PRESSURE: 130 MMHG | OXYGEN SATURATION: 97 % | DIASTOLIC BLOOD PRESSURE: 68 MMHG | WEIGHT: 220.4 LBS | HEART RATE: 82 BPM

## 2024-11-20 DIAGNOSIS — F41.9 ANXIOUSNESS: ICD-10-CM

## 2024-11-20 DIAGNOSIS — R04.0 EPISTAXIS: ICD-10-CM

## 2024-11-20 DIAGNOSIS — G62.0 CHEMOTHERAPY-INDUCED PERIPHERAL NEUROPATHY (HCC): ICD-10-CM

## 2024-11-20 DIAGNOSIS — C34.92 SQUAMOUS CELL CARCINOMA OF LEFT LUNG (HCC): Primary | Chronic | ICD-10-CM

## 2024-11-20 DIAGNOSIS — E11.40 TYPE 2 DIABETES MELLITUS WITH DIABETIC NEUROPATHY, WITHOUT LONG-TERM CURRENT USE OF INSULIN (HCC): ICD-10-CM

## 2024-11-20 DIAGNOSIS — R11.0 NAUSEA: ICD-10-CM

## 2024-11-20 DIAGNOSIS — K21.9 GASTROESOPHAGEAL REFLUX DISEASE WITHOUT ESOPHAGITIS: ICD-10-CM

## 2024-11-20 DIAGNOSIS — F33.9 DEPRESSION, RECURRENT (HCC): ICD-10-CM

## 2024-11-20 DIAGNOSIS — Z51.5 PALLIATIVE CARE ENCOUNTER: ICD-10-CM

## 2024-11-20 DIAGNOSIS — T45.1X5A CHEMOTHERAPY-INDUCED PERIPHERAL NEUROPATHY (HCC): ICD-10-CM

## 2024-11-20 PROBLEM — R45.89 DYSPHORIC MOOD: Status: ACTIVE | Noted: 2024-11-20

## 2024-11-20 PROCEDURE — 99214 OFFICE O/P EST MOD 30 MIN: CPT | Performed by: INTERNAL MEDICINE

## 2024-11-20 PROCEDURE — G2211 COMPLEX E/M VISIT ADD ON: HCPCS | Performed by: INTERNAL MEDICINE

## 2024-11-20 RX ORDER — SERTRALINE HYDROCHLORIDE 25 MG/1
25 TABLET, FILM COATED ORAL DAILY
Qty: 30 TABLET | Refills: 5 | Status: SHIPPED | OUTPATIENT
Start: 2024-11-20

## 2024-11-20 RX ORDER — GABAPENTIN 300 MG/1
300 CAPSULE ORAL 2 TIMES DAILY
Qty: 60 CAPSULE | Refills: 5 | Status: SHIPPED | OUTPATIENT
Start: 2024-11-20

## 2024-11-20 NOTE — ASSESSMENT & PLAN NOTE
ACP: On file.  Reviewed notes (PT, Podiatry, Medical Oncology, Neurology), labs (11/18/24 Cr 1.07, alb 3.5, Hb 12.5), imaging + procedures (10/1/24 PET CT). See below for more data.  No follow-ups on file.  Medication safety issues addressed - no driving under the influence of narcotics (including opioids), watch for adverse effects including AMS or respiratory depression (slowed breathing), keep medications stored in a safe/locked environment, do not use alcohol while opioids or other narcotics are in one's system, do not travel with more than the minimum number of tablets or capsules required for the trip.  I have personally queried the patient's controlled substance dispensing history in the Prescription Drug Monitoring Program in compliance with regulations before I have prescribed any controlled substances. The prescription history is consistent with prescribed therapy and our practice policies.

## 2024-11-20 NOTE — ASSESSMENT & PLAN NOTE
Patient is on Eliquis. Counseled on the likelihood of epistaxis. Encouraged use of humidifier, nasal saline.

## 2024-11-20 NOTE — PROGRESS NOTES
Ambulatory Visit  Name: Bridger Clayton      : 1940      MRN: [id number]  Encounter Provider: Ashish Colon MD  Encounter Date: 2024   Encounter department: [facility] PALLIAT[facility]    Assessment & Plan   1. 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 Diabetic neuropathy worsened by cancer therapies.  Per literature review paclitaxel can cause peripheral neuropathy (42% to 70%; grades 3/4: <= 7%) or worsen existing neuropathy.  Continue disease-directed cares.  Orders:  -     gabapentin (NEURONTIN) 300 mg capsule; Take 1 capsule (300 mg total) by mouth 2 (two) times a day  -     sertraline (Zoloft) 25 mg tablet; Take 1 tablet (25 mg total) by mouth daily  2. Type 2 diabetes mellitus with diabetic neuropathy, without long-term current use of insulin (HCC)  Assessment & Plan:  Diabetes management per PCP. Continue gabapentin for neuropathy.  Lab Results   Component Value Date    HGBA1C 7.7 (H) 10/07/2024     Orders:  -     gabapentin (NEURONTIN) 300 mg capsule; Take 1 capsule (300 mg total) by mouth 2 (two) times a day  3. Chemotherapy-induced peripheral neuropathy (HCC)  Assessment & Plan:  Continue gabapentin ATC. Patient declines offer of increase, though neuropathy is affecting the fine motor control of his bilateral hands. He declines offer of referral to OT. He is working with PT, more for mobility and strength issues. Encouraged ambulation as tolerated.  Orders:  -     gabapentin (NEURONTIN) 300 mg capsule; Take 1 capsule (300 mg total) by mouth 2 (two) times a day  4. Gastroesophageal reflux disease without esophagitis  Assessment & Plan:  Continue PPI.  5. Anxiousness  -     sertraline (Zoloft) 25 mg tablet; Take 1 tablet (25 mg total) by mouth daily  6. Depression, recurrent (HCC)  Assessment & Plan:  Start trial of sertraline, 12.5 mg for a week and then ramp up to 25 mg daily.  Emotional / psychosocial support  provided today.  Orders:  -     sertraline (Zoloft) 25 mg tablet; Take 1 tablet (25 mg total) by mouth daily  7. Nausea  Assessment & Plan:  Continue Zofran PRN N/V. Counseled on OTC remedies for nausea as well.  8. Palliative care encounter  Assessment & Plan:  ACP: On file.  Reviewed notes (PT, Podiatry, Medical Oncology, Neurology), labs (11/18/24 Cr 1.07, alb 3.5, Hb 12.5), imaging + procedures (10/1/24 PET CT). See below for more data.  No follow-ups on file.  Medication safety issues addressed - no driving under the influence of narcotics (including opioids), watch for adverse effects including AMS or respiratory depression (slowed breathing), keep medications stored in a safe/locked environment, do not use alcohol while opioids or other narcotics are in one's system, do not travel with more than the minimum number of tablets or capsules required for the trip.  I have personally queried the patient's controlled substance dispensing history in the Prescription Drug Monitoring Program in compliance with regulations before I have prescribed any controlled substances. The prescription history is consistent with prescribed therapy and our practice policies.    Orders:  -     gabapentin (NEURONTIN) 300 mg capsule; Take 1 capsule (300 mg total) by mouth 2 (two) times a day  -     sertraline (Zoloft) 25 mg tablet; Take 1 tablet (25 mg total) by mouth daily  9. Epistaxis  Assessment & Plan:  Patient is on Eliquis. Counseled on the likelihood of epistaxis. Encouraged use of humidifier, nasal saline.      Subjective   Chief Complaint   Patient presents with    Cancer    Counseling    Peripheral Neuropathy    Follow-up        History of Present Illness     Bridger Clayton is a 84 y.o. male w/ recurrent SCC of the left lung, receiving paclitaxel (pembrolizumab dc’d s/t rash); recurrent L pleural effusion, chronic dCHF, CAD, HTN, AAA, GERD, COPD, depression, persistent Afib on Eliquis, diabetes w/ diabetic neuropathy, SYLVESTER,  "h/o prostate cancer. He follows w/ Dr KELSEY James (Medical Oncology), Dr Griffin (Podiatry), Dr Horton (Cardiology).    When asked how he has been feeling, patient states \"fair to middling\". He states \"on the outside I feel great\" but he goes on to express concerns about his cancer. He also notes nosebleeds when he blows his nose or picks. He is on Eliquis.    Patient endorses ongoing neuropathy with discomfort and numbness worse in his fingers on bilateral hands. He is working with PT, but that is more for strength and mobility than for neuropathy. He is not interested in seeing OT as he states \"I see enough people\". He declines offer of changing gabapentin.    Patient states his appetite will wax and wane, though he continues to gain weight without fluid overload signs. He has nausea \"every once in a while\" but his medications provide relief, along with ginger ale. He states his bowel function is regular, moving his bowels daily or every other day.    Patient endorses some frustration about his gradual functional decline. He endorses some dysphoric mood and some anxiousness.    Patient reports some coughing after eating, particular with foods that produce crumbs such as cookies. He declines referral to SLP for swallowing evaluation.      Current Outpatient Medications:     acetaminophen (TYLENOL) 325 mg tablet, Take 2 tablets (650 mg total) by mouth every 6 (six) hours as needed for mild pain, headaches or fever, Disp: 30 tablet, Rfl: 0    ALPRAZolam (XANAX) 0.5 mg tablet, Take one 1 hour before procedure., Disp: 5 tablet, Rfl: 0    apixaban (Eliquis) 5 mg, Take 1 tablet (5 mg total) by mouth 2 (two) times a day (Patient taking differently: Take 2.5 mg by mouth 2 (two) times a day), Disp: 180 tablet, Rfl: 0    atenolol (TENORMIN) 25 mg tablet, , Disp: , Rfl:     atorvastatin (LIPITOR) 10 mg tablet, TAKE 1 TABLET (10 MG TOTAL) BY MOUTH DAILY, Disp: 90 tablet, Rfl: 1    carvedilol (COREG) 25 mg tablet, TAKE ONE TABLET BY " MOUTH TWICE DAILY WITH MEALS, Disp: 180 tablet, Rfl: 1    gabapentin (NEURONTIN) 300 mg capsule, Take 1 capsule (300 mg total) by mouth 2 (two) times a day, Disp: 60 capsule, Rfl: 5    guaifenesin-codeine (GUAIFENESIN AC) 100-10 MG/5ML liquid, Take 10 mL by mouth 3 (three) times a day as needed for cough, Disp: 237 mL, Rfl: 0    halobetasol (ULTRAVATE) 0.05 % cream, , Disp: , Rfl:     hydrOXYzine HCL (ATARAX) 50 mg tablet, Take 1 tablet (50 mg total) by mouth daily at bedtime, Disp: 90 tablet, Rfl: 1    latanoprost (XALATAN) 0.005 % ophthalmic solution, Administer 0.005 mL to both eyes daily at bedtime, Disp: , Rfl:     lidocaine-prilocaine (EMLA) cream, Apply to PAC site one hour prior to access, Disp: 30 g, Rfl: 2    metFORMIN (GLUCOPHAGE) 500 mg tablet, Take 1 tablet (500 mg total) by mouth 2 (two) times a day with meals, Disp: 180 tablet, Rfl: 1    mirtazapine (REMERON) 7.5 MG tablet, Take 1 tablet (7.5 mg total) by mouth daily at bedtime, Disp: 90 tablet, Rfl: 3    Multiple Vitamin (MULTI-VITAMIN DAILY PO), Take by mouth daily  , Disp: , Rfl:     omeprazole (PriLOSEC) 20 mg delayed release capsule, TAKE ONE CAPSULE BY MOUTH DAILY, Disp: 90 capsule, Rfl: 1    ondansetron (ZOFRAN) 8 mg tablet, Take 1 tablet (8 mg total) by mouth every 8 (eight) hours as needed for nausea or vomiting, Disp: 20 tablet, Rfl: 2    primidone (MYSOLINE) 50 mg tablet, TAKE 2 TABS AT 8PM., Disp: 180 tablet, Rfl: 1    sertraline (Zoloft) 25 mg tablet, Take 1 tablet (25 mg total) by mouth daily, Disp: 30 tablet, Rfl: 5    triamcinolone (KENALOG) 0.1 % cream, , Disp: , Rfl:     albuterol (2.5 mg/3 mL) 0.083 % nebulizer solution, Take 2.5 mg by nebulization As needed per patient's wife (Patient not taking: Reported on 7/25/2024), Disp: , Rfl:     ciclopirox (LOPROX) 0.77 % cream, Apply topically 2 (two) times a day for 20 days, Disp: 45 g, Rfl: 2    fluticasone-umeclidinium-vilanterol (Trelegy Ellipta) 100-62.5-25 mcg/actuation inhaler,  "Rinse mouth after use. (Patient not taking: Reported on 7/25/2024), Disp: 60 each, Rfl: 5  No current facility-administered medications for this visit.    Facility-Administered Medications Ordered in Other Visits:     alteplase (CATHFLO) injection 2 mg, 2 mg, Intracatheter, Q1MIN PRN, Kieran Lara,     alteplase (CATHFLO) injection 2 mg, 2 mg, Intracatheter, Q1MIN PRN, Bridger James MD    Objective   /68 (BP Location: Left arm, Patient Position: Sitting, Cuff Size: Large)   Pulse 82   Temp 97.9 °F (36.6 °C) (Temporal)   Ht 5' 10\" (1.778 m)   Wt 100 kg (220 lb 6.4 oz)   SpO2 97%   BMI 31.62 kg/m²   Physical Exam  Vitals reviewed.   Constitutional:       General: He is awake. He is not in acute distress.     Appearance: He is well-groomed. He is obese. He is not toxic-appearing.   HENT:      Head: Normocephalic and atraumatic.      Right Ear: External ear normal.      Left Ear: External ear normal.   Eyes:      General: No scleral icterus.        Right eye: No discharge.         Left eye: No discharge.      Extraocular Movements: Extraocular movements intact.      Conjunctiva/sclera: Conjunctivae normal.      Pupils: Pupils are equal, round, and reactive to light.   Cardiovascular:      Rate and Rhythm: Normal rate.   Pulmonary:      Effort: Pulmonary effort is normal. No tachypnea, bradypnea, accessory muscle usage or respiratory distress.      Comments: Able to speak comfortably in complete sentences on room air at rest.  Abdominal:      General: There is no distension.      Tenderness: There is no guarding.   Musculoskeletal:      Cervical back: Normal range of motion.      Right lower leg: No edema.      Left lower leg: No edema.   Skin:     General: Skin is dry.      Coloration: Skin is not pale.   Neurological:      Mental Status: He is alert and oriented to person, place, and time.      Cranial Nerves: No dysarthria or facial asymmetry.      Gait: Gait abnormal (using 1PC).   Psychiatric:    "      Attention and Perception: Attention normal.         Mood and Affect: Affect normal. Mood is depressed (mildly).         Speech: Speech normal.         Behavior: Behavior normal. Behavior is cooperative.         Thought Content: Thought content normal.         Cognition and Memory: Cognition and memory normal.         Judgment: Judgment normal.          Recent labs:  Lab Results   Component Value Date/Time    SODIUM 137 11/18/2024 08:50 AM    K 4.6 11/18/2024 08:50 AM    BUN 16 11/18/2024 08:50 AM    CREATININE 1.07 11/18/2024 08:50 AM    GLUC 215 (H) 11/18/2024 08:50 AM    CALCIUM 8.9 11/18/2024 08:50 AM    AST 44 (H) 11/18/2024 08:50 AM    ALT 34 11/18/2024 08:50 AM    ALB 3.5 11/18/2024 08:50 AM    TP 6.5 11/18/2024 08:50 AM    EGFR 64 11/18/2024 08:50 AM     Lab Results   Component Value Date/Time    HGB 12.5 11/18/2024 08:50 AM    WBC 7.09 11/18/2024 08:50 AM     11/18/2024 08:50 AM    INR 1.34 (H) 12/29/2022 09:58 PM    PTT 32 12/29/2022 09:58 PM     Lab Results   Component Value Date/Time    WKP6LHAHONRE 11.561 (H) 03/20/2024 10:51 AM       Recent Imaging:  Procedure: NM PET CT skull base to mid thigh  Result Date: 10/1/2024  Narrative: PET/CT SCAN INDICATION: C34.92: Malignant neoplasm of unspecified part of left bronchus or lung C79.89: Secondary malignant neoplasm of other specified sites Status post SBRT. Status post chemotherapy. Evaluate response MODIFIER: PS COMPARISON: PET/CT 6/6/2024, 10/30/2023 CELL TYPE: Squamous cell carcinoma of the lung TECHNIQUE:   10.7 mCi F-18-FDG administered IV. Multiplanar attenuation corrected and non attenuation corrected PET images are available for interpretation, and contiguous, low dose, axial CT sections were obtained from the skull base through the femurs.  Intravenous contrast material was not utilized. This examination, like all CT scans performed in the [facility], was performed utilizing techniques to minimize radiation dose  exposure, including the use of iterative reconstruction and automated exposure control. Fasting serum glucose: 147 mg/dl FINDINGS: VISUALIZED BRAIN: No acute abnormalities are seen. HEAD/NECK: There is a physiologic distribution of FDG. No FDG avid cervical adenopathy is seen. CT images: Unremarkable. CHEST: Left lower lung mass measuring approximately 8.6 x 5.5 cm demonstrates interval increase in predominantly peripheral FDG activity with SUV max of 11. This previously measured approximately 9.3 x 6.0 cm when measured similarly with SUV max of 5.7. There is less internal air within the mass than before. Unchanged size of right paratracheal lymph node measuring 1.4 cm in short axis (series 4 image 51) demonstrating SUV max of 3.4 (previously 2.6). Persistent small left pleural effusion/thickening with foci of mild FDG activity demonstrate SUV max of 3.4 (previously 3.2). CT images: Unchanged 3 mm pulmonary nodule along the right minor fissure. 0.2 to 0.3 cm nodule in the left midlung image 57 also unchanged. Bilateral gynecomastia. Right-sided chest port with tip terminating in the mid SVC. Atherosclerotic calcifications  of the thoracic aorta. Coronary artery calcifications. ABDOMEN: No FDG avid soft tissue lesions are seen. CT images: Cholelithiasis. Prior gastric bypass. Colonic diverticulosis without findings of acute diverticulitis. Atherosclerotic calcifications of the abdominal aorta. PELVIS: No FDG avid soft tissue lesions are seen. CT images: Brachytherapy seeds within the prostate gland. Right-sided hydrocele. OSSEOUS STRUCTURES: Persistent asymmetric focus of activity in the right sacrum demonstrating SUV max of 4.5 (previously 4.2), of unknown clinical significance. CT images: No acute osseous abnormality. Old right anterior ribs 4 and 5 fractures; previously seen increased activity associated with the fracture sites has now resolved.     Impression: 1. Left lower lung mass demonstrates mild decrease in  "size now measuring 8.6 x 5.5 cm. There is less internal air within the mass than before. However there is increased predominantly peripheral FDG activity when compared to prior study from 6/6/2024. Findings consistent with viable neoplasm along the peripheral margins of the lesion 2. Persistent small left pleural effusion with associated FDG activity, which again may be inflammatory/infectious, however malignant effusion cannot be excluded. 3. Right paratracheal lymph node size unchanged from prior study. SUV max currently 3.4 earlier 2.6. Resident: EVANGELINA Gauthier I, the attending radiologist, have reviewed the images and agree with the final report above. Workstation performed: [id number]       35 minutes total time spent on 11/20/2024 in caring for this patient including obtaining/reviewing history, symptom assessment and management, medication review / adjustment, psychosocial support, reviewing / ordering tests, medicines, imaging, procedures, supportive listening, anticipatory guidance, patient/family education, instructions for management, risks/benefits of treatment(s), risk factor reduction, and documenting in the medical record. All patient/family questions were answered during this discussion.    Ashish Colon MD  [facility]  [phone]    Portions of this document may have been created using dictation software and as such some \"sound alike\" terms may have been generated by the system. Do not hesitate to contact me with any questions or clarifications.   "

## 2024-11-20 NOTE — ASSESSMENT & PLAN NOTE
Continue gabapentin ATC. Patient declines offer of increase, though neuropathy is affecting the fine motor control of his bilateral hands. He declines offer of referral to OT. He is working with PT, more for mobility and strength issues. Encouraged ambulation as tolerated.

## 2024-11-20 NOTE — PATIENT INSTRUCTIONS
"It was good to see you today. Thank you for coming in.    Sertraline (Zoloft) to help with mood. May help lift her mood, reduce sadness, reduce anxiousness. Started at half tab (12.5 mg) daily for a week. Increase to a full tab after a week.  Please let me know if you would like a referral to Occupational Therapy to help with fine motor control in your hands, and activity such as opening jars.   Use Zofran as needed for nausea or vomiting.  In addition to (or instead of) prescription medications for chemotherapy-induced nausea, some over-the-counter and non-pharmacological solutions may help. Consider \"Queasy drops\" or \"Queasy pops\" (available at most pharmacies or in the \"nausea remedies\" sections of larger stores like [employer]). Ginger and peppermint may help reduce nausea. IBgard may help as well (peppermint is an active ingredient in this over-the-counter product). You may also note that if you have altered taste sensation, switching from silverware to plasticware or bamboo cutlery may help.  Continue PT. I encourage you to go for frequent walks with your Rollator walker, short distances as tolerated.  Return in about 3 months (around 2/20/2025).  Call us for refills on medications that we supply, as needed.  If something changes and you need to come in sooner, please call our office.    PRESCRIPTION REFILL REMINDER:  All medication refills should be requested prior to Noon on Friday. Any refill requests after noon on Friday would be addressed the following Monday.    MEDICATION SAFETY ISSUES:   Do not drive under the influence of narcotics (including opioids), watch for adverse effects including confusion / altered mental status / respiratory depression (slowed breathing), keep medications stored in a safe/locked environment, do not use alcohol while opioids or other narcotics are in your system. Do not travel with more than the minimum number of tablets or capsules required for the trip.   "

## 2024-11-20 NOTE — ASSESSMENT & PLAN NOTE
Start trial of sertraline, 12.5 mg for a week and then ramp up to 25 mg daily.  Emotional / psychosocial support provided today.

## 2024-11-20 NOTE — ASSESSMENT & PLAN NOTE
Recurrent SCC of the left lung, receiving paclitaxel. Diabetic neuropathy worsened by cancer therapies.  Per literature review paclitaxel can cause peripheral neuropathy (42% to 70%; grades 3/4: <= 7%) or worsen existing neuropathy.  Continue disease-directed cares.

## 2024-11-20 NOTE — ASSESSMENT & PLAN NOTE
Diabetes management per PCP. Continue gabapentin for neuropathy.  Lab Results   Component Value Date    HGBA1C 7.7 (H) 10/07/2024

## 2024-11-21 DIAGNOSIS — E78.2 MIXED HYPERLIPIDEMIA: ICD-10-CM

## 2024-11-21 RX ORDER — ATORVASTATIN CALCIUM 10 MG/1
10 TABLET, FILM COATED ORAL DAILY
Qty: 30 TABLET | Refills: 0 | Status: SHIPPED | OUTPATIENT
Start: 2024-11-21

## 2024-11-27 DIAGNOSIS — K21.9 GASTROESOPHAGEAL REFLUX DISEASE WITHOUT ESOPHAGITIS: ICD-10-CM

## 2024-11-27 DIAGNOSIS — I10 HYPERTENSION, UNSPECIFIED TYPE: ICD-10-CM

## 2024-11-27 DIAGNOSIS — I48.91 NEW ONSET ATRIAL FIBRILLATION (HCC): ICD-10-CM

## 2024-12-02 DIAGNOSIS — F33.9 DEPRESSION, RECURRENT (HCC): ICD-10-CM

## 2024-12-03 RX ORDER — MIRTAZAPINE 7.5 MG/1
7.5 TABLET, FILM COATED ORAL
Qty: 90 TABLET | Refills: 3 | Status: SHIPPED | OUTPATIENT
Start: 2024-12-03

## 2024-12-04 ENCOUNTER — OFFICE VISIT (OUTPATIENT)
Dept: HEMATOLOGY ONCOLOGY | Facility: MEDICAL CENTER | Age: 84
End: 2024-12-04
Payer: COMMERCIAL

## 2024-12-04 VITALS
DIASTOLIC BLOOD PRESSURE: 70 MMHG | WEIGHT: 222.2 LBS | RESPIRATION RATE: 17 BRPM | SYSTOLIC BLOOD PRESSURE: 124 MMHG | HEART RATE: 84 BPM | TEMPERATURE: 97.6 F | OXYGEN SATURATION: 97 % | HEIGHT: 70 IN | BODY MASS INDEX: 31.81 KG/M2

## 2024-12-04 DIAGNOSIS — C34.92 SQUAMOUS CELL CARCINOMA OF LEFT LUNG (HCC): Primary | Chronic | ICD-10-CM

## 2024-12-04 PROCEDURE — 99215 OFFICE O/P EST HI 40 MIN: CPT | Performed by: INTERNAL MEDICINE

## 2024-12-04 NOTE — PROGRESS NOTES
haris Clayton  1940  [facility]  [address] [id number]     DISCUSSION/SUMMARY:     84-year-old male with a number of medical and cardiac problems previously found to have a left lower lobe mass. Initial biopsy demonstrated squamous cell carcinoma.  IR recently performed thoracentesis, minimal amount of fluid was removed but there was no evidence of metastatic disease; cytology was negative.  Patient was seen by thoracic surgery and underwent mediastinoscopy.  There was no evidence of metastatic disease in the sampled lymph nodes (2R, 4R, 4L, 7).  Tumor was 4.5 cm.  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  Patient was treated with SBRT and then went on to surveillance.    Eventual follow-up PET/CT demonstrated evidence of recurrence in the left lower lobe, same area where the original malignancy was found.  There was no clear evidence of spread to either hilar regions or the mediastinum but radiologist commented on 1 right paratracheal lymph node very +/-.  There was nonspecific FDG activity in the sacrum and left posterior iliac bone, seen before.  Unknown clinical significance.    At that time, patient also began to have more problems with recurrent left-sided pleural effusions requiring a Pleurx catheter.  Previously wife was draining approximately 500 cc every 2 to 3 days.  Patient was subsequently started on pembrolizumab.  This was eventually discontinued and patient was started on single agent paclitaxel.  Cycle 18 is due on December 10, 2024.    Recent PET/CT performed approximately 1 month ago demonstrated mild decrease in size of the left lung mass now measuring 8.6 x 5.5 cm.  Radiologist was concerned about viable tumor at the periphery of the margin.  There was a small left pleural effusion and an unchanged right paratracheal lymph node.    Patient feels pretty well clinically  there are no concerning findings.  Recent PET/CT does not clearly demonstrate significant disease progression but there is evidence of additional disease.  Recent blood work was okay/acceptable - elevated glucose and renal function are not new.  Mr. Clayton is able to tolerate the Taxol (dose reduced).  Patient understands that the treatment is palliative, trying to keep him alive longer.  Patient understands that additional scanning will be needed again in the future.  Once there is evidence of progression, other options will need to be discussed.  Patient agrees to continue with the paclitaxel.    Regimen  Paclitaxel 175 mg/m2 IV day 1 (70%)  Cycle length = 21 days  Goal = palliation and prolongation of life    Patient is to return the office in 4 weeks.    Carefully review your medication list and verify that the list is accurate and up-to-date. Please call the hematology/oncology office if there are medications missing from the list, medications on the list that you are not currently taking or if there is a dosage or instruction that is different from how you're taking that medication.     Patient goals and areas of care: Continue with paclitaxel (with dose reduction)  Barriers to care: Comorbidities  Patient is able to self-care  ______________________________________________________________________________________         Chief Complaint   Patient presents with    Follow-up - recurrent non-small cell lung carcinoma      History of Present Illness:  84 year-old male with multiple medical and cardiac issue recently seen in the emergency room because of a cough.  Workup demonstrated a left lower lobe mass.  Biopsy demonstrated squamous cell carcinoma.  Workup did not demonstrated evidence of metastatic disease; mediastinoscopy was negative.  Patient was seen by thoracic surgery but was not felt to be a surgical candidate.  Patient was subsequently seen by radiation oncology and underwent SPRT.  Patient then went  on surveillance.      Eventual repeat scanning was consistent with recurrence at the original site.  Mr. Clayton required a left-sided Pleurx catheter placed because of recurrent left-sided pleural effusion.  Patient was initially treated with pembrolizumab only but developed a significant rash requiring steroids.  Because of the rash and the itchiness, patient did not wish to continue with the pembrolizumab; this was discontinued.  Options were discussed and patient began paclitaxel.  Patient has completed 17 cycles.    Mr. Clayton feels okay, about the same as before.  Patient seems to tolerate the chemotherapy relatively well.  Other than the chronic cough (patient has had for months), patient is pretty asymptomatic.  Appetite is okay, stable.  Glucose control is ongoing.  No fevers or signs of infection.  No sputum or hemoptysis.  Activities are limited but the same as before.    Patient was diagnosed with prostate cancer (approximately 10 + years ago) and underwent seeds and external beam radiation.  No evidence of recurrence since that time.       Review of Systems   Constitutional:  Positive for fatigue.        Poor appetite and continuing weight loss   HENT: Negative.     Eyes: Negative.    Respiratory: Chronic nonproductive cough  Cardiovascular: Negative.    Gastrointestinal: Negative.    Endocrine: Negative.    Genitourinary: Negative.    Musculoskeletal: Negative.    Skin: Negative.    Allergic/Immunologic: Negative.    Neurological: Negative.    Hematological: Negative.         Easy bruising   Psychiatric/Behavioral:  Negative for self-injury.    All other systems reviewed and are negative.         Patient Active Problem List   Diagnosis    Corns    Pain in both feet    Onychomycosis    Tinea pedis of both feet    Lung mass    Hyperlipidemia    Type 2 diabetes mellitus with diabetic neuropathy, without long-term current use of insulin (HCC)    Squamous cell carcinoma of lung (HCC)    Shortness of breath     Daytime hypersomnolence    Chronic diastolic heart failure (HCC)    SYLVESTER (obstructive sleep apnea)    Prostate cancer (HCC)    GERD (gastroesophageal reflux disease)    CAD (coronary artery disease)    Other persistent atrial fibrillation (HCC)    Alcohol dependence (HCC)    Acute respiratory failure with hypoxemia (HCC)    Depression, recurrent (HCC)    COVID-19    Angioedema    Septic shock (HCC)    Cellulitis    Cellulitis of chest wall    Acute kidney injury (BUDDY) with acute tubular necrosis (ATN) (HCC)    Chronic obstructive pulmonary disease, unspecified COPD type (HCC)    Centrilobular emphysema (HCC)    Abdominal aortic aneurysm (HCC)    Hypertensive heart disease    Tremors of nervous system    Recurrent pleural effusion on left      Medical History        Past Medical History:   Diagnosis Date    Abdominal pain      Anxiety      Arthritis      Asthma      Atrial fibrillation (HCC)      Back pain      Bleeding ulcer      Bronchitis      Cancer (HCC)       prostate 2011- radiation    Cardiac disease       cardiac stent x1    Cataract       starting    Chronic diastolic (congestive) heart failure (HCC)      Diabetes mellitus (HCC)       boarderline diabetic     GERD (gastroesophageal reflux disease)      History of radiation therapy 2010     Prostate seeds (brachytherapy) and EBRT    Hyperlipidemia      Hypertension      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, bilateral      Low back pain      Lung cancer (HCC)      Lung mass      Myocardial infarction (HCC)       mild 1999    Obesity      Prostate cancer (HCC)      Shortness of breath      Sleep apnea       sleep study 11/22    Wears dentures       full set    Wears glasses           Surgical History         Past Surgical History:   Procedure Laterality Date    ABDOMINAL HERNIA REPAIR        ABDOMINAL SURGERY         bleeding ulcer, cyst removed from abd    COLONOSCOPY        CORONARY ANGIOPLASTY WITH STENT PLACEMENT   1999     x1     ESOPHAGOGASTRODUODENOSCOPY         IR BIOPSY LUNG   10/05/2021    IR THORACENTESIS   2021    IR THORACENTESIS   2023    IR THORACENTESIS   2023    KNEE SURGERY Left      TX BRNCC INCL FLUOR GDNCE DX W/CELL WASHG SPX N/A 2021     Procedure: BRONCHOSCOPY FLEXIBLE;  Surgeon: Cachorro Jason MD;  Location: [facility];  Service: Thoracic    TX MEDIASTINOSCOPY WITH LYMPH NODE BIOPSY/IES N/A 2021     Procedure: MEDIASTINOSCOPY;  Surgeon: Cachorro Jason MD;  Location: [facility];  Service: Thoracic    PROSTATE SURGERY             Family history: 3 sons in good general health, no known familial or genetic diseases     Social History               Socioeconomic History    Marital status: /Civil Union       Spouse name: Not on file    Number of children: Not on file    Years of education: Not on file    Highest education level: Not on file   Occupational History    Not on file   Tobacco Use    Smoking status: Former       Packs/day: 1.00       Years: 30.00       Total pack years: 30.00       Types: Cigarettes       Quit date:        Years since quittin.8    Smokeless tobacco: Never   Vaping Use    Vaping Use: Never used   Substance and Sexual Activity    Alcohol use: Yes       Alcohol/week: 4.0 - 6.0 standard drinks of alcohol       Types: 1 Glasses of wine, 3 - 5 Cans of beer per week       Comment: few beers day    Drug use: Never    Sexual activity: Not on file   Other Topics Concern    Not on file   Social History Narrative    Not on file      Social Determinants of Health           Financial Resource Strain: Medium Risk (2023)     Overall Financial Resource Strain (CARDIA)      Difficulty of Paying Living Expenses: Somewhat hard   Food Insecurity: No Food Insecurity (2022)     Hunger Vital Sign      Worried About Running Out of Food in the Last Year: Never true      Ran Out of Food in the Last Year: Never true   Transportation Needs: No Transportation Needs (2023)     PRAPARE -  Transportation      Lack of Transportation (Medical): No      Lack of Transportation (Non-Medical): No   Physical Activity: Not on file   Stress: Not on file   Social Connections: Not on file   Intimate Partner Violence: Not on file   Housing Stability: Low Risk  (12/19/2022)     Housing Stability Vital Sign      Unable to Pay for Housing in the Last Year: No      Number of Places Lived in the Last Year: 1      Unstable Housing in the Last Year: No      Social history:  40 pack-year history discontinued 20 years ago, patient drinks 2-4 beers a day off and on, no drug abuse, 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)     Current Outpatient Medications:     acetaminophen (TYLENOL) 325 mg tablet, Take 2 tablets (650 mg total) by mouth every 6 (six) hours as needed for mild pain, headaches or fever, Disp: 30 tablet, Rfl: 0    albuterol (2.5 mg/3 mL) 0.083 % nebulizer solution, Take 2.5 mg by nebulization As needed per patient's wife , Disp: , Rfl:     apixaban (Eliquis) 5 mg, Take 1 tablet (5 mg total) by mouth 2 (two) times a day, Disp: 180 tablet, Rfl: 1    atenolol (TENORMIN) 25 mg tablet, , Disp: , Rfl:     atorvastatin (LIPITOR) 10 mg tablet, Take 1 tablet (10 mg total) by mouth daily, Disp: 90 tablet, Rfl: 1    carvedilol (COREG) 25 mg tablet, Take 1 tablet (25 mg total) by mouth 2 (two) times a day with meals, Disp: 180 tablet, Rfl: 1    ciclopirox (LOPROX) 0.77 % cream, Apply topically 2 (two) times a day for 20 days, Disp: 45 g, Rfl: 2    fluticasone-umeclidinium-vilanterol (Trelegy Ellipta) 100-62.5-25 mcg/actuation inhaler, Rinse mouth after use., Disp: 60 each, Rfl: 5    guaifenesin-codeine (GUAIFENESIN AC) 100-10 MG/5ML liquid, Take 10 mL by mouth 3 (three) times a day as needed for cough, Disp: 180 mL, Rfl: 0    halobetasol (ULTRAVATE) 0.05 % cream, , Disp: , Rfl:     hydrocortisone 2.5 % cream, Apply topically 2 (two) times a day Apply twice a day affected area the trunk, Disp:  20 g, Rfl: 2    latanoprost (XALATAN) 0.005 % ophthalmic solution, Administer 0.005 mL to both eyes daily at bedtime, Disp: , Rfl:     metFORMIN (GLUCOPHAGE) 500 mg tablet, Take 1 tablet (500 mg total) by mouth daily with breakfast, Disp: , Rfl:     mirtazapine (REMERON) 7.5 MG tablet, Take 1 tablet (7.5 mg total) by mouth daily at bedtime, Disp: 90 tablet, Rfl: 3    Multiple Vitamin (MULTI-VITAMIN DAILY PO), Take by mouth daily  , Disp: , Rfl:     omeprazole (PriLOSEC) 20 mg delayed release capsule, TAKE 1 CAPSULE DAILY, Disp: 90 capsule, Rfl: 1    primidone (MYSOLINE) 50 mg tablet, TAKE 2 TABS AT 8PM., Disp: 180 tablet, Rfl: 1    spironolactone (ALDACTONE) 25 mg tablet, Take 1 tablet (25 mg total) by mouth daily, Disp: 90 tablet, Rfl: 1    aspirin (ECOTRIN LOW STRENGTH) 81 mg EC tablet, , Disp: , Rfl:      No Known Allergies         Vitals:     10/20/23 0926   BP: 114/68   Pulse: 80   Resp: 17   Temp: 97.9 °F (36.6 °C)   SpO2: 92%     Physical Exam  Constitutional:       Appearance: He is well-developed.      Comments: Obese male, no respiratory distress, no signs of pain   HENT:      Head: Normocephalic and atraumatic.      Right Ear: External ear normal.      Left Ear: External ear normal.   Eyes:      Conjunctiva/sclera: Conjunctivae normal.      Pupils: Pupils are equal, round, and reactive to light.   Cardiovascular:      Rate and Rhythm: Normal rate and regular rhythm.      Heart sounds: Normal heart sounds.   Pulmonary:      Effort: Pulmonary effort is normal.      Comments: Decreased breath sounds left lower lobe  Abdominal:      General: Bowel sounds are normal     Palpations: Abdomen is soft.      Comments: Obese, nontender, sign bowel sounds, cannot palpate liver or spleen  Musculoskeletal:         General: Normal range of motion.      Cervical back: Normal range of motion and neck supple.   Skin:     General: Skin is warm.      Comments: Relatively good color, warm, moist, scattered mild areas of  ecchymosis   Neurological:      Mental Status: He is alert and oriented to person, place, and time.      Deep Tendon Reflexes: Reflexes are normal and symmetric.   Psychiatric:         Behavior: Behavior normal.         Thought Content: Thought content normal.         Judgment: Judgment normal.   Extremities:  No lower extremity edema bilaterally, no cords, pulses are 1+   Lymphatics:  No adenopathy in the neck, supraclavicular region, axilla bilateral     Labs    11/18/2024 WBC = 7.09 hemoglobin = 12.5 hematocrit = 40.2 platelet = 312 neutrophil = 59% BUN = 16 creatinine = 1.07 calcium = 8.9 LFTs WNL    Imaging    10/1/2024 PET/CT    IMPRESSION:     1. Left lower lung mass demonstrates mild decrease in size now measuring 8.6 x 5.5 cm. There is less internal air within the mass than before. However there is increased predominantly peripheral FDG activity when compared to prior study from 6/6/2024.   Findings consistent with viable neoplasm along the peripheral margins of the lesion     2. Persistent small left pleural effusion with associated FDG activity, which again may be inflammatory/infectious, however malignant effusion cannot be excluded.     3. Right paratracheal lymph node size unchanged from prior study. SUV max currently 3.4 earlier 2.6.    6/6/2024 PET/CT    IMPRESSION:    1. Necrotic left lower lung mass demonstrates persistent but diminished predominantly peripheral FDG activity. This may represent partial response to therapy, though residual viable tumor may still remain. There is new air within this necrotic mass, for   which infection is not excluded. Correlate clinically.  2. Decreased but persistent small left pleural effusion. There is associated FDG activity which may be inflammatory/infectious. Malignant effusion is not excluded.  3. Healing right anterior rib fractures. Nonspecific but stable asymmetric focus of activity in the right sacrum.  4. Otherwise no new hypermetabolic  metastases.    10/30/2023 PET/CT    CHEST:  Best seen on image 76 of series 4 is a hypermetabolic centrally photopenic left lower lobe lung mass measuring 7.1 x 6.4 cm with max SUV of 12.0, previously measuring 4.5 x 4.0 cm with max SUV of 19.7 on PET/CT dated 10/29/2021 and approximately 4.3 x 3.7 cm when utilizing similar measurement technique and CT of chest dated 12/23/2022. The interval growth in size since 12/23/2022 suggest progression of hypermetabolic malignancy.     Subtle FDG activity is noted with a stable in size prominent right paratracheal lymph node on image 58 of series 4 measuring 1 1.4 cm in short axis with max SUV of 2.8, previously 3.5. The stability favors benign reactive lymph node with early pamela metastatic disease considered less likely    IMPRESSION:     1. Hypermetabolic left lower lobe lung malignancy has significantly increased in size since 12/23/2022 suggesting progression of malignancy.  2. Again noted is nonspecific FDG activity involving the sacrum and left posterior iliac bone without definitive correlate on low-dose unenhanced CT of uncertain clinical significance. Findings can be further characterized with MRI of the bony pelvis as clinically indicated.  3. Moderate left pleural effusion.    Pathology    12/7/2023 [facility].  No action mutations were detected.  Tumor mutational burden = 6 = low.  Patient had a pathologic variant in PTEN and TP53.  PDL IHC TPS = 0%, negative.     Case Report   Surgical Pathology Report                         Case: [id number]                                    Authorizing Provider:  Cachorro Jason MD       Collected:           11/29/2021 1005               Ordering Location:     [facility]      Received:            11/29/2021 [address] [place] [address] Operating Room                                                       Pathologist:           Juan José Dietz MD                                                          Specimens:   A) - Lymph Node, level 7                                                                             B) - Lymph Node, level 4R                                                                            C) - Lymph Node, level 2R                                                                            D) - Lymph Node, level 4L                                                                   Final Diagnosis   A. Lymph node, level 7, excision:  -  Portions of benign lymph node with reactive change and anthracosis, negative for granulomas, lymphoma, or metastatic carcinoma.    B. Lymph node, level 4R, excision:  -  Portions of benign lymph node with reactive change and anthracosis, negative for granulomas, lymphoma, or metastatic carcinoma.    C. Lymph node, level 2R, excision:  -  Portions of benign lymph node with reactive change and anthracosis, negative for granulomas, lymphoma, or metastatic carcinoma.    D. Lymph node, level 4L, excision:  -  Benign lymph node with reactive change and anthracosis, negative for granulomas, lymphoma, or metastatic carcinoma.      Electronically signed by Juan José Dietz MD on 12/2/2021 at 11:29 AM         Case Report   Surgical Pathology Report                         Case: [id number]                                    Authorizing Provider:  Elmer Laird MD      Collected:           10/05/2021 1058               Ordering Location:     [facility] Received:            10/05/2021 1120                                      CAT Scan                                                                      Pathologist:           Mary Dean MD                                                         Specimen:    Lung, Left Lower Lobe                                                                       Final Diagnosis   A. Lung, Left Lower Lobe, :  - Invasive squamous carcinoma, moderately differentiated, keratinizing type.  - Tumor is  highlighted with P 40 immunoperoxidase stain.  - Prominent tumor necrosis is noted.      Best representative block with tumor A1.  This case was reviewed at the intradepartmental  conference.   RADHA Navarrete, Pulmonology, is notified of the diagnosis in [facility] via [employer] on 10/06/2021  at 2.40 pm.   Electronically signed by Mary Dean MD on 10/6/2021 at  2:44 PM

## 2024-12-06 RX ORDER — SODIUM CHLORIDE 9 MG/ML
20 INJECTION, SOLUTION INTRAVENOUS ONCE
OUTPATIENT
Start: 2024-12-17

## 2024-12-09 ENCOUNTER — HOSPITAL ENCOUNTER (OUTPATIENT)
Dept: INFUSION CENTER | Facility: HOSPITAL | Age: 84
Discharge: HOME | End: 2024-12-09
Payer: COMMERCIAL

## 2024-12-09 ENCOUNTER — HOSPITAL ENCOUNTER (OUTPATIENT)
Dept: NON INVASIVE DIAGNOSTICS | Facility: HOSPITAL | Age: 84
Discharge: HOME | End: 2024-12-09
Attending: INTERNAL MEDICINE

## 2024-12-09 ENCOUNTER — TELEPHONE (OUTPATIENT)
Dept: INFUSION CENTER | Facility: HOSPITAL | Age: 84
End: 2024-12-09

## 2024-12-09 DIAGNOSIS — L50.9 URTICARIA: ICD-10-CM

## 2024-12-09 DIAGNOSIS — C34.92 SQUAMOUS CELL CARCINOMA OF LEFT LUNG (HCC): Primary | ICD-10-CM

## 2024-12-09 DIAGNOSIS — E11.40 TYPE 2 DIABETES MELLITUS WITH DIABETIC NEUROPATHY, WITHOUT LONG-TERM CURRENT USE OF INSULIN (HCC): ICD-10-CM

## 2024-12-09 DIAGNOSIS — L03.313 CELLULITIS OF CHEST WALL: Primary | ICD-10-CM

## 2024-12-09 DIAGNOSIS — C34.90 MALIGNANT NEOPLASM OF LUNG, UNSPECIFIED LATERALITY, UNSPECIFIED PART OF LUNG (HCC): ICD-10-CM

## 2024-12-09 LAB
ALBUMIN SERPL BCG-MCNC: 3.4 G/DL (ref 3.5–5)
ALP SERPL-CCNC: 103 U/L (ref 34–104)
ALT SERPL W P-5'-P-CCNC: 27 U/L (ref 7–52)
ANION GAP SERPL CALCULATED.3IONS-SCNC: 5 MMOL/L (ref 4–13)
AST SERPL W P-5'-P-CCNC: 27 U/L (ref 13–39)
BASOPHILS # BLD AUTO: 0.07 THOUSANDS/ÂΜL (ref 0–0.1)
BASOPHILS NFR BLD AUTO: 1 % (ref 0–1)
BILIRUB SERPL-MCNC: 0.33 MG/DL (ref 0.2–1)
BUN SERPL-MCNC: 17 MG/DL (ref 5–25)
CALCIUM ALBUM COR SERPL-MCNC: 9.7 MG/DL (ref 8.3–10.1)
CALCIUM SERPL-MCNC: 9.2 MG/DL (ref 8.4–10.2)
CHLORIDE SERPL-SCNC: 102 MMOL/L (ref 96–108)
CO2 SERPL-SCNC: 29 MMOL/L (ref 21–32)
CREAT SERPL-MCNC: 0.94 MG/DL (ref 0.6–1.3)
EOSINOPHIL # BLD AUTO: 0.45 THOUSAND/ÂΜL (ref 0–0.61)
EOSINOPHIL NFR BLD AUTO: 5 % (ref 0–6)
ERYTHROCYTE [DISTWIDTH] IN BLOOD BY AUTOMATED COUNT: 15.9 % (ref 11.6–15.1)
GFR SERPL CREATININE-BSD FRML MDRD: 75 ML/MIN/1.73SQ M
GLUCOSE SERPL-MCNC: 216 MG/DL (ref 65–140)
HCT VFR BLD AUTO: 40.1 % (ref 36.5–49.3)
HGB BLD-MCNC: 12.4 G/DL (ref 12–17)
IMM GRANULOCYTES # BLD AUTO: 0.05 THOUSAND/UL (ref 0–0.2)
IMM GRANULOCYTES NFR BLD AUTO: 1 % (ref 0–2)
LYMPHOCYTES # BLD AUTO: 1.41 THOUSANDS/ÂΜL (ref 0.6–4.47)
LYMPHOCYTES NFR BLD AUTO: 16 % (ref 14–44)
MCH RBC QN AUTO: 28.6 PG (ref 26.8–34.3)
MCHC RBC AUTO-ENTMCNC: 30.9 G/DL (ref 31.4–37.4)
MCV RBC AUTO: 92 FL (ref 82–98)
MONOCYTES # BLD AUTO: 0.79 THOUSAND/ÂΜL (ref 0.17–1.22)
MONOCYTES NFR BLD AUTO: 9 % (ref 4–12)
NEUTROPHILS # BLD AUTO: 5.87 THOUSANDS/ÂΜL (ref 1.85–7.62)
NEUTS SEG NFR BLD AUTO: 68 % (ref 43–75)
NRBC BLD AUTO-RTO: 0 /100 WBCS
PLATELET # BLD AUTO: 305 THOUSANDS/UL (ref 149–390)
PMV BLD AUTO: 9.3 FL (ref 8.9–12.7)
POTASSIUM SERPL-SCNC: 4.5 MMOL/L (ref 3.5–5.3)
PROT SERPL-MCNC: 6.6 G/DL (ref 6.4–8.4)
RBC # BLD AUTO: 4.34 MILLION/UL (ref 3.88–5.62)
SODIUM SERPL-SCNC: 136 MMOL/L (ref 135–147)
WBC # BLD AUTO: 8.64 THOUSAND/UL (ref 4.31–10.16)

## 2024-12-09 PROCEDURE — 80053 COMPREHEN METABOLIC PANEL: CPT | Performed by: INTERNAL MEDICINE

## 2024-12-09 PROCEDURE — 85025 COMPLETE CBC W/AUTO DIFF WBC: CPT | Performed by: INTERNAL MEDICINE

## 2024-12-09 RX ORDER — HYDROXYZINE HYDROCHLORIDE 50 MG/1
50 TABLET, FILM COATED ORAL
Qty: 90 TABLET | Refills: 1 | Status: SHIPPED | OUTPATIENT
Start: 2024-12-09

## 2024-12-09 NOTE — PROGRESS NOTES
Pt arrived to infusion center for pre chemo labs   Assessment of port oozing in 2 areas - port red and tender - pt reports it sore   Media pictures taken - Kaykay RN - notified - would like IR to eval port - Vero REDDY   Came and escorted  pt to IR lab for eval   Labs drawn peripheral

## 2024-12-09 NOTE — TELEPHONE ENCOUNTER
Spoke to wife - advised her that Dr. James wanted to hold Spenser's chemo this week - I have rescheduled him for 12/16 @ 2 for labs - and his chemo 12/17 @ 12:30 - pt is also going to have his port rechecked by IR -

## 2024-12-09 NOTE — BRIEF OP NOTE (RAD/CATH)
INTERVENTIONAL RADIOLOGY PROCEDURE NOTE    Date: 12/9/2024    Procedure:   Procedure Summary       Date: 12/09/24 Room / Location: [facility]    Anesthesia Start:  Anesthesia Stop:     Procedure: IR OTHER Diagnosis:       Malignant neoplasm of lung, unspecified laterality, unspecified part of lung (HCC)      (port with reddness)    Scheduled Providers:  Responsible Provider:     Anesthesia Type: Not recorded ASA Status: Not recorded            Preoperative diagnosis:   1. Cellulitis of chest wall    2. Malignant neoplasm of lung, unspecified laterality, unspecified part of lung (HCC)         Postoperative diagnosis: Same.    Surgeon: Jose G Griffin MD     Assistant: None. No qualified resident was available.    Blood loss: None    Specimens: None     Findings:   (1) Patient sent from infusion center due to erythematous right chest wall infusion port site. Port originally placed on 6/19/2024 for treatment of lung cancer.  (2) Patient and wife indicate that port noted to be reddened but non-tender since yesterday, apparently new since original port placement. No fever or chills. WBC today 8.6.  (3) Mildly erythematous port site (see images) without discharge or fluctuance. No fluid in pocket under US evaluation. Catheter tunnel and venotomy sites unremarkable.  (4) Findings suggest mild cellulitis without overt infection. Will initiate Augmentin 875 x 7 days and reevaluate the patient in IR next week.  (5) Patient can continue with his regularly scheduled chemoRx tomorrow, but would use peripheral site and not port site for now.  (6) Hopefully cellulitis resolves after atbx; if not resolved and/or progresses, might necessitate explantation.              Complications: None immediate.    Anesthesia: none

## 2024-12-09 NOTE — TELEPHONE ENCOUNTER
Reason for call:   [x] Refill   [] Prior Auth  [] Other:     Office:   [x] PCP/Provider - Vivek Tatum MD   [] Specialty/Provider -     Medication:   metFORMIN (GLUCOPHAGE) 500 mg tablet Take 1 tablet (500 mg total) by mouth 2 (two) times a day with meals   180    metFORMIN (GLUCOPHAGE) 500 mg tablet Take 1 tablet (500 mg total) by mouth 2 (two) times a day with meals   90          Pharmacy: [facility] [phone]     Does the patient have enough for 3 days?   [] Yes   [x] No - Send as HP to POD

## 2024-12-10 ENCOUNTER — HOSPITAL ENCOUNTER (OUTPATIENT)
Dept: INFUSION CENTER | Facility: HOSPITAL | Age: 84
Discharge: HOME | End: 2024-12-10
Attending: INTERNAL MEDICINE

## 2024-12-16 ENCOUNTER — TELEPHONE (OUTPATIENT)
Dept: HEMATOLOGY ONCOLOGY | Facility: MEDICAL CENTER | Age: 84
End: 2024-12-16

## 2024-12-16 ENCOUNTER — HOSPITAL ENCOUNTER (OUTPATIENT)
Dept: INFUSION CENTER | Facility: HOSPITAL | Age: 84
Discharge: HOME | End: 2024-12-16
Payer: COMMERCIAL

## 2024-12-16 DIAGNOSIS — C34.92 SQUAMOUS CELL CARCINOMA OF LEFT LUNG (HCC): ICD-10-CM

## 2024-12-16 LAB
ALBUMIN SERPL BCG-MCNC: 3.5 G/DL (ref 3.5–5)
ALP SERPL-CCNC: 92 U/L (ref 34–104)
ALT SERPL W P-5'-P-CCNC: 30 U/L (ref 7–52)
ANION GAP SERPL CALCULATED.3IONS-SCNC: 5 MMOL/L (ref 4–13)
AST SERPL W P-5'-P-CCNC: 31 U/L (ref 13–39)
BASOPHILS # BLD AUTO: 0.05 THOUSANDS/ÂΜL (ref 0–0.1)
BASOPHILS NFR BLD AUTO: 0 % (ref 0–1)
BILIRUB SERPL-MCNC: 0.33 MG/DL (ref 0.2–1)
BUN SERPL-MCNC: 17 MG/DL (ref 5–25)
CALCIUM SERPL-MCNC: 9 MG/DL (ref 8.4–10.2)
CHLORIDE SERPL-SCNC: 103 MMOL/L (ref 96–108)
CO2 SERPL-SCNC: 28 MMOL/L (ref 21–32)
CREAT SERPL-MCNC: 0.94 MG/DL (ref 0.6–1.3)
EOSINOPHIL # BLD AUTO: 0.41 THOUSAND/ÂΜL (ref 0–0.61)
EOSINOPHIL NFR BLD AUTO: 3 % (ref 0–6)
ERYTHROCYTE [DISTWIDTH] IN BLOOD BY AUTOMATED COUNT: 16.2 % (ref 11.6–15.1)
GFR SERPL CREATININE-BSD FRML MDRD: 75 ML/MIN/1.73SQ M
GLUCOSE SERPL-MCNC: 183 MG/DL (ref 65–140)
HCT VFR BLD AUTO: 40.8 % (ref 36.5–49.3)
HGB BLD-MCNC: 12.5 G/DL (ref 12–17)
IMM GRANULOCYTES # BLD AUTO: 0.06 THOUSAND/UL (ref 0–0.2)
IMM GRANULOCYTES NFR BLD AUTO: 1 % (ref 0–2)
LYMPHOCYTES # BLD AUTO: 2.02 THOUSANDS/ÂΜL (ref 0.6–4.47)
LYMPHOCYTES NFR BLD AUTO: 16 % (ref 14–44)
MCH RBC QN AUTO: 28.8 PG (ref 26.8–34.3)
MCHC RBC AUTO-ENTMCNC: 30.6 G/DL (ref 31.4–37.4)
MCV RBC AUTO: 94 FL (ref 82–98)
MONOCYTES # BLD AUTO: 0.83 THOUSAND/ÂΜL (ref 0.17–1.22)
MONOCYTES NFR BLD AUTO: 7 % (ref 4–12)
NEUTROPHILS # BLD AUTO: 8.92 THOUSANDS/ÂΜL (ref 1.85–7.62)
NEUTS SEG NFR BLD AUTO: 73 % (ref 43–75)
NRBC BLD AUTO-RTO: 0 /100 WBCS
PLATELET # BLD AUTO: 221 THOUSANDS/UL (ref 149–390)
PMV BLD AUTO: 9.4 FL (ref 8.9–12.7)
POTASSIUM SERPL-SCNC: 4.6 MMOL/L (ref 3.5–5.3)
PROT SERPL-MCNC: 6.6 G/DL (ref 6.4–8.4)
RBC # BLD AUTO: 4.34 MILLION/UL (ref 3.88–5.62)
SODIUM SERPL-SCNC: 136 MMOL/L (ref 135–147)
WBC # BLD AUTO: 12.29 THOUSAND/UL (ref 4.31–10.16)

## 2024-12-16 PROCEDURE — 85025 COMPLETE CBC W/AUTO DIFF WBC: CPT | Performed by: INTERNAL MEDICINE

## 2024-12-16 PROCEDURE — 80053 COMPREHEN METABOLIC PANEL: CPT | Performed by: INTERNAL MEDICINE

## 2024-12-16 NOTE — PROGRESS NOTES
Patient presents for lab work. Per IR, port is not to be used for now. Labs drawn peripherally. Bridger Clayton  tolerated treatment well with no complications.      Bridger Clayton is aware of future appt on 12/17 at 1230.     AVS printed and given to Bridger Clayton:    No (Declined by Bridger Clayton)

## 2024-12-16 NOTE — TELEPHONE ENCOUNTER
IR came to look at patient  port in infusion center and they recommended not using PAC for now because it is still red  D/W Dr James  Hold chemotherapy until after the New Year  Attempted to call patient but voicemail unavailable  Will re attempt call    Spoke with patient and wife  Patient has appt with Dr James on 1/2/2024  Will plan for chemo on 1/3/2024  Will update [facility] staff

## 2024-12-17 ENCOUNTER — HOSPITAL ENCOUNTER (OUTPATIENT)
Dept: INFUSION CENTER | Facility: HOSPITAL | Age: 84
Discharge: HOME | End: 2024-12-17

## 2024-12-19 ENCOUNTER — HOSPITAL ENCOUNTER (OUTPATIENT)
Dept: NON INVASIVE DIAGNOSTICS | Facility: HOSPITAL | Age: 84
Discharge: HOME | End: 2024-12-19
Attending: RADIOLOGY
Payer: COMMERCIAL

## 2024-12-19 ENCOUNTER — PREP FOR PROCEDURE (OUTPATIENT)
Dept: INTERVENTIONAL RADIOLOGY/VASCULAR | Facility: CLINIC | Age: 84
End: 2024-12-19

## 2024-12-19 DIAGNOSIS — L03.313 CELLULITIS OF CHEST WALL: ICD-10-CM

## 2024-12-19 DIAGNOSIS — L03.313 CELLULITIS OF CHEST WALL: Primary | ICD-10-CM

## 2024-12-19 PROCEDURE — 99214 OFFICE O/P EST MOD 30 MIN: CPT | Performed by: STUDENT IN AN ORGANIZED HEALTH CARE EDUCATION/TRAINING PROGRAM

## 2024-12-19 PROCEDURE — 36598 INJ W/FLUOR EVAL CV DEVICE: CPT

## 2024-12-19 RX ORDER — SODIUM CHLORIDE 9 MG/ML
75 INJECTION, SOLUTION INTRAVENOUS CONTINUOUS
Status: CANCELLED | OUTPATIENT
Start: 2024-12-19

## 2024-12-19 NOTE — BRIEF OP NOTE (RAD/CATH)
INTERVENTIONAL RADIOLOGY PROCEDURE NOTE    Date: 12/19/2024    Procedure:   Procedure Summary       Date: 12/19/24 Room / Location: [facility] Cardiac Cath Lab    Anesthesia Start:  Anesthesia Stop:     Procedure: IR PORT CHECK Diagnosis:       Cellulitis of chest wall      (f/u right chest wall port site cellulitis)    Scheduled Providers:  Responsible Provider:     Anesthesia Type: Not recorded ASA Status: Not recorded            Preoperative diagnosis:   1. Cellulitis of chest wall         Postoperative diagnosis: Same.    Surgeon: Reuben Jenkins MD     Assistant: None. No qualified resident was available.    Blood loss: Minimal    Specimens: None     Findings:     Follow-up port site check after completion of 1 week abx ordered by Dr. Griffin.    Enlarging, now-black eschar overlying the port access site with worsening skin thinning.  Concern for evolving necrosis at the access site and impending exposure of the underlying port.    Given progression/worsening, recommend port explantation and placement of a new port at a new site.    Chemo/labs are not planned until after the holidays.  If unable to schedule this procedure until after then, PICC placement as an interim access would be an option.    I will order new antibiotics to be continued until port explantation.        Complications: None immediate.    Anesthesia: none

## 2024-12-19 NOTE — DISCHARGE INSTRUCTIONS
As discussed Dr Jenkins explained that port should be removed. The doctor will place orders for removal and the scheduling team will reach out to schedule.   Please continue antibiotics until port removal is scheduled per Dr. Jenkins.

## 2024-12-19 NOTE — SEDATION DOCUMENTATION
Port check complete by Dr. Jenkins. Patient and wife at bedside, Dr Jenkins explained that port should be removed. Patient to continue antibiotics until port removal. Orders will be placed for procedure.

## 2024-12-20 ENCOUNTER — TELEPHONE (OUTPATIENT)
Dept: RADIOLOGY | Facility: HOSPITAL | Age: 84
End: 2024-12-20

## 2024-12-20 NOTE — PRE-PROCEDURE INSTRUCTIONS
Spoke to patient's wife regarding IR port removal/replacement scheduled at [facility] on 12/24. Arrival time of 0800 given for a 0900 procedure. Instructions given to be NPO after midnight the night before, to have a , and to take AM medications in the morning with a small sip of water but to hold metformin morning of procedure.

## 2024-12-24 ENCOUNTER — TELEPHONE (OUTPATIENT)
Dept: PALLIATIVE MEDICINE | Facility: CLINIC | Age: 84
End: 2024-12-24

## 2024-12-24 ENCOUNTER — HOSPITAL ENCOUNTER (OUTPATIENT)
Dept: NON INVASIVE DIAGNOSTICS | Facility: HOSPITAL | Age: 84
Discharge: HOME | End: 2024-12-24
Attending: STUDENT IN AN ORGANIZED HEALTH CARE EDUCATION/TRAINING PROGRAM | Admitting: RADIOLOGY
Payer: COMMERCIAL

## 2024-12-24 VITALS
DIASTOLIC BLOOD PRESSURE: 72 MMHG | HEART RATE: 61 BPM | BODY MASS INDEX: 31.54 KG/M2 | RESPIRATION RATE: 18 BRPM | TEMPERATURE: 97.2 F | OXYGEN SATURATION: 97 % | WEIGHT: 225.31 LBS | SYSTOLIC BLOOD PRESSURE: 150 MMHG | HEIGHT: 71 IN

## 2024-12-24 DIAGNOSIS — L03.313 CELLULITIS OF CHEST WALL: ICD-10-CM

## 2024-12-24 LAB — GLUCOSE SERPL-MCNC: 141 MG/DL (ref 65–140)

## 2024-12-24 PROCEDURE — 76937 US GUIDE VASCULAR ACCESS: CPT | Performed by: RADIOLOGY

## 2024-12-24 PROCEDURE — 36590 REMOVAL TUNNELED CV CATH: CPT

## 2024-12-24 PROCEDURE — 99153 MOD SED SAME PHYS/QHP EA: CPT

## 2024-12-24 PROCEDURE — C1788 PORT, INDWELLING, IMP: HCPCS

## 2024-12-24 PROCEDURE — 77001 FLUOROGUIDE FOR VEIN DEVICE: CPT | Performed by: RADIOLOGY

## 2024-12-24 PROCEDURE — 99152 MOD SED SAME PHYS/QHP 5/>YRS: CPT

## 2024-12-24 PROCEDURE — 36590 REMOVAL TUNNELED CV CATH: CPT | Performed by: RADIOLOGY

## 2024-12-24 PROCEDURE — 99152 MOD SED SAME PHYS/QHP 5/>YRS: CPT | Performed by: RADIOLOGY

## 2024-12-24 PROCEDURE — 36561 INSERT TUNNELED CV CATH: CPT

## 2024-12-24 PROCEDURE — 77001 FLUOROGUIDE FOR VEIN DEVICE: CPT

## 2024-12-24 PROCEDURE — 36561 INSERT TUNNELED CV CATH: CPT | Performed by: RADIOLOGY

## 2024-12-24 PROCEDURE — 82948 REAGENT STRIP/BLOOD GLUCOSE: CPT

## 2024-12-24 PROCEDURE — 76937 US GUIDE VASCULAR ACCESS: CPT

## 2024-12-24 RX ORDER — ACETAMINOPHEN 325 MG/1
650 TABLET ORAL EVERY 4 HOURS PRN
Status: DISCONTINUED | OUTPATIENT
Start: 2024-12-24 | End: 2024-12-25 | Stop reason: HOSPADM

## 2024-12-24 RX ORDER — SODIUM CHLORIDE 9 MG/ML
75 INJECTION, SOLUTION INTRAVENOUS CONTINUOUS
Status: DISCONTINUED | OUTPATIENT
Start: 2024-12-24 | End: 2024-12-25 | Stop reason: HOSPADM

## 2024-12-24 RX ORDER — FENTANYL CITRATE 50 UG/ML
INJECTION, SOLUTION INTRAMUSCULAR; INTRAVENOUS AS NEEDED
Status: COMPLETED | OUTPATIENT
Start: 2024-12-24 | End: 2024-12-24

## 2024-12-24 RX ORDER — MIDAZOLAM HYDROCHLORIDE 2 MG/2ML
INJECTION, SOLUTION INTRAMUSCULAR; INTRAVENOUS AS NEEDED
Status: COMPLETED | OUTPATIENT
Start: 2024-12-24 | End: 2024-12-24

## 2024-12-24 RX ORDER — CEFAZOLIN SODIUM 2 G/50ML
SOLUTION INTRAVENOUS
Status: COMPLETED | OUTPATIENT
Start: 2024-12-24 | End: 2024-12-24

## 2024-12-24 RX ORDER — CEFAZOLIN SODIUM 2 G/50ML
2000 SOLUTION INTRAVENOUS ONCE
Status: DISCONTINUED | OUTPATIENT
Start: 2024-12-24 | End: 2024-12-25 | Stop reason: HOSPADM

## 2024-12-24 RX ADMIN — FENTANYL CITRATE 50 MCG: 50 INJECTION, SOLUTION INTRAMUSCULAR; INTRAVENOUS at 09:58

## 2024-12-24 RX ADMIN — Medication 20 ML: at 10:22

## 2024-12-24 RX ADMIN — MIDAZOLAM 1 MG: 1 INJECTION INTRAMUSCULAR; INTRAVENOUS at 09:58

## 2024-12-24 RX ADMIN — CEFAZOLIN SODIUM 2000 MG: 2 SOLUTION INTRAVENOUS at 09:45

## 2024-12-24 RX ADMIN — Medication 20 ML: at 09:59

## 2024-12-24 NOTE — SEDATION DOCUMENTATION
Port placement on left complete by Dr. Guerrero. Patient tolerated procedure well.  Exofin in place.

## 2024-12-24 NOTE — BRIEF OP NOTE (RAD/CATH)
INTERVENTIONAL RADIOLOGY PROCEDURE NOTE    Date: 12/24/2024    Procedure:   Procedure Summary       Date: 12/24/24 Room / Location: [facility] Cardiac Cath Lab    Anesthesia Start:  Anesthesia Stop:     Procedure: IR PORT REMOVAL AND PLACEMENT NEW SITE Diagnosis:       Cellulitis of chest wall      (Port site skin cellulitis/erosion)    Scheduled Providers:  Responsible Provider:     Anesthesia Type: Not recorded ASA Status: Not recorded            Preoperative diagnosis:   1. Cellulitis of chest wall         Postoperative diagnosis: Same.    Surgeon: Sandip Guerrero MD     Assistant: None. No qualified resident was available.    Blood loss: 3 mL    Specimens: None     Findings:   Successful left chest port placement.  Successful right chest port removal.    Complications: None immediate.    Anesthesia: conscious sedation and local

## 2024-12-24 NOTE — SEDATION DOCUMENTATION
Right sided port removal complete by Dr. Guerrero. Patient tolerated procedure well. Exofin/gauze and paper tape to site.

## 2024-12-24 NOTE — TELEPHONE ENCOUNTER
Left message Dr Guerrero will be out of office week of pts appt. He will need to reschedule appt.

## 2024-12-24 NOTE — DISCHARGE INSTRUCTIONS
Implanted Venous Access Port Removal    WHAT YOU NEED TO KNOW:   An implanted venous access port is a device used to give treatments and take blood. It may also be called a central venous access device (CVAD). The port is a small container that is placed under your skin, usually in your upper chest. A port can also be placed in your arm or abdomen. The port is attached to a catheter that enters a large vein.     DISCHARGE INSTRUCTIONS:   Resume your normal diet. Small sips of flat soda will help with mild nausea.    Prevent an infection:     Wash your hands often.  Use soap and water. Clean your hands before and  after you care for your incision.    Check your skin for infection every day.  Look for redness, swelling, or fluid oozing from the incision site. Dressing may come off in 24 hours. Medical glue will peel off on its own in 5 to 10 days. You may shower 24 hours after procedure.     Follow up with your healthcare provider as directed  Write down your questions so you remember to ask them during your visits.     Activity:  You may return to your daily activities when the area heals.     Contact Interventional Radiology at [phone] ([facility] PATIENTS: Contact Interventional Radiology at [phone]) ([facility] PATIENTS: Contact Interventional Radiology at [phone]) if:     You have a fever.     You have persistent nausea.    Your inciscion site is red, swollen, or draining pus.    You have questions or concerns about your condition or care.    Seek care immediately or call 911 if:  Blood soaks through your bandage.    The skin over or around your incision breaks open.    Your heart is jumping or fluttering.    You have a headache, blurred vision, and feel confused.    You have pain in your arm, neck, shoulder, or chest.    You have trouble breathing that is getting worse over time.    Implanted Venous Access Port     WHAT YOU NEED TO KNOW:   An implanted venous access port is a device used to give  treatments and take blood. It may also be called a central venous access device (CVAD). The port is a small container that is placed under your skin, usually in your upper chest. The port is attached to a catheter that enters a large vein.     DISCHARGE INSTRUCTIONS:   Resume your normal diet. Small sips of flat soda will help with mild nausea.  Prevent an infection:   Wash your hands often.  Use soap and water. Clean your hands before and after you care for your port. Remind everyone who cares for your port to wash their hands.   Check your skin for infection every day.  Look for redness, swelling, or fluid oozing from the port site.    Care for your port:   1. You may shower beginning 48 hours after procedure.     2.  Leave glue in place.    3. It is normal for some bruising to occur.    4. Use Tylenol for pain.    5. Limit use of arm on the side that your port was placed. Lift nothing heavier than 5 pounds for 1 week, and then gradually increase activity as tolerated.    6. DO NOT apply ointment, lotion or cream to port site until incision is healed. Allow glue to fall off. DO NOT attempt to peel glue from skin even it it begins to flake.     7. After the port incision is healed you may swim, bathe.    Notify the Interventional Radiologist if you have any of the followin. Fever above 101 F    2. Increased redness or swelling after 1st day.     3. Increased pain after 1st day.    4. Any sign of infection (drainage from port site, skin separation, hot to touch).    5. Persistent nausea or vomiting.    Contact Interventional Radiology at [phone] ([facility] PATIENTS: Contact Interventional Radiology at [phone]) ([facility] PATIENTS: Contact Interventional Radiology at [phone]).

## 2024-12-24 NOTE — H&P
Interventional Radiology  History and Physical 12/24/2024     Bridger Clayton   1940   [id number]    Assessment/Plan:  84 year old male with history of left lower lobe squamous cell carcinoma undergoing chemotherapy through right chest port returns due to chronic cellulitis over port site which has resulted in an eschar over the port access site.  Plan for right chest port removal followed by left chest port placement.    Problem List Items Addressed This Visit          Other    Cellulitis of chest wall    Relevant Orders    IR port removal and placement new site          Subjective:     Patient ID: Bridger Clayton is a 84 y.o. male.    History of Present Illness  Patient with history of left lower lobe squamous cell carcinoma undergoing chemotherapy through right chest port returns due to chronic cellulitis which has resulted in an eschar over the port access site.     Review of Systems      Past Medical History:   Diagnosis Date    Abdominal pain     Alcohol dependence (HCC) 05/01/2022    Anxiety     Arthritis     Asthma     Atrial fibrillation (HCC)     Back pain     Bleeding ulcer     Bronchitis     Cancer (HCC)     prostate 2011- radiation    Cardiac disease     cardiac stent x1    Cataract     starting    Chronic diastolic (congestive) heart failure (HCC)     Diabetes mellitus (HCC)     boarderline diabetic     GERD (gastroesophageal reflux disease)     History of radiation therapy 2010    Prostate seeds (brachytherapy) and EBRT    Hyperlipidemia     Hypertension     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, bilateral     Low back pain     Lung cancer (HCC)     Lung mass     Myocardial infarction (HCC)     mild 1999    Obesity     Prostate cancer (HCC)     Shortness of breath     Sleep apnea     sleep study 11/22    Wears dentures     full set    Wears glasses         Past Surgical History:   Procedure Laterality Date    ABDOMINAL HERNIA REPAIR      ABDOMINAL SURGERY      bleeding ulcer, cyst removed from abd     COLONOSCOPY      CORONARY ANGIOPLASTY WITH STENT PLACEMENT  1999    x1     ESOPHAGOGASTRODUODENOSCOPY      IR BIOPSY LUNG  10/05/2021    IR OTHER  2024    IR PORT CHECK  2024    IR PORT PLACEMENT  2024    IR THORACENTESIS  2021    IR THORACENTESIS  2023    IR THORACENTESIS  2023    KNEE SURGERY Left     CT BRNCHSC INCL FLUOR GDNCE DX W/CELL WASHG SPX N/A 2021    Procedure: BRONCHOSCOPY FLEXIBLE;  Surgeon: Cachorro Jason MD;  Location: [facility];  Service: Thoracic    CT MEDIASTINOSCOPY WITH LYMPH NODE BIOPSY/IES N/A 2021    Procedure: MEDIASTINOSCOPY;  Surgeon: Cachorro Jason MD;  Location: [facility];  Service: Thoracic    PROSTATE SURGERY          Social History     Tobacco Use   Smoking Status Former    Current packs/day: 0.00    Average packs/day: 1 pack/day for 30.0 years (30.0 ttl pk-yrs)    Types: Cigarettes    Start date:     Quit date:     Years since quittin.9   Smokeless Tobacco Never        Social History     Substance and Sexual Activity   Alcohol Use Yes    Alcohol/week: 4.0 - 6.0 standard drinks of alcohol    Types: 1 Glasses of wine, 3 - 5 Cans of beer per week    Comment: socially        Social History     Substance and Sexual Activity   Drug Use Never        No Known Allergies    Current Outpatient Medications   Medication Sig Dispense Refill    acetaminophen (TYLENOL) 325 mg tablet Take 2 tablets (650 mg total) by mouth every 6 (six) hours as needed for mild pain, headaches or fever 30 tablet 0    albuterol (2.5 mg/3 mL) 0.083 % nebulizer solution Take 2.5 mg by nebulization As needed per patient's wife (Patient not taking: Reported on 2024)      ALPRAZolam (XANAX) 0.5 mg tablet Take one 1 hour before procedure. 5 tablet 0    amoxicillin-clavulanate (AUGMENTIN) 875-125 mg per tablet Take 1 tablet by mouth every 12 (twelve) hours for 14 days Continue taking until port is removed. 28 tablet 0    apixaban (ELIQUIS) 2.5 mg Take 1  tablet (2.5 mg total) by mouth 2 (two) times a day 180 tablet 2    atenolol (TENORMIN) 25 mg tablet       atorvastatin (LIPITOR) 10 mg tablet TAKE ONE TABLET BY MOUTH DAILY 30 tablet 0    carvedilol (COREG) 25 mg tablet TAKE ONE TABLET BY MOUTH TWICE DAILY WITH MEALS 180 tablet 1    ciclopirox (LOPROX) 0.77 % cream Apply topically 2 (two) times a day for 20 days 45 g 2    fluticasone-umeclidinium-vilanterol (Trelegy Ellipta) 100-62.5-25 mcg/actuation inhaler Rinse mouth after use. (Patient not taking: Reported on 7/25/2024) 60 each 5    gabapentin (NEURONTIN) 300 mg capsule Take 1 capsule (300 mg total) by mouth 2 (two) times a day 60 capsule 5    guaifenesin-codeine (GUAIFENESIN AC) 100-10 MG/5ML liquid Take 10 mL by mouth 3 (three) times a day as needed for cough 237 mL 0    halobetasol (ULTRAVATE) 0.05 % cream       hydrOXYzine HCL (ATARAX) 50 mg tablet Take 1 tablet (50 mg total) by mouth daily at bedtime 90 tablet 1    latanoprost (XALATAN) 0.005 % ophthalmic solution Administer 0.005 mL to both eyes daily at bedtime      lidocaine-prilocaine (EMLA) cream Apply to PAC site one hour prior to access 30 g 2    metFORMIN (GLUCOPHAGE) 500 mg tablet Take 1 tablet (500 mg total) by mouth 2 (two) times a day with meals 180 tablet 1    mirtazapine (REMERON) 7.5 MG tablet TAKE 1 TABLET BY MOUTH DAILY AT BEDTIME 90 tablet 3    Multiple Vitamin (MULTI-VITAMIN DAILY PO) Take by mouth daily        omeprazole (PriLOSEC) 20 mg delayed release capsule Take 1 capsule (20 mg total) by mouth daily 90 capsule 2    ondansetron (ZOFRAN) 8 mg tablet Take 1 tablet (8 mg total) by mouth every 8 (eight) hours as needed for nausea or vomiting 20 tablet 2    primidone (MYSOLINE) 50 mg tablet TAKE 2 TABS AT 8PM. 180 tablet 1    sertraline (Zoloft) 25 mg tablet Take 1 tablet (25 mg total) by mouth daily 30 tablet 5    triamcinolone (KENALOG) 0.1 % cream        No current facility-administered medications for this encounter.     "      Objective:    Vitals:    12/24/24 0823   BP: 166/87   Pulse: 67   Resp: 22   Temp: (!) 97 °F (36.1 °C)   TempSrc: Temporal   SpO2: 97%   Weight: 102 kg (225 lb 5 oz)   Height: 5' 11\" (1.803 m)        Physical Exam  Constitutional:       Appearance: Normal appearance.   Cardiovascular:      Rate and Rhythm: Normal rate.   Pulmonary:      Effort: Pulmonary effort is normal.   Skin:     Comments: Right chest port with overlying eschar.           Lab Results   Component Value Date     (H) 09/12/2023      Lab Results   Component Value Date    WBC 12.29 (H) 12/16/2024    HGB 12.5 12/16/2024    HCT 40.8 12/16/2024    MCV 94 12/16/2024     12/16/2024     Lab Results   Component Value Date    INR 1.34 (H) 12/29/2022    INR 1.16 05/01/2022    INR 1.14 11/22/2021    PROTIME 16.7 (H) 12/29/2022    PROTIME 14.6 (H) 05/01/2022    PROTIME 14.4 11/22/2021     Lab Results   Component Value Date    PTT 32 12/29/2022         I have personally reviewed pertinent imaging and laboratory results.     Code Status: Prior  Advance Directive and Living Will: Yes    Power of :    POLST:      This text is generated with voice recognition software. There may be translation, syntax,  or grammatical errors. If you have any questions, please contact the dictating provider.   "

## 2024-12-24 NOTE — NURSING NOTE
Patient returned from IR alert and oriented times 4. Per pt, no pain. Port removeal site covered by gauze; CDI. Port placement site open to air and covered by exofin glue; CDI. Call bell within reach, bed in low position, side rails up, ride at bedside, beverage at bedside    1150: Shift completed w/o incident. Both sites remain CDI. Pt continues to report no pain. 22G IV removed from lt hand. D/C instructions reviewed with pt and spouse and both expressed understanding. Per pt and spouse, no questions or concerns. Advised to call IR with any concerns once home. Pt taken to ride via wheelchair. Pt was able to successfully ambulate from wheelchair to ride.

## 2024-12-26 RX ORDER — SODIUM CHLORIDE 9 MG/ML
20 INJECTION, SOLUTION INTRAVENOUS ONCE
OUTPATIENT
Start: 2025-01-24

## 2024-12-31 ENCOUNTER — HOSPITAL ENCOUNTER (OUTPATIENT)
Dept: INFUSION CENTER | Facility: HOSPITAL | Age: 84
Discharge: HOME | End: 2024-12-31
Attending: INTERNAL MEDICINE

## 2025-01-02 ENCOUNTER — TELEPHONE (OUTPATIENT)
Dept: HEMATOLOGY ONCOLOGY | Facility: MEDICAL CENTER | Age: 85
End: 2025-01-02

## 2025-01-02 ENCOUNTER — OFFICE VISIT (OUTPATIENT)
Dept: HEMATOLOGY ONCOLOGY | Facility: MEDICAL CENTER | Age: 85
End: 2025-01-02
Payer: COMMERCIAL

## 2025-01-02 VITALS
SYSTOLIC BLOOD PRESSURE: 135 MMHG | WEIGHT: 236 LBS | BODY MASS INDEX: 33.79 KG/M2 | DIASTOLIC BLOOD PRESSURE: 70 MMHG | OXYGEN SATURATION: 95 % | TEMPERATURE: 98.5 F | HEIGHT: 70 IN | HEART RATE: 72 BPM | RESPIRATION RATE: 17 BRPM

## 2025-01-02 DIAGNOSIS — C34.92 SQUAMOUS CELL CARCINOMA OF LEFT LUNG (HCC): Primary | Chronic | ICD-10-CM

## 2025-01-02 DIAGNOSIS — I50.32 CHRONIC DIASTOLIC HEART FAILURE (HCC): Primary | ICD-10-CM

## 2025-01-02 PROCEDURE — 99215 OFFICE O/P EST HI 40 MIN: CPT | Performed by: INTERNAL MEDICINE

## 2025-01-02 RX ORDER — FUROSEMIDE 20 MG/1
20 TABLET ORAL 2 TIMES DAILY
Qty: 60 TABLET | Refills: 5 | Status: SHIPPED | OUTPATIENT
Start: 2025-01-02

## 2025-01-02 NOTE — PROGRESS NOTES
haris Clayton  1940  [facility]  [address] [place] [id number]     DISCUSSION/SUMMARY:     84-year-old male with a number of medical and cardiac problems previously found to have a left lower lobe mass. Initial biopsy demonstrated squamous cell carcinoma.  IR recently performed thoracentesis, minimal amount of fluid was removed but there was no evidence of metastatic disease; cytology was negative.  Patient was seen by thoracic surgery and underwent mediastinoscopy.  There was no evidence of metastatic disease in the sampled lymph nodes (2R, 4R, 4L, 7).  Tumor was 4.5 cm.  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  Patient was treated with SBRT and then went on to surveillance.    Eventual follow-up PET/CT demonstrated evidence of recurrence in the left lower lobe, same area where the original malignancy was found.  There was no clear evidence of spread to either hilar regions or the mediastinum but radiologist commented on 1 right paratracheal lymph node very +/-.  There was nonspecific FDG activity in the sacrum and left posterior iliac bone, seen before.  Unknown clinical significance.    At that time, patient also began to have more problems with recurrent left-sided pleural effusions requiring a Pleurx catheter.  Previously wife was draining approximately 500 cc every 2 to 3 days.  Patient was subsequently started on pembrolizumab.  This was eventually discontinued (due to a significant rash) and patient was started on single agent paclitaxel.  Patient completed 18 cycles.    Recent PET/CT in early October 2024 demonstrated mild decrease in size of the left lung mass measuring 8.6 x 5.5 cm.  Radiologist was concerned about viable tumor at the periphery of the margin.  There was a small left pleural effusion and an unchanged right paratracheal lymph node.    Patient feels worse than before.   Activities are basically nil.  + Mild shortness of breath at rest, ++ dyspnea on exertion.  No fevers, no recent infection.  Cough seems to be worse, no hemoptysis.  The patient's performance status is worse than before.  Patient has carried the diagnosis of lung cancer for over 2 years.  Medical oncology will see if there are any options for the patient as far as further rehab, possibly help in the home.  Patient's wife is quite small, she is having trouble lifting her  (patient weighs 236 pounds) into the shower, for office visits etc. It may be that in the not-too-distant future, there will be no additional oncology treatments offered and patient/family will need to discuss end-of-life care (patient was not too interested in discussing this today).  Patient has been referred back to Dr. Rankin for the progressive cough.    Patient's wife will also call Dr. Tatum regarding the lower extremity swelling.    Chemotherapy is on hold for now.    Regimen (on hold)  Paclitaxel 175 mg/m2 IV day 1 (70%)  Cycle length = 21 days  Goal = palliation and prolongation of life    Patient is to return the office in 4 weeks; this may change depending upon the above.    Carefully review your medication list and verify that the list is accurate and up-to-date. Please call the hematology/oncology office if there are medications missing from the list, medications on the list that you are not currently taking or if there is a dosage or instruction that is different from how you're taking that medication.     Patient goals and areas of care: Hold chemotherapy, see pulmonary, see PCP  Barriers to care: Comorbidities  Patient is able to self-care  ______________________________________________________________________________________         Chief Complaint   Patient presents with    Follow-up - recurrent non-small cell lung carcinoma      History of Present Illness:  84 year-old male with multiple medical and cardiac issue  previously seen in the emergency room because of a cough.  Workup demonstrated a left lower lobe mass.  Biopsy demonstrated squamous cell carcinoma.  Workup did not demonstrated evidence of metastatic disease; mediastinoscopy was negative.  Patient was seen by thoracic surgery but was not felt to be a surgical candidate.  Patient was subsequently seen by radiation oncology and underwent SPRT.  Patient then went on surveillance.      Eventual repeat scanning was consistent with recurrence at the original site.  Mr. Clayton required a left-sided Pleurx catheter placed because of recurrent left-sided pleural effusion.  Patient was initially treated with pembrolizumab only but developed a significant rash requiring steroids.  Because of the rash and the itchiness, patient did not wish to continue with the pembrolizumab; this was discontinued.  Options were discussed and patient began paclitaxel.  Patient has completed approximately 17 cycles.    Mr. Clayton returns with his wife and son.  Patient states feeling +/- -.  Port recently needed to be replaced due to concern for infection.  Patient with worsening cough, no sputum or fevers, decreased activities, progressive weakness, lower extremity swelling.  Appetite is good, patient has gained weight (believed to be water weight).  Activities are nil.  No pain control issues.    Patient was diagnosed with prostate cancer (approximately 10 + years ago) and underwent seeds and external beam radiation.  No evidence of recurrence since that time.       Review of Systems   Constitutional:  Positive for fatigue.        Poor appetite and continuing weight loss   HENT: Negative.     Eyes: Negative.    Respiratory: Chronic nonproductive cough  Cardiovascular: Negative.    Gastrointestinal: Negative.    Endocrine: Negative.    Genitourinary: Negative.    Musculoskeletal: Negative.    Skin: Negative.    Allergic/Immunologic: Negative.    Neurological: Negative.    Hematological:  Negative.         Easy bruising   Psychiatric/Behavioral:  Negative for self-injury.    All other systems reviewed and are negative.         Patient Active Problem List   Diagnosis    Corns    Pain in both feet    Onychomycosis    Tinea pedis of both feet    Lung mass    Hyperlipidemia    Type 2 diabetes mellitus with diabetic neuropathy, without long-term current use of insulin (HCC)    Squamous cell carcinoma of lung (HCC)    Shortness of breath    Daytime hypersomnolence    Chronic diastolic heart failure (HCC)    SYLVESTER (obstructive sleep apnea)    Prostate cancer (HCC)    GERD (gastroesophageal reflux disease)    CAD (coronary artery disease)    Other persistent atrial fibrillation (HCC)    Alcohol dependence (HCC)    Acute respiratory failure with hypoxemia (HCC)    Depression, recurrent (HCC)    COVID-19    Angioedema    Septic shock (HCC)    Cellulitis    Cellulitis of chest wall    Acute kidney injury (BUDDY) with acute tubular necrosis (ATN) (HCC)    Chronic obstructive pulmonary disease, unspecified COPD type (HCC)    Centrilobular emphysema (HCC)    Abdominal aortic aneurysm (HCC)    Hypertensive heart disease    Tremors of nervous system    Recurrent pleural effusion on left      Medical History        Past Medical History:   Diagnosis Date    Abdominal pain      Anxiety      Arthritis      Asthma      Atrial fibrillation (HCC)      Back pain      Bleeding ulcer      Bronchitis      Cancer (HCC)       prostate 2011- radiation    Cardiac disease       cardiac stent x1    Cataract       starting    Chronic diastolic (congestive) heart failure (HCC)      Diabetes mellitus (HCC)       boarderline diabetic     GERD (gastroesophageal reflux disease)      History of radiation therapy 2010     Prostate seeds (brachytherapy) and EBRT    Hyperlipidemia      Hypertension      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, bilateral      Low back pain      Lung cancer (HCC)      Lung mass      Myocardial infarction (HCC)       mild      Obesity      Prostate cancer (HCC)      Shortness of breath      Sleep apnea       sleep study     Wears dentures       full set    Wears glasses           Surgical History         Past Surgical History:   Procedure Laterality Date    ABDOMINAL HERNIA REPAIR        ABDOMINAL SURGERY         bleeding ulcer, cyst removed from abd    COLONOSCOPY        CORONARY ANGIOPLASTY WITH STENT PLACEMENT   1999     x1     ESOPHAGOGASTRODUODENOSCOPY        IR BIOPSY LUNG   10/05/2021    IR THORACENTESIS   2021    IR THORACENTESIS   2023    IR THORACENTESIS   2023    KNEE SURGERY Left      WV BRNCHSC INCL FLUOR GDNCE DX W/CELL WASHG SPX N/A 2021     Procedure: BRONCHOSCOPY FLEXIBLE;  Surgeon: Cachorro Jason MD;  Location: [facility];  Service: Thoracic    WV MEDIASTINOSCOPY WITH LYMPH NODE BIOPSY/IES N/A 2021     Procedure: MEDIASTINOSCOPY;  Surgeon: Cachorro Jason MD;  Location: [facility];  Service: Thoracic    PROSTATE SURGERY             Family history: 3 sons in good general health, no known familial or genetic diseases     Social History               Socioeconomic History    Marital status: /Civil Union       Spouse name: Not on file    Number of children: Not on file    Years of education: Not on file    Highest education level: Not on file   Occupational History    Not on file   Tobacco Use    Smoking status: Former       Packs/day: 1.00       Years: 30.00       Total pack years: 30.00       Types: Cigarettes       Quit date:        Years since quittin.8    Smokeless tobacco: Never   Vaping Use    Vaping Use: Never used   Substance and Sexual Activity    Alcohol use: Yes       Alcohol/week: 4.0 - 6.0 standard drinks of alcohol       Types: 1 Glasses of wine, 3 - 5 Cans of beer per week       Comment: few beers day    Drug use: Never    Sexual activity: Not on file   Other Topics Concern    Not on file   Social History Narrative    Not on file      Social  Determinants of Health           Financial Resource Strain: Medium Risk (8/1/2023)     Overall Financial Resource Strain (CARDIA)      Difficulty of Paying Living Expenses: Somewhat hard   Food Insecurity: No Food Insecurity (12/19/2022)     Hunger Vital Sign      Worried About Running Out of Food in the Last Year: Never true      Ran Out of Food in the Last Year: Never true   Transportation Needs: No Transportation Needs (8/1/2023)     PRAPARE - Transportation      Lack of Transportation (Medical): No      Lack of Transportation (Non-Medical): No   Physical Activity: Not on file   Stress: Not on file   Social Connections: Not on file   Intimate Partner Violence: Not on file   Housing Stability: Low Risk  (12/19/2022)     Housing Stability Vital Sign      Unable to Pay for Housing in the Last Year: No      Number of Places Lived in the Last Year: 1      Unstable Housing in the Last Year: No      Social history:  40 pack-year history discontinued 20 years ago, patient drinks 2-4 beers a day off and on, no drug abuse, 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)     Current Outpatient Medications:     acetaminophen (TYLENOL) 325 mg tablet, Take 2 tablets (650 mg total) by mouth every 6 (six) hours as needed for mild pain, headaches or fever, Disp: 30 tablet, Rfl: 0    albuterol (2.5 mg/3 mL) 0.083 % nebulizer solution, Take 2.5 mg by nebulization As needed per patient's wife , Disp: , Rfl:     apixaban (Eliquis) 5 mg, Take 1 tablet (5 mg total) by mouth 2 (two) times a day, Disp: 180 tablet, Rfl: 1    atenolol (TENORMIN) 25 mg tablet, , Disp: , Rfl:     atorvastatin (LIPITOR) 10 mg tablet, Take 1 tablet (10 mg total) by mouth daily, Disp: 90 tablet, Rfl: 1    carvedilol (COREG) 25 mg tablet, Take 1 tablet (25 mg total) by mouth 2 (two) times a day with meals, Disp: 180 tablet, Rfl: 1    ciclopirox (LOPROX) 0.77 % cream, Apply topically 2 (two) times a day for 20 days, Disp: 45 g, Rfl: 2     fluticasone-umeclidinium-vilanterol (Trelegy Ellipta) 100-62.5-25 mcg/actuation inhaler, Rinse mouth after use., Disp: 60 each, Rfl: 5    guaifenesin-codeine (GUAIFENESIN AC) 100-10 MG/5ML liquid, Take 10 mL by mouth 3 (three) times a day as needed for cough, Disp: 180 mL, Rfl: 0    halobetasol (ULTRAVATE) 0.05 % cream, , Disp: , Rfl:     hydrocortisone 2.5 % cream, Apply topically 2 (two) times a day Apply twice a day affected area the trunk, Disp: 20 g, Rfl: 2    latanoprost (XALATAN) 0.005 % ophthalmic solution, Administer 0.005 mL to both eyes daily at bedtime, Disp: , Rfl:     metFORMIN (GLUCOPHAGE) 500 mg tablet, Take 1 tablet (500 mg total) by mouth daily with breakfast, Disp: , Rfl:     mirtazapine (REMERON) 7.5 MG tablet, Take 1 tablet (7.5 mg total) by mouth daily at bedtime, Disp: 90 tablet, Rfl: 3    Multiple Vitamin (MULTI-VITAMIN DAILY PO), Take by mouth daily  , Disp: , Rfl:     omeprazole (PriLOSEC) 20 mg delayed release capsule, TAKE 1 CAPSULE DAILY, Disp: 90 capsule, Rfl: 1    primidone (MYSOLINE) 50 mg tablet, TAKE 2 TABS AT 8PM., Disp: 180 tablet, Rfl: 1    spironolactone (ALDACTONE) 25 mg tablet, Take 1 tablet (25 mg total) by mouth daily, Disp: 90 tablet, Rfl: 1    aspirin (ECOTRIN LOW STRENGTH) 81 mg EC tablet, , Disp: , Rfl:      No Known Allergies         Vitals:     10/20/23 0926   BP: 114/68   Pulse: 80   Resp: 17   Temp: 97.9 °F (36.6 °C)   SpO2: 92%     Physical Exam  Constitutional:       Appearance: He is well-developed.      Comments: Obese male, no respiratory distress, no signs of pain   HENT:      Head: Normocephalic and atraumatic.      Right Ear: External ear normal.      Left Ear: External ear normal.   Eyes:      Conjunctiva/sclera: Conjunctivae normal.      Pupils: Pupils are equal, round, and reactive to light.   Cardiovascular:      Rate and Rhythm: Normal rate and regular rhythm.      Heart sounds: Normal heart sounds.   Pulmonary:      Effort: Pulmonary effort is normal.       Comments: Decreased breath sounds left lower lobe  Abdominal:      General: Bowel sounds are normal     Palpations: Abdomen is soft.      Comments: Obese, nontender, sign bowel sounds, cannot palpate liver or spleen  Musculoskeletal:         General: Normal range of motion.      Cervical back: Normal range of motion and neck supple.   Skin:     General: Skin is warm.      Comments: Relatively good color, warm, moist, scattered mild areas of ecchymosis   Neurological:      Mental Status: He is alert and oriented to person, place, and time.      Deep Tendon Reflexes: Reflexes are normal and symmetric.   Psychiatric:         Behavior: Behavior normal.         Thought Content: Thought content normal.         Judgment: Judgment normal.   Extremities:  No lower extremity edema bilaterally, no cords, pulses are 1+   Lymphatics:  No adenopathy in the neck, supraclavicular region, axilla bilateral     Labs    12/16/2024 WBC = 12.29 hemoglobin = 12.5 hematocrit = 40.8 platelet = 221 neutrophil = 73% BUN = 17 creatinine = 0.94 calcium = 9.0 LFTs WNL    11/18/2024 WBC = 7.09 hemoglobin = 12.5 hematocrit = 40.2 platelet = 312 neutrophil = 59% BUN = 16 creatinine = 1.07 calcium = 8.9 LFTs WNL    Imaging    10/1/2024 PET/CT    IMPRESSION:     1. Left lower lung mass demonstrates mild decrease in size now measuring 8.6 x 5.5 cm. There is less internal air within the mass than before. However there is increased predominantly peripheral FDG activity when compared to prior study from 6/6/2024.   Findings consistent with viable neoplasm along the peripheral margins of the lesion     2. Persistent small left pleural effusion with associated FDG activity, which again may be inflammatory/infectious, however malignant effusion cannot be excluded.     3. Right paratracheal lymph node size unchanged from prior study. SUV max currently 3.4 earlier 2.6.    6/6/2024 PET/CT    IMPRESSION:    1. Necrotic left lower lung mass demonstrates  persistent but diminished predominantly peripheral FDG activity. This may represent partial response to therapy, though residual viable tumor may still remain. There is new air within this necrotic mass, for   which infection is not excluded. Correlate clinically.  2. Decreased but persistent small left pleural effusion. There is associated FDG activity which may be inflammatory/infectious. Malignant effusion is not excluded.  3. Healing right anterior rib fractures. Nonspecific but stable asymmetric focus of activity in the right sacrum.  4. Otherwise no new hypermetabolic metastases.    10/30/2023 PET/CT    CHEST:  Best seen on image 76 of series 4 is a hypermetabolic centrally photopenic left lower lobe lung mass measuring 7.1 x 6.4 cm with max SUV of 12.0, previously measuring 4.5 x 4.0 cm with max SUV of 19.7 on PET/CT dated 10/29/2021 and approximately 4.3 x 3.7 cm when utilizing similar measurement technique and CT of chest dated 12/23/2022. The interval growth in size since 12/23/2022 suggest progression of hypermetabolic malignancy.     Subtle FDG activity is noted with a stable in size prominent right paratracheal lymph node on image 58 of series 4 measuring 1 1.4 cm in short axis with max SUV of 2.8, previously 3.5. The stability favors benign reactive lymph node with early pamela metastatic disease considered less likely    IMPRESSION:     1. Hypermetabolic left lower lobe lung malignancy has significantly increased in size since 12/23/2022 suggesting progression of malignancy.  2. Again noted is nonspecific FDG activity involving the sacrum and left posterior iliac bone without definitive correlate on low-dose unenhanced CT of uncertain clinical significance. Findings can be further characterized with MRI of the bony pelvis as clinically indicated.  3. Moderate left pleural effusion.    Pathology    12/7/2023 [facility].  No action mutations were detected.  Tumor mutational burden = 6 = low.  Patient had a  pathologic variant in PTEN and TP53.  PDL IHC TPS = 0%, negative.     Case Report   Surgical Pathology Report                         Case: [id number]                                    Authorizing Provider:  Cachorro Jason MD       Collected:           11/29/2021 1005               Ordering Location:     [facility]      Received:            11/29/2021 [address] [place] [address] Operating Room                                                       Pathologist:           Juan José Dietz MD                                                         Specimens:   A) - Lymph Node, level 7                                                                             B) - Lymph Node, level 4R                                                                            C) - Lymph Node, level 2R                                                                            D) - Lymph Node, level 4L                                                                   Final Diagnosis   A. Lymph node, level 7, excision:  -  Portions of benign lymph node with reactive change and anthracosis, negative for granulomas, lymphoma, or metastatic carcinoma.    B. Lymph node, level 4R, excision:  -  Portions of benign lymph node with reactive change and anthracosis, negative for granulomas, lymphoma, or metastatic carcinoma.    C. Lymph node, level 2R, excision:  -  Portions of benign lymph node with reactive change and anthracosis, negative for granulomas, lymphoma, or metastatic carcinoma.    D. Lymph node, level 4L, excision:  -  Benign lymph node with reactive change and anthracosis, negative for granulomas, lymphoma, or metastatic carcinoma.      Electronically signed by Juan José Dietz MD on 12/2/2021 at 11:29 AM         Case Report   Surgical Pathology Report                         Case: [id number]                                    Authorizing Provider:  Elmer Laird MD      Collected:            10/05/2021 1058               Ordering Location:     [facility] Received:            10/05/2021 1120                                      CAT Scan                                                                      Pathologist:           Mary Dean MD                                                         Specimen:    Lung, Left Lower Lobe                                                                       Final Diagnosis   A. Lung, Left Lower Lobe, :  - Invasive squamous carcinoma, moderately differentiated, keratinizing type.  - Tumor is highlighted with P 40 immunoperoxidase stain.  - Prominent tumor necrosis is noted.      Best representative block with tumor A1.  This case was reviewed at the intradepartmental  conference.   RADHA Navarrete, Pulmonology, is notified of the diagnosis in [facility] via [facility] on 10/06/2021  at 2.40 pm.   Electronically signed by Mary Dean MD on 10/6/2021 at  2:44 PM

## 2025-01-02 NOTE — TELEPHONE ENCOUNTER
Patient seen by Dr James  Has significant deconditioning  Placed order for consideration of home PT  Will reach out to pallaitive care MSW for assistance with resources for the home per wife's request

## 2025-01-02 NOTE — TELEPHONE ENCOUNTER
Seen by Dr James  Chemo to be on hold  Will send message to Xavier Watson to cancel ALL pending appts   Patient aware of plan

## 2025-01-03 ENCOUNTER — HOSPITAL ENCOUNTER (OUTPATIENT)
Dept: INFUSION CENTER | Facility: HOSPITAL | Age: 85
Discharge: HOME | End: 2025-01-03

## 2025-01-08 DIAGNOSIS — C34.92 PRIMARY MALIGNANT NEOPLASM OF LEFT LUNG METASTATIC TO OTHER SITE (HCC): ICD-10-CM

## 2025-01-08 DIAGNOSIS — C34.92 SQUAMOUS CELL CARCINOMA OF LEFT LUNG (HCC): Chronic | ICD-10-CM

## 2025-01-08 RX ORDER — CODEINE PHOSPHATE AND GUAIFENESIN 10; 100 MG/5ML; MG/5ML
10 SOLUTION ORAL 3 TIMES DAILY PRN
Qty: 237 ML | Refills: 0 | Status: SHIPPED | OUTPATIENT
Start: 2025-01-08

## 2025-01-08 NOTE — TELEPHONE ENCOUNTER
Reason for call:   [x] Refill   [] Prior Auth  [] Other:     Office:   [x] PCP/Provider -   [] Specialty/Provider -     Medication: guaifenesin-codeine (GUAIFENESIN AC) 100-10 MG/5ML     Dose/Frequency: Take 10 mL by mouth 3 (three) times a day as needed for cough     Quantity: 237 mL    Pharmacy: [facility] [address]     Does the patient have enough for 3 days?   [] Yes   [x] No - Send as HP to POD

## 2025-01-21 ENCOUNTER — OFFICE VISIT (OUTPATIENT)
Age: 85
End: 2025-01-21
Payer: COMMERCIAL

## 2025-01-21 VITALS
RESPIRATION RATE: 17 BRPM | HEIGHT: 70 IN | HEART RATE: 72 BPM | DIASTOLIC BLOOD PRESSURE: 70 MMHG | WEIGHT: 236 LBS | BODY MASS INDEX: 33.79 KG/M2 | SYSTOLIC BLOOD PRESSURE: 135 MMHG

## 2025-01-21 DIAGNOSIS — M79.671 PAIN IN BOTH FEET: ICD-10-CM

## 2025-01-21 DIAGNOSIS — I70.209 PERIPHERAL ARTERIOSCLEROSIS (HCC): ICD-10-CM

## 2025-01-21 DIAGNOSIS — E11.42 DIABETIC POLYNEUROPATHY ASSOCIATED WITH TYPE 2 DIABETES MELLITUS (HCC): Primary | ICD-10-CM

## 2025-01-21 DIAGNOSIS — B35.3 TINEA PEDIS OF BOTH FEET: ICD-10-CM

## 2025-01-21 DIAGNOSIS — B35.1 ONYCHOMYCOSIS: ICD-10-CM

## 2025-01-21 DIAGNOSIS — M79.672 PAIN IN BOTH FEET: ICD-10-CM

## 2025-01-21 PROCEDURE — 99213 OFFICE O/P EST LOW 20 MIN: CPT | Performed by: PODIATRIST

## 2025-01-21 NOTE — PROGRESS NOTES
Assessment/Plan:  Patient has pain in his feet and toes with ambulation.  He has mycosis of nail and skin. He has plantar pre ulcerative skin lesions.     Plan.  Chart reviewed.  Patient educated on care of the diabetic foot.  Diabetic foot exam performed.    Patient advised on foot hygiene.  Proper shoe sizing recommended.  Patient will use daily, he will apply cream to feet b.i.d. For 4 weeks.  Foot hygiene instruction given.  Shoe sizing and style recommendations made.  Watch for signs of infection.  Aftercare instruction given.           Subjective:  Patient has pain in his feet with ambulation.  No history of trauma .  He has pain when he wears shoes.  He has pain in his toes.      Patient ID: Spenser Clayton is a 84 y.o. male.     Patient is diabetic.  He has pain in his feet and toes with ambulation.  Has pain from calluses.  He has ingrown toenails and dry scaly skin.  He is requesting refill of topical antifungal.        Review of Systems   Constitutional: No fever or chills, feels well, no tiredness, no recent weight loss or weight gain.  Eyes: No complaints of red eyes, no eyesight problems.  ENT: no complaints of loss of hearing, no nosebleeds, no sore throat.  Cardiovascular: No complaints of chest pain, no palpitations, no leg claudication or lower extremity edema.  Respiratory: No complaints of shortness of breath, no wheezing, no cough.  Gastrointestinal: No complaints of abdominal pain, no constipation, no nausea or vomiting, no diarrhea or bloody stools.  Genitourinary: No complaints of dysuria or incontinence, no hesitancy, no nocturia.  Musculoskeletal: limb pain, but-- as noted in HPI.  Integumentary: No complaints of skin rash or lesion, no itching or dry skin, no skin wounds.  Neurological: No complaints of headache, no confusion, no numbness or tingling, no dizziness.  Psychiatric: No suicidal thoughts, no anxiety, no depression.  Endocrine: No muscle weakness, no frequent urination, no  excessive thirst, no feelings of weakness.      Active Problems  1. 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.70) (M21.969)   2. Arthritis (716.90) (M19.90)   3. Atherosclerosis of arteries of extremities (440.20) (I70.209)   4. Calcaneal spur (726.73) (M77.30)   5. Callus (700) (L84)   6. 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 Diabetic neuropathy (250.60,357.2) (E11.40)   9. Hypercholesterolemia (272.0) (E78.00)   10. Hypertension (401.9) (I10)   11. 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 Onychomycosis (110.1) (B35.1)   14. Orthopedic aftercare (V54.9) (Z47.89)   15. Pain in both feet (729.5) (M79.671,M79.672)   16. Pain in extremity (729.5) (M79.609)   17. Pes planus, congenital (754.61) (Q66.50)   18. 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 Plantar wart (078.12) (B07.0)   20. Prostate cancer (185) (C61)   21. Sprain of right shoulder (840.9) (S43.401A)   22. Tinea pedis (110.4) (B35.3)     Past Medical History   · Orthopedic aftercare (V54.9) (Z47.89)     Surgical History   · History of Gastric Surgery   · History of Hernia Repair   · History of Previous Stent Placement Total Number Performed ___     The surgical history was reviewed and updated today.       Family History  Family History    · Family history of Arthritis (V17.7)   · Family history of Cancer     The family history was reviewed and updated today.       Social History      · Beer Consumption (___ Bottles Per Day)   · Daily Coffee Consumption (1  Cups/Day)   · Former smoker (V15.82) (Z87.891)  The social history was reviewed and updated today.   Allergies  1. No Known Drug Allergies      Physical Exam  Left Foot: Appearance: Normal except as noted: excessive pronation-- and-- pes planus.   Right Foot: Appearance: Normal except as noted: excessive pronation-- and-- pes planus.    Left Ankle: ROM: limited ROM in all planes   Right Ankle: ROM: limited ROM in all planes   Neurological Exam: performed. Light touch was intact bilaterally. Vibratory sensation was intact bilaterally. Response to monofilament test was intact bilaterally. Deep tendon reflexes: patellar reflex present bilaterally-- and-- achilles reflex present bilaterally.   Vascular Exam: performed Dorsalis pedis pulses were One/4 bilaterally. Posterior tibial pulses were One/4 bilaterally. Elevation Pallor: present bilaterally. Capillary refill time was greater than 3 seconds bilaterally-- and-- Q. 9 findings bilateral. Negative digital hair noted. Positive abnormal cooling noted.   Toenails: All of the toenails were elongated,-- hypertrophied,-- discolored,-- ingrown,-- shown to have subungual debris,-- tender-- and-- Severely mycotic with onychauxis.    Socks and shoes removed, Right Foot Findings: swollen, erythematous and dry.  The sensory exam showed diminished vibratory sensation at the level of the toes. Diminished tactile sensation with monofilament testing throughout the right foot.  Socks and shoes removed, Left Foot Findings: swollen, erythematous and dry.  The sensory exam showed diminished vibratory sensation at the level of the toes. Diminished tactile sensation with monofilament testing throughout the left foot. Capillary refills findings on the right were delayed in the toes.  Pulses:  1+ in the posterior tibialis on the right  1+ in the dorsalis pedis on the right.  Capillary refills findings on the left were delayed in the toes.  Pulses:  1+ in the posterior tibialis on the left  1+ in the dorsalis pedis on the left.  Assign Risk Category: 2: Loss of protective sensation with or without weakness, deformity, callus, pre-ulcer, or history of ulceration. High risk.   Hyperkeratosis: present on both first toes,-- present on both fifth sub metatarsals-- and-- [place] tinea pedis, bilateral, is noted.   Shoe Gear  Evaluation: performed (). Right Foot: width: d-- and-- length: 11. Left Foot: width: d-- and-- length: 11. Recommendation(s): athletic shoes     Patient's shoes and socks removed.Right Foot/Ankle   Right Foot Inspection  Skin Exam: dry skin, callus and callus               Toe Exam: swelling  Sensory   Vibration: diminished  Proprioception: diminished      Vascular  Capillary refills: elevated        Left Foot/Ankle  Left Foot Inspection  Skin Exam: dry skin and callus              Toe Exam: swelling and erythema                   Sensory   Vibration: diminished  Proprioception: diminished     Vascular  Capillary refills: elevated     Right Foot/Ankle   Right Foot Inspection        Sensory   Vibration: diminished  Proprioception: diminished  Monofilament testing: diminished     Vascular  Capillary refills: < 3 seconds              Left Foot/Ankle  Left Foot Inspection        Sensory   Vibration: diminished  Proprioception: diminished  Monofilament testing: diminished     Vascular  Capillary refills: < 3 seconds         Patient's shoes and socks removed.     Right Foot/Ankle   Right Foot Inspection        Toe Exam: right toe deformity.      Sensory   Vibration: diminished      Vascular  Capillary refills: < 3 seconds              Left Foot/Ankle  Left Foot Inspection        Toe Exam: left toe deformity.      Sensory   Vibration: diminished      Vascular  Capillary refills: < 3 seconds     Patient's shoes and socks removed.     Right Foot/Ankle   Right Foot Inspection        Toe Exam: right toe deformity.      Sensory   Vibration: diminished  Proprioception: diminished  Monofilament testing: diminished     Vascular  Capillary refills: < 3 seconds        Left Foot/Ankle  Left Foot Inspection        Toe Exam: left toe deformity.      Sensory   Vibration: diminished  Proprioception: diminished  Monofilament testing: diminished     Vascular  Capillary refills: < 3 seconds        Assign Risk Category  Deformity  present  Loss of protective sensation  Weak pulses  Risk: 2

## 2025-01-23 ENCOUNTER — RA CDI HCC (OUTPATIENT)
Dept: OTHER | Facility: HOSPITAL | Age: 85
End: 2025-01-23

## 2025-01-23 PROBLEM — U07.1 COVID-19: Status: RESOLVED | Noted: 2022-10-10 | Resolved: 2025-01-23

## 2025-01-23 NOTE — PROGRESS NOTES
E11.51, I11.0, I71.40, J44.9 for 2025  HCC coding opportunities          Chart Reviewed number of suggestions sent to Provider: 4     Patients Insurance     Medicare Insurance: [employer]vantage

## 2025-01-28 ENCOUNTER — OFFICE VISIT (OUTPATIENT)
Dept: HEMATOLOGY ONCOLOGY | Facility: MEDICAL CENTER | Age: 85
End: 2025-01-28
Payer: COMMERCIAL

## 2025-01-28 ENCOUNTER — HOSPITAL ENCOUNTER (OUTPATIENT)
Dept: INFUSION CENTER | Facility: HOSPITAL | Age: 85
Discharge: HOME | End: 2025-01-28
Payer: COMMERCIAL

## 2025-01-28 VITALS
HEIGHT: 70 IN | SYSTOLIC BLOOD PRESSURE: 128 MMHG | RESPIRATION RATE: 16 BRPM | TEMPERATURE: 97.3 F | BODY MASS INDEX: 32.21 KG/M2 | WEIGHT: 225 LBS | OXYGEN SATURATION: 97 % | DIASTOLIC BLOOD PRESSURE: 72 MMHG | HEART RATE: 74 BPM

## 2025-01-28 DIAGNOSIS — C34.92 SQUAMOUS CELL CARCINOMA OF LEFT LUNG (HCC): Primary | Chronic | ICD-10-CM

## 2025-01-28 DIAGNOSIS — C34.92 SQUAMOUS CELL CARCINOMA OF LEFT LUNG (HCC): Primary | ICD-10-CM

## 2025-01-28 PROCEDURE — 96523 IRRIG DRUG DELIVERY DEVICE: CPT

## 2025-01-28 PROCEDURE — 99214 OFFICE O/P EST MOD 30 MIN: CPT | Performed by: INTERNAL MEDICINE

## 2025-01-28 NOTE — PROGRESS NOTES
haris Clayton  1940  [facility] HEMATOLOGY ONCOLOGY SPECIALISTS SHAI Garcia [facility] [id number]    30 minutes was spent with patient in direct consultation/examination or reviewing the chart.     DISCUSSION/SUMMARY:     84-year-old male with a number of medical and cardiac problems previously found to have a left lower lobe mass. Initial biopsy demonstrated squamous cell carcinoma.  IR recently performed thoracentesis, minimal amount of fluid was removed but there was no evidence of metastatic disease; cytology was negative.  Patient was seen by thoracic surgery and underwent mediastinoscopy.  There was no evidence of metastatic disease in the sampled lymph nodes (2R, 4R, 4L, 7).  Tumor was 4.5 cm.  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  Patient was treated with SBRT and then went on to surveillance.    Eventual follow-up PET/CT demonstrated evidence of recurrence in the left lower lobe, same area where the original malignancy was found.  There was no clear evidence of spread to either hilar regions or the mediastinum but radiologist commented on 1 right paratracheal lymph node, very +/-.  There was nonspecific FDG activity in the sacrum and left posterior iliac bone, seen before.  Unknown clinical significance.    At that time, patient also began to have more problems with recurrent left-sided pleural effusions requiring a Pleurx catheter.  Previously wife was draining approximately 500 cc every 2 to 3 days.  Patient was subsequently started on pembrolizumab.  This was eventually discontinued (due to a significant rash) and patient was started on single agent paclitaxel.  Patient completed 18 cycles.    Recent PET/CT in early October 2024 demonstrated mild decrease in size of the left lung mass measuring 8.6 x 5.5 cm.  Radiologist was concerned about viable tumor at the periphery of the margin.  There was a small left pleural  effusion and an unchanged right paratracheal lymph node.    Options were discussed on the last office visit approximately 4 weeks ago.  Patient was having a lot of trouble tolerating the chemotherapy, had lower extremity swelling, had persistent cough.  The plan was to hold the chemotherapy altogether.  Presently patient states feeling okay, slightly better than before.  Quality of life is better off chemotherapy.  The plan is to continue off chemotherapy.  Patient has a pending appointment with his PCP later on this week, also has a appointment with pulmonary in early February 2025.    The plan at this time is to continue with surveillance.  Patient/family understand that patient has recurrent lung cancer, likely not curable and more recently, patient has had more side effects from the chemotherapy than benefits.  The plan is to hold chemotherapy; eventually palliative care evaluation will likely be needed.    Regimen (on hold)  Paclitaxel 175 mg/m2 IV day 1 (70%)  Cycle length = 21 days  Goal = palliation and prolongation of life    Patient is to return the office in 8 weeks; this may change depending upon the above.    Carefully review your medication list and verify that the list is accurate and up-to-date. Please call the hematology/oncology office if there are medications missing from the list, medications on the list that you are not currently taking or if there is a dosage or instruction that is different from how you're taking that medication.     Patient goals and areas of care: Hold chemotherapy, see pulmonary, see PCP  Barriers to care: Comorbidities  Patient is able to self-care  ______________________________________________________________________________________         Chief Complaint   Patient presents with    Follow-up - recurrent non-small cell lung carcinoma      History of Present Illness:  84 year-old male with multiple medical and cardiac issue previously seen in the emergency room because of  a cough.  Workup demonstrated a left lower lobe mass.  Biopsy demonstrated squamous cell carcinoma.  Workup did not demonstrated evidence of metastatic disease; mediastinoscopy was negative.  Patient was seen by thoracic surgery but was not felt to be a surgical candidate.  Patient was subsequently seen by radiation oncology and underwent SPRT.  Patient then went on surveillance.      Eventual repeat scanning was consistent with recurrence at the original site.  Mr. Clayton required a left-sided Pleurx catheter placed because of recurrent left-sided pleural effusion.  Patient was initially treated with pembrolizumab only but developed a significant rash requiring steroids.  Because of the rash and the itchiness, patient did not wish to continue with the pembrolizumab; this was discontinued.  Previously options were discussed and patient was started on paclitaxel.  Patient completed approximately 17 cycles.  Mr. Clayton was seen approximately 1 month ago.  Because of side effects, toxicities and other issues, the plan was to hold chemotherapy altogether.  Patient returns for follow-up.    Mr. Clayton returns with his wife and daughter.  Patient states feeling okay, about the same as before, slightly better.  Cough is less/better.  Patient was seen/evaluated by his PCP, started on Lasix.  Lower extremity swelling is less/better than before.  Activities are limited, same as before.  Patient sleeps a lot during the day, stays awake at nighttime.  Weight is stable.  Appetite is okay.  No pain control issues, patient has Tylenol with codeine, uses occasionally.    Patient was diagnosed with prostate cancer (approximately 10 + years ago) and underwent seeds and external beam radiation.  No evidence of recurrence since that time.  Patient has a pending appointment with urology.     Review of Systems   Constitutional:  Positive for fatigue.   HENT: Negative.     Eyes: Negative.    Respiratory: Chronic nonproductive  cough  Cardiovascular: Negative.    Gastrointestinal: Negative.    Endocrine: Negative.    Genitourinary: Negative.    Musculoskeletal: Negative.    Skin: Negative.    Allergic/Immunologic: Negative.    Neurological: Negative.    Hematological: Negative.         Easy bruising   Psychiatric/Behavioral:  Negative for self-injury.    All other systems reviewed and are negative.         Patient Active Problem List   Diagnosis    Corns    Pain in both feet    Onychomycosis    Tinea pedis of both feet    Lung mass    Hyperlipidemia    Type 2 diabetes mellitus with diabetic neuropathy, without long-term current use of insulin (HCC)    Squamous cell carcinoma of lung (HCC)    Shortness of breath    Daytime hypersomnolence    Chronic diastolic heart failure (HCC)    SYLVESTER (obstructive sleep apnea)    Prostate cancer (HCC)    GERD (gastroesophageal reflux disease)    CAD (coronary artery disease)    Other persistent atrial fibrillation (HCC)    Alcohol dependence (HCC)    Acute respiratory failure with hypoxemia (HCC)    Depression, recurrent (HCC)    COVID-19    Angioedema    Septic shock (HCC)    Cellulitis    Cellulitis of chest wall    Acute kidney injury (BUDDY) with acute tubular necrosis (ATN) (HCC)    Chronic obstructive pulmonary disease, unspecified COPD type (HCC)    Centrilobular emphysema (HCC)    Abdominal aortic aneurysm (HCC)    Hypertensive heart disease    Tremors of nervous system    Recurrent pleural effusion on left      Medical History        Past Medical History:   Diagnosis Date    Abdominal pain      Anxiety      Arthritis      Asthma      Atrial fibrillation (HCC)      Back pain      Bleeding ulcer      Bronchitis      Cancer (HCC)       prostate 2011- radiation    Cardiac disease       cardiac stent x1    Cataract       starting    Chronic diastolic (congestive) heart failure (HCC)      Diabetes mellitus (HCC)       boarderline diabetic     GERD (gastroesophageal reflux disease)      History of  radiation therapy 2010     Prostate seeds (brachytherapy) and EBRT    Hyperlipidemia      Hypertension      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, bilateral      Low back pain      Lung cancer (HCC)      Lung mass      Myocardial infarction (HCC)       mild     Obesity      Prostate cancer (HCC)      Shortness of breath      Sleep apnea       sleep study     Wears dentures       full set    Wears glasses           Surgical History         Past Surgical History:   Procedure Laterality Date    ABDOMINAL HERNIA REPAIR        ABDOMINAL SURGERY         bleeding ulcer, cyst removed from abd    COLONOSCOPY        CORONARY ANGIOPLASTY WITH STENT PLACEMENT   1999     x1     ESOPHAGOGASTRODUODENOSCOPY        IR BIOPSY LUNG   10/05/2021    IR THORACENTESIS   2021    IR THORACENTESIS   2023    IR THORACENTESIS   2023    KNEE SURGERY Left      AL BRNCHSC INCL FLUOR GDNCE DX W/CELL WASHG SPX N/A 2021     Procedure: BRONCHOSCOPY FLEXIBLE;  Surgeon: Cachorro Jason MD;  Location: [facility];  Service: Thoracic    AL MEDIASTINOSCOPY WITH LYMPH NODE BIOPSY/IES N/A 2021     Procedure: MEDIASTINOSCOPY;  Surgeon: Cachorro Jason MD;  Location: [facility];  Service: Thoracic    PROSTATE SURGERY             Family history: 3 sons in good general health, no known familial or genetic diseases     Social History               Socioeconomic History    Marital status: /Civil Union       Spouse name: Not on file    Number of children: Not on file    Years of education: Not on file    Highest education level: Not on file   Occupational History    Not on file   Tobacco Use    Smoking status: Former       Packs/day: 1.00       Years: 30.00       Total pack years: 30.00       Types: Cigarettes       Quit date:        Years since quittin.8    Smokeless tobacco: Never   Vaping Use    Vaping Use: Never used   Substance and Sexual Activity    Alcohol use: Yes       Alcohol/week: 4.0 - 6.0 standard drinks  of alcohol       Types: 1 Glasses of wine, 3 - 5 Cans of beer per week       Comment: few beers day    Drug use: Never    Sexual activity: Not on file   Other Topics Concern    Not on file   Social History Narrative    Not on file      Social Determinants of Health           Financial Resource Strain: Medium Risk (8/1/2023)     Overall Financial Resource Strain (CARDIA)      Difficulty of Paying Living Expenses: Somewhat hard   Food Insecurity: No Food Insecurity (12/19/2022)     Hunger Vital Sign      Worried About Running Out of Food in the Last Year: Never true      Ran Out of Food in the Last Year: Never true   Transportation Needs: No Transportation Needs (8/1/2023)     PRAPARE - Transportation      Lack of Transportation (Medical): No      Lack of Transportation (Non-Medical): No   Physical Activity: Not on file   Stress: Not on file   Social Connections: Not on file   Intimate Partner Violence: Not on file   Housing Stability: Low Risk  (12/19/2022)     Housing Stability Vital Sign      Unable to Pay for Housing in the Last Year: No      Number of Places Lived in the Last Year: 1      Unstable Housing in the Last Year: No      Social history:  40 pack-year history discontinued 20 years ago, patient drinks 2-4 beers a day off and on, no drug abuse, 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)     Current Outpatient Medications:     acetaminophen (TYLENOL) 325 mg tablet, Take 2 tablets (650 mg total) by mouth every 6 (six) hours as needed for mild pain, headaches or fever, Disp: 30 tablet, Rfl: 0    albuterol (2.5 mg/3 mL) 0.083 % nebulizer solution, Take 2.5 mg by nebulization As needed per patient's wife , Disp: , Rfl:     apixaban (Eliquis) 5 mg, Take 1 tablet (5 mg total) by mouth 2 (two) times a day, Disp: 180 tablet, Rfl: 1    atenolol (TENORMIN) 25 mg tablet, , Disp: , Rfl:     atorvastatin (LIPITOR) 10 mg tablet, Take 1 tablet (10 mg total) by mouth daily, Disp: 90 tablet, Rfl:  1    carvedilol (COREG) 25 mg tablet, Take 1 tablet (25 mg total) by mouth 2 (two) times a day with meals, Disp: 180 tablet, Rfl: 1    ciclopirox (LOPROX) 0.77 % cream, Apply topically 2 (two) times a day for 20 days, Disp: 45 g, Rfl: 2    fluticasone-umeclidinium-vilanterol (Trelegy Ellipta) 100-62.5-25 mcg/actuation inhaler, Rinse mouth after use., Disp: 60 each, Rfl: 5    guaifenesin-codeine (GUAIFENESIN AC) 100-10 MG/5ML liquid, Take 10 mL by mouth 3 (three) times a day as needed for cough, Disp: 180 mL, Rfl: 0    halobetasol (ULTRAVATE) 0.05 % cream, , Disp: , Rfl:     hydrocortisone 2.5 % cream, Apply topically 2 (two) times a day Apply twice a day affected area the trunk, Disp: 20 g, Rfl: 2    latanoprost (XALATAN) 0.005 % ophthalmic solution, Administer 0.005 mL to both eyes daily at bedtime, Disp: , Rfl:     metFORMIN (GLUCOPHAGE) 500 mg tablet, Take 1 tablet (500 mg total) by mouth daily with breakfast, Disp: , Rfl:     mirtazapine (REMERON) 7.5 MG tablet, Take 1 tablet (7.5 mg total) by mouth daily at bedtime, Disp: 90 tablet, Rfl: 3    Multiple Vitamin (MULTI-VITAMIN DAILY PO), Take by mouth daily  , Disp: , Rfl:     omeprazole (PriLOSEC) 20 mg delayed release capsule, TAKE 1 CAPSULE DAILY, Disp: 90 capsule, Rfl: 1    primidone (MYSOLINE) 50 mg tablet, TAKE 2 TABS AT 8PM., Disp: 180 tablet, Rfl: 1    spironolactone (ALDACTONE) 25 mg tablet, Take 1 tablet (25 mg total) by mouth daily, Disp: 90 tablet, Rfl: 1    aspirin (ECOTRIN LOW STRENGTH) 81 mg EC tablet, , Disp: , Rfl:      No Known Allergies         Vitals:     10/20/23 0926   BP: 114/68   Pulse: 80   Resp: 17   Temp: 97.9 °F (36.6 °C)   SpO2: 92%     Physical Exam  Constitutional:       Appearance: He is well-developed.      Comments: Obese male, no respiratory distress, no signs of pain   HENT:      Head: Normocephalic and atraumatic.      Right Ear: External ear normal.      Left Ear: External ear normal.   Eyes:      Conjunctiva/sclera:  Conjunctivae normal.      Pupils: Pupils are equal, round, and reactive to light.   Cardiovascular:      Rate and Rhythm: Normal rate and regular rhythm.      Heart sounds: Normal heart sounds.   Pulmonary:      Effort: Pulmonary effort is normal.      Comments: Decreased breath sounds left lower lobe, about the same as before  Abdominal:      General: Bowel sounds are normal     Palpations: Abdomen is soft.      Comments: Obese, nontender, sign bowel sounds, cannot palpate liver or spleen  Musculoskeletal:         General: Normal range of motion.      Cervical back: Normal range of motion and neck supple.   Skin:     General: Skin is warm.      Comments: Relatively good color, warm, moist, scattered mild areas of ecchymosis   Neurological:      Mental Status: He is alert and oriented to person, place, and time.      Deep Tendon Reflexes: Reflexes are normal and symmetric.   Psychiatric:         Behavior: Behavior normal.         Thought Content: Thought content normal.         Judgment: Judgment normal.   Extremities:  No lower extremity edema bilaterally, no cords, pulses are 1+   Lymphatics:  No adenopathy in the neck, supraclavicular region, axilla bilateral     Labs    12/16/2024 WBC = 12.29 hemoglobin = 12.5 hematocrit = 40.8 platelet = 221 neutrophil = 73% BUN = 17 creatinine = 0.94 calcium = 9.0 LFTs WNL    Imaging    10/1/2024 PET/CT    IMPRESSION:     1. Left lower lung mass demonstrates mild decrease in size now measuring 8.6 x 5.5 cm. There is less internal air within the mass than before. However there is increased predominantly peripheral FDG activity when compared to prior study from 6/6/2024.   Findings consistent with viable neoplasm along the peripheral margins of the lesion     2. Persistent small left pleural effusion with associated FDG activity, which again may be inflammatory/infectious, however malignant effusion cannot be excluded.     3. Right paratracheal lymph node size unchanged from  prior study. SUV max currently 3.4 earlier 2.6.    6/6/2024 PET/CT    IMPRESSION:    1. Necrotic left lower lung mass demonstrates persistent but diminished predominantly peripheral FDG activity. This may represent partial response to therapy, though residual viable tumor may still remain. There is new air within this necrotic mass, for   which infection is not excluded. Correlate clinically.  2. Decreased but persistent small left pleural effusion. There is associated FDG activity which may be inflammatory/infectious. Malignant effusion is not excluded.  3. Healing right anterior rib fractures. Nonspecific but stable asymmetric focus of activity in the right sacrum.  4. Otherwise no new hypermetabolic metastases.    10/30/2023 PET/CT    CHEST:  Best seen on image 76 of series 4 is a hypermetabolic centrally photopenic left lower lobe lung mass measuring 7.1 x 6.4 cm with max SUV of 12.0, previously measuring 4.5 x 4.0 cm with max SUV of 19.7 on PET/CT dated 10/29/2021 and approximately 4.3 x 3.7 cm when utilizing similar measurement technique and CT of chest dated 12/23/2022. The interval growth in size since 12/23/2022 suggest progression of hypermetabolic malignancy.     Subtle FDG activity is noted with a stable in size prominent right paratracheal lymph node on image 58 of series 4 measuring 1 1.4 cm in short axis with max SUV of 2.8, previously 3.5. The stability favors benign reactive lymph node with early pamela metastatic disease considered less likely    IMPRESSION:     1. Hypermetabolic left lower lobe lung malignancy has significantly increased in size since 12/23/2022 suggesting progression of malignancy.  2. Again noted is nonspecific FDG activity involving the sacrum and left posterior iliac bone without definitive correlate on low-dose unenhanced CT of uncertain clinical significance. Findings can be further characterized with MRI of the bony pelvis as clinically indicated.  3. Moderate left pleural  effusion.    Pathology    12/7/2023 [facility].  No action mutations were detected.  Tumor mutational burden = 6 = low.  Patient had a pathologic variant in PTEN and TP53.  PDL IHC TPS = 0%, negative.     Case Report   Surgical Pathology Report                         Case: [id number]                                    Authorizing Provider:  Cachorro Jason MD       Collected:           11/29/2021 1005               Ordering Location:     [facility]      Received:            11/29/2021 [address] [place] [address] Operating Room                                                       Pathologist:           Juan José Dietz MD                                                         Specimens:   A) - Lymph Node, level 7                                                                             B) - Lymph Node, level 4R                                                                            C) - Lymph Node, level 2R                                                                            D) - Lymph Node, level 4L                                                                   Final Diagnosis   A. Lymph node, level 7, excision:  -  Portions of benign lymph node with reactive change and anthracosis, negative for granulomas, lymphoma, or metastatic carcinoma.    B. Lymph node, level 4R, excision:  -  Portions of benign lymph node with reactive change and anthracosis, negative for granulomas, lymphoma, or metastatic carcinoma.    C. Lymph node, level 2R, excision:  -  Portions of benign lymph node with reactive change and anthracosis, negative for granulomas, lymphoma, or metastatic carcinoma.    D. Lymph node, level 4L, excision:  -  Benign lymph node with reactive change and anthracosis, negative for granulomas, lymphoma, or metastatic carcinoma.      Electronically signed by Juan José Dietz MD on 12/2/2021 at 11:29 AM         Case Report   Surgical Pathology Report                          Case: [id number]                                    Authorizing Provider:  Elmer Laird MD      Collected:           10/05/2021 1058               Ordering Location:     [facility] Received:            10/05/2021 1120                                      CAT Scan                                                                      Pathologist:           Mary Dean MD                                                         Specimen:    Lung, Left Lower Lobe                                                                       Final Diagnosis   A. Lung, Left Lower Lobe, :  - Invasive squamous carcinoma, moderately differentiated, keratinizing type.  - Tumor is highlighted with P 40 immunoperoxidase stain.  - Prominent tumor necrosis is noted.      Best representative block with tumor A1.  This case was reviewed at the intradepartmental  conference.   RADHA Navarrete, Pulmonology, is notified of the diagnosis in [employer] via [employer] on 10/06/2021  at 2.40 pm.   Electronically signed by Mary Dean MD on 10/6/2021 at  2:44 PM

## 2025-01-28 NOTE — PROGRESS NOTES
Port accessed, flushed and deaccessed per protocol.    Bridger Clayton  tolerated treatment well with no complications.      Bridger Clayton is aware of future appt on 3/25/25.     AVS printed and given to Bridger Clayton:    No (Declined by Bridger Clayton)

## 2025-01-29 ENCOUNTER — TELEPHONE (OUTPATIENT)
Dept: HEMATOLOGY ONCOLOGY | Facility: MEDICAL CENTER | Age: 85
End: 2025-01-29

## 2025-01-30 ENCOUNTER — OFFICE VISIT (OUTPATIENT)
Dept: INTERNAL MEDICINE CLINIC | Facility: CLINIC | Age: 85
End: 2025-01-30
Payer: COMMERCIAL

## 2025-01-30 VITALS
TEMPERATURE: 98.3 F | DIASTOLIC BLOOD PRESSURE: 66 MMHG | SYSTOLIC BLOOD PRESSURE: 130 MMHG | WEIGHT: 225 LBS | OXYGEN SATURATION: 99 % | RESPIRATION RATE: 16 BRPM | BODY MASS INDEX: 32.21 KG/M2 | HEIGHT: 70 IN | HEART RATE: 70 BPM

## 2025-01-30 DIAGNOSIS — G62.0 CHEMOTHERAPY-INDUCED PERIPHERAL NEUROPATHY (HCC): ICD-10-CM

## 2025-01-30 DIAGNOSIS — J43.2 CENTRILOBULAR EMPHYSEMA (HCC): ICD-10-CM

## 2025-01-30 DIAGNOSIS — T45.1X5A CHEMOTHERAPY-INDUCED PERIPHERAL NEUROPATHY (HCC): ICD-10-CM

## 2025-01-30 DIAGNOSIS — C79.89 NON-SMALL CELL CARCINOMA OF LEFT LUNG METASTATIC TO ABDOMEN (HCC): Primary | ICD-10-CM

## 2025-01-30 DIAGNOSIS — C34.92 NON-SMALL CELL CARCINOMA OF LEFT LUNG METASTATIC TO ABDOMEN (HCC): Primary | ICD-10-CM

## 2025-01-30 DIAGNOSIS — I50.32 CHRONIC DIASTOLIC (CONGESTIVE) HEART FAILURE (HCC): ICD-10-CM

## 2025-01-30 DIAGNOSIS — I48.19 PERSISTENT ATRIAL FIBRILLATION (HCC): ICD-10-CM

## 2025-01-30 PROBLEM — F33.9 DEPRESSION, RECURRENT (HCC): Status: RESOLVED | Noted: 2022-08-25 | Resolved: 2025-01-30

## 2025-01-30 PROBLEM — J96.01 ACUTE RESPIRATORY FAILURE WITH HYPOXEMIA (HCC): Status: RESOLVED | Noted: 2022-05-03 | Resolved: 2025-01-30

## 2025-01-30 PROCEDURE — G2211 COMPLEX E/M VISIT ADD ON: HCPCS | Performed by: INTERNAL MEDICINE

## 2025-01-30 PROCEDURE — 99213 OFFICE O/P EST LOW 20 MIN: CPT | Performed by: INTERNAL MEDICINE

## 2025-01-30 RX ORDER — ALBUTEROL SULFATE 90 UG/1
2 INHALANT RESPIRATORY (INHALATION) EVERY 6 HOURS PRN
Qty: 20.1 G | Refills: 3 | Status: SHIPPED | OUTPATIENT
Start: 2025-01-30

## 2025-01-30 NOTE — ASSESSMENT & PLAN NOTE
Wt Readings from Last 3 Encounters:   01/30/25 102 kg (225 lb)   01/28/25 102 kg (225 lb)   01/21/25 107 kg (236 lb)   Above reviewed patient euvolemic for now CHF compensated agree continue low-salt diet fluid restriction Daily weight monitoring  Continue Lasix 20 mg daily

## 2025-01-30 NOTE — PATIENT INSTRUCTIONS
Patient Education     COPD Exacerbation, Adult ED   General Information   You came to the Emergency Department (ED) for worsening of chronic bronchitis or emphysema. COPD, or chronic obstructive pulmonary disease, is another name for these problems. This is often caused by inflammation of the bronchial tubes that carry air into your lungs or by infection. Chronic bronchitis and emphysema are mostly caused by smoking. It can also be caused by breathing in toxic fumes or gases. With chronic bronchitis, your cough will bring up mucus and can last for months.  What care is needed at home?   Call your regular doctor to let them know you were in the ED. Make a follow-up appointment if you were told to.  If you smoke, try to quit. Your doctor or nurse can help you with this.  Stay away from smoke-filled places. Avoid other things that may cause breathing problems like fumes, pollution, dust, and other common allergens.  If you have an inhaler to take when you are feeling short of breath, be sure to carry it with you all the time. Then, you can take it when needed. Take all your other medicines as directed.  The doctor may have ordered antibiotics to treat an infection. It is important to take all of your antibiotics even if you start to feel better.  When do I need to get emergency help?   Call for an ambulance right away if:   You are having so much trouble breathing that you can only say one or two words at a time.  You need to sit upright at all times to be able to breathe and or cannot lie down.  You are very tired from working to catch your breath or you are sweating from trying to breathe.  You are coughing up a lot of blood (more than 1 teaspoon or 5 mL).  You have signs of a heart attack, which may include:  Severe chest pain, pressure, or discomfort with:  Breathing trouble; sweating; upset stomach; or cold, clammy skin.  Pain in your arms, back, or jaw.  Worse pain with activity like walking up stairs.  Fast or  irregular heartbeat.  Feeling dizzy, faint, or weak.  Return to the ED if:   You have trouble breathing when talking or sitting still.  You cough up a small amount of blood (less than 1 teaspoon or 5 mL).  You have a fever of 100.4°F (38°C) or higher or chills.  When do I need to call the doctor?   You are feeling weak or more short of breath than normal when doing your activities.  You have new or worsening cough.  You cough up more mucus.  You feel more short of breath.  You have new or worsening symptoms.  Last Reviewed Date   2020-08-19  Consumer Information Use and Disclaimer   This generalized information is a limited summary of diagnosis, treatment, and/or medication information. It is not meant to be comprehensive and should be used as a tool to help the user understand and/or assess potential diagnostic and treatment options. It does NOT include all information about conditions, treatments, medications, side effects, or risks that may apply to a specific patient. It is not intended to be medical advice or a substitute for the medical advice, diagnosis, or treatment of a health care provider based on the health care provider's examination and assessment of a patient’s specific and unique circumstances. Patients must speak with a health care provider for complete information about their health, medical questions, and treatment options, including any risks or benefits regarding use of medications. This information does not endorse any treatments or medications as safe, effective, or approved for treating a specific patient. Up[employer], [employer] and its affiliates disclaim any warranty or liability relating to this information or the use thereof. The use of this information is governed by the Terms of Use, available at https://www.woltersTablouwer.com/en/know/clinical-effectiveness-terms   Copyright   Copyright © 2024 [employer], Inc[employer] and its affiliates and/or licensors. All rights reserved.

## 2025-01-30 NOTE — ASSESSMENT & PLAN NOTE
Proventil 2 puff 4 times a day as needed    Continue Trelegy 1 puff daily followed by pulmonology  For now not on oxygen  Orders:  •  fluticasone-umeclidinium-vilanterol (Trelegy Ellipta) 100-62.5-25 mcg/actuation inhaler; Rinse mouth after use.  •  albuterol (Proventil HFA) 90 mcg/act inhaler; Inhale 2 puffs every 6 (six) hours as needed for wheezing

## 2025-01-30 NOTE — ASSESSMENT & PLAN NOTE
Continue gabapentin ATC. Patient declines offer of increase, though neuropathy is affecting the fine motor control of his bilateral hands. He declines offer of referral to OT. He is working with PT, more for mobility and strength issues. Encouraged ambulation as tolerated.    Symptoms controlled with gabapentin 300 mg at bedtime continue current regimen

## 2025-01-30 NOTE — ASSESSMENT & PLAN NOTE
Recurrent SCC of the left lung, receiving paclitaxel. Diabetic neuropathy worsened by cancer therapies.  Per literature review paclitaxel can cause peripheral neuropathy (42% to 70%; grades 3/4: <= 7%) or worsen existing neuropathy.  Continue disease-directed cares.    Seen by oncologist chemotherapy ended no further treatment advised only symptomatic supportive care and palliative care patient refusing to see palliative care has seen in the past

## 2025-02-02 NOTE — PROGRESS NOTES
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 Diabetic neuropathy worsened by cancer therapies.  Per literature review paclitaxel can cause peripheral neuropathy (42% to 70%; grades 3/4: <= 7%) or worsen existing neuropathy.  Continue disease-directed cares.    Seen by oncologist chemotherapy ended no further treatment advised only symptomatic supportive care and palliative care patient refusing to see palliative care has seen in the past         Chemotherapy-induced peripheral neuropathy (HCC)  Continue gabapentin ATC. Patient declines offer of increase, though neuropathy is affecting the fine motor control of his bilateral hands. He declines offer of referral to OT. He is working with PT, more for mobility and strength issues. Encouraged ambulation as tolerated.    Symptoms controlled with gabapentin 300 mg at bedtime continue current regimen       Chronic diastolic (congestive) heart failure (HCC)  Wt Readings from Last 3 Encounters:   25 102 kg (225 lb)   25 102 kg (225 lb)   25 107 kg (236 lb)   Above reviewed patient euvolemic for now CHF compensated agree continue low-salt diet fluid restriction Daily weight monitoring  Continue Lasix 20 mg daily               Centrilobular emphysema (HCC)  Proventil 2 puff 4 times a day as needed    Continue Trelegy 1 puff daily followed by pulmonology  For now not on oxygen  Orders:  •  fluticasone-umeclidinium-vilanterol (Trelegy Ellipta) 100-62.5-25 mcg/actuation inhaler; Rinse mouth after use.  •  albuterol (Proventil HFA) 90 mcg/act inhaler; Inhale 2 puffs every 6 (six) hours as needed for wheezing    Persistent atrial fibrillation (HCC)  Rate controlled  asymptomatic no chest pain    Continue Eliquis 5 mg twice a day    Follow-up with cardiology    Atenolol 25 mg daily    Coreg 25 mg twice a day    Patient CHF compensated              History of Present Illness   HPI  Review of Systems   Constitutional:  Positive for appetite change and fatigue. Negative for activity change, chills, diaphoresis, fever and unexpected weight change.   HENT:  Positive for drooling. Negative for congestion, dental problem, ear discharge, ear pain, facial swelling, hearing loss, mouth sores, nosebleeds, postnasal drip, rhinorrhea, sinus pressure, sneezing, sore throat, tinnitus, trouble swallowing and voice change.    Eyes:  Negative for photophobia, pain, discharge, redness, itching and visual disturbance.   Respiratory:  Positive for cough and shortness of breath. Negative for apnea, choking, chest tightness, wheezing and stridor.    Cardiovascular:  Negative for chest pain, palpitations and leg swelling.   Gastrointestinal:  Negative for abdominal distention, abdominal pain, anal bleeding, blood in stool, constipation, diarrhea, nausea, rectal pain and vomiting.   Endocrine: Negative for cold intolerance, heat intolerance, polydipsia, polyphagia and polyuria.   Genitourinary:  Negative for decreased urine volume, difficulty urinating, dysuria, enuresis, flank pain, frequency, genital sores, hematuria and urgency.   Musculoskeletal:  Positive for arthralgias, back pain, gait problem and joint swelling. Negative for myalgias, neck pain and neck stiffness.   Skin:  Negative for color change, pallor, rash and wound.   Allergic/Immunologic: Negative.  Negative for environmental allergies, food allergies and immunocompromised state.   Neurological:  Negative for dizziness, tremors, seizures, syncope, facial asymmetry, speech difficulty, weakness, light-headedness, numbness and headaches.   Psychiatric/Behavioral:  Negative for agitation, behavioral problems, confusion, decreased  "concentration, dysphoric mood, hallucinations, self-injury, sleep disturbance and suicidal ideas. The patient is not nervous/anxious and is not hyperactive.        Objective   /66   Pulse 70   Temp 98.3 °F (36.8 °C)   Resp 16   Ht 5' 10\" (1.778 m)   Wt 102 kg (225 lb)   SpO2 99%   BMI 32.28 kg/m²      Physical Exam  Vitals and nursing note reviewed.   Constitutional:       General: He is not in acute distress.     Appearance: He is well-developed.   HENT:      Head: Normocephalic and atraumatic.      Nose: Congestion present.   Eyes:      Conjunctiva/sclera: Conjunctivae normal.   Cardiovascular:      Rate and Rhythm: Normal rate and regular rhythm.      Pulses:           Dorsalis pedis pulses are 2+ on the right side and 2+ on the left side.        Posterior tibial pulses are 1+ on the right side and 1+ on the left side.      Heart sounds: Murmur heard.   Pulmonary:      Effort: Pulmonary effort is normal. No respiratory distress.      Breath sounds: Rhonchi and rales present.   Abdominal:      Palpations: Abdomen is soft.      Tenderness: There is no abdominal tenderness.   Musculoskeletal:         General: No swelling.      Cervical back: Neck supple.   Feet:      Right foot:      Skin integrity: No ulcer, skin breakdown, erythema, warmth, callus or dry skin.      Left foot:      Skin integrity: No ulcer, skin breakdown, erythema, warmth, callus or dry skin.   Skin:     General: Skin is warm and dry.      Capillary Refill: Capillary refill takes less than 2 seconds.   Neurological:      General: No focal deficit present.      Mental Status: He is alert.      Gait: Gait abnormal.   Psychiatric:         Mood and Affect: Mood normal.         "

## 2025-02-18 ENCOUNTER — OFFICE VISIT (OUTPATIENT)
Dept: PULMONOLOGY | Facility: MEDICAL CENTER | Age: 85
End: 2025-02-18
Payer: COMMERCIAL

## 2025-02-18 VITALS
OXYGEN SATURATION: 94 % | RESPIRATION RATE: 14 BRPM | WEIGHT: 224 LBS | TEMPERATURE: 98.4 F | DIASTOLIC BLOOD PRESSURE: 80 MMHG | HEIGHT: 70 IN | SYSTOLIC BLOOD PRESSURE: 130 MMHG | HEART RATE: 65 BPM | BODY MASS INDEX: 32.07 KG/M2

## 2025-02-18 DIAGNOSIS — J44.9 CHRONIC OBSTRUCTIVE PULMONARY DISEASE, UNSPECIFIED COPD TYPE (HCC): ICD-10-CM

## 2025-02-18 DIAGNOSIS — R05.9 COUGH, UNSPECIFIED TYPE: Primary | ICD-10-CM

## 2025-02-18 DIAGNOSIS — C34.92 MALIGNANT NEOPLASM OF LEFT LUNG, UNSPECIFIED PART OF LUNG (HCC): ICD-10-CM

## 2025-02-18 PROCEDURE — 99214 OFFICE O/P EST MOD 30 MIN: CPT | Performed by: INTERNAL MEDICINE

## 2025-02-18 RX ORDER — BENZONATATE 100 MG/1
100 CAPSULE ORAL 3 TIMES DAILY PRN
Qty: 90 CAPSULE | Refills: 0 | Status: SHIPPED | OUTPATIENT
Start: 2025-02-18

## 2025-02-18 RX ORDER — GABAPENTIN 300 MG/1
300 CAPSULE ORAL 3 TIMES DAILY
Qty: 90 CAPSULE | Refills: 3 | Status: SHIPPED | OUTPATIENT
Start: 2025-02-18

## 2025-02-18 NOTE — PATIENT INSTRUCTIONS
Increase gabapentin to 300 mg three times a day  Tessalon Perrles will be ordered at pharmacy at 100mg TID as needed.  Try that for 1 week.  If not helpful, can double that  Try Breztri 2 puffs twice daily in place of Trelegy

## 2025-02-18 NOTE — LETTER
February 18, 2025     Vivek Tatum MD  [address]  [address]  [place] [address]    Patient: Bridger Clayton   YOB: 1940   Date of Visit: 2/18/2025       Dear Dr. Tatum:    Thank you for referring Bridger Clayton to me for evaluation. Below are my notes for this consultation.    If you have questions, please do not hesitate to call me. I look forward to following your patient along with you.         Sincerely,        Yamil Persaud DO        CC: No Recipients    Yamil Persaud DO  2/18/2025  3:42 PM  Sign when Signing Visit  Progress note - Pulmonary Medicine   Bridger Clayton 84 y.o. male MRN: [id number]    Assessment/Plan  84 y.o. M with PMHx of hyperliipidemia, DM type 2, hyperlipidemia, Alcohol dependence, emphysema, prostate cancer and lung cancer who comes in for cough.  1.  Chronic cough - this is usually productive cough.  Likely a combination of COPD/chronic bronchitis along with malignancy      -  he has noted some benefit from Trelegy and uses it at night time which does not seem to be sufficient.   Will switch to Breztri to allow for twice a day dosing      -  In addition, will add Tessalon 100mg TID PRN to help with the cough      -   To help with cough reflex, will increase gabapentin 300mg BID and increase TID      -   He also has codeine cough solution at home      -   check CT chest in 2 months to ensure that he has not had follow up since the PET CT    2. Squamous cell lung cancer with previous L ASEPT cathter due to recurring effusion, now removed - currently following with oncology.  Chemotherapy on hold due to poor tolerance.  Will meet with Dr. James early next month    3.  COPD - continue Breztri as above.  Continue PRN albuterol.        ______________________________________________________________________    HPI:    Bridger Clayton presents today for follow-up for chronic cough due to malignancy and COPD.  He has recently paused his chemotherapy due to  infection and poor tolerance.   He has noted some increase in his cough since that time.   He was started on Trelegy with some benefit.  He still notes a productive cough, wheezing and shortness of breath. He has noted cough is productive of thick sputum.  He uses Trelegy at bedtime to help with the symptoms at bedtime predominantly.  He is following with oncology and will discuss further treatment at next visit in 1 month.          Social history updates:  Social History     Tobacco Use   Smoking Status Former   • Current packs/day: 0.00   • Average packs/day: 1 pack/day for 30.0 years (30.0 ttl pk-yrs)   • Types: Cigarettes   • Start date:    • Quit date:    • Years since quittin.1   Smokeless Tobacco Never     Social History     Socioeconomic History   • Marital status: /Civil Union     Spouse name: Not on file   • Number of children: Not on file   • Years of education: Not on file   • Highest education level: Not on file   Occupational History   • Not on file   Tobacco Use   • Smoking status: Former     Current packs/day: 0.00     Average packs/day: 1 pack/day for 30.0 years (30.0 ttl pk-yrs)     Types: Cigarettes     Start date:      Quit date:      Years since quittin.1   • Smokeless tobacco: Never   Vaping Use   • Vaping status: Never Used   Substance and Sexual Activity   • Alcohol use: Yes     Alcohol/week: 4.0 - 6.0 standard drinks of alcohol     Types: 1 Glasses of wine, 3 - 5 Cans of beer per week     Comment: socially   • Drug use: Never   • Sexual activity: Not on file   Other Topics Concern   • Not on file   Social History Narrative    From 24  note:    Relationship status:     Duration of relationship: 59 years     Name of significant other: Taylor    Children and Ages: 3 sons     Pets: None    Other important family information: Sons are local and supportive    Living situation (where and whom): Resides with spouse      Patient's primary caregiver: Wife  "assists as needed       history: Served in the [employer]--is not [facility] the [facility]    Employment history/source of income:  for the [employer] prior to half-way    Spirituality/ Scientologist: Presbyterian     Patient's strengths, social supports, and resources: Supportive family and Jehovah's witness support ( visits 1x/month)    Durable Medical Equipment needs: Cane, rollator, high-rise toilet    Transportation: Wife transports    Advanced Directive: Copy of AD on file designating wife Taylor as substitute decision maker with sons Bridger and Yobany as alternates    Patient's interests: Watching TV     Social Drivers of Health     Financial Resource Strain: Medium Risk (8/1/2023)    Overall Financial Resource Strain (CARDIA)    • Difficulty of Paying Living Expenses: Somewhat hard   Food Insecurity: No Food Insecurity (9/27/2024)    Nursing - Inadequate Food Risk Classification    • Worried About Running Out of Food in the Last Year: Never true    • Ran Out of Food in the Last Year: Never true    • Ran Out of Food in the Last Year: Not on file   Transportation Needs: No Transportation Needs (9/27/2024)    PRAPARE - Transportation    • Lack of Transportation (Medical): No    • Lack of Transportation (Non-Medical): No   Physical Activity: Not on file   Stress: Not on file   Social Connections: Not on file   Intimate Partner Violence: Not on file   Housing Stability: Low Risk  (9/27/2024)    Housing Stability Vital Sign    • Unable to Pay for Housing in the Last Year: No    • Number of Times Moved in the Last Year: 0    • Homeless in the Last Year: No       PhysicalExamination:  Vitals:   /80 (BP Location: Left arm, Patient Position: Sitting, Cuff Size: Standard)   Pulse 65   Temp 98.4 °F (36.9 °C) (Temporal)   Resp 14   Ht 5' 10\" (1.778 m)   Wt 102 kg (224 lb)   SpO2 94%   BMI 32.14 kg/m²   General: Pleasant, Awake alert and oriented x 3, conversant without conversational dyspnea, NAD, " "normal affect  HEENT:  PERRL, Sclera noninjected, nonicteric OU, Nares patent,  no craniofacial abnormalities, Mucous membranes, moist, no oral lesions, normal dentition  NECK: Trachea midline, no accessory muscle use, no stridor, no cervical or supraclavicular adenopathy, JVP not elevated  CARDIAC: Reg, single s1/S2, no m/r/g  PULM: CTA bilaterally no wheezing, rhonchi or rales  ABD: Normoactive bowel sounds, soft nontender, nondistended, no rebound, no rigidity, no guarding  EXT: No cyanosis, no clubbing, no edema, normal capillary refill  NEURO: no focal neurologic deficits, AAOx3, moving all extremities appropriately      Diagnostic Data:  Labs:  I personally reviewed the most recent laboratory data pertinent to today's visit  not applicable    I have personally reviewed pertinent lab results.  Lab Results   Component Value Date    WBC 12.29 (H) 12/16/2024    HGB 12.5 12/16/2024    HCT 40.8 12/16/2024    MCV 94 12/16/2024     12/16/2024     Lab Results   Component Value Date    CALCIUM 9.0 12/16/2024    K 4.6 12/16/2024    CO2 28 12/16/2024     12/16/2024    BUN 17 12/16/2024    CREATININE 0.94 12/16/2024     No results found for: \"IGE\"  Lab Results   Component Value Date    ALT 30 12/16/2024    AST 31 12/16/2024    ALKPHOS 92 12/16/2024       PFT results:  The most recent pulmonary function tests were reviewed.  Results:  FEV1/FVC Ratio: 81 %  FEV1: 1.75 L58 % predicted  Forced Vital Capacity: 2.15 L    52 % predicted  After administration of bronchodilator:  There is improvement in both FEV1 and FVC     Lung volumes: Total Lung Capacity 66 % predicted Residual volume 70 % predicted     DLCO NOT corrected for patients hemoglobin level: 54 % predicted     Flow volume loop:  Consistent with obstruction     Interpretation:     Spirometry demonstrates moderate restriction  There is improvement after administration of bronchodilator  Lung volumes show moderate restriction  Moderate diffusion " impairment          Imaging:  I personally reviewed the images on the PAC system pertinent to today's visit  PET CT  10/1/24  IMPRESSION:  1. Left lower lung mass demonstrates mild decrease in size now measuring 8.6 x 5.5 cm. There is less internal air within the mass than before. However there is increased predominantly peripheral FDG activity when compared to prior study from 6/6/2024.   Findings consistent with viable neoplasm along the peripheral margins of the lesion  2. Persistent small left pleural effusion with associated FDG activity, which again may be inflammatory/infectious, however malignant effusion cannot be excluded   3. Right paratracheal lymph node size unchanged from prior study. SUV max currently 3.4 earlier 2.6.    PET CT 6/6/24  IMPRESSION:  1. Necrotic left lower lung mass demonstrates persistent but diminished predominantly peripheral FDG activity. This may represent partial response to therapy, though residual viable tumor may still remain. There is new air within this necrotic mass, for   which infection is not excluded. Correlate clinically.  2. Decreased but persistent small left pleural effusion. There is associated FDG activity which may be inflammatory/infectious. Malignant effusion is not excluded.  3. Healing right anterior rib fractures. Nonspecific but stable asymmetric focus of activity in the right sacrum.  4. Otherwise no new hypermetabolic metastases.     CT chest 9/28/23  IMPRESSION:     Since September 12, 2023, decrease in size of the still large left pleural effusion with commensurate decrease in left-sided passive atelectasis. Persistent hypoattenuation in the collapsed portion of the left lower lobe. Given that this is in the   vicinity of the treated tumor, and has increased in size since December 2022, short interval contrast-enhanced CT is advised following resolution of the pleural effusion for further assessment.       Yamil Persaud DO

## 2025-02-18 NOTE — PROGRESS NOTES
Progress note - Pulmonary Medicine   Bridger Clayton 84 y.o. male MRN: [id number]    Assessment/Plan  84 y.o. M with PMHx of hyperliipidemia, DM type 2, hyperlipidemia, Alcohol dependence, emphysema, prostate cancer and lung cancer who comes in for cough.  1.  Chronic cough - this is usually productive cough.  Likely a combination of COPD/chronic bronchitis along with malignancy      -  he has noted some benefit from Trelegy and uses it at night time which does not seem to be sufficient.   Will switch to Breztri to allow for twice a day dosing      -  In addition, will add Tessalon 100mg TID PRN to help with the cough      -   To help with cough reflex, will increase gabapentin 300mg BID and increase TID      -   He also has codeine cough solution at home      -   check CT chest in 2 months to ensure that he has not had follow up since the PET CT    2. Squamous cell lung cancer with previous L ASEPT cathter due to recurring effusion, now removed - currently following with oncology.  Chemotherapy on hold due to poor tolerance.  Will meet with Dr. James early next month    3.  COPD - continue Breztri as above.  Continue PRN albuterol.        ______________________________________________________________________    HPI:    Bridger Clayton presents today for follow-up for chronic cough due to malignancy and COPD.  He has recently paused his chemotherapy due to infection and poor tolerance.   He has noted some increase in his cough since that time.   He was started on Trelegy with some benefit.  He still notes a productive cough, wheezing and shortness of breath. He has noted cough is productive of thick sputum.  He uses Trelegy at bedtime to help with the symptoms at bedtime predominantly.  He is following with oncology and will discuss further treatment at next visit in 1 month.          Social history updates:  Social History     Tobacco Use   Smoking Status Former    Current packs/day: 0.00    Average packs/day: 1  pack/day for 30.0 years (30.0 ttl pk-yrs)    Types: Cigarettes    Start date:     Quit date:     Years since quittin.1   Smokeless Tobacco Never     Social History     Socioeconomic History    Marital status: /Civil Union     Spouse name: Not on file    Number of children: Not on file    Years of education: Not on file    Highest education level: Not on file   Occupational History    Not on file   Tobacco Use    Smoking status: Former     Current packs/day: 0.00     Average packs/day: 1 pack/day for 30.0 years (30.0 ttl pk-yrs)     Types: Cigarettes     Start date:      Quit date:      Years since quittin.1    Smokeless tobacco: Never   Vaping Use    Vaping status: Never Used   Substance and Sexual Activity    Alcohol use: Yes     Alcohol/week: 4.0 - 6.0 standard drinks of alcohol     Types: 1 Glasses of wine, 3 - 5 Cans of beer per week     Comment: socially    Drug use: Never    Sexual activity: Not on file   Other Topics Concern    Not on file   Social History Narrative    From 24  note:    Relationship status:     Duration of relationship: 59 years     Name of significant other: Taylor    Children and Ages: 3 sons     Pets: None    Other important family information: Sons are local and supportive    Living situation (where and whom): Resides with spouse      Patient's primary caregiver: Wife assists as needed       history: Served in the [employer]--is not [facility] the [facility]    Employment history/source of income:  for the [place] [employer] prior to jail    Spirituality/ Worship: Presbyterian     Patient's strengths, social supports, and resources: Supportive family and Oriental orthodox support ( visits 1x/month)    Durable Medical Equipment needs: Cane, rollator, high-rise toilet    Transportation: Wife transports    Advanced Directive: Copy of AD on file designating wife Taylor as substitute decision maker with sons Bridger and Yobany as  "alternates    Patient's interests: Watching TV     Social Drivers of Health     Financial Resource Strain: Medium Risk (8/1/2023)    Overall Financial Resource Strain (CARDIA)     Difficulty of Paying Living Expenses: Somewhat hard   Food Insecurity: No Food Insecurity (9/27/2024)    Nursing - Inadequate Food Risk Classification     Worried About Running Out of Food in the Last Year: Never true     Ran Out of Food in the Last Year: Never true     Ran Out of Food in the Last Year: Not on file   Transportation Needs: No Transportation Needs (9/27/2024)    PRAPARE - Transportation     Lack of Transportation (Medical): No     Lack of Transportation (Non-Medical): No   Physical Activity: Not on file   Stress: Not on file   Social Connections: Not on file   Intimate Partner Violence: Not on file   Housing Stability: Low Risk  (9/27/2024)    Housing Stability Vital Sign     Unable to Pay for Housing in the Last Year: No     Number of Times Moved in the Last Year: 0     Homeless in the Last Year: No       PhysicalExamination:  Vitals:   /80 (BP Location: Left arm, Patient Position: Sitting, Cuff Size: Standard)   Pulse 65   Temp 98.4 °F (36.9 °C) (Temporal)   Resp 14   Ht 5' 10\" (1.778 m)   Wt 102 kg (224 lb)   SpO2 94%   BMI 32.14 kg/m²   General: Pleasant, Awake alert and oriented x 3, conversant without conversational dyspnea, NAD, normal affect  HEENT:  PERRL, Sclera noninjected, nonicteric OU, Nares patent,  no craniofacial abnormalities, Mucous membranes, moist, no oral lesions, normal dentition  NECK: Trachea midline, no accessory muscle use, no stridor, no cervical or supraclavicular adenopathy, JVP not elevated  CARDIAC: Reg, single s1/S2, no m/r/g  PULM: CTA bilaterally no wheezing, rhonchi or rales  ABD: Normoactive bowel sounds, soft nontender, nondistended, no rebound, no rigidity, no guarding  EXT: No cyanosis, no clubbing, no edema, normal capillary refill  NEURO: no focal neurologic deficits, " "AAOx3, moving all extremities appropriately      Diagnostic Data:  Labs:  I personally reviewed the most recent laboratory data pertinent to today's visit  not applicable    I have personally reviewed pertinent lab results.  Lab Results   Component Value Date    WBC 12.29 (H) 12/16/2024    HGB 12.5 12/16/2024    HCT 40.8 12/16/2024    MCV 94 12/16/2024     12/16/2024     Lab Results   Component Value Date    CALCIUM 9.0 12/16/2024    K 4.6 12/16/2024    CO2 28 12/16/2024     12/16/2024    BUN 17 12/16/2024    CREATININE 0.94 12/16/2024     No results found for: \"IGE\"  Lab Results   Component Value Date    ALT 30 12/16/2024    AST 31 12/16/2024    ALKPHOS 92 12/16/2024       PFT results:  The most recent pulmonary function tests were reviewed.  Results:  FEV1/FVC Ratio: 81 %  FEV1: 1.75 L58 % predicted  Forced Vital Capacity: 2.15 L    52 % predicted  After administration of bronchodilator:  There is improvement in both FEV1 and FVC     Lung volumes: Total Lung Capacity 66 % predicted Residual volume 70 % predicted     DLCO NOT corrected for patients hemoglobin level: 54 % predicted     Flow volume loop:  Consistent with obstruction     Interpretation:     Spirometry demonstrates moderate restriction  There is improvement after administration of bronchodilator  Lung volumes show moderate restriction  Moderate diffusion impairment          Imaging:  I personally reviewed the images on the PAC system pertinent to today's visit  PET CT  10/1/24  IMPRESSION:  1. Left lower lung mass demonstrates mild decrease in size now measuring 8.6 x 5.5 cm. There is less internal air within the mass than before. However there is increased predominantly peripheral FDG activity when compared to prior study from 6/6/2024.   Findings consistent with viable neoplasm along the peripheral margins of the lesion  2. Persistent small left pleural effusion with associated FDG activity, which again may be inflammatory/infectious, " however malignant effusion cannot be excluded   3. Right paratracheal lymph node size unchanged from prior study. SUV max currently 3.4 earlier 2.6.    PET CT 6/6/24  IMPRESSION:  1. Necrotic left lower lung mass demonstrates persistent but diminished predominantly peripheral FDG activity. This may represent partial response to therapy, though residual viable tumor may still remain. There is new air within this necrotic mass, for   which infection is not excluded. Correlate clinically.  2. Decreased but persistent small left pleural effusion. There is associated FDG activity which may be inflammatory/infectious. Malignant effusion is not excluded.  3. Healing right anterior rib fractures. Nonspecific but stable asymmetric focus of activity in the right sacrum.  4. Otherwise no new hypermetabolic metastases.     CT chest 9/28/23  IMPRESSION:     Since September 12, 2023, decrease in size of the still large left pleural effusion with commensurate decrease in left-sided passive atelectasis. Persistent hypoattenuation in the collapsed portion of the left lower lobe. Given that this is in the   vicinity of the treated tumor, and has increased in size since December 2022, short interval contrast-enhanced CT is advised following resolution of the pleural effusion for further assessment.       Yamli Persaud DO

## 2025-02-20 DIAGNOSIS — E78.2 MIXED HYPERLIPIDEMIA: ICD-10-CM

## 2025-02-20 DIAGNOSIS — G93.40 ENCEPHALOPATHY: ICD-10-CM

## 2025-02-20 RX ORDER — ATORVASTATIN CALCIUM 10 MG/1
10 TABLET, FILM COATED ORAL DAILY
Qty: 90 TABLET | Refills: 1 | Status: SHIPPED | OUTPATIENT
Start: 2025-02-20

## 2025-02-20 RX ORDER — PRIMIDONE 50 MG/1
TABLET ORAL
Qty: 180 TABLET | Refills: 1 | Status: SHIPPED | OUTPATIENT
Start: 2025-02-20

## 2025-02-20 NOTE — TELEPHONE ENCOUNTER
Reason for call:   [x] Refill   [] Prior Auth  [] Other:     Office:   [x] PCP/Provider - Deepti  [] Specialty/Provider -     Medication: Atorvastatin 10mg    Dose/Frequency: 1 tab daily     Quantity: 90    Pharmacy: [facility] [address] [phone]     Does the patient have enough for 3 days?   [] Yes   [x] No - Send as HP to POD

## 2025-03-03 ENCOUNTER — TELEPHONE (OUTPATIENT)
Age: 85
End: 2025-03-03

## 2025-03-03 DIAGNOSIS — I89.0 LYMPHEDEMA: ICD-10-CM

## 2025-03-03 DIAGNOSIS — C34.92 SQUAMOUS CELL CARCINOMA OF LEFT LUNG (HCC): Primary | Chronic | ICD-10-CM

## 2025-03-03 NOTE — TELEPHONE ENCOUNTER
Abigail from [facility] called, asking for us to place in a OT Referral now for his Lymphedema

## 2025-03-06 DIAGNOSIS — F33.9 DEPRESSION, RECURRENT (HCC): ICD-10-CM

## 2025-03-06 RX ORDER — MIRTAZAPINE 7.5 MG/1
7.5 TABLET, FILM COATED ORAL
Qty: 30 TABLET | Refills: 0 | Status: SHIPPED | OUTPATIENT
Start: 2025-03-06

## 2025-03-06 NOTE — TELEPHONE ENCOUNTER
Reason for call:   [x] Refill   [] Prior Auth  [] Other:     Office:   [x] PCP/Provider - : [facility]   [] Specialty/Provider -     Medication: Remeron    Dose/Frequency: 7.5 mg     Quantity: #90    Pharmacy: [facility]   Does the patient have enough for 3 days?   [] Yes   [x] No - Send as HP to POD    Mail Away Pharmacy   Does the patient have enough for 10 days?   [] Yes   [] No - Send as HP to POD

## 2025-03-13 DIAGNOSIS — E11.40 TYPE 2 DIABETES MELLITUS WITH DIABETIC NEUROPATHY, WITHOUT LONG-TERM CURRENT USE OF INSULIN (HCC): ICD-10-CM

## 2025-03-18 DIAGNOSIS — L50.9 URTICARIA: ICD-10-CM

## 2025-03-19 RX ORDER — HYDROXYZINE HYDROCHLORIDE 50 MG/1
50 TABLET, FILM COATED ORAL
Qty: 90 TABLET | Refills: 1 | Status: SHIPPED | OUTPATIENT
Start: 2025-03-19

## 2025-03-25 ENCOUNTER — OFFICE VISIT (OUTPATIENT)
Dept: HEMATOLOGY ONCOLOGY | Facility: MEDICAL CENTER | Age: 85
End: 2025-03-25
Payer: COMMERCIAL

## 2025-03-25 ENCOUNTER — OFFICE VISIT (OUTPATIENT)
Dept: INTERNAL MEDICINE CLINIC | Facility: CLINIC | Age: 85
End: 2025-03-25
Payer: COMMERCIAL

## 2025-03-25 ENCOUNTER — HOSPITAL ENCOUNTER (OUTPATIENT)
Dept: INFUSION CENTER | Facility: HOSPITAL | Age: 85
Discharge: HOME | End: 2025-03-25
Payer: COMMERCIAL

## 2025-03-25 VITALS
RESPIRATION RATE: 18 BRPM | BODY MASS INDEX: 31.92 KG/M2 | HEIGHT: 70 IN | WEIGHT: 223 LBS | SYSTOLIC BLOOD PRESSURE: 104 MMHG | HEART RATE: 82 BPM | DIASTOLIC BLOOD PRESSURE: 64 MMHG | TEMPERATURE: 98.7 F | OXYGEN SATURATION: 98 %

## 2025-03-25 VITALS
TEMPERATURE: 98.1 F | OXYGEN SATURATION: 97 % | DIASTOLIC BLOOD PRESSURE: 64 MMHG | RESPIRATION RATE: 17 BRPM | SYSTOLIC BLOOD PRESSURE: 106 MMHG | WEIGHT: 229 LBS | HEIGHT: 70 IN | HEART RATE: 76 BPM | BODY MASS INDEX: 32.78 KG/M2

## 2025-03-25 DIAGNOSIS — C34.92 SQUAMOUS CELL CARCINOMA OF LEFT LUNG (HCC): Primary | ICD-10-CM

## 2025-03-25 DIAGNOSIS — I50.32 CHRONIC DIASTOLIC (CONGESTIVE) HEART FAILURE (HCC): ICD-10-CM

## 2025-03-25 DIAGNOSIS — E11.40 TYPE 2 DIABETES MELLITUS WITH DIABETIC NEUROPATHY, WITHOUT LONG-TERM CURRENT USE OF INSULIN (HCC): Primary | ICD-10-CM

## 2025-03-25 DIAGNOSIS — C79.89 NON-SMALL CELL CARCINOMA OF LEFT LUNG METASTATIC TO ABDOMEN (HCC): Primary | ICD-10-CM

## 2025-03-25 DIAGNOSIS — C34.92 NON-SMALL CELL CARCINOMA OF LEFT LUNG METASTATIC TO ABDOMEN (HCC): Primary | ICD-10-CM

## 2025-03-25 LAB
ALBUMIN SERPL BCG-MCNC: 3.5 G/DL (ref 3.5–5)
ALP SERPL-CCNC: 106 U/L (ref 34–104)
ALT SERPL W P-5'-P-CCNC: 18 U/L (ref 7–52)
ANION GAP SERPL CALCULATED.3IONS-SCNC: 11 MMOL/L (ref 4–13)
AST SERPL W P-5'-P-CCNC: 18 U/L (ref 13–39)
BASOPHILS # BLD AUTO: 0.06 THOUSANDS/ÂΜL (ref 0–0.1)
BASOPHILS NFR BLD AUTO: 1 % (ref 0–1)
BILIRUB SERPL-MCNC: 0.4 MG/DL (ref 0.2–1)
BUN SERPL-MCNC: 16 MG/DL (ref 5–25)
CALCIUM SERPL-MCNC: 9.4 MG/DL (ref 8.4–10.2)
CHLORIDE SERPL-SCNC: 98 MMOL/L (ref 96–108)
CO2 SERPL-SCNC: 25 MMOL/L (ref 21–32)
CREAT SERPL-MCNC: 1.03 MG/DL (ref 0.6–1.3)
EOSINOPHIL # BLD AUTO: 0.33 THOUSAND/ÂΜL (ref 0–0.61)
EOSINOPHIL NFR BLD AUTO: 3 % (ref 0–6)
ERYTHROCYTE [DISTWIDTH] IN BLOOD BY AUTOMATED COUNT: 14.6 % (ref 11.6–15.1)
GFR SERPL CREATININE-BSD FRML MDRD: 67 ML/MIN/1.73SQ M
GLUCOSE SERPL-MCNC: 321 MG/DL (ref 65–140)
HCT VFR BLD AUTO: 41.9 % (ref 36.5–49.3)
HGB BLD-MCNC: 13 G/DL (ref 12–17)
IMM GRANULOCYTES # BLD AUTO: 0.08 THOUSAND/UL (ref 0–0.2)
IMM GRANULOCYTES NFR BLD AUTO: 1 % (ref 0–2)
LYMPHOCYTES # BLD AUTO: 1.6 THOUSANDS/ÂΜL (ref 0.6–4.47)
LYMPHOCYTES NFR BLD AUTO: 14 % (ref 14–44)
MCH RBC QN AUTO: 29.3 PG (ref 26.8–34.3)
MCHC RBC AUTO-ENTMCNC: 31 G/DL (ref 31.4–37.4)
MCV RBC AUTO: 95 FL (ref 82–98)
MONOCYTES # BLD AUTO: 0.6 THOUSAND/ÂΜL (ref 0.17–1.22)
MONOCYTES NFR BLD AUTO: 5 % (ref 4–12)
NEUTROPHILS # BLD AUTO: 8.99 THOUSANDS/ÂΜL (ref 1.85–7.62)
NEUTS SEG NFR BLD AUTO: 76 % (ref 43–75)
NRBC BLD AUTO-RTO: 0 /100 WBCS
PLATELET # BLD AUTO: 272 THOUSANDS/UL (ref 149–390)
PMV BLD AUTO: 9.9 FL (ref 8.9–12.7)
POTASSIUM SERPL-SCNC: 4.3 MMOL/L (ref 3.5–5.3)
PROT SERPL-MCNC: 7 G/DL (ref 6.4–8.4)
RBC # BLD AUTO: 4.43 MILLION/UL (ref 3.88–5.62)
SODIUM SERPL-SCNC: 134 MMOL/L (ref 135–147)
WBC # BLD AUTO: 11.66 THOUSAND/UL (ref 4.31–10.16)

## 2025-03-25 PROCEDURE — 85025 COMPLETE CBC W/AUTO DIFF WBC: CPT | Performed by: INTERNAL MEDICINE

## 2025-03-25 PROCEDURE — G2211 COMPLEX E/M VISIT ADD ON: HCPCS | Performed by: INTERNAL MEDICINE

## 2025-03-25 PROCEDURE — 99214 OFFICE O/P EST MOD 30 MIN: CPT | Performed by: INTERNAL MEDICINE

## 2025-03-25 PROCEDURE — 80053 COMPREHEN METABOLIC PANEL: CPT | Performed by: INTERNAL MEDICINE

## 2025-03-25 PROCEDURE — 99213 OFFICE O/P EST LOW 20 MIN: CPT | Performed by: INTERNAL MEDICINE

## 2025-03-25 NOTE — PROGRESS NOTES
Name: Bridger Clayton      : 1940      MRN: [id number]  Encounter Provider: Vivek Tatum MD  Encounter Date: 3/25/2025   Encounter department: [facility]  :  Assessment & Plan  Chronic diastolic (congestive) heart failure (HCC)  Wt Readings from Last 3 Encounters:   25 101 kg (223 lb)   25 104 kg (229 lb)   25 102 kg (224 lb)     CHF compensated patient euvolemic continue low-salt diet fluid restriction Daily weight monitoring agree and continue med medication as follow    Lasix 20 mg daily    Follow-up with cardiology               Type 2 diabetes mellitus with diabetic neuropathy, without long-term current use of insulin (HCC)    Lab Results   Component Value Date    HGBA1C 7.7 (H) 10/07/2024   Patient oral intake has gone down significantly due to the  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 finished chemo awaiting to be seen by oncology today    Monitoring blood sugar at home seems to be much lower than the 2 months ago    Cut down metformin 500 mg daily    Awaiting repeat A1c depending on the results further workup treatment              History of Present Illness   Patient came in follow-up for the diabetes and other medical problems followed by oncology for advanced stage IV non-small lung cancer finished chemo as per oncology not a candidate for the chemo even so all the treatment for now stopped awaiting to be seen by oncology today to discuss other options denies any pain not dyspnea on exertion at baseline no fever chills no nausea vomiting patient appetite is poor      Review of Systems   Constitutional:  Positive for appetite change and fatigue. Negative for activity change, chills, diaphoresis, fever and unexpected weight change.   HENT:  Positive for drooling. Negative for congestion, dental problem, ear discharge, ear pain, facial swelling, hearing loss, mouth sores, nosebleeds, postnasal drip, rhinorrhea, sinus pressure,  "sneezing, sore throat, tinnitus, trouble swallowing and voice change.    Eyes:  Negative for photophobia, pain, discharge, redness, itching and visual disturbance.   Respiratory:  Positive for shortness of breath. Negative for apnea, cough, choking, chest tightness, wheezing and stridor.    Cardiovascular:  Negative for chest pain, palpitations and leg swelling.   Gastrointestinal:  Negative for abdominal distention, abdominal pain, anal bleeding, blood in stool, constipation, diarrhea, nausea, rectal pain and vomiting.   Endocrine: Negative for cold intolerance, heat intolerance, polydipsia, polyphagia and polyuria.   Genitourinary:  Negative for decreased urine volume, difficulty urinating, dysuria, enuresis, flank pain, frequency, genital sores, hematuria and urgency.   Musculoskeletal:  Positive for arthralgias, back pain, gait problem and joint swelling. Negative for myalgias, neck pain and neck stiffness.   Skin:  Negative for color change, pallor, rash and wound.   Allergic/Immunologic: Negative.  Negative for environmental allergies, food allergies and immunocompromised state.   Neurological:  Negative for dizziness, tremors, seizures, syncope, facial asymmetry, speech difficulty, weakness, light-headedness, numbness and headaches.   Psychiatric/Behavioral:  Negative for agitation, behavioral problems, confusion, decreased concentration, dysphoric mood, hallucinations, self-injury, sleep disturbance and suicidal ideas. The patient is not nervous/anxious and is not hyperactive.        Objective   /64   Pulse 82   Temp 98.7 °F (37.1 °C)   Resp 18   Ht 5' 10\" (1.778 m)   Wt 101 kg (223 lb)   SpO2 98%   BMI 32.00 kg/m²      Physical Exam  Vitals and nursing note reviewed.   Constitutional:       General: He is not in acute distress.     Appearance: He is well-developed. He is not ill-appearing, toxic-appearing or diaphoretic.   HENT:      Head: Normocephalic and atraumatic.      Right Ear: External " ear normal.      Left Ear: External ear normal.      Nose: Nose normal.      Mouth/Throat:      Pharynx: No oropharyngeal exudate.   Eyes:      General: Lids are normal. Lids are everted, no foreign bodies appreciated. No scleral icterus.        Right eye: No discharge.         Left eye: No discharge.      Conjunctiva/sclera: Conjunctivae normal.      Pupils: Pupils are equal, round, and reactive to light.   Neck:      Thyroid: No thyromegaly.      Vascular: Normal carotid pulses. No carotid bruit, hepatojugular reflux or JVD.      Trachea: No tracheal tenderness or tracheal deviation.   Cardiovascular:      Rate and Rhythm: Normal rate and regular rhythm.      Pulses: Normal pulses.      Heart sounds: Normal heart sounds. No murmur heard.     No friction rub. No gallop.   Pulmonary:      Effort: Pulmonary effort is normal. No respiratory distress.      Breath sounds: Normal breath sounds. No stridor. No wheezing or rales.      Comments: Decreased air entry bilateral  Chest:      Chest wall: No tenderness.   Abdominal:      General: Bowel sounds are normal. There is distension.      Palpations: Abdomen is soft. There is no mass.      Tenderness: There is no abdominal tenderness. There is no guarding or rebound.   Musculoskeletal:         General: No tenderness or deformity. Normal range of motion.      Cervical back: Normal range of motion and neck supple. No edema, erythema or rigidity. No spinous process tenderness or muscular tenderness. Normal range of motion.   Lymphadenopathy:      Head:      Right side of head: No submental, submandibular, tonsillar, preauricular or posterior auricular adenopathy.      Left side of head: No submental, submandibular, tonsillar, preauricular, posterior auricular or occipital adenopathy.      Cervical: No cervical adenopathy.      Right cervical: No superficial, deep or posterior cervical adenopathy.     Left cervical: No superficial, deep or posterior cervical adenopathy.       Upper Body:      Right upper body: No pectoral adenopathy.      Left upper body: No pectoral adenopathy.   Skin:     General: Skin is warm and dry.      Coloration: Skin is not pale.      Findings: No erythema or rash.   Neurological:      General: No focal deficit present.      Mental Status: He is alert and oriented to person, place, and time.      Cranial Nerves: No cranial nerve deficit.      Sensory: No sensory deficit.      Motor: No tremor, abnormal muscle tone or seizure activity.      Coordination: Coordination normal.      Gait: Gait normal.      Deep Tendon Reflexes: Reflexes are normal and symmetric. Reflexes normal.   Psychiatric:         Behavior: Behavior normal.         Thought Content: Thought content normal.         Judgment: Judgment normal.

## 2025-03-25 NOTE — PROGRESS NOTES
Labs obtained via port as per protocol.     Bridger Clayton is aware of future appt on 5/6/25 at 1100.     AVS printed and given to Bridger Clayton: Yes, appointment calendar provided.

## 2025-03-25 NOTE — ASSESSMENT & PLAN NOTE
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

## 2025-03-25 NOTE — ASSESSMENT & PLAN NOTE
Wt Readings from Last 3 Encounters:   03/25/25 101 kg (223 lb)   03/25/25 104 kg (229 lb)   02/18/25 102 kg (224 lb)     CHF compensated patient euvolemic continue low-salt diet fluid restriction Daily weight monitoring agree and continue med medication as follow    Lasix 20 mg daily    Follow-up with cardiology

## 2025-03-25 NOTE — PLAN OF CARE
Problem: Potential for Falls  Goal: Patient will remain free of falls  Description: INTERVENTIONS:- Educate patient/family on patient safety including physical limitations- Instruct patient to call for assistance with activity - Consult OT/PT to assist with strengthening/mobility - Keep Call bell within reach- Keep bed low and locked with side rails adjusted as appropriate- Keep care items and personal belongings within reach- Initiate and maintain comfort rounds- Make Fall Risk Sign visible to staff- Offer Toileting every hour, in advance of need- Initiate/Maintain alarms- Obtain necessary fall risk management equipment. Apply yellow socks and bracelet for high fall risk patients- Consider moving patient to room near nurses station  Outcome: Progressing

## 2025-03-25 NOTE — PROGRESS NOTES
haris Clayton  1940  [facility] SHAI Garcia [facility] [id number]    35 minutes was spent with patient in direct consultation/examination or reviewing the chart.     DISCUSSION/SUMMARY:     84-year-old male with a number of medical and cardiac problems previously found to have a left lower lobe mass. Initial biopsy demonstrated squamous cell carcinoma.  IR recently performed thoracentesis, minimal amount of fluid was removed but there was no evidence of metastatic disease; cytology was negative.  Patient was seen by thoracic surgery and underwent mediastinoscopy.  There was no evidence of metastatic disease in the sampled lymph nodes (2R, 4R, 4L, 7).  Tumor was 4.5 cm.  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  Patient was treated with SBRT and then went on to surveillance.    Eventual follow-up PET/CT demonstrated evidence of recurrence in the left lower lobe, same area where the original malignancy was found.  There was no clear evidence of spread to either hilar regions or the mediastinum but radiologist commented on 1 right paratracheal lymph node, very +/-.  There was nonspecific FDG activity in the sacrum and left posterior iliac bone, seen before.  Unknown clinical significance.    At that time, patient also began to have more problems with recurrent left-sided pleural effusions requiring a Pleurx catheter.  Previously wife was draining approximately 500 cc every 2 to 3 days.  Patient was subsequently started on pembrolizumab.  This was eventually discontinued (due to a significant rash) and patient was started on single agent paclitaxel.  Patient completed 18 cycles.    Recent PET/CT in early October 2024 demonstrated mild decrease in size of the left lung mass measuring 8.6 x 5.5 cm.  Radiologist was concerned about viable tumor at the periphery of the margin.  There was a small left pleural  effusion and an unchanged right paratracheal lymph node.    Options were discussed previously and because patient was having so many side effects from chemotherapy, the plan was to hold all treatments.  Presently Mr. Clayton states feeling pretty good.  No problems with recurrent pleural effusions, persistent cough, pain control issues.  CBC parameters and CMP results are listed below, all good/better than before.  Patient states that his quality of life is better as compared to when he was on chemotherapy.    The plan is to continue off treatment.  Patient has a pending CAT scan of the chest towards the end of April 2025.  Patient is to return to this office in 6 weeks.    Patient will continue with the Lasix for the lower extremity swelling    As discussed previously, patient and family understand that patient has diagnosis of recurrent lung cancer, not curable but has been controlled in the past.  In the future, options may become more limited and eventually end-of-life decisions will likely need to be discussed.    Regimen (on hold)  Paclitaxel 175 mg/m2 IV day 1 (70%)  Cycle length = 21 days  Goal = palliation and prolongation of life    Patient is to return the office in 6 weeks; this may change depending upon the CT results.    Carefully review your medication list and verify that the list is accurate and up-to-date. Please call the hematology/oncology office if there are medications missing from the list, medications on the list that you are not currently taking or if there is a dosage or instruction that is different from how you're taking that medication.     Patient goals and areas of care: Continue to hold chemotherapy, CAT scan of the chest  Barriers to care: Comorbidities  Patient is able to self-care  ______________________________________________________________________________________         Chief Complaint   Patient presents with    Follow-up - recurrent non-small cell lung carcinoma      History  of Present Illness:  84 year-old male with multiple medical and cardiac issue previously seen in the emergency room because of a cough.  Workup demonstrated a left lower lobe mass.  Biopsy demonstrated squamous cell carcinoma.  Workup did not demonstrated evidence of metastatic disease; mediastinoscopy was negative.  Patient was seen by thoracic surgery but was not felt to be a surgical candidate.  Patient was subsequently seen by radiation oncology and underwent SPRT.  Patient then went on surveillance.      Eventual repeat scanning was consistent with recurrence at the original site.  Mr. Clayton required a left-sided Pleurx catheter placed because of recurrent left-sided pleural effusion.  Patient was initially treated with pembrolizumab only but developed a significant rash requiring steroids.  Because of the rash and the itchiness, patient did not wish to continue with the pembrolizumab; this was discontinued.  Previously options were discussed and patient was started on paclitaxel.  Patient completed approximately 17 cycles.  Mr. Clayton was seen approximately 1 month ago.  Because of side effects, toxicities and other issues, the plan was to hold chemotherapy altogether.  Patient returns for follow-up.    Mr. Clayton returns with his wife and daughter.  Patient states feeling okay, slightly better than before.  Patient is on Lasix, lower extremity swelling is slightly better than before.  Activities are limited, same as before.  No cough, sputum or hemoptysis.  No headaches, blurred vision or dizziness.  No other pain control issues.  Patient still sleeps a lot, eating relatively well.  Weight is relatively stable.  No nausea or vomiting.  Patient denies any pain control issues.    Patient was diagnosed with prostate cancer (approximately 10 + years ago) and underwent seeds and external beam radiation.  No evidence of recurrence since that time.       Review of Systems   Constitutional:  Positive for fatigue.    HENT: Negative.     Eyes: Negative.    Respiratory: Chronic nonproductive cough  Cardiovascular: Negative.    Gastrointestinal: Negative.    Endocrine: Negative.    Genitourinary: Negative.    Musculoskeletal: Negative.    Skin: Negative.    Allergic/Immunologic: Negative.    Neurological: Negative.    Hematological: Negative.         Easy bruising   Psychiatric/Behavioral:  Negative for self-injury.    All other systems reviewed and are negative.         Patient Active Problem List   Diagnosis    Corns    Pain in both feet    Onychomycosis    Tinea pedis of both feet    Lung mass    Hyperlipidemia    Type 2 diabetes mellitus with diabetic neuropathy, without long-term current use of insulin (HCC)    Squamous cell carcinoma of lung (HCC)    Shortness of breath    Daytime hypersomnolence    Chronic diastolic heart failure (HCC)    SYLVESTER (obstructive sleep apnea)    Prostate cancer (HCC)    GERD (gastroesophageal reflux disease)    CAD (coronary artery disease)    Other persistent atrial fibrillation (HCC)    Alcohol dependence (HCC)    Acute respiratory failure with hypoxemia (HCC)    Depression, recurrent (HCC)    COVID-19    Angioedema    Septic shock (HCC)    Cellulitis    Cellulitis of chest wall    Acute kidney injury (BUDDY) with acute tubular necrosis (ATN) (HCC)    Chronic obstructive pulmonary disease, unspecified COPD type (HCC)    Centrilobular emphysema (HCC)    Abdominal aortic aneurysm (HCC)    Hypertensive heart disease    Tremors of nervous system    Recurrent pleural effusion on left      Medical History        Past Medical History:   Diagnosis Date    Abdominal pain      Anxiety      Arthritis      Asthma      Atrial fibrillation (HCC)      Back pain      Bleeding ulcer      Bronchitis      Cancer (HCC)       prostate 2011- radiation    Cardiac disease       cardiac stent x1    Cataract       starting    Chronic diastolic (congestive) heart failure (HCC)      Diabetes mellitus (HCC)       boarderline  diabetic     GERD (gastroesophageal reflux disease)      History of radiation therapy      Prostate seeds (brachytherapy) and EBRT    Hyperlipidemia      Hypertension      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, bilateral      Low back pain      Lung cancer (HCC)      Lung mass      Myocardial infarction (HCC)       mild     Obesity      Prostate cancer (HCC)      Shortness of breath      Sleep apnea       sleep study     Wears dentures       full set    Wears glasses           Surgical History         Past Surgical History:   Procedure Laterality Date    ABDOMINAL HERNIA REPAIR        ABDOMINAL SURGERY         bleeding ulcer, cyst removed from abd    COLONOSCOPY        CORONARY ANGIOPLASTY WITH STENT PLACEMENT   1999     x1     ESOPHAGOGASTRODUODENOSCOPY        IR BIOPSY LUNG   10/05/2021    IR THORACENTESIS   2021    IR THORACENTESIS   2023    IR THORACENTESIS   2023    KNEE SURGERY Left      VT BRNCHSC INCL FLUOR GDNCE DX W/CELL WASHG SPX N/A 2021     Procedure: BRONCHOSCOPY FLEXIBLE;  Surgeon: Cachorro Jason MD;  Location: [facility];  Service: Thoracic    VT MEDIASTINOSCOPY WITH LYMPH NODE BIOPSY/IES N/A 2021     Procedure: MEDIASTINOSCOPY;  Surgeon: Cachorro Jason MD;  Location: [facility];  Service: Thoracic    PROSTATE SURGERY             Family history: 3 sons in good general health, no known familial or genetic diseases     Social History               Socioeconomic History    Marital status: /Civil Union       Spouse name: Not on file    Number of children: Not on file    Years of education: Not on file    Highest education level: Not on file   Occupational History    Not on file   Tobacco Use    Smoking status: Former       Packs/day: 1.00       Years: 30.00       Total pack years: 30.00       Types: Cigarettes       Quit date:        Years since quittin.8    Smokeless tobacco: Never   Vaping Use    Vaping Use: Never used   Substance and Sexual  Activity    Alcohol use: Yes       Alcohol/week: 4.0 - 6.0 standard drinks of alcohol       Types: 1 Glasses of wine, 3 - 5 Cans of beer per week       Comment: few beers day    Drug use: Never    Sexual activity: Not on file   Other Topics Concern    Not on file   Social History Narrative    Not on file      Social Determinants of Health           Financial Resource Strain: Medium Risk (8/1/2023)     Overall Financial Resource Strain (CARDIA)      Difficulty of Paying Living Expenses: Somewhat hard   Food Insecurity: No Food Insecurity (12/19/2022)     Hunger Vital Sign      Worried About Running Out of Food in the Last Year: Never true      Ran Out of Food in the Last Year: Never true   Transportation Needs: No Transportation Needs (8/1/2023)     PRAPARE - Transportation      Lack of Transportation (Medical): No      Lack of Transportation (Non-Medical): No   Physical Activity: Not on file   Stress: Not on file   Social Connections: Not on file   Intimate Partner Violence: Not on file   Housing Stability: Low Risk  (12/19/2022)     Housing Stability Vital Sign      Unable to Pay for Housing in the Last Year: No      Number of Places Lived in the Last Year: 1      Unstable Housing in the Last Year: No      Social history:  40 pack-year history discontinued 20 years ago, patient drinks 2-4 beers a day off and on, no drug abuse, 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)     Current Outpatient Medications:     acetaminophen (TYLENOL) 325 mg tablet, Take 2 tablets (650 mg total) by mouth every 6 (six) hours as needed for mild pain, headaches or fever, Disp: 30 tablet, Rfl: 0    albuterol (2.5 mg/3 mL) 0.083 % nebulizer solution, Take 2.5 mg by nebulization As needed per patient's wife , Disp: , Rfl:     apixaban (Eliquis) 5 mg, Take 1 tablet (5 mg total) by mouth 2 (two) times a day, Disp: 180 tablet, Rfl: 1    atenolol (TENORMIN) 25 mg tablet, , Disp: , Rfl:     atorvastatin (LIPITOR) 10 mg  tablet, Take 1 tablet (10 mg total) by mouth daily, Disp: 90 tablet, Rfl: 1    carvedilol (COREG) 25 mg tablet, Take 1 tablet (25 mg total) by mouth 2 (two) times a day with meals, Disp: 180 tablet, Rfl: 1    ciclopirox (LOPROX) 0.77 % cream, Apply topically 2 (two) times a day for 20 days, Disp: 45 g, Rfl: 2    fluticasone-umeclidinium-vilanterol (Trelegy Ellipta) 100-62.5-25 mcg/actuation inhaler, Rinse mouth after use., Disp: 60 each, Rfl: 5    guaifenesin-codeine (GUAIFENESIN AC) 100-10 MG/5ML liquid, Take 10 mL by mouth 3 (three) times a day as needed for cough, Disp: 180 mL, Rfl: 0    halobetasol (ULTRAVATE) 0.05 % cream, , Disp: , Rfl:     hydrocortisone 2.5 % cream, Apply topically 2 (two) times a day Apply twice a day affected area the trunk, Disp: 20 g, Rfl: 2    latanoprost (XALATAN) 0.005 % ophthalmic solution, Administer 0.005 mL to both eyes daily at bedtime, Disp: , Rfl:     metFORMIN (GLUCOPHAGE) 500 mg tablet, Take 1 tablet (500 mg total) by mouth daily with breakfast, Disp: , Rfl:     mirtazapine (REMERON) 7.5 MG tablet, Take 1 tablet (7.5 mg total) by mouth daily at bedtime, Disp: 90 tablet, Rfl: 3    Multiple Vitamin (MULTI-VITAMIN DAILY PO), Take by mouth daily  , Disp: , Rfl:     omeprazole (PriLOSEC) 20 mg delayed release capsule, TAKE 1 CAPSULE DAILY, Disp: 90 capsule, Rfl: 1    primidone (MYSOLINE) 50 mg tablet, TAKE 2 TABS AT 8PM., Disp: 180 tablet, Rfl: 1    spironolactone (ALDACTONE) 25 mg tablet, Take 1 tablet (25 mg total) by mouth daily, Disp: 90 tablet, Rfl: 1    aspirin (ECOTRIN LOW STRENGTH) 81 mg EC tablet, , Disp: , Rfl:      No Known Allergies         Vitals:     10/20/23 0926   BP: 114/68   Pulse: 80   Resp: 17   Temp: 97.9 °F (36.6 °C)   SpO2: 92%     Physical Exam  Constitutional:       Appearance: He is well-developed.      Comments: Obese male, no respiratory distress, no signs of pain   HENT:      Head: Normocephalic and atraumatic.      Right Ear: External ear normal.       Left Ear: External ear normal.   Eyes:      Conjunctiva/sclera: Conjunctivae normal.      Pupils: Pupils are equal, round, and reactive to light.   Cardiovascular:      Rate and Rhythm: Normal rate and regular rhythm.      Heart sounds: Normal heart sounds.   Pulmonary:      Effort: Pulmonary effort is normal.      Comments: Decreased breath sounds left lower lobe, about the same as before  Abdominal:      General: Bowel sounds are normal     Palpations: Abdomen is soft.      Comments: Obese, nontender, sign bowel sounds, cannot palpate liver or spleen  Musculoskeletal:         General: Normal range of motion.      Cervical back: Normal range of motion and neck supple.   Skin:     General: Skin is warm.      Comments: Relatively good color, warm, moist, scattered mild areas of ecchymosis   Neurological:      Mental Status: He is alert and oriented to person, place, and time.      Deep Tendon Reflexes: Reflexes are normal and symmetric.   Psychiatric:         Behavior: Behavior normal.         Thought Content: Thought content normal.         Judgment: Judgment normal.   Extremities: 1+ bilateral lower extremity edema, no cords, pulses are 1+  Lymphatics:  No adenopathy in the neck, supraclavicular region, axilla bilateral     Labs    3/25/2025 WBC = 11.66 hemoglobin = 13 hematocrit = 41.9 platelet = 272 neutrophil = 76% bands = 1% lymphocyte = 14% monocyte = 5% BUN = 16 creatinine = 1.03 calcium = 9.4 LFTs WNL    12/16/2024 WBC = 12.29 hemoglobin = 12.5 hematocrit = 40.8 platelet = 221 neutrophil = 73% BUN = 17 creatinine = 0.94 calcium = 9.0 LFTs WNL    Imaging    10/1/2024 PET/CT    IMPRESSION:     1. Left lower lung mass demonstrates mild decrease in size now measuring 8.6 x 5.5 cm. There is less internal air within the mass than before. However there is increased predominantly peripheral FDG activity when compared to prior study from 6/6/2024.   Findings consistent with viable neoplasm along the peripheral  margins of the lesion     2. Persistent small left pleural effusion with associated FDG activity, which again may be inflammatory/infectious, however malignant effusion cannot be excluded.     3. Right paratracheal lymph node size unchanged from prior study. SUV max currently 3.4 earlier 2.6.    6/6/2024 PET/CT    IMPRESSION:    1. Necrotic left lower lung mass demonstrates persistent but diminished predominantly peripheral FDG activity. This may represent partial response to therapy, though residual viable tumor may still remain. There is new air within this necrotic mass, for   which infection is not excluded. Correlate clinically.  2. Decreased but persistent small left pleural effusion. There is associated FDG activity which may be inflammatory/infectious. Malignant effusion is not excluded.  3. Healing right anterior rib fractures. Nonspecific but stable asymmetric focus of activity in the right sacrum.  4. Otherwise no new hypermetabolic metastases.    10/30/2023 PET/CT    CHEST:  Best seen on image 76 of series 4 is a hypermetabolic centrally photopenic left lower lobe lung mass measuring 7.1 x 6.4 cm with max SUV of 12.0, previously measuring 4.5 x 4.0 cm with max SUV of 19.7 on PET/CT dated 10/29/2021 and approximately 4.3 x 3.7 cm when utilizing similar measurement technique and CT of chest dated 12/23/2022. The interval growth in size since 12/23/2022 suggest progression of hypermetabolic malignancy.     Subtle FDG activity is noted with a stable in size prominent right paratracheal lymph node on image 58 of series 4 measuring 1 1.4 cm in short axis with max SUV of 2.8, previously 3.5. The stability favors benign reactive lymph node with early pamela metastatic disease considered less likely    IMPRESSION:     1. Hypermetabolic left lower lobe lung malignancy has significantly increased in size since 12/23/2022 suggesting progression of malignancy.  2. Again noted is nonspecific FDG activity involving the  sacrum and left posterior iliac bone without definitive correlate on low-dose unenhanced CT of uncertain clinical significance. Findings can be further characterized with MRI of the bony pelvis as clinically indicated.  3. Moderate left pleural effusion.    Pathology    12/7/2023 [facility].  No action mutations were detected.  Tumor mutational burden = 6 = low.  Patient had a pathologic variant in PTEN and TP53.  PDL IHC TPS = 0%, negative.     Case Report   Surgical Pathology Report                         Case: [id number]                                    Authorizing Provider:  Cachorro Jason MD       Collected:           11/29/2021 [facility]               Ordering Location:     [facility]      Received:            11/29/2021 [address] [place] [address] Operating Room                                                       Pathologist:           Juan José Dietz MD                                                         Specimens:   A) - Lymph Node, level 7                                                                             B) - Lymph Node, level 4R                                                                            C) - Lymph Node, level 2R                                                                            D) - Lymph Node, level 4L                                                                   Final Diagnosis   A. Lymph node, level 7, excision:  -  Portions of benign lymph node with reactive change and anthracosis, negative for granulomas, lymphoma, or metastatic carcinoma.    B. Lymph node, level 4R, excision:  -  Portions of benign lymph node with reactive change and anthracosis, negative for granulomas, lymphoma, or metastatic carcinoma.    C. Lymph node, level 2R, excision:  -  Portions of benign lymph node with reactive change and anthracosis, negative for granulomas, lymphoma, or metastatic carcinoma.    D. Lymph node, level 4L, excision:  -  Benign  lymph node with reactive change and anthracosis, negative for granulomas, lymphoma, or metastatic carcinoma.      Electronically signed by Juan José Dietz MD on 12/2/2021 at 11:29 AM         Case Report   Surgical Pathology Report                         Case: [id number]                                    Authorizing Provider:  Elmer Laird MD      Collected:           10/05/2021 1058               Ordering Location:     [facility] Received:            10/05/2021 1120                                      CAT Scan                                                                      Pathologist:           Mary Daen MD                                                         Specimen:    Lung, Left Lower Lobe                                                                       Final Diagnosis   A. Lung, Left Lower Lobe, :  - Invasive squamous carcinoma, moderately differentiated, keratinizing type.  - Tumor is highlighted with P 40 immunoperoxidase stain.  - Prominent tumor necrosis is noted.      Best representative block with tumor A1.  This case was reviewed at the intradepartmental  conference.   RADHA Navarrete, Pulmonology, is notified of the diagnosis in [facility] via [facility] on 10/06/2021  at 2.40 pm.   Electronically signed by Mary Dean MD on 10/6/2021 at  2:44 PM

## 2025-04-03 DIAGNOSIS — I48.19 PERSISTENT ATRIAL FIBRILLATION (HCC): ICD-10-CM

## 2025-04-03 RX ORDER — CARVEDILOL 25 MG/1
25 TABLET ORAL
Qty: 90 TABLET | Refills: 1 | Status: SHIPPED | OUTPATIENT
Start: 2025-04-03

## 2025-04-03 NOTE — TELEPHONE ENCOUNTER
Wife calling in for a refill on carvedilol. Wife states that the carvedilol (COREG) 25 mg tablet was changed to once a day instead of twice a day.       [facility] - [place], [place]

## 2025-04-10 DIAGNOSIS — F33.9 DEPRESSION, RECURRENT (HCC): ICD-10-CM

## 2025-04-10 RX ORDER — MIRTAZAPINE 7.5 MG/1
7.5 TABLET, FILM COATED ORAL
Qty: 30 TABLET | Refills: 0 | Status: SHIPPED | OUTPATIENT
Start: 2025-04-10

## 2025-04-10 NOTE — TELEPHONE ENCOUNTER
Reason for call:   [x] Refill   [] Prior Auth  [] Other:     Office:   [x] PCP/Provider -  Vivek Tatum MD   [] Specialty/Provider -     Medication: mirtazapine (REMERON) 7.5 MG tablet     Dose/Frequency: 7.5 mg, Oral, Daily at bedtime     Quantity: 30    Pharmacy:  [facility]      Local Pharmacy   Does the patient have enough for 3 days?   [] Yes   [x] No - Send as HP to POD    Mail Away Pharmacy   Does the patient have enough for 10 days?   [] Yes   [] No - Send as HP to POD

## 2025-04-18 ENCOUNTER — HOSPITAL ENCOUNTER (OUTPATIENT)
Dept: RADIOLOGY | Facility: HOSPITAL | Age: 85
Discharge: HOME | End: 2025-04-18
Attending: INTERNAL MEDICINE
Payer: COMMERCIAL

## 2025-04-18 DIAGNOSIS — C34.92 MALIGNANT NEOPLASM OF LEFT LUNG, UNSPECIFIED PART OF LUNG (HCC): ICD-10-CM

## 2025-04-18 PROCEDURE — 71250 CT THORAX DX C-: CPT

## 2025-04-22 ENCOUNTER — PROCEDURE VISIT (OUTPATIENT)
Age: 85
End: 2025-04-22
Payer: COMMERCIAL

## 2025-04-22 VITALS — WEIGHT: 229 LBS | HEIGHT: 70 IN | RESPIRATION RATE: 17 BRPM | BODY MASS INDEX: 32.78 KG/M2 | HEART RATE: 76 BPM

## 2025-04-22 DIAGNOSIS — B35.3 TINEA PEDIS OF BOTH FEET: ICD-10-CM

## 2025-04-22 DIAGNOSIS — B35.1 ONYCHOMYCOSIS: ICD-10-CM

## 2025-04-22 DIAGNOSIS — I70.209 PERIPHERAL ARTERIOSCLEROSIS (HCC): ICD-10-CM

## 2025-04-22 DIAGNOSIS — M79.671 PAIN IN BOTH FEET: ICD-10-CM

## 2025-04-22 DIAGNOSIS — E11.42 DIABETIC POLYNEUROPATHY ASSOCIATED WITH TYPE 2 DIABETES MELLITUS (HCC): Primary | ICD-10-CM

## 2025-04-22 DIAGNOSIS — C34.92 SQUAMOUS CELL CARCINOMA OF LEFT LUNG (HCC): Primary | Chronic | ICD-10-CM

## 2025-04-22 DIAGNOSIS — C79.89 NON-SMALL CELL CARCINOMA OF LEFT LUNG METASTATIC TO ABDOMEN (HCC): ICD-10-CM

## 2025-04-22 DIAGNOSIS — M79.672 PAIN IN BOTH FEET: ICD-10-CM

## 2025-04-22 DIAGNOSIS — C34.92 NON-SMALL CELL CARCINOMA OF LEFT LUNG METASTATIC TO ABDOMEN (HCC): ICD-10-CM

## 2025-04-22 PROCEDURE — 99213 OFFICE O/P EST LOW 20 MIN: CPT | Performed by: PODIATRIST

## 2025-04-22 RX ORDER — CODEINE PHOSPHATE AND GUAIFENESIN 10; 100 MG/5ML; MG/5ML
5 SOLUTION ORAL 3 TIMES DAILY PRN
Qty: 237 ML | Refills: 0 | Status: SHIPPED | OUTPATIENT
Start: 2025-04-22

## 2025-04-22 NOTE — PROGRESS NOTES
Assessment/Plan:  Patient has pain in his feet and toes with ambulation.  He has mycosis of nail and skin. He has plantar pre ulcerative skin lesions.     Plan.  Chart reviewed.  Patient educated on care of the diabetic foot.  Diabetic foot exam performed.    Patient advised on foot hygiene.  Proper shoe sizing recommended.  Patient will use daily, he will apply cream to feet b.i.d. For 4 weeks.  Foot hygiene instruction given.  Shoe sizing and style recommendations made.  Watch for signs of infection.  Aftercare instruction given.           Subjective:  Patient has pain in his feet with ambulation.  No history of trauma .  He has pain when he wears shoes.  He has pain in his toes.      Patient ID: Spenser Clayton is a 85 y.o. male.     Patient is diabetic.  He has pain in his feet and toes with ambulation.  Has pain from calluses.  He has ingrown toenails and dry scaly skin.  He is requesting refill of topical antifungal.        Review of Systems   Constitutional: No fever or chills, feels well, no tiredness, no recent weight loss or weight gain.  Eyes: No complaints of red eyes, no eyesight problems.  ENT: no complaints of loss of hearing, no nosebleeds, no sore throat.  Cardiovascular: No complaints of chest pain, no palpitations, no leg claudication or lower extremity edema.  Respiratory: No complaints of shortness of breath, no wheezing, no cough.  Gastrointestinal: No complaints of abdominal pain, no constipation, no nausea or vomiting, no diarrhea or bloody stools.  Genitourinary: No complaints of dysuria or incontinence, no hesitancy, no nocturia.  Musculoskeletal: limb pain, but-- as noted in HPI.  Integumentary: No complaints of skin rash or lesion, no itching or dry skin, no skin wounds.  Neurological: No complaints of headache, no confusion, no numbness or tingling, no dizziness.  Psychiatric: No suicidal thoughts, no anxiety, no depression.  Endocrine: No muscle weakness, no frequent urination, no  excessive thirst, no feelings of weakness.      Active Problems  1. 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.70) (M21.969)   2. Arthritis (716.90) (M19.90)   3. Atherosclerosis of arteries of extremities (440.20) (I70.209)   4. Calcaneal spur (726.73) (M77.30)   5. Callus (700) (L84)   6. 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 Diabetic neuropathy (250.60,357.2) (E11.40)   9. Hypercholesterolemia (272.0) (E78.00)   10. Hypertension (401.9) (I10)   11. 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 Onychomycosis (110.1) (B35.1)   14. Orthopedic aftercare (V54.9) (Z47.89)   15. Pain in both feet (729.5) (M79.671,M79.672)   16. Pain in extremity (729.5) (M79.609)   17. Pes planus, congenital (754.61) (Q66.50)   18. 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 Plantar wart (078.12) (B07.0)   20. Prostate cancer (185) (C61)   21. Sprain of right shoulder (840.9) (S43.401A)   22. Tinea pedis (110.4) (B35.3)     Past Medical History   · Orthopedic aftercare (V54.9) (Z47.89)     Surgical History   · History of Gastric Surgery   · History of Hernia Repair   · History of Previous Stent Placement Total Number Performed ___     The surgical history was reviewed and updated today.       Family History  Family History    · Family history of Arthritis (V17.7)   · Family history of Cancer     The family history was reviewed and updated today.       Social History      · Beer Consumption (___ Bottles Per Day)   · Daily Coffee Consumption (1  Cups/Day)   · Former smoker (V15.82) (Z87.891)  The social history was reviewed and updated today.   Allergies  1. No Known Drug Allergies      Physical Exam  Left Foot: Appearance: Normal except as noted: excessive pronation-- and-- pes planus.   Right Foot: Appearance: Normal except as noted: excessive pronation-- and-- pes planus.    Left Ankle: ROM: limited ROM in all planes   Right Ankle: ROM: limited ROM in all planes   Neurological Exam: performed. Light touch was intact bilaterally. Vibratory sensation was intact bilaterally. Response to monofilament test was intact bilaterally. Deep tendon reflexes: patellar reflex present bilaterally-- and-- achilles reflex present bilaterally.   Vascular Exam: performed Dorsalis pedis pulses were One/4 bilaterally. Posterior tibial pulses were One/4 bilaterally. Elevation Pallor: present bilaterally. Capillary refill time was greater than 3 seconds bilaterally-- and-- Q. 9 findings bilateral. Negative digital hair noted. Positive abnormal cooling noted.   Toenails: All of the toenails were elongated,-- hypertrophied,-- discolored,-- ingrown,-- shown to have subungual debris,-- tender-- and-- Severely mycotic with onychauxis.    Socks and shoes removed, Right Foot Findings: swollen, erythematous and dry.  The sensory exam showed diminished vibratory sensation at the level of the toes. Diminished tactile sensation with monofilament testing throughout the right foot.  Socks and shoes removed, Left Foot Findings: swollen, erythematous and dry.  The sensory exam showed diminished vibratory sensation at the level of the toes. Diminished tactile sensation with monofilament testing throughout the left foot. Capillary refills findings on the right were delayed in the toes.  Pulses:  1+ in the posterior tibialis on the right  1+ in the dorsalis pedis on the right.  Capillary refills findings on the left were delayed in the toes.  Pulses:  1+ in the posterior tibialis on the left  1+ in the dorsalis pedis on the left.  Assign Risk Category: 2: Loss of protective sensation with or without weakness, deformity, callus, pre-ulcer, or history of ulceration. High risk.   Hyperkeratosis: present on both first toes,-- present on both fifth sub metatarsals-- and-- [place] tinea pedis, bilateral, is noted.   Shoe Gear  Evaluation: performed (). Right Foot: width: d-- and-- length: 11. Left Foot: width: d-- and-- length: 11. Recommendation(s): athletic shoes     Patient's shoes and socks removed.Right Foot/Ankle   Right Foot Inspection  Skin Exam: dry skin, callus and callus               Toe Exam: swelling  Sensory   Vibration: diminished  Proprioception: diminished      Vascular  Capillary refills: elevated        Left Foot/Ankle  Left Foot Inspection  Skin Exam: dry skin and callus              Toe Exam: swelling and erythema                   Sensory   Vibration: diminished  Proprioception: diminished     Vascular  Capillary refills: elevated     Right Foot/Ankle   Right Foot Inspection        Sensory   Vibration: diminished  Proprioception: diminished  Monofilament testing: diminished     Vascular  Capillary refills: < 3 seconds              Left Foot/Ankle  Left Foot Inspection        Sensory   Vibration: diminished  Proprioception: diminished  Monofilament testing: diminished     Vascular  Capillary refills: < 3 seconds         Patient's shoes and socks removed.     Right Foot/Ankle   Right Foot Inspection        Toe Exam: right toe deformity.      Sensory   Vibration: diminished      Vascular  Capillary refills: < 3 seconds              Left Foot/Ankle  Left Foot Inspection        Toe Exam: left toe deformity.      Sensory   Vibration: diminished      Vascular  Capillary refills: < 3 seconds     Patient's shoes and socks removed.     Right Foot/Ankle   Right Foot Inspection        Toe Exam: right toe deformity.      Sensory   Vibration: diminished  Proprioception: diminished  Monofilament testing: diminished     Vascular  Capillary refills: < 3 seconds        Left Foot/Ankle  Left Foot Inspection        Toe Exam: left toe deformity.      Sensory   Vibration: diminished  Proprioception: diminished  Monofilament testing: diminished     Vascular  Capillary refills: < 3 seconds        Assign Risk Category  Deformity  present  Loss of protective sensation  Weak pulses  Risk: 2

## 2025-04-23 ENCOUNTER — HOSPITAL ENCOUNTER (EMERGENCY)
Facility: HOSPITAL | Age: 85
Discharge: HOME | End: 2025-04-23
Payer: COMMERCIAL

## 2025-04-23 ENCOUNTER — NURSE TRIAGE (OUTPATIENT)
Age: 85
End: 2025-04-23

## 2025-04-23 ENCOUNTER — APPOINTMENT (EMERGENCY)
Dept: RADIOLOGY | Facility: HOSPITAL | Age: 85
End: 2025-04-23
Payer: COMMERCIAL

## 2025-04-23 VITALS
TEMPERATURE: 98.4 F | OXYGEN SATURATION: 95 % | SYSTOLIC BLOOD PRESSURE: 120 MMHG | WEIGHT: 224.65 LBS | BODY MASS INDEX: 32.23 KG/M2 | HEART RATE: 73 BPM | DIASTOLIC BLOOD PRESSURE: 62 MMHG | RESPIRATION RATE: 23 BRPM

## 2025-04-23 DIAGNOSIS — H53.8 BLURRED VISION: ICD-10-CM

## 2025-04-23 DIAGNOSIS — R26.2 AMBULATORY DYSFUNCTION: Primary | ICD-10-CM

## 2025-04-23 LAB
2HR DELTA HS TROPONIN: 0 NG/L
ALBUMIN SERPL BCG-MCNC: 3.5 G/DL (ref 3.5–5)
ALP SERPL-CCNC: 104 U/L (ref 34–104)
ALT SERPL W P-5'-P-CCNC: 21 U/L (ref 7–52)
ANION GAP SERPL CALCULATED.3IONS-SCNC: 18 MMOL/L (ref 4–13)
APTT PPP: 32 SECONDS (ref 23–34)
AST SERPL W P-5'-P-CCNC: 20 U/L (ref 13–39)
ATRIAL RATE: 394 BPM
BASOPHILS # BLD AUTO: 0.06 THOUSANDS/ÂΜL (ref 0–0.1)
BASOPHILS NFR BLD AUTO: 0 % (ref 0–1)
BILIRUB SERPL-MCNC: 0.46 MG/DL (ref 0.2–1)
BUN SERPL-MCNC: 15 MG/DL (ref 5–25)
CALCIUM SERPL-MCNC: 9.9 MG/DL (ref 8.4–10.2)
CARDIAC TROPONIN I PNL SERPL HS: 6 NG/L (ref ?–50)
CARDIAC TROPONIN I PNL SERPL HS: 6 NG/L (ref ?–50)
CHLORIDE SERPL-SCNC: 93 MMOL/L (ref 96–108)
CO2 SERPL-SCNC: 27 MMOL/L (ref 21–32)
CREAT SERPL-MCNC: 0.87 MG/DL (ref 0.6–1.3)
EOSINOPHIL # BLD AUTO: 0.29 THOUSAND/ÂΜL (ref 0–0.61)
EOSINOPHIL NFR BLD AUTO: 2 % (ref 0–6)
ERYTHROCYTE [DISTWIDTH] IN BLOOD BY AUTOMATED COUNT: 14.4 % (ref 11.6–15.1)
FLUAV RNA RESP QL NAA+PROBE: NEGATIVE
FLUBV RNA RESP QL NAA+PROBE: NEGATIVE
GFR SERPL CREATININE-BSD FRML MDRD: 79 ML/MIN/1.73SQ M
GLUCOSE SERPL-MCNC: 154 MG/DL (ref 65–140)
GLUCOSE SERPL-MCNC: 184 MG/DL (ref 65–140)
HCT VFR BLD AUTO: 43 % (ref 36.5–49.3)
HGB BLD-MCNC: 13.4 G/DL (ref 12–17)
IMM GRANULOCYTES # BLD AUTO: 0.07 THOUSAND/UL (ref 0–0.2)
IMM GRANULOCYTES NFR BLD AUTO: 1 % (ref 0–2)
INR PPP: 1.1 (ref 0.85–1.19)
LYMPHOCYTES # BLD AUTO: 1.5 THOUSANDS/ÂΜL (ref 0.6–4.47)
LYMPHOCYTES NFR BLD AUTO: 11 % (ref 14–44)
MCH RBC QN AUTO: 29 PG (ref 26.8–34.3)
MCHC RBC AUTO-ENTMCNC: 31.2 G/DL (ref 31.4–37.4)
MCV RBC AUTO: 93 FL (ref 82–98)
MONOCYTES # BLD AUTO: 0.74 THOUSAND/ÂΜL (ref 0.17–1.22)
MONOCYTES NFR BLD AUTO: 5 % (ref 4–12)
NEUTROPHILS # BLD AUTO: 11.07 THOUSANDS/ÂΜL (ref 1.85–7.62)
NEUTS SEG NFR BLD AUTO: 81 % (ref 43–75)
NRBC BLD AUTO-RTO: 0 /100 WBCS
PLATELET # BLD AUTO: 312 THOUSANDS/UL (ref 149–390)
PMV BLD AUTO: 9.5 FL (ref 8.9–12.7)
POTASSIUM SERPL-SCNC: 4.3 MMOL/L (ref 3.5–5.3)
PROT SERPL-MCNC: 7.4 G/DL (ref 6.4–8.4)
PROTHROMBIN TIME: 14.7 SECONDS (ref 12.3–15)
QRS AXIS: -63 DEGREES
QRSD INTERVAL: 94 MS
QT INTERVAL: 406 MS
QTC INTERVAL: 425 MS
RBC # BLD AUTO: 4.62 MILLION/UL (ref 3.88–5.62)
RSV RNA RESP QL NAA+PROBE: NEGATIVE
SARS-COV-2 RNA RESP QL NAA+PROBE: NEGATIVE
SODIUM SERPL-SCNC: 138 MMOL/L (ref 135–147)
T WAVE AXIS: 100 DEGREES
T4 FREE SERPL-MCNC: 0.77 NG/DL (ref 0.61–1.12)
TSH SERPL DL<=0.05 MIU/L-ACNC: 7.89 UIU/ML (ref 0.45–4.5)
VENTRICULAR RATE: 66 BPM
WBC # BLD AUTO: 13.73 THOUSAND/UL (ref 4.31–10.16)

## 2025-04-23 PROCEDURE — 97163 PT EVAL HIGH COMPLEX 45 MIN: CPT

## 2025-04-23 PROCEDURE — 84484 ASSAY OF TROPONIN QUANT: CPT

## 2025-04-23 PROCEDURE — 99284 EMERGENCY DEPT VISIT MOD MDM: CPT

## 2025-04-23 PROCEDURE — 85610 PROTHROMBIN TIME: CPT

## 2025-04-23 PROCEDURE — 84439 ASSAY OF FREE THYROXINE: CPT

## 2025-04-23 PROCEDURE — 93005 ELECTROCARDIOGRAM TRACING: CPT

## 2025-04-23 PROCEDURE — 70498 CT ANGIOGRAPHY NECK: CPT

## 2025-04-23 PROCEDURE — 0241U HB NFCT DS VIR RESP RNA 4 TRGT: CPT

## 2025-04-23 PROCEDURE — 36415 COLL VENOUS BLD VENIPUNCTURE: CPT

## 2025-04-23 PROCEDURE — 85730 THROMBOPLASTIN TIME PARTIAL: CPT

## 2025-04-23 PROCEDURE — 99285 EMERGENCY DEPT VISIT HI MDM: CPT

## 2025-04-23 PROCEDURE — 80053 COMPREHEN METABOLIC PANEL: CPT

## 2025-04-23 PROCEDURE — 97167 OT EVAL HIGH COMPLEX 60 MIN: CPT

## 2025-04-23 PROCEDURE — 84443 ASSAY THYROID STIM HORMONE: CPT

## 2025-04-23 PROCEDURE — 93010 ELECTROCARDIOGRAM REPORT: CPT | Performed by: INTERNAL MEDICINE

## 2025-04-23 PROCEDURE — 85025 COMPLETE CBC W/AUTO DIFF WBC: CPT

## 2025-04-23 PROCEDURE — 82948 REAGENT STRIP/BLOOD GLUCOSE: CPT

## 2025-04-23 PROCEDURE — 70496 CT ANGIOGRAPHY HEAD: CPT

## 2025-04-23 RX ADMIN — IOHEXOL 85 ML: 350 INJECTION, SOLUTION INTRAVENOUS at 11:18

## 2025-04-23 NOTE — PLAN OF CARE
Problem: PHYSICAL THERAPY ADULT  Goal: Performs mobility at highest level of function for planned discharge setting.  See evaluation for individualized goals.  Description: Treatment/Interventions: Functional transfer training, LE strengthening/ROM, ADL retraining, Therapeutic exercise, Endurance training, Bed mobility, Gait training, Spoke to MD, OT          See flowsheet documentation for full assessment, interventions and recommendations.  Note: Prognosis: Good  Problem List: Decreased strength, Decreased endurance, Impaired balance, Decreased mobility, Decreased safety awareness, Decreased coordination  Assessment: Patient seen for Physical Therapy evaluation. Patient admitted with falls, high blood sugar.  Comorbidities affecting patient's physical performance include: Afib, CAD, cancer, cardiac disease, CHF, diabetes, hyperlipidemia, and neuropathy.  Personal factors affecting patient at time of initial evaluation include: lives in 2 story house, ambulating with assistive device, inability to navigate community distances, inability to navigate level surfaces without external assistance, and inability to perform IADLS . Prior to admission, patient was requiring assist for functional mobility with walker/wheelchair, requiring assist for ADLS, requiring assist for IADLS, living with spouse in a 2 level home with 0 (ramped) steps to enter, ambulating household distance, and home with family assist.  Please find objective findings from Physical Therapy assessment regarding body systems outlined above with impairments and limitations including weakness, impaired balance, decreased endurance, gait deviations, decreased activity tolerance, decreased functional mobility tolerance, and fall risk.  The Barthel Index was used as a functional outcome tool presenting with a score of Barthel Index Score: 55 today indicating marked limitations of functional mobility and ADLS.  Patient's clinical presentation is currently  unstable/unpredictable as seen in patient's presentation of increased fall risk, new onset of impairment of functional mobility, and decreased endurance. Pt would benefit from continued Physical Therapy treatment to address deficits as defined above and maximize level of functional mobility. As demonstrated by objective findings, the assigned level of complexity for this evaluation is high.The patient's AM-PAC Basic Mobility Inpatient Short Form Raw Score is 16. A Raw score of less than or equal to 16 suggests the patient may benefit from discharge to post-acute rehabilitation services. Please also refer to the recommendation of the Physical Therapist for safe discharge planning.        Rehab Resource Intensity Level, PT: III (Minimum Resource Intensity)    See flowsheet documentation for full assessment.

## 2025-04-23 NOTE — ED PROVIDER NOTES
Time reflects when diagnosis was documented in both MDM as applicable and the Disposition within this note       Time User Action Codes Description Comment    4/23/2025  1:03 PM Jacky Estevez Add [R26.2] Ambulatory dysfunction     4/23/2025  1:03 PM Jacky Estevez [H53.8] Blurred vision           ED Disposition       ED Disposition   Discharge    Condition   Stable    Date/Time   Wed Apr 23, 2025  1:03 PM    Comment   Bridger Clayton discharge to home/self care.                   Assessment & Plan       Medical Decision Making  Amount and/or Complexity of Data Reviewed  Labs: ordered.  Radiology: ordered.  ECG/medicine tests:  Decision-making details documented in ED Course.    Risk  Prescription drug management.        84 y/o M with pmh DM, a fib on eliquis, presents from home with concern of dizziness, near fall, blurred vision. Wife reportedly checked BS this morning and was in 400s. Pt states difficult to describe, feels unsteady on feet. Does walk with a walker. No HA, numbness, focal weakness, chest pain. Pt notes recent chemo for lung ca last used over a month ago.   Family (wife and daughter) later arrive and state ambulating has been getting worse, multiple falls in past few months. Pt lives with wife who can't pick pt up by herself.   VSS  Pt in baseline a fib  Do not feel likely acute stroke however with report of possible ataxia will obtain CTA head/neck.   Labs without actionable derangement. CT/CTA without acute findings to explain symptoms.   Given family concern for falling, will have PT evaluate patient.   PT discussed setting up home services, do not feel pt needs acute rehab at this time. Family comfortable with this plan.   Pt with ambulatory dysfunction likely multifactorial from hx of DM, recent chemotherapy, deconditioning. Pt stable for discharge at this time.         ED Course as of 04/23/25 1532   Wed Apr 23, 2025   1044 ECG 12 lead  Procedure Note: EKG  Date/Time: 04/23/25 10:44 AM    Interpreted by: Jacky Estevez MD  Indications / Diagnosis: dizzy, blurred vision   ECG reviewed by me, the ED Provider: yes   The EKG demonstrates:  Rhythm: a fib   Intervals: normal intervals  Axis: normal axis  QRS/Blocks: normal QRS  ST Changes: No acute ST Changes, no STD/LACI.     1232 Pending PT eval       Medications   iohexol (OMNIPAQUE) 350 MG/ML injection (MULTI-DOSE) 85 mL (85 mL Intravenous Given 4/23/25 1118)       ED Risk Strat Scores                    No data recorded                            History of Present Illness       Chief Complaint   Patient presents with    Dizziness     Arrives BLS reported that he has been having lightheadenness since yesterday noon.  Thought his sugar has been high. Today was 416 at home. Pt is awake alert, no focal weakness. NIH 0. Pt reported that he has trouble waking.        Past Medical History:   Diagnosis Date    Abdominal pain     Alcohol dependence (HCC) 05/01/2022    Anxiety     Arthritis     Asthma     Atrial fibrillation (HCC)     Back pain     Bleeding ulcer     Bronchitis     Cancer (HCC)     prostate 2011- radiation    Cardiac disease     cardiac stent x1    Cataract     starting    Chronic diastolic (congestive) heart failure (HCC)     COVID-19 10/10/2022    Diabetes mellitus (HCC)     boarderline diabetic     GERD (gastroesophageal reflux disease)     History of radiation therapy 2010    Prostate seeds (brachytherapy) and EBRT    Hyperlipidemia     Hypertension     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, bilateral     Low back pain     Lung cancer (HCC)     Lung mass     Myocardial infarction (HCC)     mild 1999    Obesity     Prostate cancer (HCC)     Shortness of breath     Sleep apnea     sleep study 11/22    Wears dentures     full set    Wears glasses       Past Surgical History:   Procedure Laterality Date    ABDOMINAL HERNIA REPAIR      ABDOMINAL SURGERY      bleeding ulcer, cyst removed from abd    COLONOSCOPY      CORONARY ANGIOPLASTY WITH STENT  PLACEMENT  1999    x1     ESOPHAGOGASTRODUODENOSCOPY      IR BIOPSY LUNG  10/05/2021    IR OTHER  2024    IR PORT CHECK  2024    IR PORT PLACEMENT  2024    IR PORT REMOVAL AND PLACEMENT NEW SITE  2024    IR THORACENTESIS  2021    IR THORACENTESIS  2023    IR THORACENTESIS  2023    KNEE SURGERY Left     AL BRNCHSC INCL FLUOR GDNCE DX W/CELL WASHG SPX N/A 2021    Procedure: BRONCHOSCOPY FLEXIBLE;  Surgeon: Cachorro Jason MD;  Location: [facility];  Service: Thoracic    AL MEDIASTINOSCOPY WITH LYMPH NODE BIOPSY/IES N/A 2021    Procedure: MEDIASTINOSCOPY;  Surgeon: Cachorro Jason MD;  Location: [facility];  Service: Thoracic    PROSTATE SURGERY        Family History   Problem Relation Age of Onset    Prostate cancer Brother     Cancer Maternal Uncle         colo rectal cancer    Cancer Paternal Aunt       Social History     Tobacco Use    Smoking status: Former     Current packs/day: 0.00     Average packs/day: 1 pack/day for 30.0 years (30.0 ttl pk-yrs)     Types: Cigarettes     Start date:      Quit date:      Years since quittin.3    Smokeless tobacco: Never   Vaping Use    Vaping status: Never Used   Substance Use Topics    Alcohol use: Yes     Alcohol/week: 4.0 - 6.0 standard drinks of alcohol     Types: 1 Glasses of wine, 3 - 5 Cans of beer per week     Comment: socially    Drug use: Never      E-Cigarette/Vaping    E-Cigarette Use Never User       E-Cigarette/Vaping Substances    Nicotine No     THC No     CBD No     Flavoring No     Other No     Unknown No       I have reviewed and agree with the history as documented.     HPI  See mdm  Review of Systems   Constitutional:  Negative for chills and fever.   HENT:  Negative for ear pain and sore throat.    Eyes:  Positive for visual disturbance. Negative for pain.   Respiratory:  Negative for cough and shortness of breath.    Cardiovascular:  Negative for chest pain and palpitations.    Gastrointestinal:  Negative for abdominal pain and vomiting.   Genitourinary:  Negative for dysuria and hematuria.   Musculoskeletal:  Negative for arthralgias and back pain.   Skin:  Negative for color change and rash.   Neurological:  Positive for dizziness. Negative for seizures and syncope.   All other systems reviewed and are negative.          Objective       ED Triage Vitals   Temperature Pulse Blood Pressure Respirations SpO2 Patient Position - Orthostatic VS   04/23/25 0958 04/23/25 0958 04/23/25 0958 04/23/25 0958 04/23/25 1001 04/23/25 0958   98.7 °F (37.1 °C) 65 (!) 191/81 18 95 % Sitting      Temp Source Heart Rate Source BP Location FiO2 (%) Pain Score    04/23/25 0958 04/23/25 0958 04/23/25 0958 -- 04/23/25 0958    Oral Monitor Right arm  No Pain      Vitals      Date and Time Temp Pulse SpO2 Resp BP Pain Score FACES Pain Rating User   04/23/25 1415 -- 73 95 % 23 120/62 -- -- SF   04/23/25 1401 98.4 °F (36.9 °C) 71 93 % -- 120/62 No Pain -- FO   04/23/25 1306 -- -- -- -- -- No Pain -- JK   04/23/25 1255 -- -- -- -- -- No Pain -- KW   04/23/25 1245 -- 71 96 % 20 140/78 -- -- SF   04/23/25 1230 -- 71 96 % 18 123/68 -- -- SF   04/23/25 1030 -- 66 95 % 22 141/67 -- -- SF   04/23/25 1001 -- -- 95 % -- -- -- --    04/23/25 0958 98.7 °F (37.1 °C) 65 -- 18 191/81 No Pain -- SF            Physical Exam  Vitals and nursing note reviewed.   Constitutional:       General: He is not in acute distress.     Appearance: He is well-developed. He is not toxic-appearing.   HENT:      Head: Normocephalic and atraumatic.      Right Ear: External ear normal.      Left Ear: External ear normal.      Nose: Nose normal.      Mouth/Throat:      Pharynx: Oropharynx is clear. No oropharyngeal exudate or posterior oropharyngeal erythema.   Eyes:      Extraocular Movements: Extraocular movements intact.      Conjunctiva/sclera: Conjunctivae normal.      Pupils: Pupils are equal, round, and reactive to light.   Cardiovascular:  "     Rate and Rhythm: Normal rate. Rhythm irregular.      Pulses: Normal pulses.      Heart sounds: Normal heart sounds. No murmur heard.     No friction rub. No gallop.   Pulmonary:      Effort: Pulmonary effort is normal. No respiratory distress.      Breath sounds: Normal breath sounds. No wheezing, rhonchi or rales.   Abdominal:      General: Abdomen is flat.      Palpations: Abdomen is soft.      Tenderness: There is no abdominal tenderness. There is no guarding or rebound.   Musculoskeletal:         General: Normal range of motion.      Cervical back: Normal range of motion. No rigidity.      Right lower leg: Edema present.      Left lower leg: Edema present.   Skin:     General: Skin is warm and dry.      Capillary Refill: Capillary refill takes less than 2 seconds.   Neurological:      General: No focal deficit present.      Mental Status: He is alert.      Cranial Nerves: No cranial nerve deficit.      Sensory: No sensory deficit.      Motor: No weakness.   Psychiatric:         Mood and Affect: Mood normal.         Results Reviewed       Procedure Component Value Units Date/Time    T4, free [[id number]]  (Normal) Collected: 04/23/25 1013    Lab Status: Final result Specimen: Blood from Arm, Left Updated: 04/23/25 1434     Free T4 0.77 ng/dL     Narrative:        \"Therapeutic range for patients medicated with thyroid disorders: 0.61-1.24 ng/dL.\"    HS Troponin I 2hr [[id number]]  (Normal) Collected: 04/23/25 1223    Lab Status: Final result Specimen: Blood from Arm, Left Updated: 04/23/25 1251     hs TnI 2hr 6 ng/L      Delta 2hr hsTnI 0 ng/L     TSH, 3rd generation with Free T4 reflex [[id number]]  (Abnormal) Collected: 04/23/25 1013    Lab Status: Final result Specimen: Blood from Arm, Left Updated: 04/23/25 1109     TSH 3RD GENERATON 7.886 uIU/mL     FLU/RSV/COVID - if FLU/RSV clinically relevant (2hr TAT) [[id number]]  (Normal) Collected: 04/23/25 1029    Lab Status: Final result Specimen: Nares from Nose " Updated: 04/23/25 1109     SARS-CoV-2 Negative     INFLUENZA A PCR Negative     INFLUENZA B PCR Negative     RSV PCR Negative    Narrative:      This test has been performed using the CoV-2/Flu/RSV plus assay on the [employer] platform. This test has been validated by the  and verified by the performing laboratory.     This test is designed to amplify and detect the following: nucleocapsid (N), envelope (E), and RNA-dependent RNA polymerase (RdRP) genes of the SARS-CoV-2 genome; matrix (M), basic polymerase (PB2), and acidic protein (PA) segments of the influenza A genome; matrix (M) and non-structural protein (NS) segments of the influenza B genome, and the nucleocapsid genes of RSV A and RSV B.     Positive results are indicative of the presence of Flu A, Flu B, RSV, and/or SARS-CoV-2 RNA. Positive results for SARS-CoV-2 or suspected novel influenza should be reported to state, local, or federal health departments according to local reporting requirements.      All results should be assessed in conjunction with clinical presentation and other laboratory markers for clinical management.     FOR PEDIATRIC PATIENTS - copy/paste COVID Guidelines URL to browser: [email or web address]       HS Troponin 0hr (reflex protocol) [[id number]]  (Normal) Collected: 04/23/25 1013    Lab Status: Final result Specimen: Blood from Arm, Left Updated: 04/23/25 1043     hs TnI 0hr 6 ng/L     Comprehensive metabolic panel [[id number]]  (Abnormal) Collected: 04/23/25 1013    Lab Status: Final result Specimen: Blood from Arm, Left Updated: 04/23/25 1035     Sodium 138 mmol/L      Potassium 4.3 mmol/L      Chloride 93 mmol/L      CO2 27 mmol/L      ANION GAP 18 mmol/L      BUN 15 mg/dL      Creatinine 0.87 mg/dL      Glucose 184 mg/dL      Calcium 9.9 mg/dL      AST 20 U/L      ALT 21 U/L      Alkaline Phosphatase 104 U/L      Total Protein 7.4 g/dL      Albumin 3.5  g/dL      Total Bilirubin 0.46 mg/dL      eGFR 79 ml/min/1.73sq m     Narrative:      [employer] Kid[employer] guidelines for Chronic Kidney Disease (CKD):     Stage 1 with normal or high GFR (GFR > 90 mL/min/1.73 square meters)    Stage 2 Mild CKD (GFR = 60-89 mL/min/1.73 square meters)    Stage 3A Moderate CKD (GFR = 45-59 mL/min/1.73 square meters)    Stage 3B Moderate CKD (GFR = 30-44 mL/min/1.73 square meters)    Stage 4 Severe CKD (GFR = 15-29 mL/min/1.73 square meters)    Stage 5 End Stage CKD (GFR <15 mL/min/1.73 square meters)  Note: GFR calculation is accurate only with a steady state creatinine    APTT [[id number]]  (Normal) Collected: 04/23/25 1013    Lab Status: Final result Specimen: Blood from Arm, Left Updated: 04/23/25 1032     PTT 32 seconds     Protime-INR [[id number]]  (Normal) Collected: 04/23/25 1013    Lab Status: Final result Specimen: Blood from Arm, Left Updated: 04/23/25 1032     Protime 14.7 seconds      INR 1.10    Narrative:      INR Therapeutic Range    Indication                                             INR Range      Atrial Fibrillation                                               2.0-3.0  Hypercoagulable State                                    2.0.2.3  Left Ventricular Asist Device                            2.0-3.0  Mechanical Heart Valve                                  -    Aortic(with afib, MI, embolism, HF, LA enlargement,    and/or coagulopathy)                                     2.0-3.0 (2.5-3.5)     Mitral                                                             2.5-3.5  Prosthetic/Bioprosthetic Heart Valve               2.0-3.0  Venous thromboembolism (VTE: VT, PE        2.0-3.0    CBC and differential [[id number]]  (Abnormal) Collected: 04/23/25 1013    Lab Status: Final result Specimen: Blood from Arm, Left Updated: 04/23/25 1020     WBC 13.73 Thousand/uL      RBC 4.62 Million/uL      Hemoglobin 13.4 g/dL      Hematocrit 43.0 %      MCV 93 fL      MCH 29.0  pg      MCHC 31.2 g/dL      RDW 14.4 %      MPV 9.5 fL      Platelets 312 Thousands/uL      nRBC 0 /100 WBCs      Segmented % 81 %      Immature Grans % 1 %      Lymphocytes % 11 %      Monocytes % 5 %      Eosinophils Relative 2 %      Basophils Relative 0 %      Absolute Neutrophils 11.07 Thousands/µL      Absolute Immature Grans 0.07 Thousand/uL      Absolute Lymphocytes 1.50 Thousands/µL      Absolute Monocytes 0.74 Thousand/µL      Eosinophils Absolute 0.29 Thousand/µL      Basophils Absolute 0.06 Thousands/µL     Fingerstick Glucose (POCT) [[id number]]  (Abnormal) Collected: 04/23/25 0952    Lab Status: Final result Specimen: Blood Updated: 04/23/25 0953     POC Glucose 154 mg/dl             CTA head and neck with and without contrast   Final Interpretation by Layton Vega MD (04/23 1156)   CT Brain: No acute intracranial pathology. If there is persistent clinical concern for acute ischemia, a brain MRI could be obtained for more sensitive evaluation.      CT Angiography:   1.  Fetal-type circulation of the left PCA with a robust caliber left posterior communicating artery supplying the left PCA territory. There is a diminutive left P1 segment which is not well opacified at the P1/P2 junction and may be occluded but the    left PCA territory does remain well perfused by the fetal left PCA. Of note, the diminutive appearance of the left P1 segment is unchanged as compared to the 11/19/2021 brain MRI.   2.  No other hemodynamically significant stenosis, large vessel occlusion, or dissection involving the head or neck arterial vasculature.      The study was marked in EPIC for immediate notification.            Workstation performed: [id number]             Procedures    ED Medication and Procedure Management   Prior to Admission Medications   Prescriptions Last Dose Informant Patient Reported? Taking?   Multiple Vitamin (MULTI-VITAMIN DAILY PO)  Spouse/Significant Other, Self Yes No   Sig: Take by mouth daily      acetaminophen (TYLENOL) 325 mg tablet  Spouse/Significant Other, Self No No   Sig: Take 2 tablets (650 mg total) by mouth every 6 (six) hours as needed for mild pain, headaches or fever   albuterol (Proventil HFA) 90 mcg/act inhaler  Spouse/Significant Other, Self No No   Sig: Inhale 2 puffs every 6 (six) hours as needed for wheezing   Patient not taking: Reported on 2/18/2025   apixaban (ELIQUIS) 2.5 mg  Spouse/Significant Other, Self No No   Sig: Take 1 tablet (2.5 mg total) by mouth 2 (two) times a day   atenolol (TENORMIN) 25 mg tablet  Spouse/Significant Other, Self Yes No   atorvastatin (LIPITOR) 10 mg tablet  Self, Spouse/Significant Other No No   Sig: Take 1 tablet (10 mg total) by mouth daily   carvedilol (COREG) 25 mg tablet   No No   Sig: Take 1 tablet (25 mg total) by mouth daily with breakfast   ciclopirox (LOPROX) 0.77 % cream  Spouse/Significant Other, Self No No   Sig: Apply topically 2 (two) times a day for 20 days   fluticasone-umeclidinium-vilanterol (Trelegy Ellipta) 100-62.5-25 mcg/actuation inhaler  Spouse/Significant Other, Self No No   Sig: Rinse mouth after use.   furosemide (LASIX) 20 mg tablet  Spouse/Significant Other, Self No No   Sig: Take 1 tablet (20 mg total) by mouth 2 (two) times a day   gabapentin (Neurontin) 300 mg capsule  Self, Spouse/Significant Other No No   Sig: Take 1 capsule (300 mg total) by mouth 3 (three) times a day   guaiFENesin-codeine (Guaiatussin AC) 100-10 mg/5 mL oral solution   No No   Sig: Take 5 mL by mouth 3 (three) times a day as needed for cough   halobetasol (ULTRAVATE) 0.05 % cream  Spouse/Significant Other, Self Yes No   hydrOXYzine HCL (ATARAX) 50 mg tablet  Self, Spouse/Significant Other No No   Sig: TAKE 1 TABLET (50 MG TOTAL) BY MOUTH DAILY AT BEDTIME   latanoprost (XALATAN) 0.005 % ophthalmic solution  Spouse/Significant Other, Self Yes No   Sig: Administer 0.005 mL to both eyes daily at bedtime   lidocaine-prilocaine (EMLA) cream   Spouse/Significant Other, Self No No   Sig: Apply to PAC site one hour prior to access   metFORMIN (GLUCOPHAGE) 500 mg tablet  Self, Spouse/Significant Other No No   Sig: TAKE 1 TABLET (500 MG TOTAL) BY MOUTH 2 (TWO) TIMES A DAY WITH MEALS   mirtazapine (REMERON) 7.5 MG tablet   No No   Sig: Take 1 tablet (7.5 mg total) by mouth daily at bedtime   omeprazole (PriLOSEC) 20 mg delayed release capsule  Spouse/Significant Other, Self No No   Sig: Take 1 capsule (20 mg total) by mouth daily   ondansetron (ZOFRAN) 8 mg tablet  Spouse/Significant Other, Self No No   Sig: Take 1 tablet (8 mg total) by mouth every 8 (eight) hours as needed for nausea or vomiting   primidone (MYSOLINE) 50 mg tablet  Self, Spouse/Significant Other No No   Sig: TAKE 2 TABS AT 8PM.   sertraline (Zoloft) 25 mg tablet  Spouse/Significant Other, Self No No   Sig: Take 1 tablet (25 mg total) by mouth daily   triamcinolone (KENALOG) 0.1 % cream  Spouse/Significant Other, Self Yes No      Facility-Administered Medications: None     Patient's Medications   Discharge Prescriptions    No medications on file     No discharge procedures on file.  ED SEPSIS DOCUMENTATION   Time reflects when diagnosis was documented in both MDM as applicable and the Disposition within this note       Time User Action Codes Description Comment    4/23/2025  1:03 PM Jacky Estevez [R26.2] Ambulatory dysfunction     4/23/2025  1:03 PM Jacky Estevez [H53.8] Blurred vision                  Jacky Estevez MD  04/23/25 1457

## 2025-04-23 NOTE — OCCUPATIONAL THERAPY NOTE
Occupational Therapy Evaluation       04/23/25 1306   OT Last Visit   OT Visit Date 04/23/25   Note Type   Note type Evaluation   Pain Assessment   Pain Assessment Tool 0-10   Pain Score No Pain   Restrictions/Precautions   Other Precautions Chair Alarm;Bed Alarm;Pain;Fall Risk   Home Living   Type of Home House   Home Layout Two level;Performs ADLs on one level;Able to live on main level with bedroom/bathroom;Ramped entrance   Bathroom Shower/Tub Walk-in shower   Bathroom Toilet Raised   Bathroom Equipment Shower chair;Grab bars in shower;Toilet raiser;Commode   Bathroom Accessibility Accessible via walker   Home Equipment Walker;Cane;Wheelchair-manual;Grab bars  (lift chair (where pt mostly sleeps))   Prior Function   Level of [place] Independent with functional mobility;Needs assistance with ADLs;Needs assistance with IADLS  (Wife reports she has been assisting with ADLs at  home for a few months ; has required increased level of assist over the past few weeks, now assisting with functional mobility + mostly transfers to wheelchair, ambulation short distance within home)   Lives With Spouse   Receives Help From Family   IADLs Family/Friend/Other provides transportation;Family/Friend/Other provides meals;Family/Friend/Other provides medication management   Falls in the last 6 months 1 to 4  (at least 2 falls within last few weeks)   Vocational Retired   General   Additional Pertinent History Pt is an 85 year-old male who presents to the ED today (4/23/25) due to falls, elevated blood sugar.   Family/Caregiver Present Yes  (wife and DIL present throughout session)   Subjective   Subjective Pt agreeable to OT evaluation   ADL   Eating Assistance 5  Supervision/Setup   Grooming Assistance 5  Supervision/Setup   UB Bathing Assistance 4  Minimal Assistance   LB Bathing Assistance 3  Moderate Assistance   UB Dressing Assistance 4  Minimal Assistance   LB Dressing Assistance 3  Moderate Assistance   Toileting  Assistance  3  Moderate Assistance   Bed Mobility   Supine to Sit 5  Supervision   Additional items HOB elevated;Increased time required   Sit to Supine 4  Minimal assistance   Additional items Assist x 1;Verbal cues;Increased time required;LE management   Transfers   Sit to Stand 4  Minimal assistance  (min A/close supervision)   Additional items Assist x 1;Verbal cues   Stand to Sit 5  Supervision   Additional items Assist x 1;Verbal cues;Increased time required   Functional Mobility   Functional Mobility 4  Minimal assistance  (progressing to close supervision)   Additional Comments engaged in fxl mobility short household distances using RW   Additional items Rolling walker   Balance   Static Sitting Fair +   Dynamic Sitting Fair   Static Standing Fair   Dynamic Standing Fair -   Activity Tolerance   Activity Tolerance Patient tolerated treatment well;Patient limited by fatigue   Medical Staff Made Aware Dr. Herb PARK Assessment   RUE Assessment   (ROM WFL, MMT grossly 4/5)   LUE Assessment   LUE Assessment   (ROM WFL, MMT grossly 4/5)   Cognition   Arousal/Participation Alert;Cooperative   Attention Attends with cues to redirect   Orientation Level Oriented X4   Following Commands Follows multistep commands with increased time or repetition   Assessment   Limitation Decreased ADL status;Decreased Safe judgement during ADL;Decreased endurance;Decreased self-care trans;Decreased high-level ADLs  (decreased balance and mobility)   Prognosis Good   Assessment Patient evaluated by Occupational Therapy.  Patient admitted with <principal problem not specified>.  The patients occupational profile, medical and therapy history includes a extensive additional review of physical, cognitive, or psychosocial history related to current functional performance. Comorbidities affecting functional mobility and ADLS include: Afib, CAD, cancer, cardiac disease, CHF, diabetes, hyperlipidemia, and neuropathy. Prior to admission,  patient was independent with functional mobility with RW, requiring assist for ADLS, and requiring assist for IADLS. The evaluation identifies the following performance deficits: weakness, impaired balance, decreased endurance, increased fall risk, new onset of impairment of functional mobility, decreased ADLS, decreased IADLS, decreased activity tolerance, and decreased strength, that result in activity limitations and/or participation restrictions. This evaluation requires clinical decision making of high complexity, because the patient presents with comorbidites that affect occupational performance and required significant modification of tasks or assistance with consideration of multiple treatment options.  The Barthel Index was used as a functional outcome tool presenting with a score of Barthel Index Score: 55, indicating marked limitations of functional mobility and ADLS.  The patient's raw score on the -PAC Daily Activity Inpatient Short Form is 15. A raw score of less than 19 suggests the patient may benefit from discharge to post-acute rehabilitation services however pt was receiving assist for ADLs prior to admission and will have assist upon d/c thus would be appropriate for and benefit from return home with level III minimum resource intensity. Please refer to the recommendation of the Occupational Therapist for safe discharge planning.  Patient will benefit from skilled Occupational Therapy services to address above deficits and facilitate a safe return to prior level of function.   Goals   Patient Goals to get better   STG Time Frame   (1-7 days)   Short Term Goal  Goals established to promote Patient Goals: to get better:  Eating: independent; Grooming: independent standing at sink; Bathing: min assist; Upper Body Dressing independent; Lower Body Dressing: min assist; Toileting: min assist; Patient will increase ambulatory standard toilet transfer to supervision with rolling walker to increase  performance and safety with ADLS and functional mobility; Patient will increase standing tolerance to 3 minutes during ADL task to decrease assistance level and decrease fall risk; Patient will increase bed mobility to supervision in preparation for ADLS and transfers; Patient will increase functional mobility to and from bathroom with rolling walker with supervision to increase performance with ADLS and to use a toilet; Patient will tolerate 8 minutes of UE ROM/strengthening to increase general activity tolerance and performance in ADLS/IADLS; Patient will improve functional activity tolerance to 10 minutes of sustained functional tasks to increase participation in basic self-care and decrease assistance level;  Patient will be able to to verbalize understanding and perform energy conservation/proper body mechanics during ADLS and functional mobility at least 75% of the time with minimal cueing to decrease signs of fatigue and increase stamina to return to prior level of function; Patient will increase dynamic sitting balance to fair+ to improve the ability to sit at edge of bed or on a chair for ADLS;  Patient will increase dynamic standing balance to fair to improve postural stability and decrease fall risk during standing ADLS and transfers.   LTG Time Frame   (8-14 days)   Long Term Goal Bathing: supervision; Lower Body Dressing: supervision; Toileting: supervision; Patient will increase ambulatory standard toilet transfer to independent with rolling walker to increase performance and safety with ADLS and functional mobility; Patient will increase standing tolerance to 6 minutes during ADL task to decrease assistance level and decrease fall risk; Patient will increase bed mobility to independent in preparation for ADLS and transfers; Patient will increase functional mobility to and from bathroom with rolling walker independently to increase performance with ADLS and to use a toilet; Patient will tolerate 15  minutes of UE ROM/strengthening to increase general activity tolerance and performance in ADLS/IADLS; Patient will improve functional activity tolerance to 20 minutes of sustained functional tasks to increase participation in basic self-care and decrease assistance level;  Patient will be able to to verbalize understanding and perform energy conservation/proper body mechanics during ADLS and functional mobility at least 90% of the time with no cueing to decrease signs of fatigue and increase stamina to return to prior level of function; Patient will increase dynamic sitting balance to good to improve the ability to sit at edge of bed or on a chair for ADLS;  Patient will increase dynamic standing balance to fair+ to improve postural stability and decrease fall risk during standing ADLS and transfers.  Pt will score >/= 19/24 on AM-PAC Daily Activity Inpatient scale to promote safe independence with ADLs and functional mobility; Pt will score >/= 85/100 on Barthel Index in order to decrease caregiver assistance needed and increase ability to perform ADLs and functional mobility.   Plan   Treatment Interventions ADL retraining;Functional transfer training;UE strengthening/ROM;Patient/family training;Equipment evaluation/education;Activityengagement;Compensatory technique education;Energy conservation   Goal Expiration Date 05/06/25   OT Frequency 3-5x/wk   Discharge Recommendation   Rehab Resource Intensity Level, OT III (Minimum Resource Intensity)   AM-PAC Daily Activity Inpatient   Lower Body Dressing 2   Bathing 2   Toileting 2   Upper Body Dressing 3   Grooming 3   Eating 3   Daily Activity Raw Score 15   Daily Activity Standardized Score (Calc for Raw Score >=11) 34.69   AM-PAC Applied Cognition Inpatient   Following a Speech/Presentation 3   Understanding Ordinary Conversation 4   Taking Medications 3   Remembering Where Things Are Placed or Put Away 3   Remembering List of 4-5 Errands 2   Taking Care of  Complicated Tasks 2   Applied Cognition Raw Score 17   Applied Cognition Standardized Score 36.52   Barthel Index   Feeding 10   Bathing 0   Grooming Score 5   Dressing Score 5   Bladder Score 10   Bowels Score 10   Toilet Use Score 5   Transfers (Bed/Chair) Score 10   Mobility (Level Surface) Score 0   Stairs Score 0   Barthel Index Score 55     Leida Eric OTR/L   [place] License # [id number]  [place] License # [id number]

## 2025-04-23 NOTE — PHYSICAL THERAPY NOTE
PT EVALUATION       04/23/25 9477   PT Last Visit   PT Visit Date 04/23/25   Note Type   Note type Evaluation   Pain Assessment   Pain Assessment Tool 0-10   Pain Score No Pain   Patient's Stated Pain Goal No pain   Multiple Pain Sites No   Restrictions/Precautions   Weight Bearing Precautions Per Order No   Other Precautions Bed Alarm;Chair Alarm;Fall Risk;Pain   Home Living   Type of Home House   Home Layout Two level;Performs ADLs on one level;Able to live on main level with bedroom/bathroom;Ramped entrance   Bathroom Toilet Raised   Bathroom Equipment Shower chair;Grab bars in shower;Toilet raiser   Home Equipment Walker;Cane;Wheelchair-manual;Grab bars  (lift recliner - wife reports mostly sleeping there)   Prior Function   Level of [place] Independent with functional mobility;Needs assistance with ADLs;Needs assistance with IADLS    (Wife reports she has been assisting with ADLs at  home for a few months ; has required increased level of assist over the past few weeks, now assisting with functional mobility + mostly transfers to wheelchair, ambulation short distance within home)   Lives With Spouse   Receives Help From Family   IADLs Family/Friend/Other provides transportation;Family/Friend/Other provides meals   Falls in the last 6 months 1 to 4  (at least 2 falls)   Vocational Retired   General   Additional Pertinent History Pt is an 85 year-old male who presents to the ED today (4/23/25) due to falls, elevated blood sugar.   Family/Caregiver Present Yes  (wife + DIL at bedside throughout session)   Cognition   Arousal/Participation Cooperative   Orientation Level Oriented to person;Oriented to place;Oriented to time;Oriented to situation   Following Commands Follows multistep commands with increased time or repetition   Subjective   Subjective Pt agreeable to PT session this afternoon   RLE Assessment   RLE Assessment   (Grossly 4- to 4/5)   LLE Assessment   LLE Assessment   (Grossly 4- to 4/5)    Bed Mobility   Supine to Sit 5  Supervision   Additional items HOB elevated;Increased time required   Sit to Supine 4  Minimal assistance   Additional items Assist x 1;Verbal cues;Increased time required;LE management   Transfers   Sit to Stand 4  Minimal assistance  (to close supervision)   Additional items Assist x 1;Verbal cues   Stand to Sit 5  Supervision   Additional items Assist x 1;Verbal cues;Increased time required   Ambulation/Elevation   Gait pattern Foward flexed;Short stride;Step through pattern  (decreased gait speed, decreased step length bilat)   Gait Assistance 4  Minimal assist  (progressing to close supervision)   Additional items Assist x 1;Verbal cues   Assistive Device Rolling walker   Distance 20 feet x 2   Stair Management Assistance Not tested   Balance   Static Sitting Fair +   Dynamic Sitting Fair   Static Standing Fair   Dynamic Standing Fair -   Ambulatory Fair -  (RW)   Activity Tolerance   Activity Tolerance Patient tolerated treatment well;Patient limited by fatigue   Medical Staff Made Aware yes Dr. Estevez   Assessment   Prognosis Good   Problem List Decreased strength;Decreased endurance;Impaired balance;Decreased mobility;Decreased safety awareness;Decreased coordination   Assessment Patient seen for Physical Therapy evaluation. Patient admitted with falls, high blood sugar.  Comorbidities affecting patient's physical performance include: Afib, CAD, cancer, cardiac disease, CHF, diabetes, hyperlipidemia, and neuropathy.  Personal factors affecting patient at time of initial evaluation include: lives in 2 story house, ambulating with assistive device, inability to navigate community distances, inability to navigate level surfaces without external assistance, and inability to perform IADLS . Prior to admission, patient was requiring assist for functional mobility with walker/wheelchair, requiring assist for ADLS, requiring assist for IADLS, living with spouse in a 2 level home  with 0 (ramped) steps to enter, ambulating household distance, and home with family assist.  Please find objective findings from Physical Therapy assessment regarding body systems outlined above with impairments and limitations including weakness, impaired balance, decreased endurance, gait deviations, decreased activity tolerance, decreased functional mobility tolerance, and fall risk.  The Barthel Index was used as a functional outcome tool presenting with a score of Barthel Index Score: 55 today indicating marked limitations of functional mobility and ADLS.  Patient's clinical presentation is currently unstable/unpredictable as seen in patient's presentation of increased fall risk, new onset of impairment of functional mobility, and decreased endurance. Pt would benefit from continued Physical Therapy treatment to address deficits as defined above and maximize level of functional mobility. As demonstrated by objective findings, the assigned level of complexity for this evaluation is high.The patient's AM-[facility] Basic Mobility Inpatient Short Form Raw Score is 16. A Raw score of less than or equal to 16 suggests the patient may benefit from discharge to post-acute rehabilitation services. Please also refer to the recommendation of the Physical Therapist for safe discharge planning.   Goals   Patient Goals to get better   STG Expiration Date 04/30/25   Short Term Goal #1 Pt will perform bed mobility IND ; pt will perform bed <> chair Mod I with RW ; Standing balance will improve to fair to decrease risk of falls ; BLE strength will improve by 1/2 grade to improve tolerance to functional mobility ; Pt will ambulate x 50 feet Mod I with RW ; AMPAC score will improve >18/24 to demonstrate improved functional independence   LTG Expiration Date 05/07/25   Long Term Goal #1 Standing balance will improve to fair+ to decrease risk of falls ; BLE strength will improve by 1 grade to improve tolerance to functional mobility ; Pt  will ambulate x 100 feet Mod I with RW ; AMPAC score will improve >20/24 to demonstrate improved functional independence   Plan   Treatment/Interventions Functional transfer training;LE strengthening/ROM;ADL retraining;Therapeutic exercise;Endurance training;Bed mobility;Gait training;Spoke to MD;OT   PT Frequency 3-5x/wk   Discharge Recommendation   Rehab Resource Intensity Level, PT III (Minimum Resource Intensity)   AM-PAC Basic Mobility Inpatient   Turning in Flat Bed Without Bedrails 3   Lying on Back to Sitting on Edge of Flat Bed Without Bedrails 3   Moving Bed to Chair 3   Standing Up From Chair Using Arms 3   Walk in Room 3   Climb 3-5 Stairs With Railing 1   Basic Mobility Inpatient Raw Score 16   Basic Mobility Standardized Score 38.32   [facility] Highest Level Of Mobility   -HLM Goal 5: Stand one or more mins   -HLM Achieved 7: Walk 25 feet or more   Barthel Index   Feeding 10   Bathing 0   Grooming Score 5   Dressing Score 5   Bladder Score 10   Bowels Score 10   Toilet Use Score 5   Transfers (Bed/Chair) Score 10   Mobility (Level Surface) Score 0   Stairs Score 0   Barthel Index Score 55   End of Consult   Patient Position at End of Consult Supine;All needs within reach   Licensure   [place] License Number  Rosemary Warren [id number]      General:     NAD, well-nourished, well-appearing  Head:     NC/AT, EOMI, oral mucosa moist  Neck:     supple  Lungs:     CTA b/l, no w/r/r  CVS:     S1S2, RRR, no m/g/r  Abd:     +BS, s/nt/nd, no organomegaly  Ext:    2+ radial and pedal pulses, no c/c/e  Neuro: grossly intact

## 2025-04-23 NOTE — CASE MANAGEMENT
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) 2025     Palliative care encounter 2024     Nausea 2024     Anxiousness 2024     Epistaxis 2024     Chemotherapy follow-up examination 2024     History of prostate cancer 2024     Neuropathy 2024     Chemotherapy-induced peripheral neuropathy (HCC) 2024     Constipation 2024     Urticaria 2024     Allergic reaction caused by a drug 2024     Recurrent pleural effusion on left 2023     Abdominal aortic aneurysm ([facility]) 2023    Hypertensive heart disease 2023    Tremors of nervous system 2023     Centrilobular emphysema ([facility]) 2023     Cellulitis of chest wall 2023     Acute kidney injury (BUDDY) with acute tubular necrosis (ATN) ([facility]) 2023     Chronic obstructive pulmonary disease, unspecified COPD type ([facility]) 2023     Cellulitis 2022     Septic shock ([facility]) 2022     Angioedema 2022     Prostate cancer ([facility]) 2022     GERD (gastroesophageal reflux disease) 2022     CAD (coronary artery disease) 2022     Other persistent atrial fibrillation ([facility]) 2022     SYLVESTER (obstructive sleep apnea)      Chronic diastolic (congestive) heart failure ([facility]) 2021     Squamous cell carcinoma of lung ([facility]) 10/13/2021     Shortness of breath 10/13/2021     Daytime hypersomnolence 10/13/2021     Lung mass 2021     Hyperlipidemia      Type 2 diabetes mellitus with diabetic neuropathy, without long-term current use of insulin ([facility])      Corns 02/15/2018     Pain in both feet 02/15/2018      Onychomycosis 02/15/2018     Tinea pedis of both feet 02/15/2018       LOS (days): 0  Geometric Mean LOS (GMLOS) (days):   Days to GMLOS:     OBJECTIVE:     Current admission status: Emergency  Referral Reason: VNA    Preferred Pharmacy:   [facility] - [place], [place] - [address] [address]  Phone: [phone] Fax: [phone]    [facility] - [place] [id number] [facility]  [address]  [address]  [facility] [address]  Phone: [phone] Fax: [phone]    [facility] - [place], [place] - [address]  [address]  [facility] [address]  Phone: [phone] Fax: [phone]    Primary Care Provider: Vivek Tatum MD    Primary Insurance: [facility]  Secondary Insurance:     ASSESSMENT:  Active Health Care Proxies       ForrestTaylor Health Care Agent - Spouse   Primary Phone: [phone] (Mobile)  Home Phone: [phone]                 Patient Information  Mental Status: Alert  During Assessment patient was accompanied by: Spouse  Assessment information provided by:: Spouse  Primary Caregiver: Spouse  Caregiver's Name:: Taylor Clayton  Caregiver's Relationship to Patient:: Family Member (wife)  Caregiver's Telephone Number:: [phone]  Support Systems: Spouse/significant other, Son, Family members  County of Residence: [place]  What city do you live in?: [place]  Home entry access options. Select all that apply.: Ramp  Type of Current Residence: 2 story home  Upon entering residence, is there a bedroom on the main floor (no further steps)?: Yes  Upon entering residence, is there a bathroom on the main floor (no further steps)?: Yes  Living Arrangements: Lives w/ Spouse/significant other    Activities of Daily Living Prior to Admission  Functional Status: Assistance  Completes ADLs independently?: No  Level of ADL dependence: Assistance  Ambulates independently?: No  Level of  ambulatory dependence: Assistance  Does patient use assisted devices?: Yes  Assisted Devices (DME) used: Walker, Wheelchair, Rollator, Other (Comment) (raised toilet seat, lift recliner)  Does patient currently own DME?: Yes  What DME does the patient currently own?: Walker, Wheelchair, Rollator, Other (Comment) (raised toilet seat, lift recliner)  Does patient have a history of Outpatient Therapy (PT/OT)?: No  Does the patient have a history of Short-Term Rehab?: No  Does patient have a history of HHC?: Yes (could not recall agency)  Does patient currently have HHC?: No    Current Home Health Care  Home Health Agency Name:: Other    Patient Information Continued  Income Source: Pension/care home  Does patient have prescription coverage?: Yes  Can the patient afford their medications and any related supplies (such as glucometers or test strips)?: Yes  Does patient receive dialysis treatments?: No    Means of Transportation  Means of Transport to Appts:: Family transport    DISCHARGE DETAILS:    Discharge planning discussed with:: Patient and wife, Taylor Clayton  Freedom of Choice: Yes  Comments - Freedom of Choice: SW met with pt and wife in ED to assess needs and discuss plans.  Pt came to the ED with lightheadedness and high blood sugar.  Pt was evaluated by PT/OT at Level III and home therapy is being recommended.  Pt and wife are agreeable with home therapy since it is getting much more difficult for pt to leave the house.  SW offered to home care referrals to determine accepting agencies and follow up with pt and wife at home since pt has been cleared for discharge home.  Pt and wife agreeable with plan.  Wife shared that she has also talked with Dr. Tatum about possible hospice support but she wants to talk to Dr. James later this week about that option before making any decision.  VON offered ongoing support and assistance to pt and wife.  Wife has SW card for future reference.  CM contacted family/caregiver?:  Yes  Were Treatment Team discharge recommendations reviewed with patient/caregiver?: Yes  Did patient/caregiver verbalize understanding of patient care needs?: Yes  Were patient/caregiver advised of the risks associated with not following Treatment Team discharge recommendations?: Yes    Contacts  Patient Contacts: Taylor Forrest  Relationship to Patient:: Family  Contact Method: In Person  Reason/Outcome: Continuity of Care, Discharge Planning    Requested Home Health Care         Is the patient interested in HHC at discharge?: Yes  Home Health Discipline requested:: Physical Therapy, Occupational Therapy  Home Health Agency Name:: Other  HHA External Referral Reason (only applicable if external HHA name selected): Services not provided in network or near patient location  Home Health Follow-Up Provider:: PCP  Home Health Services Needed:: Evaluate Functional Status and Safety, Gait/ADL Training, Strengthening/Theraputic Exercises to Improve Function  Homebound Criteria Met:: Requires the Assistance of Another Person for Safe Ambulation or to Leave the Home, Uses an Assist Device (i.e. cane, walker, etc)  Supporting Clincal Findings:: Fatigues Easliy in Short Distances, Limited Endurance    DME Referral Provided  Referral made for DME?: No    Other Referral/Resources/Interventions Provided:  Interventions: HHC  Referral Comments: [facility] referrals have been made and acceptance is pending    Treatment Team Recommendation: Home with Home Health Care  Discharge Destination Plan:: Home with Home Health Care (pending acceptance)  Transport at Discharge : Wheelchair van

## 2025-04-23 NOTE — TELEPHONE ENCOUNTER
Regarding: High Glucose  ----- Message from Betsy ALFONSO sent at 4/23/2025  8:38 AM EDT -----  Patient's Glucose was 416 per his wife Myla. Tried to get a nurse, but none available. Please call wife back as soon as possible. She needs to know what to do.

## 2025-04-23 NOTE — ED NOTES
Awaiting transport home, Pt is given box lunch and is now up and eat.      Deanne Wilson RN  04/23/25 1251

## 2025-04-24 ENCOUNTER — VBI (OUTPATIENT)
Dept: INTERNAL MEDICINE CLINIC | Facility: CLINIC | Age: 85
End: 2025-04-24

## 2025-04-24 ENCOUNTER — TELEPHONE (OUTPATIENT)
Age: 85
End: 2025-04-24

## 2025-04-24 ENCOUNTER — TELEPHONE (OUTPATIENT)
Dept: CASE MANAGEMENT | Facility: HOSPITAL | Age: 85
End: 2025-04-24

## 2025-04-24 ENCOUNTER — TELEPHONE (OUTPATIENT)
Dept: INTERNAL MEDICINE CLINIC | Facility: CLINIC | Age: 85
End: 2025-04-24

## 2025-04-24 NOTE — TELEPHONE ENCOUNTER
SW followed up with pt's wife regarding accepting home care agencies, [facility] (therapy only) and [facility].  SW reviewed options with wife and she requested services through [facility].  Agency will be reserved in Aidin and notified of choice.  Wife aware that [facility] will be contacting her to initiate services.

## 2025-04-24 NOTE — TELEPHONE ENCOUNTER
04/24/25 10:09 AM    Patient contacted post ED visit, [facility] department spoke with patient/caregiver and outreach was successful.    Thank you.  Vero Olmedo  [facility]

## 2025-04-24 NOTE — TELEPHONE ENCOUNTER
PA Guaiatussin -10 mg/5 mL APPROVED         Patient advised by          []MyChart Message  []Phone call   []LMOM  []L/M to call office as no active Communication consent on file  []Unable to leave detailed message as VM not approved on Communication consent       Pharmacy advised by    [x]Fax - dispensed 4/22/25  []Phone call  []Secure Chat    Specialty Pharmacy    []

## 2025-04-24 NOTE — TELEPHONE ENCOUNTER
Marion called this morning because she had to take Spenser to the hospital yesterday.  She wants to speak with you about hospice care for him.  I explained that you are out today and that I would ask you to return the call tomorrow morning.  Myla was fine with that.

## 2025-04-24 NOTE — TELEPHONE ENCOUNTER
RADHA Posadassin -10 mg/5 mL SUBMITTED    to [employer]     via    []CMM-KEY:    [x]Surescripts-Case ID # [id number]  []Availity-Auth ID #  NDC #    []Faxed to plan   []Other website    []Phone call Case ID #      [x]PA sent as URGENT    All office notes, labs and other pertaining documents and studies sent. Clinical questions answered. Awaiting determination from insurance company.     Turnaround time for your insurance to make a decision on your Prior Authorization can take 7-21 business days.

## 2025-04-24 NOTE — PROGRESS NOTES
Patient:    MRN:  [id number]    Aidin Request ID:  [id number]    Level of care reserved:  Home Health Agency    Partner Reserved:  [place] [address] [phone]    Clinical needs requested:    Geography searched:  [id number]    Start of Service:    Request sent:  1:55pm EDT on 4/23/2025 by Katelyn Alicea    Partner reserved:  9:59am EDT on 4/24/2025 by Katelyn Alicea    Choice list shared:  9:58am EDT on 4/24/2025 by Katelyn Alicea

## 2025-04-28 DIAGNOSIS — C79.89 NON-SMALL CELL CARCINOMA OF LEFT LUNG METASTATIC TO ABDOMEN (HCC): ICD-10-CM

## 2025-04-28 DIAGNOSIS — C34.92 SQUAMOUS CELL CARCINOMA OF LEFT LUNG (HCC): Primary | Chronic | ICD-10-CM

## 2025-04-28 DIAGNOSIS — C34.92 NON-SMALL CELL CARCINOMA OF LEFT LUNG METASTATIC TO ABDOMEN (HCC): ICD-10-CM

## 2025-04-29 ENCOUNTER — TELEPHONE (OUTPATIENT)
Dept: CASE MANAGEMENT | Facility: HOSPITAL | Age: 85
End: 2025-04-29

## 2025-04-29 NOTE — TELEPHONE ENCOUNTER
Message received from Anya Nielsen from [facility] that after talking with PCP family decided to pursue hospice services not home care.  [facility]  Per Anya pt and family were going to follow up with [facility].

## 2025-05-06 ENCOUNTER — HOSPITAL ENCOUNTER (OUTPATIENT)
Dept: INFUSION CENTER | Facility: HOSPITAL | Age: 85
Discharge: HOME | End: 2025-05-06

## 2025-05-27 DIAGNOSIS — K21.9 GASTROESOPHAGEAL REFLUX DISEASE WITHOUT ESOPHAGITIS: ICD-10-CM

## 2025-05-27 RX ORDER — OMEPRAZOLE 20 MG/1
20 CAPSULE, DELAYED RELEASE ORAL DAILY
Qty: 90 CAPSULE | Refills: 1 | Status: SHIPPED | OUTPATIENT
Start: 2025-05-27

## 2025-06-15 ENCOUNTER — RA CDI HCC (OUTPATIENT)
Dept: OTHER | Facility: HOSPITAL | Age: 85
End: 2025-06-15

## 2025-06-15 NOTE — PROGRESS NOTES
HCC coding opportunities          Chart Reviewed number of suggestions sent to Provider: 4     Patients Insurance     Medicare Insurance: [employer]age

## 2025-07-08 ENCOUNTER — TELEPHONE (OUTPATIENT)
Age: 85
End: 2025-07-08

## 2025-07-08 NOTE — TELEPHONE ENCOUNTER
Dominguez Garcia calling to request a signature from Dr Lombardi for a death certificate.  For case is [id number]  Dominguez Garcia  [phone]    Please advise  Thank you.

## (undated) DEVICE — 3M™ STERI-STRIP™ REINFORCED ADHESIVE SKIN CLOSURES, R1547, 1/2 IN X 4 IN (12 MM X 100 MM), 6 STRIPS/ENVELOPE: Brand: 3M™ STERI-STRIP™

## (undated) DEVICE — SINGLE USE BIOPSY VALVE MAJ-210: Brand: SINGLE USE BIOPSY VALVE (STERILE)

## (undated) DEVICE — UTILITY MARKER,BLACK WITH LABELS: Brand: DEVON

## (undated) DEVICE — HEAVY DUTY TABLE COVER: Brand: CONVERTORS

## (undated) DEVICE — CHLORAPREP HI-LITE 26ML ORANGE

## (undated) DEVICE — 3M™ IOBAN™ 2 ANTIMICROBIAL INCISE DRAPE 6640EZ: Brand: IOBAN™ 2

## (undated) DEVICE — FIRST STEP BEDSIDE KIT - STAND-UP POUCH, ENDOSCOPIC CLEANING PAD - 1 POUCH: Brand: FIRST STEP BEDSIDE KIT - STAND-UP POUCH, ENDOSCOPIC CLEANING PAD

## (undated) DEVICE — TUBING SUCTION 5MM X 12 FT

## (undated) DEVICE — COTTON TIP APPLICTOR 2 PK

## (undated) DEVICE — PAD GROUNDING ADULT

## (undated) DEVICE — ADHESIVE SKIN HIGH VISCOSITY EXOFIN 1ML

## (undated) DEVICE — TELFA NON-ADHERENT ABSORBENT DRESSING: Brand: TELFA

## (undated) DEVICE — GLOVE INDICATOR PI UNDERGLOVE SZ 8 BLUE

## (undated) DEVICE — BETHLEHEM UNIVERSAL MINOR GEN: Brand: CARDINAL HEALTH

## (undated) DEVICE — PENCIL ELECTROSURG E-Z CLEAN -0035H

## (undated) DEVICE — SUT MONOCRYL 4-0 PS-2 18 IN Y496G

## (undated) DEVICE — SURGICEL 4 X 8

## (undated) DEVICE — GAUZE SPONGES,16 PLY: Brand: CURITY

## (undated) DEVICE — SINGLE USE SUCTION VALVE MAJ-209: Brand: SINGLE USE SUCTION VALVE (STERILE)

## (undated) DEVICE — ANTI-FOG SOLUTION WITH FOAM PAD: Brand: DEVON

## (undated) DEVICE — SUT VICRYL 3-0 SH 27 IN J416H

## (undated) DEVICE — GLOVE SRG BIOGEL ECLIPSE 7.5

## (undated) DEVICE — ADAPTOR TRACH SWIVEL